# Patient Record
Sex: FEMALE | Race: WHITE | Employment: OTHER | URBAN - METROPOLITAN AREA
[De-identification: names, ages, dates, MRNs, and addresses within clinical notes are randomized per-mention and may not be internally consistent; named-entity substitution may affect disease eponyms.]

---

## 2017-01-30 ENCOUNTER — LAB ENCOUNTER (OUTPATIENT)
Dept: LAB | Age: 77
End: 2017-01-30
Attending: INTERNAL MEDICINE
Payer: MEDICARE

## 2017-01-30 ENCOUNTER — PRIOR ORIGINAL RECORDS (OUTPATIENT)
Dept: OTHER | Age: 77
End: 2017-01-30

## 2017-01-30 DIAGNOSIS — R53.81 DEBILITY: ICD-10-CM

## 2017-01-30 DIAGNOSIS — R10.9 STOMACH ACHE: ICD-10-CM

## 2017-01-30 DIAGNOSIS — E11.9 DIABETES MELLITUS (HCC): Primary | ICD-10-CM

## 2017-01-30 DIAGNOSIS — R35.0 URINARY FREQUENCY: ICD-10-CM

## 2017-01-30 LAB
ALBUMIN SERPL BCP-MCNC: 3.3 G/DL (ref 3.5–4.8)
ALBUMIN/GLOB SERPL: 1 {RATIO} (ref 1–2)
ALP SERPL-CCNC: 52 U/L (ref 32–100)
ALT SERPL-CCNC: 27 U/L (ref 14–54)
ANION GAP SERPL CALC-SCNC: 8 MMOL/L (ref 0–18)
AST SERPL-CCNC: 22 U/L (ref 15–41)
BASOPHILS # BLD: 0 K/UL (ref 0–0.2)
BASOPHILS NFR BLD: 0 %
BILIRUB SERPL-MCNC: 0.3 MG/DL (ref 0.3–1.2)
BILIRUB UR QL: NEGATIVE
BUN SERPL-MCNC: 12 MG/DL (ref 8–20)
BUN/CREAT SERPL: 24 (ref 10–20)
CALCIUM SERPL-MCNC: 8.9 MG/DL (ref 8.5–10.5)
CHLORIDE SERPL-SCNC: 97 MMOL/L (ref 95–110)
CHOLEST SERPL-MCNC: 204 MG/DL (ref 110–200)
CLARITY UR: CLEAR
CO2 SERPL-SCNC: 35 MMOL/L (ref 22–32)
COLOR UR: YELLOW
CREAT SERPL-MCNC: 0.5 MG/DL (ref 0.5–1.5)
CREAT UR-MCNC: 79.5 MG/DL
EOSINOPHIL # BLD: 0.1 K/UL (ref 0–0.7)
EOSINOPHIL NFR BLD: 1 %
ERYTHROCYTE [DISTWIDTH] IN BLOOD BY AUTOMATED COUNT: 14.2 % (ref 11–15)
GLOBULIN PLAS-MCNC: 3.3 G/DL (ref 2.5–3.7)
GLUCOSE SERPL-MCNC: 115 MG/DL (ref 70–99)
GLUCOSE UR-MCNC: NEGATIVE MG/DL
HCT VFR BLD AUTO: 43.2 % (ref 35–48)
HDLC SERPL-MCNC: 68 MG/DL
HGB BLD-MCNC: 14.5 G/DL (ref 12–16)
HGB UR QL STRIP.AUTO: NEGATIVE
KETONES UR-MCNC: NEGATIVE MG/DL
LDLC SERPL CALC-MCNC: 117 MG/DL (ref 0–99)
LEUKOCYTE ESTERASE UR QL STRIP.AUTO: NEGATIVE
LYMPHOCYTES # BLD: 1.3 K/UL (ref 1–4)
LYMPHOCYTES NFR BLD: 16 %
MCH RBC QN AUTO: 30.9 PG (ref 27–32)
MCHC RBC AUTO-ENTMCNC: 33.5 G/DL (ref 32–37)
MCV RBC AUTO: 92.2 FL (ref 80–100)
MICROALBUMIN UR-MCNC: 0 MG/DL (ref 0–1.8)
MICROALBUMIN/CREAT UR: 0 MG/G{CREAT} (ref 0–20)
MONOCYTES # BLD: 0.6 K/UL (ref 0–1)
MONOCYTES NFR BLD: 8 %
NEUTROPHILS # BLD AUTO: 6 K/UL (ref 1.8–7.7)
NEUTROPHILS NFR BLD: 75 %
NITRITE UR QL STRIP.AUTO: NEGATIVE
NONHDLC SERPL-MCNC: 136 MG/DL
OSMOLALITY UR CALC.SUM OF ELEC: 291 MOSM/KG (ref 275–295)
PH UR: 6 [PH] (ref 5–8)
PLATELET # BLD AUTO: 209 K/UL (ref 140–400)
PMV BLD AUTO: 7.6 FL (ref 7.4–10.3)
POTASSIUM SERPL-SCNC: 4.3 MMOL/L (ref 3.3–5.1)
PROT SERPL-MCNC: 6.6 G/DL (ref 5.9–8.4)
PROT UR-MCNC: NEGATIVE MG/DL
RBC # BLD AUTO: 4.69 M/UL (ref 3.7–5.4)
SODIUM SERPL-SCNC: 140 MMOL/L (ref 136–144)
SP GR UR STRIP: 1.02 (ref 1–1.03)
TRIGL SERPL-MCNC: 96 MG/DL (ref 1–149)
TSH SERPL-ACNC: 1.23 UIU/ML (ref 0.34–5.6)
UROBILINOGEN UR STRIP-ACNC: <2
VIT C UR-MCNC: NEGATIVE MG/DL
WBC # BLD AUTO: 8 K/UL (ref 4–11)

## 2017-01-30 PROCEDURE — 80061 LIPID PANEL: CPT

## 2017-01-30 PROCEDURE — 84443 ASSAY THYROID STIM HORMONE: CPT

## 2017-01-30 PROCEDURE — 81003 URINALYSIS AUTO W/O SCOPE: CPT

## 2017-01-30 PROCEDURE — 82043 UR ALBUMIN QUANTITATIVE: CPT

## 2017-01-30 PROCEDURE — 82306 VITAMIN D 25 HYDROXY: CPT

## 2017-01-30 PROCEDURE — 83036 HEMOGLOBIN GLYCOSYLATED A1C: CPT

## 2017-01-30 PROCEDURE — 36415 COLL VENOUS BLD VENIPUNCTURE: CPT

## 2017-01-30 PROCEDURE — 82570 ASSAY OF URINE CREATININE: CPT

## 2017-01-30 PROCEDURE — 85025 COMPLETE CBC W/AUTO DIFF WBC: CPT

## 2017-01-30 PROCEDURE — 80053 COMPREHEN METABOLIC PANEL: CPT

## 2017-01-31 LAB — HBA1C MFR BLD: 6 % (ref 4–6)

## 2017-02-01 LAB — 25(OH)D3 SERPL-MCNC: 39 NG/ML

## 2017-02-16 ENCOUNTER — OFFICE VISIT (OUTPATIENT)
Dept: OTOLARYNGOLOGY | Facility: CLINIC | Age: 77
End: 2017-02-16

## 2017-02-16 VITALS
HEART RATE: 76 BPM | BODY MASS INDEX: 31.32 KG/M2 | SYSTOLIC BLOOD PRESSURE: 122 MMHG | WEIGHT: 188 LBS | DIASTOLIC BLOOD PRESSURE: 60 MMHG | TEMPERATURE: 98 F | HEIGHT: 65 IN

## 2017-02-16 DIAGNOSIS — J34.89 NASAL CRUSTING: Primary | ICD-10-CM

## 2017-02-16 PROCEDURE — G0463 HOSPITAL OUTPT CLINIC VISIT: HCPCS | Performed by: OTOLARYNGOLOGY

## 2017-02-16 PROCEDURE — 99214 OFFICE O/P EST MOD 30 MIN: CPT | Performed by: OTOLARYNGOLOGY

## 2017-02-16 NOTE — PROGRESS NOTES
Johnna Reyes is a 78year old female. Patient presents with:  Lesion: left nostril x 3 years      HISTORY OF PRESENT ILLNESS  She presents with a history Left-sided pulmonary issues. She is nonworking left hemidiaphragm.  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    • Diverticulitis      colonoscopy            Past Surgical History    T&A      HYSTERECTOMY Detail Normal Submental. Submandibular. Anterior cervical. Posterior cervical. Supraclavicular.         Nose/Mouth/Throat Normal External nose - Normal. Lips/teeth/gums - Normal. Tonsils - Normal. Oropharynx - Normal.   Nose/Mouth/Throat Normal Nares - Righ Take  by mouth. Take one tablet by mouth every day, Disp: , Rfl:   •  Multiple Vitamin (MULTI-VITAMINS) Oral Tab, Take  by mouth.  take 1 tablet by ORAL route  every day with food, Disp: , Rfl:   •  omega-3 fatty acids (FISH OIL) 1000 MG Oral Cap, Take  by

## 2017-05-04 ENCOUNTER — OFFICE VISIT (OUTPATIENT)
Dept: DERMATOLOGY CLINIC | Facility: CLINIC | Age: 77
End: 2017-05-04

## 2017-05-04 DIAGNOSIS — D23.9 BENIGN NEOPLASM OF SKIN, UNSPECIFIED LOCATION: ICD-10-CM

## 2017-05-04 DIAGNOSIS — L82.0 INFLAMED SEBORRHEIC KERATOSIS: ICD-10-CM

## 2017-05-04 DIAGNOSIS — L82.1 SEBORRHEIC KERATOSES: ICD-10-CM

## 2017-05-04 DIAGNOSIS — L57.0 AK (ACTINIC KERATOSIS): Primary | ICD-10-CM

## 2017-05-04 DIAGNOSIS — L71.9 ROSACEA: ICD-10-CM

## 2017-05-04 PROCEDURE — 99213 OFFICE O/P EST LOW 20 MIN: CPT | Performed by: DERMATOLOGY

## 2017-05-04 PROCEDURE — 17000 DESTRUCT PREMALG LESION: CPT | Performed by: DERMATOLOGY

## 2017-05-04 NOTE — PROGRESS NOTES
Past Medical History   Diagnosis Date   • A-fib [facility])    • COPD (chronic obstructive pulmonary disease) ([facility] [address]    • Arthritis    • Unspecified essential hypertension    • Extrinsic asthma, unspecified    • Headache    • Heart disease    • High cholesterol

## 2017-05-21 NOTE — PROGRESS NOTES
Jacoby Orellana is a 78year old female. HPI:     CC:  Patient presents with:  Derm Problem: established pt. Pt here with concerns of lesion to right cheek. onset x multiple years, but has recently become \"rougher and raised\".   Pt denies personal hx of Chloride ER (K-DUR M20) 20 MEQ Oral Tab CR  Disp:  Rfl:    HYDROcodone-acetaminophen (NORCO) 5-325 MG Oral Tab  Disp:  Rfl:    triamcinolone acetonide (KENALOG) 0.1 % Apply Externally Cream  Disp:  Rfl: 1   mupirocin (BACTROBAN) 2 % Apply Externally Ointme reaction(s): Vomitting  Levofloxacin                Comment:Other reaction(s): LEVOFLOXACIN  Penicillins                 Comment:Other reaction(s): Unknown    Past Medical History   Diagnosis Date   • A-[facility])    • COPD (chronic obstructive pulmonary di their usual state of health. History, medications, allergies reviewed as noted. ROS:  Denies any other systemic complaints. No new or changeing lesions other than noted above. No fevers, chills, night sweats, unusual sun sensitivity.   No other skin persistent, still bothersome slowly improving    Please refer to map for specific lesions. See additional diagnoses. Pros cons of various therapies, risks benefits discussed. Pathophysiology discussed with patient. Therapeutic options reviewed.   See  Med

## 2017-08-15 LAB
ALBUMIN: 3.3 G/DL
ALKALINE PHOSPHATATE(ALK PHOS): 52 IU/L
ALT (SGPT): 27 U/L
AST (SGOT): 22 U/L
BILIRUBIN TOTAL: 0.3 MG/DL
BUN: 12 MG/DL
CALCIUM: 8.9 MG/DL
CHLORIDE: 97 MEQ/L
CHOLESTEROL, TOTAL: 204 MG/DL
CREATININE, SERUM: 0.5 MG/DL
GLOBULIN: 3.3 G/DL
GLUCOSE: 115 MG/DL
GLUCOSE: 115 MG/DL
HDL CHOLESTEROL: 68 MG/DL
HEMOGLOBIN A1C: 6 %
LDL CHOLESTEROL: 117 MG/DL
POTASSIUM, SERUM: 4.3 MEQ/L
PROTEIN, TOTAL: 6.6 G/DL
SGOT (AST): 22 IU/L
SGPT (ALT): 27 IU/L
SODIUM: 140 MEQ/L
THYROID STIMULATING HORMONE: 1.23 MLU/L
TRIGLYCERIDES: 96 MG/DL

## 2017-08-16 ENCOUNTER — PRIOR ORIGINAL RECORDS (OUTPATIENT)
Dept: OTHER | Age: 77
End: 2017-08-16

## 2017-08-22 ENCOUNTER — HOSPITAL ENCOUNTER (OUTPATIENT)
Dept: ULTRASOUND IMAGING | Facility: HOSPITAL | Age: 77
Discharge: HOME | End: 2017-08-22
Attending: INTERNAL MEDICINE
Payer: MEDICARE

## 2017-08-22 DIAGNOSIS — R60.0 ARM EDEMA: ICD-10-CM

## 2017-08-22 PROCEDURE — 93971 EXTREMITY STUDY: CPT | Performed by: INTERNAL MEDICINE

## 2017-09-28 ENCOUNTER — TELEPHONE (OUTPATIENT)
Dept: PODIATRY CLINIC | Facility: CLINIC | Age: 77
End: 2017-09-28

## 2017-09-28 NOTE — TELEPHONE ENCOUNTER
Pt requesting to speak to RN, was pt of [facility], states her L big toe nail is coming off. Pls advise thank you.

## 2017-09-28 NOTE — TELEPHONE ENCOUNTER
Great toe on the left foot. Fungal infection for years and now nail is coming loose . Pt will place a bandaide on the nail   Appointment made for [place] next week.

## 2017-10-05 ENCOUNTER — OFFICE VISIT (OUTPATIENT)
Dept: PODIATRY CLINIC | Facility: CLINIC | Age: 77
End: 2017-10-05

## 2017-10-05 DIAGNOSIS — M79.674 PAIN IN TOES OF BOTH FEET: Primary | ICD-10-CM

## 2017-10-05 DIAGNOSIS — M79.675 PAIN IN TOES OF BOTH FEET: Primary | ICD-10-CM

## 2017-10-05 DIAGNOSIS — B35.1 ONYCHOMYCOSIS: ICD-10-CM

## 2017-10-05 PROCEDURE — 11721 DEBRIDE NAIL 6 OR MORE: CPT | Performed by: PODIATRIST

## 2017-10-05 PROCEDURE — 99213 OFFICE O/P EST LOW 20 MIN: CPT | Performed by: PODIATRIST

## 2017-10-05 RX ORDER — FUROSEMIDE 10 MG/ML
SOLUTION ORAL AS NEEDED
COMMUNITY
End: 2018-05-03

## 2017-10-05 NOTE — PROGRESS NOTES
HPI:    Patient ID: Em Braun is a 78year old female. HPI  This 72-year-old female presents as a new patient to me and the primary concern is difficulty caring for and managing her toenails.   She has noticed that the left great toenail is completel one tablet by mouth every day Disp:  Rfl:    Loratadine 10 MG Oral Cap Take  by mouth. Take one tablet by mouth daily Disp:  Rfl:    Montelukast Sodium (SINGULAIR) 10 MG Oral Tab Take  by mouth.  Take one tablet by mouth every day Disp:  Rfl:    Multiple Vi nature of these nails and will reevaluate as needed.   Patient indicates an understanding plan follow-up if needed         ASSESSMENT/PLAN:   Pain in toes of both feet  (primary encounter diagnosis)  Onychomycosis    No orders of the defined types were plac

## 2017-10-16 ENCOUNTER — HOSPITAL ENCOUNTER (OUTPATIENT)
Dept: MAMMOGRAPHY | Age: 77
Discharge: HOME | End: 2017-10-16
Attending: OBSTETRICS & GYNECOLOGY
Payer: MEDICARE

## 2017-10-16 DIAGNOSIS — Z12.31 VISIT FOR SCREENING MAMMOGRAM: ICD-10-CM

## 2017-10-16 PROCEDURE — 77067 SCR MAMMO BI INCL CAD: CPT | Performed by: OBSTETRICS & GYNECOLOGY

## 2017-10-30 ENCOUNTER — TELEPHONE (OUTPATIENT)
Dept: PODIATRY CLINIC | Facility: CLINIC | Age: 77
End: 2017-10-30

## 2017-10-30 NOTE — TELEPHONE ENCOUNTER
appt 10-5-17, dr carrillo helped the toe nail come off. Top 2/3rd of the toe is red. Not hot. No pain. Just red.   Please call to advise

## 2017-10-30 NOTE — TELEPHONE ENCOUNTER
S/w pt and she states that her left hallux from the base of the nail 2/3 way down and on the bottom of toe has been red since before SCR removed the toenail on 10/5 and the redness hasn't gone away.  She denies any swelling, warmth, d/c, fever, chills and s

## 2017-10-31 NOTE — TELEPHONE ENCOUNTER
Spoke to pt and scheduled appt with SCR for tomorrow at 4:30pm at [facility]. [facility] Pt verbalized understanding.

## 2017-11-01 ENCOUNTER — OFFICE VISIT (OUTPATIENT)
Dept: PODIATRY CLINIC | Facility: CLINIC | Age: 77
End: 2017-11-01

## 2017-11-01 DIAGNOSIS — B35.1 ONYCHOMYCOSIS: ICD-10-CM

## 2017-11-01 DIAGNOSIS — M79.674 PAIN IN TOES OF BOTH FEET: Primary | ICD-10-CM

## 2017-11-01 DIAGNOSIS — M79.675 PAIN IN TOES OF BOTH FEET: Primary | ICD-10-CM

## 2017-11-01 PROCEDURE — 11721 DEBRIDE NAIL 6 OR MORE: CPT | Performed by: PODIATRIST

## 2017-11-01 RX ORDER — FLUTICASONE FUROATE 200 UG/1
POWDER RESPIRATORY (INHALATION)
Status: ON HOLD | COMMUNITY
Start: 2017-09-29 | End: 2020-02-03

## 2017-11-02 NOTE — PROGRESS NOTES
HPI:    Patient ID: Em Braun is a 78year old female. HPI  This 51-year-old female presents with recurrent pain associated with her toenails.   Patient's had relief by previous care and today there is some slight erythema of the left great toe there Disp:  Rfl:    aspirin 81 MG Oral Tab Take  by mouth. Take one tablet by mouth daily Disp:  Rfl:    budesonide (PULMICORT) 0.25 MG/2ML Inhalation Suspension Inhale  into the lungs.  PULMICORT (unknown strength) Disp:  Rfl:    Calcium Carbonate-Vitamin D (CA

## 2017-11-22 NOTE — TELEPHONE ENCOUNTER
LOV 05/04/17 pt was seen for AK's pt requesting refill for TRIAMCINOLONE ACETONIDE 0.1 % External Cream please advise

## 2018-02-21 ENCOUNTER — TELEPHONE (OUTPATIENT)
Dept: OTOLARYNGOLOGY | Facility: CLINIC | Age: 78
End: 2018-02-21

## 2018-02-21 NOTE — TELEPHONE ENCOUNTER
Pt called stating pt has requested a refill for rx mupirocin otiment 2%, 3 different times. Rx. Is used for a sore in pt's nose. Pt has checked with the pharm but they did not know why it was not approved. Will you refill rx.   Call pt to advise

## 2018-02-23 NOTE — TELEPHONE ENCOUNTER
LMTCB. Pt's request for refill of mupirocin was denied. Per JAYNA, pt's LOV was 2/16/17. Since pt had nasal crusting, she should RTC for reevaluation if this is the issue she is still having.

## 2018-02-23 NOTE — TELEPHONE ENCOUNTER
Pt informed per JDO she will need to RTC for reevaluation. Pt had nasal crusting and was prescribed mupirocin; per JDO pt will need to be reevaluated for any changes that might warrant other treatment before any refill is given.   Pt verbalized understandi

## 2018-03-01 ENCOUNTER — OFFICE VISIT (OUTPATIENT)
Dept: OTOLARYNGOLOGY | Facility: CLINIC | Age: 78
End: 2018-03-01

## 2018-03-01 VITALS
TEMPERATURE: 97 F | BODY MASS INDEX: 31.16 KG/M2 | WEIGHT: 187 LBS | DIASTOLIC BLOOD PRESSURE: 68 MMHG | SYSTOLIC BLOOD PRESSURE: 114 MMHG | HEIGHT: 65 IN

## 2018-03-01 DIAGNOSIS — J34.89 NASAL CRUSTING: Primary | ICD-10-CM

## 2018-03-01 PROCEDURE — G0463 HOSPITAL OUTPT CLINIC VISIT: HCPCS | Performed by: OTOLARYNGOLOGY

## 2018-03-01 PROCEDURE — 99214 OFFICE O/P EST MOD 30 MIN: CPT | Performed by: OTOLARYNGOLOGY

## 2018-03-01 RX ORDER — LUBIPROSTONE 8 UG/1
8 CAPSULE, GELATIN COATED ORAL 2 TIMES DAILY WITH MEALS
COMMUNITY
Start: 2018-02-07

## 2018-03-01 RX ORDER — FUROSEMIDE 40 MG/1
20 TABLET ORAL DAILY
COMMUNITY
Start: 2018-02-26

## 2018-03-01 RX ORDER — BUDESONIDE 180 UG/1
AEROSOL, POWDER RESPIRATORY (INHALATION)
COMMUNITY
Start: 2017-12-07 | End: 2019-10-08

## 2018-03-01 RX ORDER — PREDNISONE 20 MG/1
TABLET ORAL
COMMUNITY
Start: 2018-02-08 | End: 2019-10-08

## 2018-03-01 RX ORDER — CYCLOBENZAPRINE HCL 10 MG
TABLET ORAL
COMMUNITY
Start: 2017-12-28 | End: 2019-10-08 | Stop reason: ALTCHOICE

## 2018-03-01 RX ORDER — GUAIFENESIN AND CODEINE PHOSPHATE 10; 100 MG/5ML; MG/5ML
LIQUID ORAL
Refills: 0 | COMMUNITY
Start: 2018-01-26 | End: 2019-10-08

## 2018-03-01 RX ORDER — DILTIAZEM HYDROCHLORIDE 240 MG/1
CAPSULE, COATED, EXTENDED RELEASE ORAL
COMMUNITY
Start: 2018-02-16 | End: 2019-10-08

## 2018-03-01 NOTE — PROGRESS NOTES
Rishi Baldwin is a 78year old female. Patient presents with:  Nose Problem: left nostril crusting- LOV 2/16/17      HISTORY OF PRESENT ILLNESS  She presents with a history Left-sided pulmonary issues. She is nonworking left hemidiaphragm.  She now has bec ovarian primary   • Pulmonary Disease Sister      COPD   • Skin cancer Other    • Stroke Other    • Other [OTHER] Other        Past Medical History:   Diagnosis Date   • A-fib [facility])    • Arthritis    • COPD (chronic obstructive pulmonary disease) ([facility] Normal. Cranial nerves - Cranial nerves II through XII grossly intact.    Head/Face Normal Facial features - Normal. Eyebrows - Normal. Skull - Normal.        Nasopharynx Normal External nose - Normal. Lips/teeth/gums - Normal. Tonsils - Normal. Oropharynx Rfl:   •  Diltiazem HCl ER (DILACOR XR) 240 MG Oral Capsule SR 24 Hr, , Disp: , Rfl: 0  •  DIGOX 250 MCG Oral Tab, , Disp: , Rfl:   •  Potassium Chloride ER (K-DUR M20) 20 MEQ Oral Tab CR, , Disp: , Rfl:   •  HYDROcodone-acetaminophen (NORCO) 5-325 MG Oral Tab, , Disp: , Rfl:   •  GUAIATUSSIN -10 MG/5ML Oral Syrup, , Disp: , Rfl: 0  •  mupirocin (BACTROBAN) 2 % Apply Externally Ointment, Apply 1 Application topically 2 (two) times daily. , Disp: 1 Tube, Rfl: 0  ASSESSMENT AND PLAN    1.  Nasal crusting

## 2018-03-27 ENCOUNTER — ANESTHESIA EVENT (OUTPATIENT)
Dept: ENDOSCOPY | Facility: HOSPITAL | Age: 78
End: 2018-03-27
Payer: MEDICARE

## 2018-03-27 ENCOUNTER — SURGERY (OUTPATIENT)
Age: 78
End: 2018-03-27

## 2018-03-27 ENCOUNTER — ANESTHESIA (OUTPATIENT)
Dept: ENDOSCOPY | Facility: HOSPITAL | Age: 78
End: 2018-03-27
Payer: MEDICARE

## 2018-03-27 ENCOUNTER — HOSPITAL ENCOUNTER (OUTPATIENT)
Facility: HOSPITAL | Age: 78
Setting detail: HOSPITAL OUTPATIENT SURGERY
Discharge: HOME | End: 2018-03-27
Attending: SPECIALIST | Admitting: SPECIALIST
Payer: MEDICARE

## 2018-03-27 DIAGNOSIS — R13.10 DYSPHAGIA, UNSPECIFIED TYPE: Primary | ICD-10-CM

## 2018-03-27 DIAGNOSIS — R13.10 DYSPHAGIA: ICD-10-CM

## 2018-03-27 PROCEDURE — 0DB78ZX EXCISION OF STOMACH, PYLORUS, VIA NATURAL OR ARTIFICIAL OPENING ENDOSCOPIC, DIAGNOSTIC: ICD-10-PCS | Performed by: SPECIALIST

## 2018-03-27 PROCEDURE — 88305 TISSUE EXAM BY PATHOLOGIST: CPT | Performed by: SPECIALIST

## 2018-03-27 PROCEDURE — 88312 SPECIAL STAINS GROUP 1: CPT | Performed by: SPECIALIST

## 2018-03-27 RX ORDER — SODIUM CHLORIDE, SODIUM LACTATE, POTASSIUM CHLORIDE, CALCIUM CHLORIDE 600; 310; 30; 20 MG/100ML; MG/100ML; MG/100ML; MG/100ML
INJECTION, SOLUTION INTRAVENOUS CONTINUOUS
Status: DISCONTINUED | OUTPATIENT
Start: 2018-03-27 | End: 2018-03-27

## 2018-03-27 RX ORDER — LIDOCAINE HYDROCHLORIDE 10 MG/ML
INJECTION, SOLUTION EPIDURAL; INFILTRATION; INTRACAUDAL; PERINEURAL AS NEEDED
Status: DISCONTINUED | OUTPATIENT
Start: 2018-03-27 | End: 2018-03-27 | Stop reason: SURG

## 2018-03-27 RX ORDER — MIDAZOLAM HYDROCHLORIDE 1 MG/ML
1 INJECTION INTRAMUSCULAR; INTRAVENOUS EVERY 5 MIN PRN
Status: DISCONTINUED | OUTPATIENT
Start: 2018-03-27 | End: 2018-03-27

## 2018-03-27 RX ORDER — SODIUM CHLORIDE 0.9 % (FLUSH) 0.9 %
10 SYRINGE (ML) INJECTION AS NEEDED
Status: DISCONTINUED | OUTPATIENT
Start: 2018-03-27 | End: 2018-03-27

## 2018-03-27 RX ORDER — NALOXONE HYDROCHLORIDE 0.4 MG/ML
80 INJECTION, SOLUTION INTRAMUSCULAR; INTRAVENOUS; SUBCUTANEOUS AS NEEDED
Status: DISCONTINUED | OUTPATIENT
Start: 2018-03-27 | End: 2018-03-27

## 2018-03-27 RX ADMIN — SODIUM CHLORIDE, SODIUM LACTATE, POTASSIUM CHLORIDE, CALCIUM CHLORIDE: 600; 310; 30; 20 INJECTION, SOLUTION INTRAVENOUS at 09:57:00

## 2018-03-27 RX ADMIN — LIDOCAINE HYDROCHLORIDE 50 MG: 10 INJECTION, SOLUTION EPIDURAL; INFILTRATION; INTRACAUDAL; PERINEURAL at 09:45:00

## 2018-03-27 RX ADMIN — SODIUM CHLORIDE, SODIUM LACTATE, POTASSIUM CHLORIDE, CALCIUM CHLORIDE: 600; 310; 30; 20 INJECTION, SOLUTION INTRAVENOUS at 09:30:00

## 2018-03-27 NOTE — ANESTHESIA POSTPROCEDURE EVALUATION
Patient: Rishi Baldwin    Procedure Summary     Date:  03/27/18 Room / Location:  [facility] ENDOSCOPY 05 / [facility] ENDOSCOPY    Anesthesia Start:  2045 Anesthesia Stop:      Procedure:  ESOPHAGOGASTRODUODENOSCOPY (EGD) (N/A ) Diagnosis:       Dysphagia      (poor ga

## 2018-03-27 NOTE — INTERVAL H&P NOTE
Pre-op Diagnosis: DYSPHAGIA    The above referenced H&P was reviewed by Remy Arzola MD on 3/27/2018, the patient was examined and no significant changes have occurred in the patient's condition since the H&P was performed.   I discussed with

## 2018-03-27 NOTE — ANESTHESIA PREPROCEDURE EVALUATION
Anesthesia PreOp Note    HPI:     Felice Lamb is a 78year old female who presents for preoperative consultation requested by: Dinora Maurer., Shankar Horvath MD    Date of Surgery: 3/27/2018    Procedure(s):  ESOPHAGOGASTRODUODENOSCOPY (EGD)  Indication: D MCG/ACT Inhalation Aero Soln  Disp:  Rfl:  3/20/2018   Cholecalciferol (VITAMIN D) 1000 UNITS Oral Tab Take by mouth.  Disp:  Rfl:  3/26/2018 at 2300   Crotamiton 10 % External Lotion Use tid for itching Disp: 60 g Rfl: 3 3/26/2018   Diltiazem HCl ER [facility] mouth. VITAMIN B COMPLEX (unknown strength) Disp:  Rfl:  3/26/2018 at 2300   Cyclobenzaprine HCl 10 MG Oral Tab  Disp:  Rfl:  Not Taking   DilTIAZem HCl ER Coated Beads 240 MG Oral Capsule SR 24 Hr  Disp:  Rfl:  Taking   GUAIATUSSIN -10 MG/5ML Oral S LEVOFLOXACIN  Penicillins                 Comment:Other reaction(s): Unknown    Family History   Problem Relation Age of Onset   • Ovarian Cancer Mother 68     endometrial, poss ovarian primary   • Pulmonary Disease Sister      COPD   • Skin cancer Other management. All of the patient's questions were answered to the best of my ability. The patient desires the anesthetic management as planned.   Tricia Jean  3/27/2018 9:32 AM

## 2018-03-27 NOTE — OPERATIVE REPORT
EGD PROCEDURE REPORT    DATE OF PROCEDURE:  3/27/2018     PREOPERATIVE DIAGNOSIS: Reflux and generalized abdominal pain chronic diarrhea     POSTOPERATIVE DIAGNOSIS: Gastritis esophagitis     SURGEON:  SAGAR Crandall

## 2018-03-28 VITALS
SYSTOLIC BLOOD PRESSURE: 120 MMHG | OXYGEN SATURATION: 94 % | WEIGHT: 190 LBS | DIASTOLIC BLOOD PRESSURE: 61 MMHG | TEMPERATURE: 98 F | BODY MASS INDEX: 31.65 KG/M2 | HEIGHT: 65 IN | HEART RATE: 79 BPM | RESPIRATION RATE: 16 BRPM

## 2018-05-03 ENCOUNTER — OFFICE VISIT (OUTPATIENT)
Dept: DERMATOLOGY CLINIC | Facility: CLINIC | Age: 78
End: 2018-05-03

## 2018-05-03 DIAGNOSIS — L71.9 ROSACEA: ICD-10-CM

## 2018-05-03 DIAGNOSIS — L82.1 SEBORRHEIC KERATOSES: ICD-10-CM

## 2018-05-03 DIAGNOSIS — D23.9 BENIGN NEOPLASM OF SKIN, UNSPECIFIED LOCATION: ICD-10-CM

## 2018-05-03 DIAGNOSIS — L57.0 AK (ACTINIC KERATOSIS): Primary | ICD-10-CM

## 2018-05-03 PROCEDURE — 17000 DESTRUCT PREMALG LESION: CPT | Performed by: DERMATOLOGY

## 2018-05-03 PROCEDURE — 99213 OFFICE O/P EST LOW 20 MIN: CPT | Performed by: DERMATOLOGY

## 2018-05-14 NOTE — PROGRESS NOTES
Em Braun is a 78year old female. HPI:     CC:  Patient presents with:  Lesion: LOV 5/2017. Patient presents with changing lesion to chest x couple weeks. Per patient, rasied and rough. No personal hx of skin ca.  Sister with hx of non melanoma skin c 0.0 oz/week     Comment: one drink once a week         Current Outpatient Prescriptions:  PULMICORT FLEXHALER 180 MCG/ACT Inhalation Aerosol Powder, Breath Activated  Disp:  Rfl:    Cyclobenzaprine HCl 10 MG Oral Tab  Disp:  Rfl:    DilTIAZem HCl ER mouth every day Disp:  Rfl:    Loratadine 10 MG Oral Cap Take  by mouth. Take one tablet by mouth daily Disp:  Rfl:    Montelukast Sodium (SINGULAIR) 10 MG Oral Tab Take  by mouth.  Take one tablet by mouth every day Disp:  Rfl:    Multiple Vitamin (MULTI-V swallowing     occasionally   • Shortness of breath     2 L NC ALL THE TIME 24HR/7 DAYS PER WEEK   • Unspecified essential hypertension    • Visual impairment      Past Surgical History:  No date: ADENOIDECTOMY  No date: CATARACT      Comment: cataract ext scalp, head, neck, face,nails, hair, external eyes, including conjunctival mucosa, eyelids, lips external ears, back, chest,/ breasts, axillae,  abdomen, arms, legs, palms.      Multiple light to medium brown, well marginated, uniformly pigmented, macules a cherry angiomas:  Reassurance regarding other benign skin lesions. Signs and symptoms of skin cancer, ABCDE's of melanoma discussed with patient. Sunscreen use, sun protection, self exams reviewed.   Followup as noted RTC routine checkup 6 mos - one year or

## 2018-05-21 ENCOUNTER — PRIOR ORIGINAL RECORDS (OUTPATIENT)
Dept: OTHER | Age: 78
End: 2018-05-21

## 2018-05-21 ENCOUNTER — LAB ENCOUNTER (OUTPATIENT)
Dept: LAB | Age: 78
End: 2018-05-21
Attending: INTERNAL MEDICINE
Payer: MEDICARE

## 2018-05-21 DIAGNOSIS — E83.42 HYPOMAGNESEMIA: Primary | ICD-10-CM

## 2018-05-21 DIAGNOSIS — E11.9 DIABETES MELLITUS (HCC): ICD-10-CM

## 2018-05-21 DIAGNOSIS — R35.0 URINARY FREQUENCY: ICD-10-CM

## 2018-05-21 DIAGNOSIS — I48.20 CHRONIC ATRIAL FIBRILLATION (HCC): ICD-10-CM

## 2018-05-21 DIAGNOSIS — E55.9 VITAMIN D DEFICIENCY: ICD-10-CM

## 2018-05-21 PROCEDURE — 82570 ASSAY OF URINE CREATININE: CPT

## 2018-05-21 PROCEDURE — 83036 HEMOGLOBIN GLYCOSYLATED A1C: CPT

## 2018-05-21 PROCEDURE — 36415 COLL VENOUS BLD VENIPUNCTURE: CPT

## 2018-05-21 PROCEDURE — 82043 UR ALBUMIN QUANTITATIVE: CPT

## 2018-05-21 PROCEDURE — 81015 MICROSCOPIC EXAM OF URINE: CPT

## 2018-05-21 PROCEDURE — 83735 ASSAY OF MAGNESIUM: CPT

## 2018-05-21 PROCEDURE — 82306 VITAMIN D 25 HYDROXY: CPT

## 2018-05-21 PROCEDURE — 80162 ASSAY OF DIGOXIN TOTAL: CPT

## 2018-05-21 PROCEDURE — 80053 COMPREHEN METABOLIC PANEL: CPT

## 2018-05-21 PROCEDURE — 80061 LIPID PANEL: CPT

## 2018-06-11 ENCOUNTER — PRIOR ORIGINAL RECORDS (OUTPATIENT)
Dept: OTHER | Age: 78
End: 2018-06-11

## 2018-06-11 LAB
ALT (SGPT): 20 U/L
AST (SGOT): 16 U/L
BUN: 11 MG/DL
CHOLESTEROL, TOTAL: 196 MG/DL
CREATININE, SERUM: 0.53 MG/DL
DIGOXIN LEVEL: 1.7 NG/ML
GLUCOSE: 128 MG/DL
GLUCOSE: 128 MG/DL
HDL CHOLESTEROL: 64 MG/DL
HEMOGLOBIN A1C: 6.3 %
LDL CHOLESTEROL: 118 MG/DL
MAGNESIUM: 1.8 MG/DL
NON-HDL CHOLESTEROL: 132 MG/DL
POTASSIUM, SERUM: 4.4 MEQ/L
SGOT (AST): 16 IU/L
SGPT (ALT): 20 IU/L
SODIUM: 139 MEQ/L
TRIGLYCERIDES: 72 MG/DL

## 2018-08-13 ENCOUNTER — LAB ENCOUNTER (OUTPATIENT)
Dept: LAB | Age: 78
End: 2018-08-13
Attending: INTERNAL MEDICINE
Payer: MEDICARE

## 2018-08-13 DIAGNOSIS — R53.83 FATIGUE: Primary | ICD-10-CM

## 2018-08-13 LAB
BASOPHILS # BLD: 0 K/UL (ref 0–0.2)
BASOPHILS NFR BLD: 0 %
EOSINOPHIL # BLD: 0.1 K/UL (ref 0–0.7)
EOSINOPHIL NFR BLD: 1 %
ERYTHROCYTE [DISTWIDTH] IN BLOOD BY AUTOMATED COUNT: 14.2 % (ref 11–15)
HCT VFR BLD AUTO: 41.4 % (ref 35–48)
HGB BLD-MCNC: 13.7 G/DL (ref 12–16)
LYMPHOCYTES # BLD: 1.3 K/UL (ref 1–4)
LYMPHOCYTES NFR BLD: 10 %
MCH RBC QN AUTO: 30.9 PG (ref 27–32)
MCHC RBC AUTO-ENTMCNC: 33 G/DL (ref 32–37)
MCV RBC AUTO: 93.5 FL (ref 80–100)
MONOCYTES # BLD: 0.6 K/UL (ref 0–1)
MONOCYTES NFR BLD: 5 %
NEUTROPHILS # BLD AUTO: 10.4 K/UL (ref 1.8–7.7)
NEUTROPHILS NFR BLD: 84 %
PLATELET # BLD AUTO: 307 K/UL (ref 140–400)
PMV BLD AUTO: 7 FL (ref 7.4–10.3)
RBC # BLD AUTO: 4.43 M/UL (ref 3.7–5.4)
TSH SERPL-ACNC: 1.5 UIU/ML (ref 0.45–5.33)
WBC # BLD AUTO: 12.3 K/UL (ref 4–11)

## 2018-08-13 PROCEDURE — 85025 COMPLETE CBC W/AUTO DIFF WBC: CPT

## 2018-08-13 PROCEDURE — 84443 ASSAY THYROID STIM HORMONE: CPT

## 2018-08-13 PROCEDURE — 36415 COLL VENOUS BLD VENIPUNCTURE: CPT

## 2018-08-15 ENCOUNTER — PRIOR ORIGINAL RECORDS (OUTPATIENT)
Dept: OTHER | Age: 78
End: 2018-08-15

## 2018-08-15 ENCOUNTER — MYAURORA ACCOUNT LINK (OUTPATIENT)
Dept: OTHER | Age: 78
End: 2018-08-15

## 2018-08-24 ENCOUNTER — PRIOR ORIGINAL RECORDS (OUTPATIENT)
Dept: OTHER | Age: 78
End: 2018-08-24

## 2018-08-24 ENCOUNTER — MYAURORA ACCOUNT LINK (OUTPATIENT)
Dept: OTHER | Age: 78
End: 2018-08-24

## 2018-08-27 ENCOUNTER — HOSPITAL ENCOUNTER (OUTPATIENT)
Dept: GENERAL RADIOLOGY | Age: 78
Discharge: HOME | End: 2018-08-27
Attending: INTERNAL MEDICINE
Payer: MEDICARE

## 2018-08-27 DIAGNOSIS — I10 HYPERTENSION: ICD-10-CM

## 2018-08-27 PROCEDURE — 71046 X-RAY EXAM CHEST 2 VIEWS: CPT | Performed by: INTERNAL MEDICINE

## 2018-08-28 ENCOUNTER — PRIOR ORIGINAL RECORDS (OUTPATIENT)
Dept: OTHER | Age: 78
End: 2018-08-28

## 2018-08-29 ENCOUNTER — HOSPITAL ENCOUNTER (OUTPATIENT)
Dept: CARDIOLOGY CLINIC | Age: 78
Discharge: HOME | End: 2018-08-29
Attending: INTERNAL MEDICINE
Payer: MEDICARE

## 2018-08-29 ENCOUNTER — HOSPITAL SCAN (OUTPATIENT)
Dept: CARDIOLOGY | Age: 78
End: 2018-08-29

## 2018-08-29 DIAGNOSIS — R06.02 SOB (SHORTNESS OF BREATH): ICD-10-CM

## 2018-08-29 PROCEDURE — 93306 TTE W/DOPPLER COMPLETE: CPT | Performed by: INTERNAL MEDICINE

## 2018-08-30 ENCOUNTER — PRIOR ORIGINAL RECORDS (OUTPATIENT)
Dept: OTHER | Age: 78
End: 2018-08-30

## 2018-11-01 ENCOUNTER — APPOINTMENT (OUTPATIENT)
Dept: CARDIAC REHAB | Facility: HOSPITAL | Age: 78
End: 2018-11-01
Attending: INTERNAL MEDICINE
Payer: MEDICARE

## 2018-11-08 ENCOUNTER — CARDPULM VISIT (OUTPATIENT)
Dept: CARDIAC REHAB | Facility: HOSPITAL | Age: 78
End: 2018-11-08
Attending: INTERNAL MEDICINE
Payer: MEDICARE

## 2018-11-13 ENCOUNTER — APPOINTMENT (OUTPATIENT)
Dept: CARDIAC REHAB | Facility: HOSPITAL | Age: 78
End: 2018-11-13
Attending: INTERNAL MEDICINE
Payer: MEDICARE

## 2018-11-15 ENCOUNTER — APPOINTMENT (OUTPATIENT)
Dept: CARDIAC REHAB | Facility: HOSPITAL | Age: 78
End: 2018-11-15
Attending: INTERNAL MEDICINE
Payer: MEDICARE

## 2018-11-27 ENCOUNTER — APPOINTMENT (OUTPATIENT)
Dept: CARDIAC REHAB | Facility: HOSPITAL | Age: 78
End: 2018-11-27
Attending: INTERNAL MEDICINE
Payer: MEDICARE

## 2018-11-29 ENCOUNTER — APPOINTMENT (OUTPATIENT)
Dept: CARDIAC REHAB | Facility: HOSPITAL | Age: 78
End: 2018-11-29
Attending: INTERNAL MEDICINE
Payer: MEDICARE

## 2018-12-04 ENCOUNTER — APPOINTMENT (OUTPATIENT)
Dept: CARDIAC REHAB | Facility: HOSPITAL | Age: 78
End: 2018-12-04
Attending: INTERNAL MEDICINE
Payer: MEDICARE

## 2018-12-06 ENCOUNTER — CARDPULM VISIT (OUTPATIENT)
Dept: CARDIAC REHAB | Facility: HOSPITAL | Age: 78
End: 2018-12-06
Attending: INTERNAL MEDICINE
Payer: MEDICARE

## 2018-12-11 ENCOUNTER — CARDPULM VISIT (OUTPATIENT)
Dept: CARDIAC REHAB | Facility: HOSPITAL | Age: 78
End: 2018-12-11
Attending: INTERNAL MEDICINE
Payer: MEDICARE

## 2018-12-18 ENCOUNTER — CARDPULM VISIT (OUTPATIENT)
Dept: CARDIAC REHAB | Facility: HOSPITAL | Age: 78
End: 2018-12-18
Attending: INTERNAL MEDICINE
Payer: MEDICARE

## 2018-12-20 ENCOUNTER — CARDPULM VISIT (OUTPATIENT)
Dept: CARDIAC REHAB | Facility: HOSPITAL | Age: 78
End: 2018-12-20
Attending: INTERNAL MEDICINE
Payer: MEDICARE

## 2018-12-21 ENCOUNTER — OFFICE VISIT (OUTPATIENT)
Dept: DERMATOLOGY CLINIC | Facility: CLINIC | Age: 78
End: 2018-12-21
Payer: MEDICARE

## 2018-12-21 DIAGNOSIS — L81.4 LENTIGO: ICD-10-CM

## 2018-12-21 DIAGNOSIS — D23.9 BENIGN NEOPLASM OF SKIN, UNSPECIFIED LOCATION: ICD-10-CM

## 2018-12-21 DIAGNOSIS — L82.1 SEBORRHEIC KERATOSES: ICD-10-CM

## 2018-12-21 DIAGNOSIS — L57.0 AK (ACTINIC KERATOSIS): Primary | ICD-10-CM

## 2018-12-21 PROCEDURE — 17000 DESTRUCT PREMALG LESION: CPT | Performed by: DERMATOLOGY

## 2018-12-21 PROCEDURE — 99213 OFFICE O/P EST LOW 20 MIN: CPT | Performed by: DERMATOLOGY

## 2018-12-27 ENCOUNTER — CARDPULM VISIT (OUTPATIENT)
Dept: CARDIAC REHAB | Facility: HOSPITAL | Age: 78
End: 2018-12-27
Attending: INTERNAL MEDICINE
Payer: MEDICARE

## 2018-12-31 NOTE — PROGRESS NOTES
Carlton Napier is a 70year old female. HPI:     CC:  Patient presents with:  Lesion: LOV 5-3-18. Pt c/o lesion at L wrist, pink, scaly. Hx of AKs. Allergies:  Cefuroxime;  Levofloxacin; Penicillins    HISTORY:    Past Medical History:   Diagnosis Current Outpatient Medications:  PULMICORT FLEXHALER 180 MCG/ACT Inhalation Aerosol Powder, Breath Activated  Disp:  Rfl:    Cyclobenzaprine HCl 10 MG Oral Tab  Disp:  Rfl:    DilTIAZem HCl ER Coated Beads 240 MG Oral Capsule SR 24 Hr  Disp:  Rfl: mouth. Take one tablet by mouth every day Disp:  Rfl:    Loratadine 10 MG Oral Cap Take  by mouth. Take one tablet by mouth daily Disp:  Rfl:    Montelukast Sodium (SINGULAIR) 10 MG Oral Tab Take  by mouth.  Take one tablet by mouth every day Disp:  Rfl: L NC ALL THE TIME 24HR/7 DAYS PER WEEK   • Unspecified essential hypertension    • Visual impairment      Past Surgical History:   Procedure Laterality Date   • ADENOIDECTOMY     • CATARACT      cataract extraction    • ESOPHAGOGASTRODUODENOSCOPY (EGD) N/A on file    Family History   Problem Relation Age of Onset   • Ovarian Cancer Mother 68        endometrial, poss ovarian primary   • Pulmonary Disease Sister         COPD   • Skin cancer Other    • Stroke Other    • Other (Other) Other        There were no (actinic keratosis)  (primary encounter diagnosis)  Seborrheic keratoses  Benign neoplasm of skin, unspecified location  Lentigo    See details on map. Remarkable for:  Erythematous scaling keratotic papules left dorsal wrist 1 cm  Actinic keratoses.

## 2019-01-08 ENCOUNTER — APPOINTMENT (OUTPATIENT)
Dept: CARDIAC REHAB | Facility: HOSPITAL | Age: 79
End: 2019-01-08
Attending: INTERNAL MEDICINE
Payer: MEDICARE

## 2019-01-10 ENCOUNTER — APPOINTMENT (OUTPATIENT)
Dept: CARDIAC REHAB | Facility: HOSPITAL | Age: 79
End: 2019-01-10
Attending: INTERNAL MEDICINE
Payer: MEDICARE

## 2019-01-15 ENCOUNTER — APPOINTMENT (OUTPATIENT)
Dept: CARDIAC REHAB | Facility: HOSPITAL | Age: 79
End: 2019-01-15
Attending: INTERNAL MEDICINE
Payer: MEDICARE

## 2019-02-28 VITALS
HEART RATE: 81 BPM | WEIGHT: 190 LBS | SYSTOLIC BLOOD PRESSURE: 120 MMHG | HEIGHT: 65 IN | RESPIRATION RATE: 18 BRPM | DIASTOLIC BLOOD PRESSURE: 62 MMHG | BODY MASS INDEX: 31.65 KG/M2

## 2019-02-28 VITALS
RESPIRATION RATE: 18 BRPM | HEIGHT: 65 IN | DIASTOLIC BLOOD PRESSURE: 60 MMHG | BODY MASS INDEX: 31.16 KG/M2 | SYSTOLIC BLOOD PRESSURE: 128 MMHG | WEIGHT: 187 LBS | HEART RATE: 97 BPM

## 2019-03-01 ENCOUNTER — HOSPITAL ENCOUNTER (OUTPATIENT)
Dept: MAMMOGRAPHY | Age: 79
Discharge: HOME | End: 2019-03-01
Attending: OBSTETRICS & GYNECOLOGY
Payer: MEDICARE

## 2019-03-01 ENCOUNTER — HOSPITAL ENCOUNTER (OUTPATIENT)
Dept: BONE DENSITY | Age: 79
Discharge: HOME | End: 2019-03-01
Attending: OBSTETRICS & GYNECOLOGY
Payer: MEDICARE

## 2019-03-01 DIAGNOSIS — Z78.0 POSTMENOPAUSAL STATUS: ICD-10-CM

## 2019-03-01 DIAGNOSIS — M85.80 OSTEOPENIA, UNSPECIFIED LOCATION: ICD-10-CM

## 2019-03-01 DIAGNOSIS — Z12.31 VISIT FOR SCREENING MAMMOGRAM: ICD-10-CM

## 2019-03-01 PROCEDURE — 77067 SCR MAMMO BI INCL CAD: CPT | Performed by: OBSTETRICS & GYNECOLOGY

## 2019-03-01 PROCEDURE — 77080 DXA BONE DENSITY AXIAL: CPT | Performed by: OBSTETRICS & GYNECOLOGY

## 2019-03-01 PROCEDURE — 77063 BREAST TOMOSYNTHESIS BI: CPT | Performed by: OBSTETRICS & GYNECOLOGY

## 2019-03-07 ENCOUNTER — OFFICE VISIT (OUTPATIENT)
Dept: OTOLARYNGOLOGY | Facility: CLINIC | Age: 79
End: 2019-03-07
Payer: MEDICARE

## 2019-03-07 ENCOUNTER — CARDPULM VISIT (OUTPATIENT)
Dept: CARDIAC REHAB | Facility: HOSPITAL | Age: 79
End: 2019-03-07
Attending: INTERNAL MEDICINE
Payer: MEDICARE

## 2019-03-07 ENCOUNTER — LAB ENCOUNTER (OUTPATIENT)
Dept: LAB | Facility: HOSPITAL | Age: 79
End: 2019-03-07
Attending: INTERNAL MEDICINE
Payer: MEDICARE

## 2019-03-07 VITALS
BODY MASS INDEX: 31.16 KG/M2 | HEIGHT: 65 IN | TEMPERATURE: 98 F | WEIGHT: 187 LBS | SYSTOLIC BLOOD PRESSURE: 116 MMHG | DIASTOLIC BLOOD PRESSURE: 58 MMHG

## 2019-03-07 DIAGNOSIS — E11.9 DIABETES MELLITUS, TYPE 2 (HCC): Primary | ICD-10-CM

## 2019-03-07 DIAGNOSIS — J34.89 NASAL CRUSTING: Primary | ICD-10-CM

## 2019-03-07 DIAGNOSIS — R35.0 FREQUENCY OF MICTURITION: ICD-10-CM

## 2019-03-07 DIAGNOSIS — R53.83 OTHER FATIGUE: ICD-10-CM

## 2019-03-07 DIAGNOSIS — E55.9 VITAMIN D DEFICIENCY: ICD-10-CM

## 2019-03-07 LAB
ABSOLUTE IMMATURE GRANULOCYTES (OFFPRE24): NORMAL
ALBUMIN SERPL-MCNC: 3 G/DL (ref 3.4–5)
ALBUMIN SERPL-MCNC: NORMAL G/DL
ALBUMIN/GLOB SERPL: 0.7 {RATIO} (ref 1–2)
ALBUMIN/GLOB SERPL: NORMAL {RATIO}
ALP LIVER SERPL-CCNC: 66 U/L (ref 55–142)
ALP SERPL-CCNC: NORMAL U/L
ALT SERPL-CCNC: 33 U/L
ALT SERPL-CCNC: 33 U/L (ref 13–56)
ANION GAP SERPL CALC-SCNC: 2 MMOL/L (ref 0–18)
ANION GAP SERPL CALC-SCNC: NORMAL MMOL/L
AST SERPL-CCNC: 22 U/L
AST SERPL-CCNC: 22 U/L (ref 15–37)
BACTERIA UR QL AUTO: NEGATIVE /HPF
BASO+EOS+MONOS # BLD: NORMAL 10*3/UL
BASO+EOS+MONOS NFR BLD: NORMAL %
BASOPHILS # BLD AUTO: 0.03 X10(3) UL (ref 0–0.2)
BASOPHILS # BLD: NORMAL 10*3/UL
BASOPHILS NFR BLD AUTO: 0.4 %
BASOPHILS NFR BLD: NORMAL %
BILIRUB SERPL-MCNC: 0.3 MG/DL (ref 0.1–2)
BILIRUB SERPL-MCNC: NORMAL MG/DL
BILIRUB UR QL: NEGATIVE
BUN BLD-MCNC: 11 MG/DL (ref 7–18)
BUN SERPL-MCNC: 11 MG/DL
BUN/CREAT SERPL: 19.6
BUN/CREAT SERPL: 19.6 (ref 10–20)
CALCIUM BLD-MCNC: 8.8 MG/DL (ref 8.5–10.1)
CALCIUM SERPL-MCNC: 8.8 MG/DL
CHLORIDE SERPL-SCNC: 102 MMOL/L (ref 98–107)
CHLORIDE SERPL-SCNC: NORMAL MMOL/L
CHOLEST SERPL-MCNC: 210 MG/DL
CHOLEST SMN-MCNC: 210 MG/DL (ref ?–200)
CHOLEST/HDLC SERPL: NORMAL {RATIO}
CO2 SERPL-SCNC: 35 MMOL/L (ref 21–32)
CO2 SERPL-SCNC: NORMAL MMOL/L
COLOR UR: YELLOW
CREAT BLD-MCNC: 0.56 MG/DL (ref 0.55–1.02)
CREAT SERPL-MCNC: 0.56 MG/DL
CREAT UR-SCNC: 46.7 MG/DL
DEPRECATED RDW RBC AUTO: 52.1 FL (ref 35.1–46.3)
DIFFERENTIAL METHOD BLD: NORMAL
EOSINOPHIL # BLD AUTO: 0.08 X10(3) UL (ref 0–0.7)
EOSINOPHIL # BLD: NORMAL 10*3/UL
EOSINOPHIL NFR BLD AUTO: 1 %
EOSINOPHIL NFR BLD: NORMAL %
ERYTHROCYTE [DISTWIDTH] IN BLOOD BY AUTOMATED COUNT: 14.7 % (ref 11–15)
ERYTHROCYTE [DISTWIDTH] IN BLOOD: NORMAL %
EST. AVERAGE GLUCOSE BLD GHB EST-MCNC: 131 MG/DL (ref 68–126)
GLOBULIN PLAS-MCNC: 4.1 G/DL (ref 2.8–4.4)
GLOBULIN SER-MCNC: NORMAL G/DL
GLUCOSE BLD-MCNC: 129 MG/DL (ref 70–99)
GLUCOSE SERPL-MCNC: 129 MG/DL
GLUCOSE UR-MCNC: NEGATIVE MG/DL
HBA1C MFR BLD HPLC: 6.2 % (ref ?–5.7)
HCT VFR BLD AUTO: 44.7 % (ref 35–48)
HCT VFR BLD CALC: 44.7 %
HDLC SERPL-MCNC: 75 MG/DL
HDLC SERPL-MCNC: 75 MG/DL (ref 40–59)
HGB BLD-MCNC: 13.9 G/DL
HGB BLD-MCNC: 13.9 G/DL (ref 12–16)
HGB UR QL STRIP.AUTO: NEGATIVE
IMM GRANULOCYTES # BLD AUTO: 0.02 X10(3) UL (ref 0–1)
IMM GRANULOCYTES NFR BLD: 0.3 %
IMMATURE GRANULOCYTES (OFFPRE25): NORMAL
KETONES UR-MCNC: NEGATIVE MG/DL
LDLC SERPL CALC-MCNC: 119 MG/DL
LDLC SERPL CALC-MCNC: 119 MG/DL (ref ?–100)
LENGTH OF FAST TIME PATIENT: NORMAL H
LENGTH OF FAST TIME PATIENT: NORMAL H
LYMPHOCYTES # BLD AUTO: 1.49 X10(3) UL (ref 1–4)
LYMPHOCYTES # BLD: NORMAL 10*3/UL
LYMPHOCYTES NFR BLD AUTO: 19.1 %
LYMPHOCYTES NFR BLD: NORMAL %
M PROTEIN MFR SERPL ELPH: 7.1 G/DL (ref 6.4–8.2)
MCH RBC QN AUTO: 29.7 PG (ref 26–34)
MCH RBC QN AUTO: NORMAL PG
MCHC RBC AUTO-ENTMCNC: 31.1 G/DL (ref 31–37)
MCHC RBC AUTO-ENTMCNC: NORMAL G/DL
MCV RBC AUTO: 95.5 FL (ref 80–100)
MCV RBC AUTO: NORMAL FL
MICROALBUMIN UR-MCNC: <0.5 MG/DL
MONOCYTES # BLD AUTO: 0.64 X10(3) UL (ref 0.1–1)
MONOCYTES # BLD: NORMAL 10*3/UL
MONOCYTES NFR BLD AUTO: 8.2 %
MONOCYTES NFR BLD: NORMAL %
MPV (OFFPRE2): NORMAL
NEUTROPHILS # BLD AUTO: 5.54 X10 (3) UL (ref 1.5–7.7)
NEUTROPHILS # BLD AUTO: 5.54 X10(3) UL (ref 1.5–7.7)
NEUTROPHILS # BLD: NORMAL 10*3/UL
NEUTROPHILS NFR BLD AUTO: 71 %
NEUTROPHILS NFR BLD: NORMAL %
NITRITE UR QL STRIP.AUTO: NEGATIVE
NONHDLC SERPL-MCNC: 135 MG/DL
NONHDLC SERPL-MCNC: 135 MG/DL (ref ?–130)
NRBC BLD MANUAL-RTO: NORMAL %
OSMOLALITY SERPL CALC.SUM OF ELEC: 289 MOSM/KG (ref 275–295)
PH UR: 7 [PH] (ref 5–8)
PLAT MORPH BLD: NORMAL
PLATELET # BLD AUTO: 227 10(3)UL (ref 150–450)
PLATELET # BLD: 227 10*3/UL
POTASSIUM SERPL-SCNC: 4.3 MMOL/L
POTASSIUM SERPL-SCNC: 4.3 MMOL/L (ref 3.5–5.1)
PROT SERPL-MCNC: NORMAL G/DL
PROT UR-MCNC: NEGATIVE MG/DL
RBC # BLD AUTO: 4.68 X10(6)UL (ref 3.8–5.3)
RBC # BLD: 4.68 10*6/UL
RBC #/AREA URNS AUTO: <1 /HPF
RBC MORPH BLD: NORMAL
SODIUM SERPL-SCNC: 139 MMOL/L
SODIUM SERPL-SCNC: 139 MMOL/L (ref 136–145)
SP GR UR STRIP: 1.01 (ref 1–1.03)
TRIGL SERPL-MCNC: 79 MG/DL
TRIGL SERPL-MCNC: 79 MG/DL (ref 30–149)
TSI SER-ACNC: 1.91 MIU/ML (ref 0.36–3.74)
UROBILINOGEN UR STRIP-ACNC: <2
VIT C UR-MCNC: 20 MG/DL
VLDLC SERPL CALC-MCNC: 16 MG/DL
VLDLC SERPL CALC-MCNC: 16 MG/DL (ref 0–30)
WBC # BLD AUTO: 7.8 X10(3) UL (ref 4–11)
WBC # BLD: 7.8 10*3/UL
WBC #/AREA URNS AUTO: 1 /HPF
WBC MORPH BLD: NORMAL

## 2019-03-07 PROCEDURE — 82570 ASSAY OF URINE CREATININE: CPT

## 2019-03-07 PROCEDURE — 82306 VITAMIN D 25 HYDROXY: CPT

## 2019-03-07 PROCEDURE — 80061 LIPID PANEL: CPT

## 2019-03-07 PROCEDURE — 82043 UR ALBUMIN QUANTITATIVE: CPT

## 2019-03-07 PROCEDURE — 99214 OFFICE O/P EST MOD 30 MIN: CPT | Performed by: OTOLARYNGOLOGY

## 2019-03-07 PROCEDURE — 36415 COLL VENOUS BLD VENIPUNCTURE: CPT

## 2019-03-07 PROCEDURE — 83036 HEMOGLOBIN GLYCOSYLATED A1C: CPT

## 2019-03-07 PROCEDURE — 85025 COMPLETE CBC W/AUTO DIFF WBC: CPT

## 2019-03-07 PROCEDURE — 80053 COMPREHEN METABOLIC PANEL: CPT

## 2019-03-07 PROCEDURE — 84443 ASSAY THYROID STIM HORMONE: CPT

## 2019-03-07 PROCEDURE — 81001 URINALYSIS AUTO W/SCOPE: CPT

## 2019-03-07 PROCEDURE — G0463 HOSPITAL OUTPT CLINIC VISIT: HCPCS | Performed by: OTOLARYNGOLOGY

## 2019-03-08 LAB — 25(OH)D3 SERPL-MCNC: 45.4 NG/ML (ref 30–100)

## 2019-03-08 NOTE — PROGRESS NOTES
Chuy Davila is a 70year old female. Patient presents with:  Nose Problem: pt reports here for a follow up visit on a constant sore in left nostril       HISTORY OF PRESENT ILLNESS  She presents with a history Left-sided pulmonary issues.  She is nonwork Pt has a pacemaker: No        Pt has a defibrillator: No        Reaction to local anesthetic: No        Caffeine Concern: No      Family History   Problem Relation Age of Onset   • Ovarian Cancer Mother 68        endometrial, poss ovarian primary   • Pulm syncope. GI Negative Abdominal pain and diarrhea. Endocrine Negative Cold intolerance and heat intolerance. Neuro Negative Tremors. Psych Negative Anxiety and depression. Integumentary Negative Frequent skin infections, pigment change and rash. FLEXHALER 180 MCG/ACT Inhalation Aerosol Powder, Breath Activated, , Disp: , Rfl:   •  Cyclobenzaprine HCl 10 MG Oral Tab, , Disp: , Rfl:   •  DilTIAZem HCl ER Coated Beads 240 MG Oral Capsule SR 24 Hr, , Disp: , Rfl:   •  furosemide 40 MG Oral Tab, Take 2 hydrocodone-acetaminophen (VICODIN) 5-500 MG Oral Tab, Take  by mouth. take 1 tablet by oral route  every 4 - 6 hours as needed for pain, Disp: , Rfl:   •  Lisinopril-Hydrochlorothiazide 10-12.5 MG Oral Tab, Take  by mouth.  Take one tablet by mouth every d

## 2019-03-12 ENCOUNTER — CARDPULM VISIT (OUTPATIENT)
Dept: CARDIAC REHAB | Facility: HOSPITAL | Age: 79
End: 2019-03-12
Attending: INTERNAL MEDICINE
Payer: MEDICARE

## 2019-03-14 ENCOUNTER — CARDPULM VISIT (OUTPATIENT)
Dept: CARDIAC REHAB | Facility: HOSPITAL | Age: 79
End: 2019-03-14
Attending: INTERNAL MEDICINE
Payer: MEDICARE

## 2019-03-15 ENCOUNTER — HOSPITAL ENCOUNTER (OUTPATIENT)
Dept: ULTRASOUND IMAGING | Facility: HOSPITAL | Age: 79
Discharge: HOME | End: 2019-03-15
Attending: OBSTETRICS & GYNECOLOGY
Payer: MEDICARE

## 2019-03-15 ENCOUNTER — HOSPITAL ENCOUNTER (OUTPATIENT)
Dept: MAMMOGRAPHY | Facility: HOSPITAL | Age: 79
Discharge: HOME | End: 2019-03-15
Attending: OBSTETRICS & GYNECOLOGY
Payer: MEDICARE

## 2019-03-15 DIAGNOSIS — R92.8 ABNORMAL MAMMOGRAM: ICD-10-CM

## 2019-03-15 PROCEDURE — 77061 BREAST TOMOSYNTHESIS UNI: CPT | Performed by: OBSTETRICS & GYNECOLOGY

## 2019-03-15 PROCEDURE — 76642 ULTRASOUND BREAST LIMITED: CPT | Performed by: OBSTETRICS & GYNECOLOGY

## 2019-03-15 PROCEDURE — 77065 DX MAMMO INCL CAD UNI: CPT | Performed by: OBSTETRICS & GYNECOLOGY

## 2019-03-19 ENCOUNTER — APPOINTMENT (OUTPATIENT)
Dept: CARDIAC REHAB | Facility: HOSPITAL | Age: 79
End: 2019-03-19
Attending: INTERNAL MEDICINE
Payer: MEDICARE

## 2019-03-21 ENCOUNTER — CARDPULM VISIT (OUTPATIENT)
Dept: CARDIAC REHAB | Facility: HOSPITAL | Age: 79
End: 2019-03-21
Attending: INTERNAL MEDICINE
Payer: MEDICARE

## 2019-03-26 ENCOUNTER — CARDPULM VISIT (OUTPATIENT)
Dept: CARDIAC REHAB | Facility: HOSPITAL | Age: 79
End: 2019-03-26
Attending: INTERNAL MEDICINE
Payer: MEDICARE

## 2019-03-28 ENCOUNTER — CARDPULM VISIT (OUTPATIENT)
Dept: CARDIAC REHAB | Facility: HOSPITAL | Age: 79
End: 2019-03-28
Attending: INTERNAL MEDICINE
Payer: MEDICARE

## 2019-03-28 NOTE — TELEPHONE ENCOUNTER
Dr. Lennox Carrillo please review documentation below. Pt in need of abx ointment refill however excluded from RN refill protocol. Please advise thank you. Background: LOV with Dr. Gino Leonardo 3/7/19 for sore on left nostril.  At the time Dr. iGno Leonardo had advised that

## 2019-04-02 ENCOUNTER — APPOINTMENT (OUTPATIENT)
Dept: CARDIAC REHAB | Facility: HOSPITAL | Age: 79
End: 2019-04-02
Attending: INTERNAL MEDICINE
Payer: MEDICARE

## 2019-04-04 ENCOUNTER — CARDPULM VISIT (OUTPATIENT)
Dept: CARDIAC REHAB | Facility: HOSPITAL | Age: 79
End: 2019-04-04
Attending: INTERNAL MEDICINE
Payer: MEDICARE

## 2019-04-23 ENCOUNTER — CARDPULM VISIT (OUTPATIENT)
Dept: CARDIAC REHAB | Facility: HOSPITAL | Age: 79
End: 2019-04-23
Attending: INTERNAL MEDICINE
Payer: MEDICARE

## 2019-04-24 RX ORDER — ALBUTEROL SULFATE 90 UG/1
AEROSOL, METERED RESPIRATORY (INHALATION)
COMMUNITY

## 2019-04-24 RX ORDER — MONTELUKAST SODIUM 10 MG/1
TABLET ORAL
COMMUNITY

## 2019-04-24 RX ORDER — LISINOPRIL AND HYDROCHLOROTHIAZIDE 12.5; 1 MG/1; MG/1
1 TABLET ORAL DAILY
COMMUNITY
Start: 2015-01-20

## 2019-04-24 RX ORDER — HYDROCODONE BITARTRATE AND ACETAMINOPHEN 5; 325 MG/1; MG/1
TABLET ORAL
COMMUNITY

## 2019-04-24 RX ORDER — LORATADINE 10 MG/1
TABLET ORAL
COMMUNITY

## 2019-04-24 RX ORDER — DILTIAZEM HYDROCHLORIDE 240 MG/1
240 CAPSULE, COATED, EXTENDED RELEASE ORAL DAILY
COMMUNITY

## 2019-04-24 RX ORDER — FUROSEMIDE 20 MG/1
20 TABLET ORAL DAILY
COMMUNITY
Start: 2018-08-15

## 2019-04-24 RX ORDER — TIOTROPIUM BROMIDE 18 UG/1
CAPSULE ORAL; RESPIRATORY (INHALATION)
COMMUNITY

## 2019-04-24 RX ORDER — BUDESONIDE 0.25 MG/2ML
SUSPENSION RESPIRATORY (INHALATION)
COMMUNITY

## 2019-04-24 RX ORDER — HYDROXYZINE HYDROCHLORIDE 25 MG/1
TABLET, FILM COATED ORAL
COMMUNITY
Start: 2015-08-26 | End: 2019-08-14

## 2019-04-24 RX ORDER — POTASSIUM CHLORIDE 20 MEQ/1
TABLET, EXTENDED RELEASE ORAL
COMMUNITY

## 2019-04-24 RX ORDER — AMOXICILLIN 500 MG
CAPSULE ORAL
COMMUNITY

## 2019-04-24 RX ORDER — CELECOXIB 200 MG/1
CAPSULE ORAL
COMMUNITY
End: 2019-08-14

## 2019-04-24 RX ORDER — AMITRIPTYLINE HYDROCHLORIDE 100 MG/1
TABLET ORAL
COMMUNITY

## 2019-04-25 ENCOUNTER — CARDPULM VISIT (OUTPATIENT)
Dept: CARDIAC REHAB | Facility: HOSPITAL | Age: 79
End: 2019-04-25
Attending: INTERNAL MEDICINE
Payer: MEDICARE

## 2019-04-30 ENCOUNTER — APPOINTMENT (OUTPATIENT)
Dept: CARDIAC REHAB | Facility: HOSPITAL | Age: 79
End: 2019-04-30
Attending: INTERNAL MEDICINE
Payer: MEDICARE

## 2019-05-02 ENCOUNTER — CARDPULM VISIT (OUTPATIENT)
Dept: CARDIAC REHAB | Facility: HOSPITAL | Age: 79
End: 2019-05-02
Attending: INTERNAL MEDICINE
Payer: MEDICARE

## 2019-05-09 ENCOUNTER — CARDPULM VISIT (OUTPATIENT)
Dept: CARDIAC REHAB | Facility: HOSPITAL | Age: 79
End: 2019-05-09
Attending: INTERNAL MEDICINE
Payer: MEDICARE

## 2019-05-14 ENCOUNTER — APPOINTMENT (OUTPATIENT)
Dept: CARDIAC REHAB | Facility: HOSPITAL | Age: 79
End: 2019-05-14
Attending: INTERNAL MEDICINE
Payer: MEDICARE

## 2019-07-22 ENCOUNTER — OFFICE VISIT (OUTPATIENT)
Dept: DERMATOLOGY CLINIC | Facility: CLINIC | Age: 79
End: 2019-07-22
Payer: MEDICARE

## 2019-07-22 DIAGNOSIS — L81.4 LENTIGO: ICD-10-CM

## 2019-07-22 DIAGNOSIS — L71.9 ROSACEA: ICD-10-CM

## 2019-07-22 DIAGNOSIS — L82.1 SEBORRHEIC KERATOSES: ICD-10-CM

## 2019-07-22 DIAGNOSIS — D23.9 BENIGN NEOPLASM OF SKIN, UNSPECIFIED LOCATION: ICD-10-CM

## 2019-07-22 DIAGNOSIS — D48.5 NEOPLASM OF UNCERTAIN BEHAVIOR OF SKIN: Primary | ICD-10-CM

## 2019-07-22 DIAGNOSIS — L57.0 AK (ACTINIC KERATOSIS): ICD-10-CM

## 2019-07-22 PROCEDURE — 88305 TISSUE EXAM BY PATHOLOGIST: CPT | Performed by: DERMATOLOGY

## 2019-07-22 PROCEDURE — 17003 DESTRUCT PREMALG LES 2-14: CPT | Performed by: DERMATOLOGY

## 2019-07-22 PROCEDURE — G0463 HOSPITAL OUTPT CLINIC VISIT: HCPCS | Performed by: DERMATOLOGY

## 2019-07-22 PROCEDURE — 99213 OFFICE O/P EST LOW 20 MIN: CPT | Performed by: DERMATOLOGY

## 2019-07-22 PROCEDURE — 17000 DESTRUCT PREMALG LESION: CPT | Performed by: DERMATOLOGY

## 2019-07-22 PROCEDURE — 11102 TANGNTL BX SKIN SINGLE LES: CPT | Performed by: DERMATOLOGY

## 2019-07-22 RX ORDER — ERGOCALCIFEROL 1.25 MG/1
50000 CAPSULE ORAL
COMMUNITY
Start: 2015-10-15 | End: 2019-10-08

## 2019-07-22 RX ORDER — SODIUM FLUORIDE 6 MG/ML
PASTE, DENTIFRICE DENTAL
Status: ON HOLD | COMMUNITY
Start: 2019-07-11 | End: 2020-02-03 | Stop reason: CLARIF

## 2019-07-22 RX ORDER — HYDROXYZINE PAMOATE 25 MG/1
25 CAPSULE ORAL
COMMUNITY
End: 2019-10-08 | Stop reason: ALTCHOICE

## 2019-07-22 RX ORDER — DOXYCYCLINE HYCLATE 100 MG
TABLET ORAL
COMMUNITY
Start: 2017-06-23 | End: 2019-10-08

## 2019-07-22 RX ORDER — CELECOXIB 200 MG/1
CAPSULE ORAL
Status: ON HOLD | COMMUNITY
End: 2020-02-03

## 2019-07-22 RX ORDER — CLARITHROMYCIN 500 MG/1
TABLET, COATED ORAL
COMMUNITY
Start: 2019-06-06 | End: 2019-10-08 | Stop reason: ALTCHOICE

## 2019-07-25 ENCOUNTER — OFFICE VISIT (OUTPATIENT)
Dept: PODIATRY CLINIC | Facility: CLINIC | Age: 79
End: 2019-07-25
Payer: MEDICARE

## 2019-07-25 ENCOUNTER — TELEPHONE (OUTPATIENT)
Dept: PODIATRY CLINIC | Facility: CLINIC | Age: 79
End: 2019-07-25

## 2019-07-25 DIAGNOSIS — L03.031 PARONYCHIA OF FOURTH TOE OF RIGHT FOOT: ICD-10-CM

## 2019-07-25 DIAGNOSIS — M79.675 PAIN IN TOES OF BOTH FEET: Primary | ICD-10-CM

## 2019-07-25 DIAGNOSIS — B35.1 ONYCHOMYCOSIS: ICD-10-CM

## 2019-07-25 DIAGNOSIS — M79.674 PAIN IN TOES OF BOTH FEET: Primary | ICD-10-CM

## 2019-07-25 PROCEDURE — 11721 DEBRIDE NAIL 6 OR MORE: CPT | Performed by: PODIATRIST

## 2019-07-25 PROCEDURE — 99212 OFFICE O/P EST SF 10 MIN: CPT | Performed by: PODIATRIST

## 2019-07-25 NOTE — TELEPHONE ENCOUNTER
Pt states her fourth toe on R foot is red, swollen and leaking puss. pt requesting to speak with RN in regards to being seen sooner than next available appt.  Pt states to call home # anytime except for 12-2p, pt states she will be out of the house from 12-2

## 2019-07-25 NOTE — TELEPHONE ENCOUNTER
Spoke to pt an she states her right 4th toe is infected. Bumped toe a couple of days ago. Went to have pedicure yesterday and noticed that toe was red and draining pus. States toe \"feels swollen\". Pt does say that toe \"is not really painful\".  Denies an

## 2019-07-25 NOTE — PROGRESS NOTES
HPI:    Patient ID: Jacoby Orellana is a 70year old female. 82-year-old patient called today with concerns of an infection of the fourth right toe.   She states that she went to have a pedicure and while cutting the nail there was some fluid that came out f Inhalation Aerosol Powder, Breath Activated  Disp:  Rfl:    PROAIR  (90 BASE) MCG/ACT Inhalation Aero Soln  Disp:  Rfl:    Cholecalciferol (VITAMIN D) 1000 UNITS Oral Tab Take by mouth.  Disp:  Rfl:    Crotamiton 10 % External Lotion Use tid for itch (unknown strength) Disp:  Rfl:    Tiotropium Bromide Monohydrate (SPIRIVA HANDIHALER) 18 MCG Inhalation Cap Inhale  into the lungs. No medications sig Disp:  Rfl:    B Complex Oral Cap Take  by mouth.  VITAMIN B COMPLEX (unknown strength) Disp:  Rfl:      A

## 2019-07-26 ENCOUNTER — TELEPHONE (OUTPATIENT)
Dept: PODIATRY CLINIC | Facility: CLINIC | Age: 79
End: 2019-07-26

## 2019-07-26 ENCOUNTER — TELEPHONE (OUTPATIENT)
Dept: DERMATOLOGY CLINIC | Facility: CLINIC | Age: 79
End: 2019-07-26

## 2019-07-26 NOTE — TELEPHONE ENCOUNTER
Patient is just concerned of how a sore taken off is healing, pls call to discuss what she should do

## 2019-07-26 NOTE — TELEPHONE ENCOUNTER
S/w pt and she states band-aid won't stick and she has been applying neosporin. She states the 4th toe is not draining anymore. S/w SCR in regards to pt and he states pt does not need to use band-aid and ok to stop using antibiotic cream if d/c has stopped.

## 2019-07-26 NOTE — TELEPHONE ENCOUNTER
Pt requesting to speak with RN in regards to questions. Pt states her band-aid will not stay on her toe and pt wants to know if it is okay for her toe to be exposed to air. Pt also wants to know if she can use any creams on her toe.  pls advise call thank y

## 2019-07-26 NOTE — PROGRESS NOTES
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 Please log in test results. Please call patient and inform of results and recommendations.  (please add to history). Pt to  rtc   4-6 mos  or prn.

## 2019-07-26 NOTE — TELEPHONE ENCOUNTER
As long as the toenail is not draining is probably okay for her not to wear a Band-Aid if she is trying to put a Band-Aid around the toes sometimes that does not work as well and if she is using a moisturizing cream in conjunction with trying to get a Band

## 2019-07-26 NOTE — TELEPHONE ENCOUNTER
Spoke to pt states blister on left arm opened up and is weeping. Denies any redness, warm to touch, and denies any pus on the site. Inquiring if ok to put band aid on site and if ok to put neosporin.  Informed pt to clean site and may use neosporin along wi

## 2019-08-01 ENCOUNTER — CARDPULM VISIT (OUTPATIENT)
Dept: CARDIAC REHAB | Facility: HOSPITAL | Age: 79
End: 2019-08-01
Attending: INTERNAL MEDICINE
Payer: MEDICARE

## 2019-08-04 NOTE — PROGRESS NOTES
Adarsh Mercado is a 70year old female. HPI:     CC:  Patient presents with:  Actinic Keratosis: LOV 12/21/18. pt presenting today with f/u. pt concerned about new lesion to chest that's changing in size and crusted. Allergies:  Cefuroxime;  Levoflo standard drinks      Comment: one drink once a week    Drug use: Yes         Current Outpatient Medications:  Acetaminophen (MAPAP) 500 MG Oral Cap 1-2 caps at bedtime Disp:  Rfl:    Calcium Carb-Cholecalciferol (CALCIUM + D3) 600-200 MG-UNIT Oral Tab 1 da 0.1 % Ophthalmic Solution  Disp:  Rfl:    lansoprazole (PREVACID) 30 MG Oral Capsule Delayed Release  Disp:  Rfl:    Amitriptyline HCl (ELAVIL) 100 MG Oral Tab Take  by mouth.  1 tablet by mouth at bedtime Disp:  Rfl:    aspirin 81 MG Oral Tab Take  by mout Diagnosis Date   • A-fib [facility])    • Anesthesia complication    • Arrhythmia     AFIB   • Arthritis    • Asthma    • Back problem    • COPD (chronic obstructive pulmonary disease) ([facility] [address]    • Deviated nasal septum 2009    nasal septoplasty, turb reduction, drink once a week      Drug use: Yes      Sexual activity: Not on file    Lifestyle      Physical activity:        Days per week: Not on file        Minutes per session: Not on file      Stress: Not on file    Relationships      Social connections: Past notes/ records and appropriate/relevant lab results including pathology and past body maps reviewed. Updated and new information noted in current visit.      Concern with Michelle tender lesion at left upper chest.  AK's few scaly areas on the face an location  Lentigo  Rosacea    See details on map.       Remarkable for:  Diagnosis/Clinical History: pt with tender crusted papule on chest  Spec 1 Description >>>>>: left central chest  Spec 1 Comment: r/o SCC 4x6mm keratotic erythematous crusted tender pa me regarding any confusion resulting from errors in recognition.

## 2019-08-04 NOTE — PROGRESS NOTES
Operative Report                     Shave/  Tangential biopsy     Clinical diagnosis:    Size of lesion:    Location:Diagnosis/Clinical History: pt with tender crusted papule on chest  Spec 1 Description >>>>>: left central chest  Spec 1 Comment: r/

## 2019-08-13 RX ORDER — HYDROXYZINE PAMOATE 25 MG/1
25 CAPSULE ORAL
COMMUNITY
End: 2019-08-14

## 2019-08-13 RX ORDER — DICYCLOMINE HYDROCHLORIDE 10 MG/1
10 CAPSULE ORAL 3 TIMES DAILY PRN
COMMUNITY
Start: 2019-01-22 | End: 2019-08-14

## 2019-08-13 RX ORDER — DILTIAZEM HYDROCHLORIDE 240 MG/1
240 CAPSULE, EXTENDED RELEASE ORAL DAILY
COMMUNITY
Start: 2015-03-12 | End: 2019-08-14

## 2019-08-13 RX ORDER — DIPHENOXYLATE HYDROCHLORIDE AND ATROPINE SULFATE 2.5; .025 MG/1; MG/1
TABLET ORAL
COMMUNITY
Start: 2010-03-01

## 2019-08-13 RX ORDER — CYCLOBENZAPRINE HCL 10 MG
1 TABLET ORAL 3 TIMES DAILY PRN
COMMUNITY
Start: 2015-02-05 | End: 2019-08-14

## 2019-08-13 RX ORDER — VITAMIN B COMPLEX
CAPSULE ORAL
COMMUNITY
Start: 2012-10-02

## 2019-08-13 RX ORDER — DOXYCYCLINE HYCLATE 100 MG
TABLET ORAL
COMMUNITY
Start: 2017-06-23 | End: 2019-08-14

## 2019-08-13 RX ORDER — PREDNISONE 20 MG/1
TABLET ORAL
COMMUNITY
Start: 2018-02-08 | End: 2019-08-14

## 2019-08-13 RX ORDER — ESTRADIOL 0.05 MG/D
PATCH TRANSDERMAL
COMMUNITY
Start: 2014-07-30

## 2019-08-13 RX ORDER — TRIAMCINOLONE ACETONIDE 1 MG/G
CREAM TOPICAL
COMMUNITY
Start: 2015-03-13

## 2019-08-13 RX ORDER — LUBIPROSTONE 8 UG/1
8 CAPSULE ORAL
COMMUNITY
Start: 2017-06-10

## 2019-08-13 RX ORDER — ERGOCALCIFEROL 1.25 MG/1
50000 CAPSULE ORAL
COMMUNITY
Start: 2015-10-15 | End: 2020-08-19

## 2019-08-13 RX ORDER — DIGOXIN 250 MCG
1 TABLET ORAL DAILY
COMMUNITY
Start: 2015-04-09

## 2019-08-13 RX ORDER — OLOPATADINE HYDROCHLORIDE 1 MG/ML
SOLUTION/ DROPS OPHTHALMIC
COMMUNITY
Start: 2014-07-28

## 2019-08-14 ENCOUNTER — OFFICE VISIT (OUTPATIENT)
Dept: CARDIOLOGY | Age: 79
End: 2019-08-14

## 2019-08-14 VITALS
HEART RATE: 84 BPM | HEIGHT: 65 IN | OXYGEN SATURATION: 92 % | BODY MASS INDEX: 31.82 KG/M2 | SYSTOLIC BLOOD PRESSURE: 124 MMHG | WEIGHT: 191 LBS | DIASTOLIC BLOOD PRESSURE: 64 MMHG

## 2019-08-14 DIAGNOSIS — R60.0 LOCALIZED EDEMA: ICD-10-CM

## 2019-08-14 DIAGNOSIS — I48.0 PAF (PAROXYSMAL ATRIAL FIBRILLATION) (CMD): Primary | ICD-10-CM

## 2019-08-14 PROCEDURE — 99214 OFFICE O/P EST MOD 30 MIN: CPT | Performed by: INTERNAL MEDICINE

## 2019-08-14 ASSESSMENT — PATIENT HEALTH QUESTIONNAIRE - PHQ9
SUM OF ALL RESPONSES TO PHQ9 QUESTIONS 1 AND 2: 0
1. LITTLE INTEREST OR PLEASURE IN DOING THINGS: NOT AT ALL
SUM OF ALL RESPONSES TO PHQ9 QUESTIONS 1 AND 2: 0
2. FEELING DOWN, DEPRESSED OR HOPELESS: NOT AT ALL

## 2019-09-03 NOTE — TELEPHONE ENCOUNTER
Pt last office visit on 03/07/19 for nasal crusting, pt requesting a refill for mupirocin.  Please advise

## 2019-10-08 ENCOUNTER — OFFICE VISIT (OUTPATIENT)
Dept: OTOLARYNGOLOGY | Facility: CLINIC | Age: 79
End: 2019-10-08
Payer: MEDICARE

## 2019-10-08 VITALS
WEIGHT: 190 LBS | DIASTOLIC BLOOD PRESSURE: 66 MMHG | SYSTOLIC BLOOD PRESSURE: 108 MMHG | TEMPERATURE: 98 F | BODY MASS INDEX: 31.65 KG/M2 | HEIGHT: 65 IN

## 2019-10-08 DIAGNOSIS — J34.89 NASAL CRUSTING: Primary | ICD-10-CM

## 2019-10-08 DIAGNOSIS — R49.0 HOARSENESS: ICD-10-CM

## 2019-10-08 PROCEDURE — 31575 DIAGNOSTIC LARYNGOSCOPY: CPT | Performed by: OTOLARYNGOLOGY

## 2019-10-08 PROCEDURE — G0463 HOSPITAL OUTPT CLINIC VISIT: HCPCS | Performed by: OTOLARYNGOLOGY

## 2019-10-08 PROCEDURE — 99214 OFFICE O/P EST MOD 30 MIN: CPT | Performed by: OTOLARYNGOLOGY

## 2019-10-08 RX ORDER — AZITHROMYCIN 250 MG/1
TABLET, FILM COATED ORAL
Status: ON HOLD | COMMUNITY
Start: 2019-10-04 | End: 2020-02-03

## 2019-10-08 RX ORDER — LACTOBACIL 2/BIFIDO 1/S.THERMO 450B CELL
1 PACKET (EA) ORAL DAILY
COMMUNITY

## 2019-10-08 NOTE — PROGRESS NOTES
Italo Juarez is a 70year old female.   Patient presents with:  Lesion: inside of left nostril for several years   Voice Problem: voice changes off and on for at least 1 year       HISTORY OF PRESENT ILLNESS  She presents with a history Left-sided pulmonar is affecting her voice. Previous history of left-sided true vocal cord paralysis      Social History    Socioeconomic History      Marital status:        Spouse name: Not on file      Number of children: Not on file      Years of education: Not on f impairment        Past Surgical History:   Procedure Laterality Date   • ADENOIDECTOMY     • CATARACT      cataract extraction    • ESOPHAGOGASTRODUODENOSCOPY (EGD) N/A 3/27/2018    Performed by Sawyer Dennis MD at [address] [place] [address] Lymph Detail Normal Submental. Submandibular. Anterior cervical. Posterior cervical. Supraclavicular.         Nose/Mouth/Throat Normal External nose - Normal. Lips/teeth/gums - Normal. Tonsils - Normal. Oropharynx - Normal.   Nose/Mouth/Throat Normal Na (10 mg total) by mouth 3 (three) times daily as needed. , Disp: 30 capsule, Rfl: 0  •  furosemide 40 MG Oral Tab, Take 20 mg by mouth every other day.  , Disp: , Rfl:   •  AMITIZA 8 MCG Oral Cap, , Disp: , Rfl:   •  TRIAMCINOLONE ACETONIDE 0.1 % External Cr MG Oral Cap, Take  by mouth. FISH OIL (unknown strength), Disp: , Rfl:   •  Tiotropium Bromide Monohydrate (SPIRIVA HANDIHALER) 18 MCG Inhalation Cap, Inhale  into the lungs. No medications sig, Disp: , Rfl:   •  B Complex Oral Cap, Take  by mouth.  VITAMIN

## 2020-01-31 ENCOUNTER — TELEPHONE (OUTPATIENT)
Dept: CARDIOLOGY | Age: 80
End: 2020-01-31

## 2020-02-03 ENCOUNTER — APPOINTMENT (OUTPATIENT)
Dept: GENERAL RADIOLOGY | Facility: HOSPITAL | Age: 80
End: 2020-02-03
Attending: EMERGENCY MEDICINE
Payer: MEDICARE

## 2020-02-03 ENCOUNTER — APPOINTMENT (OUTPATIENT)
Dept: CT IMAGING | Facility: HOSPITAL | Age: 80
End: 2020-02-03
Attending: INTERNAL MEDICINE
Payer: MEDICARE

## 2020-02-03 ENCOUNTER — APPOINTMENT (OUTPATIENT)
Dept: CV DIAGNOSTICS | Facility: HOSPITAL | Age: 80
End: 2020-02-03
Attending: INTERNAL MEDICINE
Payer: MEDICARE

## 2020-02-03 ENCOUNTER — HOSPITAL ENCOUNTER (OUTPATIENT)
Facility: HOSPITAL | Age: 80
Setting detail: OBSERVATION
Discharge: HOME | End: 2020-02-03
Attending: EMERGENCY MEDICINE | Admitting: HOSPITALIST
Payer: MEDICARE

## 2020-02-03 VITALS
DIASTOLIC BLOOD PRESSURE: 50 MMHG | BODY MASS INDEX: 32.04 KG/M2 | RESPIRATION RATE: 18 BRPM | WEIGHT: 192.31 LBS | HEIGHT: 65 IN | HEART RATE: 56 BPM | SYSTOLIC BLOOD PRESSURE: 101 MMHG | OXYGEN SATURATION: 96 % | TEMPERATURE: 97 F

## 2020-02-03 DIAGNOSIS — R10.10 PAIN OF UPPER ABDOMEN: ICD-10-CM

## 2020-02-03 DIAGNOSIS — R00.2 PALPITATIONS: Primary | ICD-10-CM

## 2020-02-03 LAB
ANION GAP SERPL CALC-SCNC: 4 MMOL/L (ref 0–18)
BASOPHILS # BLD AUTO: 0.03 X10(3) UL (ref 0–0.2)
BASOPHILS NFR BLD AUTO: 0.3 %
BUN BLD-MCNC: 18 MG/DL (ref 7–18)
BUN/CREAT SERPL: 25.7 (ref 10–20)
CALCIUM BLD-MCNC: 8.8 MG/DL (ref 8.5–10.1)
CHLORIDE SERPL-SCNC: 100 MMOL/L (ref 98–112)
CO2 SERPL-SCNC: 35 MMOL/L (ref 21–32)
CREAT BLD-MCNC: 0.7 MG/DL (ref 0.55–1.02)
DEPRECATED RDW RBC AUTO: 47.6 FL (ref 35.1–46.3)
EOSINOPHIL # BLD AUTO: 0.05 X10(3) UL (ref 0–0.7)
EOSINOPHIL NFR BLD AUTO: 0.5 %
ERYTHROCYTE [DISTWIDTH] IN BLOOD BY AUTOMATED COUNT: 13.5 % (ref 11–15)
GLUCOSE BLD-MCNC: 146 MG/DL (ref 70–99)
HCT VFR BLD AUTO: 44.9 % (ref 35–48)
HGB BLD-MCNC: 14.4 G/DL (ref 12–16)
IMM GRANULOCYTES # BLD AUTO: 0.03 X10(3) UL (ref 0–1)
IMM GRANULOCYTES NFR BLD: 0.3 %
LYMPHOCYTES # BLD AUTO: 1.82 X10(3) UL (ref 1–4)
LYMPHOCYTES NFR BLD AUTO: 17.4 %
MCH RBC QN AUTO: 30.6 PG (ref 26–34)
MCHC RBC AUTO-ENTMCNC: 32.1 G/DL (ref 31–37)
MCV RBC AUTO: 95.5 FL (ref 80–100)
MONOCYTES # BLD AUTO: 0.85 X10(3) UL (ref 0.1–1)
MONOCYTES NFR BLD AUTO: 8.1 %
NEUTROPHILS # BLD AUTO: 7.68 X10 (3) UL (ref 1.5–7.7)
NEUTROPHILS # BLD AUTO: 7.68 X10(3) UL (ref 1.5–7.7)
NEUTROPHILS NFR BLD AUTO: 73.4 %
OSMOLALITY SERPL CALC.SUM OF ELEC: 293 MOSM/KG (ref 275–295)
PLATELET # BLD AUTO: 220 10(3)UL (ref 150–450)
POTASSIUM SERPL-SCNC: 4 MMOL/L (ref 3.5–5.1)
RBC # BLD AUTO: 4.7 X10(6)UL (ref 3.8–5.3)
SODIUM SERPL-SCNC: 139 MMOL/L (ref 136–145)
TROPONIN I SERPL-MCNC: <0.045 NG/ML (ref ?–0.04)
TSI SER-ACNC: 2.32 MIU/ML (ref 0.36–3.74)
WBC # BLD AUTO: 10.5 X10(3) UL (ref 4–11)

## 2020-02-03 PROCEDURE — 99214 OFFICE O/P EST MOD 30 MIN: CPT | Performed by: INTERNAL MEDICINE

## 2020-02-03 PROCEDURE — 75574 CT ANGIO HRT W/3D IMAGE: CPT | Performed by: INTERNAL MEDICINE

## 2020-02-03 PROCEDURE — 93306 TTE W/DOPPLER COMPLETE: CPT | Performed by: INTERNAL MEDICINE

## 2020-02-03 PROCEDURE — 71045 X-RAY EXAM CHEST 1 VIEW: CPT | Performed by: EMERGENCY MEDICINE

## 2020-02-03 PROCEDURE — 99235 HOSP IP/OBS SAME DATE MOD 70: CPT | Performed by: HOSPITALIST

## 2020-02-03 RX ORDER — METOPROLOL TARTRATE 50 MG/1
50 TABLET, FILM COATED ORAL ONCE
Status: DISCONTINUED | OUTPATIENT
Start: 2020-02-03 | End: 2020-02-03

## 2020-02-03 RX ORDER — ACETAMINOPHEN 325 MG/1
650 TABLET ORAL EVERY 6 HOURS PRN
Status: DISCONTINUED | OUTPATIENT
Start: 2020-02-03 | End: 2020-02-03

## 2020-02-03 RX ORDER — KETOTIFEN FUMARATE 0.35 MG/ML
1 SOLUTION/ DROPS OPHTHALMIC 2 TIMES DAILY PRN
Status: DISCONTINUED | OUTPATIENT
Start: 2020-02-03 | End: 2020-02-03

## 2020-02-03 RX ORDER — METOPROLOL TARTRATE 50 MG/1
50 TABLET, FILM COATED ORAL ONCE
Status: DISCONTINUED | OUTPATIENT
Start: 2020-02-04 | End: 2020-02-03

## 2020-02-03 RX ORDER — PANTOPRAZOLE SODIUM 40 MG/1
40 TABLET, DELAYED RELEASE ORAL
Status: DISCONTINUED | OUTPATIENT
Start: 2020-02-04 | End: 2020-02-03

## 2020-02-03 RX ORDER — METOPROLOL TARTRATE 5 MG/5ML
5 INJECTION INTRAVENOUS SEE ADMIN INSTRUCTIONS
Status: DISCONTINUED | OUTPATIENT
Start: 2020-02-03 | End: 2020-02-03

## 2020-02-03 RX ORDER — DIGOXIN 250 MCG
0.25 TABLET ORAL DAILY
COMMUNITY

## 2020-02-03 RX ORDER — ALPRAZOLAM 0.25 MG/1
0.25 TABLET ORAL
Status: DISCONTINUED | OUTPATIENT
Start: 2020-02-03 | End: 2020-02-03

## 2020-02-03 RX ORDER — DOXEPIN HYDROCHLORIDE 50 MG/1
1 CAPSULE ORAL DAILY
Status: DISCONTINUED | OUTPATIENT
Start: 2020-02-04 | End: 2020-02-03

## 2020-02-03 RX ORDER — ALBUTEROL SULFATE 90 UG/1
2 AEROSOL, METERED RESPIRATORY (INHALATION) AS NEEDED
Status: DISCONTINUED | OUTPATIENT
Start: 2020-02-03 | End: 2020-02-03

## 2020-02-03 RX ORDER — SODIUM CHLORIDE 0.9 % (FLUSH) 0.9 %
3 SYRINGE (ML) INJECTION AS NEEDED
Status: DISCONTINUED | OUTPATIENT
Start: 2020-02-03 | End: 2020-02-03

## 2020-02-03 RX ORDER — METOPROLOL TARTRATE 50 MG/1
50 TABLET, FILM COATED ORAL ONCE AS NEEDED
Status: COMPLETED | OUTPATIENT
Start: 2020-02-03 | End: 2020-02-03

## 2020-02-03 RX ORDER — DILTIAZEM HYDROCHLORIDE 5 MG/ML
INJECTION INTRAVENOUS
Status: DISCONTINUED
Start: 2020-02-03 | End: 2020-02-03

## 2020-02-03 RX ORDER — METOPROLOL TARTRATE 50 MG/1
50 TABLET, FILM COATED ORAL ONCE AS NEEDED
Status: DISCONTINUED | OUTPATIENT
Start: 2020-02-03 | End: 2020-02-03

## 2020-02-03 RX ORDER — METOPROLOL TARTRATE 5 MG/5ML
INJECTION INTRAVENOUS
Status: DISCONTINUED
Start: 2020-02-03 | End: 2020-02-03

## 2020-02-03 RX ORDER — METOPROLOL TARTRATE 50 MG/1
50 TABLET, FILM COATED ORAL ONCE AS NEEDED
Status: DISCONTINUED | OUTPATIENT
Start: 2020-02-04 | End: 2020-02-03

## 2020-02-03 RX ORDER — METOPROLOL TARTRATE 100 MG/1
100 TABLET ORAL ONCE AS NEEDED
Status: DISCONTINUED | OUTPATIENT
Start: 2020-02-03 | End: 2020-02-03

## 2020-02-03 RX ORDER — DILTIAZEM HYDROCHLORIDE 5 MG/ML
5 INJECTION INTRAVENOUS SEE ADMIN INSTRUCTIONS
Status: DISCONTINUED | OUTPATIENT
Start: 2020-02-03 | End: 2020-02-03

## 2020-02-03 RX ORDER — DIGOXIN 250 MCG
0.25 TABLET ORAL DAILY
Status: DISCONTINUED | OUTPATIENT
Start: 2020-02-04 | End: 2020-02-03

## 2020-02-03 RX ORDER — METOPROLOL TARTRATE 50 MG/1
100 TABLET, FILM COATED ORAL ONCE AS NEEDED
Status: COMPLETED | OUTPATIENT
Start: 2020-02-03 | End: 2020-02-03

## 2020-02-03 RX ORDER — DILTIAZEM HYDROCHLORIDE 240 MG/1
240 CAPSULE, COATED, EXTENDED RELEASE ORAL DAILY
Refills: 0 | Status: SHIPPED | COMMUNITY
Start: 2020-02-03

## 2020-02-03 RX ORDER — BUDESONIDE 0.25 MG/2ML
0.25 INHALANT ORAL NIGHTLY
Status: DISCONTINUED | OUTPATIENT
Start: 2020-02-03 | End: 2020-02-03

## 2020-02-03 RX ORDER — DICYCLOMINE HYDROCHLORIDE 10 MG/1
10 CAPSULE ORAL EVERY 4 HOURS PRN
Status: DISCONTINUED | OUTPATIENT
Start: 2020-02-03 | End: 2020-02-03

## 2020-02-03 RX ORDER — ASPIRIN 81 MG/1
162 TABLET ORAL DAILY
Status: DISCONTINUED | OUTPATIENT
Start: 2020-02-04 | End: 2020-02-03

## 2020-02-03 RX ORDER — CETIRIZINE HYDROCHLORIDE 10 MG/1
10 TABLET ORAL NIGHTLY
Status: DISCONTINUED | OUTPATIENT
Start: 2020-02-03 | End: 2020-02-03

## 2020-02-03 RX ORDER — POLYETHYLENE GLYCOL 3350 17 G/17G
17 POWDER, FOR SOLUTION ORAL DAILY PRN
Status: DISCONTINUED | OUTPATIENT
Start: 2020-02-03 | End: 2020-02-03

## 2020-02-03 RX ORDER — METOCLOPRAMIDE HYDROCHLORIDE 5 MG/ML
10 INJECTION INTRAMUSCULAR; INTRAVENOUS EVERY 8 HOURS PRN
Status: DISCONTINUED | OUTPATIENT
Start: 2020-02-03 | End: 2020-02-03

## 2020-02-03 RX ORDER — HEPARIN SODIUM 5000 [USP'U]/ML
5000 INJECTION, SOLUTION INTRAVENOUS; SUBCUTANEOUS EVERY 12 HOURS SCHEDULED
Status: DISCONTINUED | OUTPATIENT
Start: 2020-02-03 | End: 2020-02-03

## 2020-02-03 RX ORDER — ONDANSETRON 2 MG/ML
4 INJECTION INTRAMUSCULAR; INTRAVENOUS EVERY 6 HOURS PRN
Status: DISCONTINUED | OUTPATIENT
Start: 2020-02-03 | End: 2020-02-03

## 2020-02-03 RX ORDER — METOPROLOL TARTRATE 100 MG/1
100 TABLET ORAL ONCE
Status: DISCONTINUED | OUTPATIENT
Start: 2020-02-04 | End: 2020-02-03

## 2020-02-03 RX ORDER — DOCUSATE SODIUM 100 MG/1
100 CAPSULE, LIQUID FILLED ORAL 2 TIMES DAILY
Status: DISCONTINUED | OUTPATIENT
Start: 2020-02-03 | End: 2020-02-03

## 2020-02-03 RX ORDER — MONTELUKAST SODIUM 10 MG/1
10 TABLET ORAL NIGHTLY
Status: DISCONTINUED | OUTPATIENT
Start: 2020-02-03 | End: 2020-02-03

## 2020-02-03 RX ORDER — NITROGLYCERIN 0.4 MG/1
0.4 TABLET SUBLINGUAL ONCE
Status: DISCONTINUED | OUTPATIENT
Start: 2020-02-03 | End: 2020-02-03

## 2020-02-03 RX ORDER — AMITRIPTYLINE HYDROCHLORIDE 50 MG/1
100 TABLET, FILM COATED ORAL NIGHTLY
Status: DISCONTINUED | OUTPATIENT
Start: 2020-02-03 | End: 2020-02-03

## 2020-02-03 RX ORDER — METOPROLOL TARTRATE 100 MG/1
100 TABLET ORAL ONCE
Status: DISCONTINUED | OUTPATIENT
Start: 2020-02-03 | End: 2020-02-03

## 2020-02-03 NOTE — ED PROVIDER NOTES
Patient Seen in: [facility] AND [facility] Emergency Department      History   Patient presents with:  Chest Pain Angina    Stated Complaint:     HPI    The patient is a 51-year-old female with a history of atrial fibrillation, COPD on chronic home O2 who presen No pertinent social history. Review of Systems    Positive for stated complaint:   Other systems are as noted in HPI. Constitutional and vital signs reviewed.       All other systems reviewed and negative except as noted above Judgment: Judgment normal.       Differential diagnosis includes arrhythmia, acute coronary syndrome        ED Course     Labs Reviewed   BASIC METABOLIC PANEL (8) - Abnormal; Notable for the following components:       Result Value    Glucose 146 (*) Jose Bullard MD on 2/03/2020 at [address] [place] [address]            Radiology exams  Viewed and reviewed by myself and findings discussed with patient including need for follow up    Admission disposition: 2/3/2020  9:25 AM                   Disposition and Plan     Cl

## 2020-02-03 NOTE — ED INITIAL ASSESSMENT (HPI)
Pt reports she began to 'feel  Nervous' because her heart rate was going down to 40 bpm.  When her heart rate was low, she describes a 'dull pain' - pt points at the pain near lower sternum/epigastric area. Hx of afib and paralyzed diaphragm.   Pt placed o

## 2020-02-03 NOTE — IMAGING NOTE
TO RAD HOLDING FROM CT @ 1562      HX TAKEN :CHEST PAIN    PT CONSENTED AT 1232     BASELINE VITAL SIGNS   HR 62  /53    CTA ORDERED BY DR NATHAN WAS PT GIVEN CTA  PREMEDS , IF YES     100 MG X 2 DOSES @ 0934 AND 1043 IN ER    18 GAUGE IV STARTED IN ER

## 2020-02-03 NOTE — CONSULTS
[facility] Cardiology Consult Note    Melissa Lopez Patient Status:  Emergency    1940 MRN [id number]   Location [address] Attending Roseann Garrison MD   Hosp Day # 0 PCP Linda Barrientos MD     70year old female, consult Cardiology    --------------------------------------------------------------------------------------------------------------------------------  ROS 10 systems reviewed, pertinent findings above.   Review of Systems   All other systems reviewed and are negat She reports current drug use.     Objective:   Temp: 97.8 °F (36.6 °C)  Pulse: 73  Resp: 18  BP: 125/51    Intake/Output:   No intake or output data in the 24 hours ending 02/03/20 0853    Physical Exam:     General: NAD  HEENT: Normocephalic, anicteric scl

## 2020-02-03 NOTE — H&P
[facility] - [facility]  History & Physical     Lincoln Miller  : 1940    Status: Emergency  Day #: 0    Attending: Lulu Beck MD  PCP: Aparna Coburn MD     Date of Encounter:  2/3/2020  Date of Admission:  2/3/2020     Chief Complaint:  Chest dis SINUS SURGERY        DEVIATED SEPTUM   • T&A     • TONSILLECTOMY         Family History   Problem Relation Age of Onset   • Ovarian Cancer Mother 68        endometrial, poss ovarian primary   • Pulmonary Disease Sister         COPD   • Skin cancer Other Apply 1 Application topically 2 (two) times daily.    estradiol (CLIMARA) 0.05 MG/24HR Transdermal Patch Weekly,     PATANOL 0.1 % Ophthalmic Solution,     lansoprazole (PREVACID) 30 MG Oral Capsule Delayed Release,     Amitriptyline HCl (ELAVIL) 100 MG Or clubbing  Neurologic:  nonfocal  Psychiatric:  Normal affect, calm and appropriate  Skin:  No rash, no lesion     Results     Recent Labs   Lab 02/03/20  0600   WBC 10.5   HGB 14.4   HCT 44.9   .0   RBC 4.70   MCV 95.5   MCH 30.6   MCHC 32.1   RDW 1

## 2020-02-04 ENCOUNTER — ANCILLARY ORDERS (OUTPATIENT)
Dept: CARDIOLOGY | Age: 80
End: 2020-02-04

## 2020-02-04 ENCOUNTER — ANCILLARY PROCEDURE (OUTPATIENT)
Dept: CARDIOLOGY | Age: 80
End: 2020-02-04
Attending: INTERNAL MEDICINE

## 2020-02-04 DIAGNOSIS — R00.2 PALPITATIONS: ICD-10-CM

## 2020-02-04 PROCEDURE — X1094 NO CHARGE VISIT: HCPCS | Performed by: INTERNAL MEDICINE

## 2020-02-04 NOTE — DISCHARGE SUMMARY
[facility]  Discharge Summary     Vania Lomeli  : 1940    Status: Observation  Day #: 0    Attending: Jaycob Casanova MD  PCP: Sky Esparza MD     Date of Admission: 2/3/2020  Date of Discharge: 2/3/2020     Hospital Discharge Diagnoses changed:    · how to take this  · when to take this  · reasons to take this      Use tid for itching   Quantity:  60 g  Refills:  3     triamcinolone acetonide 0.1 % Crea  Commonly known as:  KENALOG  What changed:  See the new instructions.       APPLY TO Soln  Generic drug:  Olopatadine HCl      Place 1 drop into both eyes as needed for Allergies. Refills:  0     Potassium Chloride ER 20 MEQ Tbcr  Commonly known as:  K-DUR M20      Take 20 mEq by mouth nightly.    Refills:  0     ProAir  (90 Base)

## 2020-02-04 NOTE — PLAN OF CARE
Problem: Patient Centered Care  Goal: Patient preferences are identified and integrated in the patient's plan of care  Description  Interventions:  - What would you like us to know as we care for you?  Pt was a former RN  - Provide timely, complete, and a

## 2020-02-10 ENCOUNTER — TELEPHONE (OUTPATIENT)
Dept: CARDIOLOGY | Age: 80
End: 2020-02-10

## 2020-02-15 ENCOUNTER — APPOINTMENT (OUTPATIENT)
Dept: LAB | Age: 80
End: 2020-02-15
Attending: INTERNAL MEDICINE
Payer: MEDICARE

## 2020-02-15 DIAGNOSIS — Z51.81 ENCOUNTER FOR MONITORING DIGOXIN THERAPY: ICD-10-CM

## 2020-02-15 DIAGNOSIS — Z79.899 ENCOUNTER FOR MONITORING DIGOXIN THERAPY: ICD-10-CM

## 2020-02-15 DIAGNOSIS — I48.0 PAF (PAROXYSMAL ATRIAL FIBRILLATION) (HCC): ICD-10-CM

## 2020-02-15 LAB
DGDOSEF LAST DOSE: NORMAL
DIGOXIN SERPL-MCNC: 1.19 NG/ML
DIGOXIN SERPL-MCNC: 1.19 NG/ML (ref 0.8–2)

## 2020-02-15 PROCEDURE — 80162 ASSAY OF DIGOXIN TOTAL: CPT

## 2020-02-15 PROCEDURE — 36415 COLL VENOUS BLD VENIPUNCTURE: CPT

## 2020-02-17 ENCOUNTER — CLINICAL ABSTRACT (OUTPATIENT)
Dept: CARDIOLOGY | Age: 80
End: 2020-02-17

## 2020-03-10 ENCOUNTER — TELEPHONE (OUTPATIENT)
Dept: CARDIOLOGY | Age: 80
End: 2020-03-10

## 2020-03-10 ENCOUNTER — OFFICE VISIT (OUTPATIENT)
Dept: OTOLARYNGOLOGY | Facility: CLINIC | Age: 80
End: 2020-03-10
Payer: MEDICARE

## 2020-03-10 VITALS
HEIGHT: 69 IN | SYSTOLIC BLOOD PRESSURE: 117 MMHG | TEMPERATURE: 98 F | DIASTOLIC BLOOD PRESSURE: 74 MMHG | WEIGHT: 190 LBS | BODY MASS INDEX: 28.14 KG/M2

## 2020-03-10 DIAGNOSIS — J34.89 NASAL CRUSTING: Primary | ICD-10-CM

## 2020-03-10 PROCEDURE — 99213 OFFICE O/P EST LOW 20 MIN: CPT | Performed by: OTOLARYNGOLOGY

## 2020-03-10 PROCEDURE — 93272 ECG/REVIEW INTERPRET ONLY: CPT | Performed by: INTERNAL MEDICINE

## 2020-03-10 PROCEDURE — G0463 HOSPITAL OUTPT CLINIC VISIT: HCPCS | Performed by: OTOLARYNGOLOGY

## 2020-03-10 NOTE — PROGRESS NOTES
Kajal Rivera is a 70year old female.   Patient presents with:  Nose Problem: pt here for a 5 month check up on left nasal crusting, per pt lesion no changes, pt would like refill on ointment      HISTORY OF PRESENT ILLNESS  She presents with a history Lef postnasal drip which is affecting her voice. Previous history of left-sided true vocal cord paralysis     3/10/20 Long history of nasal crusting primarily on the left side. Previous septoplasty back in 2009.   She is now oxygen dependent with significant WAS TOLD SHE HAS HAD THIS WHEN SHE WAS 18YEARS OLD   • High blood pressure    • High cholesterol    • Irregular heart rate    • Lichenoid keratosis 03/41/3505    left chest-bx   • Osteoarthritis    • 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 Left: Normal. Pupil - Right: Normal, Left: Normal. Fundus - Right: Normal, Left: Normal.   Neurological Normal Memory - Normal. Cranial nerves - Cranial nerves II through XII grossly intact.    Head/Face Normal Facial features - Normal. Eyebrows - Normal. S taking differently: as needed.  ), Disp: 80 g, Rfl: 2  •  PROAIR  (90 BASE) MCG/ACT Inhalation Aero Soln, Inhale 2 puffs into the lungs as needed.   , Disp: , Rfl:   •  Cholecalciferol (VITAMIN D) 1000 UNITS Oral Tab, Take 1,000 Units by mouth nightl , Rfl:   •  B Complex Oral Cap, Take by mouth daily. VITAMIN B COMPLEX (unknown strength) , Disp: , Rfl:   ASSESSMENT AND PLAN    1. Nasal crusting  Continue nasal crusting which seems to be reasonably well-controlled with the use of mupirocin.   Continue m

## 2020-03-11 ENCOUNTER — OFFICE VISIT (OUTPATIENT)
Dept: CARDIOLOGY | Age: 80
End: 2020-03-11

## 2020-03-11 VITALS
HEART RATE: 80 BPM | RESPIRATION RATE: 18 BRPM | HEIGHT: 65 IN | WEIGHT: 188 LBS | OXYGEN SATURATION: 95 % | BODY MASS INDEX: 31.32 KG/M2 | DIASTOLIC BLOOD PRESSURE: 50 MMHG | SYSTOLIC BLOOD PRESSURE: 116 MMHG

## 2020-03-11 DIAGNOSIS — I48.0 PAF (PAROXYSMAL ATRIAL FIBRILLATION) (CMD): Primary | ICD-10-CM

## 2020-03-11 DIAGNOSIS — R60.0 LOCALIZED EDEMA: ICD-10-CM

## 2020-03-11 DIAGNOSIS — I49.3 PVC'S (PREMATURE VENTRICULAR CONTRACTIONS): ICD-10-CM

## 2020-03-11 PROCEDURE — 99214 OFFICE O/P EST MOD 30 MIN: CPT | Performed by: INTERNAL MEDICINE

## 2020-03-11 ASSESSMENT — PATIENT HEALTH QUESTIONNAIRE - PHQ9
SUM OF ALL RESPONSES TO PHQ9 QUESTIONS 1 AND 2: 0
SUM OF ALL RESPONSES TO PHQ9 QUESTIONS 1 AND 2: 0
2. FEELING DOWN, DEPRESSED OR HOPELESS: NOT AT ALL
1. LITTLE INTEREST OR PLEASURE IN DOING THINGS: NOT AT ALL

## 2020-05-12 ENCOUNTER — TELEPHONE (OUTPATIENT)
Dept: DERMATOLOGY CLINIC | Facility: CLINIC | Age: 80
End: 2020-05-12

## 2020-05-12 NOTE — TELEPHONE ENCOUNTER
LOV 7/22/19. Patient with scab to upper back. Noticed scab x 2 months ago but didn't call because she didn't think she would be seen (shelter in place). Patient now very concerned because the scab remains. Bleeding when patient aggravates area.  Okay to javier

## 2020-05-13 ENCOUNTER — OFFICE VISIT (OUTPATIENT)
Dept: DERMATOLOGY CLINIC | Facility: CLINIC | Age: 80
End: 2020-05-13
Payer: MEDICARE

## 2020-05-13 DIAGNOSIS — L81.4 LENTIGO: ICD-10-CM

## 2020-05-13 DIAGNOSIS — L82.1 SEBORRHEIC KERATOSES: ICD-10-CM

## 2020-05-13 DIAGNOSIS — L82.0 INFLAMED SEBORRHEIC KERATOSIS: Primary | ICD-10-CM

## 2020-05-13 DIAGNOSIS — L57.0 AK (ACTINIC KERATOSIS): ICD-10-CM

## 2020-05-13 PROCEDURE — G0463 HOSPITAL OUTPT CLINIC VISIT: HCPCS | Performed by: DERMATOLOGY

## 2020-05-13 PROCEDURE — 99213 OFFICE O/P EST LOW 20 MIN: CPT | Performed by: DERMATOLOGY

## 2020-05-18 NOTE — PROGRESS NOTES
Masood Sena is a 70year old female. HPI:     CC:  Patient presents with:  Lesion: LOV 7/22/2019 Patient present with raised scaley lesion on L side of back . Patient no sure when lesion occur due to the locaition . Patient denies any hx of sc        All Smoker      Smokeless tobacco: Never Used    Alcohol use:  Yes      Alcohol/week: 0.0 standard drinks      Frequency: 2-4 times a month      Comment: one drink once a week    Drug use: Never       Current Outpatient Medications   Medication Sig Dispense Ref Inhalation Suspension Inhale 0.25 mg into the lungs nightly. 2 puffs nightly      • hydrocodone-acetaminophen (VICODIN) 5-500 MG Oral Tab Take  by mouth.  take 1 tablet by oral route  every 4 - 6 hours as needed for pain     • Lisinopril-Hydrochlorothiazide Problems with swallowing     occasionally   • Shortness of breath     2 L NC ALL THE TIME 24HR/7 DAYS PER WEEK   • Unspecified essential hypertension    • Visual impairment      Past Surgical History:   Procedure Laterality Date   • ADENOIDECTOMY     • CAT Other Topics      Concerns:        [place] up [place] a farm: Not Asked        History of tanning: Not Asked        Outdoor occupation: Not Asked        Pt has a pacemaker: No        Pt has a defibrillator: No        Breast feeding: Not Asked        Reaction to lo complaints. History, medications, allergies reviewed as noted. Physical Examination:     Well-developed well-nourished patient alert oriented in no acute distress.   Exam total-body performed, including scalp, head, neck, face,nails, hair, exte observe carefully discussed topicals over-the-counter Differin. Reviewed options for ingrowing hair. VAn IQ UA intermittently on back order    Multiple benign keratoses reassurance.   Waxy tan papules at face, upper back chest reassurance regarding benign

## 2020-06-20 ENCOUNTER — HOSPITAL ENCOUNTER (OUTPATIENT)
Dept: MAMMOGRAPHY | Age: 80
Discharge: HOME | End: 2020-06-20
Attending: INTERNAL MEDICINE
Payer: MEDICARE

## 2020-06-20 DIAGNOSIS — Z12.31 ENCOUNTER FOR SCREENING MAMMOGRAM FOR MALIGNANT NEOPLASM OF BREAST: ICD-10-CM

## 2020-06-20 PROCEDURE — 77067 SCR MAMMO BI INCL CAD: CPT | Performed by: INTERNAL MEDICINE

## 2020-06-20 PROCEDURE — 77063 BREAST TOMOSYNTHESIS BI: CPT | Performed by: INTERNAL MEDICINE

## 2020-07-21 ENCOUNTER — LAB ENCOUNTER (OUTPATIENT)
Dept: LAB | Age: 80
End: 2020-07-21
Attending: INTERNAL MEDICINE
Payer: MEDICARE

## 2020-07-21 DIAGNOSIS — I48.20 CHRONIC ATRIAL FIBRILLATION (HCC): ICD-10-CM

## 2020-07-21 DIAGNOSIS — R53.83 OTHER FATIGUE: ICD-10-CM

## 2020-07-21 DIAGNOSIS — E55.9 VITAMIN D DEFICIENCY: ICD-10-CM

## 2020-07-21 DIAGNOSIS — E11.9 DIABETES MELLITUS WITHOUT COMPLICATION (HCC): Primary | ICD-10-CM

## 2020-07-21 DIAGNOSIS — E83.42 HYPOMAGNESEMIA: ICD-10-CM

## 2020-07-21 DIAGNOSIS — E55.9 AVITAMINOSIS D: ICD-10-CM

## 2020-07-21 LAB
ALBUMIN SERPL-MCNC: 2.9 G/DL (ref 3.4–5)
ALBUMIN/GLOB SERPL: 0.7 {RATIO} (ref 1–2)
ALP LIVER SERPL-CCNC: 66 U/L (ref 55–142)
ALT SERPL-CCNC: 39 U/L (ref 13–56)
ANION GAP SERPL CALC-SCNC: 1 MMOL/L (ref 0–18)
AST SERPL-CCNC: 20 U/L (ref 15–37)
BILIRUB SERPL-MCNC: 0.2 MG/DL (ref 0.1–2)
BUN BLD-MCNC: 9 MG/DL (ref 7–18)
BUN/CREAT SERPL: 16.1 (ref 10–20)
CALCIUM BLD-MCNC: 8.7 MG/DL (ref 8.5–10.1)
CHLORIDE SERPL-SCNC: 101 MMOL/L (ref 98–112)
CHOLEST SMN-MCNC: 199 MG/DL (ref ?–200)
CO2 SERPL-SCNC: 38 MMOL/L (ref 21–32)
CREAT BLD-MCNC: 0.56 MG/DL (ref 0.55–1.02)
CREAT UR-SCNC: 150 MG/DL
DIGOXIN SERPL-MCNC: 1.48 NG/ML (ref 0.8–2)
EST. AVERAGE GLUCOSE BLD GHB EST-MCNC: 169 MG/DL (ref 68–126)
GLOBULIN PLAS-MCNC: 4.1 G/DL (ref 2.8–4.4)
GLUCOSE BLD-MCNC: 163 MG/DL (ref 70–99)
HAV IGM SER QL: 2 MG/DL (ref 1.6–2.6)
HBA1C MFR BLD HPLC: 7.5 % (ref ?–5.7)
HDLC SERPL-MCNC: 63 MG/DL (ref 40–59)
LDLC SERPL CALC-MCNC: 114 MG/DL (ref ?–100)
M PROTEIN MFR SERPL ELPH: 7 G/DL (ref 6.4–8.2)
MICROALBUMIN UR-MCNC: 1.29 MG/DL
MICROALBUMIN/CREAT 24H UR-RTO: 8.6 UG/MG (ref ?–30)
NONHDLC SERPL-MCNC: 136 MG/DL (ref ?–130)
OSMOLALITY SERPL CALC.SUM OF ELEC: 292 MOSM/KG (ref 275–295)
PATIENT FASTING Y/N/NP: YES
PATIENT FASTING Y/N/NP: YES
POTASSIUM SERPL-SCNC: 4.2 MMOL/L (ref 3.5–5.1)
SODIUM SERPL-SCNC: 140 MMOL/L (ref 136–145)
TRIGL SERPL-MCNC: 108 MG/DL (ref 30–149)
TSI SER-ACNC: 1.58 MIU/ML (ref 0.36–3.74)
VLDLC SERPL CALC-MCNC: 22 MG/DL (ref 0–30)

## 2020-07-21 PROCEDURE — 84443 ASSAY THYROID STIM HORMONE: CPT

## 2020-07-21 PROCEDURE — 80061 LIPID PANEL: CPT

## 2020-07-21 PROCEDURE — 80053 COMPREHEN METABOLIC PANEL: CPT

## 2020-07-21 PROCEDURE — 82570 ASSAY OF URINE CREATININE: CPT

## 2020-07-21 PROCEDURE — 36415 COLL VENOUS BLD VENIPUNCTURE: CPT

## 2020-07-21 PROCEDURE — 82306 VITAMIN D 25 HYDROXY: CPT

## 2020-07-21 PROCEDURE — 83036 HEMOGLOBIN GLYCOSYLATED A1C: CPT

## 2020-07-21 PROCEDURE — 80162 ASSAY OF DIGOXIN TOTAL: CPT

## 2020-07-21 PROCEDURE — 83735 ASSAY OF MAGNESIUM: CPT

## 2020-07-21 PROCEDURE — 82043 UR ALBUMIN QUANTITATIVE: CPT

## 2020-07-22 LAB — 25(OH)D3 SERPL-MCNC: 49.6 NG/ML (ref 30–100)

## 2020-08-19 ENCOUNTER — OFFICE VISIT (OUTPATIENT)
Dept: CARDIOLOGY | Age: 80
End: 2020-08-19

## 2020-08-19 VITALS
BODY MASS INDEX: 30.82 KG/M2 | SYSTOLIC BLOOD PRESSURE: 98 MMHG | DIASTOLIC BLOOD PRESSURE: 40 MMHG | HEART RATE: 73 BPM | WEIGHT: 185 LBS | HEIGHT: 65 IN | OXYGEN SATURATION: 95 % | RESPIRATION RATE: 18 BRPM

## 2020-08-19 DIAGNOSIS — I49.3 PVC'S (PREMATURE VENTRICULAR CONTRACTIONS): ICD-10-CM

## 2020-08-19 DIAGNOSIS — I10 ESSENTIAL HYPERTENSION: ICD-10-CM

## 2020-08-19 DIAGNOSIS — R60.0 LOCALIZED EDEMA: ICD-10-CM

## 2020-08-19 DIAGNOSIS — I48.0 PAF (PAROXYSMAL ATRIAL FIBRILLATION) (CMD): Primary | ICD-10-CM

## 2020-08-19 PROCEDURE — 99214 OFFICE O/P EST MOD 30 MIN: CPT | Performed by: INTERNAL MEDICINE

## 2020-08-19 ASSESSMENT — PATIENT HEALTH QUESTIONNAIRE - PHQ9
CLINICAL INTERPRETATION OF PHQ9 SCORE: NO FURTHER SCREENING NEEDED
SUM OF ALL RESPONSES TO PHQ9 QUESTIONS 1 AND 2: 0
2. FEELING DOWN, DEPRESSED OR HOPELESS: NOT AT ALL
CLINICAL INTERPRETATION OF PHQ2 SCORE: NO FURTHER SCREENING NEEDED
1. LITTLE INTEREST OR PLEASURE IN DOING THINGS: NOT AT ALL
SUM OF ALL RESPONSES TO PHQ9 QUESTIONS 1 AND 2: 0

## 2020-09-08 ENCOUNTER — APPOINTMENT (OUTPATIENT)
Dept: ENDOCRINOLOGY | Facility: HOSPITAL | Age: 80
End: 2020-09-08
Attending: INTERNAL MEDICINE
Payer: MEDICARE

## 2020-09-17 ENCOUNTER — HOSPITAL ENCOUNTER (OUTPATIENT)
Dept: ENDOCRINOLOGY | Facility: HOSPITAL | Age: 80
Discharge: HOME | End: 2020-09-17
Attending: INTERNAL MEDICINE
Payer: MEDICARE

## 2020-09-17 DIAGNOSIS — E11.9 DIABETES MELLITUS (HCC): Primary | ICD-10-CM

## 2020-09-17 DIAGNOSIS — E11.9 DIABETES MELLITUS TYPE II, NON INSULIN DEPENDENT (HCC): Primary | ICD-10-CM

## 2020-09-17 RX ORDER — BLOOD-GLUCOSE METER
1 EACH MISCELLANEOUS DAILY
Qty: 1 KIT | Refills: 0 | Status: SHIPPED | OUTPATIENT
Start: 2020-09-17

## 2020-09-17 NOTE — PROGRESS NOTES
Jimena Jones  : 1940 attended individual initial assessment for Diabetes Education:    Date: 2020   Start time: 10:30 am End time: 11:20 am    HgbA1C (%)   Date Value   2020 7.5 (H)        Assessment: Recently diagnosed with DIabetes an blood glucose meter, test strips and lancets per protocol. Written materials provided for all areas covered. Patient verbalized understanding and has no further questions at this time.     Macario Foster RN

## 2020-09-23 ENCOUNTER — OFFICE VISIT (OUTPATIENT)
Dept: DERMATOLOGY CLINIC | Facility: CLINIC | Age: 80
End: 2020-09-23
Payer: MEDICARE

## 2020-09-23 DIAGNOSIS — L82.0 INFLAMED SEBORRHEIC KERATOSIS: Primary | ICD-10-CM

## 2020-09-23 DIAGNOSIS — L72.0 EPIDERMAL CYST: ICD-10-CM

## 2020-09-23 DIAGNOSIS — L57.0 AK (ACTINIC KERATOSIS): ICD-10-CM

## 2020-09-23 DIAGNOSIS — D23.9 BENIGN NEOPLASM OF SKIN, UNSPECIFIED LOCATION: ICD-10-CM

## 2020-09-23 DIAGNOSIS — L82.1 SEBORRHEIC KERATOSES: ICD-10-CM

## 2020-09-23 PROCEDURE — G0463 HOSPITAL OUTPT CLINIC VISIT: HCPCS | Performed by: DERMATOLOGY

## 2020-09-23 PROCEDURE — 99213 OFFICE O/P EST LOW 20 MIN: CPT | Performed by: DERMATOLOGY

## 2020-09-23 RX ORDER — DIGOXIN 250 MCG
TABLET ORAL
COMMUNITY
Start: 2020-06-12

## 2020-09-23 RX ORDER — LUBIPROSTONE 24 UG/1
CAPSULE, GELATIN COATED ORAL
COMMUNITY
Start: 2020-06-12

## 2020-09-23 RX ORDER — FUROSEMIDE 40 MG/1
TABLET ORAL
COMMUNITY
Start: 2020-05-21 | End: 2020-09-23

## 2020-09-23 RX ORDER — POTASSIUM CHLORIDE 1500 MG/1
TABLET, FILM COATED, EXTENDED RELEASE ORAL
COMMUNITY
Start: 2020-06-12 | End: 2020-09-23

## 2020-09-23 RX ORDER — DOXYCYCLINE HYCLATE 100 MG/1
CAPSULE ORAL
COMMUNITY
Start: 2020-06-16

## 2020-09-23 RX ORDER — DOXYCYCLINE HYCLATE 100 MG/1
CAPSULE ORAL
COMMUNITY
Start: 2020-06-16 | End: 2020-09-23

## 2020-09-23 RX ORDER — BUDESONIDE 180 UG/1
AEROSOL, POWDER RESPIRATORY (INHALATION)
COMMUNITY
Start: 2020-07-15

## 2020-09-23 RX ORDER — B-COMPLEX WITH VITAMIN C
TABLET ORAL
COMMUNITY
End: 2020-09-23

## 2020-09-23 RX ORDER — PREDNISONE 20 MG/1
TABLET ORAL
COMMUNITY
Start: 2020-06-16

## 2020-09-23 RX ORDER — ESTRADIOL 0.05 MG/D
PATCH TRANSDERMAL
COMMUNITY
Start: 2020-06-12

## 2020-09-23 RX ORDER — AMITRIPTYLINE HYDROCHLORIDE 100 MG/1
TABLET, FILM COATED ORAL
COMMUNITY
Start: 2020-06-12 | End: 2020-09-23

## 2020-09-28 NOTE — PROGRESS NOTES
Lincoln Miller is a 68year old female. HPI:     CC:  Patient presents with:  Lesion: LOV:5/13/20 Pt presenting with lesion on scapula for 6 months. Pt c/o itching, lesion has changed in size.  HX of Ak's         Allergies:  Cefuroxime, Levofloxacin, and Pe Used    Alcohol use:  Yes      Alcohol/week: 0.0 standard drinks      Frequency: 2-4 times a month      Comment: one drink once a week    Drug use: Never       Current Outpatient Medications   Medication Sig Dispense Refill   • AMITIZA 24 MCG Oral Cap as needed for Allergies. • lansoprazole (PREVACID) 30 MG Oral Capsule Delayed Release Take 30 mg by mouth nightly. • Amitriptyline HCl (ELAVIL) 100 MG Oral Tab Take 100 mg by mouth nightly.        • aspirin 81 MG Oral Tab Take 162 mg by mouth da needed   • Diverticulitis     colonoscopy    • Diverticulosis of large intestine    • Esophageal reflux    • Extrinsic asthma, unspecified    • Headache    • Heart disease    • Hepatitis     WAS TOLD SHE HAS HAD THIS WHEN SHE WAS 18YEARS OLD   • High bloo Social connections        Talks on phone: Not on file        Gets together: Not on file        Attends Jainism service: Not on file        Active member of club or organization: Not on file        Attends meetings of clubs or organizations: Not on file Concerned with this. Cannot see it in the mirror. Patient does have a history of multiple sebaceous cyst removed previously    History of AK's  No other concerns presently    Patient presents with concerns above.     Patient has been in their usual stat several similar lesions around this laterally. Reassurance given regarding cyst.  Appears benign. Inflamed keratoses around this consider trial of cryo. Reassurance given. Patient with history of sebaceous cysts.   Is comfortable watching this present year or p.r.n. The patient indicates understanding of these issues and agrees to the plan. The patient is asked to return as noted in follow-up/ above. This note was generated using Dragon voice recognition software.   Please contact me regarding any

## 2020-10-08 ENCOUNTER — HOSPITAL ENCOUNTER (OUTPATIENT)
Dept: ENDOCRINOLOGY | Facility: HOSPITAL | Age: 80
Discharge: HOME | End: 2020-10-08
Attending: INTERNAL MEDICINE
Payer: MEDICARE

## 2020-10-08 VITALS — BODY MASS INDEX: 27 KG/M2 | WEIGHT: 180.88 LBS

## 2020-10-08 DIAGNOSIS — E11.9 DIABETES MELLITUS TYPE II, NON INSULIN DEPENDENT (HCC): Primary | ICD-10-CM

## 2020-10-08 NOTE — PROGRESS NOTES
Lincoln Miller  [id number] attended Step 2 Group Diabetes Education Class:    Date: 10/8/2020  Start time: 1:00 pm End time: 2:45 pm    Wt: 176.6 lbs Weight change since start of program:  -4.3 lbs    Diabetes Overview, pathophysiology, pre-diabetes, A1C
normal

## 2020-10-15 ENCOUNTER — HOSPITAL ENCOUNTER (OUTPATIENT)
Dept: ENDOCRINOLOGY | Facility: HOSPITAL | Age: 80
Discharge: HOME | End: 2020-10-15
Attending: INTERNAL MEDICINE
Payer: MEDICARE

## 2020-10-15 VITALS — BODY MASS INDEX: 35 KG/M2 | WEIGHT: 240 LBS

## 2020-10-15 DIAGNOSIS — E11.9 DIABETES MELLITUS TYPE II, NON INSULIN DEPENDENT (HCC): Primary | ICD-10-CM

## 2020-10-15 NOTE — PROGRESS NOTES
Anne Marie Found  [id number] attended Step 3 Group Diabetes Education Class:    Date: 10/15/2020  Start time: 1300  End time: 1500    Wt: 240 lb Weight change since start of program: 3#    Blood Glucose: Controlled: Stable        Healthy Eating  Reviewed Ba

## 2020-10-22 ENCOUNTER — APPOINTMENT (OUTPATIENT)
Dept: ENDOCRINOLOGY | Facility: HOSPITAL | Age: 80
End: 2020-10-22
Attending: INTERNAL MEDICINE
Payer: MEDICARE

## 2020-11-09 ENCOUNTER — OFFICE VISIT (OUTPATIENT)
Dept: DERMATOLOGY CLINIC | Facility: CLINIC | Age: 80
End: 2020-11-09
Payer: MEDICARE

## 2020-11-09 DIAGNOSIS — L57.0 AK (ACTINIC KERATOSIS): Primary | ICD-10-CM

## 2020-11-09 DIAGNOSIS — L72.0 EPIDERMAL CYST: ICD-10-CM

## 2020-11-09 DIAGNOSIS — L81.4 LENTIGO: ICD-10-CM

## 2020-11-09 DIAGNOSIS — D23.9 BENIGN NEOPLASM OF SKIN, UNSPECIFIED LOCATION: ICD-10-CM

## 2020-11-09 DIAGNOSIS — L71.9 ROSACEA: ICD-10-CM

## 2020-11-09 DIAGNOSIS — L82.1 SEBORRHEIC KERATOSES: ICD-10-CM

## 2020-11-09 PROCEDURE — G0463 HOSPITAL OUTPT CLINIC VISIT: HCPCS | Performed by: DERMATOLOGY

## 2020-11-09 PROCEDURE — 99213 OFFICE O/P EST LOW 20 MIN: CPT | Performed by: DERMATOLOGY

## 2020-11-16 NOTE — PROGRESS NOTES
Piotr Peacock is a 68year old female. HPI:     CC:  Patient presents with:  Full Skin Exam: LOV 9/23/2020. Pt requesting full skin exam. Concerned with lesion on back that is growing that was checked at last visit. Personal hx of AKs.          Allergies: Smokeless tobacco: Never Used    Alcohol use:  Yes      Alcohol/week: 0.0 standard drinks      Frequency: 2-4 times a month      Comment: one drink once a week    Drug use: Never       Current Outpatient Medications   Medication Sig Dispense Refill   • AM for itching ) 60 g 3   • Potassium Chloride ER (K-DUR M20) 20 MEQ Oral Tab CR Take 20 mEq by mouth nightly. • PATANOL 0.1 % Ophthalmic Solution Place 1 drop into both eyes as needed for Allergies.        • lansoprazole (PREVACID) 30 MG Oral Capsule Meri Cassidy Diverticulitis     colonoscopy    • Diverticulosis of large intestine    • Esophageal reflux    • Extrinsic asthma, unspecified    • Headache    • Heart disease    • Hepatitis     WAS TOLD SHE HAS HAD THIS WHEN SHE WAS 18YEARS OLD   • High blood pressure connections        Talks on phone: Not on file        Gets together: Not on file        Attends [employer] service: Not on file        Active member of club or organization: Not on file        Attends meetings of clubs or organizations: Not on file        R visit left scapula    Patient does have a history of multiple sebaceous cyst removed previously    History of AK's  No other concerns presently    Patient presents with concerns above. Patient has been in their usual state of health.   History, medicatio scaly areas over the forehead brows ears. Chest okay    Patient with history of sebaceous cysts. Is comfortable watching this lesion on the left back noted above presently consider removal if this becomes more bothersome.     No active AK's    Previous AK

## 2020-11-19 ENCOUNTER — APPOINTMENT (OUTPATIENT)
Dept: ENDOCRINOLOGY | Facility: HOSPITAL | Age: 80
End: 2020-11-19
Attending: INTERNAL MEDICINE
Payer: MEDICARE

## 2020-11-23 ENCOUNTER — APPOINTMENT (OUTPATIENT)
Dept: ENDOCRINOLOGY | Facility: HOSPITAL | Age: 80
End: 2020-11-23
Attending: INTERNAL MEDICINE
Payer: MEDICARE

## 2020-11-25 ENCOUNTER — LAB ENCOUNTER (OUTPATIENT)
Dept: LAB | Age: 80
End: 2020-11-25
Attending: INTERNAL MEDICINE
Payer: MEDICARE

## 2020-11-25 DIAGNOSIS — E55.9 VITAMIN D DEFICIENCY: ICD-10-CM

## 2020-11-25 DIAGNOSIS — E11.9 DIABETES MELLITUS (HCC): Primary | ICD-10-CM

## 2020-11-25 PROCEDURE — 83036 HEMOGLOBIN GLYCOSYLATED A1C: CPT

## 2020-11-25 PROCEDURE — 80053 COMPREHEN METABOLIC PANEL: CPT

## 2020-11-25 PROCEDURE — 82306 VITAMIN D 25 HYDROXY: CPT

## 2020-11-25 PROCEDURE — 80061 LIPID PANEL: CPT

## 2020-11-25 PROCEDURE — 36415 COLL VENOUS BLD VENIPUNCTURE: CPT

## 2020-12-15 ENCOUNTER — HOSPITAL ENCOUNTER (OUTPATIENT)
Dept: ENDOCRINOLOGY | Facility: HOSPITAL | Age: 80
Discharge: HOME | End: 2020-12-15
Attending: INTERNAL MEDICINE
Payer: MEDICARE

## 2020-12-15 VITALS — BODY MASS INDEX: 25 KG/M2 | WEIGHT: 166 LBS

## 2020-12-15 DIAGNOSIS — E11.9 DIABETES MELLITUS TYPE II, NON INSULIN DEPENDENT (HCC): Primary | ICD-10-CM

## 2020-12-15 NOTE — PROGRESS NOTES
Kasey Novoa  [id number] attended Step 4 Diabetes Education: 1:1 due to Covid concern       Date: 12/15/2020  Start time: 1030  End time: 1130    Wt: 166 lb Weight change since start of program: 15#    Blood Glucose: Controlled: Stable    Reviewed infor

## 2020-12-21 ENCOUNTER — APPOINTMENT (OUTPATIENT)
Dept: ENDOCRINOLOGY | Facility: HOSPITAL | Age: 80
End: 2020-12-21
Attending: INTERNAL MEDICINE
Payer: MEDICARE

## 2020-12-28 NOTE — ADDENDUM NOTE
Encounter addended by: Zackary Carranza RD on: 12/28/2020 11:30 AM   Actions taken: Clinical Note Signed

## 2020-12-29 ENCOUNTER — HOSPITAL ENCOUNTER (OUTPATIENT)
Dept: ENDOCRINOLOGY | Facility: HOSPITAL | Age: 80
Discharge: HOME | End: 2020-12-29
Attending: INTERNAL MEDICINE
Payer: MEDICARE

## 2020-12-29 VITALS — BODY MASS INDEX: 25 KG/M2 | WEIGHT: 171 LBS

## 2020-12-29 DIAGNOSIS — E11.9 DIABETES MELLITUS TYPE II, NON INSULIN DEPENDENT (HCC): Primary | ICD-10-CM

## 2020-12-29 NOTE — PROGRESS NOTES
Kajal Rivera  [id number] attended Step 5 Group Diabetes Education Class:    Date: 12/29/2020  Start time: 1:00 pm End time: 2:35 pm    Wt: 171 lb Weight change since start of program: -8.9 lbs    Blood Glucose: Controlled: Stable    Healthy Eating  Revi

## 2020-12-31 NOTE — ADDENDUM NOTE
Encounter addended by: Twin Slaughter RD on: 12/31/2020 9:19 AM   Actions taken: Charge Capture section accepted

## 2021-03-05 DIAGNOSIS — Z23 NEED FOR VACCINATION: ICD-10-CM

## 2021-03-12 ENCOUNTER — IMMUNIZATION (OUTPATIENT)
Dept: LAB | Age: 81
End: 2021-03-12

## 2021-03-12 DIAGNOSIS — Z23 NEED FOR VACCINATION: Primary | ICD-10-CM

## 2021-03-12 PROCEDURE — 0011A COVID-19 MODERNA VACCINE: CPT | Performed by: NURSE PRACTITIONER

## 2021-03-12 PROCEDURE — 91301 COVID-19 MODERNA VACCINE: CPT | Performed by: NURSE PRACTITIONER

## 2021-03-17 ENCOUNTER — APPOINTMENT (OUTPATIENT)
Dept: CARDIOLOGY | Age: 81
End: 2021-03-17

## 2021-03-23 NOTE — TELEPHONE ENCOUNTER
This refill request is being sent to the provider for the following reason:  [x]Patient has not had an appointment within the past 12 months but has made an appointment on: ___  [x]Medication is not within protocol  []Patient did not complete follow up recommendations  []Other: ___

## 2021-04-07 NOTE — TELEPHONE ENCOUNTER
This refill request is being sent to the provider for the following reason:  [x]Patient has not had an appointment within the past 12 months but has made an appointment on: ___  []Medication is not within protocol  []Patient did not complete follow up oc

## 2021-04-09 ENCOUNTER — IMMUNIZATION (OUTPATIENT)
Dept: LAB | Age: 81
End: 2021-04-09
Attending: NURSE PRACTITIONER

## 2021-04-09 DIAGNOSIS — Z23 NEED FOR VACCINATION: Primary | ICD-10-CM

## 2021-04-09 PROCEDURE — 91301 COVID-19 MODERNA VACCINE: CPT | Performed by: OCCUPATIONAL THERAPIST

## 2021-04-09 PROCEDURE — 0012A COVID-19 MODERNA VACCINE: CPT | Performed by: OCCUPATIONAL THERAPIST

## 2021-04-15 ENCOUNTER — OFFICE VISIT (OUTPATIENT)
Dept: OTOLARYNGOLOGY | Facility: CLINIC | Age: 81
End: 2021-04-15
Payer: MEDICARE

## 2021-04-15 VITALS
BODY MASS INDEX: 27.82 KG/M2 | TEMPERATURE: 98 F | SYSTOLIC BLOOD PRESSURE: 96 MMHG | DIASTOLIC BLOOD PRESSURE: 54 MMHG | HEIGHT: 65 IN | WEIGHT: 167 LBS

## 2021-04-15 DIAGNOSIS — J34.89 NASAL CRUSTING: Primary | ICD-10-CM

## 2021-04-15 PROCEDURE — 99213 OFFICE O/P EST LOW 20 MIN: CPT | Performed by: OTOLARYNGOLOGY

## 2021-04-15 NOTE — PROGRESS NOTES
Carlton Napier is a 68year old female. Patient presents with:   Follow - Up: nasal crusting- for pt no changes/improvement of symptoms, LOV 3/10/210, pt needs medication refill      HISTORY OF PRESENT ILLNESS  She presents with a history Left-sided pulmona which is affecting her voice.  Previous history of left-sided true vocal cord paralysis     3/10/20 Long history of nasal crusting primarily on the left side. Previous septoplasty back in 2009. 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 Appropriate mood and affect.    Neck Exam Normal Inspection - Normal. Palpation - Normal. Parotid gland - Normal. Thyroid gland - Normal.   Eyes Normal Conjunctiva - Right: Norm Box, Rfl: 1  •  digoxin 0.25 MG Oral Tab, Take 0.25 mg by mouth daily. , Disp: , Rfl:   •  dilTIAZem HCl ER Coated Beads 240 MG Oral Capsule SR 24 Hr, Take 1 capsule (240 mg total) by mouth daily.  Continue taking, Disp: , Rfl: 0  •  Dicyclomine HCl 10 MG Or Tab, Take 1 tablet by mouth daily.   , Disp: , Rfl:   •  Loratadine 10 MG Oral Cap, Take 10 mg by mouth nightly.  , Disp: , Rfl:   •  Montelukast Sodium (SINGULAIR) 10 MG Oral Tab, Take 10 mg by mouth nightly.  , Disp: , Rfl:   •  Multiple Vitamin (Bradly Encinas

## 2021-07-19 ENCOUNTER — HOSPITAL ENCOUNTER (OUTPATIENT)
Dept: MAMMOGRAPHY | Age: 81
Discharge: HOME | End: 2021-07-19
Attending: OBSTETRICS & GYNECOLOGY
Payer: MEDICARE

## 2021-07-19 ENCOUNTER — LAB ENCOUNTER (OUTPATIENT)
Dept: LAB | Age: 81
End: 2021-07-19
Attending: OBSTETRICS & GYNECOLOGY
Payer: MEDICARE

## 2021-07-19 DIAGNOSIS — E55.9 VITAMIN D DEFICIENCY: ICD-10-CM

## 2021-07-19 DIAGNOSIS — Z12.31 ENCOUNTER FOR SCREENING MAMMOGRAM FOR MALIGNANT NEOPLASM OF BREAST: ICD-10-CM

## 2021-07-19 DIAGNOSIS — E11.9 TYPE 2 DIABETES MELLITUS WITHOUT COMPLICATIONS (HCC): ICD-10-CM

## 2021-07-19 DIAGNOSIS — R35.0 FREQUENCY OF MICTURITION: Primary | ICD-10-CM

## 2021-07-19 LAB
ALBUMIN SERPL-MCNC: 3 G/DL (ref 3.4–5)
ALBUMIN/GLOB SERPL: 0.8 {RATIO} (ref 1–2)
ALP LIVER SERPL-CCNC: 56 U/L
ALT SERPL-CCNC: 26 U/L
ANION GAP SERPL CALC-SCNC: 2 MMOL/L (ref 0–18)
AST SERPL-CCNC: 14 U/L (ref 15–37)
BILIRUB SERPL-MCNC: 0.3 MG/DL (ref 0.1–2)
BILIRUB UR QL: NEGATIVE
BUN BLD-MCNC: 17 MG/DL (ref 7–18)
BUN/CREAT SERPL: 31.5 (ref 10–20)
CALCIUM BLD-MCNC: 8.8 MG/DL (ref 8.5–10.1)
CHLORIDE SERPL-SCNC: 101 MMOL/L (ref 98–112)
CHOLEST SMN-MCNC: 214 MG/DL (ref ?–200)
CO2 SERPL-SCNC: 37 MMOL/L (ref 21–32)
COLOR UR: YELLOW
CREAT BLD-MCNC: 0.54 MG/DL
EST. AVERAGE GLUCOSE BLD GHB EST-MCNC: 123 MG/DL (ref 68–126)
GLOBULIN PLAS-MCNC: 3.8 G/DL (ref 2.8–4.4)
GLUCOSE BLD-MCNC: 103 MG/DL (ref 70–99)
GLUCOSE UR-MCNC: NEGATIVE MG/DL
HBA1C MFR BLD HPLC: 5.9 % (ref ?–5.7)
HDLC SERPL-MCNC: 72 MG/DL (ref 40–59)
HGB UR QL STRIP.AUTO: NEGATIVE
KETONES UR-MCNC: NEGATIVE MG/DL
LDLC SERPL CALC-MCNC: 129 MG/DL (ref ?–100)
M PROTEIN MFR SERPL ELPH: 6.8 G/DL (ref 6.4–8.2)
NITRITE UR QL STRIP.AUTO: NEGATIVE
NONHDLC SERPL-MCNC: 142 MG/DL (ref ?–130)
OSMOLALITY SERPL CALC.SUM OF ELEC: 292 MOSM/KG (ref 275–295)
PATIENT FASTING Y/N/NP: YES
PATIENT FASTING Y/N/NP: YES
PH UR: 6 [PH] (ref 5–8)
POTASSIUM SERPL-SCNC: 4.4 MMOL/L (ref 3.5–5.1)
PROT UR-MCNC: NEGATIVE MG/DL
SODIUM SERPL-SCNC: 140 MMOL/L (ref 136–145)
SP GR UR STRIP: 1.02 (ref 1–1.03)
TRIGL SERPL-MCNC: 75 MG/DL (ref 30–149)
UROBILINOGEN UR STRIP-ACNC: 2
VLDLC SERPL CALC-MCNC: 13 MG/DL (ref 0–30)

## 2021-07-19 PROCEDURE — 36415 COLL VENOUS BLD VENIPUNCTURE: CPT

## 2021-07-19 PROCEDURE — 77067 SCR MAMMO BI INCL CAD: CPT | Performed by: OBSTETRICS & GYNECOLOGY

## 2021-07-19 PROCEDURE — 82306 VITAMIN D 25 HYDROXY: CPT

## 2021-07-19 PROCEDURE — 77063 BREAST TOMOSYNTHESIS BI: CPT | Performed by: OBSTETRICS & GYNECOLOGY

## 2021-07-19 PROCEDURE — 80061 LIPID PANEL: CPT

## 2021-07-19 PROCEDURE — 81001 URINALYSIS AUTO W/SCOPE: CPT

## 2021-07-19 PROCEDURE — 80053 COMPREHEN METABOLIC PANEL: CPT

## 2021-07-19 PROCEDURE — 83036 HEMOGLOBIN GLYCOSYLATED A1C: CPT

## 2021-07-21 LAB — 25(OH)D3 SERPL-MCNC: 55 NG/ML (ref 30–100)

## 2021-07-29 ENCOUNTER — OFFICE VISIT (OUTPATIENT)
Dept: OTOLARYNGOLOGY | Facility: CLINIC | Age: 81
End: 2021-07-29
Payer: MEDICARE

## 2021-07-29 VITALS — WEIGHT: 176 LBS | BODY MASS INDEX: 29.32 KG/M2 | TEMPERATURE: 97 F | HEIGHT: 65 IN

## 2021-07-29 DIAGNOSIS — J34.89 NASAL CRUSTING: Primary | ICD-10-CM

## 2021-07-29 PROCEDURE — 99213 OFFICE O/P EST LOW 20 MIN: CPT | Performed by: OTOLARYNGOLOGY

## 2021-08-16 NOTE — PROGRESS NOTES
Piotr Peacock is a 80year old female. Patient presents with:  Lesion: nose lesion ,right sideo nose, per pt pain at times ,      HISTORY OF PRESENT ILLNESS  She presents with a history Left-sided pulmonary issues. She is nonworking left hemidiaphragm.  Sh left-sided true vocal cord paralysis     3/10/20 Long history of nasal crusting primarily on the left side.  Previous septoplasty back in 2009.  She is now oxygen dependent with significant COPD/emphysema.  Nasal cannula seems to touch an area of the left History   Problem Relation Age of Onset   • Ovarian Cancer Mother 68        endometrial, poss ovarian primary   • Pulmonary Disease Sister         COPD   • Skin cancer Other    • Stroke Other    • Other (Other) Other        Past Medical History:   Arnel Willis Frequent skin infections, pigment change and rash. Hema/Lymph Negative Easy bleeding and easy bruising.            PHYSICAL EXAM    Temp 97.2 °F (36.2 °C) (Tympanic)   Ht 5' 5\" (1.651 m)   Wt 176 lb (79.8 kg)   BMI 29.29 kg/m²        Constitutional Cleatis Sable estradiol 0.05 MG/24HR Transdermal Patch Weekly, , Disp: , Rfl:   •  predniSONE 20 MG Oral Tab,  , Disp: , Rfl:   •  triamcinolone acetonide 0.1 % External Cream, , Disp: , Rfl:   •  PULMICORT FLEXHALER 180 MCG/ACT Inhalation Aerosol Powder, Breath Activat nightly.  , Disp: , Rfl:   •  Amitriptyline HCl (ELAVIL) 100 MG Oral Tab, Take 100 mg by mouth nightly.  , Disp: , Rfl:   •  aspirin 81 MG Oral Tab, Take 162 mg by mouth daily.   , Disp: , Rfl:   •  budesonide (PULMICORT) 0.25 MG/2ML Inhalation Suspension, discrepancies may still exist    Loi Baer MD    8/16/2021    9:41 AM

## 2021-08-18 ENCOUNTER — APPOINTMENT (OUTPATIENT)
Dept: CARDIOLOGY | Age: 81
End: 2021-08-18

## 2021-10-23 NOTE — TELEPHONE ENCOUNTER
This refill request is being sent to the provider for the following reason:  []Patient has not had an appointment within the past 12 months but has made an appointment on: ___  [x]Medication is not within protocol  []Patient did not complete follow up oc

## 2021-11-24 ENCOUNTER — LAB ENCOUNTER (OUTPATIENT)
Dept: LAB | Age: 81
End: 2021-11-24
Attending: INTERNAL MEDICINE
Payer: MEDICARE

## 2021-11-24 DIAGNOSIS — E55.9 VITAMIN D DEFICIENCY: Primary | ICD-10-CM

## 2021-11-24 DIAGNOSIS — E11.9 DIABETES MELLITUS WITHOUT COMPLICATION (HCC): ICD-10-CM

## 2021-11-24 PROCEDURE — 80061 LIPID PANEL: CPT

## 2021-11-24 PROCEDURE — 85025 COMPLETE CBC W/AUTO DIFF WBC: CPT

## 2021-11-24 PROCEDURE — 80053 COMPREHEN METABOLIC PANEL: CPT

## 2021-11-24 PROCEDURE — 84443 ASSAY THYROID STIM HORMONE: CPT

## 2021-11-24 PROCEDURE — 82043 UR ALBUMIN QUANTITATIVE: CPT

## 2021-11-24 PROCEDURE — 36415 COLL VENOUS BLD VENIPUNCTURE: CPT

## 2021-11-24 PROCEDURE — 83036 HEMOGLOBIN GLYCOSYLATED A1C: CPT

## 2021-11-24 PROCEDURE — 82570 ASSAY OF URINE CREATININE: CPT

## 2021-11-24 PROCEDURE — 82306 VITAMIN D 25 HYDROXY: CPT

## 2022-01-04 ENCOUNTER — HOSPITAL ENCOUNTER (OUTPATIENT)
Dept: GENERAL RADIOLOGY | Age: 82
Discharge: HOME | End: 2022-01-04
Attending: INTERNAL MEDICINE
Payer: MEDICARE

## 2022-01-04 DIAGNOSIS — R06.02 SOB (SHORTNESS OF BREATH): ICD-10-CM

## 2022-01-04 DIAGNOSIS — R07.81 RIB PAIN ON RIGHT SIDE: ICD-10-CM

## 2022-01-04 PROCEDURE — 71101 X-RAY EXAM UNILAT RIBS/CHEST: CPT | Performed by: INTERNAL MEDICINE

## 2022-02-03 NOTE — TELEPHONE ENCOUNTER
Added in for tomorrow at 11:30am. Patient says, thank you very much Detail Level: Simple Render Risk Assessment In Note?: no Additional Notes: Patient Consent was obtained to proceed with the visit and recommended plan of care after discussion of all risks and benefits, including the risks of COVID-19 exposure. Additional Notes: If not better in 1 month consider biopsy

## 2022-03-18 ENCOUNTER — OFFICE VISIT (OUTPATIENT)
Dept: DERMATOLOGY CLINIC | Facility: CLINIC | Age: 82
End: 2022-03-18
Payer: MEDICARE

## 2022-03-18 DIAGNOSIS — L82.1 SEBORRHEIC KERATOSES: ICD-10-CM

## 2022-03-18 DIAGNOSIS — L57.0 AK (ACTINIC KERATOSIS): Primary | ICD-10-CM

## 2022-03-18 DIAGNOSIS — L71.9 ROSACEA: ICD-10-CM

## 2022-03-18 DIAGNOSIS — D23.9 BENIGN NEOPLASM OF SKIN, UNSPECIFIED LOCATION: ICD-10-CM

## 2022-03-18 DIAGNOSIS — L81.4 LENTIGO: ICD-10-CM

## 2022-03-18 PROCEDURE — 17003 DESTRUCT PREMALG LES 2-14: CPT | Performed by: DERMATOLOGY

## 2022-03-18 PROCEDURE — 17000 DESTRUCT PREMALG LESION: CPT | Performed by: DERMATOLOGY

## 2022-03-18 PROCEDURE — 99213 OFFICE O/P EST LOW 20 MIN: CPT | Performed by: DERMATOLOGY

## 2022-03-18 RX ORDER — AZITHROMYCIN 250 MG/1
TABLET, FILM COATED ORAL
COMMUNITY
Start: 2022-02-11

## 2022-03-18 RX ORDER — LIDOCAINE 50 MG/G
PATCH TOPICAL
COMMUNITY

## 2022-03-18 RX ORDER — LINACLOTIDE 145 UG/1
CAPSULE, GELATIN COATED ORAL
COMMUNITY
Start: 2022-02-14

## 2022-03-18 RX ORDER — SODIUM FLUORIDE 5 MG/G
GEL, DENTIFRICE DENTAL
COMMUNITY

## 2022-03-18 RX ORDER — FLASH GLUCOSE SENSOR
1 KIT MISCELLANEOUS AS DIRECTED
COMMUNITY
Start: 2022-02-22

## 2022-04-14 ENCOUNTER — OFFICE VISIT (OUTPATIENT)
Dept: OTOLARYNGOLOGY | Facility: CLINIC | Age: 82
End: 2022-04-14
Payer: MEDICARE

## 2022-04-14 VITALS — HEIGHT: 65 IN | WEIGHT: 168 LBS | BODY MASS INDEX: 27.99 KG/M2

## 2022-04-14 DIAGNOSIS — J34.89 NASAL CRUSTING: Primary | ICD-10-CM

## 2022-04-14 PROCEDURE — 99213 OFFICE O/P EST LOW 20 MIN: CPT | Performed by: OTOLARYNGOLOGY

## 2022-04-18 NOTE — TELEPHONE ENCOUNTER
LOV 4/14/22  This refill request is being sent to the provider for the following reason:  [x]Medication is not within protocol

## 2022-04-25 NOTE — TELEPHONE ENCOUNTER
Spoke with patient. Out of medication. Called pharmacy and they did not receive when sent on 04/20/2022. Will resend to pharmacy.

## 2022-08-09 ENCOUNTER — TELEPHONE (OUTPATIENT)
Dept: CARDIOLOGY | Age: 82
End: 2022-08-09

## 2022-08-23 ENCOUNTER — HOSPITAL ENCOUNTER (OUTPATIENT)
Dept: MAMMOGRAPHY | Age: 82
Discharge: HOME | End: 2022-08-23
Attending: OBSTETRICS & GYNECOLOGY
Payer: MEDICARE

## 2022-08-23 DIAGNOSIS — Z12.31 ENCOUNTER FOR SCREENING MAMMOGRAM FOR MALIGNANT NEOPLASM OF BREAST: ICD-10-CM

## 2022-08-23 PROCEDURE — 77067 SCR MAMMO BI INCL CAD: CPT | Performed by: OBSTETRICS & GYNECOLOGY

## 2022-08-23 PROCEDURE — 77063 BREAST TOMOSYNTHESIS BI: CPT | Performed by: OBSTETRICS & GYNECOLOGY

## 2022-09-23 ENCOUNTER — OFFICE VISIT (OUTPATIENT)
Dept: OTOLARYNGOLOGY | Facility: CLINIC | Age: 82
End: 2022-09-23

## 2022-09-23 VITALS — BODY MASS INDEX: 27.99 KG/M2 | WEIGHT: 168 LBS | TEMPERATURE: 99 F | HEIGHT: 65 IN

## 2022-09-23 DIAGNOSIS — J34.89 NASAL CRUSTING: Primary | ICD-10-CM

## 2022-09-23 PROCEDURE — 99213 OFFICE O/P EST LOW 20 MIN: CPT | Performed by: OTOLARYNGOLOGY

## 2022-10-03 ENCOUNTER — TELEPHONE (OUTPATIENT)
Dept: OTHER | Age: 82
End: 2022-10-03

## 2022-10-05 ENCOUNTER — TELEPHONE (OUTPATIENT)
Dept: OTHER | Age: 82
End: 2022-10-05

## 2022-11-10 ENCOUNTER — EXTERNAL RECORD (OUTPATIENT)
Dept: OTHER | Age: 82
End: 2022-11-10

## 2022-12-09 ENCOUNTER — CLINICAL ABSTRACT (OUTPATIENT)
Dept: OTHER | Age: 82
End: 2022-12-09

## 2022-12-21 ENCOUNTER — OFFICE VISIT (OUTPATIENT)
Dept: PODIATRY CLINIC | Facility: CLINIC | Age: 82
End: 2022-12-21
Payer: MEDICARE

## 2022-12-21 DIAGNOSIS — Q82.8 POROKERATOSIS: Primary | ICD-10-CM

## 2022-12-21 PROCEDURE — 99203 OFFICE O/P NEW LOW 30 MIN: CPT | Performed by: PODIATRIST

## 2023-01-06 ENCOUNTER — LAB ENCOUNTER (OUTPATIENT)
Dept: LAB | Age: 83
End: 2023-01-06
Attending: INTERNAL MEDICINE
Payer: MEDICARE

## 2023-01-06 DIAGNOSIS — E78.5 HYPERLIPEMIA: Primary | ICD-10-CM

## 2023-01-06 DIAGNOSIS — R53.83 FATIGUE: ICD-10-CM

## 2023-01-06 DIAGNOSIS — E55.9 VITAMIN D DEFICIENCY: ICD-10-CM

## 2023-01-06 DIAGNOSIS — E55.9 AVITAMINOSIS D: ICD-10-CM

## 2023-01-06 DIAGNOSIS — R35.0 URINARY FREQUENCY: ICD-10-CM

## 2023-01-06 LAB
ALBUMIN SERPL-MCNC: 2.7 G/DL (ref 3.4–5)
ALBUMIN/GLOB SERPL: 0.6 {RATIO} (ref 1–2)
ALP LIVER SERPL-CCNC: 76 U/L
ALT SERPL-CCNC: 25 U/L
ANION GAP SERPL CALC-SCNC: 1 MMOL/L (ref 0–18)
AST SERPL-CCNC: 14 U/L (ref 15–37)
BASOPHILS # BLD AUTO: 0.03 X10(3) UL (ref 0–0.2)
BASOPHILS NFR BLD AUTO: 0.2 %
BILIRUB SERPL-MCNC: 0.2 MG/DL (ref 0.1–2)
BUN BLD-MCNC: 12 MG/DL (ref 7–18)
BUN/CREAT SERPL: 19.4 (ref 10–20)
CALCIUM BLD-MCNC: 9.2 MG/DL (ref 8.5–10.1)
CHLORIDE SERPL-SCNC: 98 MMOL/L (ref 98–112)
CHOLEST SERPL-MCNC: 163 MG/DL (ref ?–200)
CO2 SERPL-SCNC: 38 MMOL/L (ref 21–32)
CREAT BLD-MCNC: 0.62 MG/DL
DEPRECATED RDW RBC AUTO: 46.9 FL (ref 35.1–46.3)
EOSINOPHIL # BLD AUTO: 0.13 X10(3) UL (ref 0–0.7)
EOSINOPHIL NFR BLD AUTO: 1 %
ERYTHROCYTE [DISTWIDTH] IN BLOOD BY AUTOMATED COUNT: 13.2 % (ref 11–15)
EST. AVERAGE GLUCOSE BLD GHB EST-MCNC: 123 MG/DL (ref 68–126)
FASTING PATIENT LIPID ANSWER: YES
FASTING STATUS PATIENT QL REPORTED: YES
GFR SERPLBLD BASED ON 1.73 SQ M-ARVRAT: 90 ML/MIN/1.73M2 (ref 60–?)
GLOBULIN PLAS-MCNC: 4.3 G/DL (ref 2.8–4.4)
GLUCOSE BLD-MCNC: 130 MG/DL (ref 70–99)
HBA1C MFR BLD: 5.9 % (ref ?–5.7)
HCT VFR BLD AUTO: 43 %
HDLC SERPL-MCNC: 69 MG/DL (ref 40–59)
HGB BLD-MCNC: 13.9 G/DL
IMM GRANULOCYTES # BLD AUTO: 0.04 X10(3) UL (ref 0–1)
IMM GRANULOCYTES NFR BLD: 0.3 %
LDLC SERPL CALC-MCNC: 78 MG/DL (ref ?–100)
LYMPHOCYTES # BLD AUTO: 1.11 X10(3) UL (ref 1–4)
LYMPHOCYTES NFR BLD AUTO: 8.9 %
MCH RBC QN AUTO: 31.2 PG (ref 26–34)
MCHC RBC AUTO-ENTMCNC: 32.3 G/DL (ref 31–37)
MCV RBC AUTO: 96.6 FL
MONOCYTES # BLD AUTO: 1.19 X10(3) UL (ref 0.1–1)
MONOCYTES NFR BLD AUTO: 9.5 %
NEUTROPHILS # BLD AUTO: 10 X10 (3) UL (ref 1.5–7.7)
NEUTROPHILS # BLD AUTO: 10 X10(3) UL (ref 1.5–7.7)
NEUTROPHILS NFR BLD AUTO: 80.1 %
NONHDLC SERPL-MCNC: 94 MG/DL (ref ?–130)
OSMOLALITY SERPL CALC.SUM OF ELEC: 286 MOSM/KG (ref 275–295)
PLATELET # BLD AUTO: 306 10(3)UL (ref 150–450)
POTASSIUM SERPL-SCNC: 4 MMOL/L (ref 3.5–5.1)
PROT SERPL-MCNC: 7 G/DL (ref 6.4–8.2)
RBC # BLD AUTO: 4.45 X10(6)UL
SODIUM SERPL-SCNC: 137 MMOL/L (ref 136–145)
TRIGL SERPL-MCNC: 85 MG/DL (ref 30–149)
TSI SER-ACNC: 1.06 MIU/ML (ref 0.36–3.74)
VIT D+METAB SERPL-MCNC: 46.7 NG/ML (ref 30–100)
VLDLC SERPL CALC-MCNC: 13 MG/DL (ref 0–30)
WBC # BLD AUTO: 12.5 X10(3) UL (ref 4–11)

## 2023-01-06 PROCEDURE — 84443 ASSAY THYROID STIM HORMONE: CPT

## 2023-01-06 PROCEDURE — 36415 COLL VENOUS BLD VENIPUNCTURE: CPT

## 2023-01-06 PROCEDURE — 83036 HEMOGLOBIN GLYCOSYLATED A1C: CPT

## 2023-01-06 PROCEDURE — 80053 COMPREHEN METABOLIC PANEL: CPT

## 2023-01-06 PROCEDURE — 82306 VITAMIN D 25 HYDROXY: CPT

## 2023-01-06 PROCEDURE — 85025 COMPLETE CBC W/AUTO DIFF WBC: CPT

## 2023-01-06 PROCEDURE — 80061 LIPID PANEL: CPT

## 2023-01-11 ENCOUNTER — HOSPITAL ENCOUNTER (OUTPATIENT)
Dept: GENERAL RADIOLOGY | Age: 83
Discharge: HOME | End: 2023-01-11
Attending: INTERNAL MEDICINE
Payer: MEDICARE

## 2023-01-11 ENCOUNTER — LAB ENCOUNTER (OUTPATIENT)
Dept: LAB | Age: 83
End: 2023-01-11
Attending: INTERNAL MEDICINE
Payer: MEDICARE

## 2023-01-11 DIAGNOSIS — R50.9 FEVER, UNSPECIFIED FEVER CAUSE: ICD-10-CM

## 2023-01-11 DIAGNOSIS — J45.909 ASTHMA, UNSPECIFIED ASTHMA SEVERITY, UNSPECIFIED WHETHER COMPLICATED, UNSPECIFIED WHETHER PERSISTENT: ICD-10-CM

## 2023-01-11 LAB
BILIRUB UR QL: NEGATIVE
CLARITY UR: CLEAR
COLOR UR: YELLOW
GLUCOSE UR-MCNC: NEGATIVE MG/DL
HGB UR QL STRIP.AUTO: NEGATIVE
KETONES UR-MCNC: NEGATIVE MG/DL
LEUKOCYTE ESTERASE UR QL STRIP.AUTO: NEGATIVE
NITRITE UR QL STRIP.AUTO: NEGATIVE
PH UR: 6.5 [PH] (ref 5–8)
PROT UR-MCNC: NEGATIVE MG/DL
SP GR UR STRIP: 1.02 (ref 1–1.03)
UROBILINOGEN UR STRIP-ACNC: 1

## 2023-01-11 PROCEDURE — 81003 URINALYSIS AUTO W/O SCOPE: CPT

## 2023-01-11 PROCEDURE — 71046 X-RAY EXAM CHEST 2 VIEWS: CPT | Performed by: INTERNAL MEDICINE

## 2023-01-25 RX ORDER — DICYCLOMINE HYDROCHLORIDE 10 MG/1
10 CAPSULE ORAL 3 TIMES DAILY PRN
COMMUNITY

## 2023-01-25 RX ORDER — ESTRADIOL 0.1 MG/G
CREAM VAGINAL
COMMUNITY

## 2023-01-25 RX ORDER — LIDOCAINE 50 MG/G
1 PATCH TOPICAL
COMMUNITY

## 2023-02-20 ENCOUNTER — HOSPITAL ENCOUNTER (OUTPATIENT)
Dept: CT IMAGING | Age: 83
Discharge: HOME | End: 2023-02-20
Attending: INTERNAL MEDICINE
Payer: MEDICARE

## 2023-02-20 DIAGNOSIS — R10.32 LEFT LOWER QUADRANT PAIN: ICD-10-CM

## 2023-02-20 LAB
CREAT BLD-MCNC: 0.7 MG/DL
GFR SERPLBLD BASED ON 1.73 SQ M-ARVRAT: 87 ML/MIN/1.73M2 (ref 60–?)

## 2023-02-20 PROCEDURE — 82565 ASSAY OF CREATININE: CPT

## 2023-02-20 PROCEDURE — 74177 CT ABD & PELVIS W/CONTRAST: CPT | Performed by: INTERNAL MEDICINE

## 2023-03-14 ENCOUNTER — HOSPITAL ENCOUNTER (EMERGENCY)
Facility: HOSPITAL | Age: 83
Discharge: HOME | End: 2023-03-15
Attending: EMERGENCY MEDICINE
Payer: MEDICARE

## 2023-03-14 DIAGNOSIS — M54.9 MID BACK PAIN: Primary | ICD-10-CM

## 2023-03-14 DIAGNOSIS — J81.0 ACUTE PULMONARY EDEMA (HCC): ICD-10-CM

## 2023-03-14 LAB
ANION GAP SERPL CALC-SCNC: 2 MMOL/L (ref 0–18)
BASOPHILS # BLD AUTO: 0.02 X10(3) UL (ref 0–0.2)
BASOPHILS NFR BLD AUTO: 0.2 %
BUN BLD-MCNC: 12 MG/DL (ref 7–18)
BUN/CREAT SERPL: 19.4 (ref 10–20)
CALCIUM BLD-MCNC: 9.1 MG/DL (ref 8.5–10.1)
CHLORIDE SERPL-SCNC: 100 MMOL/L (ref 98–112)
CO2 SERPL-SCNC: 39 MMOL/L (ref 21–32)
CREAT BLD-MCNC: 0.62 MG/DL
DEPRECATED RDW RBC AUTO: 51.8 FL (ref 35.1–46.3)
EOSINOPHIL # BLD AUTO: 0.25 X10(3) UL (ref 0–0.7)
EOSINOPHIL NFR BLD AUTO: 2.8 %
ERYTHROCYTE [DISTWIDTH] IN BLOOD BY AUTOMATED COUNT: 14.9 % (ref 11–15)
GFR SERPLBLD BASED ON 1.73 SQ M-ARVRAT: 90 ML/MIN/1.73M2 (ref 60–?)
GLUCOSE BLD-MCNC: 186 MG/DL (ref 70–99)
GLUCOSE BLDC GLUCOMTR-MCNC: 182 MG/DL (ref 70–99)
HCT VFR BLD AUTO: 38.6 %
HGB BLD-MCNC: 12.6 G/DL
IMM GRANULOCYTES # BLD AUTO: 0.02 X10(3) UL (ref 0–1)
IMM GRANULOCYTES NFR BLD: 0.2 %
LYMPHOCYTES # BLD AUTO: 1.03 X10(3) UL (ref 1–4)
LYMPHOCYTES NFR BLD AUTO: 11.6 %
MCH RBC QN AUTO: 31 PG (ref 26–34)
MCHC RBC AUTO-ENTMCNC: 32.6 G/DL (ref 31–37)
MCV RBC AUTO: 94.8 FL
MONOCYTES # BLD AUTO: 0.62 X10(3) UL (ref 0.1–1)
MONOCYTES NFR BLD AUTO: 7 %
NEUTROPHILS # BLD AUTO: 6.93 X10 (3) UL (ref 1.5–7.7)
NEUTROPHILS # BLD AUTO: 6.93 X10(3) UL (ref 1.5–7.7)
NEUTROPHILS NFR BLD AUTO: 78.2 %
OSMOLALITY SERPL CALC.SUM OF ELEC: 297 MOSM/KG (ref 275–295)
PLATELET # BLD AUTO: 259 10(3)UL (ref 150–450)
POTASSIUM SERPL-SCNC: 4.1 MMOL/L (ref 3.5–5.1)
RBC # BLD AUTO: 4.07 X10(6)UL
SODIUM SERPL-SCNC: 141 MMOL/L (ref 136–145)
WBC # BLD AUTO: 8.9 X10(3) UL (ref 4–11)

## 2023-03-14 PROCEDURE — 80048 BASIC METABOLIC PNL TOTAL CA: CPT

## 2023-03-14 PROCEDURE — 82962 GLUCOSE BLOOD TEST: CPT

## 2023-03-14 PROCEDURE — 85025 COMPLETE CBC W/AUTO DIFF WBC: CPT

## 2023-03-14 PROCEDURE — 85025 COMPLETE CBC W/AUTO DIFF WBC: CPT | Performed by: EMERGENCY MEDICINE

## 2023-03-14 PROCEDURE — 99285 EMERGENCY DEPT VISIT HI MDM: CPT

## 2023-03-14 PROCEDURE — 83690 ASSAY OF LIPASE: CPT | Performed by: EMERGENCY MEDICINE

## 2023-03-14 PROCEDURE — 85610 PROTHROMBIN TIME: CPT | Performed by: EMERGENCY MEDICINE

## 2023-03-14 PROCEDURE — 85730 THROMBOPLASTIN TIME PARTIAL: CPT | Performed by: EMERGENCY MEDICINE

## 2023-03-14 PROCEDURE — 84484 ASSAY OF TROPONIN QUANT: CPT | Performed by: EMERGENCY MEDICINE

## 2023-03-14 PROCEDURE — 93010 ELECTROCARDIOGRAM REPORT: CPT

## 2023-03-14 PROCEDURE — 36415 COLL VENOUS BLD VENIPUNCTURE: CPT

## 2023-03-14 PROCEDURE — 80048 BASIC METABOLIC PNL TOTAL CA: CPT | Performed by: EMERGENCY MEDICINE

## 2023-03-14 PROCEDURE — 93005 ELECTROCARDIOGRAM TRACING: CPT

## 2023-03-15 ENCOUNTER — APPOINTMENT (OUTPATIENT)
Dept: CT IMAGING | Facility: HOSPITAL | Age: 83
End: 2023-03-15
Attending: EMERGENCY MEDICINE
Payer: MEDICARE

## 2023-03-15 VITALS
SYSTOLIC BLOOD PRESSURE: 106 MMHG | HEART RATE: 79 BPM | TEMPERATURE: 99 F | DIASTOLIC BLOOD PRESSURE: 93 MMHG | RESPIRATION RATE: 27 BRPM | OXYGEN SATURATION: 95 %

## 2023-03-15 LAB
APTT PPP: 30.1 SECONDS (ref 23.3–35.6)
ATRIAL RATE: 87 BPM
INR BLD: 1.12 (ref 0.85–1.16)
LIPASE SERPL-CCNC: 18 U/L (ref 13–75)
P AXIS: 11 DEGREES
P-R INTERVAL: 166 MS
PROTHROMBIN TIME: 14.3 SECONDS (ref 11.6–14.8)
Q-T INTERVAL: 332 MS
QRS DURATION: 90 MS
QTC CALCULATION (BEZET): 399 MS
R AXIS: 5 DEGREES
T AXIS: 25 DEGREES
TROPONIN I HIGH SENSITIVITY: 12 NG/L
VENTRICULAR RATE: 87 BPM

## 2023-03-15 PROCEDURE — 74175 CTA ABDOMEN W/CONTRAST: CPT | Performed by: EMERGENCY MEDICINE

## 2023-03-15 PROCEDURE — 71275 CT ANGIOGRAPHY CHEST: CPT | Performed by: EMERGENCY MEDICINE

## 2023-03-15 NOTE — ED QUICK NOTES
Pt cleared by MD for discharge. Belongings with patient. Patient instructions reviewed including when and how to follow up with healthcare and when to seek for medical treatment. Peripheral IV removed. Pt is going home with son. Pt on her portable oxygen. Denies OWEN. Pt walking with steady gait.

## 2023-03-15 NOTE — ED INITIAL ASSESSMENT (HPI)
Pt presents to ED via [facility] for SOB and bilateral arm weakness. Pt Is on 2-3L of oxygen via NC (baseline) . Denies CP. Denies dizziness. Denies vision changes.

## 2023-05-12 ENCOUNTER — OFFICE VISIT (OUTPATIENT)
Dept: DERMATOLOGY CLINIC | Facility: CLINIC | Age: 83
End: 2023-05-12

## 2023-05-12 DIAGNOSIS — L81.4 LENTIGO: ICD-10-CM

## 2023-05-12 DIAGNOSIS — L82.1 SEBORRHEIC KERATOSES: ICD-10-CM

## 2023-05-12 DIAGNOSIS — L82.1 SK (SEBORRHEIC KERATOSIS): ICD-10-CM

## 2023-05-12 DIAGNOSIS — L82.0 INFLAMED SEBORRHEIC KERATOSIS: ICD-10-CM

## 2023-05-12 DIAGNOSIS — L57.0 AK (ACTINIC KERATOSIS): Primary | ICD-10-CM

## 2023-05-12 DIAGNOSIS — D23.9 BENIGN NEOPLASM OF SKIN, UNSPECIFIED LOCATION: ICD-10-CM

## 2023-05-12 PROCEDURE — 99213 OFFICE O/P EST LOW 20 MIN: CPT | Performed by: DERMATOLOGY

## 2023-05-12 RX ORDER — ESTRADIOL 0.05 MG/D
PATCH, EXTENDED RELEASE TRANSDERMAL
COMMUNITY
Start: 2023-02-24

## 2023-05-12 RX ORDER — FUROSEMIDE 20 MG/1
TABLET ORAL
COMMUNITY
Start: 2023-04-17

## 2023-05-12 RX ORDER — PREDNISONE 20 MG/1
TABLET ORAL
COMMUNITY
Start: 2023-02-02

## 2023-06-01 ENCOUNTER — OFFICE VISIT (OUTPATIENT)
Dept: OTOLARYNGOLOGY | Facility: CLINIC | Age: 83
End: 2023-06-01

## 2023-06-01 DIAGNOSIS — J34.89 NASAL CRUSTING: Primary | ICD-10-CM

## 2023-06-01 PROCEDURE — 99213 OFFICE O/P EST LOW 20 MIN: CPT | Performed by: OTOLARYNGOLOGY

## 2023-07-10 ENCOUNTER — OFFICE VISIT (OUTPATIENT)
Dept: OBGYN | Age: 83
End: 2023-07-10

## 2023-07-10 VITALS — SYSTOLIC BLOOD PRESSURE: 77 MMHG | TEMPERATURE: 98.4 F | DIASTOLIC BLOOD PRESSURE: 45 MMHG | HEART RATE: 84 BPM

## 2023-07-10 DIAGNOSIS — Z01.419 ENCOUNTER FOR ROUTINE GYNECOLOGIC EXAMINATION IN MEDICARE PATIENT: ICD-10-CM

## 2023-07-10 DIAGNOSIS — Z12.31 ENCOUNTER FOR SCREENING MAMMOGRAM FOR MALIGNANT NEOPLASM OF BREAST: ICD-10-CM

## 2023-07-10 DIAGNOSIS — N95.1 SYMPTOMATIC MENOPAUSAL OR FEMALE CLIMACTERIC STATES: Primary | ICD-10-CM

## 2023-07-10 PROCEDURE — G0101 CA SCREEN;PELVIC/BREAST EXAM: HCPCS | Performed by: OBSTETRICS & GYNECOLOGY

## 2023-07-10 RX ORDER — ESTRADIOL 0.05 MG/D
1 PATCH TRANSDERMAL
Qty: 12 PATCH | Refills: 5 | Status: SHIPPED | OUTPATIENT
Start: 2023-07-10 | End: 2024-07-09

## 2023-08-30 ENCOUNTER — HOSPITAL ENCOUNTER (EMERGENCY)
Facility: HOSPITAL | Age: 83
Discharge: HOME | End: 2023-08-31
Attending: EMERGENCY MEDICINE
Payer: MEDICARE

## 2023-08-30 DIAGNOSIS — K62.5 RECTAL BLEEDING: ICD-10-CM

## 2023-08-30 DIAGNOSIS — K52.9 COLITIS: Primary | ICD-10-CM

## 2023-08-30 LAB
ANION GAP SERPL CALC-SCNC: 0 MMOL/L (ref 0–18)
ANTIBODY SCREEN: NEGATIVE
BASOPHILS # BLD AUTO: 0.03 X10(3) UL (ref 0–0.2)
BASOPHILS NFR BLD AUTO: 0.2 %
BILIRUB UR QL: NEGATIVE
BUN BLD-MCNC: 19 MG/DL (ref 7–18)
BUN/CREAT SERPL: 33.3 (ref 10–20)
CALCIUM BLD-MCNC: 8.3 MG/DL (ref 8.5–10.1)
CHLORIDE SERPL-SCNC: 101 MMOL/L (ref 98–112)
CLARITY UR: CLEAR
CO2 SERPL-SCNC: 39 MMOL/L (ref 21–32)
COLOR UR: YELLOW
CREAT BLD-MCNC: 0.57 MG/DL
DEPRECATED RDW RBC AUTO: 49.5 FL (ref 35.1–46.3)
EGFRCR SERPLBLD CKD-EPI 2021: 91 ML/MIN/1.73M2 (ref 60–?)
EOSINOPHIL # BLD AUTO: 0.04 X10(3) UL (ref 0–0.7)
EOSINOPHIL NFR BLD AUTO: 0.2 %
ERYTHROCYTE [DISTWIDTH] IN BLOOD BY AUTOMATED COUNT: 13.9 % (ref 11–15)
GLUCOSE BLD-MCNC: 120 MG/DL (ref 70–99)
GLUCOSE UR-MCNC: NEGATIVE MG/DL
HCT VFR BLD AUTO: 42.5 %
HGB BLD-MCNC: 13.4 G/DL
HGB UR QL STRIP.AUTO: NEGATIVE
IMM GRANULOCYTES # BLD AUTO: 0.06 X10(3) UL (ref 0–1)
IMM GRANULOCYTES NFR BLD: 0.3 %
KETONES UR-MCNC: NEGATIVE MG/DL
LEUKOCYTE ESTERASE UR QL STRIP.AUTO: NEGATIVE
LYMPHOCYTES # BLD AUTO: 1.27 X10(3) UL (ref 1–4)
LYMPHOCYTES NFR BLD AUTO: 7.3 %
MCH RBC QN AUTO: 30.6 PG (ref 26–34)
MCHC RBC AUTO-ENTMCNC: 31.5 G/DL (ref 31–37)
MCV RBC AUTO: 97 FL
MONOCYTES # BLD AUTO: 1.02 X10(3) UL (ref 0.1–1)
MONOCYTES NFR BLD AUTO: 5.9 %
NEUTROPHILS # BLD AUTO: 14.88 X10 (3) UL (ref 1.5–7.7)
NEUTROPHILS # BLD AUTO: 14.88 X10(3) UL (ref 1.5–7.7)
NEUTROPHILS NFR BLD AUTO: 86.1 %
NITRITE UR QL STRIP.AUTO: NEGATIVE
OSMOLALITY SERPL CALC.SUM OF ELEC: 293 MOSM/KG (ref 275–295)
PH UR: 5.5 [PH] (ref 5–8)
PLATELET # BLD AUTO: 241 10(3)UL (ref 150–450)
POTASSIUM SERPL-SCNC: 4.2 MMOL/L (ref 3.5–5.1)
PROT UR-MCNC: NEGATIVE MG/DL
RBC # BLD AUTO: 4.38 X10(6)UL
RH BLOOD TYPE: POSITIVE
SODIUM SERPL-SCNC: 140 MMOL/L (ref 136–145)
SP GR UR STRIP: 1.01 (ref 1–1.03)
UROBILINOGEN UR STRIP-ACNC: 0.2
WBC # BLD AUTO: 17.3 X10(3) UL (ref 4–11)

## 2023-08-30 PROCEDURE — 86901 BLOOD TYPING SEROLOGIC RH(D): CPT | Performed by: EMERGENCY MEDICINE

## 2023-08-30 PROCEDURE — 87427 SHIGA-LIKE TOXIN AG IA: CPT | Performed by: EMERGENCY MEDICINE

## 2023-08-30 PROCEDURE — 87493 C DIFF AMPLIFIED PROBE: CPT | Performed by: EMERGENCY MEDICINE

## 2023-08-30 PROCEDURE — 87046 STOOL CULTR AEROBIC BACT EA: CPT | Performed by: EMERGENCY MEDICINE

## 2023-08-30 PROCEDURE — 85025 COMPLETE CBC W/AUTO DIFF WBC: CPT | Performed by: EMERGENCY MEDICINE

## 2023-08-30 PROCEDURE — 96360 HYDRATION IV INFUSION INIT: CPT

## 2023-08-30 PROCEDURE — 87045 FECES CULTURE AEROBIC BACT: CPT | Performed by: EMERGENCY MEDICINE

## 2023-08-30 PROCEDURE — 93005 ELECTROCARDIOGRAM TRACING: CPT

## 2023-08-30 PROCEDURE — 99284 EMERGENCY DEPT VISIT MOD MDM: CPT

## 2023-08-30 PROCEDURE — 80048 BASIC METABOLIC PNL TOTAL CA: CPT | Performed by: EMERGENCY MEDICINE

## 2023-08-30 PROCEDURE — 81003 URINALYSIS AUTO W/O SCOPE: CPT | Performed by: EMERGENCY MEDICINE

## 2023-08-30 PROCEDURE — 99285 EMERGENCY DEPT VISIT HI MDM: CPT

## 2023-08-30 PROCEDURE — 86900 BLOOD TYPING SEROLOGIC ABO: CPT | Performed by: EMERGENCY MEDICINE

## 2023-08-30 PROCEDURE — 93010 ELECTROCARDIOGRAM REPORT: CPT

## 2023-08-30 PROCEDURE — 86850 RBC ANTIBODY SCREEN: CPT | Performed by: EMERGENCY MEDICINE

## 2023-08-30 PROCEDURE — 96361 HYDRATE IV INFUSION ADD-ON: CPT

## 2023-08-31 ENCOUNTER — APPOINTMENT (OUTPATIENT)
Dept: CT IMAGING | Facility: HOSPITAL | Age: 83
End: 2023-08-31
Attending: EMERGENCY MEDICINE
Payer: MEDICARE

## 2023-08-31 VITALS
OXYGEN SATURATION: 97 % | TEMPERATURE: 98 F | HEIGHT: 65 IN | WEIGHT: 157 LBS | DIASTOLIC BLOOD PRESSURE: 76 MMHG | RESPIRATION RATE: 18 BRPM | HEART RATE: 87 BPM | SYSTOLIC BLOOD PRESSURE: 104 MMHG | BODY MASS INDEX: 26.16 KG/M2

## 2023-08-31 LAB
ATRIAL RATE: 83 BPM
C DIFF TOX B STL QL: NEGATIVE
P-R INTERVAL: 146 MS
Q-T INTERVAL: 350 MS
QRS DURATION: 82 MS
QTC CALCULATION (BEZET): 411 MS
R AXIS: -4 DEGREES
RH BLOOD TYPE: POSITIVE
T AXIS: 19 DEGREES
VENTRICULAR RATE: 83 BPM

## 2023-08-31 PROCEDURE — 74174 CTA ABD&PLVS W/CONTRAST: CPT | Performed by: EMERGENCY MEDICINE

## 2023-08-31 RX ORDER — HYDROCODONE BITARTRATE AND ACETAMINOPHEN 5; 325 MG/1; MG/1
1 TABLET ORAL EVERY 8 HOURS PRN
Qty: 9 TABLET | Refills: 0 | Status: SHIPPED | OUTPATIENT
Start: 2023-08-31 | End: 2023-09-03

## 2023-08-31 RX ORDER — DICYCLOMINE HCL 20 MG
20 TABLET ORAL 4 TIMES DAILY PRN
Qty: 40 TABLET | Refills: 0 | Status: SHIPPED | OUTPATIENT
Start: 2023-08-31 | End: 2023-09-10

## 2023-08-31 RX ORDER — DICYCLOMINE HCL 20 MG
20 TABLET ORAL ONCE
Status: COMPLETED | OUTPATIENT
Start: 2023-08-31 | End: 2023-08-31

## 2023-08-31 RX ORDER — HYDROCODONE BITARTRATE AND ACETAMINOPHEN 5; 325 MG/1; MG/1
1 TABLET ORAL ONCE
Status: COMPLETED | OUTPATIENT
Start: 2023-08-31 | End: 2023-08-31

## 2023-08-31 NOTE — ED INITIAL ASSESSMENT (HPI)
PT to ED via stretcher, bright red blood in stool with clots x 2 hours, diarrhea since 1500, lowver abd pain cramping moderate non radiating. On 2 L NC at baseline. Denies dyspnea, chest pain.

## 2023-08-31 NOTE — ED QUICK NOTES
Pt cleared by MD for discharge. Belongings with patient. Patient instructions reviewed including when and how to follow up with healthcare and when to seek for medical treatment. Peripheral IV removed. Medication use and prescription reviewed. Pt left with son. Pt A&ox4. Denies CP. Denies n/v. Pt ambulates with steady gait. Pt on 2L of oxygen(baseline). Pt has oxygen at home.

## 2023-08-31 NOTE — DISCHARGE INSTRUCTIONS
Call your primary care and GI physicians for follow-up and reevaluation. Seek care for worsening symptoms, worsening bleeding and/or any concern. Patient has no objection to blood transfusions.

## 2023-09-05 ENCOUNTER — OFFICE VISIT (OUTPATIENT)
Dept: OTOLARYNGOLOGY | Facility: CLINIC | Age: 83
End: 2023-09-05

## 2023-09-05 VITALS — BODY MASS INDEX: 26.16 KG/M2 | HEIGHT: 65 IN | WEIGHT: 157 LBS

## 2023-09-05 DIAGNOSIS — J34.89 NASAL CRUSTING: Primary | ICD-10-CM

## 2023-09-05 PROCEDURE — 99213 OFFICE O/P EST LOW 20 MIN: CPT | Performed by: OTOLARYNGOLOGY

## 2023-09-05 RX ORDER — FLUTICASONE PROPIONATE 50 MCG
2 SPRAY, SUSPENSION (ML) NASAL DAILY
COMMUNITY
Start: 2023-08-11

## 2023-09-05 RX ORDER — CODEINE PHOSPHATE AND GUAIFENESIN 10; 100 MG/5ML; MG/5ML
5 SOLUTION ORAL EVERY 6 HOURS PRN
COMMUNITY
Start: 2023-08-21

## 2023-09-28 ENCOUNTER — OFFICE VISIT (OUTPATIENT)
Facility: CLINIC | Age: 83
End: 2023-09-28

## 2023-09-28 ENCOUNTER — TELEPHONE (OUTPATIENT)
Facility: CLINIC | Age: 83
End: 2023-09-28

## 2023-09-28 VITALS
WEIGHT: 155 LBS | SYSTOLIC BLOOD PRESSURE: 108 MMHG | HEART RATE: 87 BPM | DIASTOLIC BLOOD PRESSURE: 66 MMHG | HEIGHT: 65 IN | BODY MASS INDEX: 25.83 KG/M2

## 2023-09-28 DIAGNOSIS — K62.5 BRIGHT RED BLOOD PER RECTUM: Primary | ICD-10-CM

## 2023-09-28 PROCEDURE — 99204 OFFICE O/P NEW MOD 45 MIN: CPT

## 2023-09-28 NOTE — PATIENT INSTRUCTIONS
- bowel prep from pharmacy     -I will contact you regarding options for imaging    -follow up as needed

## 2023-09-28 NOTE — TELEPHONE ENCOUNTER
I would like to schedule colonoscopy for patient for examination of acute episode of blood per rectum and abdominal pain. GI RNs to reach out to patient's pulmonologist, Dr. Mello Aviles, for approval to use sedation for procedure. 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   *Pt has appt with pulmonologist, Dr. Mello Aviles, tomorrow, 9/29/23 and will discuss this with her doctor as well    -other options are unsedated colonoscopy (patient hesitant), to closely monitor for recurrence of symptoms or consider flexible sigmoidoscopy with fleet enema & magnesium citrate prep      Inell Kylah, APRN

## 2023-09-28 NOTE — H&P
[facility] - Gastroenterology                                                                                                               Reason for consult: ER follow up    Requesting physician or provider: Xiao Braden MD    Patient presents with:  ER F/U  Rectal Bleeding      HPI:   Rito Fofana is a 82year old year-old female with medical history including  history including IBS w/ constipation, diverticulosis, esophagitis, GERD, gastric ulcer, episodic atrial fibrillation, HTN, high cholesterol. 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 Her pulmonologist does not want her sedated for any procedure, so patient last had CT colonography for CRC screen in 2022. she is here today for ED follow up:  -seen in ED on 8/30/23 for diarrhea and BRBPR   -labs remarkable for WBC 17.2, elevated neutrophils, in ED  -C Diff & shigatoxin was negative  -ED CT finding: Left-sided colitis,     Prior to this:  -pt with hx of IBS-constipation, had been taking linzess daily up until 1 month ago.  And previously on amitiza which worked well, but then insurance stopped covering it  -pt reports past 2-3 months having more diarrhea, she stopped the linzess then had no stool for 2 days, took one more linzess and then had bright red blood per rectum, passing copious amounts of blood without stool, associated with lower abdominal pain that was not relieved with bentyl  -pt previously on linzess, but stopped taking on 8/30/23, last saw a Gastroenterologist about 4 years ago     Since the ED visit:  -after the ED patient reported abd pain & diarrhea resolved after 2-3 days and having soft, formed stool now  -about 2-3 days ago having hard small stool  -pt took colace x 3 days and today soft, formed stool  -no bleeding since the ED visit   -pt has not taken linzess since 8/30/23  -pt with recent covid diagnosis, tested positive on 9/13/23, reports still feeling fatigue and low appetite since  -pt reports weight loss of 10lbs since 9/13/23, because appetite low since covid, reports only eating one meal daily    #GERD  -on daily prevacid   -regurgitates water if she coughs while eating, triggered by cough when going from oral to pharyngeal phase  -gets occasional breakthrough heartburn that she brad stephanie for     Patient denies symptoms of nausea, vomiting, dyspepsia, odynophagia, globus sensation, heartburn, hematemesis, abdominal pain, or melena. she denies fever. Last colonoscopy: colonoscopy about 10 years ago, CT Colongraphy screen done 2022 at [facility] (normal, told to repeat in 5 years)  IMPRESSION:   C1: Normal colon or benign lesion; Continue routine screening. Last EGD: 2018 (improvement of esophagitis per patient)    NSAIDS: 2 81mg aspirin daily, now taking 1 daily since ED visit on 8/30.   Tobacco:former  Alcohol: social  Marijuana: none   Illicit drugs:none     FH GI malignancy: maternal grandma - colon cancer age [de-identified]    No history of adverse reaction to sedation  No ADELSO  No anticoagulants  No pacemaker/defibrillator  No pain medications and/or sleep aides    Wt Readings from Last 6 Encounters:  09/28/23 : 155 lb (70.3 kg)  09/05/23 : 157 lb (71.2 kg)  08/30/23 : 157 lb (71.2 kg)  09/23/22 : 168 lb (76.2 kg)  04/14/22 : 168 lb (76.2 kg)  11/19/21 : 166 lb (75.3 kg)       History, Medications, Allergies, ROS:      Past Medical History:   Diagnosis Date    A-fib ([address]     Anesthesia complication     Arrhythmia     AFIB    Arthritis     Asthma     Back problem     COPD (chronic obstructive pulmonary disease) ([address]     Deviated nasal septum 2009    nasal septoplasty, turb reduction, smr of turbs    Difficult intubation     fiber optic if needed    Diverticulitis     colonoscopy     Diverticulosis of large intestine     Esophageal reflux     Extrinsic asthma, unspecified     Headache     Heart disease     Hepatitis     WAS TOLD SHE HAS HAD THIS WHEN SHE WAS 18YEARS OLD    High blood pressure     High cholesterol     Irregular heart rate     Lichenoid keratosis     left chest-bx    Osteoarthritis     Paralysis ([address]     left lung and left vocal cord. Paronychia     (RT); onychomycosis; debridement    Problems with swallowing     occasionally    Shortness of breath     2 L NC ALL THE TIME 24HR/7 DAYS PER WEEK    Unspecified essential hypertension     Visual impairment       Past Surgical History:   Procedure Laterality Date    ADENOIDECTOMY      CATARACT      cataract extraction     HYSTERECTOMY      SINUS SURGERY        DEVIATED SEPTUM    T&A      TONSILLECTOMY        Family Hx:   Family History   Problem Relation Age of Onset    Ovarian Cancer Mother 68        endometrial, poss ovarian primary    Pulmonary Disease Sister         COPD    Skin cancer Other     Stroke Other     Other (Other) Other       Social History:   Social History     Socioeconomic History    Marital status:    Tobacco Use    Smoking status: Former     Types: Cigarettes     Quit date: 1996     Years since quittin.7    Smokeless tobacco: Never   Vaping Use    Vaping Use: Never used   Substance and Sexual Activity    Alcohol use: Yes     Alcohol/week: 0.0 standard drinks of alcohol     Comment: one drink once a week    Drug use: Never   Other Topics Concern    Pt has a pacemaker No    Pt has a defibrillator No    Reaction to local anesthetic No    Caffeine Concern No        Medications (Active prior to today's visit):  Current Outpatient Medications   Medication Sig Dispense Refill    polyethylene glycol, PEG 3350-KCl-NaBcb-NaCl-NaSulf, 236 g Oral Recon Soln Take 4,000 mL by mouth As Directed. Take 2,000 mL the night before your procedure and 2,000 mL the morning of your procedure. Take as directed by GI clinic. Okay to substitute for generic. 1 each 0    fluticasone propionate 50 MCG/ACT Nasal Suspension 2 sprays by Nasal route daily. guaiFENesin-codeine 100-10 MG/5ML Oral Solution Take 5 mL by mouth every 6 (six) hours as needed. mupirocin 2 % External Ointment Apply 1 Application topically 3 (three) times daily. 1 each 3    linaCLOtide 145 MCG Oral Cap Take by mouth.      estradiol 0.05 MG/24HR Transdermal Patch Biweekly APPLY 1 PATCH EVERY WEEK AS DIRECTED      predniSONE 20 MG Oral Tab TAKE 2 TABLETS BY MOUTH DAILY FOR 5 DAYS, THEN 1 TABLET DAILY FOR 5 FAYS      furosemide 20 MG Oral Tab       mupirocin 2 % External Ointment Apply 1 Application topically 3 (three) times daily. 1 each 1    MUPIROCIN 2 % External Ointment Apply 1 Application topically 3 (three) times daily. 22 g 0    triamcinolone 0.1 % External Cream Apply topically 2 (two) times daily. Apply bid as directed 80 g 3    Continuous Blood Gluc Sensor (FREESTYLE REBECCA 14 DAY SENSOR) Does not apply Misc Take 1 Bottle by mouth As Directed. diclofenac 1 % External Gel       lidocaine 5 % External Patch       LINZESS 145 MCG Oral Cap TAKE 1 CAPSULE BY MOUTH AT LEAST 30 MINUTES BEFORE THE FIRST MEAL OF THE DAY ON AN EMPTY STOMACH      Sodium Fluoride 1.1 % Dental Gel sodium fluoride 1.1 % dental paste   BRUSH 2 TIMES DAILY      dicyclomine 10 MG Oral Cap Take 1 capsule (10 mg total) by mouth 3 (three) times daily as needed (abdominal pain). 40 capsule 3    digoxin 0.25 MG Oral Tab       estradiol 0.05 MG/24HR Transdermal Patch Weekly       PULMICORT FLEXHALER 180 MCG/ACT Inhalation Aerosol Powder, Breath Activated       Blood Glucose Monitoring Suppl (CONTOUR NEXT EZ) w/Device Does not apply Kit 1 kit by Does not apply route daily. 1 kit 0    dilTIAZem HCl ER Coated Beads 240 MG Oral Capsule SR 24 Hr Take 1 capsule (240 mg total) by mouth daily. Continue taking  0    Probiotic Product (VSL#3) Oral Cap Take 1 capsule by mouth daily. Calcium Carb-Cholecalciferol (CALCIUM + D3) 600-200 MG-UNIT Oral Tab Take 1 tablet by mouth daily.         furosemide 40 MG Oral Tab Take 0.5 tablets (20 mg total) by mouth daily. PROAIR  (90 BASE) MCG/ACT Inhalation Aero Soln Inhale 2 puffs into the lungs as needed. Cholecalciferol (VITAMIN D) 1000 UNITS Oral Tab Take 1,000 Units by mouth nightly. Potassium Chloride ER (K-DUR M20) 20 MEQ Oral Tab CR Take 1 tablet (20 mEq total) by mouth nightly. PATANOL 0.1 % Ophthalmic Solution Place 1 drop into both eyes as needed for Allergies. lansoprazole (PREVACID) 30 MG Oral Capsule Delayed Release Take 1 capsule (30 mg total) by mouth nightly. Amitriptyline HCl (ELAVIL) 100 MG Oral Tab Take 1 tablet (100 mg total) by mouth nightly. aspirin 81 MG Oral Tab Take 2 tablets (162 mg total) by mouth daily. budesonide 0.25 MG/2ML Inhalation Suspension Inhale 2 mL (0.25 mg total) into the lungs nightly. 2 puffs nightly      hydrocodone-acetaminophen 5-500 MG Oral Tab Take by mouth. take 1 tablet by oral route  every 4 - 6 hours as needed for pain      Loratadine 10 MG Oral Cap Take 10 mg by mouth nightly. montelukast 10 MG Oral Tab Take 1 tablet (10 mg total) by mouth nightly. Multiple Vitamin (MULTI-VITAMINS) Oral Tab Take  by mouth. take 1 tablet by ORAL route  every day with food      omega-3 fatty acids (FISH OIL) 1000 MG Oral Cap Take 1,000 mg by mouth daily. FISH OIL (unknown strength)      Tiotropium Bromide Monohydrate 18 MCG Inhalation Cap Inhale 1 capsule (18 mcg total) into the lungs nightly. B Complex Oral Cap Take by mouth daily. VITAMIN B COMPLEX (unknown strength)       Lisinopril-Hydrochlorothiazide 10-12.5 MG Oral Tab Take 1 tablet by mouth daily.  (Patient not taking: Reported on 9/28/2023)         Allergies:    Penicillin G            ANAPHYLAXIS  Cefuroxime              UNKNOWN    Comment:Other reaction(s): Vomitting  Levofloxacin            UNKNOWN    Comment:Other reaction(s): LEVOFLOXACIN  Penicillins                 Comment:Other reaction(s): Unknown    ROS:   CONSTITUTIONAL: negative for fevers, chills, sweats and weight loss  EYES Negative for scleral icterus or redness, and diplopia  HEENT: Negative for dysphagia and hoarseness  RESPIRATORY: Negative for cough and severe shortness of breath  CARDIOVASCULAR: Negative for crushing sub-sternal chest pain  GASTROINTESTINAL: See HPI  GENITOURINARY: Negative for dysuria  MUSCULOSKELETAL: Negative for arthralgias and myalgias  SKIN: Negative for jaundice, rash or pruritus  NEUROLOGICAL: Negative for dizziness and headaches  BEHAVIOR/PSYCH: Negative for psychotic behavior and poor appetite    PHYSICAL EXAM:   Blood pressure 108/66, pulse 87, height 5' 5\" (1.651 m), weight 155 lb (70.3 kg). GEN: Alert, no acute distress, well-nourished   HEENT: anicteric sclera, neck supple, trachea midline, MMM, no palpable or tender neck or supraclavicular lymph nodes  CV: RRR, the extremities are warm and well perfused   LUNGS: No increased work of breathing, CTAB  ABDOMEN: Soft, symmetrical, non-tender without distention or guarding. No scars or lesions. Aorta is without bruit or visible pulsation. Umbilicus is midline without herniation. Normoactive bowel sounds are present, No masses, hepatomegaly or splenomegaly noted. MSK: No erythema, no warmth, no swelling of joints  SKIN: No jaundice, no erythema, no rashes, no lesions  HEMATOLOGIC: No bleeding, no bruising  NEURO: Alert and interactive, THOMASON  PSYCH: appropriate mood & affect    Labs/Imaging/Procedures:     Patient's pertinent labs and imaging were reviewed and discussed with patient today. .  ASSESSMENT/PLAN:   Keara Loza is a 82year old year-old female with medical history including  history including IBS w/ constipation, diverticulosis, esophagitis, GERD, gastric ulcer, episodic atrial fibrillation, HTN, high cholesterol. 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  Her pulmonologist does not want her sedated for any procedure, so patient last had CT colonography for [facility] screen in 2022. she is here today for ED follow up:  -seen in ED on 8/30/23 for diarrhea and BRBPR   -labs remarkable for WBC 17.2, elevated neutrophils, in ED  -C Diff & shigatoxin was negative  -ED CT finding: Left-sided colitis,     Prior to this:  -pt with hx of IBS-constipation, had been taking linzess daily up until 1 month ago. And previously on amitiza which worked well, but then insurance stopped covering it  -pt reports past 2-3 months having more diarrhea, she stopped the linzess then had no stool for 2 days, took one more linzess and then had bright red blood per rectum, passing copious amounts of blood without stool, associated with lower abdominal pain that was not relieved with bentyl  -pt previously on linzess, but stopped taking on 8/30/23, last saw a Gastroenterologist about 4 years ago     Since the ED visit:  -after the ED patient reported abd pain & diarrhea resolved after 2-3 days and having soft, formed stool now  -about 2-3 days ago having hard small stool  -pt took colace x 3 days and today soft, formed stool  -no bleeding since the ED visit   -pt has not taken linzess since 8/30/23  -pt with recent covid diagnosis, tested positive on 9/13/23, reports still feeling fatigue and low appetite since  -pt reports weight loss of 10lbs since 9/13/23, because appetite low since covid, reports only eating one meal daily    -acute episode of abdominal pain & diarrhea may have had viral or bacterial infectious etiology, acute inflammatory process, flare of IBS, new onset IBD less likely, less likely colorectal cancer given acute onset and resolution.     #GERD  -on daily prevacid   -regurgitates water if she coughs while eating, triggered by cough when going from oral to pharyngeal phase  -gets occasional breakthrough heartburn that she bradmarcelo brown for   -last EGD 2018  -symptoms described are more oral-pharyngeal then pharyngeal-esophageal, will consider video swallow screen in future if worsening    Recommendations:  - bowel prep from pharmacy if going to proceed with colonoscopy    -patient had CT colonography screening in March 20[facility] at [facility], normal exam    -I would like to schedule colonoscopy for patient for examination of acute episode of blood per rectum and abdominal pain. I will send message to GI RNs to reach out to patient's pulmonologist, Dr. Asad Murphy, for approval to use sedation for procedure. Offered colonoscopy for patient without sedation but patient hesitant about her ability to tolerate the procedure  *Pt has appt with pulmonologist, Dr. Asad Murphy, tomorrow, 9/29/23 and will discuss this with her doctor as well    -other options are to closely monitor for recurrence of symptoms or consider flexible sigmoidoscopy with fleet enema & magnesium citrate prep    -follow up as needed or at least yearly for monitoring of IBS        Orders This Visit:  No orders of the defined types were placed in this encounter. Meds This Visit:  Requested Prescriptions     Signed Prescriptions Disp Refills    polyethylene glycol, PEG 3350-KCl-NaBcb-NaCl-NaSulf, 236 g Oral Recon Soln 1 each 0     Sig: Take 4,000 mL by mouth As Directed. Take 2,000 mL the night before your procedure and 2,000 mL the morning of your procedure. Take as directed by GI clinic. Okay to substitute for generic. Imaging & Referrals:  None      Juan Carrington [facility]  9/28/2023        This note was partially prepared using [employer] voice recognition dictation software. As a result, errors may occur. When identified, these errors have been corrected.  While every attempt is made to correct errors during dictation, discrepancies may still exist.

## 2023-09-28 NOTE — TELEPHONE ENCOUNTER
I faxed a request to Dr Bree Booth office asking if we can use sedation for the pt for a procedure    I called the office to make sure I have the correct fax number    Request faxed to [phone]    Will await response    Pt has an appt tomorrow

## 2023-10-04 NOTE — TELEPHONE ENCOUNTER
Patient following up states would be ok per Dr Dean Seats as long as there will be an anesthesiologist, and also additional questions. please call.

## 2023-10-05 NOTE — TELEPHONE ENCOUNTER
Rolando Daly,  Please see message below from patient. Patient also asking when can she resume her normal diet and eat nuts again? Patient states she is feeling well. Normal stool and no blood. Please advise. Thank you.

## 2023-10-09 ENCOUNTER — TELEPHONE (OUTPATIENT)
Facility: CLINIC | Age: 83
End: 2023-10-09

## 2023-10-09 DIAGNOSIS — Z12.11 COLON CANCER SCREENING: ICD-10-CM

## 2023-10-09 DIAGNOSIS — K62.5 BRIGHT RED BLOOD PER RECTUM: Primary | ICD-10-CM

## 2023-10-12 NOTE — TELEPHONE ENCOUNTER
GI schedulers,   Please reach out to [facility] for scheduling. Dr. Kayla Lind (pulm) approved patient for procedure with anesthesia provider present.      MD: Dr. Kasey Joseph  Sedation: MAC (pulmonary hx, on O2)  Prep: golytely  Diagnosis: diarrhea & bright red blood per rectum    Lisa Morrison, APRN
Pt has additional questions about the type of procedure she is going to have please call
Saskia Elise         Patient has been schedule for her colonoscopy 04/12/2024 04/12/2024 is  first opening at [facility] also I added her to the waiting list     Patient wants to know if this is okay please advise Arias Miller
Scheduled for:  Colonoscopy [place]  Provider Name:    Date:  04/12/2024  Location:  [facility]   Sedation:  Mac  Time:  3:00pm (pt is aware to arrive at 2:00pm    Prep:  golytely   Meds/Allergies Reconciled?:  yes    Diagnosis with codes:  diarrhea R19.7 & bright red blood per rectum K62.5  Was patient informed to call insurance with codes (Y/N): yes      Referral sent?:  Referral was sent at the time of electronic surgical scheduling. [facility] or [facility]  notified?:  I sent an electronic request to Endo Scheduling and received a confirmation today. Medication Orders:  none  Misc Orders:  none     Further instructions given by staff:  I discussed the prep instructions with the patient which she verbally understood and is aware that I will send the instructions today. via [employer]
Yes this is okay given her resolution of symptoms currently. I will send her a message.     RAFI Montemayor
Home

## 2023-11-09 NOTE — TELEPHONE ENCOUNTER
Left voicemail for patient and sent her the following mychart: \"Hi Alisa Handy,     I just left you a voicemail. You can call back if you have more questions [phone]. OR if you reply to this message it will come directly to me. You can resume your normal diet and eat nuts. You are scheduled for colonoscopy in April 2024 with Dr. Nain Mckenna.      Alondra Be, RAFI\"

## 2023-11-17 ENCOUNTER — OFFICE VISIT (OUTPATIENT)
Facility: CLINIC | Age: 83
End: 2023-11-17

## 2023-11-17 VITALS
BODY MASS INDEX: 27.13 KG/M2 | WEIGHT: 162.81 LBS | HEART RATE: 86 BPM | SYSTOLIC BLOOD PRESSURE: 124 MMHG | HEIGHT: 65 IN | DIASTOLIC BLOOD PRESSURE: 69 MMHG

## 2023-11-17 DIAGNOSIS — R13.12 OROPHARYNGEAL DYSPHAGIA: Primary | ICD-10-CM

## 2023-11-17 PROCEDURE — 99214 OFFICE O/P EST MOD 30 MIN: CPT

## 2023-11-17 NOTE — PATIENT INSTRUCTIONS
-call to schedule xray video swallow, xray esophagus, speech pathology     -call me if you decide to switch to flexible sigmoidoscopy (no sedation & no bowel prep), or CT colonography (no sedation) or if you want to be scheduled for colonoscopy with first available provider

## 2023-11-17 NOTE — PROGRESS NOTES
[facility], [facility] - Gastroenterology                                                                                                               Reason for consult: ER follow up    Requesting physician or provider: Rosalia Lind MD    Chief Complaint   Patient presents with    Follow - Up       HPI:   Edy Ramirez is a 82year old year-old female with medical history including  history including IBS w/ constipation, diverticulosis, esophagitis, GERD, gastric ulcer, episodic atrial fibrillation, HTN, high cholesterol. 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 She is here today for follow up. Today in clinic:  -she reports feeling well: normally having daily bowel movement. no melena or hematochezia. Last bleeding occurred on 8/30/23. One episode of abd cramping, resolved with bentyl.  -she does report increased episodes of choking on water when initiating swallow. Tolerates food and pills without trouble. Unpredictable per patient. Denies odynophagia or globus sensation. Does have hx of GERD with breakthrough symptoms on PPI    Last visit 9/28/23  -seen for ED follow up: diarrhea and BRBPR   -labs remarkable for WBC 17.2, elevated neutrophils, in ED, -C Diff & shigatoxin negative, ED CT finding: Left-sided colitis,     Prior to this:  -pt with hx of IBS-constipation, had been taking linzess daily up until 1 month ago.  And previously on amitiza which worked well, but then insurance stopped covering it  -pt reports past 2-3 months having more diarrhea, she stopped the linzess then had no stool for 2 days, took one more linzess and then had bright red blood per rectum, passing copious amounts of blood without stool, associated with lower abdominal pain that was not relieved with bentyl  -pt previously on linzess, but stopped taking on 8/30/23, last saw a Gastroenterologist about 4 years ago     Since the ED visit:  -after the ED patient reported abd pain & diarrhea resolved after 2-3 days and having soft, formed stool now  -about 2-3 days ago having hard small stool  -pt took colace x 3 days and today soft, formed stool  -no bleeding since the ED visit   -pt has not taken linzess since 8/30/23  -pt with recent covid diagnosis, tested positive on 9/13/23, reports still feeling fatigue and low appetite since  -pt reports weight loss of 10lbs since 9/13/23, because appetite low since covid, reports only eating one meal daily    #GERD  -on daily prevacid   -regurgitates water if she coughs while eating, triggered by cough when going from oral to pharyngeal phase  -gets occasional breakthrough heartburn that she brad yuryer for     Patient denies symptoms of nausea, vomiting, dyspepsia, odynophagia, globus sensation, heartburn, hematemesis, abdominal pain, or melena. she denies fever. Last colonoscopy: colonoscopy about 10 years ago, CT Colongraphy screen done 2022 at [facility] (normal, told to repeat in 5 years)  IMPRESSION:   C1: Normal colon or benign lesion; Continue routine screening. Last EGD: 2018 (improvement of esophagitis per patient)    NSAIDS: 2 81mg aspirin daily, now taking 1 daily since ED visit on 8/30.   Tobacco:former  Alcohol: social  Marijuana: none   Illicit drugs:none     FH GI malignancy: maternal grandma - colon cancer age [de-identified]    No history of adverse reaction to sedation  No ADELSO  No anticoagulants  No pacemaker/defibrillator  No pain medications and/or sleep aides    Wt Readings from Last 6 Encounters:   11/17/23 162 lb 12.8 oz (73.8 kg)   09/28/23 155 lb (70.3 kg)   09/05/23 157 lb (71.2 kg)   08/30/23 157 lb (71.2 kg)   09/23/22 168 lb (76.2 kg)   04/14/22 168 lb (76.2 kg)        History, Medications, Allergies, ROS:      Past Medical History:   Diagnosis Date    A-fib ([facility]     Anesthesia complication     Arrhythmia     AFIB    Arthritis     Asthma     Back problem     COPD (chronic obstructive pulmonary disease) ([address]     Deviated nasal septum     nasal septoplasty, turb reduction, smr of turbs    Difficult intubation     fiber optic if needed    Diverticulitis     colonoscopy     Diverticulosis of large intestine     Esophageal reflux     Extrinsic asthma, unspecified     Headache     Heart disease     Hepatitis     WAS TOLD SHE HAS HAD THIS WHEN SHE WAS 18YEARS OLD    High blood pressure     High cholesterol     Irregular heart rate     Lichenoid keratosis     left chest-bx    Osteoarthritis     Paralysis ([address]     left lung and left vocal cord. Paronychia     (RT); onychomycosis; debridement    Problems with swallowing     occasionally    Shortness of breath     2 L NC ALL THE TIME 24HR/7 DAYS PER WEEK    Unspecified essential hypertension     Visual impairment       Past Surgical History:   Procedure Laterality Date    ADENOIDECTOMY      CATARACT      cataract extraction     HYSTERECTOMY      SINUS SURGERY        DEVIATED SEPTUM    T&A      TONSILLECTOMY        Family Hx:   Family History   Problem Relation Age of Onset    Ovarian Cancer Mother 68        endometrial, poss ovarian primary    Pulmonary Disease Sister         COPD    Skin cancer Other     Stroke Other     Other (Other) Other       Social History:   Social History     Socioeconomic History    Marital status:    Tobacco Use    Smoking status: Former     Types: Cigarettes     Quit date: 1996     Years since quittin.8    Smokeless tobacco: Never   Vaping Use    Vaping Use: Never used   Substance and Sexual Activity    Alcohol use:  Yes     Alcohol/week: 0.0 standard drinks of alcohol     Comment: one drink once a week    Drug use: Never   Other Topics Concern    Pt has a pacemaker No    Pt has a defibrillator No    Reaction to local anesthetic No    Caffeine Concern No        Medications (Active prior to today's visit):  Current Outpatient Medications   Medication Sig Dispense Refill polyethylene glycol, PEG 3350-KCl-NaBcb-NaCl-NaSulf, 236 g Oral Recon Soln Take 4,000 mL by mouth As Directed. Take 2,000 mL the night before your procedure and 2,000 mL the morning of your procedure. Take as directed by GI clinic. Okay to substitute for generic. 1 each 0    fluticasone propionate 50 MCG/ACT Nasal Suspension 2 sprays by Nasal route daily. guaiFENesin-codeine 100-10 MG/5ML Oral Solution Take 5 mL by mouth every 6 (six) hours as needed. mupirocin 2 % External Ointment Apply 1 Application topically 3 (three) times daily. 1 each 3    linaCLOtide 145 MCG Oral Cap Take by mouth.      estradiol 0.05 MG/24HR Transdermal Patch Biweekly APPLY 1 PATCH EVERY WEEK AS DIRECTED      predniSONE 20 MG Oral Tab TAKE 2 TABLETS BY MOUTH DAILY FOR 5 DAYS, THEN 1 TABLET DAILY FOR 5 FAYS      furosemide 20 MG Oral Tab       mupirocin 2 % External Ointment Apply 1 Application topically 3 (three) times daily. 1 each 1    MUPIROCIN 2 % External Ointment Apply 1 Application topically 3 (three) times daily. 22 g 0    triamcinolone 0.1 % External Cream Apply topically 2 (two) times daily. Apply bid as directed 80 g 3    Continuous Blood Gluc Sensor (LoudcasterSTYLE REBECCA 14 DAY SENSOR) Does not apply Misc Take 1 Bottle by mouth As Directed. diclofenac 1 % External Gel       lidocaine 5 % External Patch       Sodium Fluoride 1.1 % Dental Gel sodium fluoride 1.1 % dental paste   BRUSH 2 TIMES DAILY      dicyclomine 10 MG Oral Cap Take 1 capsule (10 mg total) by mouth 3 (three) times daily as needed (abdominal pain). 40 capsule 3    digoxin 0.25 MG Oral Tab       estradiol 0.05 MG/24HR Transdermal Patch Weekly       PULMICORT FLEXHALER 180 MCG/ACT Inhalation Aerosol Powder, Breath Activated       Blood Glucose Monitoring Suppl (CONTOUR NEXT EZ) w/Device Does not apply Kit 1 kit by Does not apply route daily. 1 kit 0    dilTIAZem HCl ER Coated Beads 240 MG Oral Capsule SR 24 Hr Take 1 capsule (240 mg total) by mouth daily. Continue taking  0    Probiotic Product (VSL#3) Oral Cap Take 1 capsule by mouth daily. Calcium Carb-Cholecalciferol (CALCIUM + D3) 600-200 MG-UNIT Oral Tab Take 1 tablet by mouth daily. furosemide 40 MG Oral Tab Take 0.5 tablets (20 mg total) by mouth daily. PROAIR  (90 BASE) MCG/ACT Inhalation Aero Soln Inhale 2 puffs into the lungs as needed. Cholecalciferol (VITAMIN D) 1000 UNITS Oral Tab Take 1,000 Units by mouth nightly. Potassium Chloride ER (K-DUR M20) 20 MEQ Oral Tab CR Take 1 tablet (20 mEq total) by mouth nightly. PATANOL 0.1 % Ophthalmic Solution Place 1 drop into both eyes as needed for Allergies. lansoprazole (PREVACID) 30 MG Oral Capsule Delayed Release Take 1 capsule (30 mg total) by mouth nightly. Amitriptyline HCl (ELAVIL) 100 MG Oral Tab Take 1 tablet (100 mg total) by mouth nightly. aspirin 81 MG Oral Tab Take 2 tablets (162 mg total) by mouth daily. budesonide 0.25 MG/2ML Inhalation Suspension Inhale 2 mL (0.25 mg total) into the lungs nightly. 2 puffs nightly      hydrocodone-acetaminophen 5-500 MG Oral Tab Take by mouth. take 1 tablet by oral route  every 4 - 6 hours as needed for pain      Lisinopril-Hydrochlorothiazide 10-12.5 MG Oral Tab Take 1 tablet by mouth daily. Loratadine 10 MG Oral Cap Take 10 mg by mouth nightly. montelukast 10 MG Oral Tab Take 1 tablet (10 mg total) by mouth nightly. Multiple Vitamin (MULTI-VITAMINS) Oral Tab Take  by mouth. take 1 tablet by ORAL route  every day with food      omega-3 fatty acids (FISH OIL) 1000 MG Oral Cap Take 1,000 mg by mouth daily. FISH OIL (unknown strength)      Tiotropium Bromide Monohydrate 18 MCG Inhalation Cap Inhale 1 capsule (18 mcg total) into the lungs nightly. B Complex Oral Cap Take by mouth daily.  VITAMIN B COMPLEX (unknown strength)       LINZESS 145 MCG Oral Cap TAKE 1 CAPSULE BY MOUTH AT LEAST 30 MINUTES BEFORE THE FIRST MEAL OF THE DAY ON AN EMPTY STOMACH (Patient not taking: Reported on 11/17/2023)         Allergies: Allergies   Allergen Reactions    Penicillin G ANAPHYLAXIS    Cefuroxime UNKNOWN     Other reaction(s): Vomitting    Levofloxacin UNKNOWN     Other reaction(s): LEVOFLOXACIN    Penicillins      Other reaction(s): Unknown       ROS:   CONSTITUTIONAL: negative for fevers, chills, sweats and weight loss  EYES Negative for scleral icterus or redness, and diplopia  HEENT: Negative for dysphagia and hoarseness  RESPIRATORY: Negative for cough and severe shortness of breath  CARDIOVASCULAR: Negative for crushing sub-sternal chest pain  GASTROINTESTINAL: See HPI  GENITOURINARY: Negative for dysuria  MUSCULOSKELETAL: Negative for arthralgias and myalgias  SKIN: Negative for jaundice, rash or pruritus  NEUROLOGICAL: Negative for dizziness and headaches  BEHAVIOR/PSYCH: Negative for psychotic behavior and poor appetite    PHYSICAL EXAM:   Blood pressure 124/69, pulse 86, height 5' 5\" (1.651 m), weight 162 lb 12.8 oz (73.8 kg). GEN: Alert, no acute distress, well-nourished   HEENT: anicteric sclera, neck supple, trachea midline, MMM, no palpable or tender neck or supraclavicular lymph nodes  CV: RRR, the extremities are warm and well perfused   LUNGS: No increased work of breathing  ABDOMEN: Soft, symmetrical, non-tender without distention or guarding. No scars or lesions. Aorta is without bruit or visible pulsation. Umbilicus is midline without herniation. Normoactive bowel sounds are present, No masses, hepatomegaly or splenomegaly noted. MSK: No erythema, no warmth, no swelling of joints  SKIN: No jaundice, no erythema, no rashes, no lesions  HEMATOLOGIC: No bleeding, no bruising  NEURO: Alert and interactive, THOMASON  PSYCH: appropriate mood & affect    Labs/Imaging/Procedures:     Patient's pertinent labs and imaging were reviewed and discussed with patient today.         .  ASSESSMENT/PLAN:   Rita Paul is a 82year old year-old female with medical history including  history including IBS w/ constipation, diverticulosis, esophagitis, GERD, gastric ulcer, episodic atrial fibrillation, HTN, high cholesterol. 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 She is here today for follow up. Today in clinic:  -she reports feeling well: normally having daily bowel movement. no melena or hematochezia. Last bleeding occurred on 8/30/23. One episode of abd cramping, resolved with bentyl.  -pt previously on linzess, but stopped taking on 8/30/23. No bleeding since ED visit.  -she does report increased episodes of choking on water when initiating swallow. Tolerates food and pills without trouble. Unpredictable per patient. Denies odynophagia or globus sensation. Does have hx of GERD with breakthrough symptoms on PPI  Differentials include but not limited to: achalasia, motility disorder, systemic sclerosis (scleroderma), functional dysphagia    Recommendations:  -call to schedule xray video swallow, xray esophagus, speech pathology     -call me if you decide to switch to flexible sigmoidoscopy (no sedation & no bowel prep), or CT colonography (no sedation) or if you want to be scheduled for colonoscopy with first available provider     We discussed in detail the risks, benefits of CLN, flex sig, CT colon. Pt verbalized understanding and will call if she wants to change procedure. For now she would like to keep date in April with Dr. Stephenie Urias. Orders This Visit:  No orders of the defined types were placed in this encounter. Meds This Visit:  Requested Prescriptions      No prescriptions requested or ordered in this encounter       Imaging & Referrals:  SPEECH THERAPY - INTERNAL  XR ESOPHAGUS SINGLE CONTRAST (CPT=74220)  XR VIDEO SWALLOW (ENG=[id number])      [facility]  9/28/2023        This note was partially prepared using [employer] voice recognition dictation software. As a result, errors may occur.  When identified, these errors have been corrected.  While every attempt is made to correct errors during dictation, discrepancies may still exist.

## 2024-01-10 ENCOUNTER — OFFICE VISIT (OUTPATIENT)
Facility: CLINIC | Age: 84
End: 2024-01-10

## 2024-01-10 ENCOUNTER — LAB ENCOUNTER (OUTPATIENT)
Dept: LAB | Facility: HOSPITAL | Age: 84
End: 2024-01-10
Attending: INTERNAL MEDICINE
Payer: MEDICARE

## 2024-01-10 VITALS
HEIGHT: 65 IN | WEIGHT: 160 LBS | DIASTOLIC BLOOD PRESSURE: 67 MMHG | BODY MASS INDEX: 26.66 KG/M2 | SYSTOLIC BLOOD PRESSURE: 106 MMHG | HEART RATE: 87 BPM

## 2024-01-10 DIAGNOSIS — I48.0 PAF (PAROXYSMAL ATRIAL FIBRILLATION) (HCC): ICD-10-CM

## 2024-01-10 DIAGNOSIS — K59.00 CONSTIPATION, UNSPECIFIED CONSTIPATION TYPE: Primary | ICD-10-CM

## 2024-01-10 DIAGNOSIS — R60.0 LOCALIZED EDEMA: ICD-10-CM

## 2024-01-10 DIAGNOSIS — K59.00 CONSTIPATION, UNSPECIFIED CONSTIPATION TYPE: ICD-10-CM

## 2024-01-10 DIAGNOSIS — I10 ESSENTIAL HYPERTENSION: Primary | ICD-10-CM

## 2024-01-10 LAB
BASOPHILS # BLD AUTO: 0.02 X10(3) UL (ref 0–0.2)
BASOPHILS NFR BLD AUTO: 0.2 %
DEPRECATED RDW RBC AUTO: 47.9 FL (ref 35.1–46.3)
DIGOXIN SERPL-MCNC: 2.09 NG/ML (ref 0.8–2)
EOSINOPHIL # BLD AUTO: 0.01 X10(3) UL (ref 0–0.7)
EOSINOPHIL NFR BLD AUTO: 0.1 %
ERYTHROCYTE [DISTWIDTH] IN BLOOD BY AUTOMATED COUNT: 13.5 % (ref 11–15)
HCT VFR BLD AUTO: 43.3 %
HGB BLD-MCNC: 14 G/DL
IMM GRANULOCYTES # BLD AUTO: 0.02 X10(3) UL (ref 0–1)
IMM GRANULOCYTES NFR BLD: 0.2 %
LYMPHOCYTES # BLD AUTO: 1.13 X10(3) UL (ref 1–4)
LYMPHOCYTES NFR BLD AUTO: 12.8 %
MCH RBC QN AUTO: 31.1 PG (ref 26–34)
MCHC RBC AUTO-ENTMCNC: 32.3 G/DL (ref 31–37)
MCV RBC AUTO: 96.2 FL
MONOCYTES # BLD AUTO: 0.6 X10(3) UL (ref 0.1–1)
MONOCYTES NFR BLD AUTO: 6.8 %
NEUTROPHILS # BLD AUTO: 7.08 X10 (3) UL (ref 1.5–7.7)
NEUTROPHILS # BLD AUTO: 7.08 X10(3) UL (ref 1.5–7.7)
NEUTROPHILS NFR BLD AUTO: 79.9 %
PLATELET # BLD AUTO: 218 10(3)UL (ref 150–450)
RBC # BLD AUTO: 4.5 X10(6)UL
WBC # BLD AUTO: 8.9 X10(3) UL (ref 4–11)

## 2024-01-10 PROCEDURE — 85025 COMPLETE CBC W/AUTO DIFF WBC: CPT

## 2024-01-10 PROCEDURE — 80162 ASSAY OF DIGOXIN TOTAL: CPT

## 2024-01-10 PROCEDURE — 36415 COLL VENOUS BLD VENIPUNCTURE: CPT

## 2024-01-10 NOTE — PATIENT INSTRUCTIONS
Episode diarrhea /rectal bleeding  - likely ischemic colitis episode/monitor  - CBC    Constipation  - colace + pericolace  - pelvic floor therapy     Dysphagia   - upper GI

## 2024-01-15 NOTE — PROGRESS NOTES
Yesenia Berkowitz is a 83 year old female.    HPI:     Chief Complaint   Patient presents with    Follow - Up     ER        The patient is an 83 year old female who follows up from ER      HISTORY:  Past Medical History:   Diagnosis Date    A-fib (HCC)     Anesthesia complication     Arrhythmia     AFIB    Arthritis     Asthma     Back problem     COPD (chronic obstructive pulmonary disease) (HCC)     Deviated nasal septum     nasal septoplasty, turb reduction, smr of turbs    Difficult intubation     fiber optic if needed    Diverticulitis     colonoscopy     Diverticulosis of large intestine     Esophageal reflux     Extrinsic asthma, unspecified     Headache     Heart disease     Hepatitis     WAS TOLD SHE HAS HAD THIS WHEN SHE WAS 19 YEARS OLD    High blood pressure     High cholesterol     Irregular heart rate     Lichenoid keratosis 2019    left chest-bx    Osteoarthritis     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    Paronychia     (RT); onychomycosis; debridement    Problems with swallowing     occasionally    Shortness of breath     2 L NC ALL THE TIME 24HR/7 DAYS PER WEEK    Unspecified essential hypertension     Visual impairment       Past Surgical History:   Procedure Laterality Date    ADENOIDECTOMY      CATARACT      cataract extraction     HYSTERECTOMY      SINUS SURGERY        DEVIATED SEPTUM    T&A      TONSILLECTOMY        Family History   Problem Relation Age of Onset    Ovarian Cancer Mother 76        endometrial, poss ovarian primary    Pulmonary Disease Sister         COPD    Skin cancer Other     Stroke Other     Other (Other) Other       Social History:   Social History     Socioeconomic History    Marital status:    Tobacco Use    Smoking status: Former     Types: Cigarettes     Quit date: 1996     Years since quittin.0    Smokeless tobacco: Never   Vaping Use    Vaping Use: Never used   Substance and Sexual Activity    Alcohol use: Yes     Alcohol/week:  0.0 standard drinks of alcohol     Comment: one drink once a week    Drug use: Never   Other Topics Concern    Pt has a pacemaker No    Pt has a defibrillator No    Reaction to local anesthetic No    Caffeine Concern No        Medications (Active prior to today's visit):  Current Outpatient Medications   Medication Sig Dispense Refill    polyethylene glycol, PEG 3350-KCl-NaBcb-NaCl-NaSulf, 236 g Oral Recon Soln Take 4,000 mL by mouth As Directed. Take 2,000 mL the night before your procedure and 2,000 mL the morning of your procedure. Take as directed by GI clinic. Okay to substitute for generic. 1 each 0    fluticasone propionate 50 MCG/ACT Nasal Suspension 2 sprays by Nasal route daily.      guaiFENesin-codeine 100-10 MG/5ML Oral Solution Take 5 mL by mouth every 6 (six) hours as needed.      mupirocin 2 % External Ointment Apply 1 Application topically 3 (three) times daily. 1 each 3    linaCLOtide 145 MCG Oral Cap Take by mouth.      estradiol 0.05 MG/24HR Transdermal Patch Biweekly APPLY 1 PATCH EVERY WEEK AS DIRECTED      predniSONE 20 MG Oral Tab TAKE 2 TABLETS BY MOUTH DAILY FOR 5 DAYS, THEN 1 TABLET DAILY FOR 5 FAYS      furosemide 20 MG Oral Tab       mupirocin 2 % External Ointment Apply 1 Application topically 3 (three) times daily. 1 each 1    MUPIROCIN 2 % External Ointment Apply 1 Application topically 3 (three) times daily. 22 g 0    triamcinolone 0.1 % External Cream Apply topically 2 (two) times daily. Apply bid as directed 80 g 3    Continuous Blood Gluc Sensor (FREESTYLE REBECCA 14 DAY SENSOR) Does not apply Misc Take 1 Bottle by mouth As Directed.      diclofenac 1 % External Gel       lidocaine 5 % External Patch       Sodium Fluoride 1.1 % Dental Gel sodium fluoride 1.1 % dental paste   BRUSH 2 TIMES DAILY      dicyclomine 10 MG Oral Cap Take 1 capsule (10 mg total) by mouth 3 (three) times daily as needed (abdominal pain). 40 capsule 3    digoxin 0.25 MG Oral Tab       estradiol 0.05 MG/24HR  Transdermal Patch Weekly       PULMICORT FLEXHALER 180 MCG/ACT Inhalation Aerosol Powder, Breath Activated       Blood Glucose Monitoring Suppl (CONTOUR NEXT EZ) w/Device Does not apply Kit 1 kit by Does not apply route daily. 1 kit 0    dilTIAZem HCl ER Coated Beads 240 MG Oral Capsule SR 24 Hr Take 1 capsule (240 mg total) by mouth daily. Continue taking  0    Probiotic Product (VSL#3) Oral Cap Take 1 capsule by mouth daily.      Calcium Carb-Cholecalciferol (CALCIUM + D3) 600-200 MG-UNIT Oral Tab Take 1 tablet by mouth daily.        furosemide 40 MG Oral Tab Take 0.5 tablets (20 mg total) by mouth daily.      PROAIR  (90 BASE) MCG/ACT Inhalation Aero Soln Inhale 2 puffs into the lungs as needed.      Cholecalciferol (VITAMIN D) 1000 UNITS Oral Tab Take 1,000 Units by mouth nightly.        Potassium Chloride ER (K-DUR M20) 20 MEQ Oral Tab CR Take 1 tablet (20 mEq total) by mouth nightly.      PATANOL 0.1 % Ophthalmic Solution Place 1 drop into both eyes as needed for Allergies.      lansoprazole (PREVACID) 30 MG Oral Capsule Delayed Release Take 1 capsule (30 mg total) by mouth nightly.      Amitriptyline HCl (ELAVIL) 100 MG Oral Tab Take 1 tablet (100 mg total) by mouth nightly.      aspirin 81 MG Oral Tab Take 2 tablets (162 mg total) by mouth daily.      budesonide 0.25 MG/2ML Inhalation Suspension Inhale 2 mL (0.25 mg total) into the lungs nightly. 2 puffs nightly      hydrocodone-acetaminophen 5-500 MG Oral Tab Take by mouth. take 1 tablet by oral route  every 4 - 6 hours as needed for pain      Lisinopril-Hydrochlorothiazide 10-12.5 MG Oral Tab Take 1 tablet by mouth daily.      Loratadine 10 MG Oral Cap Take 10 mg by mouth nightly.        montelukast 10 MG Oral Tab Take 1 tablet (10 mg total) by mouth nightly.      Multiple Vitamin (MULTI-VITAMINS) Oral Tab Take  by mouth. take 1 tablet by ORAL route  every day with food      omega-3 fatty acids (FISH OIL) 1000 MG Oral Cap Take 1,000 mg by mouth  daily. FISH OIL (unknown strength)      Tiotropium Bromide Monohydrate 18 MCG Inhalation Cap Inhale 1 capsule (18 mcg total) into the lungs nightly.      B Complex Oral Cap Take by mouth daily. VITAMIN B COMPLEX (unknown strength)       LINZESS 145 MCG Oral Cap  (Patient not taking: Reported on 1/10/2024)         Allergies:  Allergies   Allergen Reactions    Penicillin G ANAPHYLAXIS    Cefuroxime UNKNOWN     Other reaction(s): Vomitting    Levofloxacin UNKNOWN     Other reaction(s): LEVOFLOXACIN    Penicillins      Other reaction(s): Unknown         ROS:   The patient denies any chest pain or shortness of breath,  No neurologic or dermatologic symptoms.     PHYSICAL EXAM:   Blood pressure 106/67, pulse 87, height 5' 5\" (1.651 m), weight 160 lb (72.6 kg).    The patient appears their stated age and is in no acute distress  HEENT- anicteric sclera, neck no lymphadnopathy, OP- clear with no masses or lesions  Chest- Clear bilaterally, no wheezing,  Heart- regular rate, no murmur or gallop  Abdomen- Soft and nontender, nondistended  Ext- no clubbing or cyanosis  Skin- no rashes or lesions  Neuro- appropriate response, alert, no confusion  .  ASSESSMENT/PLAN:   Assessment   Encounter Diagnosis   Name Primary?    Constipation, unspecified constipation type Yes       Episode diarrhea /rectal bleeding  - likely ischemic colitis episode/monitor  - CBC    Constipation  - colace + pericolace  - pelvic floor therapy     Dysphagia   - upper GI          Orders This Visit:  Orders Placed This Encounter   Procedures    CBC W Differential W Platelet       Meds This Visit:  Requested Prescriptions      No prescriptions requested or ordered in this encounter       Imaging & Referrals:  PELVIC FLOOR THERAPY - INTERNAL       David Miller MD  [facility]

## 2024-01-16 ENCOUNTER — LAB ENCOUNTER (OUTPATIENT)
Dept: LAB | Age: 84
End: 2024-01-16
Attending: STUDENT IN AN ORGANIZED HEALTH CARE EDUCATION/TRAINING PROGRAM
Payer: MEDICARE

## 2024-01-16 DIAGNOSIS — I49.3 PVC (PREMATURE VENTRICULAR CONTRACTION): ICD-10-CM

## 2024-01-16 DIAGNOSIS — I48.0 PAF (PAROXYSMAL ATRIAL FIBRILLATION) (HCC): ICD-10-CM

## 2024-01-16 DIAGNOSIS — K62.5 RECTAL BLEEDING: ICD-10-CM

## 2024-01-16 DIAGNOSIS — I10 ESSENTIAL HYPERTENSION: Primary | ICD-10-CM

## 2024-01-16 DIAGNOSIS — K52.9 COLITIS: ICD-10-CM

## 2024-01-16 DIAGNOSIS — R60.0 LOCALIZED EDEMA: ICD-10-CM

## 2024-01-16 LAB — DIGOXIN SERPL-MCNC: 0.31 NG/ML (ref 0.8–2)

## 2024-01-16 PROCEDURE — 36415 COLL VENOUS BLD VENIPUNCTURE: CPT

## 2024-01-16 PROCEDURE — 80162 ASSAY OF DIGOXIN TOTAL: CPT

## 2024-01-29 ENCOUNTER — HOSPITAL ENCOUNTER (OUTPATIENT)
Dept: GENERAL RADIOLOGY | Facility: HOSPITAL | Age: 84
Discharge: HOME | End: 2024-01-29
Payer: MEDICARE

## 2024-01-29 DIAGNOSIS — R13.12 OROPHARYNGEAL DYSPHAGIA: ICD-10-CM

## 2024-01-29 PROCEDURE — 74220 X-RAY XM ESOPHAGUS 1CNTRST: CPT

## 2024-01-31 ENCOUNTER — TELEPHONE (OUTPATIENT)
Facility: CLINIC | Age: 84
End: 2024-01-31

## 2024-01-31 DIAGNOSIS — R10.9 ABDOMINAL PAIN, UNSPECIFIED ABDOMINAL LOCATION: ICD-10-CM

## 2024-01-31 DIAGNOSIS — K59.00 CONSTIPATION, UNSPECIFIED CONSTIPATION TYPE: Primary | ICD-10-CM

## 2024-01-31 NOTE — TELEPHONE ENCOUNTER
Pt states that she had a Barium Swallow test on Monday.  Pt states that she has not ad a BM since Sunday and it is becoming uncomfortable for her.  Please call

## 2024-02-01 ENCOUNTER — TELEPHONE (OUTPATIENT)
Facility: CLINIC | Age: 84
End: 2024-02-01

## 2024-02-01 NOTE — TELEPHONE ENCOUNTER
The patient was contacted, she had an esophagram/upper GI on Monday and has not passed the contrast, she is constipated, she is passing gas but feels uncomfortable and bloated but no abdominal pain or vomiting.  She has tried Tamera-Colace but no other laxatives.    Plan-trial of GoLytely bowel preparation to flush out the contrast which is likely now stuck in her colon.  If she develops any obstructive signs or symptoms she will call back immediately and or go to emergency room.  I sent over to the pharmacy bowel prep.

## 2024-02-01 NOTE — TELEPHONE ENCOUNTER
----- Message from RAFI Jenkins sent at 1/31/2024 12:49 PM CST -----  GI RNs - please call Yesenia to set up telephone virtual visit with me AFTER 2/12/24. She is getting a video swallow study done 2/7/24    Thank you.    Xray esophagus:  CONCLUSION:   1. No large hiatal hernia.  There is mild to moderate free gastroesophageal reflux.  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  Scattered tertiary contractions in the esophagus are non propulsive.  No ulceration, stricture or mass.

## 2024-02-01 NOTE — TELEPHONE ENCOUNTER
Dr. Miller    I spoke to Yesenia  She did a barium swallow test on Monday and has not had a bowel movement since.  She tried mojgan-colace and a stool softener for 2 days and no results besides 3-4 very small hard pieces.    She is passing gas.  She denies any nausea or vomiting.  She is bloated and after eating she will get pain in LUQ that goes long-term down her abdomen.  She has a rectocele so sometimes has to help herself go but does not feel any mass or anything there.    She bought over the counter liquid dulcolax as recommended by per pharmacist.  She is going to try this around 4pm or 5pm today since she has some errands to run.  I told her if she does not have a movement my tomorrow morning to call back so we can further advise her on what to do.     She is drinking plenty of fluids.  I did discuss either adding more fiber rich foods to diet or trying a supplement like Benefiber too.  I also told her when she tries to go to the bathroom to use a squatty potty or a step stool to elevate her feed to help facilitate a movement.    Reviewed signs of obstruction and symptoms that would prompt an ER visit.    Please advise    Thank you

## 2024-02-02 NOTE — TELEPHONE ENCOUNTER
Spoke to patient    Scheduled her for a virtual follow up with Molly Rodrigez after her video swallow.    Date and time confirmed. Reminded patient that this will be over the phone.    Your Appointments      Wednesday February 14, 2024  2:00 PM  Virtual Phone Visit with RAFI Jenkins  [facility] ([facility]) [address] [address]  [phone]   You have been scheduled for a Virtual Telephone visit with your provider.  Please be available at your phone so that your physician can contact you, and be prepared with any questions or concerns.  You may be billed a copay at a later time, depending upon your insurance.  As always, your health is our priority.

## 2024-02-05 NOTE — TELEPHONE ENCOUNTER
Infant Weight Check    SUBJECTIVE:   Cyndy Domínguez is a 1 week old female who presents for a weight check. Patient presents {ACCOMPANYING PEOPLE:005089}      Birth Hx:  Birth History    Birth     Length: 21.75\" (55.2 cm)     Weight: 4.23 kg (9 lb 5.2 oz)     HC 37 cm (14.57\")    Apgar     One: 9     Five: 9    Discharge Weight: 3.945 kg (8 lb 11.2 oz)    Delivery Method: , Low Transverse    Gestation Age: 39 1/7 wks    Days in Hospital: 2.0    Hospital Name: [facility] Location: [place], [place]       66 %ile (Z= 0.40) based on WHO (Girls, 0-2 years) weight-for-age data using data from 2024.   BW: 4.23 kg (9 lb 5.2 oz)  Weight Change since birth: -14%    Diet:  - Milk: Every 3-4 hours breastfeeding 10-30 minutes   Did have trouble latching, still has on the left but uses a shield. Has improved since being in the hospital. Has pumped a few times before would like to pump more.     Dietary supplements: yes    Elimination:   - Regular stooling and urinating: 3-4 times a day pooping that appear orange/green. Peeing 7 times day  - Problems with constipation: no    Sleep:  - hours of sleep per night: 4 hours   - location of sleep: [facility]       OBJECTIVE:  Vitals:    24 1013   Temp: 97 °F (36.1 °C)      66 %ile (Z= 0.40) based on WHO (Girls, 0-2 years) weight-for-age data using data from 2024.   BW: 4.23 kg (9 lb 5.2 oz)  Weight Change since birth: -14%  Physical Exam  Constitutional: Appears well-nourished, active and a strong cry.   Head: Anterior fontanelle is flat.   Eyes: Red reflex bilaterally  Right Ear: Tympanic membrane normal.   Left Ear: Tympanic membrane normal.   Nose: Nose normal.   Mouth/Throat: Mucous membranes are moist.   Cardiovascular: Normal rate, regular rhythm, S1 normal and S2 normal.   No murmur heard.Capillary refill takes less than 2 seconds.   Pulmonary/Chest: Effort normal and breath sounds normal.   Abdominal: Soft.  Pt calling back.  She states that she has become very uncomfortable.  She state the pain is in the lower abdomen and Rectal.  Please call   Bowel sounds are normal, non-tender and no distension.   Genitourinary: Normal *** genitalia without lesion  Musculoskeletal: Normal range of motion.   Negative Ortolani and Holt maneuvers of both hips   Neurological: Alert. Normal tone.   Skin: Skin is warm and moist. *** rash      ASSESSMENT:  1 week old{FT / PT* :483809} female   {Assess/PlanSmartLinks:17578}  Weight change since birth: -14%      PLAN:  - Encourage breastfeeding - Feed every 2-3 hours, call if not waking to feed or difficulty feeding.   - Monitor urine output - call if less than 6 wets in 24 hours  - Monitor stools - should transition to yellow/soft/seedy. Call if less than 4 in 24 hours or if color is black, white or pale gray.  - Discussed safe sleep (ABCs = Alone, on Back, in Crib or bassinet -- no loose blankets, not on back or side, no stuffed animals, firm mattress, no co-sleeping, no sleeping in swings)      Follow up:No follow-ups on file.

## 2024-02-05 NOTE — TELEPHONE ENCOUNTER
Spoke to patient    She is requesting to speak with Dr. Miller    Pt has been experiencing irregular bowel movements/constipation for the last 7 days.  She is very uncomfortable and has intermittent pain in her lower abdomen.    Pt confirms she is passing gas and tolerating a diet.    Pt denies fever, n/v, rectal bleeding.    She states she believes this is from the barium she had to drink for  test.    Please advise

## 2024-02-05 NOTE — TELEPHONE ENCOUNTER
Pt calling to let  know she took laxative on Thursday and had a liquid bowel movement - nothing more since then

## 2024-02-05 NOTE — TELEPHONE ENCOUNTER
Spoke to patient    She states she passed 1 formed/soft BM that did not cause any relief  Pt states she is still bloated and cramping in her suprapubic area.    I recommended dulcolax with a lot of water, and she was agreeable.    Pt states she will call back with an update

## 2024-02-05 NOTE — TELEPHONE ENCOUNTER
CALLED [facility] AND SET UP BLS TRANSPORT ETA 1630 Left message for patient to call back.  She is already scheduled-I added message indicating below message from Dr. Miller on comments.

## 2024-02-05 NOTE — TELEPHONE ENCOUNTER
The patient was contacted, she said bloating but did not have any stool output over the weekend but did have a couple small stools today.  She feels bloated and is uncomfortable.  She has been eating okay.  No vomiting.    She did take some Dulcolax today.    Patient asked about restarting on Linzess, she discontinued this recently and has been struggling with her bowels.  She did have diarrhea with going on the higher dose but we discussed possibly going to every other day or every 3 days.    Plan CT scan

## 2024-02-05 NOTE — TELEPHONE ENCOUNTER
Spoke to patient -    She is concerned that she has to drink barium again because her symptoms started right after she drank barium last time.    Please advise

## 2024-02-05 NOTE — TELEPHONE ENCOUNTER
Dr. Miller    Patient did 2000 mL of the PEG prep that you sent.  She had a lot of liquid watery diarrhea that wasn't stool.  It was mushy when she wiped, it wasn't a piece of stool.  Since then she has only passed gas-no further bowel movements.    She has not yet had a regular bowl movement.  She is concerned about having blockage.  She has been eating \"roughage\" and taking colace.    She has reflux, but no vomiting.  Denies any abdominal pain but is bloated.    I advised since colace not working can try a daily dose of Miralax as it sounds like she is taking colace but not regularly.  She seemed to get frustrated with advise stating she discussed colace and mojgan colace when she saw you last.  She told me that she has some dulcolax at home and I told her that she could try a dose of that now.  She told me that she is scared to take anything and does not want a bowel blockage.  However, she told me that she would be willing to try whatever you tell her to do.    Reviewed if severe pain/signs of obstruction which were reviewed with her she needs to go to the Emergency room.    Please advise    Thank you

## 2024-02-07 ENCOUNTER — TELEPHONE (OUTPATIENT)
Facility: CLINIC | Age: 84
End: 2024-02-07

## 2024-02-07 NOTE — TELEPHONE ENCOUNTER
Canceled OV appt on 2024    Called and spoke to the patient, /name verified.    Your Appointments        2024 11:30 AM  Virtual Phone Visit with RAFI Jenkins  [facility] ([facility]) [address] [address]  [phone]   You have been scheduled for a Virtual Telephone visit with your provider.  Please be available at your phone so that your physician can contact you, and be prepared with any questions or concerns.  You may be billed a copay at a later time, depending upon your insurance.  As always, your health is our priority.

## 2024-02-07 NOTE — TELEPHONE ENCOUNTER
Called and spoke to the patient, /name verified    Your Appointments        2024 11:30 AM  Follow Up Visit with RAFI Jenkins  [facility] ([facility]) [address] [facility]   [facility] [address]  [phone]       Advised to come 10-15 mins early.

## 2024-02-08 ENCOUNTER — TELEPHONE (OUTPATIENT)
Facility: CLINIC | Age: 84
End: 2024-02-08

## 2024-02-08 NOTE — TELEPHONE ENCOUNTER
Dr. Miller    Patient saw you in office last month.  Requesting refill for linzess-per chart she was on 145mcg daily and was last prescribed by external provider.    Would you be willing to fill?  I don't see it mentioned in your OV note.    Thank you

## 2024-02-08 NOTE — TELEPHONE ENCOUNTER
She told us the linzess was not working and she was taking colace and Pericolace for her constipation

## 2024-02-08 NOTE — TELEPHONE ENCOUNTER
I spoke to Liza  Reviewed below message from Dr. Miller with her.  She told me that they had discussed trying linzess every 2-3 days instead of daily since taking it daily caused her to have a few good days followed by diarrhea which is why she it didn't work as it had \"violent results\"    She is going to try the colace and mojgan colace as discussed before and call back if no results after the weekend to see if he would be willing to let her take the linzess after that.    She told me that she took dulcolax the day before yesterday with good results but no bowel movement yesterday or today.    She will call back if colace/pericolace plan not working out after another week.

## 2024-02-14 ENCOUNTER — TELEPHONE (OUTPATIENT)
Facility: CLINIC | Age: 84
End: 2024-02-14

## 2024-02-14 NOTE — TELEPHONE ENCOUNTER
Pt is calling to inform [facility] that she has to put off getting the Speech Swallow test.  She states that her breathing has worsened and she can barely walk 5 feet without getting winded.  She saw Cardiologist today and she is being sent for other test.  As soon as she is cleared, she will reschedule test.      She states that Dr Miller had ordered a CT Abd / Pelv.  She was scheduled for tomorrow but she will be putting off this test as well.

## 2024-02-16 ENCOUNTER — TELEPHONE (OUTPATIENT)
Facility: CLINIC | Age: 84
End: 2024-02-16

## 2024-02-16 NOTE — TELEPHONE ENCOUNTER
1st Reminder letter was sent to patient mychart for orders pending:   XR VIDEO SWALLOW (CPT=74230) (Order #[id number]) on 11/17/23

## 2024-02-22 ENCOUNTER — LAB SERVICES (OUTPATIENT)
Dept: LAB | Age: 84
End: 2024-02-22

## 2024-02-22 ENCOUNTER — LAB ENCOUNTER (OUTPATIENT)
Dept: LAB | Age: 84
End: 2024-02-22
Attending: INTERNAL MEDICINE
Payer: MEDICARE

## 2024-02-22 ENCOUNTER — LAB REQUISITION (OUTPATIENT)
Dept: LAB | Age: 84
End: 2024-02-22

## 2024-02-22 DIAGNOSIS — E11.9 TYPE 2 DIABETES MELLITUS WITHOUT COMPLICATIONS (CMD): ICD-10-CM

## 2024-02-22 DIAGNOSIS — R35.0 FREQUENCY OF MICTURITION: ICD-10-CM

## 2024-02-22 DIAGNOSIS — R53.1 WEAKNESS: ICD-10-CM

## 2024-02-22 DIAGNOSIS — D50.8 OTHER IRON DEFICIENCY ANEMIAS: ICD-10-CM

## 2024-02-22 LAB
BILIRUB UR QL: NEGATIVE
CLARITY UR: CLEAR
GLUCOSE UR-MCNC: NORMAL MG/DL
HGB UR QL STRIP.AUTO: NEGATIVE
KETONES UR-MCNC: NEGATIVE MG/DL
LEUKOCYTE ESTERASE UR QL STRIP.AUTO: NEGATIVE
NITRITE UR QL STRIP.AUTO: NEGATIVE
PH UR: 6 [PH] (ref 5–8)
PROT UR-MCNC: NEGATIVE MG/DL
SP GR UR STRIP: 1.01 (ref 1–1.03)
UROBILINOGEN UR STRIP-ACNC: NORMAL

## 2024-02-22 PROCEDURE — 83540 ASSAY OF IRON: CPT | Performed by: CLINICAL MEDICAL LABORATORY

## 2024-02-22 PROCEDURE — 80053 COMPREHEN METABOLIC PANEL: CPT | Performed by: CLINICAL MEDICAL LABORATORY

## 2024-02-22 PROCEDURE — 83036 HEMOGLOBIN GLYCOSYLATED A1C: CPT | Performed by: CLINICAL MEDICAL LABORATORY

## 2024-02-22 PROCEDURE — 80061 LIPID PANEL: CPT | Performed by: CLINICAL MEDICAL LABORATORY

## 2024-02-22 PROCEDURE — 84443 ASSAY THYROID STIM HORMONE: CPT | Performed by: CLINICAL MEDICAL LABORATORY

## 2024-02-22 PROCEDURE — 85025 COMPLETE CBC W/AUTO DIFF WBC: CPT | Performed by: CLINICAL MEDICAL LABORATORY

## 2024-02-22 PROCEDURE — 83550 IRON BINDING TEST: CPT | Performed by: CLINICAL MEDICAL LABORATORY

## 2024-02-23 LAB
ALBUMIN SERPL-MCNC: 2.8 G/DL (ref 3.6–5.1)
ALBUMIN/GLOB SERPL: 0.8 {RATIO} (ref 1–2.4)
ALP SERPL-CCNC: 85 UNITS/L (ref 45–117)
ALT SERPL-CCNC: 23 UNITS/L
ANION GAP SERPL CALC-SCNC: 8 MMOL/L (ref 7–19)
AST SERPL-CCNC: 14 UNITS/L
BASOPHILS # BLD: 0 K/MCL (ref 0–0.3)
BASOPHILS NFR BLD: 0 %
BILIRUB SERPL-MCNC: 0.4 MG/DL (ref 0.2–1)
BUN SERPL-MCNC: 17 MG/DL (ref 6–20)
BUN/CREAT SERPL: 43 (ref 7–25)
CALCIUM SERPL-MCNC: 8.8 MG/DL (ref 8.4–10.2)
CHLORIDE SERPL-SCNC: 96 MMOL/L (ref 97–110)
CHOLEST SERPL-MCNC: 196 MG/DL
CHOLEST/HDLC SERPL: 2.5 {RATIO}
CO2 SERPL-SCNC: 36 MMOL/L (ref 21–32)
CREAT SERPL-MCNC: 0.4 MG/DL (ref 0.51–0.95)
DEPRECATED RDW RBC: 52.1 FL (ref 39–50)
EGFRCR SERPLBLD CKD-EPI 2021: >90 ML/MIN/{1.73_M2}
EOSINOPHIL # BLD: 0.2 K/MCL (ref 0–0.5)
EOSINOPHIL NFR BLD: 2 %
ERYTHROCYTE [DISTWIDTH] IN BLOOD: 14.3 % (ref 11–15)
FASTING DURATION TIME PATIENT: ABNORMAL H
GLOBULIN SER-MCNC: 3.6 G/DL (ref 2–4)
GLUCOSE SERPL-MCNC: 101 MG/DL (ref 70–99)
HBA1C MFR BLD: 5.5 % (ref 4.5–5.6)
HCT VFR BLD CALC: 43.3 % (ref 36–46.5)
HDLC SERPL-MCNC: 80 MG/DL
HGB BLD-MCNC: 13.8 G/DL (ref 12–15.5)
IMM GRANULOCYTES # BLD AUTO: 0 K/MCL (ref 0–0.2)
IMM GRANULOCYTES # BLD: 0 %
IRON SATN MFR SERPL: 27 % (ref 15–45)
IRON SERPL-MCNC: 73 MCG/DL (ref 50–170)
LDLC SERPL CALC-MCNC: 102 MG/DL
LYMPHOCYTES # BLD: 1.5 K/MCL (ref 1–4)
LYMPHOCYTES NFR BLD: 13 %
MCH RBC QN AUTO: 31.7 PG (ref 26–34)
MCHC RBC AUTO-ENTMCNC: 31.9 G/DL (ref 32–36.5)
MCV RBC AUTO: 99.5 FL (ref 78–100)
MONOCYTES # BLD: 0.7 K/MCL (ref 0.3–0.9)
MONOCYTES NFR BLD: 6 %
NEUTROPHILS # BLD: 9.2 K/MCL (ref 1.8–7.7)
NEUTROPHILS NFR BLD: 79 %
NONHDLC SERPL-MCNC: 116 MG/DL
NRBC BLD MANUAL-RTO: 0 /100 WBC
PLATELET # BLD AUTO: 268 K/MCL (ref 140–450)
POTASSIUM SERPL-SCNC: 4 MMOL/L (ref 3.4–5.1)
PROT SERPL-MCNC: 6.4 G/DL (ref 6.4–8.2)
RBC # BLD: 4.35 MIL/MCL (ref 4–5.2)
SODIUM SERPL-SCNC: 136 MMOL/L (ref 135–145)
TIBC SERPL-MCNC: 267 MCG/DL (ref 250–450)
TRIGL SERPL-MCNC: 71 MG/DL
TSH SERPL-ACNC: 1.87 MCUNITS/ML (ref 0.35–5)
WBC # BLD: 11.7 K/MCL (ref 4.2–11)

## 2024-03-05 PROBLEM — M17.11 PRIMARY OSTEOARTHRITIS OF RIGHT KNEE: Status: ACTIVE | Noted: 2024-03-05

## 2024-03-05 PROBLEM — K21.9 GASTROESOPHAGEAL REFLUX DISEASE: Status: ACTIVE | Noted: 2024-03-05

## 2024-03-05 PROBLEM — R05.3 CHRONIC COUGH: Status: ACTIVE | Noted: 2024-03-05

## 2024-03-05 PROBLEM — J44.9 CHRONIC OBSTRUCTIVE PULMONARY DISEASE  (CMD): Status: ACTIVE | Noted: 2024-03-05

## 2024-03-05 PROBLEM — R06.02 SHORTNESS OF BREATH: Status: ACTIVE | Noted: 2024-03-05

## 2024-03-08 ENCOUNTER — APPOINTMENT (OUTPATIENT)
Dept: CT IMAGING | Facility: HOSPITAL | Age: 84
End: 2024-03-08
Attending: EMERGENCY MEDICINE
Payer: MEDICARE

## 2024-03-08 ENCOUNTER — HOSPITAL ENCOUNTER (EMERGENCY)
Facility: HOSPITAL | Age: 84
Discharge: HOME | End: 2024-03-08
Attending: EMERGENCY MEDICINE
Payer: MEDICARE

## 2024-03-08 VITALS
HEART RATE: 70 BPM | BODY MASS INDEX: 26.66 KG/M2 | RESPIRATION RATE: 14 BRPM | WEIGHT: 160 LBS | SYSTOLIC BLOOD PRESSURE: 113 MMHG | HEIGHT: 65 IN | TEMPERATURE: 97 F | OXYGEN SATURATION: 97 % | DIASTOLIC BLOOD PRESSURE: 56 MMHG

## 2024-03-08 DIAGNOSIS — S01.01XA LACERATION OF SCALP, INITIAL ENCOUNTER: Primary | ICD-10-CM

## 2024-03-08 PROCEDURE — 70450 CT HEAD/BRAIN W/O DYE: CPT | Performed by: EMERGENCY MEDICINE

## 2024-03-08 PROCEDURE — 90471 IMMUNIZATION ADMIN: CPT

## 2024-03-08 PROCEDURE — 12002 RPR S/N/AX/GEN/TRNK2.6-7.5CM: CPT

## 2024-03-08 PROCEDURE — 99284 EMERGENCY DEPT VISIT MOD MDM: CPT

## 2024-03-08 RX ORDER — LIDOCAINE HCL/EPINEPHRINE/PF 2%-1:200K
20 VIAL (ML) INJECTION ONCE
Status: COMPLETED | OUTPATIENT
Start: 2024-03-08 | End: 2024-03-08

## 2024-03-08 NOTE — DISCHARGE INSTRUCTIONS
You should keep your laceration clean and dry for 48 hours and then washing it twice a day with a damp soapy towel until you have the sutures removed. Your staples need to come out in 7 days. You should arrange to have this done your primary care doctor's office or can go to urgent care to have them out   Please come back right away for severe pain, changes speech or vision, numbness, tingling, weakness redness warmth swelling or discharge around the wound or any other concerns

## 2024-03-08 NOTE — ED INITIAL ASSESSMENT (HPI)
Patient arrived to ED from home via EMS for mechanica fall. Patient reports walking backwards, talking to son, tripped on a hose and fell backward. Patient reports hitting back of head on metal bracket, lac noted on posterior of head. Patient denies LOC or thinners, on 162mg (x2 81mg) asa/day. Patient arrives on 2lpm oxygen, normally on o2 at home.     Patient is A/O x 4    Patient changed into gown. Side rails up x2, call light within reach, blanket provided.

## 2024-03-08 NOTE — ED PROVIDER NOTES
Patient Seen in: [facility] Emergency Department    History     Chief Complaint   Patient presents with    Fall       HPI    83-year-old female who slipped at her garden backward and struck a metal pole at the occiput of her head and called EMS immediately.  She denies any vision changes or diplopia or any focal weakness or numbness or paresthesias.  No anticoagulation.  Takes aspirin    History reviewed.   Past Medical History:   Diagnosis Date    A-fib ([facility])     Anesthesia complication     Arrhythmia     AFIB    Arthritis     Asthma (HCC)     Back problem     COPD (chronic obstructive pulmonary disease) ([facility])     Deviated nasal septum 2009    nasal septoplasty, turb reduction, smr of turbs    Difficult intubation     fiber optic if needed    Diverticulitis     colonoscopy     Diverticulosis of large intestine     Esophageal reflux     Extrinsic asthma, unspecified     Headache     Heart disease     Hepatitis     WAS TOLD SHE HAS HAD THIS WHEN SHE WAS 19 YEARS OLD    High blood pressure     High cholesterol     Irregular heart rate     Lichenoid keratosis 07/22/2019    left chest-bx    Osteoarthritis     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    Paronychia 2011    (RT); onychomycosis; debridement    Problems with swallowing     occasionally    Shortness of breath     2 L NC ALL THE TIME 24HR/7 DAYS PER WEEK    Unspecified essential hypertension     Visual impairment        History reviewed.   Past Surgical History:   Procedure Laterality Date    ADENOIDECTOMY      CATARACT      cataract extraction     HYSTERECTOMY      SINUS SURGERY        DEVIATED SEPTUM    T&A      TONSILLECTOMY           Medications :  (Not in a hospital admission)       Family History   Problem Relation Age of Onset    Ovarian Cancer Mother 76        endometrial, poss ovarian primary    Pulmonary Disease Sister         COPD    Skin cancer Other     Stroke Other     Other (Other) Other        Smoking Status:   Social History      Socioeconomic History    Marital status:    Tobacco Use    Smoking status: Former     Types: Cigarettes     Quit date: 1996     Years since quittin.2    Smokeless tobacco: Never   Vaping Use    Vaping Use: Never used   Substance and Sexual Activity    Alcohol use: Yes     Alcohol/week: 0.0 standard drinks of alcohol     Comment: one drink once a week    Drug use: Never   Other Topics Concern    Pt has a pacemaker No    Pt has a defibrillator No    Reaction to local anesthetic No    Caffeine Concern No       Constitutional and vital signs reviewed.      Social History and Family History elements reviewed from today, pertinent positives to the presenting problem noted.    Physical Exam     ED Triage Vitals [24 1212]   /52   Pulse 85   Resp 17   Temp 97.4 °F (36.3 °C)   Temp src Temporal   SpO2 (!) 5 %   O2 Device Nasal cannula       All measures to prevent infection transmission during my interaction with the patient were taken. The patient was already wearing a droplet mask on my arrival to the room. Personal protective equipment was worn throughout the duration of the exam.  Handwashing was performed prior to and after the exam.  Stethoscope and any equipment used during my examination was cleaned with super sani-cloth germicidal wipes following the exam.     Physical Exam    General: No acute distress  Head: 3 cm superficial linear occipital laceration.  Mouth/Throat/Ears/Nose: Oropharynx is clear and moist.  Chronic nasal cannula  Eyes: Conjunctivae and EOM are normal.   Neck: Normal range of motion. Supple.  No midline cervical tenderness  Cardiovascular: Normal rate, regular rhythm, normal heart sounds.  Respiratory/Chest: No tachypnea.. Exhibits no tenderness.  Gastrointestinal: Soft, non-tender, non-distended. Bowel sounds are normal.   Musculoskeletal:No swelling or deformity.   Neurological: Alert and appropriate. No focal deficits. AOx4  CN II-XII grossly intact    strength: Left  strength, deltoid abduction, achilles, patella  5/5 strength: Right  strength, deltoid abduction, achilles, patella   SILT to bilateral upper and lower extremities   normal gait .     Skin: Skin is warm and dry. No pallor.  Psychiatric: Has a normal mood and affect.      ED Course      Labs Reviewed - No data to display    As Interpreted by me    Imaging Results Available and Reviewed while in ED: CT BRAIN OR HEAD (01400)    Result Date: 3/8/2024  CONCLUSION:  1. No acute intracranial process by noncontrast CT technique. 2. Soft tissue injury overlying the posterior vertex. 3. Intracranial atherosclerosis. 4. Nonspecific partially empty sella turcica. 5. Morphologic changes of chronic left sphenoid sinusitis. 6. Partially imaged numerous bilateral parotid sialoliths. 7. Lesser incidental findings as above.   Dictated by (CST): Ellis Garza MD on 3/08/2024 at 1:24 PM     Finalized by (CST): Ellis Garza MD on 3/08/2024 at 1:27 PM         ED Medications Administered:   Medications   Tetanus-Diphth-Acell Pertussis (Tdap) (Boostrix) injection 0.5 mL (0.5 mL Intramuscular Given 3/8/24 1233)   lidocaine 2%-EPINEPHrine 1:200,000 (Xylocaine-Epinephrine) injection (20 mL Infiltration Given by Other 3/8/24 1317)         MDM     Vitals:    03/08/24 1212 03/08/24 1215   BP: 115/52 115/52   Pulse: 85 88   Resp: 17    Temp: 97.4 °F (36.3 °C)    TempSrc: Temporal    SpO2: (!) 5% 98%   Weight: 72.6 kg    Height: 165.1 cm (5' 5\")      *I personally reviewed and interpreted all ED vitals.    Pulse Ox: 98%, Room air, Normal       Medical Decision Making      Differential Diagnosis/ Diagnostic Considerations: Intracranial hemorrhage, scalp laceration    Complicating Factors: The patient already has does not have any pertinent problems on file. to contribute to the complexity of this ED evaluation.    I reviewed prior chart records including office visit note from March 5, 2024.  Patient here after  mechanical fall, laceration repaired as below.  Tetanus updated.  CT head unremarkable for intracranial hemorrhage on my interpretation.    Discharged in stable condition.  Patient is comfortable with the plan.    Laceration repair Procedure note   Indication: Scalp laceration  Consent: obtained after discussion of risks, benefits, and alternatives  Preparation: extensive pressurized irrigation >500ml   Anesthesia: Lidocaine 2% with epinephrine, 3 mL  Procedure: Staples X5 with appropriate approximation  Complications: None      Disposition and Plan     Clinical Impression:  1. Laceration of scalp, initial encounter        Disposition:  Discharge    Follow-up:  Brenda Wilkerson MD  [address]  [address] [address]  [phone]    Schedule an appointment as soon as possible for a visit in 1 week(s)  For suture removal      Medications Prescribed:  Current Discharge Medication List

## 2024-03-08 NOTE — ED QUICK NOTES
MD cleared patient for discharge. Patient provided with discharge paperwork and RN discussed plan of care. Patient given opportunity to ask any questions. Patient was in no apparent distress. Patient instructed to follow up with PCP. Patient pushed out of ED in wheelchair by family.

## 2024-03-12 ENCOUNTER — TELEPHONE (OUTPATIENT)
Dept: OBGYN | Age: 84
End: 2024-03-12

## 2024-03-21 ENCOUNTER — TELEPHONE (OUTPATIENT)
Dept: GASTROENTEROLOGY | Facility: CLINIC | Age: 84
End: 2024-03-21

## 2024-03-21 NOTE — TELEPHONE ENCOUNTER
1st reminder of overdue imaging sent    CT ABDOMEN+PELVIS W/ ORAL AND IV CONTRAST (CPT=74177) (Order #[id number]) on 2/5/24

## 2024-03-26 ENCOUNTER — HOSPITAL ENCOUNTER (OUTPATIENT)
Dept: MAMMOGRAPHY | Age: 84
Discharge: HOME | End: 2024-03-26
Attending: OBSTETRICS & GYNECOLOGY
Payer: MEDICARE

## 2024-03-26 DIAGNOSIS — Z12.31 ENCOUNTER FOR SCREENING MAMMOGRAM FOR MALIGNANT NEOPLASM OF BREAST: ICD-10-CM

## 2024-03-26 PROCEDURE — 77067 SCR MAMMO BI INCL CAD: CPT | Performed by: OBSTETRICS & GYNECOLOGY

## 2024-03-26 PROCEDURE — 77063 BREAST TOMOSYNTHESIS BI: CPT | Performed by: OBSTETRICS & GYNECOLOGY

## 2024-04-05 ENCOUNTER — TELEPHONE (OUTPATIENT)
Facility: CLINIC | Age: 84
End: 2024-04-05

## 2024-04-05 DIAGNOSIS — K62.5 BRIGHT RED BLOOD PER RECTUM: ICD-10-CM

## 2024-04-05 DIAGNOSIS — R19.7 DIARRHEA, UNSPECIFIED TYPE: Primary | ICD-10-CM

## 2024-04-05 NOTE — TELEPHONE ENCOUNTER
Pt is scheduled for a CLN on 4/12/24.  Pt was started a steroid and Antibiotic on 4/3/24 to 4/11/24, due to Lung infection.  Pt is wanting to know if the CLN has to be reschedule or if she can proceed with 4/12/24 CLN appointment.  Please call

## 2024-04-05 NOTE — TELEPHONE ENCOUNTER
Dr. Miller    Patient is on steroid and antibiotic until 4/11/24 for a Lung infection and is scheduled for a colonoscopy with you on 4/12/24.  May she proceed as scheduled or should she reschedule to a later date?    Thank you

## 2024-04-08 NOTE — TELEPHONE ENCOUNTER
Patient contacted.  Aware per Dr. Miller needs to reschedule due to being on steroid and antibiotic right now.  Agreeable with plan.  Aware  will be contacting her to set up a new date for procedure.    I removed from appointment desk and entered surgical case change request to cancel 4/12/24 procedure.    Schedulers:  Please reach out to patient to reschedule colonoscopy - orders in 10/9/2023 encounter under Dr. Miller.  We have the pulmonary clearance, but please route back once scheduled so we can get ok from cardiology too.    Thank you

## 2024-04-16 NOTE — TELEPHONE ENCOUNTER
Spoke to patient she is requesting a call back around 1 pm. I will give the patient a call back this afternoon.

## 2024-04-17 ENCOUNTER — TELEPHONE (OUTPATIENT)
Facility: CLINIC | Age: 84
End: 2024-04-17

## 2024-04-17 DIAGNOSIS — K58.1 IRRITABLE BOWEL SYNDROME WITH CONSTIPATION: ICD-10-CM

## 2024-04-17 RX ORDER — DICYCLOMINE HYDROCHLORIDE 10 MG/1
10 CAPSULE ORAL 3 TIMES DAILY PRN
Qty: 40 CAPSULE | Refills: 3 | Status: SHIPPED | OUTPATIENT
Start: 2024-04-17

## 2024-04-17 NOTE — TELEPHONE ENCOUNTER
Dr. Miller    I spoke to Yesenia  She was in ER for lower abdominal pain back in August.  She was sent home on dicyclomine that she only takes if she really needs it.  When she takes it the pain goes away, but she is almost out.    We are rescheduling her procedure (she had to reschedule due to being on steroids for her breathing at the time it was scheduled before)    Would you be willing to refill her dicyclomine since last script came from ER since it helps her?    Thank you

## 2024-04-17 NOTE — TELEPHONE ENCOUNTER
Patient states she is still experiencing symptoms since being at the [facility] ER.  Patient states she was prescribed Dicyclomine 20 mg and is almost out - should she continue taking and if so requesting refills.  Please call.  Thank you.

## 2024-04-23 NOTE — TELEPHONE ENCOUNTER
Called patient - states she cannot schedule at this time and wants to speak around 1 pm to discuss options.

## 2024-04-23 NOTE — TELEPHONE ENCOUNTER
Called patient - rescheduled her colonoscopy with Dr. Miller for 11/7/2024 at [facility] and also added to the wait list.    Please refer to [facility] dated 10/9/2023 for scheduling orders.    [facility]AL[facility] closed.

## 2024-05-08 ENCOUNTER — APPOINTMENT (OUTPATIENT)
Dept: PHYSICAL THERAPY | Age: 84
End: 2024-05-08
Attending: INTERNAL MEDICINE
Payer: MEDICARE

## 2024-05-08 ENCOUNTER — TELEPHONE (OUTPATIENT)
Dept: PHYSICAL THERAPY | Facility: HOSPITAL | Age: 84
End: 2024-05-08

## 2024-05-15 ENCOUNTER — TELEPHONE (OUTPATIENT)
Dept: PHYSICAL THERAPY | Age: 84
End: 2024-05-15

## 2024-05-15 ENCOUNTER — APPOINTMENT (OUTPATIENT)
Dept: PHYSICAL THERAPY | Age: 84
End: 2024-05-15
Attending: INTERNAL MEDICINE
Payer: MEDICARE

## 2024-05-17 ENCOUNTER — APPOINTMENT (OUTPATIENT)
Dept: PHYSICAL THERAPY | Age: 84
End: 2024-05-17
Attending: INTERNAL MEDICINE
Payer: MEDICARE

## 2024-05-17 ENCOUNTER — TELEPHONE (OUTPATIENT)
Dept: PHYSICAL THERAPY | Facility: HOSPITAL | Age: 84
End: 2024-05-17

## 2024-05-29 ENCOUNTER — APPOINTMENT (OUTPATIENT)
Dept: PHYSICAL THERAPY | Age: 84
End: 2024-05-29
Attending: INTERNAL MEDICINE
Payer: MEDICARE

## 2024-06-05 ENCOUNTER — APPOINTMENT (OUTPATIENT)
Dept: PHYSICAL THERAPY | Age: 84
End: 2024-06-05
Attending: INTERNAL MEDICINE
Payer: MEDICARE

## 2024-06-05 ENCOUNTER — TELEPHONE (OUTPATIENT)
Dept: PHYSICAL THERAPY | Facility: HOSPITAL | Age: 84
End: 2024-06-05

## 2024-06-06 PROBLEM — R10.10 PAIN OF UPPER ABDOMEN: Status: ACTIVE | Noted: 2020-02-03

## 2024-06-06 PROBLEM — I10 ESSENTIAL HYPERTENSION: Status: ACTIVE | Noted: 2019-08-13

## 2024-06-06 PROBLEM — R00.2 PALPITATIONS: Status: ACTIVE | Noted: 2020-02-03

## 2024-06-06 PROBLEM — I48.0 PAROXYSMAL ATRIAL FIBRILLATION  (CMD): Status: ACTIVE | Noted: 2019-08-13

## 2024-06-06 PROBLEM — Z98.49 CATARACT EXTRACTION STATUS, UNSPECIFIED EYE: Status: ACTIVE | Noted: 2021-08-18

## 2024-06-06 PROBLEM — I49.3 PVC'S (PREMATURE VENTRICULAR CONTRACTIONS): Status: ACTIVE | Noted: 2019-08-13

## 2024-06-10 PROBLEM — J98.6 DIAPHRAGMATIC PARALYSIS: Status: ACTIVE | Noted: 2024-06-10

## 2024-06-10 PROBLEM — J45.909 BRONCHIAL ASTHMA (CMD): Status: ACTIVE | Noted: 2024-06-10

## 2024-06-10 PROBLEM — M41.20 IDIOPATHIC SCOLIOSIS AND KYPHOSCOLIOSIS: Status: ACTIVE | Noted: 2024-06-10

## 2024-06-10 PROBLEM — K21.9 GASTROESOPHAGEAL REFLUX DISEASE: Status: ACTIVE | Noted: 2024-06-10

## 2024-06-10 PROBLEM — J44.1 COPD WITH EXACERBATION  (CMD): Status: ACTIVE | Noted: 2024-03-05

## 2024-06-12 ENCOUNTER — APPOINTMENT (OUTPATIENT)
Dept: PHYSICAL THERAPY | Age: 84
End: 2024-06-12
Attending: INTERNAL MEDICINE
Payer: MEDICARE

## 2024-06-19 ENCOUNTER — APPOINTMENT (OUTPATIENT)
Dept: PHYSICAL THERAPY | Age: 84
End: 2024-06-19
Attending: INTERNAL MEDICINE
Payer: MEDICARE

## 2024-06-26 ENCOUNTER — APPOINTMENT (OUTPATIENT)
Dept: PHYSICAL THERAPY | Age: 84
End: 2024-06-26
Attending: INTERNAL MEDICINE
Payer: MEDICARE

## 2024-06-26 ENCOUNTER — TELEPHONE (OUTPATIENT)
Dept: PHYSICAL THERAPY | Age: 84
End: 2024-06-26

## 2024-07-01 PROBLEM — M41.20 IDIOPATHIC SCOLIOSIS AND KYPHOSCOLIOSIS: Status: ACTIVE | Noted: 2024-07-01

## 2024-07-01 PROBLEM — Z99.81 OXYGEN DEPENDENT: Status: ACTIVE | Noted: 2024-07-01

## 2024-07-03 ENCOUNTER — APPOINTMENT (OUTPATIENT)
Dept: PHYSICAL THERAPY | Age: 84
End: 2024-07-03
Attending: INTERNAL MEDICINE
Payer: MEDICARE

## 2024-07-10 ENCOUNTER — APPOINTMENT (OUTPATIENT)
Dept: PHYSICAL THERAPY | Age: 84
End: 2024-07-10
Attending: INTERNAL MEDICINE
Payer: MEDICARE

## 2024-07-12 RX ORDER — ESTRADIOL 0.05 MG/D
PATCH TRANSDERMAL
Qty: 12 EACH | Refills: 0 | Status: SHIPPED | OUTPATIENT
Start: 2024-07-12

## 2024-07-23 ENCOUNTER — APPOINTMENT (OUTPATIENT)
Dept: OBGYN | Age: 84
End: 2024-07-23

## 2024-07-23 VITALS
HEART RATE: 90 BPM | HEIGHT: 65 IN | OXYGEN SATURATION: 97 % | SYSTOLIC BLOOD PRESSURE: 95 MMHG | BODY MASS INDEX: 27.29 KG/M2 | TEMPERATURE: 98 F | DIASTOLIC BLOOD PRESSURE: 59 MMHG

## 2024-07-23 DIAGNOSIS — Z12.31 ENCOUNTER FOR SCREENING MAMMOGRAM FOR MALIGNANT NEOPLASM OF BREAST: ICD-10-CM

## 2024-07-23 DIAGNOSIS — M85.80 OSTEOPENIA, UNSPECIFIED LOCATION: ICD-10-CM

## 2024-07-23 DIAGNOSIS — Z01.419 ENCOUNTER FOR ROUTINE GYNECOLOGIC EXAMINATION IN MEDICARE PATIENT: Primary | ICD-10-CM

## 2024-08-05 ENCOUNTER — LAB REQUISITION (OUTPATIENT)
Dept: LAB | Age: 84
End: 2024-08-05

## 2024-08-05 DIAGNOSIS — R35.0 FREQUENCY OF MICTURITION: ICD-10-CM

## 2024-08-07 ENCOUNTER — APPOINTMENT (OUTPATIENT)
Dept: LAB | Age: 84
End: 2024-08-07

## 2024-08-07 PROCEDURE — 81001 URINALYSIS AUTO W/SCOPE: CPT | Performed by: CLINICAL MEDICAL LABORATORY

## 2024-08-08 LAB
APPEARANCE UR: CLEAR
BACTERIA #/AREA URNS HPF: NORMAL /HPF
BILIRUB UR QL STRIP: NEGATIVE
COLOR UR: YELLOW
GLUCOSE UR STRIP-MCNC: NEGATIVE MG/DL
HGB UR QL STRIP: NEGATIVE
HYALINE CASTS #/AREA URNS LPF: NORMAL /LPF
KETONES UR STRIP-MCNC: NEGATIVE MG/DL
LEUKOCYTE ESTERASE UR QL STRIP: NEGATIVE
NITRITE UR QL STRIP: NEGATIVE
PH UR STRIP: 6 [PH] (ref 5–7)
PROT UR STRIP-MCNC: NEGATIVE MG/DL
RBC #/AREA URNS HPF: NORMAL /HPF
SP GR UR STRIP: 1.02 (ref 1–1.03)
SQUAMOUS #/AREA URNS HPF: NORMAL /HPF
UROBILINOGEN UR STRIP-MCNC: 0.2 MG/DL
WBC #/AREA URNS HPF: NORMAL /HPF

## 2024-08-15 ENCOUNTER — OFFICE VISIT (OUTPATIENT)
Dept: OTOLARYNGOLOGY | Facility: CLINIC | Age: 84
End: 2024-08-15

## 2024-08-15 DIAGNOSIS — J34.89 NASAL CRUSTING: Primary | ICD-10-CM

## 2024-08-15 PROCEDURE — 99213 OFFICE O/P EST LOW 20 MIN: CPT | Performed by: OTOLARYNGOLOGY

## 2024-08-15 RX ORDER — DOXYCYCLINE HYCLATE 100 MG/1
100 CAPSULE ORAL EVERY 12 HOURS
COMMUNITY
Start: 2024-06-10

## 2024-08-15 NOTE — PROGRESS NOTES
Yesenia Berkowitz is a 84 year old female.    Chief Complaint   Patient presents with    Nose Problem     Patient is here due to nasal crusting follow up.        HISTORY OF PRESENT ILLNESS    She presents with a history Left-sided pulmonary issues. She is nonworking left hemidiaphragm. She now has become O2 dependent and since starting the use of nasal cannula oxygen she's developed some crusting and bleeding from the left side. History of previous septoplasty back in 2009. No other signs or symptoms.   2/16/17 She presents today with persistent nasal crusting on the left with concerns about possible malignancy. She is having some discomfort at this time. Last visit I noticed some crusting to nasal mucosa due to a slight cartilage deviation of her septum and drawing of this mucosa with her oxygen use. She has tried to trim her nasal cannula and has even purchased a softer one with no real help.   3/1/18 previous septoplasty many years ago.  Chronic oxygen use via nasal cannula.  Last visit noted to have a slight caudal edge deviation to the left with the oxygen and the cannula itself causing irritation to mucosa causing scabbing.  She has been using mupirocin on a as needed basis which seems to help significantly with her signs and symptoms.  She wishes to have a refill.   3/7/19 still having crusting on the left.  Worried about malignancy.  Using home O2 24 hours a day with nasal cannula.  Trying to use gels and mupirocin to maintain the area moist.     10/8/19 she has been doing well with a stable crust intranasally on the left.  Previous septoplasty many years ago but still has a deviation of her septum to the left.  I have suspected that her nasal cannula is causing the crusting from rubbing on the left side.  Also complains of some postnasal drip currently on an antihistamine and Singulair daily.  Cannot really use sprays due to her crusting issues.  Still having significant postnasal drip which is affecting her  voice.  Previous history of left-sided true vocal cord paralysis     3/10/20 Long history of nasal crusting primarily on the left side.  Previous septoplasty back in 2009.  She is now oxygen dependent with significant COPD/emphysema.  Nasal cannula seems to touch an area of the left nasal septal mucosa causing her to scab.  Mupirocin seems to help with limiting scab production.  No bleeding or other issues.  She has not been interested in having a mild revision procedure being performed     4/15/21 she been using mupirocin consistently and this seems to help with crusting where she has a cartilage deflection to the left.  Uses nasal O2 24 hours a day.  Has tried to cut the cannula on the left to avoid irritation of the skin of the septum.  No new signs, symptoms or complaints.  Worried that she may develop a carcinoma     7/29/21 Long history of nasal crusting on the left with a septal deviation of the caudal edge of her septum to the left nasal vestibule.  Uses nasal cannula O2 24 hours a day.  She has used mupirocin as well as other ointments and emollients but still continues to get crusting on occasion.  Very concerned about possibly having a malignancy form as she has had a skin cancer on the external aspect of her nose in the past.  No new signs, symptoms or complaints.     4/14/22 nasal crusting on the left for quite some time with a slight septal spur noted inferiorly.  This at the caudal edge of her septum of the left nasal vestibule.  Wears nasal cannula O2 which worsens symptoms.  No other signs, symptoms complaints with occasional blood and significant crusting at times.  Using ointments like mupirocin and Vaseline at least twice a day.      9/23/22 on home O2 for COPD.  No bleeding no pain intranasally but very worried about malignancy.  Previous septoplasty many years ago persistent caudal deflection to the left.  Gets crusting uses mupirocin.  Currently on Singulair loratadine for allergies and  postnasal discharge.  Very dry mouth all the time.  No other signs, symptoms or complaints     23 here for routine examination of her nasal septal mucosa.  No significant bleeding just crusting that she has removed very soft.  She is very worried about possible malignancy and wishes to have her nose examined once again.     23 changes in her symptoms still having crusting on the left.  History of deviated septum to the left.  Previous septal surgery in the past.  Persistent caudal deflection here for routine evaluation as she has tenderness about malignancy     8/15/24 she presents today for reevaluation of her left nasal vestibule due to chronic crusting.  Wants to make sure that no malignancy is present.  Very concerned about potential progression into cancer of her nasal vestibule chronic processing from use of O2 intranasally.  Has not been a candidate for any type of surgical management of this crusting issue due to her underlying pulmonary issues.  Uses O2 24 hours a day.  She has been having occasional nosebleeds which are easily managed with some Kleenex in her nose.              Social History     Socioeconomic History    Marital status:    Tobacco Use    Smoking status: Former     Current packs/day: 0.00     Types: Cigarettes     Quit date: 1996     Years since quittin.6    Smokeless tobacco: Never   Vaping Use    Vaping status: Never Used   Substance and Sexual Activity    Alcohol use: Yes     Alcohol/week: 0.0 standard drinks of alcohol     Comment: one drink once a week    Drug use: Never   Other Topics Concern    Pt has a pacemaker No    Pt has a defibrillator No    Reaction to local anesthetic No    Caffeine Concern No       Family History   Problem Relation Age of Onset    Ovarian Cancer Mother 76        endometrial, poss ovarian primary    Pulmonary Disease Sister         COPD    Skin cancer Other     Stroke Other     Other (Other) Other        Past Medical History:    A-fib  (HCC)    Anesthesia complication    Arrhythmia    AFIB    Arthritis    Asthma (HCC)    Back problem    COPD (chronic obstructive pulmonary disease) (HCC)    Deviated nasal septum    nasal septoplasty, turb reduction, smr of turbs    Difficult intubation    fiber optic if needed    Diverticulitis    colonoscopy     Diverticulosis of large intestine    Esophageal reflux    Extrinsic asthma, unspecified    Headache    Heart disease    Hepatitis    WAS TOLD SHE HAS HAD THIS WHEN SHE WAS 19 YEARS OLD    High blood pressure    High cholesterol    Irregular heart rate    Lichenoid keratosis    left chest-bx    Osteoarthritis    Paralysis ([facility])    left lung and left vocal cord.    Paronychia    (RT); onychomycosis; debridement    Problems with swallowing    occasionally    Shortness of breath    2 L NC ALL THE TIME 24HR/7 DAYS PER WEEK    Unspecified essential hypertension    Visual impairment       Past Surgical History:   Procedure Laterality Date    Adenoidectomy      Cataract      cataract extraction     Hysterectomy      Sinus surgery        DEVIATED SEPTUM    T&a      Tonsillectomy           REVIEW OF SYSTEMS    System Neg/Pos Details   Constitutional Negative Fatigue, fever and weight loss.   ENMT Negative Drooling.   Eyes Negative Blurred vision and vision changes.   Respiratory Negative Dyspnea and wheezing.   Cardio Negative Chest pain, irregular heartbeat/palpitations and syncope.   GI Negative Abdominal pain and diarrhea.   Endocrine Negative Cold intolerance and heat intolerance.   Neuro Negative Tremors.   Psych Negative Anxiety and depression.   Integumentary Negative Frequent skin infections, pigment change and rash.   Hema/Lymph Negative Easy bleeding and easy bruising.           PHYSICAL EXAM    There were no vitals taken for this visit.       Constitutional Normal Overall appearance - Normal.   Psychiatric Normal Orientation - Oriented to time, place, person & situation. Appropriate mood and affect.    Neck Exam Normal Inspection - Normal. Palpation - Normal. Parotid gland - Normal. Thyroid gland - Normal.   Eyes Normal Conjunctiva - Right: Normal, Left: Normal. Pupil - Right: Normal, Left: Normal. Fundus - Right: Normal, Left: Normal.   Neurological Normal Memory - Normal. Cranial nerves - Cranial nerves II through XII grossly intact.   Head/Face Normal Facial features - Normal. Eyebrows - Normal. Skull - Normal.        Nasopharynx Normal External nose - Normal. Lips/teeth/gums - Normal. Tonsils - Normal. Oropharynx - Normal.   Ears Normal Inspection - Right: Normal, Left: Normal. Canal - Right: Normal, Left: Normal. TM - Right: Normal, Left: Normal.   Skin Normal Inspection - Normal.        Lymph Detail Normal Submental. Submandibular. Anterior cervical. Posterior cervical. Supraclavicular.        Nose/Mouth/Throat Normal External nose - Normal. Lips/teeth/gums - Normal. Tonsils - Normal. Oropharynx - Normal.   Nose/Mouth/Throat Normal Nares - Right: Normal Left: Normal. Septum -slight deviation to the left with crusting anteriorly on the left turbinates - Right: Normal, Left: Normal.       Current Outpatient Medications:     doxycycline 100 MG Oral Cap, Take 1 capsule (100 mg total) by mouth Q12H., Disp: , Rfl:     dicyclomine 10 MG Oral Cap, Take 1 capsule (10 mg total) by mouth 3 (three) times daily as needed (abdominal pain)., Disp: 40 capsule, Rfl: 3    polyethylene glycol, PEG 3350-KCl-NaBcb-NaCl-NaSulf, 236 g Oral Recon Soln, Take 4,000 mL by mouth As Directed. Take 2,000 mL the night before your procedure and 2,000 mL the morning of your procedure. Take as directed by GI clinic. Okay to substitute for generic., Disp: 1 each, Rfl: 0    fluticasone propionate 50 MCG/ACT Nasal Suspension, 2 sprays by Nasal route daily., Disp: , Rfl:     guaiFENesin-codeine 100-10 MG/5ML Oral Solution, Take 5 mL by mouth every 6 (six) hours as needed., Disp: , Rfl:     mupirocin 2 % External Ointment, Apply 1 Application  topically 3 (three) times daily., Disp: 1 each, Rfl: 3    linaCLOtide 145 MCG Oral Cap, Take by mouth., Disp: , Rfl:     estradiol 0.05 MG/24HR Transdermal Patch Biweekly, APPLY 1 PATCH EVERY WEEK AS DIRECTED, Disp: , Rfl:     predniSONE 20 MG Oral Tab, TAKE 2 TABLETS BY MOUTH DAILY FOR 5 DAYS, THEN 1 TABLET DAILY FOR 5 FAYS, Disp: , Rfl:     furosemide 20 MG Oral Tab, , Disp: , Rfl:     mupirocin 2 % External Ointment, Apply 1 Application topically 3 (three) times daily., Disp: 1 each, Rfl: 1    MUPIROCIN 2 % External Ointment, Apply 1 Application topically 3 (three) times daily., Disp: 22 g, Rfl: 0    triamcinolone 0.1 % External Cream, Apply topically 2 (two) times daily. Apply bid as directed, Disp: 80 g, Rfl: 3    Continuous Blood Gluc Sensor (FREESTYLE REBECCA 14 DAY SENSOR) Does not apply Misc, Take 1 Bottle by mouth As Directed., Disp: , Rfl:     diclofenac 1 % External Gel, , Disp: , Rfl:     lidocaine 5 % External Patch, , Disp: , Rfl:     LINZESS 145 MCG Oral Cap, , Disp: , Rfl:     Sodium Fluoride 1.1 % Dental Gel, sodium fluoride 1.1 % dental paste  BRUSH 2 TIMES DAILY, Disp: , Rfl:     digoxin 0.25 MG Oral Tab, , Disp: , Rfl:     estradiol 0.05 MG/24HR Transdermal Patch Weekly, , Disp: , Rfl:     PULMICORT FLEXHALER 180 MCG/ACT Inhalation Aerosol Powder, Breath Activated, , Disp: , Rfl:     Blood Glucose Monitoring Suppl (CONTOUR NEXT EZ) w/Device Does not apply Kit, 1 kit by Does not apply route daily., Disp: 1 kit, Rfl: 0    dilTIAZem HCl ER Coated Beads 240 MG Oral Capsule SR 24 Hr, Take 1 capsule (240 mg total) by mouth daily. Continue taking, Disp: , Rfl: 0    Probiotic Product (VSL#3) Oral Cap, Take 1 capsule by mouth daily., Disp: , Rfl:     Calcium Carb-Cholecalciferol (CALCIUM + D3) 600-200 MG-UNIT Oral Tab, Take 1 tablet by mouth daily.  , Disp: , Rfl:     furosemide 40 MG Oral Tab, Take 0.5 tablets (20 mg total) by mouth daily., Disp: , Rfl:     PROAIR  (90 BASE) MCG/ACT Inhalation Aero  Soln, Inhale 2 puffs into the lungs as needed., Disp: , Rfl:     Cholecalciferol (VITAMIN D) 1000 UNITS Oral Tab, Take 1,000 Units by mouth nightly.  , Disp: , Rfl:     Potassium Chloride ER (K-DUR M20) 20 MEQ Oral Tab CR, Take 1 tablet (20 mEq total) by mouth nightly., Disp: , Rfl:     PATANOL 0.1 % Ophthalmic Solution, Place 1 drop into both eyes as needed for Allergies., Disp: , Rfl:     lansoprazole (PREVACID) 30 MG Oral Capsule Delayed Release, Take 1 capsule (30 mg total) by mouth nightly., Disp: , Rfl:     Amitriptyline HCl (ELAVIL) 100 MG Oral Tab, Take 1 tablet (100 mg total) by mouth nightly., Disp: , Rfl:     aspirin 81 MG Oral Tab, Take 2 tablets (162 mg total) by mouth daily., Disp: , Rfl:     budesonide 0.25 MG/2ML Inhalation Suspension, Inhale 2 mL (0.25 mg total) into the lungs nightly. 2 puffs nightly, Disp: , Rfl:     hydrocodone-acetaminophen 5-500 MG Oral Tab, Take by mouth. take 1 tablet by oral route  every 4 - 6 hours as needed for pain, Disp: , Rfl:     Lisinopril-Hydrochlorothiazide 10-12.5 MG Oral Tab, Take 1 tablet by mouth daily., Disp: , Rfl:     Loratadine 10 MG Oral Cap, Take 10 mg by mouth nightly.  , Disp: , Rfl:     montelukast 10 MG Oral Tab, Take 1 tablet (10 mg total) by mouth nightly., Disp: , Rfl:     Multiple Vitamin (MULTI-VITAMINS) Oral Tab, Take  by mouth. take 1 tablet by ORAL route  every day with food, Disp: , Rfl:     omega-3 fatty acids (FISH OIL) 1000 MG Oral Cap, Take 1,000 mg by mouth daily. FISH OIL (unknown strength), Disp: , Rfl:     Tiotropium Bromide Monohydrate 18 MCG Inhalation Cap, Inhale 1 capsule (18 mcg total) into the lungs nightly., Disp: , Rfl:     B Complex Oral Cap, Take by mouth daily. VITAMIN B COMPLEX (unknown strength) , Disp: , Rfl:   ASSESSMENT AND PLAN    1. Nasal crusting  Continue nasal crusting secondary to use of chronic oxygen for her underlying this.  We once again discussed the possibility of doing a very minimal septal repair  anteriorly to help with the crusting but she states that she would not tolerate either anesthesia or being off of her oxygen for an extended period time to do the procedure.  For now she will continue with the ointments and return to see me in 3 to 6 months.  I did reassure her that nothing looks malignant at this time.        This note was prepared using Dragon Medical voice recognition dictation software. As a result errors may occur. When identified these errors have been corrected. While every attempt is made to correct errors during dictation discrepancies may still exist    Loi Godinez MD    8/15/2024    9:08 AM

## 2024-08-27 ENCOUNTER — TELEPHONE (OUTPATIENT)
Facility: CLINIC | Age: 84
End: 2024-08-27

## 2024-08-27 DIAGNOSIS — R10.30 LOWER ABDOMINAL PAIN: Primary | ICD-10-CM

## 2024-08-27 NOTE — TELEPHONE ENCOUNTER
I spoke with Yesenia.  Patient states 1 week ago she started experiencing lower abdominal cramping/discomfort, bloating,nausea and smaller bowel movements.   Before that, she was having normal bowel movements without using Colace.  Last small bowel movement was yesterday.  Denies abdominal pain,vomiting,blood in stool or fever.  Stopped taking Dicyclomine 5 days ago.  Patient asking if she should restart her Colace.  Please advise. Thank you.

## 2024-08-27 NOTE — TELEPHONE ENCOUNTER
Patient called to apeak with a nurse in regards to her symptoms such as abdominal discomfit, nausea, cramping. Please call.

## 2024-08-27 NOTE — TELEPHONE ENCOUNTER
The patient was contacted, she reports has been doing well for the last 5 months or so.  Her bowel habits have regulated with dietary  changes only.  She has not been taking Colace or dicyclomine over this timeframe.    About 2 or 3 weeks ago she started develop bowel irregularity, crampy abdominal pain in the suprapubic region.  She has been having typically small formed bowel movements and occasionally can have 1-2 episodes of looser stool but no blood or mucus.  This has been ongoing for the last couple weeks, possibly longer.    She did take dicyclomine 2-3 times about a week ago and then developed some constipation.  She does not wish to restart on this.    Plan-patient restarted on Colace, advised that she take 1 tablet twice daily and monitor for stool output.  Hopefully this will help regulate her but she does have a history of colitis possibly ischemic colitis and advised that we repeat a CAT scan if she has ongoing symptoms or issues.  If she has severe symptoms then she should go to emergency room.  Patient agrees to this plan and all questions were answered.

## 2024-08-28 NOTE — TELEPHONE ENCOUNTER
Dr. Miller    I spoke to Yesenia.  Has not had a normal bowel movement in a week and a half.  She took the colace twice a day as directed.  She still is having the cramps especially in the suprapubic area as she discussed with you yesterday.  She had 2, 1 inch pieces of stool-has not had a good bowel movement.  Denies nausea and states her abdomen is soft.  She was calm on the phone and in no apparent distress.  Symptoms same as yesterday-wants to know if you want her to proceed with CT scan or office visit since symptoms continue.  She would rather wait for your return tomorrow for response.    She will continue colace twice a day and she is already pushing fluids.  I told her that trying a warm beverage in the morning may help stimulate a bowel movement as well.    Reviewed symptoms that would prompt ER visit.    Please advise    Thank you

## 2024-08-28 NOTE — TELEPHONE ENCOUNTER
Patient called to inform Dr. Miller that she continues to struggle with her symptoms, patient states that she continues to have cramping in her stomach. Patient wanted to give an update and will call back tomorrow with an update on her symptoms.

## 2024-08-29 NOTE — TELEPHONE ENCOUNTER
Patient states she is still experiencing abdominal discomfort with cramping in pubic area.  Patient has been taking Colace twice per day since this past Tuesday, 8/27/2024.  Patient's stools are small and thin.  Patient requesting to speak with RN regarding treatment plan.  Please call.  Thank you.

## 2024-08-29 NOTE — TELEPHONE ENCOUNTER
Dr. Miller    I spoke to Yesenia  She states she now has soreness in her upper abdomen from this.  She does not feel her symptoms are severe but she is uncomfortable.  I gave her the number to set up with CT scan with central scheduling she will call to set this up now.    She wants to know if you would recommend that she take a stimulant laxative along with the 2 times a day colace.  If so, is there one you recommend?    Thank you

## 2024-08-29 NOTE — TELEPHONE ENCOUNTER
Dr. Miller    Please see my message below - patient called for update on treatment plan since symptoms persist on the colace.    Thank you

## 2024-08-29 NOTE — TELEPHONE ENCOUNTER
Patient contacted, she still has uncomfortable which she describes as abdominal more discomfort than obtained.  She has moved her bowels slightly, she is taking the Colace.  I would like to hold off on any further medicine at this time until we get further testing.  She has a CAT scan scheduled for tomorrow morning.  I have advised that we get blood work up to and I set this up and she will go to the lab either before or after CT scan.  Patient knows to go to the emergency room if her symptoms become severe.

## 2024-08-30 ENCOUNTER — LAB ENCOUNTER (OUTPATIENT)
Dept: LAB | Facility: HOSPITAL | Age: 84
End: 2024-08-30
Attending: INTERNAL MEDICINE
Payer: MEDICARE

## 2024-08-30 ENCOUNTER — TELEPHONE (OUTPATIENT)
Facility: CLINIC | Age: 84
End: 2024-08-30

## 2024-08-30 ENCOUNTER — HOSPITAL ENCOUNTER (OUTPATIENT)
Dept: CT IMAGING | Facility: HOSPITAL | Age: 84
Discharge: HOME | End: 2024-08-30
Attending: INTERNAL MEDICINE
Payer: MEDICARE

## 2024-08-30 DIAGNOSIS — R10.30 LOWER ABDOMINAL PAIN: ICD-10-CM

## 2024-08-30 LAB
ALBUMIN SERPL-MCNC: 2.8 G/DL (ref 3.2–4.8)
ALBUMIN/GLOB SERPL: 1.4 {RATIO} (ref 1–2)
ALP LIVER SERPL-CCNC: 48 U/L
ALT SERPL-CCNC: 22 U/L
ANION GAP SERPL CALC-SCNC: 0 MMOL/L (ref 0–18)
AST SERPL-CCNC: 15 U/L (ref ?–34)
BASOPHILS # BLD AUTO: 0.03 X10(3) UL (ref 0–0.2)
BASOPHILS NFR BLD AUTO: 0.2 %
BILIRUB SERPL-MCNC: 0.2 MG/DL (ref 0.2–1.1)
BUN BLD-MCNC: 18 MG/DL (ref 9–23)
BUN/CREAT SERPL: 26.1 (ref 10–20)
CALCIUM BLD-MCNC: 8.3 MG/DL (ref 8.7–10.4)
CHLORIDE SERPL-SCNC: 98 MMOL/L (ref 98–112)
CO2 SERPL-SCNC: 37 MMOL/L (ref 21–32)
CREAT BLD-MCNC: 0.69 MG/DL
CREAT BLD-MCNC: 1 MG/DL
DEPRECATED RDW RBC AUTO: 50.8 FL (ref 35.1–46.3)
EGFRCR SERPLBLD CKD-EPI 2021: 56 ML/MIN/1.73M2 (ref 60–?)
EGFRCR SERPLBLD CKD-EPI 2021: 87 ML/MIN/1.73M2 (ref 60–?)
EOSINOPHIL # BLD AUTO: 0.29 X10(3) UL (ref 0–0.7)
EOSINOPHIL NFR BLD AUTO: 2.1 %
ERYTHROCYTE [DISTWIDTH] IN BLOOD BY AUTOMATED COUNT: 14.2 % (ref 11–15)
FASTING STATUS PATIENT QL REPORTED: YES
GLOBULIN PLAS-MCNC: 2 G/DL (ref 2–3.5)
GLUCOSE BLD-MCNC: 104 MG/DL (ref 70–99)
HCT VFR BLD AUTO: 37.6 %
HGB BLD-MCNC: 12 G/DL
IMM GRANULOCYTES # BLD AUTO: 0.07 X10(3) UL (ref 0–1)
IMM GRANULOCYTES NFR BLD: 0.5 %
LIPASE SERPL-CCNC: 30 U/L (ref 12–53)
LYMPHOCYTES # BLD AUTO: 1.42 X10(3) UL (ref 1–4)
LYMPHOCYTES NFR BLD AUTO: 10.5 %
MCH RBC QN AUTO: 31 PG (ref 26–34)
MCHC RBC AUTO-ENTMCNC: 31.9 G/DL (ref 31–37)
MCV RBC AUTO: 97.2 FL
MONOCYTES # BLD AUTO: 1.11 X10(3) UL (ref 0.1–1)
MONOCYTES NFR BLD AUTO: 8.2 %
NEUTROPHILS # BLD AUTO: 10.64 X10 (3) UL (ref 1.5–7.7)
NEUTROPHILS # BLD AUTO: 10.64 X10(3) UL (ref 1.5–7.7)
NEUTROPHILS NFR BLD AUTO: 78.5 %
OSMOLALITY SERPL CALC.SUM OF ELEC: 282 MOSM/KG (ref 275–295)
PLATELET # BLD AUTO: 232 10(3)UL (ref 150–450)
POTASSIUM SERPL-SCNC: 4.7 MMOL/L (ref 3.5–5.1)
PROT SERPL-MCNC: 4.8 G/DL (ref 5.7–8.2)
RBC # BLD AUTO: 3.87 X10(6)UL
SODIUM SERPL-SCNC: 135 MMOL/L (ref 136–145)
WBC # BLD AUTO: 13.6 X10(3) UL (ref 4–11)

## 2024-08-30 PROCEDURE — 85025 COMPLETE CBC W/AUTO DIFF WBC: CPT

## 2024-08-30 PROCEDURE — 82565 ASSAY OF CREATININE: CPT

## 2024-08-30 PROCEDURE — 74177 CT ABD & PELVIS W/CONTRAST: CPT | Performed by: INTERNAL MEDICINE

## 2024-08-30 PROCEDURE — 36415 COLL VENOUS BLD VENIPUNCTURE: CPT

## 2024-08-30 PROCEDURE — 83690 ASSAY OF LIPASE: CPT

## 2024-08-30 PROCEDURE — 80053 COMPREHEN METABOLIC PANEL: CPT

## 2024-08-30 NOTE — TELEPHONE ENCOUNTER
Patient contacted by phone, results of her CT scan reviewed.  No signs of colitis noted on the imaging.  She does have constipation and plan to have her start on MiraLAX twice a day, I like to gently move the stool out of her system and advise that she go with the MiraLAX until her stools get loose to watery and then will back off to once daily to every other day.  If the patient symptoms become severe or there is any blood per rectum or signs of colitis then she is to go to emergency room, concerns for ischemic colitis would be raised her symptoms may progress to that.  Hopefully we can get her bowels moving and prevent this.    Overall her symptoms are about the same, she is taking Prevacid for reflux.    To call with an update on Tuesday.

## 2024-09-09 RX ORDER — ESTRADIOL 0.05 MG/D
PATCH TRANSDERMAL
Qty: 12 EACH | Refills: 3 | Status: SHIPPED | OUTPATIENT
Start: 2024-09-09

## 2024-09-12 PROBLEM — J96.11 CHRONIC RESPIRATORY FAILURE WITH HYPOXIA  (CMD): Status: ACTIVE | Noted: 2024-02-13

## 2024-09-12 PROBLEM — R55 SYNCOPE AND COLLAPSE: Status: ACTIVE | Noted: 2024-02-13

## 2024-09-13 ENCOUNTER — HOSPITAL ENCOUNTER (INPATIENT)
Facility: HOSPITAL | Age: 84
LOS: 6 days | Discharge: HOME HEALTH | End: 2024-09-19
Attending: EMERGENCY MEDICINE | Admitting: HOSPITALIST
Payer: MEDICARE

## 2024-09-13 ENCOUNTER — APPOINTMENT (OUTPATIENT)
Dept: GENERAL RADIOLOGY | Facility: HOSPITAL | Age: 84
End: 2024-09-13
Attending: EMERGENCY MEDICINE
Payer: MEDICARE

## 2024-09-13 DIAGNOSIS — I50.9 ACUTE ON CHRONIC CONGESTIVE HEART FAILURE, UNSPECIFIED HEART FAILURE TYPE (HCC): Primary | ICD-10-CM

## 2024-09-13 DIAGNOSIS — J44.1 COPD EXACERBATION (HCC): ICD-10-CM

## 2024-09-13 PROBLEM — J44.9 COPD (CHRONIC OBSTRUCTIVE PULMONARY DISEASE) (HCC): Status: ACTIVE | Noted: 2024-09-13

## 2024-09-13 LAB
ANION GAP SERPL CALC-SCNC: 0 MMOL/L (ref 0–18)
BASOPHILS # BLD AUTO: 0.04 X10(3) UL (ref 0–0.2)
BASOPHILS NFR BLD AUTO: 0.4 %
BUN BLD-MCNC: 12 MG/DL (ref 9–23)
BUN/CREAT SERPL: 18.5 (ref 10–20)
CALCIUM BLD-MCNC: 9.2 MG/DL (ref 8.7–10.4)
CHLORIDE SERPL-SCNC: 100 MMOL/L (ref 98–112)
CO2 SERPL-SCNC: 38 MMOL/L (ref 21–32)
CREAT BLD-MCNC: 0.65 MG/DL
D DIMER PPP FEU-MCNC: 0.72 UG/ML FEU (ref ?–0.82)
DEPRECATED RDW RBC AUTO: 49.4 FL (ref 35.1–46.3)
EGFRCR SERPLBLD CKD-EPI 2021: 88 ML/MIN/1.73M2 (ref 60–?)
EOSINOPHIL # BLD AUTO: 0.13 X10(3) UL (ref 0–0.7)
EOSINOPHIL NFR BLD AUTO: 1.2 %
ERYTHROCYTE [DISTWIDTH] IN BLOOD BY AUTOMATED COUNT: 13.7 % (ref 11–15)
GLUCOSE BLD-MCNC: 117 MG/DL (ref 70–99)
HCT VFR BLD AUTO: 41.1 %
HGB BLD-MCNC: 13.2 G/DL
IMM GRANULOCYTES # BLD AUTO: 0.03 X10(3) UL (ref 0–1)
IMM GRANULOCYTES NFR BLD: 0.3 %
LYMPHOCYTES # BLD AUTO: 1.47 X10(3) UL (ref 1–4)
LYMPHOCYTES NFR BLD AUTO: 13.5 %
MCH RBC QN AUTO: 31.4 PG (ref 26–34)
MCHC RBC AUTO-ENTMCNC: 32.1 G/DL (ref 31–37)
MCV RBC AUTO: 97.9 FL
MONOCYTES # BLD AUTO: 0.7 X10(3) UL (ref 0.1–1)
MONOCYTES NFR BLD AUTO: 6.4 %
NEUTROPHILS # BLD AUTO: 8.52 X10 (3) UL (ref 1.5–7.7)
NEUTROPHILS # BLD AUTO: 8.52 X10(3) UL (ref 1.5–7.7)
NEUTROPHILS NFR BLD AUTO: 78.2 %
NT-PROBNP SERPL-MCNC: 164 PG/ML (ref ?–450)
OSMOLALITY SERPL CALC.SUM OF ELEC: 287 MOSM/KG (ref 275–295)
PLATELET # BLD AUTO: 233 10(3)UL (ref 150–450)
POTASSIUM SERPL-SCNC: 4.2 MMOL/L (ref 3.5–5.1)
RBC # BLD AUTO: 4.2 X10(6)UL
SARS-COV-2 RNA RESP QL NAA+PROBE: NOT DETECTED
SODIUM SERPL-SCNC: 138 MMOL/L (ref 136–145)
TROPONIN I SERPL HS-MCNC: 7 NG/L
WBC # BLD AUTO: 10.9 X10(3) UL (ref 4–11)

## 2024-09-13 PROCEDURE — 71045 X-RAY EXAM CHEST 1 VIEW: CPT | Performed by: EMERGENCY MEDICINE

## 2024-09-13 PROCEDURE — 99223 1ST HOSP IP/OBS HIGH 75: CPT | Performed by: HOSPITALIST

## 2024-09-13 PROCEDURE — 5A0945A ASSISTANCE WITH RESPIRATORY VENTILATION, 24-96 CONSECUTIVE HOURS, HIGH NASAL FLOW/VELOCITY: ICD-10-PCS | Performed by: STUDENT IN AN ORGANIZED HEALTH CARE EDUCATION/TRAINING PROGRAM

## 2024-09-13 RX ORDER — METOCLOPRAMIDE HYDROCHLORIDE 5 MG/ML
10 INJECTION INTRAMUSCULAR; INTRAVENOUS EVERY 8 HOURS PRN
Status: DISCONTINUED | OUTPATIENT
Start: 2024-09-13 | End: 2024-09-14

## 2024-09-13 RX ORDER — TEMAZEPAM 15 MG/1
15 CAPSULE ORAL NIGHTLY PRN
Status: DISCONTINUED | OUTPATIENT
Start: 2024-09-13 | End: 2024-09-19

## 2024-09-13 RX ORDER — LISINOPRIL/HYDROCHLOROTHIAZIDE 10-12.5 MG
1 TABLET ORAL DAILY
Status: DISCONTINUED | OUTPATIENT
Start: 2024-09-13 | End: 2024-09-13

## 2024-09-13 RX ORDER — PANTOPRAZOLE SODIUM 40 MG/1
40 TABLET, DELAYED RELEASE ORAL
Status: DISCONTINUED | OUTPATIENT
Start: 2024-09-14 | End: 2024-09-14

## 2024-09-13 RX ORDER — IPRATROPIUM BROMIDE AND ALBUTEROL SULFATE 2.5; .5 MG/3ML; MG/3ML
3 SOLUTION RESPIRATORY (INHALATION) EVERY 6 HOURS PRN
Status: DISCONTINUED | OUTPATIENT
Start: 2024-09-13 | End: 2024-09-19

## 2024-09-13 RX ORDER — ENOXAPARIN SODIUM 100 MG/ML
40 INJECTION SUBCUTANEOUS DAILY
Status: DISCONTINUED | OUTPATIENT
Start: 2024-09-14 | End: 2024-09-19

## 2024-09-13 RX ORDER — METHYLPREDNISOLONE SODIUM SUCCINATE 125 MG/2ML
60 INJECTION, POWDER, LYOPHILIZED, FOR SOLUTION INTRAMUSCULAR; INTRAVENOUS ONCE
Status: COMPLETED | OUTPATIENT
Start: 2024-09-13 | End: 2024-09-13

## 2024-09-13 RX ORDER — ALBUTEROL SULFATE 90 UG/1
2 INHALANT RESPIRATORY (INHALATION) AS NEEDED
COMMUNITY

## 2024-09-13 RX ORDER — DIGOXIN 125 MCG
125 TABLET ORAL DAILY
Status: DISCONTINUED | OUTPATIENT
Start: 2024-09-13 | End: 2024-09-19

## 2024-09-13 RX ORDER — DILTIAZEM HYDROCHLORIDE 120 MG/1
240 CAPSULE, EXTENDED RELEASE ORAL DAILY
Status: DISCONTINUED | OUTPATIENT
Start: 2024-09-13 | End: 2024-09-19

## 2024-09-13 RX ORDER — MONTELUKAST SODIUM 10 MG/1
10 TABLET ORAL NIGHTLY
Status: DISCONTINUED | OUTPATIENT
Start: 2024-09-13 | End: 2024-09-19

## 2024-09-13 RX ORDER — METHYLPREDNISOLONE SODIUM SUCCINATE 40 MG/ML
40 INJECTION, POWDER, LYOPHILIZED, FOR SOLUTION INTRAMUSCULAR; INTRAVENOUS EVERY 12 HOURS
Status: DISCONTINUED | OUTPATIENT
Start: 2024-09-13 | End: 2024-09-18

## 2024-09-13 RX ORDER — METHYLPREDNISOLONE SODIUM SUCCINATE 40 MG/ML
40 INJECTION, POWDER, LYOPHILIZED, FOR SOLUTION INTRAMUSCULAR; INTRAVENOUS EVERY 8 HOURS
Status: DISCONTINUED | OUTPATIENT
Start: 2024-09-13 | End: 2024-09-13

## 2024-09-13 RX ORDER — AMITRIPTYLINE HYDROCHLORIDE 50 MG/1
50 TABLET ORAL NIGHTLY
Status: DISCONTINUED | OUTPATIENT
Start: 2024-09-13 | End: 2024-09-19

## 2024-09-13 RX ORDER — BENZONATATE 200 MG/1
200 CAPSULE ORAL 3 TIMES DAILY PRN
Status: DISCONTINUED | OUTPATIENT
Start: 2024-09-13 | End: 2024-09-19

## 2024-09-13 RX ORDER — IPRATROPIUM BROMIDE AND ALBUTEROL SULFATE 2.5; .5 MG/3ML; MG/3ML
3 SOLUTION RESPIRATORY (INHALATION) EVERY 6 HOURS PRN
Status: DISCONTINUED | OUTPATIENT
Start: 2024-09-13 | End: 2024-09-13

## 2024-09-13 RX ORDER — FUROSEMIDE 10 MG/ML
40 INJECTION INTRAMUSCULAR; INTRAVENOUS
Status: DISCONTINUED | OUTPATIENT
Start: 2024-09-13 | End: 2024-09-15

## 2024-09-13 RX ORDER — CETIRIZINE HYDROCHLORIDE 10 MG/1
10 TABLET ORAL DAILY
Status: DISCONTINUED | OUTPATIENT
Start: 2024-09-13 | End: 2024-09-14

## 2024-09-13 RX ORDER — ONDANSETRON 2 MG/ML
4 INJECTION INTRAMUSCULAR; INTRAVENOUS EVERY 6 HOURS PRN
Status: DISCONTINUED | OUTPATIENT
Start: 2024-09-13 | End: 2024-09-19

## 2024-09-13 RX ORDER — FUROSEMIDE 10 MG/ML
40 INJECTION INTRAMUSCULAR; INTRAVENOUS ONCE
Status: COMPLETED | OUTPATIENT
Start: 2024-09-13 | End: 2024-09-13

## 2024-09-13 RX ORDER — ASPIRIN 325 MG
162 TABLET ORAL DAILY
Status: DISCONTINUED | OUTPATIENT
Start: 2024-09-13 | End: 2024-09-14

## 2024-09-13 RX ORDER — IPRATROPIUM BROMIDE AND ALBUTEROL SULFATE 2.5; .5 MG/3ML; MG/3ML
3 SOLUTION RESPIRATORY (INHALATION) ONCE
Status: COMPLETED | OUTPATIENT
Start: 2024-09-13 | End: 2024-09-13

## 2024-09-13 RX ORDER — ACETAMINOPHEN 500 MG
500 TABLET ORAL EVERY 4 HOURS PRN
Status: DISCONTINUED | OUTPATIENT
Start: 2024-09-13 | End: 2024-09-14

## 2024-09-13 NOTE — HISTORICAL OFFICE NOTE
[facility]  Outside Information  Continuity of Care Document  8/26/2024  Yesenia Berkowitz - 84 y.o. Female; born [birth year]Summary of episode note, generated on Aug. 30, 2024August 30, 2024   CHIEF COMPLAINT    CHIEF COMPLAINT  Reason for Visit/Chief Complaint   f/u   Patient is an 83-year-old female with a history of paroxysmal atrial fibrillation, oxygen dependent COPD, HTN, HLD who presents establish care. Previously followed with Dr. Boles, and prior to that with Dr. Preston. Patient is a former smoker and quit 30 years ago, has been on 2 L of oxygen at baseline for the last 15 years. She has relatively unchanged exertional dyspnea. No chest pain, palpitations, edema, dizziness, or syncope. She also has a remote history of paroxysmal atrial fibrillation, when this comes on she will feel more short of breath and fatigued and then checks her pulse which will feel irregular. This is not occurred for the last several years. She is not on anticoagulation due to frequent bruising and infrequent A-fib. She has mild bilateral lower extremity edema but only takes her Lasix as needed. Patient had a coronary CT in 2020 which demonstrated a small speck of calcium in the LAD with a calcium score of 1.4/15/2024  Her last few months has had increased shortness of breath, put on steroids by her pulmonologist with significant permanent breathing. Denies significant palpitations. Has had increased bilateral lower extremity edema, 2 months ago was on increased Lasix 40 mg daily which seemed to improve although will back to 20 mg daily after 10 days per instruction.8/26/2024  Reports worsened shortness of breath, currently on prednisone for COPD exacerbation. Typically has allergies in the summer as well. Mild gradual increase in lower extremity edema although not too bothersome at this point. Remains on Lasix 40 mg daily.Cardiac history:  Paroxysmal atrial fibrillation  COPD, on 2 L O2  HFpEF  HTN      PROBLEMS  Reconcile with Patient's ChartPROBLEMS  Problem Effective Dates Date resolved Problem Status   Chronic hypoxemic respiratory failure, [SNOMED-CT: 166405068] 2/13/2024 - Active   COPD (chronic obstructive pulmonary disease), [SNOMED-CT: 75197884] 2/13/2024 - Active   Syncope and collapse, [SNOMED-CT: 010073712] 2/13/2024 - Active   Cataract surgery, unspecified eye, [SNOMED-CT: 664426009] 8/18/2021 5/19/2023 Resolved   PAF (paroxysmal atrial fibrillation), [SNOMED-CT: 213219937] 8/13/2019 - Active   Essential hypertension, [SNOMED-CT: 12399942] 8/13/2019 - Active   PVC's (premature ventricular contractions), [SNOMED-CT: 59542415] 8/13/2019 - Active   Localized edema, [SNOMED-CT: 757721260] 8/14/2019 - Active     ENCOUNTER    ENCOUNTER  Problem Effective Dates Date resolved Problem Status   Acute heart failure with preserved ejection fraction (HFpEF), [SNOMED-CT: 667759090] 2/13/2024 - Active   Chronic hypoxemic respiratory failure, [SNOMED-CT: 753035909] 2/13/2024 - Active   COPD (chronic obstructive pulmonary disease), [SNOMED-CT: 81787172] 2/13/2024 - Active   Syncope and collapse, [SNOMED-CT: 935917546] 2/13/2024 - Active   PAF (paroxysmal atrial fibrillation), [SNOMED-CT: 264358568] 8/13/2019 - Active   Essential hypertension, [SNOMED-CT: 86550014] 8/13/2019 - Active   PVC's (premature ventricular contractions), [SNOMED-CT: 03178400] 8/13/2019 - Active     VITAL SIGNS    VITAL SIGNS  Date / Time: 8/26/2024   BP Systolic 108 mmHg   BP Diastolic 60 mmHg   Height 65 inches   Weight 167.2 lbs   Pulse Rate 96 bpm   BSA (Body Surface Area) 1.9 cc/m2   BMI (Body Mass Index) 27.8 cc/m2   Blood Pressure 108 / 60 mmHg     PHYSICAL EXAMINATION    PHYSICAL EXAMINATION  Header Details   Constitutional o2sat 94   Vitals Right Arm Sitting  / 60 mmHg, Pulse rate 96 bpm, Height in 5' 5\", BMI: 27.8, Weight in 167.2 lbs (or) 75.84 kgs, BSA : 1.88 cc/m²   General Appearance No Acute Distress, Normal Body habitus    Cardiovascular      ALLERGIES, ADVERSE REACTIONS, ALERTS    ALLERGIES, ADVERSE REACTIONS, ALERTS  Type Substance Reaction Severity Status   Allergies Cefuroxime, [RxNorm: 786023]   Mild Active   Allergies Levofloxacin, [RxNorm: 9282591]   Mild Active   Allergies penicillin - Ingredient unknown Moderate Active     MEDICATIONS ADMINISTERED DURING VISIT    No data available    MEDICATIONS    MEDICATIONS  Medication Start Date Route/Frequency Status   albuterol (PROAIR HFA) 108 (90 Base) MCG/ACT inhaler, [RxNorm: 907771] 8/16/2021 as needed Active   amitriptyline (ELAVIL) 100 MG tablet, [RxNorm: 727940] 8/16/2021 1 daily Active   aspirin 81mg, [RxNorm: 1191] 11/22/2023 2 tablets once a day Active   B complex capsule, [RxNorm: 0] 8/16/2021 - Active   Calcium Carb-Cholecalciferol (CALCIUM + D3) 600-200 MG-UNIT Tab, [RxNorm: 339422] 8/16/2021 1 daily Active   digoxin 125 mcg (0.125 mg) tablet, [RxNorm: 436923] 1/11/2024 Take 1 tablet orally once a day. Active   dilTIAZem (CARDIZEM CD) 240 MG 24 hr capsule, [RxNorm: 245514] 8/16/2021 Take 240 mg by mouth daily. 1 daily Active   estradiol (CLIMARA) 0.05 MG/24HR once weekly patch, [RxNorm: 0] 8/16/2021 - Active   furosemide 40 mg tablet, [RxNorm: 194635] 4/15/2024 Take 1 tablet orally once a day. Active   HYDROcodone-acetaminophen (NORCO) 5-325 MG per tablet, [RxNorm: 913592] 8/16/2021 as needed Active   lansoprazole (PREVACID SOLUTAB) 30 MG disintegrating tablet, [RxNorm: 0] 8/16/2021 Take 30 mg by mouth daily. 1 daily Active   lisinopril-hydroCHLOROthiazide (PRINZIDE,ZESTORETIC) 10-12.5 MG per tablet, [RxNorm: 018162] 8/16/2021 Take 1 tablet by mouth daily. Active   loratadine (CLARITIN) 10 MG tablet, [RxNorm: 231592] 8/16/2021 1 daily Active   montelukast (SINGULAIR) 10 MG tablet, [RxNorm: 294517] 8/16/2021 1 daily Active   Multiple Vitamin (MULTI-VITAMINS) Tab, [RxNorm: 0] 8/16/2021 - Active   olopatadine (PATANOL) 0.1 % ophthalmic solution, [RxNorm: 6280096] 8/16/2021  - Active   Omega-3 Fatty Acids (FISH OIL) 1200 MG capsule, [RxNorm: 0] 8/16/2021 1 daily Active   tiotropium (SPIRIVA HANDIHALER) 18 MCG capsule for inhaler, [RxNorm: 838929] 8/16/2021 daily Active   sodium fluoride (PREVIDENT 5000 BOOSTER PLUS) 1.1 % dental paste, [RxNorm: 817358] 8/16/2021 - Active   triamcinolone (ARISTOCORT) 0.1 % cream, [RxNorm: 7452428] 8/16/2021 APPLY TO AFFECTED AREA(S) 2 (TWO) TIMES DAILY. Active   potassium chloride (KLOR-CON M20) 20 MEQ cruz ER tablet, [RxNorm: 5850366] 8/16/2021 1 daily Active   Pulmicort Nebulizer , [RxNorm: 0] 11/22/2023 twice a day Active     ASSESSMENT    Paroxysmal atrial fibrillation  - No recent recurrence or symptoms  - Continue aspirin 81 mg once daily, diltiazem 240 mg once daily  - Reviewed 30-day MCT without evidence of recurrent atrial fibrillation  - Per patient preference avoiding anticoagulation, history of GI bleed in the past and low A-fib burden  - Additionally, we had a discussion about possibly stopping her digoxin however she does not feel comfortable doing this at this pointHFpEF  - Increase bilateral lower extremity edema, TTE noted diastolic dysfunction, edema improved on higher dose of Lasix  - Continue  - Additionally discussed SGLT2 inhibitor, would like to reassess symptoms in 3 months before making a decision Lasix to 40 mg dailyHTN  - Normotensive today  - Continue lisinopril-hydrochlorothiazide 10-12.5 mg once dailyPlan  Follow-up with me in 3 months or sooner as needed.     FAMILY HISTORY    FAMILY HISTORY  Relationship Age Diagnosis   Father 62 Hypertension (HTN), primary; Old MI (myocardial infarction) (Reason for death)   Mother 0 Hypertension (HTN), primary   No family history of heart disease.     GENERAL STATUS    No data available    PAST MEDICAL HISTORY    PAST MEDICAL HISTORY  Problem Date diagonsed Date resolved Status   Chronic hypoxemic respiratory failure, [SNOMED-CT: 772457244] 2/13/2024 - Active   COPD (chronic obstructive  pulmonary disease), [SNOMED-CT: 00751696] 2/13/2024 - Active   Syncope and collapse, [SNOMED-CT: 964029378] 2/13/2024 - Active   PAF (paroxysmal atrial fibrillation), [SNOMED-CT: 318216059] 8/13/2019 - Active   Essential hypertension, [SNOMED-CT: 80629215] 8/13/2019 - Active   PVC's (premature ventricular contractions), [SNOMED-CT: 29791543] 8/13/2019 - Active   Localized edema, [SNOMED-CT: 916670032] 8/14/2019 - Active     HISTORY OF PRESENT ILLNESS    Patient is an 83-year-old female with a history of paroxysmal atrial fibrillation, oxygen dependent COPD, HTN, HLD who presents establish care. Previously followed with Dr. Boles, and prior to that with Dr. Preston. Patient is a former smoker and quit 30 years ago, has been on 2 L of oxygen at baseline for the last 15 years. She has relatively unchanged exertional dyspnea. No chest pain, palpitations, edema, dizziness, or syncope. She also has a remote history of paroxysmal atrial fibrillation, when this comes on she will feel more short of breath and fatigued and then checks her pulse which will feel irregular. This is not occurred for the last several years. She is not on anticoagulation due to frequent bruising and infrequent A-fib. She has mild bilateral lower extremity edema but only takes her Lasix as needed. Patient had a coronary CT in 2020 which demonstrated a small speck of calcium in the LAD with a calcium score of 1.4/15/2024  Her last few months has had increased shortness of breath, put on steroids by her pulmonologist with significant permanent breathing. Denies significant palpitations. Has had increased bilateral lower extremity edema, 2 months ago was on increased Lasix 40 mg daily which seemed to improve although will back to 20 mg daily after 10 days per instruction.8/26/2024  Reports worsened shortness of breath, currently on prednisone for COPD exacerbation. Typically has allergies in the summer as well. Mild gradual increase in lower extremity  edema although not too bothersome at this point. Remains on Lasix 40 mg daily.Cardiac history:  Paroxysmal atrial fibrillation  COPD, on 2 L O2  HFpEF  HTN     IMMUNIZATIONS  Reconcile with Patient's ChartNo data available    PLAN OF CARE    No data available    PROCEDURES    No data available    RESULTS    RESULTS  Name Result Date Location - Ordered By   DIGOXIN [LOINC: 10535-3] 0.31 ng/mL [Low] 01/16/2024 01:31:00 PM [facility] ([facility])  Address: [address] [facility]  [id number]  tel:   Trans Thoracic Echocardiogram 1.The left ventricle is normal in size. Left ventricular wall thickness is normal. Global left ventricular systolic function is hyperdynamic. The left ventricular ejection fraction is >70%. No regional wall motion abnormalities. The left ventricle diastolic function is impaired (Grade II) with an elevated left atrial pressure.2.The right ventricle is mildly enlarged. Right ventricular systolic function is mildly decreased. 3/18/2024 11:00:00 AM Luis Fernando Fowler MD   Ambulatory Telemetry Monitor 1.This is an average quality study. 2.Predominant rhythm is normal sinus rhythm. 3.The minimum heart rate recorded was 62 beats / minute (normal sinus rhythm, 3/10/2024). The maximum heart rate is 111 beats / minute (sinus tachycardia, 2/27/2024). The mean heart rate is 82 beats / minute. 4.- There were rare PVCs with a burden of <1%.  - There were rare PACs with a burden of 2%.  - 113 Supraventricular Tachycardia events -- the longest episode was 6.8s and the fastest episode was 181 BPM.  - No atrial fibrillation.  - No other arrhythmias.  - There were 7 patient triggered events with symptoms of palpitations, cough, and shortness of breath, associated with sinus rhythm, PVCs, PACs, and brief PAT. 2/15/2024 2:45:00 PM Luis Fernando Fowler MD     REVIEW OF SYSTEMS    REVIEW OF SYSTEMS  Header Details   Cardiovascular No history of Palpitations, Edema   Respiratory SOB   Neurological  No history of Numbness, Limb weakness     SOCIAL HISTORY    SOCIAL HISTORY  Social History Element Description Effective Dates   Smoking status Former smoker -     FUNCTIONAL STATUS    No data available    MEDICAL EQUIPMENT    No data available    Goals Sections    No data available    REASON FOR REFERRAL    No data available    Health Concerns Section    No data available    COGNITIVE/MENTAL STATUS    No data available    Patient Demographics    Patient Demographics  Patient Address Patient Name Communication   [address] [place] [address] Yesenia Berkowitz [phone] (Home)     Patient Demographics  Language Race / Ethnicity Marital Status   Unknown - Spoken White / Unknown      Document Information    Primary Care Provider Other Service Providers Document Coverage Dates   Luis Fernando Fowler  NPI: [id number]  [phone] (Work)  [address] [facility], [address] [place] [address]  [place] [address]  Interpreting Physicians  [facility]  [phone] (Work)  [address] [place] [address] Norma Murillo  NPI: [id number]  [phone] (Work)  [address] [facility], [address] [place] [address]  [place] [address]  Nurses     Alida GIOVANY Sorto  NPI: [id number]  [phone] (Work)  [address] [facility], [address] [place] [address]  [place] [address]  Nurses Aug. 26, 2024August 26, 2024      Organization   [facility]  [phone] (Work)  [address] [facility], [address] [place] [address]  [place] [address]     Encounter Providers Encounter Date    Aug. 26, 2024August 26, 2024     Legal Authenticator    Luis Fernando Fowler  NPI: [id number]  [phone] (Work)  [address] [facility], [address] [place] [address]  [place] [address]

## 2024-09-13 NOTE — H&P
[facility]    PATIENT'S NAME: CEZAR JOVEL   ATTENDING PHYSICIAN: Korey Luna MD   PATIENT ACCOUNT#:   [id number]    LOCATION:  [address]  MEDICAL RECORD #:   [id number]       YOB: 1940  ADMISSION DATE:       09/13/2024    HISTORY AND PHYSICAL EXAMINATION    CHIEF COMPLAINT:  Combined COPD exacerbation and heart failure.     HISTORY OF PRESENT ILLNESS:  Patient is an 84-year-old  female who came into the emergency department for evaluation of 10 days of progressive leg edema, dyspnea on exertion, wheezing, and shortness of breath.  CBC showed white blood cell count of 10.9 with left shift.  Chemistry showed bicarb of 38, otherwise unremarkable.  Troponin, proBNP, COVID testing, and D-dimer were all negative.  Chest x-ray showed cardiomegaly, increased interstitial edema, and bilateral pleural effusions.  EKG showed normal sinus rhythm.    PAST MEDICAL HISTORY:  Left ventricular grade 1 diastolic dysfunction; paroxysmal atrial fibrillation, low burden; history of gastrointestinal bleed, not on anticoagulation; chronic obstructive pulmonary disease, on home oxygen; hypertension; hyperlipidemia; gastroesophageal reflux disease; diverticulosis; osteoarthritis; degenerative joint disease of lumbar spine.    PAST SURGICAL HISTORY:  Tonsillectomy, adenoidectomy, hysterectomy, cataract procedure.    MEDICATIONS:  Please see medication reconciliation list.     ALLERGIES:  Penicillin.    FAMILY HISTORY:  Mother had ovarian cancer.  Sister had COPD.    SOCIAL HISTORY:  Ex-tobacco user.  Social alcohol.  No drug use.  Lives with her family.  Independent for basic activities of daily living.     REVIEW OF SYSTEMS:  Patient describes increased leg edema, dyspnea on exertion, and orthopnea.  She takes Lasix only when she needs it.  She is noncompliant with low-salt diet.  No sick contacts.  No fever or chills.  She heard herself wheezing.  Other 12-point review of systems is negative.        PHYSICAL EXAMINATION:    GENERAL:  Alert and oriented to time, place and person.  Visibly dyspneic.  Mild distress.   VITAL SIGNS:  Temperature 97.5; pulse 93; respiratory rate 26; blood pressure 112/53; pulse ox 76% on room air, 90% on 4L nasal cannula oxygen.  HEENT:  Atraumatic.  Oropharynx clear.  Dry mucous membranes.  Ears and nose normal.  Eyes:  Anicteric sclerae.   NECK:  Supple.  No lymphadenopathy.  Positive jugular venous distention.   LUNGS:  Diminished breathing sounds, both lung bases.  End-expiratory wheezing auscultated on both lung fields.  Increased respiratory effort.    HEART:  Regular rate and rhythm.  S1 and S2 auscultated.  No murmur.    ABDOMEN:  Soft, nondistended.  No tenderness.  Positive bowel sounds.   EXTREMITIES:  There is +1 to 2 edema, both legs.  No clubbing or cyanosis.   NEUROLOGIC:  Motor and sensory intact.  Cranial nerves II to XII are intact.      ASSESSMENT:    1.   Acute on chronic hypoxemic respiratory failure.  2.   Chronic obstructive pulmonary disease exacerbation.  3.   Fluid overload status and possible component of heart failure.   4.   Essential hypertension.    PLAN:  Patient will be admitted to telemetry floor.  IV Lasix.  Solu-Medrol.  Nebulizer treatments.  Cough suppressants.  Oxygen support.  DVT prophylaxis.  Pulmonary and cardiology consults.  Obtain 2D echocardiogram with Doppler.  Further recommendations to follow.     Dictated By Yamil Khan MD  d: 09/13/2024 16:11:39  t: 09/13/2024 16:32:45  Job [id number]  FB/

## 2024-09-13 NOTE — ED QUICK NOTES
Orders for admission, patient is aware of plan and ready to go upstairs. Any questions, please call ED RN zachary  at extension [phone].   Type of COVID test sent:  COVID Suspicion level: negative    Titratable drug(s) infusing:  Rate:right ac 20    LOC at time of transport:Ax4    Other pertinent information  4L high flow  2L at baseline    Left arm restriction

## 2024-09-13 NOTE — CONSULTS
[facility]  Cardiology Consultation    Yesenia Berkowitz Patient Status:  Emergency    1940 MRN [id number]   Location [facility] EMERGENCY DEPARTMENT Attending Korey Luna MD   Hosp Day # 0 PCP JO-ANN YANG MD     Date of Admission:  2024  Date of Consult:  2024  Reason for Consultation:   Shortness of breath    History of Present Illness:   Patient is an 84-year-old female with a history of paroxysmal atrial fibrillation, COPD on 2 L oxygen, HFpEF, HTN, who presents with progressive shortness of breath.  Symptoms worsened over the last week but became particularly bad the last 24 hours where she became out of breath doing minimal activity.  Occasional cough but infrequent.  Symptoms are worse at night when she is laying down.  No fever/chills.  She has very mild increased lower extremity edema that began the last 24 hours.  If she continues to take Lasix 40 mg daily.    proBNP 164  Troponin 7  D-dimer 0.72    CXR-possible mild interstitial edema and small pleural effusions  ECG-sinus rhythm with PACs, cannot rule out anterior infarct, 90 bpm    Cardiac history:  Paroxysmal atrial fibrillation  COPD, on 2 L O2 baseline  HFpEF  HTN    Past Medical History  Past Medical History:    A-fib (HCC)    Anesthesia complication    Arrhythmia    AFIB    Arthritis    Asthma (HCC)    Back problem    COPD (chronic obstructive pulmonary disease) (HCC)    Deviated nasal septum    nasal septoplasty, turb reduction, smr of turbs    Difficult intubation    fiber optic if needed    Diverticulitis    colonoscopy     Diverticulosis of large intestine    Esophageal reflux    Extrinsic asthma, unspecified    Headache    Heart disease    Hepatitis    WAS TOLD SHE HAS HAD THIS WHEN SHE WAS 19 YEARS OLD    High blood pressure    High cholesterol    Irregular heart rate    Lichenoid keratosis    left chest-bx    Osteoarthritis    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    Paronychia     (RT); onychomycosis; debridement    Problems with swallowing    occasionally    Shortness of breath    2 L NC ALL THE TIME 24HR/7 DAYS PER WEEK    Unspecified essential hypertension    Visual impairment     Past Surgical History  Past Surgical History:   Procedure Laterality Date    Adenoidectomy      Cataract      cataract extraction     Hysterectomy      Sinus surgery        DEVIATED SEPTUM    T&a      Tonsillectomy       Family History  Family history of heart disease  Family History   Problem Relation Age of Onset    Ovarian Cancer Mother 76        endometrial, poss ovarian primary    Pulmonary Disease Sister         COPD    Skin cancer Other     Stroke Other     Other (Other) Other      Social History  Lives at home alone.  She is a former smoker quit 30 years ago.  No alcohol use.  She is a former RN  Pediatric History   Patient Parents    Not on file     Other Topics Concern    Grew up on a farm Not Asked    History of tanning Not Asked    Outdoor occupation Not Asked    Pt has a pacemaker No    Pt has a defibrillator No    Breast feeding Not Asked    Reaction to local anesthetic No     Service Not Asked    Blood Transfusions Not Asked    Caffeine Concern No    Occupational Exposure Not Asked    Hobby Hazards Not Asked    Sleep Concern Not Asked    Stress Concern Not Asked    Weight Concern Not Asked    Special Diet Not Asked    Back Care Not Asked    Exercise Not Asked    Bike Helmet Not Asked    Seat Belt Not Asked    Self-Exams Not Asked   Social History Narrative    Not on file         Current Medications:  Current Facility-Administered Medications   Medication Dose Route Frequency    ipratropium-albuterol (Duoneb) 0.5-2.5 (3) MG/3ML inhalation solution 3 mL  3 mL Nebulization Once     (Not in a hospital admission)    Allergies  Allergies   Allergen Reactions    Penicillin G ANAPHYLAXIS    Cefuroxime UNKNOWN     Other reaction(s): Vomitting    Levofloxacin UNKNOWN     Other reaction(s):  LEVOFLOXACIN    Penicillins      Other reaction(s): Unknown       Review of Systems:     14 point review of systems completed and negative except as noted.    Physical Exam:   Patient Vitals for the past 24 hrs:   BP Temp Pulse Resp SpO2   09/13/24 1615 -- -- 81 21 94 %   09/13/24 1359 112/53 97.5 °F (36.4 °C) -- -- --   09/13/24 1354 -- -- 93 26 (!) 76 %     Scheduled Meds:    ipratropium-albuterol  3 mL Nebulization Once     General: Alert and oriented x 3. No apparent distress.   HEENT: Normocephalic, anicteric sclera, neck supple, no thyromegaly or adenopathy.  Neck: No JVD, carotids 2+, no bruits.  Cardiac: Regular rate and rhythm. S1, S2 normal. No murmur, pericardial rub, S3, or extra cardiac sounds.  Lungs: Diffuse wheezing bilaterally.  Normal excursions and effort.  Abdomen: Soft, non-tender. No organosplenomegally, mass or rebound, BS-present.  Extremities: Without clubbing or cyanosis.  Trace edema.    Neurologic: Alert and oriented, normal affect. No focal defects  Skin: Warm and dry.     Results:   Laboratory Data:  Lab Results   Component Value Date    WBC 10.9 09/13/2024    HGB 13.2 09/13/2024    HCT 41.1 09/13/2024    .0 09/13/2024    CREATSERUM 0.65 09/13/2024    BUN 12 09/13/2024     09/13/2024    K 4.2 09/13/2024     09/13/2024    CO2 38.0 (H) 09/13/2024     (H) 09/13/2024    CA 9.2 09/13/2024    ALB 2.8 (L) 08/30/2024    ALKPHO 48 (L) 08/30/2024    TP 4.8 (L) 08/30/2024    AST 15 08/30/2024    ALT 22 08/30/2024    PTT 30.1 03/14/2023    INR 1.12 03/14/2023    PTP 14.3 03/14/2023    TSH 1.060 01/06/2023    LIP 30 08/30/2024    DDIMER 0.72 09/13/2024    MG 2.0 07/21/2020    TROP <0.045 02/03/2020         Recent Labs   Lab 09/13/24  1357   *   BUN 12   CREATSERUM 0.65   CA 9.2      K 4.2      CO2 38.0*     Recent Labs   Lab 09/13/24  1357   RBC 4.20   HGB 13.2   HCT 41.1   MCV 97.9   MCH 31.4   MCHC 32.1   RDW 13.7   NEPRELIM 8.52*   WBC 10.9   .0        No results for input(s): \"BNPML\" in the last 168 hours.    No results for input(s): \"TROP\", \"CK\" in the last 168 hours.    Imaging:  XR CHEST AP PORTABLE  (CPT=71045)    Result Date: 9/13/2024  CONCLUSION:  1. Mild cardiomegaly and mild interstitial edema but decreased from previous study.  I cannot exclude a component of cardiac failure/fluid overload.  Chronic moderate elevation left hemidiaphragm.  Small bilateral pleural effusions appear to have increased since previous study, and I cannot exclude small areas of bibasilar pneumonia and or atelectasis with a progressed as well from previous study.  Correlate clinically and follow-up studies are advised. 2. Small subcentimeter in size nodules in bilateral upper lung fields.  Correlate with CT scan findings.    Dictated by (CST): Kings Pritchard MD on 9/13/2024 at 3:29 PM     Finalized by (CST): Kings Pritchard MD on 9/13/2024 at 3:32 PM           Impression:   COPD exacerbation  - Primary causes of increased exertional dyspnea is likely secondary to above, noted significant wheezing on exam with occasional cough  - Continue IV steroids, nebulizers  - Further management per pulmonary    HFpEF  - Clinically does not appear significant overloaded, very minimal edema today and proBNP was normal, however chest x-ray read as possible interstitial edema  - Continue IV Lasix twice daily empirically  - Continue lisinopril-hydrochlorothiazide 10-12.5 mg daily  - Consider SGLT2 inhibitor as outpatient    Paroxysmal atrial fibrillation  -Currently in sinus rhythm  - Continue diltiazem 240 mg daily    HTN  - Continue home antihypertensive medications    Thank you for allowing me to participate in the care of your patient.    Luis Fernando Fowler MD  [facility]  9/13/2024

## 2024-09-13 NOTE — ED PROVIDER NOTES
Patient Seen in: [facility] Emergency Department      History     Chief Complaint   Patient presents with    Difficulty Breathing     Stated Complaint: copd    Subjective:   HPI    84-year-old female with history of hypertension, hypercholesterolemia, atrial fibrillation not on anticoagulation, COPD on 2 L of oxygen by nasal cannula, asthma, and esophageal reflux presents with complaints of increased shortness of breath.  The patient reports increasing shortness of breath over the past 2 to 3 weeks.  She was given a course of prednisone which she completed at the beginning of this week.  She reports some improvement in symptoms while taking the prednisone but states the symptoms have worsened since she completed the steroids.  She reports chronic nonproductive cough.  No complaints of any chest pain.  No fevers.  No vomiting or diarrhea.  She had an appointment scheduled with her pulmonologist today but reports that her shortness of breath was too bad to make it to the appointment.    Objective:   Past Medical History:    A-fib (HCC)    Anesthesia complication    Arrhythmia    AFIB    Arthritis    Asthma ([facility])    Back problem    COPD (chronic obstructive pulmonary disease) ([facility])    Deviated nasal septum    nasal septoplasty, turb reduction, smr of turbs    Difficult intubation    fiber optic if needed    Diverticulitis    colonoscopy     Diverticulosis of large intestine    Esophageal reflux    Extrinsic asthma, unspecified    Headache    Heart disease    Hepatitis    WAS TOLD SHE HAS HAD THIS WHEN SHE WAS 19 YEARS OLD    High blood pressure    High cholesterol    Irregular heart rate    Lichenoid keratosis    left chest-bx    Osteoarthritis    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    Paronychia    (RT); onychomycosis; debridement    Problems with swallowing    occasionally    Shortness of breath    2 L NC ALL THE TIME 24HR/7 DAYS PER WEEK    Unspecified essential hypertension    Visual  impairment              Past Surgical History:   Procedure Laterality Date    Adenoidectomy      Cataract      cataract extraction     Hysterectomy      Sinus surgery        DEVIATED SEPTUM    T&a      Tonsillectomy                  Social History     Socioeconomic History    Marital status:    Tobacco Use    Smoking status: Former     Current packs/day: 0.00     Types: Cigarettes     Quit date: 1996     Years since quittin.7    Smokeless tobacco: Never   Vaping Use    Vaping status: Never Used   Substance and Sexual Activity    Alcohol use: Yes     Alcohol/week: 0.0 standard drinks of alcohol     Comment: one drink once a week    Drug use: Never   Other Topics Concern    Pt has a pacemaker No    Pt has a defibrillator No    Reaction to local anesthetic No    Caffeine Concern No              Review of Systems    Positive for stated Chief Complaint: Difficulty Breathing    Other systems are as noted in HPI.  Constitutional and vital signs reviewed.      All other systems reviewed and negative except as noted above.    Physical Exam     ED Triage Vitals   BP 24 1359 112/53   Pulse 24 1354 93   Resp 24 1354 26   Temp 24 1359 97.5 °F (36.4 °C)   Temp src --    SpO2 24 1354 (!) 76 %   O2 Device 24 1354 None (Room air)       Current Vitals:   Vital Signs  BP: 112/53  Pulse: 93  Resp: 26  Temp: 97.5 °F (36.4 °C)    Oxygen Therapy  SpO2: (!) 76 %  O2 Device: High flow nasal cannula  O2 Flow Rate (L/min): 4 L/min            Physical Exam      General Appearance: alert, no distress  Eyes: pupils equal and round no pallor or injection  ENT, Mouth: mucous membranes moist  Respiratory: there are no retractions, crackles to bilateral lower lung fields  Cardiovascular: regular rate and rhythm  Gastrointestinal:  abdomen is soft and non tender, no masses, bowel sounds normal  Neurological: Speech normal.  Moving extremities x 4.  Skin: warm and dry, no rashes.  Musculoskeletal:  neck is supple non tender        Extremities are symmetrical, full range of motion.  Bilateral distal leg and ankle edema.  No erythema, warmth, or tenderness noted.  Psychiatric: patient is oriented X 3, there is no agitation    DIFFERENTIAL DIAGNOSIS: After history and physical exam differential diagnosis was considered for shortness of breath including pulmonary infectious process, COPD, asthma, pulmonary embolus and congestive heart failure        ED Course     Labs Reviewed   CBC WITH DIFFERENTIAL WITH PLATELET - Abnormal; Notable for the following components:       Result Value    RDW-SD 49.4 (*)     Neutrophil Absolute Prelim 8.52 (*)     Neutrophil Absolute 8.52 (*)     All other components within normal limits   BASIC METABOLIC PANEL (8) - Abnormal; Notable for the following components:    Glucose 117 (*)     CO2 38.0 (*)     All other components within normal limits   TROPONIN I HIGH SENSITIVITY - Normal   D-DIMER - Normal   PRO BETA NATRIURETIC PEPTIDE - Normal   RAPID SARS-COV-2 BY PCR - Normal   RAINBOW DRAW LAVENDER   RAINBOW DRAW LIGHT GREEN   RAINBOW DRAW BLUE     EKG    Rate, intervals and axes as noted on EKG Report.  Rate: 90  Rhythm: Sinus Rhythm  Axis: Normal  Reading: Nonspecific EKG                        MDM      Lab, x-ray, and EKG results noted.  Suspect COPD and CHF exacerbations.  Given diuretics in the ED along with IV steroids and nebulizer treatments.  Plan to admit for further evaluation and treatment.  Discussed with Dr. Khan, hospitalist.  Also discussed with Dr. Boyle, the patient's pulmonologist.  Also communicated with [facility].  Admission disposition: 9/13/2024  4:23 PM                                        Medical Decision Making      Disposition and Plan     Clinical Impression:  1. Acute on chronic congestive heart failure, unspecified heart failure type (HCC)    2. COPD exacerbation (HCC)         Disposition:  Admit  9/13/2024  4:23  pm    Follow-up:  No follow-up provider specified.  We recommend that you schedule follow up care with a primary care provider within the next three months to obtain basic health screening including reassessment of your blood pressure.      Medications Prescribed:  Current Discharge Medication List                            Hospital Problems       Present on Admission  Date Reviewed: 8/15/2024            ICD-10-CM Noted POA    * (Principal) Acute on chronic congestive heart failure, unspecified heart failure type (HCC) I50.9 9/13/2024 Unknown

## 2024-09-14 ENCOUNTER — APPOINTMENT (OUTPATIENT)
Dept: CV DIAGNOSTICS | Facility: HOSPITAL | Age: 84
End: 2024-09-14
Attending: HOSPITALIST
Payer: MEDICARE

## 2024-09-14 LAB
ANION GAP SERPL CALC-SCNC: 1 MMOL/L (ref 0–18)
ATRIAL RATE: 90 BPM
BASOPHILS # BLD AUTO: 0 X10(3) UL (ref 0–0.2)
BASOPHILS NFR BLD AUTO: 0 %
BUN BLD-MCNC: 21 MG/DL (ref 9–23)
BUN/CREAT SERPL: 30 (ref 10–20)
CALCIUM BLD-MCNC: 8.8 MG/DL (ref 8.7–10.4)
CHLORIDE SERPL-SCNC: 98 MMOL/L (ref 98–112)
CO2 SERPL-SCNC: 39 MMOL/L (ref 21–32)
CREAT BLD-MCNC: 0.7 MG/DL
DEPRECATED RDW RBC AUTO: 48.3 FL (ref 35.1–46.3)
EGFRCR SERPLBLD CKD-EPI 2021: 86 ML/MIN/1.73M2 (ref 60–?)
EOSINOPHIL # BLD AUTO: 0 X10(3) UL (ref 0–0.7)
EOSINOPHIL NFR BLD AUTO: 0 %
ERYTHROCYTE [DISTWIDTH] IN BLOOD BY AUTOMATED COUNT: 13.6 % (ref 11–15)
GLUCOSE BLD-MCNC: 121 MG/DL (ref 70–99)
HCT VFR BLD AUTO: 35.5 %
HGB BLD-MCNC: 11.6 G/DL
IMM GRANULOCYTES # BLD AUTO: 0.04 X10(3) UL (ref 0–1)
IMM GRANULOCYTES NFR BLD: 0.4 %
LYMPHOCYTES # BLD AUTO: 0.61 X10(3) UL (ref 1–4)
LYMPHOCYTES NFR BLD AUTO: 6.3 %
MCH RBC QN AUTO: 31.4 PG (ref 26–34)
MCHC RBC AUTO-ENTMCNC: 32.7 G/DL (ref 31–37)
MCV RBC AUTO: 96.2 FL
MONOCYTES # BLD AUTO: 0.22 X10(3) UL (ref 0.1–1)
MONOCYTES NFR BLD AUTO: 2.3 %
NEUTROPHILS # BLD AUTO: 8.78 X10 (3) UL (ref 1.5–7.7)
NEUTROPHILS # BLD AUTO: 8.78 X10(3) UL (ref 1.5–7.7)
NEUTROPHILS NFR BLD AUTO: 91 %
OSMOLALITY SERPL CALC.SUM OF ELEC: 290 MOSM/KG (ref 275–295)
P AXIS: -10 DEGREES
P-R INTERVAL: 160 MS
PLATELET # BLD AUTO: 206 10(3)UL (ref 150–450)
POTASSIUM SERPL-SCNC: 4.5 MMOL/L (ref 3.5–5.1)
Q-T INTERVAL: 346 MS
QRS DURATION: 92 MS
QTC CALCULATION (BEZET): 423 MS
R AXIS: -4 DEGREES
RBC # BLD AUTO: 3.69 X10(6)UL
SODIUM SERPL-SCNC: 138 MMOL/L (ref 136–145)
T AXIS: 69 DEGREES
VENTRICULAR RATE: 90 BPM
WBC # BLD AUTO: 9.7 X10(3) UL (ref 4–11)

## 2024-09-14 PROCEDURE — 93306 TTE W/DOPPLER COMPLETE: CPT | Performed by: HOSPITALIST

## 2024-09-14 PROCEDURE — 99233 SBSQ HOSP IP/OBS HIGH 50: CPT | Performed by: HOSPITALIST

## 2024-09-14 RX ORDER — LANSOPRAZOLE 30 MG/1
30 TABLET, ORALLY DISINTEGRATING, DELAYED RELEASE ORAL
Status: DISCONTINUED | OUTPATIENT
Start: 2024-09-15 | End: 2024-09-19

## 2024-09-14 RX ORDER — METOCLOPRAMIDE HYDROCHLORIDE 5 MG/ML
5 INJECTION INTRAMUSCULAR; INTRAVENOUS EVERY 8 HOURS PRN
Status: DISCONTINUED | OUTPATIENT
Start: 2024-09-14 | End: 2024-09-19

## 2024-09-14 RX ORDER — ASPIRIN 81 MG/1
81 TABLET ORAL DAILY
Status: DISCONTINUED | OUTPATIENT
Start: 2024-09-15 | End: 2024-09-19

## 2024-09-14 RX ORDER — DOXYCYCLINE 100 MG/1
100 CAPSULE ORAL EVERY 12 HOURS SCHEDULED
Status: DISCONTINUED | OUTPATIENT
Start: 2024-09-14 | End: 2024-09-19

## 2024-09-14 RX ORDER — POLYETHYLENE GLYCOL 3350 17 G/17G
17 POWDER, FOR SOLUTION ORAL 2 TIMES DAILY
Status: DISCONTINUED | OUTPATIENT
Start: 2024-09-14 | End: 2024-09-19

## 2024-09-14 RX ORDER — CETIRIZINE HYDROCHLORIDE 5 MG/1
5 TABLET ORAL DAILY
Status: DISCONTINUED | OUTPATIENT
Start: 2024-09-15 | End: 2024-09-19

## 2024-09-14 RX ORDER — CALCIUM CARBONATE 500 MG/1
500 TABLET, CHEWABLE ORAL EVERY 6 HOURS PRN
Status: DISCONTINUED | OUTPATIENT
Start: 2024-09-14 | End: 2024-09-19

## 2024-09-14 RX ORDER — ACETAMINOPHEN 500 MG
500 TABLET ORAL EVERY 4 HOURS PRN
Status: DISCONTINUED | OUTPATIENT
Start: 2024-09-14 | End: 2024-09-19

## 2024-09-14 NOTE — CONSULTS
[facility]    PATIENT'S NAME: CEZAR JOVEL   ATTENDING PHYSICIAN: Yamil Khan MD   CONSULTING PHYSICIAN: Bing Boyle MD   PATIENT ACCOUNT#:   [id number]    LOCATION:  [facility]  MEDICAL RECORD #:   [id number]       YOB: 1940  ADMISSION DATE:       2024      CONSULT DATE:  2024    REPORT OF CONSULTATION    HISTORY OF PRESENT ILLNESS:  Patient is an 84-year-old white female with a history of asthma, COPD, quit smoking 20 years ago, presented to emergency room with increasing shortness of breath in the past 2 weeks.  The patient also experienced cough, nonproductive; wheezing and tightness of the chest, lower leg edema.  In the emergency room, chest x-ray showed elevation of the left hemidiaphragm, pulmonary interstitial infiltrate.  Diuretic was given and patient was admitted.    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    MEDICATIONS:  Home medications:  Albuterol p.r.n., mupirocin 2% ointment, triamcinolone 0.1% external cream, digoxin 0.25 mg a day, Pulmicort 180 two inhalations q.12 h., Cardizem 240 daily, Lasix 40 mg a day, potassium chloride 20 mEq a day, Prevacid 30 mg a day, amitriptyline 50 mg a day, aspirin 81 mg a day, lisinopril/hydrochlorothiazide 10/12.5 mg a day, Claritin 10 mg a day, Singulair 10 mg at bedtime, Spiriva Respimat 1 inhalation daily, dicyclomine 10 mg 3 times a day.    ALLERGIES:  Penicillin, Levaquin, and Zinacef.    SOCIAL HISTORY:  Patient was a heavy smoker until 20 years ago.  Denies alcohol abuse.    FAMILY HISTORY:  Father  from MI in his 60s.  Mother  from cancer of the ovary at the age of 79.  Both parents had allergies or asthma.    REVIEW OF SYSTEMS:  GENERAL:  Weak.  HEAD:  No headache.  EYES:  No diplopia, blurry vision.  EARS:  No tinnitus, impaired hearing.  NOSE:  No rhinorrhea, epistaxis.  THROAT:  No sore  throat.  NECK:  No neck stiffness.  RESPIRATORY:  See the Present Illness.  CARDIOVASCULAR:  No orthopnea, paroxysmal nocturnal dyspnea.  GASTROINTESTINAL:  No nausea, vomiting, diarrhea.  GENITOURINARY:  No dysuria or hematuria.  MUSCULOSKELETAL:  Lower leg edema.      PHYSICAL EXAMINATION:    VITAL SIGNS:  Temperature 97.5, pulse 81, respirations 21, blood pressure 112/53.   HEENT:  Unremarkable.    CHEST:  Asymmetrical.  The patient is kyphoscoliotic.    LUNGS:  There is rhonchi and wheezing throughout both lung fields.  HEART:  Irregular.  No murmur.  Cardiac dullness was normal.  ABDOMEN:  Soft.  No hepatosplenomegaly.  No ascites.  No hernia.  EXTREMITIES:  There is 3 to 4+ edema.    LABORATORY DATA:  Electrolytes showed sodium 138, potassium 4.2, chloride 100, CO2 of 38, BUN 12, creatinine 0.65.  CBC showed WBC 10,900, RBC 4.20, hemoglobin 13.2, hematocrit 41, platelet count 233,000.    DIAGNOSTIC IMPRESSION:    1.   Acute respiratory failure due to chronic obstructive pulmonary disease, asthma, kyphoscoliosis, and left phrenic nerve paralysis.  2.   Pneumonia cannot be ruled out.  3.   Cor pulmonale.  4.   Chronic atrial fibrillation.  5.   Kyphoscoliosis.  6.   Left phrenic nerve paralysis.    SUGGESTIONS AND RECOMMENDATIONS:    1.   O2 protocol.  2.   DuoNeb q.i.d. and p.r.n.  3.   Solu-Medrol 40 IV q.12 h.  4.   Doxycycline 100 mg IV q.12 h.   5.   Protonix 40 mg IV q.24 h.   6.   Diuretic.    Dictated By Bing Boyle MD  d: 09/13/2024 18:07:39  t: 09/13/2024 18:31:16  Job [id number]  [facility]/

## 2024-09-14 NOTE — PLAN OF CARE
Problem: Patient Centered Care  Goal: Patient preferences are identified and integrated in the patient's plan of care  Description: Interventions:  - What would you like us to know as we care for you? From home alone  - Provide timely, complete, and accurate information to patient/family  - Incorporate patient and family knowledge, values, beliefs, and cultural backgrounds into the planning and delivery of care  - Encourage patient/family to participate in care and decision-making at the level they choose  - Honor patient and family perspectives and choices  Outcome: Progressing     Problem: Patient/Family Goals  Goal: Patient/Family Long Term Goal  Description: Patient's Long Term Goal:     Interventions:  - Monitor vitals  - Monitor appropriate labs  - Administer medications as ordered  - Follow MD's orders  - Update patient on plan of care   - Discharge planning     - See additional Care Plan goals for specific interventions  Outcome: Progressing  Goal: Patient/Family Short Term Goal  Description: Patient's Short Term Goal:     Interventions:   - Monitor vitals  - Monitor appropriate labs  - Administer medications as ordered  - Follow MD's orders  - Update patient on plan of care   - Discharge planning     - See additional Care Plan goals for specific interventions  Outcome: Progressing     Problem: RESPIRATORY - ADULT  Goal: Achieves optimal ventilation and oxygenation  Description: INTERVENTIONS:  - Assess for changes in respiratory status  - Assess for changes in mentation and behavior  - Position to facilitate oxygenation and minimize respiratory effort  - Oxygen supplementation based on oxygen saturation or ABGs  - Provide Smoking Cessation handout, if applicable  - Encourage broncho-pulmonary hygiene including cough, deep breathe, Incentive Spirometry  - Assess the need for suctioning and perform as needed  - Assess and instruct to report SOB or any respiratory difficulty  - Respiratory Therapy support as  indicated  - Manage/alleviate anxiety  - Monitor for signs/symptoms of CO2 retention  Outcome: Progressing

## 2024-09-14 NOTE — PROGRESS NOTES
[facility] Cardiology Progress Note    Yesenia Berkowitz Patient Status:  Inpatient    1940 MRN [id number]   Location [facility]W Attending Yamil Khan MD   Hosp Day # 1 PCP JO-ANN YANG MD     Subjective:  Denies cp. Dyspnea improved     Objective:  /54 (BP Location: Right arm)   Pulse 71   Temp 97.5 °F (36.4 °C) (Oral)   Resp 18   Wt 168 lb 3.2 oz (76.3 kg)   SpO2 95%   BMI 27.99 kg/m²     Telemetry: NSR       Intake/Output:    Intake/Output Summary (Last 24 hours) at 2024 0829  Last data filed at 2024 2212  Gross per 24 hour   Intake 580 ml   Output --   Net 580 ml       Last 3 Weights   24 0643 168 lb 3.2 oz (76.3 kg)   24 1832 160 lb 0.9 oz (72.6 kg)   24 1212 160 lb (72.6 kg)   01/10/24 1442 160 lb (72.6 kg)       Labs:  Recent Labs   Lab 24  1357 24  0437   * 121*   BUN 12 21   CREATSERUM 0.65 0.70   EGFRCR 88 86   CA 9.2 8.8    138   K 4.2 4.5    98   CO2 38.0* 39.0*     Recent Labs   Lab 24  1357 24  0437   RBC 4.20 3.69*   HGB 13.2 11.6*   HCT 41.1 35.5   MCV 97.9 96.2   MCH 31.4 31.4   MCHC 32.1 32.7   RDW 13.7 13.6   NEPRELIM 8.52* 8.78*   WBC 10.9 9.7   .0 206.0         Recent Labs   Lab 24  1357   TROPHS 7       Diagnostics:         Review of Systems   Respiratory:  Positive for shortness of breath and wheezing.    Cardiovascular: Negative.      Physical Exam:    General: Alert and oriented x 3. No apparent distress.   HEENT: Normocephalic, anicteric sclera, neck supple, no thyromegaly or adenopathy.  Neck: No JVD, carotids 2+, no bruits.  Cardiac: Regular rate & rhythm. S1, S2 normal. No murmur, pericardial rub, S3, or extra cardiac sounds.  Lungs: Mild exp wheezing.  Normal excursions and effort.  Abdomen: Soft, non-tender. No organosplenomegally, mass or rebound, BS-present.  Extremities: Without clubbing or cyanosis.  No left lower extremity edema, no right lower extremity  edema.  Neurologic: Alert and oriented, normal affect. No focal defects  Skin: Warm and dry.       Medications:   furosemide  40 mg Intravenous BID (Diuretic)    enoxaparin  40 mg Subcutaneous Daily    doxycycline  100 mg Intravenous Q12H    methylPREDNISolone  40 mg Intravenous Q12H    amitriptyline  50 mg Oral Nightly    aspirin  162.5 mg Oral Daily    digoxin  125 mcg Oral Daily    dilTIAZem ER  240 mg Oral Daily    pantoprazole  40 mg Oral QAM AC    cetirizine  10 mg Oral Daily    montelukast  10 mg Oral Nightly    lisinopril (Zestril) 10 mg, hydroCHLOROthiazide 12.5 mg for Zestoretic 10-12.5 ([facility] only)   Oral Daily    fluticasone furoate  1 puff Inhalation Daily         Assessment:    COPD exacerbation  - Primary causes of increased exertional dyspnea is likely secondary to above, noted significant wheezing on exam on admission with occasional cough. Sx improving   - Continue IV steroids, nebulizers, & inhalers   - Further management per pulmonary     HFpEF  - Clinically does not appear significant overloaded, very minimal edema on admission and proBNP was normal, however chest x-ray read as possible interstitial edema  - Continue IV Lasix twice daily empirically  - Continue lisinopril-hydrochlorothiazide    - Consider SGLT2 inhibitor as outpatient     Paroxysmal atrial fibrillation  - Currently in sinus rhythm  - Continue diltiazem & digoxin   - Per patient preference avoiding anticoagulation, history of GI bleed in the past and low A-fib burden (based on recent office note)      HTN  - Stable     Plan:    Dyspnea improving.  Continue IV steroids, nebulizers, & inhalers per Pulm   Appears compensated . Scr stable. Continue IVP lasix for mild interstitial edema noted on cxr yesterday  Monitor accurate volume status & renal fx closely     PAULIE Scott  9/14/2024  8:29 AM  Ph [phone] ([place])  Ph [phone] ([place])      CARDIOLOGIST ADDENDUM:    I agree with the findings above.  I have personally  performed the Assessment and Plan in its entirety with my edits made to the above.    Davey Vasquez M.D.   Cardiology/Electrophysiology   9/14/2024  L3  -----------------------------------------

## 2024-09-14 NOTE — PLAN OF CARE
Pt is ao x 4. On 3L of O2 HFNC. Pt is ambulating independently. On tele/pulse ox. Pt refused CGM agreement form. 2D echo completed. Son at bedside. Call light in reach. Safety precautions in place.   Problem: Patient Centered Care  Goal: Patient preferences are identified and integrated in the patient's plan of care  Description: Interventions:  - What would you like us to know as we care for you? From home alone  - Provide timely, complete, and accurate information to patient/family  - Incorporate patient and family knowledge, values, beliefs, and cultural backgrounds into the planning and delivery of care  - Encourage patient/family to participate in care and decision-making at the level they choose  - Honor patient and family perspectives and choices  Outcome: Progressing     Problem: Patient/Family Goals  Goal: Patient/Family Long Term Goal  Description: Patient's Long Term Goal:     Interventions:  - Monitor vitals  - Monitor appropriate labs  - Administer medications as ordered  - Follow MD's orders  - Update patient on plan of care   - Discharge planning     - See additional Care Plan goals for specific interventions  Outcome: Progressing  Goal: Patient/Family Short Term Goal  Description: Patient's Short Term Goal:     Interventions:   - Monitor vitals  - Monitor appropriate labs  - Administer medications as ordered  - Follow MD's orders  - Update patient on plan of care   - Discharge planning     - See additional Care Plan goals for specific interventions  Outcome: Progressing     Problem: RESPIRATORY - ADULT  Goal: Achieves optimal ventilation and oxygenation  Description: INTERVENTIONS:  - Assess for changes in respiratory status  - Assess for changes in mentation and behavior  - Position to facilitate oxygenation and minimize respiratory effort  - Oxygen supplementation based on oxygen saturation or ABGs  - Provide Smoking Cessation handout, if applicable  - Encourage broncho-pulmonary hygiene including  cough, deep breathe, Incentive Spirometry  - Assess the need for suctioning and perform as needed  - Assess and instruct to report SOB or any respiratory difficulty  - Respiratory Therapy support as indicated  - Manage/alleviate anxiety  - Monitor for signs/symptoms of CO2 retention  Outcome: Progressing

## 2024-09-14 NOTE — DIETARY NOTE
Nutrition Education:     9/14/24: Received consult for heart failure diet education 9/13 1800. Education deferred today and will provide prior to discharge.     Lexie Tavarez RD, LDN, [facility] ([id number])

## 2024-09-15 LAB
ANION GAP SERPL CALC-SCNC: 1 MMOL/L (ref 0–18)
BUN BLD-MCNC: 28 MG/DL (ref 9–23)
BUN/CREAT SERPL: 40 (ref 10–20)
CALCIUM BLD-MCNC: 9.1 MG/DL (ref 8.7–10.4)
CHLORIDE SERPL-SCNC: 98 MMOL/L (ref 98–112)
CO2 SERPL-SCNC: 39 MMOL/L (ref 21–32)
CREAT BLD-MCNC: 0.7 MG/DL
DEPRECATED RDW RBC AUTO: 48.4 FL (ref 35.1–46.3)
EGFRCR SERPLBLD CKD-EPI 2021: 86 ML/MIN/1.73M2 (ref 60–?)
ERYTHROCYTE [DISTWIDTH] IN BLOOD BY AUTOMATED COUNT: 13.7 % (ref 11–15)
GLUCOSE BLD-MCNC: 146 MG/DL (ref 70–99)
HCT VFR BLD AUTO: 34.1 %
HGB BLD-MCNC: 11.1 G/DL
MAGNESIUM SERPL-MCNC: 1.9 MG/DL (ref 1.6–2.6)
MCH RBC QN AUTO: 31.4 PG (ref 26–34)
MCHC RBC AUTO-ENTMCNC: 32.6 G/DL (ref 31–37)
MCV RBC AUTO: 96.6 FL
OSMOLALITY SERPL CALC.SUM OF ELEC: 294 MOSM/KG (ref 275–295)
PLATELET # BLD AUTO: 217 10(3)UL (ref 150–450)
POTASSIUM SERPL-SCNC: 4.5 MMOL/L (ref 3.5–5.1)
RBC # BLD AUTO: 3.53 X10(6)UL
SODIUM SERPL-SCNC: 138 MMOL/L (ref 136–145)
WBC # BLD AUTO: 12.9 X10(3) UL (ref 4–11)

## 2024-09-15 PROCEDURE — 99233 SBSQ HOSP IP/OBS HIGH 50: CPT | Performed by: HOSPITALIST

## 2024-09-15 RX ORDER — DIAZEPAM 10 MG/2ML
2.5 INJECTION, SOLUTION INTRAMUSCULAR; INTRAVENOUS ONCE AS NEEDED
Status: DISCONTINUED | OUTPATIENT
Start: 2024-09-15 | End: 2024-09-15

## 2024-09-15 RX ORDER — FUROSEMIDE 40 MG
40 TABLET ORAL DAILY
Status: DISCONTINUED | OUTPATIENT
Start: 2024-09-16 | End: 2024-09-19

## 2024-09-15 RX ORDER — DIGOXIN 125 MCG
125 TABLET ORAL DAILY
Qty: 30 TABLET | Refills: 0 | Status: SHIPPED | OUTPATIENT
Start: 2024-09-16

## 2024-09-15 RX ORDER — DIAZEPAM 10 MG/2ML
2.5 INJECTION, SOLUTION INTRAMUSCULAR; INTRAVENOUS ONCE
Status: DISCONTINUED | OUTPATIENT
Start: 2024-09-15 | End: 2024-09-15

## 2024-09-15 NOTE — PLAN OF CARE
Pt ao x 4 and on 3L of o2 NC. Ambulates independently. On remote tele/ pulse ox. EF 65%-70%. Call light within reach. Son at bedside. Safety precautions in place.     Problem: Patient Centered Care  Goal: Patient preferences are identified and integrated in the patient's plan of care  Description: Interventions:  - What would you like us to know as we care for you? From home alone  - Provide timely, complete, and accurate information to patient/family  - Incorporate patient and family knowledge, values, beliefs, and cultural backgrounds into the planning and delivery of care  - Encourage patient/family to participate in care and decision-making at the level they choose  - Honor patient and family perspectives and choices  Outcome: Progressing     Problem: Patient/Family Goals  Goal: Patient/Family Long Term Goal  Description: Patient's Long Term Goal:     Interventions:  - Monitor vitals  - Monitor appropriate labs  - Administer medications as ordered  - Follow MD's orders  - Update patient on plan of care   - Discharge planning     - See additional Care Plan goals for specific interventions  Outcome: Progressing  Goal: Patient/Family Short Term Goal  Description: Patient's Short Term Goal:     Interventions:   - Monitor vitals  - Monitor appropriate labs  - Administer medications as ordered  - Follow MD's orders  - Update patient on plan of care   - Discharge planning     - See additional Care Plan goals for specific interventions  Outcome: Progressing     Problem: RESPIRATORY - ADULT  Goal: Achieves optimal ventilation and oxygenation  Description: INTERVENTIONS:  - Assess for changes in respiratory status  - Assess for changes in mentation and behavior  - Position to facilitate oxygenation and minimize respiratory effort  - Oxygen supplementation based on oxygen saturation or ABGs  - Provide Smoking Cessation handout, if applicable  - Encourage broncho-pulmonary hygiene including cough, deep breathe, Incentive  Spirometry  - Assess the need for suctioning and perform as needed  - Assess and instruct to report SOB or any respiratory difficulty  - Respiratory Therapy support as indicated  - Manage/alleviate anxiety  - Monitor for signs/symptoms of CO2 retention  Outcome: Progressing     Problem: PAIN - ADULT  Goal: Verbalizes/displays adequate comfort level or patient's stated pain goal  Description: INTERVENTIONS:  - Encourage pt to monitor pain and request assistance  - Assess pain using appropriate pain scale  - Administer analgesics based on type and severity of pain and evaluate response  - Implement non-pharmacological measures as appropriate and evaluate response  - Consider cultural and social influences on pain and pain management  - Manage/alleviate anxiety  - Utilize distraction and/or relaxation techniques  - Monitor for opioid side effects  - Notify MD/LIP if interventions unsuccessful or patient reports new pain  - Anticipate increased pain with activity and pre-medicate as appropriate  Outcome: Progressing     Problem: RISK FOR INFECTION - ADULT  Goal: Absence of fever/infection during anticipated neutropenic period  Description: INTERVENTIONS  - Monitor WBC  - Administer growth factors as ordered  - Implement neutropenic guidelines  Outcome: Progressing     Problem: SAFETY ADULT - FALL  Goal: Free from fall injury  Description: INTERVENTIONS:  - Assess pt frequently for physical needs  - Identify cognitive and physical deficits and behaviors that affect risk of falls.  - [place] fall precautions as indicated by assessment.  - Educate pt/family on patient safety including physical limitations  - Instruct pt to call for assistance with activity based on assessment  - Modify environment to reduce risk of injury  - Provide assistive devices as appropriate  - Consider OT/PT consult to assist with strengthening/mobility  - Encourage toileting schedule  Outcome: Progressing     Problem: DISCHARGE PLANNING  Goal:  Discharge to home or other facility with appropriate resources  Description: INTERVENTIONS:  - Identify barriers to discharge w/pt and caregiver  - Include patient/family/discharge partner in discharge planning  - Arrange for needed discharge resources and transportation as appropriate  - Identify discharge learning needs (meds, wound care, etc)  - Arrange for interpreters to assist at discharge as needed  - Consider post-discharge preferences of patient/family/discharge partner  - Complete POLST form as appropriate  - Assess patient's ability to be responsible for managing their own health  - Refer to Case Management Department for coordinating discharge planning if the patient needs post-hospital services based on physician/LIP order or complex needs related to functional status, cognitive ability or social support system  Outcome: Progressing

## 2024-09-15 NOTE — PLAN OF CARE
Problem: Patient Centered Care  Goal: Patient preferences are identified and integrated in the patient's plan of care  Description: Interventions:  - What would you like us to know as we care for you? From home alone  - Provide timely, complete, and accurate information to patient/family  - Incorporate patient and family knowledge, values, beliefs, and cultural backgrounds into the planning and delivery of care  - Encourage patient/family to participate in care and decision-making at the level they choose  - Honor patient and family perspectives and choices  Outcome: Progressing     Problem: Patient/Family Goals  Goal: Patient/Family Long Term Goal  Description: Patient's Long Term Goal: To discharge from hospital     Interventions:  - Monitor vitals  - Monitor appropriate labs  - Administer medications as ordered  - Follow MD's orders  - Update patient on plan of care   - Discharge planning     - See additional Care Plan goals for specific interventions  Outcome: Progressing  Goal: Patient/Family Short Term Goal  Description: Patient's Short Term Goal: To improve shortness of breath     Interventions:   - Monitor vitals  - Monitor appropriate labs  - Administer medications as ordered  - Follow MD's orders  - Update patient on plan of care     - See additional Care Plan goals for specific interventions  Outcome: Progressing     Problem: RESPIRATORY - ADULT  Goal: Achieves optimal ventilation and oxygenation  Description: INTERVENTIONS:  - Assess for changes in respiratory status  - Assess for changes in mentation and behavior  - Position to facilitate oxygenation and minimize respiratory effort  - Oxygen supplementation based on oxygen saturation or ABGs  - Provide Smoking Cessation handout, if applicable  - Encourage broncho-pulmonary hygiene including cough, deep breathe, Incentive Spirometry  - Assess the need for suctioning and perform as needed  - Assess and instruct to report SOB or any respiratory  difficulty  - Respiratory Therapy support as indicated  - Manage/alleviate anxiety  - Monitor for signs/symptoms of CO2 retention  Outcome: Progressing     Problem: PAIN - ADULT  Goal: Verbalizes/displays adequate comfort level or patient's stated pain goal  Description: INTERVENTIONS:  - Encourage pt to monitor pain and request assistance  - Assess pain using appropriate pain scale  - Administer analgesics based on type and severity of pain and evaluate response  - Implement non-pharmacological measures as appropriate and evaluate response  - Consider cultural and social influences on pain and pain management  - Manage/alleviate anxiety  - Utilize distraction and/or relaxation techniques  - Monitor for opioid side effects  - Notify MD/LIP if interventions unsuccessful or patient reports new pain  - Anticipate increased pain with activity and pre-medicate as appropriate  Outcome: Progressing     Problem: RISK FOR INFECTION - ADULT  Goal: Absence of fever/infection during anticipated neutropenic period  Description: INTERVENTIONS  - Monitor WBC  - Administer growth factors as ordered  - Implement neutropenic guidelines  Outcome: Progressing     Problem: SAFETY ADULT - FALL  Goal: Free from fall injury  Description: INTERVENTIONS:  - Assess pt frequently for physical needs  - Identify cognitive and physical deficits and behaviors that affect risk of falls.  - [place] fall precautions as indicated by assessment.  - Educate pt/family on patient safety including physical limitations  - Instruct pt to call for assistance with activity based on assessment  - Modify environment to reduce risk of injury  - Provide assistive devices as appropriate  - Consider OT/PT consult to assist with strengthening/mobility  - Encourage toileting schedule  Outcome: Progressing     Problem: DISCHARGE PLANNING  Goal: Discharge to home or other facility with appropriate resources  Description: INTERVENTIONS:  - Identify barriers to  discharge w/pt and caregiver  - Include patient/family/discharge partner in discharge planning  - Arrange for needed discharge resources and transportation as appropriate  - Identify discharge learning needs (meds, wound care, etc)  - Arrange for interpreters to assist at discharge as needed  - Consider post-discharge preferences of patient/family/discharge partner  - Complete POLST form as appropriate  - Assess patient's ability to be responsible for managing their own health  - Refer to Case Management Department for coordinating discharge planning if the patient needs post-hospital services based on physician/LIP order or complex needs related to functional status, cognitive ability or social support system  Outcome: Progressing     Monitoring vital signs, stable at this time. No acute changes at this moment. Fall precautions in place- bed alarm on, bed locked in lowest position, call light within reach. Frequent rounding by nursing staff.

## 2024-09-15 NOTE — PLAN OF CARE
Correction:    General: Alert and oriented x 3. No apparent distress.   HEENT: Normocephalic, anicteric sclera, neck supple, no thyromegaly or adenopathy.  Neck: No JVD, carotids 2+, no bruits.  Cardiac: Regular rate & rhythm. S1, S2 normal. No murmur, pericardial rub, S3, or extra cardiac sounds.  Lungs: Clear without wheezes, rales, rhonchi or dullness.  Normal excursions and effort.  Abdomen: Soft, non-tender. No organosplenomegally, mass or rebound, BS-present.  Extremities: Without clubbing or cyanosis.  +trace chronic LLE lymphedema, no right lower extremity edema.  Neurologic: Alert and oriented, normal affect. No focal defects  Skin: Warm and dry.       PAULIE Scott  9/14/2024  8:30 AM  Ph [phone] ([place])  Ph [phone] ([place])

## 2024-09-15 NOTE — PROGRESS NOTES
[facility]  part of [facility]    Progress Note    Yesenia Berkowitz Patient Status:  Inpatient    1940 MRN [id number]   Location [facility]5W Attending Jose Pfeiffer MD   Hosp Day # 2 PCP JO-ANN YANG MD       Subjective:   Yesenia Berkowitz is feeling better, wants to know what happened to her lungs because she still has shortness of breath.    Review of Systems:   10 point ROS completed and was negative, except for pertinent positive and negatives stated in subjective.    Objective:     Vitals:    24 1701 24 2044 24 2354 09/15/24 0548   BP: 103/51 117/52 102/50 99/57   BP Location: Right arm Right arm Right arm Right arm   Pulse: 81 84 83 71   Resp:    Temp: 98.2 °F (36.8 °C) 98.1 °F (36.7 °C) 98.4 °F (36.9 °C) 97.6 °F (36.4 °C)   TempSrc: Oral Oral Oral Oral   SpO2: 92% 95% 93% 92%   Weight:    168 lb 8 oz (76.4 kg)     Patient Weight for the past 72 hrs:   Weight   24 1832 160 lb 0.9 oz (72.6 kg)   24 0643 168 lb 3.2 oz (76.3 kg)       Intake/Output Summary (Last 24 hours) at 9/15/2024 0831  Last data filed at 9/15/2024 0550  Gross per 24 hour   Intake 802 ml   Output 350 ml   Net 452 ml       Current Scheduled Inpatient Meds:      polyethylene glycol (PEG 3350)  17 g Oral BID    aspirin  81 mg Oral Daily    cetirizine  5 mg Oral Daily    doxycycline  100 mg Oral 2 times per day    lansoprazole  30 mg Oral QAM AC    furosemide  40 mg Intravenous BID (Diuretic)    enoxaparin  40 mg Subcutaneous Daily    methylPREDNISolone  40 mg Intravenous Q12H    amitriptyline  50 mg Oral Nightly    digoxin  125 mcg Oral Daily    dilTIAZem ER  240 mg Oral Daily    montelukast  10 mg Oral Nightly    lisinopril (Zestril) 10 mg, hydroCHLOROthiazide 12.5 mg for Zestoretic 10-12.5 ([facility] only)   Oral Daily    fluticasone furoate  1 puff Inhalation Daily       Current PRN Inpatient Meds:        calcium carbonate    acetaminophen    metoclopramide     ipratropium-albuterol    temazepam    ondansetron    guaiFENesin    benzonatate    Results:     Lab Results   Component Value Date    WBC 12.9 (H) 09/15/2024    HGB 11.1 (L) 09/15/2024    HCT 34.1 (L) 09/15/2024    .0 09/15/2024    CREATSERUM 0.70 09/15/2024    BUN 28 (H) 09/15/2024     09/15/2024    K 4.5 09/15/2024    CL 98 09/15/2024    CO2 39.0 (H) 09/15/2024     (H) 09/15/2024    CA 9.1 09/15/2024    ALB 2.8 (L) 08/30/2024    ALKPHO 48 (L) 08/30/2024    BILT 0.2 08/30/2024    TP 4.8 (L) 08/30/2024    AST 15 08/30/2024    ALT 22 08/30/2024    PTT 30.1 03/14/2023    INR 1.12 03/14/2023    TSH 1.060 01/06/2023    LIP 30 08/30/2024    DDIMER 0.72 09/13/2024    MG 1.9 09/15/2024    TROP <0.045 02/03/2020       Recent Labs   Lab 09/13/24  1357 09/14/24  0437 09/15/24  0513   RBC 4.20 3.69* 3.53*   HGB 13.2 11.6* 11.1*   HCT 41.1 35.5 34.1*   MCV 97.9 96.2 96.6   MCH 31.4 31.4 31.4   MCHC 32.1 32.7 32.6   RDW 13.7 13.6 13.7   NEPRELIM 8.52* 8.78*  --    WBC 10.9 9.7 12.9*   .0 206.0 217.0     Recent Labs   Lab 09/13/24  1357 09/14/24  0437 09/15/24  0513   * 121* 146*   BUN 12 21 28*   CREATSERUM 0.65 0.70 0.70   CA 9.2 8.8 9.1    138 138   K 4.2 4.5 4.5    98 98   CO2 38.0* 39.0* 39.0*     Lab Results   Component Value Date    INR 1.12 03/14/2023       Culture:  No results found for this visit on 09/13/24.    Imaging/EKG:   XR CHEST AP PORTABLE  (CPT=71045)    Result Date: 9/13/2024  CONCLUSION:  1. Mild cardiomegaly and mild interstitial edema but decreased from previous study.  I cannot exclude a component of cardiac failure/fluid overload.  Chronic moderate elevation left hemidiaphragm.  Small bilateral pleural effusions appear to have increased since previous study, and I cannot exclude small areas of bibasilar pneumonia and or atelectasis with a progressed as well from previous study.  Correlate clinically and follow-up studies are advised. 2. Small subcentimeter in  size nodules in bilateral upper lung fields.  Correlate with CT scan findings.    Dictated by (CST): Kings Pritchard MD on 9/13/2024 at 3:29 PM     Finalized by (CST): Kings Pritchard MD on 9/13/2024 at 3:32 PM         EKG 12 Lead    Result Date: 9/14/2024  Sinus rhythm with Premature supraventricular complexes Cannot rule out Anterior infarct (cited on or before 30-AUG-2023) Abnormal ECG When compared with ECG of 30-AUG-2023 21:55, Premature supraventricular complexes are now Present Non-specific change in ST segment in Lateral leads Confirmed by DO Sargent Asif ([id number]) on 9/14/2024 8:28:59 AM   Assessment and Plan:   Patient is an 84-year-old female with hx of COPD on home O2, HTN, HLD, Afib not on AC, who p/w progressive leg edema, dyspnea on exertion and shortness of breath.     # Acute exacerbation of HFpEF  # Paroxysmal Afib, not on AC  # HTN  # HLD  - Echo unremarkable  - continue home diltiazem, digoxin, lisinopril  - Lasix IV -> PO  - Cards on consult    # Acute on chronic hypoxemic respiratory failure   # COPD exacerbation  # Left phrenic nerve paralysis  # Kyphoscoliosis  - Steroid, bronchodilator, doxycycline  - Pulm on consult    Diet: low salt  PT/OT: deferred  DVT ppx: Lovenox  Code status: Full  Dispo: per clinical course    MDM: high Jose Pfeiffer MD, PhD  Message via Secure Chat  [facility] Hospitalist

## 2024-09-15 NOTE — PROGRESS NOTES
[facility]    PATIENT'S NAME: CEZAR JOVEL   ATTENDING PHYSICIAN: Yamil Khan MD   CONSULTING PHYSICIAN: Bing Boyle MD   PATIENT ACCOUNT#:   [id number]    LOCATION:  [facility]  MEDICAL RECORD #:   [id number]       YOB: 1940  ADMISSION DATE:       2024      CONSULT DATE:  2024    REPORT OF CONSULTATION    HISTORY OF PRESENT ILLNESS:  Patient is an 84-year-old white female with a history of asthma, COPD, quit smoking 20 years ago, presented to emergency room with increasing shortness of breath in the past 2 weeks.  The patient also experienced cough, nonproductive; wheezing and tightness of the chest, lower leg edema.  In the emergency room, chest x-ray showed elevation of the left hemidiaphragm, pulmonary interstitial infiltrate.  Diuretic was given and patient was admitted.    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    MEDICATIONS:  Home medications:  Albuterol p.r.n., mupirocin 2% ointment, triamcinolone 0.1% external cream, digoxin 0.25 mg a day, Pulmicort 180 two inhalations q.12 h., Cardizem 240 daily, Lasix 40 mg a day, potassium chloride 20 mEq a day, Prevacid 30 mg a day, amitriptyline 50 mg a day, aspirin 81 mg a day, lisinopril/hydrochlorothiazide 10/12.5 mg a day, Claritin 10 mg a day, Singulair 10 mg at bedtime, Spiriva Respimat 1 inhalation daily, dicyclomine 10 mg 3 times a day.    ALLERGIES:  Penicillin, Levaquin, and Zinacef.    SOCIAL HISTORY:  Patient was a heavy smoker until 20 years ago.  Denies alcohol abuse.    FAMILY HISTORY:  Father  from MI in his 60s.  Mother  from cancer of the ovary at the age of 79.  Both parents had allergies or asthma.    REVIEW OF SYSTEMS:  GENERAL:  Weak.  HEAD:  No headache.  EYES:  No diplopia, blurry vision.  EARS:  No tinnitus, impaired hearing.  NOSE:  No rhinorrhea, epistaxis.  THROAT:  No sore  throat.  NECK:  No neck stiffness.  RESPIRATORY:  See the Present Illness.  CARDIOVASCULAR:  No orthopnea, paroxysmal nocturnal dyspnea.  GASTROINTESTINAL:  No nausea, vomiting, diarrhea.  GENITOURINARY:  No dysuria or hematuria.  MUSCULOSKELETAL:  Lower leg edema.  Better today    9/15/2024 shortness of breath with dyspnea on exertion    PHYSICAL EXAMINATION:    VITAL SIGNS: afebrile, on 3L  HEENT:  Unremarkable.    CHEST:  Asymmetrical.  The patient is kyphoscoliotic.    LUNGS:  There is rhonchi and wheezing throughout both lung fields.  HEART:  Irregular.  No murmur.  Cardiac dullness was normal.  ABDOMEN:  Soft.  No hepatosplenomegaly.  No ascites.  No hernia.  EXTREMITIES: Wearing stockings    LABORATORY DATA:  Electrolytes showed sodium 138, potassium 4.2, chloride 100, CO2 of 38, BUN 12, creatinine 0.65.  CBC showed WBC 10,900, RBC 4.20, hemoglobin 13.2, hematocrit 41, platelet count 233,000.    DIAGNOSTIC IMPRESSION:    1.   Acute respiratory failure due to chronic obstructive pulmonary disease, asthma, kyphoscoliosis, and left phrenic nerve paralysis.  2.   Pneumonia cannot be ruled out.  3.   Cor pulmonale.  4.   Chronic atrial fibrillation.  5.   Kyphoscoliosis.  6.   Left phrenic nerve paralysis.  Elevated left diaphragm    SUGGESTIONS AND RECOMMENDATIONS:    1.   O2 protocol.  2.   DuoNeb q.i.d. and p.r.n.  3.   Solu-Medrol 40 IV q.12 h.  4.   Doxycycline 100 mg IV q.12 h.   5.   Protonix 40 mg IV q.24 h.   6.   Diuretic.  7.      DW Pt and Family, recommend pulmonary rehab    Enrique Padilla MD.  RST/

## 2024-09-15 NOTE — PROGRESS NOTES
[facility] Cardiology Progress Note    Yesenia Berkowitz Patient Status:  Inpatient    1940 MRN [id number]   Location [facility]5W Attending Yamil Khan MD   Hosp Day # 2 PCP JO-ANN YANG MD     Subjective:  Denies cp, sob at rest. Mild OJEDA improving   On 3L 02 NC  (baseline 2)   Not happy with fluid restriction     Objective:  /81 (BP Location: Right arm)   Pulse 75   Temp 97.5 °F (36.4 °C) (Oral)   Resp 20   Wt 168 lb 8 oz (76.4 kg)   SpO2 91%   BMI 28.04 kg/m²     Telemetry: NSR       Intake/Output:    Intake/Output Summary (Last 24 hours) at 9/15/2024 0926  Last data filed at 9/15/2024 0550  Gross per 24 hour   Intake 702 ml   Output 350 ml   Net 352 ml       Last 3 Weights   09/15/24 0548 168 lb 8 oz (76.4 kg)   24 0643 168 lb 3.2 oz (76.3 kg)   24 1832 160 lb 0.9 oz (72.6 kg)   24 1212 160 lb (72.6 kg)   01/10/24 1442 160 lb (72.6 kg)       Labs:  Recent Labs   Lab 24  1357 24  0437 09/15/24  0513   * 121* 146*   BUN 12 21 28*   CREATSERUM 0.65 0.70 0.70   EGFRCR 88 86 86   CA 9.2 8.8 9.1    138 138   K 4.2 4.5 4.5    98 98   CO2 38.0* 39.0* 39.0*     Recent Labs   Lab 24  1357 24  0437 09/15/24  0513   RBC 4.20 3.69* 3.53*   HGB 13.2 11.6* 11.1*   HCT 41.1 35.5 34.1*   MCV 97.9 96.2 96.6   MCH 31.4 31.4 31.4   MCHC 32.1 32.7 32.6   RDW 13.7 13.6 13.7   NEPRELIM 8.52* 8.78*  --    WBC 10.9 9.7 12.9*   .0 206.0 217.0         Recent Labs   Lab 24  1357   TROPHS 7       Diagnostics:    9/ 15/24 Echo  1. Left ventricle: The cavity size was normal. Wall thickness was mildly      increased. Systolic function was hyperdynamic. The estimated ejection      fraction was 65-70%, by visual assessment. No diagnostic evidence for      regional wall motion abnormalities. Unable to assess LV diastolic      function.   2. Right ventricle: The cavity size was mildly increased. Systolic pressure      was moderately  increased.   3. Aortic valve: There was thickening. The findings were consistent with      mild stenosis. The peak systolic velocity was 2.06m/sec. The mean      systolic gradient was 10mm Hg. The valve area (VTI) was 2.17cm^2. The      valve area (VTI) index was 1.19cm^2/m^2.   4. Mitral valve: The annulus was moderately calcified. The leaflets were      mildly thickened. Transvalvular velocity was increased. The findings were      consistent with mild stenosis. The mean diastolic gradient was 5mm Hg.   5. Pulmonary arteries: Systolic pressure was moderately increased, estimated      to be 56mm Hg.       Review of Systems   Respiratory:  Positive for shortness of breath.    Cardiovascular: Negative.      Physical Exam:    General: Alert and oriented x 3. No apparent distress.   HEENT: Normocephalic, anicteric sclera, neck supple, no thyromegaly or adenopathy.  Neck: No JVD, carotids 2+, no bruits.  Cardiac: Regular rate & rhythm. S1, S2 normal. No murmur, pericardial rub, S3, or extra cardiac sounds.  Lungs: Faint crackles bilaterally. No wheezing. Normal excursions and effort.  Abdomen: Soft, non-tender. No organosplenomegally, mass or rebound, BS-present.  Extremities: Without clubbing or cyanosis.  +Trace chronic LLE edema > RLE (Baseline )   Neurologic: Alert and oriented, normal affect. No focal defects  Skin: Warm and dry.       Medications:   polyethylene glycol (PEG 3350)  17 g Oral BID    aspirin  81 mg Oral Daily    cetirizine  5 mg Oral Daily    doxycycline  100 mg Oral 2 times per day    lansoprazole  30 mg Oral QAM AC    furosemide  40 mg Intravenous BID (Diuretic)    enoxaparin  40 mg Subcutaneous Daily    methylPREDNISolone  40 mg Intravenous Q12H    amitriptyline  50 mg Oral Nightly    digoxin  125 mcg Oral Daily    dilTIAZem ER  240 mg Oral Daily    montelukast  10 mg Oral Nightly    lisinopril (Zestril) 10 mg, hydroCHLOROthiazide 12.5 mg for Zestoretic 10-12.5 (EE[facility] only)   Oral Daily     fluticasone furoate  1 puff Inhalation Daily         Assessment:    COPD exacerbation  - Primary causes of increased exertional dyspnea is likely secondary to above, noted significant wheezing on exam on admission with occasional cough. Sx improving   - Continue IV steroids, nebulizers, & inhalers   - Further management per pulmonary     Acute HFpEF  - Clinically does not appear significant overloaded, very minimal edema on admission and proBNP was normal, however chest x-ray read as possible interstitial edema  - On IV Lasix twice daily empirically  - Continue lisinopril-hydrochlorothiazide    - Consider SGLT2 inhibitor as outpatient  - Apply compression stockings. I/o inaccurate . Monitor accurate volume status & renal fx closely.   - Echo w/ EF 65-70%, no wma, mod increased RV systolic pressure, mild AoS, mild MS, pasp 56mmHg      Paroxysmal atrial fibrillation  - Currently in sinus rhythm  - Continue diltiazem & digoxin   - Per patient preference avoiding anticoagulation, history of GI bleed in the past and low A-fib burden (based on recent office note)      HTN  - Stable     Plan:    Mild jackson, improving. Scr stable. Switch lasix to 40mg po daily  Further recommendations per Pulm   Will follow peripherally at this time. Continue current cardiac medications. Please call with any questions / concerns       PAULIE Scott  9/15/2024  9:27 AM  Ph [phone] ([place])  Ph [phone] ([place])          CARDIOLOGIST ADDENDUM:    I agree with the findings above.  I have personally performed the Assessment and Plan in its entirety, as detailed below:    Acute HFpEF  - Echo w/ EF 65-70%, no wma, mod increased RV systolic pressure, mild AoS, mild MS, pasp 56mmHg   - Continues to improve  - Change to Lasix 40 PO daily  - Consider SGLT2 inhibitor as outpatient  - Does not need an ischemic evaluation at this time. I had raised this consideration with her, but given the overall findings on her echo and improvement in  symptoms, this can be considered by Dr Fowler as an outpatient.     Paroxysmal atrial fibrillation  - Currently in sinus rhythm, outpt monitoring showed recurrent PAF albeit low burden.  - Continue diltiazem & digoxin   - CHADSVASC = 4.  I recommended starting anticoagulation. However, based on her wishes and prior outpt discussions, she will not start a/c at this time.     HTN  - BP at goal  - Continue current meds    Davey Vasquez M.D.   Cardiology/Electrophysiology   9/15/2024  L3  -----------------------------------------     intermittent compression stockings

## 2024-09-15 NOTE — PROGRESS NOTES
[facility]    PATIENT'S NAME: CEZAR JOVEL   ATTENDING PHYSICIAN: Yamil Khan MD   CONSULTING PHYSICIAN: Bing Boyle MD   PATIENT ACCOUNT#:   [id number]    LOCATION:  [facility]  MEDICAL RECORD #:   [id number]       YOB: 1940  ADMISSION DATE:       2024      CONSULT DATE:  2024    REPORT OF CONSULTATION    HISTORY OF PRESENT ILLNESS:  Patient is an 84-year-old white female with a history of asthma, COPD, quit smoking 20 years ago, presented to emergency room with increasing shortness of breath in the past 2 weeks.  The patient also experienced cough, nonproductive; wheezing and tightness of the chest, lower leg edema.  In the emergency room, chest x-ray showed elevation of the left hemidiaphragm, pulmonary interstitial infiltrate.  Diuretic was given and patient was admitted.    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    MEDICATIONS:  Home medications:  Albuterol p.r.n., mupirocin 2% ointment, triamcinolone 0.1% external cream, digoxin 0.25 mg a day, Pulmicort 180 two inhalations q.12 h., Cardizem 240 daily, Lasix 40 mg a day, potassium chloride 20 mEq a day, Prevacid 30 mg a day, amitriptyline 50 mg a day, aspirin 81 mg a day, lisinopril/hydrochlorothiazide 10/12.5 mg a day, Claritin 10 mg a day, Singulair 10 mg at bedtime, Spiriva Respimat 1 inhalation daily, dicyclomine 10 mg 3 times a day.    ALLERGIES:  Penicillin, Levaquin, and Zinacef.    SOCIAL HISTORY:  Patient was a heavy smoker until 20 years ago.  Denies alcohol abuse.    FAMILY HISTORY:  Father  from MI in his 60s.  Mother  from cancer of the ovary at the age of 79.  Both parents had allergies or asthma.    REVIEW OF SYSTEMS:  GENERAL:  Weak.  HEAD:  No headache.  EYES:  No diplopia, blurry vision.  EARS:  No tinnitus, impaired hearing.  NOSE:  No rhinorrhea, epistaxis.  THROAT:  No sore  throat.  NECK:  No neck stiffness.  RESPIRATORY:  See the Present Illness.  CARDIOVASCULAR:  No orthopnea, paroxysmal nocturnal dyspnea.  GASTROINTESTINAL:  No nausea, vomiting, diarrhea.  GENITOURINARY:  No dysuria or hematuria.  MUSCULOSKELETAL:  Lower leg edema.  Better today    PHYSICAL EXAMINATION:    VITAL SIGNS: afebrile, on 3L  HEENT:  Unremarkable.    CHEST:  Asymmetrical.  The patient is kyphoscoliotic.    LUNGS:  There is rhonchi and wheezing throughout both lung fields.  HEART:  Irregular.  No murmur.  Cardiac dullness was normal.  ABDOMEN:  Soft.  No hepatosplenomegaly.  No ascites.  No hernia.  EXTREMITIES:  There is 3 to 4+ edema.    LABORATORY DATA:  Electrolytes showed sodium 138, potassium 4.2, chloride 100, CO2 of 38, BUN 12, creatinine 0.65.  CBC showed WBC 10,900, RBC 4.20, hemoglobin 13.2, hematocrit 41, platelet count 233,000.    DIAGNOSTIC IMPRESSION:    1.   Acute respiratory failure due to chronic obstructive pulmonary disease, asthma, kyphoscoliosis, and left phrenic nerve paralysis.  2.   Pneumonia cannot be ruled out.  3.   Cor pulmonale.  4.   Chronic atrial fibrillation.  5.   Kyphoscoliosis.  6.   Left phrenic nerve paralysis.    SUGGESTIONS AND RECOMMENDATIONS:    1.   O2 protocol.  2.   DuoNeb q.i.d. and p.r.n.  3.   Solu-Medrol 40 IV q.12 h.  4.   Doxycycline 100 mg IV q.12 h.   5.   Protonix 40 mg IV q.24 h.   6.   Diuretic.  7.      DW Pt and Family    Enrique Padilla MD.  RST/

## 2024-09-16 LAB
BUN BLD-MCNC: 38 MG/DL (ref 9–23)
BUN/CREAT SERPL: 48.1 (ref 10–20)
CALCIUM BLD-MCNC: 9.1 MG/DL (ref 8.7–10.4)
CHLORIDE SERPL-SCNC: 93 MMOL/L (ref 98–112)
CO2 SERPL-SCNC: >40 MMOL/L (ref 21–32)
CREAT BLD-MCNC: 0.79 MG/DL
DEPRECATED RDW RBC AUTO: 47.8 FL (ref 35.1–46.3)
EGFRCR SERPLBLD CKD-EPI 2021: 75 ML/MIN/1.73M2 (ref 60–?)
ERYTHROCYTE [DISTWIDTH] IN BLOOD BY AUTOMATED COUNT: 13.6 % (ref 11–15)
GLUCOSE BLD-MCNC: 156 MG/DL (ref 70–99)
HCT VFR BLD AUTO: 35.8 %
HGB BLD-MCNC: 11.9 G/DL
MAGNESIUM SERPL-MCNC: 1.9 MG/DL (ref 1.6–2.6)
MCH RBC QN AUTO: 31.7 PG (ref 26–34)
MCHC RBC AUTO-ENTMCNC: 33.2 G/DL (ref 31–37)
MCV RBC AUTO: 95.5 FL
NT-PROBNP SERPL-MCNC: 256 PG/ML (ref ?–450)
OSMOLALITY SERPL CALC.SUM OF ELEC: 292 MOSM/KG (ref 275–295)
PLATELET # BLD AUTO: 222 10(3)UL (ref 150–450)
POTASSIUM SERPL-SCNC: 4.9 MMOL/L (ref 3.5–5.1)
RBC # BLD AUTO: 3.75 X10(6)UL
SODIUM SERPL-SCNC: 135 MMOL/L (ref 136–145)
WBC # BLD AUTO: 12.5 X10(3) UL (ref 4–11)

## 2024-09-16 PROCEDURE — 99233 SBSQ HOSP IP/OBS HIGH 50: CPT | Performed by: HOSPITALIST

## 2024-09-16 RX ORDER — SODIUM CHLORIDE 9 MG/ML
INJECTION, SOLUTION INTRAVENOUS CONTINUOUS
Status: DISCONTINUED | OUTPATIENT
Start: 2024-09-16 | End: 2024-09-18

## 2024-09-16 NOTE — PLAN OF CARE
Problem: RESPIRATORY - ADULT  Goal: Achieves optimal ventilation and oxygenation  Description: INTERVENTIONS:  - Assess for changes in respiratory status  - Assess for changes in mentation and behavior  - Position to facilitate oxygenation and minimize respiratory effort  - Oxygen supplementation based on oxygen saturation or ABGs  - Provide Smoking Cessation handout, if applicable  - Encourage broncho-pulmonary hygiene including cough, deep breathe, Incentive Spirometry  - Assess the need for suctioning and perform as needed  - Assess and instruct to report SOB or any respiratory difficulty  - Respiratory Therapy support as indicated  - Manage/alleviate anxiety  - Monitor for signs/symptoms of CO2 retention  Outcome: Progressing     Problem: PAIN - ADULT  Goal: Verbalizes/displays adequate comfort level or patient's stated pain goal  Description: INTERVENTIONS:  - Encourage pt to monitor pain and request assistance  - Assess pain using appropriate pain scale  - Administer analgesics based on type and severity of pain and evaluate response  - Implement non-pharmacological measures as appropriate and evaluate response  - Consider cultural and social influences on pain and pain management  - Manage/alleviate anxiety  - Utilize distraction and/or relaxation techniques  - Monitor for opioid side effects  - Notify MD/LIP if interventions unsuccessful or patient reports new pain  - Anticipate increased pain with activity and pre-medicate as appropriate  Outcome: Progressing     Problem: RISK FOR INFECTION - ADULT  Goal: Absence of fever/infection during anticipated neutropenic period  Description: INTERVENTIONS  - Monitor WBC  - Administer growth factors as ordered  - Implement neutropenic guidelines  Outcome: Progressing     Problem: SAFETY ADULT - FALL  Goal: Free from fall injury  Description: INTERVENTIONS:  - Assess pt frequently for physical needs  - Identify cognitive and physical deficits and behaviors that  affect risk of falls.  - [place] fall precautions as indicated by assessment.  - Educate pt/family on patient safety including physical limitations  - Instruct pt to call for assistance with activity based on assessment  - Modify environment to reduce risk of injury  - Provide assistive devices as appropriate  - Consider OT/PT consult to assist with strengthening/mobility  - Encourage toileting schedule  Outcome: Progressing     Patient on 3 L nasal cannula at rest and with activity. On remote telemetry monitoring. Ambulating independently, tolerating cardiac diet. Consent for bronchoscopy signed and in chart.   Safety precautions in place, frequent nursing rounding completed, call light within reach. All questions answered and needs met.

## 2024-09-16 NOTE — CM/SW NOTE
09/16/24 1600   CM/SW Referral Data   Referral Source    Reason for Referral Discharge planning   Informant Patient   Medical Hx   Does patient have an established PCP? Yes  (Brenda Wilkerson)   Patient Info   Patient's Current Mental Status at Time of Assessment Alert;Oriented   Patient's Home Environment House   Number of Levels in Home 1   Patient lives with Alone   Patient Status Prior to Admission   Independent with ADLs and Mobility Yes   Services in place prior to admission DME/Supplies at home   Type of DME/Supplies Standard Walker;Shower Chair   Discharge Needs   Anticipated D/C needs To be determined     Pt discussed during nursing rounds. Dx CHF on 3L O2 (2L is baseline). Home alone, independent w/walker prior to admission. Current w/[employer] for home O2. PT/OT evals requested for dc recommendation.    Plan: TBD    / to remain available for support and/or discharge planning.     GEORGINA Samuel    [phone]

## 2024-09-16 NOTE — PROGRESS NOTES
Heart Failure Nurse  Progress Note    Patient was evaluated by the Heart Failure Nurse  for understanding, verbalization, demonstration, and recall of education related to heart failure, overall adherence to the behaviors necessary to maintain a compensated status, and risk for readmission.     Patient assessment:    Patient is able to verbalize signs/symptoms fluid overload/impending HF exacerbation and who to contact with problems                                          _x__ yes  ___ no      Patient is following a 2000 mg sodium diet                                             __x_ yes  ___ no    If no, barriers to 2000 mg sodium diet:_______________________________________________    Patient is adherent to medication regimen                                              _x__ yes  ___ no    If no, barriers to medication regimen:    Patient has sufficient funds to purchase medication                      __x_ yes  ___ no      Patient has a scale in the home              __x_ yes  ___ no      Patient is adherent to daily weight monitoring                                        ___ yes   __x_ no agreeable to several times per week    If no, barriers to daily weight monitoring:    Symptom Tracker Worksheet reviewed with patient  __x_ yes   ___ no      Patient verbalizes understanding of stoplight/heart failure zones          __x_ yes   ___ no      Patient understands the importance of 7-day follow-up appointment      __x_ yes  ___ no    Appointment Date:      9/24 /24 with  Amanda requesting @0807    Patient has adequate transportation to attend follow-up appointments    __x_ yes  ___ no    If no, was referral to Social Work made  ___yes  ___ no      Family/Friend present during education: son    Additional consultations required: none    Juliet Hines RN HF

## 2024-09-16 NOTE — PLAN OF CARE
Problem: Patient Centered Care  Goal: Patient preferences are identified and integrated in the patient's plan of care  Description: Interventions:  - What would you like us to know as we care for you? From home alone  - Provide timely, complete, and accurate information to patient/family  - Incorporate patient and family knowledge, values, beliefs, and cultural backgrounds into the planning and delivery of care  - Encourage patient/family to participate in care and decision-making at the level they choose  - Honor patient and family perspectives and choices  Outcome: Progressing     Problem: Patient/Family Goals  Goal: Patient/Family Long Term Goal  Description: Patient's Long Term Goal: To discharge from hospital     Interventions:  - Monitor vitals  - Monitor appropriate labs  - Administer medications as ordered  - Follow MD's orders  - Update patient on plan of care   - Discharge planning     - See additional Care Plan goals for specific interventions  Outcome: Progressing  Goal: Patient/Family Short Term Goal  Description: Patient's Short Term Goal: To improve shortness of breath     Interventions:   - Monitor vitals  - Monitor appropriate labs  - Administer medications as ordered  - Follow MD's orders  - Update patient on plan of care     - See additional Care Plan goals for specific interventions  Outcome: Progressing     Problem: RESPIRATORY - ADULT  Goal: Achieves optimal ventilation and oxygenation  Description: INTERVENTIONS:  - Assess for changes in respiratory status  - Assess for changes in mentation and behavior  - Position to facilitate oxygenation and minimize respiratory effort  - Oxygen supplementation based on oxygen saturation or ABGs  - Provide Smoking Cessation handout, if applicable  - Encourage broncho-pulmonary hygiene including cough, deep breathe, Incentive Spirometry  - Assess the need for suctioning and perform as needed  - Assess and instruct to report SOB or any respiratory  difficulty  - Respiratory Therapy support as indicated  - Manage/alleviate anxiety  - Monitor for signs/symptoms of CO2 retention  Outcome: Progressing     Problem: PAIN - ADULT  Goal: Verbalizes/displays adequate comfort level or patient's stated pain goal  Description: INTERVENTIONS:  - Encourage pt to monitor pain and request assistance  - Assess pain using appropriate pain scale  - Administer analgesics based on type and severity of pain and evaluate response  - Implement non-pharmacological measures as appropriate and evaluate response  - Consider cultural and social influences on pain and pain management  - Manage/alleviate anxiety  - Utilize distraction and/or relaxation techniques  - Monitor for opioid side effects  - Notify MD/LIP if interventions unsuccessful or patient reports new pain  - Anticipate increased pain with activity and pre-medicate as appropriate  Outcome: Progressing     Problem: RISK FOR INFECTION - ADULT  Goal: Absence of fever/infection during anticipated neutropenic period  Description: INTERVENTIONS  - Monitor WBC  - Administer growth factors as ordered  - Implement neutropenic guidelines  Outcome: Progressing     Problem: SAFETY ADULT - FALL  Goal: Free from fall injury  Description: INTERVENTIONS:  - Assess pt frequently for physical needs  - Identify cognitive and physical deficits and behaviors that affect risk of falls.  - [place] fall precautions as indicated by assessment.  - Educate pt/family on patient safety including physical limitations  - Instruct pt to call for assistance with activity based on assessment  - Modify environment to reduce risk of injury  - Provide assistive devices as appropriate  - Consider OT/PT consult to assist with strengthening/mobility  - Encourage toileting schedule  Outcome: Progressing     Problem: DISCHARGE PLANNING  Goal: Discharge to home or other facility with appropriate resources  Description: INTERVENTIONS:  - Identify barriers to  discharge w/pt and caregiver  - Include patient/family/discharge partner in discharge planning  - Arrange for needed discharge resources and transportation as appropriate  - Identify discharge learning needs (meds, wound care, etc)  - Arrange for interpreters to assist at discharge as needed  - Consider post-discharge preferences of patient/family/discharge partner  - Complete POLST form as appropriate  - Assess patient's ability to be responsible for managing their own health  - Refer to Case Management Department for coordinating discharge planning if the patient needs post-hospital services based on physician/LIP order or complex needs related to functional status, cognitive ability or social support system  Outcome: Progressing     Son at bedside overnight. Monitoring vital signs, stable at this time. No acute changes at this moment. Fall precautions in place- bed alarm on, bed locked in lowest position, call light within reach. Frequent rounding by nursing staff.

## 2024-09-16 NOTE — CARDIAC REHAB
CARDIAC REHAB HEART FAILURE EDUCATION    Handouts provided and reviewed: CHF Booklet.     Disease Process: Disease process reviewed.    Reviewed the following: DAILY WEIGHT MONITORING: reviewed      SODIUM RESTRICTION: reviewed      FLUID RESTRICTION: reviewed      RISK FACTORS: reviewed      SMOKING CESSATION: n/a      HOME EXERCISE ACTIVITY: reviewed      OUTPATIENT CARDIAC REHAB: not a candidate for phase 2 cardiac rehab       WHEN TO CONTACT YOUR PHYSICIAN: reviewed       HEART FAILURE CLINIC: [phone]

## 2024-09-16 NOTE — PROGRESS NOTES
[facility]  part of [facility]    Progress Note    Yesenia Berkowitz Patient Status:  Inpatient    1940 MRN [id number]   Location [facility]5W Attending Jose Pfeiffer MD   Hosp Day # 3 PCP JO-ANN YANG MD       Subjective:   Yesenia Berkowitz remains stable but frail, PT/OT and bronch.    Review of Systems:   10 point ROS completed and was negative, except for pertinent positive and negatives stated in subjective.    Objective:     Vitals:    09/15/24 1900 09/15/24 2035 09/15/24 2350 24 0545   BP:  99/66 132/61 104/53   BP Location:  Right arm Right arm Right arm   Pulse: 84 95 89 78   Resp:   16   Temp:  98.3 °F (36.8 °C) 98.2 °F (36.8 °C) 97.7 °F (36.5 °C)   TempSrc:  Oral Oral Oral   SpO2:  92% 95% 98%   Weight:    168 lb 14.4 oz (76.6 kg)     Patient Weight for the past 72 hrs:   Weight   24 1832 160 lb 0.9 oz (72.6 kg)   24 0643 168 lb 3.2 oz (76.3 kg)       Intake/Output Summary (Last 24 hours) at 2024 0820  Last data filed at 2024 0554  Gross per 24 hour   Intake 420 ml   Output 1720 ml   Net -1300 ml       Current Scheduled Inpatient Meds:      furosemide  40 mg Oral Daily    polyethylene glycol (PEG 3350)  17 g Oral BID    aspirin  81 mg Oral Daily    cetirizine  5 mg Oral Daily    doxycycline  100 mg Oral 2 times per day    lansoprazole  30 mg Oral QAM AC    enoxaparin  40 mg Subcutaneous Daily    methylPREDNISolone  40 mg Intravenous Q12H    amitriptyline  50 mg Oral Nightly    digoxin  125 mcg Oral Daily    dilTIAZem ER  240 mg Oral Daily    montelukast  10 mg Oral Nightly    lisinopril (Zestril) 10 mg, hydroCHLOROthiazide 12.5 mg for Zestoretic 10-12.5 (EEH only)   Oral Daily    fluticasone furoate  1 puff Inhalation Daily       Current PRN Inpatient Meds:        calcium carbonate    acetaminophen    metoclopramide    ipratropium-albuterol    temazepam    ondansetron    guaiFENesin    benzonatate    Results:     Lab Results   Component Value  Date    WBC 12.5 (H) 09/16/2024    HGB 11.9 (L) 09/16/2024    HCT 35.8 09/16/2024    .0 09/16/2024    CREATSERUM 0.79 09/16/2024    BUN 38 (H) 09/16/2024     (L) 09/16/2024    K 4.9 09/16/2024    CL 93 (L) 09/16/2024    CO2 >40.0 (HH) 09/16/2024     (H) 09/16/2024    CA 9.1 09/16/2024    ALB 2.8 (L) 08/30/2024    ALKPHO 48 (L) 08/30/2024    BILT 0.2 08/30/2024    TP 4.8 (L) 08/30/2024    AST 15 08/30/2024    ALT 22 08/30/2024    PTT 30.1 03/14/2023    INR 1.12 03/14/2023    TSH 1.060 01/06/2023    LIP 30 08/30/2024    DDIMER 0.72 09/13/2024    MG 1.9 09/16/2024    TROP <0.045 02/03/2020       Recent Labs   Lab 09/13/24  1357 09/14/24  0437 09/15/24  0513 09/16/24  0442   RBC 4.20 3.69* 3.53* 3.75*   HGB 13.2 11.6* 11.1* 11.9*   HCT 41.1 35.5 34.1* 35.8   MCV 97.9 96.2 96.6 95.5   MCH 31.4 31.4 31.4 31.7   MCHC 32.1 32.7 32.6 33.2   RDW 13.7 13.6 13.7 13.6   NEPRELIM 8.52* 8.78*  --   --    WBC 10.9 9.7 12.9* 12.5*   .0 206.0 217.0 222.0     Recent Labs   Lab 09/14/24  0437 09/15/24  0513 09/16/24  0442   * 146* 156*   BUN 21 28* 38*   CREATSERUM 0.70 0.70 0.79   CA 8.8 9.1 9.1    138 135*   K 4.5 4.5 4.9   CL 98 98 93*   CO2 39.0* 39.0* >40.0*     Lab Results   Component Value Date    INR 1.12 03/14/2023       Culture:  No results found for this visit on 09/13/24.    Imaging/EKG:   No results found.      Assessment and Plan:   Patient is an 84-year-old female with hx of COPD on home O2, HTN, HLD, Afib not on AC, who p/w progressive leg edema, dyspnea on exertion and shortness of breath.     # Acute exacerbation of HFpEF  # Paroxysmal Afib, not on AC  # HTN  # HLD  - Echo unremarkable  - continue home diltiazem, digoxin, lisinopril  - Lasix IV -> PO  - Cards on consult    # Acute on chronic hypoxemic respiratory failure   # COPD exacerbation  # Left phrenic nerve paralysis  # Kyphoscoliosis  - Steroid, bronchodilator, doxycycline  - Pulm on consult: bronchoscopy     Diet: low  salt  PT/OT: deferred  DVT ppx: Lovenox  Code status: Full  Dispo: per clinical course    MDM: high Jose Pfeiffer MD, PhD  Message via Secure Chat  [facility]

## 2024-09-16 NOTE — PROGRESS NOTES
[facility]  part of [facility]    Progress Note    Yesenia Berkowitz Patient Status:  Inpatient    1940 MRN [id number]   Location [facility]5W Attending Jose Pfeiffer MD   Hosp Day # 3 PCP JO-ANN YANG MD       Subjective:   Yesenia Berkowitz is a(n) 84 year old female.   Persistent non productive cough sob and wheezing  Objective:   Blood pressure 123/51, pulse 84, temperature 98 °F (36.7 °C), temperature source Oral, resp. rate 20, weight 168 lb 14.4 oz (76.6 kg), SpO2 96%.    HEENT: negative  Neck: no adenopathy, no carotid bruit, no JVD, supple, symmetrical, trachea midline and thyroid not enlarged, symmetric, no tenderness/mass/nodules  Pulmonary/Chest:  rhonchi wheezing  Cardiovascular:atrial fibrillation  Abdominal: normal findings: bowel sounds normal, soft, non-tender and no hepatosplenomegaly   Extremities: extremities normal, atraumatic, no cyanosis 2+ edema  Skin: Skin color, texture, turgor normal. No rashes or lesions    Results:     Lab Results   Component Value Date    WBC 12.5 (H) 2024    HGB 11.9 (L) 2024    HCT 35.8 2024    .0 2024    CREATSERUM 0.79 2024    BUN 38 (H) 2024     (L) 2024    K 4.9 2024    CL 93 (L) 2024    CO2 >40.0 (HH) 2024     (H) 2024    CA 9.1 2024    ALB 2.8 (L) 2024    ALKPHO 48 (L) 2024    BILT 0.2 2024    TP 4.8 (L) 2024    AST 15 2024    ALT 22 2024    PTT 30.1 2023    INR 1.12 2023    TSH 1.060 2023    LIP 30 2024    DDIMER 0.72 2024    MG 1.9 2024    TROP <0.045 2020       No results found.        Assessment and Plan:   Principal Problem:    Acute on chronic congestive heart failure, unspecified heart failure type (HCC)  Active Problems:    COPD exacerbation (HCC)    COPD (chronic obstructive pulmonary disease) (HCC)       Persisent non productive cough sob and  wheezing,continue solumedrol doxycycline duoneb,flexiblle bronchoscopy      ELVIA LIVINGSTON MD, MD  9/16/2024

## 2024-09-16 NOTE — DIETARY NOTE
NUTRITION EDUCATION NOTE     Received consult for heart failure nutrition education. Verbally reviewed basic cardiac diet restrictions. Provided with Heart Failure Nutrition Therapy NCM handout to reinforce. Receptive to instruction. Would benefit from outpt f/u. Expect fair compliance. RD contact information provided to pt.      Tracy Salvador MS, DEMETRIUS, RDN, LDN  Clinical Dietitian  P: [phone]

## 2024-09-16 NOTE — DISCHARGE INSTRUCTIONS
Sometimes managing your health at home requires assistance.  The [facility] team has recognized your preference to use [facility].  They can be reached by phone at [phone].  The fax number for your reference is [phone].  A representative from the home health agency will contact you or your family to schedule your first visit.     Going Home Instructions  In this section you will find the tools which will guide you through the first few days after you leave the hospital. Continued use of these tools will help you develop the skills necessary to keep your heart failure under control.     Home Care Instructions Following Heart Failure - the most important things to do every day include:   Weigh yourself and review the “Self-Check Plan” sheet every morning.   Call your cardiologist office if you are in the “Pay Attention-Use Caution” (yellow zone) or “Medical Alert-Warning!” (red zone) as outlined in the Self-Check Plan sheet.  Take your medicines as prescribed.  Limit your sodium (salt) intake.  Know when to call your cardiologist, primary doctor, or nurse.  Know when to seek emergency care.      Things for You to Remember:   1. See your doctor or healthcare provider as written on your discharge instructions.  It is important that you attend this appointment to make sure your symptoms are under control.     2. Your recommended sodium intake is 5614-4900 mg daily.    3.  Weigh yourself every day.    4. Some exercise and activity is important to help keep your heart functioning and strong. Unless instructed not to exercise, you may walk at a slow to moderate pace for 10-15 minutes 2-3 days per week to start. Pace your activity to prevent shortness of breath or fatigue. Stop exercising if you develop chest pain, lightheadedness, or significant shortness of breath.       Call Your Cardiologist If:   You gain 2-3 pounds in one day or 5 pounds in one week.  You have more difficulty  breathing.  You are getting more tired with normal activity.  You are more short of breath lying down, or awaken at night short of breath.  You have swelling of your feet or legs.  You urinate less often during the day and more often at night.  You have cramps in your legs.  You have blurred vision or see yellowish-green halos around objects of lights.    Go to the Emergency Room If:   You have pain or tightness in your chest  You are extremely short of breath  You are coughing up pink-frothy mucus  You are traveling and develop symptoms of worsening heart failure      ** Please follow up with your cardiologist or Advanced Practice Provider as written on your discharge instructions. If you are not provided with an appointment, let your nurse know so you can get an appointment**

## 2024-09-17 PROBLEM — J18.9 PNEUMONIA: Status: ACTIVE | Noted: 2024-09-17

## 2024-09-17 PROBLEM — M41.9 KYPHOSCOLIOSIS: Status: ACTIVE | Noted: 2024-09-17

## 2024-09-17 PROBLEM — G56.80 PHRENIC NERVE PARALYSIS: Status: ACTIVE | Noted: 2024-09-17

## 2024-09-17 PROBLEM — J98.4 RESTRICTIVE LUNG DISEASE: Status: ACTIVE | Noted: 2024-09-17

## 2024-09-17 PROBLEM — I26.09 ACUTE COR PULMONALE (HCC): Status: ACTIVE | Noted: 2024-09-17

## 2024-09-17 LAB
BUN BLD-MCNC: 29 MG/DL (ref 9–23)
BUN/CREAT SERPL: 46.8 (ref 10–20)
CALCIUM BLD-MCNC: 8.8 MG/DL (ref 8.7–10.4)
CHLORIDE SERPL-SCNC: 96 MMOL/L (ref 98–112)
CO2 SERPL-SCNC: >40 MMOL/L (ref 21–32)
CREAT BLD-MCNC: 0.62 MG/DL
DEPRECATED RDW RBC AUTO: 48.6 FL (ref 35.1–46.3)
EGFRCR SERPLBLD CKD-EPI 2021: 89 ML/MIN/1.73M2 (ref 60–?)
ERYTHROCYTE [DISTWIDTH] IN BLOOD BY AUTOMATED COUNT: 13.5 % (ref 11–15)
GLUCOSE BLD-MCNC: 146 MG/DL (ref 70–99)
GLUCOSE BLDC GLUCOMTR-MCNC: 129 MG/DL (ref 70–99)
HCT VFR BLD AUTO: 38 %
HGB BLD-MCNC: 12.3 G/DL
MAGNESIUM SERPL-MCNC: 1.9 MG/DL (ref 1.6–2.6)
MCH RBC QN AUTO: 31.3 PG (ref 26–34)
MCHC RBC AUTO-ENTMCNC: 32.4 G/DL (ref 31–37)
MCV RBC AUTO: 96.7 FL
OSMOLALITY SERPL CALC.SUM OF ELEC: 290 MOSM/KG (ref 275–295)
PLATELET # BLD AUTO: 221 10(3)UL (ref 150–450)
POTASSIUM SERPL-SCNC: 4.8 MMOL/L (ref 3.5–5.1)
RBC # BLD AUTO: 3.93 X10(6)UL
SODIUM SERPL-SCNC: 136 MMOL/L (ref 136–145)
WBC # BLD AUTO: 11.2 X10(3) UL (ref 4–11)

## 2024-09-17 PROCEDURE — 0BC38ZZ EXTIRPATION OF MATTER FROM RIGHT MAIN BRONCHUS, VIA NATURAL OR ARTIFICIAL OPENING ENDOSCOPIC: ICD-10-PCS | Performed by: INTERNAL MEDICINE

## 2024-09-17 PROCEDURE — 99233 SBSQ HOSP IP/OBS HIGH 50: CPT | Performed by: HOSPITALIST

## 2024-09-17 PROCEDURE — 0BC78ZZ EXTIRPATION OF MATTER FROM LEFT MAIN BRONCHUS, VIA NATURAL OR ARTIFICIAL OPENING ENDOSCOPIC: ICD-10-PCS | Performed by: INTERNAL MEDICINE

## 2024-09-17 RX ORDER — LIDOCAINE HYDROCHLORIDE 20 MG/ML
INJECTION, SOLUTION EPIDURAL; INFILTRATION; INTRACAUDAL; PERINEURAL
Status: DISCONTINUED | OUTPATIENT
Start: 2024-09-17 | End: 2024-09-17 | Stop reason: HOSPADM

## 2024-09-17 RX ORDER — MIDAZOLAM HYDROCHLORIDE 1 MG/ML
INJECTION INTRAMUSCULAR; INTRAVENOUS
Status: DISCONTINUED | OUTPATIENT
Start: 2024-09-17 | End: 2024-09-17 | Stop reason: HOSPADM

## 2024-09-17 NOTE — PLAN OF CARE
Problem: RESPIRATORY - ADULT  Goal: Achieves optimal ventilation and oxygenation  Description: INTERVENTIONS:  - Assess for changes in respiratory status  - Assess for changes in mentation and behavior  - Position to facilitate oxygenation and minimize respiratory effort  - Oxygen supplementation based on oxygen saturation or ABGs  - Provide Smoking Cessation handout, if applicable  - Encourage broncho-pulmonary hygiene including cough, deep breathe, Incentive Spirometry  - Assess the need for suctioning and perform as needed  - Assess and instruct to report SOB or any respiratory difficulty  - Respiratory Therapy support as indicated  - Manage/alleviate anxiety  - Monitor for signs/symptoms of CO2 retention  Outcome: Progressing     Problem: PAIN - ADULT  Goal: Verbalizes/displays adequate comfort level or patient's stated pain goal  Description: INTERVENTIONS:  - Encourage pt to monitor pain and request assistance  - Assess pain using appropriate pain scale  - Administer analgesics based on type and severity of pain and evaluate response  - Implement non-pharmacological measures as appropriate and evaluate response  - Consider cultural and social influences on pain and pain management  - Manage/alleviate anxiety  - Utilize distraction and/or relaxation techniques  - Monitor for opioid side effects  - Notify MD/LIP if interventions unsuccessful or patient reports new pain  - Anticipate increased pain with activity and pre-medicate as appropriate  Outcome: Progressing     Problem: RISK FOR INFECTION - ADULT  Goal: Absence of fever/infection during anticipated neutropenic period  Description: INTERVENTIONS  - Monitor WBC  - Administer growth factors as ordered  - Implement neutropenic guidelines  Outcome: Progressing     Problem: SAFETY ADULT - FALL  Goal: Free from fall injury  Description: INTERVENTIONS:  - Assess pt frequently for physical needs  - Identify cognitive and physical deficits and behaviors that  affect risk of falls.  - [place] fall precautions as indicated by assessment.  - Educate pt/family on patient safety including physical limitations  - Instruct pt to call for assistance with activity based on assessment  - Modify environment to reduce risk of injury  - Provide assistive devices as appropriate  - Consider OT/PT consult to assist with strengthening/mobility  - Encourage toileting schedule  Outcome: Progressing     Patient went to Endoscopy for Bronchoscopy with Dr. Boyle this am, vital signs stable on return to unit. Weaned to 2 L nasal cannula. On remote telemetry monitoring. Ambulating independently.   Safety precautions in place, frequent nursing rounding completed, call light within reach. All questions answered and needs met.

## 2024-09-17 NOTE — OCCUPATIONAL THERAPY NOTE
OCCUPATIONAL THERAPY EVALUATION - INPATIENT     Room Number: 515/515-A  Evaluation Date: 9/17/2024  Type of Evaluation: Initial       Physician Order: IP Consult to Occupational Therapy  Reason for Therapy: ADL/IADL Dysfunction and Discharge Planning    OCCUPATIONAL THERAPY ASSESSMENT   RN cleared pt for participation in OT session, which was completed in collaboration with PT. Upon arrival, pt was supine in bed and agreeable to activity. No visitors present during session. Pt was left in bed. Call light and all needs left in reach. Handoff given to RN.    Patient is a 84 year old female admitted 9/13/2024 for acute on chronic CHF.  Prior to admission, pt was independent; 2-3L of O2.  Patient is currently requiring up to ind for ADLs overall along with ind for all mobility. After toileting, O2 decreased to 82% on 3L and recovered after a few minutes and sitting down. Walking in hallway deferred due to decrease and high recovery time after walking to bathroom. Pt reported she is fine and takes off her oxygen when she sits outside and goes to mailbox at home. 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 Will attempt walk test prior to DC. Pt owns all recommended DME.         Patient will benefit from continued skilled OT Services for duration of hospitalization, however, given the patient is functioning near baseline level do not anticipate skilled therapy needs at discharge     PLAN  OT Treatment Plan: Balance activities;Energy conservation/work simplification techniques;Continued evaluation;Compensatory technique education;Fine motor coordination activities;Neuromuscluar reeducation;Equipment eval/education;Patient/Family training;Patient/Family education;Cognitive reorientation;Endurance training;UE strengthening/ROM;Functional transfer training;Visual perceptual training;IADL training;ADL training      OCCUPATIONAL THERAPY MEDICAL/SOCIAL HISTORY     Problem List  Principal Problem:    Acute on chronic  congestive heart failure, unspecified heart failure type (HCC)  Active Problems:    COPD exacerbation (HCC)    COPD (chronic obstructive pulmonary disease) ([facility])    Kyphoscoliosis    Phrenic nerve paralysis    Restrictive lung disease    Acute cor pulmonale (HCC)    Pneumonia      Past Medical History  Past Medical History:    A-fib ([facility])    Anesthesia complication    Arrhythmia    AFIB    Arthritis    Asthma (HCC)    Back problem    COPD (chronic obstructive pulmonary disease) ([facility])    Deviated nasal septum    nasal septoplasty, turb reduction, smr of turbs    Difficult intubation    fiber optic if needed    Diverticulitis    colonoscopy     Diverticulosis of large intestine    Esophageal reflux    Extrinsic asthma, unspecified    Headache    Heart disease    Hepatitis    WAS TOLD SHE HAS HAD THIS WHEN SHE WAS 19 YEARS OLD    High blood pressure    High cholesterol    Irregular heart rate    Lichenoid keratosis    left chest-bx    Osteoarthritis    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    Paronychia    (RT); onychomycosis; debridement    Problems with swallowing    occasionally    Shortness of breath    2 L NC ALL THE TIME 24HR/7 DAYS PER WEEK    Unspecified essential hypertension    Visual impairment       Past Surgical History  Past Surgical History:   Procedure Laterality Date    Adenoidectomy      Cataract      cataract extraction     Hysterectomy      Sinus surgery        DEVIATED SEPTUM    T&a      Tonsillectomy         HOME SITUATION  Type of Home: House  Home Layout: Two level  Lives With: Alone                              SUBJECTIVE  \"I am fine once I sit.\"    OCCUPATIONAL THERAPY EXAMINATION      OBJECTIVE     Fall Risk: Standard fall risk    PAIN ASSESSMENT  Ratin           RANGE OF MOTION   Upper extremity ROM is within functional limits     STRENGTH ASSESSMENT  Upper extremity strength is within functional limits     COORDINATION  Gross Motor: WFL   Fine Motor: WFL     ACTIVITIES OF  DAILY LIVING ASSESSMENT  AM-PAC ‘6-Clicks’ Inpatient Daily Activity Short Form  How much help from another person does the patient currently need…  -   Putting on and taking off regular lower body clothing?: None  -   Bathing (including washing, rinsing, drying)?: None  -   Toileting, which includes using toilet, bedpan or urinal? : None  -   Putting on and taking off regular upper body clothing?: None  -   Taking care of personal grooming such as brushing teeth?: None  -   Eating meals?: None    AM-PAC Score:  Score: 24  Approx Degree of Impairment: 0%  Standardized Score (AM-PAC Scale): 57.54  CMS Modifier (G-Code): CH      FUNCTIONAL TRANSFER ASSESSMENT  ind    FUNCTIONAL ADL ASSESSMENT  ind    The patient's Approx Degree of Impairment: 0% has been calculated based on documentation in the [facility] '6 clicks' Inpatient Daily Activity Short Form.  Research supports that patients with this level of impairment may benefit from home. Final disposition will be made by interdisciplinary medical team.    OT Goals  Patients self stated goal is: to go home    Pt will complete IADL with ind.    Patient Evaluation Complexity Level:   Occupational Profile/Medical History MODERATE - Expanded review of history including review of medical or therapy record   Specific performance deficits impacting engagement in ADL/IADL MODERATE  3 - 5 performance deficits   Client Assessment/Performance Deficits MODERATE - Comorbidities and min to mod modifications of tasks    Clinical Decision Making MODERATE - Analysis of occupational profile, detailed assessments, several treatment options    Overall Complexity MODERATE     OT Session Time: 20 minutes  Self-Care Home Management: 10 minutes           Rosalba Fallon OT  [facility]  Inpatient Rehabilitation  Occupational Therapy  [phone]

## 2024-09-17 NOTE — PROGRESS NOTES
[facility]  part of [facility]    Progress Note    Yesenia Berkowitz Patient Status:  Inpatient    1940 MRN [id number]   Location [facility]5W Attending Jose Pfeiffer MD   Hosp Day # 4 PCP JO-ANN YANG MD       Subjective:   Yesenia Berkowitz remains stable, still with dyspnea, ready for bronch today.    Review of Systems:   10 point ROS completed and was negative, except for pertinent positive and negatives stated in subjective.    Objective:     Vitals:    24 2000 24 2100 24 0151 24 0541   BP:  119/65 117/60 121/59   BP Location:  Right arm Right arm Right arm   Pulse: 80 81 81 82   Resp:     Temp:  97.8 °F (36.6 °C) 97.7 °F (36.5 °C) 97.4 °F (36.3 °C)   TempSrc:  Oral Oral Oral   SpO2:  92% 94% 92%   Weight:    168 lb (76.2 kg)     Patient Weight for the past 72 hrs:   Weight   24 1832 160 lb 0.9 oz (72.6 kg)   24 0643 168 lb 3.2 oz (76.3 kg)       Intake/Output Summary (Last 24 hours) at 2024 0747  Last data filed at 2024 0620  Gross per 24 hour   Intake 627 ml   Output 2425 ml   Net -1798 ml       Current Scheduled Inpatient Meds:      furosemide  40 mg Oral Daily    polyethylene glycol (PEG 3350)  17 g Oral BID    aspirin  81 mg Oral Daily    cetirizine  5 mg Oral Daily    doxycycline  100 mg Oral 2 times per day    lansoprazole  30 mg Oral QAM AC    [Held by provider] enoxaparin  40 mg Subcutaneous Daily    methylPREDNISolone  40 mg Intravenous Q12H    amitriptyline  50 mg Oral Nightly    digoxin  125 mcg Oral Daily    dilTIAZem ER  240 mg Oral Daily    montelukast  10 mg Oral Nightly    lisinopril (Zestril) 10 mg, hydroCHLOROthiazide 12.5 mg for Zestoretic 10-12.5 (EEH only)   Oral Daily    fluticasone furoate  1 puff Inhalation Daily       Current PRN Inpatient Meds:        calcium carbonate    acetaminophen    metoclopramide    ipratropium-albuterol    temazepam    ondansetron    guaiFENesin    benzonatate    Results:     Lab  Results   Component Value Date    WBC 11.2 (H) 09/17/2024    HGB 12.3 09/17/2024    HCT 38.0 09/17/2024    .0 09/17/2024    CREATSERUM 0.62 09/17/2024    BUN 29 (H) 09/17/2024     09/17/2024    K 4.8 09/17/2024    CL 96 (L) 09/17/2024    CO2 >40.0 (HH) 09/17/2024     (H) 09/17/2024    CA 8.8 09/17/2024    ALB 2.8 (L) 08/30/2024    ALKPHO 48 (L) 08/30/2024    BILT 0.2 08/30/2024    TP 4.8 (L) 08/30/2024    AST 15 08/30/2024    ALT 22 08/30/2024    PTT 30.1 03/14/2023    INR 1.12 03/14/2023    TSH 1.060 01/06/2023    LIP 30 08/30/2024    DDIMER 0.72 09/13/2024    MG 1.9 09/17/2024    TROP <0.045 02/03/2020       Recent Labs   Lab 09/13/24  1357 09/14/24  0437 09/15/24  0513 09/16/24  0442 09/17/24  0526   RBC 4.20 3.69* 3.53* 3.75* 3.93   HGB 13.2 11.6* 11.1* 11.9* 12.3   HCT 41.1 35.5 34.1* 35.8 38.0   MCV 97.9 96.2 96.6 95.5 96.7   MCH 31.4 31.4 31.4 31.7 31.3   MCHC 32.1 32.7 32.6 33.2 32.4   RDW 13.7 13.6 13.7 13.6 13.5   NEPRELIM 8.52* 8.78*  --   --   --    WBC 10.9 9.7 12.9* 12.5* 11.2*   .0 206.0 217.0 222.0 221.0     Recent Labs   Lab 09/15/24  0513 09/16/24  0442 09/17/24  0526   * 156* 146*   BUN 28* 38* 29*   CREATSERUM 0.70 0.79 0.62   CA 9.1 9.1 8.8    135* 136   K 4.5 4.9 4.8   CL 98 93* 96*   CO2 39.0* >40.0* >40.0*     Lab Results   Component Value Date    INR 1.12 03/14/2023       Culture:  No results found for this visit on 09/13/24.    Imaging/EKG:   No results found.      Assessment and Plan:   Patient is an 84-year-old female with hx of COPD on home O2, HTN, HLD, Afib not on AC, who p/w progressive leg edema, dyspnea on exertion and shortness of breath.     # Acute on chronic hypoxemic respiratory failure   # COPD exacerbation  # Mucus plug  # Left phrenic nerve paralysis  # Kyphoscoliosis  - Steroid, bronchodilator, doxycycline  - s/p flexible bronchoscopy with bronchial washings and removal of mucus plug, by Dr. Boyle 09/17/24  - Pulm on consult    # Acute  exacerbation of HFpEF  # Paroxysmal Afib, not on AC  # HTN  # HLD  - Echo unremarkable  - continue home diltiazem, digoxin, lisinopril  - Lasix IV -> PO  - Cards signed off 09/15.    Diet: low salt  PT/OT: rec HH PT  DVT ppx: Lovenox  Code status: Full  Dispo: per clinical course    MDM: high Jose Pfeiffer MD, PhD  Message via Secure Chat  [facility]

## 2024-09-17 NOTE — PHYSICAL THERAPY NOTE
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 Prior to admission, patient's baseline is Mod I for functional mobility with use of rollator for community ambulation and holding onto furniture as needed for household ambulation.  Patient is currently functioning below baseline with bed mobility, transfers, gait, stair negotiation, standing prolonged periods, and performing household tasks.  Patient is requiring supervision, stand-by assist, and contact guard assist as a result of the following impairments: decreased endurance/aerobic capacity, decreased muscular endurance, increased O2 needs from baseline, and decreased insight/receptivity towards education .  Physical Therapy will continue to follow for duration of hospitalization.    Patient will benefit from continued skilled PT Services at discharge to promote prior level of function and safety with additional support and return home with home health PT with transition to Cardiopulmonary Rehab.    PLAN  PT Treatment Plan: Bed mobility;Body mechanics;Coordination;Endurance;Energy conservation;Patient education;Gait training;Strengthening;Stair training;Transfer training;Balance training  Rehab Potential : Good  Frequency (Obs): 5x/week    PHYSICAL THERAPY MEDICAL/SOCIAL HISTORY     Problem List  Principal Problem:    Acute on chronic congestive heart failure, unspecified heart failure type  ([facility])  Active Problems:    COPD exacerbation (HCC)    COPD (chronic obstructive pulmonary disease) ([facility])    Kyphoscoliosis    Phrenic nerve paralysis    Restrictive lung disease    Acute cor pulmonale (HCC)    Pneumonia      HOME SITUATION  Home Situation  Type of Home: House  Home Layout: One level (with basement - 12 stairs with partial railing)  Stairs to Enter : 5  Railing: Yes  Lives With: Alone (supportive son in area)  Drives: No  Patient Owned Equipment: Rollator  Patient Regularly Uses: Home O2 (2-3 L/min at baseline)     SUBJECTIVE  \"Can I walk more? My oxygen is almost 100% again.\"    PHYSICAL THERAPY EXAMINATION   OBJECTIVE  Precautions:  (oxygen saturations)  Fall Risk: Standard fall risk    WEIGHT BEARING RESTRICTION  Weight Bearing Restriction: None    PAIN ASSESSMENT  Ratin    COGNITION  Overall Cognitive Status:  WFL - within functional limits  Safety Judgement:  decreased awareness of need for safety    RANGE OF MOTION AND STRENGTH ASSESSMENT  Upper extremity ROM and strength are within functional limits   Lower extremity ROM is within functional limits   Lower extremity strength is within functional limits     BALANCE  Static Sitting: Good  Dynamic Sitting: Fair +  Static Standing: Fair  Dynamic Standing: Fair -    ACTIVITY TOLERANCE  Pulse: 94 (104 bpm with activity)  Heart Rate Source: Monitor    O2 WALK  Oxygen Therapy  SPO2% on Oxygen at Rest: 98  At rest oxygen flow (liters per minute): 3  SPO2% Ambulation on Oxygen: 82 (recovered to 90% within 5 minutes with seated rest break and cues for PLB)  Ambulation oxygen flow (liters per minute): 3    AM-PAC '6-Clicks' INPATIENT SHORT FORM - BASIC MOBILITY  How much difficulty does the patient currently have...  Patient Difficulty: Turning over in bed (including adjusting bedclothes, sheets and blankets)?: A Little   Patient Difficulty: Sitting down on and standing up from a chair with arms (e.g., wheelchair, bedside commode, etc.): A Little    Patient Difficulty: Moving from lying on back to sitting on the side of the bed?: A Little   How much help from another person does the patient currently need...   Help from Another: Moving to and from a bed to a chair (including a wheelchair)?: A Little   Help from Another: Need to walk in hospital room?: A Little   Help from Another: Climbing 3-5 steps with a railing?: A Little     AM-PAC Score:  Raw Score: 18   Approx Degree of Impairment: 46.58%   Standardized Score (AM-PAC Scale): 43.63   CMS Modifier (G-Code): CK    FUNCTIONAL ABILITY STATUS  Functional Mobility/Gait Assessment  Gait Assistance: Contact guard assist (close SBA)  Distance (ft): 25 ft x 2  Assistive Device: None  Pattern: Shuffle (decreased hecotr speed, decreased step length. Seated rest break provided in between ambulation trials.)  Supine to Sit: supervision  Sit to Supine: supervision  Sit to Stand: stand-by assist    Exercise/Education Provided:  Bed mobility  Body mechanics  Energy conservation  Gait training  Posture  Transfer training    Skilled Therapy Provided: Patient in bed upon arrival. RN approved activity. Educated patient on POC and benefits of mobilization. Agreeable to participate. Patient reporting no pain. Educated patient on energy conservation, activity pacing, and pursed lip breathing. Patient with drop in oxygen saturations to 82% on 3 L/min with ambulation to/from bathroom. Patient requiring 5 minute seated rest break in order to recover to 90%. Patient requesting to ambulate further distance in hallway once recovered, however, further activity deferred at this time. Patient with limited receptivity towards education on oxygen saturations dropping with short distance of ambulation, requiring increased time to recover. Patient reports that at home, she takes her oxygen off if she has to go outside in the front to the mailbox and at times her oxygen saturations drop to the 50-60%. 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 Will benefit from continued education on activity pacing and energy conservation.     The patient's Approx Degree of Impairment: 46.58% has been calculated based on documentation in the [facility] '6 clicks' Inpatient Basic Mobility Short Form.  Research supports that patients with this level of impairment may benefit from HHPT.  Final disposition will be made by interdisciplinary medical team.    Patient End of Session: In bed;Needs met;Call light within reach;RN aware of session/findings;All patient questions and concerns addressed    CURRENT GOALS  Goals to be met by: 24  Patient Goal Patient's self-stated goal is: walk more and go home   Goal #1 Patient is able to demonstrate supine - sit EOB @ level: modified independent     Goal #1   Current Status    Goal #2 Patient is able to demonstrate transfers Sit to/from Stand at assistance level: modified independent with walker - rolling     Goal #2  Current Status    Goal #3 Patient is able to ambulate 100 feet with assist device: walker - rolling at assistance level: modified independent   Goal #3   Current Status    Goal #4 Patient will negotiate 5 stairs/one curb w/ assistive device and supervision   Goal #4   Current Status    Goal #5 Patient to demonstrate independence with home activity/exercise instructions provided to patient in preparation for discharge.   Goal #5   Current Status      Patient Evaluation Complexity Level:  History Moderate - 1 or 2 personal factors and/or co-morbidities   Examination of body systems Moderate - addressing a total of 3 or more elements   Clinical Presentation  Moderate - Evolving   Clinical Decision Making  Moderate Complexity     Gait Trainin minutes  Therapeutic Activity:  10 minutes

## 2024-09-17 NOTE — PLAN OF CARE
Fall precautions in place, safety precautions in place. Bed in lowest setting and locked. Patient educated and encouraged to use call light as needed overnight. Frequent rounding performed overnight by nursing staff. NPO at midnight for planned bronchoscopy tomorrow, IV fluids started per order. Family at bedside overnight.     Problem: Patient Centered Care  Goal: Patient preferences are identified and integrated in the patient's plan of care  Description: Interventions:  - What would you like us to know as we care for you? From home alone  - Provide timely, complete, and accurate information to patient/family  - Incorporate patient and family knowledge, values, beliefs, and cultural backgrounds into the planning and delivery of care  - Encourage patient/family to participate in care and decision-making at the level they choose  - Honor patient and family perspectives and choices  Outcome: Progressing     Problem: Patient/Family Goals  Goal: Patient/Family Long Term Goal  Description: Patient's Long Term Goal: To discharge     Interventions:  - Monitor vitals  - Monitor appropriate labs  - Administer medications as ordered  - Follow MD's orders  - Update patient on plan of care   - Discharge planning   - See additional Care Plan goals for specific interventions  Outcome: Progressing  Goal: Patient/Family Short Term Goal  Description: Patient's Short Term Goal: To feel better    Interventions:   -Assess for pain  -Administer prn medication as ordered  -Decrease external stimuli  -Reposition as needed  -Frequent rounding  - See additional Care Plan goals for specific interventions  Outcome: Progressing     Problem: RESPIRATORY - ADULT  Goal: Achieves optimal ventilation and oxygenation  Description: INTERVENTIONS:  - Assess for changes in respiratory status  - Assess for changes in mentation and behavior  - Position to facilitate oxygenation and minimize respiratory effort  - Oxygen supplementation based on oxygen  saturation or ABGs  - Provide Smoking Cessation handout, if applicable  - Encourage broncho-pulmonary hygiene including cough, deep breathe, Incentive Spirometry  - Assess the need for suctioning and perform as needed  - Assess and instruct to report SOB or any respiratory difficulty  - Respiratory Therapy support as indicated  - Manage/alleviate anxiety  - Monitor for signs/symptoms of CO2 retention  Outcome: Progressing     Problem: PAIN - ADULT  Goal: Verbalizes/displays adequate comfort level or patient's stated pain goal  Description: INTERVENTIONS:  - Encourage pt to monitor pain and request assistance  - Assess pain using appropriate pain scale  - Administer analgesics based on type and severity of pain and evaluate response  - Implement non-pharmacological measures as appropriate and evaluate response  - Consider cultural and social influences on pain and pain management  - Manage/alleviate anxiety  - Utilize distraction and/or relaxation techniques  - Monitor for opioid side effects  - Notify MD/LIP if interventions unsuccessful or patient reports new pain  - Anticipate increased pain with activity and pre-medicate as appropriate  Outcome: Progressing     Problem: RISK FOR INFECTION - ADULT  Goal: Absence of fever/infection during anticipated neutropenic period  Description: INTERVENTIONS  - Monitor WBC  - Administer growth factors as ordered  - Implement neutropenic guidelines  Outcome: Progressing     Problem: SAFETY ADULT - FALL  Goal: Free from fall injury  Description: INTERVENTIONS:  - Assess pt frequently for physical needs  - Identify cognitive and physical deficits and behaviors that affect risk of falls.  - Lucile fall precautions as indicated by assessment.  - Educate pt/family on patient safety including physical limitations  - Instruct pt to call for assistance with activity based on assessment  - Modify environment to reduce risk of injury  - Provide assistive devices as appropriate  -  Consider OT/PT consult to assist with strengthening/mobility  - Encourage toileting schedule  Outcome: Progressing     Problem: DISCHARGE PLANNING  Goal: Discharge to home or other facility with appropriate resources  Description: INTERVENTIONS:  - Identify barriers to discharge w/pt and caregiver  - Include patient/family/discharge partner in discharge planning  - Arrange for needed discharge resources and transportation as appropriate  - Identify discharge learning needs (meds, wound care, etc)  - Arrange for interpreters to assist at discharge as needed  - Consider post-discharge preferences of patient/family/discharge partner  - Complete POLST form as appropriate  - Assess patient's ability to be responsible for managing their own health  - Refer to Case Management Department for coordinating discharge planning if the patient needs post-hospital services based on physician/LIP order or complex needs related to functional status, cognitive ability or social support system  Outcome: Progressing

## 2024-09-17 NOTE — BRIEF OP NOTE
Pre-Operative Diagnosis: mucus plug; copd     Post-Operative Diagnosis: same*      Procedure Performed:   BRONCHOSCOPY    Surgeons and Role:     * Elvia Livingston MD - Primary    Assistant(s):        Surgical Findings: no endobronchial lesion,mucus pluggs removed by suction     Specimen: brnchial wasing     Estimated Blood Loss: No data recorded    Dictation Number:      ELVIA LIVINGSTON MD, MD  9/17/2024  9:40 AM

## 2024-09-17 NOTE — PROGRESS NOTES
[facility]  part of [facility]    Progress Note    Yesenia Berkowitz Patient Status:  Inpatient    1940 MRN [id number]   Location [facility] ENDOSCOPY LAB SUITES Attending Jose Pfeiffer MD   Hosp Day # 4 PCP JO-ANN YANG MD       Subjective:   Yesenia Berkowitz is a(n) 84 year old female.   Persistent cough,sob and wheezing  Objective:   Blood pressure 121/71, pulse 73, temperature 97.3 °F (36.3 °C), temperature source Oral, resp. rate 17, height 5' 5\" (1.651 m), weight 168 lb (76.2 kg), SpO2 96%.    HEENT: negative  Neck: no adenopathy, no carotid bruit, no JVD, supple, symmetrical, trachea midline and thyroid not enlarged, symmetric, no tenderness/mass/nodules  Pulmonary/Chest:  rhonchi,wheezing  Cardiovascular: atrial fibrillation  Abdominal: normal findings: bowel sounds normal, soft, non-tender and no hepatosplenomegaly   Extremities: extremities normal, atraumatic, no cyanosis or 2+ edema  Skin: Skin color, texture, turgor normal. No rashes or lesions    Results:     Lab Results   Component Value Date    WBC 11.2 (H) 2024    HGB 12.3 2024    HCT 38.0 2024    .0 2024    CREATSERUM 0.62 2024    BUN 29 (H) 2024     2024    K 4.8 2024    CL 96 (L) 2024    CO2 >40.0 (HH) 2024     (H) 2024    CA 8.8 2024    ALB 2.8 (L) 2024    ALKPHO 48 (L) 2024    BILT 0.2 2024    TP 4.8 (L) 2024    AST 15 2024    ALT 22 2024    PTT 30.1 2023    INR 1.12 2023    TSH 1.060 2023    LIP 30 2024    DDIMER 0.72 2024    MG 1.9 2024    TROP <0.045 2020       No results found.        Assessment and Plan:   Principal Problem:    Acute on chronic congestive heart failure, unspecified heart failure type (HCC)  Active Problems:    COPD exacerbation (HCC)    COPD (chronic obstructive pulmonary disease) (HCC)    Kyphoscoliosis    Phrenic nerve  paralysis    Restrictive lung disease    Acute cor pulmonale (HCC)     Persistent cough,non productive,sob and wheezing,flexible bronchoscopy today        ELVIA LIVINGSTON MD, MD  9/17/2024

## 2024-09-18 LAB
BUN BLD-MCNC: 26 MG/DL (ref 9–23)
BUN/CREAT SERPL: 43.3 (ref 10–20)
CALCIUM BLD-MCNC: 8.5 MG/DL (ref 8.7–10.4)
CHLORIDE SERPL-SCNC: 94 MMOL/L (ref 98–112)
CO2 SERPL-SCNC: >40 MMOL/L (ref 21–32)
CREAT BLD-MCNC: 0.6 MG/DL
DEPRECATED RDW RBC AUTO: 47.5 FL (ref 35.1–46.3)
EGFRCR SERPLBLD CKD-EPI 2021: 90 ML/MIN/1.73M2 (ref 60–?)
ERYTHROCYTE [DISTWIDTH] IN BLOOD BY AUTOMATED COUNT: 13.5 % (ref 11–15)
GLUCOSE BLD-MCNC: 164 MG/DL (ref 70–99)
HCT VFR BLD AUTO: 37.4 %
HGB BLD-MCNC: 12.3 G/DL
MAGNESIUM SERPL-MCNC: 2 MG/DL (ref 1.6–2.6)
MCH RBC QN AUTO: 31.6 PG (ref 26–34)
MCHC RBC AUTO-ENTMCNC: 32.9 G/DL (ref 31–37)
MCV RBC AUTO: 96.1 FL
OSMOLALITY SERPL CALC.SUM OF ELEC: 290 MOSM/KG (ref 275–295)
PHOSPHATE SERPL-MCNC: 4 MG/DL (ref 2.4–5.1)
PLATELET # BLD AUTO: 231 10(3)UL (ref 150–450)
POTASSIUM SERPL-SCNC: 4.5 MMOL/L (ref 3.5–5.1)
RBC # BLD AUTO: 3.89 X10(6)UL
SODIUM SERPL-SCNC: 136 MMOL/L (ref 136–145)
WBC # BLD AUTO: 11.3 X10(3) UL (ref 4–11)

## 2024-09-18 PROCEDURE — 99233 SBSQ HOSP IP/OBS HIGH 50: CPT | Performed by: HOSPITALIST

## 2024-09-18 RX ORDER — METHYLPREDNISOLONE SODIUM SUCCINATE 40 MG/ML
40 INJECTION, POWDER, LYOPHILIZED, FOR SOLUTION INTRAMUSCULAR; INTRAVENOUS DAILY
Status: DISCONTINUED | OUTPATIENT
Start: 2024-09-19 | End: 2024-09-19

## 2024-09-18 NOTE — HOME CARE LIAISON
Received referral via [employer] for Home Health services. Spoke w/ patient at the bedside and remain agreeable for home health care services. Demographics confirmed and updated AVS with contact information

## 2024-09-18 NOTE — CM/SW NOTE
Anticipated therapy need: Home with Home Healthcare    CM sent tentative home healthcare referrals via [employer].  Orders and face to face entered.    CM will meet with patient today to determine if agreeable to HHC, provide list/confirm choice agency.    1237 CM met with patient at bedside to discuss home healthcare services/provide list HHA.  Patient confirmed she is agreeable to HH arrangement and her choice agency is [facility].  CM reserved [facility] via [employer] and notified liaison Zee of choice. Patient discharge instructions updated with [facility] contact information.     Patient inquiring about additional assistance at home for housework.  Patient states her son is able to help occasionally.  CM provided patient with caregiver resources at bedside.  CM contacted [facility] liaison Meghan to determine if patient is eligible for caregiver assistance through [facility].  Per [facility] liaison, she will meet with patient prior to discharge to discuss eligibility/provide additional resources.    / to remain available for support and/or discharge planning.     Plan: Home with [facility] and [employer] home oxygen - once medically cleared    Melinda Cartagena RN, BSN  Nurse   [phone]

## 2024-09-18 NOTE — PHYSICAL THERAPY NOTE
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      Patient is requiring supervision, stand-by assist, and contact guard assist as a result of the following impairments: decreased functional strength and medical status.     Patient continues to function below baseline with bed mobility, transfers, and gait.  Next session anticipate patient to progress bed mobility, transfers, and gait.  Physical Therapy will continue to follow patient for duration of hospitalization.    Patient continues to benefit from continued skilled PT services: at discharge to promote prior level of function and safety with additional support and return home with home health PT.    PLAN  PT Treatment Plan: Bed mobility;Body mechanics;Coordination;Endurance;Energy conservation;Patient education;Gait training;Strengthening;Stair training;Transfer training;Balance training  Frequency (Obs): 5x/week    SUBJECTIVE  \"You'll know I need oxygen when I stop talking\"    OBJECTIVE  Precautions:  (oxygen saturations)    PAIN ASSESSMENT   Rating: 3  Location: L thoracic  Management Techniques: Breathing techniques;Body mechanics;Activity promotion    BALANCE  Static Sitting: Good  Dynamic Sitting: Fair +  Static Standing: Fair  Dynamic Standing: Fair  -    ACTIVITY TOLERANCE           BP: 119/55  BP Location: Right arm  BP Method: Automatic  Patient Position: Semi-Green     O2 WALK  Oxygen Therapy  SPO2% on Oxygen at Rest: 80  At rest oxygen flow (liters per minute): 2  SPO2% Ambulation on Oxygen: 76  Ambulation oxygen flow (liters per minute): 2    AM-PAC '6-Clicks' INPATIENT SHORT FORM - BASIC MOBILITY  How much difficulty does the patient currently have...  Patient Difficulty: Turning over in bed (including adjusting bedclothes, sheets and blankets)?: A Little   Patient Difficulty: Sitting down on and standing up from a chair with arms (e.g., wheelchair, bedside commode, etc.): A Little   Patient Difficulty: Moving from lying on back to sitting on the side of the bed?: A Little   How much help from another person does the patient currently need...   Help from Another: Moving to and from a bed to a chair (including a wheelchair)?: A Little   Help from Another: Need to walk in hospital room?: A Little   Help from Another: Climbing 3-5 steps with a railing?: A Little     AM-PAC Score:  Raw Score: 18   Approx Degree of Impairment: 46.58%   Standardized Score (AM-PAC Scale): 43.63   CMS Modifier (G-Code): CK    FUNCTIONAL ABILITY STATUS  Functional Mobility/Gait Assessment  Gait Assistance: Contact guard assist  Distance (ft): 100ft x2  Assistive Device: None  Pattern: Shuffle  Rolling: supervision  Supine to Sit: supervision  Sit to Supine: stand-by assist  Sit to Stand: stand-by assist    Skilled Therapy Provided: Pt rec'd in bed agreeable to therapy, identified by name and , gait belt donned for mobility. Pt returned from [facility] upon entry. Vitals monitored on bed and SpO2 noted at 80%. Pursed lip breathing encouraged to reach appropriate oxygen levels before beginning activity. Pt ambulated past household distance without assistive device with no visible signs of increased work however pt did experience one lateral sway with CGA to recover.  After 100ft, vitals were monitored during a seated rest break. SpO2 dropped to 76% again and took one minute and a half to recover to 94%. Pt stated its difficult to interpret if energy is hindered from lack of oxygen or pain from knee. Pt educated on use of RW for energy conservation technique with pursed lip breathing and use of pulse oximetry at home. After ambulation, SpO2 levels reached 76% with 1 minute to recover to 92%. This treatment session demos improvement with recovery time needed to reach normal oxygen levels after activity.      The patient's Approx Degree of Impairment: 46.58% has been calculated based on documentation in the [facility] '6 clicks' Inpatient Daily Activity Short Form.  Research supports that patients with this level of impairment may benefit from HHPT.  Final disposition will be made by interdisciplinary medical team.    Patient End of Session: In bed;Call light within reach;Needs met;RN aware of session/findings    CURRENT GOALS   Goals to be met by: 9/24/24  Patient Goal Patient's self-stated goal is: walk more and go home   Goal #1 Patient is able to demonstrate supine - sit EOB @ level: modified independent      Goal #1   Current Status  NOT MET/ IN PROGRESS   Goal #2 Patient is able to demonstrate transfers Sit to/from Stand at assistance level: modified independent with walker - rolling      Goal #2  Current Status  NOT MET/ IN PROGRESS   Goal #3 Patient is able to ambulate 100 feet with assist device: walker - rolling at assistance level: modified independent   Goal #3   Current Status NOT MET/ IN PROGRESS   Goal #4 Patient will negotiate 5 stairs/one curb w/ assistive device and supervision   Goal #4   Current Status  NOT MET/ IN PROGRESS   Goal #5 Patient to demonstrate independence with home activity/exercise instructions provided to patient in preparation for discharge.   Goal #5   Current Status  NOT MET/ IN PROGRESS     Therapeutic Activity: 23 minutes    This treatment was  completed under direct supervision of clinical instructor. I have reviewed and agree with the above documentation.    RENETAT Elder

## 2024-09-18 NOTE — PROGRESS NOTES
[facility]  part of [facility]    Progress Note    Yesenia Berkowitz Patient Status:  Inpatient    1940 MRN [id number]   Location [facility]5W Attending Jose Pfeiffer MD   Hosp Day # 5 PCP JO-ANN YANG MD       Subjective:   Yesenia Berkowitz is a(n) 84 year old female.   Felt better,les sob and cough  Objective:   Blood pressure 140/56, pulse 75, temperature 97.8 °F (36.6 °C), temperature source Oral, resp. rate 18, height 5' 5\" (1.651 m), weight 166 lb 3.2 oz (75.4 kg), SpO2 96%.    HEENT: negative  Neck: no adenopathy, no carotid bruit, no JVD, supple, symmetrical, trachea midline and thyroid not enlarged, symmetric, no tenderness/mass/nodules  Pulmonary/Chest:  wheezing  Cardiovascular: S1, S2 normal, no S3 or S4, regular rate and rhythm, no murmur  Abdominal: normal findings: bowel sounds normal, soft, non-tender and no hepatosplenomegaly   Extremities: extremities normal, atraumatic, 2+ edema  Skin: Skin color, texture, turgor normal. No rashes or lesions    Results:     Lab Results   Component Value Date    WBC 11.3 (H) 2024    HGB 12.3 2024    HCT 37.4 2024    .0 2024    CREATSERUM 0.60 2024    BUN 26 (H) 2024     2024    K 4.5 2024    CL 94 (L) 2024    CO2 >40.0 (HH) 2024     (H) 2024    CA 8.5 (L) 2024    ALB 2.8 (L) 2024    ALKPHO 48 (L) 2024    BILT 0.2 2024    TP 4.8 (L) 2024    AST 15 2024    ALT 22 2024    PTT 30.1 2023    INR 1.12 2023    TSH 1.060 2023    LIP 30 2024    DDIMER 0.72 2024    MG 2.0 2024    PHOS 4.0 2024    TROP <0.045 2020       No results found.        Assessment and Plan:   Principal Problem:    Acute on chronic congestive heart failure, unspecified heart failure type (HCC)  Active Problems:    COPD exacerbation (HCC)    COPD (chronic obstructive pulmonary disease) (HCC)     Kyphoscoliosis    Phrenic nerve paralysis    Restrictive lung disease    Acute cor pulmonale (HCC)    Pneumonia     Continue to improve.less sob and cough,waiting bronch result,continue doxycycine,reduce solumedrol,home am?        ELVIA LIVINGSTON MD, MD  9/18/2024

## 2024-09-18 NOTE — PLAN OF CARE
Pt A/O x4, 2L O2 baseline. SOB with exertion at times. No calls from tele. Tolerating diet. Up ad gary, voiding freely. Denies pain. L arm precautions maintained. PO abx continued. PT/OT. Fall precautions in place. Call light within reach. Calls appropriately. Frequent rounding. Anticipate dc home tomorrow with HHC pending bronch cxs.     Problem: Patient Centered Care  Goal: Patient preferences are identified and integrated in the patient's plan of care  Description: Interventions:  - What would you like us to know as we care for you? From home alone  - Provide timely, complete, and accurate information to patient/family  - Incorporate patient and family knowledge, values, beliefs, and cultural backgrounds into the planning and delivery of care  - Encourage patient/family to participate in care and decision-making at the level they choose  - Honor patient and family perspectives and choices  Outcome: Progressing     Problem: Patient/Family Goals  Goal: Patient/Family Long Term Goal  Description: Patient's Long Term Goal:     Interventions:  - Monitor vitals  - Monitor appropriate labs  - Administer medications as ordered  - Follow MD's orders  - Update patient on plan of care   - Discharge planning     - See additional Care Plan goals for specific interventions  Outcome: Progressing  Goal: Patient/Family Short Term Goal  Description: Patient's Short Term Goal:     Interventions:   - Monitor vitals  - Monitor appropriate labs  - Administer medications as ordered  - Follow MD's orders  - Update patient on plan of care   - Discharge planning     - See additional Care Plan goals for specific interventions  Outcome: Progressing     Problem: RESPIRATORY - ADULT  Goal: Achieves optimal ventilation and oxygenation  Description: INTERVENTIONS:  - Assess for changes in respiratory status  - Assess for changes in mentation and behavior  - Position to facilitate oxygenation and minimize respiratory effort  - Oxygen  supplementation based on oxygen saturation or ABGs  - Provide Smoking Cessation handout, if applicable  - Encourage broncho-pulmonary hygiene including cough, deep breathe, Incentive Spirometry  - Assess the need for suctioning and perform as needed  - Assess and instruct to report SOB or any respiratory difficulty  - Respiratory Therapy support as indicated  - Manage/alleviate anxiety  - Monitor for signs/symptoms of CO2 retention  Outcome: Progressing     Problem: PAIN - ADULT  Goal: Verbalizes/displays adequate comfort level or patient's stated pain goal  Description: INTERVENTIONS:  - Encourage pt to monitor pain and request assistance  - Assess pain using appropriate pain scale  - Administer analgesics based on type and severity of pain and evaluate response  - Implement non-pharmacological measures as appropriate and evaluate response  - Consider cultural and social influences on pain and pain management  - Manage/alleviate anxiety  - Utilize distraction and/or relaxation techniques  - Monitor for opioid side effects  - Notify MD/LIP if interventions unsuccessful or patient reports new pain  - Anticipate increased pain with activity and pre-medicate as appropriate  Outcome: Progressing     Problem: RISK FOR INFECTION - ADULT  Goal: Absence of fever/infection during anticipated neutropenic period  Description: INTERVENTIONS  - Monitor WBC  - Administer growth factors as ordered  - Implement neutropenic guidelines  Outcome: Progressing     Problem: SAFETY ADULT - FALL  Goal: Free from fall injury  Description: INTERVENTIONS:  - Assess pt frequently for physical needs  - Identify cognitive and physical deficits and behaviors that affect risk of falls.  - [place] fall precautions as indicated by assessment.  - Educate pt/family on patient safety including physical limitations  - Instruct pt to call for assistance with activity based on assessment  - Modify environment to reduce risk of injury  - Provide  assistive devices as appropriate  - Consider OT/PT consult to assist with strengthening/mobility  - Encourage toileting schedule  Outcome: Progressing     Problem: DISCHARGE PLANNING  Goal: Discharge to home or other facility with appropriate resources  Description: INTERVENTIONS:  - Identify barriers to discharge w/pt and caregiver  - Include patient/family/discharge partner in discharge planning  - Arrange for needed discharge resources and transportation as appropriate  - Identify discharge learning needs (meds, wound care, etc)  - Arrange for interpreters to assist at discharge as needed  - Consider post-discharge preferences of patient/family/discharge partner  - Complete POLST form as appropriate  - Assess patient's ability to be responsible for managing their own health  - Refer to Case Management Department for coordinating discharge planning if the patient needs post-hospital services based on physician/LIP order or complex needs related to functional status, cognitive ability or social support system  Outcome: Progressing

## 2024-09-18 NOTE — OPERATIVE REPORT
[facility]    PATIENT'S NAME: CEZAR JOVEL   ATTENDING PHYSICIAN: Jose Pfeiffer MD   OPERATING PHYSICIAN: Bing Boyle MD   PATIENT ACCOUNT#:   [id number]    LOCATION:  [address] [place] [address]  MEDICAL RECORD #:   [id number]       YOB: 1940  ADMISSION DATE:       09/13/2024      OPERATION DATE:  09/17/2024    OPERATIVE REPORT    PREOPERATIVE DIAGNOSIS:    1.   Chronic obstructive pulmonary disease with exacerbation.    2.   Mucus plug.  POSTOPERATIVE DIAGNOSIS:    1.   Chronic obstructive pulmonary disease with exacerbation.    2.   Mucus plug.  PROCEDURE:  Flexible bronchoscopy with bronchial washings and removal of mucus plug.    ANESTHESIA:  Xylocaine 2%, Versed 2 mg IV.    OPERATIVE TECHNIQUE:  After informed consent was obtained from the patient, the patient was placed in supine position.  Versed was given in a slowly fashion until 2 mg was given IV.  O2 was given per nasal cannula to maintain O2 saturation over 90%.  Xylocaine 2% was sprayed to both nostrils, nasopharynx, hypopharynx, peritonsillar fossae.  Flexible scope was inserted through right nostril, nasopharynx, under epiglottis, through vocal cord to the trachea.  Vocal cord moved normally.  Peritonsillar fossae were free of tumor.  Esthela appeared to be sharp.  Scope was advanced to the left main bronchus.  There was no endobronchial lesion noted up to segmental, subsegmental bronchi of upper lobe and lower lobe.  Scope was withdrawn and advanced to the right main bronchus.  There was no endobronchial lesion noted up to segmental, subsegmental bronchi of upper lobe, middle lobe, lower lobe.  Mucus plug was removed by suction.  Patient tolerated procedure very well.  Bronchoscopy was terminated in satisfactory fashion.    Dictated By Bing Boyle MD  d: 09/17/2024 09:50:54  t: 09/17/2024 18:17:09  [facility] [id number]  [facility]/

## 2024-09-18 NOTE — PLAN OF CARE
Pt A&Ox4. No acute changes overnight. Tele remains in place. New iv inserted, flushed and saline locked. Po antibiotics given. On 2L O2, tolerating well. O2 increased with activity. Call lightwithin reach  Problem: Patient Centered Care  Goal: Patient preferences are identified and integrated in the patient's plan of care  Description: Interventions:  - What would you like us to know as we care for you? From home alone  - Provide timely, complete, and accurate information to patient/family  - Incorporate patient and family knowledge, values, beliefs, and cultural backgrounds into the planning and delivery of care  - Encourage patient/family to participate in care and decision-making at the level they choose  - Honor patient and family perspectives and choices  Outcome: Progressing     Problem: Patient/Family Goals  Goal: Patient/Family Long Term Goal  Description: Patient's Long Term Goal:     Interventions:  - Monitor vitals  - Monitor appropriate labs  - Administer medications as ordered  - Follow MD's orders  - Update patient on plan of care   - Discharge planning     - See additional Care Plan goals for specific interventions  Outcome: Progressing  Goal: Patient/Family Short Term Goal  Description: Patient's Short Term Goal:     Interventions:   - Monitor vitals  - Monitor appropriate labs  - Administer medications as ordered  - Follow MD's orders  - Update patient on plan of care   - Discharge planning     - See additional Care Plan goals for specific interventions  Outcome: Progressing     Problem: RESPIRATORY - ADULT  Goal: Achieves optimal ventilation and oxygenation  Description: INTERVENTIONS:  - Assess for changes in respiratory status  - Assess for changes in mentation and behavior  - Position to facilitate oxygenation and minimize respiratory effort  - Oxygen supplementation based on oxygen saturation or ABGs  - Provide Smoking Cessation handout, if applicable  - Encourage broncho-pulmonary hygiene  including cough, deep breathe, Incentive Spirometry  - Assess the need for suctioning and perform as needed  - Assess and instruct to report SOB or any respiratory difficulty  - Respiratory Therapy support as indicated  - Manage/alleviate anxiety  - Monitor for signs/symptoms of CO2 retention  Outcome: Progressing     Problem: PAIN - ADULT  Goal: Verbalizes/displays adequate comfort level or patient's stated pain goal  Description: INTERVENTIONS:  - Encourage pt to monitor pain and request assistance  - Assess pain using appropriate pain scale  - Administer analgesics based on type and severity of pain and evaluate response  - Implement non-pharmacological measures as appropriate and evaluate response  - Consider cultural and social influences on pain and pain management  - Manage/alleviate anxiety  - Utilize distraction and/or relaxation techniques  - Monitor for opioid side effects  - Notify MD/LIP if interventions unsuccessful or patient reports new pain  - Anticipate increased pain with activity and pre-medicate as appropriate  Outcome: Progressing     Problem: RISK FOR INFECTION - ADULT  Goal: Absence of fever/infection during anticipated neutropenic period  Description: INTERVENTIONS  - Monitor WBC  - Administer growth factors as ordered  - Implement neutropenic guidelines  Outcome: Progressing     Problem: SAFETY ADULT - FALL  Goal: Free from fall injury  Description: INTERVENTIONS:  - Assess pt frequently for physical needs  - Identify cognitive and physical deficits and behaviors that affect risk of falls.  - [place] fall precautions as indicated by assessment.  - Educate pt/family on patient safety including physical limitations  - Instruct pt to call for assistance with activity based on assessment  - Modify environment to reduce risk of injury  - Provide assistive devices as appropriate  - Consider OT/PT consult to assist with strengthening/mobility  - Encourage toileting schedule  Outcome:  Progressing     Problem: DISCHARGE PLANNING  Goal: Discharge to home or other facility with appropriate resources  Description: INTERVENTIONS:  - Identify barriers to discharge w/pt and caregiver  - Include patient/family/discharge partner in discharge planning  - Arrange for needed discharge resources and transportation as appropriate  - Identify discharge learning needs (meds, wound care, etc)  - Arrange for interpreters to assist at discharge as needed  - Consider post-discharge preferences of patient/family/discharge partner  - Complete POLST form as appropriate  - Assess patient's ability to be responsible for managing their own health  - Refer to Case Management Department for coordinating discharge planning if the patient needs post-hospital services based on physician/LIP order or complex needs related to functional status, cognitive ability or social support system  Outcome: Progressing

## 2024-09-18 NOTE — PROGRESS NOTES
[facility] [place]  part of [facility]    Progress Note    Yesenia Berkowitz Patient Status:  Inpatient    1940 MRN [id number]   Location [facility]5W Attending Jose Pfeiffer MD   Hosp Day # 5 PCP JO-ANN YANG MD       Subjective:   Yesenia Berkowitz feels well, and wants to go home.    Review of Systems:   10 point ROS completed and was negative, except for pertinent positive and negatives stated in subjective.    Objective:     Vitals:    24 2142 24 2345 24 0548 24 0609   BP:  119/63 140/56    BP Location:  Right arm Right arm    Pulse:   75    Resp:  18 18    Temp:  98.1 °F (36.7 °C) 97.8 °F (36.6 °C)    TempSrc:   Oral    SpO2: 91%  96%    Weight:    166 lb 3.2 oz (75.4 kg)   Height:         Patient Weight for the past 72 hrs:   Weight   24 1832 160 lb 0.9 oz (72.6 kg)   24 0643 168 lb 3.2 oz (76.3 kg)       Intake/Output Summary (Last 24 hours) at 2024 0738  Last data filed at 2024 2200  Gross per 24 hour   Intake 768.5 ml   Output 1450 ml   Net -681.5 ml     GENERAL:  no respiratory distress at rest  HEENT:  Head was atraumatic and normocephalic.    NECK:  Supple.  There was no JVD.   CHEST:  Symmetrical movement on inspiration  LUNGS:  No audible wheezing  ABDOMEN: Non-distended  MUSCULOSKELETAL:  There was no deformity.   EXTREMITIES: There was no edema  NEUROLOGICAL:  There was no focal deficit.    PSYCHIATRIC: Calm and cooperative     Current Scheduled Inpatient Meds:      furosemide  40 mg Oral Daily    polyethylene glycol (PEG 3350)  17 g Oral BID    aspirin  81 mg Oral Daily    cetirizine  5 mg Oral Daily    doxycycline  100 mg Oral 2 times per day    lansoprazole  30 mg Oral QAM AC    [Held by provider] enoxaparin  40 mg Subcutaneous Daily    methylPREDNISolone  40 mg Intravenous Q12H    amitriptyline  50 mg Oral Nightly    digoxin  125 mcg Oral Daily    dilTIAZem ER  240 mg Oral Daily    montelukast  10 mg Oral Nightly    lisinopril  (Zestril) 10 mg, hydroCHLOROthiazide 12.5 mg for Zestoretic 10-12.5 ([facility] only)   Oral Daily    fluticasone furoate  1 puff Inhalation Daily       Current PRN Inpatient Meds:        calcium carbonate    acetaminophen    metoclopramide    ipratropium-albuterol    temazepam    ondansetron    guaiFENesin    benzonatate    Results:     Lab Results   Component Value Date    WBC 11.3 (H) 09/18/2024    HGB 12.3 09/18/2024    HCT 37.4 09/18/2024    .0 09/18/2024    CREATSERUM 0.60 09/18/2024    BUN 26 (H) 09/18/2024     09/18/2024    K 4.5 09/18/2024    CL 94 (L) 09/18/2024    CO2 >40.0 (HH) 09/18/2024     (H) 09/18/2024    CA 8.5 (L) 09/18/2024    ALB 2.8 (L) 08/30/2024    ALKPHO 48 (L) 08/30/2024    BILT 0.2 08/30/2024    TP 4.8 (L) 08/30/2024    AST 15 08/30/2024    ALT 22 08/30/2024    PTT 30.1 03/14/2023    INR 1.12 03/14/2023    TSH 1.060 01/06/2023    LIP 30 08/30/2024    DDIMER 0.72 09/13/2024    MG 1.9 09/17/2024    TROP <0.045 02/03/2020       Recent Labs   Lab 09/13/24  1357 09/14/24  0437 09/15/24  0513 09/16/24  0442 09/17/24  0526 09/18/24  0434   RBC 4.20 3.69*   < > 3.75* 3.93 3.89   HGB 13.2 11.6*   < > 11.9* 12.3 12.3   HCT 41.1 35.5   < > 35.8 38.0 37.4   MCV 97.9 96.2   < > 95.5 96.7 96.1   MCH 31.4 31.4   < > 31.7 31.3 31.6   MCHC 32.1 32.7   < > 33.2 32.4 32.9   RDW 13.7 13.6   < > 13.6 13.5 13.5   NEPRELIM 8.52* 8.78*  --   --   --   --    WBC 10.9 9.7   < > 12.5* 11.2* 11.3*   .0 206.0   < > 222.0 221.0 231.0    < > = values in this interval not displayed.     Recent Labs   Lab 09/16/24  0442 09/17/24  0526 09/18/24  0434   * 146* 164*   BUN 38* 29* 26*   CREATSERUM 0.79 0.62 0.60   CA 9.1 8.8 8.5*   * 136 136   K 4.9 4.8 4.5   CL 93* 96* 94*   CO2 >40.0* >40.0* >40.0*     Lab Results   Component Value Date    INR 1.12 03/14/2023       Culture:  No results found for this visit on 09/13/24.    Imaging/EKG:   No results found.      Assessment and Plan:   Patient  is an 84-year-old female with hx of COPD on home O2, HTN, HLD, Afib not on AC, who p/w progressive leg edema, dyspnea on exertion and shortness of breath.     # Acute on chronic hypoxemic respiratory failure   # COPD exacerbation  # Mucus plug  # Left phrenic nerve paralysis  # Kyphoscoliosis  - Steroid, bronchodilator, doxycycline  - s/p flexible bronchoscopy with bronchial washings and removal of mucus plug, by Dr. Boyle 09/17/24  - Pulm on consult    # Acute exacerbation of HFpEF  # Paroxysmal Afib, not on AC  # HTN  # HLD  - Echo unremarkable  - continue home diltiazem, digoxin, lisinopril  - Lasix IV -> PO  - Cards signed off 09/15.    Diet: low salt  PT/OT: rec HH PT  DVT ppx: Lovenox  Code status: Full  Dispo: home 9/19    MDM: high Jose Pfeiffer MD, PhD  Message via Secure Chat  [facility] Hospitalist

## 2024-09-19 VITALS
BODY MASS INDEX: 28.16 KG/M2 | SYSTOLIC BLOOD PRESSURE: 102 MMHG | HEART RATE: 84 BPM | DIASTOLIC BLOOD PRESSURE: 46 MMHG | TEMPERATURE: 98 F | WEIGHT: 169 LBS | OXYGEN SATURATION: 94 % | HEIGHT: 65 IN | RESPIRATION RATE: 18 BRPM

## 2024-09-19 LAB
BUN BLD-MCNC: 34 MG/DL (ref 9–23)
BUN/CREAT SERPL: 48.6 (ref 10–20)
CALCIUM BLD-MCNC: 8.7 MG/DL (ref 8.7–10.4)
CHLORIDE SERPL-SCNC: 92 MMOL/L (ref 98–112)
CO2 SERPL-SCNC: >40 MMOL/L (ref 21–32)
CREAT BLD-MCNC: 0.7 MG/DL
DEPRECATED RDW RBC AUTO: 47.5 FL (ref 35.1–46.3)
EGFRCR SERPLBLD CKD-EPI 2021: 86 ML/MIN/1.73M2 (ref 60–?)
ERYTHROCYTE [DISTWIDTH] IN BLOOD BY AUTOMATED COUNT: 13.4 % (ref 11–15)
GLUCOSE BLD-MCNC: 106 MG/DL (ref 70–99)
HCT VFR BLD AUTO: 39.5 %
HGB BLD-MCNC: 12.9 G/DL
MCH RBC QN AUTO: 31.2 PG (ref 26–34)
MCHC RBC AUTO-ENTMCNC: 32.7 G/DL (ref 31–37)
MCV RBC AUTO: 95.4 FL
OSMOLALITY SERPL CALC.SUM OF ELEC: 288 MOSM/KG (ref 275–295)
PLATELET # BLD AUTO: 252 10(3)UL (ref 150–450)
POTASSIUM SERPL-SCNC: 4.1 MMOL/L (ref 3.5–5.1)
RBC # BLD AUTO: 4.14 X10(6)UL
SODIUM SERPL-SCNC: 135 MMOL/L (ref 136–145)
WBC # BLD AUTO: 15.4 X10(3) UL (ref 4–11)

## 2024-09-19 PROCEDURE — 99239 HOSP IP/OBS DSCHRG MGMT >30: CPT | Performed by: STUDENT IN AN ORGANIZED HEALTH CARE EDUCATION/TRAINING PROGRAM

## 2024-09-19 RX ORDER — DOXYCYCLINE 100 MG/1
100 CAPSULE ORAL 2 TIMES DAILY
Qty: 10 CAPSULE | Refills: 0 | Status: SHIPPED | OUTPATIENT
Start: 2024-09-19 | End: 2024-09-24

## 2024-09-19 RX ORDER — PREDNISONE 20 MG/1
TABLET ORAL
Qty: 15 TABLET | Refills: 0 | Status: SHIPPED | OUTPATIENT
Start: 2024-09-19 | End: 2024-09-28

## 2024-09-19 NOTE — PLAN OF CARE
Pt A&x4. Tele in place. On 2L O2, her baseline. Pt up self in room. PO antibiotics given. Is/Os monitored as ordered. Waiting for Bronch results prior to discharge.  Problem: Patient Centered Care  Goal: Patient preferences are identified and integrated in the patient's plan of care  Description: Interventions:  - What would you like us to know as we care for you? From home alone  - Provide timely, complete, and accurate information to patient/family  - Incorporate patient and family knowledge, values, beliefs, and cultural backgrounds into the planning and delivery of care  - Encourage patient/family to participate in care and decision-making at the level they choose  - Honor patient and family perspectives and choices  Outcome: Progressing     Problem: Patient/Family Goals  Goal: Patient/Family Long Term Goal  Description: Patient's Long Term Goal:     Interventions:  - Monitor vitals  - Monitor appropriate labs  - Administer medications as ordered  - Follow MD's orders  - Update patient on plan of care   - Discharge planning     - See additional Care Plan goals for specific interventions  Outcome: Progressing  Goal: Patient/Family Short Term Goal  Description: Patient's Short Term Goal:     Interventions:   - Monitor vitals  - Monitor appropriate labs  - Administer medications as ordered  - Follow MD's orders  - Update patient on plan of care   - Discharge planning     - See additional Care Plan goals for specific interventions  Outcome: Progressing     Problem: RESPIRATORY - ADULT  Goal: Achieves optimal ventilation and oxygenation  Description: INTERVENTIONS:  - Assess for changes in respiratory status  - Assess for changes in mentation and behavior  - Position to facilitate oxygenation and minimize respiratory effort  - Oxygen supplementation based on oxygen saturation or ABGs  - Provide Smoking Cessation handout, if applicable  - Encourage broncho-pulmonary hygiene including cough, deep breathe, Incentive  Spirometry  - Assess the need for suctioning and perform as needed  - Assess and instruct to report SOB or any respiratory difficulty  - Respiratory Therapy support as indicated  - Manage/alleviate anxiety  - Monitor for signs/symptoms of CO2 retention  Outcome: Progressing     Problem: PAIN - ADULT  Goal: Verbalizes/displays adequate comfort level or patient's stated pain goal  Description: INTERVENTIONS:  - Encourage pt to monitor pain and request assistance  - Assess pain using appropriate pain scale  - Administer analgesics based on type and severity of pain and evaluate response  - Implement non-pharmacological measures as appropriate and evaluate response  - Consider cultural and social influences on pain and pain management  - Manage/alleviate anxiety  - Utilize distraction and/or relaxation techniques  - Monitor for opioid side effects  - Notify MD/LIP if interventions unsuccessful or patient reports new pain  - Anticipate increased pain with activity and pre-medicate as appropriate  Outcome: Progressing     Problem: RISK FOR INFECTION - ADULT  Goal: Absence of fever/infection during anticipated neutropenic period  Description: INTERVENTIONS  - Monitor WBC  - Administer growth factors as ordered  - Implement neutropenic guidelines  Outcome: Progressing     Problem: SAFETY ADULT - FALL  Goal: Free from fall injury  Description: INTERVENTIONS:  - Assess pt frequently for physical needs  - Identify cognitive and physical deficits and behaviors that affect risk of falls.  - [place] fall precautions as indicated by assessment.  - Educate pt/family on patient safety including physical limitations  - Instruct pt to call for assistance with activity based on assessment  - Modify environment to reduce risk of injury  - Provide assistive devices as appropriate  - Consider OT/PT consult to assist with strengthening/mobility  - Encourage toileting schedule  Outcome: Progressing     Problem: DISCHARGE PLANNING  Goal:  Discharge to home or other facility with appropriate resources  Description: INTERVENTIONS:  - Identify barriers to discharge w/pt and caregiver  - Include patient/family/discharge partner in discharge planning  - Arrange for needed discharge resources and transportation as appropriate  - Identify discharge learning needs (meds, wound care, etc)  - Arrange for interpreters to assist at discharge as needed  - Consider post-discharge preferences of patient/family/discharge partner  - Complete POLST form as appropriate  - Assess patient's ability to be responsible for managing their own health  - Refer to Case Management Department for coordinating discharge planning if the patient needs post-hospital services based on physician/LIP order or complex needs related to functional status, cognitive ability or social support system  Outcome: Progressing

## 2024-09-19 NOTE — PROGRESS NOTES
[facility]  part of [facility]    Progress Note    Yesenia Berkowitz Patient Status:  Inpatient    1940 MRN [id number]   Location [facility]5W Attending Anna Douglas MD   Hosp Day # 6 PCP JO-ANN YANG MD       Subjective:   Yesenia Berkowitz is a(n) 84 year old female.   Much better,less sob and cough  Objective:   Blood pressure 102/46, pulse 93, temperature 98 °F (36.7 °C), temperature source Oral, resp. rate 18, height 5' 5\" (1.651 m), weight 169 lb (76.7 kg), SpO2 94%.    HEENT: negative  Neck: no adenopathy, no carotid bruit, no JVD, supple, symmetrical, trachea midline and thyroid not enlarged, symmetric, no tenderness/mass/nodules  Pulmonary/Chest:rhonchi  Cardiovascular: S1, S2 normal, no S3 or S4, regular rate and rhythm, no murmur  Abdominal: normal findings: bowel sounds normal, soft, non-tender and no hepatosplenomegaly   Extremities: extremities normal, atraumatic, no cyanosis or edema  Skin: Skin color, texture, turgor normal. No rashes or lesions    Results:     Lab Results   Component Value Date    WBC 15.4 (H) 2024    HGB 12.9 2024    HCT 39.5 2024    .0 2024    CREATSERUM 0.70 2024    BUN 34 (H) 2024     (L) 2024    K 4.1 2024    CL 92 (L) 2024    CO2 >40.0 (HH) 2024     (H) 2024    CA 8.7 2024    ALB 2.8 (L) 2024    ALKPHO 48 (L) 2024    BILT 0.2 2024    TP 4.8 (L) 2024    AST 15 2024    ALT 22 2024    PTT 30.1 2023    INR 1.12 2023    TSH 1.060 2023    LIP 30 2024    DDIMER 0.72 2024    MG 2.0 2024    PHOS 4.0 2024    TROP <0.045 2020       No results found.        Assessment and Plan:   Principal Problem:    Acute on chronic congestive heart failure, unspecified heart failure type (HCC)  Active Problems:    COPD exacerbation (HCC)    COPD (chronic obstructive pulmonary disease) (HCC)     Kyphoscoliosis    Phrenic nerve paralysis    Restrictive lung disease    Acute cor pulmonale (HCC)    Pneumonia    Much improvd less soband cough,home         ELVIA LIVINGSTON MD, MD  9/19/2024

## 2024-09-19 NOTE — PLAN OF CARE
Patient stable for discharge this afternoon. Son will provide transportation home. Education given to patient. Safety precautions maintained. Call light in reach.     Problem: Patient Centered Care  Goal: Patient preferences are identified and integrated in the patient's plan of care  Description: Interventions:  - What would you like us to know as we care for you? From home alone  - Provide timely, complete, and accurate information to patient/family  - Incorporate patient and family knowledge, values, beliefs, and cultural backgrounds into the planning and delivery of care  - Encourage patient/family to participate in care and decision-making at the level they choose  - Honor patient and family perspectives and choices  Outcome: Completed     Problem: Patient/Family Goals  Goal: Patient/Family Long Term Goal  Description: Patient's Long Term Goal:     Interventions:  - Monitor vitals  - Monitor appropriate labs  - Administer medications as ordered  - Follow MD's orders  - Update patient on plan of care   - Discharge planning     - See additional Care Plan goals for specific interventions  Outcome: Completed  Goal: Patient/Family Short Term Goal  Description: Patient's Short Term Goal:     Interventions:   - Monitor vitals  - Monitor appropriate labs  - Administer medications as ordered  - Follow MD's orders  - Update patient on plan of care   - Discharge planning     - See additional Care Plan goals for specific interventions  Outcome: Completed     Problem: RESPIRATORY - ADULT  Goal: Achieves optimal ventilation and oxygenation  Description: INTERVENTIONS:  - Assess for changes in respiratory status  - Assess for changes in mentation and behavior  - Position to facilitate oxygenation and minimize respiratory effort  - Oxygen supplementation based on oxygen saturation or ABGs  - Provide Smoking Cessation handout, if applicable  - Encourage broncho-pulmonary hygiene including cough, deep breathe, Incentive  Spirometry  - Assess the need for suctioning and perform as needed  - Assess and instruct to report SOB or any respiratory difficulty  - Respiratory Therapy support as indicated  - Manage/alleviate anxiety  - Monitor for signs/symptoms of CO2 retention  Outcome: Completed     Problem: PAIN - ADULT  Goal: Verbalizes/displays adequate comfort level or patient's stated pain goal  Description: INTERVENTIONS:  - Encourage pt to monitor pain and request assistance  - Assess pain using appropriate pain scale  - Administer analgesics based on type and severity of pain and evaluate response  - Implement non-pharmacological measures as appropriate and evaluate response  - Consider cultural and social influences on pain and pain management  - Manage/alleviate anxiety  - Utilize distraction and/or relaxation techniques  - Monitor for opioid side effects  - Notify MD/LIP if interventions unsuccessful or patient reports new pain  - Anticipate increased pain with activity and pre-medicate as appropriate  Outcome: Completed     Problem: RISK FOR INFECTION - ADULT  Goal: Absence of fever/infection during anticipated neutropenic period  Description: INTERVENTIONS  - Monitor WBC  - Administer growth factors as ordered  - Implement neutropenic guidelines  Outcome: Completed     Problem: SAFETY ADULT - FALL  Goal: Free from fall injury  Description: INTERVENTIONS:  - Assess pt frequently for physical needs  - Identify cognitive and physical deficits and behaviors that affect risk of falls.  - [place] fall precautions as indicated by assessment.  - Educate pt/family on patient safety including physical limitations  - Instruct pt to call for assistance with activity based on assessment  - Modify environment to reduce risk of injury  - Provide assistive devices as appropriate  - Consider OT/PT consult to assist with strengthening/mobility  - Encourage toileting schedule  Outcome: Completed     Problem: DISCHARGE PLANNING  Goal: Discharge  to home or other facility with appropriate resources  Description: INTERVENTIONS:  - Identify barriers to discharge w/pt and caregiver  - Include patient/family/discharge partner in discharge planning  - Arrange for needed discharge resources and transportation as appropriate  - Identify discharge learning needs (meds, wound care, etc)  - Arrange for interpreters to assist at discharge as needed  - Consider post-discharge preferences of patient/family/discharge partner  - Complete POLST form as appropriate  - Assess patient's ability to be responsible for managing their own health  - Refer to Case Management Department for coordinating discharge planning if the patient needs post-hospital services based on physician/LIP order or complex needs related to functional status, cognitive ability or social support system  Outcome: Completed

## 2024-09-19 NOTE — CM/SW NOTE
09/19/24 1000   Discharge disposition   Expected discharge disposition Home-Health   Post Acute Care Provider [facility]   DME/Infusion Providers Other (comment)  ([employer] home oxygen - 2L)   Discharge transportation Private car     MDO placed for discharge.     notified [facility] liaison Vanessa of discharge today.  [facility] will be reaching out to patient to coordinate start of care.     JIMI Barksdale notified  that patient's son will be providing transport home with patient's portable O2 concentrator.    Patient cleared for discharge from CM/ standpoint.    Melinda Cartagena RN, BSN  Nurse   [phone]

## 2024-09-19 NOTE — DISCHARGE SUMMARY
[facility]  part of [facility]    Discharge Summary    Yesenia Berkowitz Patient Status:  Inpatient    1940 MRN [id number]   Location [facility]5W Attending Anna Douglas MD   Hosp Day # 6 PCP JO-ANN YANG MD     Date of Admission: 2024   Date of Discharge: 24     Admitting Diagnosis: COPD exacerbation (HCC) [J44.1]  Acute on chronic congestive heart failure, unspecified heart failure type (HCC) [I50.9]    Disposition: Home    Discharge Diagnosis: .Principal Problem:    Acute on chronic congestive heart failure, unspecified heart failure type (HCC)  Active Problems:    COPD exacerbation (HCC)    COPD (chronic obstructive pulmonary disease) (HCC)    Kyphoscoliosis    Phrenic nerve paralysis    Restrictive lung disease    Acute cor pulmonale (HCC)    Pneumonia      Hospital Course:   Reason for Admission: leg edema, OJEDA, wheezing, SOB    Discharge Physical Exam:     GENERAL:  no respiratory distress at rest  HEENT:  Head was atraumatic and normocephalic.    NECK:  Supple.  There was no JVD.   CHEST:  Symmetrical movement on inspiration  LUNGS:  No audible wheezing  ABDOMEN: Non-distended  MUSCULOSKELETAL:  There was no deformity.   EXTREMITIES: There was no edema  NEUROLOGICAL:  There was no focal deficit.    PSYCHIATRIC: Calm and cooperative     Hospital Course:     Patient is an 84-year-old  female who came into the emergency department for evaluation of 10 days of progressive leg edema, dyspnea on exertion, wheezing, and shortness of breath. CBC showed white blood cell count of 10.9 with left shift. Chemistry showed bicarb of 38, otherwise unremarkable. Troponin, proBNP, COVID testing, and D-dimer were all negative. Chest x-ray showed cardiomegaly, increased interstitial edema, and bilateral pleural effusions. EKG showed normal sinus rhythm. Patient was admitted for Acute on chronic hypoxemic respiratory failure 2/2 COPD and fluid overload from CHF. Pulmonary  was consulted and pt underwent flex bronch on 9/17/24 with Dr. Boyle his pulmonologist, with bronchial washings and removal of mucus plug. Pt tolerated the procedure with no complications. She received broncodilators, steroids, and doxycycline.   Cardiology was also consulted for acute HFpEF and received IV lasix which was later switched to oral.   By discharge patient was feeling better. She was discharged home to complete doxycycline and steroid taper. Full biopsy results to be followed in clinic.   Follow up instructions given. Ed precautions reviewed.       Complications: None    Consultants         Provider   Role Specialty     Luis Fernando Fowler MD      Consulting Physician Cardiovascular Diseases     Bing Boyle MD      Consulting Physician PULMONARY DISEASES          Surgical Procedures       Case IDs Date Procedure Surgeon Location Status    [id number] 9/17/24 BRONCHOSCOPY with bronchoavleolar lavage Bing Boyle MD [facility] ENDOSCOPY Comp          Pending Labs       Order Current Status    AFB Culture and Smear In process    AFB Culture(P) In process    FUNGUS CULTURE(P) In process    Fungus Culture and Stain In process            Discharge Plan:   Discharge Condition: Stable    Current Discharge Medication List        New Orders    Details   predniSONE 20 MG Oral Tab Take 2 tablets (40 mg total) by mouth daily for 5 days, THEN 1 tablet (20 mg total) daily for 5 days. 20mgx2 [id number], then 20mg [id number].      doxycycline 100 MG Oral Cap Take 1 capsule (100 mg total) by mouth 2 (two) times daily for 10 doses.           Home Meds - Modified    Details   DIGOXIN 0.125 MG Oral Tab Take 1 tablet (125 mcg total) by mouth daily.           Home Meds - Unchanged    Details   albuterol 108 (90 Base) MCG/ACT Inhalation Aero Soln Inhale 2 puffs into the lungs as needed.      mupirocin 2 % External Ointment Apply 1 Application topically 3 (three) times daily.      triamcinolone 0.1 % External Cream Apply topically 2 (two) times  daily. Apply bid as directed      estradiol 0.05 MG/24HR Transdermal Patch Weekly Place 1 patch onto the skin once a week. As directed.      PULMICORT FLEXHALER 180 MCG/ACT Inhalation Aerosol Powder, Breath Activated Inhale 2 puffs into the lungs daily.      dilTIAZem HCl ER Coated Beads 240 MG Oral Capsule SR 24 Hr Take 1 capsule (240mg)by mouth every morning on an empty stomach.      Calcium Carb-Cholecalciferol (CALCIUM + D3) 600-200 MG-UNIT Oral Tab Take 1 tablet by mouth daily.        furosemide 40 MG Oral Tab Take 1 tablet (40 mg total) by mouth daily.      Cholecalciferol (VITAMIN D) 1000 UNITS Oral Tab Take 1,000 Units by mouth 2 (two) times daily.      Potassium Chloride ER (K-DUR M20) 20 MEQ Oral Tab CR Take 1 tablet (20 mEq total) by mouth nightly.      lansoprazole (PREVACID) 30 MG Oral Capsule Delayed Release Take 1 capsule (30 mg total) by mouth nightly.      amitriptyline 50 MG Oral Tab Take 1 tablet (50 mg total) by mouth nightly.      aspirin 81 MG Oral Tab Take 2 tablets (162 mg total) by mouth daily.      Lisinopril-Hydrochlorothiazide 10-12.5 MG Oral Tab Take 1 tablet by mouth daily.      Loratadine 10 MG Oral Cap Take 10 mg by mouth nightly.        montelukast 10 MG Oral Tab Take 1 tablet (10 mg total) by mouth nightly.      Multiple Vitamin (MULTI-VITAMINS) Oral Tab take 1 tablet by ORAL route  every day with food      omega-3 fatty acids (FISH OIL) 1000 MG Oral Cap Take 1,000 mg by mouth daily.      Tiotropium Bromide Monohydrate 18 MCG Inhalation Cap Inhale 1 capsule (18 mcg total) into the lungs nightly.      B Complex Oral Cap Take 1 tablet by mouth daily.      dicyclomine 10 MG Oral Cap Take 1 capsule (10 mg total) by mouth 3 (three) times daily as needed (abdominal pain).      polyethylene glycol, PEG 3350-KCl-NaBcb-NaCl-NaSulf, 236 g Oral Recon Soln Take 4,000 mL by mouth As Directed. Take 2,000 mL the night before your procedure and 2,000 mL the morning of your procedure. Take as  directed by GI clinic. Okay to substitute for generic.                 Discharge Diet: As tolerated    Discharge Activity: As tolerated    Follow up:      Follow-up Information       Luis Fernando Fowler MD Follow up in 2 week(s).    Specialties: Cardiovascular Diseases, CARDIOLOGY  Why: Office will call to schedule  Contact information:  [address]  [address]  [facility] [id number] [phone]               Amanda Nelson APRN. Go on 9/20/2024.    Specialty: Nurse Practitioner  Why: @1130 pls bring discharge med lsit to this appt  Contact information:  [address]  [facility] [id number] [phone]               Bing Boyle MD Follow up in 1 week(s).    Specialties: PULMONARY DISEASES, Sleep Disorders  Contact information:  [address]  [address]  [place] [id number] [phone]                                   Other Discharge Instructions:         Sometimes managing your health at home requires assistance.  The [facility] team has recognized your preference to use [facility].  They can be reached by phone at [phone].  The fax number for your reference is [phone].  A representative from the home health agency will contact you or your family to schedule your first visit.     Going Home Instructions  In this section you will find the tools which will guide you through the first few days after you leave the hospital. Continued use of these tools will help you develop the skills necessary to keep your heart failure under control.     Home Care Instructions Following Heart Failure - the most important things to do every day include:   Weigh yourself and review the “Self-Check Plan” sheet every morning.   Call your cardiologist office if you are in the “Pay Attention-Use Caution” (yellow zone) or “Medical Alert-Warning!” (red zone) as outlined in the Self-Check Plan sheet.  Take your medicines as prescribed.  Limit your sodium (salt) intake.  Know when to call  your cardiologist, primary doctor, or nurse.  Know when to seek emergency care.      Things for You to Remember:   1. See your doctor or healthcare provider as written on your discharge instructions.  It is important that you attend this appointment to make sure your symptoms are under control.     2. Your recommended sodium intake is 3845-4131 mg daily.    3.  Weigh yourself every day.    4. Some exercise and activity is important to help keep your heart functioning and strong. Unless instructed not to exercise, you may walk at a slow to moderate pace for 10-15 minutes 2-3 days per week to start. Pace your activity to prevent shortness of breath or fatigue. Stop exercising if you develop chest pain, lightheadedness, or significant shortness of breath.       Call Your Cardiologist If:   You gain 2-3 pounds in one day or 5 pounds in one week.  You have more difficulty breathing.  You are getting more tired with normal activity.  You are more short of breath lying down, or awaken at night short of breath.  You have swelling of your feet or legs.  You urinate less often during the day and more often at night.  You have cramps in your legs.  You have blurred vision or see yellowish-green halos around objects of lights.    Go to the Emergency Room If:   You have pain or tightness in your chest  You are extremely short of breath  You are coughing up pink-frothy mucus  You are traveling and develop symptoms of worsening heart failure      ** Please follow up with your cardiologist or Advanced Practice Provider as written on your discharge instructions. If you are not provided with an appointment, let your nurse know so you can get an appointment**         >30 minutes spent on discharge orders, coordination, and planning.       Anna Douglas MD  9/19/2024

## 2024-09-23 ENCOUNTER — TELEPHONE (OUTPATIENT)
Dept: CARDIOLOGY UNIT | Facility: HOSPITAL | Age: 84
End: 2024-09-23

## 2024-09-23 NOTE — PROGRESS NOTES
Called patient for a post Dc clinical update and to remind her a bout her appt with Amanda Nelson APRN (Nurse Practitioner) on 9/24/2024; @1131 pls bring discharge med lsit to this appt . A message was left in detail with contact information and the above mentioned. Will follow.

## 2024-09-30 PROBLEM — J45.909 BRONCHIAL ASTHMA (CMD): Status: ACTIVE | Noted: 2024-09-30

## 2024-10-04 PROBLEM — J45.909 BRONCHIAL ASTHMA (CMD): Status: RESOLVED | Noted: 2024-06-10 | Resolved: 2024-10-04

## 2024-10-07 ENCOUNTER — HOSPITAL ENCOUNTER (INPATIENT)
Facility: HOSPITAL | Age: 84
LOS: 4 days | Discharge: HOME HEALTH | End: 2024-10-11
Attending: STUDENT IN AN ORGANIZED HEALTH CARE EDUCATION/TRAINING PROGRAM | Admitting: HOSPITALIST
Payer: MEDICARE

## 2024-10-07 ENCOUNTER — APPOINTMENT (OUTPATIENT)
Dept: GENERAL RADIOLOGY | Facility: HOSPITAL | Age: 84
End: 2024-10-07
Attending: STUDENT IN AN ORGANIZED HEALTH CARE EDUCATION/TRAINING PROGRAM
Payer: MEDICARE

## 2024-10-07 DIAGNOSIS — J96.21 ACUTE ON CHRONIC HYPOXIC RESPIRATORY FAILURE (HCC): Primary | ICD-10-CM

## 2024-10-07 DIAGNOSIS — J81.0 ACUTE PULMONARY EDEMA (HCC): ICD-10-CM

## 2024-10-07 DIAGNOSIS — J44.1 COPD EXACERBATION (HCC): ICD-10-CM

## 2024-10-07 PROBLEM — M41.20 IDIOPATHIC SCOLIOSIS AND KYPHOSCOLIOSIS: Status: ACTIVE | Noted: 2024-10-07

## 2024-10-07 PROBLEM — J96.10 CHRONIC RESPIRATORY FAILURE (CMD): Status: ACTIVE | Noted: 2024-02-13

## 2024-10-07 LAB
ANION GAP SERPL CALC-SCNC: 0 MMOL/L (ref 0–18)
ATRIAL RATE: 92 BPM
BASOPHILS # BLD AUTO: 0.01 X10(3) UL (ref 0–0.2)
BASOPHILS NFR BLD AUTO: 0.1 %
BNP SERPL-MCNC: 72 PG/ML
BUN BLD-MCNC: 18 MG/DL (ref 9–23)
BUN/CREAT SERPL: 25.4 (ref 10–20)
CALCIUM BLD-MCNC: 9 MG/DL (ref 8.7–10.4)
CHLORIDE SERPL-SCNC: 101 MMOL/L (ref 98–112)
CO2 SERPL-SCNC: 39 MMOL/L (ref 21–32)
CREAT BLD-MCNC: 0.71 MG/DL
D DIMER PPP FEU-MCNC: 0.63 UG/ML FEU (ref ?–0.82)
DEPRECATED RDW RBC AUTO: 47.4 FL (ref 35.1–46.3)
EGFRCR SERPLBLD CKD-EPI 2021: 85 ML/MIN/1.73M2 (ref 60–?)
EOSINOPHIL # BLD AUTO: 0.05 X10(3) UL (ref 0–0.7)
EOSINOPHIL NFR BLD AUTO: 0.5 %
ERYTHROCYTE [DISTWIDTH] IN BLOOD BY AUTOMATED COUNT: 13.8 % (ref 11–15)
GLUCOSE BLD-MCNC: 137 MG/DL (ref 70–99)
HCT VFR BLD AUTO: 36.7 %
HGB BLD-MCNC: 12.3 G/DL
IMM GRANULOCYTES # BLD AUTO: 0.02 X10(3) UL (ref 0–1)
IMM GRANULOCYTES NFR BLD: 0.2 %
LYMPHOCYTES # BLD AUTO: 0.74 X10(3) UL (ref 1–4)
LYMPHOCYTES NFR BLD AUTO: 6.8 %
MCH RBC QN AUTO: 31.5 PG (ref 26–34)
MCHC RBC AUTO-ENTMCNC: 33.5 G/DL (ref 31–37)
MCV RBC AUTO: 94.1 FL
MONOCYTES # BLD AUTO: 0.79 X10(3) UL (ref 0.1–1)
MONOCYTES NFR BLD AUTO: 7.3 %
NEUTROPHILS # BLD AUTO: 9.2 X10 (3) UL (ref 1.5–7.7)
NEUTROPHILS # BLD AUTO: 9.2 X10(3) UL (ref 1.5–7.7)
NEUTROPHILS NFR BLD AUTO: 85.1 %
OSMOLALITY SERPL CALC.SUM OF ELEC: 294 MOSM/KG (ref 275–295)
P AXIS: -13 DEGREES
P-R INTERVAL: 136 MS
PLATELET # BLD AUTO: 195 10(3)UL (ref 150–450)
POTASSIUM SERPL-SCNC: 3.9 MMOL/L (ref 3.5–5.1)
PROCALCITONIN SERPL-MCNC: <0.04 NG/ML (ref ?–0.05)
Q-T INTERVAL: 338 MS
QRS DURATION: 88 MS
QTC CALCULATION (BEZET): 417 MS
R AXIS: -5 DEGREES
RBC # BLD AUTO: 3.9 X10(6)UL
SODIUM SERPL-SCNC: 140 MMOL/L (ref 136–145)
T AXIS: 27 DEGREES
TROPONIN I SERPL HS-MCNC: 9 NG/L
VENTRICULAR RATE: 92 BPM
WBC # BLD AUTO: 10.8 X10(3) UL (ref 4–11)

## 2024-10-07 PROCEDURE — 99223 1ST HOSP IP/OBS HIGH 75: CPT | Performed by: HOSPITALIST

## 2024-10-07 PROCEDURE — 71045 X-RAY EXAM CHEST 1 VIEW: CPT | Performed by: STUDENT IN AN ORGANIZED HEALTH CARE EDUCATION/TRAINING PROGRAM

## 2024-10-07 RX ORDER — IPRATROPIUM BROMIDE AND ALBUTEROL SULFATE 2.5; .5 MG/3ML; MG/3ML
3 SOLUTION RESPIRATORY (INHALATION) EVERY 6 HOURS PRN
Status: DISCONTINUED | OUTPATIENT
Start: 2024-10-07 | End: 2024-10-11

## 2024-10-07 RX ORDER — MONTELUKAST SODIUM 10 MG/1
10 TABLET ORAL NIGHTLY
Status: DISCONTINUED | OUTPATIENT
Start: 2024-10-07 | End: 2024-10-11

## 2024-10-07 RX ORDER — ENOXAPARIN SODIUM 100 MG/ML
40 INJECTION SUBCUTANEOUS DAILY
Status: DISCONTINUED | OUTPATIENT
Start: 2024-10-07 | End: 2024-10-11

## 2024-10-07 RX ORDER — FUROSEMIDE 40 MG/1
40 TABLET ORAL DAILY
Status: DISCONTINUED | OUTPATIENT
Start: 2024-10-07 | End: 2024-10-08

## 2024-10-07 RX ORDER — FUROSEMIDE 10 MG/ML
40 INJECTION INTRAMUSCULAR; INTRAVENOUS ONCE
Status: COMPLETED | OUTPATIENT
Start: 2024-10-07 | End: 2024-10-07

## 2024-10-07 RX ORDER — DICYCLOMINE HYDROCHLORIDE 10 MG/1
10 CAPSULE ORAL 3 TIMES DAILY PRN
Status: DISCONTINUED | OUTPATIENT
Start: 2024-10-07 | End: 2024-10-11

## 2024-10-07 RX ORDER — METHYLPREDNISOLONE SODIUM SUCCINATE 40 MG/ML
40 INJECTION, POWDER, LYOPHILIZED, FOR SOLUTION INTRAMUSCULAR; INTRAVENOUS EVERY 6 HOURS
Status: DISCONTINUED | OUTPATIENT
Start: 2024-10-07 | End: 2024-10-08

## 2024-10-07 RX ORDER — DOXYCYCLINE 100 MG/1
100 CAPSULE ORAL EVERY 12 HOURS SCHEDULED
Status: DISCONTINUED | OUTPATIENT
Start: 2024-10-07 | End: 2024-10-11

## 2024-10-07 RX ORDER — ASPIRIN 81 MG/1
162 TABLET, CHEWABLE ORAL DAILY
Status: DISCONTINUED | OUTPATIENT
Start: 2024-10-07 | End: 2024-10-11

## 2024-10-07 RX ORDER — AMITRIPTYLINE HYDROCHLORIDE 50 MG/1
50 TABLET ORAL NIGHTLY
Status: DISCONTINUED | OUTPATIENT
Start: 2024-10-07 | End: 2024-10-11

## 2024-10-07 RX ORDER — BENZONATATE 200 MG/1
200 CAPSULE ORAL 3 TIMES DAILY PRN
Status: DISCONTINUED | OUTPATIENT
Start: 2024-10-07 | End: 2024-10-11

## 2024-10-07 RX ORDER — CETIRIZINE HYDROCHLORIDE 10 MG/1
10 TABLET ORAL DAILY
Status: DISCONTINUED | OUTPATIENT
Start: 2024-10-08 | End: 2024-10-11

## 2024-10-07 RX ORDER — METHYLPREDNISOLONE SODIUM SUCCINATE 125 MG/2ML
125 INJECTION, POWDER, LYOPHILIZED, FOR SOLUTION INTRAMUSCULAR; INTRAVENOUS ONCE
Status: COMPLETED | OUTPATIENT
Start: 2024-10-07 | End: 2024-10-07

## 2024-10-07 RX ORDER — DIGOXIN 125 MCG
125 TABLET ORAL DAILY
Status: DISCONTINUED | OUTPATIENT
Start: 2024-10-07 | End: 2024-10-11

## 2024-10-07 RX ORDER — IPRATROPIUM BROMIDE AND ALBUTEROL SULFATE 2.5; .5 MG/3ML; MG/3ML
3 SOLUTION RESPIRATORY (INHALATION) EVERY 4 HOURS PRN
Status: DISCONTINUED | OUTPATIENT
Start: 2024-10-07 | End: 2024-10-11

## 2024-10-07 RX ORDER — ALBUTEROL SULFATE 0.83 MG/ML
10 SOLUTION RESPIRATORY (INHALATION) CONTINUOUS
Status: DISCONTINUED | OUTPATIENT
Start: 2024-10-07 | End: 2024-10-07

## 2024-10-07 RX ORDER — PANTOPRAZOLE SODIUM 40 MG/1
40 TABLET, DELAYED RELEASE ORAL NIGHTLY
Status: DISCONTINUED | OUTPATIENT
Start: 2024-10-07 | End: 2024-10-11

## 2024-10-07 RX ORDER — DILTIAZEM HYDROCHLORIDE 120 MG/1
240 CAPSULE, EXTENDED RELEASE ORAL DAILY
Status: DISCONTINUED | OUTPATIENT
Start: 2024-10-07 | End: 2024-10-11

## 2024-10-07 RX ORDER — TEMAZEPAM 15 MG/1
15 CAPSULE ORAL NIGHTLY PRN
Status: DISCONTINUED | OUTPATIENT
Start: 2024-10-07 | End: 2024-10-11

## 2024-10-07 NOTE — ED QUICK NOTES
Orders for admission, patient is aware of plan and ready to go upstairs. Any questions, please call ED JIMI pulliam at extension [phone].     Patient Covid vaccination status: Fully vaccinated     COVID Test Ordered in ED: None    COVID Suspicion at Admission: N/A    Running Infusions:    albuterol      None    Mental Status/LOC at time of transport: aox4    Other pertinent information:   CIWA score: N/A   NIH score:  N/A

## 2024-10-07 NOTE — H&P
[facility]    PATIENT'S NAME: CEZAR JOVEL   ATTENDING PHYSICIAN: Jacqueline López MD   PATIENT ACCOUNT#:   [id number]    LOCATION:  [address]  MEDICAL RECORD #:   [id number]       YOB: 1940  ADMISSION DATE:       10/07/2024    HISTORY AND PHYSICAL EXAMINATION    CHIEF COMPLAINT:  COPD exacerbation.    HISTORY OF PRESENT ILLNESS:  The patient is an 84-year-old  female with history of severe chronic obstructive pulmonary disease, last hospitalization September 2024.  At that time treated with steroids, antibiotics, and nebulizer treatments.  Also had an echocardiogram which showed mild aortic stenosis and moderate pulmonary artery hypertension.  Today presented with progressive shortness of breath.  CBC showed white blood cell count of 10.8 with left shift.  Chemistry, B-natriuretic peptide, and troponin are still pending.  Chest x-ray showed hazy opacities both lung bases.  The patient was started on steroids, nebulizer treatments, and she will be admitted to the hospital for further management.     PAST MEDICAL HISTORY:  Left ventricular grade 1 diastolic dysfunction; paroxysmal atrial fibrillation, low burden; history of gastrointestinal bleed, not on anticoagulation; chronic obstructive pulmonary disease, on home oxygen; hypertension; hyperlipidemia; gastroesophageal reflux disease; diverticulosis; osteoarthritis; degenerative joint disease of lumbar spine.    PAST SURGICAL HISTORY:  Tonsillectomy, adenoidectomy, hysterectomy, cataract procedure.    MEDICATIONS:  Please see medication reconciliation list.     ALLERGIES:  Penicillin.    FAMILY HISTORY:  Mother had ovarian cancer.  Sister had COPD.    SOCIAL HISTORY:  Ex-tobacco user.  Social alcohol.  No drug use.  Lives with her family.  Independent for basic activities of daily living.    REVIEW OF SYSTEMS:  The patient reports progressive shortness of breath for the last 4 to 5 days and then became worse overnight to the  point she is dyspneic without even exertion.  Other 12-point review of systems negative.  She denies fever, chills, or sick contacts.      PHYSICAL EXAMINATION:    GENERAL:  Alert, oriented to time, place, and person, moderate distress.   VITAL SIGNS:  Temperature 98.4, pulse 93, respiratory rate 34, blood pressure 115/100, pulse ox 62% on room air and 95% on nasal cannula oxygen.  HEENT:  Atraumatic.  Oropharynx clear.  Dry mucous membranes.  Ears, nose normal.  Eyes:  Anicteric sclerae.   NECK:  Supple.  No lymphadenopathy.   LUNGS:  Bilateral expiratory wheezing with moderate air restriction.  Increased respiratory effort.   HEART:  Regular rate and rhythm.  S1 and S2 auscultated.   ABDOMEN:  Soft, nondistended.  No tenderness.  Positive bowel sounds.  EXTREMITIES:  Trace edema both legs.  No clubbing or cyanosis.  NEUROLOGIC:  Motor and sensory intact.      ASSESSMENT:    1.   COPD exacerbation.  2.   Acute on chronic hypoxemic respiratory failure.  3.   Question of possible underlying recurrent pneumonia versus acute bronchitis.  4.   Essential hypertension.    PLAN:  The patient will be admitted to general medical floor.  IV Solu-Medrol, nebulizer treatments.  Oral doxycycline.  Pulmonary consult.  Oxygen support.  DVT prophylaxis.  Monitor hemodynamic and respiratory status.  Further recommendations to follow.     Dictated By Yamil Khan MD  d: 10/07/2024 12:45:05  t: 10/07/2024 13:04:22  Job [id number]  FB/    cc: Jacqueline López MD

## 2024-10-07 NOTE — PLAN OF CARE
Patient is A&Ox4, 6Lo2, stand by assist. Continuing IV solumedrol. Call light within reach and fall precautions in place.     Problem: Patient Centered Care  Goal: Patient preferences are identified and integrated in the patient's plan of care  Description: Interventions:  - What would you like us to know as we care for you? From home alone  - Provide timely, complete, and accurate information to patient/family  - Incorporate patient and family knowledge, values, beliefs, and cultural backgrounds into the planning and delivery of care  - Encourage patient/family to participate in care and decision-making at the level they choose  - Honor patient and family perspectives and choices  Outcome: Progressing     Problem: Patient/Family Goals  Goal: Patient/Family Long Term Goal  Description: Patient's Long Term Goal: to go back home    Interventions:  - follow medical regimen  - See additional Care Plan goals for specific interventions  Outcome: Progressing  Goal: Patient/Family Short Term Goal  Description: Patient's Short Term Goal: to not be Short of Breath    Interventions:   - follow medical regimen  - See additional Care Plan goals for specific interventions  Outcome: Progressing     Problem: RESPIRATORY - ADULT  Goal: Achieves optimal ventilation and oxygenation  Description: INTERVENTIONS:  - Assess for changes in respiratory status  - Assess for changes in mentation and behavior  - Position to facilitate oxygenation and minimize respiratory effort  - Oxygen supplementation based on oxygen saturation or ABGs  - Provide Smoking Cessation handout, if applicable  - Encourage broncho-pulmonary hygiene including cough, deep breathe, Incentive Spirometry  - Assess the need for suctioning and perform as needed  - Assess and instruct to report SOB or any respiratory difficulty  - Respiratory Therapy support as indicated  - Manage/alleviate anxiety  - Monitor for signs/symptoms of CO2 retention  Outcome: Progressing      Problem: CARDIOVASCULAR - ADULT  Goal: Maintains optimal cardiac output and hemodynamic stability  Description: INTERVENTIONS:  - Monitor vital signs, rhythm, and trends  - Monitor for bleeding, hypotension and signs of decreased cardiac output  - Evaluate effectiveness of vasoactive medications to optimize hemodynamic stability  - Monitor arterial and/or venous puncture sites for bleeding and/or hematoma  - Assess quality of pulses, skin color and temperature  - Assess for signs of decreased coronary artery perfusion - ex. Angina  - Evaluate fluid balance, assess for edema, trend weights  Outcome: Progressing  Goal: Absence of cardiac arrhythmias or at baseline  Description: INTERVENTIONS:  - Continuous cardiac monitoring, monitor vital signs, obtain 12 lead EKG if indicated  - Evaluate effectiveness of antiarrhythmic and heart rate control medications as ordered  - Initiate emergency measures for life threatening arrhythmias  - Monitor electrolytes and administer replacement therapy as ordered  Outcome: Progressing

## 2024-10-07 NOTE — ED PROVIDER NOTES
[place] Emergency Department Note  Patient: Yesenia Berkowitz Age: 84 year old Sex: female      MRN: [id number]  : 1940    Patient Seen in: [facility]    History     Chief Complaint   Patient presents with    Difficulty Breathing     Stated Complaint: difficulty breathing    History obtained from: Patient    Patient is an 84-year-old female with a past medical history of asthma, COPD, hypertension, hyperlipidemia, A-fib presenting for evaluation of shortness of breath gradually worsening over the past 1 week.  Associate with cough.  States that she was seen at her pulmonologist office today and noted to be hypoxic.  States that she is normally on 3 to 4 L of O2 at home.  Noted to be saturating 62% on her home O2 settings.    Review of Systems:  Review of Systems  Positive for stated complaint: difficulty breathing. Constitutional and vital signs reviewed. All other systems reviewed and negative except as noted above.    Patient History:  Past Medical History:    A-fib (HCC)    Anesthesia complication    Arrhythmia    AFIB    Arthritis    Asthma (HCC)    Back problem    COPD (chronic obstructive pulmonary disease) ([facility])    Deviated nasal septum    nasal septoplasty, turb reduction, smr of turbs    Difficult intubation    fiber optic if needed    Diverticulitis    colonoscopy     Diverticulosis of large intestine    Esophageal reflux    Extrinsic asthma, unspecified    Headache    Heart disease    Hepatitis    WAS TOLD SHE HAS HAD THIS WHEN SHE WAS 19 YEARS OLD    High blood pressure    High cholesterol    Irregular heart rate    Lichenoid keratosis    left chest-bx    Osteoarthritis    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    Paronychia    (RT); onychomycosis; debridement    Problems with swallowing    occasionally    Shortness of breath    2 L NC ALL THE TIME 24HR/7 DAYS PER WEEK    Unspecified essential hypertension    Visual impairment       Past Surgical History:   Procedure Laterality  Date    Adenoidectomy      Cataract      cataract extraction     Hysterectomy      Sinus surgery        DEVIATED SEPTUM    T&a      Tonsillectomy          Family History   Problem Relation Age of Onset    Ovarian Cancer Mother 76        endometrial, poss ovarian primary    Pulmonary Disease Sister         COPD    Skin cancer Other     Stroke Other     Other (Other) Other        Specific Social Determinants of Health:   Social History     Socioeconomic History    Marital status:    Tobacco Use    Smoking status: Former     Current packs/day: 0.00     Types: Cigarettes     Quit date: 1996     Years since quittin.7    Smokeless tobacco: Never   Vaping Use    Vaping status: Never Used   Substance and Sexual Activity    Alcohol use: Yes     Alcohol/week: 0.0 standard drinks of alcohol     Comment: one drink once a week    Drug use: Never   Other Topics Concern    Pt has a pacemaker No    Pt has a defibrillator No    Reaction to local anesthetic No    Caffeine Concern No     Social Determinants of Health     Food Insecurity: No Food Insecurity (10/7/2024)    Food Insecurity     Food Insecurity: Never true   Transportation Needs: No Transportation Needs (10/7/2024)    Transportation Needs     Lack of Transportation: No   Housing Stability: Low Risk  (10/7/2024)    Housing Stability     Housing Instability: No           PSFH elements reviewed from today and agreed except as otherwise stated in HPI.    Physical Exam     ED Triage Vitals   BP 10/07/24 1200 (!) 115/103   Pulse 10/07/24 1200 93   Resp 10/07/24 1200 (!) 34   Temp 10/07/24 1207 98.6 °F (37 °C)   Temp src 10/07/24 1207 Temporal   SpO2 10/07/24 1149 (!) 62 %   O2 Device 10/07/24 1149 Nasal cannula       Current:/62 (BP Location: Right arm)   Pulse 99   Temp 98.6 °F (37 °C) (Oral)   Resp 24   Ht 165.1 cm (5' 5\")   Wt 74.1 kg   SpO2 99%   BMI 27.19 kg/m²         Physical Exam  Constitutional:       General: She is not in acute  distress.  HENT:      Head: Normocephalic and atraumatic.      Mouth/Throat:      Mouth: Mucous membranes are moist.   Eyes:      Extraocular Movements: Extraocular movements intact.   Cardiovascular:      Rate and Rhythm: Normal rate and regular rhythm.      Heart sounds: Normal heart sounds.   Pulmonary:      Effort: Pulmonary effort is normal. No respiratory distress.      Breath sounds: Wheezing and rhonchi present.   Abdominal:      Palpations: Abdomen is soft.      Tenderness: There is no abdominal tenderness.   Musculoskeletal:      Right lower leg: No tenderness. Edema present.      Left lower leg: No tenderness. Edema present.   Skin:     General: Skin is warm and dry.      Capillary Refill: Capillary refill takes less than 2 seconds.      Findings: No rash.   Neurological:      General: No focal deficit present.      Mental Status: She is alert and oriented to person, place, and time.   Psychiatric:         Mood and Affect: Mood normal.         Behavior: Behavior normal.         ED Course   Labs:   Labs Reviewed   CBC WITH DIFFERENTIAL WITH PLATELET - Abnormal; Notable for the following components:       Result Value    RDW-SD 47.4 (*)     Neutrophil Absolute Prelim 9.20 (*)     Neutrophil Absolute 9.20 (*)     Lymphocyte Absolute 0.74 (*)     All other components within normal limits   BASIC METABOLIC PANEL (8) - Abnormal; Notable for the following components:    Glucose 137 (*)     CO2 39.0 (*)     BUN/CREA Ratio 25.4 (*)     All other components within normal limits   BNP (B TYPE NATRIURETIC PEPTIDE) - Normal   TROPONIN I HIGH SENSITIVITY - Normal   PROCALCITONIN - Normal   D-DIMER - Normal   RAINBOW DRAW LAVENDER   RAINBOW DRAW LIGHT GREEN   RAINBOW DRAW BLUE   RAINBOW DRAW GOLD     Radiology findings:  I personally reviewed the images.   XR CHEST AP PORTABLE  (CPT=71045)    Result Date: 10/7/2024  CONCLUSION:   No significant change in the small bilateral pleural effusions with associated bibasilar  opacities, which may reflect atelectasis with or without superimposed pneumonia.  Prominence of the interstitial markings, which likely reflects components of both pulmonary edema and emphysema/bronchiectasis.  Lesser incidental findings described above.    Dictated by (CST): Joe Hayes MD on 10/07/2024 at 12:39 PM     Finalized by (CST): Joe Hayes MD on 10/07/2024 at 12:45 PM           EKG as interpreted by me: Ventricular rate 92, normal sinus rhythm, normal axis, no IA interval prolongation, narrow QRS, QTc 417 ms, no ST segment elevations or depressions, no abnormal T wave inversions  Cardiac Monitor: Interpreted by me.   Pulse Readings from Last 1 Encounters:   10/07/24 99   , sinus,     External non-ED records reviewed independently by me: Echocardiogram from 9/14/2024 reviewed showing ejection fraction of 65 to 70% with no regional wall motion abnormalities.    MDM   84-year-old female with a past medical history of asthma, COPD, hypertension, hyperlipidemia, A-fib presenting for evaluation of shortness of breath and cough worsening over the past 1 week.  Upon arrival to the emergency department, she saturating 62% on her home O2.  Tachypneic.  On 10 L supplemental O2 via large bore nasal cannula improvement of symptoms and O2 saturations.    Differential diagnoses considered includes, but is not limited to: COPD Exacerbation, asthma exacerbation, pulmonary embolism, pulmonary edema, CHF    Will obtain the following tests: CBC, BMP, troponin, BNP, D-dimer, chest x-ray, EKG  Please see ED course for my independent review of these tests/imaging results.    Initial Medications/Therapeutics administered: Albuterol, Atrovent, Solu-Medrol, Lasix    Chronic conditions affecting care: Asthma, COPD, hypertension, hyperlipidemia, A-fib    Workup and medications considered but not ordered: None    Social Determinants of Health that impacted care: None    ED Course as of 10/07/24  1557  ------------------------------------------------------------  Time: 10/07 1254  Comment: I spoke with Dr. Boyle.  He was made aware new consult.  Agrees with plan for admission for further management.  Request D-dimer to rule out pulmonary embolism.  I endorsed the patient's case to the [facility] hospitalist, Dr. Khan, who accepted the patient for admission.          Disposition and Plan     Clinical Impression:  1. Acute on chronic hypoxic respiratory failure (HCC)    2. COPD exacerbation (HCC)    3. Acute pulmonary edema (HCC)        Disposition:  Admit    Follow-up:  No follow-up provider specified.    Medications Prescribed:  Current Discharge Medication List          Hospital Problems       Present on Admission  Date Reviewed: 8/15/2024            ICD-10-CM Noted POA    * (Principal) COPD exacerbation (HCC) J44.1 9/13/2024 Unknown    Acute on chronic hypoxic respiratory failure (HCC) J96.21 10/7/2024 Unknown    Acute pulmonary edema (HCC) J81.0 10/7/2024 Unknown        This note may have been created using voice dictation technology and may include inadvertent errors.      Jacqueline López MD  Emergency Medicine

## 2024-10-07 NOTE — ED INITIAL ASSESSMENT (HPI)
Patient presents with OWEN and increased oxygen use. Patient using unk amount of o2 at this time. Normally 3-4 L at home 62% on NC

## 2024-10-08 ENCOUNTER — APPOINTMENT (OUTPATIENT)
Dept: CT IMAGING | Facility: HOSPITAL | Age: 84
End: 2024-10-08
Attending: INTERNAL MEDICINE
Payer: MEDICARE

## 2024-10-08 LAB
ANION GAP SERPL CALC-SCNC: 0 MMOL/L (ref 0–18)
BASOPHILS # BLD AUTO: 0.01 X10(3) UL (ref 0–0.2)
BASOPHILS NFR BLD AUTO: 0.1 %
BUN BLD-MCNC: 21 MG/DL (ref 9–23)
BUN/CREAT SERPL: 30.4 (ref 10–20)
CALCIUM BLD-MCNC: 8.7 MG/DL (ref 8.7–10.4)
CHLORIDE SERPL-SCNC: 100 MMOL/L (ref 98–112)
CO2 SERPL-SCNC: 38 MMOL/L (ref 21–32)
CREAT BLD-MCNC: 0.69 MG/DL
DEPRECATED RDW RBC AUTO: 46 FL (ref 35.1–46.3)
EGFRCR SERPLBLD CKD-EPI 2021: 87 ML/MIN/1.73M2 (ref 60–?)
EOSINOPHIL # BLD AUTO: 0 X10(3) UL (ref 0–0.7)
EOSINOPHIL NFR BLD AUTO: 0 %
ERYTHROCYTE [DISTWIDTH] IN BLOOD BY AUTOMATED COUNT: 13.4 % (ref 11–15)
GLUCOSE BLD-MCNC: 165 MG/DL (ref 70–99)
HCT VFR BLD AUTO: 32 %
HGB BLD-MCNC: 10.8 G/DL
IMM GRANULOCYTES # BLD AUTO: 0.07 X10(3) UL (ref 0–1)
IMM GRANULOCYTES NFR BLD: 0.7 %
LYMPHOCYTES # BLD AUTO: 0.44 X10(3) UL (ref 1–4)
LYMPHOCYTES NFR BLD AUTO: 4.2 %
MCH RBC QN AUTO: 31.5 PG (ref 26–34)
MCHC RBC AUTO-ENTMCNC: 33.8 G/DL (ref 31–37)
MCV RBC AUTO: 93.3 FL
MONOCYTES # BLD AUTO: 0.14 X10(3) UL (ref 0.1–1)
MONOCYTES NFR BLD AUTO: 1.4 %
NEUTROPHILS # BLD AUTO: 9.7 X10 (3) UL (ref 1.5–7.7)
NEUTROPHILS # BLD AUTO: 9.7 X10(3) UL (ref 1.5–7.7)
NEUTROPHILS NFR BLD AUTO: 93.6 %
OSMOLALITY SERPL CALC.SUM OF ELEC: 293 MOSM/KG (ref 275–295)
PLATELET # BLD AUTO: 160 10(3)UL (ref 150–450)
POTASSIUM SERPL-SCNC: 4 MMOL/L (ref 3.5–5.1)
RBC # BLD AUTO: 3.43 X10(6)UL
SODIUM SERPL-SCNC: 138 MMOL/L (ref 136–145)
WBC # BLD AUTO: 10.4 X10(3) UL (ref 4–11)

## 2024-10-08 PROCEDURE — 71260 CT THORAX DX C+: CPT | Performed by: INTERNAL MEDICINE

## 2024-10-08 PROCEDURE — 99233 SBSQ HOSP IP/OBS HIGH 50: CPT | Performed by: HOSPITALIST

## 2024-10-08 RX ORDER — FUROSEMIDE 10 MG/ML
40 INJECTION INTRAMUSCULAR; INTRAVENOUS DAILY
Status: DISCONTINUED | OUTPATIENT
Start: 2024-10-08 | End: 2024-10-10

## 2024-10-08 RX ORDER — METHYLPREDNISOLONE SODIUM SUCCINATE 40 MG/ML
40 INJECTION, POWDER, LYOPHILIZED, FOR SOLUTION INTRAMUSCULAR; INTRAVENOUS EVERY 12 HOURS
Status: DISCONTINUED | OUTPATIENT
Start: 2024-10-08 | End: 2024-10-11

## 2024-10-08 NOTE — PHYSICAL THERAPY NOTE
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 Prior to admission, patient's baseline is Mod I for functional mobility - holds onto furniture as needed for household ambulation and utilizes rollator intermittently for community ambulation. Mod I > Independent with ADLs. Son assists as needed with iADLs. Patient reports that recently more difficult to meal prep/cook meals due to fatigue. Patient is currently functioning below baseline with bed mobility, transfers, gait, stair negotiation, standing prolonged periods, and performing household tasks.  Patient is requiring supervision and stand-by assist as a result of the following impairments: decreased functional strength, decreased endurance/aerobic capacity, decreased muscular endurance, medical status, and increased O2 needs from baseline.  Physical Therapy will continue to follow for duration of hospitalization.    Patient will benefit from continued skilled PT Services at discharge to promote prior level of function and safety with additional support and return home with Cardiopulmonary Rehab.    PLAN DURING HOSPITALIZATION  Nursing Mobility Recommendation : 1 Assist  PT Device Recommendation: Rolling walker (for energy conservation purposes)  PT Treatment Plan: Bed mobility;Body mechanics;Coordination;Endurance;Energy conservation;Patient education;Gait training;Strengthening;Stair training;Transfer training;Balance training  Rehab Potential : Good  Frequency (Obs): 3-5x/week      PHYSICAL THERAPY MEDICAL/SOCIAL HISTORY     Problem List  Principal Problem:    COPD exacerbation (HCC)  Active Problems:    Acute on chronic hypoxic respiratory failure (HCC)    Acute pulmonary edema (HCC)      HOME SITUATION  Type of Home: House  Home Layout: One level (12 stairs to basement with partial railing)  Stairs to Enter : 5   Railing: Yes    Lives With: Alone    Drives: Yes   Patient Regularly Uses: Home O2 (3 L/min at baseline. Owns rollator - utilizes intermittently for community ambulation.)    SUBJECTIVE  \"I need someone to bring me meals, I'll pay!\"    PHYSICAL THERAPY EXAMINATION   OBJECTIVE  Precautions: Limb alert - left (oxygen saturations)  Fall Risk: Standard fall risk    PAIN ASSESSMENT  Ratin    COGNITION  Overall Cognitive Status:  WFL - within functional limits    RANGE OF MOTION AND STRENGTH ASSESSMENT  Upper extremity ROM and strength are within functional limits   Lower extremity ROM is within functional limits   Lower extremity strength is within functional limits     BALANCE  Static Sitting: Normal  Dynamic Sitting: Good  Static Standing: Fair +  Dynamic Standing: Fair    ACTIVITY TOLERANCE  Pulse: 79  Heart Rate Source: Monitor    O2 WALK  Oxygen Therapy  SPO2% on Oxygen at Rest: 96  At rest oxygen flow (liters per minute): 4.5  SPO2% Ambulation on Oxygen: 79 (recovered to 93% within 2.5 minutes with seated rest break and cues for pursed lip breathing)  Ambulation oxygen flow (liters per minute): 4.5    AM-PAC '6-Clicks' INPATIENT SHORT FORM - BASIC MOBILITY  How much difficulty does the patient currently have...  Patient Difficulty: Turning over in bed (including adjusting bedclothes, sheets and blankets)?: A Little   Patient Difficulty: Sitting down on and standing up from a chair with arms (e.g., wheelchair, bedside commode, etc.): A Little   Patient Difficulty: Moving from lying on back to sitting on the side of the bed?: A Little   How much help from another person does  the patient currently need...   Help from Another: Moving to and from a bed to a chair (including a wheelchair)?: A Little   Help from Another: Need to walk in hospital room?: A Little   Help from Another: Climbing 3-5 steps with a railing?: A Little     AM-PAC Score:  Raw Score: 18   Approx Degree of Impairment: 46.58%   Standardized Score (AM-PAC Scale): 43.63   CMS Modifier (G-Code): CK    FUNCTIONAL ABILITY STATUS  Functional Mobility/Gait Assessment  Gait Assistance:  (SBA)  Distance (ft): 40 ft  Assistive Device: None  Pattern: Shuffle (decreased hector speed, decreased step length)  Supine to Sit: supervision  Sit to Stand: stand-by assist    Exercise/Education Provided:  Bed mobility  Body mechanics  Energy conservation  Gait training  Posture  Transfer training    Skilled Therapy Provided: Patient in bed upon arrival. RN approved activity. Educated patient on POC and benefits of mobilization. Agreeable to participate. Patient reporting no pain. Increased time spent educating patient on energy conservation, activity pacing, pursed lip breathing, and taking rest breaks. Patient reports that she monitors her SpO2 numbers at home as well. Patient on 4.5 L/min of supplemental oxygen upon arrival, SpO2 96% at rest. With short distance of ambulation, SpO2 79%. With seated rest break and cues for pursed lip breathing, SpO2 recovered to 93% within 2.5 minutes. Discussed utilizing rolling walker/rollator more often upon discharge for energy conservation purposes; patient may benefit from continued education and encouragement on benefits. Patient reports that she has been up ad gary in the room.     The patient's Approx Degree of Impairment: 46.58% has been calculated based on documentation in the [facility] '6 clicks' Inpatient Basic Mobility Short Form.  Research supports that patients with this level of impairment may benefit from OP cardiopulmonary PT.  Final disposition will be made by interdisciplinary medical  team.    Patient End of Session: Needs met;Call light within reach;RN aware of session/findings;All patient questions and concerns addressed;Hospital anti-slip socks (sitting EOB)    CURRENT GOALS  Goals to be met by: 10/15/24  Patient Goal Patient's self-stated goal is: none stated   Goal #1 Patient is able to demonstrate supine - sit EOB @ level: modified independent     Goal #1   Current Status    Goal #2 Patient is able to demonstrate transfers Sit to/from Stand at assistance level: modified independent with  LRAD     Goal #2  Current Status    Goal #3 Patient is able to ambulate 150 feet with assist device:  LRAD  at assistance level: modified independent   Goal #3   Current Status    Goal #4 Patient will negotiate 5 stairs/one curb w/ assistive device and supervision   Goal #4   Current Status    Goal #5 Patient to demonstrate independence with home activity/exercise instructions provided to patient in preparation for discharge.   Goal #5   Current Status      Patient Evaluation Complexity Level:  History Moderate - 1 or 2 personal factors and/or co-morbidities   Examination of body systems Low -  addressing 1-2 elements   Clinical Presentation  Moderate - Evolving   Clinical Decision Making  Low Complexity     Gait Trainin minutes  Therapeutic Activity:  15 minutes

## 2024-10-08 NOTE — CONSULTS
[facility]  part of [facility]    Cardiology Consultation    Yesenia Berkowitz Patient Status:  Inpatient    1940 MRN [id number]   Location [facility]5W Attending Ez Taylor MD   Hosp Day # 1 PCP JO-ANN YANG MD     Date of Admission:  10/7/2024  Date of Consult:  10/8/2024  Reason for Consultation:   Shortness of breath    History of Present Illness:   Patient is a 84-year-old female with a history of COPD on 2 to 3 L O2 at home, paroxysmal atrial fibrillation, HFpEF, HTN, who presented with increased shortness of breath, hypoxia, and cough.  She recently saw her pulmonologist in the office where she was noted to be saturating in the 70s to 80s on 5 L O2.  Symptoms have been progressively worsening over the last 1 to 2 weeks.  She has 1+ bilateral lower extremity edema which is close to her baseline.  No significant weight gain.  No palpitations, dizziness, or syncope.  When I saw in the office 2 weeks ago I discontinued her lisinopril-hydrochlorothiazide due to hypotension and lightheadedness and this is all resolved since then.  Blood pressure running 120s to 130s at home.    BNP 72  Troponin 9  D-dimer 0.63    CXR-prominent interstitial markings, small bilateral pleural effusions  CT chest-no PE, findings compatible with both emphysema and fluid retention  ECG-sinus rhythm, anterior infarct, 92 bpm    Cardiac history:  Paroxysmal atrial fibrillation  COPD, on 2-3 L O2  HFpEF  HTN     Past Medical History  Past Medical History:    A-fib (HCC)    Anesthesia complication    Arrhythmia    AFIB    Arthritis    Asthma ([facility]ly [facility])    Back problem    COPD (chronic obstructive pulmonary disease) (Formerly [facility])    Deviated nasal septum    nasal septoplasty, turb reduction, smr of turbs    Difficult intubation    fiber optic if needed    Diverticulitis    colonoscopy     Diverticulosis of large intestine    Esophageal reflux    Extrinsic asthma, unspecified    Headache    Heart disease    Hepatitis     WAS TOLD SHE HAS HAD THIS WHEN SHE WAS 19 YEARS OLD    High blood pressure    High cholesterol    Irregular heart rate    Lichenoid keratosis    left chest-bx    Osteoarthritis    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    Paronychia    (RT); onychomycosis; debridement    Problems with swallowing    occasionally    Shortness of breath    2 L NC ALL THE TIME 24HR/7 DAYS PER WEEK    Unspecified essential hypertension    Visual impairment     Past Surgical History  Past Surgical History:   Procedure Laterality Date    Adenoidectomy      Cataract      cataract extraction     Hysterectomy      Sinus surgery        DEVIATED SEPTUM    T&a      Tonsillectomy       Family History  No family history of heart disease.  Family History   Problem Relation Age of Onset    Ovarian Cancer Mother 76        endometrial, poss ovarian primary    Pulmonary Disease Sister         COPD    Skin cancer Other     Stroke Other     Other (Other) Other      Social History  Lives at home alone.  Former smoker, quit 30 years ago.  No alcohol use.  She is a former RN.  Pediatric History   Patient Parents    Not on file     Other Topics Concern    [place] up [place] a farm Not Asked    History of tanning Not Asked    Outdoor occupation Not Asked    Pt has a pacemaker No    Pt has a defibrillator No    Breast feeding Not Asked    Reaction to local anesthetic No     Service Not Asked    Blood Transfusions Not Asked    Caffeine Concern No    Occupational Exposure Not Asked    Hobby Hazards Not Asked    Sleep Concern Not Asked    Stress Concern Not Asked    Weight Concern Not Asked    Special Diet Not Asked    Back Care Not Asked    Exercise Not Asked    Bike Helmet Not Asked    Seat Belt Not Asked    Self-Exams Not Asked   Social History Narrative    Not on file         Current Medications:  Current Facility-Administered Medications   Medication Dose Route Frequency    methylPREDNISolone sodium succinate (Solu-MEDROL) injection 40 mg  40 mg  Intravenous Q12H    furosemide (Lasix) 10 mg/mL injection 40 mg  40 mg Intravenous Daily    ipratropium-albuterol (Duoneb) 0.5-2.5 (3) MG/3ML inhalation solution 3 mL  3 mL Nebulization Q4H PRN    enoxaparin (Lovenox) 40 MG/0.4ML SUBQ injection 40 mg  40 mg Subcutaneous Daily    temazepam (Restoril) cap 15 mg  15 mg Oral Nightly PRN    guaiFENesin (Robitussin) 100 MG/5 ML oral liquid 200 mg  200 mg Oral Q4H PRN    benzonatate (Tessalon) cap 200 mg  200 mg Oral TID PRN    doxycycline (Vibramycin) cap 100 mg  100 mg Oral 2 times per day    amitriptyline (Elavil) tab 50 mg  50 mg Oral Nightly    aspirin chewable tab 162 mg  162 mg Oral Daily    dicyclomine (Bentyl) cap 10 mg  10 mg Oral TID PRN    digoxin (Lanoxin) tab 125 mcg  125 mcg Oral Daily    dilTIAZem ER (Dilacor XR) 24 hr cap 240 mg  240 mg Oral Daily    pantoprazole (Protonix) DR tab 40 mg  40 mg Oral Nightly    montelukast (Singulair) tab 10 mg  10 mg Oral Nightly    cetirizine (ZyrTEC) tab 10 mg  10 mg Oral Daily    fluticasone furoate (Arnuity Ellipta) 100 MCG/ACT inhaler 1 puff  1 puff Inhalation Daily    ipratropium-albuterol (Duoneb) 0.5-2.5 (3) MG/3ML inhalation solution 3 mL  3 mL Nebulization Q6H PRN     Medications Prior to Admission   Medication Sig    DIGOXIN 0.125 MG Oral Tab Take 1 tablet (125 mcg total) by mouth daily.    mupirocin 2 % External Ointment Apply 1 Application topically 3 (three) times daily.    PULMICORT FLEXHALER 180 MCG/ACT Inhalation Aerosol Powder, Breath Activated Inhale 2 puffs into the lungs daily.    dilTIAZem HCl ER Coated Beads 240 MG Oral Capsule SR 24 Hr Take 1 capsule (240mg)by mouth every morning on an empty stomach.    Calcium Carb-Cholecalciferol (CALCIUM + D3) 600-200 MG-UNIT Oral Tab Take 1 tablet by mouth daily.      furosemide 40 MG Oral Tab Take 1 tablet (40 mg total) by mouth daily.    Cholecalciferol (VITAMIN D) 1000 UNITS Oral Tab Take 1,000 Units by mouth 2 (two) times daily.    Potassium Chloride ER  (K-DUR M20) 20 MEQ Oral Tab CR Take 1 tablet (20 mEq total) by mouth nightly.    lansoprazole (PREVACID) 30 MG Oral Capsule Delayed Release Take 1 capsule (30 mg total) by mouth nightly.    amitriptyline 50 MG Oral Tab Take 1 tablet (50 mg total) by mouth nightly.    aspirin 81 MG Oral Tab Take 2 tablets (162 mg total) by mouth daily.    Loratadine 10 MG Oral Cap Take 10 mg by mouth nightly.      montelukast 10 MG Oral Tab Take 1 tablet (10 mg total) by mouth nightly.    Multiple Vitamin (MULTI-VITAMINS) Oral Tab take 1 tablet by ORAL route  every day with food    omega-3 fatty acids (FISH OIL) 1000 MG Oral Cap Take 1,000 mg by mouth daily.    Tiotropium Bromide Monohydrate 18 MCG Inhalation Cap Inhale 1 capsule (18 mcg total) into the lungs nightly.    [] predniSONE 20 MG Oral Tab Take 2 tablets (40 mg total) by mouth daily for 5 days, THEN 1 tablet (20 mg total) daily for 5 days. 20mgx2 [id number], then 20mg [id number].    [] doxycycline 100 MG Oral Cap Take 1 capsule (100 mg total) by mouth 2 (two) times daily for 10 doses.    albuterol 108 (90 Base) MCG/ACT Inhalation Aero Soln Inhale 2 puffs into the lungs as needed.    dicyclomine 10 MG Oral Cap Take 1 capsule (10 mg total) by mouth 3 (three) times daily as needed (abdominal pain).    polyethylene glycol, PEG 3350-KCl-NaBcb-NaCl-NaSulf, 236 g Oral Recon Soln Take 4,000 mL by mouth As Directed. Take 2,000 mL the night before your procedure and 2,000 mL the morning of your procedure. Take as directed by GI clinic. Okay to substitute for generic.    triamcinolone 0.1 % External Cream Apply topically 2 (two) times daily. Apply bid as directed    estradiol 0.05 MG/24HR Transdermal Patch Weekly Place 1 patch onto the skin once a week. As directed.    B Complex Oral Cap Take 1 tablet by mouth daily.     Allergies  Allergies   Allergen Reactions    Penicillin G ANAPHYLAXIS    Azithromycin DIARRHEA and NAUSEA AND VOMITING    Cefuroxime UNKNOWN     Other  reaction(s): Vomitting    Levofloxacin UNKNOWN     Other reaction(s): LEVOFLOXACIN    Penicillins      Other reaction(s): Unknown       Review of Systems:     14 point review of systems completed and negative except as noted.    Physical Exam:   Patient Vitals for the past 24 hrs:   BP Temp Temp src Pulse Resp SpO2   10/08/24 1249 -- -- -- -- -- 93 %   10/08/24 1234 130/60 97.2 °F (36.2 °C) -- -- -- 99 %   10/08/24 0923 -- -- -- -- -- 93 %   10/08/24 0759 -- -- -- 75 -- --   10/08/24 0758 120/53 97.6 °F (36.4 °C) Oral 75 18 91 %   10/08/24 0605 117/48 97.6 °F (36.4 °C) Oral 75 20 100 %   10/07/24 2300 146/73 98.6 °F (37 °C) Oral 87 20 97 %   10/07/24 2214 134/58 98.6 °F (37 °C) Oral 94 18 95 %   10/07/24 2000 -- -- -- 90 -- --   10/07/24 1612 -- -- -- 101 -- --   10/07/24 1611 141/53 98.3 °F (36.8 °C) Oral 101 18 94 %     Intake/Output:   Last 3 shifts:   Intake/Output                   10/06/24 0700 - 10/07/24 0659 (Not Admitted) 10/07/24 0700 - 10/08/24 0659 10/08/24 0700 - 10/09/24 0659       Intake    P.O.  --  --  240    P.O. -- -- 240    Total Intake -- -- 240       Output    Urine  --  1151  --    Urine -- 1151 --    Stool  --  --  --    Stool Count Calculated for I/O -- 1 x --    Total Output -- 1151 --       Net I/O     -- -1151 240          Scheduled Meds:    methylPREDNISolone  40 mg Intravenous Q12H    furosemide  40 mg Intravenous Daily    enoxaparin  40 mg Subcutaneous Daily    doxycycline  100 mg Oral 2 times per day    amitriptyline  50 mg Oral Nightly    aspirin  162 mg Oral Daily    digoxin  125 mcg Oral Daily    dilTIAZem ER  240 mg Oral Daily    pantoprazole  40 mg Oral Nightly    montelukast  10 mg Oral Nightly    cetirizine  10 mg Oral Daily    fluticasone furoate  1 puff Inhalation Daily     General: Alert and oriented x 3. No apparent distress.   HEENT: Normocephalic, anicteric sclera, neck supple, no thyromegaly or adenopathy.  Neck: No JVD, carotids 2+, no bruits.  Cardiac: Regular rate and  rhythm. S1, S2 normal. No murmur, pericardial rub, S3, or extra cardiac sounds.  Lungs: Clear without wheezes, rales, rhonchi or dullness.  Normal excursions and effort.  Abdomen: Soft, non-tender. No organosplenomegally, mass or rebound, BS-present.  Extremities: Without clubbing or cyanosis. 1+ LE edema.    Neurologic: Alert and oriented, normal affect. No focal defects  Skin: Warm and dry.     Results:   Laboratory Data:  Lab Results   Component Value Date    WBC 10.4 10/08/2024    HGB 10.8 (L) 10/08/2024    HCT 32.0 (L) 10/08/2024    .0 10/08/2024    CREATSERUM 0.69 10/08/2024    BUN 21 10/08/2024     10/08/2024    K 4.0 10/08/2024     10/08/2024    CO2 38.0 (H) 10/08/2024     (H) 10/08/2024    CA 8.7 10/08/2024    ALB 2.8 (L) 08/30/2024    ALKPHO 48 (L) 08/30/2024    TP 4.8 (L) 08/30/2024    AST 15 08/30/2024    ALT 22 08/30/2024    PTT 30.1 03/14/2023    INR 1.12 03/14/2023    PTP 14.3 03/14/2023    TSH 1.060 01/06/2023    LIP 30 08/30/2024    DDIMER 0.63 10/07/2024    MG 2.0 09/18/2024    PHOS 4.0 09/18/2024    TROP <0.045 02/03/2020         Recent Labs   Lab 10/07/24  1204 10/08/24  0525   * 165*   BUN 18 21   CREATSERUM 0.71 0.69   CA 9.0 8.7    138   K 3.9 4.0    100   CO2 39.0* 38.0*     Recent Labs   Lab 10/07/24  1204 10/08/24  0525   RBC 3.90 3.43*   HGB 12.3 10.8*   HCT 36.7 32.0*   MCV 94.1 93.3   MCH 31.5 31.5   MCHC 33.5 33.8   RDW 13.8 13.4   NEPRELIM 9.20* 9.70*   WBC 10.8 10.4   .0 160.0       Recent Labs   Lab 10/07/24  1204   BNPML 72       No results for input(s): \"TROP\", \"CK\" in the last 168 hours.    Imaging:  CT CHEST PE AORTA (IV ONLY) (CPT=71260)    Result Date: 10/8/2024  CONCLUSION:  1. Negative for pulmonary embolism. 2. Cardiomegaly and mild coronary atherosclerosis.  Stable findings consistent with moderate CHF/fluid overload including pulmonary edema, a moderate right pleural effusion and a small left pleural effusion.  There is  resulting passive atelectasis at both lung bases. 3. Mild emphysema with a 10 x 7 mm noncalcified subpleural nodule in the posterior aspect of the right upper lobe.  This nodule has increased in size compared to 03/15/2023.  A malignant etiology such as a slow growing primary bronchogenic carcinoma therefore cannot be excluded.  Consider further assessment with percutaneous biopsy or follow-up PET/CT when the patient is able. 4. Stable moderately dilated main pulmonary artery, which can be seen with chronic pulmonary hypertension; correlate clinically. 5. Chronically elevated left hemidiaphragm with unchanged chronic scarring at the left lung base.    Dictated by (CST): Malik Reynolds MD on 10/08/2024 at 1:53 PM     Finalized by (CST): Malik Reynolds MD on 10/08/2024 at 2:02 PM           Impression:   Assessment:  COPD exacerbation  Paroxysmal atrial fibrillation  HFpEF  HTN     Plan:  - Presents with increased cough, hypoxia, and shortness of breath, likely all secondary to COPD exacerbation  - BNP normal, no change in lower extremity edema, however CT of the chest noted findings suggestive of interstitial edema, unchanged from prior scans  - Not significantly overloaded on exam, primary issue is progressive lung disease however will empirically diurese with Lasix 40 mg IV twice daily as long as she tolerates  - Continue steroids, antibiotics, nebulizers per pulmonary  - Not on anticoagulation per patient preference  - Continue diltiazem 240 mg once a day    Thank you for allowing me to participate in the care of your patient.    Luis Fernando Fowler MD  [facility]  10/8/2024

## 2024-10-08 NOTE — OCCUPATIONAL THERAPY NOTE
OCCUPATIONAL THERAPY EVALUATION - INPATIENT     Room Number: 517/517-A  Evaluation Date: 10/8/2024  Type of Evaluation: Initial  Presenting Problem: sob    Physician Order: IP Consult to Occupational Therapy  Reason for Therapy: ADL/IADL Dysfunction and Discharge Planning    OCCUPATIONAL THERAPY ASSESSMENT   Patient is a 84 year old female admitted 10/7/2024 for dyspnea.  Prior to admission, patient was living alone  in a 1 story home;stairs to basement. Pt reports being mod I for adls, ambulation w/o and ad and driving. Pt is O2 dependent 3L at baseline. Pt's son lives in the area and assists as needed.  Patient is currently functioning near baseline with adls and functional mobility.    PLAN  Patient has been evaluated and presents with no skilled Occupational Therapy needs  at this time.  Patient will be discharged from Occupational Therapy services.     OT Device Recommendations: None    OCCUPATIONAL THERAPY MEDICAL/SOCIAL HISTORY   Problem List  Principal Problem:    COPD exacerbation (HCC)  Active Problems:    Acute on chronic hypoxic respiratory failure (HCC)    Acute pulmonary edema (HCC)    HOME SITUATION  Type of Home: House  Home Layout: One level (12 stairs to basement with partial railing)  Lives With: Alone  Other Equipment: -- (pulse ox;rollator)  Occupation/Status: -- (retired nurse)  Drives: Yes  Patient Regularly Uses: Home O2 (3 L/min at baseline. Owns rollator - utilizes intermittently for community ambulation.)    Stairs in Home: 5 stairs to enter  Use of Assistive Device(s): rollator primarily in the community to assist w/ O2 tank management    Prior Level of [place]: Prior to admission, patient was living alone  in a 1 story home;stairs to basement. Pt reports being mod I for adls, ambulation w/o and ad and driving. Pt is O2 dependent 3L at baseline. Pt's son lives in the area and assists as needed    SUBJECTIVE  \"I'm back, this time I came from my doctor's office\"    OCCUPATIONAL THERAPY  EXAMINATION    OBJECTIVE  Precautions: Limb alert - left (oxygen saturations)  Fall Risk: Standard fall risk    PAIN ASSESSMENT  Ratin    ACTIVITY TOLERANCE  Limited by sob w/ activity    O2 SATURATIONS  96%  on 4.5L at rest prior to activity. Pt desatting to 79% w/ short distance ambulation. Pt requiring increased time (2+ minutes) to recover to 93%    Behavioral/Emotional/Social: pleasant,receptive to suggestions    RANGE OF MOTION   Upper extremity ROM is within functional limits     STRENGTH ASSESSMENT  Upper extremity strength is within functional limits     ACTIVITIES OF DAILY LIVING ASSESSMENT  AM-PAC ‘6-Clicks’ Inpatient Daily Activity Short Form  How much help from another person does the patient currently need…  -   Putting on and taking off regular lower body clothing?: None  -   Bathing (including washing, rinsing, drying)?: None  -   Toileting, which includes using toilet, bedpan or urinal? : None  -   Putting on and taking off regular upper body clothing?: None  -   Taking care of personal grooming such as brushing teeth?: None  -   Eating meals?: None    AM-PAC Score:  Score: 24  Approx Degree of Impairment: 0%  Standardized Score (AM-PAC Scale): 57.54  CMS Modifier (G-Code): CH    FUNCTIONAL ADL ASSESSMENT  Eating: independent  Grooming: independent  UB Dressing: independent  LB Dressing: independent  Toileting: independent    Skilled Therapy Provided: RN contacted prior to start of care. Treatment coordinated w/ PT. Pt agreeable to participation in therapy. Pt received in bed, alert and oriented x 4. On initial contact pt performing bed mobility and transfers w/ supervision. Pt ambulating in the room w/ supervision;activity tolerance limited by sob. Pt presenting w/ good insight into pacing strategies and when to terminate activity. Pt practicing pursed lip breathing and aware of need to minimize conversation while recovering. Energy conservation and pacing strategies reinforced. Discussed  possible ideas to assist w/ home needs such as home delivered meals. Suggestion made for OP Pulmonary Rehab as well. Pt receptive. At this time pt does not present w/ skilled OT needs. Oxygen management is pt's most limiting factor at this time      At end of session pt returned to bed and left w/ all needs in reach RN aware of pt's status and performance in therapy. No further OT contact planned      EDUCATION PROVIDED  Patient Education : Role of Occupational Therapy; Plan of Care; Functional Transfer Techniques; Fall Prevention; Energy Conservation; Proper Body Mechanics  Patient's Response to Education: Verbalized Understanding; Returned Demonstration    The patient's Approx Degree of Impairment: 0% has been calculated based on documentation in the [facility] '6 clicks' Inpatient Daily Activity Short Form.  Research supports that patients with this level of impairment may benefit from return to home.  Final disposition will be made by interdisciplinary medical team.    Patient End of Session: In bed;Needs met;Call light within reach;RN aware of session/findings;All patient questions and concerns addressed      Patient Evaluation Complexity Level:   Occupational Profile/Medical History LOW - Brief history including review of medical or therapy records    Specific performance deficits impacting engagement in ADL/IADL LOW  1 - 3 performance deficits    Client Assessment/Performance Deficits MODERATE - Comorbidities and min to mod modifications of tasks    Clinical Decision Making LOW - Analysis of occupational profile, problem-focused assessments, limited treatment options    Overall Complexity LOW     Therapeutic Activity: 23 minutes

## 2024-10-08 NOTE — HISTORICAL OFFICE NOTE
Continuity of Care Document  9/27/2024  Yesenia Berkowitz - 84 y.o. Female; born [birth year]Summary of episode note, generated on Oct. 07, 2024October 07, 2024   CHIEF COMPLAINT    CHIEF COMPLAINT  Reason for Visit/Chief Complaint   hospital f/u  SOB, Very tired, pt feels shaky and lightheaded   Patient is an 83-year-old female with a history of paroxysmal atrial fibrillation, oxygen dependent COPD, HTN, HLD who presents establish care. Previously followed with Dr. Boles, and prior to that with Dr. Preston. Patient is a former smoker and quit 30 years ago, has been on 2 L of oxygen at baseline for the last 15 years. She has relatively unchanged exertional dyspnea. No chest pain, palpitations, edema, dizziness, or syncope. She also has a remote history of paroxysmal atrial fibrillation, when this comes on she will feel more short of breath and fatigued and then checks her pulse which will feel irregular. This is not occurred for the last several years. She is not on anticoagulation due to frequent bruising and infrequent A-fib. She has mild bilateral lower extremity edema but only takes her Lasix as needed. Patient had a coronary CT in 2020 which demonstrated a small speck of calcium in the LAD with a calcium score of 1.4/15/2024  Her last few months has had increased shortness of breath, put on steroids by her pulmonologist with significant permanent breathing. Denies significant palpitations. Has had increased bilateral lower extremity edema, 2 months ago was on increased Lasix 40 mg daily which seemed to improve although will back to 20 mg daily after 10 days per instruction.8/26/2024  Reports worsened shortness of breath, currently on prednisone for COPD exacerbation. Typically has allergies in the summer as well. Mild gradual increase in lower extremity edema although not too bothersome at this point. Remains on Lasix 40 mg daily.9/27/2024  Unfortunately hospitalized again earlier this month primarily  with COPD exacerbation, was diuresed with IV Lasix for a few days but did not appear clinically overloaded at that time. More recently's been feeling dizzy/lightheaded, fatigued, with persistent shortness of breath. Blood pressures were running low at home in the 90s systolic. Today 82/54.Cardiac history:  Paroxysmal atrial fibrillation  COPD, on 2-3 L O2  HFpEF  HTN     PROBLEMS  Reconcile with Patient's ChartPROBLEMS  Problem Effective Dates Date resolved Problem Status   Chronic hypoxemic respiratory failure, [SNOMED-CT: 016089604] 2/13/2024 - Active   COPD (chronic obstructive pulmonary disease), [SNOMED-CT: 20543227] 2/13/2024 - Active   Syncope and collapse, [SNOMED-CT: 836313086] 2/13/2024 - Active   Cataract surgery, unspecified eye, [SNOMED-CT: 463288377] 8/18/2021 5/19/2023 Resolved   PAF (paroxysmal atrial fibrillation), [SNOMED-CT: 526649884] 8/13/2019 - Active   Essential hypertension, [SNOMED-CT: 50035077] 8/13/2019 - Active   PVC's (premature ventricular contractions), [SNOMED-CT: 99801147] 8/13/2019 - Active   Localized edema, [SNOMED-CT: 356003392] 8/14/2019 - Active     ENCOUNTER    ENCOUNTER  Problem Effective Dates Date resolved Problem Status   Acute heart failure with preserved ejection fraction (HFpEF), [SNOMED-CT: 266595786] 2/13/2024 - Active   Chronic hypoxemic respiratory failure, [SNOMED-CT: 335399032] 2/13/2024 - Active   COPD (chronic obstructive pulmonary disease), [SNOMED-CT: 68440447] 2/13/2024 - Active   Syncope and collapse, [SNOMED-CT: 836401562] 2/13/2024 - Active   PAF (paroxysmal atrial fibrillation), [SNOMED-CT: 332602460] 8/13/2019 - Active   Essential hypertension, [SNOMED-CT: 35315044] 8/13/2019 - Active   PVC's (premature ventricular contractions), [SNOMED-CT: 08452294] 8/13/2019 - Active     VITAL SIGNS    VITAL SIGNS  Date / Time: 9/27/2024   BP Systolic 82 mmHg   BP Diastolic 54 mmHg   Height 65 inches   Weight 157 lbs   Pulse Rate 101 bpm   BSA (Body Surface Area) 1.8  cc/m2   BMI (Body Mass Index) 26.1 cc/m2   Blood Pressure 82 / 54 mmHg     PHYSICAL EXAMINATION    PHYSICAL EXAMINATION  Header Details   Constitutional o2sat 86 %   Vitals Left Arm Sitting BP 82 / 54 mmHg, Pulse rate 101 bpm, Height in 5' 5\", BMI: 26.1, Weight in 156.53 lbs (or) 71 kgs, BSA : 1.82 cc/m²   General Appearance No Acute Distress, Normal Body habitus   Cardiovascular      ALLERGIES, ADVERSE REACTIONS, ALERTS    ALLERGIES, ADVERSE REACTIONS, ALERTS  Type Substance Reaction Severity Status   Allergies Cefuroxime, [RxNorm: 864260]   Mild Active   Allergies Levofloxacin, [RxNorm: 1371245]   Mild Active   Allergies penicillin - Ingredient unknown Moderate Active     MEDICATIONS ADMINISTERED DURING VISIT    No data available    MEDICATIONS  Reconcile with Patient's ChartMEDICATIONS  Medication Start Date Route/Frequency Status   albuterol (PROAIR HFA) 108 (90 Base) MCG/ACT inhaler, [RxNorm: 877020] 8/16/2021 as needed Active   amitriptyline 50 mg tablet, [RxNorm: 875562] 9/27/2024 Take 1 tablet orally once a day. Active   aspirin 81mg, [RxNorm: 1191] 11/22/2023 2 tablets once a day Active   B complex capsule, [RxNorm: 0] 8/16/2021 - Active   Calcium Carb-Cholecalciferol (CALCIUM + D3) 600-200 MG-UNIT Tab, [RxNorm: 871880] 8/16/2021 1 daily Active   digoxin 125 mcg (0.125 mg) tablet, [RxNorm: 664286] 1/11/2024 Take 1 tablet orally once a day. Active   dilTIAZem (CARDIZEM CD) 240 MG 24 hr capsule, [RxNorm: 492678] 8/16/2021 Take 240 mg by mouth daily. 1 daily Active   estradiol (CLIMARA) 0.05 MG/24HR once weekly patch, [RxNorm: 0] 8/16/2021 - Active   furosemide 40 mg tablet, [RxNorm: 718651] 4/15/2024 Take 1 tablet orally once a day. Active   HYDROcodone-acetaminophen (NORCO) 5-325 MG per tablet, [RxNorm: 530087] 8/16/2021 as needed Active   lansoprazole (PREVACID SOLUTAB) 30 MG disintegrating tablet, [RxNorm: 0] 8/16/2021 Take 30 mg by mouth daily. 1 daily Active   loratadine (CLARITIN) 10 MG tablet, [RxNorm:  147952] 8/16/2021 1 daily Active   montelukast (SINGULAIR) 10 MG tablet, [RxNorm: 417329] 8/16/2021 1 daily Active   Multiple Vitamin (MULTI-VITAMINS) Tab, [RxNorm: 0] 8/16/2021 - Active   olopatadine (PATANOL) 0.1 % ophthalmic solution, [RxNorm: 0731338] 8/16/2021 - Active   Omega-3 Fatty Acids (FISH OIL) 1200 MG capsule, [RxNorm: 0] 8/16/2021 1 daily Active   potassium chloride (KLOR-CON M20) 20 MEQ cruz ER tablet, [RxNorm: 8510753] 8/16/2021 1 daily Active   sodium fluoride (PREVIDENT 5000 BOOSTER PLUS) 1.1 % dental paste, [RxNorm: 945328] 8/16/2021 - Active   tiotropium (SPIRIVA HANDIHALER) 18 MCG capsule for inhaler, [RxNorm: 475169] 8/16/2021 daily Active   triamcinolone (ARISTOCORT) 0.1 % cream, [RxNorm: 6792775] 8/16/2021 APPLY TO AFFECTED AREA(S) 2 (TWO) TIMES DAILY. Active     ASSESSMENT    Paroxysmal atrial fibrillation  - No recent recurrence or symptoms  - Continue aspirin 81 mg once daily, diltiazem 240 mg once daily  - Reviewed 30-day MCT without evidence of recurrent atrial fibrillation  - Per patient preference avoiding anticoagulation, history of GI bleed in the past and low A-fib burden  - Additionally, we had a discussion about possibly stopping her digoxin however she does not feel comfortable doing this at this pointHFpEF  - Increase bilateral lower extremity edema, TTE noted diastolic dysfunction, edema improved on higher dose of Lasix  - Continue Lasix 40 mg daily  - Additionally discussed SGLT2 inhibitor, would like to reassess symptoms before making a decisionHTN  - Low blood pressure today at home, may be contributing to dizziness/lightheadedness and fatigue  - Discontinue lisinopril-hydrochlorothiazide  - Continue diltiazem 240 mg dailyPlan  Stop lisinopril-hydrochlorothiazide  Follow-up with me as scheduled in November, then every 3 months afterwards.     FAMILY HISTORY    FAMILY HISTORY  Relationship Age Diagnosis   Father 62 Hypertension (HTN), primary; Old MI (myocardial infarction)  (Reason for death)   Mother 0 Hypertension (HTN), primary   No family history of heart disease.     GENERAL STATUS    No data available    PAST MEDICAL HISTORY    PAST MEDICAL HISTORY  Problem Date diagonsed Date resolved Status   Chronic hypoxemic respiratory failure, [SNOMED-CT: 905592829] 2/13/2024 - Active   COPD (chronic obstructive pulmonary disease), [SNOMED-CT: 90124591] 2/13/2024 - Active   Syncope and collapse, [SNOMED-CT: 004317140] 2/13/2024 - Active   PAF (paroxysmal atrial fibrillation), [SNOMED-CT: 290922390] 8/13/2019 - Active   Essential hypertension, [SNOMED-CT: 51001362] 8/13/2019 - Active   PVC's (premature ventricular contractions), [SNOMED-CT: 77342377] 8/13/2019 - Active   Localized edema, [SNOMED-CT: 287071554] 8/14/2019 - Active     HISTORY OF PRESENT ILLNESS    Patient is an 83-year-old female with a history of paroxysmal atrial fibrillation, oxygen dependent COPD, HTN, HLD who presents establish care. Previously followed with Dr. Boles, and prior to that with Dr. Preston. Patient is a former smoker and quit 30 years ago, has been on 2 L of oxygen at baseline for the last 15 years. She has relatively unchanged exertional dyspnea. No chest pain, palpitations, edema, dizziness, or syncope. She also has a remote history of paroxysmal atrial fibrillation, when this comes on she will feel more short of breath and fatigued and then checks her pulse which will feel irregular. This is not occurred for the last several years. She is not on anticoagulation due to frequent bruising and infrequent A-fib. She has mild bilateral lower extremity edema but only takes her Lasix as needed. Patient had a coronary CT in 2020 which demonstrated a small speck of calcium in the LAD with a calcium score of 1.4/15/2024  Her last few months has had increased shortness of breath, put on steroids by her pulmonologist with significant permanent breathing. Denies significant palpitations. Has had increased bilateral  lower extremity edema, 2 months ago was on increased Lasix 40 mg daily which seemed to improve although will back to 20 mg daily after 10 days per instruction.8/26/2024  Reports worsened shortness of breath, currently on prednisone for COPD exacerbation. Typically has allergies in the summer as well. Mild gradual increase in lower extremity edema although not too bothersome at this point. Remains on Lasix 40 mg daily.9/27/2024  Unfortunately hospitalized again earlier this month primarily with COPD exacerbation, was diuresed with IV Lasix for a few days but did not appear clinically overloaded at that time. More recently's been feeling dizzy/lightheaded, fatigued, with persistent shortness of breath. Blood pressures were running low at home in the 90s systolic. Today 82/54.Cardiac history:  Paroxysmal atrial fibrillation  COPD, on 2-3 L O2  HFpEF  HTN     IMMUNIZATIONS  Reconcile with Patient's ChartNo data available    PLAN OF CARE    No data available    PROCEDURES    No data available    RESULTS    RESULTS  Name Result Date Location - Ordered By   DIGOXIN [LOINC: 10535-3] 0.31 ng/mL [Low] 01/16/2024 01:31:00 PM [facility] ([facility])  Address: [address] [facility]  [address]  tel:   Trans Thoracic Echocardiogram 1.The left ventricle is normal in size. Left ventricular wall thickness is normal. Global left ventricular systolic function is hyperdynamic. The left ventricular ejection fraction is >70%. No regional wall motion abnormalities. The left ventricle diastolic function is impaired (Grade II) with an elevated left atrial pressure.2.The right ventricle is mildly enlarged. Right ventricular systolic function is mildly decreased. 3/18/2024 11:00:00 AM Luis Fernando Fowler MD   Ambulatory Telemetry Monitor 1.This is an average quality study. 2.Predominant rhythm is normal sinus rhythm. 3.The minimum heart rate recorded was 62 beats / minute (normal sinus rhythm, 3/10/2024). The  maximum heart rate is 111 beats / minute (sinus tachycardia, 2/27/2024). The mean heart rate is 82 beats / minute. 4.- There were rare PVCs with a burden of <1%.  - There were rare PACs with a burden of 2%.  - 113 Supraventricular Tachycardia events -- the longest episode was 6.8s and the fastest episode was 181 BPM.  - No atrial fibrillation.  - No other arrhythmias.  - There were 7 patient triggered events with symptoms of palpitations, cough, and shortness of breath, associated with sinus rhythm, PVCs, PACs, and brief PAT. 2/15/2024 2:45:00 PM Luis Fernando Fowler MD     REVIEW OF SYSTEMS    REVIEW OF SYSTEMS  Header Details   Cardiovascular No history of Chest pain, Palpitations, Edema   Respiratory SOB   Neurological No history of Numbness, Limb weakness     SOCIAL HISTORY    SOCIAL HISTORY  Social History Element Description Effective Dates   Smoking status Former smoker -     FUNCTIONAL STATUS    No data available    MEDICAL EQUIPMENT    No data available    Goals Sections    No data available    REASON FOR REFERRAL    No data available    Health Concerns Section    No data available    COGNITIVE/MENTAL STATUS    No data available    Patient Demographics    Patient Demographics  Patient Address Patient Name Communication   [address] [place] [address] Yesenia Berkowitz [phone] (Home)     Patient Demographics  Language Race / Ethnicity Marital Status   Unknown - Spoken White / Unknown      Document Information    Primary Care Provider Other Service Providers Document Coverage Dates   Luis Fernando Fowler  NPI: [id number]  [phone] (Work)  [address], [address] [place] [address]  [place] [address]  Interpreting Physicians  [facility]  [phone] (Work)  [address] [place] [address] Norma Murillo  NPI: [id number]  [phone] (Work)  [address], [address] [place] [address]  [place] [address]  Nurses     Makayla ADAIR  Sebastian  NPI: [id number]  [phone] (Work)  [address], [address] [place] [address]  [place] [address]  Nurses Sep. 27, 2024September 27, 2024      Organization   [facility]  [phone] (Work)  [address], [address] [place] [address]  [place] [address]     Encounter Providers Encounter Date    Sep. 27, 2024September 27, 2024     Legal Authenticator    Luis Fernando Fowler  NPI: [id number]  [phone] (Work)  [address], [address] [place] [address]  [place] [address]

## 2024-10-08 NOTE — PLAN OF CARE
Pt is A&Ox4. Pt has wean down to 5L or O2 via high flow Nasal canula. She is monitor via remote tele and continuous pulse ox.  Pt is tolerating ambulating to self in her room. Pt is not producing sputum at the moment. Safety precaution in place. Call light with the pt's reach and frequent round made by staff.     Problem: Patient Centered Care  Goal: Patient preferences are identified and integrated in the patient's plan of care  Description: Interventions:  - What would you like us to know as we care for you? From home alone  - Provide timely, complete, and accurate information to patient/family  - Incorporate patient and family knowledge, values, beliefs, and cultural backgrounds into the planning and delivery of care  - Encourage patient/family to participate in care and decision-making at the level they choose  - Honor patient and family perspectives and choices  Outcome: Progressing     Problem: Patient/Family Goals  Goal: Patient/Family Long Term Goal  Description: Patient's Long Term Goal: to go back home    Interventions:  - follow medical regimen  - See additional Care Plan goals for specific interventions  Outcome: Progressing  Goal: Patient/Family Short Term Goal  Description: Patient's Short Term Goal: to not be Short of Breath    Interventions:   - follow medical regimen  - See additional Care Plan goals for specific interventions  Outcome: Progressing     Problem: RESPIRATORY - ADULT  Goal: Achieves optimal ventilation and oxygenation  Description: INTERVENTIONS:  - Assess for changes in respiratory status  - Assess for changes in mentation and behavior  - Position to facilitate oxygenation and minimize respiratory effort  - Oxygen supplementation based on oxygen saturation or ABGs  - Provide Smoking Cessation handout, if applicable  - Encourage broncho-pulmonary hygiene including cough, deep breathe, Incentive Spirometry  - Assess the need for suctioning and perform as needed  - Assess and instruct to  report SOB or any respiratory difficulty  - Respiratory Therapy support as indicated  - Manage/alleviate anxiety  - Monitor for signs/symptoms of CO2 retention  Outcome: Progressing     Problem: CARDIOVASCULAR - ADULT  Goal: Maintains optimal cardiac output and hemodynamic stability  Description: INTERVENTIONS:  - Monitor vital signs, rhythm, and trends  - Monitor for bleeding, hypotension and signs of decreased cardiac output  - Evaluate effectiveness of vasoactive medications to optimize hemodynamic stability  - Monitor arterial and/or venous puncture sites for bleeding and/or hematoma  - Assess quality of pulses, skin color and temperature  - Assess for signs of decreased coronary artery perfusion - ex. Angina  - Evaluate fluid balance, assess for edema, trend weights  Outcome: Progressing  Goal: Absence of cardiac arrhythmias or at baseline  Description: INTERVENTIONS:  - Continuous cardiac monitoring, monitor vital signs, obtain 12 lead EKG if indicated  - Evaluate effectiveness of antiarrhythmic and heart rate control medications as ordered  - Initiate emergency measures for life threatening arrhythmias  - Monitor electrolytes and administer replacement therapy as ordered  Outcome: Progressing

## 2024-10-08 NOTE — CONSULTS
[facility]    PATIENT'S NAME: CEZAR JOVEL   ATTENDING PHYSICIAN: Ez Taylor MD   CONSULTING PHYSICIAN: Bing Boyle MD   PATIENT ACCOUNT#:   [id number]    LOCATION:  [facility]  MEDICAL RECORD #:   [id number]       YOB: 1940  ADMISSION DATE:       10/07/2024      CONSULT DATE:  10/08/2024    REPORT OF CONSULTATION    HISTORY OF PRESENT ILLNESS:  The patient is an 84-year-old white female with history of asthma, COPD, quit smoking 20 years ago, was previously admitted to [facility] from  to  because of respiratory failure.  The patient is known to have asthma, COPD, kyphoscoliosis, elevation of left hemidiaphragm, atrial fibrillation.  Chest x-ray in the emergency room showed increasing pulmonary interstitial infiltrate.  The patient presented to my office yesterday because of increasing shortness of breath, nonproductive cough, severe hypoxia with O2 saturation in 70s and 80s with 5L O2.  The patient denied chills and fever.  The patient was subsequently admitted.    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    MEDICATIONS:  Home medications of albuterol p.r.n., Pulmicort 180 two puffs every 12, Cardizem 240 mg a day, Lasix 40 mg a day, potassium chloride 20 mEq a day, amitriptyline 50 mg a day, aspirin 81 mg a day, lisinopril/hydrochlorothiazide 10/12.5 mg a day, Claritin 10 mg a day, Singulair 10 mg at bedtime, Spiriva Respimat 1 inhalation every 24, and Reglan 10 mg 3 times a day.     ALLERGIES:  Penicillin, Levaquin, Zinacef.    SOCIAL HISTORY:  The patient quit smoking 20 years ago.  The patient denies alcohol abuse.    FAMILY HISTORY:  Father  from MI in his 60.  Mother  from cancer of ovary at age of 79.    REVIEW OF SYSTEMS:  GENERAL:  No weight loss.  HEAD:  No headache.  EYES:  No diplopia, blurred vision.  EARS:  No  tinnitus, impaired hearing.  NOSE:  No rhinorrhea, epistaxis.  THROAT:  No sore throat.  NECK:  No neck stiffness.  RESPIRATORY:  See the present illness.  CARDIOVASCULAR:  No orthopnea, paroxysmal nocturnal dyspnea.  GASTROINTESTINAL:  No nausea, vomiting, diarrhea.  GASTROINTESTINAL:  No dysuria or hematuria.      PHYSICAL EXAMINATION:    VITAL SIGNS:  Temperature 97.6, pulse 75, respirations 18, blood pressure 134/53.   HEENT:  Head and face:  No trauma.  No injury.  Pupils equal bilaterally.  Conjunctivae were not anemic.  Sclerae nonicteric.  Eye fundi were normal.  External ears, no deformity.  Ear canals were patent.  Eardrums were intact.  Nose, no congestion or polyp.  Mouth, throat free.    NECK:  No neck stiffness.  No palpable nodes.  No carotid bruit.  CHEST:  Symmetrical.  LUNGS:  Rhonchi, wheezing.  HEART:  Regular.  No murmur.  Cardiac dullness was normal.  ABDOMEN:  Soft.  No hepatosplenomegaly.  No ascites.  No hernia.  EXTREMITIES:  No clubbing of fingers or lower leg edema.    LABORATORY DATA:  CBC showed WBC 10,800, RBC 3.90, hemoglobin 12.3, hematocrit 36.  BNP 72.  Electrolytes showed sodium 140, potassium 3.9, chloride 101, CO2 of 39.    IMPRESSION:    1.   Acute on chronic respiratory failure.  2.   COPD with exacerbation.  3.   Asthma with exacerbation.  4.   Left phrenic nerve paralysis.  5.   Acute on chronic pulmonary.  6.   Kyphoscoliosis.    SUGGESTION AND RECOMMENDATION:    1.   DuoNeb p.r.n.   2.   Solu-Medrol 40 IV every 12.   3.   Doxycycline 100 mg IV q.12 h.    4.   Protonix 40 mg IV daily.  5.   Lasix 40 mg a day.  6.   DVT prophylaxis.   7.   Will have chest CT angiogram to rule out pulmonary embolism.     Dictated By Bing Boyle MD  d: 10/08/2024 09:45:13  t: 10/08/2024 10:31:03  Job [id number]  RUST/

## 2024-10-08 NOTE — HOME CARE LIAISON
Patient is current with [facility] for RN PT and HHA services. Will need LEIDY orders at [facility]. Informed zachary Hwang

## 2024-10-08 NOTE — PROGRESS NOTES
[facility]  Progress Note     Yesenia Berkowitz  : 1940    Status: Inpatient  Day #: 1    Attending: Ez Taylor MD  PCP: JO-ANN YANG MD     Assessment and Plan:    Acute on chronic respiratory failure due to  AECOPD  Acute on chronic diastolic CHF  Left phrenic nerve paralysis  Kyphoscoliosis  -pulm on consult  -CT chest - no PE. +pulmonary edema, moderate R and small L pleural effusions  -cont nebs, steroids IV, doxycycline  -lasix IV  -monitor I/O, wts    Acute on chronic diastolic CHF  -echo 2024 - EF 65-70%, moderate pulmonary htn  -consult cardiology    Paroxysmal afib  -not on AC  -cont diltiazem, digoxin  -in NSR    HTN  -cont meds and moniotr    Pulmonary nodule  -per pulmonology        DVT Mechanical Prophylaxis:        DVT Pharmacologic Prophylaxis   Medication    enoxaparin (Lovenox) 40 MG/0.4ML SUBQ injection 40 mg      DVT Pharmacologic prophylaxis: Aspirin 162 mg        Subjective:      Initial Chief Complaint:  SOB    Feels a little better. Still SOB with exertion. No CP    10 point ROS completed and was negative, except for pertinent positive and negatives stated in subjective.      Objective:      Temp:  [97.2 °F (36.2 °C)-98.6 °F (37 °C)] 97.2 °F (36.2 °C)  Pulse:  [] 75  Resp:  [18-20] 18  BP: (117-146)/(48-73) 130/60  SpO2:  [91 %-100 %] 93 %  General:  Alert, no distress  HEENT:  Normocephalic, atraumatic  Cardiac:  Regular rate, regular rhythm  Pulmonary:  bibasilar crackles  Gastrointestinal:  Soft, non-tender, normal bowel sounds  Musculoskeletal:  No joint swelling  Extremities:  1+ b/l LE edema  Neurologic:  nonfocal  Psychiatric:  Normal affect, calm and appropriate    Intake/Output Summary (Last 24 hours) at 10/8/2024 1415  Last data filed at 10/8/2024 0923  Gross per 24 hour   Intake 240 ml   Output 1151 ml   Net -911 ml         Recent Labs   Lab 10/07/24  1204 10/08/24  0525   WBC 10.8 10.4   HGB 12.3 10.8*   HCT 36.7 32.0*   .0 160.0   RBC 3.90 3.43*    MCV 94.1 93.3   MCH 31.5 31.5   MCHC 33.5 33.8   RDW 13.8 13.4   NEPRELIM 9.20* 9.70*     Recent Labs   Lab 10/07/24  1204 10/08/24  0525   BUN 18 21   CREATSERUM 0.71 0.69   CA 9.0 8.7    138   K 3.9 4.0    100   CO2 39.0* 38.0*   * 165*   DDIMER 0.63  --        CT CHEST PE AORTA (IV ONLY) (CPT=71260)    Result Date: 10/8/2024  CONCLUSION:  1. Negative for pulmonary embolism. 2. Cardiomegaly and mild coronary atherosclerosis.  Stable findings consistent with moderate CHF/fluid overload including pulmonary edema, a moderate right pleural effusion and a small left pleural effusion.  There is resulting passive atelectasis at both lung bases. 3. Mild emphysema with a 10 x 7 mm noncalcified subpleural nodule in the posterior aspect of the right upper lobe.  This nodule has increased in size compared to 03/15/2023.  A malignant etiology such as a slow growing primary bronchogenic carcinoma therefore cannot be excluded.  Consider further assessment with percutaneous biopsy or follow-up PET/CT when the patient is able. 4. Stable moderately dilated main pulmonary artery, which can be seen with chronic pulmonary hypertension; correlate clinically. 5. Chronically elevated left hemidiaphragm with unchanged chronic scarring at the left lung base.    Dictated by (CST): Malik Reynolds MD on 10/08/2024 at 1:53 PM     Finalized by (CST): Malik Reynolds MD on 10/08/2024 at 2:02 PM          XR CHEST AP PORTABLE  (CPT=71045)    Result Date: 10/7/2024  CONCLUSION:   No significant change in the small bilateral pleural effusions with associated bibasilar opacities, which may reflect atelectasis with or without superimposed pneumonia.  Prominence of the interstitial markings, which likely reflects components of both pulmonary edema and emphysema/bronchiectasis.  Lesser incidental findings described above.    Dictated by (CST): Joe Hayes MD on 10/07/2024 at 12:39 PM     Finalized by (CST): Freddy  MD Joe on 10/07/2024 at 12:45 PM         EKG 12 Lead    Result Date: 10/7/2024  Normal sinus rhythm Cannot rule out Anterior infarct (cited on or before 30-AUG-2023) Abnormal ECG When compared with ECG of 13-SEP-2024 13:59, Premature supraventricular complexes are no longer Present Confirmed by SAGAR VINSON JOHN ([id number]) on 10/7/2024 5:40:47 PM   Medications:   methylPREDNISolone  40 mg Intravenous Q12H    enoxaparin  40 mg Subcutaneous Daily    doxycycline  100 mg Oral 2 times per day    amitriptyline  50 mg Oral Nightly    aspirin  162 mg Oral Daily    digoxin  125 mcg Oral Daily    dilTIAZem ER  240 mg Oral Daily    furosemide  40 mg Oral Daily    pantoprazole  40 mg Oral Nightly    montelukast  10 mg Oral Nightly    cetirizine  10 mg Oral Daily    fluticasone furoate  1 puff Inhalation Daily      PRN Meds:   ipratropium-albuterol    temazepam    guaiFENesin    benzonatate    dicyclomine    ipratropium-albuterol    Supplementary Documentation:        [facility]. Time spent on chart/note review, review of labs/imaging, discussion with patient, physical exam, discussion with staff, consultants, coordinating care, writing progress note, discussion of plan of care.      Ez Taylor MD

## 2024-10-08 NOTE — PLAN OF CARE
Problem: Patient Centered Care  Goal: Patient preferences are identified and integrated in the patient's plan of care  Description: Interventions:  - What would you like us to know as we care for you? From home alone  - Provide timely, complete, and accurate information to patient/family  - Incorporate patient and family knowledge, values, beliefs, and cultural backgrounds into the planning and delivery of care  - Encourage patient/family to participate in care and decision-making at the level they choose  - Honor patient and family perspectives and choices  Outcome: Progressing     Problem: Patient/Family Goals  Goal: Patient/Family Long Term Goal  Description: Patient's Long Term Goal: to go back home    Interventions:  - follow medical regimen  - See additional Care Plan goals for specific interventions  Outcome: Progressing  Goal: Patient/Family Short Term Goal  Description: Patient's Short Term Goal: to not be Short of Breath    Interventions:   - follow medical regimen  - See additional Care Plan goals for specific interventions  Outcome: Progressing     Problem: RESPIRATORY - ADULT  Goal: Achieves optimal ventilation and oxygenation  Description: INTERVENTIONS:  - Assess for changes in respiratory status  - Assess for changes in mentation and behavior  - Position to facilitate oxygenation and minimize respiratory effort  - Oxygen supplementation based on oxygen saturation or ABGs  - Provide Smoking Cessation handout, if applicable  - Encourage broncho-pulmonary hygiene including cough, deep breathe, Incentive Spirometry  - Assess the need for suctioning and perform as needed  - Assess and instruct to report SOB or any respiratory difficulty  - Respiratory Therapy support as indicated  - Manage/alleviate anxiety  - Monitor for signs/symptoms of CO2 retention  Outcome: Progressing

## 2024-10-09 PROBLEM — R91.1 PULMONARY NODULE: Status: ACTIVE | Noted: 2024-10-09

## 2024-10-09 PROBLEM — J90 PLEURAL EFFUSION: Status: ACTIVE | Noted: 2024-10-09

## 2024-10-09 LAB
ANION GAP SERPL CALC-SCNC: 3 MMOL/L (ref 0–18)
BASOPHILS # BLD AUTO: 0.02 X10(3) UL (ref 0–0.2)
BASOPHILS NFR BLD AUTO: 0.1 %
BUN BLD-MCNC: 35 MG/DL (ref 9–23)
BUN/CREAT SERPL: 35 (ref 10–20)
CALCIUM BLD-MCNC: 8.7 MG/DL (ref 8.7–10.4)
CHLORIDE SERPL-SCNC: 99 MMOL/L (ref 98–112)
CO2 SERPL-SCNC: 36 MMOL/L (ref 21–32)
CREAT BLD-MCNC: 1 MG/DL
DEPRECATED RDW RBC AUTO: 46.5 FL (ref 35.1–46.3)
EGFRCR SERPLBLD CKD-EPI 2021: 56 ML/MIN/1.73M2 (ref 60–?)
EOSINOPHIL # BLD AUTO: 0 X10(3) UL (ref 0–0.7)
EOSINOPHIL NFR BLD AUTO: 0 %
ERYTHROCYTE [DISTWIDTH] IN BLOOD BY AUTOMATED COUNT: 13.5 % (ref 11–15)
GLUCOSE BLD-MCNC: 172 MG/DL (ref 70–99)
HCT VFR BLD AUTO: 34.2 %
HGB BLD-MCNC: 11.3 G/DL
IMM GRANULOCYTES # BLD AUTO: 0.12 X10(3) UL (ref 0–1)
IMM GRANULOCYTES NFR BLD: 0.7 %
LYMPHOCYTES # BLD AUTO: 0.51 X10(3) UL (ref 1–4)
LYMPHOCYTES NFR BLD AUTO: 3 %
MCH RBC QN AUTO: 31.3 PG (ref 26–34)
MCHC RBC AUTO-ENTMCNC: 33 G/DL (ref 31–37)
MCV RBC AUTO: 94.7 FL
MONOCYTES # BLD AUTO: 0.63 X10(3) UL (ref 0.1–1)
MONOCYTES NFR BLD AUTO: 3.7 %
NEUTROPHILS # BLD AUTO: 15.57 X10 (3) UL (ref 1.5–7.7)
NEUTROPHILS # BLD AUTO: 15.57 X10(3) UL (ref 1.5–7.7)
NEUTROPHILS NFR BLD AUTO: 92.5 %
OSMOLALITY SERPL CALC.SUM OF ELEC: 298 MOSM/KG (ref 275–295)
PLATELET # BLD AUTO: 204 10(3)UL (ref 150–450)
POTASSIUM SERPL-SCNC: 4.2 MMOL/L (ref 3.5–5.1)
RBC # BLD AUTO: 3.61 X10(6)UL
SODIUM SERPL-SCNC: 138 MMOL/L (ref 136–145)
WBC # BLD AUTO: 16.9 X10(3) UL (ref 4–11)

## 2024-10-09 PROCEDURE — 99233 SBSQ HOSP IP/OBS HIGH 50: CPT | Performed by: HOSPITALIST

## 2024-10-09 RX ORDER — ACETAMINOPHEN 325 MG/1
650 TABLET ORAL EVERY 6 HOURS PRN
Status: DISCONTINUED | OUTPATIENT
Start: 2024-10-09 | End: 2024-10-11

## 2024-10-09 NOTE — PROGRESS NOTES
[facility]  part of [facility]    Progress Note    Yesenia Berkowitz Patient Status:  Inpatient    1940 MRN [id number]   Location [facility]5W Attending Tayo Bang MD   Hosp Day # 2 PCP JO-ANN YANG MD       Subjective:     Pt notes OJEDA.    Objective:   Blood pressure 131/58, pulse 78, temperature 97.3 °F (36.3 °C), temperature source Oral, resp. rate 20, height 5' 5\" (1.651 m), weight 166 lb 9.6 oz (75.6 kg), SpO2 (!) 87%.    Gen:   NAD.  A and O x 3  CV:   RRR, no m/g/r  Pulm:   crackles bilat  Abd:   +bs, soft, NT, ND  LE:   No c/c/e  Neuro:   nonfocal    Results:     Lab Results   Component Value Date    WBC 16.9 (H) 10/09/2024    HGB 11.3 (L) 10/09/2024    HCT 34.2 (L) 10/09/2024    .0 10/09/2024    CREATSERUM 1.00 10/09/2024    BUN 35 (H) 10/09/2024     10/09/2024    K 4.2 10/09/2024    CL 99 10/09/2024    CO2 36.0 (H) 10/09/2024     (H) 10/09/2024    CA 8.7 10/09/2024    ALB 2.8 (L) 2024    ALKPHO 48 (L) 2024    BILT 0.2 2024    TP 4.8 (L) 2024    AST 15 2024    ALT 22 2024    PTT 30.1 2023    INR 1.12 2023    TSH 1.060 2023    LIP 30 2024    DDIMER 0.63 10/07/2024    MG 2.0 2024    PHOS 4.0 2024    TROP <0.045 2020       CT CHEST PE AORTA (IV ONLY) (CPT=71260)    Result Date: 10/8/2024  CONCLUSION:  1. Negative for pulmonary embolism. 2. Cardiomegaly and mild coronary atherosclerosis.  Stable findings consistent with moderate CHF/fluid overload including pulmonary edema, a moderate right pleural effusion and a small left pleural effusion.  There is resulting passive atelectasis at both lung bases. 3. Mild emphysema with a 10 x 7 mm noncalcified subpleural nodule in the posterior aspect of the right upper lobe.  This nodule has increased in size compared to 03/15/2023.  A malignant etiology such as a slow growing primary bronchogenic carcinoma therefore cannot be excluded.   Consider further assessment with percutaneous biopsy or follow-up PET/CT when the patient is able. 4. Stable moderately dilated main pulmonary artery, which can be seen with chronic pulmonary hypertension; correlate clinically. 5. Chronically elevated left hemidiaphragm with unchanged chronic scarring at the left lung base.    Dictated by (CST): Malik Reynolds MD on 10/08/2024 at 1:53 PM     Finalized by (CST): Malik Reynolds MD on 10/08/2024 at 2:02 PM               Assessment and Plan:     Acute on chronic respiratory failure due to  AECOPD  Acute on chronic diastolic CHF  Left phrenic nerve paralysis  Currently on 4L o2  Kyphoscoliosis  - pulm and cards on consult  - CT chest - no PE. +pulmonary edema, moderate R and small L pleural effusions  - cont nebs, steroids IV, doxycycline  - lasix IV  - monitor I/O, wts  - possible right thoracentesis tomorrow per pulm  - wean o2     Acute on chronic diastolic CHF  - echo 9/2024 - EF 65-70%, moderate pulmonary htn  - cardiology on consult  - cont IV lasix     Paroxysmal afib  - not on AC  - aspirin  - cont diltiazem, digoxin  - in NSR     HTN  - cont meds and monitor     Pulmonary nodule  - per pulmonology    dvt proph:    lovenox on hold.   SCDs.    Code status:    Full       MDM:    High Tayo Cervantes MD  10/9/2024

## 2024-10-09 NOTE — CM/SW NOTE
CM informed by [facility] liaison Vanessa - patient is current with [facility] for RN, PT, and HHA services.  CM sent return referral to [facility] via [employer].  LEIDY entered and uploaded to referral.    Patient discussed in nursing rounds - plan for thoracentesis tomorrow 10/10.    / to remain available for support and/or discharge planning.     Plan: Home with [facility], [employer] home oxygen - once medically cleared    Melinda Cartagena RN, BSN  Nurse   [phone]

## 2024-10-09 NOTE — PROGRESS NOTES
[facility]  part of [facility]    Progress Note    Yesenia Berkowitz Patient Status:  Inpatient    1940 MRN [id number]   Location [facility]5W Attending Tayo Bang MD   Hosp Day # 2 PCP JO-ANN YANG MD       Subjective:   Yesenia Berkowitz is a(n) 84 year old female.   Less sob and cough  Objective:   Blood pressure 115/56, pulse 78, temperature 97 °F (36.1 °C), temperature source Oral, resp. rate 18, height 5' 5\" (1.651 m), weight 166 lb 9.6 oz (75.6 kg), SpO2 98%.    HEENT: negative  Neck: no adenopathy, no carotid bruit, no JVD, supple, symmetrical, trachea midline and thyroid not enlarged, symmetric, no tenderness/mass/nodules  Pulmonary/Chest: rhonchi,diminished BS with dullness on the rt chest  Cardiovascular: S1, S2 normal, no S3 or S4, regular rate and rhythm, no murmur  Abdominal: normal findings: bowel sounds normal, soft, non-tender and no hepatosplenomegaly   Extremities: extremities normal, atraumatic, no cyanosis or edema  Skin: Skin color, texture, turgor normal. No rashes or lesions    Results:     Lab Results   Component Value Date    WBC 16.9 (H) 10/09/2024    HGB 11.3 (L) 10/09/2024    HCT 34.2 (L) 10/09/2024    .0 10/09/2024    CREATSERUM 1.00 10/09/2024    BUN 35 (H) 10/09/2024     10/09/2024    K 4.2 10/09/2024    CL 99 10/09/2024    CO2 36.0 (H) 10/09/2024     (H) 10/09/2024    CA 8.7 10/09/2024    ALB 2.8 (L) 2024    ALKPHO 48 (L) 2024    BILT 0.2 2024    TP 4.8 (L) 2024    AST 15 2024    ALT 22 2024    PTT 30.1 2023    INR 1.12 2023    TSH 1.060 2023    LIP 30 2024    DDIMER 0.63 10/07/2024    MG 2.0 2024    PHOS 4.0 2024    TROP <0.045 2020       CT CHEST PE AORTA (IV ONLY) (CPT=71260)    Result Date: 10/8/2024  CONCLUSION:  1. Negative for pulmonary embolism. 2. Cardiomegaly and mild coronary atherosclerosis.  Stable findings consistent with moderate  CHF/fluid overload including pulmonary edema, a moderate right pleural effusion and a small left pleural effusion.  There is resulting passive atelectasis at both lung bases. 3. Mild emphysema with a 10 x 7 mm noncalcified subpleural nodule in the posterior aspect of the right upper lobe.  This nodule has increased in size compared to 03/15/2023.  A malignant etiology such as a slow growing primary bronchogenic carcinoma therefore cannot be excluded.  Consider further assessment with percutaneous biopsy or follow-up PET/CT when the patient is able. 4. Stable moderately dilated main pulmonary artery, which can be seen with chronic pulmonary hypertension; correlate clinically. 5. Chronically elevated left hemidiaphragm with unchanged chronic scarring at the left lung base.    Dictated by (CST): Malik Reynolds MD on 10/08/2024 at 1:53 PM     Finalized by (CST): Malik Reynolds MD on 10/08/2024 at 2:02 PM          XR CHEST AP PORTABLE  (CPT=71045)    Result Date: 10/7/2024  CONCLUSION:   No significant change in the small bilateral pleural effusions with associated bibasilar opacities, which may reflect atelectasis with or without superimposed pneumonia.  Prominence of the interstitial markings, which likely reflects components of both pulmonary edema and emphysema/bronchiectasis.  Lesser incidental findings described above.    Dictated by (CST): Joe Hayes MD on 10/07/2024 at 12:39 PM     Finalized by (CST): Joe Hayes MD on 10/07/2024 at 12:45 PM         EKG 12 Lead    Result Date: 10/7/2024  Normal sinus rhythm Cannot rule out Anterior infarct (cited on or before 30-AUG-2023) Abnormal ECG When compared with ECG of 13-SEP-2024 13:59, Premature supraventricular complexes are no longer Present Confirmed by SAGAR VINSON JOHN ([id number]) on 10/7/2024 5:40:47 PM     Assessment and Plan:   Principal Problem:    COPD exacerbation (HCC)  Active Problems:    Acute on chronic hypoxic respiratory failure (HCC)     Acute pulmonary edema (HCC)    Pleural effusion    Pulmonary nodule       Less sob and cough,continue doxycycline,lasix and solumedrol.plan rt thoracenthesis      EVLIA LIVINGSTON MD, MD  10/9/2024

## 2024-10-09 NOTE — PLAN OF CARE
Problem: Patient Centered Care  Goal: Patient preferences are identified and integrated in the patient's plan of care  Description: Interventions:  - What would you like us to know as we care for you? From home alone  - Provide timely, complete, and accurate information to patient/family  - Incorporate patient and family knowledge, values, beliefs, and cultural backgrounds into the planning and delivery of care  - Encourage patient/family to participate in care and decision-making at the level they choose  - Honor patient and family perspectives and choices  Outcome: Progressing     Problem: Patient/Family Goals  Goal: Patient/Family Long Term Goal  Description: Patient's Long Term Goal: to go back home    Interventions:  - follow medical regimen  - See additional Care Plan goals for specific interventions  Outcome: Progressing  Goal: Patient/Family Short Term Goal  Description: Patient's Short Term Goal: to not be Short of Breath    Interventions:   - follow medical regimen  - See additional Care Plan goals for specific interventions  Outcome: Progressing     Problem: RESPIRATORY - ADULT  Goal: Achieves optimal ventilation and oxygenation  Description: INTERVENTIONS:  - Assess for changes in respiratory status  - Assess for changes in mentation and behavior  - Position to facilitate oxygenation and minimize respiratory effort  - Oxygen supplementation based on oxygen saturation or ABGs  - Provide Smoking Cessation handout, if applicable  - Encourage broncho-pulmonary hygiene including cough, deep breathe, Incentive Spirometry  - Assess the need for suctioning and perform as needed  - Assess and instruct to report SOB or any respiratory difficulty  - Respiratory Therapy support as indicated  - Manage/alleviate anxiety  - Monitor for signs/symptoms of CO2 retention  Outcome: Progressing     Problem: CARDIOVASCULAR - ADULT  Goal: Maintains optimal cardiac output and hemodynamic stability  Description:  INTERVENTIONS:  - Monitor vital signs, rhythm, and trends  - Monitor for bleeding, hypotension and signs of decreased cardiac output  - Evaluate effectiveness of vasoactive medications to optimize hemodynamic stability  - Monitor arterial and/or venous puncture sites for bleeding and/or hematoma  - Assess quality of pulses, skin color and temperature  - Assess for signs of decreased coronary artery perfusion - ex. Angina  - Evaluate fluid balance, assess for edema, trend weights  Outcome: Progressing  Goal: Absence of cardiac arrhythmias or at baseline  Description: INTERVENTIONS:  - Continuous cardiac monitoring, monitor vital signs, obtain 12 lead EKG if indicated  - Evaluate effectiveness of antiarrhythmic and heart rate control medications as ordered  - Initiate emergency measures for life threatening arrhythmias  - Monitor electrolytes and administer replacement therapy as ordered  Outcome: Progressing

## 2024-10-09 NOTE — PROGRESS NOTES
[facility] - CARDIOLOGY PROGRESS NOTE      Yesenia Berkowitz Patient Status:  Inpatient    1940 MRN [id number]   Location [facility]5W Attending Tayo Bang MD   Hosp Day # 2 PCP JO-ANN YANG MD         Assessment and Plan:     COPD exacerbation (HCC)  .  Shortness of breath multifactorial in origin.  COPD component treated per pulmonary.  May have thoracentesis in a.m.    Hypertension   Stable on present meds      Acute pulmonary edema (HCC)  Small component of acute on chronic diastolic heart failure.  Continue IV Lasix today.  Monitor renal function and transition to oral meds if improved in a.m.     History of prior A-fib   No blood thinners per patient request      Subjective:   Yesenia Berkowitz is a(n) 84 year old female still with some shortness of breath with slightly decreased  Objective:   Blood pressure 115/56, pulse 78, temperature 97 °F (36.1 °C), temperature source Oral, resp. rate 18, height 165.1 cm (5' 5\"), weight 166 lb 9.6 oz (75.6 kg), SpO2 98%.  Intake/Output:        Last 24 hours:   Intake/Output Summary (Last 24 hours) at 10/9/2024 1021  Last data filed at 10/9/2024 0133  Gross per 24 hour   Intake 900 ml   Output 627 ml   Net 273 ml      This shift: No intake/output data recorded.    Exam  Gen: Comfortable, in no acute distress,    Psych.alert and oriented x3  HEENT: atraumatic, normal conjunctiva, moist mucosa  Neck:supple,no JVD, no bruits  Lungs: Diminished at the bases, normal respiratory effort  Cardiac: regular rate and rhythm, nl S1,S2, no pathologic murmur  Abd: Abdomen soft, nontender, nondistended,  bowel sounds present  Ext:  no clubbing, no cyanosis, trace ankle edema  Neuro: no focal deficits  Skin: no rashes or lesions        Scheduled Meds:    methylPREDNISolone  40 mg Intravenous Q12H    furosemide  40 mg Intravenous Daily    [Held by provider] enoxaparin  40 mg Subcutaneous Daily    doxycycline  100 mg Oral  2 times per day    amitriptyline  50 mg Oral Nightly    aspirin  162 mg Oral Daily    digoxin  125 mcg Oral Daily    dilTIAZem ER  240 mg Oral Daily    pantoprazole  40 mg Oral Nightly    montelukast  10 mg Oral Nightly    cetirizine  10 mg Oral Daily    fluticasone furoate  1 puff Inhalation Daily       Results:     Lab Results   Component Value Date    WBC 16.9 (H) 10/09/2024    HGB 11.3 (L) 10/09/2024    HCT 34.2 (L) 10/09/2024    .0 10/09/2024    CREATSERUM 1.00 10/09/2024    BUN 35 (H) 10/09/2024     10/09/2024    K 4.2 10/09/2024    CL 99 10/09/2024    CO2 36.0 (H) 10/09/2024     (H) 10/09/2024    CA 8.7 10/09/2024    ALB 2.8 (L) 08/30/2024    ALKPHO 48 (L) 08/30/2024    BILT 0.2 08/30/2024    TP 4.8 (L) 08/30/2024    AST 15 08/30/2024    ALT 22 08/30/2024    PTT 30.1 03/14/2023    INR 1.12 03/14/2023    TSH 1.060 01/06/2023    LIP 30 08/30/2024    DDIMER 0.63 10/07/2024    MG 2.0 09/18/2024    PHOS 4.0 09/18/2024    TROP <0.045 02/03/2020       CT CHEST PE AORTA (IV ONLY) (CPT=71260)    Result Date: 10/8/2024  CONCLUSION:  1. Negative for pulmonary embolism. 2. Cardiomegaly and mild coronary atherosclerosis.  Stable findings consistent with moderate CHF/fluid overload including pulmonary edema, a moderate right pleural effusion and a small left pleural effusion.  There is resulting passive atelectasis at both lung bases. 3. Mild emphysema with a 10 x 7 mm noncalcified subpleural nodule in the posterior aspect of the right upper lobe.  This nodule has increased in size compared to 03/15/2023.  A malignant etiology such as a slow growing primary bronchogenic carcinoma therefore cannot be excluded.  Consider further assessment with percutaneous biopsy or follow-up PET/CT when the patient is able. 4. Stable moderately dilated main pulmonary artery, which can be seen with chronic pulmonary hypertension; correlate clinically. 5. Chronically elevated left hemidiaphragm with unchanged chronic  scarring at the left lung base.    Dictated by (CST): Malik Reynolds MD on 10/08/2024 at 1:53 PM     Finalized by (CST): Malik Reynolds MD on 10/08/2024 at 2:02 PM          XR CHEST AP PORTABLE  (CPT=71045)    Result Date: 10/7/2024  CONCLUSION:   No significant change in the small bilateral pleural effusions with associated bibasilar opacities, which may reflect atelectasis with or without superimposed pneumonia.  Prominence of the interstitial markings, which likely reflects components of both pulmonary edema and emphysema/bronchiectasis.  Lesser incidental findings described above.    Dictated by (CST): Joe Hayes MD on 10/07/2024 at 12:39 PM     Finalized by (CST): Joe Hayes MD on 10/07/2024 at 12:45 PM         EKG 12 Lead    Result Date: 10/7/2024  Normal sinus rhythm Cannot rule out Anterior infarct (cited on or before 30-AUG-2023) Abnormal ECG When compared with ECG of 13-SEP-2024 13:59, Premature supraventricular complexes are no longer Present Confirmed by SAGAR VINSON, JESS ([id number]) on 10/7/2024 5:40:47 PM       Luis Fernando Castaneda MD  [facility] [place] [facility]   10/9/2024

## 2024-10-10 ENCOUNTER — APPOINTMENT (OUTPATIENT)
Dept: GENERAL RADIOLOGY | Facility: HOSPITAL | Age: 84
End: 2024-10-10
Attending: INTERNAL MEDICINE
Payer: MEDICARE

## 2024-10-10 ENCOUNTER — APPOINTMENT (OUTPATIENT)
Dept: ULTRASOUND IMAGING | Facility: HOSPITAL | Age: 84
End: 2024-10-10
Attending: INTERNAL MEDICINE
Payer: MEDICARE

## 2024-10-10 LAB
ANION GAP SERPL CALC-SCNC: 1 MMOL/L (ref 0–18)
BASOPHILS NFR PLR: 0 %
BUN BLD-MCNC: 49 MG/DL (ref 9–23)
BUN/CREAT SERPL: 51 (ref 10–20)
CALCIUM BLD-MCNC: 8.5 MG/DL (ref 8.7–10.4)
CHLORIDE SERPL-SCNC: 99 MMOL/L (ref 98–112)
CO2 SERPL-SCNC: 39 MMOL/L (ref 21–32)
CREAT BLD-MCNC: 0.96 MG/DL
EGFRCR SERPLBLD CKD-EPI 2021: 59 ML/MIN/1.73M2 (ref 60–?)
EOSINOPHIL NFR PLR: 0 %
GLUCOSE BLD-MCNC: 147 MG/DL (ref 70–99)
GLUCOSE PLR-MCNC: 170 MG/DL
LDH FLD L TO P-CCNC: 65 U/L
LYMPHOCYTES NFR PLR: 92 %
MESOTHL CELL NFR PLR: 1 %
MONOS+MACROS NFR PLR: 5 %
NEUTROPHILS NFR PLR: 2 %
OSMOLALITY SERPL CALC.SUM OF ELEC: 304 MOSM/KG (ref 275–295)
POTASSIUM SERPL-SCNC: 4.3 MMOL/L (ref 3.5–5.1)
PROT PLR-MCNC: 2.1 G/DL
RBC # FLD: ABNORMAL /CUMM (ref ?–1)
SODIUM SERPL-SCNC: 139 MMOL/L (ref 136–145)
TOTAL CELLS COUNTED FLD: 100
TOTAL CELLS COUNTED PLR: 2173 /CUMM (ref ?–1)
WBC # PLR: 2151 /CUMM

## 2024-10-10 PROCEDURE — 32555 ASPIRATE PLEURA W/ IMAGING: CPT | Performed by: INTERNAL MEDICINE

## 2024-10-10 PROCEDURE — 99233 SBSQ HOSP IP/OBS HIGH 50: CPT | Performed by: HOSPITALIST

## 2024-10-10 PROCEDURE — 0W993ZZ DRAINAGE OF RIGHT PLEURAL CAVITY, PERCUTANEOUS APPROACH: ICD-10-PCS | Performed by: INTERNAL MEDICINE

## 2024-10-10 RX ORDER — FUROSEMIDE 40 MG/1
40 TABLET ORAL DAILY
Status: DISCONTINUED | OUTPATIENT
Start: 2024-10-11 | End: 2024-10-11

## 2024-10-10 NOTE — PROGRESS NOTES
Progress Note  Yesenia Berkowitz Patient Status:  Inpatient    1940 MRN [id number]   Location [facility]5W Attending Tayo Bang MD   Hosp Day # 3 PCP JO-ANN YANG MD     Subjective:  Pt feels SOB is mildly improved today; coughing a bit more. S/P thoracentesis earlier.     Objective:  /56 (BP Location: Right arm)   Pulse 78   Temp 97.7 °F (36.5 °C) (Oral)   Resp 16   Ht 5' 5\" (1.651 m)   Wt 167 lb (75.8 kg)   SpO2 100%   BMI 27.79 kg/m²     Telemetry: NSR    Intake/Output:    Intake/Output Summary (Last 24 hours) at 10/10/2024 1215  Last data filed at 10/10/2024 0127  Gross per 24 hour   Intake 240 ml   Output 650 ml   Net -410 ml       Last 3 Weights   10/10/24 0511 167 lb (75.8 kg)   10/09/24 0611 166 lb 9.6 oz (75.6 kg)   10/07/24 1403 163 lb 6.4 oz (74.1 kg)   24 0554 169 lb (76.7 kg)   24 0609 166 lb 3.2 oz (75.4 kg)   24 0834 168 lb (76.2 kg)   24 0541 168 lb (76.2 kg)   24 0545 168 lb 14.4 oz (76.6 kg)   09/15/24 0548 168 lb 8 oz (76.4 kg)   24 0643 168 lb 3.2 oz (76.3 kg)   24 1832 160 lb 0.9 oz (72.6 kg)   24 1212 160 lb (72.6 kg)       Labs:  Recent Labs   Lab 10/08/24  0525 10/09/24  0440 10/10/24  0505   * 172* 147*   BUN 21 35* 49*   CREATSERUM 0.69 1.00 0.96   EGFRCR 87 56* 59*   CA 8.7 8.7 8.5*    138 139   K 4.0 4.2 4.3    99 99   CO2 38.0* 36.0* 39.0*     Recent Labs   Lab 10/07/24  1204 10/08/24  0525 10/09/24  0441   RBC 3.90 3.43* 3.61*   HGB 12.3 10.8* 11.3*   HCT 36.7 32.0* 34.2*   MCV 94.1 93.3 94.7   MCH 31.5 31.5 31.3   MCHC 33.5 33.8 33.0   RDW 13.8 13.4 13.5   NEPRELIM 9.20* 9.70* 15.57*   WBC 10.8 10.4 16.9*   .0 160.0 204.0         Recent Labs   Lab 10/07/24  1204   TROPHS 9       Diagnostics:  XR CHEST AP PORTABLE  (CPT=71045)    Result Date: 10/10/2024  CONCLUSION:   Decreased right pleural effusion with mild improved aeration in the right lung base.  No pneumothorax.  No other  significant interval change.    Dictated by (CST): Jayro Samuel MD on 10/10/2024 at 11:14 AM     Finalized by (CST): Jayro Samuel MD on 10/10/2024 at 11:15 AM          US THORACENTESIS GUIDED RIGHT (CPT=32555)    Result Date: 10/10/2024  CONCLUSION: No sonographic guidance for right thoracentesis.    Dictated by (CST): Jayro Samuel MD on 10/10/2024 at 10:00 AM     Finalized by (CST): Jayro Samuel MD on 10/10/2024 at 10:01 AM          Review of Systems   Cardiovascular:  Positive for dyspnea on exertion and leg swelling. Negative for chest pain, orthopnea and palpitations.   Respiratory:  Positive for cough and shortness of breath.        Physical Exam:    Gen: alert, oriented x 3, NAD  Heent: pupils equal, reactive. Mucous membranes moist.   Neck: no jvd  Cardiac: regular rate and rhythm, normal S1,S2, 1/6 RUSB SONNY murmur, no clicks, rub or gallop  Lungs: diminished, R basilar crackles  Abd: soft, NT/ND +bs  Ext: trace BLE edema  Skin: Warm, dry  Neuro: No focal deficits      Medications:     methylPREDNISolone  40 mg Intravenous Q12H    furosemide  40 mg Intravenous Daily    [Held by provider] enoxaparin  40 mg Subcutaneous Daily    doxycycline  100 mg Oral 2 times per day    amitriptyline  50 mg Oral Nightly    aspirin  162 mg Oral Daily    digoxin  125 mcg Oral Daily    dilTIAZem ER  240 mg Oral Daily    pantoprazole  40 mg Oral Nightly    montelukast  10 mg Oral Nightly    cetirizine  10 mg Oral Daily    fluticasone furoate  1 puff Inhalation Daily         Assessment:  Acute COPD Exacerabation  Currently on 4L NC (baseline 2-3L at home)  Steroids, inhalers, nebs   Pulm following  Bilateral Pleural Effusion R>L, L Hemidiaphragm  10/10 S/P US guided R thoracentesis - 700ml serosanguinous fluid removed  Fluid analysis pending  On IV Lasix   Acute on Chronic HFpEF, Pulmonary Hypertension  BNP 72, CXR/CT Chest w/pulm edema, effusions  Echo LVEF 65-70%, no RWMA, mild AVS (mean grad 10mmHg), mild  MVS, PASP 56mmHg  Diuresing w/IV Lasix - initially 40mg BID, now daily; net neg 1L  Hx on Lasix 40mg po daily at home  Paroxysmal Atrial Fibrillation  Currently maintaining NSR  On po digoxin 125mcg, diltiazem 240mg daily  [id number] - hx not on A/C due to pt preference  Hypertension - controlled  On diltiazem    Plan:  Continue IV Lasix today and hopefully transition back to oral Lasix tomorrow  Maintaining NSR - continue digoxin, diltiazem   Steroids, inhalers per pulmonology    Plan of care discussed with patient, RN.    Cassy Landers, APRN  10/10/2024  12:15 PM  [phone] [facility]  [phone] [facility]        I saw and examined the patient agree with attached findings.  Breathing a little better since thoracentesis.  Creatinine trending upwards, stop IV Lasix and start home oral Lasix tomorrow.  Continue other cardiac medications.    Luis Fernando Fowler MD  [place] [facility] [place]

## 2024-10-10 NOTE — PHYSICAL THERAPY NOTE
Attempted treatment pt is at a procedure at this time. Will follow up as appropriate and schedule permitting.

## 2024-10-10 NOTE — PROGRESS NOTES
[facility]  part of [facility]    Progress Note    Yesenia Berkowitz Patient Status:  Inpatient    1940 MRN [id number]   Location [facility]5W Attending Tayo Bang MD   Hosp Day # 3 PCP JO-ANN YANG MD       Subjective:     Breathing improved.    Objective:   Blood pressure 128/55, pulse 79, temperature 97.9 °F (36.6 °C), temperature source Oral, resp. rate 18, height 5' 5\" (1.651 m), weight 167 lb (75.8 kg), SpO2 99%.    Gen:   NAD.  A and O x 3  CV:   RRR, no m/g/r  Pulm:   faint crackles bilat  Abd:   +bs, soft, NT, ND  LE:   No c/c/e  Neuro:   nonfocal    Results:     Lab Results   Component Value Date    WBC 16.9 (H) 10/09/2024    HGB 11.3 (L) 10/09/2024    HCT 34.2 (L) 10/09/2024    .0 10/09/2024    CREATSERUM 0.96 10/10/2024    BUN 49 (H) 10/10/2024     10/10/2024    K 4.3 10/10/2024    CL 99 10/10/2024    CO2 39.0 (H) 10/10/2024     (H) 10/10/2024    CA 8.5 (L) 10/10/2024    ALB 2.8 (L) 2024    ALKPHO 48 (L) 2024    BILT 0.2 2024    TP 4.8 (L) 2024    AST 15 2024    ALT 22 2024    PTT 30.1 2023    INR 1.12 2023    TSH 1.060 2023    LIP 30 2024    DDIMER 0.63 10/07/2024    MG 2.0 2024    PHOS 4.0 2024    TROP <0.045 2020       XR CHEST AP PORTABLE  (CPT=71045)    Result Date: 10/10/2024  CONCLUSION:   Decreased right pleural effusion with mild improved aeration in the right lung base.  No pneumothorax.  No other significant interval change.    Dictated by (CST): Jayro Samuel MD on 10/10/2024 at 11:14 AM     Finalized by (CST): Jayro Samuel MD on 10/10/2024 at 11:15 AM          US THORACENTESIS GUIDED RIGHT (CPT=32555)    Result Date: 10/10/2024  CONCLUSION: No sonographic guidance for right thoracentesis.    Dictated by (CST): Jayro Samuel MD on 10/10/2024 at 10:00 AM     Finalized by (CST): Jayro Samuel MD on 10/10/2024 at 10:01 AM                Assessment and Plan:     Acute on chronic respiratory failure due to  AECOPD  Acute on chronic diastolic CHF  Left phrenic nerve paralysis  Currently on 4L o2.   On 3L o2 at home.  Kyphoscoliosis  - pulm and cards on consult  - CT chest - no PE. +pulmonary edema, moderate R and small L pleural effusions  - cont nebs, steroids IV, doxycycline  - lasix IV  - monitor I/O, wts  - S/p right thoracentesis of 700 ml today  - wean o2     Acute on chronic diastolic CHF  - echo 9/2024 - EF 65-70%, moderate pulmonary htn  - cardiology on consult  - cont IV lasix     Paroxysmal afib  - not on AC  - aspirin  - cont diltiazem, digoxin  - in NSR     HTN  - cont meds and monitor     Pulmonary nodule  - per pulmonology     dvt proph:    lovenox on hold.   SCDs.     Code status:    Full       MDM:    High Tayo Cervantes MD  10/10/2024

## 2024-10-10 NOTE — PROCEDURES
[facility]5W  Post Procedure Staging Note    Yesenia Berkowitz Patient Status:  Inpatient    1940 MRN [id number]   Location [facility]5W Attending Tayo Bang MD   Hosp Day # 3 PCP JO-ANN YANG MD       Procedure Date: 10/10/2024    Primary Surgeon: ELVIA LIVINGSTON MD, MD  Assistant Surgeon:   Surgical Assistant:     PROCEDURE:U/S guided right thoracenthesis    Pre-Op Diagnosis: rt pleural effusion    Post-Op Diagnosis: same    Procedure Performed:U/S guided rt thoracentheis    Post-Op Findings: 700ml seresanguinous fluid was removed    Complications: none    Condition: good      ELVIA LIVINGSTON MD, MD  10/10/2024  9:40 AM

## 2024-10-10 NOTE — PLAN OF CARE
Patient is Aox4, thoracentesis done at the bedside, tolerated well. Vital signs stable. Call light within reach, rounding maintained.     Problem: CARDIOVASCULAR - ADULT  Goal: Maintains optimal cardiac output and hemodynamic stability  Description: INTERVENTIONS:  - Monitor vital signs, rhythm, and trends  - Monitor for bleeding, hypotension and signs of decreased cardiac output  - Evaluate effectiveness of vasoactive medications to optimize hemodynamic stability  - Monitor arterial and/or venous puncture sites for bleeding and/or hematoma  - Assess quality of pulses, skin color and temperature  - Assess for signs of decreased coronary artery perfusion - ex. Angina  - Evaluate fluid balance, assess for edema, trend weights  Outcome: Progressing  Goal: Absence of cardiac arrhythmias or at baseline  Description: INTERVENTIONS:  - Continuous cardiac monitoring, monitor vital signs, obtain 12 lead EKG if indicated  - Evaluate effectiveness of antiarrhythmic and heart rate control medications as ordered  - Initiate emergency measures for life threatening arrhythmias  - Monitor electrolytes and administer replacement therapy as ordered  Outcome: Progressing

## 2024-10-10 NOTE — PLAN OF CARE
Pt A&Ox4, self care, calls appropriately. Pt NPO since 00:00 for 09:00 thoracentesis. Lovenox on hold for procedure. Hourly rounding overnight.       Problem: Patient Centered Care  Goal: Patient preferences are identified and integrated in the patient's plan of care  Description: Interventions:  - What would you like us to know as we care for you? From home alone  - Provide timely, complete, and accurate information to patient/family  - Incorporate patient and family knowledge, values, beliefs, and cultural backgrounds into the planning and delivery of care  - Encourage patient/family to participate in care and decision-making at the level they choose  - Honor patient and family perspectives and choices  Outcome: Progressing     Problem: Patient/Family Goals  Goal: Patient/Family Long Term Goal  Description: Patient's Long Term Goal: to go back home    Interventions:  - follow medical regimen  - See additional Care Plan goals for specific interventions  Outcome: Progressing  Goal: Patient/Family Short Term Goal  Description: Patient's Short Term Goal: to not be Short of Breath    Interventions:   - follow medical regimen  - See additional Care Plan goals for specific interventions  Outcome: Progressing     Problem: RESPIRATORY - ADULT  Goal: Achieves optimal ventilation and oxygenation  Description: INTERVENTIONS:  - Assess for changes in respiratory status  - Assess for changes in mentation and behavior  - Position to facilitate oxygenation and minimize respiratory effort  - Oxygen supplementation based on oxygen saturation or ABGs  - Provide Smoking Cessation handout, if applicable  - Encourage broncho-pulmonary hygiene including cough, deep breathe, Incentive Spirometry  - Assess the need for suctioning and perform as needed  - Assess and instruct to report SOB or any respiratory difficulty  - Respiratory Therapy support as indicated  - Manage/alleviate anxiety  - Monitor for signs/symptoms of CO2  retention  Outcome: Progressing     Problem: CARDIOVASCULAR - ADULT  Goal: Maintains optimal cardiac output and hemodynamic stability  Description: INTERVENTIONS:  - Monitor vital signs, rhythm, and trends  - Monitor for bleeding, hypotension and signs of decreased cardiac output  - Evaluate effectiveness of vasoactive medications to optimize hemodynamic stability  - Monitor arterial and/or venous puncture sites for bleeding and/or hematoma  - Assess quality of pulses, skin color and temperature  - Assess for signs of decreased coronary artery perfusion - ex. Angina  - Evaluate fluid balance, assess for edema, trend weights  Outcome: Progressing  Goal: Absence of cardiac arrhythmias or at baseline  Description: INTERVENTIONS:  - Continuous cardiac monitoring, monitor vital signs, obtain 12 lead EKG if indicated  - Evaluate effectiveness of antiarrhythmic and heart rate control medications as ordered  - Initiate emergency measures for life threatening arrhythmias  - Monitor electrolytes and administer replacement therapy as ordered  Outcome: Progressing

## 2024-10-10 NOTE — PROGRESS NOTES
[facility]  part of [facility]    Progress Note    Yesenia Berkowitz Patient Status:  Inpatient    1940 MRN [id number]   Location [facility]5W Attending Tayo Bang MD   Hosp Day # 3 PCP JO-ANN YANG MD       Subjective:   Yesenia Berkowitz is a(n) 84 year old female.   Less sob and cough  Objective:   Blood pressure 125/56, pulse 75, temperature 97.7 °F (36.5 °C), temperature source Oral, resp. rate 16, height 5' 5\" (1.651 m), weight 167 lb (75.8 kg), SpO2 96%.    HEENT: negative  Neck: no adenopathy, no carotid bruit, no JVD, supple, symmetrical, trachea midline and thyroid not enlarged, symmetric, no tenderness/mass/nodules  Pulmonary/Chest: rales,rhonchi,diminished bs with dullness on the rt chest  Cardiovascular: S1, S2 normal, no S3 or S4, regular rate and rhythm, no murmur  Abdominal: normal findings: bowel sounds normal, soft, non-tender and no hepatosplenomegaly   Extremities: extremities normal, atraumatic, no cyanosis or edema  Skin: Skin color, texture, turgor normal. No rashes or lesions    Results:     Lab Results   Component Value Date    WBC 16.9 (H) 10/09/2024    HGB 11.3 (L) 10/09/2024    HCT 34.2 (L) 10/09/2024    .0 10/09/2024    CREATSERUM 0.96 10/10/2024    BUN 49 (H) 10/10/2024     10/10/2024    K 4.3 10/10/2024    CL 99 10/10/2024    CO2 39.0 (H) 10/10/2024     (H) 10/10/2024    CA 8.5 (L) 10/10/2024    ALB 2.8 (L) 2024    ALKPHO 48 (L) 2024    BILT 0.2 2024    TP 4.8 (L) 2024    AST 15 2024    ALT 22 2024    PTT 30.1 2023    INR 1.12 2023    TSH 1.060 2023    LIP 30 2024    DDIMER 0.63 10/07/2024    MG 2.0 2024    PHOS 4.0 2024    TROP <0.045 2020       CT CHEST PE AORTA (IV ONLY) (CPT=71260)    Result Date: 10/8/2024  CONCLUSION:  1. Negative for pulmonary embolism. 2. Cardiomegaly and mild coronary atherosclerosis.  Stable findings consistent with moderate  CHF/fluid overload including pulmonary edema, a moderate right pleural effusion and a small left pleural effusion.  There is resulting passive atelectasis at both lung bases. 3. Mild emphysema with a 10 x 7 mm noncalcified subpleural nodule in the posterior aspect of the right upper lobe.  This nodule has increased in size compared to 03/15/2023.  A malignant etiology such as a slow growing primary bronchogenic carcinoma therefore cannot be excluded.  Consider further assessment with percutaneous biopsy or follow-up PET/CT when the patient is able. 4. Stable moderately dilated main pulmonary artery, which can be seen with chronic pulmonary hypertension; correlate clinically. 5. Chronically elevated left hemidiaphragm with unchanged chronic scarring at the left lung base.    Dictated by (CST): Malik Reynolds MD on 10/08/2024 at 1:53 PM     Finalized by (CST): Malik Reynolds MD on 10/08/2024 at 2:02 PM               Assessment and Plan:   Principal Problem:    COPD exacerbation (HCC)  Active Problems:    Acute on chronic hypoxic respiratory failure (HCC)    Acute pulmonary edema (HCC)    Pleural effusion    Pulmonary nodule       Less sob and cough,rduce solumrol,continue doxycycline,rt thoracenthesis today      ELVIA LIVINGSTON MD, MD  10/10/2024

## 2024-10-11 VITALS
HEART RATE: 87 BPM | TEMPERATURE: 98 F | HEIGHT: 65 IN | RESPIRATION RATE: 16 BRPM | BODY MASS INDEX: 27.81 KG/M2 | WEIGHT: 166.88 LBS | DIASTOLIC BLOOD PRESSURE: 51 MMHG | OXYGEN SATURATION: 93 % | SYSTOLIC BLOOD PRESSURE: 122 MMHG

## 2024-10-11 LAB
ANION GAP SERPL CALC-SCNC: 5 MMOL/L (ref 0–18)
BUN BLD-MCNC: 37 MG/DL (ref 9–23)
BUN/CREAT SERPL: 41.6 (ref 10–20)
CALCIUM BLD-MCNC: 8.5 MG/DL (ref 8.7–10.4)
CHLORIDE SERPL-SCNC: 100 MMOL/L (ref 98–112)
CO2 SERPL-SCNC: 34 MMOL/L (ref 21–32)
CREAT BLD-MCNC: 0.89 MG/DL
EGFRCR SERPLBLD CKD-EPI 2021: 65 ML/MIN/1.73M2 (ref 60–?)
GLUCOSE BLD-MCNC: 164 MG/DL (ref 70–99)
OSMOLALITY SERPL CALC.SUM OF ELEC: 300 MOSM/KG (ref 275–295)
POTASSIUM SERPL-SCNC: 4.7 MMOL/L (ref 3.5–5.1)
SODIUM SERPL-SCNC: 139 MMOL/L (ref 136–145)

## 2024-10-11 PROCEDURE — 99239 HOSP IP/OBS DSCHRG MGMT >30: CPT | Performed by: HOSPITALIST

## 2024-10-11 RX ORDER — FUROSEMIDE 40 MG/1
TABLET ORAL
Qty: 45 TABLET | Refills: 1 | Status: SHIPPED | OUTPATIENT
Start: 2024-10-11

## 2024-10-11 RX ORDER — IPRATROPIUM BROMIDE AND ALBUTEROL SULFATE 2.5; .5 MG/3ML; MG/3ML
3 SOLUTION RESPIRATORY (INHALATION)
Status: DISCONTINUED | OUTPATIENT
Start: 2024-10-11 | End: 2024-10-11

## 2024-10-11 RX ORDER — PREDNISONE 20 MG/1
TABLET ORAL
Qty: 15 TABLET | Refills: 0 | Status: SHIPPED | OUTPATIENT
Start: 2024-10-11 | End: 2024-10-20

## 2024-10-11 RX ORDER — DOXYCYCLINE 100 MG/1
100 CAPSULE ORAL EVERY 12 HOURS SCHEDULED
Qty: 10 CAPSULE | Refills: 0 | Status: SHIPPED | OUTPATIENT
Start: 2024-10-11 | End: 2024-10-16

## 2024-10-11 NOTE — PHYSICAL THERAPY NOTE
PHYSICAL THERAPY TREATMENT NOTE - INPATIENT     Room Number: 517/517-A       Presenting Problem: COPD exacerbation, SOB  Co-Morbidities : Asthma, COPD, atrial fibrillation, herpes zoster, kyphoscoliosis, left phrenic nerve paralysis with elevation of left hemidiaphragm    Problem List  Principal Problem:    COPD exacerbation (HCC)  Active Problems:    Acute on chronic hypoxic respiratory failure (HCC)    Acute pulmonary edema (HCC)    Pleural effusion    Pulmonary nodule      PHYSICAL THERAPY ASSESSMENT   Patient demonstrates good  progress this session, goals  updated to reflect patient performance.      Patient is requiring supervision as a result of the following impairments: decreased functional strength, decreased endurance/aerobic capacity, medical status, and low endurance .     Patient continues to function near baseline with transfers and gait.  Next session anticipate patient to progress transfers and gait.  Physical Therapy will continue to follow patient for duration of hospitalization.    Patient continues to benefit from continued skilled PT services: at discharge to promote prior level of function and safety with additional support and return home with Cardiopulmonary Rehab.    PLAN DURING HOSPITALIZATION  Nursing Mobility Recommendation : 1 Assist  PT Device Recommendation: Rolling walker (for energy conservation purposes)  PT Treatment Plan: Bed mobility;Body mechanics;Endurance;Energy conservation;Patient education;Gait training;Neuromuscular re-educate;Range of motion;Strengthening;Transfer training;Balance training  Frequency (Obs): 5x/week     SUBJECTIVE  \"I feel like I am breathing easier but wheezing more now.\"     OBJECTIVE  Precautions: Limb alert - left (monitor 02 saturations with activity)    WEIGHT BEARING RESTRICTION       PAIN ASSESSMENT   Ratin     Management Techniques: Activity promotion    BALANCE  Static Sitting: Normal  Dynamic Sitting: Good  Static Standing: Fair +  Dynamic  Standing: Fair +    ACTIVITY TOLERANCE  Pulse: 85  Heart Rate Source: Monitor                    O2 WALK  Oxygen Therapy  SPO2% on Oxygen at Rest: 96  At rest oxygen flow (liters per minute): 3  SPO2% Ambulation on Oxygen: 88  Ambulation oxygen flow (liters per minute): 3    AM-PAC '6-Clicks' INPATIENT SHORT FORM - BASIC MOBILITY  How much difficulty does the patient currently have...  Patient Difficulty: Turning over in bed (including adjusting bedclothes, sheets and blankets)?: None   Patient Difficulty: Sitting down on and standing up from a chair with arms (e.g., wheelchair, bedside commode, etc.): None   Patient Difficulty: Moving from lying on back to sitting on the side of the bed?: None   How much help from another person does the patient currently need...   Help from Another: Moving to and from a bed to a chair (including a wheelchair)?: A Little   Help from Another: Need to walk in hospital room?: A Little   Help from Another: Climbing 3-5 steps with a railing?: A Little     AM-PAC Score:  Raw Score: 21   Approx Degree of Impairment: 28.97%   Standardized Score (AM-PAC Scale): 50.25   CMS Modifier (G-Code): CJ    FUNCTIONAL ABILITY STATUS  Functional Mobility/Gait Assessment  Gait Assistance: Supervision  Distance (ft): 40 ft  Assistive Device: None  Pattern:  (wide LAMONT, decreased hector)  Rolling: supervision  Supine to Sit: supervision  Sit to Supine: supervision  Sit to Stand: supervision    Skilled Therapy Provided: Pt received in bed at start of session, agreeable to participate. Pt demonstrates bed mobility with supervision. Gait belt donned. Pt presently on 3L supplemental 02 with saturation at 96% at rest. Pt ambulated 40 ft within room, no device and SBA/CGA. Pt maintains wide LAMONT and slow hector. Gait pattern appears effortful. Pt reports moderate fatigue with short distance ambulation. 02 saturation at 88% with activity, encouraged PLB and activity pacing. Recovered into 90's within 1 min of  seated rest break. Pt remained in bed at end of session, needs in reach. Discussed discharge concerns. Pt most significant worry is how she will be able to prepare meals and is hopeful to find a service that can deliver pre-made meals that are heart healthy. Discussed some options and also send message to social work to determine if there were any other options available for patient.     The patient's Approx Degree of Impairment: 28.97% has been calculated based on documentation in the [facility] '6 clicks' Inpatient Daily Activity Short Form.  Research supports that patients with this level of impairment may benefit from home with cardiopulmonary outpatient PT.    Final disposition will be made by interdisciplinary medical team.      Patient End of Session: In bed;Needs met;Call light within reach;RN aware of session/findings;All patient questions and concerns addressed;Hospital anti-slip socks    CURRENT GOALS   Goals to be met by: 10/15/24  Patient Goal Patient's self-stated goal is: none stated   Goal #1 Patient is able to demonstrate supine - sit EOB @ level: modified independent      Goal #1   Current Status  Progressing   Goal #2 Patient is able to demonstrate transfers Sit to/from Stand at assistance level: modified independent with  LRAD      Goal #2  Current Status  Progressing   Goal #3 Patient is able to ambulate 150 feet with assist device:  LRAD  at assistance level: modified independent   Goal #3   Current Status  Progressing   Goal #4 Patient will negotiate 5 stairs/one curb w/ assistive device and supervision   Goal #4   Current Status  NT   Goal #5 Patient to demonstrate independence with home activity/exercise instructions provided to patient in preparation for discharge.   Goal #5   Current Status  Progressing     Gait Training: 10 minutes  Therapeutic Activity: 15 minutes

## 2024-10-11 NOTE — PROGRESS NOTES
Patient is being discharge to home, all discharge instructions provided to the patient. Patient verbalized understanding of taking medications as well as following up with physicians. IV discontinued.Tele off.   Patient has her own oxygen at bedside, connected and working. Family is providing the transportation.

## 2024-10-11 NOTE — PLAN OF CARE
Bedside thoracentesis 10/10, 700 ml bloody output, pending results of thora. CXRAY showed no pneumo. Pt reporting some mild R sided discomfort r/t thora. PRN tylenol administered. PT A&Ox4, calls appropriately. Self care with fall precautions in place. Hourly rounding overnight.       Problem: Patient Centered Care  Goal: Patient preferences are identified and integrated in the patient's plan of care  Description: Interventions:  - What would you like us to know as we care for you? From home alone  - Provide timely, complete, and accurate information to patient/family  - Incorporate patient and family knowledge, values, beliefs, and cultural backgrounds into the planning and delivery of care  - Encourage patient/family to participate in care and decision-making at the level they choose  - Honor patient and family perspectives and choices  Outcome: Progressing     Problem: Patient/Family Goals  Goal: Patient/Family Long Term Goal  Description: Patient's Long Term Goal: to go back home    Interventions:  - follow medical regimen  - See additional Care Plan goals for specific interventions  Outcome: Progressing  Goal: Patient/Family Short Term Goal  Description: Patient's Short Term Goal: to not be Short of Breath    Interventions:   - follow medical regimen  - See additional Care Plan goals for specific interventions  Outcome: Progressing     Problem: RESPIRATORY - ADULT  Goal: Achieves optimal ventilation and oxygenation  Description: INTERVENTIONS:  - Assess for changes in respiratory status  - Assess for changes in mentation and behavior  - Position to facilitate oxygenation and minimize respiratory effort  - Oxygen supplementation based on oxygen saturation or ABGs  - Provide Smoking Cessation handout, if applicable  - Encourage broncho-pulmonary hygiene including cough, deep breathe, Incentive Spirometry  - Assess the need for suctioning and perform as needed  - Assess and instruct to report SOB or any  respiratory difficulty  - Respiratory Therapy support as indicated  - Manage/alleviate anxiety  - Monitor for signs/symptoms of CO2 retention  Outcome: Progressing     Problem: CARDIOVASCULAR - ADULT  Goal: Maintains optimal cardiac output and hemodynamic stability  Description: INTERVENTIONS:  - Monitor vital signs, rhythm, and trends  - Monitor for bleeding, hypotension and signs of decreased cardiac output  - Evaluate effectiveness of vasoactive medications to optimize hemodynamic stability  - Monitor arterial and/or venous puncture sites for bleeding and/or hematoma  - Assess quality of pulses, skin color and temperature  - Assess for signs of decreased coronary artery perfusion - ex. Angina  - Evaluate fluid balance, assess for edema, trend weights  Outcome: Progressing  Goal: Absence of cardiac arrhythmias or at baseline  Description: INTERVENTIONS:  - Continuous cardiac monitoring, monitor vital signs, obtain 12 lead EKG if indicated  - Evaluate effectiveness of antiarrhythmic and heart rate control medications as ordered  - Initiate emergency measures for life threatening arrhythmias  - Monitor electrolytes and administer replacement therapy as ordered  Outcome: Progressing

## 2024-10-11 NOTE — PLAN OF CARE
Problem: Patient Centered Care  Goal: Patient preferences are identified and integrated in the patient's plan of care  Description: Interventions:  - What would you like us to know as we care for you? From home alone  - Provide timely, complete, and accurate information to patient/family  - Incorporate patient and family knowledge, values, beliefs, and cultural backgrounds into the planning and delivery of care  - Encourage patient/family to participate in care and decision-making at the level they choose  - Honor patient and family perspectives and choices  Outcome: Adequate for Discharge     Problem: Patient/Family Goals  Goal: Patient/Family Long Term Goal  Description: Patient's Long Term Goal: to go back home    Interventions:  - follow medical regimen  - See additional Care Plan goals for specific interventions  Outcome: Adequate for Discharge  Goal: Patient/Family Short Term Goal  Description: Patient's Short Term Goal: to not be Short of Breath    Interventions:   - follow medical regimen  - See additional Care Plan goals for specific interventions  Outcome: Adequate for Discharge     Problem: RESPIRATORY - ADULT  Goal: Achieves optimal ventilation and oxygenation  Description: INTERVENTIONS:  - Assess for changes in respiratory status  - Assess for changes in mentation and behavior  - Position to facilitate oxygenation and minimize respiratory effort  - Oxygen supplementation based on oxygen saturation or ABGs  - Provide Smoking Cessation handout, if applicable  - Encourage broncho-pulmonary hygiene including cough, deep breathe, Incentive Spirometry  - Assess the need for suctioning and perform as needed  - Assess and instruct to report SOB or any respiratory difficulty  - Respiratory Therapy support as indicated  - Manage/alleviate anxiety  - Monitor for signs/symptoms of CO2 retention  10/11/2024 1042 by Arianna Mills, RN  Outcome: Adequate for Discharge  10/11/2024 0907 by Arianna Mills, RN  Outcome:  Progressing     Problem: CARDIOVASCULAR - ADULT  Goal: Maintains optimal cardiac output and hemodynamic stability  Description: INTERVENTIONS:  - Monitor vital signs, rhythm, and trends  - Monitor for bleeding, hypotension and signs of decreased cardiac output  - Evaluate effectiveness of vasoactive medications to optimize hemodynamic stability  - Monitor arterial and/or venous puncture sites for bleeding and/or hematoma  - Assess quality of pulses, skin color and temperature  - Assess for signs of decreased coronary artery perfusion - ex. Angina  - Evaluate fluid balance, assess for edema, trend weights  10/11/2024 1042 by Arianna Mills RN  Outcome: Adequate for Discharge  10/11/2024 0907 by Arianna Mills RN  Outcome: Progressing  Goal: Absence of cardiac arrhythmias or at baseline  Description: INTERVENTIONS:  - Continuous cardiac monitoring, monitor vital signs, obtain 12 lead EKG if indicated  - Evaluate effectiveness of antiarrhythmic and heart rate control medications as ordered  - Initiate emergency measures for life threatening arrhythmias  - Monitor electrolytes and administer replacement therapy as ordered  10/11/2024 1042 by Arianna Mills RN  Outcome: Adequate for Discharge  10/11/2024 0907 by Arianna Mills RN  Outcome: Progressing

## 2024-10-11 NOTE — PROGRESS NOTES
[facility]  part of [facility]    Progress Note    Yesenia Berkowitz Patient Status:  Inpatient    1940 MRN [id number]   Location [facility]5W Attending Tayo Bang MD   Hosp Day # 4 PCP JO-ANN YANG MD       Subjective:   Yesenia Berkowitz is a(n) 84 year old female.   Less sob and cough,still wheezing  Objective:   Blood pressure 122/51, pulse 76, temperature 97.9 °F (36.6 °C), temperature source Oral, resp. rate 16, height 5' 5\" (1.651 m), weight 166 lb 14.4 oz (75.7 kg), SpO2 95%.    HEENT: negative  Neck: no adenopathy, no carotid bruit, no JVD, supple, symmetrical, trachea midline and thyroid not enlarged, symmetric, no tenderness/mass/nodules  Pulmonary/Chest:rales, wheezing  Cardiovascular: S1, S2 normal, no S3 or S4, regular rate and rhythm, no murmur  Abdominal: normal findings: bowel sounds normal, soft, non-tender and no hepatosplenomegaly   Extremities: extremities normal, atraumatic, no cyanosis or edema  Skin: Skin color, texture, turgor normal. No rashes or lesions    Results:     Lab Results   Component Value Date    WBC 16.9 (H) 10/09/2024    HGB 11.3 (L) 10/09/2024    HCT 34.2 (L) 10/09/2024    .0 10/09/2024    CREATSERUM 0.89 10/11/2024    BUN 37 (H) 10/11/2024     10/11/2024    K 4.7 10/11/2024     10/11/2024    CO2 34.0 (H) 10/11/2024     (H) 10/11/2024    CA 8.5 (L) 10/11/2024    ALB 2.8 (L) 2024    ALKPHO 48 (L) 2024    BILT 0.2 2024    TP 4.8 (L) 2024    AST 15 2024    ALT 22 2024    PTT 30.1 2023    INR 1.12 2023    TSH 1.060 2023    LIP 30 2024    DDIMER 0.63 10/07/2024    MG 2.0 2024    PHOS 4.0 2024    TROP <0.045 2020       XR CHEST AP PORTABLE  (CPT=71045)    Result Date: 10/10/2024  CONCLUSION:   Decreased right pleural effusion with mild improved aeration in the right lung base.  No pneumothorax.  No other significant interval change.    Dictated  by (CST): Jayro Samuel MD on 10/10/2024 at 11:14 AM     Finalized by (CST): Jayro Samuel MD on 10/10/2024 at 11:15 AM          US THORACENTESIS GUIDED RIGHT (CPT=32555)    Result Date: 10/10/2024  CONCLUSION: No sonographic guidance for right thoracentesis.    Dictated by (CST): Jayro Samuel MD on 10/10/2024 at 10:00 AM     Finalized by (CST): Jayro Samuel MD on 10/10/2024 at 10:01 AM               Assessment and Plan:   Principal Problem:    COPD exacerbation (HCC)  Active Problems:    Acute on chronic hypoxic respiratory failure (HCC)    Acute pulmonary edema (HCC)    Pleural effusion    Pulmonary nodule    Less sob and cough,still wheezing.pleural fluid is exudative,cytology negative,home         ELVIA LIVINGSTON MD, MD  10/11/2024

## 2024-10-11 NOTE — CM/SW NOTE
10/11/24 1300   Discharge disposition   Expected discharge disposition Home-Health   Post Acute Care Provider Residential   Discharge transportation Private car     MDO placed for discharge.     notified [facility] liaison Vanessa of patient discharge today.  [facility] will be reaching out to patient to coordinate start of care.    Patent cleared for discharge from CM/ standpoint.    Melinda Cartagena RN, BSN  Nurse   [phone]'

## 2024-10-11 NOTE — PROGRESS NOTES
[facility]  Cardiology Progress Note    Yesenia Berkowitz Patient Status:  Inpatient    1940 MRN [id number]   Location [facility]5W Attending Tayo Bang MD   Hosp Day # 4 PCP JO-ANN YANG MD     Subjective     Now back to 3 to 4 L oxygen.  Breathing is improved.    Objective:   Patient Vitals for the past 24 hrs:   BP Temp Temp src Pulse Resp SpO2 Weight   10/11/24 0822 -- -- -- 76 -- -- --   10/11/24 0821 122/51 97.9 °F (36.6 °C) Oral 76 16 95 % --   10/11/24 0618 -- -- -- -- -- 98 % 166 lb 14.4 oz (75.7 kg)   10/11/24 0617 129/59 97.7 °F (36.5 °C) Oral 74 16 100 % --   10/11/24 0300 -- -- -- 78 -- -- --   10/11/24 0107 138/60 -- -- 81 -- 98 % --   10/11/24 0013 (!) 154/95 97.8 °F (36.6 °C) Oral 91 20 96 % --   10/10/24 2018 148/75 98 °F (36.7 °C) Axillary 87 18 96 % --   10/10/24 1900 -- -- -- 93 -- -- --   10/10/24 1514 128/55 97.9 °F (36.6 °C) Oral 79 18 99 % --     Intake/Output:   Last 3 shifts:   Intake/Output                   10/09/24 07 - 10/10/24 0659 10/10/24 07 - 10/11/24 0659 10/11/24 07 - 10/12/24 0659       Intake    P.O.  240  720  120    P.O. 240 720 120    Total Intake 240 720 120       Output    Urine  650  1260  --    Urine 650 1260 --    Stool  --  --  --    Stool Count Calculated for I/O -- 1 x --    Total Output 650 1260 --       Net I/O     -410 -540 120             Scheduled Meds:    furosemide  40 mg Oral Daily    methylPREDNISolone  40 mg Intravenous Q12H    [Held by provider] enoxaparin  40 mg Subcutaneous Daily    doxycycline  100 mg Oral 2 times per day    amitriptyline  50 mg Oral Nightly    aspirin  162 mg Oral Daily    digoxin  125 mcg Oral Daily    dilTIAZem ER  240 mg Oral Daily    pantoprazole  40 mg Oral Nightly    montelukast  10 mg Oral Nightly    cetirizine  10 mg Oral Daily    fluticasone furoate  1 puff Inhalation Daily       Continuous Infusions:     Results:     Lab Results   Component Value Date    WBC 16.9 (H) 10/09/2024     HGB 11.3 (L) 10/09/2024    HCT 34.2 (L) 10/09/2024    .0 10/09/2024    CREATSERUM 0.89 10/11/2024    BUN 37 (H) 10/11/2024     10/11/2024    K 4.7 10/11/2024     10/11/2024    CO2 34.0 (H) 10/11/2024     (H) 10/11/2024    CA 8.5 (L) 10/11/2024    ALB 2.8 (L) 08/30/2024    ALKPHO 48 (L) 08/30/2024    BILT 0.2 08/30/2024    TP 4.8 (L) 08/30/2024    AST 15 08/30/2024    ALT 22 08/30/2024    PTT 30.1 03/14/2023    INR 1.12 03/14/2023    TSH 1.060 01/06/2023    LIP 30 08/30/2024    DDIMER 0.63 10/07/2024    MG 2.0 09/18/2024    PHOS 4.0 09/18/2024    TROP <0.045 02/03/2020       Recent Labs   Lab 10/09/24  0440 10/10/24  0505 10/11/24  0532   * 147* 164*   BUN 35* 49* 37*   CREATSERUM 1.00 0.96 0.89   CA 8.7 8.5* 8.5*    139 139   K 4.2 4.3 4.7   CL 99 99 100   CO2 36.0* 39.0* 34.0*     Recent Labs   Lab 10/07/24  1204 10/08/24  0525 10/09/24  0441   RBC 3.90 3.43* 3.61*   HGB 12.3 10.8* 11.3*   HCT 36.7 32.0* 34.2*   MCV 94.1 93.3 94.7   MCH 31.5 31.5 31.3   MCHC 33.5 33.8 33.0   RDW 13.8 13.4 13.5   NEPRELIM 9.20* 9.70* 15.57*   WBC 10.8 10.4 16.9*   .0 160.0 204.0       Recent Labs   Lab 10/07/24  1204   BNPML 72       No results for input(s): \"TROP\", \"CK\" in the last 168 hours.    XR CHEST AP PORTABLE  (CPT=71045)    Result Date: 10/10/2024  CONCLUSION:   Decreased right pleural effusion with mild improved aeration in the right lung base.  No pneumothorax.  No other significant interval change.    Dictated by (CST): Jayro Samuel MD on 10/10/2024 at 11:14 AM     Finalized by (CST): Jayro Samuel MD on 10/10/2024 at 11:15 AM          US THORACENTESIS GUIDED RIGHT (CPT=32555)    Result Date: 10/10/2024  CONCLUSION: No sonographic guidance for right thoracentesis.    Dictated by (CST): Jayro Samuel MD on 10/10/2024 at 10:00 AM     Finalized by (CST): Jayro Samuel MD on 10/10/2024 at 10:01 AM          CT CHEST PE AORTA (IV ONLY) (CPT=71260)    Result Date:  10/8/2024  CONCLUSION:  1. Negative for pulmonary embolism. 2. Cardiomegaly and mild coronary atherosclerosis.  Stable findings consistent with moderate CHF/fluid overload including pulmonary edema, a moderate right pleural effusion and a small left pleural effusion.  There is resulting passive atelectasis at both lung bases. 3. Mild emphysema with a 10 x 7 mm noncalcified subpleural nodule in the posterior aspect of the right upper lobe.  This nodule has increased in size compared to 03/15/2023.  A malignant etiology such as a slow growing primary bronchogenic carcinoma therefore cannot be excluded.  Consider further assessment with percutaneous biopsy or follow-up PET/CT when the patient is able. 4. Stable moderately dilated main pulmonary artery, which can be seen with chronic pulmonary hypertension; correlate clinically. 5. Chronically elevated left hemidiaphragm with unchanged chronic scarring at the left lung base.    Dictated by (CST): Malik Reynolds MD on 10/08/2024 at 1:53 PM     Finalized by (CST): Malik Reynolds MD on 10/08/2024 at 2:02 PM             CARD ECHO 2D DOPPLER CONTRAST (CPT=93306)    Result Date: 9/15/2024  Transthoracic Echocardiogram Name: Yesenia Berkowitz Date: 2024 :  1940 Ht:  (64 in)        BP: 117 / 63 MRN:  [id number]    Age:  84 years   Wt:  (168 lb)       HR: 75 bpm Loc:  [facility]       Gndr: F          BSA: 1.82 m^2 Sonographer: Samantha Ordering:    Yamil Khan Consulting:  Luis Fernando Fowler ---------------------------------------------------------------------------- History/Indications:   Abnormal ECG. Heart failure. ---------------------------------------------------------------------------- Procedure information:  A transthoracic complete 2D study was performed. Additional evaluation included M-mode, complete spectral Doppler, and color Doppler.  Patient status:  Inpatient.  Location:  Bedside.    This was a routine study. Transthoracic echocardiography for  diagnosis. Image quality was suboptimal. The study was technically limited due to poor acoustic window availability. Intravenous contrast (Definity) was administered to evaluate left ventricular function. ---------------------------------------------------------------------------- Conclusions: 1. Left ventricle: The cavity size was normal. Wall thickness was mildly    increased. Systolic function was hyperdynamic. The estimated ejection    fraction was 65-70%, by visual assessment. No diagnostic evidence for    regional wall motion abnormalities. Unable to assess LV diastolic    function. 2. Right ventricle: The cavity size was mildly increased. Systolic pressure    was moderately increased. 3. Aortic valve: There was thickening. The findings were consistent with    mild stenosis. The peak systolic velocity was 2.06 m/sec. The mean    systolic gradient was 10 mm Hg. The valve area (VTI) was 2.17 cm^2. The    valve area (VTI) index was 1.19 cm^2/m^2. 4. Mitral valve: The annulus was moderately calcified. The leaflets were    mildly thickened. Transvalvular velocity was increased. The findings were    consistent with mild stenosis. The mean diastolic gradient was 5 mm Hg. 5. Pulmonary arteries: Systolic pressure was moderately increased, estimated    to be 56 mm Hg. * ---------------------------------------------------------------------------- * Findings: Left ventricle:  The cavity size was normal. Wall thickness was mildly increased. Systolic function was hyperdynamic. The estimated ejection fraction was 65-70%, by visual assessment. No diagnostic evidence for regional wall motion abnormalities. Unable to assess LV diastolic function. Left atrium:  The atrium was normal in size. The left atrial volume was normal. Right ventricle:  The cavity size was mildly increased. Systolic function was normal.  Systolic pressure was moderately increased. Right atrium:  The atrium was normal in size. Mitral valve:  The annulus  was moderately calcified. The leaflets were mildly thickened. Leaflet separation was normal.  Doppler:  Transvalvular velocity was increased. The findings were consistent with mild stenosis. There was trivial regurgitation.    The valve area (LVOT continuity) was 1.88 cm^2. The valve area index (LVOT continuity) was 1.04 cm^2/m^2.    The mean diastolic gradient was 5 mm Hg. Aortic valve:   The valve was trileaflet.   There was thickening. Cusp separation was normal.  Doppler:   The findings were consistent with mild stenosis.   There was no significant regurgitation.    The valve area (VTI) was 2.17 cm^2. The valve area (VTI) index was 1.19 cm^2/m^2.    The mean systolic gradient was 10 mm Hg. The peak systolic gradient was 17 mm Hg. Tricuspid valve:  The valve is structurally normal. Leaflet separation was normal.  Doppler:  Transvalvular velocity was within the normal range. There was no evidence for stenosis. There was mild regurgitation. Pulmonic valve:   The valve is structurally normal. Cusp separation was normal.  Doppler:  Transvalvular velocity was within the normal range. There was no evidence for stenosis. There was no significant regurgitation. Pericardium:   There was no pericardial effusion. Aorta: Aortic root: The aortic root was normal. Ascending aorta: The ascending aorta was normal. Pulmonary arteries: Systolic pressure was moderately increased, estimated to be 56 mm Hg. ---------------------------------------------------------------------------- Measurements  Left ventricle                  Value          Ref  IVS thickness, ED, PLAX     (H) 1.0   cm       0.6 - 0.9  LV ID, ED, PLAX                 4.2   cm       3.8 - 5.2  LV ID, ES, PLAX                 2.9   cm       2.2 - 3.5  LV PW thickness, ED, PLAX   (H) 1.0   cm       0.6 - 0.9  IVS/LV PW ratio, ED, PLAX       1.00           ---------  LV PW/LV ID ratio, ED, PLAX     0.24           ---------  LV ejection fraction            59    %         54 - 74  Stroke volume/bsa, 2D           51    ml/m^2   ---------  LVOT                            Value          Ref  LVOT ID                         1.8   cm       ---------  LVOT peak velocity, S           1.69  m/sec    ---------  LVOT VTI, S                     36.4  cm       ---------  LVOT peak gradient, S           11    mm Hg    ---------  LVOT mean gradient, S           6     mm Hg    ---------  Stroke volume (SV), LVOT DP     93    ml       ---------  Stroke index (SV/bsa), LVOT     51    ml/m^2   ---------  DP  Aortic valve                    Value          Ref  Aortic valve peak velocity,     2.06  m/sec    ---------  S  Aortic valve VTI, S             42.1  cm       ---------  Aortic mean gradient, S         10    mm Hg    ---------  Aortic peak gradient, S         17    mm Hg    ---------  Aortic valve area, VTI          2.17  cm^2     ---------  Aortic valve area/bsa, VTI      1.19  cm^2/m^2 ---------  Velocity ratio, peak,           0.82           ---------  LVOT/AV  Aortic root                     Value          Ref  Aortic root ID                  2.9   cm       2.5 - 4.0  Ascending aorta                 Value          Ref  Ascending aorta ID              3.0   cm       1.9 - 3.5  Left atrium                     Value          Ref  LA ID, A-P, ES                  2.8   cm       2.7 - 3.8  LA volume, S                    47    ml       22 - 52  LA volume/bsa, S                26    ml/m^2   16 - 34  LA volume, ES, 1-p A4C          47    ml       22 - 52  LA volume, ES, 1-p A2C          44    ml       22 - 52  LA volume, ES, A/L              53    ml       ---------  LA volume/bsa, ES, A/L          29    ml/m^2   16 - 34  LA/aortic root ratio            0.97           ---------  Mitral valve                    Value          Ref  Mitral E-wave peak velocity     1     m/sec    ---------  Mitral A-wave peak velocity     1.4   m/sec    ---------  Mitral deceleration time        243   ms       ---------   Mitral mean gradient, D         5     mm Hg    ---------  Mitral peak gradient, D         11    mm Hg    ---------  Mitral E/A ratio, peak          0.7            ---------  Mitral valve area, LVOT         1.88  cm^2     ---------  continuity  Mitral valve area/bsa, LVOT     1.04  cm^2/m^2 ---------  continuity  Pulmonary artery                Value          Ref  PA pressure, S, DP              56    mm Hg    ---------  Tricuspid valve                 Value          Ref  Tricuspid regurg peak       (H) 3.63  m/sec    <=2.8  velocity  Tricuspid peak RV-RA            53    mm Hg    ---------  gradient  Systemic veins                  Value          Ref  Estimated CVP                   3     mm Hg    ---------  Inferior vena cava              Value          Ref  ID                              1.8   cm       <=2.1  Right ventricle                 Value          Ref  TAPSE, 2D                       1.98  cm       >=1.70  TAPSE, MM                       1.98  cm       >=1.70  RV pressure, S, DP              56    mm Hg    ---------  RV s', lateral                  14.8  cm/sec   >=9.5 Legend: (L)  and  (H)  malvin values outside specified reference range. ---------------------------------------------------------------------------- Prepared and electronically signed by Davey Vasquez 09/15/2024 09:22        Exam:     General: Alert and oriented x 3. No apparent distress.   HEENT: Normocephalic, anicteric sclera, neck supple, no thyromegaly or adenopathy.  Neck: No JVD, carotids 2+, no bruits.  Cardiac: Regular rate and rhythm. S1, S2 normal. No murmur, pericardial rub, S3, or extra cardiac sounds.  Lungs: Clear without wheezes, rales, rhonchi or dullness.  Normal excursions and effort.  Abdomen: Soft, non-tender. No organosplenomegally, mass or rebound, BS-present.  Extremities: Without clubbing or cyanosis. 1 + LE edema.    Neurologic: Alert and oriented, normal affect. No focal defects  Skin: Warm and dry.     Assessment  and Plan:   Assessment:  Acute COPD Exacerabation  Currently on 4L NC (baseline 2-3L at home)  Steroids, inhalers, nebs   Pulm following  Bilateral Pleural Effusion R>L, L Hemidiaphragm  10/10 S/P US guided R thoracentesis - 700ml serosanguinous fluid removed  On oral Lasix   Acute on Chronic HFpEF, Pulmonary Hypertension  BNP 72, CXR/CT Chest w/pulm edema, effusions  Echo LVEF 65-70%, no RWMA, mild AVS (mean grad 10mmHg), mild MVS, PASP 56mmHg  Hx on Lasix 40mg po daily at home  Paroxysmal Atrial Fibrillation  Currently maintaining NSR  On po digoxin 125mcg, diltiazem 240mg daily  [id number] 5 - hx not on A/C due to pt preference  Hypertension - controlled  On diltiazem     Plan:  Continue oral Lasix.  Recommend she alternate 40 mg twice daily with 40 mg daily at home.  Maintaining NSR - continue digoxin, diltiazem.  Steroids, inhalers per pulmonology.  Plan for discharge home today, will need follow-up with me in several weeks.    Luis Fernando Fowler MD  [facility]  10/11/2024

## 2024-10-11 NOTE — DISCHARGE SUMMARY
[facility]  part of [facility]    Discharge Summary    Yesenia Berkowitz Patient Status:  Inpatient    1940 MRN [id number]   Location [facility]5W Attending Tayo Bang MD   Hosp Day # 4 PCP JO-ANN YANG MD     Date of Admission: 10/7/2024 Disposition:   Home Health Care Services     Date of Discharge:   10/11/2024     Admitting Diagnosis:   Acute pulmonary edema (HCC) [J81.0]  COPD exacerbation (HCC) [J44.1]  Acute on chronic hypoxic respiratory failure (HCC) [J96.21]    Hospital Discharge Diagnoses:    Acute on chronic respiratory failure    AECOPD  Acute on chronic diastolic CHF  Left phrenic nerve paralysis  Kyphoscoliosis  Acute on chronic diastolic CHF  Paroxysmal afib  Hx of HTN  Pulmonary nodule      Problem List:     Patient Active Problem List   Diagnosis    Actinic keratosis    Inflamed seborrheic keratosis    Palpitations    Pain of upper abdomen    Acute on chronic congestive heart failure, unspecified heart failure type (HCC)    COPD exacerbation (HCC)    COPD (chronic obstructive pulmonary disease) (HCC)    Kyphoscoliosis    Phrenic nerve paralysis    Restrictive lung disease    Acute cor pulmonale (HCC)    Pneumonia    Acute on chronic hypoxic respiratory failure (HCC)    Acute pulmonary edema (HCC)    Pleural effusion    Pulmonary nodule       Physical Exam:      /51 (BP Location: Right arm)   Pulse 76   Temp 97.9 °F (36.6 °C) (Oral)   Resp 16   Ht 5' 5\" (1.651 m)   Wt 166 lb 14.4 oz (75.7 kg)   SpO2 93%   BMI 27.77 kg/m²     Gen:   NAD.  A and O x 3  CV:   RRR, no m/g/r  Pulm:   coarse bilat  Abd:   +bs, soft, NT, ND  LE:   No c/c/e  Neuro:   nonfocal      Reason for Admission:   COPD exacerbation.     HISTORY OF PRESENT ILLNESS:  The patient is an 84-year-old  female with history of severe chronic obstructive pulmonary disease, last hospitalization 2024.  At that time treated with steroids, antibiotics, and nebulizer  treatments.  Also had an echocardiogram which showed mild aortic stenosis and moderate pulmonary artery hypertension.  Today presented with progressive shortness of breath.  CBC showed white blood cell count of 10.8 with left shift.  Chemistry, B-natriuretic peptide, and troponin are still pending.  Chest x-ray showed hazy opacities both lung bases.  The patient was started on steroids, nebulizer treatments, and she will be admitted to the hospital for further management.     Hospital Course:     Acute on chronic respiratory failure due to  AECOPD  Acute on chronic diastolic CHF  Left phrenic nerve paralysis  Currently on 3L o2.   On 3L o2 at home.  Kyphoscoliosis  - pulm and cards were on consult  - CT chest - no PE. +pulmonary edema, moderate R and small L pleural effusions  - was given nebs, steroids IV, doxycycline  - was given lasix IV  - dc home on po lasix.   Lasix 40 mg bid alternating with lasix 40 daily.  - monitored I/O, wts  - S/p right thoracentesis of 700 ml 10/10  - weaned o2  - cards and pulm f/u     Acute on chronic diastolic CHF  - echo 9/2024 - EF 65-70%, moderate pulmonary htn  - cardiology was on consult  - lasix as above     Paroxysmal afib  - not on AC  - aspirin  - cont diltiazem, digoxin  - in NSR     HTN  - cont meds and monitor     Pulmonary nodule  - per pulmonology     dvt proph:   lovenox      Code status:    Full       Consultations:   Pulmonology, cardiology     Discharge Condition:  Good    Discharge Medications:      Discharge Medications        CHANGE how you take these medications        Instructions Prescription details   doxycycline 100 MG Caps  Commonly known as: Vibramycin  What changed: when to take this      Take 1 capsule (100 mg total) by mouth every 12 (twelve) hours for 10 doses.   Stop taking on: October 16, 2024  Quantity: 10 capsule  Refills: 0     furosemide 40 MG Tabs  Commonly known as: Lasix  What changed:   how much to take  how to take this  when to take  this  additional instructions      Please alternate taking 40 mg twice per day with 40 mg once per day.   Quantity: 45 tablet  Refills: 1     predniSONE 20 MG Tabs  Commonly known as: Deltasone  Start taking on: October 11, 2024  What changed: See the new instructions.      Take 2 tablets (40 mg total) by mouth daily for 5 days, THEN 1 tablet (20 mg total) daily for 5 days. 20mgx2 [id number], then 20mg [id number].   Stop taking on: October 20, 2024  Quantity: 15 tablet  Refills: 0            CONTINUE taking these medications        Instructions Prescription details   albuterol 108 (90 Base) MCG/ACT Aers  Commonly known as: Ventolin HFA      Inhale 2 puffs into the lungs as needed.   Refills: 0     amitriptyline 50 MG Tabs  Commonly known as: Elavil      Take 1 tablet (50 mg total) by mouth nightly.   Refills: 0     aspirin 81 MG Tabs      Take 2 tablets (162 mg total) by mouth daily.   Refills: 0     B Complex Caps      Take 1 tablet by mouth daily.   Refills: 0     Calcium + D3 600-200 MG-UNIT Tabs      Take 1 tablet by mouth daily.   Refills: 0     dicyclomine 10 MG Caps  Commonly known as: Bentyl      Take 1 capsule (10 mg total) by mouth 3 (three) times daily as needed (abdominal pain).   Quantity: 40 capsule  Refills: 3     digoxin 0.125 MG Tabs  Commonly known as: Lanoxin      Take 1 tablet (125 mcg total) by mouth daily.   Quantity: 30 tablet  Refills: 0     dilTIAZem  MG Cp24  Commonly known as: CardIZEM CD      Take 1 capsule (240mg)by mouth every morning on an empty stomach.   Refills: 0     estradiol 0.05 MG/24HR Ptwk  Commonly known as: Climara      Place 1 patch onto the skin once a week. As directed.   Refills: 0     lansoprazole 30 MG Cpdr  Commonly known as: Prevacid      Take 1 capsule (30 mg total) by mouth nightly.   Refills: 0     Loratadine 10 MG Caps      Take 10 mg by mouth nightly.   Refills: 0     montelukast 10 MG Tabs  Commonly known as: Singulair      Take 1 tablet (10 mg total) by  mouth nightly.   Refills: 0     mupirocin 2 % Oint  Commonly known as: Bactroban      Apply 1 Application topically 3 (three) times daily.   Quantity: 1 each  Refills: 3     omega-3 fatty acids 1000 MG Caps  Commonly known as: Fish Oil      Take 1,000 mg by mouth daily.   Refills: 0     polyethylene glycol (PEG 3350-KCl-NaBcb-NaCl-NaSulf) 236 g Solr  Commonly known as: Golytely      Take 4,000 mL by mouth As Directed. Take 2,000 mL the night before your procedure and 2,000 mL the morning of your procedure. Take as directed by GI clinic. Okay to substitute for generic.   Quantity: 1 each  Refills: 0     potassium chloride 20 MEQ Tbcr  Commonly known as: Klor-Con M20      Take 1 tablet (20 mEq total) by mouth nightly.   Refills: 0     Pulmicort Flexhaler 180 MCG/ACT Aepb  Generic drug: Budesonide      Inhale 2 puffs into the lungs daily.   Refills: 0     THERA/BETA-CAROTENE Tabs      take 1 tablet by ORAL route  every day with food   Refills: 0     tiotropium 18 MCG Caps  Commonly known as: Spiriva Handihaler      Inhale 1 capsule (18 mcg total) into the lungs nightly.   Refills: 0     triamcinolone 0.1 % Crea  Commonly known as: Kenalog      Apply topically 2 (two) times daily. Apply bid as directed   Quantity: 80 g  Refills: 3     Vitamin D 1000 units Tabs      Take 1,000 Units by mouth 2 (two) times daily.   Refills: 0               Where to Get Your Medications        These medications were sent to [id number] - [place], [place] - [address] [place] [address] [phone], [phone]  [address] [place] [id number]      Phone: [phone]   doxycycline 100 MG Caps  furosemide 40 MG Tabs  predniSONE 20 MG Tabs         Follow up Visits:     Follow-up Information       [facility]. Schedule an appointment as soon as possible for a visit.    Specialty: Cardiology  Contact information:  [address] [facility] [address]  [facility] [address] [phone]  Additional information:  Your appointment  is located at [address] [facility] in [place], [place].  Please park in the Yellow lot and go through the [facility] entrance.  Then proceed to [address].      Masks are optional for all patients and visitors, unless otherwise indicated.             Brenda Wilkerson MD. Schedule an appointment as soon as possible for a visit in 1 week(s).    Specialty: Internal Medicine  Contact information:  [address] [facility] [address]  [facility] [address]  [phone]               Bing Boyle MD Follow up in 1 week(s).    Specialties: PULMONARY DISEASES, Sleep Disorders  Contact information:  [address]  [place] [address]  [phone]                             Hospital Discharge Diagnoses:  AECOPD    Lace+ Score: 78  59-90 High Risk  29-58 Medium Risk  0-28   Low Risk.    TCM Follow-Up Recommendation:  LACE > 58: High Risk of readmission after discharge from the hospital.    >35 minutes spent preparing this discharge.    Tayo Cervantes MD  10/11/2024  10:57 AM

## 2024-10-14 ENCOUNTER — TELEPHONE (OUTPATIENT)
Dept: CARDIOLOGY CLINIC | Facility: HOSPITAL | Age: 84
End: 2024-10-14

## 2024-10-14 NOTE — TELEPHONE ENCOUNTER
Spoke with Yesenia, assisted to schedule follow up in [facility] for this week. Advised if [facility] doesn't call to schedule follow up with Dr. Camilo we can assist when in clinic Thursday. Agreed with plan.

## 2024-10-14 NOTE — TELEPHONE ENCOUNTER
Yesenia called for a hospital follow up appointment with Dr. Fowler in 2-3 weeks. Chart reviewed, hospitalized 10/7-10/11, acute pulm edema, COPD exacerbation, A/C hypoxic respiratory failure, acute on chronic diastolic CHF treated with IV diuretic.    AVS shows:  Follow-up Information         [facility]. Schedule an appointment as soon as possible for a visit.    Specialty: Cardiology  Contact information:  [address] [facility] [address]  [facility] [address]  [phone]  Additional information:  Your appointment is located at [address] in [place], [place].  Please park in the Yellow lot and go through the [facility] entrance.  Then proceed to [address].

## 2024-10-17 ENCOUNTER — PATIENT OUTREACH (OUTPATIENT)
Dept: CASE MANAGEMENT | Age: 84
End: 2024-10-17

## 2024-10-17 NOTE — PROGRESS NOTES
Voicemail received; Patient requesting assistance with cancelling appointment. Patient can be reached at [phone]. Patient discharged on 10/11.  Called pt back    Patient asked to cancel appointment today with Linda Garay ([facility]); patient advised she is seeing Dr Boyle on Monday, 10/21 and doesn't need the appointment today.  Per patient's request, appointment for today 10/17 @2:00pm has been cancelled.  Closing encounter

## 2024-10-28 ENCOUNTER — TELEPHONE (OUTPATIENT)
Facility: CLINIC | Age: 84
End: 2024-10-28

## 2024-10-28 NOTE — TELEPHONE ENCOUNTER
Called patient - rescheduled her colonoscopy with Dr. Miller to 2/24/2025 at [facility].    Please refer to telephone encounter dated 10/9/2023 for scheduling orders.    Telephone encounter closed.

## 2024-10-28 NOTE — TELEPHONE ENCOUNTER
Patient states that she needs to reschedule her procedure and procedure must be done at the hospital.

## 2024-10-30 ENCOUNTER — LAB ENCOUNTER (OUTPATIENT)
Dept: LAB | Age: 84
End: 2024-10-30
Attending: STUDENT IN AN ORGANIZED HEALTH CARE EDUCATION/TRAINING PROGRAM
Payer: MEDICARE

## 2024-10-30 DIAGNOSIS — I10 HYPERTENSION, ESSENTIAL: ICD-10-CM

## 2024-10-30 DIAGNOSIS — I48.0 PAF (PAROXYSMAL ATRIAL FIBRILLATION) (HCC): Primary | ICD-10-CM

## 2024-10-30 LAB
BUN BLD-MCNC: 15 MG/DL (ref 9–23)
BUN/CREAT SERPL: 19 (ref 10–20)
CALCIUM BLD-MCNC: 8.8 MG/DL (ref 8.7–10.4)
CHLORIDE SERPL-SCNC: 100 MMOL/L (ref 98–112)
CO2 SERPL-SCNC: >40 MMOL/L (ref 21–32)
CREAT BLD-MCNC: 0.79 MG/DL
EGFRCR SERPLBLD CKD-EPI 2021: 75 ML/MIN/1.73M2 (ref 60–?)
FASTING STATUS PATIENT QL REPORTED: YES
GLUCOSE BLD-MCNC: 137 MG/DL (ref 70–99)
OSMOLALITY SERPL CALC.SUM OF ELEC: 301 MOSM/KG (ref 275–295)
POTASSIUM SERPL-SCNC: 4 MMOL/L (ref 3.5–5.1)
SODIUM SERPL-SCNC: 144 MMOL/L (ref 136–145)

## 2024-10-30 PROCEDURE — 36415 COLL VENOUS BLD VENIPUNCTURE: CPT

## 2024-10-30 PROCEDURE — 80048 BASIC METABOLIC PNL TOTAL CA: CPT

## 2024-11-02 ENCOUNTER — APPOINTMENT (OUTPATIENT)
Dept: GENERAL RADIOLOGY | Facility: HOSPITAL | Age: 84
End: 2024-11-02
Attending: EMERGENCY MEDICINE
Payer: MEDICARE

## 2024-11-02 ENCOUNTER — HOSPITAL ENCOUNTER (INPATIENT)
Facility: HOSPITAL | Age: 84
LOS: 4 days | Discharge: HOME HEALTH | End: 2024-11-07
Attending: EMERGENCY MEDICINE | Admitting: STUDENT IN AN ORGANIZED HEALTH CARE EDUCATION/TRAINING PROGRAM
Payer: MEDICARE

## 2024-11-02 DIAGNOSIS — J44.1 COPD EXACERBATION (HCC): Primary | ICD-10-CM

## 2024-11-02 DIAGNOSIS — J96.22 ACUTE ON CHRONIC RESPIRATORY FAILURE WITH HYPOXIA AND HYPERCAPNIA (HCC): ICD-10-CM

## 2024-11-02 DIAGNOSIS — J18.9 COMMUNITY ACQUIRED PNEUMONIA OF RIGHT LOWER LOBE OF LUNG: ICD-10-CM

## 2024-11-02 DIAGNOSIS — J96.21 ACUTE ON CHRONIC RESPIRATORY FAILURE WITH HYPOXIA AND HYPERCAPNIA (HCC): ICD-10-CM

## 2024-11-02 LAB
BASE EXCESS BLD CALC-SCNC: 23.3 MMOL/L (ref ?–2)
BLOOD GAS EPAP: 8 CM H2O
BLOOD GAS IPAP: 14 CM H2O
HCO3 BLDA-SCNC: 42.9 MEQ/L (ref 21–27)
MODIFIED ALLEN TEST: POSITIVE
O2 CT BLD-SCNC: 16.3 VOL% (ref 15–23)
O2/TOTAL GAS SETTING VFR VENT: 50 %
PCO2 BLDA: 79 MM HG (ref 35–45)
PH BLDA: 7.43 [PH] (ref 7.35–7.45)
PO2 BLDA: 65 MM HG (ref 80–100)
PUNCTURE CHARGE: YES
RESP RATE: 12 BPM
SAO2 % BLDA: 94.4 % (ref 94–100)

## 2024-11-02 PROCEDURE — 71045 X-RAY EXAM CHEST 1 VIEW: CPT | Performed by: EMERGENCY MEDICINE

## 2024-11-02 PROCEDURE — 5A09357 ASSISTANCE WITH RESPIRATORY VENTILATION, LESS THAN 24 CONSECUTIVE HOURS, CONTINUOUS POSITIVE AIRWAY PRESSURE: ICD-10-PCS | Performed by: HOSPITALIST

## 2024-11-02 RX ORDER — PREDNISONE 20 MG/1
TABLET ORAL
Qty: 12 TABLET | Refills: 0 | Status: SHIPPED | OUTPATIENT
Start: 2024-11-02 | End: 2024-11-07

## 2024-11-02 RX ORDER — ALBUTEROL SULFATE 0.83 MG/ML
2.5 SOLUTION RESPIRATORY (INHALATION) EVERY 4 HOURS PRN
Qty: 50 EACH | Refills: 0 | Status: SHIPPED | OUTPATIENT
Start: 2024-11-02 | End: 2024-11-07

## 2024-11-02 RX ORDER — PREDNISONE 20 MG/1
60 TABLET ORAL ONCE
Status: COMPLETED | OUTPATIENT
Start: 2024-11-02 | End: 2024-11-02

## 2024-11-02 RX ORDER — DOXYCYCLINE 100 MG/1
100 CAPSULE ORAL 2 TIMES DAILY
Qty: 20 CAPSULE | Refills: 0 | Status: SHIPPED | OUTPATIENT
Start: 2024-11-02 | End: 2024-11-07

## 2024-11-02 RX ORDER — ALBUTEROL SULFATE 0.83 MG/ML
10 SOLUTION RESPIRATORY (INHALATION) CONTINUOUS
Status: DISCONTINUED | OUTPATIENT
Start: 2024-11-02 | End: 2024-11-03 | Stop reason: ALTCHOICE

## 2024-11-03 ENCOUNTER — APPOINTMENT (OUTPATIENT)
Dept: CT IMAGING | Facility: HOSPITAL | Age: 84
End: 2024-11-03
Attending: INTERNAL MEDICINE
Payer: MEDICARE

## 2024-11-03 PROBLEM — J18.9 COMMUNITY ACQUIRED PNEUMONIA OF RIGHT LOWER LOBE OF LUNG: Status: ACTIVE | Noted: 2024-11-03

## 2024-11-03 PROBLEM — J96.22 ACUTE ON CHRONIC RESPIRATORY FAILURE WITH HYPOXIA AND HYPERCAPNIA (HCC): Status: ACTIVE | Noted: 2024-11-03

## 2024-11-03 PROBLEM — J96.21 ACUTE ON CHRONIC RESPIRATORY FAILURE WITH HYPOXIA AND HYPERCAPNIA (HCC): Status: ACTIVE | Noted: 2024-11-03

## 2024-11-03 LAB
ATRIAL RATE: 80 BPM
BASOPHILS # BLD AUTO: 0.01 X10(3) UL (ref 0–0.2)
BASOPHILS # BLD AUTO: 0.02 X10(3) UL (ref 0–0.2)
BASOPHILS NFR BLD AUTO: 0.1 %
BASOPHILS NFR BLD AUTO: 0.2 %
BNP SERPL-MCNC: 73 PG/ML (ref ?–100)
BUN BLD-MCNC: 14 MG/DL (ref 9–23)
BUN BLD-MCNC: 14 MG/DL (ref 9–23)
BUN/CREAT SERPL: 22.6 (ref 10–20)
CALCIUM BLD-MCNC: 9.1 MG/DL (ref 8.7–10.4)
CALCIUM BLD-MCNC: 9.1 MG/DL (ref 8.7–10.4)
CHLORIDE SERPL-SCNC: 100 MMOL/L (ref 98–112)
CHLORIDE SERPL-SCNC: 102 MMOL/L (ref 98–112)
CO2 SERPL-SCNC: >40 MMOL/L (ref 21–32)
CO2 SERPL-SCNC: >40 MMOL/L (ref 21–32)
CREAT BLD-MCNC: 0.62 MG/DL
CREAT BLD-MCNC: 0.75 MG/DL
DEPRECATED RDW RBC AUTO: 49.8 FL (ref 35.1–46.3)
DEPRECATED RDW RBC AUTO: 50.6 FL (ref 35.1–46.3)
EGFRCR SERPLBLD CKD-EPI 2021: 79 ML/MIN/1.73M2 (ref 60–?)
EGFRCR SERPLBLD CKD-EPI 2021: 89 ML/MIN/1.73M2 (ref 60–?)
EOSINOPHIL # BLD AUTO: 0 X10(3) UL (ref 0–0.7)
EOSINOPHIL # BLD AUTO: 0.04 X10(3) UL (ref 0–0.7)
EOSINOPHIL NFR BLD AUTO: 0 %
EOSINOPHIL NFR BLD AUTO: 0.5 %
ERYTHROCYTE [DISTWIDTH] IN BLOOD BY AUTOMATED COUNT: 13.8 % (ref 11–15)
ERYTHROCYTE [DISTWIDTH] IN BLOOD BY AUTOMATED COUNT: 14.2 % (ref 11–15)
GLUCOSE BLD-MCNC: 137 MG/DL (ref 70–99)
GLUCOSE BLD-MCNC: 170 MG/DL (ref 70–99)
HCT VFR BLD AUTO: 39.2 %
HCT VFR BLD AUTO: 41 %
HGB BLD-MCNC: 12 G/DL
HGB BLD-MCNC: 13.2 G/DL
IMM GRANULOCYTES # BLD AUTO: 0.02 X10(3) UL (ref 0–1)
IMM GRANULOCYTES # BLD AUTO: 0.03 X10(3) UL (ref 0–1)
IMM GRANULOCYTES NFR BLD: 0.2 %
IMM GRANULOCYTES NFR BLD: 0.4 %
LACTATE SERPL-SCNC: 1.4 MMOL/L (ref 0.5–2)
LYMPHOCYTES # BLD AUTO: 0.33 X10(3) UL (ref 1–4)
LYMPHOCYTES # BLD AUTO: 1.62 X10(3) UL (ref 1–4)
LYMPHOCYTES NFR BLD AUTO: 19 %
LYMPHOCYTES NFR BLD AUTO: 4.7 %
MCH RBC QN AUTO: 30.4 PG (ref 26–34)
MCH RBC QN AUTO: 31.1 PG (ref 26–34)
MCHC RBC AUTO-ENTMCNC: 30.6 G/DL (ref 31–37)
MCHC RBC AUTO-ENTMCNC: 32.2 G/DL (ref 31–37)
MCV RBC AUTO: 96.7 FL
MCV RBC AUTO: 99.2 FL
MONOCYTES # BLD AUTO: 0.05 X10(3) UL (ref 0.1–1)
MONOCYTES # BLD AUTO: 0.56 X10(3) UL (ref 0.1–1)
MONOCYTES NFR BLD AUTO: 0.7 %
MONOCYTES NFR BLD AUTO: 6.6 %
NEUTROPHILS # BLD AUTO: 6.27 X10 (3) UL (ref 1.5–7.7)
NEUTROPHILS # BLD AUTO: 6.27 X10(3) UL (ref 1.5–7.7)
NEUTROPHILS # BLD AUTO: 6.66 X10 (3) UL (ref 1.5–7.7)
NEUTROPHILS # BLD AUTO: 6.66 X10(3) UL (ref 1.5–7.7)
NEUTROPHILS NFR BLD AUTO: 73.5 %
NEUTROPHILS NFR BLD AUTO: 94.1 %
NT-PROBNP SERPL-MCNC: 394 PG/ML (ref ?–450)
OSMOLALITY SERPL CALC.SUM OF ELEC: 304 MOSM/KG (ref 275–295)
P AXIS: 7 DEGREES
P-R INTERVAL: 150 MS
PLATELET # BLD AUTO: 247 10(3)UL (ref 150–450)
PLATELET # BLD AUTO: 286 10(3)UL (ref 150–450)
POTASSIUM SERPL-SCNC: 3.9 MMOL/L (ref 3.5–5.1)
POTASSIUM SERPL-SCNC: 4.2 MMOL/L (ref 3.5–5.1)
Q-T INTERVAL: 352 MS
QRS DURATION: 90 MS
QTC CALCULATION (BEZET): 405 MS
R AXIS: 1 DEGREES
RBC # BLD AUTO: 3.95 X10(6)UL
RBC # BLD AUTO: 4.24 X10(6)UL
SODIUM SERPL-SCNC: 145 MMOL/L (ref 136–145)
SODIUM SERPL-SCNC: 145 MMOL/L (ref 136–145)
T AXIS: 28 DEGREES
VENTRICULAR RATE: 80 BPM
WBC # BLD AUTO: 7.1 X10(3) UL (ref 4–11)
WBC # BLD AUTO: 8.5 X10(3) UL (ref 4–11)

## 2024-11-03 PROCEDURE — 71260 CT THORAX DX C+: CPT | Performed by: INTERNAL MEDICINE

## 2024-11-03 PROCEDURE — 99223 1ST HOSP IP/OBS HIGH 75: CPT | Performed by: HOSPITALIST

## 2024-11-03 RX ORDER — DILTIAZEM HYDROCHLORIDE 180 MG/1
360 CAPSULE, EXTENDED RELEASE ORAL DAILY
Status: DISCONTINUED | OUTPATIENT
Start: 2024-11-04 | End: 2024-11-07

## 2024-11-03 RX ORDER — PANTOPRAZOLE SODIUM 40 MG/1
40 TABLET, DELAYED RELEASE ORAL
Status: DISCONTINUED | OUTPATIENT
Start: 2024-11-03 | End: 2024-11-07

## 2024-11-03 RX ORDER — METHYLPREDNISOLONE SODIUM SUCCINATE 40 MG/ML
40 INJECTION INTRAMUSCULAR; INTRAVENOUS EVERY 8 HOURS
Status: DISCONTINUED | OUTPATIENT
Start: 2024-11-03 | End: 2024-11-03

## 2024-11-03 RX ORDER — FUROSEMIDE 40 MG/1
40 TABLET ORAL DAILY
Status: DISCONTINUED | OUTPATIENT
Start: 2024-11-03 | End: 2024-11-07

## 2024-11-03 RX ORDER — POLYETHYLENE GLYCOL 3350 17 G/17G
17 POWDER, FOR SOLUTION ORAL DAILY PRN
Status: DISCONTINUED | OUTPATIENT
Start: 2024-11-03 | End: 2024-11-07

## 2024-11-03 RX ORDER — DOXEPIN HYDROCHLORIDE 50 MG/1
1 CAPSULE ORAL DAILY
Status: DISCONTINUED | OUTPATIENT
Start: 2024-11-03 | End: 2024-11-07

## 2024-11-03 RX ORDER — SENNOSIDES 8.6 MG
17.2 TABLET ORAL NIGHTLY PRN
Status: DISCONTINUED | OUTPATIENT
Start: 2024-11-03 | End: 2024-11-07

## 2024-11-03 RX ORDER — ONDANSETRON 2 MG/ML
4 INJECTION INTRAMUSCULAR; INTRAVENOUS EVERY 6 HOURS PRN
Status: DISCONTINUED | OUTPATIENT
Start: 2024-11-03 | End: 2024-11-07

## 2024-11-03 RX ORDER — ASPIRIN 81 MG/1
162 TABLET ORAL DAILY
Status: DISCONTINUED | OUTPATIENT
Start: 2024-11-04 | End: 2024-11-07

## 2024-11-03 RX ORDER — HEPARIN SODIUM 5000 [USP'U]/ML
5000 INJECTION, SOLUTION INTRAVENOUS; SUBCUTANEOUS EVERY 12 HOURS SCHEDULED
Status: DISCONTINUED | OUTPATIENT
Start: 2024-11-03 | End: 2024-11-07

## 2024-11-03 RX ORDER — ACETAMINOPHEN 500 MG
500 TABLET ORAL EVERY 4 HOURS PRN
Status: DISCONTINUED | OUTPATIENT
Start: 2024-11-03 | End: 2024-11-07

## 2024-11-03 RX ORDER — IPRATROPIUM BROMIDE AND ALBUTEROL SULFATE 2.5; .5 MG/3ML; MG/3ML
3 SOLUTION RESPIRATORY (INHALATION) EVERY 4 HOURS PRN
Status: DISCONTINUED | OUTPATIENT
Start: 2024-11-03 | End: 2024-11-07

## 2024-11-03 RX ORDER — AMITRIPTYLINE HYDROCHLORIDE 50 MG/1
50 TABLET ORAL NIGHTLY
Status: DISCONTINUED | OUTPATIENT
Start: 2024-11-03 | End: 2024-11-07

## 2024-11-03 RX ORDER — SODIUM PHOSPHATE, DIBASIC AND SODIUM PHOSPHATE, MONOBASIC 7; 19 G/230ML; G/230ML
1 ENEMA RECTAL ONCE AS NEEDED
Status: DISCONTINUED | OUTPATIENT
Start: 2024-11-03 | End: 2024-11-07

## 2024-11-03 RX ORDER — DILTIAZEM HYDROCHLORIDE 120 MG/1
240 CAPSULE, EXTENDED RELEASE ORAL DAILY
Status: DISCONTINUED | OUTPATIENT
Start: 2024-11-03 | End: 2024-11-03

## 2024-11-03 RX ORDER — ASPIRIN 325 MG
162 TABLET ORAL DAILY
Status: DISCONTINUED | OUTPATIENT
Start: 2024-11-03 | End: 2024-11-03 | Stop reason: ALTCHOICE

## 2024-11-03 RX ORDER — ALBUTEROL SULFATE 0.83 MG/ML
10 SOLUTION RESPIRATORY (INHALATION) CONTINUOUS
Status: DISCONTINUED | OUTPATIENT
Start: 2024-11-03 | End: 2024-11-03 | Stop reason: ALTCHOICE

## 2024-11-03 RX ORDER — CETIRIZINE HYDROCHLORIDE 5 MG/1
5 TABLET ORAL DAILY
Status: DISCONTINUED | OUTPATIENT
Start: 2024-11-03 | End: 2024-11-07

## 2024-11-03 RX ORDER — MONTELUKAST SODIUM 10 MG/1
10 TABLET ORAL NIGHTLY
Status: DISCONTINUED | OUTPATIENT
Start: 2024-11-03 | End: 2024-11-07

## 2024-11-03 RX ORDER — DOXYCYCLINE 100 MG/1
100 CAPSULE ORAL EVERY 12 HOURS
Status: DISCONTINUED | OUTPATIENT
Start: 2024-11-03 | End: 2024-11-07

## 2024-11-03 RX ORDER — BISACODYL 10 MG
10 SUPPOSITORY, RECTAL RECTAL
Status: DISCONTINUED | OUTPATIENT
Start: 2024-11-03 | End: 2024-11-07

## 2024-11-03 RX ORDER — BENZONATATE 200 MG/1
200 CAPSULE ORAL 3 TIMES DAILY PRN
Status: DISCONTINUED | OUTPATIENT
Start: 2024-11-03 | End: 2024-11-07

## 2024-11-03 RX ORDER — METHYLPREDNISOLONE SODIUM SUCCINATE 40 MG/ML
40 INJECTION INTRAMUSCULAR; INTRAVENOUS EVERY 12 HOURS
Status: DISCONTINUED | OUTPATIENT
Start: 2024-11-03 | End: 2024-11-04

## 2024-11-03 RX ORDER — DIGOXIN 125 MCG
125 TABLET ORAL DAILY
Status: DISCONTINUED | OUTPATIENT
Start: 2024-11-03 | End: 2024-11-07

## 2024-11-03 RX ORDER — FUROSEMIDE 10 MG/ML
20 INJECTION INTRAMUSCULAR; INTRAVENOUS ONCE
Status: DISCONTINUED | OUTPATIENT
Start: 2024-11-03 | End: 2024-11-03

## 2024-11-03 NOTE — CONSULTS
[facility]    PATIENT'S NAME: CEZAR JOVEL   ATTENDING PHYSICIAN: Obdulia Lovell DO   CONSULTING PHYSICIAN: Bing Boyle MD   PATIENT ACCOUNT#:   [id number]    LOCATION:  [facility]  MEDICAL RECORD #:   [id number]       YOB: 1940  ADMISSION DATE:       11/02/2024      CONSULT DATE:  11/03/2024    REPORT OF CONSULTATION    HISTORY OF PRESENT ILLNESS:  The patient is an 84-year-old white female with history of asthma, COPD, quit smoking 20 years ago, kyphoscoliosis, elevation of left hemidiaphragm, and atrial fibrillation, present to emergency room with increasing shortness of breath, nonproductive cough for 2 days.  The patient claimed 2 days ago she had some shortness of breath.  The day before admission symptoms deteriorated.  She also noted increasing lower leg edema.  Her Lasix was cut down from 80 mg a day to 40 mg a day about 1 week ago.  The patient was hypoxic at home on 5L of O2.  The patient presented to the emergency room and chest x-ray show right lower lobe infiltrate with pleural effusion.  CBC showed WBC 7100, RBC 3.95, hemoglobin 12.0, hematocrit 39.  Electrolytes showed sodium 145, potassium 3.9, chloride 102, CO2 greater than 40.  Arterial blood gas on 50% O2 showed pO2 of 65, pCO2 of 79, pH 7.43.  The patient was put on BiPAP and admitted.    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    MEDICATIONS:  Home medications of prednisone 20 mg a day; doxycycline 100 mg twice a day; Lasix 40 mg a day; digoxin 0.125 mg a day; albuterol 2 inhalations p.r.n.; Bentyl 10 mg p.r.n.; triamcinolone 0.1% cream; Pulmicort 180 two puffs q.12; Spiriva 1 inhalation q.24; Cardizem 240 mg a day; potassium chloride 20 mg a day; Prevacid 30 mg a day; aspirin 81 mg a day; amitriptyline 50 mg a day; Claritin 10 mg a day; Singulair 10 mg at bedtime.    ALLERGIES:  Penicillin, Levaquin,  John.    SOCIAL HISTORY:  The patient quit smoking 20 years ago.  The patient is .  Denies alcohol abuse.    FAMILY HISTORY:  Father  from MI in 60.  Mother  from cancer of the ovary at age of 79.    REVIEW OF SYSTEMS:  GENERAL: No weight loss.  HEAD:  o headache.  EYES: No diplopia, blurred vision.  EARS: No tinnitus, impaired hearing.  NOSE:  o rhinorrhea, epistaxis.  THROAT: No sore throat.  NECK: No neck stiffness.  RESPIRATORY: See the present illness.  CARDIOVASCULAR: No orthopnea, paroxysmal nocturnal dyspnea.  GASTROINTESTINAL: No nausea, vomiting, diarrhea.  GENITOURINARY: No dysuria, hematuria.      PHYSICAL EXAMINATION:    VITAL SIGNS:  Temperature 97.6, pulse 91, respiration 19, blood pressure 132/62.    HEENT:  Head and face:  No trauma, no injury.  Pupils equal bilaterally.  Conjunctivae were not anemic.  Sclerae nonicteric.  Eye fundi were normal.  External ears, no deformity.  Ear canals were patent.  Eardrums were intact.  Nose:  No congestion, polyp.  Mouth, throat free.    NECK:  No neck stiffness.  No palpable nodes.  No carotid bruit.  CHEST:  Symmetrical.  LUNGS:  Diminished breath sounds with increased dullness and decreased vocal fremitus of the right chest and rales.  HEART:  Regular.  No murmur.  Cardiac dullness was normal.  ABDOMEN:  Soft.  No hepatosplenomegaly.  No ascites.  No hernia.  EXTREMITIES:  No clubbing of fingers.  There was 2+ edema.    IMPRESSION:    1.   Acute on chronic respiratory failure.  2.   Chronic obstructive pulmonary disease with exacerbation.  3.   Asthma with exacerbation.  4.   Left phrenic nerve paralysis.  5.   Pneumonia and pleural effusion.  6.   Kyphoscoliosis.    SUGGESTIONS AND RECOMMENDATIONS:    1.   Continue BiPAP p.r.n.  2.   Solu-Medrol 40 IV q.12.  3.   Doxycycline 100 mg q.12.  4.   Protonix 40 mg daily.  5.   Lasix 40 mg a day.  6.   DVT prophylaxis.  7.   CT angio of the chest.  8.   Obtain BMP.    Dictated By Bing Boyle  MD  d: 11/03/2024 10:08:20  t: 11/03/2024 13:26:53  [facility] [id number]  T/

## 2024-11-03 NOTE — ED INITIAL ASSESSMENT (HPI)
Pt c/o SOB that started 2 days ago but got worse. Pt has hx of COPD and usually on 3L at home. Pt given 2 albuterol tx in field.    Pt denies CP/N/V/D/Fever and cough

## 2024-11-03 NOTE — ED QUICK NOTES
Orders for admission, patient is aware of plan and ready to go upstairs. Any questions, please call ED RN simin at extension [phone].     Patient Covid vaccination status: Fully vaccinated     COVID Test Ordered in ED: None    COVID Suspicion at Admission: N/A    Running Infusions:    albuterol      albuterol          Mental Status/LOC at time of transport: A&Ox4    Other pertinent information: pt usually on 3L nasal canula at home. Pt currently on BiPap  CIWA score: N/A   NIH score:  N/A

## 2024-11-03 NOTE — CM/SW NOTE
11/03/24 1200   PERLITA/DREW Referral Data   Referral Source Physician;Social Work (self-referral)   Reason for Referral Discharge planning;Readmission   Informant EMR;Clinical Staff Member   Readmission Assessment   Factors that patient feels contributed to this readmission Acute/Chronic Clinical Presentation   Pt's living situation prior to admission? Home alone   Pt's level of independence at discharge? No assist/independent (minimal)   Pt. received education on diagnoses at time of discharge? Yes   Did you know who and how to call someone if you felt worse? Yes   Did any new symptoms or issues develop after you were discharged? Yes   ----Post D/C symptoms: Symptoms/issue related to previous hospitalization Yes   Did you understand your discharge instructions? Yes   Were medications taken as indicated on discharge instructions? Yes   Did you have a follow-up appointment scheduled at time of discharge? Yes   ----F/U appt: Did you go to that appointment? Yes   ---- F/U appt: How many days after discharge was follow-up appointment? 0-14 days   Was patient a candidate for: Home Health   ----Home Health Recommendation: Recommended, arranged   Was full assessment completed by DREW/PERLITA on prior admission? Yes   Was the recommended discharge plan achieved? Yes   Was pt. discharged w/out services? No   Medical Hx   Does patient have an established PCP? Yes  (Dr. Brenda Wilkerson)   Significant Past Medical/Mental Health Hx COPD; pulm edema   Patient Info   Advanced directives? No   Patient's Current Mental Status at Time of Assessment Alert;Oriented   Patient's Home Environment House   Number of Levels in Home 1   Number of Stair in Home 5 to enter   Patient lives with Alone   Patient Status Prior to Admission   Independent with ADLs and Mobility Yes   Services in place prior to admission Home Health Care;DME/Supplies at home   Home Health Provider Info [facility]   DME Provider/Supplier Shane   Type of DME/Supplies Rollator  Walker;Home Oxygen;Home Pulse Oximetry;Nebulizer   Discharge Needs   Anticipated D/C needs Home health care     SW received MD order for home health evaluation.    Pt is a 30 day readmission for COPD exacerbation.  Pt saw Dr. Boyle in office 10/21.  Pt currently on 7 L NC.    DREW performed EMR of case.    Pt is from home alone- independent with ADLs and mobility.  She uses a rollator PRN to conserve her energy (becomes SOB due to COPD).  Pt uses 2-3 L of O2 baseline ([employer] POC).  Pt has a nebulizer machine at home and monitors her O2 sats regularly.    Patient is current with [facility].  LEIDY entered on request of [facility] liaison Vanessa.    PT/OT to evaluate pt when medically cleared to work with them.    PLAN: TBD pending clinical course    / to remain available for support and/or discharge planning.     Liyah Sawant MSW, LSW [id number]

## 2024-11-03 NOTE — ED PROVIDER NOTES
Patient Seen in: [facility] Emergency Department    History     Chief Complaint   Patient presents with    Shortness Of Breath       HPI    The patient presents to the ED complaining of increasing shortness of breath for the past several days.  She states history of COPD and intermittently gets flares, especially in the fall time.  Was seen about a month ago for similar symptoms.  Denies any fevers, chills, changes in cough/sputum production or other new complaints other than the shortness of breath.  Usually on 3 L of oxygen at home, is changed to 4 L today due to the shortness of breath.    History reviewed.   Past Medical History:    A-fib ([facility])    Anesthesia complication    Arrhythmia    AFIB    Arthritis    Asthma ([facility])    Back problem    COPD (chronic obstructive pulmonary disease) ([facility])    Deviated nasal septum    nasal septoplasty, turb reduction, smr of turbs    Difficult intubation    fiber optic if needed    Diverticulitis    colonoscopy     Diverticulosis of large intestine    Esophageal reflux    Extrinsic asthma, unspecified    Headache    Heart disease    Hepatitis    WAS TOLD SHE HAS HAD THIS WHEN SHE WAS 19 YEARS OLD    High blood pressure    High cholesterol    Irregular heart rate    Lichenoid keratosis    left chest-bx    Osteoarthritis    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    Paronychia    (RT); onychomycosis; debridement    Problems with swallowing    occasionally    Shortness of breath    2 L NC ALL THE TIME 24HR/7 DAYS PER WEEK    Unspecified essential hypertension    Visual impairment       History reviewed.   Past Surgical History:   Procedure Laterality Date    Adenoidectomy      Cataract      cataract extraction     Hysterectomy      Sinus surgery        DEVIATED SEPTUM    T&a      Tonsillectomy           Medications :  Prescriptions Prior to Admission[1]     Family History   Problem Relation Age of Onset    Ovarian Cancer Mother 76        endometrial, poss ovarian  primary    Pulmonary Disease Sister         COPD    Skin cancer Other     Stroke Other     Other (Other) Other        Smoking Status:   Social History     Socioeconomic History    Marital status:    Tobacco Use    Smoking status: Former     Current packs/day: 0.00     Types: Cigarettes     Quit date: 1996     Years since quittin.8    Smokeless tobacco: Never   Vaping Use    Vaping status: Never Used   Substance and Sexual Activity    Alcohol use: Yes     Alcohol/week: 0.0 standard drinks of alcohol     Comment: one drink once a week    Drug use: Never   Other Topics Concern    Pt has a pacemaker No    Pt has a defibrillator No    Reaction to local anesthetic No    Caffeine Concern No       Constitutional and vital signs reviewed.      Social History and Family History elements reviewed from today, pertinent positives to the presenting problem noted.    Physical Exam     ED Triage Vitals   BP 24 141/54   Pulse 245 83   Resp 24 (!) 32   Temp 24 98.1 °F (36.7 °C)   Temp src 24 Oral   SpO2 24 92 %   O2 Device 24 Nasal cannula       All measures to prevent infection transmission during my interaction with the patient were taken. Handwashing was performed prior to and after the exam.  Stethoscope and any equipment used during my examination was cleaned with super sani-cloth germicidal wipes following the exam.     Physical Exam  Vitals and nursing note reviewed.   Constitutional:       General: She is not in acute distress.     Appearance: She is well-developed. She is not ill-appearing or toxic-appearing.   HENT:      Head: Normocephalic and atraumatic.   Eyes:      General:         Right eye: No discharge.         Left eye: No discharge.      Conjunctiva/sclera: Conjunctivae normal.   Neck:      Trachea: No tracheal deviation.   Cardiovascular:      Rate and Rhythm: Normal rate and regular rhythm.   Pulmonary:      Effort:  Pulmonary effort is normal. Tachypnea present. No bradypnea or respiratory distress.      Breath sounds: No stridor. Wheezing present.   Abdominal:      General: There is no distension.      Palpations: Abdomen is soft.   Musculoskeletal:         General: No deformity.   Skin:     General: Skin is warm and dry.   Neurological:      Mental Status: She is alert and oriented to person, place, and time.   Psychiatric:         Mood and Affect: Mood normal.         Behavior: Behavior normal.         ED Course        Labs Reviewed   RAINBOW DRAW LAVENDER   RAINBOW DRAW LIGHT GREEN   RAINBOW DRAW BLUE   RAINBOW DRAW GOLD     EKG    Rate, intervals and axes as noted on EKG Report.  Rate: Normal, 80 bpm  Rhythm: Sinus Rhythm  Reading: Normal intervals, normal ST segments, normal EKG           As Interpreted by me    Imaging Results Available and Reviewed while in ED: Chest x-ray 1 view  ED Medications Administered:   Medications   albuterol (Ventolin) (2.5 MG/3ML) 0.083% nebulizer solution 10 mg (10 mg Nebulization New Bag 11/2/24 2155)   predniSONE (Deltasone) tab 60 mg (60 mg Oral Given 11/2/24 2140)         MDM     Vitals:    11/02/24 2115 11/02/24 2145 11/02/24 2215   BP: 141/54 113/56 131/52   Pulse: 83 83 79   Resp: (!) 32 25 20   Temp: 98.1 °F (36.7 °C)     TempSrc: Oral     SpO2:  92% 97%   Weight: 74.4 kg       *I personally reviewed and interpreted all ED vitals.    Pulse Ox: 97%, Room air, Normal     Monitor Interpretation:   normal sinus rhythm as interpreted by me.  The cardiac monitor was ordered to monitor heart rate.    Differential Diagnosis/ Diagnostic Considerations: COPD exacerbation, respiratory failure, other    Complicating Factors: The patient already has does not have any pertinent problems on file. to contribute to the complexity of this ED evaluation.    Medical Decision Making  The patient presents to the ED with COPD exacerbation symptoms.  Mild respiratory distress arrival however patient  otherwise nondistressed.  Afebrile in the ED.  patient improve dramatically with an hour-long breathing treatment and oral steroids.  No wheezing on reexamination and patient would like to go home but requests a nebulizer machine for home as this seems to help better than her inhaler.  Possible infectious finding on x-ray imaging.  Will add doxycycline as well.  Recommended outpatient follow-up and return precautions.    Problems Addressed:  COPD exacerbation (HCC): chronic illness or injury with exacerbation, progression, or side effects of treatment that poses a threat to life or bodily functions    Amount and/or Complexity of Data Reviewed  Radiology: ordered and independent interpretation performed. Decision-making details documented in ED Course.     Details: I personally reviewed the patient's chest x-ray image and noted no pneumothorax  ECG/medicine tests: ordered and independent interpretation performed. Decision-making details documented in ED Course.    Risk  Prescription drug management.        Condition upon leaving the department: Stable    Disposition and Plan     Clinical Impression:  1. COPD exacerbation (HCC)        Disposition:  Discharge    Follow-up:  Brenda Wilkerson MD  [address] [id number] [phone]    Schedule an appointment as soon as possible for a visit in 3 day(s)        Medications Prescribed:  Current Discharge Medication List        START taking these medications    Details   albuterol (2.5 MG/3ML) 0.083% Inhalation Nebu Soln Take 3 mL (2.5 mg total) by nebulization every 4 (four) hours as needed for Wheezing or Shortness of Breath.  Qty: 50 each, Refills: 0      Doxycycline Monohydrate 100 MG Oral Cap Take 1 capsule (100 mg total) by mouth 2 (two) times daily for 10 days.  Qty: 20 capsule, Refills: 0                              [1] (Not in a hospital admission)

## 2024-11-03 NOTE — H&P
[facility]  part of [facility]    History & Physical    Yesenia Berkowitz Patient Status:  Inpatient    1940 MRN [id number]   Location [facility]5W Attending Obdulia Lovell,    Hosp Day # 0 PCP JO-ANN YANG MD     Date:  11/3/2024  Date of Admission:  2024    History provided by:patient  Chief Complaint:     Chief Complaint   Patient presents with    Shortness Of Breath       HPI:   Yesenia Berkowitz is a(n) 84 year old female. Pt has h/o copd on 3 L presents with sob and cough. She was here a month ago and treated for copd and pna. Pt was on bipap overnight but off this am. She denies fever or change in her cough. She did report cp upon arrival but better now. She received nebs, steroids and abx in er. ROS is neg for n/v, c/d, brbpr, melena, dysuria or hematuria.     Abg reveals respiratory acidosis with compensation.   Glucose 137  Wbc 7.1  Hb 12     EKG nsr     History     Past Medical History:    A-fib ([facility])    Anesthesia complication    Arrhythmia    AFIB    Arthritis    Asthma (HCC)    Back problem    COPD (chronic obstructive pulmonary disease) ([facility])    Deviated nasal septum    nasal septoplasty, turb reduction, smr of turbs    Difficult intubation    fiber optic if needed    Diverticulitis    colonoscopy     Diverticulosis of large intestine    Esophageal reflux    Extrinsic asthma, unspecified    Headache    Heart disease    Hepatitis    WAS TOLD SHE HAS HAD THIS WHEN SHE WAS 19 YEARS OLD    High blood pressure    High cholesterol    Irregular heart rate    Lichenoid keratosis    left chest-bx    Osteoarthritis    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    Paronychia    (RT); onychomycosis; debridement    Problems with swallowing    occasionally    Shortness of breath    2 L NC ALL THE TIME 24HR/7 DAYS PER WEEK    Unspecified essential hypertension    Visual impairment     Past Surgical History:   Procedure Laterality Date    Adenoidectomy      Cataract       cataract extraction     Hysterectomy      Sinus surgery        DEVIATED SEPTUM    T&a      Tonsillectomy       Family History   Problem Relation Age of Onset    Ovarian Cancer Mother 76        endometrial, poss ovarian primary    Pulmonary Disease Sister         COPD    Skin cancer Other     Stroke Other     Other (Other) Other      Social History:  Social History     Socioeconomic History    Marital status:    Tobacco Use    Smoking status: Former     Current packs/day: 0.00     Types: Cigarettes     Quit date: 1996     Years since quittin.8    Smokeless tobacco: Never   Vaping Use    Vaping status: Never Used   Substance and Sexual Activity    Alcohol use: Yes     Alcohol/week: 0.0 standard drinks of alcohol     Comment: one drink once a week    Drug use: Never   Other Topics Concern    Pt has a pacemaker No    Pt has a defibrillator No    Reaction to local anesthetic No    Caffeine Concern No     Social Drivers of Health     Food Insecurity: No Food Insecurity (11/3/2024)    Food Insecurity     Food Insecurity: Never true   Transportation Needs: No Transportation Needs (11/3/2024)    Transportation Needs     Lack of Transportation: No   Housing Stability: Low Risk  (11/3/2024)    Housing Stability     Housing Instability: No     Allergies/Medications:   Allergies: Allergies[1]  Medications Prior to Admission   Medication Sig    furosemide 40 MG Oral Tab Please alternate taking 40 mg twice per day with 40 mg once per day.    DIGOXIN 0.125 MG Oral Tab Take 1 tablet (125 mcg total) by mouth daily.    albuterol 108 (90 Base) MCG/ACT Inhalation Aero Soln Inhale 2 puffs into the lungs as needed.    dicyclomine 10 MG Oral Cap Take 1 capsule (10 mg total) by mouth 3 (three) times daily as needed (abdominal pain).    polyethylene glycol, PEG 3350-KCl-NaBcb-NaCl-NaSulf, 236 g Oral Recon Soln Take 4,000 mL by mouth As Directed. Take 2,000 mL the night before your procedure and 2,000 mL the morning of your  procedure. Take as directed by GI clinic. Okay to substitute for generic.    mupirocin 2 % External Ointment Apply 1 Application topically 3 (three) times daily.    triamcinolone 0.1 % External Cream Apply topically 2 (two) times daily. Apply bid as directed    estradiol 0.05 MG/24HR Transdermal Patch Weekly Place 1 patch onto the skin once a week. As directed.    PULMICORT FLEXHALER 180 MCG/ACT Inhalation Aerosol Powder, Breath Activated Inhale 2 puffs into the lungs daily.    dilTIAZem HCl ER Coated Beads 240 MG Oral Capsule SR 24 Hr Take 1 capsule (240mg)by mouth every morning on an empty stomach.    Calcium Carb-Cholecalciferol (CALCIUM + D3) 600-200 MG-UNIT Oral Tab Take 1 tablet by mouth daily.      Cholecalciferol (VITAMIN D) 1000 UNITS Oral Tab Take 1,000 Units by mouth 2 (two) times daily.    Potassium Chloride ER (K-DUR M20) 20 MEQ Oral Tab CR Take 1 tablet (20 mEq total) by mouth nightly.    lansoprazole (PREVACID) 30 MG Oral Capsule Delayed Release Take 1 capsule (30 mg total) by mouth nightly.    amitriptyline 50 MG Oral Tab Take 1 tablet (50 mg total) by mouth nightly.    aspirin 81 MG Oral Tab Take 2 tablets (162 mg total) by mouth daily.    Loratadine 10 MG Oral Cap Take 10 mg by mouth nightly.      montelukast 10 MG Oral Tab Take 1 tablet (10 mg total) by mouth nightly.    Multiple Vitamin (MULTI-VITAMINS) Oral Tab take 1 tablet by ORAL route  every day with food    omega-3 fatty acids (FISH OIL) 1000 MG Oral Cap Take 1,000 mg by mouth daily.    Tiotropium Bromide Monohydrate 18 MCG Inhalation Cap Inhale 1 capsule (18 mcg total) into the lungs nightly.    B Complex Oral Cap Take 1 tablet by mouth daily.    [] predniSONE 20 MG Oral Tab Take 2 tablets (40 mg total) by mouth daily for 5 days, THEN 1 tablet (20 mg total) daily for 5 days. 20mgx2 [id number], then 20mg [id number].    [] doxycycline 100 MG Oral Cap Take 1 capsule (100 mg total) by mouth every 12 (twelve) hours for 10 doses.        Review of Systems:   Constitutional: negative  Eyes: negative for blurry vision  Ears, nose, mouth, throat, and face: negative  Respiratory: negative for cough and dyspnea on exertion  Cardiovascular: negative for chest pain and dyspnea  Gastrointestinal: negative for constipation, diarrhea, melena, nausea and vomiting  Genitourinary:negative for dysuria and hematuria  Integument/breast: negative for rash  Hematologic/lymphatic: negative  Musculoskeletal: negative for back pain   Neurological: negative for dizziness and headaches  Behavioral/Psych: negative for depression  Allergic/Immunologic: negative    Physical Exam:   Vital Signs:  Blood pressure 132/62, pulse 91, temperature 97.6 °F (36.4 °C), temperature source Oral, resp. rate 19, weight 161 lb 14.4 oz (73.4 kg), SpO2 96%.     General appearance: alert, appears stated age and cooperative  Head: Normocephalic, without obvious abnormality, atraumatic  Eyes: conjunctivae/corneas clear. PERRL, EOM's intact. Fundi benign.  Nose: Nares normal. Septum midline. Mucosa normal. No drainage or sinus tenderness.  Throat: lips, mucosa, and tongue normal; teeth and gums normal  Neck: no adenopathy, no carotid bruit, no JVD, supple, symmetrical, trachea midline and thyroid not enlarged, symmetric, no tenderness/mass/nodules  Back: symmetric, no curvature. ROM normal. No CVA tenderness.  Pulmonary: crackles at bases   Cardiovascular: S1, S2 normal, no murmur, click, rub or gallop, regular rate and rhythm  Abdominal: soft, non-tender; bowel sounds normal; no masses,  no organomegaly  Extremities:b/l edema   Pulses: 2+ and symmetric  Skin: Skin color, texture, turgor normal. No rashes or lesions  Neurologic: Alert and oriented X 3, normal strength and tone. Normal symmetric reflexes.   Psychiatric: calm      Cervical Papanicolaou contraindicated    Results:     Lab Results   Component Value Date    WBC 7.1 11/03/2024    HGB 12.0 11/03/2024    HCT 39.2 11/03/2024    PLT  247.0 11/03/2024    CREATSERUM 0.75 11/02/2024    BUN 14 11/03/2024     11/02/2024    K 4.2 11/03/2024     11/02/2024    CO2 >40.0 (HH) 11/02/2024     (H) 11/02/2024    CA 9.1 11/02/2024    ALB 2.8 (L) 08/30/2024    ALKPHO 48 (L) 08/30/2024    BILT 0.2 08/30/2024    TP 4.8 (L) 08/30/2024    AST 15 08/30/2024    ALT 22 08/30/2024    PTT 30.1 03/14/2023    INR 1.12 03/14/2023    TSH 1.060 01/06/2023    LIP 30 08/30/2024    DDIMER 0.63 10/07/2024    MG 2.0 09/18/2024    PHOS 4.0 09/18/2024    TROP <0.045 02/03/2020       XR CHEST AP PORTABLE  (CPT=71045)    Result Date: 11/3/2024  CONCLUSION:   Increased right basilar opacity with associated small pleural effusion, may represent atelectasis and/or pneumonia.  Slightly decreased small left pleural effusion with left basilar opacity.  Background of coarsened increased interstitial markings bilaterally, may represent a component of pulmonary fibrosis.    Dictated by (CST): Loreto Rain MD on 11/03/2024 at 8:31 AM     Finalized by (CST): Loreto Rain MD on 11/03/2024 at 8:33 AM         EKG 12 Lead    Result Date: 11/3/2024  Normal sinus rhythm Normal ECG When compared with ECG of 07-OCT-2024 11:58, No significant change was found     Assessment/Plan:       Acute on chronic respiratory failure with hypoxia and hypercapnia - likely multifactorial from copd exacerbation, chf and possible pna   - cont iv abx ; recent admit in October   - cont nebs and steroids   - consult pulm Dr Boyle notified in ER   - may need thoracentesis   - will order lasix 20 iv x 1   - use bipap as needed  - cont singulair     Afib with controlled rate - cont diltiazem and dig, monitor on tele     Chf with h/o diastolic dysfunction and pulmonary htn   - lasix as above   - strict I/o and daily wts   - last echo with normal EF in sep, pulm arteries sys pressure increased    Gerd - cont ppi               DVT PPX: heparin       60 minutes spent on this admission - examining  patient, obtaining history, reviewing previous medical records, going over test results/imaging and discussing plan of care. All questions answered.       Obdulia Lovell, DO  11/3/2024  **Certification      PHYSICIAN Certification of Need for Inpatient Hospitalization - Initial Certification    Patient will require inpatient services that will reasonably be expected to span two midnight's based on the clinical documentation in H+P.   Based on patients current state of illness, I anticipate that, after discharge, patient will require TBD.               [1]   Allergies  Allergen Reactions    Penicillin G ANAPHYLAXIS    Azithromycin DIARRHEA and NAUSEA AND VOMITING    Cefuroxime UNKNOWN     Other reaction(s): Vomitting    Levofloxacin UNKNOWN     Other reaction(s): LEVOFLOXACIN    Penicillins      Other reaction(s): Unknown

## 2024-11-03 NOTE — PLAN OF CARE
Problem: PAIN - ADULT  Goal: Verbalizes/displays adequate comfort level or patient's stated pain goal  Description: INTERVENTIONS:  - Encourage pt to monitor pain and request assistance  - Assess pain using appropriate pain scale  - Administer analgesics based on type and severity of pain and evaluate response  - Implement non-pharmacological measures as appropriate and evaluate response  - Consider cultural and social influences on pain and pain management  - Manage/alleviate anxiety  - Utilize distraction and/or relaxation techniques  - Monitor for opioid side effects  - Notify MD/LIP if interventions unsuccessful or patient reports new pain  - Anticipate increased pain with activity and pre-medicate as appropriate  Outcome: Progressing     Problem: SAFETY ADULT - FALL  Goal: Free from fall injury  Description: INTERVENTIONS:  - Assess pt frequently for physical needs  - Identify cognitive and physical deficits and behaviors that affect risk of falls.  - [place] fall precautions as indicated by assessment.  - Educate pt/family on patient safety including physical limitations  - Instruct pt to call for assistance with activity based on assessment  - Modify environment to reduce risk of injury  - Provide assistive devices as appropriate  - Consider OT/PT consult to assist with strengthening/mobility  - Encourage toileting schedule  Outcome: Progressing     Problem: RESPIRATORY - ADULT  Goal: Achieves optimal ventilation and oxygenation  Description: INTERVENTIONS:  - Assess for changes in respiratory status  - Assess for changes in mentation and behavior  - Position to facilitate oxygenation and minimize respiratory effort  - Oxygen supplementation based on oxygen saturation or ABGs  - Provide Smoking Cessation handout, if applicable  - Encourage broncho-pulmonary hygiene including cough, deep breathe, Incentive Spirometry  - Assess the need for suctioning and perform as needed  - Assess and instruct to report  SOB or any respiratory difficulty  - Respiratory Therapy support as indicated  - Manage/alleviate anxiety  - Monitor for signs/symptoms of CO2 retention  Outcome: Progressing     Patient on 5 L high flow nasal cannula at rest and up to 7 L with activity. Went to CT scan for rule out PE today.  On remote telemetry monitoring. Tolerating regular diet.   Safety precautions in place, frequent nursing rounding completed, call light within reach. All questions answered and needs met.

## 2024-11-03 NOTE — DISCHARGE INSTRUCTIONS
Resume services with [facility]  [phone]  Ok to go home  Home o2 3l nc     Medication List        START taking these medications      acetaminophen 500 MG Tabs  Commonly known as: Tylenol Extra Strength     benzonatate 200 MG Caps  Commonly known as: Tessalon  Take 1 capsule (200 mg total) by mouth 3 (three) times daily as needed for cough.     Doxycycline Monohydrate 100 MG Caps  Commonly known as: MONODOX  Take 1 capsule (100 mg total) by mouth 2 (two) times daily for 10 days.     guaiFENesin 100 MG/5 ML  Commonly known as: Robitussin  Take 10 mL (200 mg total) by mouth every 4 (four) hours as needed.            CHANGE how you take these medications      * albuterol 108 (90 Base) MCG/ACT Aers  Commonly known as: Ventolin HFA  What changed: Another medication with the same name was added. Make sure you understand how and when to take each.     * albuterol (2.5 MG/3ML) 0.083% Nebu  Commonly known as: Ventolin  Take 3 mL (2.5 mg total) by nebulization every 4 (four) hours as needed for Wheezing or Shortness of Breath.  What changed: You were already taking a medication with the same name, and this prescription was added. Make sure you understand how and when to take each.     * doxycycline 100 MG Caps  Commonly known as: Vibramycin  Take 1 capsule (100 mg total) by mouth every 12 (twelve) hours for 10 doses.  What changed: Another medication with the same name was added. Make sure you understand how and when to take each.     * doxycycline 100 MG Caps  Commonly known as: Vibramycin  Take 1 capsule (100 mg total) by mouth every 12 (twelve) hours for 10 doses.  What changed: You were already taking a medication with the same name, and this prescription was added. Make sure you understand how and when to take each.     predniSONE 20 MG Tabs  Commonly known as: Deltasone  Take 1 tablet (20 mg total) by mouth daily for 10 days.  What changed: See the new instructions.           * This list has 4  medication(s) that are the same as other medications prescribed for you. Read the directions carefully, and ask your doctor or other care provider to review them with you.                CONTINUE taking these medications      amitriptyline 50 MG Tabs  Commonly known as: Elavil     aspirin 81 MG Tabs     B Complex Caps     Calcium + D3 600-200 MG-UNIT Tabs     dicyclomine 10 MG Caps  Commonly known as: Bentyl  Take 1 capsule (10 mg total) by mouth 3 (three) times daily as needed (abdominal pain).     digoxin 0.125 MG Tabs  Commonly known as: Lanoxin  Take 1 tablet (125 mcg total) by mouth daily.     dilTIAZem  MG Cp24  Commonly known as: CardIZEM CD     estradiol 0.05 MG/24HR Ptwk  Commonly known as: Climara     furosemide 40 MG Tabs  Commonly known as: Lasix  Please alternate taking 40 mg twice per day with 40 mg once per day.     lansoprazole 30 MG Cpdr  Commonly known as: Prevacid     Loratadine 10 MG Caps     montelukast 10 MG Tabs  Commonly known as: Singulair     mupirocin 2 % Oint  Commonly known as: Bactroban  Apply 1 Application topically 3 (three) times daily.     omega-3 fatty acids 1000 MG Caps  Commonly known as: Fish Oil     polyethylene glycol (PEG 3350-KCl-NaBcb-NaCl-NaSulf) 236 g Solr  Commonly known as: Golytely  Take 4,000 mL by mouth As Directed. Take 2,000 mL the night before your procedure and 2,000 mL the morning of your procedure. Take as directed by GI clinic. Okay to substitute for generic.     potassium chloride 20 MEQ Tbcr  Commonly known as: Klor-Con M20     Pulmicort Flexhaler 180 MCG/ACT Aepb  Generic drug: Budesonide     THERA/BETA-CAROTENE Tabs     tiotropium 18 MCG Caps  Commonly known as: Spiriva Handihaler     triamcinolone 0.1 % Crea  Commonly known as: Kenalog  Apply topically 2 (two) times daily. Apply bid as directed     Vitamin D 1000 units Tabs               Where to Get Your Medications        These medications were sent to [id number] - [place], [place]  - [address] AT [facility], [phone], [phone]  [address], [facility] [address]      Hours: 24-hours Phone: [phone]   albuterol (2.5 MG/3ML) 0.083% Nebu  benzonatate 200 MG Caps  doxycycline 100 MG Caps  doxycycline 100 MG Caps  Doxycycline Monohydrate 100 MG Caps  guaiFENesin 100 MG/5 ML  predniSONE 20 MG Tabs

## 2024-11-03 NOTE — ED PROVIDER NOTES
Patient with COPD, signed out with plan to go home with her nebulizer machine after nebulizer treatment in the ER completed.  The ER nurse found me and stated the patient seemed to be doing worse after the treatment, shortness of breath, hypoxia on her home O2 3 L, 80%.  Patient reported not feeling well.  Lungs with rhonchi and wheezing bilaterally.  ABG obtained and shows compensated normal pH but with elevated pCO2 79.  Patient placed on BiPAP and is feeling better.  Will admit.  On review of chest x-ray there is a concern for pneumonia, she has had this in the past.  Will start on doxycycline.  Labs ordered and pending.    Discussed with Dr. Pitts - will admit  Discussed with Dr. Boyle -will consult, advises steroids, nebs, BiPAP, doxycycline antibiotic.

## 2024-11-03 NOTE — ED QUICK NOTES
Pt being admitted to 518. Report given to JIMI Joyce. Pt and family are aware of admission and have no further questions at this time. Pt A&Ox4, stable and vss

## 2024-11-03 NOTE — PLAN OF CARE
Pt A&Ox4,  bedside, pt currently on continuous BIPAP. Pt wants to remove BIPAP by morning, verified with MD to place diet order when pt is no longer able to tolerate BIPAP, will place on HFNC and titrate accordingly. Pt calls appropriately, call light within reach. Pt is normally self care, calling for assistance at this time, does not want purewick. Wearing brief at this time. Tylenol flagged as an allergy related to zithro pt has been able to tolerate tylenol, notified MD & verified order.     Pt placed on 4L HFNC requested increase to 5L HFNC, >90% .      Problem: Patient Centered Care  Goal: Patient preferences are identified and integrated in the patient's plan of care  Description: Interventions:  - What would you like us to know as we care for you? From home w/ family  - Provide timely, complete, and accurate information to patient/family  - Incorporate patient and family knowledge, values, beliefs, and cultural backgrounds into the planning and delivery of care  - Encourage patient/family to participate in care and decision-making at the level they choose  - Honor patient and family perspectives and choices  Outcome: Progressing     Problem: Patient/Family Goals  Goal: Patient/Family Long Term Goal  Description: Patient's Long Term Goal: free from PNA    Interventions:  - follow POC & regimen   - See additional Care Plan goals for specific interventions  Outcome: Progressing  Goal: Patient/Family Short Term Goal  Description: Patient's Short Term Goal: free from BIPAP    Interventions:   - follow POC & regimen   - See additional Care Plan goals for specific interventions  Outcome: Progressing

## 2024-11-04 LAB
BASOPHILS # BLD AUTO: 0.01 X10(3) UL (ref 0–0.2)
BASOPHILS NFR BLD AUTO: 0.1 %
BUN BLD-MCNC: 20 MG/DL (ref 9–23)
BUN/CREAT SERPL: 31.7 (ref 10–20)
CALCIUM BLD-MCNC: 9.1 MG/DL (ref 8.7–10.4)
CHLORIDE SERPL-SCNC: 100 MMOL/L (ref 98–112)
CO2 SERPL-SCNC: >40 MMOL/L (ref 21–32)
CREAT BLD-MCNC: 0.63 MG/DL
DEPRECATED RDW RBC AUTO: 49.6 FL (ref 35.1–46.3)
EGFRCR SERPLBLD CKD-EPI 2021: 89 ML/MIN/1.73M2 (ref 60–?)
EOSINOPHIL # BLD AUTO: 0 X10(3) UL (ref 0–0.7)
EOSINOPHIL NFR BLD AUTO: 0 %
ERYTHROCYTE [DISTWIDTH] IN BLOOD BY AUTOMATED COUNT: 13.8 % (ref 11–15)
GLUCOSE BLD-MCNC: 169 MG/DL (ref 70–99)
HCT VFR BLD AUTO: 36.8 %
HGB BLD-MCNC: 11.4 G/DL
IMM GRANULOCYTES # BLD AUTO: 0.06 X10(3) UL (ref 0–1)
IMM GRANULOCYTES NFR BLD: 0.7 %
LYMPHOCYTES # BLD AUTO: 0.35 X10(3) UL (ref 1–4)
LYMPHOCYTES NFR BLD AUTO: 4 %
MCH RBC QN AUTO: 30.2 PG (ref 26–34)
MCHC RBC AUTO-ENTMCNC: 31 G/DL (ref 31–37)
MCV RBC AUTO: 97.4 FL
MONOCYTES # BLD AUTO: 0.12 X10(3) UL (ref 0.1–1)
MONOCYTES NFR BLD AUTO: 1.4 %
NEUTROPHILS # BLD AUTO: 8.22 X10 (3) UL (ref 1.5–7.7)
NEUTROPHILS # BLD AUTO: 8.22 X10(3) UL (ref 1.5–7.7)
NEUTROPHILS NFR BLD AUTO: 93.8 %
OSMOLALITY SERPL CALC.SUM OF ELEC: 303 MOSM/KG (ref 275–295)
PLATELET # BLD AUTO: 220 10(3)UL (ref 150–450)
POTASSIUM SERPL-SCNC: 3.8 MMOL/L (ref 3.5–5.1)
RBC # BLD AUTO: 3.78 X10(6)UL
SODIUM SERPL-SCNC: 143 MMOL/L (ref 136–145)
WBC # BLD AUTO: 8.8 X10(3) UL (ref 4–11)

## 2024-11-04 PROCEDURE — 99233 SBSQ HOSP IP/OBS HIGH 50: CPT | Performed by: HOSPITALIST

## 2024-11-04 RX ORDER — METHYLPREDNISOLONE SODIUM SUCCINATE 40 MG/ML
40 INJECTION INTRAMUSCULAR; INTRAVENOUS DAILY
Status: DISCONTINUED | OUTPATIENT
Start: 2024-11-05 | End: 2024-11-07

## 2024-11-04 NOTE — PROGRESS NOTES
[facility]  part of [facility]    Progress Note    Yesenia Berkowitz Patient Status:  Inpatient    1940 MRN [id number]   Location [facility]5W Attending Obdulia Lovell,    Hosp Day # 1 PCP JO-ANN YANG MD       Subjective:   Yesenia Berkowitz is a(n) 84 year old female.   Less sob and cough  Objective:   Blood pressure 114/45, pulse 74, temperature 97.5 °F (36.4 °C), temperature source Oral, resp. rate 20, weight 165 lb 11.2 oz (75.2 kg), SpO2 96%.    HEENT: negative  Neck: no adenopathy, no carotid bruit, no JVD, supple, symmetrical, trachea midline and thyroid not enlarged, symmetric, no tenderness/mass/nodules  Pulmonary/Chest: diminished bs with dullness on the rt chest  Cardiovascular: S1, S2 normal, no S3 or S4, regular rate and rhythm, no murmur  Abdominal: normal findings: bowel sounds normal, soft, non-tender and no hepatosplenomegaly   Extremities: extremities normal, atraumatic, no cyanosis 2+ edema  Skin: Skin color, texture, turgor normal. No rashes or lesions    Results:     Lab Results   Component Value Date    WBC 8.8 2024    HGB 11.4 (L) 2024    HCT 36.8 2024    .0 2024    CREATSERUM 0.63 2024    BUN 20 2024     2024    K 3.8 2024     2024    CO2 >40.0 (HH) 2024     (H) 2024    CA 9.1 2024    ALB 2.8 (L) 2024    ALKPHO 48 (L) 2024    BILT 0.2 2024    TP 4.8 (L) 2024    AST 15 2024    ALT 22 2024    PTT 30.1 2023    INR 1.12 2023    TSH 1.060 2023    LIP 30 2024    DDIMER 0.63 10/07/2024    MG 2.0 2024    PHOS 4.0 2024    TROP <0.045 2020       CT CHEST PE AORTA (IV ONLY) (CPT=71260)    Result Date: 11/3/2024  CONCLUSION:   No evidence of acute pulmonary embolism to the level of the first order subsegmental pulmonary artery branches.  Chronic enlargement of the main pulmonary artery suggesting  pulmonary arterial hypertension, unchanged.  Unchanged cardiomegaly with interstitial and alveolar pulmonary edema.  Unchanged moderate right and small left pleural effusions.  Similar appearance of a 0.6 cm nodule along the posterior aspect of the left lower thyroid, may be an exophytic thyroid nodule, lymph node, or parathyroid finding.  Correlate with thyroid ultrasound on a nonemergent basis if not right previously performed  10 mm right middle lobe nodule, unchanged from most recent prior, but noted to have increased since 2023. Recommend further evaluation with FDG PET-CT when the patient is able to tolerate.        Dictated by (CST): Loreto Rain MD on 11/03/2024 at 12:57 PM     Finalized by (CST): Loreto Rain MD on 11/03/2024 at 1:04 PM          XR CHEST AP PORTABLE  (CPT=71045)    Result Date: 11/3/2024  CONCLUSION:   Increased right basilar opacity with associated small pleural effusion, may represent atelectasis and/or pneumonia.  Slightly decreased small left pleural effusion with left basilar opacity.  Background of coarsened increased interstitial markings bilaterally, may represent a component of pulmonary fibrosis.    Dictated by (CST): Loreto Rain MD on 11/03/2024 at 8:31 AM     Finalized by (CST): Loreto Rain MD on 11/03/2024 at 8:33 AM         EKG 12 Lead    Result Date: 11/3/2024  Normal sinus rhythm Normal ECG When compared with ECG of 07-OCT-2024 11:58, No significant change was found Confirmed by SAGAR BRIZUELA STANLEY ([id number]) on 11/3/2024 12:42:59 PM     Assessment and Plan:   Principal Problem:    COPD exacerbation (HCC)  Active Problems:    Pleural effusion    Community acquired pneumonia of right lower lobe of lung    Acute on chronic respiratory failure with hypoxia and hypercapnia (HCC)     Less sob and cough,continue doxycycilie,reduce solumedrol and rt thoracenthesis        ELVIA LIVINGSTON MD, MD  11/4/2024

## 2024-11-04 NOTE — PROGRESS NOTES
[facility]  part of [facility]    Progress Note    Yesenia Berkowitz Patient Status:  Inpatient    1940 MRN [id number]   Location [facility]5W Attending Obdulia Lovell,    Hosp Day # 1 PCP JO-ANN YANG MD     Chief complaint     Subjective:   Yesenia Berkowitz is a(n) 84 year old female Pt seen resting with bipap in place     ROS:   No cp, sob   No c/d   No n/v     Objective:   Blood pressure 114/45, pulse 74, temperature 97.5 °F (36.4 °C), temperature source Oral, resp. rate 20, weight 165 lb 11.2 oz (75.2 kg), SpO2 96%.      Intake/Output Summary (Last 24 hours) at 2024 1437  Last data filed at 2024 1323  Gross per 24 hour   Intake 200 ml   Output 1050 ml   Net -850 ml       Patient Weight(s) for the past 336 hrs:   Weight   24 0607 165 lb 11.2 oz (75.2 kg)   24 0443 165 lb 11.2 oz (75.2 kg)   24 0247 161 lb 14.4 oz (73.4 kg)   24 0118 161 lb 6.4 oz (73.2 kg)   24 2115 164 lb (74.4 kg)           General appearance: alert, appears stated age and cooperative  Pulmonary:  clear to auscultation bilaterally  Cardiovascular: S1, S2 normal, no murmur, click, rub or gallop, regular rate and rhythm  Abdominal: soft, non-tender; bowel sounds normal; no masses,  no organomegaly  Extremities: extremities normal, atraumatic, no cyanosis or edema        Medicines:     Current Facility-Administered Medications   Medication Dose Route Frequency    ondansetron (Zofran) 4 MG/2ML injection 4 mg  4 mg Intravenous Q6H PRN    heparin (Porcine) 5000 UNIT/ML injection 5,000 Units  5,000 Units Subcutaneous 2 times per day    acetaminophen (Tylenol Extra Strength) tab 500 mg  500 mg Oral Q4H PRN    polyethylene glycol (PEG 3350) (Miralax) 17 g oral packet 17 g  17 g Oral Daily PRN    sennosides (Senokot) tab 17.2 mg  17.2 mg Oral Nightly PRN    bisacodyl (Dulcolax) 10 MG rectal suppository 10 mg  10 mg Rectal Daily PRN    fleet enema (Fleet) rectal enema 133 mL  1  enema Rectal Once PRN    guaiFENesin (Robitussin) 100 MG/5 ML oral liquid 200 mg  200 mg Oral Q4H PRN    benzonatate (Tessalon) cap 200 mg  200 mg Oral TID PRN    ipratropium-albuterol (Duoneb) 0.5-2.5 (3) MG/3ML inhalation solution 3 mL  3 mL Nebulization Q4H PRN    influenza virus trivalent high dose PF (Fluzone HD) 0.5 mL injection (ages >/= 65 years) 0.5 mL  0.5 mL Intramuscular Prior to discharge    doxycycline (Vibramycin) cap 100 mg  100 mg Oral q12h    amitriptyline (Elavil) tab 50 mg  50 mg Oral Nightly    multivitamin (Tab-A-Morteza/Beta Carotene) tab 1 tablet  1 tablet Oral Daily    digoxin (Lanoxin) tab 125 mcg  125 mcg Oral Daily    pantoprazole (Protonix) DR tab 40 mg  40 mg Oral QAM AC    cetirizine (ZyrTEC) tab 5 mg  5 mg Oral Daily    montelukast (Singulair) tab 10 mg  10 mg Oral Nightly    cefTRIAXone (Rocephin) 2 g in sodium chloride 0.9% 100 mL IVPB-ADDV  2 g Intravenous Q24H    dilTIAZem ER (CardIZEM CD) 24 hr cap 360 mg  360 mg Oral Daily    methylPREDNISolone sodium succinate (Solu-MEDROL) injection 40 mg  40 mg Intravenous Q12H    furosemide (Lasix) tab 40 mg  40 mg Oral Daily    aspirin DR tab 162 mg  162 mg Oral Daily           Ant-Infective Medications            Doxycycline Monohydrate 100 MG Oral Cap    doxycycline 100 MG Oral Cap ()                Blood Pressure and Cardiac Medications            DIGOXIN 0.125 MG Oral Tab    dilTIAZem HCl ER Coated Beads 240 MG Oral Capsule SR 24 Hr                    Lab Results   Component Value Date    WBC 8.8 2024    HGB 11.4 (L) 2024    HCT 36.8 2024    .0 2024    CREATSERUM 0.63 2024    BUN 20 2024     2024    K 3.8 2024     2024    CO2 >40.0 (HH) 2024     (H) 2024    CA 9.1 2024    ALB 2.8 (L) 2024    ALKPHO 48 (L) 2024    BILT 0.2 2024    TP 4.8 (L) 2024    AST 15 2024    ALT 22 2024    PTT 30.1 2023     INR 1.12 03/14/2023    TSH 1.060 01/06/2023    LIP 30 08/30/2024    DDIMER 0.63 10/07/2024    MG 2.0 09/18/2024    PHOS 4.0 09/18/2024    TROP <0.045 02/03/2020       CT CHEST PE AORTA (IV ONLY) (CPT=71260)    Result Date: 11/3/2024  CONCLUSION:   No evidence of acute pulmonary embolism to the level of the first order subsegmental pulmonary artery branches.  Chronic enlargement of the main pulmonary artery suggesting pulmonary arterial hypertension, unchanged.  Unchanged cardiomegaly with interstitial and alveolar pulmonary edema.  Unchanged moderate right and small left pleural effusions.  Similar appearance of a 0.6 cm nodule along the posterior aspect of the left lower thyroid, may be an exophytic thyroid nodule, lymph node, or parathyroid finding.  Correlate with thyroid ultrasound on a nonemergent basis if not right previously performed  10 mm right middle lobe nodule, unchanged from most recent prior, but noted to have increased since 2023. Recommend further evaluation with FDG PET-CT when the patient is able to tolerate.        Dictated by (CST): Loreto Rain MD on 11/03/2024 at 12:57 PM     Finalized by (CST): Loreto Rain MD on 11/03/2024 at 1:04 PM          XR CHEST AP PORTABLE  (CPT=71045)    Result Date: 11/3/2024  CONCLUSION:   Increased right basilar opacity with associated small pleural effusion, may represent atelectasis and/or pneumonia.  Slightly decreased small left pleural effusion with left basilar opacity.  Background of coarsened increased interstitial markings bilaterally, may represent a component of pulmonary fibrosis.    Dictated by (CST): Loreto Rain MD on 11/03/2024 at 8:31 AM     Finalized by (CST): Loreto Rain MD on 11/03/2024 at 8:33 AM         EKG 12 Lead    Result Date: 11/3/2024  Normal sinus rhythm Normal ECG When compared with ECG of 07-OCT-2024 11:58, No significant change was found Confirmed by SAGAR BRIZUELA STANLEY ([id number]) on 11/3/2024 12:42:59 PM     Results:      CBC:    Lab Results   Component Value Date    WBC 8.8 11/04/2024    WBC 7.1 11/03/2024    WBC 8.5 11/02/2024     Lab Results   Component Value Date    HGB 11.4 (L) 11/04/2024    HGB 12.0 11/03/2024    HGB 13.2 11/02/2024      Lab Results   Component Value Date    .0 11/04/2024    .0 11/03/2024    .0 11/02/2024       Recent Labs   Lab 11/02/24  2119 11/03/24  0150 11/03/24  0827 11/04/24  0502   *  --  170* 169*   BUN  --  14 14 20   CREATSERUM 0.75  --  0.62 0.63   CA 9.1  --  9.1 9.1     --  145 143   K  --  4.2 3.9 3.8     --  102 100   CO2 >40.0*  --  >40.0* >40.0*           Assessment and Plan:        Acute on chronic respiratory failure with hypoxia and hypercapnia - likely multifactorial from copd exacerbation, chf and possible pna   - currently on 6 L. Try to wean. Goal 90-92%  - cont iv abx-ceftriaxone and doxy ; recent admit in October   - cont nebs and steroids   - consult pulm Dr Boyle notified in ER - apprec input - ct neg for pe    - cont home lasix   - use bipap as needed  - cont singulair      Afib with controlled rate - cont diltiazem and dig, monitor on tele      Chf with h/o diastolic dysfunction and pulmonary htn   - lasix as above   - strict I/o and daily wts   - last echo with normal EF in sep, pulm arteries sys pressure increased     Gerd - cont ppi            Pt/ot consult         DVT PPX: heparin            Obdulia Lovell DO  11/3/2024  **Certification                 Obdulia Lovell DO         Chart reviewed, including current vitals, notes, labs and imaging  Labs ordered and medications adjusted as outlined above  Coordinate care with care team/consultants  Discussed with patient results of tests, management plan as outlined above, and the need for ongoing hospitalization  D/w RN     [facility]        11/4/2024             Supplementary Documentation:   DVT Mechanical Prophylaxis:   SCDs,    DVT Pharmacologic Prophylaxis   Medication    heparin  (Porcine) 5000 UNIT/ML injection 5,000 Units         DVT Pharmacologic prophylaxis: Aspirin 81 mg      Code Status: Full Code  García: No urinary catheter in place  García Duration (in days):   Central line:    ANGEL:

## 2024-11-04 NOTE — CDS QUERY
.  Dear Dr. Lovell  Clinical information (provided below) includes diagnosis of Heart Failure.   Please clarify the status of CHF.     (  x  ) Acute on Chronic Diastolic Heart Failure   (    ) Chronic Diastolic Heart Failure    (    ) Other - please specify:    _____________________________________________________________________________  Clinical Indicators:   H&P: Chf with h/o diastolic dysfunction and pulmonary htn   last echo with normal EF in sep, pulm arteries sys pressure increased   Echo 9-:  Ejection fraction 65-70%   Treatment: Lasix 20mg IVP   x'1 on 11-3-2024, Lasix po 40mg daily  Risk factors: COPD   If you have any questions, please contact Clinical  Matilda Westfall RN CCDS  [phone]     Thank You!      THIS FORM IS A PERMANENT PART OF THE MEDICAL RECORD

## 2024-11-04 NOTE — CDS QUERY
Dear Dr. Lovell,  Please specify type of possible pneumonia in the progress notes:    (  )  Aspiration pneumonia                  Please document specific aspirate (food, liquids, etc.)    (x  )  Bacterial (specify organism)____________     (  ) Viral pneumonia    (  ) Other pneumonia (specify organism or type)____________________       2. Please specify the organism causing the pneumonia, if known     Note: CAP (Community-acquired), HAP (Hospital-acquired), and HCAP (Healthcare-associated) indicate where the pneumonia was acquired, not a specific type.   _______________________________________________________________________   Clinical Indicators:   H&P: Acute on chronic respiratory failure with hypoxia and hypercapnia - likely multifactorial from copd exacerbation, chf and possible pna     Dr. Boyle consult: 4.  Left phrenic nerve paralysis.  5.   Pneumonia and pleural effusion    11-3-2024 CXR: Increased right basilar opacity with associated small pleural effusion, may represent atelectasis and/or pneumonia.    Slightly decreased small left pleural effusion with left basilar opacity.   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     THIS FORM IS A PERMANENT PART OF THE MEDICAL RECORD

## 2024-11-04 NOTE — PLAN OF CARE
Problem: Patient Centered Care  Goal: Patient preferences are identified and integrated in the patient's plan of care  Description: Interventions:  - What would you like us to know as we care for you?   - Provide timely, complete, and accurate information to patient/family  - Incorporate patient and family knowledge, values, beliefs, and cultural backgrounds into the planning and delivery of care  - Encourage patient/family to participate in care and decision-making at the level they choose  - Honor patient and family perspectives and choices  Outcome: Progressing     Problem: Patient/Family Goals  Goal: Patient/Family Long Term Goal  Description: Patient's Long Term Goal: To be discharged home.     Interventions:  - IV antibiotics  IV steroids  -Neb treatment  -BiPAP  -Supplemental O2.   - See additional Care Plan goals for specific interventions  Outcome: Progressing  Goal: Patient/Family Short Term Goal  Description: Patient's Short Term Goal: To breathe better/     Interventions:   - IV steroids  -neb treatment  -BiPAP    - See additional Care Plan goals for specific interventions  Outcome: Progressing     Problem: PAIN - ADULT  Goal: Verbalizes/displays adequate comfort level or patient's stated pain goal  Description: INTERVENTIONS:  - Encourage pt to monitor pain and request assistance  - Assess pain using appropriate pain scale  - Administer analgesics based on type and severity of pain and evaluate response  - Implement non-pharmacological measures as appropriate and evaluate response  - Consider cultural and social influences on pain and pain management  - Manage/alleviate anxiety  - Utilize distraction and/or relaxation techniques  - Monitor for opioid side effects  - Notify MD/LIP if interventions unsuccessful or patient reports new pain  - Anticipate increased pain with activity and pre-medicate as appropriate  Outcome: Progressing     Problem: SAFETY ADULT - FALL  Goal: Free from fall  injury  Description: INTERVENTIONS:  - Assess pt frequently for physical needs  - Identify cognitive and physical deficits and behaviors that affect risk of falls.  - [place] fall precautions as indicated by assessment.  - Educate pt/family on patient safety including physical limitations  - Instruct pt to call for assistance with activity based on assessment  - Modify environment to reduce risk of injury  - Provide assistive devices as appropriate  - Consider OT/PT consult to assist with strengthening/mobility  - Encourage toileting schedule  Outcome: Progressing     Problem: RESPIRATORY - ADULT  Goal: Achieves optimal ventilation and oxygenation  Description: INTERVENTIONS:  - Assess for changes in respiratory status  - Assess for changes in mentation and behavior  - Position to facilitate oxygenation and minimize respiratory effort  - Oxygen supplementation based on oxygen saturation or ABGs  - Provide Smoking Cessation handout, if applicable  - Encourage broncho-pulmonary hygiene including cough, deep breathe, Incentive Spirometry  - Assess the need for suctioning and perform as needed  - Assess and instruct to report SOB or any respiratory difficulty  - Respiratory Therapy support as indicated  - Manage/alleviate anxiety  - Monitor for signs/symptoms of CO2 retention  Outcome: Progressing   Pt is A&Ox4. Pt was able to tolerated the continuous Bi-Pap for 2 hours. Pt is on 6L High flow nasal canula. She is able to ambulate within the room with increased o2. Pt was given IV antibiotics and PO lasix with no complaints of pain. Safety precaution in place, and call light within the pt's reach.

## 2024-11-04 NOTE — PLAN OF CARE
Pt alert and oriented, ambulated to the bathroom. On 4L of O2, tried the bipap for naps. Plan of care discussed with pt. Frequent rounding by staff, call light within reach.  Problem: Patient Centered Care  Goal: Patient preferences are identified and integrated in the patient's plan of care  Description: Interventions:  - What would you like us to know as we care for you?  - Provide timely, complete, and accurate information to patient/family  - Incorporate patient and family knowledge, values, beliefs, and cultural backgrounds into the planning and delivery of care  - Encourage patient/family to participate in care and decision-making at the level they choose  - Honor patient and family perspectives and choices  Outcome: Progressing     Problem: Patient/Family Goals  Goal: Patient/Family Long Term Goal  Description: Patient's Long Term Goal:    Interventions:  - See additional Care Plan goals for specific interventions  Outcome: Progressing  Goal: Patient/Family Short Term Goal  Description: Patient's Short Term Goal:    Interventions:   - See additional Care Plan goals for specific interventions  Outcome: Progressing     Problem: PAIN - ADULT  Goal: Verbalizes/displays adequate comfort level or patient's stated pain goal  Description: INTERVENTIONS:  - Encourage pt to monitor pain and request assistance  - Assess pain using appropriate pain scale  - Administer analgesics based on type and severity of pain and evaluate response  - Implement non-pharmacological measures as appropriate and evaluate response  - Consider cultural and social influences on pain and pain management  - Manage/alleviate anxiety  - Utilize distraction and/or relaxation techniques  - Monitor for opioid side effects  - Notify MD/LIP if interventions unsuccessful or patient reports new pain  - Anticipate increased pain with activity and pre-medicate as appropriate  Outcome: Progressing     Problem: SAFETY ADULT - FALL  Goal: Free from fall  injury  Description: INTERVENTIONS:  - Assess pt frequently for physical needs  - Identify cognitive and physical deficits and behaviors that affect risk of falls.  - [place] fall precautions as indicated by assessment.  - Educate pt/family on patient safety including physical limitations  - Instruct pt to call for assistance with activity based on assessment  - Modify environment to reduce risk of injury  - Provide assistive devices as appropriate  - Consider OT/PT consult to assist with strengthening/mobility  - Encourage toileting schedule  Outcome: Progressing     Problem: RESPIRATORY - ADULT  Goal: Achieves optimal ventilation and oxygenation  Description: INTERVENTIONS:  - Assess for changes in respiratory status  - Assess for changes in mentation and behavior  - Position to facilitate oxygenation and minimize respiratory effort  - Oxygen supplementation based on oxygen saturation or ABGs  - Provide Smoking Cessation handout, if applicable  - Encourage broncho-pulmonary hygiene including cough, deep breathe, Incentive Spirometry  - Assess the need for suctioning and perform as needed  - Assess and instruct to report SOB or any respiratory difficulty  - Respiratory Therapy support as indicated  - Manage/alleviate anxiety  - Monitor for signs/symptoms of CO2 retention  Outcome: Progressing

## 2024-11-05 ENCOUNTER — APPOINTMENT (OUTPATIENT)
Dept: GENERAL RADIOLOGY | Facility: HOSPITAL | Age: 84
End: 2024-11-05
Attending: INTERNAL MEDICINE
Payer: MEDICARE

## 2024-11-05 ENCOUNTER — APPOINTMENT (OUTPATIENT)
Dept: ULTRASOUND IMAGING | Facility: HOSPITAL | Age: 84
End: 2024-11-05
Attending: INTERNAL MEDICINE
Payer: MEDICARE

## 2024-11-05 LAB
BASOPHILS # BLD AUTO: 0.02 X10(3) UL (ref 0–0.2)
BASOPHILS NFR BLD AUTO: 0.2 %
BASOPHILS NFR PLR: 0 %
BUN BLD-MCNC: 26 MG/DL (ref 9–23)
BUN/CREAT SERPL: 35.1 (ref 10–20)
CALCIUM BLD-MCNC: 9.2 MG/DL (ref 8.7–10.4)
CHLORIDE SERPL-SCNC: 100 MMOL/L (ref 98–112)
CO2 SERPL-SCNC: >40 MMOL/L (ref 21–32)
CREAT BLD-MCNC: 0.74 MG/DL
DEPRECATED RDW RBC AUTO: 49.4 FL (ref 35.1–46.3)
EGFRCR SERPLBLD CKD-EPI 2021: 81 ML/MIN/1.73M2 (ref 60–?)
EOSINOPHIL # BLD AUTO: 0 X10(3) UL (ref 0–0.7)
EOSINOPHIL NFR BLD AUTO: 0 %
EOSINOPHIL NFR PLR: 0 %
ERYTHROCYTE [DISTWIDTH] IN BLOOD BY AUTOMATED COUNT: 13.8 % (ref 11–15)
GLUCOSE BLD-MCNC: 122 MG/DL (ref 70–99)
GLUCOSE PLR-MCNC: 139 MG/DL
HCT VFR BLD AUTO: 37.1 %
HGB BLD-MCNC: 12.1 G/DL
IMM GRANULOCYTES # BLD AUTO: 0.07 X10(3) UL (ref 0–1)
IMM GRANULOCYTES NFR BLD: 0.6 %
LDH FLD L TO P-CCNC: 74 U/L
LYMPHOCYTES # BLD AUTO: 1.03 X10(3) UL (ref 1–4)
LYMPHOCYTES NFR BLD AUTO: 9.5 %
LYMPHOCYTES NFR PLR: 95 %
MCH RBC QN AUTO: 31.7 PG (ref 26–34)
MCHC RBC AUTO-ENTMCNC: 32.6 G/DL (ref 31–37)
MCV RBC AUTO: 97.1 FL
MONOCYTES # BLD AUTO: 0.79 X10(3) UL (ref 0.1–1)
MONOCYTES NFR BLD AUTO: 7.3 %
MONOS+MACROS NFR PLR: 3 %
NEUTROPHILS # BLD AUTO: 8.89 X10 (3) UL (ref 1.5–7.7)
NEUTROPHILS # BLD AUTO: 8.89 X10(3) UL (ref 1.5–7.7)
NEUTROPHILS NFR BLD AUTO: 82.4 %
NEUTROPHILS NFR PLR: 2 %
OSMOLALITY SERPL CALC.SUM OF ELEC: 304 MOSM/KG (ref 275–295)
PLATELET # BLD AUTO: 246 10(3)UL (ref 150–450)
POTASSIUM SERPL-SCNC: 4.1 MMOL/L (ref 3.5–5.1)
PROT PLR-MCNC: 2.4 G/DL
RBC # BLD AUTO: 3.82 X10(6)UL
RBC # FLD: ABNORMAL /CUMM (ref ?–1)
SODIUM SERPL-SCNC: 144 MMOL/L (ref 136–145)
TOTAL CELLS COUNTED FLD: 100
TOTAL CELLS COUNTED PLR: 1673 /CUMM (ref ?–1)
WBC # BLD AUTO: 10.8 X10(3) UL (ref 4–11)
WBC # PLR: 1673 /CUMM

## 2024-11-05 PROCEDURE — 32555 ASPIRATE PLEURA W/ IMAGING: CPT | Performed by: INTERNAL MEDICINE

## 2024-11-05 PROCEDURE — 99233 SBSQ HOSP IP/OBS HIGH 50: CPT | Performed by: HOSPITALIST

## 2024-11-05 PROCEDURE — 0W993ZZ DRAINAGE OF RIGHT PLEURAL CAVITY, PERCUTANEOUS APPROACH: ICD-10-PCS | Performed by: INTERNAL MEDICINE

## 2024-11-05 NOTE — PLAN OF CARE
A&Ox4. Thoracentesis completed. Pt on 3L via high flow nasal cannula, tylenol provided as needed for pain. Frequent rounding by nursing staff. Safety precautions maintained/call light within reach.     Problem: Patient Centered Care  Goal: Patient preferences are identified and integrated in the patient's plan of care  Description: Interventions:  - What would you like us to know as we care for you?   - Provide timely, complete, and accurate information to patient/family  - Incorporate patient and family knowledge, values, beliefs, and cultural backgrounds into the planning and delivery of care  - Encourage patient/family to participate in care and decision-making at the level they choose  - Honor patient and family perspectives and choices  Outcome: Progressing     Problem: Patient/Family Goals  Goal: Patient/Family Long Term Goal  Description: Patient's Long Term Goal: Discharge    Interventions:  - Monitor vital signs  - Discharge planning  - Medical clearance  - See additional Care Plan goals for specific interventions  Outcome: Progressing  Goal: Patient/Family Short Term Goal  Description: Patient's Short Term Goal: Pain score 0    Interventions:   - Pain management  - See additional Care Plan goals for specific interventions  Outcome: Progressing     Problem: PAIN - ADULT  Goal: Verbalizes/displays adequate comfort level or patient's stated pain goal  Description: INTERVENTIONS:  - Encourage pt to monitor pain and request assistance  - Assess pain using appropriate pain scale  - Administer analgesics based on type and severity of pain and evaluate response  - Implement non-pharmacological measures as appropriate and evaluate response  - Consider cultural and social influences on pain and pain management  - Manage/alleviate anxiety  - Utilize distraction and/or relaxation techniques  - Monitor for opioid side effects  - Notify MD/LIP if interventions unsuccessful or patient reports new pain  - Anticipate  increased pain with activity and pre-medicate as appropriate  Outcome: Progressing     Problem: SAFETY ADULT - FALL  Goal: Free from fall injury  Description: INTERVENTIONS:  - Assess pt frequently for physical needs  - Identify cognitive and physical deficits and behaviors that affect risk of falls.  - [place] fall precautions as indicated by assessment.  - Educate pt/family on patient safety including physical limitations  - Instruct pt to call for assistance with activity based on assessment  - Modify environment to reduce risk of injury  - Provide assistive devices as appropriate  - Consider OT/PT consult to assist with strengthening/mobility  - Encourage toileting schedule  Outcome: Progressing     Problem: RESPIRATORY - ADULT  Goal: Achieves optimal ventilation and oxygenation  Description: INTERVENTIONS:  - Assess for changes in respiratory status  - Assess for changes in mentation and behavior  - Position to facilitate oxygenation and minimize respiratory effort  - Oxygen supplementation based on oxygen saturation or ABGs  - Provide Smoking Cessation handout, if applicable  - Encourage broncho-pulmonary hygiene including cough, deep breathe, Incentive Spirometry  - Assess the need for suctioning and perform as needed  - Assess and instruct to report SOB or any respiratory difficulty  - Respiratory Therapy support as indicated  - Manage/alleviate anxiety  - Monitor for signs/symptoms of CO2 retention  Outcome: Progressing

## 2024-11-05 NOTE — PROGRESS NOTES
[facility]  part of [facility]    Progress Note    Yesenia Berkowitz Patient Status:  Inpatient    1940 MRN [id number]   Location [facility]5W Attending Obdulia Lovell, DO   Hosp Day # 2 PCP JO-ANN YANG MD       Subjective:   Yesenia Berkowitz is a(n) 84 year old female.   Persistent sob,sl cough  Objective:   Blood pressure 124/54, pulse 72, temperature 97.5 °F (36.4 °C), resp. rate 18, weight 165 lb 11.2 oz (75.2 kg), SpO2 95%.    HEENT: negative  Neck: no adenopathy, no carotid bruit, no JVD, supple, symmetrical, trachea midline and thyroid not enlarged, symmetric, no tenderness/mass/nodules  Pulmonary/Chest: diminished BS with dullness on the rt chest  Cardiovascular: S1, S2 normal, no S3 or S4, regular rate and rhythm, no murmur  Abdominal: normal findings: bowel sounds normal, soft, non-tender and no hepatosplenomegaly   Extremities: extremities normal, atraumatic, no cyanosis or edema  Skin: Skin color, texture, turgor normal. No rashes or lesions    Results:     Lab Results   Component Value Date    WBC 10.8 2024    HGB 12.1 2024    HCT 37.1 2024    .0 2024    CREATSERUM 0.74 2024    BUN 26 (H) 2024     2024    K 4.1 2024     2024    CO2 >40.0 (HH) 2024     (H) 2024    CA 9.2 2024    ALB 2.8 (L) 2024    ALKPHO 48 (L) 2024    BILT 0.2 2024    TP 4.8 (L) 2024    AST 15 2024    ALT 22 2024    PTT 30.1 2023    INR 1.12 2023    TSH 1.060 2023    LIP 30 2024    DDIMER 0.63 10/07/2024    MG 2.0 2024    PHOS 4.0 2024    TROP <0.045 2020       US THORACENTESIS GUIDED RIGHT (CPT=32555)    Result Date: 2024  CONCLUSION:  1. Right pleural effusion.    Dictated by (CST): Bijan Orona MD on 2024 at 9:31 AM     Finalized by (CST): Bijan Orona MD on 2024 at 9:32 AM          CT CHEST PE AORTA (IV  ONLY) (CPT=71260)    Result Date: 11/3/2024  CONCLUSION:   No evidence of acute pulmonary embolism to the level of the first order subsegmental pulmonary artery branches.  Chronic enlargement of the main pulmonary artery suggesting pulmonary arterial hypertension, unchanged.  Unchanged cardiomegaly with interstitial and alveolar pulmonary edema.  Unchanged moderate right and small left pleural effusions.  Similar appearance of a 0.6 cm nodule along the posterior aspect of the left lower thyroid, may be an exophytic thyroid nodule, lymph node, or parathyroid finding.  Correlate with thyroid ultrasound on a nonemergent basis if not right previously performed  10 mm right middle lobe nodule, unchanged from most recent prior, but noted to have increased since 2023. Recommend further evaluation with FDG PET-CT when the patient is able to tolerate.        Dictated by (CST): Loreto Rain MD on 11/03/2024 at 12:57 PM     Finalized by (CST): Loreto Rain MD on 11/03/2024 at 1:04 PM               Assessment and Plan:   Principal Problem:    COPD exacerbation (HCC)  Active Problems:    Pleural effusion    Community acquired pneumonia of right lower lobe of lung    Acute on chronic respiratory failure with hypoxia and hypercapnia (HCC)     Persistwnt sob and sl cough,continue rocephin and doxycycline solumedrol. Ultrasound guided rt thoracenthesis        ELVIA LIVINGSTON MD, MD  11/5/2024

## 2024-11-05 NOTE — PROCEDURES
[facility]  Post Procedure Staging Note    Yesenia Berkowitz Patient Status:  Inpatient    1940 MRN [id number]   Location [facility]5W Attending Obdulia Lovell DO   Hosp Day # 2 PCP JO-ANN YANG MD       Procedure Date: 2024    Primary Surgeon: ELVIA LIVINGSTON MD, MD  Assistant Surgeon:   Surgical Assistant:     PROCEDURE:rt ultrasound guided thoracenthesis    Pre-Op Diagnosis: rt pleural effusion    Post-Op Diagnosis: same    Procedure Performed:U/S guided rt thoracenthesis    Post-Op Findings: 800ml serosanguinous fluid was removed    Complications: none    Condition: good      ELVIA LIVINGSTON MD, MD  2024  9:37 AM

## 2024-11-05 NOTE — PLAN OF CARE
Problem: Patient Centered Care  Goal: Patient preferences are identified and integrated in the patient's plan of care  Description: Interventions:  - What would you like us to know as we care for you? Home ind.  - Provide timely, complete, and accurate information to patient/family  - Incorporate patient and family knowledge, values, beliefs, and cultural backgrounds into the planning and delivery of care  - Encourage patient/family to participate in care and decision-making at the level they choose  - Honor patient and family perspectives and choices  Outcome: Progressing     Problem: PAIN - ADULT  Goal: Verbalizes/displays adequate comfort level or patient's stated pain goal  Description: INTERVENTIONS:  - Encourage pt to monitor pain and request assistance  - Assess pain using appropriate pain scale  - Administer analgesics based on type and severity of pain and evaluate response  - Implement non-pharmacological measures as appropriate and evaluate response  - Consider cultural and social influences on pain and pain management  - Manage/alleviate anxiety  - Utilize distraction and/or relaxation techniques  - Monitor for opioid side effects  - Notify MD/LIP if interventions unsuccessful or patient reports new pain  - Anticipate increased pain with activity and pre-medicate as appropriate  Outcome: Progressing     Problem: SAFETY ADULT - FALL  Goal: Free from fall injury  Description: INTERVENTIONS:  - Assess pt frequently for physical needs  - Identify cognitive and physical deficits and behaviors that affect risk of falls.  - [place] fall precautions as indicated by assessment.  - Educate pt/family on patient safety including physical limitations  - Instruct pt to call for assistance with activity based on assessment  - Modify environment to reduce risk of injury  - Provide assistive devices as appropriate  - Consider OT/PT consult to assist with strengthening/mobility  - Encourage toileting  schedule  Outcome: Progressing     Problem: RESPIRATORY - ADULT  Goal: Achieves optimal ventilation and oxygenation  Description: INTERVENTIONS:  - Assess for changes in respiratory status  - Assess for changes in mentation and behavior  - Position to facilitate oxygenation and minimize respiratory effort  - Oxygen supplementation based on oxygen saturation or ABGs  - Provide Smoking Cessation handout, if applicable  - Encourage broncho-pulmonary hygiene including cough, deep breathe, Incentive Spirometry  - Assess the need for suctioning and perform as needed  - Assess and instruct to report SOB or any respiratory difficulty  - Respiratory Therapy support as indicated  - Manage/alleviate anxiety  - Monitor for signs/symptoms of CO2 retention  Outcome: Progressing

## 2024-11-05 NOTE — PROGRESS NOTES
[facility]  part of [facility]    Progress Note    Yesenia Berkowitz Patient Status:  Inpatient    1940 MRN [id number]   Location [facility] Attending Obdulia Lovell,    Hosp Day # 2 PCP JO-ANN YANG MD     Chief complaint     Subjective:   Yesenia Berkowitz is a(n) 84 year old female Pt improved today after thoracentesis. 900 c c out     ROS:   sob  No c/d   No n/v     Objective:   Blood pressure 113/50, pulse 81, temperature 97.5 °F (36.4 °C), temperature source Oral, resp. rate 16, weight 165 lb 11.2 oz (75.2 kg), SpO2 90%.      Intake/Output Summary (Last 24 hours) at 2024 1518  Last data filed at 2024 1100  Gross per 24 hour   Intake 120 ml   Output 2300 ml   Net -2180 ml       Patient Weight(s) for the past 336 hrs:   Weight   24 0607 165 lb 11.2 oz (75.2 kg)   24 0443 165 lb 11.2 oz (75.2 kg)   24 0247 161 lb 14.4 oz (73.4 kg)   24 0118 161 lb 6.4 oz (73.2 kg)   24 2115 164 lb (74.4 kg)           General appearance: alert, appears stated age and cooperative  Pulmonary:  clear to auscultation bilaterally  Cardiovascular: S1, S2 normal, no murmur, click, rub or gallop, regular rate and rhythm  Abdominal: soft, non-tender; bowel sounds normal; no masses,  no organomegaly  Extremities: extremities normal, atraumatic, no cyanosis or edema        Medicines:     Current Facility-Administered Medications   Medication Dose Route Frequency    methylPREDNISolone sodium succinate (Solu-MEDROL) injection 40 mg  40 mg Intravenous Daily    ondansetron (Zofran) 4 MG/2ML injection 4 mg  4 mg Intravenous Q6H PRN    [Held by provider] heparin (Porcine) 5000 UNIT/ML injection 5,000 Units  5,000 Units Subcutaneous 2 times per day    acetaminophen (Tylenol Extra Strength) tab 500 mg  500 mg Oral Q4H PRN    polyethylene glycol (PEG 3350) (Miralax) 17 g oral packet 17 g  17 g Oral Daily PRN    sennosides (Senokot) tab 17.2 mg  17.2 mg Oral Nightly PRN     bisacodyl (Dulcolax) 10 MG rectal suppository 10 mg  10 mg Rectal Daily PRN    fleet enema (Fleet) rectal enema 133 mL  1 enema Rectal Once PRN    guaiFENesin (Robitussin) 100 MG/5 ML oral liquid 200 mg  200 mg Oral Q4H PRN    benzonatate (Tessalon) cap 200 mg  200 mg Oral TID PRN    ipratropium-albuterol (Duoneb) 0.5-2.5 (3) MG/3ML inhalation solution 3 mL  3 mL Nebulization Q4H PRN    influenza virus trivalent high dose PF (Fluzone HD) 0.5 mL injection (ages >/= 65 years) 0.5 mL  0.5 mL Intramuscular Prior to discharge    doxycycline (Vibramycin) cap 100 mg  100 mg Oral q12h    amitriptyline (Elavil) tab 50 mg  50 mg Oral Nightly    multivitamin (Tab-A-Morteza/Beta Carotene) tab 1 tablet  1 tablet Oral Daily    digoxin (Lanoxin) tab 125 mcg  125 mcg Oral Daily    pantoprazole (Protonix) DR tab 40 mg  40 mg Oral QAM AC    cetirizine (ZyrTEC) tab 5 mg  5 mg Oral Daily    montelukast (Singulair) tab 10 mg  10 mg Oral Nightly    cefTRIAXone (Rocephin) 2 g in sodium chloride 0.9% 100 mL IVPB-ADDV  2 g Intravenous Q24H    dilTIAZem ER (CardIZEM CD) 24 hr cap 360 mg  360 mg Oral Daily    furosemide (Lasix) tab 40 mg  40 mg Oral Daily    aspirin DR tab 162 mg  162 mg Oral Daily           Ant-Infective Medications            Doxycycline Monohydrate 100 MG Oral Cap    doxycycline 100 MG Oral Cap ()                Blood Pressure and Cardiac Medications            DIGOXIN 0.125 MG Oral Tab    dilTIAZem HCl ER Coated Beads 240 MG Oral Capsule SR 24 Hr                    Lab Results   Component Value Date    WBC 10.8 2024    HGB 12.1 2024    HCT 37.1 2024    .0 2024    CREATSERUM 0.74 2024    BUN 26 (H) 2024     2024    K 4.1 2024     2024    CO2 >40.0 (HH) 2024     (H) 2024    CA 9.2 2024    ALB 2.8 (L) 2024    ALKPHO 48 (L) 2024    BILT 0.2 2024    TP 4.8 (L) 2024    AST 15 2024    ALT 22  08/30/2024    PTT 30.1 03/14/2023    INR 1.12 03/14/2023    TSH 1.060 01/06/2023    LIP 30 08/30/2024    DDIMER 0.63 10/07/2024    MG 2.0 09/18/2024    PHOS 4.0 09/18/2024    TROP <0.045 02/03/2020       XR CHEST AP PORTABLE  (CPT=71045)    Result Date: 11/5/2024  CONCLUSION:   Post thoracentesis there is markedly decreased right pleural effusion.  No appreciable pneumothorax.   Persistent elevation of the left hemidiaphragm and small left pleural effusion.  Persistent pulmonary edema.     elm-remote   Dictated by (CST): Isaac Holley MD on 11/05/2024 at 1:39 PM     Finalized by (CST): Isaac Holley MD on 11/05/2024 at 1:45 PM          US THORACENTESIS GUIDED RIGHT (CPT=32555)    Result Date: 11/5/2024  CONCLUSION:  1. Right pleural effusion.    Dictated by (CST): Bijan Orona MD on 11/05/2024 at 9:31 AM     Finalized by (CST): Bijan Orona MD on 11/05/2024 at 9:32 AM               Results:     CBC:    Lab Results   Component Value Date    WBC 10.8 11/05/2024    WBC 8.8 11/04/2024    WBC 7.1 11/03/2024     Lab Results   Component Value Date    HGB 12.1 11/05/2024    HGB 11.4 (L) 11/04/2024    HGB 12.0 11/03/2024      Lab Results   Component Value Date    .0 11/05/2024    .0 11/04/2024    .0 11/03/2024       Recent Labs   Lab 11/03/24  0827 11/04/24  0502 11/05/24  0513   * 169* 122*   BUN 14 20 26*   CREATSERUM 0.62 0.63 0.74   CA 9.1 9.1 9.2    143 144   K 3.9 3.8 4.1    100 100   CO2 >40.0* >40.0* >40.0*           Assessment and Plan:        Acute on chronic respiratory failure with hypoxia and hypercapnia - likely multifactorial from copd exacerbation, chf and possible pna   - currently on 5 L. Try to wean. Goal 90-92%  - cont iv abx-ceftriaxone and doxy ; recent admit in October   - cont nebs and steroids   - consult pulm Dr Boyle notified in ER - apprec input - ct neg for pe  ; sp 900 c c out by thormarcelo today. Appears exudative   - cont home lasix   - use bipap as  needed  - cont singulair      Afib with controlled rate - cont diltiazem and dig, monitor on tele      Chf with h/o diastolic dysfunction and pulmonary htn   - lasix as above   - strict I/o and daily wts   - last echo with normal EF in sep, pulm arteries sys pressure increased     Gerd - cont ppi            Pt/ot consult         DVT PPX: heparin            Obdulia Lovell, DO Obdulia Lovell DO         Chart reviewed, including current vitals, notes, labs and imaging  Labs ordered and medications adjusted as outlined above  Coordinate care with care team/consultants  Discussed with patient results of tests, management plan as outlined above, and the need for ongoing hospitalization  D/w RN     [facility] high        11/4/2024             Supplementary Documentation:   DVT Mechanical Prophylaxis:   SCDs,    DVT Pharmacologic Prophylaxis   Medication    [Held by provider] heparin (Porcine) 5000 UNIT/ML injection 5,000 Units         DVT Pharmacologic prophylaxis: Aspirin 81 mg      Code Status: Full Code  García: No urinary catheter in place  García Duration (in days):   Central line:    ANGEL: 11/6/2024

## 2024-11-06 ENCOUNTER — APPOINTMENT (OUTPATIENT)
Dept: GENERAL RADIOLOGY | Facility: HOSPITAL | Age: 84
End: 2024-11-06
Attending: HOSPITALIST
Payer: MEDICARE

## 2024-11-06 PROCEDURE — 71045 X-RAY EXAM CHEST 1 VIEW: CPT | Performed by: HOSPITALIST

## 2024-11-06 PROCEDURE — 99233 SBSQ HOSP IP/OBS HIGH 50: CPT | Performed by: HOSPITALIST

## 2024-11-06 NOTE — PROGRESS NOTES
[facility]  part of [facility]    Progress Note    Yesenia Berkowitz Patient Status:  Inpatient    1940 MRN [id number]   Location [facility]5W Attending Rex Tabor,*   Hosp Day # 3 PCP JO-ANN YANG MD       Subjective:   Yesenia Berkowitz is a(n) 84 year old female.   Non productive cough,less sob  Objective:   Blood pressure 117/58, pulse 76, temperature 97.8 °F (36.6 °C), temperature source Oral, resp. rate 20, weight 165 lb 11.2 oz (75.2 kg), SpO2 96%.    HEENT: negative  Neck: no adenopathy, no carotid bruit, no JVD, supple, symmetrical, trachea midline and thyroid not enlarged, symmetric, no tenderness/mass/nodules  Pulmonary/Chest:  rhonchi,rales,diminished BS ,dullness left chest  Cardiovascular: S1, S2 normal, no S3 or S4, regular rate and rhythm, no murmur  Abdominal: normal findings: bowel sounds normal, soft, non-tender and no hepatosplenomegaly   Extremities: extremities normal, atraumatic, no cyanosis or edema  Skin: Skin color, texture, turgor normal. No rashes or lesions    Results:     Lab Results   Component Value Date    WBC 10.8 2024    HGB 12.1 2024    HCT 37.1 2024    .0 2024    CREATSERUM 0.74 2024    BUN 26 (H) 2024     2024    K 4.1 2024     2024    CO2 >40.0 (HH) 2024     (H) 2024    CA 9.2 2024    ALB 2.8 (L) 2024    ALKPHO 48 (L) 2024    BILT 0.2 2024    TP 4.8 (L) 2024    AST 15 2024    ALT 22 2024    PTT 30.1 2023    INR 1.12 2023    TSH 1.060 2023    LIP 30 2024    DDIMER 0.63 10/07/2024    MG 2.0 2024    PHOS 4.0 2024    TROP <0.045 2020       XR CHEST AP PORTABLE  (CPT=71045)    Result Date: 2024  CONCLUSION:   Post thoracentesis there is markedly decreased right pleural effusion.  No appreciable pneumothorax.   Persistent elevation of the left hemidiaphragm and  small left pleural effusion.  Persistent pulmonary edema.     elm-remote   Dictated by (CST): Isaac Holley MD on 11/05/2024 at 1:39 PM     Finalized by (CST): Isaac Holley MD on 11/05/2024 at 1:45 PM          US THORACENTESIS GUIDED RIGHT (CPT=32555)    Result Date: 11/5/2024  CONCLUSION:  1. Right pleural effusion.    Dictated by (CST): Bijan Orona MD on 11/05/2024 at 9:31 AM     Finalized by (CST): Bijan Orona MD on 11/05/2024 at 9:32 AM               Assessment and Plan:   Principal Problem:    COPD exacerbation (HCC)  Active Problems:    Pleural effusion    Community acquired pneumonia of right lower lobe of lung    Acute on chronic respiratory failure with hypoxia and hypercapnia (HCC)     Less sob and non productive cough,await thorcenthsis result,pleural fluid transudative,continue doxycycline solumedrol,lasix home am        ELVIA LIVINGSTON MD, MD  11/6/2024

## 2024-11-06 NOTE — PROGRESS NOTES
[facility]  part of [facility]    Progress Note    Yesenia Berkowitz Patient Status:  Inpatient    1940 MRN [id number]   Location [facility]5W Attending Rex Tabor,*   Hosp Day # 3 PCP JO-ANN YANG MD     Chief Complaint: left sided lower back pain    Subjective:     Constitutional:  Negative for appetite change, chills and diaphoresis.   HENT:  Positive for congestion.    Respiratory:  Positive for cough.    Cardiovascular:  Positive for chest pain.        Left side   Gastrointestinal:  Negative for abdominal pain.   Genitourinary:  Negative for dysuria.   Musculoskeletal:  Negative for joint pain.   Psychiatric/Behavioral:  Negative for confusion, sleep disturbance and decreased concentration. The patient is not nervous/anxious and is not hyperactive.        Objective:   Blood pressure 121/62, pulse 80, temperature 98.2 °F (36.8 °C), temperature source Oral, resp. rate 18, weight 170 lb 3.2 oz (77.2 kg), SpO2 92%.  Physical Exam  Vitals and nursing note reviewed.   Constitutional:       General: She is not in acute distress.     Appearance: She is not ill-appearing, toxic-appearing or diaphoretic.   Neurological:      Mental Status: She is alert.         Results:   Lab Results   Component Value Date    WBC 10.8 2024    HGB 12.1 2024    HCT 37.1 2024    .0 2024    CREATSERUM 0.74 2024    BUN 26 (H) 2024     2024    K 4.1 2024     2024    CO2 >40.0 (HH) 2024     (H) 2024    CA 9.2 2024    ALB 2.8 (L) 2024    ALKPHO 48 (L) 2024    BILT 0.2 2024    TP 4.8 (L) 2024    AST 15 2024    ALT 22 2024    PTT 30.1 2023    INR 1.12 2023    TSH 1.060 2023    LIP 30 2024    DDIMER 0.63 10/07/2024    MG 2.0 2024    PHOS 4.0 2024    TROP <0.045 2020    TROPHS 9 10/07/2024       XR CHEST AP PORTABLE   (CPT=71045)    Result Date: 11/5/2024  CONCLUSION:   Post thoracentesis there is markedly decreased right pleural effusion.  No appreciable pneumothorax.   Persistent elevation of the left hemidiaphragm and small left pleural effusion.  Persistent pulmonary edema.     elm-remote   Dictated by (CST): Isaac Holley MD on 11/05/2024 at 1:39 PM     Finalized by (CST): Isaac Holley MD on 11/05/2024 at 1:45 PM          US THORACENTESIS GUIDED RIGHT (CPT=32555)    Result Date: 11/5/2024  CONCLUSION:  1. Right pleural effusion.    Dictated by (CST): Bijan Orona MD on 11/05/2024 at 9:31 AM     Finalized by (CST): Bijan Orona MD on 11/05/2024 at 9:32 AM               Assessment & Plan:        Acute on chronic respiratory failure with hypoxia and hypercapnia - likely multifactorial from copd exacerbation, chf and possible pna   - currently on 3l her baseline  - cont iv abx-ceftriaxone and doxy ; recent admit in October   - cont nebs and steroids   - consult pulm Dr Boyle notified in ER - apprec input - ct neg for pe  ; sp 900 c c out by thorra Appears exudative   - cont home lasix   - use bipap as needed  - cont singulair     Left sided pain will recheck cxr     Afib with controlled rate - cont diltiazem and dig, monitor on tele      Acute on chronic Chf with h/o diastolic dysfunction and pulmonary htn   - lasix as above   - strict I/o and daily wts   - last echo with normal EF in sep, pulm arteries sys pressure increased     Gerd - cont ppi            Pt/ot consult         DVT PPX: heparin        Complex mdm; coordinating care with nurse and counseling pt about poss dc; check cxr        TEOFILO HOLBROOK MD

## 2024-11-06 NOTE — PLAN OF CARE
Pt alert and oriented, ambulated within the room and up to the bathroom. No complaints of pain, on 3L O2 which is baseline. Pt updated on plan of care. Frequent rounding by staff, call light within reach.   Problem: Patient Centered Care  Goal: Patient preferences are identified and integrated in the patient's plan of care  Description: Interventions:  - What would you like us to know as we care for you?  - Provide timely, complete, and accurate information to patient/family  - Incorporate patient and family knowledge, values, beliefs, and cultural backgrounds into the planning and delivery of care  - Encourage patient/family to participate in care and decision-making at the level they choose  - Honor patient and family perspectives and choices  Outcome: Progressing     Problem: Patient/Family Goals  Goal: Patient/Family Long Term Goal  Description: Patient's Long Term Goal:  Interventions:  - See additional Care Plan goals for specific interventions  Outcome: Progressing  Goal: Patient/Family Short Term Goal  Description: Patient's Short Term Goal:    Interventions:   - See additional Care Plan goals for specific interventions  Outcome: Progressing     Problem: PAIN - ADULT  Goal: Verbalizes/displays adequate comfort level or patient's stated pain goal  Description: INTERVENTIONS:  - Encourage pt to monitor pain and request assistance  - Assess pain using appropriate pain scale  - Administer analgesics based on type and severity of pain and evaluate response  - Implement non-pharmacological measures as appropriate and evaluate response  - Consider cultural and social influences on pain and pain management  - Manage/alleviate anxiety  - Utilize distraction and/or relaxation techniques  - Monitor for opioid side effects  - Notify MD/LIP if interventions unsuccessful or patient reports new pain  - Anticipate increased pain with activity and pre-medicate as appropriate  Outcome: Progressing     Problem: SAFETY ADULT -  FALL  Goal: Free from fall injury  Description: INTERVENTIONS:  - Assess pt frequently for physical needs  - Identify cognitive and physical deficits and behaviors that affect risk of falls.  - [place] fall precautions as indicated by assessment.  - Educate pt/family on patient safety including physical limitations  - Instruct pt to call for assistance with activity based on assessment  - Modify environment to reduce risk of injury  - Provide assistive devices as appropriate  - Consider OT/PT consult to assist with strengthening/mobility  - Encourage toileting schedule  Outcome: Progressing     Problem: RESPIRATORY - ADULT  Goal: Achieves optimal ventilation and oxygenation  Description: INTERVENTIONS:  - Assess for changes in respiratory status  - Assess for changes in mentation and behavior  - Position to facilitate oxygenation and minimize respiratory effort  - Oxygen supplementation based on oxygen saturation or ABGs  - Provide Smoking Cessation handout, if applicable  - Encourage broncho-pulmonary hygiene including cough, deep breathe, Incentive Spirometry  - Assess the need for suctioning and perform as needed  - Assess and instruct to report SOB or any respiratory difficulty  - Respiratory Therapy support as indicated  - Manage/alleviate anxiety  - Monitor for signs/symptoms of CO2 retention  Outcome: Progressing

## 2024-11-06 NOTE — PROGRESS NOTES
[facility]  part of [facility]    Progress Note    Yesenia Berkowitz Patient Status:  Inpatient    1940 MRN [id number]   Location [facility]5W Attending Rex Tabor,*   Hosp Day # 3 PCP JO-ANN YANG MD       Subjective:   Yesenia Berkowitz is a(n) 84 year old female.   Felt better,less son and chest pain  Objective:   Blood pressure 117/58, pulse 76, temperature 97.8 °F (36.6 °C), temperature source Oral, resp. rate 20, weight 165 lb 11.2 oz (75.2 kg), SpO2 96%.    HEENT: negative  Neck: no adenopathy, no carotid bruit, no JVD, supple, symmetrical, trachea midline and thyroid not enlarged, symmetric, no tenderness/mass/nodules  Pulmonary/Chest:  clear to auscultation bilaterally, no tenderness  Cardiovascular: S1, S2 normal, no S3 or S4, regular rate and rhythm, no murmur  Abdominal: normal findings: bowel sounds normal, soft, non-tender and no hepatosplenomegaly   Extremities: extremities normal, atraumatic, no cyanosis or edema  Skin: Skin color, texture, turgor normal. No rashes or lesions    Results:     Lab Results   Component Value Date    WBC 10.8 2024    HGB 12.1 2024    HCT 37.1 2024    .0 2024    CREATSERUM 0.74 2024    BUN 26 (H) 2024     2024    K 4.1 2024     2024    CO2 >40.0 (HH) 2024     (H) 2024    CA 9.2 2024    ALB 2.8 (L) 2024    ALKPHO 48 (L) 2024    BILT 0.2 2024    TP 4.8 (L) 2024    AST 15 2024    ALT 22 2024    PTT 30.1 2023    INR 1.12 2023    TSH 1.060 2023    LIP 30 2024    DDIMER 0.63 10/07/2024    MG 2.0 2024    PHOS 4.0 2024    TROP <0.045 2020       XR CHEST AP PORTABLE  (CPT=71045)    Result Date: 2024  CONCLUSION:   Post thoracentesis there is markedly decreased right pleural effusion.  No appreciable pneumothorax.   Persistent elevation of the left hemidiaphragm  and small left pleural effusion.  Persistent pulmonary edema.     elm-remote   Dictated by (CST): Isaac Holley MD on 11/05/2024 at 1:39 PM     Finalized by (CST): Isaac Holley MD on 11/05/2024 at 1:45 PM          US THORACENTESIS GUIDED RIGHT (CPT=32555)    Result Date: 11/5/2024  CONCLUSION:  1. Right pleural effusion.    Dictated by (CST): Bijan Orona MD on 11/05/2024 at 9:31 AM     Finalized by (CST): Bijan Orona MD on 11/05/2024 at 9:32 AM               Assessment and Plan:   Principal Problem:    COPD exacerbation (HCC)  Active Problems:    Pleural effusion    Community acquired pneumonia of right lower lobe of lung    Acute on chronic respiratory failure with hypoxia and hypercapnia (HCC)     Much better,home with julee LIVINGSTON MD, MD  11/6/2024

## 2024-11-06 NOTE — PLAN OF CARE
Problem: Patient Centered Care  Goal: Patient preferences are identified and integrated in the patient's plan of care  Description: Interventions:  - What would you like us to know as we care for you?   - Provide timely, complete, and accurate information to patient/family  - Incorporate patient and family knowledge, values, beliefs, and cultural backgrounds into the planning and delivery of care  - Encourage patient/family to participate in care and decision-making at the level they choose  - Honor patient and family perspectives and choices  Outcome: Progressing     Problem: Patient/Family Goals  Goal: Patient/Family Long Term Goal  Description: Patient's Long Term Goal:     Interventions:  -   - See additional Care Plan goals for specific interventions  Outcome: Progressing  Goal: Patient/Family Short Term Goal  Description: Patient's Short Term Goal:     Interventions:   -  - See additional Care Plan goals for specific interventions  Outcome: Progressing     Problem: PAIN - ADULT  Goal: Verbalizes/displays adequate comfort level or patient's stated pain goal  Description: INTERVENTIONS:  - Encourage pt to monitor pain and request assistance  - Assess pain using appropriate pain scale  - Administer analgesics based on type and severity of pain and evaluate response  - Implement non-pharmacological measures as appropriate and evaluate response  - Consider cultural and social influences on pain and pain management  - Manage/alleviate anxiety  - Utilize distraction and/or relaxation techniques  - Monitor for opioid side effects  - Notify MD/LIP if interventions unsuccessful or patient reports new pain  - Anticipate increased pain with activity and pre-medicate as appropriate  Outcome: Progressing     Problem: SAFETY ADULT - FALL  Goal: Free from fall injury  Description: INTERVENTIONS:  - Assess pt frequently for physical needs  - Identify cognitive and physical deficits and behaviors that affect risk of falls.  -  [place] fall precautions as indicated by assessment.  - Educate pt/family on patient safety including physical limitations  - Instruct pt to call for assistance with activity based on assessment  - Modify environment to reduce risk of injury  - Provide assistive devices as appropriate  - Consider OT/PT consult to assist with strengthening/mobility  - Encourage toileting schedule  Outcome: Progressing     Problem: RESPIRATORY - ADULT  Goal: Achieves optimal ventilation and oxygenation  Description: INTERVENTIONS:  - Assess for changes in respiratory status  - Assess for changes in mentation and behavior  - Position to facilitate oxygenation and minimize respiratory effort  - Oxygen supplementation based on oxygen saturation or ABGs  - Provide Smoking Cessation handout, if applicable  - Encourage broncho-pulmonary hygiene including cough, deep breathe, Incentive Spirometry  - Assess the need for suctioning and perform as needed  - Assess and instruct to report SOB or any respiratory difficulty  - Respiratory Therapy support as indicated  - Manage/alleviate anxiety  - Monitor for signs/symptoms of CO2 retention  Outcome: Progressing   No acute changes overnight, vital signs being monitored, frequent rounding by nursing staff, appropriate safety precautions in place, call light within reach.

## 2024-11-07 VITALS
TEMPERATURE: 98 F | BODY MASS INDEX: 29 KG/M2 | OXYGEN SATURATION: 97 % | WEIGHT: 171.38 LBS | RESPIRATION RATE: 20 BRPM | SYSTOLIC BLOOD PRESSURE: 135 MMHG | DIASTOLIC BLOOD PRESSURE: 69 MMHG | HEART RATE: 90 BPM

## 2024-11-07 LAB
BASOPHILS # BLD AUTO: 0.04 X10(3) UL (ref 0–0.2)
BASOPHILS NFR BLD AUTO: 0.4 %
BUN BLD-MCNC: 21 MG/DL (ref 9–23)
BUN/CREAT SERPL: 38.2 (ref 10–20)
CALCIUM BLD-MCNC: 9 MG/DL (ref 8.7–10.4)
CHLORIDE SERPL-SCNC: 101 MMOL/L (ref 98–112)
CO2 SERPL-SCNC: >40 MMOL/L (ref 21–32)
CREAT BLD-MCNC: 0.55 MG/DL
DEPRECATED RDW RBC AUTO: 48.5 FL (ref 35.1–46.3)
EGFRCR SERPLBLD CKD-EPI 2021: 91 ML/MIN/1.73M2 (ref 60–?)
EOSINOPHIL # BLD AUTO: 0 X10(3) UL (ref 0–0.7)
EOSINOPHIL NFR BLD AUTO: 0 %
ERYTHROCYTE [DISTWIDTH] IN BLOOD BY AUTOMATED COUNT: 13.9 % (ref 11–15)
GLUCOSE BLD-MCNC: 122 MG/DL (ref 70–99)
HCT VFR BLD AUTO: 39 %
HGB BLD-MCNC: 12.4 G/DL
IMM GRANULOCYTES # BLD AUTO: 0.11 X10(3) UL (ref 0–1)
IMM GRANULOCYTES NFR BLD: 1.1 %
LYMPHOCYTES # BLD AUTO: 1.34 X10(3) UL (ref 1–4)
LYMPHOCYTES NFR BLD AUTO: 12.8 %
MAGNESIUM SERPL-MCNC: 2 MG/DL (ref 1.6–2.6)
MCH RBC QN AUTO: 30.3 PG (ref 26–34)
MCHC RBC AUTO-ENTMCNC: 31.8 G/DL (ref 31–37)
MCV RBC AUTO: 95.4 FL
MONOCYTES # BLD AUTO: 0.86 X10(3) UL (ref 0.1–1)
MONOCYTES NFR BLD AUTO: 8.2 %
NEUTROPHILS # BLD AUTO: 8.11 X10 (3) UL (ref 1.5–7.7)
NEUTROPHILS # BLD AUTO: 8.11 X10(3) UL (ref 1.5–7.7)
NEUTROPHILS NFR BLD AUTO: 77.5 %
OSMOLALITY SERPL CALC.SUM OF ELEC: 296 MOSM/KG (ref 275–295)
PHOSPHATE SERPL-MCNC: 3.7 MG/DL (ref 2.4–5.1)
PLATELET # BLD AUTO: 257 10(3)UL (ref 150–450)
POTASSIUM SERPL-SCNC: 4.1 MMOL/L (ref 3.5–5.1)
RBC # BLD AUTO: 4.09 X10(6)UL
SODIUM SERPL-SCNC: 141 MMOL/L (ref 136–145)
WBC # BLD AUTO: 10.5 X10(3) UL (ref 4–11)

## 2024-11-07 PROCEDURE — 99239 HOSP IP/OBS DSCHRG MGMT >30: CPT | Performed by: HOSPITALIST

## 2024-11-07 RX ORDER — DOXYCYCLINE 100 MG/1
100 CAPSULE ORAL EVERY 12 HOURS
Qty: 10 CAPSULE | Refills: 0 | Status: SHIPPED | OUTPATIENT
Start: 2024-11-07 | End: 2024-11-07

## 2024-11-07 RX ORDER — DOXYCYCLINE 100 MG/1
100 CAPSULE ORAL EVERY 12 HOURS SCHEDULED
Qty: 10 CAPSULE | Refills: 0 | Status: SHIPPED | OUTPATIENT
Start: 2024-11-07 | End: 2024-11-07

## 2024-11-07 RX ORDER — BENZONATATE 200 MG/1
200 CAPSULE ORAL 3 TIMES DAILY PRN
Qty: 30 CAPSULE | Refills: 0 | Status: SHIPPED | OUTPATIENT
Start: 2024-11-07

## 2024-11-07 RX ORDER — DOXYCYCLINE 100 MG/1
100 CAPSULE ORAL 2 TIMES DAILY
Qty: 20 CAPSULE | Refills: 0 | Status: SHIPPED | OUTPATIENT
Start: 2024-11-07 | End: 2024-11-17

## 2024-11-07 RX ORDER — BENZONATATE 200 MG/1
200 CAPSULE ORAL 3 TIMES DAILY PRN
Qty: 30 CAPSULE | Refills: 0 | Status: SHIPPED | OUTPATIENT
Start: 2024-11-07 | End: 2024-11-07

## 2024-11-07 RX ORDER — ALBUTEROL SULFATE 0.83 MG/ML
2.5 SOLUTION RESPIRATORY (INHALATION) EVERY 4 HOURS PRN
Qty: 50 EACH | Refills: 0 | Status: SHIPPED | OUTPATIENT
Start: 2024-11-07 | End: 2024-11-07

## 2024-11-07 RX ORDER — PREDNISONE 20 MG/1
20 TABLET ORAL DAILY
Qty: 10 TABLET | Refills: 0 | Status: SHIPPED | OUTPATIENT
Start: 2024-11-07 | End: 2024-11-17

## 2024-11-07 RX ORDER — PREDNISONE 20 MG/1
20 TABLET ORAL DAILY
Qty: 10 TABLET | Refills: 0 | Status: SHIPPED | OUTPATIENT
Start: 2024-11-07 | End: 2024-11-07

## 2024-11-07 RX ORDER — ACETAMINOPHEN 500 MG
500 TABLET ORAL EVERY 6 HOURS PRN
Status: SHIPPED | COMMUNITY
Start: 2024-11-07

## 2024-11-07 RX ORDER — ALBUTEROL SULFATE 0.83 MG/ML
2.5 SOLUTION RESPIRATORY (INHALATION) EVERY 4 HOURS PRN
Qty: 50 EACH | Refills: 0 | Status: SHIPPED | OUTPATIENT
Start: 2024-11-07

## 2024-11-07 NOTE — PLAN OF CARE
Problem: Patient Centered Care  Goal: Patient preferences are identified and integrated in the patient's plan of care  Description: Interventions:  - What would you like us to know as we care for you? I am a retired RN.   - Provide timely, complete, and accurate information to patient/family  - Incorporate patient and family knowledge, values, beliefs, and cultural backgrounds into the planning and delivery of care  - Encourage patient/family to participate in care and decision-making at the level they choose  - Honor patient and family perspectives and choices  Outcome: Progressing     Problem: PAIN - ADULT  Goal: Verbalizes/displays adequate comfort level or patient's stated pain goal  Description: INTERVENTIONS:  - Encourage pt to monitor pain and request assistance  - Assess pain using appropriate pain scale  - Administer analgesics based on type and severity of pain and evaluate response  - Implement non-pharmacological measures as appropriate and evaluate response  - Consider cultural and social influences on pain and pain management  - Manage/alleviate anxiety  - Utilize distraction and/or relaxation techniques  - Monitor for opioid side effects  - Notify MD/LIP if interventions unsuccessful or patient reports new pain  - Anticipate increased pain with activity and pre-medicate as appropriate  Outcome: Progressing     Problem: SAFETY ADULT - FALL  Goal: Free from fall injury  Description: INTERVENTIONS:  - Assess pt frequently for physical needs  - Identify cognitive and physical deficits and behaviors that affect risk of falls.  - [place] fall precautions as indicated by assessment.  - Educate pt/family on patient safety including physical limitations  - Instruct pt to call for assistance with activity based on assessment  - Modify environment to reduce risk of injury  - Provide assistive devices as appropriate  - Consider OT/PT consult to assist with strengthening/mobility  - Encourage toileting  schedule  Outcome: Progressing     Problem: RESPIRATORY - ADULT  Goal: Achieves optimal ventilation and oxygenation  Description: INTERVENTIONS:  - Assess for changes in respiratory status  - Assess for changes in mentation and behavior  - Position to facilitate oxygenation and minimize respiratory effort  - Oxygen supplementation based on oxygen saturation or ABGs  - Provide Smoking Cessation handout, if applicable  - Encourage broncho-pulmonary hygiene including cough, deep breathe, Incentive Spirometry  - Assess the need for suctioning and perform as needed  - Assess and instruct to report SOB or any respiratory difficulty  - Respiratory Therapy support as indicated  - Manage/alleviate anxiety  - Monitor for signs/symptoms of CO2 retention  Outcome: Progressing   Pt is A& Ox4. Pt's was able to tolerated ambulating on 3L of O2, this is the pt's baseline. No Bi-Pap. Remote tele with pulse ox. Tylenol for pain. Vitals stable. Safety precautions in place and call light within the pt's reach.

## 2024-11-07 NOTE — PROGRESS NOTES
[facility]  part of [facility]    Progress Note    Yesenia Berkowitz Patient Status:  Inpatient    1940 MRN [id number]   Location [facility]W Attending Rex Tabor,*   Hosp Day # 4 PCP JO-ANN YANG MD       Subjective:   Yesenia Berkowitz is a(n) 84 year old female.   Less sob,c/c non productive cough  Objective:   Blood pressure 125/59, pulse 82, temperature 98 °F (36.7 °C), temperature source Oral, resp. rate 18, weight 171 lb 6.4 oz (77.7 kg), SpO2 99%.    HEENT: negative  Neck: no adenopathy, no carotid bruit, no JVD, supple, symmetrical, trachea midline and thyroid not enlarged, symmetric, no tenderness/mass/nodules  Pulmonary/Chest: rhonchi  Cardiovascular: S1, S2 normal, no S3 or S4, regular rate and rhythm, no murmur  Abdominal: normal findings: bowel sounds normal, soft, non-tender and no hepatosplenomegaly   Extremities: extremities normal, atraumatic, no cyanosis or edema  Skin: Skin color, texture, turgor normal. No rashes or lesions    Results:     Lab Results   Component Value Date    WBC 10.5 2024    HGB 12.4 2024    HCT 39.0 2024    .0 2024    CREATSERUM 0.55 2024    BUN 21 2024     2024    K 4.1 2024     2024    CO2 >40.0 (HH) 2024     (H) 2024    CA 9.0 2024    ALB 2.8 (L) 2024    ALKPHO 48 (L) 2024    BILT 0.2 2024    TP 4.8 (L) 2024    AST 15 2024    ALT 22 2024    PTT 30.1 2023    INR 1.12 2023    TSH 1.060 2023    LIP 30 2024    DDIMER 0.63 10/07/2024    MG 2.0 2024    PHOS 3.7 2024    TROP <0.045 2020       XR CHEST AP PORTABLE  (CPT=71045)    Result Date: 2024  CONCLUSION:   No significant change when compared to 2024.    Dictated by (CST): Joe Hayes MD on 2024 at 4:07 PM     Finalized by (CST): Joe Hayes MD on 2024 at 4:09 PM          XR CHEST  AP PORTABLE  (CPT=71045)    Result Date: 11/5/2024  CONCLUSION:   Post thoracentesis there is markedly decreased right pleural effusion.  No appreciable pneumothorax.   Persistent elevation of the left hemidiaphragm and small left pleural effusion.  Persistent pulmonary edema.     elm-remote   Dictated by (CST): Isaac Holley MD on 11/05/2024 at 1:39 PM     Finalized by (CST): Isaac Holley MD on 11/05/2024 at 1:45 PM               Assessment and Plan:   Principal Problem:    COPD exacerbation (HCC)  Active Problems:    Pleural effusion    Community acquired pneumonia of right lower lobe of lung    Acute on chronic respiratory failure with hypoxia and hypercapnia (HCC)     Felt better,less sob,c/c non productive coug,home        ELVIA LIVINGSTON MD, MD  11/7/2024

## 2024-11-07 NOTE — PLAN OF CARE
Pt alert and oriented, ambulated within the room and up to the bathroom. On 3L of O2, at baseline. Pt okay to dc per MD. AVS printed and reviewed with pt, all questions answered. Frequent rounding by staff, call light within reach.  Problem: Patient Centered Care  Goal: Patient preferences are identified and integrated in the patient's plan of care  Description: Interventions:  - What would you like us to know as we care for you?  - Provide timely, complete, and accurate information to patient/family  - Incorporate patient and family knowledge, values, beliefs, and cultural backgrounds into the planning and delivery of care  - Encourage patient/family to participate in care and decision-making at the level they choose  - Honor patient and family perspectives and choices  Outcome: Completed     Problem: Patient/Family Goals  Goal: Patient/Family Long Term Goal  Description: Patient's Long Term Goal:    Interventions:  - See additional Care Plan goals for specific interventions  Outcome: Completed  Goal: Patient/Family Short Term Goal  Description: Patient's Short Term Goal:    Interventions:   - See additional Care Plan goals for specific interventions  Outcome: Completed     Problem: PAIN - ADULT  Goal: Verbalizes/displays adequate comfort level or patient's stated pain goal  Description: INTERVENTIONS:  - Encourage pt to monitor pain and request assistance  - Assess pain using appropriate pain scale  - Administer analgesics based on type and severity of pain and evaluate response  - Implement non-pharmacological measures as appropriate and evaluate response  - Consider cultural and social influences on pain and pain management  - Manage/alleviate anxiety  - Utilize distraction and/or relaxation techniques  - Monitor for opioid side effects  - Notify MD/LIP if interventions unsuccessful or patient reports new pain  - Anticipate increased pain with activity and pre-medicate as appropriate  Outcome: Completed      Problem: SAFETY ADULT - FALL  Goal: Free from fall injury  Description: INTERVENTIONS:  - Assess pt frequently for physical needs  - Identify cognitive and physical deficits and behaviors that affect risk of falls.  - [place] fall precautions as indicated by assessment.  - Educate pt/family on patient safety including physical limitations  - Instruct pt to call for assistance with activity based on assessment  - Modify environment to reduce risk of injury  - Provide assistive devices as appropriate  - Consider OT/PT consult to assist with strengthening/mobility  - Encourage toileting schedule  Outcome: Completed     Problem: RESPIRATORY - ADULT  Goal: Achieves optimal ventilation and oxygenation  Description: INTERVENTIONS:  - Assess for changes in respiratory status  - Assess for changes in mentation and behavior  - Position to facilitate oxygenation and minimize respiratory effort  - Oxygen supplementation based on oxygen saturation or ABGs  - Provide Smoking Cessation handout, if applicable  - Encourage broncho-pulmonary hygiene including cough, deep breathe, Incentive Spirometry  - Assess the need for suctioning and perform as needed  - Assess and instruct to report SOB or any respiratory difficulty  - Respiratory Therapy support as indicated  - Manage/alleviate anxiety  - Monitor for signs/symptoms of CO2 retention  Outcome: Completed

## 2024-11-07 NOTE — DISCHARGE SUMMARY
Dc summary#[id number]  > 30 min spent on dc    Hospital Discharge Diagnoses: copd exacerbation    Lace+ Score: 82  59-90 High Risk  29-58 Medium Risk  0-28   Low Risk.    TCM Follow-Up Recommendation:  LACE 29-58: Moderate Risk of readmission after discharge from the hospital.  Tcm fu recommended

## 2024-11-07 NOTE — CM/SW NOTE
11/07/24 1100   Discharge disposition   Expected discharge disposition Home-Health   Post Acute Care Provider [facility]   DME/Infusion Providers Other (comment)  ([employer] home O2)   Discharge transportation Private car     MDO placed for discharge.     notified [facility] liaison Vanessa of patient discharge today.  [facility] will be reaching out to patient to coordinate start of care.    Patient cleared for discharge from CM/SW standpoint.    Melinda Cartagena RN, BSN  Nurse   [phone]

## 2024-11-08 ENCOUNTER — PATIENT OUTREACH (OUTPATIENT)
Dept: CASE MANAGEMENT | Age: 84
End: 2024-11-08

## 2024-11-08 NOTE — PROGRESS NOTES
Called pt to schedule a [facility] follow-up appt :        [facility]-COPD Request :     [facility]  [address]  [place] [place] [address]  [phone]  Schedule an appointment as soon as possible for a visit  Tuesday 11/12 @9am w/ Michelle MCKEE      No further assistance needed      Close encounter

## 2024-11-08 NOTE — PROGRESS NOTES
Called pt to schedule a [facility] follow-up appt :      [facility]-COPD Request :    [facility]  [address] [id number] [phone]  Schedule an appointment as soon as possible for a visit

## 2024-11-11 NOTE — DISCHARGE SUMMARY
[facility]    PATIENT'S NAME: CEZAR JOVEL   ATTENDING PHYSICIAN: Rex Tabor MD   PATIENT ACCOUNT#:   [id number]    LOCATION:  [facility]  MEDICAL RECORD #:   [id number]       YOB: 1940  ADMISSION DATE:       11/02/2024      DISCHARGE DATE:  11/07/2024    DISCHARGE SUMMARY    About 35 minutes were spent preparing this discharge.    DISCHARGE DIAGNOSIS:  Acute on chronic respiratory failure with hypoxia and hypercapnia, on chronic O2 at home, with community-acquired pneumonia of right lower lung complicated by chronic obstructive pulmonary disease exacerbation.    HISTORY AND HOSPITAL COURSE:  This is a very pleasant 84-year-old white female, appears younger than her stated age, who presents with a history of having difficulty breathing.  She is a long time patient of Dr. Boyle's and needed to have more oxygen than her usual 3L at home and came with an elevated respiratory rate of 32.  She was admitted to the hospital and found to have a pneumonia.  She was started on antibiotics, nebulizers, and steroids and seen by Dr. Boyle.  She slowly improved.  She was found to have a right pleural effusion and Dr. Boyle removed 800 mL from her right lung.  Started having some discomfort in her left lung, but her x-ray did not show any significant effusion.  She looked well today.  Dr. Boyle put in an order to discharge her home and I concur.    PHYSICAL EXAMINATION ON DISCHARGE:    VITAL SIGNS:  Temperature 98.3, pulse 90, respiratory rate 20, blood pressure 135/69, 97% on 3L nasal cannula.  LUNGS:  Crackles, right base greater than left.  HEART:  Normal S1, S2.  No S3.  ABDOMEN:  Soft.  EXTREMITIES:  Mild edema.  NEUROLOGIC:  She is alert and oriented, friendly and cooperative.    LABORATORY STUDIES:  Please see chart.    ASSESSMENT AND PLAN:    1.   Acute on chronic respiratory failure with hypoxia and hypercapnia from COPD, mild CHF, and possible pneumonia and COPD exacerbation.  Patient on  antibiotics status post thoracentesis, doing well.  2.   Left-sided chest pain.  Chest x-ray rechecked.  3.   Atrial fibrillation with controlled rate.  Continue medication.  4.   Acute on chronic diastolic CHF and pulmonary hypertension.  Continue medication.  5.   GERD.  Continue PPI.  6.   DVT prophylaxis.  Subcutaneous heparin.    CONDITION ON DISCHARGE:  Stable.    CODE STATUS:  Full Code per records.  Not actually discussed during this hospitalization.    DISCHARGE MEDICATIONS:    1.   Albuterol 2 puffs as long as needed.   2.   Amitriptyline 50 mg nightly.  Watch for dizziness and constipation.  3.   Ecotrin 162 mg daily with food.  4.   B-Complex 1 daily.  5.   Calcium carbonate 1 tablet daily.  6.   Vitamin D 1000 units twice a day.  7.   Bentyl 10 mg 3 times a day as needed.  8.   Digoxin 125 mcg daily.  9.   Diltiazem  mg daily.  10.   Estradiol patch weekly on Sundays.  11.   Lasix 40 mg twice a day alternating with 40 mg once a day.  12.   Prevacid 30 mg daily.  13.   Loratadine 10 mg daily.  14.   Montelukast.  15.   Singulair 10 mg daily.  16.   Multivitamins once a day.  17.   Mupirocin.  Apply 3 times a day.  18.   Omega-3 fish oil 1000 mg daily.  19.   Potassium 20 mEq nightly.  20.   Pulmicort 2 puffs daily.  Rinse mouth after use.  21.   Spiriva 18 mcg daily.  22.   Triamcinolone cream as needed.  23.   Tylenol 500 mg every 6 hours as needed for pain or fever.  Watch total daily Tylenol to 3 g.  24.   Tessalon Perles 200 mg 3 times a day as needed.  25.   Robitussin 200 mg every 4 hours as needed.  26.   Doxycycline 100 mg twice a day for 10 days.  Keep away from children.  Avoid the sun.  Do not take with milk.  27.   Prednisone 20 g daily for 10 days.  To be weaned off by Dr. Boyle.    DISCHARGE INSTRUCTIONS:  Her activity is as tolerated, wearing 3L of oxygen at all times.  Followup is with Dr. Boyle in 1 week.  Follow up with specialty clinic as needed.  Follow up with Dr. Wilkerson on November  8 at 11 a.m.  Return if fever, chills, sweats, nausea, vomiting, chest pain, shortness of breath, or other complaints.    RISK OF READMISSION:  Moderate.  TCM followup is recommended.    Dictated By Rex Tabor MD  d: 11/07/2024 17:15:12  t: 11/09/2024 09:21:07  Job [id number]  LAS/

## 2024-11-18 ENCOUNTER — APPOINTMENT (OUTPATIENT)
Dept: GENERAL RADIOLOGY | Facility: HOSPITAL | Age: 84
End: 2024-11-18
Attending: EMERGENCY MEDICINE
Payer: MEDICARE

## 2024-11-18 ENCOUNTER — HOSPITAL ENCOUNTER (EMERGENCY)
Facility: HOSPITAL | Age: 84
Discharge: HOME | End: 2024-11-18
Attending: EMERGENCY MEDICINE
Payer: MEDICARE

## 2024-11-18 VITALS
WEIGHT: 170 LBS | RESPIRATION RATE: 21 BRPM | DIASTOLIC BLOOD PRESSURE: 67 MMHG | SYSTOLIC BLOOD PRESSURE: 123 MMHG | HEART RATE: 96 BPM | HEIGHT: 65 IN | BODY MASS INDEX: 28.32 KG/M2 | TEMPERATURE: 98 F | OXYGEN SATURATION: 90 %

## 2024-11-18 DIAGNOSIS — J44.1 COPD EXACERBATION (HCC): Primary | ICD-10-CM

## 2024-11-18 LAB
ATRIAL RATE: 91 BPM
BASOPHILS # BLD AUTO: 0.05 X10(3) UL (ref 0–0.2)
BASOPHILS NFR BLD AUTO: 0.3 %
BNP SERPL-MCNC: 63 PG/ML (ref ?–100)
BUN BLD-MCNC: 21 MG/DL (ref 9–23)
BUN/CREAT SERPL: 36.2 (ref 10–20)
CALCIUM BLD-MCNC: 9.1 MG/DL (ref 8.7–10.4)
CHLORIDE SERPL-SCNC: 100 MMOL/L (ref 98–112)
CO2 SERPL-SCNC: >40 MMOL/L (ref 21–32)
CREAT BLD-MCNC: 0.58 MG/DL
DEPRECATED RDW RBC AUTO: 54.9 FL (ref 35.1–46.3)
EGFRCR SERPLBLD CKD-EPI 2021: 90 ML/MIN/1.73M2 (ref 60–?)
EOSINOPHIL # BLD AUTO: 0 X10(3) UL (ref 0–0.7)
EOSINOPHIL NFR BLD AUTO: 0 %
ERYTHROCYTE [DISTWIDTH] IN BLOOD BY AUTOMATED COUNT: 15.1 % (ref 11–15)
GLUCOSE BLD-MCNC: 123 MG/DL (ref 70–99)
HCT VFR BLD AUTO: 40.7 %
HGB BLD-MCNC: 12.7 G/DL
IMM GRANULOCYTES # BLD AUTO: 0.11 X10(3) UL (ref 0–1)
IMM GRANULOCYTES NFR BLD: 0.7 %
LYMPHOCYTES # BLD AUTO: 2.55 X10(3) UL (ref 1–4)
LYMPHOCYTES NFR BLD AUTO: 16.3 %
MCH RBC QN AUTO: 30.7 PG (ref 26–34)
MCHC RBC AUTO-ENTMCNC: 31.2 G/DL (ref 31–37)
MCV RBC AUTO: 98.3 FL
MONOCYTES # BLD AUTO: 1.05 X10(3) UL (ref 0.1–1)
MONOCYTES NFR BLD AUTO: 6.7 %
NEUTROPHILS # BLD AUTO: 11.9 X10 (3) UL (ref 1.5–7.7)
NEUTROPHILS # BLD AUTO: 11.9 X10(3) UL (ref 1.5–7.7)
NEUTROPHILS NFR BLD AUTO: 76 %
NT-PROBNP SERPL-MCNC: 258 PG/ML (ref ?–450)
OSMOLALITY SERPL CALC.SUM OF ELEC: 302 MOSM/KG (ref 275–295)
P AXIS: 4 DEGREES
P-R INTERVAL: 150 MS
PLATELET # BLD AUTO: 220 10(3)UL (ref 150–450)
POTASSIUM SERPL-SCNC: 4.6 MMOL/L (ref 3.5–5.1)
Q-T INTERVAL: 340 MS
QRS DURATION: 90 MS
QTC CALCULATION (BEZET): 418 MS
R AXIS: -4 DEGREES
RBC # BLD AUTO: 4.14 X10(6)UL
SODIUM SERPL-SCNC: 144 MMOL/L (ref 136–145)
T AXIS: 36 DEGREES
VENTRICULAR RATE: 91 BPM
WBC # BLD AUTO: 15.7 X10(3) UL (ref 4–11)

## 2024-11-18 PROCEDURE — 99285 EMERGENCY DEPT VISIT HI MDM: CPT

## 2024-11-18 PROCEDURE — 80048 BASIC METABOLIC PNL TOTAL CA: CPT | Performed by: EMERGENCY MEDICINE

## 2024-11-18 PROCEDURE — 93005 ELECTROCARDIOGRAM TRACING: CPT

## 2024-11-18 PROCEDURE — 96375 TX/PRO/DX INJ NEW DRUG ADDON: CPT

## 2024-11-18 PROCEDURE — 83880 ASSAY OF NATRIURETIC PEPTIDE: CPT | Performed by: EMERGENCY MEDICINE

## 2024-11-18 PROCEDURE — 93010 ELECTROCARDIOGRAM REPORT: CPT

## 2024-11-18 PROCEDURE — 96374 THER/PROPH/DIAG INJ IV PUSH: CPT

## 2024-11-18 PROCEDURE — 94640 AIRWAY INHALATION TREATMENT: CPT

## 2024-11-18 PROCEDURE — 85025 COMPLETE CBC W/AUTO DIFF WBC: CPT | Performed by: EMERGENCY MEDICINE

## 2024-11-18 PROCEDURE — 99284 EMERGENCY DEPT VISIT MOD MDM: CPT

## 2024-11-18 PROCEDURE — 71045 X-RAY EXAM CHEST 1 VIEW: CPT | Performed by: EMERGENCY MEDICINE

## 2024-11-18 RX ORDER — IPRATROPIUM BROMIDE AND ALBUTEROL SULFATE 2.5; .5 MG/3ML; MG/3ML
3 SOLUTION RESPIRATORY (INHALATION) ONCE
Status: COMPLETED | OUTPATIENT
Start: 2024-11-18 | End: 2024-11-18

## 2024-11-18 RX ORDER — DEXAMETHASONE 4 MG/1
8 TABLET ORAL
Qty: 6 TABLET | Refills: 0 | Status: ON HOLD | OUTPATIENT
Start: 2024-11-18 | End: 2024-11-21

## 2024-11-18 RX ORDER — FUROSEMIDE 10 MG/ML
40 INJECTION INTRAMUSCULAR; INTRAVENOUS ONCE
Status: COMPLETED | OUTPATIENT
Start: 2024-11-18 | End: 2024-11-18

## 2024-11-18 RX ORDER — DEXAMETHASONE SODIUM PHOSPHATE 4 MG/ML
6 VIAL (ML) INJECTION ONCE
Status: COMPLETED | OUTPATIENT
Start: 2024-11-18 | End: 2024-11-18

## 2024-11-18 NOTE — ED QUICK NOTES
.Patient cleared for discharge by ED MD. Patient verbalizes understanding of discharge instructions. Patient exited ER via wheelchair upon discharge.

## 2024-11-18 NOTE — CM/SW NOTE
Medical Hx   Does patient have an established PCP? Yes  (Dr. Brenda Wilkerson)   Significant Past Medical/Mental Health Hx COPD; pulm edema   Patient Info   Advanced directives? No   Patient's Current Mental Status at Time of Assessment Alert;Oriented   Patient's Home Environment House   Number of Levels in Home 1   Number of Stair in Home 5 to enter   Patient lives with Alone      Independent with ADLs and Mobility Yes   Services in place prior to admission Home Health Care;DME/Supplies at home   Home Health Provider Info [facility]   DME Provider/Supplier [facility]   Type of DME/Supplies Rollator Walker;Home Oxygen;Home Pulse Oximetry;Nebulizer   Discharge Needs   Anticipated D/C needs Home health care         Pt is from home alone- independent with ADLs and mobility.  She uses a rollator PRN to conserve her energy (becomes SOB due to COPD).  Pt uses 2-3 L of O2 baseline ([employer] POC).  Pt has a nebulizer machine at home and monitors her O2 sats regularly.     Patient is current with [facility]. Services from home health care confirmed with [facility] are aide and RN. Physical therapy added to services. Services added to Ry. Caregiver, respite and a place for mom resources provided    Geno CANO, CCM, MSN    Emergency Room  [facility]  Clinical Transitions Leader  [phone]

## 2024-11-18 NOTE — ED INITIAL ASSESSMENT (HPI)
Pt arrives via ems for shortness of breath, worsening for the past couple of days. Pt hx of copd and on 3L, per ems pt was 85% on 4L at home. Per ems pt received one duo neb en route which increased her oxygen to 100%. Pt denies chest pain.

## 2024-11-18 NOTE — ED PROVIDER NOTES
Patient Seen in: Health system Emergency Department    History     Chief Complaint   Patient presents with    Difficulty Breathing     Stated Complaint: dyspnea    HPI    Patient complains of worsening shortness of breath for the past couple of days.  Patient suffers from chronic COPD she is on inhalers inhaled steroids.  She was hospitalized at the beginning of the month for this.  She also has some CHF she is on twice a day Lasix.  She states for the past 24 hours she is at increased shortness of breath no fever no chills no chest pain.  Okay at rest she is on 3 L normally at home.  Notes with some exertion she is more fatigued.    Alleviating factors: rest  Exacerbating factors: activity    Past Medical History:    A-fib ([facility])    Anesthesia complication    Arrhythmia    AFIB    Arthritis    Asthma ([facility])    Back problem    COPD (chronic obstructive pulmonary disease) ([facility])    Deviated nasal septum    nasal septoplasty, turb reduction, smr of turbs    Difficult intubation    fiber optic if needed    Diverticulitis    colonoscopy     Diverticulosis of large intestine    Esophageal reflux    Extrinsic asthma, unspecified    Headache    Heart disease    Hepatitis    WAS TOLD SHE HAS HAD THIS WHEN SHE WAS 19 YEARS OLD    High blood pressure    High cholesterol    Irregular heart rate    Lichenoid keratosis    left chest-bx    Osteoarthritis    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    Paronychia    (RT); onychomycosis; debridement    Problems with swallowing    occasionally    Shortness of breath    2 L NC ALL THE TIME 24HR/7 DAYS PER WEEK    Unspecified essential hypertension    Visual impairment       Past Surgical History:   Procedure Laterality Date    Adenoidectomy      Cataract      cataract extraction     Hysterectomy      Sinus surgery        DEVIATED SEPTUM    T&a      Tonsillectomy              Family History   Problem Relation Age of Onset    Ovarian Cancer Mother 76        endometrial, poss  EMS was called out to pts home for a g tube problem. Reports that they were unable to flush it at home. Last time used successfully was around 1800 tonight, denies any abdominal pain or issues around site.   ovarian primary    Pulmonary Disease Sister         COPD    Skin cancer Other     Stroke Other     Other (Other) Other        Social History     Socioeconomic History    Marital status:    Tobacco Use    Smoking status: Former     Current packs/day: 0.00     Types: Cigarettes     Quit date: 1996     Years since quittin.9    Smokeless tobacco: Never   Vaping Use    Vaping status: Never Used   Substance and Sexual Activity    Alcohol use: Yes     Alcohol/week: 0.0 standard drinks of alcohol     Comment: one drink once a week    Drug use: Never   Other Topics Concern    Pt has a pacemaker No    Pt has a defibrillator No    Reaction to local anesthetic No    Caffeine Concern No     Social Drivers of Health     Food Insecurity: No Food Insecurity (11/3/2024)    Food Insecurity     Food Insecurity: Never true   Transportation Needs: No Transportation Needs (11/3/2024)    Transportation Needs     Lack of Transportation: No   Housing Stability: Low Risk  (11/3/2024)    Housing Stability     Housing Instability: No       Review of Systems    Positive for stated complaint: dyspnea  Other systems are as noted in HPI.  Constitutional and vital signs reviewed.      All other systems reviewed and negative except as noted above.    PSFH elements reviewed from today and agreed except as otherwise stated in HPI.    Physical Exam     ED Triage Vitals [24 1224]   /75   Pulse 98   Resp 22   Temp 98 °F (36.7 °C)   Temp src Temporal   SpO2 (!) 88 %   O2 Device Nasal cannula       Current:/75   Pulse 98   Temp 98 °F (36.7 °C) (Temporal)   Resp 22   Ht 165.1 cm (5' 5\")   Wt 77.1 kg   SpO2 92%   BMI 28.29 kg/m²    PULSE OX nl  GENERAL: awake alert conversational dyspnea  HEAD: normocephalic, atraumatic,   EYES: PERRLA, EOMI, conj sclera clear  THROAT: mmm, no lesions  NECK: supple, no meningeal signs  LUNGS: no acc use, conversational dyspnea, scattered rhonchi  CARDIO: RRR without murmur  GI:  abdomen is soft and non tender, no masses, nl bowel sounds   EXTREMITIES: from, 5/5 strength in all 4 ext, pedal edema  NEURO: alert and oiented *3, 2-12 intact, no focal deficit noted  SKIN: good skin turgor, no  rashes  PSYCH: calm, cooperative,    Differential includes:copd and chf likely both contributing, has sig underlying disease    ED Course     Labs Reviewed   CBC WITH DIFFERENTIAL WITH PLATELET - Abnormal; Notable for the following components:       Result Value    WBC 15.7 (*)     RDW-SD 54.9 (*)     RDW 15.1 (*)     Neutrophil Absolute Prelim 11.90 (*)     Neutrophil Absolute 11.90 (*)     Monocyte Absolute 1.05 (*)     All other components within normal limits   BASIC METABOLIC PANEL (8) - Abnormal; Notable for the following components:    Glucose 123 (*)     CO2 >40.0 (*)     BUN/CREA Ratio 36.2 (*)     Calculated Osmolality 302 (*)     All other components within normal limits   BNP (B TYPE NATRIURETIC PEPTIDE) - Normal   PRO BETA NATRIURETIC PEPTIDE - Normal   RAINBOW DRAW LAVENDER   RAINBOW DRAW LIGHT GREEN   RAINBOW DRAW BLUE     EKG    Rate, intervals and axes as noted on EKG Report.  Rate: 92  Rhythm: Sinus Rhythm  Reading: nsr with non specific st changes           MDM       Cardiac Monitor:   Pulse Readings from Last 1 Encounters:   11/18/24 98   , sinus, 98  interpreted by me.    Radiology findings:   No results found.    No results found.  I reviewed xray noted no infiltrates no pneumothorax      Medical Decision Making  Problems Addressed:  COPD exacerbation (HCC): acute illness or injury    Amount and/or Complexity of Data Reviewed  Independent Historian: caregiver  Labs: ordered. Decision-making details documented in ED Course.  Radiology: ordered and independent interpretation performed. Decision-making details documented in ED Course.  ECG/medicine tests: ordered and independent interpretation performed. Decision-making details documented in ED Course.  Discussion of management or test  interpretation with external provider(s): Iv asix and steroids.  Consult sw to help with home health resources.  Fu with pulmonologist.    Risk  Prescription drug management.  Decision regarding hospitalization.        Disposition and Plan     Clinical Impression:  1. COPD exacerbation (HCC)        Disposition:  Discharge    Follow-up:  Bing Boyle MD  [address]  [place] [address] [phone]    Follow up        Medications Prescribed:  Current Discharge Medication List        START taking these medications    Details   dexamethasone (DECADRON) 4 MG tablet Take 2 tablets (8 mg total) by mouth daily with breakfast for 3 days.  Qty: 6 tablet, Refills: 0

## 2024-11-24 ENCOUNTER — HOSPITAL ENCOUNTER (INPATIENT)
Facility: HOSPITAL | Age: 84
LOS: 4 days | Discharge: HOME HEALTH | End: 2024-11-28
Attending: EMERGENCY MEDICINE | Admitting: HOSPITALIST
Payer: MEDICARE

## 2024-11-24 ENCOUNTER — HOSPITAL ENCOUNTER (INPATIENT)
Facility: HOSPITAL | Age: 84
LOS: 4 days | Discharge: HOME HEALTH | End: 2024-11-28
Attending: EMERGENCY MEDICINE
Payer: MEDICARE

## 2024-11-24 ENCOUNTER — APPOINTMENT (OUTPATIENT)
Dept: GENERAL RADIOLOGY | Facility: HOSPITAL | Age: 84
End: 2024-11-24
Attending: EMERGENCY MEDICINE
Payer: MEDICARE

## 2024-11-24 DIAGNOSIS — J44.1 COPD EXACERBATION (HCC): Primary | ICD-10-CM

## 2024-11-24 DIAGNOSIS — J96.02 ACUTE RESPIRATORY FAILURE WITH HYPOXIA AND HYPERCAPNIA (HCC): ICD-10-CM

## 2024-11-24 DIAGNOSIS — J96.01 ACUTE RESPIRATORY FAILURE WITH HYPOXIA AND HYPERCAPNIA (HCC): ICD-10-CM

## 2024-11-24 LAB
ALBUMIN SERPL-MCNC: 3.2 G/DL (ref 3.2–4.8)
ALP LIVER SERPL-CCNC: 59 U/L
ALT SERPL-CCNC: 32 U/L
AST SERPL-CCNC: 20 U/L (ref ?–34)
BASOPHILS # BLD AUTO: 0.03 X10(3) UL (ref 0–0.2)
BASOPHILS NFR BLD AUTO: 0.2 %
BILIRUB DIRECT SERPL-MCNC: 0.1 MG/DL (ref ?–0.3)
BILIRUB SERPL-MCNC: 0.4 MG/DL (ref 0.2–1.1)
BUN BLD-MCNC: 20 MG/DL (ref 9–23)
BUN/CREAT SERPL: 33.3 (ref 10–20)
CALCIUM BLD-MCNC: 8.4 MG/DL (ref 8.7–10.4)
CHLORIDE SERPL-SCNC: 96 MMOL/L (ref 98–112)
CO2 SERPL-SCNC: >40 MMOL/L (ref 21–32)
CREAT BLD-MCNC: 0.6 MG/DL
DEPRECATED RDW RBC AUTO: 54.3 FL (ref 35.1–46.3)
EGFRCR SERPLBLD CKD-EPI 2021: 90 ML/MIN/1.73M2 (ref 60–?)
EOSINOPHIL # BLD AUTO: 0.08 X10(3) UL (ref 0–0.7)
EOSINOPHIL NFR BLD AUTO: 0.5 %
ERYTHROCYTE [DISTWIDTH] IN BLOOD BY AUTOMATED COUNT: 15 % (ref 11–15)
GLUCOSE BLD-MCNC: 134 MG/DL (ref 70–99)
HCT VFR BLD AUTO: 41 %
HGB BLD-MCNC: 13.1 G/DL
IMM GRANULOCYTES # BLD AUTO: 0.09 X10(3) UL (ref 0–1)
IMM GRANULOCYTES NFR BLD: 0.5 %
LYMPHOCYTES # BLD AUTO: 1.73 X10(3) UL (ref 1–4)
LYMPHOCYTES NFR BLD AUTO: 9.9 %
MCH RBC QN AUTO: 31 PG (ref 26–34)
MCHC RBC AUTO-ENTMCNC: 32 G/DL (ref 31–37)
MCV RBC AUTO: 97.2 FL
MONOCYTES # BLD AUTO: 1.06 X10(3) UL (ref 0.1–1)
MONOCYTES NFR BLD AUTO: 6.1 %
NEUTROPHILS # BLD AUTO: 14.46 X10 (3) UL (ref 1.5–7.7)
NEUTROPHILS # BLD AUTO: 14.46 X10(3) UL (ref 1.5–7.7)
NEUTROPHILS NFR BLD AUTO: 82.8 %
NT-PROBNP SERPL-MCNC: 231 PG/ML (ref ?–450)
OSMOLALITY SERPL CALC.SUM OF ELEC: 299 MOSM/KG (ref 275–295)
PLATELET # BLD AUTO: 213 10(3)UL (ref 150–450)
PLATELETS.RETICULATED NFR BLD AUTO: 1.6 % (ref 0–7)
POTASSIUM SERPL-SCNC: 3.7 MMOL/L (ref 3.5–5.1)
PROT SERPL-MCNC: 5 G/DL (ref 5.7–8.2)
RBC # BLD AUTO: 4.22 X10(6)UL
SODIUM SERPL-SCNC: 142 MMOL/L (ref 136–145)
WBC # BLD AUTO: 17.5 X10(3) UL (ref 4–11)

## 2024-11-24 PROCEDURE — 71045 X-RAY EXAM CHEST 1 VIEW: CPT | Performed by: EMERGENCY MEDICINE

## 2024-11-24 PROCEDURE — 99223 1ST HOSP IP/OBS HIGH 75: CPT | Performed by: INTERNAL MEDICINE

## 2024-11-24 RX ORDER — FUROSEMIDE 10 MG/ML
40 INJECTION INTRAMUSCULAR; INTRAVENOUS
Status: DISCONTINUED | OUTPATIENT
Start: 2024-11-24 | End: 2024-11-27

## 2024-11-24 RX ORDER — ENOXAPARIN SODIUM 100 MG/ML
40 INJECTION SUBCUTANEOUS DAILY
Status: DISCONTINUED | OUTPATIENT
Start: 2024-11-25 | End: 2024-11-28

## 2024-11-24 RX ORDER — ALBUTEROL SULFATE 5 MG/ML
10 SOLUTION RESPIRATORY (INHALATION) ONCE
Status: COMPLETED | OUTPATIENT
Start: 2024-11-24 | End: 2024-11-24

## 2024-11-24 RX ORDER — ONDANSETRON 2 MG/ML
4 INJECTION INTRAMUSCULAR; INTRAVENOUS EVERY 6 HOURS PRN
Status: DISCONTINUED | OUTPATIENT
Start: 2024-11-24 | End: 2024-11-28

## 2024-11-24 RX ORDER — METHYLPREDNISOLONE SODIUM SUCCINATE 125 MG/2ML
125 INJECTION INTRAMUSCULAR; INTRAVENOUS ONCE
Status: COMPLETED | OUTPATIENT
Start: 2024-11-24 | End: 2024-11-24

## 2024-11-24 RX ORDER — DOXYCYCLINE 100 MG/1
100 CAPSULE ORAL EVERY 12 HOURS SCHEDULED
Status: DISCONTINUED | OUTPATIENT
Start: 2024-11-24 | End: 2024-11-28

## 2024-11-24 RX ORDER — IPRATROPIUM BROMIDE AND ALBUTEROL SULFATE 2.5; .5 MG/3ML; MG/3ML
3 SOLUTION RESPIRATORY (INHALATION) EVERY 6 HOURS PRN
Status: DISCONTINUED | OUTPATIENT
Start: 2024-11-24 | End: 2024-11-28

## 2024-11-24 RX ORDER — METOCLOPRAMIDE HYDROCHLORIDE 5 MG/ML
10 INJECTION INTRAMUSCULAR; INTRAVENOUS EVERY 8 HOURS PRN
Status: DISCONTINUED | OUTPATIENT
Start: 2024-11-24 | End: 2024-11-28

## 2024-11-24 RX ORDER — ACETAMINOPHEN 500 MG
500 TABLET ORAL EVERY 4 HOURS PRN
Status: DISCONTINUED | OUTPATIENT
Start: 2024-11-24 | End: 2024-11-28

## 2024-11-24 RX ORDER — PREDNISONE 20 MG/1
40 TABLET ORAL DAILY
Status: DISCONTINUED | OUTPATIENT
Start: 2024-11-25 | End: 2024-11-28

## 2024-11-24 RX ORDER — FUROSEMIDE 10 MG/ML
40 INJECTION INTRAMUSCULAR; INTRAVENOUS
Status: DISCONTINUED | OUTPATIENT
Start: 2024-11-25 | End: 2024-11-24

## 2024-11-25 ENCOUNTER — APPOINTMENT (OUTPATIENT)
Dept: CT IMAGING | Facility: HOSPITAL | Age: 84
End: 2024-11-25
Attending: INTERNAL MEDICINE
Payer: MEDICARE

## 2024-11-25 LAB
APTT PPP: 24.3 SECONDS (ref 23–36)
ATRIAL RATE: 91 BPM
BASE EXCESS BLD CALC-SCNC: 18.5 MMOL/L (ref ?–2)
BUN BLD-MCNC: 23 MG/DL (ref 9–23)
BUN/CREAT SERPL: 28.8 (ref 10–20)
CALCIUM BLD-MCNC: 8.4 MG/DL (ref 8.7–10.4)
CHLORIDE SERPL-SCNC: 97 MMOL/L (ref 98–112)
CO2 SERPL-SCNC: >40 MMOL/L (ref 21–32)
CREAT BLD-MCNC: 0.8 MG/DL
DEPRECATED RDW RBC AUTO: 55.9 FL (ref 35.1–46.3)
EGFRCR SERPLBLD CKD-EPI 2021: 74 ML/MIN/1.73M2 (ref 60–?)
ERYTHROCYTE [DISTWIDTH] IN BLOOD BY AUTOMATED COUNT: 15.2 % (ref 11–15)
EST. AVERAGE GLUCOSE BLD GHB EST-MCNC: 123 MG/DL (ref 68–126)
GLUCOSE BLD-MCNC: 351 MG/DL (ref 70–99)
GLUCOSE BLDC GLUCOMTR-MCNC: 128 MG/DL (ref 70–99)
GLUCOSE BLDC GLUCOMTR-MCNC: 198 MG/DL (ref 70–99)
GLUCOSE BLDC GLUCOMTR-MCNC: 239 MG/DL (ref 70–99)
HBA1C MFR BLD: 5.9 % (ref ?–5.7)
HCO3 BLDA-SCNC: 38.9 MEQ/L (ref 21–27)
HCT VFR BLD AUTO: 37.2 %
HGB BLD-MCNC: 11.5 G/DL
INR BLD: 1.08 (ref 0.8–1.2)
MCH RBC QN AUTO: 30.7 PG (ref 26–34)
MCHC RBC AUTO-ENTMCNC: 30.9 G/DL (ref 31–37)
MCV RBC AUTO: 99.2 FL
MODIFIED ALLEN TEST: POSITIVE
O2 CT BLD-SCNC: 14 VOL% (ref 15–23)
OSMOLALITY SERPL CALC.SUM OF ELEC: 316 MOSM/KG (ref 275–295)
OXYGEN LITERS/MINUTE: 3 L/MIN
P AXIS: 7 DEGREES
P-R INTERVAL: 126 MS
PCO2 BLDA: 68 MM HG (ref 35–45)
PH BLDA: 7.44 [PH] (ref 7.35–7.45)
PLATELET # BLD AUTO: 151 10(3)UL (ref 150–450)
PO2 BLDA: 45 MM HG (ref 80–100)
POTASSIUM SERPL-SCNC: 3.4 MMOL/L (ref 3.5–5.1)
POTASSIUM SERPL-SCNC: 4.5 MMOL/L (ref 3.5–5.1)
PROTHROMBIN TIME: 14.6 SECONDS (ref 11.6–14.8)
PUNCTURE CHARGE: YES
Q-T INTERVAL: 350 MS
QRS DURATION: 94 MS
QTC CALCULATION (BEZET): 430 MS
R AXIS: -17 DEGREES
RBC # BLD AUTO: 3.75 X10(6)UL
SAO2 % BLDA: 83.5 % (ref 94–100)
SODIUM SERPL-SCNC: 144 MMOL/L (ref 136–145)
T AXIS: 55 DEGREES
VENTRICULAR RATE: 91 BPM
WBC # BLD AUTO: 13.7 X10(3) UL (ref 4–11)

## 2024-11-25 PROCEDURE — 71250 CT THORAX DX C-: CPT | Performed by: INTERNAL MEDICINE

## 2024-11-25 PROCEDURE — 99233 SBSQ HOSP IP/OBS HIGH 50: CPT | Performed by: HOSPITALIST

## 2024-11-25 RX ORDER — POTASSIUM CHLORIDE 1500 MG/1
40 TABLET, EXTENDED RELEASE ORAL EVERY 4 HOURS
Status: COMPLETED | OUTPATIENT
Start: 2024-11-25 | End: 2024-11-25

## 2024-11-25 RX ORDER — DIGOXIN 125 MCG
125 TABLET ORAL DAILY
Status: DISCONTINUED | OUTPATIENT
Start: 2024-11-25 | End: 2024-11-28

## 2024-11-25 RX ORDER — BENZONATATE 200 MG/1
200 CAPSULE ORAL 3 TIMES DAILY PRN
Status: DISCONTINUED | OUTPATIENT
Start: 2024-11-25 | End: 2024-11-28

## 2024-11-25 RX ORDER — NICOTINE POLACRILEX 4 MG
15 LOZENGE BUCCAL
Status: DISCONTINUED | OUTPATIENT
Start: 2024-11-25 | End: 2024-11-28

## 2024-11-25 RX ORDER — ALBUTEROL SULFATE 0.83 MG/ML
2.5 SOLUTION RESPIRATORY (INHALATION) EVERY 4 HOURS PRN
Status: DISCONTINUED | OUTPATIENT
Start: 2024-11-25 | End: 2024-11-28

## 2024-11-25 RX ORDER — NICOTINE POLACRILEX 4 MG
30 LOZENGE BUCCAL
Status: DISCONTINUED | OUTPATIENT
Start: 2024-11-25 | End: 2024-11-28

## 2024-11-25 RX ORDER — MONTELUKAST SODIUM 10 MG/1
10 TABLET ORAL NIGHTLY
Status: DISCONTINUED | OUTPATIENT
Start: 2024-11-25 | End: 2024-11-28

## 2024-11-25 RX ORDER — PANTOPRAZOLE SODIUM 40 MG/1
40 TABLET, DELAYED RELEASE ORAL
Status: DISCONTINUED | OUTPATIENT
Start: 2024-11-25 | End: 2024-11-28

## 2024-11-25 RX ORDER — INSULIN DEGLUDEC 100 U/ML
5 INJECTION, SOLUTION SUBCUTANEOUS DAILY
Status: DISCONTINUED | OUTPATIENT
Start: 2024-11-25 | End: 2024-11-25

## 2024-11-25 RX ORDER — CETIRIZINE HYDROCHLORIDE 5 MG/1
5 TABLET ORAL NIGHTLY
Status: DISCONTINUED | OUTPATIENT
Start: 2024-11-25 | End: 2024-11-28

## 2024-11-25 RX ORDER — ACETAZOLAMIDE 250 MG/1
500 TABLET ORAL DAILY
Status: DISCONTINUED | OUTPATIENT
Start: 2024-11-25 | End: 2024-11-28

## 2024-11-25 RX ORDER — DICYCLOMINE HYDROCHLORIDE 10 MG/1
10 CAPSULE ORAL 3 TIMES DAILY PRN
Status: DISCONTINUED | OUTPATIENT
Start: 2024-11-25 | End: 2024-11-28

## 2024-11-25 RX ORDER — ASPIRIN 81 MG/1
162 TABLET, CHEWABLE ORAL DAILY
Status: DISCONTINUED | OUTPATIENT
Start: 2024-11-25 | End: 2024-11-28

## 2024-11-25 RX ORDER — DILTIAZEM HYDROCHLORIDE 180 MG/1
360 CAPSULE, EXTENDED RELEASE ORAL DAILY
Status: DISCONTINUED | OUTPATIENT
Start: 2024-11-25 | End: 2024-11-28

## 2024-11-25 RX ORDER — DEXTROSE MONOHYDRATE 25 G/50ML
50 INJECTION, SOLUTION INTRAVENOUS
Status: DISCONTINUED | OUTPATIENT
Start: 2024-11-25 | End: 2024-11-28

## 2024-11-25 RX ORDER — ESTRADIOL 0.1 MG/G
2 CREAM VAGINAL DAILY
Status: DISCONTINUED | OUTPATIENT
Start: 2024-11-25 | End: 2024-11-25

## 2024-11-25 NOTE — ED PROVIDER NOTES
Patient Seen in: [facility] Emergency Department    History     Chief Complaint   Patient presents with    Shortness Of Breath       HPI    84-year-old female presents to the ED for difficulty breathing and low oxygen saturations at home.  Patient states that she has had worsening symptoms over the past 2 or 3 days.  She had gone to the bathroom at home and when she sat down, her O2 sats were in the 40s.  She is normally on 3 and half liters oxygen at home and even on 3 and half liters, her O2 sats were staying in the 60s.  She denies any chest pain, fever, chills, diaphoresis.  She states she does have leg edema which is chronic.    History from Independent Source: Per EMS, patient's O2 sats were in the 70s on 3 L and they had to put her on 6 L to get her sats around 90.    External Records Reviewed: Hospital on the 18th and discharged from the ER after treatment for COPD exacerbation.  She was placed on 3 additional days of dexamethasone.  Patient was last admitted to the hospital at the beginning of November and discharged on 7 November after acute respiratory failure, community-acquired pneumonia, COPD exacerbation.  Patient had thoracentesis completed and had 800 mL of fluid removed from the right lung at that time.    History reviewed.   Past Medical History:    A-fib ([facility])    Anesthesia complication    Arrhythmia    AFIB    Arthritis    Asthma ([facility])    Back problem    COPD (chronic obstructive pulmonary disease) ([facility])    Deviated nasal septum    nasal septoplasty, turb reduction, smr of turbs    Difficult intubation    fiber optic if needed    Diverticulitis    colonoscopy     Diverticulosis of large intestine    Esophageal reflux    Extrinsic asthma, unspecified    Headache    Heart disease    Hepatitis    WAS TOLD SHE HAS HAD THIS WHEN SHE WAS 19 YEARS OLD    High blood pressure    High cholesterol    Irregular heart rate    Lichenoid keratosis    left chest-bx    Osteoarthritis    Paralysis ([facility])     left lung and left vocal cord.    Paronychia    (RT); onychomycosis; debridement    Problems with swallowing    occasionally    Shortness of breath    2 L NC ALL THE TIME 24HR/7 DAYS PER WEEK    Unspecified essential hypertension    Visual impairment       History reviewed.   Past Surgical History:   Procedure Laterality Date    Adenoidectomy      Cataract      cataract extraction     Hysterectomy      Sinus surgery        DEVIATED SEPTUM    T&a      Tonsillectomy           Medications :  Prescriptions Prior to Admission[1]     Family History   Problem Relation Age of Onset    Ovarian Cancer Mother 76        endometrial, poss ovarian primary    Pulmonary Disease Sister         COPD    Skin cancer Other     Stroke Other     Other (Other) Other        Smoking Status:   Social History     Socioeconomic History    Marital status:    Tobacco Use    Smoking status: Former     Current packs/day: 0.00     Types: Cigarettes     Quit date: 1996     Years since quittin.9    Smokeless tobacco: Never   Vaping Use    Vaping status: Never Used   Substance and Sexual Activity    Alcohol use: Yes     Alcohol/week: 0.0 standard drinks of alcohol     Comment: one drink once a week    Drug use: Never   Other Topics Concern    Pt has a pacemaker No    Pt has a defibrillator No    Reaction to local anesthetic No    Caffeine Concern No       Constitutional and vital signs reviewed.      Social History and Family History elements reviewed from today, pertinent positives to the presenting problem noted.    Physical Exam     ED Triage Vitals   BP 24 1915 125/54   Pulse 24 1837 91   Resp 24 1837 20   Temp 24 1837 98.6 °F (37 °C)   Temp src 24 1837 Temporal   SpO2 24 1837 (!) 77 %   O2 Device 24 1837 Nasal cannula       Physical Exam   Constitutional: AAOx3, well nourished, NAD  HEENT: Normocephalic, PERRLA, MMM  CV: s1s2+, RRR, no m/r/g, normal distal pulses  Pulmonary/Chest:   bibasilar crackles.  No chest wall tenderness  Abdominal: Nontender.  Nondistended. Soft. Bowel sounds are normal.   Neck/Back:   :   Musculoskeletal: Normal range of motion. No deformity.   Neurological: Awake, alert. Normal reflexes. No cranial nerve deficit.    Skin: Skin is warm and dry. No rash noted. No erythema.   Psychiatric:      All measures to prevent infection transmission during my interaction with the patient were taken. The patient was already wearing a droplet mask on my arrival to the room. Personal protective equipment was worn throughout the duration of the exam.      ED Course        Labs Reviewed   BASIC METABOLIC PANEL (8) - Abnormal; Notable for the following components:       Result Value    Glucose 134 (*)     Chloride 96 (*)     CO2 >40.0 (*)     BUN/CREA Ratio 33.3 (*)     Calcium, Total 8.4 (*)     Calculated Osmolality 299 (*)     All other components within normal limits   HEPATIC FUNCTION PANEL (7) - Abnormal; Notable for the following components:    Total Protein 5.0 (*)     All other components within normal limits   CBC WITH DIFFERENTIAL WITH PLATELET - Abnormal; Notable for the following components:    WBC 17.5 (*)     RDW-SD 54.3 (*)     Neutrophil Absolute Prelim 14.46 (*)     Neutrophil Absolute 14.46 (*)     Monocyte Absolute 1.06 (*)     All other components within normal limits   PRO BETA NATRIURETIC PEPTIDE - Normal   SCAN SLIDE   RAINBOW DRAW LAVENDER   RAINBOW DRAW LIGHT GREEN   RAINBOW DRAW BLUE     My Independent Interpretation of EKG (if performed): EKG    Rate, intervals and axes as noted on EKG Report.  Rate: 91 bpm  Rhythm: Sinus Rhythm  Reading: Normal sinus rhythm, no acute ST changes, normal axis and intervals, no ectopy             Monitor Interpretation:   normal sinus rhythm as interpreted by me.      Imaging Results Available and Reviewed while in ED: XR CHEST AP PORTABLE  (CPT=71045)    Result Date: 11/24/2024  CONCLUSION:   Moderate interstitial edema.   Bilateral lower lobe air space opacities suggestive of atelectasis. Underlying pneumonia is felt to be less likely but is also in the differential.  Dictated by (CST): Miles Clayton MD on 11/24/2024 at 7:09 PM     Finalized by (CST): Miles Clayton MD on 11/24/2024 at 7:10 PM         ED Medications Administered:   Medications   methylPREDNISolone sodium succinate (Solu-MEDROL) injection 125 mg (has no administration in time range)   albuterol (Ventolin) (5 MG/ML) 0.5% nebulizer solution 10 mg (10 mg Nebulization Given 11/24/24 1934)   ipratropium (Atrovent) 0.02 % nebulizer solution 1 mg (1 mg Nebulization Given 11/24/24 1933)             MDM     Vitals:    11/24/24 1857 11/24/24 1915 11/24/24 2000 11/24/24 2033   BP:  125/54 122/52    Pulse: 92 85 82 94   Resp: 19 19 19    Temp:       TempSrc:       SpO2: 92% 92% 96% 96%   Height:         *I personally reviewed and interpreted all ED vitals.    Independent Interpretation of Studies: Independently reviewed chest x-ray and there is pulmonary vascular congestion along with atelectasis at the bases, not significantly changed from prior study earlier in the week.    Social Determinants of Health:     Procedures:      Differential/MDM/Shared Decision Making: Differential Diagnosis includes COPD, CHF, pneumonia, PE, pleural effusion, others.      The patient already  has a past medical history of A-fib (HCC), Anesthesia complication, Arrhythmia, Arthritis, Asthma (HCC), Back problem, COPD (chronic obstructive pulmonary disease) (HCC), Deviated nasal septum (2009), Difficult intubation, Diverticulitis, Diverticulosis of large intestine, Esophageal reflux, Extrinsic asthma, unspecified, Headache, Heart disease, Hepatitis, High blood pressure, High cholesterol, Irregular heart rate, Lichenoid keratosis (07/22/2019), Osteoarthritis, Paralysis (HCC), Paronychia (2011), Problems with swallowing, Shortness of breath, Unspecified essential hypertension, and Visual impairment.  to  contribute to the complexity of this ED evaluation.           Medications, Diagnostics, or Disposition considered but not done:     Patient does not have hypoxia here is her initial O2 sats were in the low 70s on her normal 3.5 L of oxygen.  She is doing much better on 6 L.  She completed hour-long nebulizer treatment and steroids.  Chest x-ray shows some mild vascular congestion however her BNP is not significantly elevated.  Chest x-ray does not show any other acute infiltrates at this time.  Patient has an elevated white count of 17 though has been on steroids for several days.  Patient given IV Solu-Medrol in the ED.  Management of case was discussed with Dr. Padilla from pulmonology who will see for consult.  Discussed with [facility] and they have accepted patient for admission.    Critical care time exclusive of procedure time spent on this patient was 35 min for taking history from patient examining patient, medical decision-making, reviewing lab work and radiology studies, explaining results to patient and family, discussing with consultants/admitting physician, and documenting in patient's chart.      Condition upon leaving the department: Stable    Disposition and Plan     Clinical Impression:  1. COPD exacerbation (HCC)    2. Acute respiratory failure with hypoxia and hypercapnia (HCC)        Disposition:  Admit    Follow-up:  No follow-up provider specified.    Medications Prescribed:  Current Discharge Medication List          Hospital Problems       Present on Admission  Date Reviewed: 8/15/2024            ICD-10-CM Noted POA    * (Principal) COPD exacerbation (HCC) J44.1 9/13/2024 Unknown               [1] (Not in a hospital admission)

## 2024-11-25 NOTE — CONSULTS
[facility]  part of [facility]    Cardiology Consultation    Yesenia Berkowitz Patient Status:  Inpatient    1940 MRN [id number]   Location [facility]5W Attending Rex Tabor,*   Hosp Day # 1 PCP JO-ANN YANG MD     Date of Admission:  2024  Date of Consult:  2024  Reason for Consultation: CHF, PAF    History of Present Illness:   Patient is a 84 year old female with sig PMH/o paroxysmal AF, HFpEF, hypertension and COPD who presents with progressive dyspnea and hypoxia.  CXR with moderate pulmonary edema, bilateral lower lobe atelectasis.  Started on IV lasix 40mg BID with improvement in symptoms - still with dyspnea and edema.   Tele with brief paroxysmal AF, currently in SR.   Assessment and Plan:   Acute hypoxic and hypercapnic respiratory failure  Moderate to severe pulmonary hypertension  Acute on chronic HFpEF  Acute on chronic COPD  -presents with progressive dyspnea/hypoxi  -found to have pulmonary edema and elevated proBNP and bicarb >40  -TTE with preserved EF, moderate to severe pulmonary hypertension   -continue preload optimization with IV lasix, add acetazolamide - likely compensation secondary to chronic respiratory acidosis due to COPD  -strict I/Os  -GDMT as tolerated  -appreciate COPD management per primary     Paroxysmal AF  -brief paroxysmal AF, currently in SR  -continue rate control, on dilt/digoxin  -continue AC for elevated [id number]    Past Medical History  Past Medical History:    A-fib (HCC)    Anesthesia complication    Arrhythmia    AFIB    Arthritis    Asthma (HCC)    Back problem    COPD (chronic obstructive pulmonary disease) (HCC)    Deviated nasal septum    nasal septoplasty, turb reduction, smr of turbs    Difficult intubation    fiber optic if needed    Diverticulitis    colonoscopy     Diverticulosis of large intestine    Esophageal reflux    Extrinsic asthma, unspecified    Headache    Heart disease    Hepatitis     WAS TOLD SHE HAS HAD THIS WHEN SHE WAS 19 YEARS OLD    High blood pressure    High cholesterol    Irregular heart rate    Lichenoid keratosis    left chest-bx    Osteoarthritis    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    Paronychia    (RT); onychomycosis; debridement    Problems with swallowing    occasionally    Shortness of breath    2 L NC ALL THE TIME 24HR/7 DAYS PER WEEK    Unspecified essential hypertension    Visual impairment       Past Surgical History  Past Surgical History:   Procedure Laterality Date    Adenoidectomy      Cataract      cataract extraction     Hysterectomy      Sinus surgery        DEVIATED SEPTUM    T&a      Tonsillectomy         Family History  Family History   Problem Relation Age of Onset    Ovarian Cancer Mother 76        endometrial, poss ovarian primary    Pulmonary Disease Sister         COPD    Skin cancer Other     Stroke Other     Other (Other) Other        Social History  Pediatric History   Patient Parents    Not on file     Other Topics Concern    Grew up on a farm Not Asked    History of tanning Not Asked    Outdoor occupation Not Asked    Pt has a pacemaker No    Pt has a defibrillator No    Breast feeding Not Asked    Reaction to local anesthetic No     Service Not Asked    Blood Transfusions Not Asked    Caffeine Concern No    Occupational Exposure Not Asked    Hobby Hazards Not Asked    Sleep Concern Not Asked    Stress Concern Not Asked    Weight Concern Not Asked    Special Diet Not Asked    Back Care Not Asked    Exercise Not Asked    Bike Helmet Not Asked    Seat Belt Not Asked    Self-Exams Not Asked   Social History Narrative    Not on file           Current Medications:  Current Facility-Administered Medications   Medication Dose Route Frequency    albuterol (Ventolin) (2.5 MG/3ML) 0.083% nebulizer solution 2.5 mg  2.5 mg Nebulization Q4H PRN    amitriptyline (Elavil) tab 50 mg  50 mg Oral Nightly    aspirin chewable tab 162 mg  162 mg Oral  Daily    benzonatate (Tessalon) cap 200 mg  200 mg Oral TID PRN    dicyclomine (Bentyl) cap 10 mg  10 mg Oral TID PRN    digoxin (Lanoxin) tab 125 mcg  125 mcg Oral Daily    dilTIAZem ER (CardIZEM CD) 24 hr cap 360 mg  360 mg Oral Daily    guaiFENesin (Robitussin) 100 MG/5 ML oral liquid 200 mg  200 mg Oral Q4H PRN    pantoprazole (Protonix) DR tab 40 mg  40 mg Oral QAM AC    cetirizine (ZyrTEC) tab 5 mg  5 mg Oral Nightly    montelukast (Singulair) tab 10 mg  10 mg Oral Nightly    fluticasone furoate (Arnuity Ellipta) 100 MCG/ACT inhaler 1 puff  1 puff Inhalation Daily    umeclidinium bromide (Incruse Ellipta) 62.5 MCG/ACT inhaler 1 puff  1 puff Inhalation Daily    glucose (Dex4) 15 GM/59ML oral liquid 15 g  15 g Oral Q15 Min PRN    Or    glucose (Glutose) 40% oral gel 15 g  15 g Oral Q15 Min PRN    Or    glucose-vitamin C (Dex-4) chewable tab 4 tablet  4 tablet Oral Q15 Min PRN    Or    dextrose 50% injection 50 mL  50 mL Intravenous Q15 Min PRN    Or    glucose (Dex4) 15 GM/59ML oral liquid 30 g  30 g Oral Q15 Min PRN    Or    glucose (Glutose) 40% oral gel 30 g  30 g Oral Q15 Min PRN    Or    glucose-vitamin C (Dex-4) chewable tab 8 tablet  8 tablet Oral Q15 Min PRN    insulin aspart (NovoLOG) 100 Units/mL FlexPen 1-7 Units  1-7 Units Subcutaneous TID CC    acetaZOLAMIDE (Diamox) tab 500 mg  500 mg Oral Daily    enoxaparin (Lovenox) 40 MG/0.4ML SUBQ injection 40 mg  40 mg Subcutaneous Daily    acetaminophen (Tylenol Extra Strength) tab 500 mg  500 mg Oral Q4H PRN    ipratropium-albuterol (Duoneb) 0.5-2.5 (3) MG/3ML inhalation solution 3 mL  3 mL Nebulization Q6H PRN    predniSONE (Deltasone) tab 40 mg  40 mg Oral Daily    doxycycline (Vibramycin) cap 100 mg  100 mg Oral 2 times per day    ondansetron (Zofran) 4 MG/2ML injection 4 mg  4 mg Intravenous Q6H PRN    metoclopramide (Reglan) 5 mg/mL injection 10 mg  10 mg Intravenous Q8H PRN    furosemide (Lasix) 10 mg/mL injection 40 mg  40 mg Intravenous BID  (Diuretic)     Medications Prior to Admission   Medication Sig    acetaminophen 500 MG Oral Tab Take 1 tablet (500 mg total) by mouth every 6 (six) hours as needed for Pain or Fever.    albuterol (2.5 MG/3ML) 0.083% Inhalation Nebu Soln Take 3 mL (2.5 mg total) by nebulization every 4 (four) hours as needed for Wheezing or Shortness of Breath.    furosemide 40 MG Oral Tab Please alternate taking 40 mg twice per day with 40 mg once per day.    DIGOXIN 0.125 MG Oral Tab Take 1 tablet (125 mcg total) by mouth daily.    albuterol 108 (90 Base) MCG/ACT Inhalation Aero Soln Inhale 2 puffs into the lungs as needed.    dicyclomine 10 MG Oral Cap Take 1 capsule (10 mg total) by mouth 3 (three) times daily as needed (abdominal pain).    mupirocin 2 % External Ointment Apply 1 Application topically 3 (three) times daily.    PULMICORT FLEXHALER 180 MCG/ACT Inhalation Aerosol Powder, Breath Activated Inhale 2 puffs into the lungs daily.    dilTIAZem HCl ER Coated Beads 360 MG Oral Capsule SR 24 Hr Take 1 capsule (360 mg total) by mouth daily. Take 1 capsule (360mg)by mouth every morning on an empty stomach.    Calcium Carb-Cholecalciferol (CALCIUM + D3) 600-200 MG-UNIT Oral Tab Take 1 tablet by mouth daily.      Cholecalciferol (VITAMIN D) 1000 UNITS Oral Tab Take 1,000 Units by mouth 2 (two) times daily.    Potassium Chloride ER (K-DUR M20) 20 MEQ Oral Tab CR Take 1 tablet (20 mEq total) by mouth nightly.    lansoprazole (PREVACID) 30 MG Oral Capsule Delayed Release Take 1 capsule (30 mg total) by mouth nightly.    amitriptyline 50 MG Oral Tab Take 1 tablet (50 mg total) by mouth nightly.    aspirin 81 MG Oral Tab Take 2 tablets (162 mg total) by mouth daily.    Loratadine 10 MG Oral Cap Take 10 mg by mouth nightly.      montelukast 10 MG Oral Tab Take 1 tablet (10 mg total) by mouth nightly.    Multiple Vitamin (MULTI-VITAMINS) Oral Tab take 1 tablet by ORAL route  every day with food    omega-3 fatty acids (FISH OIL) 1000 MG  Oral Cap Take 1,000 mg by mouth daily.    Tiotropium Bromide Monohydrate 18 MCG Inhalation Cap Inhale 1 capsule (18 mcg total) into the lungs nightly.    B Complex Oral Cap Take 1 tablet by mouth daily.    [] dexamethasone (DECADRON) 4 MG tablet Take 2 tablets (8 mg total) by mouth daily with breakfast for 3 days.    benzonatate 200 MG Oral Cap Take 1 capsule (200 mg total) by mouth 3 (three) times daily as needed for cough.    guaiFENesin 100 MG/5 ML Oral Take 10 mL (200 mg total) by mouth every 4 (four) hours as needed.    [] Doxycycline Monohydrate 100 MG Oral Cap Take 1 capsule (100 mg total) by mouth 2 (two) times daily for 10 days. Avoid sun, do not take with milk; keep away from children    [] predniSONE 20 MG Oral Tab Take 1 tablet (20 mg total) by mouth daily for 10 days.    triamcinolone 0.1 % External Cream Apply topically 2 (two) times daily. Apply bid as directed    estradiol 0.05 MG/24HR Transdermal Patch Weekly Place 1 patch onto the skin once a week. As directed.       Allergies  Allergies[1]    Review of Systems:   ROS  10 point review of systems completed and negative except as noted.    Physical Exam:   Patient Vitals for the past 24 hrs:   BP Temp Temp src Pulse Resp SpO2 Height Weight   24 1653 -- -- -- -- -- 99 % -- --   24 1604 127/61 98.7 °F (37.1 °C) Oral 80 20 99 % -- --   24 1100 -- -- -- -- -- 97 % -- --   24 1008 -- -- -- -- -- 100 % -- --   24 0809 -- -- -- 84 -- 96 % -- --   24 0808 131/41 97.1 °F (36.2 °C) Oral 84 18 97 % -- --   24 0602 107/50 -- -- 75 20 95 % -- --   24 0324 -- -- -- 97 -- -- -- --   24 0302 -- -- -- 85 -- -- -- --   24 130/52 98.9 °F (37.2 °C) Oral 85 20 91 % -- --   24 -- -- -- -- -- -- -- 173 lb 6.4 oz (78.7 kg)   24 116/50 -- -- 85 19 92 % -- --   24 -- -- -- 94 -- 96 % -- --   24 122/52 -- -- 82 19 96 % -- --   24 125/54  -- -- 85 19 92 % -- --   11/24/24 1857 -- -- -- 92 19 92 % -- --   11/24/24 1842 -- -- -- -- -- 90 % -- --   11/24/24 1840 -- -- -- -- -- (!) 89 % -- --   11/24/24 1837 -- 98.6 °F (37 °C) Temporal 91 20 (!) 77 % 5' 5\" (1.651 m) --       Intake/Output:   Last 3 shifts:   Intake/Output                   11/23/24 0700 - 11/24/24 0659 (Not Admitted) 11/24/24 0700 - 11/25/24 0659 11/25/24 0700 - 11/26/24 0659       Intake    P.O.  --  --  60    P.O. -- -- 60    Total Intake -- -- 60       Output    Urine  --  750  700    Urine -- 750 700    Stool  --  --  --    Stool Count Calculated for I/O -- 1 x --    Total Output -- 750 700       Net I/O     -- -750 -640          Vent Settings:    Hemodynamic parameters (last 24 hours):    Scheduled Meds:    amitriptyline  50 mg Oral Nightly    aspirin  162 mg Oral Daily    digoxin  125 mcg Oral Daily    dilTIAZem HCl ER Coated Beads  360 mg Oral Daily    pantoprazole  40 mg Oral QAM AC    cetirizine  5 mg Oral Nightly    montelukast  10 mg Oral Nightly    fluticasone furoate  1 puff Inhalation Daily    umeclidinium bromide  1 puff Inhalation Daily    insulin aspart  1-7 Units Subcutaneous TID CC    acetaZOLAMIDE  500 mg Oral Daily    enoxaparin  40 mg Subcutaneous Daily    predniSONE  40 mg Oral Daily    doxycycline  100 mg Oral 2 times per day    furosemide  40 mg Intravenous BID (Diuretic)     Continuous Infusions:     Physical Exam:  General: Alert and oriented x 3. No apparent distress.   HEENT: Normocephalic, anicteric sclera, neck supple, no thyromegaly or adenopathy.  Neck:+ JVD, carotids 2+, no bruits.  Cardiac: Regular rate and rhythm. S1, S2 normal.   Lungs: diminished BS, +crackles bilbasilar  Abdomen: Soft, non-tender. No organosplenomegally, mass or rebound, BS-present.  Extremities: Without clubbing or cyanosis. No edema.    Neurologic: Alert and oriented, normal affect. No focal defects  Skin: Warm and dry.     Results:   Laboratory Data:  Lab Results   Component  Value Date    WBC 13.7 (H) 11/25/2024    HGB 11.5 (L) 11/25/2024    HCT 37.2 11/25/2024    .0 11/25/2024    CREATSERUM 0.80 11/25/2024    BUN 23 11/25/2024     11/25/2024    K 4.5 11/25/2024    CL 97 (L) 11/25/2024    CO2 >40.0 (HH) 11/25/2024     (H) 11/25/2024    CA 8.4 (L) 11/25/2024    ALB 3.2 11/24/2024    ALKPHO 59 11/24/2024    TP 5.0 (L) 11/24/2024    AST 20 11/24/2024    ALT 32 11/24/2024    PTT 24.3 11/25/2024    INR 1.08 11/25/2024    PTP 14.6 11/25/2024    TSH 1.060 01/06/2023    LIP 30 08/30/2024    DDIMER 0.63 10/07/2024    MG 2.0 11/07/2024    PHOS 3.7 11/07/2024    TROP <0.045 02/03/2020         Recent Labs   Lab 11/24/24  1844 11/25/24  0446 11/25/24  1614   * 351*  --    BUN 20 23  --    CREATSERUM 0.60 0.80  --    CA 8.4* 8.4*  --     144  --    K 3.7 3.4* 4.5   CL 96* 97*  --    CO2 >40.0* >40.0*  --      Recent Labs   Lab 11/24/24 1844 11/25/24 0446   RBC 4.22 3.75*   HGB 13.1 11.5*   HCT 41.0 37.2   MCV 97.2 99.2   MCH 31.0 30.7   MCHC 32.0 30.9*   RDW 15.0 15.2*   NEPRELIM 14.46*  --    WBC 17.5* 13.7*   .0 151.0       No results for input(s): \"BNPML\" in the last 168 hours.    No results for input(s): \"TROP\", \"CK\" in the last 168 hours.    Imaging:  XR CHEST AP PORTABLE  (CPT=71045)    Result Date: 11/24/2024  CONCLUSION:   Moderate interstitial edema.  Bilateral lower lobe air space opacities suggestive of atelectasis. Underlying pneumonia is felt to be less likely but is also in the differential.  Dictated by (CST): Miles Clayton MD on 11/24/2024 at 7:09 PM     Finalized by (CST): Miles Clayton MD on 11/24/2024 at 7:10 PM           Thank you for allowing me to participate in the care of your patient.    Angel Pratt, [facility] [place]       [1]   Allergies  Allergen Reactions    Penicillin G ANAPHYLAXIS    Azithromycin DIARRHEA and NAUSEA AND VOMITING    Cefuroxime UNKNOWN     Other reaction(s): Vomitting    Levofloxacin UNKNOWN      Other reaction(s): LEVOFLOXACIN    Penicillins      Other reaction(s): Unknown

## 2024-11-25 NOTE — PLAN OF CARE
Problem: Patient Centered Care  Goal: Patient preferences are identified and integrated in the patient's plan of care  Description: Interventions:  - What would you like us to know as we care for you? I live home alone, but my adult son helps me.   - Provide timely, complete, and accurate information to patient/family  - Incorporate patient and family knowledge, values, beliefs, and cultural backgrounds into the planning and delivery of care  - Encourage patient/family to participate in care and decision-making at the level they choose  - Honor patient and family perspectives and choices  Outcome: Progressing     Problem: Patient/Family Goals  Goal: Patient/Family Long Term Goal  Description: Patient's Long Term Goal: To be discharge home.     Interventions:  - Neb treatment  -Dieretics   -wean o2 down to baseline.   - Pulmonary consult  -Card consult  - See additional Care Plan goals for specific interventions  Outcome: Progressing  Goal: Patient/Family Short Term Goal  Description: Patient's Short Term Goal: to breathe better.     Interventions:   -  Neb treatment  -Dieretics   -wean o2 down to baseline.   - Pulmonary consult  -Card consult    - See additional Care Plan goals for specific interventions  Outcome: Progressing     Problem: CARDIOVASCULAR - ADULT  Goal: Absence of cardiac arrhythmias or at baseline  Description: INTERVENTIONS:  - Continuous cardiac monitoring, monitor vital signs, obtain 12 lead EKG if indicated  - Evaluate effectiveness of antiarrhythmic and heart rate control medications as ordered  - Initiate emergency measures for life threatening arrhythmias  - Monitor electrolytes and administer replacement therapy as ordered  Outcome: Progressing     Problem: RESPIRATORY - ADULT  Goal: Achieves optimal ventilation and oxygenation  Description: INTERVENTIONS:  - Assess for changes in respiratory status  - Assess for changes in mentation and behavior  - Position to facilitate oxygenation and  minimize respiratory effort  - Oxygen supplementation based on oxygen saturation or ABGs  - Provide Smoking Cessation handout, if applicable  - Encourage broncho-pulmonary hygiene including cough, deep breathe, Incentive Spirometry  - Assess the need for suctioning and perform as needed  - Assess and instruct to report SOB or any respiratory difficulty  - Respiratory Therapy support as indicated  - Manage/alleviate anxiety  - Monitor for signs/symptoms of CO2 retention  Outcome: Progressing   Pt is A&0x4. Pt si on 2L at baseline. She is able to ambulate to self, but is a fall risk d/t a recent fall at home. Pt's Med rec has been completed. No complaints of pain. Safety precaution in place and call light within the pt's reach.

## 2024-11-25 NOTE — PROGRESS NOTES
[facility]  part of [facility]    Progress Note    Yesenia Berkowitz Patient Status:  Inpatient    1940 MRN [id number]   Location [facility]5W Attending Rex Tabor,*   Hosp Day # 1 PCP JO-ANN YANG MD     Chief Complaint: when can I go home    Subjective:     Constitutional:  Positive for fatigue. Negative for appetite change and chills.   HENT:  Negative for congestion.    Respiratory:  Positive for shortness of breath. Negative for cough and chest tightness.    Cardiovascular:  Negative for chest pain and leg swelling.   Genitourinary:  Negative for dysuria.   Neurological:  Negative for weakness.   Psychiatric/Behavioral:  Negative for behavioral problems and decreased concentration. The patient is nervous/anxious.        Objective:   Blood pressure 131/41, pulse 84, temperature 97.1 °F (36.2 °C), temperature source Oral, resp. rate 18, height 5' 5\" (1.651 m), weight 173 lb 6.4 oz (78.7 kg), SpO2 97%.  Physical Exam  Vitals and nursing note reviewed.   HENT:      Head: Normocephalic and atraumatic.   Cardiovascular:      Rate and Rhythm: Normal rate.      Pulses: Normal pulses.   Pulmonary:      Effort: Pulmonary effort is normal.      Breath sounds: Rhonchi present.   Abdominal:      General: Bowel sounds are normal.      Palpations: Abdomen is soft.   Skin:     General: Skin is warm and dry.      Capillary Refill: Capillary refill takes less than 2 seconds.   Neurological:      General: No focal deficit present.      Mental Status: She is alert and oriented to person, place, and time.   Psychiatric:         Behavior: Behavior normal.         Judgment: Judgment normal.         Results:   Lab Results   Component Value Date    WBC 13.7 (H) 2024    HGB 11.5 (L) 2024    HCT 37.2 2024    .0 2024    CREATSERUM 0.80 2024    BUN 23 2024     2024    K 3.4 (L) 2024    CL 97 (L) 2024    CO2 >40.0 (HH)  11/25/2024     (H) 11/25/2024    CA 8.4 (L) 11/25/2024    ALB 3.2 11/24/2024    ALKPHO 59 11/24/2024    BILT 0.4 11/24/2024    TP 5.0 (L) 11/24/2024    AST 20 11/24/2024    ALT 32 11/24/2024    PTT 24.3 11/25/2024    INR 1.08 11/25/2024    TSH 1.060 01/06/2023    LIP 30 08/30/2024    DDIMER 0.63 10/07/2024    MG 2.0 11/07/2024    PHOS 3.7 11/07/2024    TROP <0.045 02/03/2020    TROPHS 9 10/07/2024       XR CHEST AP PORTABLE  (CPT=71045)    Result Date: 11/24/2024  CONCLUSION:   Moderate interstitial edema.  Bilateral lower lobe air space opacities suggestive of atelectasis. Underlying pneumonia is felt to be less likely but is also in the differential.  Dictated by (CST): Miles Clayton MD on 11/24/2024 at 7:09 PM     Finalized by (CST): Miles Clayton MD on 11/24/2024 at 7:10 PM         EKG 12 Lead    Result Date: 11/25/2024  Normal sinus rhythm Cannot rule out Anterior infarct (cited on or before 18-NOV-2024) Abnormal ECG When compared with ECG of 18-NOV-2024 12:28, No significant change was found     Assessment & Plan:     Acute on chronic respiratory failure with hypercapnia, hypoxia  CHF exacerbation, last echo with EF 65 to 70%.    Possible COPD exacerbation  -Admit  -CHF protocol with diuretics  -Strict I's and O's  -Wean oxygen to baseline  -Activity as tolerated  -Pulmonology on consult  -Steroids, nebs, antibiotics     Leukocytosis, likely steroid-induced  -Was in the ER 11/18/2024, discharged on Decadron    Pulm htn with rv dysfunction     A-fib  HTN  HLD  -Resume home medications as appropriate    Vag dryness resume meds; estrogrn patch; per pt NO hist pe     Quality:  DVT Prophylaxis: Lovenox  CODE status: Full code  ANGEL: TBD    Complex mdm; coordinating care with nurse/seen with dr fowler and counseling pt about steroids and insulin     Patient will require inpatient services that will reasonably be expected to span two midnight's based on the clinical documentation in H+P.   Based on patients current  state of illness, I anticipate that, after discharge, patient will require TBD.      TEOFILO HOLBROOK MD

## 2024-11-25 NOTE — HISTORICAL OFFICE NOTE
[facility]ility [facility]  [address], [address] [place] [address]  [phone]      Yesenia Berkowitz  Progress Note  Demographics:  Name: Yesenia Berkowitz YOB: 1940  Age: 84, Female Medical Record No: [id number]  Visited Date/Time: 10/23/2024 10:30 AM    Chief Complaints  Follow up  History of Present Illness  Patient is an 83-year-old female with a history of paroxysmal atrial fibrillation, oxygen dependent COPD, HTN, HLD who presents establish care.  Previously followed with Dr. Boles, and prior to that with Dr. Preston.  Patient is a former smoker and quit 30 years ago, has been on 2 L of oxygen at baseline for the last 15 years.  She has relatively unchanged exertional dyspnea.  No chest pain, palpitations, edema, dizziness, or syncope.  She also has a remote history of paroxysmal atrial fibrillation, when this comes on she will feel more short of breath and fatigued and then checks her pulse which will feel irregular.  This is not occurred for the last several years.  She is not on anticoagulation due to frequent bruising and infrequent A-fib.  She has mild bilateral lower extremity edema but only takes her Lasix as needed.  Patient had a coronary CT in 2020 which demonstrated a small speck of calcium in the LAD with a calcium score of 1.    4/15/2024  Her last few months has had increased shortness of breath, put on steroids by her pulmonologist with significant permanent breathing.  Denies significant palpitations.  Has had increased bilateral lower extremity edema, 2 months ago was on increased Lasix 40 mg daily which seemed to improve although will back to 20 mg daily after 10 days per instruction.    8/26/2024  Reports worsened shortness of breath, currently on prednisone for COPD exacerbation.  Typically has allergies in the summer as well.  Mild gradual increase in lower extremity edema although not too bothersome at this point.  Remains on Lasix 40 mg  daily.    9/27/2024  Unfortunately hospitalized again earlier this month primarily with COPD exacerbation, was diuresed with IV Lasix for a few days but did not appear clinically overloaded at that time.  More recently's been feeling dizzy/lightheaded, fatigued, with persistent shortness of breath.  Blood pressures were running low at home in the 90s systolic.  Today 82/54.    10/23/2024  Patient was again hospitalized with COPD exacerbation, had a thoracentesis for pleural effusion, diuresed with IV Lasix.  Discharged on Lasix 40 mg daily alternating with 40 mg twice daily.  She saw Dr. Li few days ago recommend that she stay on 40 mg twice daily for a week.  Recently had an episode of atrial fibrillation, felt abnormal sensation in her chest.  Was confirmed on [place] mobile device but now back in sinus rhythm.  Blood pressure creeping up 130s 140s systolic.  Persistent unchanged exertional dyspnea since discharge.    Cardiac history:  Paroxysmal atrial fibrillation  COPD, on 2-3 L O2  HFpEF  HTN  Cardiac risk factors Hypertension and Former smoker  Past Medical History  1.Chronic hypoxemic respiratory failure  2.COPD (chronic obstructive pulmonary disease)  3.Syncope and collapse  4.PAF (paroxysmal atrial fibrillation)  5.Essential hypertension  6.PVC's (premature ventricular contractions)  7.Localized edema  Past Surgical History  1.Cataract surgery, unspecified eye  Family History  1. Father Age 62 - Hypertension (HTN), primary; Old MI (myocardial infarction) (Reason for death)  2. Mother - Hypertension (HTN), primary  No family history of heart disease.  Social History  Smoking status Former [id number] (End Date)  Tobacco usage - No (Non-smoker for [employer] reasons (finding))  Patient lives at home alone.  She is a former smoker and quit 30 years ago.  She denies regular alcohol use.  She is a former RN.  Review of systems  Cardiovascular No history of Chest pain, OJEDA, Palpitations, Syncope, PND,  Orthopnea, Edema and Claudication  Respiratory No history of SOB, Wheezing and Sputum  Hem/Lymphatic No history of Easy bruising, Blood clots, Hx of blood transfusion, Anemia and Bleeding problems  Physical Examination  Constitutional 95%o2  Vitals Left Arm Sitting  / 68 mmHg, Pulse rate 101 bpm, Height in 5' 5\", BMI: 27.6, Weight in 166 lbs (or) 75 kgs and BSA : 1.88 cc/m²  General Appearance No Acute Distress and Normal Body habitus  Cardiovascular   EKG/Other abnormalities  GENERAL: in no apparent distress  HEENT: Normal  NECK: no JVD, normal carotid pulses without bruits. No lymphadenopathy  LUNGS: normal excursion, clear to auscultation bilaterally  HEART: RRR, nl S1/S2, no gallop, no murmur, no rub  ABD: soft, nontender, nondistended, normal bowel sounds  EXTREMITIES: no cyanosis, clubbing.  1+ lower extremity edema.  SKIN: warm, dry, normal turgor  NEURO: alert, oriented x3, symmetrical face, no dysarthria, no focal deficits  PSYCH: cooperative, calm  Allergies  1.Cefuroxime(Reaction:, Severity:Mild)  2.Levofloxacin(Reaction:, Severity:Mild)  3.penicillin - Ingredient(Reaction:unknown, Severity:Moderate)  Medications (Info obtained by: Verbal)  1.albuterol (PROAIR HFA) 108 (90 Base) MCG/ACT inhaler, as needed  2.amitriptyline 50 mg tablet, Take 1 tablet orally once a day.  3.aspirin 81mg, 2 tablets once a day  4.B complex capsule  5.Calcium Carb-Cholecalciferol (CALCIUM + D3) 600-200 MG-UNIT Tab, 1 daily  6.dicyclomine 10 mg capsule, Take 1 capsule orally 3 times a day as needed. for pain  7.digoxin 125 mcg (0.125 mg) tablet, Take 1 tablet orally once a day.  8.dilTIAZem (CARDIZEM CD) 240 MG 24 hr capsule, Take 240 mg by mouth daily. 1 daily  9.estradiol (CLIMARA) 0.05 MG/24HR once weekly patch  10.furosemide 40 mg tablet, Take 1 tablet orally 2 times a day. For a week  11.HYDROcodone-acetaminophen (NORCO) 5-325 MG per tablet, as needed  12.lansoprazole (PREVACID SOLUTAB) 30 MG disintegrating tablet,  Take 30 mg by mouth daily. 1 daily  13.loratadine (CLARITIN) 10 MG tablet, 1 daily  14.Omega-3 Fatty Acids (FISH OIL) 1200 MG capsule, 1 daily  15.Multiple Vitamin (MULTI-VITAMINS) Tab  16.montelukast (SINGULAIR) 10 MG tablet, 1 daily  17.tiotropium (SPIRIVA HANDIHALER) 18 MCG capsule for inhaler, daily  18.sodium fluoride (PREVIDENT 5000 BOOSTER PLUS) 1.1 % dental paste  19.triamcinolone (ARISTOCORT) 0.1 % cream, APPLY TO AFFECTED AREA(S) 2 (TWO) TIMES DAILY.  20.potassium chloride (KLOR-CON M20) 20 MEQ cruz ER tablet, 1 daily  21.olopatadine (PATANOL) 0.1 % ophthalmic solution  22.dilTIAZem  mg capsule,24 hr,extended release, Take 1 capsule orally 2 times a day.  Impression  1.Acute heart failure with preserved ejection fraction (HFpEF)  2.Chronic hypoxemic respiratory failure  3.COPD (chronic obstructive pulmonary disease)  4.Syncope and collapse  5.PAF (paroxysmal atrial fibrillation)  6.Essential hypertension  7.PVC's (premature ventricular contractions)  Assessment & Plan  Paroxysmal atrial fibrillation  - Recent recurrence on [place] mobile, back in sinus rhythm  - Continue aspirin 81 mg once daily, digoxin daily  - Increase diltiazem to 360 mg once daily  - Per patient preference avoiding anticoagulation, history of GI bleed in the past and low A-fib burden; discussed Watchman as an alternative, will provide patient with pamphlet  - Check twelve-lead ECG today    HFpEF  - Increase bilateral lower extremity edema, TTE noted diastolic dysfunction, edema improved on higher dose of Lasix  - Continue Lasix 40 mg twice daily  - Check BMP in 1 week  - Additionally discussed SGLT2 inhibitor, would like to reassess symptoms before making a decision    HTN  - Blood pressure 130s 140s systolic  - Previously on lisinopril-hydrochlorothiazide, discontinued due to hypotension  - Increase diltiazem to 360 mg once daily    Plan  Increase diltiazem to 360 mg daily  Twelve-lead ECG today  BMP in 1 week  Follow-up with me  in 1 month or sooner as needed  Labs and Diagnostics ordered  1.EKG (electrocardiogram) (Today)  Miscellaneous  1.Reviewed trans thoracic echocardiogram, ambulatory telemetry monitor with the patient.  2.Weight monitoring (regime/therapy)  Nurses documentation  EKG: None   refills: none  assistive devices: none  upcoming sx or procedures: none     Patient instructions  Plan  Increase diltiazem to 360 mg daily  Twelve-lead ECG today  BMP in 1 week  Follow-up with me in 1 month or sooner as needed    Please bring in you medication bottles or updated medicine list to your next appointment. Call [facility] if you have any problems or concerns at [phone].   Lab Details  DIGOXIN (LANOXIN)  01/16/2024 04:34:35 PM  DIGOXIN 0.31 0.80-2.00 ng/mL L F  Diagnostics Details  Trans Thoracic Echocardiogram 03/18/2024  1.The left ventricle is normal in size. Left ventricular wall thickness is normal. Global left ventricular systolic function is hyperdynamic. The left ventricular ejection fraction is >70%. No regional wall motion abnormalities. The left ventricle diastolic function is impaired (Grade II) with an elevated left atrial pressure.    2.The right ventricle is mildly enlarged. Right ventricular systolic function is mildly decreased.    ACTMonitoring 02/15/2024  1.This is an average quality study.    2.Predominant rhythm is normal sinus rhythm.    3.The minimum heart rate recorded was 62 beats / minute (normal sinus rhythm, 3/10/2024). The maximum heart rate is 111 beats / minute (sinus tachycardia, 2/27/2024). The mean heart rate is 82 beats / minute.    4.- There were rare PVCs with a burden of <1%. - There were rare PACs with a burden of 2%. - 113 Supraventricular Tachycardia events -- the longest episode was 6.8s and the fastest episode was 181 BPM. - No atrial fibrillation. - No other arrhythmias. - There were 7 patient triggered events with symptoms of palpitations, cough, and shortness of breath, associated with sinus  rhythm, PVCs, PACs, and brief PAT.    CPOE Orders carried out by: Norma CROCKETT  Care Providers: Luis Fernando Fowler MD, Norma CROCKETT and Vonda Prajapati  Electronically Authenticated by  Luis Fernando Fowler MD  10/23/2024 12:23:02 PM  Disclaimer: Components of this note were documented using voice recognition system and are subject to errors not corrected at proofreading. Contact the author of this note for any clarifications.

## 2024-11-25 NOTE — PLAN OF CARE
Problem: Patient Centered Care  Goal: Patient preferences are identified and integrated in the patient's plan of care  Description: Interventions:  - What would you like us to know as we care for you? I live home alone, but my adult son helps me.   - Provide timely, complete, and accurate information to patient/family  - Incorporate patient and family knowledge, values, beliefs, and cultural backgrounds into the planning and delivery of care  - Encourage patient/family to participate in care and decision-making at the level they choose  - Honor patient and family perspectives and choices  Outcome: Progressing     Problem: CARDIOVASCULAR - ADULT  Goal: Absence of cardiac arrhythmias or at baseline  Description: INTERVENTIONS:  - Continuous cardiac monitoring, monitor vital signs, obtain 12 lead EKG if indicated  - Evaluate effectiveness of antiarrhythmic and heart rate control medications as ordered  - Initiate emergency measures for life threatening arrhythmias  - Monitor electrolytes and administer replacement therapy as ordered  Outcome: Progressing     Problem: RESPIRATORY - ADULT  Goal: Achieves optimal ventilation and oxygenation  Description: INTERVENTIONS:  - Assess for changes in respiratory status  - Assess for changes in mentation and behavior  - Position to facilitate oxygenation and minimize respiratory effort  - Oxygen supplementation based on oxygen saturation or ABGs  - Provide Smoking Cessation handout, if applicable  - Encourage broncho-pulmonary hygiene including cough, deep breathe, Incentive Spirometry  - Assess the need for suctioning and perform as needed  - Assess and instruct to report SOB or any respiratory difficulty  - Respiratory Therapy support as indicated  - Manage/alleviate anxiety  - Monitor for signs/symptoms of CO2 retention  Outcome: Progressing     Problem: Patient/Family Goals  Goal: Patient/Family Long Term Goal  Description: Patient's Long Term Goal: to be discharge home.      Interventions:  -  Neb treatment  -Dieretics   -wean o2 down to baseline.   - Pulmonary consult  -Card consult  - See additional Care Plan goals for specific interventions  Outcome: Progressing  Goal: Patient/Family Short Term Goal  Description: Patient's Short Term Goal: To breathe better.    Interventions:   -  Neb treatment  -Dieretics   -wean o2 down to baseline.   - Pulmonary consult  -Card consult    - See additional Care Plan goals for specific interventions  Outcome: Progressing

## 2024-11-25 NOTE — ED INITIAL ASSESSMENT (HPI)
Pt presents to the ED with c/o shortness of  breath upon for one day. Pt reports when she ambulated to the restroom her oxygen level was in the 40s. Pt is oxygen dependent on 3L. Denies chest pain/dizziness/lightheadedness. Pt presents with saturations in the high 70s on 3L of oxygen. Pt speaking in full sentences.

## 2024-11-25 NOTE — H&P
[facility]  part of [facility]                                                                                                          History and Physical     Yesenia Berkowitz Patient Status:  Inpatient    1940 MRN [id number]   Location [facility]5W Attending Luz Marina Garcia MD   Hosp Day # 0 PCP JO-ANN YANG MD       Chief Complaint: Shortness of breath, hypoxia    Subjective:    Yesenia Berkowitz is a 84 year old female with history of A-fib, COPD, HTN, HLD, HFpEF, chronic respiratory failure on 3 L at baseline who presented to the ED with complaints of shortness of breath and hypoxia.  Over the last couple of days, patient has noted progressive shortness of breath with increased lower extremity swelling.  She used her nebs and increased Lasix to 80 daily without improvement.  Today, while ambulating to the bathroom she noted her oxygen levels in the 40s and called EMT.  Has chronic LLE swelling.  When she develops edema on the right, she notes she is in heart failure.  No chest pain, dizziness, lightheadedness, cough, fever or chills.  On arrival to the ED she was noted to be in the high 70s on 3 L, talking in full sentences.    She was given steroids and nebs and has been admitted for further treatment.  CXR: Moderate interstitial edema.  Bilateral lower lobe airspace opacities suggestive of atelectasis.  Underlying pneumonia is felt to be less likely but is also in the differentials.  BNP only 231    At the time of my evaluation, she is sitting up in bed eating, able to talk in full sentences.    Of note patient was hospitalized earlier this month for acute on chronic respiratory failure.  Required thoracentesis with removal of 800 cc from her right lung, diastolic heart failure.    History/Other:      Past Medical History:  Past Medical History:    A-fib (HCC)    Anesthesia complication    Arrhythmia    AFIB    Arthritis    Asthma (HCC)    Back problem    COPD (chronic  obstructive pulmonary disease) (HCC)    Deviated nasal septum    nasal septoplasty, turb reduction, smr of turbs    Difficult intubation    fiber optic if needed    Diverticulitis    colonoscopy     Diverticulosis of large intestine    Esophageal reflux    Extrinsic asthma, unspecified    Headache    Heart disease    Hepatitis    WAS TOLD SHE HAS HAD THIS WHEN SHE WAS 19 YEARS OLD    High blood pressure    High cholesterol    Irregular heart rate    Lichenoid keratosis    left chest-bx    Osteoarthritis    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    Paronychia    (RT); onychomycosis; debridement    Problems with swallowing    occasionally    Shortness of breath    2 L NC ALL THE TIME 24HR/7 DAYS PER WEEK    Unspecified essential hypertension    Visual impairment        Past Surgical History:   Past Surgical History:   Procedure Laterality Date    Adenoidectomy      Cataract      cataract extraction     Hysterectomy      Sinus surgery        DEVIATED SEPTUM    T&a      Tonsillectomy         Social History:  reports that she quit smoking about 28 years ago. Her smoking use included cigarettes. She has never used smokeless tobacco. She reports current alcohol use. She reports that she does not use drugs.    Family History:   Family History   Problem Relation Age of Onset    Ovarian Cancer Mother 76        endometrial, poss ovarian primary    Pulmonary Disease Sister         COPD    Skin cancer Other     Stroke Other     Other (Other) Other        Allergies: Allergies[1]    Medications:  Medications Ordered Prior to Encounter[2]    Review of Systems:   A comprehensive 14 point review of systems was completed.    Pertinent positives and negatives noted in the HPI.    Objective:     /52 (BP Location: Right arm)   Pulse 85   Temp 98.9 °F (37.2 °C) (Oral)   Resp 20   Ht 5' 5\" (1.651 m)   Wt 173 lb 6.4 oz (78.7 kg)   SpO2 91%   BMI 28.86 kg/m²   General: No acute distress.    HEENT: Normocephalic,  atraumatic.  Neck: Supple.  Respiratory: Normal effort.  CTAB.  Diminished breath sounds bibasal  Cardiovascular: S1, S2 regular.  Normal rate.   Abdomen: Soft, nontender, nondistended.  Neurologic: Alert, oriented x 3.  Nonfocal.  Musculoskeletal: No tenderness or deformity.  Extremities: 2+ bilateral lower extremity edema, pitting  Integument: No rashes.   Psychiatric: Appropriate    Results:      Labs:  Labs Last 24 Hours:   BMP     CBC    Other     Na 142 Cl 96 BUN 20 Glu 134   Hb 13.1   PTT - Procal -   K 3.7 CO2 >40.0 Cr 0.60   WBC 17.5 >< .0  INR - CRP -   Renal Lytes Endo    Hct 41.0   Trop - D dim -   eGFR - Ca 8.4 POC Gluc  -    LFT   pBNP 231 Lactic -   eGFR AA - PO4 - A1c -   AST 20 APk 59 Prot 5.0  LDL -     Mg - TSH -   ALT 32 T katie 0.4 Alb 3.2          COVID-19 Lab Results    COVID-19  Lab Results   Component Value Date    COVID19 Not Detected 09/13/2024       Pro-Calcitonin  No results for input(s): \"PCT\" in the last 168 hours.    Cardiac  Recent Labs   Lab 11/24/24  1844   PBNP 231       Creatinine Kinase  No results for input(s): \"CK\" in the last 168 hours.    Inflammatory Markers  No results for input(s): \"CRP\", \"SONA\", \"LDH\", \"DDIMER\" in the last 168 hours.    Imaging: Imaging data reviewed in Epic.    Assessment & Plan:    Acute on chronic respiratory failure with hypercapnia, hypoxia  CHF exacerbation, last echo with EF 65 to 70%.    Possible COPD exacerbation  -Admit  -CHF protocol with diuretics  -Strict I's and O's  -Wean oxygen to baseline  -Activity as tolerated  -Pulmonology on consult  -Consider cardiology consult in a.m.  -Steroids, nebs, antibiotics    Leukocytosis, likely steroid-induced  -Was in the ER 11/18/2024, discharged on Decadron    A-fib  HTN  HLD  -Resume home medications as appropriate    Quality:  DVT Prophylaxis: Lovenox  CODE status: Full code  ANGEL: TBD        Plan of care discussed with patient and son at bedside. Acknowledged understanding and agrees to plan.  Also discussed with the ED physician.    High MDM  Time spent on this admission - examining patient, obtaining history, reviewing previous medical records, going over test results/imaging and discussing plan of care. More than 50% of the time was spent in counseling and/or coordination of care related to acute on chronic respiratory failure.   All questions answered.     Luz Marina Garcia MD  11/24/2024                   [1]   Allergies  Allergen Reactions    Penicillin G ANAPHYLAXIS    Azithromycin DIARRHEA and NAUSEA AND VOMITING    Cefuroxime UNKNOWN     Other reaction(s): Vomitting    Levofloxacin UNKNOWN     Other reaction(s): LEVOFLOXACIN    Penicillins      Other reaction(s): Unknown   [2]   Current Facility-Administered Medications on File Prior to Encounter   Medication Dose Route Frequency Provider Last Rate Last Admin    [COMPLETED] furosemide (Lasix) 10 mg/mL injection 40 mg  40 mg Intravenous Once Aakash Hurt MD   40 mg at 11/18/24 1300    [COMPLETED] dexamethasone (Decadron) 4 MG/ML injection 6 mg  6 mg Intravenous Once Aakash Hurt MD   6 mg at 11/18/24 1255    [COMPLETED] ipratropium-albuterol (Duoneb) 0.5-2.5 (3) MG/3ML inhalation solution 3 mL  3 mL Nebulization Once Aakash Hurt MD   3 mL at 11/18/24 1250    [COMPLETED] doxycycline hyclate (Vibramycin) 100 mg in sodium chloride 0.9% 100 mL IVPB  100 mg Intravenous Once Clare Quintero  mL/hr at 11/03/24 0114 100 mg at 11/03/24 0114    [COMPLETED] iopamidol 76% (ISOVUE-370) injection for power injector  80 mL Intravenous ONCE PRN Obdulia Lovell DO   80 mL at 11/03/24 1238    [COMPLETED] predniSONE (Deltasone) tab 60 mg  60 mg Oral Once Russ Su MD   60 mg at 11/02/24 2140    [COMPLETED] iopamidol 76% (ISOVUE-370) injection for power injector  80 mL Intravenous ONCE PRN Ez Taylor MD   50 mL at 10/08/24 1223    [COMPLETED] methylPREDNISolone sodium succinate (Solu-MEDROL) injection 125 mg  125 mg Intravenous Once Rene,  MD Jacqueline   125 mg at 10/07/24 1254    [COMPLETED] ipratropium (Atrovent) 0.02 % nebulizer solution 1 mg  1 mg Nebulization Once Jacqueline López MD   1 mg at 10/07/24 1216    [COMPLETED] furosemide (Lasix) 10 mg/mL injection 40 mg  40 mg Intravenous Once Jacqueline López MD   40 mg at 10/07/24 1357    [COMPLETED] Perflutren Lipid Microsphere (DEFINITY) 6.52 MG/ML injection 1.5 mL  1.5 mL Intravenous ONCE PRN Jose Pfeiffer MD   1.5 mL at 24 1613    [COMPLETED] ipratropium-albuterol (Duoneb) 0.5-2.5 (3) MG/3ML inhalation solution 3 mL  3 mL Nebulization Once Korey Luna MD   3 mL at 24 1419    [COMPLETED] furosemide (Lasix) 10 mg/mL injection 40 mg  40 mg Intravenous Once Korey Luna MD   40 mg at 24 1558    [COMPLETED] methylPREDNISolone sodium succinate (Solu-MEDROL) injection 60 mg  60 mg Intravenous Once Korey Luna MD   60 mg at 24 1648    [COMPLETED] ipratropium-albuterol (Duoneb) 0.5-2.5 (3) MG/3ML inhalation solution 3 mL  3 mL Nebulization Once Korey Luna MD   3 mL at 24 1730    [COMPLETED] iopamidol 76% (ISOVUE-370) injection for power injector  80 mL Intravenous ONCE PRN David Miller MD   80 mL at 24 1026     Current Outpatient Medications on File Prior to Encounter   Medication Sig Dispense Refill    [] dexamethasone (DECADRON) 4 MG tablet Take 2 tablets (8 mg total) by mouth daily with breakfast for 3 days. 6 tablet 0    acetaminophen 500 MG Oral Tab Take 1 tablet (500 mg total) by mouth every 6 (six) hours as needed for Pain or Fever.      benzonatate 200 MG Oral Cap Take 1 capsule (200 mg total) by mouth 3 (three) times daily as needed for cough. 30 capsule 0    guaiFENesin 100 MG/5 ML Oral Take 10 mL (200 mg total) by mouth every 4 (four) hours as needed. 300 mL 0    [] Doxycycline Monohydrate 100 MG Oral Cap Take 1 capsule (100 mg total) by mouth 2 (two) times daily for 10 days. Avoid sun, do not take with milk; keep away from children  20 capsule 0    [] predniSONE 20 MG Oral Tab Take 1 tablet (20 mg total) by mouth daily for 10 days. 10 tablet 0    albuterol (2.5 MG/3ML) 0.083% Inhalation Nebu Soln Take 3 mL (2.5 mg total) by nebulization every 4 (four) hours as needed for Wheezing or Shortness of Breath. 50 each 0    furosemide 40 MG Oral Tab Please alternate taking 40 mg twice per day with 40 mg once per day. 45 tablet 1    DIGOXIN 0.125 MG Oral Tab Take 1 tablet (125 mcg total) by mouth daily. 30 tablet 0    albuterol 108 (90 Base) MCG/ACT Inhalation Aero Soln Inhale 2 puffs into the lungs as needed.      dicyclomine 10 MG Oral Cap Take 1 capsule (10 mg total) by mouth 3 (three) times daily as needed (abdominal pain). 40 capsule 3    mupirocin 2 % External Ointment Apply 1 Application topically 3 (three) times daily. 1 each 3    triamcinolone 0.1 % External Cream Apply topically 2 (two) times daily. Apply bid as directed 80 g 3    estradiol 0.05 MG/24HR Transdermal Patch Weekly Place 1 patch onto the skin once a week. As directed.      PULMICORT FLEXHALER 180 MCG/ACT Inhalation Aerosol Powder, Breath Activated Inhale 2 puffs into the lungs daily.      dilTIAZem HCl ER Coated Beads 240 MG Oral Capsule SR 24 Hr Take 1 capsule (240mg)by mouth every morning on an empty stomach.  0    Calcium Carb-Cholecalciferol (CALCIUM + D3) 600-200 MG-UNIT Oral Tab Take 1 tablet by mouth daily.        Cholecalciferol (VITAMIN D) 1000 UNITS Oral Tab Take 1,000 Units by mouth 2 (two) times daily.      Potassium Chloride ER (K-DUR M20) 20 MEQ Oral Tab CR Take 1 tablet (20 mEq total) by mouth nightly.      lansoprazole (PREVACID) 30 MG Oral Capsule Delayed Release Take 1 capsule (30 mg total) by mouth nightly.      amitriptyline 50 MG Oral Tab Take 1 tablet (50 mg total) by mouth nightly.      aspirin 81 MG Oral Tab Take 2 tablets (162 mg total) by mouth daily.      Loratadine 10 MG Oral Cap Take 10 mg by mouth nightly.        montelukast 10 MG Oral Tab Take  1 tablet (10 mg total) by mouth nightly.      Multiple Vitamin (MULTI-VITAMINS) Oral Tab take 1 tablet by ORAL route  every day with food      omega-3 fatty acids (FISH OIL) 1000 MG Oral Cap Take 1,000 mg by mouth daily.      Tiotropium Bromide Monohydrate 18 MCG Inhalation Cap Inhale 1 capsule (18 mcg total) into the lungs nightly.      B Complex Oral Cap Take 1 tablet by mouth daily.

## 2024-11-25 NOTE — CM/SW NOTE
Social work received an update from the [facility] liaison that the patient is pending for RN,PT,HHA, and MSW  services.    Social work sent a referral in A[employer] and entered new F2F to be signed.     DREW/CM to remain available for support and/or discharge planning.     Isidra De Paz MSW, LSW  Discharge Planner [id number]

## 2024-11-25 NOTE — ED QUICK NOTES
Orders for admission, patient is aware of plan and ready to go upstairs. Any questions, please call ED RN Marce at extension [phone].     Patient Covid vaccination status: Fully vaccinated     COVID Test Ordered in ED: None    COVID Suspicion at Admission: N/A    Running Infusions:  None    Mental Status/LOC at time of transport: axox4    Other pertinent information: oxygen dependent, on 5L  CIWA score: N/A   NIH score:  N/A

## 2024-11-26 PROBLEM — I27.81 COR PULMONALE (CHRONIC) (HCC): Status: ACTIVE | Noted: 2024-11-26

## 2024-11-26 LAB
BASOPHILS # BLD AUTO: 0.02 X10(3) UL (ref 0–0.2)
BASOPHILS NFR BLD AUTO: 0.1 %
BUN BLD-MCNC: 23 MG/DL (ref 9–23)
BUN/CREAT SERPL: 35.9 (ref 10–20)
CALCIUM BLD-MCNC: 8.8 MG/DL (ref 8.7–10.4)
CHLORIDE SERPL-SCNC: 99 MMOL/L (ref 98–112)
CO2 SERPL-SCNC: >40 MMOL/L (ref 21–32)
CREAT BLD-MCNC: 0.64 MG/DL
DEPRECATED RDW RBC AUTO: 54.3 FL (ref 35.1–46.3)
EGFRCR SERPLBLD CKD-EPI 2021: 88 ML/MIN/1.73M2 (ref 60–?)
EOSINOPHIL # BLD AUTO: 0 X10(3) UL (ref 0–0.7)
EOSINOPHIL NFR BLD AUTO: 0 %
ERYTHROCYTE [DISTWIDTH] IN BLOOD BY AUTOMATED COUNT: 15 % (ref 11–15)
GLUCOSE BLD-MCNC: 156 MG/DL (ref 70–99)
GLUCOSE BLDC GLUCOMTR-MCNC: 120 MG/DL (ref 70–99)
GLUCOSE BLDC GLUCOMTR-MCNC: 208 MG/DL (ref 70–99)
GLUCOSE BLDC GLUCOMTR-MCNC: 284 MG/DL (ref 70–99)
HCT VFR BLD AUTO: 34.8 %
HGB BLD-MCNC: 11 G/DL
IMM GRANULOCYTES # BLD AUTO: 0.07 X10(3) UL (ref 0–1)
IMM GRANULOCYTES NFR BLD: 0.4 %
LYMPHOCYTES # BLD AUTO: 1.14 X10(3) UL (ref 1–4)
LYMPHOCYTES NFR BLD AUTO: 6.9 %
MAGNESIUM SERPL-MCNC: 2.1 MG/DL (ref 1.6–2.6)
MCH RBC QN AUTO: 30.6 PG (ref 26–34)
MCHC RBC AUTO-ENTMCNC: 31.6 G/DL (ref 31–37)
MCV RBC AUTO: 96.9 FL
MONOCYTES # BLD AUTO: 0.93 X10(3) UL (ref 0.1–1)
MONOCYTES NFR BLD AUTO: 5.6 %
NEUTROPHILS # BLD AUTO: 14.47 X10 (3) UL (ref 1.5–7.7)
NEUTROPHILS # BLD AUTO: 14.47 X10(3) UL (ref 1.5–7.7)
NEUTROPHILS NFR BLD AUTO: 87 %
OSMOLALITY SERPL CALC.SUM OF ELEC: 303 MOSM/KG (ref 275–295)
PHOSPHATE SERPL-MCNC: 3.2 MG/DL (ref 2.4–5.1)
PLATELET # BLD AUTO: 145 10(3)UL (ref 150–450)
POTASSIUM SERPL-SCNC: 3.7 MMOL/L (ref 3.5–5.1)
RBC # BLD AUTO: 3.59 X10(6)UL
SODIUM SERPL-SCNC: 143 MMOL/L (ref 136–145)
WBC # BLD AUTO: 16.6 X10(3) UL (ref 4–11)

## 2024-11-26 PROCEDURE — 99233 SBSQ HOSP IP/OBS HIGH 50: CPT | Performed by: HOSPITALIST

## 2024-11-26 RX ORDER — POTASSIUM CHLORIDE 1500 MG/1
40 TABLET, EXTENDED RELEASE ORAL ONCE
Status: COMPLETED | OUTPATIENT
Start: 2024-11-26 | End: 2024-11-26

## 2024-11-26 NOTE — CONSULTS
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  Patient denied cough.  Chest x-ray showed pulmonary interstitial edema with slight right pleural effusion and elevation of the hemidiaphragm.  Electrolytes showed sodium 142, potassium 3.7, chloride 96.  CO2 greater than 40, BUN 20, creatinine 0.60.  CBC showed WBC 17,500, RBC 4.22, hemoglobin 13.1, hematocrit 41.      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     MEDICATIONS:  Home medications:  Albuterol p.r.n., Lasix 40 mg a day, digoxin 0.125 mg a day, dicyclomine 10 mg 3 times a day, Pulmicort 180 two puffs q.12 h., Cardizem  mg a day, potassium chloride 20 mEq a day, Prevacid 30 mg a day, Protonix 40 mg a day, amitriptyline 50 mg at night, aspirin 81 mg a day, Claritin 10 mg a day, Singulair 10 mg at bedtime, doxycycline 100 mg twice a day, prednisone 20 mg a day, Spiriva 1 inhalation q.24 h.     ALLERGIES:  Penicillin, Levaquin, Zinacef.    SOCIAL HISTORY:  Patient quit smoking 20 years ago.      FAMILY HISTORY:  Father  from MI in 60s.  Mother  from cancer of the ovary at the age of  70.    REVIEW OF SYSTEMS:  GENERAL:  No weight loss.  HEAD:  No headache.  EYES:  No diplopia, blurry vision.  EARS:   No tinnitus, impaired hearing.  NOSE:  No rhinorrhea, epistaxis.  THROAT:  No sore throat.  NECK:  No neck stiffness.  RESPIRATORY:  No cough.  Complains of progressive shortness of breath.  CARDIOVASCULAR:  No orthopnea, paroxysmal nocturnal dyspnea.  GASTROINTESTINAL:  No nausea, vomiting, diarrhea.  GENITOURINARY:  No dysuria or hematuria.       PHYSICAL EXAMINATION:    VITAL SIGNS:  Temperature 98.7, pulse 80, respirations 20, blood pressure 127/61.   HEENT:  Head and face:  No trauma, no injury.  Eyes:  Pupils equal bilaterally.  Conjunctivae were not anemic.  Sclerae nonicteric.  Fundi were normal.  Ears:  External ears, no deformity.  Ear canals were patent.  Eardrums were intact.  Nose:  No congestion, polyp.  Mouth and throat:  Free.   NECK:  No neck stiffness.  No palpable nodes.  No carotid bruit.    CHEST:  Symmetrical.    LUNGS:  Diminished breath sounds with increased dullness and decreased vocal fremitus from the left chest.    HEART:  Regular.  No murmur.  Cardiac dullness was normal.   ABDOMEN:  Soft.  No hepatosplenomegaly.  No ascites.  No hernia.   EXTREMITIES:  There is 3+ edema.      DIAGNOSTIC IMPRESSION:    1.   Acute on chronic respiratory failure.   2.   Chronic obstructive pulmonary disease with exacerbation.  3.   Asthma with exacerbation.  4.   Left phrenic nerve paralysis.  5.   Kyphoscoliosis  6.   Obesity hypoventilation syndrome.    SUGGESTIONS AND RECOMMENDATIONS:    1.   Prednisone 40 mg a day.  2.   Doxycycline 100 mg q.12 h.  3.   Protonix 40 mg a day.  4.   Lasix 40 mg a day.    5.   DVT prophylaxis.    6.   CT of the chest.  7.   Consider nocturnal polysomnography study as an outpatient.    Dictated By Bing Boyle MD  d: 11/25/2024 17:48:28  t: 11/25/2024 20:20:41  Job [id number]  [facility]/

## 2024-11-26 NOTE — DISCHARGE INSTRUCTIONS
HOME HEALTH:  Sometimes managing your health at home requires assistance.  The [facility] team has recognized your preference to use [facility].  They can be reached by phone at [phone].  The fax number for your reference is [phone].  A representative from the home health agency will contact you or your family to schedule your first visit.

## 2024-11-26 NOTE — PLAN OF CARE
Patient on baseline oxygen. Remote tele and pulse ox in place. IV lasix and solumedrol continued. Fluid restriction in place. Waiting on medical clearance. VSS. Rounded on frequently.    Problem: Patient Centered Care  Goal: Patient preferences are identified and integrated in the patient's plan of care  Description: Interventions:  - What would you like us to know as we care for you? I live home alone, but my adult son helps me.   - Provide timely, complete, and accurate information to patient/family  - Incorporate patient and family knowledge, values, beliefs, and cultural backgrounds into the planning and delivery of care  - Encourage patient/family to participate in care and decision-making at the level they choose  - Honor patient and family perspectives and choices  Outcome: Progressing     Problem: Patient/Family Goals  Goal: Patient/Family Long Term Goal  Description: Patient's Long Term Goal: to be discharge home.     Interventions:  -  Neb treatment  -Dieretics   -wean o2 down to baseline.   - Pulmonary consult  -Card consult  - See additional Care Plan goals for specific interventions  Outcome: Progressing  Goal: Patient/Family Short Term Goal  Description: Patient's Short Term Goal: To breathe better.    Interventions:   -  Neb treatment  -Dieretics   -wean o2 down to baseline.   - Pulmonary consult  -Card consult    - See additional Care Plan goals for specific interventions  Outcome: Progressing     Problem: CARDIOVASCULAR - ADULT  Goal: Absence of cardiac arrhythmias or at baseline  Description: INTERVENTIONS:  - Continuous cardiac monitoring, monitor vital signs, obtain 12 lead EKG if indicated  - Evaluate effectiveness of antiarrhythmic and heart rate control medications as ordered  - Initiate emergency measures for life threatening arrhythmias  - Monitor electrolytes and administer replacement therapy as ordered  Outcome: Progressing     Problem: RESPIRATORY - ADULT  Goal: Achieves optimal  ventilation and oxygenation  Description: INTERVENTIONS:  - Assess for changes in respiratory status  - Assess for changes in mentation and behavior  - Position to facilitate oxygenation and minimize respiratory effort  - Oxygen supplementation based on oxygen saturation or ABGs  - Provide Smoking Cessation handout, if applicable  - Encourage broncho-pulmonary hygiene including cough, deep breathe, Incentive Spirometry  - Assess the need for suctioning and perform as needed  - Assess and instruct to report SOB or any respiratory difficulty  - Respiratory Therapy support as indicated  - Manage/alleviate anxiety  - Monitor for signs/symptoms of CO2 retention  Outcome: Progressing

## 2024-11-26 NOTE — PROGRESS NOTES
[facility]  Cardiology Progress Note    Yesenia Berkowitz Patient Status:  Inpatient    1940 MRN [id number]   Location [facility]5W Attending Rex Tabor,*   Hosp Day # 2 PCP JO-ANN YANG MD     Subjective     Feeling better today however not back to normal.    Objective:   Patient Vitals for the past 24 hrs:   BP Temp Temp src Pulse Resp SpO2 Weight   24 0700 -- -- -- -- -- -- 174 lb (78.9 kg)   24 0513 94/59 97.4 °F (36.3 °C) Axillary 72 18 99 % --   24 0100 114/48 98.6 °F (37 °C) Oral 74 20 100 % --   24 2050 121/54 98.7 °F (37.1 °C) Oral 83 20 97 % --   24 1653 -- -- -- -- -- 99 % --   24 1604 127/61 98.7 °F (37.1 °C) Oral 80 20 99 % --   24 1100 -- -- -- -- -- 97 % --   24 1008 -- -- -- -- -- 100 % --     Intake/Output:   Last 3 shifts:   Intake/Output                   24 0700 - 24 0659 24 0700 - 24 0659 24 0700 - 24 0659       Intake    P.O.  --  540  --    P.O. -- 540 --    Total Intake -- 540 --       Output    Urine  750  2500  --    Urine 750 2500 --    Incontinent Urine Occurrence -- 1 x --    Stool  --  --  --    Stool Count Calculated for I/O 1 x 1 x --    Total Output 750 2500 --       Net I/O     -750  --             Scheduled Meds:    potassium chloride  40 mEq Oral Once    amitriptyline  50 mg Oral Nightly    aspirin  162 mg Oral Daily    digoxin  125 mcg Oral Daily    dilTIAZem HCl ER Coated Beads  360 mg Oral Daily    pantoprazole  40 mg Oral QAM AC    cetirizine  5 mg Oral Nightly    montelukast  10 mg Oral Nightly    fluticasone furoate  1 puff Inhalation Daily    umeclidinium bromide  1 puff Inhalation Daily    insulin aspart  1-7 Units Subcutaneous TID CC    acetaZOLAMIDE  500 mg Oral Daily    enoxaparin  40 mg Subcutaneous Daily    predniSONE  40 mg Oral Daily    doxycycline  100 mg Oral 2 times per day    furosemide  40 mg Intravenous BID (Diuretic)        Results:     Lab Results   Component Value Date    WBC 16.6 (H) 11/26/2024    HGB 11.0 (L) 11/26/2024    HCT 34.8 (L) 11/26/2024    .0 (L) 11/26/2024    CREATSERUM 0.64 11/26/2024    BUN 23 11/26/2024     11/26/2024    K 3.7 11/26/2024    CL 99 11/26/2024    CO2 >40.0 (HH) 11/26/2024     (H) 11/26/2024    CA 8.8 11/26/2024    ALB 3.2 11/24/2024    ALKPHO 59 11/24/2024    BILT 0.4 11/24/2024    TP 5.0 (L) 11/24/2024    AST 20 11/24/2024    ALT 32 11/24/2024    PTT 24.3 11/25/2024    INR 1.08 11/25/2024    TSH 1.060 01/06/2023    LIP 30 08/30/2024    DDIMER 0.63 10/07/2024    MG 2.1 11/26/2024    PHOS 3.2 11/26/2024    TROP <0.045 02/03/2020       Recent Labs   Lab 11/24/24  1844 11/25/24  0446 11/25/24  1614 11/26/24 0449   * 351*  --  156*   BUN 20 23  --  23   CREATSERUM 0.60 0.80  --  0.64   CA 8.4* 8.4*  --  8.8    144  --  143   K 3.7 3.4* 4.5 3.7   CL 96* 97*  --  99   CO2 >40.0* >40.0*  --  >40.0*     Recent Labs   Lab 11/24/24  1844 11/25/24  0446 11/26/24 0449   RBC 4.22 3.75* 3.59*   HGB 13.1 11.5* 11.0*   HCT 41.0 37.2 34.8*   MCV 97.2 99.2 96.9   MCH 31.0 30.7 30.6   MCHC 32.0 30.9* 31.6   RDW 15.0 15.2* 15.0   NEPRELIM 14.46*  --  14.47*   WBC 17.5* 13.7* 16.6*   .0 151.0 145.0*       No results for input(s): \"BNPML\" in the last 168 hours.    No results for input(s): \"TROP\", \"CK\" in the last 168 hours.    CT CHEST (OIO=40523)    Result Date: 11/25/2024  CONCLUSION:  1. Cardiomegaly and mild coronary atherosclerosis.  Findings consistent with mild CHF/fluid overload including small bilateral pleural effusions, slightly improved on the right, with associated bibasilar atelectasis. 2. Mild emphysema with a 10 x 7 mm spiculated right upper lobe pulmonary nodule.  This nodule is stable dating back to October 2024 but has increased in size compared to prior studies.  A malignant etiology such as a slow growing primary bronchogenic carcinoma therefore cannot be  excluded.  Consider further assessment with percutaneous biopsy or follow-up PET/CT when the patient is better able to cooperate. 3. 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 4. Stable moderately dilated main pulmonary artery, which can be seen with chronic pulmonary hypertension; correlate clinically.     Dictated by (CST): Malik Reynolds MD on 11/25/2024 at 8:16 PM     Finalized by (CST): Malik Reynolds MD on 11/25/2024 at 8:22 PM          XR CHEST AP PORTABLE  (CPT=71045)    Result Date: 11/24/2024  CONCLUSION:   Moderate interstitial edema.  Bilateral lower lobe air space opacities suggestive of atelectasis. Underlying pneumonia is felt to be less likely but is also in the differential.  Dictated by (CST): Miles Clayton MD on 11/24/2024 at 7:09 PM     Finalized by (CST): Mlies Clayton MD on 11/24/2024 at 7:10 PM         EKG 12 Lead    Result Date: 11/25/2024  Normal sinus rhythm Cannot rule out Anterior infarct (cited on or before 18-NOV-2024) Abnormal ECG When compared with ECG of 18-NOV-2024 12:28, No significant change was found Confirmed by SAGAR HERNANDEZ, HERNANDEZ ([id number]) on 11/25/2024 5:03:25 PM          Exam:     General: Alert and oriented x 3. No apparent distress.   HEENT: Normocephalic, anicteric sclera, neck supple, no thyromegaly or adenopathy.  Neck: No JVD, carotids 2+, no bruits.  Cardiac: Regular rate and rhythm. S1, S2 normal. No murmur, pericardial rub, S3, or extra cardiac sounds.  Lungs: Mild wheezes bilaterally..  Normal excursions and effort.  Abdomen: Soft, non-tender. No organosplenomegally, mass or rebound, BS-present.  Extremities: Without clubbing or cyanosis. 1/2+ LE edema.    Neurologic: Alert and oriented, normal affect. No focal defects  Skin: Warm and dry.     Assessment and Plan:   Acute hypoxic and hypercapnic respiratory failure  Moderate to severe pulmonary hypertension  Acute on chronic HFpEF  Acute on chronic  COPD  -presents with progressive dyspnea/hypoxi  -found to have pulmonary edema and elevated proBNP and bicarb >40  -TTE with preserved EF, moderate to severe pulmonary hypertension   -Supposed to be taking Lasix 40 mg daily alternating with 40 mg twice daily every other day however was only taking 40 mg daily  -continue Lasix 40 mg IV twice daily  -Patient will be more compliant with Lasix at home, however if has recurrence may benefit from CardioMEMS device  -strict I/Os  -GDMT as tolerated  -appreciate COPD management per primary      Paroxysmal AF  -brief paroxysmal AF, currently in SR  -continue rate control, on dilt/digoxin  -continue AC for elevated [id number]      Luis Fernando Fowler MD  [facility]  11/26/2024

## 2024-11-26 NOTE — PROGRESS NOTES
[facility]  part of [facility]    Progress Note    Yesenia Berkowitz Patient Status:  Inpatient    1940 MRN [id number]   Location [facility]5W Attending Rex Tabor,*   Hosp Day # 2 PCP JO-ANN YANG MD     Chief Complaint: when can I go home    Subjective:     Constitutional:  Positive for fatigue. Negative for appetite change and chills.   HENT:  Negative for congestion.    Respiratory:  Positive for shortness of breath. Negative for cough and chest tightness.    Cardiovascular:  Negative for chest pain and leg swelling.   Genitourinary:  Negative for dysuria.   Neurological:  Negative for weakness.   Psychiatric/Behavioral:  Negative for behavioral problems and decreased concentration. The patient is nervous/anxious.        Objective:   Blood pressure 118/57, pulse 71, temperature 97.8 °F (36.6 °C), temperature source Oral, resp. rate 18, height 5' 5\" (1.651 m), weight 174 lb (78.9 kg), SpO2 94%.  Physical Exam  Vitals and nursing note reviewed.   HENT:      Head: Normocephalic and atraumatic.   Cardiovascular:      Rate and Rhythm: Normal rate.      Pulses: Normal pulses.   Pulmonary:      Effort: Pulmonary effort is normal.      Breath sounds: Rhonchi present.   Abdominal:      General: Bowel sounds are normal.      Palpations: Abdomen is soft.   Skin:     General: Skin is warm and dry.      Capillary Refill: Capillary refill takes less than 2 seconds.   Neurological:      General: No focal deficit present.      Mental Status: She is alert and oriented to person, place, and time.   Psychiatric:         Behavior: Behavior normal.         Judgment: Judgment normal.         Results:   Lab Results   Component Value Date    WBC 16.6 (H) 2024    HGB 11.0 (L) 2024    HCT 34.8 (L) 2024    .0 (L) 2024    CREATSERUM 0.64 2024    BUN 23 2024     2024    K 3.7 2024    CL 99 2024    CO2 >40.0 (HH) 2024      (H) 11/26/2024    CA 8.8 11/26/2024    ALB 3.2 11/24/2024    ALKPHO 59 11/24/2024    BILT 0.4 11/24/2024    TP 5.0 (L) 11/24/2024    AST 20 11/24/2024    ALT 32 11/24/2024    PTT 24.3 11/25/2024    INR 1.08 11/25/2024    TSH 1.060 01/06/2023    LIP 30 08/30/2024    DDIMER 0.63 10/07/2024    MG 2.1 11/26/2024    PHOS 3.2 11/26/2024    TROP <0.045 02/03/2020    TROPHS 9 10/07/2024       CT CHEST (CMA=21984)    Result Date: 11/25/2024  CONCLUSION:  1. Cardiomegaly and mild coronary atherosclerosis.  Findings consistent with mild CHF/fluid overload including small bilateral pleural effusions, slightly improved on the right, with associated bibasilar atelectasis. 2. Mild emphysema with a 10 x 7 mm spiculated right upper lobe pulmonary nodule.  This nodule is stable dating back to October 2024 but has increased in size compared to prior studies.  A malignant etiology such as a slow growing primary bronchogenic carcinoma therefore cannot be excluded.  Consider further assessment with percutaneous biopsy or follow-up PET/CT when the patient is better able to cooperate. 3. 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 4. Stable moderately dilated main pulmonary artery, which can be seen with chronic pulmonary hypertension; correlate clinically.     Dictated by (CST): Malik Reynolds MD on 11/25/2024 at 8:16 PM     Finalized by (CST): Malik Reynolds MD on 11/25/2024 at 8:22 PM          XR CHEST AP PORTABLE  (CPT=71045)    Result Date: 11/24/2024  CONCLUSION:   Moderate interstitial edema.  Bilateral lower lobe air space opacities suggestive of atelectasis. Underlying pneumonia is felt to be less likely but is also in the differential.  Dictated by (CST): Miles Clayton MD on 11/24/2024 at 7:09 PM     Finalized by (CST): Miles Clayton MD on 11/24/2024 at 7:10 PM         EKG 12 Lead    Result Date: 11/25/2024  Normal sinus rhythm Cannot rule out Anterior  infarct (cited on or before 18-NOV-2024) Abnormal ECG When compared with ECG of 18-NOV-2024 12:28, No significant change was found Confirmed by SAGAR HERNANDEZ, HERNANDEZ ([id number]) on 11/25/2024 5:03:25 PM     Assessment & Plan:     Acute on chronic respiratory failure with hypercapnia, hypoxia  CHF exacerbation, last echo with EF 65 to 70%.    Possible COPD exacerbation  -Admit  -CHF protocol with diuretics  -Strict I's and O's  -Wean oxygen to baseline  -Activity as tolerated  -Pulmonology on consult  -Steroids, nebs, antibiotics     Leukocytosis, likely steroid-induced  -Was in the ER 11/18/2024, discharged on Decadron    Pulm htn with rv dysfunction     A-fib  HTN  HLD  -Resume home medications as appropriate    Vag dryness resume meds; estrogrn patch; per pt NO hist pe     Quality:  DVT Prophylaxis: Lovenox  CODE status: Full code  ANGEL: TBD    Complex mdm; coordinating care with nurse/dr fowler and counseling pt about steroids and insulin; poss dc if walk on 3.5 l nc ok     Patient will require inpatient services that will reasonably be expected to span two midnight's based on the clinical documentation in H+P.   Based on patients current state of illness, I anticipate that, after discharge, patient will require TBD.      TEOFILO HOLBROOK MD

## 2024-11-26 NOTE — PROGRESS NOTES
[facility]  part of [facility]    Progress Note    Yesenia Berkowitz Patient Status:  Inpatient    1940 MRN [id number]   Location [facility]5W Attending Rex Tabor,*   Hosp Day # 2 PCP JO-ANN YANG MD       Subjective:   Yesenia Berkowitz is a(n) 84 year old female.   Felt better,less sob,persistent  lower leg edema  Objective:   Blood pressure 94/59, pulse 72, temperature 97.4 °F (36.3 °C), temperature source Axillary, resp. rate 18, height 5' 5\" (1.651 m), weight 174 lb (78.9 kg), SpO2 99%.    HEENT: negative  Neck: no adenopathy, no carotid bruit, no JVD, supple, symmetrical, trachea midline and thyroid not enlarged, symmetric, no tenderness/mass/nodules  Pulmonary/Chest:  rhonhi  Cardiovascular: S1, S2 normal, no S3 or S4, regular rate and rhythm, no murmur  Abdominal: normal findings: bowel sounds normal, soft, non-tender and no hepatosplenomegaly   Extremities: extremities normal, atraumatic, no cyanosis3+ edema  Skin: Skin color, texture, turgor normal. No rashes or lesions    Results:     Lab Results   Component Value Date    WBC 16.6 (H) 2024    HGB 11.0 (L) 2024    HCT 34.8 (L) 2024    .0 (L) 2024    CREATSERUM 0.64 2024    BUN 23 2024     2024    K 3.7 2024    CL 99 2024    CO2 >40.0 (HH) 2024     (H) 2024    CA 8.8 2024    ALB 3.2 2024    ALKPHO 59 2024    BILT 0.4 2024    TP 5.0 (L) 2024    AST 20 2024    ALT 32 2024    PTT 24.3 2024    INR 1.08 2024    TSH 1.060 2023    LIP 30 2024    DDIMER 0.63 10/07/2024    MG 2.1 2024    PHOS 3.2 2024    TROP <0.045 2020       CT CHEST (WMD=53256)    Result Date: 2024  CONCLUSION:  1. Cardiomegaly and mild coronary atherosclerosis.  Findings consistent with mild CHF/fluid overload including small bilateral pleural effusions, slightly improved on the  right, with associated bibasilar atelectasis. 2. Mild emphysema with a 10 x 7 mm spiculated right upper lobe pulmonary nodule.  This nodule is stable dating back to October 2024 but has increased in size compared to prior studies.  A malignant etiology such as a slow growing primary bronchogenic carcinoma therefore cannot be excluded.  Consider further assessment with percutaneous biopsy or follow-up PET/CT when the patient is better able to cooperate. 3. 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 4. Stable moderately dilated main pulmonary artery, which can be seen with chronic pulmonary hypertension; correlate clinically.     Dictated by (CST): Malik Reynolds MD on 11/25/2024 at 8:16 PM     Finalized by (CST): Malik Reynolds MD on 11/25/2024 at 8:22 PM          XR CHEST AP PORTABLE  (CPT=71045)    Result Date: 11/24/2024  CONCLUSION:   Moderate interstitial edema.  Bilateral lower lobe air space opacities suggestive of atelectasis. Underlying pneumonia is felt to be less likely but is also in the differential.  Dictated by (CST): Miles Clayton MD on 11/24/2024 at 7:09 PM     Finalized by (CST): Miles Clayton MD on 11/24/2024 at 7:10 PM         EKG 12 Lead    Result Date: 11/25/2024  Normal sinus rhythm Cannot rule out Anterior infarct (cited on or before 18-NOV-2024) Abnormal ECG When compared with ECG of 18-NOV-2024 12:28, No significant change was found Confirmed by SAGAR HERNANDEZ, HERNANDEZ (48) on 11/25/2024 5:03:25 PM     Assessment and Plan:   Principal Problem:    COPD exacerbation (HCC)  Active Problems:    Pulmonary nodule    Acute respiratory failure with hypoxia and hypercapnia (HCC)    Cor pulmonale (chronic) (HCC)       Felt better,persistent edema,continue lasix,dimox,duoneb and doxycycline.follow up CT of the chest in 3 month for pulmonar nodule      ELVIA LIVINGSTON MD, MD  11/26/2024

## 2024-11-27 ENCOUNTER — APPOINTMENT (OUTPATIENT)
Dept: PICC SERVICES | Facility: HOSPITAL | Age: 84
End: 2024-11-27
Attending: HOSPITALIST
Payer: MEDICARE

## 2024-11-27 LAB
BASOPHILS # BLD AUTO: 0.01 X10(3) UL (ref 0–0.2)
BASOPHILS NFR BLD AUTO: 0.1 %
BUN BLD-MCNC: 25 MG/DL (ref 9–23)
BUN/CREAT SERPL: 40.3 (ref 10–20)
CALCIUM BLD-MCNC: 8.7 MG/DL (ref 8.7–10.4)
CHLORIDE SERPL-SCNC: 101 MMOL/L (ref 98–112)
CO2 SERPL-SCNC: >40 MMOL/L (ref 21–32)
CREAT BLD-MCNC: 0.62 MG/DL
DEPRECATED RDW RBC AUTO: 54.6 FL (ref 35.1–46.3)
EGFRCR SERPLBLD CKD-EPI 2021: 89 ML/MIN/1.73M2 (ref 60–?)
EOSINOPHIL # BLD AUTO: 0.01 X10(3) UL (ref 0–0.7)
EOSINOPHIL NFR BLD AUTO: 0.1 %
ERYTHROCYTE [DISTWIDTH] IN BLOOD BY AUTOMATED COUNT: 14.7 % (ref 11–15)
GLUCOSE BLD-MCNC: 136 MG/DL (ref 70–99)
GLUCOSE BLDC GLUCOMTR-MCNC: 115 MG/DL (ref 70–99)
GLUCOSE BLDC GLUCOMTR-MCNC: 153 MG/DL (ref 70–99)
GLUCOSE BLDC GLUCOMTR-MCNC: 170 MG/DL (ref 70–99)
GLUCOSE BLDC GLUCOMTR-MCNC: 232 MG/DL (ref 70–99)
HCT VFR BLD AUTO: 33.9 %
HGB BLD-MCNC: 10.7 G/DL
IMM GRANULOCYTES # BLD AUTO: 0.05 X10(3) UL (ref 0–1)
IMM GRANULOCYTES NFR BLD: 0.4 %
LYMPHOCYTES # BLD AUTO: 1.18 X10(3) UL (ref 1–4)
LYMPHOCYTES NFR BLD AUTO: 10.1 %
MAGNESIUM SERPL-MCNC: 2 MG/DL (ref 1.6–2.6)
MCH RBC QN AUTO: 31.3 PG (ref 26–34)
MCHC RBC AUTO-ENTMCNC: 31.6 G/DL (ref 31–37)
MCV RBC AUTO: 99.1 FL
MONOCYTES # BLD AUTO: 0.72 X10(3) UL (ref 0.1–1)
MONOCYTES NFR BLD AUTO: 6.1 %
NEUTROPHILS # BLD AUTO: 9.75 X10 (3) UL (ref 1.5–7.7)
NEUTROPHILS # BLD AUTO: 9.75 X10(3) UL (ref 1.5–7.7)
NEUTROPHILS NFR BLD AUTO: 83.2 %
OSMOLALITY SERPL CALC.SUM OF ELEC: 300 MOSM/KG (ref 275–295)
PHOSPHATE SERPL-MCNC: 3.5 MG/DL (ref 2.4–5.1)
PLATELET # BLD AUTO: 149 10(3)UL (ref 150–450)
POTASSIUM SERPL-SCNC: 3.6 MMOL/L (ref 3.5–5.1)
POTASSIUM SERPL-SCNC: 3.6 MMOL/L (ref 3.5–5.1)
POTASSIUM SERPL-SCNC: 4.6 MMOL/L (ref 3.5–5.1)
RBC # BLD AUTO: 3.42 X10(6)UL
SODIUM SERPL-SCNC: 142 MMOL/L (ref 136–145)
WBC # BLD AUTO: 11.7 X10(3) UL (ref 4–11)

## 2024-11-27 PROCEDURE — 99233 SBSQ HOSP IP/OBS HIGH 50: CPT | Performed by: HOSPITALIST

## 2024-11-27 RX ORDER — FUROSEMIDE 40 MG/1
40 TABLET ORAL
Qty: 60 TABLET | Refills: 0 | Status: SHIPPED | OUTPATIENT
Start: 2024-11-28

## 2024-11-27 RX ORDER — ACETAZOLAMIDE 250 MG/1
500 TABLET ORAL DAILY
Qty: 30 TABLET | Refills: 0 | Status: SHIPPED | OUTPATIENT
Start: 2024-11-28

## 2024-11-27 RX ORDER — PREDNISONE 20 MG/1
TABLET ORAL
Qty: 15 TABLET | Refills: 0 | Status: SHIPPED | OUTPATIENT
Start: 2024-11-28 | End: 2024-12-10

## 2024-11-27 RX ORDER — DOXYCYCLINE 100 MG/1
100 CAPSULE ORAL EVERY 12 HOURS SCHEDULED
Qty: 4 CAPSULE | Refills: 0 | Status: SHIPPED | OUTPATIENT
Start: 2024-11-27 | End: 2024-11-29

## 2024-11-27 RX ORDER — POTASSIUM CHLORIDE 1500 MG/1
40 TABLET, EXTENDED RELEASE ORAL EVERY 4 HOURS
Status: COMPLETED | OUTPATIENT
Start: 2024-11-27 | End: 2024-11-27

## 2024-11-27 RX ORDER — FUROSEMIDE 40 MG/1
40 TABLET ORAL
Status: DISCONTINUED | OUTPATIENT
Start: 2024-11-28 | End: 2024-11-28

## 2024-11-27 RX ORDER — FUROSEMIDE 40 MG/1
40 TABLET ORAL
Status: DISCONTINUED | OUTPATIENT
Start: 2024-11-27 | End: 2024-11-27

## 2024-11-27 RX ORDER — FUROSEMIDE 10 MG/ML
40 INJECTION INTRAMUSCULAR; INTRAVENOUS ONCE
Status: COMPLETED | OUTPATIENT
Start: 2024-11-27 | End: 2024-11-27

## 2024-11-27 RX ORDER — PREDNISONE 20 MG/1
20 TABLET ORAL DAILY
Qty: 10 TABLET | Refills: 0
Start: 2024-11-27 | End: 2024-12-07

## 2024-11-27 NOTE — PROGRESS NOTES
[facility]  part of [facility]    Progress Note    Yesenia Berkowitz Patient Status:  Inpatient    1940 MRN [id number]   Location [facility]5W Attending Obdulia Lovell,    Hosp Day # 3 PCP JO-ANN YANG MD       Subjective:   Yesenia Berkowitz is a(n) 84 year old female.   Much better,less sob and less edema  Objective:   Blood pressure 139/42, pulse 69, temperature 98.3 °F (36.8 °C), temperature source Oral, resp. rate 20, height 5' 5\" (1.651 m), weight 169 lb 6.4 oz (76.8 kg), SpO2 99%.    HEENT: negative  Neck: no adenopathy, no carotid bruit, no JVD, supple, symmetrical, trachea midline and thyroid not enlarged, symmetric, no tenderness/mass/nodules  Pulmonary/Chest:  clear to auscultation bilaterally, no tenderness  Cardiovascular: S1, S2 normal, no S3 or S4, regular rate and rhythm, no murmur  Abdominal: normal findings: bowel sounds normal, soft, non-tender and no hepatosplenomegaly   Extremities: extremities normal, atraumatic, no cyanosis or 2+ edema  Skin: Skin color, texture, turgor normal. No rashes or lesions    Results:     Lab Results   Component Value Date    WBC 11.7 (H) 2024    HGB 10.7 (L) 2024    HCT 33.9 (L) 2024    .0 (L) 2024    CREATSERUM 0.62 2024    BUN 25 (H) 2024     2024    K 3.6 2024    K 3.6 2024     2024    CO2 >40.0 (HH) 2024     (H) 2024    CA 8.7 2024    ALB 3.2 2024    ALKPHO 59 2024    BILT 0.4 2024    TP 5.0 (L) 2024    AST 20 2024    ALT 32 2024    PTT 24.3 2024    INR 1.08 2024    TSH 1.060 2023    LIP 30 2024    DDIMER 0.63 10/07/2024    MG 2.0 2024    PHOS 3.5 2024    TROP <0.045 2020       CT CHEST (UDZ=37780)    Result Date: 2024  CONCLUSION:  1. Cardiomegaly and mild coronary atherosclerosis.  Findings consistent with mild CHF/fluid overload including  small bilateral pleural effusions, slightly improved on the right, with associated bibasilar atelectasis. 2. Mild emphysema with a 10 x 7 mm spiculated right upper lobe pulmonary nodule.  This nodule is stable dating back to October 2024 but has increased in size compared to prior studies.  A malignant etiology such as a slow growing primary bronchogenic carcinoma therefore cannot be excluded.  Consider further assessment with percutaneous biopsy or follow-up PET/CT when the patient is better able to cooperate. 3. 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 4. Stable moderately dilated main pulmonary artery, which can be seen with chronic pulmonary hypertension; correlate clinically.     Dictated by (CST): Malik Reynolds MD on 11/25/2024 at 8:16 PM     Finalized by (CST): Malik Reynolds MD on 11/25/2024 at 8:22 PM               Assessment and Plan:   Principal Problem:    COPD exacerbation (HCC)  Active Problems:    Pulmonary nodule    Acute respiratory failure with hypoxia and hypercapnia (HCC)    Cor pulmonale (chronic) (HCC)       Felt better,less sob and cough,less edema,home      ELVIA LIVINGSTON MD, MD  11/27/2024

## 2024-11-27 NOTE — PLAN OF CARE
Problem: CARDIOVASCULAR - ADULT  Goal: Absence of cardiac arrhythmias or at baseline  Description: INTERVENTIONS:  - Continuous cardiac monitoring, monitor vital signs, obtain 12 lead EKG if indicated  - Evaluate effectiveness of antiarrhythmic and heart rate control medications as ordered  - Initiate emergency measures for life threatening arrhythmias  - Monitor electrolytes and administer replacement therapy as ordered  Outcome: Progressing     Problem: RESPIRATORY - ADULT  Goal: Achieves optimal ventilation and oxygenation  Description: INTERVENTIONS:  - Assess for changes in respiratory status  - Assess for changes in mentation and behavior  - Position to facilitate oxygenation and minimize respiratory effort  - Oxygen supplementation based on oxygen saturation or ABGs  - Provide Smoking Cessation handout, if applicable  - Encourage broncho-pulmonary hygiene including cough, deep breathe, Incentive Spirometry  - Assess the need for suctioning and perform as needed  - Assess and instruct to report SOB or any respiratory difficulty  - Respiratory Therapy support as indicated  - Manage/alleviate anxiety  - Monitor for signs/symptoms of CO2 retention  Outcome: Progressing     Problem: Patient/Family Goals  Goal: Patient/Family Short Term Goal  Description: Patient's Short Term Goal: To breathe better.    Interventions:   -  Neb treatment  -Dieretics   -wean o2 down to baseline.   - Pulmonary consult  -Card consult    - See additional Care Plan goals for specific interventions  Outcome: Progressing

## 2024-11-27 NOTE — CM/SW NOTE
SW followed up on DC planning.     Pt not ready for DC yet    Per chart review, pt is pending with [facility], also has Home O2 with [employer], 3-4L of o2 at baseline    SW/CM to remain available for support and/or discharge planning.     PLAN: DC home, with [facility] and Home O2 once medically cleared    Kellie MORALEZW, MSW ext. [phone]

## 2024-11-27 NOTE — PLAN OF CARE
Patient on baseline oxygen. Remote tele and pulse ox in place. New IV placed by VAT. Cardiology keeping patient due to fluid overload. Fluid restriction continued. Daily weights as ordered. Lasix and steroid oral. VSS. Rounded on frequently. Possible discharge tomorrow based on cardiology recommendations.    Problem: Patient Centered Care  Goal: Patient preferences are identified and integrated in the patient's plan of care  Description: Interventions:  - What would you like us to know as we care for you? I live home alone, but my adult son helps me.   - Provide timely, complete, and accurate information to patient/family  - Incorporate patient and family knowledge, values, beliefs, and cultural backgrounds into the planning and delivery of care  - Encourage patient/family to participate in care and decision-making at the level they choose  - Honor patient and family perspectives and choices  Outcome: Progressing     Problem: Patient/Family Goals  Goal: Patient/Family Long Term Goal  Description: Patient's Long Term Goal: to be discharge home.     Interventions:  -  Neb treatment  -Dieretics   -wean o2 down to baseline.   - Pulmonary consult  -Card consult  - See additional Care Plan goals for specific interventions  Outcome: Progressing  Goal: Patient/Family Short Term Goal  Description: Patient's Short Term Goal: To breathe better.    Interventions:   -  Neb treatment  -Dieretics   -wean o2 down to baseline.   - Pulmonary consult  -Card consult    - See additional Care Plan goals for specific interventions  Outcome: Progressing     Problem: CARDIOVASCULAR - ADULT  Goal: Absence of cardiac arrhythmias or at baseline  Description: INTERVENTIONS:  - Continuous cardiac monitoring, monitor vital signs, obtain 12 lead EKG if indicated  - Evaluate effectiveness of antiarrhythmic and heart rate control medications as ordered  - Initiate emergency measures for life threatening arrhythmias  - Monitor electrolytes and  administer replacement therapy as ordered  Outcome: Progressing     Problem: RESPIRATORY - ADULT  Goal: Achieves optimal ventilation and oxygenation  Description: INTERVENTIONS:  - Assess for changes in respiratory status  - Assess for changes in mentation and behavior  - Position to facilitate oxygenation and minimize respiratory effort  - Oxygen supplementation based on oxygen saturation or ABGs  - Provide Smoking Cessation handout, if applicable  - Encourage broncho-pulmonary hygiene including cough, deep breathe, Incentive Spirometry  - Assess the need for suctioning and perform as needed  - Assess and instruct to report SOB or any respiratory difficulty  - Respiratory Therapy support as indicated  - Manage/alleviate anxiety  - Monitor for signs/symptoms of CO2 retention  Outcome: Progressing

## 2024-11-27 NOTE — PROGRESS NOTES
Progress Note  Yesenia Berkowitz Patient Status:  Inpatient    1940 MRN [id number]   Location [facility]5W Attending Obdulia Lovell,    Hosp Day # 3 PCP JO-ANN YANG MD     Subjective:  Remains on 3.5L O2    Objective:  /70 (BP Location: Right arm)   Pulse 78   Temp 98.4 °F (36.9 °C) (Oral)   Resp 20   Ht 5' 5\" (1.651 m)   Wt 169 lb 6.4 oz (76.8 kg)   SpO2 98%   BMI 28.19 kg/m²     Telemetry: SR    Intake/Output:    Intake/Output Summary (Last 24 hours) at 2024 1107  Last data filed at 2024 0924  Gross per 24 hour   Intake 700 ml   Output 800 ml   Net -100 ml     Last 3 Weights   24 0650 169 lb 6.4 oz (76.8 kg)   24 0700 174 lb (78.9 kg)   24 2200 173 lb 6.4 oz (78.7 kg)   24 1224 170 lb (77.1 kg)   24 0443 171 lb 6.4 oz (77.7 kg)   24 0958 170 lb 3.2 oz (77.2 kg)   24 0607 165 lb 11.2 oz (75.2 kg)   24 0443 165 lb 11.2 oz (75.2 kg)   24 0247 161 lb 14.4 oz (73.4 kg)   24 0118 161 lb 6.4 oz (73.2 kg)   24 2115 164 lb (74.4 kg)     Labs:  Recent Labs   Lab 24  0446 24  1614 24  0449 24  0434   *  --  156* 136*   BUN 23  --  23 25*   CREATSERUM 0.80  --  0.64 0.62   EGFRCR 74  --  88 89   CA 8.4*  --  8.8 8.7     --  143 142   K 3.4* 4.5 3.7 3.6  3.6   CL 97*  --  99 101   CO2 >40.0*  --  >40.0* >40.0*     Recent Labs   Lab 24  1844 24  0446 24  0449 24  0434   RBC 4.22 3.75* 3.59* 3.42*   HGB 13.1 11.5* 11.0* 10.7*   HCT 41.0 37.2 34.8* 33.9*   MCV 97.2 99.2 96.9 99.1   MCH 31.0 30.7 30.6 31.3   MCHC 32.0 30.9* 31.6 31.6   RDW 15.0 15.2* 15.0 14.7   NEPRELIM 14.46*  --  14.47* 9.75*   WBC 17.5* 13.7* 16.6* 11.7*   .0 151.0 145.0* 149.0*     No results for input(s): \"TROP\", \"TROPHS\", \"CK\" in the last 168 hours.  Lab Results   Component Value Date/Time    HDL 69 (H) 2023 04:12 PM    LDL 78 2023 04:12 PM    TRIG 85 2023 04:12  PM     Lab Results   Component Value Date    DDIMER 0.63 10/07/2024     Lab Results   Component Value Date    TSH 1.060 01/06/2023     Review of Systems:   Constitutional: No fevers, chills, fatigue or night sweats.  Respiratory: Denies cough, wheezing or shortness of breath.  CV: Denies chest pain, palpitations, orthopnea, PND or dizziness.  GI: No nausea, vomiting or diarrhea. No blood in stools.    Physical Exam:  General: Alert, cooperative, no distress, appears stated age.  Neck: no JVD.  Lungs: expiratory wheezing bilaterally.  Chest wall: No tenderness or deformity.  Heart: Regular rate and rhythm, S1, S2 normal, no murmur, click, rub or gallop.  Abdomen: Soft, non-tender. Bowel sounds normal. No masses,  No organomegaly.  Extremities: Extremities normal, atraumatic, no cyanosis, BLE edema.  Pulses: 2+ and symmetric all extremities.  Neurologic: Grossly intact.    Medications:   potassium chloride  40 mEq Oral Q4H    amitriptyline  50 mg Oral Nightly    aspirin  162 mg Oral Daily    digoxin  125 mcg Oral Daily    dilTIAZem HCl ER Coated Beads  360 mg Oral Daily    pantoprazole  40 mg Oral QAM AC    cetirizine  5 mg Oral Nightly    montelukast  10 mg Oral Nightly    fluticasone furoate  1 puff Inhalation Daily    umeclidinium bromide  1 puff Inhalation Daily    insulin aspart  1-7 Units Subcutaneous TID CC    acetaZOLAMIDE  500 mg Oral Daily    enoxaparin  40 mg Subcutaneous Daily    predniSONE  40 mg Oral Daily    doxycycline  100 mg Oral 2 times per day    furosemide  40 mg Intravenous BID (Diuretic)     Assessment:    84 year old female with PMH of PAF, HFpEF, HTN, COPD who presents with progressive dyspnea and hypoxia.     Acute on chronic HFpEF  Moderate to severe pulmonary HTN  Was not compliant with home diuretic regimen  -September echo with LVEF 65-70%, moderate to severe pulmonary htn, PASP 56MMhG  -diuresing with IV lasix BID with good response  -I/Os net neg 4.2L, 2.1L UOP past 24 hours  -pulmonary  edema on CXR, on admission  -proBNP 231 on admission  -CO2 elevated >40 chronically, on diamox daily  -weight down 5# from peak  -GDMT: lasix home dosing was 40mg daily with twice daily every other day alternating, she was only taking once daily  -consideration of cardioMEMS evaluation due to frequent exacerbations    Acute hypoxic and hypercapneic Respiratory failure  2/2 COPD, CHF  -pulm following, cleared for discharge from their perspective  -steroids, inhalers, abx    Paroxysmal atrial fibrillation  Maintaining sinus on tele  -digoxin, diltiazem  -not on OAC due to history of GIB and infrequent episodes of PAF, taking 164mg asa alternatively  - [id number] score of 5, consider watchman eval    Leukocytosis  WBC trending down  -afebrile    Mild Aortic stenosis  Mean gradient 10mmHg    Mild Mitral stenosis  Mean gradient 5mmHg    HTN-controlled    HLD-not on statin    Plan:  -Transition to PO lasix 40mg BID tomorrow, give additional IV dose this afternoon  -High risk for readmission due to historical noncompliance with diuretics, frequent readmissions  -Continue digoxin, diltiazem  -Needs 1 week [facility] f/u after discharge, likely discharge home tomorrow      Plan of care discussed with patient, RN.        Molly Carrillo, APRN  11/27/2024  11:07 AM  [phone] [place]  [phone] Jac

## 2024-11-28 VITALS
TEMPERATURE: 98 F | WEIGHT: 169.88 LBS | HEIGHT: 65 IN | SYSTOLIC BLOOD PRESSURE: 113 MMHG | BODY MASS INDEX: 28.3 KG/M2 | DIASTOLIC BLOOD PRESSURE: 57 MMHG | HEART RATE: 82 BPM | OXYGEN SATURATION: 98 % | RESPIRATION RATE: 16 BRPM

## 2024-11-28 LAB
ANION GAP SERPL CALC-SCNC: 1 MMOL/L (ref 0–18)
BASOPHILS # BLD AUTO: 0.01 X10(3) UL (ref 0–0.2)
BASOPHILS NFR BLD AUTO: 0.1 %
BUN BLD-MCNC: 26 MG/DL (ref 9–23)
BUN/CREAT SERPL: 36.6 (ref 10–20)
CALCIUM BLD-MCNC: 8.5 MG/DL (ref 8.7–10.4)
CHLORIDE SERPL-SCNC: 104 MMOL/L (ref 98–112)
CO2 SERPL-SCNC: 38 MMOL/L (ref 21–32)
CREAT BLD-MCNC: 0.71 MG/DL
DEPRECATED RDW RBC AUTO: 52.4 FL (ref 35.1–46.3)
EGFRCR SERPLBLD CKD-EPI 2021: 85 ML/MIN/1.73M2 (ref 60–?)
EOSINOPHIL # BLD AUTO: 0 X10(3) UL (ref 0–0.7)
EOSINOPHIL NFR BLD AUTO: 0 %
ERYTHROCYTE [DISTWIDTH] IN BLOOD BY AUTOMATED COUNT: 14.7 % (ref 11–15)
GLUCOSE BLD-MCNC: 150 MG/DL (ref 70–99)
GLUCOSE BLDC GLUCOMTR-MCNC: 122 MG/DL (ref 70–99)
HCT VFR BLD AUTO: 33.1 %
HGB BLD-MCNC: 10.9 G/DL
IMM GRANULOCYTES # BLD AUTO: 0.05 X10(3) UL (ref 0–1)
IMM GRANULOCYTES NFR BLD: 0.5 %
LYMPHOCYTES # BLD AUTO: 1.3 X10(3) UL (ref 1–4)
LYMPHOCYTES NFR BLD AUTO: 11.7 %
MCH RBC QN AUTO: 31.9 PG (ref 26–34)
MCHC RBC AUTO-ENTMCNC: 32.9 G/DL (ref 31–37)
MCV RBC AUTO: 96.8 FL
MONOCYTES # BLD AUTO: 0.97 X10(3) UL (ref 0.1–1)
MONOCYTES NFR BLD AUTO: 8.7 %
NEUTROPHILS # BLD AUTO: 8.76 X10 (3) UL (ref 1.5–7.7)
NEUTROPHILS # BLD AUTO: 8.76 X10(3) UL (ref 1.5–7.7)
NEUTROPHILS NFR BLD AUTO: 79 %
OSMOLALITY SERPL CALC.SUM OF ELEC: 304 MOSM/KG (ref 275–295)
PLATELET # BLD AUTO: 141 10(3)UL (ref 150–450)
POTASSIUM SERPL-SCNC: 4.1 MMOL/L (ref 3.5–5.1)
RBC # BLD AUTO: 3.42 X10(6)UL
SODIUM SERPL-SCNC: 143 MMOL/L (ref 136–145)
WBC # BLD AUTO: 11.1 X10(3) UL (ref 4–11)

## 2024-11-28 PROCEDURE — 99239 HOSP IP/OBS DSCHRG MGMT >30: CPT | Performed by: HOSPITALIST

## 2024-11-28 NOTE — PROGRESS NOTES
Progress Note  Yesenia Berkowitz Patient Status:  Inpatient    1940 MRN [id number]   Location [facility]5W Attending Obdulia Lovell, DO   Hosp Day # 4 PCP JO-ANN YANG MD     Subjective:  Remains on 3.5L O2  Denies cardiac complaints, feels her swelling is improving and at baseline    Objective:  /47 (BP Location: Right arm)   Pulse 66   Temp 97.8 °F (36.6 °C) (Oral)   Resp 16   Ht 5' 5\" (1.651 m)   Wt 169 lb 14.4 oz (77.1 kg)   SpO2 97%   BMI 28.27 kg/m²     Telemetry: SR    Intake/Output:    Intake/Output Summary (Last 24 hours) at 2024 0909  Last data filed at 2024 0014  Gross per 24 hour   Intake 860 ml   Output 200 ml   Net 660 ml     Last 3 Weights   24 0541 169 lb 14.4 oz (77.1 kg)   24 0650 169 lb 6.4 oz (76.8 kg)   24 0700 174 lb (78.9 kg)   24 2200 173 lb 6.4 oz (78.7 kg)   24 1224 170 lb (77.1 kg)   24 0443 171 lb 6.4 oz (77.7 kg)   24 0958 170 lb 3.2 oz (77.2 kg)   24 0607 165 lb 11.2 oz (75.2 kg)   24 0443 165 lb 11.2 oz (75.2 kg)   24 0247 161 lb 14.4 oz (73.4 kg)   24 0118 161 lb 6.4 oz (73.2 kg)   24 2115 164 lb (74.4 kg)     Labs:  Recent Labs   Lab 24  0449 24  0434 24  1546 24  0419   * 136*  --  150*   BUN 23 25*  --  26*   CREATSERUM 0.64 0.62  --  0.71   EGFRCR 88 89  --  85   CA 8.8 8.7  --  8.5*    142  --  143   K 3.7 3.6  3.6 4.6 4.1   CL 99 101  --  104   CO2 >40.0* >40.0*  --  38.0*     Recent Labs   Lab 24  0449 24  0434 24   RBC 3.59* 3.42* 3.42*   HGB 11.0* 10.7* 10.9*   HCT 34.8* 33.9* 33.1*   MCV 96.9 99.1 96.8   MCH 30.6 31.3 31.9   MCHC 31.6 31.6 32.9   RDW 15.0 14.7 14.7   NEPRELIM 14.47* 9.75* 8.76*   WBC 16.6* 11.7* 11.1*   .0* 149.0* 141.0*     No results for input(s): \"TROP\", \"TROPHS\", \"CK\" in the last 168 hours.  Lab Results   Component Value Date/Time    HDL 69 (H) 2023 04:12 PM    LDL 78  01/06/2023 04:12 PM    TRIG 85 01/06/2023 04:12 PM     Lab Results   Component Value Date    DDIMER 0.63 10/07/2024     Lab Results   Component Value Date    TSH 1.060 01/06/2023     Review of Systems:   Constitutional: No fevers, chills, fatigue or night sweats.  Respiratory: Denies cough, wheezing or shortness of breath.  CV: Denies chest pain, palpitations, orthopnea, PND or dizziness.  GI: No nausea, vomiting or diarrhea. No blood in stools.    Physical Exam:  General: Alert, cooperative, no distress, appears stated age.  Neck: no JVD.  Lungs: clear to auscultation bilaterally  Chest wall: No tenderness or deformity.  Heart: Regular rate and rhythm, S1, S2 normal, no murmur, click, rub or gallop.  Abdomen: Soft, non-tender. Bowel sounds normal. No masses,  No organomegaly.  Extremities: Extremities normal, atraumatic, no cyanosis, mild BLE edema.  Pulses: 2+ and symmetric all extremities.  Neurologic: Grossly intact.    Medications:   furosemide  40 mg Oral BID (Diuretic)    amitriptyline  50 mg Oral Nightly    aspirin  162 mg Oral Daily    digoxin  125 mcg Oral Daily    dilTIAZem HCl ER Coated Beads  360 mg Oral Daily    pantoprazole  40 mg Oral QAM AC    cetirizine  5 mg Oral Nightly    montelukast  10 mg Oral Nightly    fluticasone furoate  1 puff Inhalation Daily    umeclidinium bromide  1 puff Inhalation Daily    insulin aspart  1-7 Units Subcutaneous TID CC    acetaZOLAMIDE  500 mg Oral Daily    enoxaparin  40 mg Subcutaneous Daily    predniSONE  40 mg Oral Daily    doxycycline  100 mg Oral 2 times per day     Assessment:    84 year old female with PMH of PAF, HFpEF, HTN, COPD who presents with progressive dyspnea and hypoxia.     Acute on chronic HFpEF  Moderate to severe pulmonary HTN  Was not compliant with home diuretic regimen  -September echo with LVEF 65-70%, moderate to severe pulmonary htn, PASP 56MMhG  -diuresing with IV lasix BID with good response  -I/Os net neg 3.5L, UOP not accurately  documented  -pulmonary edema on CXR, on admission  -proBNP 231 on admission  -CO2 elevated chronically, on diamox daily  -weight down 5# from peak  -GDMT: lasix home dosing was 40mg daily with twice daily every other day alternating, she was only taking once daily  -consideration of cardioMEMS evaluation due to frequent exacerbations    Acute hypoxic and hypercapneic Respiratory failure  2/2 COPD, CHF  -pulm following, cleared for discharge from their perspective  -steroids, inhalers, abx    Paroxysmal atrial fibrillation  Maintaining sinus on tele  -digoxin, diltiazem  -not on OAC due to history of GIB and infrequent episodes of PAF, taking 164mg asa alternatively  -[id number] score of 5, consider watchman eval    Leukocytosis  WBC trending down  -afebrile    Mild Aortic stenosis  Mean gradient 10mmHg    Mild Mitral stenosis  Mean gradient 5mmHg    HTN-controlled    HLD-not on statin    Plan:  -Transition to PO lasix 40mg BID   -High risk for readmission due to historical noncompliance with diuretics, frequent readmissions  -Continue digoxin, diltiazem  -Needs 1 week [facility] f/u after discharge, stable for discharge home today      Plan of care discussed with patient, RN.        Molly Carrillo, APRN  11/28/24  9:12 AM  [phone] [place]  [phone] Jac

## 2024-11-28 NOTE — PLAN OF CARE
Discharge put in. Paperwork gone over. IV removed. Son drove home.   Problem: Patient Centered Care  Goal: Patient preferences are identified and integrated in the patient's plan of care  Description: Interventions:  - What would you like us to know as we care for you? I live home alone, but my adult son helps me.   - Provide timely, complete, and accurate information to patient/family  - Incorporate patient and family knowledge, values, beliefs, and cultural backgrounds into the planning and delivery of care  - Encourage patient/family to participate in care and decision-making at the level they choose  - Honor patient and family perspectives and choices  Outcome: Completed     Problem: Patient/Family Goals  Goal: Patient/Family Long Term Goal  Description: Patient's Long Term Goal: to be discharge home.     Interventions:  -  Neb treatment  -Dieretics   -wean o2 down to baseline.   - Pulmonary consult  -Card consult  - See additional Care Plan goals for specific interventions  Outcome: Completed  Goal: Patient/Family Short Term Goal  Description: Patient's Short Term Goal: To breathe better.    Interventions:   -  Neb treatment  -Dieretics   -wean o2 down to baseline.   - Pulmonary consult  -Card consult    - See additional Care Plan goals for specific interventions  Outcome: Completed     Problem: CARDIOVASCULAR - ADULT  Goal: Absence of cardiac arrhythmias or at baseline  Description: INTERVENTIONS:  - Continuous cardiac monitoring, monitor vital signs, obtain 12 lead EKG if indicated  - Evaluate effectiveness of antiarrhythmic and heart rate control medications as ordered  - Initiate emergency measures for life threatening arrhythmias  - Monitor electrolytes and administer replacement therapy as ordered  Outcome: Completed     Problem: RESPIRATORY - ADULT  Goal: Achieves optimal ventilation and oxygenation  Description: INTERVENTIONS:  - Assess for changes in respiratory status  - Assess for changes in  mentation and behavior  - Position to facilitate oxygenation and minimize respiratory effort  - Oxygen supplementation based on oxygen saturation or ABGs  - Provide Smoking Cessation handout, if applicable  - Encourage broncho-pulmonary hygiene including cough, deep breathe, Incentive Spirometry  - Assess the need for suctioning and perform as needed  - Assess and instruct to report SOB or any respiratory difficulty  - Respiratory Therapy support as indicated  - Manage/alleviate anxiety  - Monitor for signs/symptoms of CO2 retention  Outcome: Completed

## 2024-11-28 NOTE — PLAN OF CARE
Problem: CARDIOVASCULAR - ADULT  Goal: Absence of cardiac arrhythmias or at baseline  Description: INTERVENTIONS:  - Continuous cardiac monitoring, monitor vital signs, obtain 12 lead EKG if indicated  - Evaluate effectiveness of antiarrhythmic and heart rate control medications as ordered  - Initiate emergency measures for life threatening arrhythmias  - Monitor electrolytes and administer replacement therapy as ordered  Outcome: Progressing     Problem: RESPIRATORY - ADULT  Goal: Achieves optimal ventilation and oxygenation  Description: INTERVENTIONS:  - Assess for changes in respiratory status  - Assess for changes in mentation and behavior  - Position to facilitate oxygenation and minimize respiratory effort  - Oxygen supplementation based on oxygen saturation or ABGs  - Provide Smoking Cessation handout, if applicable  - Encourage broncho-pulmonary hygiene including cough, deep breathe, Incentive Spirometry  - Assess the need for suctioning and perform as needed  - Assess and instruct to report SOB or any respiratory difficulty  - Respiratory Therapy support as indicated  - Manage/alleviate anxiety  - Monitor for signs/symptoms of CO2 retention  Outcome: Progressing

## 2024-11-28 NOTE — PROGRESS NOTES
[facility]  part of [facility]    Progress Note    Yesenia Berkowitz Patient Status:  Inpatient    1940 MRN [id number]   Location [facility]5W Attending Obdulia Lovell DO   Hosp Day # 3 PCP JO-ANN YANG MD     Chief complaint sob     Subjective:   Yesenia Berkowitz is a(n) 84 year old female pt doing better today. Wants to go home     ROS:   No cp, sob   No c/d   No n/v     Objective:   Blood pressure 127/62, pulse 74, temperature 98.7 °F (37.1 °C), temperature source Oral, resp. rate 20, height 5' 5\" (1.651 m), weight 169 lb 6.4 oz (76.8 kg), SpO2 98%.      Intake/Output Summary (Last 24 hours) at 2024 1816  Last data filed at 2024 1802  Gross per 24 hour   Intake 860 ml   Output 500 ml   Net 360 ml       Patient Weight(s) for the past 336 hrs:   Weight   24 0650 169 lb 6.4 oz (76.8 kg)   24 0700 174 lb (78.9 kg)   24 2200 173 lb 6.4 oz (78.7 kg)           General appearance: alert, appears stated age and cooperative  Pulmonary:  clear to auscultation bilaterally  Cardiovascular: S1, S2 normal, no murmur, click, rub or gallop, regular rate and rhythm  Abdominal: soft, non-tender; bowel sounds normal; no masses,  no organomegaly  Extremities: extremities normal, atraumatic, no cyanosis or edema        Medicines:     Current Facility-Administered Medications   Medication Dose Route Frequency    [START ON 2024] furosemide (Lasix) tab 40 mg  40 mg Oral BID (Diuretic)    albuterol (Ventolin) (2.5 MG/3ML) 0.083% nebulizer solution 2.5 mg  2.5 mg Nebulization Q4H PRN    amitriptyline (Elavil) tab 50 mg  50 mg Oral Nightly    aspirin chewable tab 162 mg  162 mg Oral Daily    benzonatate (Tessalon) cap 200 mg  200 mg Oral TID PRN    dicyclomine (Bentyl) cap 10 mg  10 mg Oral TID PRN    digoxin (Lanoxin) tab 125 mcg  125 mcg Oral Daily    dilTIAZem ER (CardIZEM CD) 24 hr cap 360 mg  360 mg Oral Daily    guaiFENesin (Robitussin) 100 MG/5 ML oral liquid 200 mg   200 mg Oral Q4H PRN    pantoprazole (Protonix) DR tab 40 mg  40 mg Oral QAM AC    cetirizine (ZyrTEC) tab 5 mg  5 mg Oral Nightly    montelukast (Singulair) tab 10 mg  10 mg Oral Nightly    fluticasone furoate (Arnuity Ellipta) 100 MCG/ACT inhaler 1 puff  1 puff Inhalation Daily    umeclidinium bromide (Incruse Ellipta) 62.5 MCG/ACT inhaler 1 puff  1 puff Inhalation Daily    glucose (Dex4) 15 GM/59ML oral liquid 15 g  15 g Oral Q15 Min PRN    Or    glucose (Glutose) 40% oral gel 15 g  15 g Oral Q15 Min PRN    Or    glucose-vitamin C (Dex-4) chewable tab 4 tablet  4 tablet Oral Q15 Min PRN    Or    dextrose 50% injection 50 mL  50 mL Intravenous Q15 Min PRN    Or    glucose (Dex4) 15 GM/59ML oral liquid 30 g  30 g Oral Q15 Min PRN    Or    glucose (Glutose) 40% oral gel 30 g  30 g Oral Q15 Min PRN    Or    glucose-vitamin C (Dex-4) chewable tab 8 tablet  8 tablet Oral Q15 Min PRN    insulin aspart (NovoLOG) 100 Units/mL FlexPen 1-7 Units  1-7 Units Subcutaneous TID CC    acetaZOLAMIDE (Diamox) tab 500 mg  500 mg Oral Daily    enoxaparin (Lovenox) 40 MG/0.4ML SUBQ injection 40 mg  40 mg Subcutaneous Daily    acetaminophen (Tylenol Extra Strength) tab 500 mg  500 mg Oral Q4H PRN    ipratropium-albuterol (Duoneb) 0.5-2.5 (3) MG/3ML inhalation solution 3 mL  3 mL Nebulization Q6H PRN    predniSONE (Deltasone) tab 40 mg  40 mg Oral Daily    doxycycline (Vibramycin) cap 100 mg  100 mg Oral 2 times per day    ondansetron (Zofran) 4 MG/2ML injection 4 mg  4 mg Intravenous Q6H PRN    metoclopramide (Reglan) 5 mg/mL injection 10 mg  10 mg Intravenous Q8H PRN           Ant-Infective Medications            doxycycline 100 MG Oral Cap    Doxycycline Monohydrate 100 MG Oral Cap ()                Blood Pressure and Cardiac Medications            DIGOXIN 0.125 MG Oral Tab    dilTIAZem HCl ER Coated Beads 360 MG Oral Capsule SR 24 Hr                    Lab Results   Component Value Date    WBC 11.7 (H) 2024    HGB 10.7  (L) 11/27/2024    HCT 33.9 (L) 11/27/2024    .0 (L) 11/27/2024    CREATSERUM 0.62 11/27/2024    BUN 25 (H) 11/27/2024     11/27/2024    K 4.6 11/27/2024     11/27/2024    CO2 >40.0 (HH) 11/27/2024     (H) 11/27/2024    CA 8.7 11/27/2024    ALB 3.2 11/24/2024    ALKPHO 59 11/24/2024    BILT 0.4 11/24/2024    TP 5.0 (L) 11/24/2024    AST 20 11/24/2024    ALT 32 11/24/2024    PTT 24.3 11/25/2024    INR 1.08 11/25/2024    TSH 1.060 01/06/2023    LIP 30 08/30/2024    DDIMER 0.63 10/07/2024    MG 2.0 11/27/2024    PHOS 3.5 11/27/2024    TROP <0.045 02/03/2020       CT CHEST (HIP=[id number])    Result Date: 11/25/2024  CONCLUSION:  1. Cardiomegaly and mild coronary atherosclerosis.  Findings consistent with mild CHF/fluid overload including small bilateral pleural effusions, slightly improved on the right, with associated bibasilar atelectasis. 2. Mild emphysema with a 10 x 7 mm spiculated right upper lobe pulmonary nodule.  This nodule is stable dating back to October 2024 but has increased in size compared to prior studies.  A malignant etiology such as a slow growing primary bronchogenic carcinoma therefore cannot be excluded.  Consider further assessment with percutaneous biopsy or follow-up PET/CT when the patient is better able to cooperate. 3. 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 4. Stable moderately dilated main pulmonary artery, which can be seen with chronic pulmonary hypertension; correlate clinically.     Dictated by (CST): Malik Reynolds MD on 11/25/2024 at 8:16 PM     Finalized by (CST): Malik Reynolds MD on 11/25/2024 at 8:22 PM               Results:     CBC:    Lab Results   Component Value Date    WBC 11.7 (H) 11/27/2024    WBC 16.6 (H) 11/26/2024    WBC 13.7 (H) 11/25/2024     Lab Results   Component Value Date    HGB 10.7 (L) 11/27/2024    HGB 11.0 (L) 11/26/2024    HGB 11.5 (L) 11/25/2024      Lab Results    Component Value Date    .0 (L) 11/27/2024    .0 (L) 11/26/2024    .0 11/25/2024       Recent Labs   Lab 11/25/24  0446 11/25/24  1614 11/26/24  0449 11/27/24  0434 11/27/24  1546   *  --  156* 136*  --    BUN 23  --  23 25*  --    CREATSERUM 0.80  --  0.64 0.62  --    CA 8.4*  --  8.8 8.7  --      --  143 142  --    K 3.4*   < > 3.7 3.6  3.6 4.6   CL 97*  --  99 101  --    CO2 >40.0*  --  >40.0* >40.0*  --     < > = values in this interval not displayed.           Assessment and Plan:         Acute on chronic respiratory failure with hypercapnia, hypoxia  CHF exacerbation, last echo with EF 65 to 70%.    Possible COPD exacerbation  -Admit  -CHF protocol with diuretics  -Strict I's and O's  -Wean oxygen to baseline  -Activity as tolerated  -Pulmonology on consult  -Steroids, nebs, antibiotics     Leukocytosis, likely steroid-induced  -Was in the ER 11/18/2024, discharged on Decadron     Pulm htn with rv dysfunction     A-fib  HTN  HLD  -Resume home medications as appropriate     Vag dryness resume meds; estrogrn patch; per pt NO hist pe     Quality:  DVT Prophylaxis: Lovenox  CODE status: Full code  ANGEL: TBD        Patient will require inpatient services that will reasonably be expected to span two midnight's based on the clinical documentation in H+P.   Based on patients current state of illness, I anticipate that, after discharge, patient will require TBD.              Obdulia Lovell DO         Chart reviewed, including current vitals, notes, labs and imaging  Labs ordered and medications adjusted as outlined above  Coordinate care with care team/consultants  Discussed with patient results of tests, management plan as outlined above, and the need for ongoing hospitalization  D/w RN     [facility]        11/27/2024             Supplementary Documentation:   DVT Mechanical Prophylaxis:     Early ambuation  DVT Pharmacologic Prophylaxis   Medication    enoxaparin (Lovenox) 40  MG/0.4ML SUBQ injection 40 mg      DVT Pharmacologic prophylaxis: Aspirin 162 mg         Code Status: Full Code  García: No urinary catheter in place  García Duration (in days):   Central line:    ANGEL: 11/28/2024

## 2024-11-28 NOTE — CM/SW NOTE
11/28/24 0900   Discharge disposition   Expected discharge disposition Home-Health   Post Acute Care Provider [facility]   DME/Infusion Providers [facility]  (Home O2)   Discharge transportation Private car     Pt discussed during nursing rounds. Pt is stable for dc today. [facility] order entered. [facility] will provide RN and therapy services at [facility], liaison Maryann notified of dc home today. H[facility] will resume home O2 services at [facility]. Pt's family will provide transport at [facility].    Plan: Home w/[facility] and [facility] for home O2 today.    / to remain available for support and/or discharge planning.     GEORGINA Samuel    [phone]

## 2024-11-28 NOTE — PROGRESS NOTES
[facility]  part of [facility]    Progress Note    Yesenia Berkowitz Patient Status:  Inpatient    1940 MRN [id number]   Location [facility]W Attending Obdulia Lovell,    Hosp Day # 4 PCP JO-ANN YANG MD       Subjective:   Yesenia Berkowitz is a(n) 84 year old female.   Less sob,no cough persistent lower leg edema  Objective:   Blood pressure 113/47, pulse 66, temperature 97.8 °F (36.6 °C), temperature source Oral, resp. rate 16, height 5' 5\" (1.651 m), weight 169 lb 14.4 oz (77.1 kg), SpO2 97%.    HEENT: negative  Neck: no adenopathy, no carotid bruit, no JVD, supple, symmetrical, trachea midline and thyroid not enlarged, symmetric, no tenderness/mass/nodules  Pulmonary/Chest:  diminished BS with dullness on the leftn chest  Cardiovascular: S1, S2 normal, no S3 or S4, regular rate and rhythm, no murmur  Abdominal: normal findings: bowel sounds normal, soft, non-tender and no hepatosplenomegaly   Extremities: extremities normal, atraumatic, 2+ edema  Skin: Skin color, texture, turgor normal. No rashes or lesions    Results:     Lab Results   Component Value Date    WBC 11.1 (H) 2024    HGB 10.9 (L) 2024    HCT 33.1 (L) 2024    .0 (L) 2024    CREATSERUM 0.71 2024    BUN 26 (H) 2024     2024    K 4.1 2024     2024    CO2 38.0 (H) 2024     (H) 2024    CA 8.5 (L) 2024    ALB 3.2 2024    ALKPHO 59 2024    BILT 0.4 2024    TP 5.0 (L) 2024    AST 20 2024    ALT 32 2024    PTT 24.3 2024    INR 1.08 2024    TSH 1.060 2023    LIP 30 2024    DDIMER 0.63 10/07/2024    MG 2.0 2024    PHOS 3.5 2024    TROP <0.045 2020       No results found.        Assessment and Plan:   Principal Problem:    COPD exacerbation (HCC)  Active Problems:    Pulmonary nodule    Acute respiratory failure with hypoxia and hypercapnia (HCC)    Cor  pulmonale (chronic) (HCC)       Less sob ,no cough,coninue lasix,home today      ELVIA LIVINGSTON MD, MD  11/28/2024

## 2024-11-29 ENCOUNTER — PATIENT OUTREACH (OUTPATIENT)
Dept: CASE MANAGEMENT | Age: 84
End: 2024-11-29

## 2024-11-29 LAB
GLUCOSE BLDC GLUCOMTR-MCNC: 109 MG/DL (ref 70–99)
GLUCOSE BLDC GLUCOMTR-MCNC: 117 MG/DL (ref 70–99)
GLUCOSE BLDC GLUCOMTR-MCNC: 50 MG/DL (ref 70–99)

## 2024-11-29 NOTE — PROGRESS NOTES
Hospital follow up.    [facility] (COPD) request.  Tuesday 12/3 @3    Patient requested a call back between 2:30-3:00.

## 2024-11-29 NOTE — PROGRESS NOTES
Hospital follow up.    [facility] (COPD) request.  Tuesday 12/3 @11    Confirmed with patient.    Closing encounter.

## 2024-12-03 NOTE — DISCHARGE SUMMARY
[facility]  part of [facility]    Discharge Summary    Yesenia Berkowitz Patient Status:  Inpatient    1940 MRN [id number]   Location [facility]W Attending No att. providers found   Hosp Day # 4 PCP JO-ANN YANG MD     Date of Admission: 2024 Disposition: Home Health Care Services     Date of Discharge: 24    Admitting Diagnosis: COPD exacerbation (HCC) [J44.1]  Acute respiratory failure with hypoxia and hypercapnia (HCC) [J96.01, J96.02]    Hospital Discharge Diagnoses:  copd    Hospital Discharge Diagnoses:  chf    Lace+ Score: 81  59-90 High Risk  29-58 Medium Risk  0-28   Low Risk.    TCM Follow-Up Recommendation:  LACE > 58: High Risk of readmission after discharge from the hospital.          Lace+ Score: 81  59-90 High Risk  29-58 Medium Risk  0-28   Low Risk    Risk of readmission: Yesenia Berkowitz has High Risk of readmission after discharge from the hospital.    Problem List:   Patient Active Problem List   Diagnosis    Actinic keratosis    Inflamed seborrheic keratosis    Palpitations    Pain of upper abdomen    Acute on chronic congestive heart failure, unspecified heart failure type (HCC)    COPD exacerbation (HCC)    COPD (chronic obstructive pulmonary disease) (HCC)    Kyphoscoliosis    Phrenic nerve paralysis    Restrictive lung disease    Acute cor pulmonale (HCC)    Pneumonia    Acute on chronic hypoxic respiratory failure (HCC)    Acute pulmonary edema (HCC)    Pleural effusion    Pulmonary nodule    Community acquired pneumonia of right lower lobe of lung    Acute on chronic respiratory failure with hypoxia and hypercapnia (HCC)    Acute respiratory failure with hypoxia and hypercapnia (HCC)    Cor pulmonale (chronic) (HCC)       Reason for Admission: sob     Physical Exam:   General appearance: alert, appears stated age and cooperative  Pulmonary:  clear to auscultation bilaterally  Cardiovascular: S1, S2 normal, no murmur, click, rub or gallop,  regular rate and rhythm  Abdominal: soft, non-tender; bowel sounds normal; no masses,  no organomegaly  Extremities: extremities normal, atraumatic, no cyanosis or edema  Psychiatric: calm        History of Present Illness:     Yesenia Berkowitz is a 84 year old female with history of A-fib, COPD, HTN, HLD, HFpEF, chronic respiratory failure on 3 L at baseline who presented to the ED with complaints of shortness of breath and hypoxia.  Over the last couple of days, patient has noted progressive shortness of breath with increased lower extremity swelling.  She used her nebs and increased Lasix to 80 daily without improvement.  Today, while ambulating to the bathroom she noted her oxygen levels in the 40s and called EMT.  Has chronic LLE swelling.  When she develops edema on the right, she notes she is in heart failure.  No chest pain, dizziness, lightheadedness, cough, fever or chills.  On arrival to the ED she was noted to be in the high 70s on 3 L, talking in full sentences.     She was given steroids and nebs and has been admitted for further treatment.  CXR: Moderate interstitial edema.  Bilateral lower lobe airspace opacities suggestive of atelectasis.  Underlying pneumonia is felt to be less likely but is also in the differentials.  BNP only 231     At the time of my evaluation, she is sitting up in bed eating, able to talk in full sentences.     Of note patient was hospitalized earlier this month for acute on chronic respiratory failure.  Required thoracentesis with removal of 800 cc from her right lung, diastolic heart failure.    Hospital Course:   Acute on chronic respiratory failure with hypercapnia, hypoxia  CHF exacerbation, last echo with EF 65 to 70%.    Possible COPD exacerbation  -Admit  -CHF protocol with diuretics  -Strict I's and O's  -Wean oxygen to baseline  -Activity as tolerated  -Pulmonology on consult  -Steroids, nebs, antibiotics     Leukocytosis, likely steroid-induced  -Was in the ER  11/18/2024, discharged on Decadron     Pulm htn with rv dysfunction     A-fib  HTN  HLD  -Resume home medications as appropriate     Vag dryness resume meds; estrogrn patch; per pt NO hist pe     Quality:  DVT Prophylaxis: Lovenox  CODE status: Full code  ANGEL: TBD        Patient will require inpatient services that will reasonably be expected to span two midnight's based on the clinical documentation in H+P.   Based on patients current state of illness, I anticipate that, after discharge, patient will require TBD.                 Obdulia Lovell DO           Chart reviewed, including current vitals, notes, labs and imaging  Labs ordered and medications adjusted as outlined above  Coordinate care with care team/consultants  Discussed with patient results of tests, management plan as outlined above, and the need for ongoing hospitalization  D/w RN     [facility]           11/27/2024              Consultations: Dr Boyle, Dr Pratt     Procedures: none    Complications: none    Discharge Condition: Good    Discharge Medications:      Discharge Medications        START taking these medications        Instructions Prescription details   acetaZOLAMIDE 250 MG Tabs  Commonly known as: Diamox      Take 2 tablets (500 mg total) by mouth daily.   Quantity: 30 tablet  Refills: 0            CHANGE how you take these medications        Instructions Prescription details   furosemide 40 MG Tabs  Commonly known as: Lasix  What changed:   how much to take  how to take this  when to take this  additional instructions      Take 1 tablet (40 mg total) by mouth BID (Diuretic).   Quantity: 60 tablet  Refills: 0     predniSONE 20 MG Tabs  Commonly known as: Deltasone  Start taking on: November 28, 2024  What changed: See the new instructions.      Take 2 tablets (40 mg total) by mouth daily for 3 days, THEN 1.5 tablets (30 mg total) daily for 3 days, THEN 1 tablet (20 mg total) daily for 3 days, THEN 0.5 tablets (10 mg total) daily for 3  days.   Stop taking on: December 10, 2024  Quantity: 15 tablet  Refills: 0            CONTINUE taking these medications        Instructions Prescription details   acetaminophen 500 MG Tabs  Commonly known as: Tylenol Extra Strength      Take 1 tablet (500 mg total) by mouth every 6 (six) hours as needed for Pain or Fever.   Refills: 0     albuterol 108 (90 Base) MCG/ACT Aers  Commonly known as: Ventolin HFA      Inhale 2 puffs into the lungs as needed.   Refills: 0     albuterol (2.5 MG/3ML) 0.083% Nebu  Commonly known as: Ventolin      Take 3 mL (2.5 mg total) by nebulization every 4 (four) hours as needed for Wheezing or Shortness of Breath.   Quantity: 50 each  Refills: 0     amitriptyline 50 MG Tabs  Commonly known as: Elavil      Take 1 tablet (50 mg total) by mouth nightly.   Refills: 0     aspirin 81 MG Tabs      Take 2 tablets (162 mg total) by mouth daily.   Refills: 0     B Complex Caps      Take 1 tablet by mouth daily.   Refills: 0     benzonatate 200 MG Caps  Commonly known as: Tessalon      Take 1 capsule (200 mg total) by mouth 3 (three) times daily as needed for cough.   Quantity: 30 capsule  Refills: 0     Calcium + D3 600-200 MG-UNIT Tabs      Take 1 tablet by mouth daily.   Refills: 0     dicyclomine 10 MG Caps  Commonly known as: Bentyl      Take 1 capsule (10 mg total) by mouth 3 (three) times daily as needed (abdominal pain).   Quantity: 40 capsule  Refills: 3     digoxin 0.125 MG Tabs  Commonly known as: Lanoxin      Take 1 tablet (125 mcg total) by mouth daily.   Quantity: 30 tablet  Refills: 0     dilTIAZem HCl ER Coated Beads 360 MG Cp24  Commonly known as: CARDIZEM CD      Take 1 capsule (360 mg total) by mouth daily. Take 1 capsule (360mg)by mouth every morning on an empty stomach.   Refills: 0     estradiol 0.05 MG/24HR Ptwk  Commonly known as: Climara      Place 1 patch onto the skin once a week. As directed.   Refills: 0     guaiFENesin 100 MG/5 ML  Commonly known as: Robitussin       Take 10 mL (200 mg total) by mouth every 4 (four) hours as needed.   Quantity: 300 mL  Refills: 0     lansoprazole 30 MG Cpdr  Commonly known as: Prevacid      Take 1 capsule (30 mg total) by mouth nightly.   Refills: 0     Loratadine 10 MG Caps      Take 10 mg by mouth nightly.   Refills: 0     montelukast 10 MG Tabs  Commonly known as: Singulair      Take 1 tablet (10 mg total) by mouth nightly.   Refills: 0     mupirocin 2 % Oint  Commonly known as: Bactroban      Apply 1 Application topically 3 (three) times daily.   Quantity: 1 each  Refills: 3     omega-3 fatty acids 1000 MG Caps  Commonly known as: Fish Oil      Take 1,000 mg by mouth daily.   Refills: 0     potassium chloride 20 MEQ Tbcr  Commonly known as: Klor-Con M20      Take 1 tablet (20 mEq total) by mouth nightly.   Refills: 0     Pulmicort Flexhaler 180 MCG/ACT Aepb  Generic drug: Budesonide      Inhale 2 puffs into the lungs daily.   Refills: 0     THERA/BETA-CAROTENE Tabs      take 1 tablet by ORAL route  every day with food   Refills: 0     tiotropium 18 MCG Caps  Commonly known as: Spiriva Handihaler      Inhale 1 capsule (18 mcg total) into the lungs nightly.   Refills: 0     triamcinolone 0.1 % Crea  Commonly known as: Kenalog      Apply topically 2 (two) times daily. Apply bid as directed   Quantity: 80 g  Refills: 3     Vitamin D 1000 units Tabs      Take 1,000 Units by mouth 2 (two) times daily.   Refills: 0            STOP taking these medications      dexamethasone 4 MG tablet  Commonly known as: Decadron        Doxycycline Monohydrate 100 MG Caps  Commonly known as: MONODOX               ASK your doctor about these medications        Instructions Prescription details   doxycycline 100 MG Caps  Commonly known as: Vibramycin  Ask about: Should I take this medication?      Take 1 capsule (100 mg total) by mouth every 12 (twelve) hours for 2 days.   Stop taking on: November 29, 2024  Quantity: 4 capsule  Refills: 0               Where to Get  Your Medications        These medications were sent to [id number] - [place], [place] - [address] [phone], [phone]  [address], [place] [address]      Phone: [phone]   acetaZOLAMIDE 250 MG Tabs  doxycycline 100 MG Caps  furosemide 40 MG Tabs  predniSONE 20 MG Tabs         Follow up Visits: Follow-up with pcp, cards, pulm  in 1 week    Follow up Labs: none     Other Discharge Instructions: none    Obdulia Lovell DO  12/3/2024  5:01 PM    > 35 min

## 2024-12-12 ENCOUNTER — HOSPITAL ENCOUNTER (INPATIENT)
Facility: HOSPITAL | Age: 84
LOS: 5 days | Discharge: HOME HEALTH | End: 2024-12-18
Attending: EMERGENCY MEDICINE | Admitting: HOSPITALIST
Payer: MEDICARE

## 2024-12-12 ENCOUNTER — APPOINTMENT (OUTPATIENT)
Dept: GENERAL RADIOLOGY | Facility: HOSPITAL | Age: 84
End: 2024-12-12
Attending: EMERGENCY MEDICINE
Payer: MEDICARE

## 2024-12-12 DIAGNOSIS — R06.02 SOB (SHORTNESS OF BREATH): Primary | ICD-10-CM

## 2024-12-12 PROBLEM — I50.33 ACUTE ON CHRONIC DIASTOLIC (CONGESTIVE) HEART FAILURE (HCC): Status: ACTIVE | Noted: 2024-12-12

## 2024-12-12 LAB
ANION GAP SERPL CALC-SCNC: 2 MMOL/L (ref 0–18)
ATRIAL RATE: 102 BPM
BASE EXCESS BLD CALC-SCNC: 11.3 MMOL/L (ref ?–2)
BASOPHILS # BLD AUTO: 0.02 X10(3) UL (ref 0–0.2)
BASOPHILS NFR BLD AUTO: 0.2 %
BUN BLD-MCNC: 18 MG/DL (ref 9–23)
BUN/CREAT SERPL: 27.7 (ref 10–20)
CALCIUM BLD-MCNC: 8.9 MG/DL (ref 8.7–10.4)
CHLORIDE SERPL-SCNC: 106 MMOL/L (ref 98–112)
CO2 SERPL-SCNC: 38 MMOL/L (ref 21–32)
CREAT BLD-MCNC: 0.65 MG/DL
D DIMER PPP FEU-MCNC: 0.45 UG/ML FEU (ref ?–0.82)
DEPRECATED RDW RBC AUTO: 55.3 FL (ref 35.1–46.3)
EGFRCR SERPLBLD CKD-EPI 2021: 88 ML/MIN/1.73M2 (ref 60–?)
EOSINOPHIL # BLD AUTO: 0.09 X10(3) UL (ref 0–0.7)
EOSINOPHIL NFR BLD AUTO: 0.9 %
ERYTHROCYTE [DISTWIDTH] IN BLOOD BY AUTOMATED COUNT: 14.9 % (ref 11–15)
GLUCOSE BLD-MCNC: 136 MG/DL (ref 70–99)
HCO3 BLDA-SCNC: 33.6 MEQ/L (ref 21–27)
HCT VFR BLD AUTO: 40.6 %
HGB BLD-MCNC: 13 G/DL
IMM GRANULOCYTES # BLD AUTO: 0.04 X10(3) UL (ref 0–1)
IMM GRANULOCYTES NFR BLD: 0.4 %
LYMPHOCYTES # BLD AUTO: 1.46 X10(3) UL (ref 1–4)
LYMPHOCYTES NFR BLD AUTO: 14.4 %
MCH RBC QN AUTO: 31.8 PG (ref 26–34)
MCHC RBC AUTO-ENTMCNC: 32 G/DL (ref 31–37)
MCV RBC AUTO: 99.3 FL
MODIFIED ALLEN TEST: POSITIVE
MONOCYTES # BLD AUTO: 0.86 X10(3) UL (ref 0.1–1)
MONOCYTES NFR BLD AUTO: 8.5 %
NEUTROPHILS # BLD AUTO: 7.65 X10 (3) UL (ref 1.5–7.7)
NEUTROPHILS # BLD AUTO: 7.65 X10(3) UL (ref 1.5–7.7)
NEUTROPHILS NFR BLD AUTO: 75.6 %
NT-PROBNP SERPL-MCNC: 272 PG/ML (ref ?–450)
O2 CT BLD-SCNC: 14.8 VOL% (ref 15–23)
OSMOLALITY SERPL CALC.SUM OF ELEC: 306 MOSM/KG (ref 275–295)
OXYGEN LITERS/MINUTE: 4.5 L/MIN
P AXIS: 10 DEGREES
P-R INTERVAL: 142 MS
PCO2 BLDA: 78 MM HG (ref 35–45)
PH BLDA: 7.32 [PH] (ref 7.35–7.45)
PLATELET # BLD AUTO: 188 10(3)UL (ref 150–450)
PO2 BLDA: 69 MM HG (ref 80–100)
POTASSIUM SERPL-SCNC: 3.9 MMOL/L (ref 3.5–5.1)
PUNCTURE CHARGE: YES
Q-T INTERVAL: 334 MS
QRS DURATION: 94 MS
QTC CALCULATION (BEZET): 435 MS
R AXIS: 0 DEGREES
RBC # BLD AUTO: 4.09 X10(6)UL
SAO2 % BLDA: 94.8 % (ref 94–100)
SODIUM SERPL-SCNC: 146 MMOL/L (ref 136–145)
T AXIS: 47 DEGREES
TROPONIN I SERPL HS-MCNC: 12 NG/L
VENTRICULAR RATE: 102 BPM
WBC # BLD AUTO: 10.1 X10(3) UL (ref 4–11)

## 2024-12-12 PROCEDURE — 99223 1ST HOSP IP/OBS HIGH 75: CPT | Performed by: HOSPITALIST

## 2024-12-12 PROCEDURE — 71045 X-RAY EXAM CHEST 1 VIEW: CPT | Performed by: EMERGENCY MEDICINE

## 2024-12-12 PROCEDURE — 5A0945A ASSISTANCE WITH RESPIRATORY VENTILATION, 24-96 CONSECUTIVE HOURS, HIGH NASAL FLOW/VELOCITY: ICD-10-PCS | Performed by: EMERGENCY MEDICINE

## 2024-12-12 RX ORDER — IPRATROPIUM BROMIDE AND ALBUTEROL SULFATE 2.5; .5 MG/3ML; MG/3ML
3 SOLUTION RESPIRATORY (INHALATION) ONCE
Status: COMPLETED | OUTPATIENT
Start: 2024-12-12 | End: 2024-12-12

## 2024-12-12 RX ORDER — HEPARIN SODIUM 5000 [USP'U]/ML
5000 INJECTION, SOLUTION INTRAVENOUS; SUBCUTANEOUS EVERY 12 HOURS SCHEDULED
Status: DISCONTINUED | OUTPATIENT
Start: 2024-12-12 | End: 2024-12-18

## 2024-12-12 RX ORDER — DICYCLOMINE HYDROCHLORIDE 10 MG/1
10 CAPSULE ORAL 3 TIMES DAILY PRN
Status: DISCONTINUED | OUTPATIENT
Start: 2024-12-12 | End: 2024-12-18

## 2024-12-12 RX ORDER — IPRATROPIUM BROMIDE AND ALBUTEROL SULFATE 2.5; .5 MG/3ML; MG/3ML
3 SOLUTION RESPIRATORY (INHALATION)
Status: DISCONTINUED | OUTPATIENT
Start: 2024-12-12 | End: 2024-12-12

## 2024-12-12 RX ORDER — METHYLPREDNISOLONE SODIUM SUCCINATE 40 MG/ML
40 INJECTION INTRAMUSCULAR; INTRAVENOUS EVERY 12 HOURS
Status: DISCONTINUED | OUTPATIENT
Start: 2024-12-12 | End: 2024-12-18

## 2024-12-12 RX ORDER — ASPIRIN 81 MG/1
162 TABLET, CHEWABLE ORAL DAILY
Status: DISCONTINUED | OUTPATIENT
Start: 2024-12-12 | End: 2024-12-18

## 2024-12-12 RX ORDER — DILTIAZEM HYDROCHLORIDE 180 MG/1
360 CAPSULE, EXTENDED RELEASE ORAL DAILY
Status: DISCONTINUED | OUTPATIENT
Start: 2024-12-12 | End: 2024-12-18

## 2024-12-12 RX ORDER — MONTELUKAST SODIUM 10 MG/1
10 TABLET ORAL NIGHTLY
Status: DISCONTINUED | OUTPATIENT
Start: 2024-12-12 | End: 2024-12-18

## 2024-12-12 RX ORDER — ONDANSETRON 2 MG/ML
4 INJECTION INTRAMUSCULAR; INTRAVENOUS EVERY 6 HOURS PRN
Status: DISCONTINUED | OUTPATIENT
Start: 2024-12-12 | End: 2024-12-18

## 2024-12-12 RX ORDER — BENZONATATE 200 MG/1
200 CAPSULE ORAL 3 TIMES DAILY PRN
Status: DISCONTINUED | OUTPATIENT
Start: 2024-12-12 | End: 2024-12-18

## 2024-12-12 RX ORDER — IPRATROPIUM BROMIDE AND ALBUTEROL SULFATE 2.5; .5 MG/3ML; MG/3ML
3 SOLUTION RESPIRATORY (INHALATION)
Status: DISCONTINUED | OUTPATIENT
Start: 2024-12-12 | End: 2024-12-15

## 2024-12-12 RX ORDER — PANTOPRAZOLE SODIUM 40 MG/1
40 TABLET, DELAYED RELEASE ORAL
Status: DISCONTINUED | OUTPATIENT
Start: 2024-12-12 | End: 2024-12-18

## 2024-12-12 RX ORDER — SPIRONOLACTONE 25 MG/1
25 TABLET ORAL DAILY
Status: DISCONTINUED | OUTPATIENT
Start: 2024-12-12 | End: 2024-12-18

## 2024-12-12 RX ORDER — FUROSEMIDE 10 MG/ML
10 INJECTION INTRAMUSCULAR; INTRAVENOUS ONCE
Status: COMPLETED | OUTPATIENT
Start: 2024-12-12 | End: 2024-12-12

## 2024-12-12 RX ORDER — CETIRIZINE HYDROCHLORIDE 5 MG/1
5 TABLET ORAL DAILY
Status: DISCONTINUED | OUTPATIENT
Start: 2024-12-12 | End: 2024-12-18

## 2024-12-12 RX ORDER — ACETAZOLAMIDE 250 MG/1
500 TABLET ORAL DAILY
Status: DISCONTINUED | OUTPATIENT
Start: 2024-12-12 | End: 2024-12-18

## 2024-12-12 RX ORDER — ACETAMINOPHEN 500 MG
1000 TABLET ORAL ONCE
Status: COMPLETED | OUTPATIENT
Start: 2024-12-12 | End: 2024-12-12

## 2024-12-12 RX ORDER — DIGOXIN 125 MCG
125 TABLET ORAL DAILY
Status: DISCONTINUED | OUTPATIENT
Start: 2024-12-12 | End: 2024-12-18

## 2024-12-12 RX ORDER — FUROSEMIDE 40 MG/1
40 TABLET ORAL DAILY
Status: DISCONTINUED | OUTPATIENT
Start: 2024-12-12 | End: 2024-12-13

## 2024-12-12 RX ORDER — ACETAMINOPHEN 500 MG
500 TABLET ORAL EVERY 6 HOURS PRN
Status: DISCONTINUED | OUTPATIENT
Start: 2024-12-12 | End: 2024-12-18

## 2024-12-12 NOTE — ED INITIAL ASSESSMENT (HPI)
Pt to ED via EMS from home for increased SOB. Hx of COPD and asthma. Per EMS pt found on 66% on 4L per baseline, increased to 6L, saturation increased to 96% with improved WOB

## 2024-12-12 NOTE — DISCHARGE INSTRUCTIONS
[facility]- [phone]    [facility] is providing your bipap. Please call as soon as you get home.  P:[phone]  F:[phone]

## 2024-12-12 NOTE — ED PROVIDER NOTES
Patient Seen in: [facility] Emergency Department      History     Chief Complaint   Patient presents with    Difficulty Breathing     Stated Complaint:     Subjective:   HPI      84-year-old female with history of hypertension, hypercholesterolemia, atrial fibrillation, COPD on 3 to 4 L of oxygen by nasal cannula at home, asthma, and recent admission from 11/24-28 for COPD exacerbation and CHF presents with complaints of thoracic back pain and increased dyspnea.  The patient reports pain across her mid back just beneath her scapula bilaterally over the past 2 days.  She also reports increase in her baseline shortness of breath.  She reports minimal cough.  No known fevers.  She also reports increased swelling to her extremities.  She reports the back pain is worsened with deep inhalation and with movement.  She denies any known injury.    Objective:     Past Medical History:    A-fib ([facility])    Anesthesia complication    Arrhythmia    AFIB    Arthritis    Asthma ([facility])    Back problem    COPD (chronic obstructive pulmonary disease) ([facility])    Deviated nasal septum    nasal septoplasty, turb reduction, smr of turbs    Difficult intubation    fiber optic if needed    Diverticulitis    colonoscopy     Diverticulosis of large intestine    Esophageal reflux    Extrinsic asthma, unspecified    Headache    Heart disease    Hepatitis    WAS TOLD SHE HAS HAD THIS WHEN SHE WAS 19 YEARS OLD    High blood pressure    High cholesterol    Irregular heart rate    Lichenoid keratosis    left chest-bx    Osteoarthritis    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    Paronychia    (RT); onychomycosis; debridement    Problems with swallowing    occasionally    Shortness of breath    2 L NC ALL THE TIME 24HR/7 DAYS PER WEEK    Unspecified essential hypertension    Visual impairment              Past Surgical History:   Procedure Laterality Date    Adenoidectomy      Cataract      cataract extraction     Hysterectomy       Sinus surgery        DEVIATED SEPTUM    T&a      Tonsillectomy                  Social History     Socioeconomic History    Marital status:    Tobacco Use    Smoking status: Former     Current packs/day: 0.00     Types: Cigarettes     Quit date: 1996     Years since quittin.9    Smokeless tobacco: Never   Vaping Use    Vaping status: Never Used   Substance and Sexual Activity    Alcohol use: Yes     Alcohol/week: 0.0 standard drinks of alcohol     Comment: one drink once a week    Drug use: Never   Other Topics Concern    Pt has a pacemaker No    Pt has a defibrillator No    Reaction to local anesthetic No    Caffeine Concern No     Social Drivers of Health     Food Insecurity: No Food Insecurity (2024)    Food Insecurity     Food Insecurity: Never true   Transportation Needs: No Transportation Needs (2024)    Transportation Needs     Lack of Transportation: No   Housing Stability: Low Risk  (2024)    Housing Stability     Housing Instability: No                  Physical Exam     ED Triage Vitals    144/78   Pulse 244 99   Resp 244 20   Temp 24 0550 98.1 °F (36.7 °C)   Temp src 24 0550 Tympanic   SpO2 244 91 %   O2 Device 24 Nasal cannula       Current Vitals:   Vital Signs  BP: 111/53  Pulse: 78  Resp: 20  Temp: 97.8 °F (36.6 °C)  Temp src: Oral  MAP (mmHg): 68    Oxygen Therapy  SpO2: 95 %  O2 Device: High flow nasal cannula  Mode: Spontaneous  O2 Flow Rate (L/min): 4 L/min        Physical Exam    General Appearance: alert, no distress  Eyes: pupils equal and round no pallor or injection  ENT, Mouth: mucous membranes moist  Respiratory: there are no retractions, scattered wheezes and crackles bilaterally.  Mild tenderness to palpation across the mid back.  No overlying erythema or skin changes noted.  Cardiovascular: regular rate and rhythm  Gastrointestinal:  abdomen is soft and non tender, no masses, bowel  sounds normal  Neurological: Speech normal.  Moving extremities x 4.  Skin: warm and dry, no rashes.  Musculoskeletal: neck is supple non tender        Extremities are symmetrical, full range of motion.  Bilateral leg and ankle edema.  There is also edema noted to bilateral forearms and hands.  Psychiatric: patient is oriented X 3, there is no agitation    DIFFERENTIAL DIAGNOSIS: After history and physical exam differential diagnosis was considered for shortness of breath including pulmonary infectious process, COPD, asthma, pulmonary embolus and congestive heart failure        ED Course     Labs Reviewed   BASIC METABOLIC PANEL (8) - Abnormal; Notable for the following components:       Result Value    Glucose 136 (*)     Sodium 146 (*)     CO2 38.0 (*)     BUN/CREA Ratio 27.7 (*)     Calculated Osmolality 306 (*)     All other components within normal limits   CBC WITH DIFFERENTIAL WITH PLATELET - Abnormal; Notable for the following components:    RDW-SD 55.3 (*)     All other components within normal limits   ARTERIAL BLOOD GAS - Abnormal; Notable for the following components:    ABG pH 7.32 (*)     ABG pCO2 78 (*)     ABG PO2 69 (*)     ABG HCO3 33.6 (*)     Blood Gas Base Excess 11.3 (*)     Oxygen Content 14.8 (*)     All other components within normal limits   BASIC METABOLIC PANEL (8) - Abnormal; Notable for the following components:    Glucose 212 (*)     CO2 35.0 (*)     BUN/CREA Ratio 28.8 (*)     Calcium, Total 8.6 (*)     Calculated Osmolality 307 (*)     All other components within normal limits   CBC WITH DIFFERENTIAL WITH PLATELET - Abnormal; Notable for the following components:    RBC 3.53 (*)     HGB 11.1 (*)     HCT 34.5 (*)     RDW-SD 53.2 (*)     Lymphocyte Absolute 0.42 (*)     Monocyte Absolute 0.09 (*)     All other components within normal limits   BASIC METABOLIC PANEL (8) - Abnormal; Notable for the following components:    Glucose 198 (*)     CO2 36.0 (*)     BUN 24 (*)     BUN/CREA  Ratio 38.1 (*)     Calculated Osmolality 308 (*)     All other components within normal limits   ARTERIAL BLOOD GAS - Abnormal; Notable for the following components:    ABG pCO2 69 (*)     ABG PO2 74 (*)     ABG HCO3 32.6 (*)     Blood Gas Base Excess 10.0 (*)     All other components within normal limits   TROPONIN I HIGH SENSITIVITY - Normal   D-DIMER - Normal   PRO BETA NATRIURETIC PEPTIDE - Normal   MAGNESIUM - Normal   RAINBOW DRAW LAVENDER     EKG    Rate, intervals and axes as noted on EKG Report.  Rate: 102  Rhythm: Sinus Rhythm  Axis: Normal  Reading: Nonspecific EKG                     MDM      Lab results noted.  Patient receiving a nebulizer treatment and was given Tylenol for pain.  Awaiting chest x-ray and D-dimer.    Admission disposition: 12/12/2024  7:02 AM           Medical Decision Making      Disposition and Plan     Clinical Impression:  1. SOB (shortness of breath)         Disposition:  Admit  12/12/2024  7:02 am    Follow-up:  No follow-up provider specified.  We recommend that you schedule follow up care with a primary care provider within the next three months to obtain basic health screening including reassessment of your blood pressure.      Medications Prescribed:  Current Discharge Medication List              Supplementary Documentation:         Hospital Problems       Present on Admission  Date Reviewed: 11/24/2024            ICD-10-CM Noted POA    * (Principal) SOB (shortness of breath) R06.02 12/12/2024 Unknown    Acute cor pulmonale (HCC) I26.09 9/17/2024 Yes    Acute on chronic diastolic (congestive) heart failure (HCC) I50.33 12/12/2024 Unknown    Acute on chronic hypoxic respiratory failure (HCC) J96.21 10/7/2024 Yes    Asthma (HCC) J45.909 12/14/2024 Unknown    COPD with acute exacerbation (HCC) J44.1 9/13/2024 Yes    Kyphoscoliosis M41.9 9/17/2024 Yes    Obesity hypoventilation syndrome (HCC) E66.2 12/14/2024 Unknown    Phrenic nerve paralysis G56.80 9/17/2024 Yes     Restrictive lung disease J98.4 9/17/2024 Yes

## 2024-12-12 NOTE — H&P
[facility]  part of [facility]  HISTORY AND PHYSICAL       Yesenia Berkowitz Patient Status:  Observation    1940  84 year old CSN [id number]   Location 508/508-A Attending Robbie Tate MD     PCP JO-ANN YANG MD     ASSESSMENT/PLAN    Acute on chronic diastolic congestive heart failure.  Presumably mostly due to noncompliance with diet.  Patient does not check her weight very regularly.  -IV diuretics per cardiology  -Cardiology consult  -Telemetry  -GDMT    Acute COPD exacerbation.  -IV Solu-Medrol  -Nebulizer treatments  -Pulmonology consult    Acute on chronic hypoxemic respiratory failure.  Usually on 4 L.  -Continue oxygen  -Decrease as tolerated    Back pain.  This was her primary complaint but now much better.  Seems to be musculoskeletal.  -Heat pack  -Gentle activity  -No neurologic symptoms    Other problems  A-fib, proximal muscle  Diverticulosis  ?  Hepatitis      ----------------------------------  dvt prophylaxis: sc heparin  code status: full code  dispo: home upon medical clearance    DATE OF ADMISSION: 24    CHIEF COMPLAINT: Back pain    HISTORY OF PRESENT ILLNESS  This is a 84 year oldfemale recently discharged after hospitalization for COPD and congestive heart failure.  Noticed increase in swelling, weight, dyspnea at home.  Her main reason for coming in however was back pain located between the shoulder blades.  No radiation.      PAST MEDICAL HISTORY  Past Medical History:    A-fib (HCC)    Anesthesia complication    Arrhythmia    AFIB    Arthritis    Asthma (HCC)    Back problem    COPD (chronic obstructive pulmonary disease) (HCC)    Deviated nasal septum    nasal septoplasty, turb reduction, smr of turbs    Difficult intubation    fiber optic if needed    Diverticulitis    colonoscopy     Diverticulosis of large intestine    Esophageal reflux    Extrinsic asthma, unspecified    Headache    Heart disease    Hepatitis    WAS TOLD SHE HAS HAD THIS WHEN SHE WAS  19 YEARS OLD    High blood pressure    High cholesterol    Irregular heart rate    Lichenoid keratosis    left chest-bx    Osteoarthritis    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    Paronychia    (RT); onychomycosis; debridement    Problems with swallowing    occasionally    Shortness of breath    2 L NC ALL THE TIME 24HR/7 DAYS PER WEEK    Unspecified essential hypertension    Visual impairment        PAST SURGICAL HISTORY  Past Surgical History:   Procedure Laterality Date    Adenoidectomy      Cataract      cataract extraction     Hysterectomy      Sinus surgery        DEVIATED SEPTUM    T&a      Tonsillectomy         ALLERGIES   Penicillin g, Azithromycin, Cefuroxime, Levofloxacin, and Penicillins    MEDICATIONS  Current Discharge Medication List        CONTINUE these medications which have NOT CHANGED    Details   acetaZOLAMIDE 250 MG Oral Tab Take 2 tablets (500 mg total) by mouth daily.  Qty: 30 tablet, Refills: 0      furosemide 40 MG Oral Tab Take 1 tablet (40 mg total) by mouth BID (Diuretic).  Qty: 60 tablet, Refills: 0      acetaminophen 500 MG Oral Tab Take 1 tablet (500 mg total) by mouth every 6 (six) hours as needed for Pain or Fever.      DIGOXIN 0.125 MG Oral Tab Take 1 tablet (125 mcg total) by mouth daily.  Qty: 30 tablet, Refills: 0      albuterol 108 (90 Base) MCG/ACT Inhalation Aero Soln Inhale 2 puffs into the lungs as needed.      dicyclomine 10 MG Oral Cap Take 1 capsule (10 mg total) by mouth 3 (three) times daily as needed (abdominal pain).  Qty: 40 capsule, Refills: 3    Associated Diagnoses: Irritable bowel syndrome with constipation      mupirocin 2 % External Ointment Apply 1 Application topically 3 (three) times daily.  Qty: 1 each, Refills: 3      PULMICORT FLEXHALER 180 MCG/ACT Inhalation Aerosol Powder, Breath Activated Inhale 2 puffs into the lungs daily.      dilTIAZem HCl ER Coated Beads 360 MG Oral Capsule SR 24 Hr Take 1 capsule (360 mg total) by mouth daily. Take  1 capsule (360mg)by mouth every morning on an empty stomach.  Refills: 0      Calcium Carb-Cholecalciferol (CALCIUM + D3) 600-200 MG-UNIT Oral Tab Take 1 tablet by mouth daily.        Cholecalciferol (VITAMIN D) 1000 UNITS Oral Tab Take 1,000 Units by mouth 2 (two) times daily.      Potassium Chloride ER (K-DUR M20) 20 MEQ Oral Tab CR Take 1 tablet (20 mEq total) by mouth nightly.      lansoprazole (PREVACID) 30 MG Oral Capsule Delayed Release Take 1 capsule (30 mg total) by mouth nightly.      amitriptyline 50 MG Oral Tab Take 1 tablet (50 mg total) by mouth nightly.      aspirin 81 MG Oral Tab Take 2 tablets (162 mg total) by mouth daily.      Loratadine 10 MG Oral Cap Take 10 mg by mouth nightly.        montelukast 10 MG Oral Tab Take 1 tablet (10 mg total) by mouth nightly.      Multiple Vitamin (MULTI-VITAMINS) Oral Tab take 1 tablet by ORAL route  every day with food      omega-3 fatty acids (FISH OIL) 1000 MG Oral Cap Take 1,000 mg by mouth daily.      Tiotropium Bromide Monohydrate 18 MCG Inhalation Cap Inhale 1 capsule (18 mcg total) into the lungs nightly.      B Complex Oral Cap Take 1 tablet by mouth daily.      benzonatate 200 MG Oral Cap Take 1 capsule (200 mg total) by mouth 3 (three) times daily as needed for cough.  Qty: 30 capsule, Refills: 0      guaiFENesin 100 MG/5 ML Oral Take 10 mL (200 mg total) by mouth every 4 (four) hours as needed.  Qty: 300 mL, Refills: 0      albuterol (2.5 MG/3ML) 0.083% Inhalation Nebu Soln Take 3 mL (2.5 mg total) by nebulization every 4 (four) hours as needed for Wheezing or Shortness of Breath.  Qty: 50 each, Refills: 0    Comments: ICD code J44.1      triamcinolone 0.1 % External Cream Apply topically 2 (two) times daily. Apply bid as directed  Qty: 80 g, Refills: 3    Comments: per patient request      estradiol 0.05 MG/24HR Transdermal Patch Weekly Place 1 patch onto the skin once a week. As directed.             SOCIAL HISTORY  Social History     Socioeconomic  History    Marital status:    Tobacco Use    Smoking status: Former     Current packs/day: 0.00     Types: Cigarettes     Quit date: 1996     Years since quittin.9    Smokeless tobacco: Never   Vaping Use    Vaping status: Never Used   Substance and Sexual Activity    Alcohol use: Yes     Alcohol/week: 0.0 standard drinks of alcohol     Comment: one drink once a week    Drug use: Never   Other Topics Concern    Pt has a pacemaker No    Pt has a defibrillator No    Reaction to local anesthetic No    Caffeine Concern No       FAMILY HISTORY  Family History   Problem Relation Age of Onset    Ovarian Cancer Mother 76        endometrial, poss ovarian primary    Pulmonary Disease Sister         COPD    Skin cancer Other     Stroke Other     Other (Other) Other        REVIEW OF SYSTEMS  Gen: Neg for chills, fever, diaphoresis and fatigue.  No weight loss or weight gain.  Endo: No heat or cold intolerance.  No polyuria.  HEENT: Neg for trouble swallowing, visual disturbance.  no hearing changes, no speech difficulties, no neck stiffness.  Resp: As above  CV: Neg for chest pain and palpitations.   GI: Neg for nausea, vomiting, diarrhea, abdominal pain/distention, constipation, blood in stool, black stools  : Neg for dysuria, frequency and hematuria.   MS as above  Skin: Neg for rash.   Neuro: Neg for dizziness, focal weakness, LH, HA.   Psych: Neg for suicidal ideas, confusion, agitation and depressed mood.   Other: All other review systems negative except what is mentioned in the HPI    PHYSICAL EXAM  Vital signs:  /67 (BP Location: Right arm)   Pulse 96   Temp 97.2 °F (36.2 °C) (Oral)   Resp 20   Wt 175 lb 3.2 oz (79.5 kg)   SpO2 94%   BMI 29.15 kg/m²   Gen: A+Ox3.  No distress.   HEENT: NCAT, neck supple, no carotid bruit.  CV: RRR, S1S2, and intact distal pulses. No gallop, rub, murmur.    Pulm: Lungs with mild bilateral basilar rales.  No obvious wheezing.  Abd: Soft, NTND, BS normal, no  mass, no HSM, no rebound/guarding.   Neuro: Normal reflexes, cranial nerves II through XII intact, strength is symmetric and 5 out of 5 throughout, sensory exam grossly intact, coordination and gait normal.  Skin: Skin is warm and dry. No rashes, erythema, diaphoresis.   MS: Some tenderness to palpation in the paraspinal muscles along thoracic spine.  No midline tenderness.  No radiation.  No skin changes.  Psych: Normal mood and affect. Behavior and judgment normal.     Data:  Recent Labs   Lab 12/12/24  0513   RBC 4.09   HGB 13.0   HCT 40.6   MCV 99.3   MCH 31.8   MCHC 32.0   RDW 14.9   NEPRELIM 7.65   WBC 10.1   .0     Recent Labs   Lab 12/12/24  0513   *   BUN 18   CREATSERUM 0.65   CA 8.9   *   K 3.9      CO2 38.0*       I personally reviewed the available laboratories, imaging including chest x-ray. I discussed the case with ER physician. I ordered laboratories, studies including CBC. I adjusted medications including Solu-Medrol. Medical decision making high, risk is high  >75min spent, >50% spent counseling and coordinating care in the form of educating pt/family and d/w consultants and staff. Most of the time spent discussing the above plan.

## 2024-12-12 NOTE — PROGRESS NOTES
12/12/24 1215   BiPAP   $ Vent Care / Non-Invasive Initial Day Yes   Device V60   BiPAP / CPAP CE# [id number]   BiPAP bacteria filter Yes   BIPAP plugged into main power? Yes   Mode Spontaneous/Timed   Interface Full face mask   Mask Size Medium   Control Settings   Set Rate 12 breaths/min   Set IPAP 13   Set EPAP 5   Oxygen Percent 30 %   Inspiratory time 1   Insp rise time 2   BiPAP/CPAP Alarm Settings   Hi Rate 50   Low Rate 10   Hi VT 1500   Low    Hi Pressure 20   Low Pressure 5   Low MV 3   BiPAP/CPAP Monitored Parameters   Pulse 96   PIP 14   Total Rate 31 breaths/min   Minute Volume 12   Tidal Volume 424   Total Leak 24   Trigger % 79   Ti/Ttot % 25   Toleration Well   Skin Integrity Normal

## 2024-12-12 NOTE — ED NOTES
Patient signed out at shift change.  Results for orders placed or performed during the hospital encounter of 12/12/24   Basic Metabolic Panel (8)    Collection Time: 12/12/24  5:13 AM   Result Value Ref Range    Glucose 136 (H) 70 - 99 mg/dL    Sodium 146 (H) 136 - 145 mmol/L    Potassium 3.9 3.5 - 5.1 mmol/L    Chloride 106 98 - 112 mmol/L    CO2 38.0 (H) 21.0 - 32.0 mmol/L    Anion Gap 2 0 - 18 mmol/L    BUN 18 9 - 23 mg/dL    Creatinine 0.65 0.55 - 1.02 mg/dL    BUN/CREA Ratio 27.7 (H) 10.0 - 20.0    Calcium, Total 8.9 8.7 - 10.4 mg/dL    Calculated Osmolality 306 (H) 275 - 295 mOsm/kg    eGFR-Cr 88 >=60 mL/min/1.73m2   CBC With Differential With Platelet    Collection Time: 12/12/24  5:13 AM   Result Value Ref Range    WBC 10.1 4.0 - 11.0 x10(3) uL    RBC 4.09 3.80 - 5.30 x10(6)uL    HGB 13.0 12.0 - 16.0 g/dL    HCT 40.6 35.0 - 48.0 %    MCV 99.3 80.0 - 100.0 fL    MCH 31.8 26.0 - 34.0 pg    MCHC 32.0 31.0 - 37.0 g/dL    RDW-SD 55.3 (H) 35.1 - 46.3 fL    RDW 14.9 11.0 - 15.0 %    .0 150.0 - 450.0 10(3)uL    Neutrophil Absolute Prelim 7.65 1.50 - 7.70 x10 (3) uL    Neutrophil Absolute 7.65 1.50 - 7.70 x10(3) uL    Lymphocyte Absolute 1.46 1.00 - 4.00 x10(3) uL    Monocyte Absolute 0.86 0.10 - 1.00 x10(3) uL    Eosinophil Absolute 0.09 0.00 - 0.70 x10(3) uL    Basophil Absolute 0.02 0.00 - 0.20 x10(3) uL    Immature Granulocyte Absolute 0.04 0.00 - 1.00 x10(3) uL    Neutrophil % 75.6 %    Lymphocyte % 14.4 %    Monocyte % 8.5 %    Eosinophil % 0.9 %    Basophil % 0.2 %    Immature Granulocyte % 0.4 %   Troponin I (High Sensitivity)    Collection Time: 12/12/24  5:13 AM   Result Value Ref Range    Troponin I (High Sensitivity) 12 <=34 ng/L   D-Dimer    Collection Time: 12/12/24  5:13 AM   Result Value Ref Range    D-Dimer 0.45 <0.82 ug/mL FEU   Pro Beta Natriuretic Peptide    Collection Time: 12/12/24  5:13 AM   Result Value Ref Range    Pro-Beta Natriuretic Peptide 272 <450 pg/mL   EKG    Collection Time:  12/12/24  5:21 AM   Result Value Ref Range    Ventricular rate 102 BPM    Atrial rate 102 BPM    P-R Interval 142 ms    QRS Duration 94 ms    Q-T Interval 334 ms    QTC Calculation (Bezet) 435 ms    P Axis 10 degrees    R Axis 0 degrees    T Axis 47 degrees     Vitals:    12/12/24 0630   BP: 121/68   Pulse: 98   Resp: 16   Temp:      Vitals:    12/12/24 0630   BP: 121/68   Pulse: 98   Resp: 16   Temp:      Chest X-ray, independent interpretation completed by myself and awaiting formal radiology read: Fluid overload versus pneumonia      Patient was signed out to me requesting to follow on chest x-ray and D-dimer.  Known history of CHF/COPD on 3 L chronically.  I was requested to follow the aforementioned and to have low reserve for admission given patient's shortness of breath.  Patient presented to the emergency room for mid upper back pain and shortness of breath.  she reports this occurring for 1 week during my interview.  states that she has worsening lower extremity edema.   S/p neb and Tylenol by previous clinician.  She reports mild improvement.  On my exam, crackles noted.  Lower extremity edema noted with equal distal pulses.  I suspect combination of COPD and CHF.  Case discussed with hospitalist who will kindly admit, no further recommendations at this time.    [facility] consultations to cardiology and pulmonary completed.        Medical Decision Making  Problems Addressed:  SOB (shortness of breath): acute illness or injury    Amount and/or Complexity of Data Reviewed  External Data Reviewed: labs, radiology and notes.  Labs:  Decision-making details documented in ED Course.  Radiology: independent interpretation performed. Decision-making details documented in ED Course.  ECG/medicine tests: independent interpretation performed. Decision-making details documented in ED Course.  Discussion of management or test interpretation with external provider(s): Hospitalist    Risk  Decision regarding  hospitalization.          .

## 2024-12-12 NOTE — CONSULTS
[facility]    PATIENT'S NAME: CEZAR JOVEL   ATTENDING PHYSICIAN: Robbie Tate MD   CONSULTING PHYSICIAN: Bing Boyle MD   PATIENT ACCOUNT#:   [id number]    LOCATION:  [facility]  MEDICAL RECORD #:   [id number]       YOB: 1940  ADMISSION DATE:       2024      CONSULT DATE:  2024    REPORT OF CONSULTATION      HISTORY OF PRESENT ILLNESS:  Patient is an 84-year-old white female with history of asthma, COPD, elevation of left hemidiaphragm, quit smoking 20 years ago, kyphoscoliosis, atrial fibrillation, now sinus rhythm, presented to emergency room with increasing shortness of breath, pain in the right upper back worse with coughing for 3 or 4 days.  The patient also gained 4 or 5 pounds in 3 to 4 days with increasing lower leg edema.  Chest x-ray showed pulmonary congestion, bilateral basal pleural effusion, atelectasis.  CBC showed WBC 10,100, RBC 4.09, hemoglobin 13.0, hematocrit 40.  Electrolytes showed sodium 146, potassium 3.9, chloride 106, CO2 of 38, BUN 18, creatinine 0.65.    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    MEDICATIONS:  Lasix 40 mg a day, digoxin 0.125 mg a day, dicyclomine 10 mg 3 times a day, Pulmicort 180 two puffs q.12 h., Cardizem  mg a day, potassium chloride 20 mEq a day, Prevacid 30 mg a day, Protonix 40 mg a day, amitriptyline 50 mg at night, aspirin 81 mg a day, Claritin 10 mg a day, Singular 10 mg at bedtime, Spiriva 1 inhalation q.24 h.    ALLERGIES:  Penicillin, Levaquin, Zinacef.    SOCIAL HISTORY:  Patient quit smoking 20 years ago.    FAMILY HISTORY:  Father  from MI at age of 60.  Mother  from ovarian cancer at age of 70.    REVIEW OF SYSTEMS:  GENERAL:  Weight gain of 3 to 5 pounds.  HEAD:  No headache.  EYES:  No diplopia, blurred vision.  EARS:  No tinnitus, impaired hearing.  NOSE:  No rhinorrhea, epistaxis.   THROAT:  No sore throat.  NECK:  No neck stiffness.  RESPIRATORY:  See the Present Illness.  CARDIOVASCULAR:  No orthopnea, paroxysmal nocturnal dyspnea.  GASTROINTESTINAL: No nausea, vomiting, diarrhea.  GENITOURINARY:  No dysuria or hematuria.      PHYSICAL EXAMINATION:    VITAL SIGNS:  Temperature 97, pulse 94, respirations 22, blood pressure 133/58, O2 saturation 94% on nasal cannula 4L to 5L.  HEENT:  Head and face:  No trauma, no injury.  Eyes:  Pupils equal bilaterally.  Conjunctivae were not anemic.  Sclerae nonicteric.  Fundi were normal.  Ears:  External ears, no deformity.  Ear canals were patent.  Eardrums were intact.  Nose:  No congestion, polyp.  Mouth and throat:  Free.  NECK:  No neck stiffness.  No palpable nodes.  No carotid bruit.    CHEST:  Symmetrical.  LUNGS:  Rales throughout both lung fields.  HEART:  Regular.  No murmur.  Cardiac dullness was normal.  ABDOMEN:  Soft.  No hepatosplenomegaly.  No ascites.  No hernia.  EXTREMITIES:  No clubbing of fingers.  There is 3+ lower leg edema.    DIAGNOSTIC IMPRESSION:    1.   Acute on chronic respiratory failure.  2.   Chronic obstructive pulmonary disease and asthma with exacerbation.  3.   Left phrenic nerve paralysis.  4.   Kyphoscoliosis.  5.   Obesity hypoventilation syndrome.  6.   Chronic and acute cor pulmonale.    SUGGESTIONS AND RECOMMENDATIONS:    1.   Solu-Medrol 40 IV q.12 h.   2.   DuoNeb q.i.d. and p.r.n.   3.   Protonix 40 mg a day.    4.   Continue Lasix.    5.   DVT prophylaxis.  6.   Doxycycline 100 mg IV q.12 h.    Dictated By Bing Boyle MD  d: 12/12/2024 10:41:22  t: 12/12/2024 12:18:50  Job [id number]  [facility]/

## 2024-12-12 NOTE — CONSULTS
[facility]  Cardiology Consultation    Yesenia Berkowitz Patient Status:  Observation    1940 MRN [id number]   Location [facility], [place]5W Attending Robbie Tate MD   Hosp Day # 0 PCP JO-ANN YANG MD     Date of Admission:  2024  Date of Consult:  2024  Reason for Consultation:   Heart failure    History of Present Illness:   Patient is an 84-year-old female with a history of COPD on 3 L O2 at home, paroxysmal atrial fibrillation, HFpEF, HTN, who presents with increased shortness of breath, hypoxia, and bilateral lower extremity edema.  She was recently hospitalized in November for similar symptoms, treated for COPD exacerbation and diuresed with plans to take Lasix 40 mg twice daily at home.  She has only been taking it once daily as she mostly forgets the second dose.  Over the last 1 to 2 weeks developed above symptoms including lower extremity edema during current admission.  Denies any chest pain, palpitations, dizziness, or syncope.    proBNP 272  Troponin 12  D-dimer 0.45  Sodium 146    CXR-emphysema with bibasilar pleural effusion  ECG-sinus tachycardia with PACs, anterior infarct, 102 bpm    Cardiac history:  Paroxysmal atrial fibrillation  COPD, on 3 L O2  HFpEF  HTN     Past Medical History  Past Medical History:    A-fib ([facility])    Anesthesia complication    Arrhythmia    AFIB    Arthritis    Asthma ([facility])    Back problem    COPD (chronic obstructive pulmonary disease) ([facility])    Deviated nasal septum    nasal septoplasty, turb reduction, smr of turbs    Difficult intubation    fiber optic if needed    Diverticulitis    colonoscopy     Diverticulosis of large intestine    Esophageal reflux    Extrinsic asthma, unspecified    Headache    Heart disease    Hepatitis    WAS TOLD SHE HAS HAD THIS WHEN SHE WAS 19 YEARS OLD    High blood pressure    High cholesterol    Irregular heart rate    Lichenoid keratosis    left chest-bx    Osteoarthritis    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    Paronychia    (RT); onychomycosis; debridement    Problems with swallowing    occasionally    Shortness of breath    2 L NC ALL THE TIME 24HR/7 DAYS PER WEEK    Unspecified essential hypertension    Visual impairment     Past Surgical History  Past Surgical History:   Procedure Laterality Date    Adenoidectomy      Cataract      cataract extraction     Hysterectomy      Sinus surgery        DEVIATED SEPTUM    T&a      Tonsillectomy       Family History  No family history of heart disease.  Family History   Problem Relation Age of Onset    Ovarian Cancer Mother 76        endometrial, poss ovarian primary    Pulmonary Disease Sister         COPD    Skin cancer Other     Stroke Other     Other (Other) Other      Social History  Lives at home alone. Former smoker, quit 30 years ago. No alcohol use. She is a former RN.   Pediatric History   Patient Parents    Not on file     Other Topics Concern    [place] a farm Not Asked    History of tanning Not Asked    Outdoor occupation Not Asked    Pt has a pacemaker No    Pt has a defibrillator No    Breast feeding Not Asked    Reaction to local anesthetic No     Service Not Asked    Blood Transfusions Not Asked    Caffeine Concern No    Occupational Exposure Not Asked    Hobby Hazards Not Asked    Sleep Concern Not Asked    Stress Concern Not Asked    Weight Concern Not Asked    Special Diet Not Asked    Back Care Not Asked    Exercise Not Asked    Bike Helmet Not Asked    Seat Belt Not Asked    Self-Exams Not Asked   Social History Narrative    Not on file         Current Medications:  Current Facility-Administered Medications   Medication Dose Route Frequency    acetaminophen (Tylenol Extra Strength) tab 500 mg  500 mg Oral Q6H PRN    acetaZOLAMIDE (Diamox) tab 500 mg  500 mg Oral Daily    amitriptyline (Elavil) tab 50 mg  50 mg Oral Nightly    aspirin chewable tab 162 mg  162 mg Oral Daily    benzonatate (Tessalon) cap 200 mg  200 mg  Oral TID PRN    dicyclomine (Bentyl) cap 10 mg  10 mg Oral TID PRN    digoxin (Lanoxin) tab 125 mcg  125 mcg Oral Daily    dilTIAZem ER (CardIZEM CD) 24 hr cap 360 mg  360 mg Oral Daily    guaiFENesin (Robitussin) 100 MG/5 ML oral liquid 200 mg  200 mg Oral Q4H PRN    pantoprazole (Protonix) DR tab 40 mg  40 mg Oral QAM AC    cetirizine (ZyrTEC) tab 5 mg  5 mg Oral Daily    montelukast (Singulair) tab 10 mg  10 mg Oral Nightly    fluticasone furoate (Arnuity Ellipta) 100 MCG/ACT inhaler 1 puff  1 puff Inhalation Daily    umeclidinium bromide (Incruse Ellipta) 62.5 MCG/ACT inhaler 1 puff  1 puff Inhalation Daily    ondansetron (Zofran) 4 MG/2ML injection 4 mg  4 mg Intravenous Q6H PRN    heparin (Porcine) 5000 UNIT/ML injection 5,000 Units  5,000 Units Subcutaneous 2 times per day    ipratropium-albuterol (Duoneb) 0.5-2.5 (3) MG/3ML inhalation solution 3 mL  3 mL Nebulization Q4H WA (4 times daily)    methylPREDNISolone sodium succinate (Solu-MEDROL) injection 40 mg  40 mg Intravenous Q12H    doxycycline hyclate (Vibramycin) 100 mg in sodium chloride 0.9% 100 mL IVPB  100 mg Intravenous Q12H    furosemide (Lasix) tab 40 mg  40 mg Oral Daily    spironolactone (Aldactone) tab 25 mg  25 mg Oral Daily     Medications Prior to Admission   Medication Sig    acetaZOLAMIDE 250 MG Oral Tab Take 2 tablets (500 mg total) by mouth daily.    furosemide 40 MG Oral Tab Take 1 tablet (40 mg total) by mouth BID (Diuretic).    acetaminophen 500 MG Oral Tab Take 1 tablet (500 mg total) by mouth every 6 (six) hours as needed for Pain or Fever.    DIGOXIN 0.125 MG Oral Tab Take 1 tablet (125 mcg total) by mouth daily.    albuterol 108 (90 Base) MCG/ACT Inhalation Aero Soln Inhale 2 puffs into the lungs as needed.    dicyclomine 10 MG Oral Cap Take 1 capsule (10 mg total) by mouth 3 (three) times daily as needed (abdominal pain).    mupirocin 2 % External Ointment Apply 1 Application topically 3 (three) times daily.    PULMICORT FLEXHALER  180 MCG/ACT Inhalation Aerosol Powder, Breath Activated Inhale 2 puffs into the lungs daily.    dilTIAZem HCl ER Coated Beads 360 MG Oral Capsule SR 24 Hr Take 1 capsule (360 mg total) by mouth daily. Take 1 capsule (360mg)by mouth every morning on an empty stomach.    Calcium Carb-Cholecalciferol (CALCIUM + D3) 600-200 MG-UNIT Oral Tab Take 1 tablet by mouth daily.      Cholecalciferol (VITAMIN D) 1000 UNITS Oral Tab Take 1,000 Units by mouth 2 (two) times daily.    Potassium Chloride ER (K-DUR M20) 20 MEQ Oral Tab CR Take 1 tablet (20 mEq total) by mouth nightly.    lansoprazole (PREVACID) 30 MG Oral Capsule Delayed Release Take 1 capsule (30 mg total) by mouth nightly.    amitriptyline 50 MG Oral Tab Take 1 tablet (50 mg total) by mouth nightly.    aspirin 81 MG Oral Tab Take 2 tablets (162 mg total) by mouth daily.    Loratadine 10 MG Oral Cap Take 10 mg by mouth nightly.      montelukast 10 MG Oral Tab Take 1 tablet (10 mg total) by mouth nightly.    Multiple Vitamin (MULTI-VITAMINS) Oral Tab take 1 tablet by ORAL route  every day with food    omega-3 fatty acids (FISH OIL) 1000 MG Oral Cap Take 1,000 mg by mouth daily.    Tiotropium Bromide Monohydrate 18 MCG Inhalation Cap Inhale 1 capsule (18 mcg total) into the lungs nightly.    B Complex Oral Cap Take 1 tablet by mouth daily.    benzonatate 200 MG Oral Cap Take 1 capsule (200 mg total) by mouth 3 (three) times daily as needed for cough.    guaiFENesin 100 MG/5 ML Oral Take 10 mL (200 mg total) by mouth every 4 (four) hours as needed.    albuterol (2.5 MG/3ML) 0.083% Inhalation Nebu Soln Take 3 mL (2.5 mg total) by nebulization every 4 (four) hours as needed for Wheezing or Shortness of Breath.    triamcinolone 0.1 % External Cream Apply topically 2 (two) times daily. Apply bid as directed    estradiol 0.05 MG/24HR Transdermal Patch Weekly Place 1 patch onto the skin once a week. As directed.     Allergies  Allergies[1]    Review of Systems:     14 point  review of systems completed and negative except as noted.    Physical Exam:   Patient Vitals for the past 24 hrs:   BP Temp Temp src Pulse Resp SpO2 Weight   12/12/24 0959 120/54 -- -- 95 -- -- --   12/12/24 0900 -- -- -- -- -- -- 175 lb 3.2 oz (79.5 kg)   12/12/24 0834 133/58 97.5 °F (36.4 °C) Oral 94 22 94 % --   12/12/24 0745 126/61 -- -- 91 16 93 % --   12/12/24 0630 121/68 -- -- 98 16 93 % --   12/12/24 0550 -- 98.1 °F (36.7 °C) Tympanic -- -- -- --   12/12/24 0524 -- -- -- 107 25 92 % --   12/12/24 0514 -- -- -- 107 (!) 27 94 % --   12/12/24 0504 -- -- -- 88 20 95 % --   12/12/24 0500 144/78 -- -- 96 20 94 % --   12/12/24 0454 144/78 -- -- 99 20 91 % --     Scheduled Meds:    acetaZOLAMIDE  500 mg Oral Daily    amitriptyline  50 mg Oral Nightly    aspirin  162 mg Oral Daily    digoxin  125 mcg Oral Daily    dilTIAZem HCl ER Coated Beads  360 mg Oral Daily    pantoprazole  40 mg Oral QAM AC    cetirizine  5 mg Oral Daily    montelukast  10 mg Oral Nightly    fluticasone furoate  1 puff Inhalation Daily    umeclidinium bromide  1 puff Inhalation Daily    heparin  5,000 Units Subcutaneous 2 times per day    ipratropium-albuterol  3 mL Nebulization Q4H WA (4 times daily)    methylPREDNISolone  40 mg Intravenous Q12H    doxycycline  100 mg Intravenous Q12H    furosemide  40 mg Oral Daily    spironolactone  25 mg Oral Daily     General: Alert and oriented x 3. No apparent distress.   HEENT: Normocephalic, anicteric sclera, neck supple, no thyromegaly or adenopathy.  Neck: No JVD, carotids 2+, no bruits.  Cardiac: Regular rate and rhythm. S1, S2 normal. No murmur, pericardial rub, S3, or extra cardiac sounds.  Lungs: Clear without wheezes, rales, rhonchi or dullness.  Normal excursions and effort.  Abdomen: Soft, non-tender. No organosplenomegally, mass or rebound, BS-present.  Extremities: Without clubbing or cyanosis. 2+ LE edema.    Neurologic: Alert and oriented, normal affect. No focal defects  Skin: Warm and  dry.     Results:   Laboratory Data:  Lab Results   Component Value Date    WBC 10.1 12/12/2024    HGB 13.0 12/12/2024    HCT 40.6 12/12/2024    .0 12/12/2024    CREATSERUM 0.65 12/12/2024    BUN 18 12/12/2024     (H) 12/12/2024    K 3.9 12/12/2024     12/12/2024    CO2 38.0 (H) 12/12/2024     (H) 12/12/2024    CA 8.9 12/12/2024    ALB 3.2 11/24/2024    ALKPHO 59 11/24/2024    TP 5.0 (L) 11/24/2024    AST 20 11/24/2024    ALT 32 11/24/2024    PTT 24.3 11/25/2024    INR 1.08 11/25/2024    PTP 14.6 11/25/2024    TSH 1.060 01/06/2023    LIP 30 08/30/2024    DDIMER 0.45 12/12/2024    MG 2.0 11/27/2024    PHOS 3.5 11/27/2024    TROP <0.045 02/03/2020         Recent Labs   Lab 12/12/24  0513   *   BUN 18   CREATSERUM 0.65   CA 8.9   *   K 3.9      CO2 38.0*     Recent Labs   Lab 12/12/24  0513   RBC 4.09   HGB 13.0   HCT 40.6   MCV 99.3   MCH 31.8   MCHC 32.0   RDW 14.9   NEPRELIM 7.65   WBC 10.1   .0       No results for input(s): \"BNPML\" in the last 168 hours.    No results for input(s): \"TROP\", \"CK\" in the last 168 hours.    Imaging:  No results found.    Impression:   Assessment:  COPD exacerbation  HFpEF, right-sided symptoms with primarily lower extremity edema  Paroxysmal atrial fibrillation  HTN      Plan:  - Presents with increased cough, hypoxia, and shortness of breath, secondary to COPD exacerbation with component of congestive heart failure  - With significant bilateral lower extremity edema agree with Lasix 40 mg IV twice daily  - On discharge will have her increase Lasix to 80 mg once daily as she has trouble remembering to take medication twice a day  - Would also benefit from SGLT2 inhibitor, will try to get a price check from  before discharge  - Continue steroids, antibiotics, nebulizers per pulmonary  - Not on anticoagulation per patient preference  - Continue diltiazem 360 mg once a day    Thank you for allowing me to participate in the  care of your patient.    Luis Fernando Fowler MD  [facility]  12/12/2024         [1]   Allergies  Allergen Reactions    Penicillin G ANAPHYLAXIS    Azithromycin DIARRHEA and NAUSEA AND VOMITING    Cefuroxime UNKNOWN     Other reaction(s): Vomitting    Levofloxacin UNKNOWN     Other reaction(s): LEVOFLOXACIN    Penicillins      Other reaction(s): Unknown

## 2024-12-12 NOTE — HISTORICAL OFFICE NOTE
Continuity of Care Document  10/23/2024  Yesenia Berkowitz - 84 y.o. Female; born [birth year]Summary of episode note, generated on Oct. 30, 2024October 30, 2024   CHIEF COMPLAINT    CHIEF COMPLAINT  Reason for Visit/Chief Complaint   Follow up   Patient is an 83-year-old female with a history of paroxysmal atrial fibrillation, oxygen dependent COPD, HTN, HLD who presents establish care. Previously followed with Dr. Boles, and prior to that with Dr. Preston. Patient is a former smoker and quit 30 years ago, has been on 2 L of oxygen at baseline for the last 15 years. She has relatively unchanged exertional dyspnea. No chest pain, palpitations, edema, dizziness, or syncope. She also has a remote history of paroxysmal atrial fibrillation, when this comes on she will feel more short of breath and fatigued and then checks her pulse which will feel irregular. This is not occurred for the last several years. She is not on anticoagulation due to frequent bruising and infrequent A-fib. She has mild bilateral lower extremity edema but only takes her Lasix as needed. Patient had a coronary CT in 2020 which demonstrated a small speck of calcium in the LAD with a calcium score of 1.4/15/2024  Her last few months has had increased shortness of breath, put on steroids by her pulmonologist with significant permanent breathing. Denies significant palpitations. Has had increased bilateral lower extremity edema, 2 months ago was on increased Lasix 40 mg daily which seemed to improve although will back to 20 mg daily after 10 days per instruction.8/26/2024  Reports worsened shortness of breath, currently on prednisone for COPD exacerbation. Typically has allergies in the summer as well. Mild gradual increase in lower extremity edema although not too bothersome at this point. Remains on Lasix 40 mg daily.9/27/2024  Unfortunately hospitalized again earlier this month primarily with COPD exacerbation, was diuresed with IV Lasix  for a few days but did not appear clinically overloaded at that time. More recently's been feeling dizzy/lightheaded, fatigued, with persistent shortness of breath. Blood pressures were running low at home in the 90s systolic. Today 82/54.10/23/2024  Patient was again hospitalized with COPD exacerbation, had a thoracentesis for pleural effusion, diuresed with IV Lasix. Discharged on Lasix 40 mg daily alternating with 40 mg twice daily. She saw Dr. Li few days ago recommend that she stay on 40 mg twice daily for a week. Recently had an episode of atrial fibrillation, felt abnormal sensation in her chest. Was confirmed on [employer] mobile device but now back in sinus rhythm. Blood pressure creeping up 130s 140s systolic. Persistent unchanged exertional dyspnea since discharge.Cardiac history:  Paroxysmal atrial fibrillation  COPD, on 2-3 L O2  HFpEF  HTN     PROBLEMS  Reconcile with Patient's ChartPROBLEMS  Problem Effective Dates Date resolved Problem Status   Chronic hypoxemic respiratory failure, [SNOMED-CT: 456212450] 2/13/2024 - Active   COPD (chronic obstructive pulmonary disease), [SNOMED-CT: 99136970] 2/13/2024 - Active   Syncope and collapse, [SNOMED-CT: 138160382] 2/13/2024 - Active   Cataract surgery, unspecified eye, [SNOMED-CT: 296357968] 8/18/2021 5/19/2023 Resolved   PAF (paroxysmal atrial fibrillation), [SNOMED-CT: 413212451] 8/13/2019 - Active   Essential hypertension, [SNOMED-CT: 39199547] 8/13/2019 - Active   PVC's (premature ventricular contractions), [SNOMED-CT: 83981207] 8/13/2019 - Active   Localized edema, [SNOMED-CT: 424008858] 8/14/2019 - Active     ENCOUNTER    ENCOUNTER  Problem Effective Dates Date resolved Problem Status   Acute heart failure with preserved ejection fraction (HFpEF), [SNOMED-CT: 346107874] 2/13/2024 - Active   Chronic hypoxemic respiratory failure, [SNOMED-CT: 804436556] 2/13/2024 - Active   COPD (chronic obstructive pulmonary disease), [SNOMED-CT: 21974943] 2/13/2024 -  Active   Syncope and collapse, [SNOMED-CT: 589638963] 2/13/2024 - Active   PAF (paroxysmal atrial fibrillation), [SNOMED-CT: 552339613] 8/13/2019 - Active   Essential hypertension, [SNOMED-CT: 03075987] 8/13/2019 - Active   PVC's (premature ventricular contractions), [SNOMED-CT: 88634959] 8/13/2019 - Active     VITAL SIGNS    VITAL SIGNS  Date / Time: 10/23/2024   BP Systolic 142 mmHg   BP Diastolic 68 mmHg   Height 65 inches   Weight 166 lbs   Pulse Rate 101 bpm   BSA (Body Surface Area) 1.9 cc/m2   BMI (Body Mass Index) 27.6 cc/m2   Blood Pressure 142 / 68 mmHg     PHYSICAL EXAMINATION    PHYSICAL EXAMINATION  Header Details   Constitutional 95%o2   Vitals Left Arm Sitting  / 68 mmHg, Pulse rate 101 bpm, Height in 5' 5\", BMI: 27.6, Weight in 165.35 lbs (or) 75 kgs, BSA : 1.88 cc/m²   General Appearance No Acute Distress, Normal Body habitus   Cardiovascular      ALLERGIES, ADVERSE REACTIONS, ALERTS    ALLERGIES, ADVERSE REACTIONS, ALERTS  Type Substance Reaction Severity Status   Allergies Cefuroxime, [RxNorm: 113302]   Mild Active   Allergies Levofloxacin, [RxNorm: 4660336]   Mild Active   Allergies penicillin - Ingredient unknown Moderate Active     MEDICATIONS ADMINISTERED DURING VISIT    No data available    MEDICATIONS  Reconcile with Patient's ChartMEDICATIONS  Medication Start Date Route/Frequency Status   albuterol (PROAIR HFA) 108 (90 Base) MCG/ACT inhaler, [RxNorm: 409422] 8/16/2021 as needed Active   amitriptyline 50 mg tablet, [RxNorm: 701541] 9/27/2024 Take 1 tablet orally once a day. Active   aspirin 81mg, [RxNorm: 1191] 11/22/2023 2 tablets once a day Active   B complex capsule, [RxNorm: 0] 8/16/2021 - Active   Calcium Carb-Cholecalciferol (CALCIUM + D3) 600-200 MG-UNIT Tab, [RxNorm: 170046] 8/16/2021 1 daily Active   dicyclomine 10 mg capsule, [RxNorm: 993529] 10/23/2024 Take 1 capsule orally 3 times a day as needed. for pain Active   digoxin 125 mcg (0.125 mg) tablet, [RxNorm: 672901]  10/22/2024 Take 1 tablet orally once a day. Active   dilTIAZem (CARDIZEM CD) 240 MG 24 hr capsule, [RxNorm: 855804] 8/16/2021 Take 240 mg by mouth daily. 1 daily Active   estradiol (CLIMARA) 0.05 MG/24HR once weekly patch, [RxNorm: 0] 8/16/2021 - Active   furosemide 40 mg tablet, [RxNorm: 351216] 10/23/2024 Take 1 tablet orally 2 times a day. For a week Active   HYDROcodone-acetaminophen (NORCO) 5-325 MG per tablet, [RxNorm: 756769] 8/16/2021 as needed Active   lansoprazole (PREVACID SOLUTAB) 30 MG disintegrating tablet, [RxNorm: 0] 8/16/2021 Take 30 mg by mouth daily. 1 daily Active   loratadine (CLARITIN) 10 MG tablet, [RxNorm: 943505] 8/16/2021 1 daily Active   montelukast (SINGULAIR) 10 MG tablet, [RxNorm: 617225] 8/16/2021 1 daily Active   Multiple Vitamin (MULTI-VITAMINS) Tab, [RxNorm: 0] 8/16/2021 - Active   olopatadine (PATANOL) 0.1 % ophthalmic solution, [RxNorm: 9591632] 8/16/2021 - Active   Omega-3 Fatty Acids (FISH OIL) 1200 MG capsule, [RxNorm: 0] 8/16/2021 1 daily Active   tiotropium (SPIRIVA HANDIHALER) 18 MCG capsule for inhaler, [RxNorm: 398960] 8/16/2021 daily Active   sodium fluoride (PREVIDENT 5000 BOOSTER PLUS) 1.1 % dental paste, [RxNorm: 549294] 8/16/2021 - Active   triamcinolone (ARISTOCORT) 0.1 % cream, [RxNorm: 5067668] 8/16/2021 APPLY TO AFFECTED AREA(S) 2 (TWO) TIMES DAILY. Active   potassium chloride (KLOR-CON M20) 20 MEQ cruz ER tablet, [RxNorm: 5743565] 8/16/2021 1 daily Active   dilTIAZem  mg capsule,24 hr,extended release, [RxNorm: 936215] 10/23/2024 Take 1 capsule orally 2 times a day. Active     ASSESSMENT    Paroxysmal atrial fibrillation  - Recent recurrence on [employer] mobile, back in sinus rhythm  - Continue aspirin 81 mg once daily, digoxin daily  - Increase diltiazem to 360 mg once daily  - Per patient preference avoiding anticoagulation, history of GI bleed in the past and low A-fib burden; discussed Watchman as an alternative, will provide patient with pamphlet  - Check  twelve-lead ECG todayHFpEF  - Increase bilateral lower extremity edema, TTE noted diastolic dysfunction, edema improved on higher dose of Lasix  - Continue Lasix 40 mg twice daily  - Check BMP in 1 week  - Additionally discussed SGLT2 inhibitor, would like to reassess symptoms before making a decisionHTN  - Blood pressure 130s 140s systolic  - Previously on lisinopril-hydrochlorothiazide, discontinued due to hypotension  - Increase diltiazem to 360 mg once dailyPlan  Increase diltiazem to 360 mg daily  Twelve-lead ECG today  BMP in 1 week  Follow-up with me in 1 month or sooner as needed     FAMILY HISTORY    FAMILY HISTORY  Relationship Age Diagnosis   Father 62 Hypertension (HTN), primary; Old MI (myocardial infarction) (Reason for death)   Mother 0 Hypertension (HTN), primary   No family history of heart disease.     GENERAL STATUS    No data available    PAST MEDICAL HISTORY    PAST MEDICAL HISTORY  Problem Date diagonsed Date resolved Status   Chronic hypoxemic respiratory failure, [SNOMED-CT: 657171739] 2/13/2024 - Active   COPD (chronic obstructive pulmonary disease), [SNOMED-CT: 73504811] 2/13/2024 - Active   Syncope and collapse, [SNOMED-CT: 998388096] 2/13/2024 - Active   PAF (paroxysmal atrial fibrillation), [SNOMED-CT: 614122758] 8/13/2019 - Active   Essential hypertension, [SNOMED-CT: 58810102] 8/13/2019 - Active   PVC's (premature ventricular contractions), [SNOMED-CT: 00809777] 8/13/2019 - Active   Localized edema, [SNOMED-CT: 690857197] 8/14/2019 - Active     HISTORY OF PRESENT ILLNESS    Patient is an 83-year-old female with a history of paroxysmal atrial fibrillation, oxygen dependent COPD, HTN, HLD who presents establish care. Previously followed with Dr. Boles, and prior to that with Dr. Preston. Patient is a former smoker and quit 30 years ago, has been on 2 L of oxygen at baseline for the last 15 years. She has relatively unchanged exertional dyspnea. No chest pain, palpitations, edema,  dizziness, or syncope. She also has a remote history of paroxysmal atrial fibrillation, when this comes on she will feel more short of breath and fatigued and then checks her pulse which will feel irregular. This is not occurred for the last several years. She is not on anticoagulation due to frequent bruising and infrequent A-fib. She has mild bilateral lower extremity edema but only takes her Lasix as needed. Patient had a coronary CT in 2020 which demonstrated a small speck of calcium in the LAD with a calcium score of 1.4/15/2024  Her last few months has had increased shortness of breath, put on steroids by her pulmonologist with significant permanent breathing. Denies significant palpitations. Has had increased bilateral lower extremity edema, 2 months ago was on increased Lasix 40 mg daily which seemed to improve although will back to 20 mg daily after 10 days per instruction.8/26/2024  Reports worsened shortness of breath, currently on prednisone for COPD exacerbation. Typically has allergies in the summer as well. Mild gradual increase in lower extremity edema although not too bothersome at this point. Remains on Lasix 40 mg daily.9/27/2024  Unfortunately hospitalized again earlier this month primarily with COPD exacerbation, was diuresed with IV Lasix for a few days but did not appear clinically overloaded at that time. More recently's been feeling dizzy/lightheaded, fatigued, with persistent shortness of breath. Blood pressures were running low at home in the 90s systolic. Today 82/54.10/23/2024  Patient was again hospitalized with COPD exacerbation, had a thoracentesis for pleural effusion, diuresed with IV Lasix. Discharged on Lasix 40 mg daily alternating with 40 mg twice daily. She saw Dr. Li few days ago recommend that she stay on 40 mg twice daily for a week. Recently had an episode of atrial fibrillation, felt abnormal sensation in her chest. Was confirmed on [employer] device but now back in  sinus rhythm. Blood pressure creeping up 130s 140s systolic. Persistent unchanged exertional dyspnea since discharge.Cardiac history:  Paroxysmal atrial fibrillation  COPD, on 2-3 L O2  HFpEF  HTN     IMMUNIZATIONS  Reconcile with Patient's ChartNo data available    PLAN OF CARE    PLAN OF CARE  Planned Care Date   EKG (electrocardiogram) 1/1/1900     PROCEDURES    No data available    RESULTS    RESULTS  Name Result Date Location - Ordered By   DIGOXIN [LOINC: 10535-3] 0.31 ng/mL [Low] 01/16/2024 01:31:00 PM [facility] ([facility])  Address: [address] [facility]  [id number]  tel:   Trans Thoracic Echocardiogram 1.The left ventricle is normal in size. Left ventricular wall thickness is normal. Global left ventricular systolic function is hyperdynamic. The left ventricular ejection fraction is >70%. No regional wall motion abnormalities. The left ventricle diastolic function is impaired (Grade II) with an elevated left atrial pressure.2.The right ventricle is mildly enlarged. Right ventricular systolic function is mildly decreased. 3/18/2024 11:00:00 AM Luis Fernando Fowler MD   Ambulatory Telemetry Monitor 1.This is an average quality study. 2.Predominant rhythm is normal sinus rhythm. 3.The minimum heart rate recorded was 62 beats / minute (normal sinus rhythm, 3/10/2024). The maximum heart rate is 111 beats / minute (sinus tachycardia, 2/27/2024). The mean heart rate is 82 beats / minute. 4.- There were rare PVCs with a burden of <1%.  - There were rare PACs with a burden of 2%.  - 113 Supraventricular Tachycardia events -- the longest episode was 6.8s and the fastest episode was 181 BPM.  - No atrial fibrillation.  - No other arrhythmias.  - There were 7 patient triggered events with symptoms of palpitations, cough, and shortness of breath, associated with sinus rhythm, PVCs, PACs, and brief PAT. 2/15/2024 2:45:00 PM Luis Fernando Fowler MD     REVIEW OF SYSTEMS    REVIEW OF SYSTEMS  Header  Details   Cardiovascular No history of Chest pain, OJEDA, Palpitations, Syncope, PND, Orthopnea, Edema, Claudication   Respiratory No history of SOB, Wheezing, Sputum   Hem/Lymphatic No history of Easy bruising, Blood clots, Hx of blood transfusion, Anemia, Bleeding problems     SOCIAL HISTORY    SOCIAL HISTORY  Social History Element Description Effective Dates   Smoking status Former smoker -     FUNCTIONAL STATUS    No data available    INSTRUCTIONS    INSTRUCTIONS  NAME TYPE DATE   Recommend low sodium diet, daily exercise and maintain a normal BMI. Recommend monitor blood pressure at home. Goals 10/23/2024     MEDICAL EQUIPMENT    No data available    Goals Sections    No data available    REASON FOR REFERRAL    No data available    Health Concerns Section    No data available    COGNITIVE/MENTAL STATUS    No data available    Patient Demographics    Patient Demographics  Patient Address Patient Name Communication   [address] [place] [address] Yesenia Berkowitz [phone] (Home)     Patient Demographics  Language Race / Ethnicity Marital Status   Unknown - Spoken White / Unknown      Document Information    Primary Care Provider Other Service Providers Document Coverage Dates   Luis Fernando Fowler  NPI: [id number]  [phone] (Work)  [address], [address]  [place] [address]  [place] [address]  Interpreting Physicians  [facility]  [phone] (Work)  [address] [place] [address] Norma Murillo  NPI: [id number]  [phone] (Work)  [address], [address]  [place] [address]  [place] [address]  Nurses     Vonda Prajapati  NPI: [id number]  [phone] (Work)  [address], [address]  [place] [address]  [place] [address]  Nurses Oct. 23, 2024October 23, 2024      Organization   [facility]  [phone] (Work)  [address], [address]  [place] [address]  [place]  [address]     Encounter Providers Encounter Date    Oct. 23, 2024October 23, 2024     Legal Authenticator    Luis Fernando Fowler  NPI: [id number]  [phone] (Work)  [address], [address]  [place] [address]  [place] [address]

## 2024-12-13 PROBLEM — J44.1 COPD WITH ACUTE EXACERBATION (HCC): Status: ACTIVE | Noted: 2024-09-13

## 2024-12-13 LAB
ANION GAP SERPL CALC-SCNC: 3 MMOL/L (ref 0–18)
BASOPHILS # BLD AUTO: 0.01 X10(3) UL (ref 0–0.2)
BASOPHILS NFR BLD AUTO: 0.1 %
BUN BLD-MCNC: 19 MG/DL (ref 9–23)
BUN/CREAT SERPL: 28.8 (ref 10–20)
CALCIUM BLD-MCNC: 8.6 MG/DL (ref 8.7–10.4)
CHLORIDE SERPL-SCNC: 106 MMOL/L (ref 98–112)
CO2 SERPL-SCNC: 35 MMOL/L (ref 21–32)
CREAT BLD-MCNC: 0.66 MG/DL
DEPRECATED RDW RBC AUTO: 53.2 FL (ref 35.1–46.3)
EGFRCR SERPLBLD CKD-EPI 2021: 88 ML/MIN/1.73M2 (ref 60–?)
EOSINOPHIL # BLD AUTO: 0 X10(3) UL (ref 0–0.7)
EOSINOPHIL NFR BLD AUTO: 0 %
ERYTHROCYTE [DISTWIDTH] IN BLOOD BY AUTOMATED COUNT: 14.6 % (ref 11–15)
GLUCOSE BLD-MCNC: 212 MG/DL (ref 70–99)
HCT VFR BLD AUTO: 34.5 %
HGB BLD-MCNC: 11.1 G/DL
IMM GRANULOCYTES # BLD AUTO: 0.03 X10(3) UL (ref 0–1)
IMM GRANULOCYTES NFR BLD: 0.4 %
LYMPHOCYTES # BLD AUTO: 0.42 X10(3) UL (ref 1–4)
LYMPHOCYTES NFR BLD AUTO: 5.5 %
MCH RBC QN AUTO: 31.4 PG (ref 26–34)
MCHC RBC AUTO-ENTMCNC: 32.2 G/DL (ref 31–37)
MCV RBC AUTO: 97.7 FL
MONOCYTES # BLD AUTO: 0.09 X10(3) UL (ref 0.1–1)
MONOCYTES NFR BLD AUTO: 1.2 %
NEUTROPHILS # BLD AUTO: 7.14 X10 (3) UL (ref 1.5–7.7)
NEUTROPHILS # BLD AUTO: 7.14 X10(3) UL (ref 1.5–7.7)
NEUTROPHILS NFR BLD AUTO: 92.8 %
OSMOLALITY SERPL CALC.SUM OF ELEC: 307 MOSM/KG (ref 275–295)
PLATELET # BLD AUTO: 171 10(3)UL (ref 150–450)
POTASSIUM SERPL-SCNC: 3.8 MMOL/L (ref 3.5–5.1)
RBC # BLD AUTO: 3.53 X10(6)UL
SODIUM SERPL-SCNC: 144 MMOL/L (ref 136–145)
WBC # BLD AUTO: 7.7 X10(3) UL (ref 4–11)

## 2024-12-13 PROCEDURE — 99233 SBSQ HOSP IP/OBS HIGH 50: CPT | Performed by: HOSPITALIST

## 2024-12-13 RX ORDER — FUROSEMIDE 10 MG/ML
40 INJECTION INTRAMUSCULAR; INTRAVENOUS
Status: DISCONTINUED | OUTPATIENT
Start: 2024-12-13 | End: 2024-12-14

## 2024-12-13 RX ORDER — DOXYCYCLINE 100 MG/1
100 CAPSULE ORAL EVERY 12 HOURS SCHEDULED
Status: DISCONTINUED | OUTPATIENT
Start: 2024-12-13 | End: 2024-12-18

## 2024-12-13 NOTE — PLAN OF CARE
Pt alert and oriented, on 4L of O2. Ambulated within the room and up to the bathroom. VS stable. Pt updated on plan of care, frequent rounding by staff, call light within reach.   Problem: Patient Centered Care  Goal: Patient preferences are identified and integrated in the patient's plan of care  Description: Interventions:  - What would you like us to know as we care for you? From home   - Provide timely, complete, and accurate information to patient/family  - Incorporate patient and family knowledge, values, beliefs, and cultural backgrounds into the planning and delivery of care  - Encourage patient/family to participate in care and decision-making at the level they choose  - Honor patient and family perspectives and choices  Outcome: Progressing     Problem: Patient/Family Goals  Goal: Patient/Family Long Term Goal  Description: Patient's Long Term Goal: to discharge home    Interventions:  - Monitor vital signs  - Monitor appropriate labs   - IV abx   - BIPAP prn  - Pain management   - Administer medications per order   - Follow MD orders   - Diagnostics per order   - Update/inform patient and family on plan of care   - Discharge planning   - See additional Care Plan goals for specific interventions  Outcome: Progressing  Goal: Patient/Family Short Term Goal  Description: Patient's Short Term Goal: to breathe better and decrease fluid overload    Interventions:   - Monitor vital signs  - Monitor appropriate labs   - IV abx   - BIPAP prn  - Pain management   - Administer medications per order   - Follow MD orders   - Diagnostics per order   - Update/inform patient and family on plan of care   - Discharge planning   - See additional Care Plan goals for specific interventions  Outcome: Progressing     Problem: PAIN - ADULT  Goal: Verbalizes/displays adequate comfort level or patient's stated pain goal  Description: INTERVENTIONS:  - Encourage pt to monitor pain and request assistance  - Assess pain using  appropriate pain scale  - Administer analgesics based on type and severity of pain and evaluate response  - Implement non-pharmacological measures as appropriate and evaluate response  - Consider cultural and social influences on pain and pain management  - Manage/alleviate anxiety  - Utilize distraction and/or relaxation techniques  - Monitor for opioid side effects  - Notify MD/LIP if interventions unsuccessful or patient reports new pain  - Anticipate increased pain with activity and pre-medicate as appropriate  Outcome: Progressing     Problem: SAFETY ADULT - FALL  Goal: Free from fall injury  Description: INTERVENTIONS:  - Assess pt frequently for physical needs  - Identify cognitive and physical deficits and behaviors that affect risk of falls.  - [place] fall precautions as indicated by assessment.  - Educate pt/family on patient safety including physical limitations  - Instruct pt to call for assistance with activity based on assessment  - Modify environment to reduce risk of injury  - Provide assistive devices as appropriate  - Consider OT/PT consult to assist with strengthening/mobility  - Encourage toileting schedule  Outcome: Progressing     Problem: DISCHARGE PLANNING  Goal: Discharge to home or other facility with appropriate resources  Description: INTERVENTIONS:  - Identify barriers to discharge w/pt and caregiver  - Include patient/family/discharge partner in discharge planning  - Arrange for needed discharge resources and transportation as appropriate  - Identify discharge learning needs (meds, wound care, etc)  - Arrange for interpreters to assist at discharge as needed  - Consider post-discharge preferences of patient/family/discharge partner  - Complete POLST form as appropriate  - Assess patient's ability to be responsible for managing their own health  - Refer to Case Management Department for coordinating discharge planning if the patient needs post-hospital services based on physician/LIP  order or complex needs related to functional status, cognitive ability or social support system  Outcome: Progressing     Problem: RESPIRATORY - ADULT  Goal: Achieves optimal ventilation and oxygenation  Description: INTERVENTIONS:  - Assess for changes in respiratory status  - Assess for changes in mentation and behavior  - Position to facilitate oxygenation and minimize respiratory effort  - Oxygen supplementation based on oxygen saturation or ABGs  - Provide Smoking Cessation handout, if applicable  - Encourage broncho-pulmonary hygiene including cough, deep breathe, Incentive Spirometry  - Assess the need for suctioning and perform as needed  - Assess and instruct to report SOB or any respiratory difficulty  - Respiratory Therapy support as indicated  - Manage/alleviate anxiety  - Monitor for signs/symptoms of CO2 retention  Outcome: Progressing     Problem: CARDIOVASCULAR - ADULT  Goal: Maintains optimal cardiac output and hemodynamic stability  Description: INTERVENTIONS:  - Monitor vital signs, rhythm, and trends  - Monitor for bleeding, hypotension and signs of decreased cardiac output  - Evaluate effectiveness of vasoactive medications to optimize hemodynamic stability  - Monitor arterial and/or venous puncture sites for bleeding and/or hematoma  - Assess quality of pulses, skin color and temperature  - Assess for signs of decreased coronary artery perfusion - ex. Angina  - Evaluate fluid balance, assess for edema, trend weights  Outcome: Progressing     Problem: SKIN/TISSUE INTEGRITY - ADULT  Goal: Skin integrity remains intact  Description: INTERVENTIONS  - Assess and document risk factors for pressure ulcer development  - Assess and document skin integrity  - Monitor for areas of redness and/or skin breakdown  - Initiate interventions, skin care algorithm/standards of care as needed  Outcome: Progressing     Problem: HEMATOLOGIC - ADULT  Goal: Maintains hematologic stability  Description:  INTERVENTIONS  - Assess for signs and symptoms of bleeding or hemorrhage  - Monitor labs and vital signs for trends  - Administer supportive blood products/factors, fluids and medications as ordered and appropriate  - Administer supportive blood products/factors as ordered and appropriate  Outcome: Progressing  Goal: Free from bleeding injury  Description: (Example usage: patient with low platelets)  INTERVENTIONS:  - Avoid intramuscular injections, enemas and rectal medication administration  - Ensure safe mobilization of patient  - Hold pressure on venipuncture sites to achieve adequate hemostasis  - Assess for signs and symptoms of internal bleeding  - Monitor lab trends  - Patient is to report abnormal signs of bleeding to staff  - Avoid use of toothpicks and dental floss  - Use electric shaver for shaving  - Use soft bristle tooth brush  - Limit straining and forceful nose blowing  Outcome: Progressing     Problem: MUSCULOSKELETAL - ADULT  Goal: Return mobility to safest level of function  Description: INTERVENTIONS:  - Assess patient stability and activity tolerance for standing, transferring and ambulating w/ or w/o assistive devices  - Assist with transfers and ambulation using safe patient handling equipment as needed  - Ensure adequate protection for wounds/incisions during mobilization  - Obtain PT/OT consults as needed  - Advance activity as appropriate  - Communicate ordered activity level and limitations with patient/family  Outcome: Progressing

## 2024-12-13 NOTE — PLAN OF CARE
Problem: Patient Centered Care  Goal: Patient preferences are identified and integrated in the patient's plan of care  Description: Interventions:  - What would you like us to know as we care for you? From home   - Provide timely, complete, and accurate information to patient/family  - Incorporate patient and family knowledge, values, beliefs, and cultural backgrounds into the planning and delivery of care  - Encourage patient/family to participate in care and decision-making at the level they choose  - Honor patient and family perspectives and choices  Outcome: Progressing     Problem: Patient/Family Goals  Goal: Patient/Family Long Term Goal  Description: Patient's Long Term Goal: to discharge home    Interventions:  - Monitor vital signs  - Monitor appropriate labs   - IV abx   - BIPAP prn  - Pain management   - Administer medications per order   - Follow MD orders   - Diagnostics per order   - Update/inform patient and family on plan of care   - Discharge planning   - See additional Care Plan goals for specific interventions  Outcome: Progressing  Goal: Patient/Family Short Term Goal  Description: Patient's Short Term Goal: to breathe better and decrease fluid overload    Interventions:   - Monitor vital signs  - Monitor appropriate labs   - IV abx   - BIPAP prn  - Pain management   - Administer medications per order   - Follow MD orders   - Diagnostics per order   - Update/inform patient and family on plan of care   - Discharge planning   - See additional Care Plan goals for specific interventions  Outcome: Progressing     Problem: PAIN - ADULT  Goal: Verbalizes/displays adequate comfort level or patient's stated pain goal  Description: INTERVENTIONS:  - Encourage pt to monitor pain and request assistance  - Assess pain using appropriate pain scale  - Administer analgesics based on type and severity of pain and evaluate response  - Implement non-pharmacological measures as appropriate and evaluate response  -  Consider cultural and social influences on pain and pain management  - Manage/alleviate anxiety  - Utilize distraction and/or relaxation techniques  - Monitor for opioid side effects  - Notify MD/LIP if interventions unsuccessful or patient reports new pain  - Anticipate increased pain with activity and pre-medicate as appropriate  Outcome: Progressing     Problem: SAFETY ADULT - FALL  Goal: Free from fall injury  Description: INTERVENTIONS:  - Assess pt frequently for physical needs  - Identify cognitive and physical deficits and behaviors that affect risk of falls.  - [place] fall precautions as indicated by assessment.  - Educate pt/family on patient safety including physical limitations  - Instruct pt to call for assistance with activity based on assessment  - Modify environment to reduce risk of injury  - Provide assistive devices as appropriate  - Consider OT/PT consult to assist with strengthening/mobility  - Encourage toileting schedule  Outcome: Progressing     Problem: DISCHARGE PLANNING  Goal: Discharge to home or other facility with appropriate resources  Description: INTERVENTIONS:  - Identify barriers to discharge w/pt and caregiver  - Include patient/family/discharge partner in discharge planning  - Arrange for needed discharge resources and transportation as appropriate  - Identify discharge learning needs (meds, wound care, etc)  - Arrange for interpreters to assist at discharge as needed  - Consider post-discharge preferences of patient/family/discharge partner  - Complete POLST form as appropriate  - Assess patient's ability to be responsible for managing their own health  - Refer to Case Management Department for coordinating discharge planning if the patient needs post-hospital services based on physician/LIP order or complex needs related to functional status, cognitive ability or social support system  Outcome: Progressing     Problem: RESPIRATORY - ADULT  Goal: Achieves optimal ventilation  and oxygenation  Description: INTERVENTIONS:  - Assess for changes in respiratory status  - Assess for changes in mentation and behavior  - Position to facilitate oxygenation and minimize respiratory effort  - Oxygen supplementation based on oxygen saturation or ABGs  - Provide Smoking Cessation handout, if applicable  - Encourage broncho-pulmonary hygiene including cough, deep breathe, Incentive Spirometry  - Assess the need for suctioning and perform as needed  - Assess and instruct to report SOB or any respiratory difficulty  - Respiratory Therapy support as indicated  - Manage/alleviate anxiety  - Monitor for signs/symptoms of CO2 retention  Outcome: Progressing     Problem: CARDIOVASCULAR - ADULT  Goal: Maintains optimal cardiac output and hemodynamic stability  Description: INTERVENTIONS:  - Monitor vital signs, rhythm, and trends  - Monitor for bleeding, hypotension and signs of decreased cardiac output  - Evaluate effectiveness of vasoactive medications to optimize hemodynamic stability  - Monitor arterial and/or venous puncture sites for bleeding and/or hematoma  - Assess quality of pulses, skin color and temperature  - Assess for signs of decreased coronary artery perfusion - ex. Angina  - Evaluate fluid balance, assess for edema, trend weights  Outcome: Progressing     Problem: SKIN/TISSUE INTEGRITY - ADULT  Goal: Skin integrity remains intact  Description: INTERVENTIONS  - Assess and document risk factors for pressure ulcer development  - Assess and document skin integrity  - Monitor for areas of redness and/or skin breakdown  - Initiate interventions, skin care algorithm/standards of care as needed  Outcome: Progressing     Problem: HEMATOLOGIC - ADULT  Goal: Maintains hematologic stability  Description: INTERVENTIONS  - Assess for signs and symptoms of bleeding or hemorrhage  - Monitor labs and vital signs for trends  - Administer supportive blood products/factors, fluids and medications as ordered and  appropriate  - Administer supportive blood products/factors as ordered and appropriate  Outcome: Progressing  Goal: Free from bleeding injury  Description: (Example usage: patient with low platelets)  INTERVENTIONS:  - Avoid intramuscular injections, enemas and rectal medication administration  - Ensure safe mobilization of patient  - Hold pressure on venipuncture sites to achieve adequate hemostasis  - Assess for signs and symptoms of internal bleeding  - Monitor lab trends  - Patient is to report abnormal signs of bleeding to staff  - Avoid use of toothpicks and dental floss  - Use electric shaver for shaving  - Use soft bristle tooth brush  - Limit straining and forceful nose blowing  Outcome: Progressing     Problem: MUSCULOSKELETAL - ADULT  Goal: Return mobility to safest level of function  Description: INTERVENTIONS:  - Assess patient stability and activity tolerance for standing, transferring and ambulating w/ or w/o assistive devices  - Assist with transfers and ambulation using safe patient handling equipment as needed  - Ensure adequate protection for wounds/incisions during mobilization  - Obtain PT/OT consults as needed  - Advance activity as appropriate  - Communicate ordered activity level and limitations with patient/family  Outcome: Progressing

## 2024-12-13 NOTE — PROGRESS NOTES
[facility]  part of [facility]  Hospitalist Progress Note     Yesenia Berkowitz Patient Status:  Observation    1940  84 year old CSN [id number]   Location 508/508-A Attending Robbie Tate MD   Hosp Day # 0 PCP JO-NAN YANG MD     Assessment & Plan:   ----------------------------------  Acute on chronic diastolic congestive heart failure.  Presumably mostly due to noncompliance with diet.  Patient does not check her weight very regularly.  -IV diuretics per cardiology  -Cardiology consult  -Telemetry  -GDMT     Acute COPD exacerbation.  -IV Solu-Medrol  -Nebulizer treatments  -Pulmonology consult     Acute on chronic hypoxemic and hypercarbic respiratory failure.  Usually on 4 L.  -Continue oxygen  -Decrease as tolerated  -Bipap as tolerated     Back pain.  This was her primary complaint but now much better.  Seems to be musculoskeletal.  -Heat pack  -Gentle activity  -No neurologic symptoms     Other problems  A-fib, proximal muscle  Diverticulosis  ?  Hepatitis, per chart      ----------------------------------  dvt prophylaxis: sc heparin  code status: full code  dispo: home upon medical clearance  If applicable, malnutrition status at bottom of note    I personally reviewed the available laboratories, imaging including. I discussed/will discuss the case with consultants. I ordered laboratories and/or radiographic studies. I adjusted medications as detailed above.  Medical decision making high, risk is high.    Subjective:   ----------------------------------  Pain and breathing better. Tolerated bipap overnight. Feels that her edema is better.      Objective:   Chief Complaint:   Chief Complaint   Patient presents with    Difficulty Breathing     ----------------------------------  Temp:  [97.2 °F (36.2 °C)-97.9 °F (36.6 °C)] 97.7 °F (36.5 °C)  Pulse:  [78-98] 81  Resp:  [16-22] 18  BP: (120-151)/(54-67) 139/57  SpO2:  [90 %-97 %] 90 %  Gen: A+Ox3.  No distress.   HEENT: NCAT, neck  supple, no carotid bruit.  CV: RRR, S1S2, and intact distal pulses. No gallop, rub, murmur.  Pulm: coarse bs L base, no wheezing  Abd: Soft, NTND, BS normal, no mass, no HSM, no rebound/guarding.   Neuro: Normal reflexes, CN. Sensory/motor exams grossly normal deficit.   MS: No joint effusions.  1+ bilateral LE peripheral edema.  Skin: Skin is warm and dry. No rashes, erythema, diaphoresis.   Psych: Normal mood and affect. Calm, cooperative    Labs:  Lab Results   Component Value Date    HGB 11.1 (L) 12/13/2024    WBC 7.7 12/13/2024    .0 12/13/2024     12/13/2024    K 3.8 12/13/2024    CREATSERUM 0.66 12/13/2024    INR 1.08 11/25/2024    AST 20 11/24/2024    ALT 32 11/24/2024    TROP <0.045 02/03/2020            acetaZOLAMIDE  500 mg Oral Daily    amitriptyline  50 mg Oral Nightly    aspirin  162 mg Oral Daily    digoxin  125 mcg Oral Daily    dilTIAZem HCl ER Coated Beads  360 mg Oral Daily    pantoprazole  40 mg Oral QAM AC    cetirizine  5 mg Oral Daily    montelukast  10 mg Oral Nightly    fluticasone furoate  1 puff Inhalation Daily    umeclidinium bromide  1 puff Inhalation Daily    heparin  5,000 Units Subcutaneous 2 times per day    ipratropium-albuterol  3 mL Nebulization Q4H WA (4 times daily)    methylPREDNISolone  40 mg Intravenous Q12H    doxycycline  100 mg Intravenous Q12H    furosemide  40 mg Oral Daily    spironolactone  25 mg Oral Daily       acetaminophen    benzonatate    dicyclomine    guaiFENesin    ondansetron

## 2024-12-13 NOTE — PROGRESS NOTES
[facility]  part of [facility]    Progress Note    Yesenia Berkowitz Patient Status:  Observation    1940 MRN [id number]   Location [facility]W Attending Robbie Tate MD   Hosp Day # 0 PCP JO-ANN YANG MD       Subjective:   Yesenia Berkowitz is a(n) 84 year old female.   Felt better,less sob and edema  Objective:   Blood pressure 139/57, pulse 81, temperature 97.7 °F (36.5 °C), temperature source Oral, resp. rate 18, weight 182 lb 9.6 oz (82.8 kg), SpO2 90%.    HEENT: negative  Neck: no adenopathy, no carotid bruit, no JVD, supple, symmetrical, trachea midline and thyroid not enlarged, symmetric, no tenderness/mass/nodules  Pulmonary/Chest: rales  Cardiovascular: S1, S2 normal, no S3 or S4, regular rate and rhythm, no murmur  Abdominal: normal findings: bowel sounds normal, soft, non-tender and no hepatosplenomegaly   Extremities: extremities normal, atraumatic, no cyanosis or less edema  Skin: Skin color, texture, turgor normal. No rashes or lesions    Results:     Lab Results   Component Value Date    WBC 7.7 2024    HGB 11.1 (L) 2024    HCT 34.5 (L) 2024    .0 2024    CREATSERUM 0.66 2024    BUN 19 2024     2024    K 3.8 2024     2024    CO2 35.0 (H) 2024     (H) 2024    CA 8.6 (L) 2024    ALB 3.2 2024    ALKPHO 59 2024    BILT 0.4 2024    TP 5.0 (L) 2024    AST 20 2024    ALT 32 2024    PTT 24.3 2024    INR 1.08 2024    TSH 1.060 2023    LIP 30 2024    DDIMER 0.45 2024    MG 2.0 2024    PHOS 3.5 2024    TROP <0.045 2020       No results found.  EKG    Result Date: 2024  Sinus tachycardia with Premature atrial complexes Cannot rule out Anterior infarct (cited on or before 2024) Abnormal ECG When compared with ECG of 2024 18:42, Premature atrial complexes are now Present Confirmed by  SAGAR HANSEN, HEDY ([id number]) on 12/12/2024 8:19:33 AM     Assessment and Plan:   Principal Problem:    SOB (shortness of breath)  Active Problems:    COPD with acute exacerbation (HCC)    Acute on chronic diastolic (congestive) heart failure (HCC)    Felt better,less sob and edema,continue solumedrol,duoneb,doxycycline and BIPAP         ELVIA LIVINGSTON MD, MD  12/13/2024

## 2024-12-13 NOTE — CM/SW NOTE
12/13/24 0900   CM/SW Referral Data   Referral Source    Reason for Referral Readmission;Discharge planning   Informant Patient   Readmission Assessment   Factors that patient feels contributed to this readmission Acute/Chronic Clinical Presentation   Pt's living situation prior to admission? Home alone   Pt's level of independence at discharge? No assist/independent (minimal)   Pt. received education on diagnoses at time of discharge? Yes   Did you know who and how to call someone if you felt worse? Yes   Did any new symptoms or issues develop after you were discharged? Yes   ----Post D/C symptoms: Symptoms/issue related to previous hospitalization Yes   Did you understand your discharge instructions? Yes   Were medications taken as indicated on discharge instructions? Yes   Was full assessment completed by SW/PERLITA on prior admission? Yes   Was the recommended discharge plan achieved? Yes   Was pt. discharged w/out services? No   Medical Hx   Does patient have an established PCP? Yes  (Brenda Wilkerson)   Patient Info   Patient's Current Mental Status at Time of Assessment Alert;Oriented   Patient's Home Environment House   Number of Levels in Home 1   Number of Stair in Home 5 steps to enter   Patient lives with Alone   Patient Status Prior to Admission   Independent with ADLs and Mobility Yes   Services in place prior to admission Home Health Care;DME/Supplies at home   Home Health Provider Info [facility]  (RN. PT, and MSW)   DME Provider/Supplier [employer] for home O2   Type of DME/Supplies Straight Cane;Standard Walker;Wheelchair;Shower Chair;Raised Toilet Seat;Grab Bars;Home Oxygen   Discharge Needs   Anticipated D/C needs Home health care;Medical equipment     Pt discussed during nursing rounds. Dxs respiratory failure 2/2 CHF and COPD, on 4L O2 (3L is baseline). Home alone, independent w/o device prior to admission though pt has all DME on hand at home if needed. Current w/[employer] for home  O2. Current w/[facility] for RN/PT/MSW, LEIDY entered. CM anticipated pt will return with same services once medically cleared for dc. Pt used BIPAP overnight, currently no BIPAP in place at home.    1040: Pt will require BIPAP at dc per NIA [place]. DME referral sent to [facility] in [place]. Per Colby Clarke ([phone]) at [facility], pt does qualify for BIPAP with current COPD diagnosis, current ABG pCO2 of 72, and overnight nocturnal sleep study. Overnight nocturnal sleep study ordered. Weekend SW/CM will need to upload report in [place] once available. Once report is sent, [facility] will complete BIPAP order and send to SW/CM directly. Same order will need to be signed by MD and then uploaded in [place].    12/16/2024 at 1205: Overnight nocturnal sleep study uploaded by weekend DREW. Madhu Evans at [facility] now requesting BIPAP settings for pre-filled order form. CM requested that settings be entered into respiratory note. CM will upload in [place] once available.    1305: BIPAP settings uploaded in [place]. Madhu Azevedo confirmed she will request BIPAP order form be filled out prior to sending to CM for MD signature.     1455: Signed BIPAP order form uploaded in [place]. Madhu Evans at [facility] confirmed she will set up delivery to patient's room tomorrow.    ABHIJIT received for medication check for new order for Jardiance. Per pt's [facility], cost to patient for a 30 days supply will be $156.42, CM and CARDS APN made aware.    12/17/2024 at 1205: PULM notified CM that pt will be ready for dc tomorrow. CM notified madhu Durbin at [facility] of dc tomorrow. Cristina confirmed she will set up delivery for new BIPAP to patient's home tomorrow. Wednesday SW/CM will need to confirm delivery time prior to patient's dc home.    Plan: Home w/[facility], [employer] for home O2, and HME for new BIPAP pending confirmation of delivery of new BIPAP and  medical clearance.    / to remain available for support and/or discharge  planning.     Luis Fernando Esparza, ROMÁNN    [phone]

## 2024-12-13 NOTE — SPIRITUAL CARE NOTE
Spiritual Care Visit Note    Patient Name: Yesenia Berkowitz Date of Spiritual Care Visit: 24   : 1940 Primary Dx: SOB (shortness of breath)       Referred By: Referral From: Nurse    Spiritual Care Taxonomy:    Intended Effects: Build relationship of care and support    Methods: Accompany someone in their spiritual/Cheondoism practice outside your coral tradition;Encourage story-telling;Encourage sharing of feelings;Encourage self reflection;Offer spiritual/Cheondoism support;Demonstrate acceptance;Collaborate with care team member;Assist with spiritual/Cheondoism practices    Interventions: Acknowledge current situation;Active listening;Ask guided questions;Ask questions to bring forth feelings;Discuss coping mechanism with someone;Discuss concerns;Assist with identifying strengths;Prayer for healing;Identify supportive relationship(s);Provide hospitality;Share words of hope and inspiration    Visit Type/Summary:     - Spiritual Care: Responded to a request for spiritual care and assessed the patient for spiritual care needs. Consulted with RN prior to visit. Offered empathic listening and emotional support. Patient and family expressed appreciation for  visit. Provided support for Patient's spiritual/Cheondoism requests. Coordinated Jain Communion and verified NPO status. Offered prayer.  remains available for follow up.    Spiritual Care support can be requested via an Epic consult. For urgent/immediate needs, please contact the On Call  at: [place]: ext [phone]    Rev Tash Pringle MDiv

## 2024-12-13 NOTE — PLAN OF CARE
Med rec completed with patient. Patient placed on continuous BIPAP during the afternoon, removed for meals per MD.     Problem: Patient Centered Care  Goal: Patient preferences are identified and integrated in the patient's plan of care  Description: Interventions:  - What would you like us to know as we care for you? I live home alone  - Provide timely, complete, and accurate information to patient/family  - Incorporate patient and family knowledge, values, beliefs, and cultural backgrounds into the planning and delivery of care  - Encourage patient/family to participate in care and decision-making at the level they choose  - Honor patient and family perspectives and choices  Outcome: Progressing     Problem: Patient/Family Goals  Goal: Patient/Family Long Term Goal  Description: Patient's Long Term Goal: Discharge from hospital    Interventions:  - Monitor vital signs  - Monitor appropriate labs  - Pain management  - Oxygen and respiratory intervention as needed  - Administer medications per order  - Follow MD orders  - Diagnostics per order  - Update / inform patient and family on plan of care  - Discharge planning  - See additional Care Plan goals for specific interventions  Outcome: Progressing  Goal: Patient/Family Short Term Goal  Description: Patient's Short Term Goal: improve swelling    Interventions:   - Monitor vital signs  - Monitor appropriate labs  - Pain management  - Oxygen and respiratory intervention as needed  - Administer medications per order  - Follow MD orders  - Diagnostics per order  - Update / inform patient and family on plan of care  - Discharge planning  - See additional Care Plan goals for specific interventions  Outcome: Progressing     Problem: PAIN - ADULT  Goal: Verbalizes/displays adequate comfort level or patient's stated pain goal  Description: INTERVENTIONS:  - Encourage pt to monitor pain and request assistance  - Assess pain using appropriate pain scale  - Administer  analgesics based on type and severity of pain and evaluate response  - Implement non-pharmacological measures as appropriate and evaluate response  - Consider cultural and social influences on pain and pain management  - Manage/alleviate anxiety  - Utilize distraction and/or relaxation techniques  - Monitor for opioid side effects  - Notify MD/LIP if interventions unsuccessful or patient reports new pain  - Anticipate increased pain with activity and pre-medicate as appropriate  Outcome: Progressing     Problem: SAFETY ADULT - FALL  Goal: Free from fall injury  Description: INTERVENTIONS:  - Assess pt frequently for physical needs  - Identify cognitive and physical deficits and behaviors that affect risk of falls.  - [place] fall precautions as indicated by assessment.  - Educate pt/family on patient safety including physical limitations  - Instruct pt to call for assistance with activity based on assessment  - Modify environment to reduce risk of injury  - Provide assistive devices as appropriate  - Consider OT/PT consult to assist with strengthening/mobility  - Encourage toileting schedule  Outcome: Progressing     Problem: DISCHARGE PLANNING  Goal: Discharge to home or other facility with appropriate resources  Description: INTERVENTIONS:  - Identify barriers to discharge w/pt and caregiver  - Include patient/family/discharge partner in discharge planning  - Arrange for needed discharge resources and transportation as appropriate  - Identify discharge learning needs (meds, wound care, etc)  - Arrange for interpreters to assist at discharge as needed  - Consider post-discharge preferences of patient/family/discharge partner  - Complete POLST form as appropriate  - Assess patient's ability to be responsible for managing their own health  - Refer to Case Management Department for coordinating discharge planning if the patient needs post-hospital services based on physician/LIP order or complex needs related to  functional status, cognitive ability or social support system  Outcome: Progressing     Problem: RESPIRATORY - ADULT  Goal: Achieves optimal ventilation and oxygenation  Description: INTERVENTIONS:  - Assess for changes in respiratory status  - Assess for changes in mentation and behavior  - Position to facilitate oxygenation and minimize respiratory effort  - Oxygen supplementation based on oxygen saturation or ABGs  - Provide Smoking Cessation handout, if applicable  - Encourage broncho-pulmonary hygiene including cough, deep breathe, Incentive Spirometry  - Assess the need for suctioning and perform as needed  - Assess and instruct to report SOB or any respiratory difficulty  - Respiratory Therapy support as indicated  - Manage/alleviate anxiety  - Monitor for signs/symptoms of CO2 retention  Outcome: Progressing

## 2024-12-13 NOTE — PROGRESS NOTES
[facility]  Cardiology Progress Note    Yesenia Berkowitz Patient Status:  Observation    1940 MRN [id number]   Location [facility]5W Attending Robbie Tate MD   Hosp Day # 0 PCP JO-ANN YANG MD     Subjective     Unchanged dyspnea and edema.    Objective:   Patient Vitals for the past 24 hrs:   BP Temp Temp src Pulse Resp SpO2 Weight   24 0915 146/55 97.7 °F (36.5 °C) Oral 86 20 90 % --   24 07 -- -- -- -- -- -- 182 lb 9.6 oz (82.8 kg)   24 0618 139/57 97.7 °F (36.5 °C) Oral 81 18 90 % --   24 0300 -- -- -- 78 -- -- --   24 2347 136/59 97.9 °F (36.6 °C) Oral -- 20 96 % --   24 2046 151/59 97.8 °F (36.6 °C) Oral 98 20 95 % --   24 -- -- -- -- -- 97 % --   24 -- -- -- -- -- 94 % --   24 1905 -- -- -- 86 -- -- --   24 1728 129/62 97.8 °F (36.6 °C) Oral 87 20 93 % --   24 1215 -- -- -- 96 -- -- --   24 1200 -- -- -- 95 -- -- --   24 1159 150/67 97.2 °F (36.2 °C) Oral 95 20 94 % --     Intake/Output:   Last 3 shifts:   Intake/Output                   24 - 24 (Not Admitted) 24 - 24 - 24 0659       Intake    P.O.  --  1182  174    P.O. -- 1182 174    IV PIGGYBACK  --  200  --    Volume (mL) (doxycycline hyclate (Vibramycin) 100 mg in sodium chloride 0.9% 100 mL IVPB) -- 200 --    Total Intake -- 1382 174       Output    Urine  --  1350  --    Urine -- 1350 --    Urine Occurrence -- 5 x --    Total Output -- 1350 --       Net I/O     -- 32 174           Scheduled Meds:    furosemide  40 mg Intravenous BID (Diuretic)    doxycycline  100 mg Oral 2 times per day    acetaZOLAMIDE  500 mg Oral Daily    amitriptyline  50 mg Oral Nightly    aspirin  162 mg Oral Daily    digoxin  125 mcg Oral Daily    dilTIAZem HCl ER Coated Beads  360 mg Oral Daily    pantoprazole  40 mg Oral QAM AC    cetirizine  5 mg Oral Daily    montelukast  10 mg Oral  Nightly    fluticasone furoate  1 puff Inhalation Daily    umeclidinium bromide  1 puff Inhalation Daily    heparin  5,000 Units Subcutaneous 2 times per day    ipratropium-albuterol  3 mL Nebulization Q4H WA (4 times daily)    methylPREDNISolone  40 mg Intravenous Q12H    spironolactone  25 mg Oral Daily       Results:     Lab Results   Component Value Date    WBC 7.7 12/13/2024    HGB 11.1 (L) 12/13/2024    HCT 34.5 (L) 12/13/2024    .0 12/13/2024    CREATSERUM 0.66 12/13/2024    BUN 19 12/13/2024     12/13/2024    K 3.8 12/13/2024     12/13/2024    CO2 35.0 (H) 12/13/2024     (H) 12/13/2024    CA 8.6 (L) 12/13/2024    ALB 3.2 11/24/2024    ALKPHO 59 11/24/2024    BILT 0.4 11/24/2024    TP 5.0 (L) 11/24/2024    AST 20 11/24/2024    ALT 32 11/24/2024    PTT 24.3 11/25/2024    INR 1.08 11/25/2024    TSH 1.060 01/06/2023    LIP 30 08/30/2024    DDIMER 0.45 12/12/2024    MG 2.0 11/27/2024    PHOS 3.5 11/27/2024    TROP <0.045 02/03/2020       Recent Labs   Lab 12/12/24  0513 12/13/24  0449   * 212*   BUN 18 19   CREATSERUM 0.65 0.66   CA 8.9 8.6*   * 144   K 3.9 3.8    106   CO2 38.0* 35.0*     Recent Labs   Lab 12/12/24  0513 12/13/24  0449   RBC 4.09 3.53*   HGB 13.0 11.1*   HCT 40.6 34.5*   MCV 99.3 97.7   MCH 31.8 31.4   MCHC 32.0 32.2   RDW 14.9 14.6   NEPRELIM 7.65 7.14   WBC 10.1 7.7   .0 171.0       No results for input(s): \"BNPML\" in the last 168 hours.    No results for input(s): \"TROP\", \"CK\" in the last 168 hours.    No results found.  EKG    Result Date: 12/12/2024  Sinus tachycardia with Premature atrial complexes Cannot rule out Anterior infarct (cited on or before 18-NOV-2024) Abnormal ECG When compared with ECG of 24-NOV-2024 18:42, Premature atrial complexes are now Present Confirmed by SAGAR HANSEN, HEDY ([id number]) on 12/12/2024 8:19:33 AM          Exam:     General: Alert and oriented x 3. No apparent distress.   HEENT: Normocephalic, anicteric  sclera, neck supple, no thyromegaly or adenopathy.  Neck: No JVD, carotids 2+, no bruits.  Cardiac: Regular rate and rhythm. S1, S2 normal. No murmur, pericardial rub, S3, or extra cardiac sounds.  Lungs: Clear without wheezes, rales, rhonchi or dullness.  Normal excursions and effort.  Abdomen: Soft, non-tender. No organosplenomegally, mass or rebound, BS-present.  Extremities: Without clubbing or cyanosis. 2+ LE edema.    Neurologic: Alert and oriented, normal affect. No focal defects  Skin: Warm and dry.      Assessment and Plan:   Assessment:  COPD exacerbation  HFpEF, right-sided symptoms with primarily lower extremity edema  Paroxysmal atrial fibrillation  HTN      Plan:  - Presents with increased cough, hypoxia, and shortness of breath, secondary to COPD exacerbation with component of congestive heart failure  - With significant bilateral lower extremity edema agree with Lasix 40 mg IV twice daily  - On discharge will have her increase Lasix to 80 mg once daily as she has trouble remembering to take medication twice a day  - Would also benefit from SGLT2 inhibitor, will try to get a price check from  before discharge  - Continue steroids, antibiotics, nebulizers per pulmonary  - Not on anticoagulation per patient preference  - Continue diltiazem 360 mg once a day    Luis Fernando Fowler MD  [facility]  12/13/2024

## 2024-12-14 PROBLEM — J45.909 ASTHMA (HCC): Status: ACTIVE | Noted: 2024-12-14

## 2024-12-14 PROBLEM — E66.2 OBESITY HYPOVENTILATION SYNDROME (HCC): Status: ACTIVE | Noted: 2024-12-14

## 2024-12-14 LAB
ANION GAP SERPL CALC-SCNC: 4 MMOL/L (ref 0–18)
BASE EXCESS BLD CALC-SCNC: 10 MMOL/L (ref ?–2)
BUN BLD-MCNC: 24 MG/DL (ref 9–23)
BUN/CREAT SERPL: 38.1 (ref 10–20)
CALCIUM BLD-MCNC: 8.9 MG/DL (ref 8.7–10.4)
CHLORIDE SERPL-SCNC: 104 MMOL/L (ref 98–112)
CO2 SERPL-SCNC: 36 MMOL/L (ref 21–32)
CREAT BLD-MCNC: 0.63 MG/DL
EGFRCR SERPLBLD CKD-EPI 2021: 89 ML/MIN/1.73M2 (ref 60–?)
GLUCOSE BLD-MCNC: 198 MG/DL (ref 70–99)
HCO3 BLDA-SCNC: 32.6 MEQ/L (ref 21–27)
MAGNESIUM SERPL-MCNC: 1.9 MG/DL (ref 1.6–2.6)
MODIFIED ALLEN TEST: POSITIVE
O2 CT BLD-SCNC: 15.7 VOL% (ref 15–23)
OSMOLALITY SERPL CALC.SUM OF ELEC: 308 MOSM/KG (ref 275–295)
OXYGEN LITERS/MINUTE: 4 L/MIN
PCO2 BLDA: 69 MM HG (ref 35–45)
PH BLDA: 7.35 [PH] (ref 7.35–7.45)
PO2 BLDA: 74 MM HG (ref 80–100)
POTASSIUM SERPL-SCNC: 3.7 MMOL/L (ref 3.5–5.1)
PUNCTURE CHARGE: YES
SAO2 % BLDA: 96.3 % (ref 94–100)
SODIUM SERPL-SCNC: 144 MMOL/L (ref 136–145)

## 2024-12-14 PROCEDURE — 99233 SBSQ HOSP IP/OBS HIGH 50: CPT | Performed by: HOSPITALIST

## 2024-12-14 RX ORDER — FUROSEMIDE 10 MG/ML
40 INJECTION INTRAMUSCULAR; INTRAVENOUS
Status: DISCONTINUED | OUTPATIENT
Start: 2024-12-14 | End: 2024-12-15

## 2024-12-14 RX ORDER — FUROSEMIDE 40 MG/1
40 TABLET ORAL
Status: DISCONTINUED | OUTPATIENT
Start: 2024-12-14 | End: 2024-12-14

## 2024-12-14 NOTE — PROGRESS NOTES
[facility]  part of [facility]    Progress Note    Yesenia Berkowitz Patient Status:  Inpatient    1940 MRN [id number]   Location [facility]5W Attending Robbie Tate MD   Hosp Day # 1 PCP JO-ANN YANG MD       Subjective:   Yesenia Berkowitz is a(n) 84 year old female.   Less sob,back pain persists,no cough  Objective:   Blood pressure 138/58, pulse 86, temperature 97.8 °F (36.6 °C), temperature source Oral, resp. rate 20, weight 179 lb 12.8 oz (81.6 kg), SpO2 91%.    HEENT: negative  Neck: no adenopathy, no carotid bruit, no JVD, supple, symmetrical, trachea midline and thyroid not enlarged, symmetric, no tenderness/mass/nodules  Pulmonary/Chest:  clear to auscultation bilaterally, no tenderness  Cardiovascular: S1, S2 normal, no S3 or S4, regular rate and rhythm, no murmur  Abdominal: normal findings: bowel sounds normal, soft, non-tender and no hepatosplenomegaly   Extremities: extremities normal, atraumatic, no cyanosis or 2+ edema  Skin: Skin color, texture, turgor normal. No rashes or lesions    Results:     Lab Results   Component Value Date    WBC 7.7 2024    HGB 11.1 (L) 2024    HCT 34.5 (L) 2024    .0 2024    CREATSERUM 0.63 2024    BUN 24 (H) 2024     2024    K 3.7 2024     2024    CO2 36.0 (H) 2024     (H) 2024    CA 8.9 2024    ALB 3.2 2024    ALKPHO 59 2024    BILT 0.4 2024    TP 5.0 (L) 2024    AST 20 2024    ALT 32 2024    PTT 24.3 2024    INR 1.08 2024    TSH 1.060 2023    LIP 30 2024    DDIMER 0.45 2024    MG 1.9 2024    PHOS 3.5 2024    TROP <0.045 2020       No results found.        Assessment and Plan:   Principal Problem:    SOB (shortness of breath)  Active Problems:    COPD with acute exacerbation (HCC)    Kyphoscoliosis    Phrenic nerve paralysis    Restrictive lung disease    Acute  cor pulmonale (HCC)    Acute on chronic hypoxic respiratory failure (HCC)    Acute on chronic diastolic (congestive) heart failure (HCC)    Obesity hypoventilation syndrome (HCC)    Asthma (HCC)       Felt better,less sob and cough back pain persists,continue solumedrol,duoneb doxycycline and lasix aldactone.ABG's today      ELVIA LIVINGSTON MD, MD  12/14/2024

## 2024-12-14 NOTE — PLAN OF CARE
Problem: PAIN - ADULT  Goal: Verbalizes/displays adequate comfort level or patient's stated pain goal  Description: INTERVENTIONS:  - Encourage pt to monitor pain and request assistance  - Assess pain using appropriate pain scale  - Administer analgesics based on type and severity of pain and evaluate response  - Implement non-pharmacological measures as appropriate and evaluate response  - Consider cultural and social influences on pain and pain management  - Manage/alleviate anxiety  - Utilize distraction and/or relaxation techniques  - Monitor for opioid side effects  - Notify MD/LIP if interventions unsuccessful or patient reports new pain  - Anticipate increased pain with activity and pre-medicate as appropriate  Outcome: Progressing     Problem: SAFETY ADULT - FALL  Goal: Free from fall injury  Description: INTERVENTIONS:  - Assess pt frequently for physical needs  - Identify cognitive and physical deficits and behaviors that affect risk of falls.  - [place] fall precautions as indicated by assessment.  - Educate pt/family on patient safety including physical limitations  - Instruct pt to call for assistance with activity based on assessment  - Modify environment to reduce risk of injury  - Provide assistive devices as appropriate  - Consider OT/PT consult to assist with strengthening/mobility  - Encourage toileting schedule  Outcome: Progressing     Problem: RESPIRATORY - ADULT  Goal: Achieves optimal ventilation and oxygenation  Description: INTERVENTIONS:  - Assess for changes in respiratory status  - Assess for changes in mentation and behavior  - Position to facilitate oxygenation and minimize respiratory effort  - Oxygen supplementation based on oxygen saturation or ABGs  - Provide Smoking Cessation handout, if applicable  - Encourage broncho-pulmonary hygiene including cough, deep breathe, Incentive Spirometry  - Assess the need for suctioning and perform as needed  - Assess and instruct to report  SOB or any respiratory difficulty  - Respiratory Therapy support as indicated  - Manage/alleviate anxiety  - Monitor for signs/symptoms of CO2 retention  Outcome: Progressing     Problem: CARDIOVASCULAR - ADULT  Goal: Maintains optimal cardiac output and hemodynamic stability  Description: INTERVENTIONS:  - Monitor vital signs, rhythm, and trends  - Monitor for bleeding, hypotension and signs of decreased cardiac output  - Evaluate effectiveness of vasoactive medications to optimize hemodynamic stability  - Monitor arterial and/or venous puncture sites for bleeding and/or hematoma  - Assess quality of pulses, skin color and temperature  - Assess for signs of decreased coronary artery perfusion - ex. Angina  - Evaluate fluid balance, assess for edema, trend weights  Outcome: Progressing     Problem: SKIN/TISSUE INTEGRITY - ADULT  Goal: Skin integrity remains intact  Description: INTERVENTIONS  - Assess and document risk factors for pressure ulcer development  - Assess and document skin integrity  - Monitor for areas of redness and/or skin breakdown  - Initiate interventions, skin care algorithm/standards of care as needed  Outcome: Progressing     Problem: HEMATOLOGIC - ADULT  Goal: Maintains hematologic stability  Description: INTERVENTIONS  - Assess for signs and symptoms of bleeding or hemorrhage  - Monitor labs and vital signs for trends  - Administer supportive blood products/factors, fluids and medications as ordered and appropriate  - Administer supportive blood products/factors as ordered and appropriate  Outcome: Progressing  Goal: Free from bleeding injury  Description: (Example usage: patient with low platelets)  INTERVENTIONS:  - Avoid intramuscular injections, enemas and rectal medication administration  - Ensure safe mobilization of patient  - Hold pressure on venipuncture sites to achieve adequate hemostasis  - Assess for signs and symptoms of internal bleeding  - Monitor lab trends  - Patient is to  report abnormal signs of bleeding to staff  - Avoid use of toothpicks and dental floss  - Use electric shaver for shaving  - Use soft bristle tooth brush  - Limit straining and forceful nose blowing  Outcome: Progressing     Problem: MUSCULOSKELETAL - ADULT  Goal: Return mobility to safest level of function  Description: INTERVENTIONS:  - Assess patient stability and activity tolerance for standing, transferring and ambulating w/ or w/o assistive devices  - Assist with transfers and ambulation using safe patient handling equipment as needed  - Ensure adequate protection for wounds/incisions during mobilization  - Obtain PT/OT consults as needed  - Advance activity as appropriate  - Communicate ordered activity level and limitations with patient/family  Outcome: Progressing     Patient on 4 L high flow nasal cannula and remote telemetry monitoring. Ambulating independently. Tolerating 2 gram sodium diet.   Safety precautions in place, frequent nursing rounding completed, call light within reach. All questions answered and needs met.

## 2024-12-14 NOTE — PROGRESS NOTES
Progress Note  Yesenia Berkowitz Patient Status:  Inpatient    1940 MRN [id number]   Location [facility]5W Attending Robbie Tate MD   Hosp Day # 1 PCP JO-ANN YANG MD     Subjective:  No acute events overnight.  Eating breakfast this morning, on baseline O2 support at 4L, breathing slightly improving but still not at baseline. Chronic LE edema feels stable.  Denies any chest a[in, palpitations or dizziness at this time.    Objective:  /67 (BP Location: Right arm)   Pulse 80   Temp 97.7 °F (36.5 °C) (Oral)   Resp 18   Wt 179 lb 12.8 oz (81.6 kg)   SpO2 96%   BMI 29.92 kg/m²     Telemetry: SR, HR 80s      Intake/Output:    Intake/Output Summary (Last 24 hours) at 2024 0828  Last data filed at 2024 0500  Gross per 24 hour   Intake 534 ml   Output 2000 ml   Net -1466 ml       Last 3 Weights   24 0500 179 lb 12.8 oz (81.6 kg)   24 0700 182 lb 9.6 oz (82.8 kg)   24 0900 175 lb 3.2 oz (79.5 kg)   24 0541 169 lb 14.4 oz (77.1 kg)   24 0650 169 lb 6.4 oz (76.8 kg)   24 0700 174 lb (78.9 kg)   24 2200 173 lb 6.4 oz (78.7 kg)   24 1224 170 lb (77.1 kg)       Labs:  Recent Labs   Lab 24  0513 24  0449 24  0444   * 212* 198*   BUN 18  24*   CREATSERUM 0.65 0.66 0.63   EGFRCR 88 88 89   CA 8.9 8.6* 8.9   * 144 144   K 3.9 3.8 3.7    106 104   CO2 38.0* 35.0* 36.0*     Recent Labs   Lab 24  0513 24  044   RBC 4.09 3.53*   HGB 13.0 11.1*   HCT 40.6 34.5*   MCV 99.3 97.7   MCH 31.8 31.4   MCHC 32.0 32.2   RDW 14.9 14.6   NEPRELIM 7.65 7.14   WBC 10.1 7.7   .0 171.0         Recent Labs   Lab 24  0513   TROPHS 12       Review of Systems   Constitutional: Negative.   Cardiovascular:  Positive for dyspnea on exertion and leg swelling.   Respiratory:  Positive for shortness of breath.        Physical Exam:    Gen: alert, oriented x 3, NAD  Heent: pupils equal, reactive. Mucous membranes  moist.   Neck: no jvd  Cardiac: regular rate and rhythm, normal S1,S2, no murmur, gallop or rub   Lungs: fine bibasilar crackles, diminished, on O2 at 4L  Abd: soft, NT/ND +bs  Ext: +1 lower extremities edema  Skin: Warm, dry  Neuro: No focal deficits        Medications:     furosemide  40 mg Intravenous BID (Diuretic)    doxycycline  100 mg Oral 2 times per day    acetaZOLAMIDE  500 mg Oral Daily    amitriptyline  50 mg Oral Nightly    aspirin  162 mg Oral Daily    digoxin  125 mcg Oral Daily    dilTIAZem HCl ER Coated Beads  360 mg Oral Daily    pantoprazole  40 mg Oral QAM AC    cetirizine  5 mg Oral Daily    montelukast  10 mg Oral Nightly    fluticasone furoate  1 puff Inhalation Daily    umeclidinium bromide  1 puff Inhalation Daily    heparin  5,000 Units Subcutaneous 2 times per day    ipratropium-albuterol  3 mL Nebulization Q4H WA (4 times daily)    methylPREDNISolone  40 mg Intravenous Q12H    spironolactone  25 mg Oral Daily       Assessment:  Acute on chronic hypoxemic and hypercarbic respiratory failure, on home O2 at baseline-likely multifactorial in the settings of acute COPD exacerbation, suspect component of mild volume overload with HF-presented with SOB and cough  CXR showed emphysema, persistent bibasilar pleural effusion and atelectasis    On nebs, inhalers, steroids-pulm following   HFpEF with primarily lower extremities edema    proBNP 272   S/P dose of IV lasix, net fluid off 1.4L    Echo 9/14/24 LVEF 65-70%, mild mitral stenosis, mild aortic stenosis, PASP 56 mmHg  Historically on alena, lasix, acetazolamide  PAF, currently in SR  On diltiazem, digoxin   Historically not on ac d/t bruising and infrequent A-fib   HTN-controlled    HLP, not on statin, LDL 78    Plan:  Still with SOB, O2 demand at baseline 4L, not much improvement with IV lasix, renal function trending up-will hold off further IV diuresis and resume back on oral lasix.  Continue COPD exacerbation treatment with nebs, inhalers,  steroids, abx-per pulm/primary.  Continue current cardiac medications.    Plan of care discussed with patient, RN.    Marimar Villalba, APRN  12/14/2024  8:28 AM  [phone]        On exam patient agree with attached findings.  Continues to have significant lower extremity edema.  Reports good urine output with IV Lasix.  Continue with Lasix 40 mg IV twice daily and wean oxygen as tolerated.  Will start SGLT2 inhibitor as outpatient.    Luis Fernando Fowler MD  [facility] [place]

## 2024-12-14 NOTE — PLAN OF CARE
Problem: Patient Centered Care  Goal: Patient preferences are identified and integrated in the patient's plan of care  Description: Interventions:  - What would you like us to know as we care for you? From home   - Provide timely, complete, and accurate information to patient/family  - Incorporate patient and family knowledge, values, beliefs, and cultural backgrounds into the planning and delivery of care  - Encourage patient/family to participate in care and decision-making at the level they choose  - Honor patient and family perspectives and choices  Outcome: Progressing     Problem: Patient/Family Goals  Goal: Patient/Family Long Term Goal  Description: Patient's Long Term Goal: to discharge home    Interventions:  - Monitor vital signs  - Monitor appropriate labs   - IV abx   - BIPAP prn  - Pain management   - Administer medications per order   - Follow MD orders   - Diagnostics per order   - Update/inform patient and family on plan of care   - Discharge planning   - See additional Care Plan goals for specific interventions  Outcome: Progressing  Goal: Patient/Family Short Term Goal  Description: Patient's Short Term Goal: to breathe better and decrease fluid overload    Interventions:   - Monitor vital signs  - Monitor appropriate labs   - IV abx   - BIPAP prn  - Pain management   - Administer medications per order   - Follow MD orders   - Diagnostics per order   - Update/inform patient and family on plan of care   - Discharge planning   - See additional Care Plan goals for specific interventions  Outcome: Progressing     Problem: PAIN - ADULT  Goal: Verbalizes/displays adequate comfort level or patient's stated pain goal  Description: INTERVENTIONS:  - Encourage pt to monitor pain and request assistance  - Assess pain using appropriate pain scale  - Administer analgesics based on type and severity of pain and evaluate response  - Implement non-pharmacological measures as appropriate and evaluate response  -  Consider cultural and social influences on pain and pain management  - Manage/alleviate anxiety  - Utilize distraction and/or relaxation techniques  - Monitor for opioid side effects  - Notify MD/LIP if interventions unsuccessful or patient reports new pain  - Anticipate increased pain with activity and pre-medicate as appropriate  Outcome: Progressing     Problem: SAFETY ADULT - FALL  Goal: Free from fall injury  Description: INTERVENTIONS:  - Assess pt frequently for physical needs  - Identify cognitive and physical deficits and behaviors that affect risk of falls.  - [place] fall precautions as indicated by assessment.  - Educate pt/family on patient safety including physical limitations  - Instruct pt to call for assistance with activity based on assessment  - Modify environment to reduce risk of injury  - Provide assistive devices as appropriate  - Consider OT/PT consult to assist with strengthening/mobility  - Encourage toileting schedule  Outcome: Progressing     Problem: DISCHARGE PLANNING  Goal: Discharge to home or other facility with appropriate resources  Description: INTERVENTIONS:  - Identify barriers to discharge w/pt and caregiver  - Include patient/family/discharge partner in discharge planning  - Arrange for needed discharge resources and transportation as appropriate  - Identify discharge learning needs (meds, wound care, etc)  - Arrange for interpreters to assist at discharge as needed  - Consider post-discharge preferences of patient/family/discharge partner  - Complete POLST form as appropriate  - Assess patient's ability to be responsible for managing their own health  - Refer to Case Management Department for coordinating discharge planning if the patient needs post-hospital services based on physician/LIP order or complex needs related to functional status, cognitive ability or social support system  Outcome: Progressing     Problem: RESPIRATORY - ADULT  Goal: Achieves optimal ventilation  and oxygenation  Description: INTERVENTIONS:  - Assess for changes in respiratory status  - Assess for changes in mentation and behavior  - Position to facilitate oxygenation and minimize respiratory effort  - Oxygen supplementation based on oxygen saturation or ABGs  - Provide Smoking Cessation handout, if applicable  - Encourage broncho-pulmonary hygiene including cough, deep breathe, Incentive Spirometry  - Assess the need for suctioning and perform as needed  - Assess and instruct to report SOB or any respiratory difficulty  - Respiratory Therapy support as indicated  - Manage/alleviate anxiety  - Monitor for signs/symptoms of CO2 retention  Outcome: Progressing     Problem: CARDIOVASCULAR - ADULT  Goal: Maintains optimal cardiac output and hemodynamic stability  Description: INTERVENTIONS:  - Monitor vital signs, rhythm, and trends  - Monitor for bleeding, hypotension and signs of decreased cardiac output  - Evaluate effectiveness of vasoactive medications to optimize hemodynamic stability  - Monitor arterial and/or venous puncture sites for bleeding and/or hematoma  - Assess quality of pulses, skin color and temperature  - Assess for signs of decreased coronary artery perfusion - ex. Angina  - Evaluate fluid balance, assess for edema, trend weights  Outcome: Progressing     Problem: SKIN/TISSUE INTEGRITY - ADULT  Goal: Skin integrity remains intact  Description: INTERVENTIONS  - Assess and document risk factors for pressure ulcer development  - Assess and document skin integrity  - Monitor for areas of redness and/or skin breakdown  - Initiate interventions, skin care algorithm/standards of care as needed  Outcome: Progressing     Problem: HEMATOLOGIC - ADULT  Goal: Maintains hematologic stability  Description: INTERVENTIONS  - Assess for signs and symptoms of bleeding or hemorrhage  - Monitor labs and vital signs for trends  - Administer supportive blood products/factors, fluids and medications as ordered and  appropriate  - Administer supportive blood products/factors as ordered and appropriate  Outcome: Progressing  Goal: Free from bleeding injury  Description: (Example usage: patient with low platelets)  INTERVENTIONS:  - Avoid intramuscular injections, enemas and rectal medication administration  - Ensure safe mobilization of patient  - Hold pressure on venipuncture sites to achieve adequate hemostasis  - Assess for signs and symptoms of internal bleeding  - Monitor lab trends  - Patient is to report abnormal signs of bleeding to staff  - Avoid use of toothpicks and dental floss  - Use electric shaver for shaving  - Use soft bristle tooth brush  - Limit straining and forceful nose blowing  Outcome: Progressing     Problem: MUSCULOSKELETAL - ADULT  Goal: Return mobility to safest level of function  Description: INTERVENTIONS:  - Assess patient stability and activity tolerance for standing, transferring and ambulating w/ or w/o assistive devices  - Assist with transfers and ambulation using safe patient handling equipment as needed  - Ensure adequate protection for wounds/incisions during mobilization  - Obtain PT/OT consults as needed  - Advance activity as appropriate  - Communicate ordered activity level and limitations with patient/family  Outcome: Progressing

## 2024-12-14 NOTE — RESPIRATORY THERAPY NOTE
ABG drawn on 4lpm HFNC        ABG pH  7.35 - 7.45 7.35   ABG pCO2  35 - 45 mm Hg 69 High Panic    ABG PO2  80 - 100 mm Hg 74 Low    ABG HCO3  21.0 - 27.0 mEq/L 32.6 High    Blood Gas Base Excess  -2.0 - 2.0 mmol/L 10.0 High    ABG O2 Saturation  94.0 - 100.0 % 96.3   Oxygen Content  15.0 - 23.0 Vol% 15.7   Oxygen Delivery Device Hi Flow Nasal Canula   L/M  L/min 4.00     Result inform to Dr. [place] Boyle called back and said continue bipap at night. Notified JIMI Barnes.

## 2024-12-15 LAB
ANION GAP SERPL CALC-SCNC: 4 MMOL/L (ref 0–18)
BUN BLD-MCNC: 28 MG/DL (ref 9–23)
BUN/CREAT SERPL: 37.3 (ref 10–20)
CALCIUM BLD-MCNC: 8.8 MG/DL (ref 8.7–10.4)
CHLORIDE SERPL-SCNC: 103 MMOL/L (ref 98–112)
CO2 SERPL-SCNC: 36 MMOL/L (ref 21–32)
CREAT BLD-MCNC: 0.75 MG/DL
EGFRCR SERPLBLD CKD-EPI 2021: 79 ML/MIN/1.73M2 (ref 60–?)
GLUCOSE BLD-MCNC: 248 MG/DL (ref 70–99)
MAGNESIUM SERPL-MCNC: 1.9 MG/DL (ref 1.6–2.6)
OSMOLALITY SERPL CALC.SUM OF ELEC: 310 MOSM/KG (ref 275–295)
POTASSIUM SERPL-SCNC: 4 MMOL/L (ref 3.5–5.1)
SODIUM SERPL-SCNC: 143 MMOL/L (ref 136–145)

## 2024-12-15 PROCEDURE — 99233 SBSQ HOSP IP/OBS HIGH 50: CPT | Performed by: HOSPITALIST

## 2024-12-15 RX ORDER — FUROSEMIDE 10 MG/ML
80 INJECTION INTRAMUSCULAR; INTRAVENOUS
Status: DISCONTINUED | OUTPATIENT
Start: 2024-12-15 | End: 2024-12-17

## 2024-12-15 RX ORDER — IPRATROPIUM BROMIDE AND ALBUTEROL SULFATE 2.5; .5 MG/3ML; MG/3ML
3 SOLUTION RESPIRATORY (INHALATION)
Status: DISCONTINUED | OUTPATIENT
Start: 2024-12-15 | End: 2024-12-18

## 2024-12-15 RX ORDER — IPRATROPIUM BROMIDE AND ALBUTEROL SULFATE 2.5; .5 MG/3ML; MG/3ML
3 SOLUTION RESPIRATORY (INHALATION) EVERY 6 HOURS PRN
Status: DISCONTINUED | OUTPATIENT
Start: 2024-12-15 | End: 2024-12-18

## 2024-12-15 RX ORDER — FUROSEMIDE 10 MG/ML
40 INJECTION INTRAMUSCULAR; INTRAVENOUS ONCE
Status: COMPLETED | OUTPATIENT
Start: 2024-12-15 | End: 2024-12-15

## 2024-12-15 NOTE — PLAN OF CARE
Problem: Patient Centered Care  Goal: Patient preferences are identified and integrated in the patient's plan of care  Description: Interventions:  - What would you like us to know as we care for you? From home   - Provide timely, complete, and accurate information to patient/family  - Incorporate patient and family knowledge, values, beliefs, and cultural backgrounds into the planning and delivery of care  - Encourage patient/family to participate in care and decision-making at the level they choose  - Honor patient and family perspectives and choices  Outcome: Progressing     Problem: Patient/Family Goals  Goal: Patient/Family Long Term Goal  Description: Patient's Long Term Goal: To discharge from hospital     Interventions:  - Monitor vital signs  - Monitor appropriate labs   - IV abx   - BIPAP prn  - Pain management   - Administer medications per order   - Follow MD orders   - Diagnostics per order   - Update/inform patient and family on plan of care   - Discharge planning   - See additional Care Plan goals for specific interventions  Outcome: Progressing  Goal: Patient/Family Short Term Goal  Description: Patient's Short Term Goal: To breathe better and decrease fluid overload    Interventions:   - Monitor vital signs  - Monitor appropriate labs   - IV abx   - BIPAP prn  - Pain management   - Administer medications per order   - Follow MD orders   - Diagnostics per order   - Update/inform patient and family on plan of care   - See additional Care Plan goals for specific interventions  Outcome: Progressing     Problem: PAIN - ADULT  Goal: Verbalizes/displays adequate comfort level or patient's stated pain goal  Description: INTERVENTIONS:  - Encourage pt to monitor pain and request assistance  - Assess pain using appropriate pain scale  - Administer analgesics based on type and severity of pain and evaluate response  - Implement non-pharmacological measures as appropriate and evaluate response  - Consider  cultural and social influences on pain and pain management  - Manage/alleviate anxiety  - Utilize distraction and/or relaxation techniques  - Monitor for opioid side effects  - Notify MD/LIP if interventions unsuccessful or patient reports new pain  - Anticipate increased pain with activity and pre-medicate as appropriate  Outcome: Progressing     Problem: SAFETY ADULT - FALL  Goal: Free from fall injury  Description: INTERVENTIONS:  - Assess pt frequently for physical needs  - Identify cognitive and physical deficits and behaviors that affect risk of falls.  - [place] fall precautions as indicated by assessment.  - Educate pt/family on patient safety including physical limitations  - Instruct pt to call for assistance with activity based on assessment  - Modify environment to reduce risk of injury  - Provide assistive devices as appropriate  - Consider OT/PT consult to assist with strengthening/mobility  - Encourage toileting schedule  Outcome: Progressing     Problem: DISCHARGE PLANNING  Goal: Discharge to home or other facility with appropriate resources  Description: INTERVENTIONS:  - Identify barriers to discharge w/pt and caregiver  - Include patient/family/discharge partner in discharge planning  - Arrange for needed discharge resources and transportation as appropriate  - Identify discharge learning needs (meds, wound care, etc)  - Arrange for interpreters to assist at discharge as needed  - Consider post-discharge preferences of patient/family/discharge partner  - Complete POLST form as appropriate  - Assess patient's ability to be responsible for managing their own health  - Refer to Case Management Department for coordinating discharge planning if the patient needs post-hospital services based on physician/LIP order or complex needs related to functional status, cognitive ability or social support system  Outcome: Progressing     Problem: RESPIRATORY - ADULT  Goal: Achieves optimal ventilation and  oxygenation  Description: INTERVENTIONS:  - Assess for changes in respiratory status  - Assess for changes in mentation and behavior  - Position to facilitate oxygenation and minimize respiratory effort  - Oxygen supplementation based on oxygen saturation or ABGs  - Provide Smoking Cessation handout, if applicable  - Encourage broncho-pulmonary hygiene including cough, deep breathe, Incentive Spirometry  - Assess the need for suctioning and perform as needed  - Assess and instruct to report SOB or any respiratory difficulty  - Respiratory Therapy support as indicated  - Manage/alleviate anxiety  - Monitor for signs/symptoms of CO2 retention  Outcome: Progressing     Problem: CARDIOVASCULAR - ADULT  Goal: Maintains optimal cardiac output and hemodynamic stability  Description: INTERVENTIONS:  - Monitor vital signs, rhythm, and trends  - Monitor for bleeding, hypotension and signs of decreased cardiac output  - Evaluate effectiveness of vasoactive medications to optimize hemodynamic stability  - Monitor arterial and/or venous puncture sites for bleeding and/or hematoma  - Assess quality of pulses, skin color and temperature  - Assess for signs of decreased coronary artery perfusion - ex. Angina  - Evaluate fluid balance, assess for edema, trend weights  Outcome: Progressing     Problem: SKIN/TISSUE INTEGRITY - ADULT  Goal: Skin integrity remains intact  Description: INTERVENTIONS  - Assess and document risk factors for pressure ulcer development  - Assess and document skin integrity  - Monitor for areas of redness and/or skin breakdown  - Initiate interventions, skin care algorithm/standards of care as needed  Outcome: Progressing     Problem: HEMATOLOGIC - ADULT  Goal: Maintains hematologic stability  Description: INTERVENTIONS  - Assess for signs and symptoms of bleeding or hemorrhage  - Monitor labs and vital signs for trends  - Administer supportive blood products/factors, fluids and medications as ordered and  appropriate  - Administer supportive blood products/factors as ordered and appropriate  Outcome: Progressing  Goal: Free from bleeding injury  Description: (Example usage: patient with low platelets)  INTERVENTIONS:  - Avoid intramuscular injections, enemas and rectal medication administration  - Ensure safe mobilization of patient  - Hold pressure on venipuncture sites to achieve adequate hemostasis  - Assess for signs and symptoms of internal bleeding  - Monitor lab trends  - Patient is to report abnormal signs of bleeding to staff  - Avoid use of toothpicks and dental floss  - Use electric shaver for shaving  - Use soft bristle tooth brush  - Limit straining and forceful nose blowing  Outcome: Progressing     Problem: MUSCULOSKELETAL - ADULT  Goal: Return mobility to safest level of function  Description: INTERVENTIONS:  - Assess patient stability and activity tolerance for standing, transferring and ambulating w/ or w/o assistive devices  - Assist with transfers and ambulation using safe patient handling equipment as needed  - Ensure adequate protection for wounds/incisions during mobilization  - Obtain PT/OT consults as needed  - Advance activity as appropriate  - Communicate ordered activity level and limitations with patient/family  Outcome: Progressing     Monitoring vital signs, stable at this time. No acute changes at this moment. Fall precautions in place-  bed locked in lowest position, call light within reach. Frequent rounding by nursing staff. Report given to JIMI Knox.

## 2024-12-15 NOTE — PROGRESS NOTES
Progress Note  Yesenia Berkowitz Patient Status:  Inpatient    1940 MRN [id number]   Location [facility]5W Attending Robbie Tate MD   Hosp Day # 2 PCP JO-ANN YANG MD     Subjective:  No acute events overnight.  Sitting up in bed this morning, on O2 at 4L, breathing feels better but still not at baseline. Denies any chest pain, palpitations or dizziness at this time.  Diuresing well with IV lasix, net neg 1.7L/24 hr, renal function remains stable.    Objective:  /51 (BP Location: Right arm)   Pulse 74   Temp 97.4 °F (36.3 °C) (Axillary)   Resp 20   Wt 179 lb 14.4 oz (81.6 kg)   SpO2 96%   BMI 29.94 kg/m²     Telemetry: Sr, HR 70s      Intake/Output:    Intake/Output Summary (Last 24 hours) at 12/15/2024 0636  Last data filed at 12/15/2024 0527  Gross per 24 hour   Intake 650 ml   Output 1700 ml   Net -1050 ml       Last 3 Weights   12/15/24 0559 179 lb 14.4 oz (81.6 kg)   24 0500 179 lb 12.8 oz (81.6 kg)   24 0700 182 lb 9.6 oz (82.8 kg)   24 0900 175 lb 3.2 oz (79.5 kg)   24 0541 169 lb 14.4 oz (77.1 kg)   24 0650 169 lb 6.4 oz (76.8 kg)   24 0700 174 lb (78.9 kg)   24 2200 173 lb 6.4 oz (78.7 kg)   24 1224 170 lb (77.1 kg)       Labs:  Recent Labs   Lab 24  0449 24  0444 12/15/24  0523   * 198* 248*   BUN 19 24* 28*   CREATSERUM 0.66 0.63 0.75   EGFRCR 88 89 79   CA 8.6* 8.9 8.8    144 143   K 3.8 3.7 4.0    104 103   CO2 35.0* 36.0* 36.0*     Recent Labs   Lab 24  0513 24  0449   RBC 4.09 3.53*   HGB 13.0 11.1*   HCT 40.6 34.5*   MCV 99.3 97.7   MCH 31.8 31.4   MCHC 32.0 32.2   RDW 14.9 14.6   NEPRELIM 7.65 7.14   WBC 10.1 7.7   .0 171.0         Recent Labs   Lab 24  0513   TROPHS 12       Review of Systems   Constitutional: Negative.   Cardiovascular:  Positive for dyspnea on exertion and leg swelling.   Respiratory:  Positive for shortness of breath.        Physical Exam:    Gen:  alert, oriented x 3, NAD  Heent: pupils equal, reactive. Mucous membranes moist.   Neck: no jvd  Cardiac: regular rate and rhythm, normal S1,S2, no murmur, gallop or rub   Lungs: diminished, scattered bibasilar crackles, O2 at 4L   Abd: soft, NT/ND +bs  Ext: +1 lower extremities edema  Skin: Warm, dry  Neuro: No focal deficits        Medications:     furosemide  40 mg Intravenous BID (Diuretic)    doxycycline  100 mg Oral 2 times per day    acetaZOLAMIDE  500 mg Oral Daily    amitriptyline  50 mg Oral Nightly    aspirin  162 mg Oral Daily    digoxin  125 mcg Oral Daily    dilTIAZem HCl ER Coated Beads  360 mg Oral Daily    pantoprazole  40 mg Oral QAM AC    cetirizine  5 mg Oral Daily    montelukast  10 mg Oral Nightly    fluticasone furoate  1 puff Inhalation Daily    umeclidinium bromide  1 puff Inhalation Daily    heparin  5,000 Units Subcutaneous 2 times per day    ipratropium-albuterol  3 mL Nebulization Q4H WA (4 times daily)    methylPREDNISolone  40 mg Intravenous Q12H    spironolactone  25 mg Oral Daily     Assessment:  Acute on chronic hypoxemic and hypercarbic respiratory failure, on home O2 at baseline-likely multifactorial in the settings of acute COPD exacerbation, suspect component of mild volume overload with HF-presented with SOB and cough  CXR showed emphysema, persistent bibasilar pleural effusion and atelectasis    On nebs, inhalers, steroids-pulm following   HFpEF with primarily lower extremities edema    proBNP 272   S/P dose of IV lasix, net fluid off 2.4L    Echo 9/14/24 LVEF 65-70%, mild mitral stenosis, mild aortic stenosis, PASP 56 mmHg  Historically on alena, lasix, acetazolamide  PAF, currently in SR  On diltiazem, digoxin   Historically not on ac d/t bruising and infrequent A-fib   HTN-controlled    HLP, not on statin, LDL 78    Plan:  Breathing improving but still not at baseline.  Still with lower extremities edema-continue IV diuresis with lasix, net neg 1.7L/24hr, renal function  remains stable.  Monitor I/Os, daily weights, electrolytes and renal function.  Continue COPD exacerbation treatment with nebs, inhalers, steroids, abx per pulm/primary.  Continue current cardiac meds.     Plan of care discussed with patient, RN.    Marimar Villalba, APRN  12/15/2024  6:36 AM  [phone]      I saw and examined patient with attached findings.  Continues to have significant lower extremity edema, not significantly changed.  Suboptimal urine output with 1.7 L for last 24 hours.  Increase Lasix to 80 mg twice daily.  Pulmonary treatments per Dr. Boyle.    Luis Fernando Fowler MD  [facility] [place]

## 2024-12-15 NOTE — PROGRESS NOTES
[facility]  part of [facility]    Progress Note    Yesenia Berkowitz Patient Status:  Inpatient    1940 MRN [id number]   Location [facility]5W Attending Robbie Tate MD   Hosp Day # 2 PCP JO-ANN YANG MD       Subjective:   Yesenia Berkowitz is a(n) 84 year old female.   Felt the same,sob and backache,persistent edema  Objective:   Blood pressure 99/80, pulse 79, temperature 97.7 °F (36.5 °C), temperature source Oral, resp. rate 20, weight 179 lb 14.4 oz (81.6 kg), SpO2 96%.    HEENT: negative  Neck: no adenopathy, no carotid bruit, no JVD, supple, symmetrical, trachea midline and thyroid not enlarged, symmetric, no tenderness/mass/nodules  Pulmonary/Chest:rales  Cardiovascular: S1, S2 normal, no S3 or S4, regular rate and rhythm, no murmur  Abdominal: normal findings: bowel sounds normal, soft, non-tender and no hepatosplenomegaly   Extremities: extremities normal, atraumatic, no cyanosis or  2+edema  Skin: Skin color, texture, turgor normal. No rashes or lesions    Results:     Lab Results   Component Value Date    WBC 7.7 2024    HGB 11.1 (L) 2024    HCT 34.5 (L) 2024    .0 2024    CREATSERUM 0.75 12/15/2024    BUN 28 (H) 12/15/2024     12/15/2024    K 4.0 12/15/2024     12/15/2024    CO2 36.0 (H) 12/15/2024     (H) 12/15/2024    CA 8.8 12/15/2024    ALB 3.2 2024    ALKPHO 59 2024    BILT 0.4 2024    TP 5.0 (L) 2024    AST 20 2024    ALT 32 2024    PTT 24.3 2024    INR 1.08 2024    TSH 1.060 2023    LIP 30 2024    DDIMER 0.45 2024    MG 1.9 12/15/2024    PHOS 3.5 2024    TROP <0.045 2020       No results found.        Assessment and Plan:   Principal Problem:    SOB (shortness of breath)  Active Problems:    COPD with acute exacerbation (HCC)    Kyphoscoliosis    Phrenic nerve paralysis    Restrictive lung disease    Acute cor pulmonale (HCC)    Acute on  chronic hypoxic respiratory failure (HCC)    Acute on chronic diastolic (congestive) heart failure (HCC)    Obesity hypoventilation syndrome (HCC)    Asthma (HCC)     Persistent sob and edema,continue solumedrol,diuretic,doxccline BIPAP.social servicee for discharge planning.home with BIPAP        ELVIA LIVINGSTON MD, MD  12/15/2024

## 2024-12-15 NOTE — CM/SW NOTE
Received MDO for BiPAP at home.    SW obtained copy of overnight pulse ox study from pt's chart and sent to [facility] via [employer].    Monday SW/CM will need to confirm HME received it and is able to schedule delivery of BiPAP when pt is cleared for DC.    PLAN: Home w/ [facility], resume home O2 via [employer], & E BiPAP - pending confirmation of approval/delivery for BiPAP & med clear      SW/CM to remain available for support and/or discharge planning.       MS GabrielleW, LSW [id number]

## 2024-12-15 NOTE — PROGRESS NOTES
[facility]  part of [facility]  Hospitalist Progress Note     Yesenia Berkowitz Patient Status:  Observation    1940  84 year old CSN [id number]   Location 508/508-A Attending Robbie Tate MD   Hosp Day # 2 PCP JO-ANN YANG MD     Assessment & Plan:   ----------------------------------  Acute on chronic diastolic congestive heart failure.  Presumably mostly due to noncompliance with diet.  Patient does not check her weight very regularly.  -IV diuretics per cardiology  -Cardiology consult  -Telemetry  -GDMT     Acute COPD exacerbation.  -IV Solu-Medrol  -Nebulizer treatments  -Pulmonology consult     Acute on chronic hypoxemic and hypercarbic respiratory failure.  Usually on 4 L.  -Continue oxygen  -Decrease as tolerated  -Bipap as tolerated     Back pain.  This was her primary complaint but now much better.  Seems to be musculoskeletal.  -Heat pack  -Gentle activity  -No neurologic symptoms     Other problems  A-fib, proximal muscle  Diverticulosis  ?  Hepatitis, per chart      ----------------------------------  dvt prophylaxis: sc heparin  code status: full code  dispo: home upon medical clearance  If applicable, malnutrition status at bottom of note    I personally reviewed the available laboratories, imaging including. I discussed/will discuss the case with consultants. I ordered laboratories and/or radiographic studies. I adjusted medications as detailed above.  Medical decision making high, risk is high.    Subjective:   ----------------------------------  Pain and breathing better. Tolerated bipap overnight. Feels that her edema is better.      Objective:   Chief Complaint:   Chief Complaint   Patient presents with    Difficulty Breathing     ----------------------------------  Temp:  [97.4 °F (36.3 °C)-97.8 °F (36.6 °C)] 97.8 °F (36.6 °C)  Pulse:  [74-99] 78  Resp:  [20-22] 20  BP: ()/(45-80) 118/50  SpO2:  [95 %-99 %] 95 %  Gen: A+Ox3.  No distress.   HEENT: NCAT, neck  supple, no carotid bruit.  CV: RRR, S1S2, and intact distal pulses. No gallop, rub, murmur.  Pulm: coarse bs L base, no wheezing  Abd: Soft, NTND, BS normal, no mass, no HSM, no rebound/guarding.   Neuro: Normal reflexes, CN. Sensory/motor exams grossly normal deficit.   MS: No joint effusions.  1+ bilateral LE peripheral edema.  Skin: Skin is warm and dry. No rashes, erythema, diaphoresis.   Psych: Normal mood and affect. Calm, cooperative    Labs:  Lab Results   Component Value Date    HGB 11.1 (L) 12/13/2024    WBC 7.7 12/13/2024    .0 12/13/2024     12/15/2024    K 4.0 12/15/2024    CREATSERUM 0.75 12/15/2024    INR 1.08 11/25/2024    AST 20 11/24/2024    ALT 32 11/24/2024    TROP <0.045 02/03/2020            ipratropium-albuterol  3 mL Nebulization 2 times daily    furosemide  80 mg Intravenous BID (Diuretic)    doxycycline  100 mg Oral 2 times per day    acetaZOLAMIDE  500 mg Oral Daily    amitriptyline  50 mg Oral Nightly    aspirin  162 mg Oral Daily    digoxin  125 mcg Oral Daily    dilTIAZem HCl ER Coated Beads  360 mg Oral Daily    pantoprazole  40 mg Oral QAM AC    cetirizine  5 mg Oral Daily    montelukast  10 mg Oral Nightly    fluticasone furoate  1 puff Inhalation Daily    umeclidinium bromide  1 puff Inhalation Daily    heparin  5,000 Units Subcutaneous 2 times per day    methylPREDNISolone  40 mg Intravenous Q12H    spironolactone  25 mg Oral Daily       ipratropium-albuterol    acetaminophen    benzonatate    dicyclomine    guaiFENesin    ondansetron

## 2024-12-15 NOTE — PROGRESS NOTES
[facility]  part of [facility]  Hospitalist Progress Note     Yesenia Berkowitz Patient Status:  Observation    1940  84 year old CSN [id number]   Location 508/508-A Attending Robbie Tate MD   Hosp Day # 2 PCP JO-ANN YANG MD     Assessment & Plan:   ----------------------------------  Acute on chronic diastolic congestive heart failure.  Presumably mostly due to noncompliance with diet.  Patient does not check her weight very regularly.  -IV diuretics per cardiology, increased dose  -Cardiology consult  -Telemetry  -GDMT     Acute COPD exacerbation.  -IV Solu-Medrol, now every 12  -Nebulizer treatments  -Pulmonology consult     Acute on chronic hypoxemic and hypercarbic respiratory failure.  Usually on 4 L.  -Continue oxygen  -Decrease as tolerated  -Bipap as tolerated     Back pain.  This was her primary complaint but now much better.  Seems to be musculoskeletal.  -Heat pack  -Gentle activity  -No neurologic symptoms     Other problems  A-fib, proximal muscle  Diverticulosis  ?  Hepatitis, per chart      ----------------------------------  dvt prophylaxis: sc heparin  code status: full code  dispo: home upon medical clearance  If applicable, malnutrition status at bottom of note    I personally reviewed the available laboratories, imaging including. I discussed/will discuss the case with consultants. I ordered laboratories and/or radiographic studies. I adjusted medications as detailed above.  Medical decision making high, risk is high.    Subjective:   ----------------------------------  Pain and breathing better. Tolerated bipap overnight. Feels that her edema is better.      Objective:   Chief Complaint:   Chief Complaint   Patient presents with    Difficulty Breathing     ----------------------------------  Temp:  [97.4 °F (36.3 °C)-97.8 °F (36.6 °C)] 97.8 °F (36.6 °C)  Pulse:  [74-99] 78  Resp:  [20-22] 20  BP: ()/(45-80) 118/50  SpO2:  [95 %-99 %] 95 %  Gen: A+Ox3.  No  distress.   HEENT: NCAT, neck supple, no carotid bruit.  CV: RRR, S1S2, and intact distal pulses. No gallop, rub, murmur.  Pulm: coarse bs L base, no wheezing  Abd: Soft, NTND, BS normal, no mass, no HSM, no rebound/guarding.   Neuro: Normal reflexes, CN. Sensory/motor exams grossly normal deficit.   MS: No joint effusions.  1+ bilateral LE peripheral edema.  Skin: Skin is warm and dry. No rashes, erythema, diaphoresis.   Psych: Normal mood and affect. Calm, cooperative    Labs:  Lab Results   Component Value Date    HGB 11.1 (L) 12/13/2024    WBC 7.7 12/13/2024    .0 12/13/2024     12/15/2024    K 4.0 12/15/2024    CREATSERUM 0.75 12/15/2024    INR 1.08 11/25/2024    AST 20 11/24/2024    ALT 32 11/24/2024    TROP <0.045 02/03/2020            ipratropium-albuterol  3 mL Nebulization 2 times daily    furosemide  80 mg Intravenous BID (Diuretic)    doxycycline  100 mg Oral 2 times per day    acetaZOLAMIDE  500 mg Oral Daily    amitriptyline  50 mg Oral Nightly    aspirin  162 mg Oral Daily    digoxin  125 mcg Oral Daily    dilTIAZem HCl ER Coated Beads  360 mg Oral Daily    pantoprazole  40 mg Oral QAM AC    cetirizine  5 mg Oral Daily    montelukast  10 mg Oral Nightly    fluticasone furoate  1 puff Inhalation Daily    umeclidinium bromide  1 puff Inhalation Daily    heparin  5,000 Units Subcutaneous 2 times per day    methylPREDNISolone  40 mg Intravenous Q12H    spironolactone  25 mg Oral Daily       ipratropium-albuterol    acetaminophen    benzonatate    dicyclomine    guaiFENesin    ondansetron

## 2024-12-15 NOTE — PLAN OF CARE
Problem: PAIN - ADULT  Goal: Verbalizes/displays adequate comfort level or patient's stated pain goal  Description: INTERVENTIONS:  - Encourage pt to monitor pain and request assistance  - Assess pain using appropriate pain scale  - Administer analgesics based on type and severity of pain and evaluate response  - Implement non-pharmacological measures as appropriate and evaluate response  - Consider cultural and social influences on pain and pain management  - Manage/alleviate anxiety  - Utilize distraction and/or relaxation techniques  - Monitor for opioid side effects  - Notify MD/LIP if interventions unsuccessful or patient reports new pain  - Anticipate increased pain with activity and pre-medicate as appropriate  Outcome: Progressing     Problem: SAFETY ADULT - FALL  Goal: Free from fall injury  Description: INTERVENTIONS:  - Assess pt frequently for physical needs  - Identify cognitive and physical deficits and behaviors that affect risk of falls.  - [place] fall precautions as indicated by assessment.  - Educate pt/family on patient safety including physical limitations  - Instruct pt to call for assistance with activity based on assessment  - Modify environment to reduce risk of injury  - Provide assistive devices as appropriate  - Consider OT/PT consult to assist with strengthening/mobility  - Encourage toileting schedule  Outcome: Progressing     Problem: RESPIRATORY - ADULT  Goal: Achieves optimal ventilation and oxygenation  Description: INTERVENTIONS:  - Assess for changes in respiratory status  - Assess for changes in mentation and behavior  - Position to facilitate oxygenation and minimize respiratory effort  - Oxygen supplementation based on oxygen saturation or ABGs  - Provide Smoking Cessation handout, if applicable  - Encourage broncho-pulmonary hygiene including cough, deep breathe, Incentive Spirometry  - Assess the need for suctioning and perform as needed  - Assess and instruct to report  SOB or any respiratory difficulty  - Respiratory Therapy support as indicated  - Manage/alleviate anxiety  - Monitor for signs/symptoms of CO2 retention  Outcome: Progressing     Problem: CARDIOVASCULAR - ADULT  Goal: Maintains optimal cardiac output and hemodynamic stability  Description: INTERVENTIONS:  - Monitor vital signs, rhythm, and trends  - Monitor for bleeding, hypotension and signs of decreased cardiac output  - Evaluate effectiveness of vasoactive medications to optimize hemodynamic stability  - Monitor arterial and/or venous puncture sites for bleeding and/or hematoma  - Assess quality of pulses, skin color and temperature  - Assess for signs of decreased coronary artery perfusion - ex. Angina  - Evaluate fluid balance, assess for edema, trend weights  Outcome: Progressing     Problem: HEMATOLOGIC - ADULT  Goal: Maintains hematologic stability  Description: INTERVENTIONS  - Assess for signs and symptoms of bleeding or hemorrhage  - Monitor labs and vital signs for trends  - Administer supportive blood products/factors, fluids and medications as ordered and appropriate  - Administer supportive blood products/factors as ordered and appropriate  Outcome: Progressing  Goal: Free from bleeding injury  Description: (Example usage: patient with low platelets)  INTERVENTIONS:  - Avoid intramuscular injections, enemas and rectal medication administration  - Ensure safe mobilization of patient  - Hold pressure on venipuncture sites to achieve adequate hemostasis  - Assess for signs and symptoms of internal bleeding  - Monitor lab trends  - Patient is to report abnormal signs of bleeding to staff  - Avoid use of toothpicks and dental floss  - Use electric shaver for shaving  - Use soft bristle tooth brush  - Limit straining and forceful nose blowing  Outcome: Progressing     Problem: MUSCULOSKELETAL - ADULT  Goal: Return mobility to safest level of function  Description: INTERVENTIONS:  - Assess patient stability  and activity tolerance for standing, transferring and ambulating w/ or w/o assistive devices  - Assist with transfers and ambulation using safe patient handling equipment as needed  - Ensure adequate protection for wounds/incisions during mobilization  - Obtain PT/OT consults as needed  - Advance activity as appropriate  - Communicate ordered activity level and limitations with patient/family  Outcome: Progressing     On 4 L high flow nasal cannula and remote telemetry monitoring. Ambulating independently, tolerating 2 gram sodium diet.   Safety precautions in place, frequent nursing rounding completed, call light within reach. All questions answered and needs met.

## 2024-12-16 ENCOUNTER — APPOINTMENT (OUTPATIENT)
Dept: PICC SERVICES | Facility: HOSPITAL | Age: 84
End: 2024-12-16
Attending: HOSPITALIST
Payer: MEDICARE

## 2024-12-16 LAB
ALBUMIN SERPL-MCNC: 3.1 G/DL (ref 3.2–4.8)
ANION GAP SERPL CALC-SCNC: 0 MMOL/L (ref 0–18)
BUN BLD-MCNC: 34 MG/DL (ref 9–23)
BUN/CREAT SERPL: 36.2 (ref 10–20)
CALCIUM BLD-MCNC: 8.7 MG/DL (ref 8.7–10.4)
CHLORIDE SERPL-SCNC: 102 MMOL/L (ref 98–112)
CO2 SERPL-SCNC: 37 MMOL/L (ref 21–32)
CREAT BLD-MCNC: 0.94 MG/DL
EGFRCR SERPLBLD CKD-EPI 2021: 61 ML/MIN/1.73M2 (ref 60–?)
GLUCOSE BLD-MCNC: 277 MG/DL (ref 70–99)
MAGNESIUM SERPL-MCNC: 1.9 MG/DL (ref 1.6–2.6)
OSMOLALITY SERPL CALC.SUM OF ELEC: 306 MOSM/KG (ref 275–295)
PHOSPHATE SERPL-MCNC: 3.8 MG/DL (ref 2.4–5.1)
POTASSIUM SERPL-SCNC: 3.9 MMOL/L (ref 3.5–5.1)
SODIUM SERPL-SCNC: 139 MMOL/L (ref 136–145)

## 2024-12-16 PROCEDURE — 5A09357 ASSISTANCE WITH RESPIRATORY VENTILATION, LESS THAN 24 CONSECUTIVE HOURS, CONTINUOUS POSITIVE AIRWAY PRESSURE: ICD-10-PCS | Performed by: INTERNAL MEDICINE

## 2024-12-16 PROCEDURE — 99233 SBSQ HOSP IP/OBS HIGH 50: CPT | Performed by: HOSPITALIST

## 2024-12-16 RX ORDER — DOCUSATE SODIUM 100 MG/1
100 CAPSULE, LIQUID FILLED ORAL 2 TIMES DAILY
Status: DISCONTINUED | OUTPATIENT
Start: 2024-12-16 | End: 2024-12-18

## 2024-12-16 RX ORDER — SODIUM PHOSPHATE, DIBASIC AND SODIUM PHOSPHATE, MONOBASIC 7; 19 G/230ML; G/230ML
1 ENEMA RECTAL ONCE AS NEEDED
Status: DISCONTINUED | OUTPATIENT
Start: 2024-12-16 | End: 2024-12-18

## 2024-12-16 RX ORDER — BISACODYL 10 MG
10 SUPPOSITORY, RECTAL RECTAL
Status: DISCONTINUED | OUTPATIENT
Start: 2024-12-16 | End: 2024-12-18

## 2024-12-16 RX ORDER — ESTRADIOL 0.05 MG/D
1 PATCH TRANSDERMAL WEEKLY
Status: DISCONTINUED | OUTPATIENT
Start: 2024-12-16 | End: 2024-12-18

## 2024-12-16 RX ORDER — POLYETHYLENE GLYCOL 3350 17 G/17G
17 POWDER, FOR SOLUTION ORAL DAILY
Status: DISCONTINUED | OUTPATIENT
Start: 2024-12-16 | End: 2024-12-18

## 2024-12-16 NOTE — PROGRESS NOTES
[facility]  part of [facility]  Hospitalist Progress Note     Yesenia Berkowitz Patient Status:  Observation    1940  84 year old CSN [id number]   Location 508/508-A Attending Robbie Tate MD   Hosp Day # 3 PCP JO-ANN YANG MD     Assessment & Plan:   ----------------------------------  Acute on chronic diastolic congestive heart failure.  Presumably mostly due to noncompliance with diet.  Patient does not check her weight very regularly. Cr up slightly but still normal  -IV diuretics per cardiology  -Cardiology consult  -Telemetry  -GDMT     Acute COPD exacerbation.  -IV Solu-Medrol, now every 12  -Nebulizer treatments  -Pulmonology consult     Acute on chronic hypoxemic and hypercarbic respiratory failure.  Usually on 4 L. Some of her OJEDA may be related to inc right sided pressures.  -Continue oxygen  -Decrease as tolerated  -Bipap as tolerated     Back pain.  This was her primary complaint but now much better.  Seems to be musculoskeletal.  -Heat pack  -Gentle activity  -No neurologic symptoms     Other problems  A-fib, proximal muscle  Diverticulosis  ?  Hepatitis, per chart      ----------------------------------  dvt prophylaxis: sc heparin  code status: full code  dispo: home upon medical clearance  If applicable, malnutrition status at bottom of note    I personally reviewed the available laboratories, imaging including. I discussed/will discuss the case with consultants. I ordered laboratories and/or radiographic studies. I adjusted medications as detailed above.  Medical decision making high, risk is high.    Subjective:   ----------------------------------  Pain and breathing about the same. Back pain better than admission but same as yesterday, improved with bedside massage      Objective:   Chief Complaint:   Chief Complaint   Patient presents with    Difficulty Breathing     ----------------------------------  Temp:  [97.7 °F (36.5 °C)-98.1 °F (36.7 °C)] 97.7 °F (36.5  °C)  Pulse:  [73-82] 73  Resp:  [15-20] 15  BP: ()/(50-80) 138/52  SpO2:  [95 %-100 %] 100 %  Gen: A+Ox3.  No distress.   HEENT: NCAT, neck supple, no carotid bruit.  CV: RRR, S1S2, and intact distal pulses. No gallop, rub, murmur.  Pulm: coarse bs L base, no wheezing  Abd: Soft, NTND, BS normal, no mass, no HSM, no rebound/guarding.   Neuro: Normal reflexes, CN. Sensory/motor exams grossly normal deficit.   MS: No joint effusions.  1+ bilateral LE peripheral edema.  Skin: Skin is warm and dry. No rashes, erythema, diaphoresis.   Psych: Normal mood and affect. Calm, cooperative    Labs:  Lab Results   Component Value Date    HGB 11.1 (L) 12/13/2024    WBC 7.7 12/13/2024    .0 12/13/2024     12/16/2024    K 3.9 12/16/2024    CREATSERUM 0.94 12/16/2024    INR 1.08 11/25/2024    AST 20 11/24/2024    ALT 32 11/24/2024    TROP <0.045 02/03/2020            ipratropium-albuterol  3 mL Nebulization 2 times daily    furosemide  80 mg Intravenous BID (Diuretic)    doxycycline  100 mg Oral 2 times per day    acetaZOLAMIDE  500 mg Oral Daily    amitriptyline  50 mg Oral Nightly    aspirin  162 mg Oral Daily    digoxin  125 mcg Oral Daily    dilTIAZem HCl ER Coated Beads  360 mg Oral Daily    pantoprazole  40 mg Oral QAM AC    cetirizine  5 mg Oral Daily    montelukast  10 mg Oral Nightly    fluticasone furoate  1 puff Inhalation Daily    umeclidinium bromide  1 puff Inhalation Daily    heparin  5,000 Units Subcutaneous 2 times per day    methylPREDNISolone  40 mg Intravenous Q12H    spironolactone  25 mg Oral Daily       ipratropium-albuterol    acetaminophen    benzonatate    dicyclomine    guaiFENesin    ondansetron

## 2024-12-16 NOTE — RESPIRATORY THERAPY NOTE
Patient on Bipap last night with following settings:      Bipap Spontaneous/Timed    Rate -12  IPAP -13  EPAP- 5  FIO2 - 30%  I time 1.0 sec    Patient used a medium full face mask

## 2024-12-16 NOTE — PROGRESS NOTES
Progress Note  Yesenia Berkowitz Patient Status:  Inpatient    1940 MRN [id number]   Location [facility] Attending Robbie Tate MD   Hosp Day # 3 PCP JO-ANN YANG MD     Subjective:  In bed on exam. No acute distress. Feels breathing is about the same. Does feel LE edema is somewhat improved.     Objective:  /61 (BP Location: Right arm)   Pulse 88   Temp 97.5 °F (36.4 °C) (Oral)   Resp 22   Wt 179 lb 14.4 oz (81.6 kg)   SpO2 100%   BMI 29.94 kg/m²     Telemetry: nsr      Intake/Output:    Intake/Output Summary (Last 24 hours) at 2024 0946  Last data filed at 2024 0939  Gross per 24 hour   Intake 410 ml   Output 1375 ml   Net -965 ml       Last 3 Weights   12/15/24 0559 179 lb 14.4 oz (81.6 kg)   24 0500 179 lb 12.8 oz (81.6 kg)   24 0700 182 lb 9.6 oz (82.8 kg)   24 0900 175 lb 3.2 oz (79.5 kg)   24 0541 169 lb 14.4 oz (77.1 kg)   24 0650 169 lb 6.4 oz (76.8 kg)   24 0700 174 lb (78.9 kg)   24 2200 173 lb 6.4 oz (78.7 kg)   24 1224 170 lb (77.1 kg)       Labs:  Recent Labs   Lab 24  0444 12/15/24  0523 24  0440   * 248* 277*   BUN 24* 28* 34*   CREATSERUM 0.63 0.75 0.94   EGFRCR 89 79 61   CA 8.9 8.8 8.7    143 139   K 3.7 4.0 3.9    103 102   CO2 36.0* 36.0* 37.0*     Recent Labs   Lab 24  0513 24  0449   RBC 4.09 3.53*   HGB 13.0 11.1*   HCT 40.6 34.5*   MCV 99.3 97.7   MCH 31.8 31.4   MCHC 32.0 32.2   RDW 14.9 14.6   NEPRELIM 7.65 7.14   WBC 10.1 7.7   .0 171.0         Recent Labs   Lab 24  0513   TROPHS 12       Review of Systems   Cardiovascular:  Positive for dyspnea on exertion and leg swelling.   Respiratory:  Positive for shortness of breath.        Physical Exam:    Gen: alert, oriented x 3, NAD  Heent: pupils equal, reactive. Mucous membranes moist.   Neck: no jvd  Cardiac: regular rate and rhythm, normal S1,S2  Lungs: CTA/dim, on nc  Abd: soft, NT/ND +bs  Ext:  1-2+ ble edema to knees.  Skin: Warm, dry  Neuro: No focal deficits        Medications:     ipratropium-albuterol  3 mL Nebulization 2 times daily    furosemide  80 mg Intravenous BID (Diuretic)    doxycycline  100 mg Oral 2 times per day    acetaZOLAMIDE  500 mg Oral Daily    amitriptyline  50 mg Oral Nightly    aspirin  162 mg Oral Daily    digoxin  125 mcg Oral Daily    dilTIAZem HCl ER Coated Beads  360 mg Oral Daily    pantoprazole  40 mg Oral QAM AC    cetirizine  5 mg Oral Daily    montelukast  10 mg Oral Nightly    fluticasone furoate  1 puff Inhalation Daily    umeclidinium bromide  1 puff Inhalation Daily    heparin  5,000 Units Subcutaneous 2 times per day    methylPREDNISolone  40 mg Intravenous Q12H    spironolactone  25 mg Oral Daily             Assessment:  Acute on chronic hypoxemic and hypercarbic respiratory failure, on home O2 at baseline-likely multifactorial in the settings of acute COPD exacerbation, suspect component of mild volume overload with HF-presented with SOB and cough  CXR showed emphysema, persistent bibasilar pleural effusion and atelectasis    On nebs, inhalers, steroids-pulm following   On 3L o2 at home.   Acute on chronic HFpEF with primarily lower extremity edema    Admits to dietary indiscretions and non-adherence to bid diuretic prior to admit.   proBNP 272 -no need to trend given normal.   Echo 9/14/24 LVEF 65-70%, mild mitral stenosis, mild aortic stenosis, PASP 56 mmHg  Historically on alena, lasix, acetazolamide  Prior dc weight ~169#.   Was prescribed lasix bid, but took once daily frequently.   PAF, currently in SR  On diltiazem, digoxin   Historically not on ac d/t bruising and infrequent A-fib   HTN-controlled    HLP, not on statin, LDL 78    Plan:  Overall negative 3.2L since admit. Several unmeasured voids so UO may be underestimated. Needs weight today. Creat a bit higher, but still stable. Cont IV diuretic for today. Alkalosis stable and multifactorial given  underlying lung disease. On daily diamox. Clinically remains ol on exam.   Resp/copd tx per primary/pulmonary.   Check [id number] pricing.     Plan of care discussed with patient, RN.    Gillian Bone, APRN  12/16/2024  9:46 AM      Seen/examined patient independently.  Agree with findings, assessment and plan as outlined above.     Patient reports improved edema.  No orthopnea.  Ongoing dyspnea - marginally better.    In regards to acute on chronic HFpEF, patient with ongoing volume overload-marginally improved.  Will continue IV Lasix.  Given concomitant metabolic alkalosis, continue acetazolamide.  Continue spironolactone.  Will need to optimize GDMT.  Consider switching diltiazem to beta-blocker.    In regards to paroxysmal A-fib, patient not on chronic anticoagulation-Per chart review appears to be due to abnormal bruising.  On digoxin.     Angel Pratt, DO

## 2024-12-16 NOTE — PROGRESS NOTES
[facility]  part of [facility]    Progress Note    Yesenia Berkowitz Patient Status:  Inpatient    1940 MRN [id number]   Location [facility] Attending Robbie Tate MD   Hosp Day # 3 PCP JO-ANN YANG MD       Subjective:   Yesenia Berkowitz is a(n) 84 year old female.   Felt better,less sob and cough  Objective:   Blood pressure 142/61, pulse 88, temperature 97.5 °F (36.4 °C), temperature source Oral, resp. rate 22, weight 179 lb (81.2 kg), SpO2 100%.    HEENT: negative  Neck: no adenopathy, no carotid bruit, no JVD, supple, symmetrical, trachea midline and thyroid not enlarged, symmetric, no tenderness/mass/nodules  Pulmonary/Chest:  clear to auscultation bilaterally, no tenderness  Cardiovascular: S1, S2 normal, no S3 or S4, regular rate and rhythm, no murmur  Abdominal: normal findings: bowel sounds normal, soft, non-tender and no hepatosplenomegaly   Extremities: extremities normal, atraumatic, no cyanosis or 2+ edema  Skin: Skin color, texture, turgor normal. No rashes or lesions    Results:     Lab Results   Component Value Date    WBC 7.7 2024    HGB 11.1 (L) 2024    HCT 34.5 (L) 2024    .0 2024    CREATSERUM 0.94 2024    BUN 34 (H) 2024     2024    K 3.9 2024     2024    CO2 37.0 (H) 2024     (H) 2024    CA 8.7 2024    ALB 3.1 (L) 2024    ALKPHO 59 2024    BILT 0.4 2024    TP 5.0 (L) 2024    AST 20 2024    ALT 32 2024    PTT 24.3 2024    INR 1.08 2024    TSH 1.060 2023    LIP 30 2024    DDIMER 0.45 2024    MG 1.9 2024    PHOS 3.8 2024    TROP <0.045 2020       No results found.        Assessment and Plan:   Principal Problem:    SOB (shortness of breath)  Active Problems:    COPD with acute exacerbation (HCC)    Kyphoscoliosis    Phrenic nerve paralysis    Restrictive lung disease    Acute cor  pulmonale (HCC)    Acute on chronic hypoxic respiratory failure (HCC)    Acute on chronic diastolic (congestive) heart failure (HCC)    Obesity hypoventilation syndrome (HCC)    Asthma (HCC)    Felt betetr,less sob and cough,home with bipap continue lasix,aldactone and sumedrol         ELVIA LIVINGSTON MD, MD  12/16/2024

## 2024-12-16 NOTE — PLAN OF CARE
Patient alert and oriented. Tele. Call light within reach. Frequent rounding by staff.  Problem: Patient Centered Care  Goal: Patient preferences are identified and integrated in the patient's plan of care  Description: Interventions:  - What would you like us to know as we care for you? From home   - Provide timely, complete, and accurate information to patient/family  - Incorporate patient and family knowledge, values, beliefs, and cultural backgrounds into the planning and delivery of care  - Encourage patient/family to participate in care and decision-making at the level they choose  - Honor patient and family perspectives and choices  Outcome: Progressing     Problem: Patient/Family Goals  Goal: Patient/Family Long Term Goal  Description: Patient's Long Term Goal: to discharge home    Interventions:  - Monitor vital signs  - Monitor appropriate labs   - IV abx   - BIPAP prn  - Pain management   - Administer medications per order   - Follow MD orders   - Diagnostics per order   - Update/inform patient and family on plan of care   - Discharge planning   - See additional Care Plan goals for specific interventions  Outcome: Progressing  Goal: Patient/Family Short Term Goal  Description: Patient's Short Term Goal: to breathe better and decrease fluid overload    Interventions:   - Monitor vital signs  - Monitor appropriate labs   - IV abx   - BIPAP prn  - Pain management   - Administer medications per order   - Follow MD orders   - Diagnostics per order   - Update/inform patient and family on plan of care   - Discharge planning   - See additional Care Plan goals for specific interventions  Outcome: Progressing     Problem: PAIN - ADULT  Goal: Verbalizes/displays adequate comfort level or patient's stated pain goal  Description: INTERVENTIONS:  - Encourage pt to monitor pain and request assistance  - Assess pain using appropriate pain scale  - Administer analgesics based on type and severity of pain and evaluate  response  - Implement non-pharmacological measures as appropriate and evaluate response  - Consider cultural and social influences on pain and pain management  - Manage/alleviate anxiety  - Utilize distraction and/or relaxation techniques  - Monitor for opioid side effects  - Notify MD/LIP if interventions unsuccessful or patient reports new pain  - Anticipate increased pain with activity and pre-medicate as appropriate  Outcome: Progressing     Problem: SAFETY ADULT - FALL  Goal: Free from fall injury  Description: INTERVENTIONS:  - Assess pt frequently for physical needs  - Identify cognitive and physical deficits and behaviors that affect risk of falls.  - [place] fall precautions as indicated by assessment.  - Educate pt/family on patient safety including physical limitations  - Instruct pt to call for assistance with activity based on assessment  - Modify environment to reduce risk of injury  - Provide assistive devices as appropriate  - Consider OT/PT consult to assist with strengthening/mobility  - Encourage toileting schedule  Outcome: Progressing     Problem: DISCHARGE PLANNING  Goal: Discharge to home or other facility with appropriate resources  Description: INTERVENTIONS:  - Identify barriers to discharge w/pt and caregiver  - Include patient/family/discharge partner in discharge planning  - Arrange for needed discharge resources and transportation as appropriate  - Identify discharge learning needs (meds, wound care, etc)  - Arrange for interpreters to assist at discharge as needed  - Consider post-discharge preferences of patient/family/discharge partner  - Complete POLST form as appropriate  - Assess patient's ability to be responsible for managing their own health  - Refer to Case Management Department for coordinating discharge planning if the patient needs post-hospital services based on physician/LIP order or complex needs related to functional status, cognitive ability or social support  system  Outcome: Progressing     Problem: RESPIRATORY - ADULT  Goal: Achieves optimal ventilation and oxygenation  Description: INTERVENTIONS:  - Assess for changes in respiratory status  - Assess for changes in mentation and behavior  - Position to facilitate oxygenation and minimize respiratory effort  - Oxygen supplementation based on oxygen saturation or ABGs  - Provide Smoking Cessation handout, if applicable  - Encourage broncho-pulmonary hygiene including cough, deep breathe, Incentive Spirometry  - Assess the need for suctioning and perform as needed  - Assess and instruct to report SOB or any respiratory difficulty  - Respiratory Therapy support as indicated  - Manage/alleviate anxiety  - Monitor for signs/symptoms of CO2 retention  Outcome: Progressing     Problem: CARDIOVASCULAR - ADULT  Goal: Maintains optimal cardiac output and hemodynamic stability  Description: INTERVENTIONS:  - Monitor vital signs, rhythm, and trends  - Monitor for bleeding, hypotension and signs of decreased cardiac output  - Evaluate effectiveness of vasoactive medications to optimize hemodynamic stability  - Monitor arterial and/or venous puncture sites for bleeding and/or hematoma  - Assess quality of pulses, skin color and temperature  - Assess for signs of decreased coronary artery perfusion - ex. Angina  - Evaluate fluid balance, assess for edema, trend weights  Outcome: Progressing     Problem: SKIN/TISSUE INTEGRITY - ADULT  Goal: Skin integrity remains intact  Description: INTERVENTIONS  - Assess and document risk factors for pressure ulcer development  - Assess and document skin integrity  - Monitor for areas of redness and/or skin breakdown  - Initiate interventions, skin care algorithm/standards of care as needed  Outcome: Progressing     Problem: HEMATOLOGIC - ADULT  Goal: Maintains hematologic stability  Description: INTERVENTIONS  - Assess for signs and symptoms of bleeding or hemorrhage  - Monitor labs and vital signs  for trends  - Administer supportive blood products/factors, fluids and medications as ordered and appropriate  - Administer supportive blood products/factors as ordered and appropriate  Outcome: Progressing  Goal: Free from bleeding injury  Description: (Example usage: patient with low platelets)  INTERVENTIONS:  - Avoid intramuscular injections, enemas and rectal medication administration  - Ensure safe mobilization of patient  - Hold pressure on venipuncture sites to achieve adequate hemostasis  - Assess for signs and symptoms of internal bleeding  - Monitor lab trends  - Patient is to report abnormal signs of bleeding to staff  - Avoid use of toothpicks and dental floss  - Use electric shaver for shaving  - Use soft bristle tooth brush  - Limit straining and forceful nose blowing  Outcome: Progressing     Problem: MUSCULOSKELETAL - ADULT  Goal: Return mobility to safest level of function  Description: INTERVENTIONS:  - Assess patient stability and activity tolerance for standing, transferring and ambulating w/ or w/o assistive devices  - Assist with transfers and ambulation using safe patient handling equipment as needed  - Ensure adequate protection for wounds/incisions during mobilization  - Obtain PT/OT consults as needed  - Advance activity as appropriate  - Communicate ordered activity level and limitations with patient/family  Outcome: Progressing

## 2024-12-17 LAB
ALBUMIN SERPL-MCNC: 3.3 G/DL (ref 3.2–4.8)
ANION GAP SERPL CALC-SCNC: 1 MMOL/L (ref 0–18)
BASOPHILS # BLD AUTO: 0.03 X10(3) UL (ref 0–0.2)
BASOPHILS NFR BLD AUTO: 0.3 %
BUN BLD-MCNC: 50 MG/DL (ref 9–23)
BUN/CREAT SERPL: 47.2 (ref 10–20)
CALCIUM BLD-MCNC: 8.5 MG/DL (ref 8.7–10.4)
CHLORIDE SERPL-SCNC: 100 MMOL/L (ref 98–112)
CO2 SERPL-SCNC: 40 MMOL/L (ref 21–32)
CREAT BLD-MCNC: 1.06 MG/DL
DEPRECATED RDW RBC AUTO: 52.4 FL (ref 35.1–46.3)
DIGOXIN SERPL-MCNC: 0.7 NG/ML (ref 0.8–2)
EGFRCR SERPLBLD CKD-EPI 2021: 52 ML/MIN/1.73M2 (ref 60–?)
EOSINOPHIL # BLD AUTO: 0 X10(3) UL (ref 0–0.7)
EOSINOPHIL NFR BLD AUTO: 0 %
ERYTHROCYTE [DISTWIDTH] IN BLOOD BY AUTOMATED COUNT: 14.8 % (ref 11–15)
GLUCOSE BLD-MCNC: 273 MG/DL (ref 70–99)
HCT VFR BLD AUTO: 38.5 %
HGB BLD-MCNC: 12.3 G/DL
IMM GRANULOCYTES # BLD AUTO: 0.09 X10(3) UL (ref 0–1)
IMM GRANULOCYTES NFR BLD: 0.9 %
LYMPHOCYTES # BLD AUTO: 0.36 X10(3) UL (ref 1–4)
LYMPHOCYTES NFR BLD AUTO: 3.6 %
MCH RBC QN AUTO: 30.9 PG (ref 26–34)
MCHC RBC AUTO-ENTMCNC: 31.9 G/DL (ref 31–37)
MCV RBC AUTO: 96.7 FL
MONOCYTES # BLD AUTO: 0.31 X10(3) UL (ref 0.1–1)
MONOCYTES NFR BLD AUTO: 3.1 %
NEUTROPHILS # BLD AUTO: 9.27 X10 (3) UL (ref 1.5–7.7)
NEUTROPHILS # BLD AUTO: 9.27 X10(3) UL (ref 1.5–7.7)
NEUTROPHILS NFR BLD AUTO: 92.1 %
OSMOLALITY SERPL CALC.SUM OF ELEC: 315 MOSM/KG (ref 275–295)
PHOSPHATE SERPL-MCNC: 3.9 MG/DL (ref 2.4–5.1)
PLATELET # BLD AUTO: 250 10(3)UL (ref 150–450)
POTASSIUM SERPL-SCNC: 3.9 MMOL/L (ref 3.5–5.1)
RBC # BLD AUTO: 3.98 X10(6)UL
SODIUM SERPL-SCNC: 141 MMOL/L (ref 136–145)
T3FREE SERPL-MCNC: 2.18 PG/ML (ref 2.4–4.2)
T4 FREE SERPL-MCNC: 0.9 NG/DL (ref 0.8–1.7)
TSI SER-ACNC: 0.18 UIU/ML (ref 0.55–4.78)
WBC # BLD AUTO: 10.1 X10(3) UL (ref 4–11)

## 2024-12-17 PROCEDURE — 99233 SBSQ HOSP IP/OBS HIGH 50: CPT | Performed by: HOSPITALIST

## 2024-12-17 RX ORDER — DOXYCYCLINE 100 MG/1
100 CAPSULE ORAL EVERY 12 HOURS SCHEDULED
Qty: 10 CAPSULE | Refills: 0 | Status: SHIPPED | OUTPATIENT
Start: 2024-12-17 | End: 2024-12-29

## 2024-12-17 RX ORDER — PREDNISONE 20 MG/1
20 TABLET ORAL DAILY
Qty: 10 TABLET | Refills: 0 | Status: SHIPPED | OUTPATIENT
Start: 2024-12-17 | End: 2024-12-29

## 2024-12-17 RX ORDER — FUROSEMIDE 10 MG/ML
80 INJECTION INTRAMUSCULAR; INTRAVENOUS
Status: COMPLETED | OUTPATIENT
Start: 2024-12-17 | End: 2024-12-17

## 2024-12-17 RX ORDER — SPIRONOLACTONE 25 MG/1
25 TABLET ORAL DAILY
Qty: 90 TABLET | Refills: 0 | Status: SHIPPED | OUTPATIENT
Start: 2024-12-17 | End: 2025-03-17

## 2024-12-17 RX ORDER — ACETAZOLAMIDE 250 MG/1
500 TABLET ORAL ONCE
Status: COMPLETED | OUTPATIENT
Start: 2024-12-17 | End: 2024-12-17

## 2024-12-17 NOTE — PROGRESS NOTES
Heart Failure Nurse  Progress Note    Patient was evaluated by the Heart Failure Nurse  for understanding, verbalization, demonstration, and recall of education related to heart failure, overall adherence to the behaviors necessary to maintain a compensated status, and risk for readmission.     Patient assessment:Patient sitting in bed had just awakened from a nap. Patient has a meal delivery service as she does not cook. Her son lives with her and he helps to provide transportation to her appts.     Patient is able to verbalize signs/symptoms fluid overload/impending HF exacerbation and who to contact with problems                                          _x__ yes  ___ no      Patient is following a 2000 mg sodium diet                                             _x__ yes  ___ no has ordered meal to be delivered [place] comes with an oven as well.    If no, barriers to 2000 mg sodium diet:_______________________________________________    Patient informed of 2-Part dietician classes that is free if sign up within 30 days of discharge or $40  ___ yes  __x_ no      Patient is adherent to medication regimen                                              _x__ yes  ___ no    If no, barriers to medication regimen:    Patient has sufficient funds to purchase medication                      ___x yes  ___ no      Patient has a scale in the home              _x__ yes  ___ no      Patient is adherent to daily weight monitoring                                        _x__ yes   ___ no    If no, barriers to daily weight monitoring:    Symptom Tracker Worksheet reviewed with patient  _x__ yes   ___ no      Patient verbalizes understanding of stoplight/heart failure zones          _x__ yes   ___ no      Patient understands the importance of 7-day follow-up appointment      ___x yes  ___ no    Appointment Date:12/27/24 1100 with Janet          Patient has adequate transportation to attend follow-up appointments    __x_ yes   ___ no    If no, was referral to Social Work made  ___yes  ___ no      Family/Friend present during education: none    Additional consultations required: none    Juliet Hines RN HF  [phone]

## 2024-12-17 NOTE — PLAN OF CARE
A&Ox4. Pt ambulates self, voiding up to bathroom, tolerating diet, on 4L via NC. Frequent rounding by nursing staff. Safety precautions maintained/call light within reach. Plan for discharge tomorrow pending medical clearance.    Problem: Patient Centered Care  Goal: Patient preferences are identified and integrated in the patient's plan of care  Description: Interventions:  - What would you like us to know as we care for you? From home   - Provide timely, complete, and accurate information to patient/family  - Incorporate patient and family knowledge, values, beliefs, and cultural backgrounds into the planning and delivery of care  - Encourage patient/family to participate in care and decision-making at the level they choose  - Honor patient and family perspectives and choices  Outcome: Progressing     Problem: PAIN - ADULT  Goal: Verbalizes/displays adequate comfort level or patient's stated pain goal  Description: INTERVENTIONS:  - Encourage pt to monitor pain and request assistance  - Assess pain using appropriate pain scale  - Administer analgesics based on type and severity of pain and evaluate response  - Implement non-pharmacological measures as appropriate and evaluate response  - Consider cultural and social influences on pain and pain management  - Manage/alleviate anxiety  - Utilize distraction and/or relaxation techniques  - Monitor for opioid side effects  - Notify MD/LIP if interventions unsuccessful or patient reports new pain  - Anticipate increased pain with activity and pre-medicate as appropriate  Outcome: Progressing     Problem: SAFETY ADULT - FALL  Goal: Free from fall injury  Description: INTERVENTIONS:  - Assess pt frequently for physical needs  - Identify cognitive and physical deficits and behaviors that affect risk of falls.  - [place] fall precautions as indicated by assessment.  - Educate pt/family on patient safety including physical limitations  - Instruct pt to call for assistance  with activity based on assessment  - Modify environment to reduce risk of injury  - Provide assistive devices as appropriate  - Consider OT/PT consult to assist with strengthening/mobility  - Encourage toileting schedule  Outcome: Progressing     Problem: DISCHARGE PLANNING  Goal: Discharge to home or other facility with appropriate resources  Description: INTERVENTIONS:  - Identify barriers to discharge w/pt and caregiver  - Include patient/family/discharge partner in discharge planning  - Arrange for needed discharge resources and transportation as appropriate  - Identify discharge learning needs (meds, wound care, etc)  - Arrange for interpreters to assist at discharge as needed  - Consider post-discharge preferences of patient/family/discharge partner  - Complete POLST form as appropriate  - Assess patient's ability to be responsible for managing their own health  - Refer to Case Management Department for coordinating discharge planning if the patient needs post-hospital services based on physician/LIP order or complex needs related to functional status, cognitive ability or social support system  Outcome: Progressing     Problem: RESPIRATORY - ADULT  Goal: Achieves optimal ventilation and oxygenation  Description: INTERVENTIONS:  - Assess for changes in respiratory status  - Assess for changes in mentation and behavior  - Position to facilitate oxygenation and minimize respiratory effort  - Oxygen supplementation based on oxygen saturation or ABGs  - Provide Smoking Cessation handout, if applicable  - Encourage broncho-pulmonary hygiene including cough, deep breathe, Incentive Spirometry  - Assess the need for suctioning and perform as needed  - Assess and instruct to report SOB or any respiratory difficulty  - Respiratory Therapy support as indicated  - Manage/alleviate anxiety  - Monitor for signs/symptoms of CO2 retention  Outcome: Progressing     Problem: SKIN/TISSUE INTEGRITY - ADULT  Goal: Skin integrity  remains intact  Description: INTERVENTIONS  - Assess and document risk factors for pressure ulcer development  - Assess and document skin integrity  - Monitor for areas of redness and/or skin breakdown  - Initiate interventions, skin care algorithm/standards of care as needed  Outcome: Progressing     Problem: HEMATOLOGIC - ADULT  Goal: Free from bleeding injury  Description: (Example usage: patient with low platelets)  INTERVENTIONS:  - Avoid intramuscular injections, enemas and rectal medication administration  - Ensure safe mobilization of patient  - Hold pressure on venipuncture sites to achieve adequate hemostasis  - Assess for signs and symptoms of internal bleeding  - Monitor lab trends  - Patient is to report abnormal signs of bleeding to staff  - Avoid use of toothpicks and dental floss  - Use electric shaver for shaving  - Use soft bristle tooth brush  - Limit straining and forceful nose blowing  Outcome: Progressing

## 2024-12-17 NOTE — PLAN OF CARE
Problem: Patient Centered Care  Goal: Patient preferences are identified and integrated in the patient's plan of care  Description: Interventions:  - What would you like us to know as we care for you? From home   - Provide timely, complete, and accurate information to patient/family  - Incorporate patient and family knowledge, values, beliefs, and cultural backgrounds into the planning and delivery of care  - Encourage patient/family to participate in care and decision-making at the level they choose  - Honor patient and family perspectives and choices  Outcome: Progressing     Problem: Patient/Family Goals  Goal: Patient/Family Long Term Goal  Description: Patient's Long Term Goal: To discharge from hospital     Interventions:  - Monitor vital signs  - Monitor appropriate labs   - IV abx   - BIPAP prn  - Pain management   - Administer medications per order   - Follow MD orders   - Diagnostics per order   - Update/inform patient and family on plan of care   - Discharge planning   - See additional Care Plan goals for specific interventions  Outcome: Progressing  Goal: Patient/Family Short Term Goal  Description: Patient's Short Term Goal: To breathe better and decrease fluid overload    Interventions:   - Monitor vital signs  - Monitor appropriate labs   - IV abx   - BIPAP prn  - Pain management   - Administer medications per order   - Follow MD orders   - Diagnostics per order   - Update/inform patient and family on plan of care   - See additional Care Plan goals for specific interventions  Outcome: Progressing     Problem: PAIN - ADULT  Goal: Verbalizes/displays adequate comfort level or patient's stated pain goal  Description: INTERVENTIONS:  - Encourage pt to monitor pain and request assistance  - Assess pain using appropriate pain scale  - Administer analgesics based on type and severity of pain and evaluate response  - Implement non-pharmacological measures as appropriate and evaluate response  - Consider  cultural and social influences on pain and pain management  - Manage/alleviate anxiety  - Utilize distraction and/or relaxation techniques  - Monitor for opioid side effects  - Notify MD/LIP if interventions unsuccessful or patient reports new pain  - Anticipate increased pain with activity and pre-medicate as appropriate  Outcome: Progressing     Problem: SAFETY ADULT - FALL  Goal: Free from fall injury  Description: INTERVENTIONS:  - Assess pt frequently for physical needs  - Identify cognitive and physical deficits and behaviors that affect risk of falls.  - [place] fall precautions as indicated by assessment.  - Educate pt/family on patient safety including physical limitations  - Instruct pt to call for assistance with activity based on assessment  - Modify environment to reduce risk of injury  - Provide assistive devices as appropriate  - Consider OT/PT consult to assist with strengthening/mobility  - Encourage toileting schedule  Outcome: Progressing     Problem: DISCHARGE PLANNING  Goal: Discharge to home or other facility with appropriate resources  Description: INTERVENTIONS:  - Identify barriers to discharge w/pt and caregiver  - Include patient/family/discharge partner in discharge planning  - Arrange for needed discharge resources and transportation as appropriate  - Identify discharge learning needs (meds, wound care, etc)  - Arrange for interpreters to assist at discharge as needed  - Consider post-discharge preferences of patient/family/discharge partner  - Complete POLST form as appropriate  - Assess patient's ability to be responsible for managing their own health  - Refer to Case Management Department for coordinating discharge planning if the patient needs post-hospital services based on physician/LIP order or complex needs related to functional status, cognitive ability or social support system  Outcome: Progressing     Problem: RESPIRATORY - ADULT  Goal: Achieves optimal ventilation and  oxygenation  Description: INTERVENTIONS:  - Assess for changes in respiratory status  - Assess for changes in mentation and behavior  - Position to facilitate oxygenation and minimize respiratory effort  - Oxygen supplementation based on oxygen saturation or ABGs  - Provide Smoking Cessation handout, if applicable  - Encourage broncho-pulmonary hygiene including cough, deep breathe, Incentive Spirometry  - Assess the need for suctioning and perform as needed  - Assess and instruct to report SOB or any respiratory difficulty  - Respiratory Therapy support as indicated  - Manage/alleviate anxiety  - Monitor for signs/symptoms of CO2 retention  Outcome: Progressing     Problem: CARDIOVASCULAR - ADULT  Goal: Maintains optimal cardiac output and hemodynamic stability  Description: INTERVENTIONS:  - Monitor vital signs, rhythm, and trends  - Monitor for bleeding, hypotension and signs of decreased cardiac output  - Evaluate effectiveness of vasoactive medications to optimize hemodynamic stability  - Monitor arterial and/or venous puncture sites for bleeding and/or hematoma  - Assess quality of pulses, skin color and temperature  - Assess for signs of decreased coronary artery perfusion - ex. Angina  - Evaluate fluid balance, assess for edema, trend weights  Outcome: Progressing     Problem: SKIN/TISSUE INTEGRITY - ADULT  Goal: Skin integrity remains intact  Description: INTERVENTIONS  - Assess and document risk factors for pressure ulcer development  - Assess and document skin integrity  - Monitor for areas of redness and/or skin breakdown  - Initiate interventions, skin care algorithm/standards of care as needed  Outcome: Progressing     Problem: HEMATOLOGIC - ADULT  Goal: Maintains hematologic stability  Description: INTERVENTIONS  - Assess for signs and symptoms of bleeding or hemorrhage  - Monitor labs and vital signs for trends  - Administer supportive blood products/factors, fluids and medications as ordered and  appropriate  - Administer supportive blood products/factors as ordered and appropriate  Outcome: Progressing  Goal: Free from bleeding injury  Description: (Example usage: patient with low platelets)  INTERVENTIONS:  - Avoid intramuscular injections, enemas and rectal medication administration  - Ensure safe mobilization of patient  - Hold pressure on venipuncture sites to achieve adequate hemostasis  - Assess for signs and symptoms of internal bleeding  - Monitor lab trends  - Patient is to report abnormal signs of bleeding to staff  - Avoid use of toothpicks and dental floss  - Use electric shaver for shaving  - Use soft bristle tooth brush  - Limit straining and forceful nose blowing  Outcome: Progressing     Problem: MUSCULOSKELETAL - ADULT  Goal: Return mobility to safest level of function  Description: INTERVENTIONS:  - Assess patient stability and activity tolerance for standing, transferring and ambulating w/ or w/o assistive devices  - Assist with transfers and ambulation using safe patient handling equipment as needed  - Ensure adequate protection for wounds/incisions during mobilization  - Obtain PT/OT consults as needed  - Advance activity as appropriate  - Communicate ordered activity level and limitations with patient/family  Outcome: Progressing     Monitoring vital signs, stable at this time. No acute changes at this moment. BiPAP worn on/off overnight. Fall precautions in place-  bed locked in lowest position, call light within reach. Frequent rounding by nursing staff.

## 2024-12-17 NOTE — PROGRESS NOTES
[facility]  part of [facility]    Progress Note    Yesenia Berkowitz Patient Status:  Inpatient    1940 MRN [id number]   Location [facility]5W Attending Robbie Tate MD   Hosp Day # 4 PCP JO-ANN YANG MD       Subjective:   Yesenia Berkowitz is a(n) 84 year old female.   Felt better,less sob,no cough  Objective:   Blood pressure 159/72, pulse 85, temperature 97.7 °F (36.5 °C), temperature source Oral, resp. rate 20, weight 180 lb 4.8 oz (81.8 kg), SpO2 98%.    HEENT: negative  Neck: no adenopathy, no carotid bruit, no JVD, supple, symmetrical, trachea midline and thyroid not enlarged, symmetric, no tenderness/mass/nodules  Pulmonary/Chest:  clear to auscultation bilaterally, no tenderness  Cardiovascular: S1, S2 normal, no S3 or S4, regular rate and rhythm, no murmur  Abdominal: normal findings: bowel sounds normal, soft, non-tender and no hepatosplenomegaly   Extremities: extremities normal, atraumatic, no cyanosis or 2+ edema  Skin: Skin color, texture, turgor normal. No rashes or lesions    Results:     Lab Results   Component Value Date    WBC 10.1 2024    HGB 12.3 2024    HCT 38.5 2024    .0 2024    CREATSERUM 1.06 (H) 2024    BUN 50 (H) 2024     2024    K 3.9 2024     2024    CO2 40.0 (HH) 2024     (H) 2024    CA 8.5 (L) 2024    ALB 3.3 2024    ALKPHO 59 2024    BILT 0.4 2024    TP 5.0 (L) 2024    AST 20 2024    ALT 32 2024    PTT 24.3 2024    INR 1.08 2024    TSH 0.185 (L) 2024    LIP 30 2024    DDIMER 0.45 2024    MG 1.9 2024    PHOS 3.9 2024    TROP <0.045 2020       No results found.        Assessment and Plan:   Principal Problem:    SOB (shortness of breath)  Active Problems:    COPD with acute exacerbation (HCC)    Kyphoscoliosis    Phrenic nerve paralysis    Restrictive lung disease    Acute  cor pulmonale (HCC)    Acute on chronic hypoxic respiratory failure (HCC)    Acute on chronic diastolic (congestive) heart failure (HCC)    Obesity hypoventilation syndrome (HCC)    Asthma (HCC)     Felt better,less sob,no cough,persistent lower leg edemaBIPAP to be delivered today,home am        ELVIA LIVINGSTON MD, MD  12/17/2024

## 2024-12-17 NOTE — PROGRESS NOTES
[facility]  part of [facility]  Hospitalist Progress Note     Yesenia Berkowitz Patient Status:  Observation    1940  84 year old CSN [id number]   Location 508/508-A Attending Robbie Tate MD   Hosp Day # 4 PCP JO-ANN YANG MD     Assessment & Plan:   ----------------------------------  Acute on chronic diastolic congestive heart failure.  Presumably mostly due to noncompliance with diet.  Patient does not check her weight very regularly. Cr up slightly but still normal  -IV diuretics per cardiology  -Cardiology consult  -Telemetry  -GDMT     Acute COPD exacerbation.  -IV Solu-Medrol, now every 12  -Nebulizer treatments  -Pulmonology consult     Acute on chronic hypoxemic and hypercarbic respiratory failure.  Usually on 4 L. Some of her OJEDA may be related to inc right sided pressures.  -Continue oxygen  -Decrease as tolerated  -Bipap as tolerated  -Home BiPAP arranged     Back pain.  This was her primary complaint but now much better.  Seems to be musculoskeletal.  -Heat pack  -Gentle activity  -No neurologic symptoms     Other problems  A-fib, proximal muscle  Diverticulosis  ?  Hepatitis, per chart      ----------------------------------  dvt prophylaxis: sc heparin  code status: full code  dispo: home tomorrow  If applicable, malnutrition status at bottom of note    I personally reviewed the available laboratories, imaging including. I discussed/will discuss the case with consultants. I ordered laboratories and/or radiographic studies. I adjusted medications as detailed above.  Medical decision making high, risk is high.    Subjective:   ----------------------------------  Pain and breathing about the same. Back pain better than admission but same as yesterday, improved with bedside massage      Objective:   Chief Complaint:   Chief Complaint   Patient presents with    Difficulty Breathing     ----------------------------------  Temp:  [97.6 °F (36.4 °C)-98 °F (36.7 °C)] 97.6 °F (36.4  °C)  Pulse:  [78-92] 88  Resp:  [20] 20  BP: (139-159)/(64-72) 139/64  SpO2:  [92 %-98 %] 98 %  Gen: A+Ox3.  No distress.   HEENT: NCAT, neck supple, no carotid bruit.  CV: RRR, S1S2, and intact distal pulses. No gallop, rub, murmur.  Pulm: coarse bs L base, no wheezing  Abd: Soft, NTND, BS normal, no mass, no HSM, no rebound/guarding.   Neuro: Normal reflexes, CN. Sensory/motor exams grossly normal deficit.   MS: No joint effusions.  1+ bilateral LE peripheral edema.  Skin: Skin is warm and dry. No rashes, erythema, diaphoresis.   Psych: Normal mood and affect. Calm, cooperative    Labs:  Lab Results   Component Value Date    HGB 12.3 12/17/2024    WBC 10.1 12/17/2024    .0 12/17/2024     12/17/2024    K 3.9 12/17/2024    CREATSERUM 1.06 (H) 12/17/2024    INR 1.08 11/25/2024    AST 20 11/24/2024    ALT 32 11/24/2024    TROP <0.045 02/03/2020            acetaZOLAMIDE  500 mg Oral Once    docusate sodium  100 mg Oral BID    polyethylene glycol (PEG 3350)  17 g Oral Daily    estradiol  1 patch Transdermal Weekly    ipratropium-albuterol  3 mL Nebulization 2 times daily    doxycycline  100 mg Oral 2 times per day    acetaZOLAMIDE  500 mg Oral Daily    amitriptyline  50 mg Oral Nightly    aspirin  162 mg Oral Daily    digoxin  125 mcg Oral Daily    dilTIAZem HCl ER Coated Beads  360 mg Oral Daily    pantoprazole  40 mg Oral QAM AC    cetirizine  5 mg Oral Daily    montelukast  10 mg Oral Nightly    fluticasone furoate  1 puff Inhalation Daily    umeclidinium bromide  1 puff Inhalation Daily    heparin  5,000 Units Subcutaneous 2 times per day    methylPREDNISolone  40 mg Intravenous Q12H    spironolactone  25 mg Oral Daily       magnesium hydroxide    bisacodyl    fleet enema    ipratropium-albuterol    acetaminophen    benzonatate    dicyclomine    guaiFENesin    ondansetron

## 2024-12-17 NOTE — PLAN OF CARE
Problem: Patient Centered Care  Goal: Patient preferences are identified and integrated in the patient's plan of care  Description: Interventions:  - What would you like us to know as we care for you? From home   - Provide timely, complete, and accurate information to patient/family  - Incorporate patient and family knowledge, values, beliefs, and cultural backgrounds into the planning and delivery of care  - Encourage patient/family to participate in care and decision-making at the level they choose  - Honor patient and family perspectives and choices  Outcome: Progressing     Problem: Patient/Family Goals  Goal: Patient/Family Long Term Goal  Description: Patient's Long Term Goal: to discharge home    Interventions:  - Monitor vital signs  - Monitor appropriate labs   - IV abx   - BIPAP prn  - Pain management   - Administer medications per order   - Follow MD orders   - Diagnostics per order   - Update/inform patient and family on plan of care   - Discharge planning   - See additional Care Plan goals for specific interventions  Outcome: Progressing  Goal: Patient/Family Short Term Goal  Description: Patient's Short Term Goal: to breathe better and decrease fluid overload    Interventions:   - Monitor vital signs  - Monitor appropriate labs   - IV abx   - BIPAP prn  - Pain management   - Administer medications per order   - Follow MD orders   - Diagnostics per order   - Update/inform patient and family on plan of care   - Discharge planning   - See additional Care Plan goals for specific interventions  Outcome: Progressing     Problem: PAIN - ADULT  Goal: Verbalizes/displays adequate comfort level or patient's stated pain goal  Description: INTERVENTIONS:  - Encourage pt to monitor pain and request assistance  - Assess pain using appropriate pain scale  - Administer analgesics based on type and severity of pain and evaluate response  - Implement non-pharmacological measures as appropriate and evaluate response  -  Consider cultural and social influences on pain and pain management  - Manage/alleviate anxiety  - Utilize distraction and/or relaxation techniques  - Monitor for opioid side effects  - Notify MD/LIP if interventions unsuccessful or patient reports new pain  - Anticipate increased pain with activity and pre-medicate as appropriate  Outcome: Progressing     Problem: SAFETY ADULT - FALL  Goal: Free from fall injury  Description: INTERVENTIONS:  - Assess pt frequently for physical needs  - Identify cognitive and physical deficits and behaviors that affect risk of falls.  - [place] fall precautions as indicated by assessment.  - Educate pt/family on patient safety including physical limitations  - Instruct pt to call for assistance with activity based on assessment  - Modify environment to reduce risk of injury  - Provide assistive devices as appropriate  - Consider OT/PT consult to assist with strengthening/mobility  - Encourage toileting schedule  Outcome: Progressing     Problem: DISCHARGE PLANNING  Goal: Discharge to home or other facility with appropriate resources  Description: INTERVENTIONS:  - Identify barriers to discharge w/pt and caregiver  - Include patient/family/discharge partner in discharge planning  - Arrange for needed discharge resources and transportation as appropriate  - Identify discharge learning needs (meds, wound care, etc)  - Arrange for interpreters to assist at discharge as needed  - Consider post-discharge preferences of patient/family/discharge partner  - Complete POLST form as appropriate  - Assess patient's ability to be responsible for managing their own health  - Refer to Case Management Department for coordinating discharge planning if the patient needs post-hospital services based on physician/LIP order or complex needs related to functional status, cognitive ability or social support system  Outcome: Progressing     Problem: RESPIRATORY - ADULT  Goal: Achieves optimal ventilation  and oxygenation  Description: INTERVENTIONS:  - Assess for changes in respiratory status  - Assess for changes in mentation and behavior  - Position to facilitate oxygenation and minimize respiratory effort  - Oxygen supplementation based on oxygen saturation or ABGs  - Provide Smoking Cessation handout, if applicable  - Encourage broncho-pulmonary hygiene including cough, deep breathe, Incentive Spirometry  - Assess the need for suctioning and perform as needed  - Assess and instruct to report SOB or any respiratory difficulty  - Respiratory Therapy support as indicated  - Manage/alleviate anxiety  - Monitor for signs/symptoms of CO2 retention  Outcome: Progressing     Problem: CARDIOVASCULAR - ADULT  Goal: Maintains optimal cardiac output and hemodynamic stability  Description: INTERVENTIONS:  - Monitor vital signs, rhythm, and trends  - Monitor for bleeding, hypotension and signs of decreased cardiac output  - Evaluate effectiveness of vasoactive medications to optimize hemodynamic stability  - Monitor arterial and/or venous puncture sites for bleeding and/or hematoma  - Assess quality of pulses, skin color and temperature  - Assess for signs of decreased coronary artery perfusion - ex. Angina  - Evaluate fluid balance, assess for edema, trend weights  Outcome: Progressing     Problem: SKIN/TISSUE INTEGRITY - ADULT  Goal: Skin integrity remains intact  Description: INTERVENTIONS  - Assess and document risk factors for pressure ulcer development  - Assess and document skin integrity  - Monitor for areas of redness and/or skin breakdown  - Initiate interventions, skin care algorithm/standards of care as needed  Outcome: Progressing     Problem: HEMATOLOGIC - ADULT  Goal: Maintains hematologic stability  Description: INTERVENTIONS  - Assess for signs and symptoms of bleeding or hemorrhage  - Monitor labs and vital signs for trends  - Administer supportive blood products/factors, fluids and medications as ordered and  appropriate  - Administer supportive blood products/factors as ordered and appropriate  Outcome: Progressing  Goal: Free from bleeding injury  Description: (Example usage: patient with low platelets)  INTERVENTIONS:  - Avoid intramuscular injections, enemas and rectal medication administration  - Ensure safe mobilization of patient  - Hold pressure on venipuncture sites to achieve adequate hemostasis  - Assess for signs and symptoms of internal bleeding  - Monitor lab trends  - Patient is to report abnormal signs of bleeding to staff  - Avoid use of toothpicks and dental floss  - Use electric shaver for shaving  - Use soft bristle tooth brush  - Limit straining and forceful nose blowing  Outcome: Progressing     Problem: MUSCULOSKELETAL - ADULT  Goal: Return mobility to safest level of function  Description: INTERVENTIONS:  - Assess patient stability and activity tolerance for standing, transferring and ambulating w/ or w/o assistive devices  - Assist with transfers and ambulation using safe patient handling equipment as needed  - Ensure adequate protection for wounds/incisions during mobilization  - Obtain PT/OT consults as needed  - Advance activity as appropriate  - Communicate ordered activity level and limitations with patient/family  Outcome: Progressing     Vital signs are stable.  C/O pain to upper back, heat pack applied.  On 4L of oxygen, which is at baseline.  On IV solumedrol and IV lasix.  Safety precautions in place.

## 2024-12-17 NOTE — PROGRESS NOTES
Progress Note  Yesenia Berkowitz Patient Status:  Inpatient    1940 MRN [id number]   Location [facility]W Attending Robbie Tate MD   Hosp Day # 4 PCP JO-ANN YANG MD     Attending Cardiologist: Malcolm     SUBJECTIVE:    Breathing and LE edema feels near to be near baseline. No chest pain. Reports mild non-productive cough.     VITALS:  /72 (BP Location: Right arm)   Pulse 85   Temp 97.7 °F (36.5 °C) (Oral)   Resp 20   Wt 180 lb 4.8 oz (81.8 kg)   SpO2 98%   BMI 30.00 kg/m²   INTAKE/OUTPUT:    Intake/Output Summary (Last 24 hours) at 2024 0713  Last data filed at 2024 0548  Gross per 24 hour   Intake 470 ml   Output 1800 ml   Net -1330 ml     Last 3 Weights   24 0544 180 lb 4.8 oz (81.8 kg)   24 0906 179 lb (81.2 kg)   12/15/24 0559 179 lb 14.4 oz (81.6 kg)   24 0500 179 lb 12.8 oz (81.6 kg)   24 0700 182 lb 9.6 oz (82.8 kg)   24 0900 175 lb 3.2 oz (79.5 kg)   24 0541 169 lb 14.4 oz (77.1 kg)   24 0650 169 lb 6.4 oz (76.8 kg)   24 0700 174 lb (78.9 kg)   24 2200 173 lb 6.4 oz (78.7 kg)   24 1224 170 lb (77.1 kg)     LABS:  Recent Labs   Lab 12/15/24  0523 24  0440 24  0502   * 277* 273*   BUN 28* 34* 50*   CREATSERUM 0.75 0.94 1.06*   EGFRCR 79 61 52*   CA 8.8 8.7 8.5*    139 141   K 4.0 3.9 3.9    102 100   CO2 36.0* 37.0* 40.0*     Recent Labs   Lab 24  0513 24  0449   RBC 4.09 3.53*   HGB 13.0 11.1*   HCT 40.6 34.5*   MCV 99.3 97.7   MCH 31.8 31.4   MCHC 32.0 32.2   RDW 14.9 14.6   NEPRELIM 7.65 7.14   WBC 10.1 7.7   .0 171.0     No results for input(s): \"TROP\", \"CK\" in the last 168 hours.  DIAGNOSTICS:  TELEMETRY: SR, Rare PVCs    ECHO 2024:  Conclusions:   1. Left ventricle: The cavity size was normal. Wall thickness was mildly      increased. Systolic function was hyperdynamic. The estimated ejection      fraction was 65-70%, by visual assessment. No  diagnostic evidence for      regional wall motion abnormalities. Unable to assess LV diastolic      function.   2. Right ventricle: The cavity size was mildly increased. Systolic pressure      was moderately increased.   3. Aortic valve: There was thickening. The findings were consistent with      mild stenosis. The peak systolic velocity was 2.06 m/sec. The mean      systolic gradient was 10 mm Hg. The valve area (VTI) was 2.17 cm^2. The      valve area (VTI) index was 1.19 cm^2/m^2.   4. Mitral valve: The annulus was moderately calcified. The leaflets were      mildly thickened. Transvalvular velocity was increased. The findings were      consistent with mild stenosis. The mean diastolic gradient was 5 mm Hg.   5. Pulmonary arteries: Systolic pressure was moderately increased, estimated      to be 56 mm Hg.     ROS: Negative unless noted above   PHYSICAL EXAM:  General: Alert and oriented x 3. No apparent distress.  HEENT: Normocephalic, sclera are nonicteric. Hearing appropriate bilaterally.  Neck: No JVD or Carotid bruits. Trachea midline.   Cardiac: Regular rate and rhythm. S1, S2 auscultated. No murmurs, rubs, or gallops appreciated.   Lungs: a/p dullness. Chest expansion symmetrical. Regular effort. 2L NC  Abdomen: Soft, non-tender, +BS. No hepatosplenomegaly or appreciable masses.   Extremities: Without clubbing, cyanosis. Peripheral pulses are 2+. Edema +1 from feet to mid shin BLE  Neurologic: Motor and sensory nerves grossly intact.   Psych: Appropriate affect   Skin: Warm and dry. No obvious lesions, wounds, or ulcerations.   MEDICATIONS:   docusate sodium  100 mg Oral BID    polyethylene glycol (PEG 3350)  17 g Oral Daily    estradiol  1 patch Transdermal Weekly    ipratropium-albuterol  3 mL Nebulization 2 times daily    furosemide  80 mg Intravenous BID (Diuretic)    doxycycline  100 mg Oral 2 times per day    acetaZOLAMIDE  500 mg Oral Daily    amitriptyline  50 mg Oral Nightly    aspirin  162 mg  Oral Daily    digoxin  125 mcg Oral Daily    dilTIAZem HCl ER Coated Beads  360 mg Oral Daily    pantoprazole  40 mg Oral QAM AC    cetirizine  5 mg Oral Daily    montelukast  10 mg Oral Nightly    fluticasone furoate  1 puff Inhalation Daily    umeclidinium bromide  1 puff Inhalation Daily    heparin  5,000 Units Subcutaneous 2 times per day    methylPREDNISolone  40 mg Intravenous Q12H    spironolactone  25 mg Oral Daily     ASSESSMENT:    Presented with dyspnea and LE edema. Reports forgetting to take Lasix twice a day at home.     Acute Hypercarbic Respiratory Failure   COPD Exacerbation  Acute on Chronic HFpEF  - Multifactorial with COPD exacerbation and volume expansion  - Pulm following, Steroids  - Baseline 2-3L NC use  - proBNP 272, trop negative x1, dimer negative   - IV Lasix 80 mg BID and Diamox 500 mg daily, Aldactone, Net neg 4L, Wt grossly unchanged, bed scale   - Slight increase in Cr and Bicarb rising   - Nearing euvolemia     PAF  - Maintaining SR. On CCB and Digoxin, Last TSH one year ago unremarkable, Last Dig level one year ago was low   - Not historically on a/c due to bruising/ bleeding risk/ Hx GIB and low Afib burden, o/p Watchman discussions in process     HTN- Controlled   Chronic Anemia- Stable on 12/13  HLD- LDL 78    PLAN:  - Give one more dose of IV Lasix 80 mg this morning, with rising Bicarb will give two doses of Diamox today. She is historically on Diamox once daily. Likely start oral Lasix tomorrow and will plan on sending her home on Lasix 80 mg daily PO so it is easier for her to take her medications.   - Unable to start Lipitor while on CCB due to interaction and increased risk of myopathies, can explore alternatives outpatient   - Check Digoxin level and TSH  - Add compression hose, check standing weight   - SGLT2 cost-prohibitive for patient at this time, OOP cost is $156.42/ month     Plan of care discussed with patient and RN.     Cortney Dumont, MSN, FNP-BC,  CCK  12/17/24   7:13 AM  [phone] [place]  [phone] [place]      I saw and examined the patient agree with attached findings.  Feeling better however continues to have lower extremity edema.  Continue Lasix 80 mg IV twice daily.  Give a second dose of Diamox this evening.  On discharge will need Lasix 80 mg daily.  Will also set up referral to Dr. Rey to discuss CardioMEMS as outpatient.    Luis Fernando Fowler MD  [place] [facility] [place]

## 2024-12-18 VITALS
DIASTOLIC BLOOD PRESSURE: 71 MMHG | RESPIRATION RATE: 16 BRPM | HEART RATE: 86 BPM | WEIGHT: 175.63 LBS | TEMPERATURE: 98 F | BODY MASS INDEX: 29 KG/M2 | OXYGEN SATURATION: 95 % | SYSTOLIC BLOOD PRESSURE: 133 MMHG

## 2024-12-18 LAB
ANION GAP SERPL CALC-SCNC: 4 MMOL/L (ref 0–18)
BASOPHILS # BLD AUTO: 0.14 X10(3) UL (ref 0–0.2)
BASOPHILS NFR BLD AUTO: 0.9 %
BUN BLD-MCNC: 29 MG/DL (ref 9–23)
BUN/CREAT SERPL: 50 (ref 10–20)
CALCIUM BLD-MCNC: 8.3 MG/DL (ref 8.7–10.4)
CHLORIDE SERPL-SCNC: 99 MMOL/L (ref 98–112)
CO2 SERPL-SCNC: 33 MMOL/L (ref 21–32)
CREAT BLD-MCNC: 0.58 MG/DL
DEPRECATED RDW RBC AUTO: 56.5 FL (ref 35.1–46.3)
EGFRCR SERPLBLD CKD-EPI 2021: 90 ML/MIN/1.73M2 (ref 60–?)
EOSINOPHIL # BLD AUTO: 0.01 X10(3) UL (ref 0–0.7)
EOSINOPHIL NFR BLD AUTO: 0.1 %
ERYTHROCYTE [DISTWIDTH] IN BLOOD BY AUTOMATED COUNT: 14.8 % (ref 11–15)
GLUCOSE BLD-MCNC: 195 MG/DL (ref 70–99)
HCT VFR BLD AUTO: 41 %
HGB BLD-MCNC: 12.5 G/DL
IMM GRANULOCYTES # BLD AUTO: 0.14 X10(3) UL (ref 0–1)
IMM GRANULOCYTES NFR BLD: 0.9 %
LYMPHOCYTES # BLD AUTO: 0.72 X10(3) UL (ref 1–4)
LYMPHOCYTES NFR BLD AUTO: 4.6 %
MCH RBC QN AUTO: 31.1 PG (ref 26–34)
MCHC RBC AUTO-ENTMCNC: 30.5 G/DL (ref 31–37)
MCV RBC AUTO: 102 FL
MONOCYTES # BLD AUTO: 1.43 X10(3) UL (ref 0.1–1)
MONOCYTES NFR BLD AUTO: 9.2 %
NEUTROPHILS # BLD AUTO: 13.18 X10 (3) UL (ref 1.5–7.7)
NEUTROPHILS # BLD AUTO: 13.18 X10(3) UL (ref 1.5–7.7)
NEUTROPHILS NFR BLD AUTO: 84.3 %
OSMOLALITY SERPL CALC.SUM OF ELEC: 293 MOSM/KG (ref 275–295)
PLATELET # BLD AUTO: 191 10(3)UL (ref 150–450)
POTASSIUM SERPL-SCNC: 4.2 MMOL/L (ref 3.5–5.1)
RBC # BLD AUTO: 4.02 X10(6)UL
SODIUM SERPL-SCNC: 136 MMOL/L (ref 136–145)
WBC # BLD AUTO: 15.6 X10(3) UL (ref 4–11)

## 2024-12-18 PROCEDURE — 99239 HOSP IP/OBS DSCHRG MGMT >30: CPT | Performed by: HOSPITALIST

## 2024-12-18 RX ORDER — FUROSEMIDE 40 MG/1
80 TABLET ORAL DAILY
Status: DISCONTINUED | OUTPATIENT
Start: 2024-12-18 | End: 2024-12-18

## 2024-12-18 RX ORDER — FUROSEMIDE 80 MG/1
80 TABLET ORAL DAILY
Qty: 30 TABLET | Refills: 3 | Status: SHIPPED | OUTPATIENT
Start: 2024-12-18

## 2024-12-18 NOTE — PHYSICAL THERAPY NOTE
PHYSICAL THERAPY EVALUATION - INPATIENT     Room Number: 508/508-A  Evaluation Date: 12/18/2024  Type of Evaluation: Initial   Physician Order: PT Eval and Treat    Presenting Problem: SOB, LE swelling  Co-Morbidities : A-fib (HCC)    Anesthesia complication   Arrhythmia    AFIB   Arthritis   Asthma (HCC)   Back problem   COPD (chronic obstructive pulmonary disease) ([facility])   Deviated nasal septum    nasal septoplasty, turb reduction, smr of turbs   Difficult intubation    fiber optic if needed   Diverticulitis    colonoscopy    Diverticulosis of large intestine   Esophageal reflux   Extrinsic asthma, unspecified   Headache   Heart disease   Hepatitis    WAS TOLD SHE HAS HAD THIS WHEN SHE WAS 19 YEARS OLD   High blood pressure   High cholesterol   Irregular heart rate   Lichenoid keratosis    left chest-bx   Osteoarthritis   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   Paronychia    (RT); onychomycosis; debridement   Problems with swallowing    occasionally   Shortness of breath    2 L NC ALL THE TIME 24HR/7 DAYS PER WEEK   Unspecified essential hypertension   Visual impairment  Reason for Therapy: Mobility Dysfunction and Discharge Planning    PHYSICAL THERAPY ASSESSMENT   Patient is a 84 year old female admitted 12/12/2024 for SOB.  Prior to admission, patient's baseline is independent.  Patient is currently functioning below baseline with bed mobility, transfers, gait, stair negotiation, maintaining seated position, standing prolonged periods, and performing household tasks.  Patient is requiring stand-by assist as a result of the following impairments: decreased functional strength, decreased endurance/aerobic capacity, impaired standing balance, decreased muscular endurance, and medical status.  Physical Therapy will continue to follow for duration of hospitalization.    Patient will benefit from continued skilled PT Services at discharge to promote prior level of function and safety with additional  support and return home with home health PT.    PLAN DURING HOSPITALIZATION  Nursing Mobility Recommendation : 1 Assist     PT Treatment Plan: Bed mobility;Body mechanics;Coordination;Endurance;Energy conservation;Gait training;Strengthening;Stair training;Balance training;Transfer training  Rehab Potential : Fair  Frequency (Obs): 5x/week     PHYSICAL THERAPY MEDICAL/SOCIAL HISTORY   History related to current admission: This is a 84 year oldfemale recently discharged after hospitalization for COPD and congestive heart failure.  Noticed increase in swelling, weight, dyspnea at home.  Her main reason for coming in however was back pain located between the shoulder blades.  No radiation.       Problem List  Principal Problem:    SOB (shortness of breath)  Active Problems:    COPD with acute exacerbation (HCC)    Kyphoscoliosis    Phrenic nerve paralysis    Restrictive lung disease    Acute cor pulmonale (HCC)    Acute on chronic hypoxic respiratory failure (HCC)    Acute on chronic diastolic (congestive) heart failure (HCC)    Obesity hypoventilation syndrome (HCC)    Asthma (HCC)    HOME SITUATION  Type of Home: House  Home Layout: One level  Stairs to Enter : 5   Railing: Yes (bilateral rails)    Stairs to Bedroom: 0 (12 to basement)    Lives With: Alone (supportive son in area)    Patient Regularly Uses: Home O2 (has RW at home available for use)     Prior Level of [place]: Prior to admission patient was independent with performance of all ADL's and performance of functional mobility with no assistive device. 3-4L on home O2.    SUBJECTIVE  \"I will be fine at home\"    PHYSICAL THERAPY EXAMINATION   OBJECTIVE  Precautions: Bed/chair alarm  Fall Risk: Standard fall risk    PAIN ASSESSMENT  [place]    COGNITION  Overall Cognitive Status:  WFL - within functional limits  Arousal/Alertness:  appropriate responses to stimuli  Orientation Level:  oriented x4  Safety Judgement:  good awareness of safety  precautions    RANGE OF MOTION AND STRENGTH ASSESSMENT  Upper extremity ROM and strength are within functional limits BUE.  Lower extremity ROM is within functional limits, limited due to swelling.  Lower extremity strength is within functional limits, limited due to swelling.    BALANCE  Static Sitting: Good  Dynamic Sitting: Fair  Static Standing: Fair  Dynamic Standing: Fair    ACTIVITY TOLERANCE  Pulse: 86    O2 WALK  Oxygen Therapy  SPO2% on Oxygen at Rest: 96  At rest oxygen flow (liters per minute): 4  SPO2% Ambulation on Oxygen: 88  Ambulation oxygen flow (liters per minute): 4    AM-PAC '6-Clicks' INPATIENT SHORT FORM - BASIC MOBILITY  How much difficulty does the patient currently have...  Patient Difficulty: Turning over in bed (including adjusting bedclothes, sheets and blankets)?: A Little   Patient Difficulty: Sitting down on and standing up from a chair with arms (e.g., wheelchair, bedside commode, etc.): A Little   Patient Difficulty: Moving from lying on back to sitting on the side of the bed?: A Little   How much help from another person does the patient currently need...   Help from Another: Moving to and from a bed to a chair (including a wheelchair)?: A Little   Help from Another: Need to walk in hospital room?: A Little   Help from Another: Climbing 3-5 steps with a railing?: A Little     AM-PAC Score:  Raw Score: 18   Approx Degree of Impairment: 46.58%   Standardized Score (AM-PAC Scale): 43.63   CMS Modifier (G-Code): CK    FUNCTIONAL ABILITY STATUS  Functional Mobility/Gait Assessment  Gait Assistance:  (standby assistance)  Distance (ft): ~40 feet  Assistive Device: Rolling walker  Pattern:  (decreased hector, decreased step length, forward flexed posture, narrow base of support, verbal cues for sequencing and rolling walker management.)  Stairs:  (Patient declining to trial stairs despite education. Verbalizes no concerns regarding stair negotiation upon discharge. Performed high  standing marches with SBA and BUE support on rolling walker in order to mimic activity.)  Rolling: stand-by assist  Supine to Sit: stand-by assist  Sit to Supine: stand-by assist  Sit to Stand: stand-by assist    Exercise/Education Provided:  Education Provided To: Patient Patient Education: Role of Physical Therapy;Plan of Care;Discharge Recommendations;DME Recommendations;Functional Transfer Techniques;Fall Prevention;Weight Bear Status;Surgical Precautions;Posture/Positioning;Energy Conservation;Stair Training;Gait Training Patient's Response to Education: Verbalized Understanding;Requires Further Education     Skilled Therapy Provided: Patient received supine in bed at initiation of session agreeable to participation in PT. Patient tolerates treatment fairly, requires SBA to perform transfers and ambulation with BUE support on rolling walker. Patient declining to trial stairs despite education. Verbalizes no concerns regarding stair negotiation upon discharge. Performed high standing marches with SBA and BUE support on rolling walker in order to mimic activity. Patient requesting to return to supine at end of session. Patient left in bed, lines intact, needs in reach and handoff to RN.    The patient's Approx Degree of Impairment: 46.58% has been calculated based on documentation in the [facility] '6 clicks' Inpatient Basic Mobility Short Form.  Research supports that patients with this level of impairment may benefit from home.  Final disposition will be made by interdisciplinary medical team.    Patient End of Session: In bed;Needs met;Call light within reach;RN aware of session/findings;All patient questions and concerns addressed;Hospital anti-slip socks;Alarm set    CURRENT GOALS  Goals to be met by: 1/1/24  Patient Goal Patient's self-stated goal is: to return home   Goal #1 Patient is able to demonstrate supine - sit EOB @ level: modified independent     Goal #1   Current Status    Goal #2 Patient is able to  demonstrate transfers Sit to/from Stand at assistance level: modified independent with  least restrictive device     Goal #2  Current Status    Goal #3 Patient is able to ambulate >50 feet with assist device:  least restrictive device  at assistance level: modified independent   Goal #3   Current Status    Goal #4 Patient will negotiate 5 stairs/one curb w/ assistive device and supervision   Goal #4   Current Status    Goal #5 Patient to demonstrate independence with home activity/exercise instructions provided to patient in preparation for discharge.   Goal #5   Current Status    Goal #6    Goal #6  Current Status      Patient Evaluation Complexity Level:  History High - 3 or more personal factors and/or co-morbidities   Examination of body systems Moderate - addressing a total of 3 or more elements   Clinical Presentation  Moderate - Evolving   Clinical Decision Making  Moderate Complexity     Gait Training: 10 minutes    Marybeth Wells PT, DPT

## 2024-12-18 NOTE — CM/SW NOTE
12/18/24 1300   Discharge disposition   Expected discharge disposition Home-Health   Post Acute Care Provider Residential   DME/Infusion Providers [facility]  (BIPAP)   Discharge transportation Private car     MDO placed for discharge.     notified HME liaison Amanda of patient discharge today.  Patient states she will be available at home by 4 PM today for BiPap delivery.  E will coordinate home delivery of equipment directly with patient.    CM notified [facility] liaison Tonia of patient discharge today.  [facility] will be reaching out to patient/family to coordinate start of care.    Patient cleared for discharge from CM/SW standpoint.    Melinda Cartagena RN, BSN  Nurse   [phone]

## 2024-12-18 NOTE — PLAN OF CARE
Problem: Patient Centered Care  Goal: Patient preferences are identified and integrated in the patient's plan of care  Description: Interventions:  - What would you like us to know as we care for you? From home   - Provide timely, complete, and accurate information to patient/family  - Incorporate patient and family knowledge, values, beliefs, and cultural backgrounds into the planning and delivery of care  - Encourage patient/family to participate in care and decision-making at the level they choose  - Honor patient and family perspectives and choices  Outcome: Progressing     Problem: Patient/Family Goals  Goal: Patient/Family Long Term Goal  Description: Patient's Long Term Goal: To discharge from hospital     Interventions:  - Monitor vital signs  - Monitor appropriate labs   - IV abx   - BIPAP prn  - Pain management   - Administer medications per order   - Follow MD orders   - Diagnostics per order   - Update/inform patient and family on plan of care   - Discharge planning   - See additional Care Plan goals for specific interventions  Outcome: Progressing  Goal: Patient/Family Short Term Goal  Description: Patient's Short Term Goal: To breathe better and decrease fluid overload    Interventions:   - Monitor vital signs  - Monitor appropriate labs   - IV abx   - BIPAP prn  - Pain management   - Administer medications per order   - Follow MD orders   - Diagnostics per order   - Update/inform patient and family on plan of care   - See additional Care Plan goals for specific interventions  Outcome: Progressing     Problem: PAIN - ADULT  Goal: Verbalizes/displays adequate comfort level or patient's stated pain goal  Description: INTERVENTIONS:  - Encourage pt to monitor pain and request assistance  - Assess pain using appropriate pain scale  - Administer analgesics based on type and severity of pain and evaluate response  - Implement non-pharmacological measures as appropriate and evaluate response  - Consider  cultural and social influences on pain and pain management  - Manage/alleviate anxiety  - Utilize distraction and/or relaxation techniques  - Monitor for opioid side effects  - Notify MD/LIP if interventions unsuccessful or patient reports new pain  - Anticipate increased pain with activity and pre-medicate as appropriate  Outcome: Progressing     Problem: SAFETY ADULT - FALL  Goal: Free from fall injury  Description: INTERVENTIONS:  - Assess pt frequently for physical needs  - Identify cognitive and physical deficits and behaviors that affect risk of falls.  - [place] fall precautions as indicated by assessment.  - Educate pt/family on patient safety including physical limitations  - Instruct pt to call for assistance with activity based on assessment  - Modify environment to reduce risk of injury  - Provide assistive devices as appropriate  - Consider OT/PT consult to assist with strengthening/mobility  - Encourage toileting schedule  Outcome: Progressing     Problem: DISCHARGE PLANNING  Goal: Discharge to home or other facility with appropriate resources  Description: INTERVENTIONS:  - Identify barriers to discharge w/pt and caregiver  - Include patient/family/discharge partner in discharge planning  - Arrange for needed discharge resources and transportation as appropriate  - Identify discharge learning needs (meds, wound care, etc)  - Arrange for interpreters to assist at discharge as needed  - Consider post-discharge preferences of patient/family/discharge partner  - Complete POLST form as appropriate  - Assess patient's ability to be responsible for managing their own health  - Refer to Case Management Department for coordinating discharge planning if the patient needs post-hospital services based on physician/LIP order or complex needs related to functional status, cognitive ability or social support system  Outcome: Progressing     Problem: RESPIRATORY - ADULT  Goal: Achieves optimal ventilation and  oxygenation  Description: INTERVENTIONS:  - Assess for changes in respiratory status  - Assess for changes in mentation and behavior  - Position to facilitate oxygenation and minimize respiratory effort  - Oxygen supplementation based on oxygen saturation or ABGs  - Provide Smoking Cessation handout, if applicable  - Encourage broncho-pulmonary hygiene including cough, deep breathe, Incentive Spirometry  - Assess the need for suctioning and perform as needed  - Assess and instruct to report SOB or any respiratory difficulty  - Respiratory Therapy support as indicated  - Manage/alleviate anxiety  - Monitor for signs/symptoms of CO2 retention  Outcome: Progressing     Problem: CARDIOVASCULAR - ADULT  Goal: Maintains optimal cardiac output and hemodynamic stability  Description: INTERVENTIONS:  - Monitor vital signs, rhythm, and trends  - Monitor for bleeding, hypotension and signs of decreased cardiac output  - Evaluate effectiveness of vasoactive medications to optimize hemodynamic stability  - Monitor arterial and/or venous puncture sites for bleeding and/or hematoma  - Assess quality of pulses, skin color and temperature  - Assess for signs of decreased coronary artery perfusion - ex. Angina  - Evaluate fluid balance, assess for edema, trend weights  Outcome: Progressing     Problem: SKIN/TISSUE INTEGRITY - ADULT  Goal: Skin integrity remains intact  Description: INTERVENTIONS  - Assess and document risk factors for pressure ulcer development  - Assess and document skin integrity  - Monitor for areas of redness and/or skin breakdown  - Initiate interventions, skin care algorithm/standards of care as needed  Outcome: Progressing     Problem: HEMATOLOGIC - ADULT  Goal: Maintains hematologic stability  Description: INTERVENTIONS  - Assess for signs and symptoms of bleeding or hemorrhage  - Monitor labs and vital signs for trends  - Administer supportive blood products/factors, fluids and medications as ordered and  appropriate  - Administer supportive blood products/factors as ordered and appropriate  Outcome: Progressing  Goal: Free from bleeding injury  Description: (Example usage: patient with low platelets)  INTERVENTIONS:  - Avoid intramuscular injections, enemas and rectal medication administration  - Ensure safe mobilization of patient  - Hold pressure on venipuncture sites to achieve adequate hemostasis  - Assess for signs and symptoms of internal bleeding  - Monitor lab trends  - Patient is to report abnormal signs of bleeding to staff  - Avoid use of toothpicks and dental floss  - Use electric shaver for shaving  - Use soft bristle tooth brush  - Limit straining and forceful nose blowing  Outcome: Progressing     Problem: MUSCULOSKELETAL - ADULT  Goal: Return mobility to safest level of function  Description: INTERVENTIONS:  - Assess patient stability and activity tolerance for standing, transferring and ambulating w/ or w/o assistive devices  - Assist with transfers and ambulation using safe patient handling equipment as needed  - Ensure adequate protection for wounds/incisions during mobilization  - Obtain PT/OT consults as needed  - Advance activity as appropriate  - Communicate ordered activity level and limitations with patient/family  Outcome: Progressing     Monitoring vital signs, stable at this time. No acute changes at this moment. BiPAP worn on/off overnight. Fall precautions in place-  bed locked in lowest position, call light within reach. Frequent rounding by nursing staff.

## 2024-12-18 NOTE — PROGRESS NOTES
[facility]  part of [facility]    Progress Note    Yesenia Berkowitz Patient Status:  Inpatient    1940 MRN [id number]   Location [facility]5W Attending Robbie Tate MD   Hosp Day # 5 PCP JO-ANN YANG MD       Subjective:   Yesenia Berkowitz is a(n) 84 year old female.   Less sob and cough persistent leg edema  Objective:   Blood pressure 133/71, pulse 80, temperature 97.9 °F (36.6 °C), temperature source Oral, resp. rate 18, weight 175 lb 9.6 oz (79.7 kg), SpO2 95%.    HEENT: negative  Neck: no adenopathy, no carotid bruit, no JVD, supple, symmetrical, trachea midline and thyroid not enlarged, symmetric, no tenderness/mass/nodules  Pulmonary/Chest:  clear to auscultation bilaterally, no tenderness  Cardiovascular: S1, S2 normal, no S3 or S4, regular rate and rhythm, no murmur  Abdominal: normal findings: bowel sounds normal, soft, non-tender and no hepatosplenomegaly   Extremities: extremities normal, atraumatic, no cyanosis or2+ edema  Skin: Skin color, texture, turgor normal. No rashes or lesions    Results:     Lab Results   Component Value Date    WBC 10.1 2024    HGB 12.3 2024    HCT 38.5 2024    .0 2024    CREATSERUM 1.06 (H) 2024    BUN 50 (H) 2024     2024    K 3.9 2024     2024    CO2 40.0 (HH) 2024     (H) 2024    CA 8.5 (L) 2024    ALB 3.3 2024    ALKPHO 59 2024    BILT 0.4 2024    TP 5.0 (L) 2024    AST 20 2024    ALT 32 2024    PTT 24.3 2024    INR 1.08 2024    T4F 0.9 2024    TSH 0.185 (L) 2024    LIP 30 2024    DDIMER 0.45 2024    MG 1.9 2024    PHOS 3.9 2024    TROP <0.045 2020       No results found.        Assessment and Plan:   Principal Problem:    SOB (shortness of breath)  Active Problems:    COPD with acute exacerbation (HCC)    Kyphoscoliosis    Phrenic nerve paralysis     Restrictive lung disease    Acute cor pulmonale (HCC)    Acute on chronic hypoxic respiratory failure (HCC)    Acute on chronic diastolic (congestive) heart failure (HCC)    Obesity hypoventilation syndrome (HCC)    Asthma (HCC)    Felt better,less sob and cough,persistent leg edemahome with raghu LIVINGSTON MD, MD  12/18/2024

## 2024-12-18 NOTE — DISCHARGE SUMMARY
[facility]  part of [facility]    Discharge Summary    Yesenia Berkowitz Patient Status:  Inpatient    1940 MRN [id number]   Location [facility]5W Attending Sina Hogan MD   Hosp Day # 5 PCP JO-ANN YANG MD     Date of Admission: 2024 Disposition: Home Health Care Services     Date of Discharge: 24    Admitting Diagnosis: SOB (shortness of breath) [R06.02]    Hospital Discharge Diagnoses: Acute diastolic HF, Acute COPD exacerbation    Lace+ Score: 83  59-90 High Risk  29-58 Medium Risk  0-28   Low Risk.    TCM Follow-Up Recommendation:  LACE > 58: High Risk of readmission after discharge from the hospital.    Problem List:   Patient Active Problem List   Diagnosis    Actinic keratosis    Inflamed seborrheic keratosis    Palpitations    Pain of upper abdomen    Acute on chronic congestive heart failure, unspecified heart failure type (HCC)    COPD with acute exacerbation (HCC)    COPD (chronic obstructive pulmonary disease) (HCC)    Kyphoscoliosis    Phrenic nerve paralysis    Restrictive lung disease    Acute cor pulmonale (HCC)    Pneumonia    Acute on chronic hypoxic respiratory failure (HCC)    Acute pulmonary edema (HCC)    Pleural effusion    Pulmonary nodule    Community acquired pneumonia of right lower lobe of lung    Acute on chronic respiratory failure with hypoxia and hypercapnia (HCC)    Acute respiratory failure with hypoxia and hypercapnia (HCC)    Cor pulmonale (chronic) (HCC)    SOB (shortness of breath)    Acute on chronic diastolic (congestive) heart failure (HCC)    Obesity hypoventilation syndrome (HCC)    Asthma (HCC)       Reason for Admission: SOB    Physical Exam:   Vitals:    24 1033   BP:    Pulse: 86   Resp:    Temp:      Gen: A+Ox3.  No distress.   HEENT: NCAT, neck supple, no carotid bruit.  CV: RRR, S1S2, and intact distal pulses. No gallop, rub, murmur.  Pulm: coarse bs L base, no wheezing  Abd: Soft, NTND, BS normal, no  mass, no HSM, no rebound/guarding.   Neuro:  Normal reflexes, CN. Sensory/motor exams grossly normal deficit.   MS: No joint effusions.  1+ bilateral LE peripheral edema.  Skin: Skin is warm and dry. No rashes, erythema, diaphoresis.   Psych:   Normal mood and affect. Calm, cooperative    History of Present Illness:   Per Dr. Tate  This is a 84 year oldfemale recently discharged after hospitalization for COPD and congestive heart failure.  Noticed increase in swelling, weight, dyspnea at home.  Her main reason for coming in however was back pain located between the shoulder blades.  No radiation.      Hospital Course:   Acute on chronic diastolic congestive heart failure.  Presumably mostly due to noncompliance with diet.  Patient does not check her weight very regularly. Cr up slightly but still normal  -IV diuretics per cardiology -> dc on PO  -Cardiology was consulted  -cont GDMT     Acute COPD exacerbation.  -IV Solu-Medrol, now every 12 -> DC o PO  -Nebulizer treatments  -Pulmonology was on consult     Acute on chronic hypoxemic and hypercarbic respiratory failure.  Usually on 4 L. Some of her OJEDA may be related to inc right sided pressures.  -Continue oxygen  -Decrease as tolerated  -Bipap as tolerated  -Home BiPAP arranged     Back pain.  This was her primary complaint but now much better.  Seems to be musculoskeletal.  -Heat pack  -Gentle activity  -No neurologic symptoms     Other problems  A-fib, proximal muscle  Diverticulosis  ?  Hepatitis, per chart     Consultations: Pulm, Cards    Procedures: none    Complications: none    Discharge Condition: Stable    Discharge Medications:      Discharge Medications        START taking these medications        Instructions Prescription details   doxycycline 100 MG Caps  Commonly known as: Vibramycin      Take 1 capsule (100 mg total) by mouth every 12 (twelve) hours for 10 doses.   Stop taking on: December 22, 2024  Quantity: 10 capsule  Refills: 0      empagliflozin 10 MG Tabs  Commonly known as: Jardiance      Take 1 tablet (10 mg total) by mouth daily.   Quantity: 30 tablet  Refills: 3     predniSONE 20 MG Tabs  Commonly known as: Deltasone      Take 1 tablet (20 mg total) by mouth daily for 10 doses.   Stop taking on: December 27, 2024  Quantity: 10 tablet  Refills: 0     spironolactone 25 MG Tabs  Commonly known as: Aldactone      Take 1 tablet (25 mg total) by mouth daily for 90 doses.   Stop taking on: March 17, 2025  Quantity: 90 tablet  Refills: 0            CONTINUE taking these medications        Instructions Prescription details   acetaminophen 500 MG Tabs  Commonly known as: Tylenol Extra Strength      Take 1 tablet (500 mg total) by mouth every 6 (six) hours as needed for Pain or Fever.   Refills: 0     acetaZOLAMIDE 250 MG Tabs  Commonly known as: Diamox      Take 2 tablets (500 mg total) by mouth daily.   Quantity: 30 tablet  Refills: 0     albuterol 108 (90 Base) MCG/ACT Aers  Commonly known as: Ventolin HFA      Inhale 2 puffs into the lungs as needed.   Refills: 0     albuterol (2.5 MG/3ML) 0.083% Nebu  Commonly known as: Ventolin      Take 3 mL (2.5 mg total) by nebulization every 4 (four) hours as needed for Wheezing or Shortness of Breath.   Quantity: 50 each  Refills: 0     aspirin 81 MG Tabs      Take 2 tablets (162 mg total) by mouth daily.   Refills: 0     B Complex Caps      Take 1 tablet by mouth daily.   Refills: 0     Calcium + D3 600-200 MG-UNIT Tabs      Take 1 tablet by mouth daily.   Refills: 0     dicyclomine 10 MG Caps  Commonly known as: Bentyl      Take 1 capsule (10 mg total) by mouth 3 (three) times daily as needed (abdominal pain).   Quantity: 40 capsule  Refills: 3     digoxin 0.125 MG Tabs  Commonly known as: Lanoxin      Take 1 tablet (125 mcg total) by mouth daily.   Quantity: 30 tablet  Refills: 0     dilTIAZem HCl ER Coated Beads 360 MG Cp24  Commonly known as: CARDIZEM CD      Take 1 capsule (360 mg total) by mouth  daily. Take 1 capsule (360mg)by mouth every morning on an empty stomach.   Refills: 0     estradiol 0.05 MG/24HR Ptwk  Commonly known as: Climara      Place 1 patch onto the skin once a week. As directed.   Refills: 0     furosemide 40 MG Tabs  Commonly known as: Lasix      Take 1 tablet (40 mg total) by mouth BID (Diuretic).   Quantity: 60 tablet  Refills: 0     lansoprazole 30 MG Cpdr  Commonly known as: Prevacid      Take 1 capsule (30 mg total) by mouth nightly.   Refills: 0     Loratadine 10 MG Caps      Take 10 mg by mouth nightly.   Refills: 0     montelukast 10 MG Tabs  Commonly known as: Singulair      Take 1 tablet (10 mg total) by mouth nightly.   Refills: 0     mupirocin 2 % Oint  Commonly known as: Bactroban      Apply 1 Application topically 3 (three) times daily.   Quantity: 1 each  Refills: 3     omega-3 fatty acids 1000 MG Caps  Commonly known as: Fish Oil      Take 1,000 mg by mouth daily.   Refills: 0     potassium chloride 20 MEQ Tbcr  Commonly known as: Klor-Con M20      Take 1 tablet (20 mEq total) by mouth nightly.   Refills: 0     Pulmicort Flexhaler 180 MCG/ACT Aepb  Generic drug: Budesonide      Inhale 2 puffs into the lungs daily.   Refills: 0     THERA/BETA-CAROTENE Tabs      take 1 tablet by ORAL route  every day with food   Refills: 0     tiotropium 18 MCG Caps  Commonly known as: Spiriva Handihaler      Inhale 1 capsule (18 mcg total) into the lungs nightly.   Refills: 0     triamcinolone 0.1 % Crea  Commonly known as: Kenalog      Apply topically 2 (two) times daily. Apply bid as directed   Quantity: 80 g  Refills: 3     Vitamin D 1000 units Tabs      Take 1,000 Units by mouth 2 (two) times daily.   Refills: 0            STOP taking these medications      amitriptyline 50 MG Tabs  Commonly known as: Elavil        benzonatate 200 MG Caps  Commonly known as: Tessalon        guaiFENesin 100 MG/5 ML  Commonly known as: Robitussin                  Where to Get Your Medications        These  medications were sent to [id number] - [place], [place] - [address] [phone], [phone]  [address] [place] [address]      Phone: [phone]   doxycycline 100 MG Caps  empagliflozin 10 MG Tabs  predniSONE 20 MG Tabs  spironolactone 25 MG Tabs         Greater than 35 minutes spent, >50% spent counseling re: treatment plan and workup     Sina Hogan MD  12/18/2024

## 2024-12-18 NOTE — PROGRESS NOTES
Patient is being discharge to home, all discharge instructions provided to the patient as well as son at bedside. Patient verbalized understanding of taking medications as well as following up with physicians. Iv discontinued. Tele off.

## 2024-12-18 NOTE — PROGRESS NOTES
Progress Note  Yesenia Berkowitz Patient Status:  Inpatient    1940 MRN [id number]   Location [facility]5W Attending Robbie Tate MD   Hosp Day # 5 PCP JO-ANN YANG MD     Attending Cardiologist: Malcolm     SUBJECTIVE:    Breathing and LE edema feels near to be near baseline. No chest pain. Reports mild non-productive cough that is unchanged.     VITALS:  /71 (BP Location: Right arm)   Pulse 86   Temp 97.9 °F (36.6 °C) (Oral)   Resp 16   Wt 175 lb 9.6 oz (79.7 kg)   SpO2 95%   BMI 29.22 kg/m²   INTAKE/OUTPUT:    Intake/Output Summary (Last 24 hours) at 2024 1040  Last data filed at 2024 1012  Gross per 24 hour   Intake 492 ml   Output 1120 ml   Net -628 ml     Last 3 Weights   24 0608 175 lb 9.6 oz (79.7 kg)   24 0544 180 lb 4.8 oz (81.8 kg)   24 0906 179 lb (81.2 kg)   12/15/24 0559 179 lb 14.4 oz (81.6 kg)   24 0500 179 lb 12.8 oz (81.6 kg)   24 0700 182 lb 9.6 oz (82.8 kg)   24 0900 175 lb 3.2 oz (79.5 kg)   24 0541 169 lb 14.4 oz (77.1 kg)   24 0650 169 lb 6.4 oz (76.8 kg)   24 0700 174 lb (78.9 kg)   24 2200 173 lb 6.4 oz (78.7 kg)   24 1224 170 lb (77.1 kg)     LABS:  Recent Labs   Lab 12/15/24  0523 24  0440 24  0502   * 277* 273*   BUN 28* 34* 50*   CREATSERUM 0.75 0.94 1.06*   EGFRCR 79 61 52*   CA 8.8 8.7 8.5*    139 141   K 4.0 3.9 3.9    102 100   CO2 36.0* 37.0* 40.0*     Recent Labs   Lab 24  0513 24  0449 24  0509   RBC 4.09 3.53* 3.98   HGB 13.0 11.1* 12.3   HCT 40.6 34.5* 38.5   MCV 99.3 97.7 96.7   MCH 31.8 31.4 30.9   MCHC 32.0 32.2 31.9   RDW 14.9 14.6 14.8   NEPRELIM 7.65 7.14 9.27*   WBC 10.1 7.7 10.1   .0 171.0 250.0     No results for input(s): \"TROP\", \"CK\" in the last 168 hours.  DIAGNOSTICS:  TELEMETRY: SR, Rare PVCs    ECHO 2024:  Conclusions:   1. Left ventricle: The cavity size was normal. Wall thickness was mildly       increased. Systolic function was hyperdynamic. The estimated ejection      fraction was 65-70%, by visual assessment. No diagnostic evidence for      regional wall motion abnormalities. Unable to assess LV diastolic      function.   2. Right ventricle: The cavity size was mildly increased. Systolic pressure      was moderately increased.   3. Aortic valve: There was thickening. The findings were consistent with      mild stenosis. The peak systolic velocity was 2.06 m/sec. The mean      systolic gradient was 10 mm Hg. The valve area (VTI) was 2.17 cm^2. The      valve area (VTI) index was 1.19 cm^2/m^2.   4. Mitral valve: The annulus was moderately calcified. The leaflets were      mildly thickened. Transvalvular velocity was increased. The findings were      consistent with mild stenosis. The mean diastolic gradient was 5 mm Hg.   5. Pulmonary arteries: Systolic pressure was moderately increased, estimated      to be 56 mm Hg.     ROS: Negative unless noted above   PHYSICAL EXAM:  General: Alert and oriented x 3. No apparent distress.  HEENT: Normocephalic, sclera are nonicteric. Hearing appropriate bilaterally.  Neck: No JVD or Carotid bruits. Trachea midline.   Cardiac: Regular rate and rhythm. S1, S2 auscultated. No murmurs, rubs, or gallops appreciated.   Lungs: a/p dullness. Chest expansion symmetrical. Regular effort. 3.5L NC  Abdomen: Soft, non-tender, +BS. No hepatosplenomegaly or appreciable masses.   Extremities: Without clubbing, cyanosis. Peripheral pulses are 2+. Edema +1 from feet to mid shin BLE (per pt chronic)  Neurologic: Motor and sensory nerves grossly intact.   Psych: Appropriate affect   Skin: Warm and dry. No obvious lesions, wounds, or ulcerations.   MEDICATIONS:   docusate sodium  100 mg Oral BID    polyethylene glycol (PEG 3350)  17 g Oral Daily    estradiol  1 patch Transdermal Weekly    ipratropium-albuterol  3 mL Nebulization 2 times daily    doxycycline  100 mg Oral 2 times per day     acetaZOLAMIDE  500 mg Oral Daily    amitriptyline  50 mg Oral Nightly    aspirin  162 mg Oral Daily    digoxin  125 mcg Oral Daily    dilTIAZem HCl ER Coated Beads  360 mg Oral Daily    pantoprazole  40 mg Oral QAM AC    cetirizine  5 mg Oral Daily    montelukast  10 mg Oral Nightly    fluticasone furoate  1 puff Inhalation Daily    umeclidinium bromide  1 puff Inhalation Daily    heparin  5,000 Units Subcutaneous 2 times per day    methylPREDNISolone  40 mg Intravenous Q12H    spironolactone  25 mg Oral Daily     ASSESSMENT:    Presented with dyspnea and LE edema. Reports forgetting to take Lasix twice a day at home.     Acute Hypercarbic Respiratory Failure   COPD Exacerbation  Acute on Chronic HFpEF  - Multifactorial with COPD exacerbation and volume expansion  - Pulm following, Steroids  - Baseline 2-3.5L NC use, at baseline 3.5L NC  - proBNP 272, trop negative x1, dimer negative   - s/p IV Lasix 80 mg BID and Diamox 500 mg daily, Aldactone, Net neg 4L, Wt grossly unchanged, bed scale   - Slight increase in Cr and Bicarb rising yesterday therefore an extra dose of Diamox was given  - Nearing euvolemia     PAF  - Maintaining SR. On CCB and Digoxin, Last TSH low/T4 normal,  Dig level 0.70  - Not historically on a/c due to bruising/ bleeding risk/ Hx GIB and low Afib burden, o/p Watchman discussions in process     HTN- Controlled   Chronic Anemia- Stable on 12/13  HLD- LDL 78    PLAN:  - Check BMP, then will determine PO diuretic dosing, hopefully home today   - Unable to start Lipitor while on CCB due to interaction and increased risk of myopathies, can explore alternatives outpatient   - Add compression hose, check standing weight   - SGLT2 cost-prohibitive for patient at this time, OOP cost is $156.42/ month   - CardioDafne silva with  o/p, will review education materials with patient    Plan of care discussed with patient and RN.     Cortney Dumont, MSN, FNP-BC, CCK  12/18/24   9:26 AM  [phone]  [place]  [phone] [place]

## 2024-12-21 ENCOUNTER — APPOINTMENT (OUTPATIENT)
Dept: GENERAL RADIOLOGY | Facility: HOSPITAL | Age: 84
DRG: 477 | End: 2024-12-21
Attending: EMERGENCY MEDICINE
Payer: MEDICARE

## 2024-12-21 ENCOUNTER — HOSPITAL ENCOUNTER (INPATIENT)
Facility: HOSPITAL | Age: 84
LOS: 8 days | Discharge: HOME HEALTH | DRG: 477 | End: 2024-12-29
Attending: EMERGENCY MEDICINE | Admitting: INTERNAL MEDICINE
Payer: MEDICARE

## 2024-12-21 ENCOUNTER — HOSPITAL ENCOUNTER (INPATIENT)
Facility: HOSPITAL | Age: 84
LOS: 8 days | Discharge: HOME | End: 2024-12-29
Attending: EMERGENCY MEDICINE | Admitting: INTERNAL MEDICINE
Payer: MEDICARE

## 2024-12-21 ENCOUNTER — APPOINTMENT (OUTPATIENT)
Dept: GENERAL RADIOLOGY | Facility: HOSPITAL | Age: 84
End: 2024-12-21
Attending: EMERGENCY MEDICINE
Payer: MEDICARE

## 2024-12-21 DIAGNOSIS — J18.9 ATYPICAL PNEUMONIA: ICD-10-CM

## 2024-12-21 DIAGNOSIS — J90 PLEURAL EFFUSION ON LEFT: ICD-10-CM

## 2024-12-21 DIAGNOSIS — S22.050A COMPRESSION FRACTURE OF T6 VERTEBRA (HCC): ICD-10-CM

## 2024-12-21 DIAGNOSIS — I50.9 ACUTE ON CHRONIC CONGESTIVE HEART FAILURE, UNSPECIFIED HEART FAILURE TYPE (HCC): Primary | ICD-10-CM

## 2024-12-21 DIAGNOSIS — M54.6 ACUTE BILATERAL THORACIC BACK PAIN: ICD-10-CM

## 2024-12-21 DIAGNOSIS — J90 PLEURAL EFFUSION ON RIGHT: ICD-10-CM

## 2024-12-21 LAB
ALBUMIN SERPL-MCNC: 3.4 G/DL (ref 3.2–4.8)
ALBUMIN/GLOB SERPL: 1.8 {RATIO} (ref 1–2)
ALP LIVER SERPL-CCNC: 74 U/L
ALT SERPL-CCNC: 21 U/L
ANION GAP SERPL CALC-SCNC: 5 MMOL/L (ref 0–18)
AST SERPL-CCNC: 15 U/L (ref ?–34)
BASOPHILS # BLD AUTO: 0.05 X10(3) UL (ref 0–0.2)
BASOPHILS NFR BLD AUTO: 0.3 %
BILIRUB SERPL-MCNC: 0.4 MG/DL (ref 0.2–1.1)
BILIRUB UR QL: NEGATIVE
BUN BLD-MCNC: 22 MG/DL (ref 9–23)
BUN/CREAT SERPL: 31 (ref 10–20)
CALCIUM BLD-MCNC: 8.6 MG/DL (ref 8.7–10.4)
CHLORIDE SERPL-SCNC: 101 MMOL/L (ref 98–112)
CLARITY UR: CLEAR
CO2 SERPL-SCNC: 38 MMOL/L (ref 21–32)
COLOR UR: COLORLESS
CREAT BLD-MCNC: 0.71 MG/DL
D DIMER PPP FEU-MCNC: 0.47 UG/ML FEU (ref ?–0.82)
DEPRECATED RDW RBC AUTO: 53.6 FL (ref 35.1–46.3)
EGFRCR SERPLBLD CKD-EPI 2021: 85 ML/MIN/1.73M2 (ref 60–?)
EOSINOPHIL # BLD AUTO: 0.02 X10(3) UL (ref 0–0.7)
EOSINOPHIL NFR BLD AUTO: 0.1 %
ERYTHROCYTE [DISTWIDTH] IN BLOOD BY AUTOMATED COUNT: 14.8 % (ref 11–15)
FLUAV + FLUBV RNA SPEC NAA+PROBE: NEGATIVE
FLUAV + FLUBV RNA SPEC NAA+PROBE: NEGATIVE
GLOBULIN PLAS-MCNC: 1.9 G/DL (ref 2–3.5)
GLUCOSE BLD-MCNC: 233 MG/DL (ref 70–99)
GLUCOSE UR-MCNC: >1000 MG/DL
HCT VFR BLD AUTO: 42.5 %
HGB BLD-MCNC: 13.1 G/DL
HGB UR QL STRIP.AUTO: NEGATIVE
IMM GRANULOCYTES # BLD AUTO: 0.21 X10(3) UL (ref 0–1)
IMM GRANULOCYTES NFR BLD: 1.2 %
KETONES UR-MCNC: NEGATIVE MG/DL
LACTATE SERPL-SCNC: 1.6 MMOL/L (ref 0.5–2)
LEUKOCYTE ESTERASE UR QL STRIP.AUTO: NEGATIVE
LYMPHOCYTES # BLD AUTO: 1.38 X10(3) UL (ref 1–4)
LYMPHOCYTES NFR BLD AUTO: 8 %
MCH RBC QN AUTO: 30.3 PG (ref 26–34)
MCHC RBC AUTO-ENTMCNC: 30.8 G/DL (ref 31–37)
MCV RBC AUTO: 98.2 FL
MONOCYTES # BLD AUTO: 0.4 X10(3) UL (ref 0.1–1)
MONOCYTES NFR BLD AUTO: 2.3 %
NEUTROPHILS # BLD AUTO: 15.25 X10 (3) UL (ref 1.5–7.7)
NEUTROPHILS # BLD AUTO: 15.25 X10(3) UL (ref 1.5–7.7)
NEUTROPHILS NFR BLD AUTO: 88.1 %
NITRITE UR QL STRIP.AUTO: NEGATIVE
NT-PROBNP SERPL-MCNC: 786 PG/ML (ref ?–450)
OSMOLALITY SERPL CALC.SUM OF ELEC: 309 MOSM/KG (ref 275–295)
PH UR: 6.5 [PH] (ref 5–8)
PLATELET # BLD AUTO: 245 10(3)UL (ref 150–450)
POTASSIUM SERPL-SCNC: 4.5 MMOL/L (ref 3.5–5.1)
PROCALCITONIN SERPL-MCNC: 0.05 NG/ML (ref ?–0.05)
PROT SERPL-MCNC: 5.3 G/DL (ref 5.7–8.2)
PROT UR-MCNC: NEGATIVE MG/DL
RBC # BLD AUTO: 4.33 X10(6)UL
RSV RNA SPEC NAA+PROBE: NEGATIVE
SARS-COV-2 RNA RESP QL NAA+PROBE: NOT DETECTED
SODIUM SERPL-SCNC: 144 MMOL/L (ref 136–145)
SP GR UR STRIP: 1.01 (ref 1–1.03)
TROPONIN I SERPL HS-MCNC: 19 NG/L
UROBILINOGEN UR STRIP-ACNC: NORMAL
WBC # BLD AUTO: 17.3 X10(3) UL (ref 4–11)

## 2024-12-21 PROCEDURE — 71045 X-RAY EXAM CHEST 1 VIEW: CPT | Performed by: EMERGENCY MEDICINE

## 2024-12-21 PROCEDURE — 99223 1ST HOSP IP/OBS HIGH 75: CPT | Performed by: INTERNAL MEDICINE

## 2024-12-21 RX ORDER — HYDROCODONE BITARTRATE AND ACETAMINOPHEN 5; 325 MG/1; MG/1
1 TABLET ORAL ONCE
Status: COMPLETED | OUTPATIENT
Start: 2024-12-21 | End: 2024-12-21

## 2024-12-21 RX ORDER — DOXYCYCLINE 100 MG/1
100 CAPSULE ORAL EVERY 12 HOURS SCHEDULED
Status: DISCONTINUED | OUTPATIENT
Start: 2024-12-22 | End: 2024-12-27

## 2024-12-21 RX ORDER — DILTIAZEM HYDROCHLORIDE 180 MG/1
360 CAPSULE, EXTENDED RELEASE ORAL DAILY
Status: DISCONTINUED | OUTPATIENT
Start: 2024-12-22 | End: 2024-12-29

## 2024-12-21 RX ORDER — DOXYCYCLINE 100 MG/1
100 CAPSULE ORAL ONCE
Status: COMPLETED | OUTPATIENT
Start: 2024-12-21 | End: 2024-12-21

## 2024-12-21 RX ORDER — FUROSEMIDE 10 MG/ML
80 INJECTION INTRAMUSCULAR; INTRAVENOUS ONCE
Status: COMPLETED | OUTPATIENT
Start: 2024-12-21 | End: 2024-12-21

## 2024-12-21 RX ORDER — ACETAZOLAMIDE 250 MG/1
500 TABLET ORAL DAILY
Status: DISCONTINUED | OUTPATIENT
Start: 2024-12-22 | End: 2024-12-26

## 2024-12-21 RX ORDER — MONTELUKAST SODIUM 10 MG/1
10 TABLET ORAL NIGHTLY
Status: DISCONTINUED | OUTPATIENT
Start: 2024-12-21 | End: 2024-12-29

## 2024-12-21 RX ORDER — PREDNISONE 20 MG/1
20 TABLET ORAL DAILY
Status: DISCONTINUED | OUTPATIENT
Start: 2024-12-22 | End: 2024-12-28

## 2024-12-21 RX ORDER — ONDANSETRON 2 MG/ML
4 INJECTION INTRAMUSCULAR; INTRAVENOUS EVERY 6 HOURS PRN
Status: DISCONTINUED | OUTPATIENT
Start: 2024-12-21 | End: 2024-12-29

## 2024-12-21 RX ORDER — DIGOXIN 125 MCG
125 TABLET ORAL DAILY
Status: DISCONTINUED | OUTPATIENT
Start: 2024-12-22 | End: 2024-12-29

## 2024-12-21 RX ORDER — ACETAMINOPHEN 500 MG
500 TABLET ORAL EVERY 4 HOURS PRN
Status: DISCONTINUED | OUTPATIENT
Start: 2024-12-21 | End: 2024-12-29

## 2024-12-21 RX ORDER — HYDROCODONE BITARTRATE AND ACETAMINOPHEN 5; 325 MG/1; MG/1
1 TABLET ORAL EVERY 6 HOURS PRN
Status: DISCONTINUED | OUTPATIENT
Start: 2024-12-21 | End: 2024-12-29

## 2024-12-21 RX ORDER — ASPIRIN 325 MG
162 TABLET ORAL DAILY
Status: DISCONTINUED | OUTPATIENT
Start: 2024-12-22 | End: 2024-12-29

## 2024-12-21 RX ORDER — ALBUTEROL SULFATE 0.83 MG/ML
2.5 SOLUTION RESPIRATORY (INHALATION) EVERY 4 HOURS PRN
Status: DISCONTINUED | OUTPATIENT
Start: 2024-12-21 | End: 2024-12-29

## 2024-12-21 RX ORDER — METOCLOPRAMIDE HYDROCHLORIDE 5 MG/ML
5 INJECTION INTRAMUSCULAR; INTRAVENOUS EVERY 8 HOURS PRN
Status: DISCONTINUED | OUTPATIENT
Start: 2024-12-21 | End: 2024-12-29

## 2024-12-21 RX ORDER — FUROSEMIDE 10 MG/ML
40 INJECTION INTRAMUSCULAR; INTRAVENOUS
Status: DISCONTINUED | OUTPATIENT
Start: 2024-12-22 | End: 2024-12-23

## 2024-12-21 RX ORDER — HEPARIN SODIUM 5000 [USP'U]/ML
5000 INJECTION, SOLUTION INTRAVENOUS; SUBCUTANEOUS EVERY 8 HOURS SCHEDULED
Status: DISCONTINUED | OUTPATIENT
Start: 2024-12-22 | End: 2024-12-29

## 2024-12-21 NOTE — ED INITIAL ASSESSMENT (HPI)
Pt to the ed via ems for upper back pain x 3 days  Also reports BLE swelling and increased SOB   Hx of asthma and COPD  Baseline 4L O2 at home

## 2024-12-21 NOTE — ED PROVIDER NOTES
[facility] Emergency Department Note  Patient: Yesenia Berkowitz Age: 84 year old Sex: female      MRN: [id number]  : 1940    Patient Seen in: [facility] Emergency Department    History     Chief Complaint   Patient presents with    Difficulty Breathing     Stated Complaint: sob    History obtained from: patient     This is a very pleasant 84-year-old female history of A-fib not on anticoagulation, COPD chronically on 4 L nasal cannula, GERD, presents to the ER for evaluation of left-sided thoracic back pain.  Denies any trauma, new activities, heavy lifting or other injury.  States the pain is worse when she is laying flat at night and worse with certain movements.  She denies numbness or tingling in her extremities.  No fevers or chills.  No new cough.  States that the pain makes her feel short of breath.  Denies chest pain or tightness.  No abdominal pain, vomiting or diarrhea.  Does note increased swelling in her legs since she was discharged from the hospital here few days ago.  No history of DVT or PE.  No recent travel.  States she chronically has urinary incontinence that has been worsening recently but no stool incontinence, no saddle anesthesias.    Review of Systems:  Review of Systems  Positive for stated complaint: sob. Constitutional and vital signs reviewed. All other systems reviewed and negative except as noted above.    Patient History:  Past Medical History:    A-fib (HCC)    Anesthesia complication    Arrhythmia    AFIB    Arthritis    Asthma (HCC)    Back problem    COPD (chronic obstructive pulmonary disease) (HCC)    Deviated nasal septum    nasal septoplasty, turb reduction, smr of turbs    Difficult intubation    fiber optic if needed    Diverticulitis    colonoscopy     Diverticulosis of large intestine    Esophageal reflux    Extrinsic asthma, unspecified    Headache    Heart disease    Hepatitis    WAS TOLD SHE HAS HAD THIS WHEN SHE WAS 19 YEARS OLD    High blood pressure    High  cholesterol    Irregular heart rate    Lichenoid keratosis    left chest-bx    Osteoarthritis    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    Paronychia    (RT); onychomycosis; debridement    Problems with swallowing    occasionally    Shortness of breath    2 L NC ALL THE TIME 24HR/7 DAYS PER WEEK    Unspecified essential hypertension    Visual impairment       Past Surgical History:   Procedure Laterality Date    Adenoidectomy      Cataract      cataract extraction     Hysterectomy      Sinus surgery        DEVIATED SEPTUM    T&a      Tonsillectomy          Family History   Problem Relation Age of Onset    Ovarian Cancer Mother 76        endometrial, poss ovarian primary    Pulmonary Disease Sister         COPD    Skin cancer Other     Stroke Other     Other (Other) Other        Specific Social Determinants of Health:   Social History     Socioeconomic History    Marital status:    Tobacco Use    Smoking status: Former     Current packs/day: 0.00     Types: Cigarettes     Quit date: 1996     Years since quittin.9    Smokeless tobacco: Never   Vaping Use    Vaping status: Never Used   Substance and Sexual Activity    Alcohol use: Yes     Alcohol/week: 0.0 standard drinks of alcohol     Comment: one drink once a week    Drug use: Never   Other Topics Concern    Pt has a pacemaker No    Pt has a defibrillator No    Reaction to local anesthetic No    Caffeine Concern No     Social Drivers of Health     Food Insecurity: No Food Insecurity (2024)    Food Insecurity     Food Insecurity: Never true   Transportation Needs: No Transportation Needs (2024)    Transportation Needs     Lack of Transportation: No   Housing Stability: Low Risk  (2024)    Housing Stability     Housing Instability: No           PSFH elements reviewed from today and agreed except as otherwise stated in HPI.    Physical Exam     ED Triage Vitals   BP 24 1815 108/60   Pulse 24 1815 80   Resp  12/21/24 1815 20   Temp 12/21/24 1850 97.8 °F (36.6 °C)   Temp src 12/21/24 1850 Temporal   SpO2 12/21/24 1815 93 %   O2 Device 12/21/24 1815 Nasal cannula       Current:/61 (BP Location: Right arm)   Pulse 78   Temp 98.7 °F (37.1 °C) (Oral)   Resp 18   Wt 78.2 kg   SpO2 98%   BMI 28.69 kg/m²         Physical Exam  Vitals and nursing note reviewed.   Constitutional:       General: She is not in acute distress.     Appearance: She is not ill-appearing.   HENT:      Head: Normocephalic and atraumatic.      Right Ear: External ear normal.      Left Ear: External ear normal.      Nose: Nose normal.      Mouth/Throat:      Mouth: Mucous membranes are moist.   Eyes:      Conjunctiva/sclera: Conjunctivae normal.   Cardiovascular:      Rate and Rhythm: Normal rate and regular rhythm.      Heart sounds: No murmur heard.  Pulmonary:      Effort: Pulmonary effort is normal. Tachypnea present. No accessory muscle usage or respiratory distress.      Breath sounds: No stridor. Examination of the left-lower field reveals rales. Rales present. No wheezing or rhonchi.   Abdominal:      General: There is no distension.      Palpations: Abdomen is soft.      Tenderness: There is no abdominal tenderness. There is no guarding or rebound.   Musculoskeletal:         General: No deformity.      Right lower leg: Edema present.      Left lower leg: Edema present.      Comments: 2+ lower leg edema distal to the knees that is pitting, no calf tenderness or asymmetry, no leg redness   Skin:     General: Skin is warm and dry.      Capillary Refill: Capillary refill takes less than 2 seconds.   Neurological:      General: No focal deficit present.      Mental Status: She is alert.      Comments: Strength is 5/5 bilateral handgrip, elbow flexion and extension, hip flexion, and ankle plantar and dorsiflexion.  Sensation tact light touch symmetric bilateral upper and lower extremities and symmetric         ED Course   Labs:   Labs  Reviewed   CBC WITH DIFFERENTIAL WITH PLATELET - Abnormal; Notable for the following components:       Result Value    WBC 17.3 (*)     MCHC 30.8 (*)     RDW-SD 53.6 (*)     Neutrophil Absolute Prelim 15.25 (*)     Neutrophil Absolute 15.25 (*)     All other components within normal limits   COMP METABOLIC PANEL (14) - Abnormal; Notable for the following components:    Glucose 233 (*)     CO2 38.0 (*)     BUN/CREA Ratio 31.0 (*)     Calcium, Total 8.6 (*)     Calculated Osmolality 309 (*)     Total Protein 5.3 (*)     Globulin  1.9 (*)     All other components within normal limits   PRO BETA NATRIURETIC PEPTIDE - Abnormal; Notable for the following components:    Pro-Beta Natriuretic Peptide 786 (*)     All other components within normal limits   URINALYSIS WITH CULTURE REFLEX - Abnormal; Notable for the following components:    Urine Color Colorless (*)     Glucose Urine >1000 (*)     Squamous Epi. Cells Few (*)     All other components within normal limits   PROCALCITONIN - Abnormal; Notable for the following components:    Procalcitonin 0.05 (*)     All other components within normal limits   D-DIMER - Normal   TROPONIN I HIGH SENSITIVITY - Normal   LACTIC ACID, PLASMA - Normal   SARS-COV-2/FLU A AND B/RSV BY PCR (GENEXPERT) - Normal    Narrative:     This test is intended for the qualitative detection and differentiation of SARS-CoV-2, influenza A, influenza B, and respiratory syncytial virus (RSV) viral RNA in nasopharyngeal or nares swabs from individuals suspected of respiratory viral infection consistent with COVID-19 by their healthcare provider. Signs and symptoms of respiratory viral infection due to SARS-CoV-2, influenza, and RSV can be similar.    Test performed using the Xpert Xpress SARS-CoV-2/FLU/RSV (real time RT-PCR)  assay on the GeneXpert instrument, [employer], [place] [address].   This test is being used under the Food and Drug Administration's Emergency Use Authorization.    The authorized Fact  Sheet for Healthcare Providers for this assay is available upon request from the laboratory.   BLOOD CULTURE   BLOOD CULTURE     Radiology findings:  I personally reviewed the images.   XR CHEST AP PORTABLE  (CPT=71045)    Result Date: 12/21/2024  CONCLUSION:   Small bilateral pleural effusions with mild bibasilar opacity which may reflect atelectasis with or without superimposed pneumonia similar to the prior study.    Dictated by (CST): Jayro Samuel MD on 12/21/2024 at 6:41 PM     Finalized by (CST): Jayro Samuel MD on 12/21/2024 at 6:43 PM           EKG as interpreted by me: My interpretation of EKG shows atrial flutter with variable AV block rate 86 bpm, QTc 435 ms, no STEMI  Cardiac Monitor: Interpreted by me.   Pulse Readings from Last 1 Encounters:   12/21/24 78   ,aflutter     External non-ED records reviewed independently by me: Reviewed patient discharge summary after hospitalization from 12/12 through 12/18/2024, she was admitted at that time for heart failure exacerbation and COPD exacerbation, seen by cardiology and discharged on 80 mg Lasix, discharged on oral steroids, seen by pulmonology.    MDM   This patient presents with atraumatic left/thoracic back pain and leg swelling, sob     Differential diagnoses considered includes, but is not limited to:   Pleural effusion  Pneumonia  Viral syndrome  COPD exacerbation less likely given no wheezing on exam but considered given her history   PE given recent hospitalization   Uti  Clinically given no reproducible midline back tenderness or fever, low suspicion for spinal epidural abscess or spinal cord compressive syndrome    Will obtain the following tests: cbc, cmp, trop, probnp, d-dimer, viral swab,cxr, ecg     Will administer the following medications/therapies: norco for pain     Please see ED course for my independent review of these tests/imaging results.      ED Course as of 12/21/24  2335  ------------------------------------------------------------  Time: 12/21 1815  Value: D-Dimer: 0.47  Comment: (Reviewed)  ------------------------------------------------------------  Time: 12/21 1856  Value: pro-BETA NATRIURETIC PEPTIDE(!): 786  Comment: Probnp has uptrended from recent labs. Trop normal. Dimer normal. Cov flu rsv neg. Cbc leukocytosis with left shift. Cmp with elevated bicarb likely 2/2 chronic COPD. Ua no uti.   ------------------------------------------------------------  Time: 12/21 1857  Value: XR CHEST AP PORTABLE  (CPT=71045)  Comment:   FINDINGS:  CARDIAC/MEDIAST: Left heart border is partially obscured.  The heart and mediastinum are unchanged.  LUNGS/PLEURA: Emphysematous change again noted.  There appears to be elevation left hemidiaphragm.  Mild bibasilar opacity which appears to reflect small effusions and parenchymal opacity not significantly changed.  No pneumothorax.  OTHER: Degenerative change in the osseous structures.              Impression  CONCLUSION:     Small bilateral pleural effusions with mild bibasilar opacity which may reflect atelectasis with or without superimposed pneumonia similar to the prior study.       ------------------------------------------------------------  Time: 12/21 1918  Comment: Case d/w Dr. Garcia accepts admission.  Overall workup is concerning for likely acute CHF exacerbation given her uptrending proBNP from prior admission with her increased leg swelling, she also does have a new leukocytosis with left shift concerning for pneumonia given bilateral pleural effusions and basilar opacities, suspect likely component of pleuritic pain irritating her thoracic spine.  Will administer cefepime and doxycycline, obtain blood cultures lactic, plan for admission.  Cardiology and pulmonology will be consulted  ------------------------------------------------------------  Time: 12/21 1946  Comment: Discussed case with Dr. Padilla with pulm accepts  consult.  Discussed with hospitalist accepts admission.  Discussed with JR APODACA accepts consult. Will tx with cefepime and doxycyline for pneumonia coverage. Lasix given. VSS at this time             Procedures:  Procedures        Disposition and Plan     Clinical Impression:  1. Acute on chronic congestive heart failure, unspecified heart failure type (HCC)    2. Pleural effusion on left    3. Pleural effusion on right    4. Acute bilateral thoracic back pain    5. Atypical pneumonia        Disposition:  Admit    Hospital Problems       Present on Admission  Date Reviewed: 12/21/2024            ICD-10-CM Noted POA    * (Principal) Acute on chronic congestive heart failure, unspecified heart failure type (HCC) I50.9 9/13/2024 Unknown    Acute bilateral thoracic back pain M54.6 12/21/2024 Unknown    Acute exacerbation of CHF (congestive heart failure) (HCC) I50.9 12/21/2024 Yes    Atypical pneumonia J18.9 12/21/2024 Unknown    Pleural effusion on left J90 12/21/2024 Unknown    Pleural effusion on right J90 12/21/2024 Unknown        This note may have been created using voice dictation technology and may include inadvertent errors.      Tonia Maguire, DO  Attending Physician   Emergency Medicine

## 2024-12-22 ENCOUNTER — APPOINTMENT (OUTPATIENT)
Dept: CT IMAGING | Facility: HOSPITAL | Age: 84
DRG: 477 | End: 2024-12-22
Attending: INTERNAL MEDICINE
Payer: MEDICARE

## 2024-12-22 ENCOUNTER — APPOINTMENT (OUTPATIENT)
Dept: CT IMAGING | Facility: HOSPITAL | Age: 84
End: 2024-12-22
Attending: INTERNAL MEDICINE
Payer: MEDICARE

## 2024-12-22 LAB
ATRIAL RATE: 326 BPM
P AXIS: 234 DEGREES
Q-T INTERVAL: 364 MS
QRS DURATION: 86 MS
QTC CALCULATION (BEZET): 435 MS
R AXIS: -7 DEGREES
T AXIS: 38 DEGREES
VENTRICULAR RATE: 86 BPM

## 2024-12-22 PROCEDURE — 99233 SBSQ HOSP IP/OBS HIGH 50: CPT | Performed by: INTERNAL MEDICINE

## 2024-12-22 PROCEDURE — 72128 CT CHEST SPINE W/O DYE: CPT | Performed by: INTERNAL MEDICINE

## 2024-12-22 RX ORDER — MORPHINE SULFATE 2 MG/ML
0.5 INJECTION, SOLUTION INTRAMUSCULAR; INTRAVENOUS EVERY 6 HOURS PRN
Status: DISCONTINUED | OUTPATIENT
Start: 2024-12-22 | End: 2024-12-29

## 2024-12-22 RX ORDER — CETIRIZINE HYDROCHLORIDE 10 MG/1
10 TABLET ORAL NIGHTLY
Status: DISCONTINUED | OUTPATIENT
Start: 2024-12-22 | End: 2024-12-22

## 2024-12-22 RX ORDER — CALCIUM CARBONATE 500(1250)
500 TABLET ORAL 2 TIMES DAILY WITH MEALS
Status: DISCONTINUED | OUTPATIENT
Start: 2024-12-22 | End: 2024-12-29

## 2024-12-22 RX ORDER — SPIRONOLACTONE 25 MG/1
25 TABLET ORAL DAILY
Status: DISCONTINUED | OUTPATIENT
Start: 2024-12-22 | End: 2024-12-29

## 2024-12-22 RX ORDER — ESTRADIOL 0.05 MG/D
1 PATCH TRANSDERMAL WEEKLY
Status: DISCONTINUED | OUTPATIENT
Start: 2024-12-22 | End: 2024-12-29

## 2024-12-22 RX ORDER — CHOLECALCIFEROL (VITAMIN D3) 25 MCG
1000 TABLET ORAL 2 TIMES DAILY
Status: DISCONTINUED | OUTPATIENT
Start: 2024-12-22 | End: 2024-12-29

## 2024-12-22 RX ORDER — PANTOPRAZOLE SODIUM 40 MG/1
40 TABLET, DELAYED RELEASE ORAL
Status: DISCONTINUED | OUTPATIENT
Start: 2024-12-22 | End: 2024-12-29

## 2024-12-22 RX ORDER — CETIRIZINE HYDROCHLORIDE 5 MG/1
5 TABLET ORAL NIGHTLY
Status: DISCONTINUED | OUTPATIENT
Start: 2024-12-22 | End: 2024-12-29

## 2024-12-22 NOTE — PLAN OF CARE
Pt A&Ox4. On 4L NC, BL. On tele with pulse ox. PRN norco for pain. Standby assist. Call light within reach. Frequent rounding by staff.  Problem: Patient Centered Care  Goal: Patient preferences are identified and integrated in the patient's plan of care  Description: Interventions:  - What would you like us to know as we care for you?   - Provide timely, complete, and accurate information to patient/family  - Incorporate patient and family knowledge, values, beliefs, and cultural backgrounds into the planning and delivery of care  - Encourage patient/family to participate in care and decision-making at the level they choose  - Honor patient and family perspectives and choices  Outcome: Progressing     Problem: Patient/Family Goals  Goal: Patient/Family Long Term Goal  Description: Patient's Long Term Goal:     Interventions:  -   - See additional Care Plan goals for specific interventions  Outcome: Progressing  Goal: Patient/Family Short Term Goal  Description: Patient's Short Term Goal:     Interventions:   -   - See additional Care Plan goals for specific interventions  Outcome: Progressing     Problem: PAIN - ADULT  Goal: Verbalizes/displays adequate comfort level or patient's stated pain goal  Description: INTERVENTIONS:  - Encourage pt to monitor pain and request assistance  - Assess pain using appropriate pain scale  - Administer analgesics based on type and severity of pain and evaluate response  - Implement non-pharmacological measures as appropriate and evaluate response  - Consider cultural and social influences on pain and pain management  - Manage/alleviate anxiety  - Utilize distraction and/or relaxation techniques  - Monitor for opioid side effects  - Notify MD/LIP if interventions unsuccessful or patient reports new pain  - Anticipate increased pain with activity and pre-medicate as appropriate  Outcome: Progressing     Problem: RISK FOR INFECTION - ADULT  Goal: Absence of fever/infection during  anticipated neutropenic period  Description: INTERVENTIONS  - Monitor WBC  - Administer growth factors as ordered  - Implement neutropenic guidelines  Outcome: Progressing     Problem: SAFETY ADULT - FALL  Goal: Free from fall injury  Description: INTERVENTIONS:  - Assess pt frequently for physical needs  - Identify cognitive and physical deficits and behaviors that affect risk of falls.  - [place] fall precautions as indicated by assessment.  - Educate pt/family on patient safety including physical limitations  - Instruct pt to call for assistance with activity based on assessment  - Modify environment to reduce risk of injury  - Provide assistive devices as appropriate  - Consider OT/PT consult to assist with strengthening/mobility  - Encourage toileting schedule  Outcome: Progressing     Problem: DISCHARGE PLANNING  Goal: Discharge to home or other facility with appropriate resources  Description: INTERVENTIONS:  - Identify barriers to discharge w/pt and caregiver  - Include patient/family/discharge partner in discharge planning  - Arrange for needed discharge resources and transportation as appropriate  - Identify discharge learning needs (meds, wound care, etc)  - Arrange for interpreters to assist at discharge as needed  - Consider post-discharge preferences of patient/family/discharge partner  - Complete POLST form as appropriate  - Assess patient's ability to be responsible for managing their own health  - Refer to Case Management Department for coordinating discharge planning if the patient needs post-hospital services based on physician/LIP order or complex needs related to functional status, cognitive ability or social support system  Outcome: Progressing     Problem: RESPIRATORY - ADULT  Goal: Achieves optimal ventilation and oxygenation  Description: INTERVENTIONS:  - Assess for changes in respiratory status  - Assess for changes in mentation and behavior  - Position to facilitate oxygenation and  minimize respiratory effort  - Oxygen supplementation based on oxygen saturation or ABGs  - Provide Smoking Cessation handout, if applicable  - Encourage broncho-pulmonary hygiene including cough, deep breathe, Incentive Spirometry  - Assess the need for suctioning and perform as needed  - Assess and instruct to report SOB or any respiratory difficulty  - Respiratory Therapy support as indicated  - Manage/alleviate anxiety  - Monitor for signs/symptoms of CO2 retention  Outcome: Progressing

## 2024-12-22 NOTE — PLAN OF CARE
Yesenia Berkowitz Patient Status:  Inpatient    1940 MRN [id number]   Location [facility] Attending Luz Marina Garcia MD   Hosp Day # 1 PCP JO-ANN YANG MD       Cardiology Nocturnal APN Note    Page Received: Dr. Maguire, ED Physician    HPI:     Patient is a 84 year old female with PMH of atrial fibrillation-not on anticoagulation, chronic respiratory failure on chronic O2 at 4 L NC, HFpEF, HTN and asthma who presented to the ED with c/o back pain. Pt reported back pain was worse when laying flat and with movement. Pt also reported having increased BLE edema. Pt discharged from [facility] on 2024 for acute CHF exacerbation and acute respiratory failure. Cardiac work up in ED was suggestive of acute CHF exacerbation. IV Lasix was given. Work up also indicative of possible pneumonia. [facility] consulted for acute on chronic CHF exacerbation. Echocardiogram completed 2024 showed normal left ventricular cavity size, mildly increased wall thickness and hyperdynamic systolic function. EF 65-60%. HPI obtained from chart review and information provided by ED physician.      ED Clinical Course    EKG: Aflutter-rate controlled    Labs:   Troponin negative    Procalcitonin 0.05    Imaging: CXR showed small bilateral effusions with possible superimposed pneumonia    Medications: Lasix 80 mg IVP, antibiotics        Assessment/Plan:    - Continue diuresis  - Monitor I/O  - Daily weights  - Continue to monitor overnight  - Formal cardiology consult to follow in AM.       RAFI Dias  [facility]  2024  2:39 AM

## 2024-12-22 NOTE — CONSULTS
Cardiology Consult Note    Yesenia Berkowitz Patient Status:  Inpatient    1940 MRN [id number]   Location [facility]5W Attending Lissy Blandon MD   Hosp Day # 1 PCP JO-ANN YANG MD     HPI.  Yesenia Berkowitz is a 84 year old female with a history of chronic hypoxic respiratory failure/COPD on 4 L, heart failure, moderate pulm hypertension, hypertension, A-fib, asthma, GERD who presents to the hospital with back pain for 5 to 6 days.  Patient was admitted earlier this month for COPD and heart failure exacerbation.  Lab work pertinent for BNP 76, dimer 0.47, troponin 19, WBC 17.3 and chest x-ray with small bilateral pleural effusions and mild bibasilar opacities atelectasis versus pneumonia.  EKG with atrial flutter in variable block.  Patient deemed to be in acute on chronic heart failure and was admitted for workup and management of back pain, heart failure Possible pneumonia.      Prior cardiac workup  Echo 2024  1. Left ventricle: The cavity size was normal. Wall thickness was mildly      increased. Systolic function was hyperdynamic. The estimated ejection      fraction was 65-70%, by visual assessment. No diagnostic evidence for      regional wall motion abnormalities. Unable to assess LV diastolic      function.   2. Right ventricle: The cavity size was mildly increased. Systolic pressure      was moderately increased.   3. Aortic valve: There was thickening. The findings were consistent with      mild stenosis. The peak systolic velocity was 2.06 m/sec. The mean      systolic gradient was 10 mm Hg. The valve area (VTI) was 2.17 cm^2. The      valve area (VTI) index was 1.19 cm^2/m^2.   4. Mitral valve: The annulus was moderately calcified. The leaflets were      mildly thickened. Transvalvular velocity was increased. The findings were      consistent with mild stenosis. The mean diastolic gradient was 5 mm Hg.   5. Pulmonary arteries: Systolic pressure was moderately increased, estimated      to  be 56 mm Hg.   *         --------------------------------------------------------------------------------------------------------------------------------  ROS 10 systems reviewed, pertinent findings above.  ROS    History:  Past Medical History:    A-fib ([facility])    Anesthesia complication    Arrhythmia    AFIB    Arthritis    Asthma ([facility])    Back problem    COPD (chronic obstructive pulmonary disease) ([facility])    Deviated nasal septum    nasal septoplasty, turb reduction, smr of turbs    Difficult intubation    fiber optic if needed    Diverticulitis    colonoscopy     Diverticulosis of large intestine    Esophageal reflux    Extrinsic asthma, unspecified    Headache    Heart disease    Hepatitis    WAS TOLD SHE HAS HAD THIS WHEN SHE WAS 19 YEARS OLD    High blood pressure    High cholesterol    Irregular heart rate    Lichenoid keratosis    left chest-bx    Osteoarthritis    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    Paronychia    (RT); onychomycosis; debridement    Problems with swallowing    occasionally    Shortness of breath    2 L NC ALL THE TIME 24HR/7 DAYS PER WEEK    Unspecified essential hypertension    Visual impairment     Past Surgical History:   Procedure Laterality Date    Adenoidectomy      Cataract      cataract extraction     Hysterectomy      Sinus surgery        DEVIATED SEPTUM    T&a      Tonsillectomy       Family History   Problem Relation Age of Onset    Ovarian Cancer Mother 76        endometrial, poss ovarian primary    Pulmonary Disease Sister         COPD    Skin cancer Other     Stroke Other     Other (Other) Other       reports that she quit smoking about 28 years ago. Her smoking use included cigarettes. She has never used smokeless tobacco. She reports current alcohol use. She reports that she does not use drugs.    Objective:   Temp: 98.3 °F (36.8 °C)  Pulse: 78  Resp: 18  BP: 120/53    Intake/Output:     Intake/Output Summary (Last 24 hours) at 12/22/2024 1138  Last data filed  at 12/22/2024 1133  Gross per 24 hour   Intake 442 ml   Output 905 ml   Net -463 ml       Physical Exam:     General: Alert and oriented x 3  HEENT: Normocephalic, anicteric sclera, neck supple.  Neck: No JVD, carotids 2+, no bruits.  Cardiac: Irregularly irregular rate and rhythm. S1, S2 normal. No murmur, pericardial rub, S3.  Lungs: Clear without wheezes, rales, rhonchi or dullness.  Normal excursions and effort.  Abdomen: Soft, non-tender. BS-present.  Extremities: Without clubbing, cyanosis, +2 lower EXTR edema.  Peripheral pulses are 2+.  Neurologic: Non-focal  Skin: Warm and dry.       Assessment:    Back pain secondary to T6 fracture  Chronic hypoxic respiratory failure secondary to COPD  HFpEF  Moderate pulm hypertension  Atrial fibrillation/flutter, not on anticoagulation  Former tobacco abuse  Hypertension        Plan:  84-year-old female presenting with back pain admitted for further workup and management.  Questionable pneumonia based on chest x-ray in the ER  Also concern for volume overload, started on diuretics  Recommend continued diuretics and acetazolamide for alkalosis  Rate control with diltiazem, digoxin  T6 vertebral fracture management per primary    Thank you for allowing me to take part in the care of Yesenia Berkowitz. Please call with any questions of concerns.      Level of care: C5    Dr. Shawn Camilo,   December 22, 2024  11:39 AM

## 2024-12-22 NOTE — PROGRESS NOTES
[facility]  [facility]     Yesenia Berkowitz Patient Status:  Inpatient    1940 MRN [id number]   Location [facility]5W Attending Lissy Blandon MD   Hosp Day # 1 PCP JO-ANN YANG MD     Subjective:     Patient resting comfortably and in NAD. Denied any active complaints at the time of interview.   She reported her pain is better since admission.   In good spirits this morning.       Objective:    Review of Systems:   ROS completed; pertinent positive and negatives stated in subjective.      Vital signs:  Temp:  [97.8 °F (36.6 °C)-98.8 °F (37.1 °C)] 98.3 °F (36.8 °C)  Pulse:  [64-87] 78  Resp:  [14-20] 18  BP: ()/() 120/53  SpO2:  [90 %-98 %] 97 %      Physical Exam:    Gen: NAD AO x3  Chest: good air entry CTABL  CVS: normal s1 and s2 RR  Abd: NABS soft NT ND  Neuro: CN 2-12 grossly intact  Ext: no edema in bilateral LE      Diagnostic Data:    Labs:  Recent Labs   Lab 24  0509 24  1236 24  1735   WBC 10.1 15.6* 17.3*   HGB 12.3 12.5 13.1   MCV 96.7 102.0* 98.2   .0 191.0 245.0       Recent Labs   Lab 24  0440 24  0502 24  1236 24  1735   * 273* 195* 233*   BUN 34* 50* 29* 22   CREATSERUM 0.94 1.06* 0.58 0.71   CA 8.7 8.5* 8.3* 8.6*   ALB 3.1* 3.3  --  3.4    141 136 144   K 3.9 3.9 4.2 4.5    100 99 101   CO2 37.0* 40.0* 33.0* 38.0*   ALKPHO  --   --   --  74   AST  --   --   --  15   ALT  --   --   --  21   BILT  --   --   --  0.4   TP  --   --   --  5.3*       Estimated Creatinine Clearance: 55 mL/min (based on SCr of 0.71 mg/dL).    No results for input(s): \"PTP\", \"INR\" in the last 168 hours.           Imaging: Imaging data reviewed in Epic.    Medications:    cholecalciferol  1,000 Units Oral BID    estradiol  1 patch Transdermal Weekly    pantoprazole  40 mg Oral QAM AC    cetirizine  10 mg Oral Nightly    spironolactone  25 mg Oral Daily    acetaZOLAMIDE  500 mg  Oral Daily    aspirin  162.5 mg Oral Daily    digoxin  125 mcg Oral Daily    dilTIAZem HCl ER Coated Beads  360 mg Oral Daily    doxycycline  100 mg Oral 2 times per day    empagliflozin  10 mg Oral Daily    montelukast  10 mg Oral Nightly    predniSONE  20 mg Oral Daily    umeclidinium bromide  1 puff Inhalation Daily    heparin  5,000 Units Subcutaneous Q8H MOISÉS    furosemide  40 mg Intravenous BID (Diuretic)    lidocaine-menthol  1 patch Transdermal Daily       Assessment & Plan:     Intractable mid back pain  Denies any injury or recent trauma  CT T-spine ending  Acute on chronic CHF exacerbation  Chronic respiratory failure, baseline ~4L NC  BNP elevated  Started on Lasix 40 mg IV BID  Cardiology on consult  Appreciate recs  Net IO Since Admission: -190 mL [12/22/24 0846]  Leukocytosis, likely steroid induced  CXR reviewed  UA reviewed  Procal borderline  Cultures pending  Complete home doxycycline course  HTN  BP well controlled  Continue home meds  Monitor vitals  Afib, not on AC  Continue home meds  Tele monitoring  COPD  Not in exacerbation      Plan of care discussed with patient or family at bedside.      Supplementary Documentation:     Quality:  DVT Prophylaxis: heparin s/c  CODE status: Full       Estimated date of discharge: TBD  Discharge is dependent on: clinical stability  At this point Ms. Berkowitz is expected to be discharge to: home             **Certification      PHYSICIAN Certification of Need for Inpatient Hospitalization - Initial Certification    Patient will require inpatient services that will reasonably be expected to span two midnight's based on the clinical documentation in H+P.   Based on patients current state of illness, I anticipate that, after discharge, patient will require TBD.      Lissy Blandon MD  Hospitalist    MDM: High, I personally reviewed the available laboratories, imaging including XR. I discussed the case with RN. I ordered laboratories, studies including AM  labs.  Medical decision making high, risk is high.       The 21st Century Cures Act makes medical notes like these available to patients in the interest of transparency. Please be advised this is a medical document. Medical documents are intended to carry relevant information, facts as evident, and the clinical opinion of the practitioner. The medical note is intended as peer to peer communication and may appear blunt or direct. It is written in medical language and may contain abbreviations or verbiage that are unfamiliar.

## 2024-12-22 NOTE — H&P
[facility]  part of [facility]                                                                                                          History and Physical     Yesenia Berkowitz Patient Status:  Emergency    1940 MRN [id number]   Location [facility] EMERGENCY DEPARTMENT Attending Tonia Maguire DO   Hosp Day # 0 PCP JO-ANN YANG MD       Chief Complaint: Upper back pain    Subjective:    Yesenia Berkowitz is a 84 year old female with history of chronic respiratory failure on 4 L, COPD, CHF, HTN, A-fib, asthma, GERD with recent hospitalization between  to 2024 and treated for CHF/COPD exacerbation.  She presents today via EMS with complaints of upper back pain for the last 5-6 days.  Pain has progressively gotten worse.  She is unable to lie down.  Has been unable to sleep.  Son at bedside notes patient has been a bit forgetful.  Patient attributes this to lack of sleep.  She has noted mild worsening of lower extremity swelling but no pulmonary symptoms.  No fever or chills.  No cough above baseline.    Vital stable  Labs with CO2 38, , D-dimer 0.47, troponin 19  WBC 17.3  CXR: Small bilateral pleural effusions with mild bibasilar opacity which may reflect atelectasis with or without superimposed pneumonia similar to the prior study   UA negative for nitrite and leuc est  Resp PCR negative.  ECG: Aflutter with variable A-V block    As noted, recently hospitalized and treated for COPD/CHF exacerbation. Treated with diuretics, steroids, doxycyline(currently still taking).  This will be patient 5th admission in the last 3 months      History/Other:      Past Medical History:  Past Medical History:    A-fib ([facility])    Anesthesia complication    Arrhythmia    AFIB    Arthritis    Asthma ([facility])    Back problem    COPD (chronic obstructive pulmonary disease) ([facility])    Deviated nasal septum    nasal septoplasty, turb reduction, smr of turbs    Difficult intubation    fiber optic  if needed    Diverticulitis    colonoscopy     Diverticulosis of large intestine    Esophageal reflux    Extrinsic asthma, unspecified    Headache    Heart disease    Hepatitis    WAS TOLD SHE HAS HAD THIS WHEN SHE WAS 19 YEARS OLD    High blood pressure    High cholesterol    Irregular heart rate    Lichenoid keratosis    left chest-bx    Osteoarthritis    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    Paronychia    (RT); onychomycosis; debridement    Problems with swallowing    occasionally    Shortness of breath    2 L NC ALL THE TIME 24HR/7 DAYS PER WEEK    Unspecified essential hypertension    Visual impairment        Past Surgical History:   Past Surgical History:   Procedure Laterality Date    Adenoidectomy      Cataract      cataract extraction     Hysterectomy      Sinus surgery        DEVIATED SEPTUM    T&a      Tonsillectomy         Social History:  reports that she quit smoking about 28 years ago. Her smoking use included cigarettes. She has never used smokeless tobacco. She reports current alcohol use. She reports that she does not use drugs.    Family History:   Family History   Problem Relation Age of Onset    Ovarian Cancer Mother 76        endometrial, poss ovarian primary    Pulmonary Disease Sister         COPD    Skin cancer Other     Stroke Other     Other (Other) Other        Allergies: Allergies[1]    Medications:  Medications Ordered Prior to Encounter[2]    Review of Systems:   A comprehensive 14 point review of systems was completed.    Pertinent positives and negatives noted in the HPI.    Objective:     /66   Pulse 66   Temp 97.8 °F (36.6 °C) (Temporal)   Resp 20   SpO2 95%   General: No acute distress.    HEENT: Normocephalic, atraumatic.  Neck: Supple.  Respiratory: Normal effort.  CTAB  Cardiovascular: Irregular heart rhythm, normal rate.  No murmur  Abdomen: Soft, nontender distended.  Neurologic: Alert, oriented x 3.  Nonfocal.  Musculoskeletal: Tenderness to mid  back  Extremities: 2+ lower extremity edema, close to baseline  Psychiatric: Appropriate    Results:      Labs:  Labs Last 24 Hours:   BMP     CBC    Other     Na 144 Cl 101 BUN 22 Glu 233   Hb 13.1   PTT - Procal -   K 4.5 CO2 38.0 Cr 0.71   WBC 17.3 >< .0  INR - CRP -   Renal Lytes Endo    Hct 42.5   Trop - D dim 0.47   eGFR - Ca 8.6 POC Gluc  -    LFT   pBNP 786 Lactic -   eGFR AA - PO4 - A1c -   AST 15 APk 74 Prot 5.3  LDL -     Mg - TSH -   ALT 21 T katie 0.4 Alb 3.4          COVID-19 Lab Results    COVID-19  Lab Results   Component Value Date    COVID19 Not Detected 12/21/2024    COVID19 Not Detected 09/13/2024       Pro-Calcitonin  No results for input(s): \"PCT\" in the last 168 hours.    Cardiac  Recent Labs   Lab 12/21/24  1735   PBNP 786*       Creatinine Kinase  No results for input(s): \"CK\" in the last 168 hours.    Inflammatory Markers  Recent Labs   Lab 12/21/24  1735   DDIMER 0.47       Imaging: Imaging data reviewed in Epic.    Assessment & Plan:    Intractable mid back pain, unclear etiology.  No reported trauma   -CT T-spine   -Pain control    Acute on chronic CHF exacerbation  -Patient clinically with minimal symptoms but BNP elevated above baseline  -Got a dose of IV Lasix in ED.  -Give another dose in the morning  -Cardiology on consult    Leucocytosis, likely steroids induced  -CXR with no acute process, UA negative  -Check procalcitonin.  Hold cefepime and complete course of doxy if not elevated  -Pulmonology consulted    Chronic respiratory failure on 4 L,  COPD,  CHF,  HTN,  A-fib, not on anticoagulation  Asthma,  GERD  -Resume home regimen when reconciled    Quality:  DVT Prophylaxis: Heparin  CODE status: Full code  ANGEL: 1 to 2 days      Plan of care discussed with patient and son at bedside. Acknowledged understanding and agrees to plan. Also discussed with the ED physician.      >75 minutes spent on this admission - examining patient, obtaining history, reviewing previous medical  records, going over test results/imaging and discussing plan of care. More than 50% of the time was spent in counseling and/or coordination of care related to intractable back pain, possible CHF exacerbation.   All questions answered.     Luz Marina Garcia MD  12/21/2024                   [1]   Allergies  Allergen Reactions    Penicillin G ANAPHYLAXIS    Azithromycin DIARRHEA and NAUSEA AND VOMITING    Cefuroxime UNKNOWN     Other reaction(s): Vomitting    Levofloxacin UNKNOWN     Other reaction(s): LEVOFLOXACIN    Penicillins      Other reaction(s): Unknown   [2]   Current Facility-Administered Medications on File Prior to Encounter   Medication Dose Route Frequency Provider Last Rate Last Admin    [COMPLETED] acetaZOLAMIDE (Diamox) tab 500 mg  500 mg Oral Once Cortney Dumont APRN   500 mg at 12/17/24 1600    [COMPLETED] furosemide (Lasix) 10 mg/mL injection 80 mg  80 mg Intravenous BID (Diuretic) Cortney Dumont APRN   80 mg at 12/17/24 0951    [COMPLETED] furosemide (Lasix) 10 mg/mL injection 40 mg  40 mg Intravenous Once Luis Fernando Fowler MD   40 mg at 12/15/24 1242    [COMPLETED] ipratropium-albuterol (Duoneb) 0.5-2.5 (3) MG/3ML inhalation solution 3 mL  3 mL Nebulization Once Korey Luna MD   3 mL at 12/12/24 0541    [COMPLETED] acetaminophen (Tylenol Extra Strength) tab 1,000 mg  1,000 mg Oral Once Korey Luna MD   1,000 mg at 12/12/24 0554    [COMPLETED] furosemide (Lasix) 10 mg/mL injection 10 mg  10 mg Intravenous Once Norma Kerns MD   10 mg at 12/12/24 0808    [COMPLETED] ipratropium-albuterol (Duoneb) 0.5-2.5 (3) MG/3ML inhalation solution 3 mL  3 mL Nebulization Once Norma Kerns MD   3 mL at 12/12/24 0758    [COMPLETED] potassium chloride (Klor-Con M20) tab 40 mEq  40 mEq Oral Q4H Obdulia Lovell DO   40 mEq at 11/27/24 1234    [COMPLETED] furosemide (Lasix) 10 mg/mL injection 40 mg  40 mg Intravenous Once Molly Carrillo APRN   40 mg at 11/27/24 1726    [COMPLETED]  potassium chloride (Klor-Con M20) tab 40 mEq  40 mEq Oral Once Rex Tabor MD   40 mEq at 11/26/24 1006    [COMPLETED] potassium chloride (Klor-Con M20) tab 40 mEq  40 mEq Oral Q4H Luz Marina Garcia MD   40 mEq at 11/25/24 1301    [COMPLETED] albuterol (Ventolin) (5 MG/ML) 0.5% nebulizer solution 10 mg  10 mg Nebulization Once Thierry Lay MD   10 mg at 11/24/24 1934    [COMPLETED] ipratropium (Atrovent) 0.02 % nebulizer solution 1 mg  1 mg Nebulization Once Thierry Lay MD   1 mg at 11/24/24 1933    [COMPLETED] methylPREDNISolone sodium succinate (Solu-MEDROL) injection 125 mg  125 mg Intravenous Once Thierry Lay MD   125 mg at 11/24/24 2111    [COMPLETED] furosemide (Lasix) 10 mg/mL injection 40 mg  40 mg Intravenous Once Aakash Hurt MD   40 mg at 11/18/24 1300    [COMPLETED] dexamethasone (Decadron) 4 MG/ML injection 6 mg  6 mg Intravenous Once Aakash Hurt MD   6 mg at 11/18/24 1255    [COMPLETED] ipratropium-albuterol (Duoneb) 0.5-2.5 (3) MG/3ML inhalation solution 3 mL  3 mL Nebulization Once Aakash Hurt MD   3 mL at 11/18/24 1250    [COMPLETED] doxycycline hyclate (Vibramycin) 100 mg in sodium chloride 0.9% 100 mL IVPB  100 mg Intravenous Once Clare Quintero  mL/hr at 11/03/24 0114 100 mg at 11/03/24 0114    [COMPLETED] iopamidol 76% (ISOVUE-370) injection for power injector  80 mL Intravenous ONCE PRN Obdulia Lovell DO   80 mL at 11/03/24 1238    [COMPLETED] predniSONE (Deltasone) tab 60 mg  60 mg Oral Once Russ Su MD   60 mg at 11/02/24 2140    [COMPLETED] iopamidol 76% (ISOVUE-370) injection for power injector  80 mL Intravenous ONCE PRN Ez Taylor MD   50 mL at 10/08/24 1223    [COMPLETED] methylPREDNISolone sodium succinate (Solu-MEDROL) injection 125 mg  125 mg Intravenous Once ReneJacqueline frank MD   125 mg at 10/07/24 1254    [COMPLETED] ipratropium (Atrovent) 0.02 % nebulizer solution 1 mg  1 mg Nebulization Once ReneJacqueline frank MD   1 mg at 10/07/24 1216     [COMPLETED] furosemide (Lasix) 10 mg/mL injection 40 mg  40 mg Intravenous Once Jacqueline López MD   40 mg at 10/07/24 0346     Current Outpatient Medications on File Prior to Encounter   Medication Sig Dispense Refill    furosemide 80 MG Oral Tab Take 1 tablet (80 mg total) by mouth daily. 30 tablet 3    spironolactone 25 MG Oral Tab Take 1 tablet (25 mg total) by mouth daily for 90 doses. 90 tablet 0    doxycycline 100 MG Oral Cap Take 1 capsule (100 mg total) by mouth every 12 (twelve) hours for 10 doses. 10 capsule 0    predniSONE 20 MG Oral Tab Take 1 tablet (20 mg total) by mouth daily for 10 doses. 10 tablet 0    empagliflozin (JARDIANCE) 10 MG Oral Tab Take 1 tablet (10 mg total) by mouth daily. 30 tablet 3    acetaZOLAMIDE 250 MG Oral Tab Take 2 tablets (500 mg total) by mouth daily. 30 tablet 0    acetaminophen 500 MG Oral Tab Take 1 tablet (500 mg total) by mouth every 6 (six) hours as needed for Pain or Fever.      albuterol (2.5 MG/3ML) 0.083% Inhalation Nebu Soln Take 3 mL (2.5 mg total) by nebulization every 4 (four) hours as needed for Wheezing or Shortness of Breath. 50 each 0    DIGOXIN 0.125 MG Oral Tab Take 1 tablet (125 mcg total) by mouth daily. 30 tablet 0    albuterol 108 (90 Base) MCG/ACT Inhalation Aero Soln Inhale 2 puffs into the lungs as needed.      dicyclomine 10 MG Oral Cap Take 1 capsule (10 mg total) by mouth 3 (three) times daily as needed (abdominal pain). 40 capsule 3    mupirocin 2 % External Ointment Apply 1 Application topically 3 (three) times daily. 1 each 3    triamcinolone 0.1 % External Cream Apply topically 2 (two) times daily. Apply bid as directed 80 g 3    estradiol 0.05 MG/24HR Transdermal Patch Weekly Place 1 patch onto the skin once a week. As directed.      PULMICORT FLEXHALER 180 MCG/ACT Inhalation Aerosol Powder, Breath Activated Inhale 2 puffs into the lungs daily.      dilTIAZem HCl ER Coated Beads 360 MG Oral Capsule SR 24 Hr Take 1 capsule (360 mg total) by  mouth daily. Take 1 capsule (360mg)by mouth every morning on an empty stomach.  0    Calcium Carb-Cholecalciferol (CALCIUM + D3) 600-200 MG-UNIT Oral Tab Take 1 tablet by mouth daily.        Cholecalciferol (VITAMIN D) 1000 UNITS Oral Tab Take 1,000 Units by mouth 2 (two) times daily.      Potassium Chloride ER (K-DUR M20) 20 MEQ Oral Tab CR Take 1 tablet (20 mEq total) by mouth nightly.      lansoprazole (PREVACID) 30 MG Oral Capsule Delayed Release Take 1 capsule (30 mg total) by mouth nightly.      aspirin 81 MG Oral Tab Take 2 tablets (162 mg total) by mouth daily.      Loratadine 10 MG Oral Cap Take 10 mg by mouth nightly.        montelukast 10 MG Oral Tab Take 1 tablet (10 mg total) by mouth nightly.      Multiple Vitamin (MULTI-VITAMINS) Oral Tab take 1 tablet by ORAL route  every day with food      omega-3 fatty acids (FISH OIL) 1000 MG Oral Cap Take 1,000 mg by mouth daily.      Tiotropium Bromide Monohydrate 18 MCG Inhalation Cap Inhale 1 capsule (18 mcg total) into the lungs nightly.      B Complex Oral Cap Take 1 tablet by mouth daily.

## 2024-12-22 NOTE — HISTORICAL OFFICE NOTE
[facility]  Outside Information  Continuity of Care Document  10/23/2024  Yesenia Berkowitz - 84 y.o. Female; born [birth year]Summary of episode note, generated on Oct. 30, 2024October 30, 2024   CHIEF COMPLAINT    CHIEF COMPLAINT  Reason for Visit/Chief Complaint   Follow up   Patient is an 83-year-old female with a history of paroxysmal atrial fibrillation, oxygen dependent COPD, HTN, HLD who presents establish care. Previously followed with Dr. Boles, and prior to that with Dr. Preston. Patient is a former smoker and quit 30 years ago, has been on 2 L of oxygen at baseline for the last 15 years. She has relatively unchanged exertional dyspnea. No chest pain, palpitations, edema, dizziness, or syncope. She also has a remote history of paroxysmal atrial fibrillation, when this comes on she will feel more short of breath and fatigued and then checks her pulse which will feel irregular. This is not occurred for the last several years. She is not on anticoagulation due to frequent bruising and infrequent A-fib. She has mild bilateral lower extremity edema but only takes her Lasix as needed. Patient had a coronary CT in 2020 which demonstrated a small speck of calcium in the LAD with a calcium score of 1.4/15/2024  Her last few months has had increased shortness of breath, put on steroids by her pulmonologist with significant permanent breathing. Denies significant palpitations. Has had increased bilateral lower extremity edema, 2 months ago was on increased Lasix 40 mg daily which seemed to improve although will back to 20 mg daily after 10 days per instruction.8/26/2024  Reports worsened shortness of breath, currently on prednisone for COPD exacerbation. Typically has allergies in the summer as well. Mild gradual increase in lower extremity edema although not too bothersome at this point. Remains on Lasix 40 mg daily.9/27/2024  Unfortunately hospitalized again earlier this month  primarily with COPD exacerbation, was diuresed with IV Lasix for a few days but did not appear clinically overloaded at that time. More recently's been feeling dizzy/lightheaded, fatigued, with persistent shortness of breath. Blood pressures were running low at home in the 90s systolic. Today 82/54.10/23/2024  Patient was again hospitalized with COPD exacerbation, had a thoracentesis for pleural effusion, diuresed with IV Lasix. Discharged on Lasix 40 mg daily alternating with 40 mg twice daily. She saw Dr. Li few days ago recommend that she stay on 40 mg twice daily for a week. Recently had an episode of atrial fibrillation, felt abnormal sensation in her chest. Was confirmed on "[employer]"a mobile device but now back in sinus rhythm. Blood pressure creeping up 130s 140s systolic. Persistent unchanged exertional dyspnea since discharge.Cardiac history:  Paroxysmal atrial fibrillation  COPD, on 2-3 L O2  HFpEF  HTN     PROBLEMS  Reconcile with Patient's ChartPROBLEMS  Problem Effective Dates Date resolved Problem Status   Chronic hypoxemic respiratory failure, [SNOMED-CT: 515460373] 2/13/2024 - Active   COPD (chronic obstructive pulmonary disease), [SNOMED-CT: 82044887] 2/13/2024 - Active   Syncope and collapse, [SNOMED-CT: 538405898] 2/13/2024 - Active   Cataract surgery, unspecified eye, [SNOMED-CT: 671213733] 8/18/2021 5/19/2023 Resolved   PAF (paroxysmal atrial fibrillation), [SNOMED-CT: 792461375] 8/13/2019 - Active   Essential hypertension, [SNOMED-CT: 91931610] 8/13/2019 - Active   PVC's (premature ventricular contractions), [SNOMED-CT: 44530418] 8/13/2019 - Active   Localized edema, [SNOMED-CT: 732067025] 8/14/2019 - Active     ENCOUNTER    ENCOUNTER  Problem Effective Dates Date resolved Problem Status   Acute heart failure with preserved ejection fraction (HFpEF), [SNOMED-CT: 366133507] 2/13/2024 - Active   Chronic hypoxemic respiratory failure, [SNOMED-CT: 307812540] 2/13/2024 - Active   COPD (chronic  obstructive pulmonary disease), [SNOMED-CT: 66932654] 2/13/2024 - Active   Syncope and collapse, [SNOMED-CT: 548249146] 2/13/2024 - Active   PAF (paroxysmal atrial fibrillation), [SNOMED-CT: 182674875] 8/13/2019 - Active   Essential hypertension, [SNOMED-CT: 20261744] 8/13/2019 - Active   PVC's (premature ventricular contractions), [SNOMED-CT: 47862924] 8/13/2019 - Active     VITAL SIGNS    VITAL SIGNS  Date / Time: 10/23/2024   BP Systolic 142 mmHg   BP Diastolic 68 mmHg   Height 65 inches   Weight 166 lbs   Pulse Rate 101 bpm   BSA (Body Surface Area) 1.9 cc/m2   BMI (Body Mass Index) 27.6 cc/m2   Blood Pressure 142 / 68 mmHg     PHYSICAL EXAMINATION    PHYSICAL EXAMINATION  Header Details   Constitutional 95%o2   Vitals Left Arm Sitting  / 68 mmHg, Pulse rate 101 bpm, Height in 5' 5\", BMI: 27.6, Weight in 165.35 lbs (or) 75 kgs, BSA : 1.88 cc/m²   General Appearance No Acute Distress, Normal Body habitus   Cardiovascular      ALLERGIES, ADVERSE REACTIONS, ALERTS    ALLERGIES, ADVERSE REACTIONS, ALERTS  Type Substance Reaction Severity Status   Allergies Cefuroxime, [RxNorm: 561161]   Mild Active   Allergies Levofloxacin, [RxNorm: 2388874]   Mild Active   Allergies penicillin - Ingredient unknown Moderate Active     MEDICATIONS ADMINISTERED DURING VISIT    No data available    MEDICATIONS    MEDICATIONS  Medication Start Date Route/Frequency Status   albuterol (PROAIR HFA) 108 (90 Base) MCG/ACT inhaler, [RxNorm: 522202] 8/16/2021 as needed Active   amitriptyline 50 mg tablet, [RxNorm: 848714] 9/27/2024 Take 1 tablet orally once a day. Active   aspirin 81mg, [RxNorm: 1191] 11/22/2023 2 tablets once a day Active   B complex capsule, [RxNorm: 0] 8/16/2021 - Active   Calcium Carb-Cholecalciferol (CALCIUM + D3) 600-200 MG-UNIT Tab, [RxNorm: 950976] 8/16/2021 1 daily Active   dicyclomine 10 mg capsule, [RxNorm: 806806] 10/23/2024 Take 1 capsule orally 3 times a day as needed. for pain Active   digoxin 125 mcg  (0.125 mg) tablet, [RxNorm: 689885] 10/22/2024 Take 1 tablet orally once a day. Active   dilTIAZem (CARDIZEM CD) 240 MG 24 hr capsule, [RxNorm: 974212] 8/16/2021 Take 240 mg by mouth daily. 1 daily Active   estradiol (CLIMARA) 0.05 MG/24HR once weekly patch, [RxNorm: 0] 8/16/2021 - Active   furosemide 40 mg tablet, [RxNorm: 099825] 10/23/2024 Take 1 tablet orally 2 times a day. For a week Active   HYDROcodone-acetaminophen (NORCO) 5-325 MG per tablet, [RxNorm: 332932] 8/16/2021 as needed Active   lansoprazole (PREVACID SOLUTAB) 30 MG disintegrating tablet, [RxNorm: 0] 8/16/2021 Take 30 mg by mouth daily. 1 daily Active   loratadine (CLARITIN) 10 MG tablet, [RxNorm: 469728] 8/16/2021 1 daily Active   montelukast (SINGULAIR) 10 MG tablet, [RxNorm: 368853] 8/16/2021 1 daily Active   Multiple Vitamin (MULTI-VITAMINS) Tab, [RxNorm: 0] 8/16/2021 - Active   olopatadine (PATANOL) 0.1 % ophthalmic solution, [RxNorm: 5405815] 8/16/2021 - Active   Omega-3 Fatty Acids (FISH OIL) 1200 MG capsule, [RxNorm: 0] 8/16/2021 1 daily Active   tiotropium (SPIRIVA HANDIHALER) 18 MCG capsule for inhaler, [RxNorm: 124219] 8/16/2021 daily Active   sodium fluoride (PREVIDENT 5000 BOOSTER PLUS) 1.1 % dental paste, [RxNorm: 513780] 8/16/2021 - Active   triamcinolone (ARISTOCORT) 0.1 % cream, [RxNorm: 2638363] 8/16/2021 APPLY TO AFFECTED AREA(S) 2 (TWO) TIMES DAILY. Active   potassium chloride (KLOR-CON M20) 20 MEQ cruz ER tablet, [RxNorm: 5611206] 8/16/2021 1 daily Active   dilTIAZem  mg capsule,24 hr,extended release, [RxNorm: 757278] 10/23/2024 Take 1 capsule orally 2 times a day. Active     ASSESSMENT    Paroxysmal atrial fibrillation  - Recent recurrence on cardia mobile, back in sinus rhythm  - Continue aspirin 81 mg once daily, digoxin daily  - Increase diltiazem to 360 mg once daily  - Per patient preference avoiding anticoagulation, history of GI bleed in the past and low A-fib burden; discussed [employer] as an alternative, will  provide patient with pamphlet  - Check twelve-lead ECG todayHFpEF  - Increase bilateral lower extremity edema, TTE noted diastolic dysfunction, edema improved on higher dose of Lasix  - Continue Lasix 40 mg twice daily  - Check BMP in 1 week  - Additionally discussed SGLT2 inhibitor, would like to reassess symptoms before making a decisionHTN  - Blood pressure 130s 140s systolic  - Previously on lisinopril-hydrochlorothiazide, discontinued due to hypotension  - Increase diltiazem to 360 mg once dailyPlan  Increase diltiazem to 360 mg daily  Twelve-lead ECG today  BMP in 1 week  Follow-up with me in 1 month or sooner as needed     FAMILY HISTORY    FAMILY HISTORY  Relationship Age Diagnosis   Father 62 Hypertension (HTN), primary; Old MI (myocardial infarction) (Reason for death)   Mother 0 Hypertension (HTN), primary   No family history of heart disease.     GENERAL STATUS    No data available    PAST MEDICAL HISTORY    PAST MEDICAL HISTORY  Problem Date diagonsed Date resolved Status   Chronic hypoxemic respiratory failure, [SNOMED-CT: 009653816] 2/13/2024 - Active   COPD (chronic obstructive pulmonary disease), [SNOMED-CT: 89227068] 2/13/2024 - Active   Syncope and collapse, [SNOMED-CT: 607889186] 2/13/2024 - Active   PAF (paroxysmal atrial fibrillation), [SNOMED-CT: 148931580] 8/13/2019 - Active   Essential hypertension, [SNOMED-CT: 73693296] 8/13/2019 - Active   PVC's (premature ventricular contractions), [SNOMED-CT: 60497523] 8/13/2019 - Active   Localized edema, [SNOMED-CT: 776899011] 8/14/2019 - Active     HISTORY OF PRESENT ILLNESS    Patient is an 83-year-old female with a history of paroxysmal atrial fibrillation, oxygen dependent COPD, HTN, HLD who presents establish care. Previously followed with Dr. Boles, and prior to that with Dr. Preston. Patient is a former smoker and quit 30 years ago, has been on 2 L of oxygen at baseline for the last 15 years. She has relatively unchanged exertional dyspnea. No  chest pain, palpitations, edema, dizziness, or syncope. She also has a remote history of paroxysmal atrial fibrillation, when this comes on she will feel more short of breath and fatigued and then checks her pulse which will feel irregular. This is not occurred for the last several years. She is not on anticoagulation due to frequent bruising and infrequent A-fib. She has mild bilateral lower extremity edema but only takes her Lasix as needed. Patient had a coronary CT in 2020 which demonstrated a small speck of calcium in the LAD with a calcium score of 1.4/15/2024  Her last few months has had increased shortness of breath, put on steroids by her pulmonologist with significant permanent breathing. Denies significant palpitations. Has had increased bilateral lower extremity edema, 2 months ago was on increased Lasix 40 mg daily which seemed to improve although will back to 20 mg daily after 10 days per instruction.8/26/2024  Reports worsened shortness of breath, currently on prednisone for COPD exacerbation. Typically has allergies in the summer as well. Mild gradual increase in lower extremity edema although not too bothersome at this point. Remains on Lasix 40 mg daily.9/27/2024  Unfortunately hospitalized again earlier this month primarily with COPD exacerbation, was diuresed with IV Lasix for a few days but did not appear clinically overloaded at that time. More recently's been feeling dizzy/lightheaded, fatigued, with persistent shortness of breath. Blood pressures were running low at home in the 90s systolic. Today 82/54.10/23/2024  Patient was again hospitalized with COPD exacerbation, had a thoracentesis for pleural effusion, diuresed with IV Lasix. Discharged on Lasix 40 mg daily alternating with 40 mg twice daily. She saw Dr. Li few days ago recommend that she stay on 40 mg twice daily for a week. Recently had an episode of atrial fibrillation, felt abnormal sensation in her chest. Was confirmed on  [employer] mobile device but now back in sinus rhythm. Blood pressure creeping up 130s 140s systolic. Persistent unchanged exertional dyspnea since discharge.Cardiac history:  Paroxysmal atrial fibrillation  COPD, on 2-3 L O2  HFpEF  HTN     IMMUNIZATIONS  Reconcile with Patient's ChartNo data available    PLAN OF CARE    PLAN OF CARE  Planned Care Date   EKG (electrocardiogram) 1/1/1900     PROCEDURES    No data available    RESULTS    RESULTS  Name Result Date Location - Ordered By   DIGOXIN [LOINC: 10535-3] 0.31 ng/mL [Low] 01/16/2024 01:31:00 PM [facility] ([facility])  Address: [address] [facility]  [id number]  tel:   Trans Thoracic Echocardiogram 1.The left ventricle is normal in size. Left ventricular wall thickness is normal. Global left ventricular systolic function is hyperdynamic. The left ventricular ejection fraction is >70%. No regional wall motion abnormalities. The left ventricle diastolic function is impaired (Grade II) with an elevated left atrial pressure.2.The right ventricle is mildly enlarged. Right ventricular systolic function is mildly decreased. 3/18/2024 11:00:00 AM Luis Fernando Fowler MD   Ambulatory Telemetry Monitor 1.This is an average quality study. 2.Predominant rhythm is normal sinus rhythm. 3.The minimum heart rate recorded was 62 beats / minute (normal sinus rhythm, 3/10/2024). The maximum heart rate is 111 beats / minute (sinus tachycardia, 2/27/2024). The mean heart rate is 82 beats / minute. 4.- There were rare PVCs with a burden of <1%.  - There were rare PACs with a burden of 2%.  - 113 Supraventricular Tachycardia events -- the longest episode was 6.8s and the fastest episode was 181 BPM.  - No atrial fibrillation.  - No other arrhythmias.  - There were 7 patient triggered events with symptoms of palpitations, cough, and shortness of breath, associated with sinus rhythm, PVCs, PACs, and brief PAT. 2/15/2024 2:45:00 PM Luis Fernando Fowler MD     REVIEW OF  SYSTEMS    REVIEW OF SYSTEMS  Header Details   Cardiovascular No history of Chest pain, OJEDA, Palpitations, Syncope, PND, Orthopnea, Edema, Claudication   Respiratory No history of SOB, Wheezing, Sputum   Hem/Lymphatic No history of Easy bruising, Blood clots, Hx of blood transfusion, Anemia, Bleeding problems     SOCIAL HISTORY    SOCIAL HISTORY  Social History Element Description Effective Dates   Smoking status Former smoker -     FUNCTIONAL STATUS    No data available    INSTRUCTIONS    INSTRUCTIONS  NAME TYPE DATE   Recommend low sodium diet, daily exercise and maintain a normal BMI. Recommend monitor blood pressure at home. Goals 10/23/2024     MEDICAL EQUIPMENT    No data available    Goals Sections    No data available    REASON FOR REFERRAL    No data available    Health Concerns Section    No data available    COGNITIVE/MENTAL STATUS    No data available    Patient Demographics    Patient Demographics  Patient Address Patient Name Communication   [address] [place] [address] Yesenia [place] [phone] (Home)     Patient Demographics  Language Race / Ethnicity Marital Status   Unknown - Spoken White / Unknown      Document Information    Primary Care Provider Other Service Providers Document Coverage Dates   Luis Fernando Fowler  NPI: [id number]  [phone] (Work)  [address]  [place] [address]  [place] [address]  Interpreting Physicians  [facility]  [phone] (Work)  [address] [place] [address] Norma Murillo  NPI: [id number]  [phone] (Work)  [address]  [place] [address]  [place] [address]  Nurses     Vonda Prajapati  NPI: [id number]  [phone] (Work)  [address]  [place] [address]  [place] [address]  Nurses Oct. 23, 2024October 23, 2024      Organization   [facility]  [phone] (Work)  [address]  [place] [address]  [place] [address]     Encounter Providers Encounter Date    Oct. 23, 2024October 23, 2024     Legal Authenticator    Luis Fernando Fowler  NPI: [id number]  [phone] (Work)  [address]  [place] [address]  [place] [address]

## 2024-12-22 NOTE — CONSULTS
[facility]  part of [facility]    Report of Consultation    Yesenia Berkowitz Patient Status:  Inpatient    1940 MRN [id number]   Location [facility]5W Attending Lissy Blandon MD   Hosp Day # 1 PCP JO-ANN YANG MD     Date of Admission:  2024  Date of Consult:  24  Reason for Consultation:   SOB    History of Present Illness:   Patient is a 84 year old female who was admitted to the hospital for Acute on chronic congestive heart failure, unspecified heart failure type (HCC):  Pulmonary consulted for COPD and chronic resp failure. Pt c/o leg edema and now on diuretics and fluid restriction. On Home O2 and bronchodilators.    Past Medical History  Past Medical History:    A-fib (Formerly [facility])    Anesthesia complication    Arrhythmia    AFIB    Arthritis    Asthma (Formerly [facility])    Back problem    COPD (chronic obstructive pulmonary disease) (Formerly [facility])    Deviated nasal septum    nasal septoplasty, turb reduction, smr of turbs    Difficult intubation    fiber optic if needed    Diverticulitis    colonoscopy     Diverticulosis of large intestine    Esophageal reflux    Extrinsic asthma, unspecified    Headache    Heart disease    Hepatitis    WAS TOLD SHE HAS HAD THIS WHEN SHE WAS 19 YEARS OLD    High blood pressure    High cholesterol    Irregular heart rate    Lichenoid keratosis    left chest-bx    Osteoarthritis    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    Paronychia    (RT); onychomycosis; debridement    Problems with swallowing    occasionally    Shortness of breath    2 L NC ALL THE TIME 24HR/7 DAYS PER WEEK    Unspecified essential hypertension    Visual impairment       Past Surgical History  Past Surgical History:   Procedure Laterality Date    Adenoidectomy      Cataract      cataract extraction     Hysterectomy      Sinus surgery        DEVIATED SEPTUM    T&a      Tonsillectomy         Family History  Family History   Problem Relation Age of Onset    Ovarian Cancer Mother 76         endometrial, poss ovarian primary    Pulmonary Disease Sister         COPD    Skin cancer Other     Stroke Other     Other (Other) Other        Social History  Social History     Socioeconomic History    Marital status:    Tobacco Use    Smoking status: Former     Current packs/day: 0.00     Types: Cigarettes     Quit date: 1996     Years since quittin.9    Smokeless tobacco: Never   Vaping Use    Vaping status: Never Used   Substance and Sexual Activity    Alcohol use: Yes     Alcohol/week: 0.0 standard drinks of alcohol     Comment: one drink once a week    Drug use: Never   Other Topics Concern    Pt has a pacemaker No    Pt has a defibrillator No    Reaction to local anesthetic No    Caffeine Concern No     Social Drivers of Health     Food Insecurity: No Food Insecurity (2024)    Food Insecurity     Food Insecurity: Never true   Transportation Needs: No Transportation Needs (2024)    Transportation Needs     Lack of Transportation: No   Housing Stability: Low Risk  (2024)    Housing Stability     Housing Instability: No          Current Medications:  Current Facility-Administered Medications   Medication Dose Route Frequency    cholecalciferol (Vitamin D3) tab 1,000 Units  1,000 Units Oral BID    estradiol (Climara) 0.05 MG/24HR patch 1 patch  1 patch Transdermal Weekly    pantoprazole (Protonix) DR tab 40 mg  40 mg Oral QAM AC    cetirizine (ZyrTEC) tab 10 mg  10 mg Oral Nightly    spironolactone (Aldactone) tab 25 mg  25 mg Oral Daily    tiZANidine (Zanaflex) partial tab 1 mg  1 mg Oral Q6H PRN    morphINE PF 2 MG/ML injection 0.5 mg  0.5 mg Intravenous Q6H PRN    acetaZOLAMIDE (Diamox) tab 500 mg  500 mg Oral Daily    albuterol (Ventolin) (2.5 MG/3ML) 0.083% nebulizer solution 2.5 mg  2.5 mg Nebulization Q4H PRN    aspirin tab 162.5 mg  162.5 mg Oral Daily    digoxin (Lanoxin) tab 125 mcg  125 mcg Oral Daily    dilTIAZem ER (CardIZEM CD) 24 hr cap 360 mg  360 mg Oral  Daily    doxycycline (Vibramycin) cap 100 mg  100 mg Oral 2 times per day    empagliflozin (Jardiance) tab 10 mg  10 mg Oral Daily    montelukast (Singulair) tab 10 mg  10 mg Oral Nightly    predniSONE (Deltasone) tab 20 mg  20 mg Oral Daily    umeclidinium bromide (Incruse Ellipta) 62.5 MCG/ACT inhaler 1 puff  1 puff Inhalation Daily    heparin (Porcine) 5000 UNIT/ML injection 5,000 Units  5,000 Units Subcutaneous Q8H MOISÉS    acetaminophen (Tylenol Extra Strength) tab 500 mg  500 mg Oral Q4H PRN    furosemide (Lasix) 10 mg/mL injection 40 mg  40 mg Intravenous BID (Diuretic)    melatonin tab 3 mg  3 mg Oral Nightly PRN    ondansetron (Zofran) 4 MG/2ML injection 4 mg  4 mg Intravenous Q6H PRN    metoclopramide (Reglan) 5 mg/mL injection 5 mg  5 mg Intravenous Q8H PRN    guaiFENesin (Robitussin) 100 MG/5 ML oral liquid 200 mg  200 mg Oral Q4H PRN    HYDROcodone-acetaminophen (Norco) 5-325 MG per tab 1 tablet  1 tablet Oral Q6H PRN    lidocaine-menthol 4-1 % patch 1 patch  1 patch Transdermal Daily     Medications Prior to Admission   Medication Sig    furosemide 80 MG Oral Tab Take 1 tablet (80 mg total) by mouth daily.    doxycycline 100 MG Oral Cap Take 1 capsule (100 mg total) by mouth every 12 (twelve) hours for 10 doses.    predniSONE 20 MG Oral Tab Take 1 tablet (20 mg total) by mouth daily for 10 doses.    empagliflozin (JARDIANCE) 10 MG Oral Tab Take 1 tablet (10 mg total) by mouth daily.    acetaZOLAMIDE 250 MG Oral Tab Take 2 tablets (500 mg total) by mouth daily.    acetaminophen 500 MG Oral Tab Take 1 tablet (500 mg total) by mouth every 6 (six) hours as needed.    DIGOXIN 0.125 MG Oral Tab Take 1 tablet (125 mcg total) by mouth daily.    albuterol 108 (90 Base) MCG/ACT Inhalation Aero Soln Inhale 2 puffs into the lungs as needed.    estradiol 0.05 MG/24HR Transdermal Patch Weekly Place 1 patch onto the skin once a week. As directed.    dilTIAZem HCl ER Coated Beads 360 MG Oral Capsule SR 24 Hr Take 1  capsule (360 mg total) by mouth 2 (two) times daily.    Cholecalciferol (VITAMIN D) 1000 UNITS Oral Tab Take 1,000 Units by mouth 2 (two) times daily.    Potassium Chloride ER (K-DUR M20) 20 MEQ Oral Tab CR Take 1 tablet (20 mEq total) by mouth nightly.    lansoprazole (PREVACID) 30 MG Oral Capsule Delayed Release Take 1 capsule (30 mg total) by mouth nightly.    aspirin 81 MG Oral Tab Take 2 tablets (162 mg total) by mouth daily as needed.    Loratadine 10 MG Oral Cap Take 10 mg by mouth nightly.      montelukast 10 MG Oral Tab Take 1 tablet (10 mg total) by mouth nightly.    omega-3 fatty acids (FISH OIL) 1000 MG Oral Cap Take 1,000 mg by mouth daily.    spironolactone 25 MG Oral Tab Take 1 tablet (25 mg total) by mouth daily for 90 doses. (Patient taking differently: Take 1 tablet (25 mg total) by mouth daily. Pt not started yet)    dicyclomine 10 MG Oral Cap Take 1 capsule (10 mg total) by mouth 3 (three) times daily as needed (abdominal pain). (Patient not taking: Reported on 12/21/2024)       Allergies  Allergies[1]    Review of Systems:    Pertinent items are noted in HPI.    Physical Exam:   Blood pressure 120/53, pulse 78, temperature 98.3 °F (36.8 °C), temperature source Oral, resp. rate 18, weight 173 lb 4.8 oz (78.6 kg), SpO2 97%.    Dec bs LT>RT  Normal HR  Leg edema    Results:     Laboratory Data:  Lab Results   Component Value Date    WBC 17.3 (H) 12/21/2024    HGB 13.1 12/21/2024    HCT 42.5 12/21/2024    .0 12/21/2024    CREATSERUM 0.71 12/21/2024    BUN 22 12/21/2024     12/21/2024    K 4.5 12/21/2024     12/21/2024    CO2 38.0 (H) 12/21/2024     (H) 12/21/2024    CA 8.6 (L) 12/21/2024    ALB 3.4 12/21/2024    ALKPHO 74 12/21/2024    TP 5.3 (L) 12/21/2024    AST 15 12/21/2024    ALT 21 12/21/2024    PTT 24.3 11/25/2024    INR 1.08 11/25/2024    PTP 14.6 11/25/2024    T4F 0.9 12/17/2024    TSH 0.185 (L) 12/17/2024    LIP 30 08/30/2024    DDIMER 0.47 12/21/2024    MG 1.9  12/16/2024    PHOS 3.9 12/17/2024    TROPHS 19 12/21/2024         Imaging:  CT SPINE THORACIC (CPT=72128)    Result Date: 12/22/2024  CONCLUSION:  1. Mild-to-moderate acute or subacute T6 vertebral body compression fracture.  No associated stenosis. 2. Irregular 11 mm posterior right upper lobe pulmonary nodule suspicious for malignancy. 3. Bibasilar right greater than left pleural effusions suggesting fluid overload. 4. Moderate emphysema.  Enlarged main pulmonary artery compatible with pulmonary hypertension.    Dictated by (CST): Bijan Orona MD on 12/22/2024 at 10:27 AM     Finalized by (CST): Bijan Orona MD on 12/22/2024 at 10:37 AM          XR CHEST AP PORTABLE  (CPT=71045)    Result Date: 12/21/2024  CONCLUSION:   Small bilateral pleural effusions with mild bibasilar opacity which may reflect atelectasis with or without superimposed pneumonia similar to the prior study.    Dictated by (CST): Jayro Samuel MD on 12/21/2024 at 6:41 PM     Finalized by (CST): Jayro Samuel MD on 12/21/2024 at 6:43 PM              Impression:     Acute on chronic congestive heart failure, unspecified heart failure type (HCC)      Acute exacerbation of CHF (congestive heart failure) (HCC)      Pleural effusion on left      Pleural effusion on right      Acute bilateral thoracic back pain      Atypical pneumonia        Recommendations:  Agree with diuresis  Fluid/salt restriction  O2 and Bds  On prednisone 20 mg / day  Dc abc if ok with IM  Has diaphragmatic paralysis and pleural effusions are small  Needs PT and pain control  DW Pt and RN    Thank you for allowing me to participate in the care of your patient.    VIVIANE MIKE MD  12/22/2024         [1]   Allergies  Allergen Reactions    Penicillin G ANAPHYLAXIS    Azithromycin DIARRHEA and NAUSEA AND VOMITING    Cefuroxime UNKNOWN     Other reaction(s): Vomitting    Levofloxacin UNKNOWN     Other reaction(s): LEVOFLOXACIN    Penicillins      Other reaction(s):  Unknown

## 2024-12-23 PROBLEM — S22.050A COMPRESSION FRACTURE OF T6 VERTEBRA (HCC): Status: ACTIVE | Noted: 2024-12-23

## 2024-12-23 PROBLEM — R91.1 PULMONARY NODULE 1 CM OR GREATER IN DIAMETER: Status: ACTIVE | Noted: 2024-10-09

## 2024-12-23 LAB
ALBUMIN SERPL-MCNC: 3.1 G/DL (ref 3.2–4.8)
ALBUMIN/GLOB SERPL: 1.7 {RATIO} (ref 1–2)
ALP LIVER SERPL-CCNC: 66 U/L
ALT SERPL-CCNC: 18 U/L
ANION GAP SERPL CALC-SCNC: 2 MMOL/L (ref 0–18)
AST SERPL-CCNC: 9 U/L (ref ?–34)
BASOPHILS # BLD AUTO: 0.02 X10(3) UL (ref 0–0.2)
BASOPHILS NFR BLD AUTO: 0.2 %
BILIRUB SERPL-MCNC: 0.2 MG/DL (ref 0.2–1.1)
BUN BLD-MCNC: 23 MG/DL (ref 9–23)
BUN/CREAT SERPL: 33.8 (ref 10–20)
CALCIUM BLD-MCNC: 9 MG/DL (ref 8.7–10.4)
CHLORIDE SERPL-SCNC: 101 MMOL/L (ref 98–112)
CO2 SERPL-SCNC: 40 MMOL/L (ref 21–32)
CREAT BLD-MCNC: 0.68 MG/DL
DEPRECATED RDW RBC AUTO: 52.3 FL (ref 35.1–46.3)
EGFRCR SERPLBLD CKD-EPI 2021: 87 ML/MIN/1.73M2 (ref 60–?)
EOSINOPHIL # BLD AUTO: 0.01 X10(3) UL (ref 0–0.7)
EOSINOPHIL NFR BLD AUTO: 0.1 %
ERYTHROCYTE [DISTWIDTH] IN BLOOD BY AUTOMATED COUNT: 14.5 % (ref 11–15)
GLOBULIN PLAS-MCNC: 1.8 G/DL (ref 2–3.5)
GLUCOSE BLD-MCNC: 221 MG/DL (ref 70–99)
GLUCOSE BLDC GLUCOMTR-MCNC: 152 MG/DL (ref 70–99)
HCT VFR BLD AUTO: 35.8 %
HGB BLD-MCNC: 11.6 G/DL
IMM GRANULOCYTES # BLD AUTO: 0.08 X10(3) UL (ref 0–1)
IMM GRANULOCYTES NFR BLD: 0.7 %
LYMPHOCYTES # BLD AUTO: 0.59 X10(3) UL (ref 1–4)
LYMPHOCYTES NFR BLD AUTO: 4.9 %
MCH RBC QN AUTO: 31.4 PG (ref 26–34)
MCHC RBC AUTO-ENTMCNC: 32.4 G/DL (ref 31–37)
MCV RBC AUTO: 97 FL
MONOCYTES # BLD AUTO: 0.33 X10(3) UL (ref 0.1–1)
MONOCYTES NFR BLD AUTO: 2.7 %
NEUTROPHILS # BLD AUTO: 11 X10 (3) UL (ref 1.5–7.7)
NEUTROPHILS # BLD AUTO: 11 X10(3) UL (ref 1.5–7.7)
NEUTROPHILS NFR BLD AUTO: 91.4 %
OSMOLALITY SERPL CALC.SUM OF ELEC: 306 MOSM/KG (ref 275–295)
PLATELET # BLD AUTO: 183 10(3)UL (ref 150–450)
POTASSIUM SERPL-SCNC: 4.3 MMOL/L (ref 3.5–5.1)
PROT SERPL-MCNC: 4.9 G/DL (ref 5.7–8.2)
RBC # BLD AUTO: 3.69 X10(6)UL
SODIUM SERPL-SCNC: 143 MMOL/L (ref 136–145)
WBC # BLD AUTO: 12 X10(3) UL (ref 4–11)

## 2024-12-23 PROCEDURE — 99233 SBSQ HOSP IP/OBS HIGH 50: CPT | Performed by: HOSPITALIST

## 2024-12-23 RX ORDER — DEXTROSE MONOHYDRATE 25 G/50ML
50 INJECTION, SOLUTION INTRAVENOUS
Status: DISCONTINUED | OUTPATIENT
Start: 2024-12-23 | End: 2024-12-29

## 2024-12-23 RX ORDER — NICOTINE POLACRILEX 4 MG
30 LOZENGE BUCCAL
Status: DISCONTINUED | OUTPATIENT
Start: 2024-12-23 | End: 2024-12-29

## 2024-12-23 RX ORDER — POLYETHYLENE GLYCOL 3350 17 G/17G
17 POWDER, FOR SOLUTION ORAL DAILY
Status: DISCONTINUED | OUTPATIENT
Start: 2024-12-23 | End: 2024-12-29

## 2024-12-23 RX ORDER — LORAZEPAM 2 MG/ML
0.5 INJECTION INTRAMUSCULAR
Status: COMPLETED | OUTPATIENT
Start: 2024-12-23 | End: 2024-12-24

## 2024-12-23 RX ORDER — NICOTINE POLACRILEX 4 MG
15 LOZENGE BUCCAL
Status: DISCONTINUED | OUTPATIENT
Start: 2024-12-23 | End: 2024-12-29

## 2024-12-23 RX ORDER — FUROSEMIDE 10 MG/ML
40 INJECTION INTRAMUSCULAR; INTRAVENOUS 3 TIMES DAILY
Status: DISCONTINUED | OUTPATIENT
Start: 2024-12-23 | End: 2024-12-29

## 2024-12-23 RX ORDER — DOCUSATE SODIUM 100 MG/1
100 CAPSULE, LIQUID FILLED ORAL 2 TIMES DAILY
Status: DISCONTINUED | OUTPATIENT
Start: 2024-12-23 | End: 2024-12-29

## 2024-12-23 NOTE — DIETARY NOTE
Brief Nutrition Note:    Pt admitted for acute on chronic congestive heart failure. Pt screened at no nutrition risk at admission by RN. RD consulted for \"heart failure diet education\". Chart reviewed, pt seen and educated by RD on 9/16/24. Handout provided for reinforcement. Further education not warranted at this time. Cleared consult. F/u at length of stay (LOS) per protocol. Please consult nutrition services if earlier intervention is indicated.    Tracy Salvador MS, DEMETRIUS, RDN, LDN  Clinical Dietitian  P: [phone]

## 2024-12-23 NOTE — PLAN OF CARE
Problem: Patient Centered Care  Goal: Patient preferences are identified and integrated in the patient's plan of care  Description: Interventions:  - What would you like us to know as we care for you?   - Provide timely, complete, and accurate information to patient/family  - Incorporate patient and family knowledge, values, beliefs, and cultural backgrounds into the planning and delivery of care  - Encourage patient/family to participate in care and decision-making at the level they choose  - Honor patient and family perspectives and choices  Outcome: Progressing      Problem: PAIN - ADULT  Goal: Verbalizes/displays adequate comfort level or patient's stated pain goal  Description: INTERVENTIONS:  - Encourage pt to monitor pain and request assistance  - Assess pain using appropriate pain scale  - Administer analgesics based on type and severity of pain and evaluate response  - Implement non-pharmacological measures as appropriate and evaluate response  - Consider cultural and social influences on pain and pain management  - Manage/alleviate anxiety  - Utilize distraction and/or relaxation techniques  - Monitor for opioid side effects  - Notify MD/LIP if interventions unsuccessful or patient reports new pain  - Anticipate increased pain with activity and pre-medicate as appropriate  Outcome: Progressing     Problem: RISK FOR INFECTION - ADULT  Goal: Absence of fever/infection during anticipated neutropenic period  Description: INTERVENTIONS  - Monitor WBC  - Administer growth factors as ordered  - Implement neutropenic guidelines  Outcome: Progressing     Problem: SAFETY ADULT - FALL  Goal: Free from fall injury  Description: INTERVENTIONS:  - Assess pt frequently for physical needs  - Identify cognitive and physical deficits and behaviors that affect risk of falls.  - [place] fall precautions as indicated by assessment.  - Educate pt/family on patient safety including physical limitations  - Instruct pt to call  for assistance with activity based on assessment  - Modify environment to reduce risk of injury  - Provide assistive devices as appropriate  - Consider OT/PT consult to assist with strengthening/mobility  - Encourage toileting schedule  Outcome: Progressing     Problem: DISCHARGE PLANNING  Goal: Discharge to home or other facility with appropriate resources  Description: INTERVENTIONS:  - Identify barriers to discharge w/pt and caregiver  - Include patient/family/discharge partner in discharge planning  - Arrange for needed discharge resources and transportation as appropriate  - Identify discharge learning needs (meds, wound care, etc)  - Arrange for interpreters to assist at discharge as needed  - Consider post-discharge preferences of patient/family/discharge partner  - Complete POLST form as appropriate  - Assess patient's ability to be responsible for managing their own health  - Refer to Case Management Department for coordinating discharge planning if the patient needs post-hospital services based on physician/LIP order or complex needs related to functional status, cognitive ability or social support system  Outcome: Progressing     Problem: RESPIRATORY - ADULT  Goal: Achieves optimal ventilation and oxygenation  Description: INTERVENTIONS:  - Assess for changes in respiratory status  - Assess for changes in mentation and behavior  - Position to facilitate oxygenation and minimize respiratory effort  - Oxygen supplementation based on oxygen saturation or ABGs  - Provide Smoking Cessation handout, if applicable  - Encourage broncho-pulmonary hygiene including cough, deep breathe, Incentive Spirometry  - Assess the need for suctioning and perform as needed  - Assess and instruct to report SOB or any respiratory difficulty  - Respiratory Therapy support as indicated  - Manage/alleviate anxiety  - Monitor for signs/symptoms of CO2 retention  Outcome: Progressing     Vital signs are stable. C/o pain to left upper  back. Scheduled lidocaine patch applied, heat pack given, and PRN tizanidine and Norco administered. Currently at 4L of oxygen, which is at baseline.  On IV lasix BID and fluid restriction. Safety precautions in place.

## 2024-12-23 NOTE — PROGRESS NOTES
Pt with persistent hyperglycemia on BMP likely 2/2 prednisone. Will add accuchecks and sliding scale insulin. A1c from 11-: 5.9

## 2024-12-23 NOTE — PLAN OF CARE
Problem: Patient Centered Care  Goal: Patient preferences are identified and integrated in the patient's plan of care  Description: Interventions:  - What would you like us to know as we care for you? Home ind. - Family (son) for support  - Provide timely, complete, and accurate information to patient/family  - Incorporate patient and family knowledge, values, beliefs, and cultural backgrounds into the planning and delivery of care  - Encourage patient/family to participate in care and decision-making at the level they choose  - Honor patient and family perspectives and choices  Outcome: Progressing     Problem: Patient/Family Goals  Goal: Patient/Family Long Term Goal  Description: Patient's Long Term Goal: discharge    Interventions:  - - Monitor vital signs  - Monitor lab values  - Oxygen therapy  - Medications per order  - Diagnostics per order  - Follow MD orders  - See additional Care Plan goals for specific interventions    Outcome: Progressing  Goal: Patient/Family Short Term Goal  Description: Patient's Short Term Goal: manage pain better    Interventions:   - Monitor vital signs and lab values  - Oxygen therapy  - Follow MD orders  - See additional Care Plan goals for specific interventions    Outcome: Progressing

## 2024-12-23 NOTE — PROGRESS NOTES
[facility]  [facility]     Yesenia Berkowitz Patient Status:  Inpatient    1940 MRN [id number]   Location [facility]5W Attending Lissy Blandon MD   Hosp Day # 2 PCP JO-ANN YANG MD     Subjective:     Pain much better with Norco.  Breathing is \"fine\".  It is at her baseline and she does not think it is worse than usual.  She is not sure how aggressive she would be regarding her pulmonary nodule      Objective:    Review of Systems:   ROS completed; pertinent positive and negatives stated in subjective.      Vital signs:  Temp:  [97.7 °F (36.5 °C)-98.6 °F (37 °C)] 98.6 °F (37 °C)  Pulse:  [] 74  Resp:  [19-22] 20  BP: (125-132)/(54-71) 132/69  SpO2:  [94 %-98 %] 94 %      Physical Exam:    Gen: NAD AO x3  Chest: good air entry CTABL  CVS: normal s1 and s2 RR  Abd: NABS soft NT ND  Neuro: CN 2-12 grossly intact  Ext: no edema in bilateral LE      Diagnostic Data:    Labs:  Recent Labs   Lab 24  0509 24  1236 24  1735 24  0526   WBC 10.1 15.6* 17.3* 12.0*   HGB 12.3 12.5 13.1 11.6*   MCV 96.7 102.0* 98.2 97.0   .0 191.0 245.0 183.0       Recent Labs   Lab 24  0502 24  1236 24  1735 24  0526   * 195* 233* 221*   BUN 50* 29* 22 23   CREATSERUM 1.06* 0.58 0.71 0.68   CA 8.5* 8.3* 8.6* 9.0   ALB 3.3  --  3.4 3.1*    136 144 143   K 3.9 4.2 4.5 4.3    99 101 101   CO2 40.0* 33.0* 38.0* 40.0*   ALKPHO  --   --  74 66   AST  --   --  15 9   ALT  --   --  21 18   BILT  --   --  0.4 0.2   TP  --   --  5.3* 4.9*       Estimated Creatinine Clearance: 57.4 mL/min (based on SCr of 0.68 mg/dL).    No results for input(s): \"PTP\", \"INR\" in the last 168 hours.           Imaging: Imaging data reviewed in Epic.    Medications:    insulin aspart  1-5 Units Subcutaneous TID CC    furosemide  40 mg Intravenous TID    docusate sodium  100 mg Oral BID    polyethylene glycol (PEG 3350)  17 g  Oral Daily    cholecalciferol  1,000 Units Oral BID    estradiol  1 patch Transdermal Weekly    pantoprazole  40 mg Oral QAM AC    spironolactone  25 mg Oral Daily    calcium carbonate  500 mg Oral BID with meals    cetirizine  5 mg Oral Nightly    acetaZOLAMIDE  500 mg Oral Daily    aspirin  162.5 mg Oral Daily    digoxin  125 mcg Oral Daily    dilTIAZem HCl ER Coated Beads  360 mg Oral Daily    doxycycline  100 mg Oral 2 times per day    empagliflozin  10 mg Oral Daily    montelukast  10 mg Oral Nightly    predniSONE  20 mg Oral Daily    umeclidinium bromide  1 puff Inhalation Daily    heparin  5,000 Units Subcutaneous Q8H [facility]    lidocaine-menthol  1 patch Transdermal Daily       Assessment & Plan:     Thoracic compression fracture.  T6.  Chronicity unknown.  Seems to be quite symptomatic.  -Pain control  -IR consult  -MRI thoracic spine with and without    Right upper lobe lung nodule.  Slowly growing over the course of years.  Previous thoracentesis cytology negative, otherwise no attempts at obtaining tissue diagnosis.  -Defer to pulmonology regarding further workup  -If kyphoplasty done, will obtain biopsy at the same time    Acute on chronic diastolic congestive heart failure.  Numerous admissions for this.  -IV diuretics per cardiology  -Cardiology following  -Telemetry    COPD.  Does not appear to be in exacerbation.  -No steroids for now  -Nebulizer treatments as needed  -Pulmonology following    Other problems  Steroid-induced leukocytosis  Hypertension  Atrial fibrillation, paroxysmal    Plan of care discussed with patient and son at bedside.      Supplementary Documentation:     Quality:  DVT Prophylaxis: heparin s/c  CODE status: Full       Estimated date of discharge: TBD  Discharge is dependent on: clinical stability  At this point Ms. Berkowitz is expected to be discharge to: home    **Certification      PHYSICIAN Certification of Need for Inpatient Hospitalization - Initial Certification    Patient will  require inpatient services that will reasonably be expected to span two midnight's based on the clinical documentation in H+P.   Based on patients current state of illness, I anticipate that, after discharge, patient will require TBD.    MDM: High, I personally reviewed the available laboratories, imaging including XR. I discussed the case with RN. I ordered laboratories, studies including AM labs.  Medical decision making high, risk is high.

## 2024-12-23 NOTE — PROGRESS NOTES
Notified by RN cardiac rhythm converted from afib to atrial flutter. HR 90s-100s. Advised RN to monitor for now. Repage if HR > 120.

## 2024-12-23 NOTE — HOME CARE LIAISON
Pt is current with [facility] for RN, PT, HHA, MSW. Will need LEIDY prior to discharge.

## 2024-12-23 NOTE — PROGRESS NOTES
Progress Note  Yesenia Berkowitz Patient Status:  Inpatient    1940 MRN [id number]   Location [facility]5W Attending Robbie Tate MD   Hosp Day # 2 PCP JO-ANN YANG MD     Subjective:  Continued mid back pain. No chest pain or palpitations.  Dyspnea same as usual.   Notes she has been urinating normally, but not as frequently as usual on lasix.    Objective:  Physical Exam:   /71 (BP Location: Right arm)   Pulse 89   Temp 98.1 °F (36.7 °C) (Oral)   Resp 20   Wt 173 lb 4.8 oz (78.6 kg)   SpO2 94%   BMI 28.84 kg/m²   Temp (24hrs), Av.1 °F (36.7 °C), Min:97.7 °F (36.5 °C), Max:98.4 °F (36.9 °C)       Intake/Output Summary (Last 24 hours) at 2024 1034  Last data filed at 2024 0822  Gross per 24 hour   Intake 2037 ml   Output 1275 ml   Net 762 ml     Wt Readings from Last 3 Encounters:   24 173 lb 4.8 oz (78.6 kg)   24 175 lb 9.6 oz (79.7 kg)   24 169 lb 14.4 oz (77.1 kg)     Telemetry: NSR in the 80's  General: Alert and oriented in no apparent distress lying comfortably in bed at a 45 degree angle on 3L .  HEENT: No focal deficits.  Neck: No JVD, carotids 2+ no bruits.  Cardiac: Regular rate and rhythm, S1, S2 normal, no murmur, rub or gallop.  Lungs: diminished throughout with prolonged expiratory phase.  Normal excursions and effort.  Abdomen: Soft, non-tender.   Extremities: Without clubbing, cyanosis, 2+ pitting edema L>R.  Peripheral pulses are 2+.  Neurologic: Alert and oriented, normal affect.  Skin: Warm and dry.        Intake/Output:    Intake/Output Summary (Last 24 hours) at 2024 1033  Last data filed at 2024 0822  Gross per 24 hour   Intake 2037 ml   Output 1275 ml   Net 762 ml       Last 3 Weights   24 0543 173 lb 4.8 oz (78.6 kg)   24 172 lb 6.4 oz (78.2 kg)   24 172 lb 6.4 oz (78.2 kg)   24 0608 175 lb 9.6 oz (79.7 kg)   24 0544 180 lb 4.8 oz (81.8 kg)   24 0906 179 lb (81.2 kg)    12/15/24 0559 179 lb 14.4 oz (81.6 kg)   12/14/24 0500 179 lb 12.8 oz (81.6 kg)   12/13/24 0700 182 lb 9.6 oz (82.8 kg)   12/12/24 0900 175 lb 3.2 oz (79.5 kg)   11/28/24 0541 169 lb 14.4 oz (77.1 kg)   11/27/24 0650 169 lb 6.4 oz (76.8 kg)   11/26/24 0700 174 lb (78.9 kg)   11/24/24 2200 173 lb 6.4 oz (78.7 kg)       Labs:  Recent Labs   Lab 12/18/24  1236 12/21/24  1735 12/23/24  0526   * 233* 221*   BUN 29* 22 23   CREATSERUM 0.58 0.71 0.68   EGFRCR 90 85 87   CA 8.3* 8.6* 9.0    144 143   K 4.2 4.5 4.3   CL 99 101 101   CO2 33.0* 38.0* 40.0*     Recent Labs   Lab 12/18/24  1236 12/21/24  1735 12/23/24  0526   RBC 4.02 4.33 3.69*   HGB 12.5 13.1 11.6*   HCT 41.0 42.5 35.8   .0* 98.2 97.0   MCH 31.1 30.3 31.4   MCHC 30.5* 30.8* 32.4   RDW 14.8 14.8 14.5   NEPRELIM 13.18* 15.25* 11.00*   WBC 15.6* 17.3* 12.0*   .0 245.0 183.0         Recent Labs   Lab 12/21/24  1735   TROPHS 19       Diagnostics:   ECHOCARDIOGRAM 9/14/2024:  1. Left ventricle: The cavity size was normal. Wall thickness was mildly      increased. Systolic function was hyperdynamic. The estimated ejection  fraction was 65-70%, by visual assessment. No diagnostic evidence for regional wall motion abnormalities. Unable to assess LV diastolic function.   2. Right ventricle: The cavity size was mildly increased. Systolic pressure was moderately increased.   3. Aortic valve: There was thickening. The findings were consistent with      mild stenosis. The peak systolic velocity was 2.06 m/sec. The mean      systolic gradient was 10 mm Hg. The valve area (VTI) was 2.17 cm^2. The valve area (VTI) index was 1.19 cm^2/m^2.   4. Mitral valve: The annulus was moderately calcified. The leaflets were      mildly thickened. Transvalvular velocity was increased. The findings were consistent with mild stenosis. The mean diastolic gradient was 5 mm Hg.   5. Pulmonary arteries: Systolic pressure was moderately increased, estimated to be 56  mm Hg.           Medications:   insulin aspart  1-5 Units Subcutaneous TID CC    cholecalciferol  1,000 Units Oral BID    estradiol  1 patch Transdermal Weekly    pantoprazole  40 mg Oral QAM AC    spironolactone  25 mg Oral Daily    calcium carbonate  500 mg Oral BID with meals    cetirizine  5 mg Oral Nightly    acetaZOLAMIDE  500 mg Oral Daily    aspirin  162.5 mg Oral Daily    digoxin  125 mcg Oral Daily    dilTIAZem HCl ER Coated Beads  360 mg Oral Daily    doxycycline  100 mg Oral 2 times per day    empagliflozin  10 mg Oral Daily    montelukast  10 mg Oral Nightly    predniSONE  20 mg Oral Daily    umeclidinium bromide  1 puff Inhalation Daily    heparin  5,000 Units Subcutaneous Q8H [facility]    furosemide  40 mg Intravenous BID (Diuretic)    lidocaine-menthol  1 patch Transdermal Daily         Assessment/Plan:  Mrs. Berkowitz is an 83 yo female who presented with back pain found to have a T6 fx having recently been admitted for a COPD exacerbation 12/12-12/18.  She also has a PMH of afib no on AC, COPD on 4L at home, HFpEF, HTN, and asthma.      Acute back pain related to T6 fracture  Chronic Hypoxic respiratory failure secondary to COPD  Acute on chronic HFpEF  IV diuretics and acetazolamide for metabolic alkalosis  Weight increased, net + since admission, increase IV diuretics  GDMT: Aldactone and SGLT2i.  Consider bisoprolol - off d/t pulmonary disease? And ACE  Moderate pHTN suspect group III  Atrial fibrillation/flutter non historically on AC  Currently NSR in the 80's  Rates controlled on diltiazem and digoxin  Former tobacco use  HTN  BP at goal  Leukocytosis related to steroid use    PLAN  Consider adjustment from diltiazem to bisoprolol for dual tx for rates and CHF. Consider addition of ACE. Will discuss with attending cardiologist.  Increase IV diuretics to TID and continue acetazolamide  CHF order set  Continue digoxin for rate control  Remains off OAC historically - understands risk of CVA- Currently  NSR        Joe Carter PA-C  12/23/2024  10:33 AM     _  Seen/examined patient independently.  Agree with findings, assessment and plan as outlined above.     Ongoing back pain, plan for MR back once pain controlled for her to lay supine.  Otherwise complains of BLE edema.    In regards to acute on chronic HFpEF, continues to be volume overloaded on exam. Agree with increasing frequency of IV diuresis, adding acetazolamide due to diuresis associated metabolic acidosis. Needs optimization of GDMT - adding ACEi.     Angel Pratt, DO

## 2024-12-23 NOTE — CM/SW NOTE
SW followed up on DC planning.     SW was notified pt is current with [facility]    LEIDY entered    SW/CM to remain available for support and/or discharge planning.     Kellie BUSTOS LSW, MSW ext. [phone]

## 2024-12-23 NOTE — SPIRITUAL CARE NOTE
Spiritual Care Visit Note    Patient Name: Yesenia Berkowitz Date of Spiritual Care Visit: 24   : 1940 Primary Dx: Acute on chronic congestive heart failure, unspecified heart failure type (HCC)       Referred By: Referral From: Patient    Spiritual Care Taxonomy:    Intended Effects: Aligning care plan with patient's values    Methods: Offer spiritual/Advent support;Assist with spiritual/Advent practices;Accompany someone in their spiritual/Advent practice outside your coral tradition;Collaborate with care team member    Interventions: Acknowledge current situation;Active listening;Ask guided questions;Connect someone with their coral community/clergy    Visit Type/Summary:     - Spiritual Care: Responded to a request via the on call phone Provided support for Patient's spiritual/Advent requests. Coordinated Jain Communion and verified NPO status.  remains available for follow up.    Spiritual Care support can be requested via an [facility] consult. For urgent/immediate needs, please contact the On Call  at: [place]: ext [phone]    Rev Tash Pringle MDiv

## 2024-12-23 NOTE — PROGRESS NOTES
[facility]  part of [facility]    Progress Note    Yesenia Berkowitz Patient Status:  Inpatient    1940 MRN [id number]   Location [facility]5W Attending Robbie Tate MD   Hosp Day # 2 PCP JO-ANN YANG MD       Subjective:   Yesenia Berkowitz is a(n) 84 year old female.   Bacck pain due to T6 compression fracture,MRI scan pending  Objective:   Blood pressure 132/71, pulse 89, temperature 98.1 °F (36.7 °C), temperature source Oral, resp. rate 20, weight 174 lb 3.2 oz (79 kg), SpO2 94%.    HEENT: negative  Neck: no adenopathy, no carotid bruit, no JVD, supple, symmetrical, trachea midline and thyroid not enlarged, symmetric, no tenderness/mass/nodules  Pulmonary/Chest:  clear to auscultation bilaterally, no tenderness  Cardiovascular: S1, S2 normal, no S3 or S4, regular rate and rhythm, no murmur  Abdominal: normal findings: bowel sounds normal, soft, non-tender and no hepatosplenomegaly   Extremities: extremities normal, atraumatic, no cyanosis or edema  Skin: Skin color, texture, turgor normal. No rashes or lesions    Results:     Lab Results   Component Value Date    WBC 12.0 (H) 2024    HGB 11.6 (L) 2024    HCT 35.8 2024    .0 2024    CREATSERUM 0.68 2024    BUN 23 2024     2024    K 4.3 2024     2024    CO2 40.0 (HH) 2024     (H) 2024    CA 9.0 2024    ALB 3.1 (L) 2024    ALKPHO 66 2024    BILT 0.2 2024    TP 4.9 (L) 2024    AST 9 2024    ALT 18 2024    PTT 24.3 2024    INR 1.08 2024    T4F 0.9 2024    TSH 0.185 (L) 2024    LIP 30 2024    DDIMER 0.47 2024    MG 1.9 2024    PHOS 3.9 2024    TROP <0.045 2020       CT SPINE THORACIC (CPT=72128)    Result Date: 2024  CONCLUSION:  1. Mild-to-moderate acute or subacute T6 vertebral body compression fracture.  No associated stenosis. 2. Irregular 11  mm posterior right upper lobe pulmonary nodule suspicious for malignancy. 3. Bibasilar right greater than left pleural effusions suggesting fluid overload. 4. Moderate emphysema.  Enlarged main pulmonary artery compatible with pulmonary hypertension.    Dictated by (CST): Bijan Orona MD on 12/22/2024 at 10:27 AM     Finalized by (CST): Bijan Orona MD on 12/22/2024 at 10:37 AM          XR CHEST AP PORTABLE  (CPT=71045)    Result Date: 12/21/2024  CONCLUSION:   Small bilateral pleural effusions with mild bibasilar opacity which may reflect atelectasis with or without superimposed pneumonia similar to the prior study.    Dictated by (CST): Jayro Samuel MD on 12/21/2024 at 6:41 PM     Finalized by (CST): Jayro Samuel MD on 12/21/2024 at 6:43 PM         EKG 12 Lead    Result Date: 12/22/2024  Atrial flutter with variable A-V block Abnormal ECG When compared with ECG of 12-DEC-2024 05:21, Atrial flutter has replaced Sinus rhythm Confirmed by Shawn Camilo ([id number]) on 12/22/2024 6:08:45 PM     Assessment and Plan:   Principal Problem:    Acute on chronic congestive heart failure, unspecified heart failure type (HCC)  Active Problems:    Pulmonary nodule 1 cm or greater in diameter    Acute exacerbation of CHF (congestive heart failure) ([facility])    Pleural effusion on left    Pleural effusion on right    Acute bilateral thoracic back pain    Atypical pneumonia    Compression fracture of T6 vertebra (HCC)     Back pain due tto compression fracture of T^,MRI scan pending.continue prednisone,doxycycline,duoneb and BIPAP at night        ELVIA LIVINGSTON MD, MD  12/23/2024

## 2024-12-24 ENCOUNTER — APPOINTMENT (OUTPATIENT)
Dept: GENERAL RADIOLOGY | Facility: HOSPITAL | Age: 84
End: 2024-12-24
Attending: HOSPITALIST
Payer: MEDICARE

## 2024-12-24 ENCOUNTER — APPOINTMENT (OUTPATIENT)
Dept: GENERAL RADIOLOGY | Facility: HOSPITAL | Age: 84
DRG: 477 | End: 2024-12-24
Attending: HOSPITALIST
Payer: MEDICARE

## 2024-12-24 ENCOUNTER — APPOINTMENT (OUTPATIENT)
Dept: MRI IMAGING | Facility: HOSPITAL | Age: 84
End: 2024-12-24
Attending: HOSPITALIST
Payer: MEDICARE

## 2024-12-24 ENCOUNTER — APPOINTMENT (OUTPATIENT)
Dept: MRI IMAGING | Facility: HOSPITAL | Age: 84
DRG: 477 | End: 2024-12-24
Attending: HOSPITALIST
Payer: MEDICARE

## 2024-12-24 LAB
ANION GAP SERPL CALC-SCNC: 3 MMOL/L (ref 0–18)
BUN BLD-MCNC: 22 MG/DL (ref 9–23)
BUN/CREAT SERPL: 33.8 (ref 10–20)
CALCIUM BLD-MCNC: 9.3 MG/DL (ref 8.7–10.4)
CHLORIDE SERPL-SCNC: 100 MMOL/L (ref 98–112)
CO2 SERPL-SCNC: 39 MMOL/L (ref 21–32)
CREAT BLD-MCNC: 0.65 MG/DL
EGFRCR SERPLBLD CKD-EPI 2021: 88 ML/MIN/1.73M2 (ref 60–?)
GLUCOSE BLD-MCNC: 128 MG/DL (ref 70–99)
GLUCOSE BLDC GLUCOMTR-MCNC: 124 MG/DL (ref 70–99)
GLUCOSE BLDC GLUCOMTR-MCNC: 129 MG/DL (ref 70–99)
GLUCOSE BLDC GLUCOMTR-MCNC: 134 MG/DL (ref 70–99)
GLUCOSE BLDC GLUCOMTR-MCNC: 140 MG/DL (ref 70–99)
GLUCOSE BLDC GLUCOMTR-MCNC: 169 MG/DL (ref 70–99)
GLUCOSE BLDC GLUCOMTR-MCNC: 174 MG/DL (ref 70–99)
OSMOLALITY SERPL CALC.SUM OF ELEC: 299 MOSM/KG (ref 275–295)
POTASSIUM SERPL-SCNC: 3.5 MMOL/L (ref 3.5–5.1)
POTASSIUM SERPL-SCNC: 4.7 MMOL/L (ref 3.5–5.1)
SODIUM SERPL-SCNC: 142 MMOL/L (ref 136–145)

## 2024-12-24 PROCEDURE — 72100 X-RAY EXAM L-S SPINE 2/3 VWS: CPT | Performed by: HOSPITALIST

## 2024-12-24 PROCEDURE — 72157 MRI CHEST SPINE W/O & W/DYE: CPT | Performed by: HOSPITALIST

## 2024-12-24 PROCEDURE — 72020 X-RAY EXAM OF SPINE 1 VIEW: CPT | Performed by: HOSPITALIST

## 2024-12-24 PROCEDURE — 99233 SBSQ HOSP IP/OBS HIGH 50: CPT | Performed by: HOSPITALIST

## 2024-12-24 RX ORDER — LISINOPRIL 2.5 MG/1
2.5 TABLET ORAL DAILY
Status: DISCONTINUED | OUTPATIENT
Start: 2024-12-24 | End: 2024-12-29

## 2024-12-24 RX ORDER — GADOTERATE MEGLUMINE 376.9 MG/ML
20 INJECTION INTRAVENOUS
Status: COMPLETED | OUTPATIENT
Start: 2024-12-24 | End: 2024-12-24

## 2024-12-24 RX ORDER — POTASSIUM CHLORIDE 1500 MG/1
40 TABLET, EXTENDED RELEASE ORAL EVERY 4 HOURS
Status: COMPLETED | OUTPATIENT
Start: 2024-12-24 | End: 2024-12-24

## 2024-12-24 NOTE — RESPIRATORY THERAPY NOTE
Patient to be placed on CPAP: No  Patient refused: Yes      Comments: Patient refused to use CPAP during hospital stay.

## 2024-12-24 NOTE — PROGRESS NOTES
[facility]  part of [facility]     Hospitalist Progress Note     Yesenia Berkowitz Patient Status:  Inpatient    1940 MRN [id number]   Location [facility]5W Attending Lissy Blandon MD   Hosp Day # 3 PCP JO-ANN YANG MD     Subjective:     Pain ok. Still somewhat somnolent from ativan given for MRI. No overnight events      Objective:    Review of Systems:   ROS completed; pertinent positive and negatives stated in subjective.      Vital signs:  Temp:  [97.8 °F (36.6 °C)-98.6 °F (37 °C)] 97.8 °F (36.6 °C)  Pulse:  [74-89] 87  Resp:  [18-20] 20  BP: (132-136)/(69-81) 135/81  SpO2:  [94 %-100 %] 100 %      Physical Exam:    Gen: NAD Somnolent  Chest: good air entry CTABL  CVS: normal s1 and s2 RR  Abd: NABS soft NT ND  Neuro: CN 2-12 grossly intact  Ext: no edema in bilateral LE      Diagnostic Data:    Labs:  Recent Labs   Lab 24  1236 24  1735 24  0526   WBC 15.6* 17.3* 12.0*   HGB 12.5 13.1 11.6*   .0* 98.2 97.0   .0 245.0 183.0       Recent Labs   Lab 24  1735 24  0526 24  0649   * 221* 128*   BUN 22 23 22   CREATSERUM 0.71 0.68 0.65   CA 8.6* 9.0 9.3   ALB 3.4 3.1*  --     143 142   K 4.5 4.3 3.5    101 100   CO2 38.0* 40.0* 39.0*   ALKPHO 74 66  --    AST 15 9  --    ALT 21 18  --    BILT 0.4 0.2  --    TP 5.3* 4.9*  --        Estimated Creatinine Clearance: 60 mL/min (based on SCr of 0.65 mg/dL).    No results for input(s): \"PTP\", \"INR\" in the last 168 hours.           Imaging: Imaging data reviewed in Epic.    Medications:    insulin aspart  1-5 Units Subcutaneous TID CC    furosemide  40 mg Intravenous TID    docusate sodium  100 mg Oral BID    polyethylene glycol (PEG 3350)  17 g Oral Daily    cholecalciferol  1,000 Units Oral BID    estradiol  1 patch Transdermal Weekly    pantoprazole  40 mg Oral QAM AC    spironolactone  25 mg Oral Daily    calcium carbonate  500 mg Oral BID with meals    cetirizine  5 mg  Oral Nightly    acetaZOLAMIDE  500 mg Oral Daily    aspirin  162.5 mg Oral Daily    digoxin  125 mcg Oral Daily    dilTIAZem HCl ER Coated Beads  360 mg Oral Daily    doxycycline  100 mg Oral 2 times per day    empagliflozin  10 mg Oral Daily    montelukast  10 mg Oral Nightly    predniSONE  20 mg Oral Daily    umeclidinium bromide  1 puff Inhalation Daily    [Held by provider] heparin  5,000 Units Subcutaneous Q8H [facility]    lidocaine-menthol  1 patch Transdermal Daily       Assessment & Plan:     Thoracic compression fracture.  T6.  Chronicity unknown.  Seems to be quite symptomatic.  -Pain control  -IR consult  -MRI thoracic spine with and without done, read pending    Right upper lobe lung nodule.  Slowly growing over the course of years.  Previous thoracentesis cytology negative, otherwise no attempts at obtaining tissue diagnosis.  -Defer to pulmonology regarding further workup  -If kyphoplasty done, will obtain biopsy at the same time    Acute on chronic diastolic congestive heart failure.  Numerous admissions for this.  -IV diuretics per cardiology, cont acetazolamide  -Cardiology following  -Telemetry    COPD.  Does not appear to be in exacerbation.  -No steroids for now  -Nebulizer treatments as needed  -Pulmonology following    Other problems  Steroid-induced leukocytosis  Hypertension  Atrial fibrillation, paroxysmal    Plan of care discussed with patient and son at bedside.      Supplementary Documentation:     Quality:  DVT Prophylaxis: heparin s/c  CODE status: Full       Estimated date of discharge: TBD  Discharge is dependent on: clinical stability  At this point Ms. Berkowitz is expected to be discharge to: home    **Certification      PHYSICIAN Certification of Need for Inpatient Hospitalization - Initial Certification    Patient will require inpatient services that will reasonably be expected to span two midnight's based on the clinical documentation in H+P.   Based on patients current state of illness, I  anticipate that, after discharge, patient will require TBD.    MDM: High, I personally reviewed the available laboratories, imaging including XR. I discussed the case with RN. I ordered laboratories, studies including AM labs.  Medical decision making high, risk is high.

## 2024-12-24 NOTE — DISCHARGE INSTRUCTIONS
Going Home Instructions  In this section you will find the tools which will guide you through the first few days after you leave the hospital. Continued use of these tools will help you develop the skills necessary to keep your heart failure under control.     Home Care Instructions Following Heart Failure - the most important things to do every day include:   Weigh yourself and review the “Self-Check Plan” sheet every morning.   Call your cardiologist office if you are in the “Pay Attention-Use Caution” (yellow zone) or “Medical Alert-Warning!” (red zone) as outlined in the Self-Check Plan sheet.  Take your medicines as prescribed.  Limit your sodium (salt) intake.  Know when to call your cardiologist, primary doctor, or nurse.  Know when to seek emergency care.      Things for You to Remember:   1. See your doctor or healthcare provider as written on your discharge instructions.  It is important that you attend this appointment to make sure your symptoms are under control.     2. Your recommended sodium intake is 6109-0069 mg daily.    3.  Weigh yourself every day.    4. Some exercise and activity is important to help keep your heart functioning and strong. Unless instructed not to exercise, you may walk at a slow to moderate pace for 10-15 minutes 2-3 days per week to start. Pace your activity to prevent shortness of breath or fatigue. Stop exercising if you develop chest pain, lightheadedness, or significant shortness of breath.       Call Your Cardiologist If:   You gain 2-3 pounds in one day or 5 pounds in one week.  You have more difficulty breathing.  You are getting more tired with normal activity.  You are more short of breath lying down, or awaken at night short of breath.  You have swelling of your feet or legs.  You urinate less often during the day and more often at night.  You have cramps in your legs.  You have blurred vision or see yellowish-green halos around objects of lights.    Go to the  Emergency Room If:   You have pain or tightness in your chest  You are extremely short of breath  You are coughing up pink-frothy mucus  You are traveling and develop symptoms of worsening heart failure      ** Please follow up with your cardiologist or Advanced Practice Provider as written on your discharge instructions. If you are not provided with an appointment, let your nurse know so you can get an appointment**

## 2024-12-24 NOTE — CM/SW NOTE
12/24/24 1400   CM/DREW Referral Data   Referral Source    Reason for Referral Readmission;Discharge planning   Informant Patient;EMR   Readmission Assessment   Factors that patient feels contributed to this readmission Acute/Chronic Clinical Presentation   Pt's living situation prior to admission? Home alone   Pt's level of independence at discharge? No assist/independent (minimal)   Pt. received education on diagnoses at time of discharge? Yes   Did you know who and how to call someone if you felt worse? Yes   Did any new symptoms or issues develop after you were discharged? Yes   ----Post D/C symptoms: Symptoms/issue related to previous hospitalization Yes   Did you understand your discharge instructions? Yes   Were medications taken as indicated on discharge instructions? Yes   Was patient a candidate for: Home Health   ----Home Health Recommendation: Recommended, arranged   Was full assessment completed by DREW/PERLITA on prior admission? Yes   Was the recommended discharge plan achieved? Yes   Was pt. discharged w/out services? No   Medical Hx   Does patient have an established PCP? Yes  (Brenda Wilkerson)   Patient Info   Patient's Current Mental Status at Time of Assessment Alert;Oriented   Patient's Home Environment House   Number of Levels in Home 1   Number of Stair in Home 5 steps to enter   Patient Status Prior to Admission   Independent with ADLs and Mobility Yes   Services in place prior to admission Home Health Care;DME/Supplies at home   Home Health Provider Info [facility]  (RN, PT, and MSW)   DME Provider/Supplier [employer] for home O2, HME for BIPAP   Type of DME/Supplies Straight Cane;Standard Walker;Wheelchair;Shower Chair;Raised Toilet Seat;Grab Bars;Home Oxygen;BiPAP   Discharge Needs   Anticipated D/C needs To be determined     Pt discussed during nursing rounds. Dx CHF on 4L O2 (3L is baseline), recurrent falls. Home alone, independent w/walker at baseline. Pt has all required  DME in place at home. Current w/[employer] for home O2. Current w/HME for BIPAP. Current w/[facility] for RN, PT, OT, and MSW. New F2F entered. PT/OT evals requested for dc recommendation.    Plan: TBD    / to remain available for support and/or discharge planning.     GEORGINA Samuel    [phone]

## 2024-12-24 NOTE — PROGRESS NOTES
[facility]  part of [facility]    Progress Note    Yesenia Berkowitz Patient Status:  Inpatient    1940 MRN [id number]   Location [facility]5W Attending Robbie Tate MD   Hosp Day # 3 PCP JO-ANN YANG MD       Subjective:   Yesenia Berkowitz is a(n) 84 year old female.   Felt better,await MRI of the spin tody  Objective:   Blood pressure 111/75, pulse 70, temperature 97.8 °F (36.6 °C), temperature source Oral, resp. rate 16, weight 171 lb 9.6 oz (77.8 kg), SpO2 100%.    HEENT: negative  Neck: no adenopathy, no carotid bruit, no JVD, supple, symmetrical, trachea midline and thyroid not enlarged, symmetric, no tenderness/mass/nodules  Pulmonary/Chest:  clear to auscultation bilaterally, no tenderness  Cardiovascular: S1, S2 normal, no S3 or S4, regular rate and rhythm, no murmur  Abdominal: normal findings: bowel sounds normal, soft, non-tender and no hepatosplenomegaly   Extremities: extremities normal, atraumatic, no cyanosis or edema  Skin: Skin color, texture, turgor normal. No rashes or lesions    Results:     Lab Results   Component Value Date    WBC 12.0 (H) 2024    HGB 11.6 (L) 2024    HCT 35.8 2024    .0 2024    CREATSERUM 0.65 2024    BUN 22 2024     2024    K 3.5 2024     2024    CO2 39.0 (H) 2024     (H) 2024    CA 9.3 2024    ALB 3.1 (L) 2024    ALKPHO 66 2024    BILT 0.2 2024    TP 4.9 (L) 2024    AST 9 2024    ALT 18 2024    PTT 24.3 2024    INR 1.08 2024    T4F 0.9 2024    TSH 0.185 (L) 2024    LIP 30 2024    DDIMER 0.47 2024    MG 1.9 2024    PHOS 3.9 2024    TROP <0.045 2020       No results found.        Assessment and Plan:   Principal Problem:    Acute on chronic congestive heart failure, unspecified heart failure type (HCC)  Active Problems:    Pulmonary nodule 1 cm or greater in  diameter    Acute exacerbation of CHF (congestive heart failure) (HCC)    Pleural effusion on left    Pleural effusion on right    Acute bilateral thoracic back pain    Atypical pneumonia    Compression fracture of T6 vertebra (HCC)    Persistent pin on the back,les sob no cough,awat MRI of the spine for Kyphoplsty         ELVIA LIVINGSTON MD, MD  12/24/2024

## 2024-12-24 NOTE — PLAN OF CARE
Problem: PAIN - ADULT  Goal: Verbalizes/displays adequate comfort level or patient's stated pain goal  Description: INTERVENTIONS:  - Encourage pt to monitor pain and request assistance  - Assess pain using appropriate pain scale  - Administer analgesics based on type and severity of pain and evaluate response  - Implement non-pharmacological measures as appropriate and evaluate response  - Consider cultural and social influences on pain and pain management  - Manage/alleviate anxiety  - Utilize distraction and/or relaxation techniques  - Monitor for opioid side effects  - Notify MD/LIP if interventions unsuccessful or patient reports new pain  - Anticipate increased pain with activity and pre-medicate as appropriate  Outcome: Progressing     Problem: RISK FOR INFECTION - ADULT  Goal: Absence of fever/infection during anticipated neutropenic period  Description: INTERVENTIONS  - Monitor WBC  - Administer growth factors as ordered  - Implement neutropenic guidelines  Outcome: Progressing     Problem: SAFETY ADULT - FALL  Goal: Free from fall injury  Description: INTERVENTIONS:  - Assess pt frequently for physical needs  - Identify cognitive and physical deficits and behaviors that affect risk of falls.  - [place] fall precautions as indicated by assessment.  - Educate pt/family on patient safety including physical limitations  - Instruct pt to call for assistance with activity based on assessment  - Modify environment to reduce risk of injury  - Provide assistive devices as appropriate  - Consider OT/PT consult to assist with strengthening/mobility  - Encourage toileting schedule  Outcome: Progressing     Problem: RESPIRATORY - ADULT  Goal: Achieves optimal ventilation and oxygenation  Description: INTERVENTIONS:  - Assess for changes in respiratory status  - Assess for changes in mentation and behavior  - Position to facilitate oxygenation and minimize respiratory effort  - Oxygen supplementation based on oxygen  saturation or ABGs  - Provide Smoking Cessation handout, if applicable  - Encourage broncho-pulmonary hygiene including cough, deep breathe, Incentive Spirometry  - Assess the need for suctioning and perform as needed  - Assess and instruct to report SOB or any respiratory difficulty  - Respiratory Therapy support as indicated  - Manage/alleviate anxiety  - Monitor for signs/symptoms of CO2 retention  Outcome: Progressing     Patient on 3 L at rest and 4 L with activity. On remote telemetry monitoring. PRN medications given per MAR for back pain. Tolerating cardiac diet, 2000 ml fluid restriction enforced. MRI checklist completed.   Safety precautions in place, frequent nursing rounding completed, call light within reach. All questions answered and needs met.

## 2024-12-24 NOTE — SIGNIFICANT EVENT
Significant Event - Fall Note    Date/Time of Fall: December 24, 2024 at 0943    Fall huddle completed: Yes    Description of patient fall:     Patient fell from: Standing     Activity when fall occurred: Ambulating with assistance or assistive devices and Toileting-related activities     Where did fall occur: Bathroom     Was the fall assisted: Assisted to floor    Who witnessed the fall: Staff    Patient narrative of fall: \"Why does this keep happening?\"    Staff narrative of fall: Patient was ambulating to bathroom with this writer within arms reach. Patient was turning to prepare to sit on toilet and lost her balance and fell backward to the ground. This writer witnessed the patient fall onto her bottom, did not hit her head. This writer was within arms reach of the patient but was unable to assist patient to ground. Patient was assisted back to bed by 3 staff members, vital signs taken and stable. Patient known to have existing T6 fracture on admission and c/o increased back pain post fall. Dr. Tate notified and aware of fall, x rays ordered. No other interventions ordered at this time.     Name of Provider notified of fall: Dr. Robbie Tate    Family notification: Family notified    Factors contributing to fall:     Physical: Balance impairment, Unsteady gait, and increased BLE swelling     Psychological: Alert and Oriented     Environmental: Footwear- non-skid socks in place     Medications received in the past 8 hours:   Medication(s) Administered in past 8 Hours from 12/24/2024 0312 to 12/24/2024 1112       Date/Time Order Dose Route Action Action by Comments    12/24/2024 0936 CST acetaZOLAMIDE (Diamox) tab 500 mg 500 mg Oral Given Molly Martinez RN --    12/24/2024 0955 CST aspirin tab 162.5 mg 162.5 mg Oral Given Molly Martinez RN --    12/24/2024 0936 CST calcium carbonate (Oyster Shell) tab 500 mg 500 mg Oral Given Molly Martinez RN --    12/24/2024 0936 CST cholecalciferol (Vitamin D3) tab 1,000  Units 1,000 Units Oral Given Molly Martinez RN --    12/24/2024 0935 CST digoxin (Lanoxin) tab 125 mcg 125 mcg Oral Given Molly Martinez RN --    12/24/2024 0935 CST dilTIAZem ER (CardIZEM CD) 24 hr cap 360 mg 360 mg Oral Given Molly Martinez RN --    12/24/2024 0935 CST docusate sodium (Colace) cap 100 mg 100 mg Oral Given Molly Martinez RN --    12/24/2024 0930 CST doxycycline (Vibramycin) cap 100 mg 100 mg Oral Given Molly Martinez RN --    12/24/2024 0938 CST empagliflozin (Jardiance) tab 10 mg 10 mg Oral Given Molly Martinez RN --    12/24/2024 0936 CST furosemide (Lasix) 10 mg/mL injection 40 mg 40 mg Intravenous Given Molly Martinez RN --    12/24/2024 0452 CST gadoterate meglumine (Dotarem) 10 MMOL/20ML injection 20 mL 16 mL Intravenous Given Reuben Lynn --    12/24/2024 0517 CST heparin (Porcine) 5000 UNIT/ML injection 5,000 Units 5,000 Units Subcutaneous Given Ely Lorenzo RN --    12/24/2024 0654 CST HYDROcodone-acetaminophen (Norco) 5-325 MG per tab 1 tablet 1 tablet Oral Given Ely Lorenzo RN --    12/24/2024 0936 CST lisinopril (Prinivil; Zestril) tab 2.5 mg 2.5 mg Oral Given Molly Martinez RN --    12/24/2024 0517 CST pantoprazole (Protonix) DR tab 40 mg 40 mg Oral Given Ely Lorenzo RN --    12/24/2024 0935 CST polyethylene glycol (PEG 3350) (Miralax) 17 g oral packet 17 g 17 g Oral Given Molly Martinez RN --    12/24/2024 0936 CST predniSONE (Deltasone) tab 20 mg 20 mg Oral Given Molly Martinez RN --    12/24/2024 0936 CST spironolactone (Aldactone) tab 25 mg 25 mg Oral Given Molly Martinez RN --    12/24/2024 0936 CST umeclidinium bromide (Incruse Ellipta) 62.5 MCG/ACT inhaler 1 puff 1 puff Inhalation Given Molly Martinez, RN --            Was patient identified as high fall risk prior to fall:                                What interventions were in place prior to fall: Bed alarm, Call light within reach, [place] footwear, Patient/family involved in fall prevention plan, Patient  situated close to nursing station, Personal items within reach, and Rounding    Interventions post fall: Bed alarm, Call light within reach, Chair alarm, Fall alert wristband, [place] footwear, Patient/family involved in fall prevention plan, Patient situated close to nursing station, Personal items within reach, Rounding, and Toileting regimen    Additional comments:

## 2024-12-24 NOTE — PROGRESS NOTES
[facility] - CARDIOLOGY PROGRESS NOTE      Yesenia Berkowitz Patient Status:  Inpatient    1940 MRN [id number]   Location [facility]5W Attending Robbie Tate MD   Hosp Day # 3 PCP JO-ANN YANG MD         Assessment and Plan:       Assessment/Plan:  Mrs. Berkowitz is an 83 yo female who presented with back pain found to have a T6 fx having recently been admitted for a COPD exacerbation -.  She also has a PMH of afib no on AC, COPD on 4L at home, HFpEF, HTN, and asthma.        Acute back pain related to T6 fracture  Chronic Hypoxic respiratory failure secondary to COPD  Acute on chronic HFpEF  IV diuretics and acetazolamide for metabolic alkalosis  Weight increased, net + since admission, increase IV diuretics  GDMT: Aldactone and SGLT2i.  Consider bisoprolol - off d/t pulmonary disease? And ACE  Moderate pHTN suspect group III  Atrial fibrillation/flutter non historically on AC  Currently NSR in the 's  Rates controlled on diltiazem and digoxin  Former tobacco use  HTN  BP at goal  Leukocytosis related to steroid use     PLAN  Can, consider addition of ACE  Cont IV diuretics to TID and continue acetazolamide since still +  CHF order set, education ie water restriction at home  Continue digoxin for rate control  Remains off OAC historically - understands risk of CVA- Currently NSR    Subjective:   Yesenia Berkowitz is a(n) 84 year old female with no new c/o today    Objective:   Blood pressure 135/81, pulse 87, temperature 97.8 °F (36.6 °C), temperature source Oral, resp. rate 20, weight 171 lb 9.6 oz (77.8 kg), SpO2 100%.    Exam  Gen: No acute distress, alert and oriented   Neck:supple,no JVD  Pulm: Lungs ok, normal respiratory effort  CV: Heart with regular rate and rhythm,no pathologic murmur  Abd: Abdomen soft, nontender, nondistended,  bowel sounds present  Ext:  no clubbing, no cyanosis  Neuro: no focal deficits  Skin: no rashes or lesions      Scheduled Meds:    insulin aspart  1-5  Units Subcutaneous TID CC    furosemide  40 mg Intravenous TID    docusate sodium  100 mg Oral BID    polyethylene glycol (PEG 3350)  17 g Oral Daily    cholecalciferol  1,000 Units Oral BID    estradiol  1 patch Transdermal Weekly    pantoprazole  40 mg Oral QAM AC    spironolactone  25 mg Oral Daily    calcium carbonate  500 mg Oral BID with meals    cetirizine  5 mg Oral Nightly    acetaZOLAMIDE  500 mg Oral Daily    aspirin  162.5 mg Oral Daily    digoxin  125 mcg Oral Daily    dilTIAZem HCl ER Coated Beads  360 mg Oral Daily    doxycycline  100 mg Oral 2 times per day    empagliflozin  10 mg Oral Daily    montelukast  10 mg Oral Nightly    predniSONE  20 mg Oral Daily    umeclidinium bromide  1 puff Inhalation Daily    heparin  5,000 Units Subcutaneous Q8H MOISÉS    lidocaine-menthol  1 patch Transdermal Daily       Continuous Infusions:     Results:     Lab Results   Component Value Date    WBC 12.0 (H) 12/23/2024    HGB 11.6 (L) 12/23/2024    HCT 35.8 12/23/2024    .0 12/23/2024    CREATSERUM 0.68 12/23/2024    BUN 23 12/23/2024     12/23/2024    K 4.3 12/23/2024     12/23/2024    CO2 40.0 (HH) 12/23/2024     (H) 12/23/2024    CA 9.0 12/23/2024    ALB 3.1 (L) 12/23/2024    ALKPHO 66 12/23/2024    BILT 0.2 12/23/2024    TP 4.9 (L) 12/23/2024    AST 9 12/23/2024    ALT 18 12/23/2024    PTT 24.3 11/25/2024    INR 1.08 11/25/2024    T4F 0.9 12/17/2024    TSH 0.185 (L) 12/17/2024    LIP 30 08/30/2024    DDIMER 0.47 12/21/2024    MG 1.9 12/16/2024    PHOS 3.9 12/17/2024    TROP <0.045 02/03/2020       CT SPINE THORACIC (CPT=72128)    Result Date: 12/22/2024  CONCLUSION:  1. Mild-to-moderate acute or subacute T6 vertebral body compression fracture.  No associated stenosis. 2. Irregular 11 mm posterior right upper lobe pulmonary nodule suspicious for malignancy. 3. Bibasilar right greater than left pleural effusions suggesting fluid overload. 4. Moderate emphysema.  Enlarged main pulmonary  artery compatible with pulmonary hypertension.    Dictated by (CST): Bijna Orona MD on 12/22/2024 at 10:27 AM     Finalized by (CST): Bijan Orona MD on 12/22/2024 at 10:37 AM               Imaging: I independently visualized all relevant chest imaging in PACS, agree with radiology interpretation except where noted.    Raghu Ray MD  [facility]  Cardiac Electrophysiology  12/24/2024  2v

## 2024-12-24 NOTE — PLAN OF CARE
Yesenia is A&Ox4, she ambulates and voids independently. Monitoring blood glucose levels per order- ACHS. She is tolerating her diet, on 3L NC at rest and 4L with activity. Pt is saline locked. Prn medication given for pain-see MAR. Frequent rounding by nursing staff. Safety precautions maintained/call light within reach.    Problem: Patient Centered Care  Goal: Patient preferences are identified and integrated in the patient's plan of care  Description: Interventions:  - What would you like us to know as we care for you?   - Provide timely, complete, and accurate information to patient/family  - Incorporate patient and family knowledge, values, beliefs, and cultural backgrounds into the planning and delivery of care  - Encourage patient/family to participate in care and decision-making at the level they choose  - Honor patient and family perspectives and choices  12/24/2024 0154 by Ely Lorenzo RN  Outcome: Progressing  12/24/2024 0154 by Ely Lorenzo RN  Outcome: Progressing     Problem: PAIN - ADULT  Goal: Verbalizes/displays adequate comfort level or patient's stated pain goal  Description: INTERVENTIONS:  - Encourage pt to monitor pain and request assistance  - Assess pain using appropriate pain scale  - Administer analgesics based on type and severity of pain and evaluate response  - Implement non-pharmacological measures as appropriate and evaluate response  - Consider cultural and social influences on pain and pain management  - Manage/alleviate anxiety  - Utilize distraction and/or relaxation techniques  - Monitor for opioid side effects  - Notify MD/LIP if interventions unsuccessful or patient reports new pain  - Anticipate increased pain with activity and pre-medicate as appropriate  12/24/2024 0154 by Ely Lorenzo RN  Outcome: Progressing  12/24/2024 0154 by Ely Lorenzo RN  Outcome: Progressing     Problem: RISK FOR INFECTION - ADULT  Goal: Absence of fever/infection during anticipated  neutropenic period  Description: INTERVENTIONS  - Monitor WBC  - Administer growth factors as ordered  - Implement neutropenic guidelines  12/24/2024 0154 by Ely Lorenzo RN  Outcome: Progressing  12/24/2024 0154 by Ely Lorenzo RN  Outcome: Progressing     Problem: SAFETY ADULT - FALL  Goal: Free from fall injury  Description: INTERVENTIONS:  - Assess pt frequently for physical needs  - Identify cognitive and physical deficits and behaviors that affect risk of falls.  - [place] fall precautions as indicated by assessment.  - Educate pt/family on patient safety including physical limitations  - Instruct pt to call for assistance with activity based on assessment  - Modify environment to reduce risk of injury  - Provide assistive devices as appropriate  - Consider OT/PT consult to assist with strengthening/mobility  - Encourage toileting schedule  12/24/2024 0154 by Ely Lorenzo RN  Outcome: Progressing  12/24/2024 0154 by Ely Lorenzo RN  Outcome: Progressing     Problem: DISCHARGE PLANNING  Goal: Discharge to home or other facility with appropriate resources  Description: INTERVENTIONS:  - Identify barriers to discharge w/pt and caregiver  - Include patient/family/discharge partner in discharge planning  - Arrange for needed discharge resources and transportation as appropriate  - Identify discharge learning needs (meds, wound care, etc)  - Arrange for interpreters to assist at discharge as needed  - Consider post-discharge preferences of patient/family/discharge partner  - Complete POLST form as appropriate  - Assess patient's ability to be responsible for managing their own health  - Refer to Case Management Department for coordinating discharge planning if the patient needs post-hospital services based on physician/LIP order or complex needs related to functional status, cognitive ability or social support system  12/24/2024 0154 by Ely Lorenzo RN  Outcome: Progressing  12/24/2024 0154 by Bj  JIMI Graves  Outcome: Progressing     Problem: RESPIRATORY - ADULT  Goal: Achieves optimal ventilation and oxygenation  Description: INTERVENTIONS:  - Assess for changes in respiratory status  - Assess for changes in mentation and behavior  - Position to facilitate oxygenation and minimize respiratory effort  - Oxygen supplementation based on oxygen saturation or ABGs  - Provide Smoking Cessation handout, if applicable  - Encourage broncho-pulmonary hygiene including cough, deep breathe, Incentive Spirometry  - Assess the need for suctioning and perform as needed  - Assess and instruct to report SOB or any respiratory difficulty  - Respiratory Therapy support as indicated  - Manage/alleviate anxiety  - Monitor for signs/symptoms of CO2 retention  12/24/2024 0154 by Ely Lorenzo, RN  Outcome: Progressing  12/24/2024 0154 by Ely Lorenzo, RN  Outcome: Progressing

## 2024-12-25 LAB
ANION GAP SERPL CALC-SCNC: 1 MMOL/L (ref 0–18)
BASOPHILS # BLD AUTO: 0.03 X10(3) UL (ref 0–0.2)
BASOPHILS NFR BLD AUTO: 0.3 %
BUN BLD-MCNC: 22 MG/DL (ref 9–23)
BUN/CREAT SERPL: 37.9 (ref 10–20)
CALCIUM BLD-MCNC: 8.9 MG/DL (ref 8.7–10.4)
CHLORIDE SERPL-SCNC: 102 MMOL/L (ref 98–112)
CO2 SERPL-SCNC: 40 MMOL/L (ref 21–32)
CREAT BLD-MCNC: 0.58 MG/DL
DEPRECATED RDW RBC AUTO: 53.2 FL (ref 35.1–46.3)
EGFRCR SERPLBLD CKD-EPI 2021: 90 ML/MIN/1.73M2 (ref 60–?)
EOSINOPHIL # BLD AUTO: 0.03 X10(3) UL (ref 0–0.7)
EOSINOPHIL NFR BLD AUTO: 0.3 %
ERYTHROCYTE [DISTWIDTH] IN BLOOD BY AUTOMATED COUNT: 14.6 % (ref 11–15)
GLUCOSE BLD-MCNC: 120 MG/DL (ref 70–99)
GLUCOSE BLDC GLUCOMTR-MCNC: 112 MG/DL (ref 70–99)
GLUCOSE BLDC GLUCOMTR-MCNC: 169 MG/DL (ref 70–99)
GLUCOSE BLDC GLUCOMTR-MCNC: 183 MG/DL (ref 70–99)
GLUCOSE BLDC GLUCOMTR-MCNC: 184 MG/DL (ref 70–99)
HCT VFR BLD AUTO: 33.1 %
HGB BLD-MCNC: 10.2 G/DL
IMM GRANULOCYTES # BLD AUTO: 0.1 X10(3) UL (ref 0–1)
IMM GRANULOCYTES NFR BLD: 1 %
LYMPHOCYTES # BLD AUTO: 1.33 X10(3) UL (ref 1–4)
LYMPHOCYTES NFR BLD AUTO: 12.9 %
MCH RBC QN AUTO: 30.5 PG (ref 26–34)
MCHC RBC AUTO-ENTMCNC: 30.8 G/DL (ref 31–37)
MCV RBC AUTO: 99.1 FL
MONOCYTES # BLD AUTO: 1.27 X10(3) UL (ref 0.1–1)
MONOCYTES NFR BLD AUTO: 12.3 %
NEUTROPHILS # BLD AUTO: 7.54 X10 (3) UL (ref 1.5–7.7)
NEUTROPHILS # BLD AUTO: 7.54 X10(3) UL (ref 1.5–7.7)
NEUTROPHILS NFR BLD AUTO: 73.2 %
OSMOLALITY SERPL CALC.SUM OF ELEC: 301 MOSM/KG (ref 275–295)
PLATELET # BLD AUTO: 177 10(3)UL (ref 150–450)
POTASSIUM SERPL-SCNC: 4 MMOL/L (ref 3.5–5.1)
RBC # BLD AUTO: 3.34 X10(6)UL
SODIUM SERPL-SCNC: 143 MMOL/L (ref 136–145)
WBC # BLD AUTO: 10.3 X10(3) UL (ref 4–11)

## 2024-12-25 PROCEDURE — 99233 SBSQ HOSP IP/OBS HIGH 50: CPT | Performed by: HOSPITALIST

## 2024-12-25 NOTE — PROGRESS NOTES
Progress Note  Yesenia Berkowitz Patient Status:  Inpatient    1940 MRN [id number]   Location [facility]5W Attending Robbie Tate MD   Hosp Day # 4 PCP JO-ANN YANG MD     Subjective:  Pt denies any cardiac concerns     Objective:  BP (!) 115/99 (BP Location: Right arm)   Pulse 78   Temp 98.2 °F (36.8 °C) (Oral)   Resp 18   Wt 168 lb 6.4 oz (76.4 kg)   SpO2 94%   BMI 28.02 kg/m²     Telemetry: Aflutter/NSR      Intake/Output:    Intake/Output Summary (Last 24 hours) at 2024 0644  Last data filed at 2024 0544  Gross per 24 hour   Intake 660 ml   Output 2050 ml   Net -1390 ml       Last 3 Weights   24 0529 168 lb 6.4 oz (76.4 kg)   24 0516 171 lb 9.6 oz (77.8 kg)   24 0815 174 lb 3.2 oz (79 kg)   24 0543 173 lb 4.8 oz (78.6 kg)   24 205 172 lb 6.4 oz (78.2 kg)   24 2045 172 lb 6.4 oz (78.2 kg)   24 0608 175 lb 9.6 oz (79.7 kg)   24 0544 180 lb 4.8 oz (81.8 kg)   24 0906 179 lb (81.2 kg)   12/15/24 0559 179 lb 14.4 oz (81.6 kg)   24 0500 179 lb 12.8 oz (81.6 kg)   24 0700 182 lb 9.6 oz (82.8 kg)   24 0900 175 lb 3.2 oz (79.5 kg)   24 0541 169 lb 14.4 oz (77.1 kg)   24 0650 169 lb 6.4 oz (76.8 kg)   24 0700 174 lb (78.9 kg)   24 2200 173 lb 6.4 oz (78.7 kg)       Labs:  Recent Labs   Lab 24  1735 24  0526 24  0649 24   * 221* 128*  --    BUN   --    CREATSERUM 0.71 0.68 0.65  --    EGFRCR 85 87 88  --    CA 8.6* 9.0 9.3  --     143 142  --    K 4.5 4.3 3.5 4.7    101 100  --    CO2 38.0* 40.0* 39.0*  --      Recent Labs   Lab 24  1236 24  1735 24  0526   RBC 4.02 4.33 3.69*   HGB 12.5 13.1 11.6*   HCT 41.0 42.5 35.8   .0* 98.2 97.0   MCH 31.1 30.3 31.4   MCHC 30.5* 30.8* 32.4   RDW 14.8 14.8 14.5   NEPRELIM 13.18* 15.25* 11.00*   WBC 15.6* 17.3* 12.0*   .0 245.0 183.0         Recent Labs   Lab  12/21/24  1735   [facility] within [facility]     Lab Results   Component Value Date    INR 1.08 11/25/2024    INR 1.12 03/14/2023       Diagnostics:     Echo from 9/2024  Conclusions:     1. Left ventricle: The cavity size was normal. Wall thickness was mildly      increased. Systolic function was hyperdynamic. The estimated ejection      fraction was 65-70%, by visual assessment. No diagnostic evidence for      regional wall motion abnormalities. Unable to assess LV diastolic      function.   2. Right ventricle: The cavity size was mildly increased. Systolic pressure      was moderately increased.   3. Aortic valve: There was thickening. The findings were consistent with      mild stenosis. The peak systolic velocity was 2.06 m/sec. The mean      systolic gradient was 10 mm Hg. The valve area (VTI) was 2.17 cm^2. The      valve area (VTI) index was 1.19 cm^2/m^2.   4. Mitral valve: The annulus was moderately calcified. The leaflets were      mildly thickened. Transvalvular velocity was increased. The findings were      consistent with mild stenosis. The mean diastolic gradient was 5 mm Hg.   5. Pulmonary arteries: Systolic pressure was moderately increased, estimated      to be 56 mm Hg.   *       Review of Systems   Respiratory: Negative.     Cardiovascular: Negative.            Physical Exam:    Physical Exam  Vitals reviewed.   Constitutional:       General: She is not in acute distress.     Appearance: She is not ill-appearing.   Neck:      Vascular: No JVD.   Cardiovascular:      Rate and Rhythm: Normal rate and regular rhythm.      Pulses: Normal pulses.      Heart sounds: Normal heart sounds, S1 normal and S2 normal. No murmur heard.     No friction rub. No gallop.      Comments: LUE with lymphedema  Pulmonary:      Effort: Pulmonary effort is normal. No respiratory distress.      Breath sounds: No stridor. No wheezing, rhonchi or rales.      Comments: Fine crackles   Chest:      Chest wall: No tenderness.   Abdominal:       General: Abdomen is flat.      Palpations: Abdomen is soft.   Musculoskeletal:      Cervical back: Normal range of motion.      Right lower le+ Edema present.      Left lower le+ Edema present.   Neurological:      Mental Status: She is alert and oriented to person, place, and time.         Medications:   lisinopril  2.5 mg Oral Daily    insulin aspart  1-5 Units Subcutaneous TID CC    furosemide  40 mg Intravenous TID    docusate sodium  100 mg Oral BID    polyethylene glycol (PEG 3350)  17 g Oral Daily    cholecalciferol  1,000 Units Oral BID    estradiol  1 patch Transdermal Weekly    pantoprazole  40 mg Oral QAM AC    spironolactone  25 mg Oral Daily    calcium carbonate  500 mg Oral BID with meals    cetirizine  5 mg Oral Nightly    acetaZOLAMIDE  500 mg Oral Daily    aspirin  162.5 mg Oral Daily    digoxin  125 mcg Oral Daily    dilTIAZem HCl ER Coated Beads  360 mg Oral Daily    doxycycline  100 mg Oral 2 times per day    empagliflozin  10 mg Oral Daily    montelukast  10 mg Oral Nightly    predniSONE  20 mg Oral Daily    umeclidinium bromide  1 puff Inhalation Daily    heparin  5,000 Units Subcutaneous Q8H MOISÉS    lidocaine-menthol  1 patch Transdermal Daily         Assessment/Plan:    Acute back pain related to T6 fracture   Chronic hypoxic respiratory failure secondary to COPD  Acute on chronic HFpEF  Moderate pHTN  Atrial fibrlllation/flutter   HTN  H/o tobacco use  Leukocytosis secondary to steroid use    Plan;  - aflutter rates controlled don diltiazem and digoxin  - mildly volume overloaded on exam, neg of 2L over past 24hrs, wt down 4lb,  continue on diamox and lasix  - remains off OAC historically- understands risk of CVA, currently NSR  - CHF orderset placed on     RAFI Cope  [facility]  2024  6:14 AM

## 2024-12-25 NOTE — PLAN OF CARE
Problem: Patient Centered Care  Goal: Patient preferences are identified and integrated in the patient's plan of care  Description: Interventions:  - What would you like us to know as we care for you? Son at bedside  - Provide timely, complete, and accurate information to patient/family  - Incorporate patient and family knowledge, values, beliefs, and cultural backgrounds into the planning and delivery of care  - Encourage patient/family to participate in care and decision-making at the level they choose  - Honor patient and family perspectives and choices  Outcome: Progressing     Problem: Patient/Family Goals  Goal: Patient/Family Long Term Goal  Description: Patient's Long Term Goal: To go home    Interventions:  - Wean Oxygen, Kyphoplasty tomorrow  - See additional Care Plan goals for specific interventions  Outcome: Progressing  Goal: Patient/Family Short Term Goal  Description: Patient's Short Term Goal: Pain mgmt    Interventions:   - See MAR  - See additional Care Plan goals for specific interventions  Outcome: Progressing     Problem: PAIN - ADULT  Goal: Verbalizes/displays adequate comfort level or patient's stated pain goal  Description: INTERVENTIONS:  - Encourage pt to monitor pain and request assistance  - Assess pain using appropriate pain scale  - Administer analgesics based on type and severity of pain and evaluate response  - Implement non-pharmacological measures as appropriate and evaluate response  - Consider cultural and social influences on pain and pain management  - Manage/alleviate anxiety  - Utilize distraction and/or relaxation techniques  - Monitor for opioid side effects  - Notify MD/LIP if interventions unsuccessful or patient reports new pain  - Anticipate increased pain with activity and pre-medicate as appropriate  Outcome: Progressing     Problem: RISK FOR INFECTION - ADULT  Goal: Absence of fever/infection during anticipated neutropenic period  Description: INTERVENTIONS  -  Monitor WBC  - Administer growth factors as ordered  - Implement neutropenic guidelines  Outcome: Progressing     Problem: SAFETY ADULT - FALL  Goal: Free from fall injury  Description: INTERVENTIONS:  - Assess pt frequently for physical needs  - Identify cognitive and physical deficits and behaviors that affect risk of falls.  - [place] fall precautions as indicated by assessment.  - Educate pt/family on patient safety including physical limitations  - Instruct pt to call for assistance with activity based on assessment  - Modify environment to reduce risk of injury  - Provide assistive devices as appropriate  - Consider OT/PT consult to assist with strengthening/mobility  - Encourage toileting schedule  Outcome: Progressing     Problem: DISCHARGE PLANNING  Goal: Discharge to home or other facility with appropriate resources  Description: INTERVENTIONS:  - Identify barriers to discharge w/pt and caregiver  - Include patient/family/discharge partner in discharge planning  - Arrange for needed discharge resources and transportation as appropriate  - Identify discharge learning needs (meds, wound care, etc)  - Arrange for interpreters to assist at discharge as needed  - Consider post-discharge preferences of patient/family/discharge partner  - Complete POLST form as appropriate  - Assess patient's ability to be responsible for managing their own health  - Refer to Case Management Department for coordinating discharge planning if the patient needs post-hospital services based on physician/LIP order or complex needs related to functional status, cognitive ability or social support system  Outcome: Progressing     Problem: RESPIRATORY - ADULT  Goal: Achieves optimal ventilation and oxygenation  Description: INTERVENTIONS:  - Assess for changes in respiratory status  - Assess for changes in mentation and behavior  - Position to facilitate oxygenation and minimize respiratory effort  - Oxygen supplementation based on  oxygen saturation or ABGs  - Provide Smoking Cessation handout, if applicable  - Encourage broncho-pulmonary hygiene including cough, deep breathe, Incentive Spirometry  - Assess the need for suctioning and perform as needed  - Assess and instruct to report SOB or any respiratory difficulty  - Respiratory Therapy support as indicated  - Manage/alleviate anxiety  - Monitor for signs/symptoms of CO2 retention  Outcome: Progressing

## 2024-12-25 NOTE — PROGRESS NOTES
[facility]  part of [facility]     Hospitalist Progress Note     Yesenia Berkowitz Patient Status:  Inpatient    1940 MRN [id number]   Location [facility]5W Attending Lissy Blandon MD   Hosp Day # 4 PCP JO-ANN YANG MD     Subjective:     Pain ok. Still somewhat somnolent from ativan given for MRI. No overnight events      Objective:    Review of Systems:   ROS completed; pertinent positive and negatives stated in subjective.      Vital signs:  Temp:  [97.8 °F (36.6 °C)-98.7 °F (37.1 °C)] 97.8 °F (36.6 °C)  Pulse:  [68-81] 76  Resp:  [16-18] 16  BP: (113-125)/(43-99) 114/46  SpO2:  [92 %-100 %] 92 %      Physical Exam:    Gen: NAD Somnolent  Chest: good air entry CTABL  CVS: normal s1 and s2 RR  Abd: NABS soft NT ND  Neuro: CN 2-12 grossly intact  Ext: no edema in bilateral LE      Diagnostic Data:    Labs:  Recent Labs   Lab 24  1735 24  0526 24  0734   WBC 17.3* 12.0* 10.3   HGB 13.1 11.6* 10.2*   MCV 98.2 97.0 99.1   .0 183.0 177.0       Recent Labs   Lab 24  1735 24  0526 24  0649 24  0734   * 221* 128*  --  120*   BUN 22 23 22  --  22   CREATSERUM 0.71 0.68 0.65  --  0.58   CA 8.6* 9.0 9.3  --  8.9   ALB 3.4 3.1*  --   --   --     143 142  --  143   K 4.5 4.3 3.5 4.7 4.0    101 100  --  102   CO2 38.0* 40.0* 39.0*  --  40.0*   ALKPHO 74 66  --   --   --    AST 15 9  --   --   --    ALT 21 18  --   --   --    BILT 0.4 0.2  --   --   --    TP 5.3* 4.9*  --   --   --        Estimated Creatinine Clearance: 67.3 mL/min (based on SCr of 0.58 mg/dL).    No results for input(s): \"PTP\", \"INR\" in the last 168 hours.           Imaging: Imaging data reviewed in Epic.    Medications:    lisinopril  2.5 mg Oral Daily    insulin aspart  1-5 Units Subcutaneous TID CC    furosemide  40 mg Intravenous TID    docusate sodium  100 mg Oral BID    polyethylene glycol (PEG 3350)  17 g Oral Daily    cholecalciferol   1,000 Units Oral BID    estradiol  1 patch Transdermal Weekly    pantoprazole  40 mg Oral QAM AC    spironolactone  25 mg Oral Daily    calcium carbonate  500 mg Oral BID with meals    cetirizine  5 mg Oral Nightly    acetaZOLAMIDE  500 mg Oral Daily    aspirin  162.5 mg Oral Daily    digoxin  125 mcg Oral Daily    dilTIAZem HCl ER Coated Beads  360 mg Oral Daily    doxycycline  100 mg Oral 2 times per day    empagliflozin  10 mg Oral Daily    montelukast  10 mg Oral Nightly    predniSONE  20 mg Oral Daily    umeclidinium bromide  1 puff Inhalation Daily    [Held by provider] heparin  5,000 Units Subcutaneous Q8H [facility]    lidocaine-menthol  1 patch Transdermal Daily       Assessment & Plan:     Thoracic compression fracture.  T6.  Chronicity unknown.  Seems to be quite symptomatic.  -Pain control  -IR consult  -MRI thoracic spine with and without done, read pending    Right upper lobe lung nodule.  Slowly growing over the course of years.  Previous thoracentesis cytology negative, otherwise no attempts at obtaining tissue diagnosis.  -Defer to pulmonology regarding further workup  -If kyphoplasty done, will obtain biopsy at the same time    Acute on chronic diastolic congestive heart failure.  Numerous admissions for this.  -IV diuretics per cardiology, cont acetazolamide  -Cardiology following  -Telemetry    COPD.  Does not appear to be in exacerbation.  -No steroids for now  -Nebulizer treatments as needed  -Pulmonology following    Other problems  Steroid-induced leukocytosis  Hypertension  Atrial fibrillation, paroxysmal    Plan of care discussed with patient and son at bedside.      Supplementary Documentation:     Quality:  DVT Prophylaxis: heparin s/c  CODE status: Full       Estimated date of discharge: TBD  Discharge is dependent on: clinical stability  At this point Ms. Berkowitz is expected to be discharge to: home    **Certification      PHYSICIAN Certification of Need for Inpatient Hospitalization - Initial  Certification    Patient will require inpatient services that will reasonably be expected to span two midnight's based on the clinical documentation in H+P.   Based on patients current state of illness, I anticipate that, after discharge, patient will require TBD.    MDM: High, I personally reviewed the available laboratories, imaging including XR. I discussed the case with RN. I ordered laboratories, studies including AM labs.  Medical decision making high, risk is high.

## 2024-12-25 NOTE — PROGRESS NOTES
[facility]  part of [facility]    Progress Note    Yesenia Berkowitz Patient Status:  Inpatient    1940 MRN [id number]   Location [facility]5W Attending Robbie Tate MD   Hosp Day # 4 PCP JO-ANN YANG MD       Subjective:   Yesenia Berkowitz is a(n) 84 year old female.   Less sob and less back pain  Objective:   Blood pressure 114/46, pulse 68, temperature 97.8 °F (36.6 °C), temperature source Oral, resp. rate 18, weight 168 lb 6.4 oz (76.4 kg), SpO2 99%.    HEENT: negative  Neck: no adenopathy, no carotid bruit, no JVD, supple, symmetrical, trachea midline and thyroid not enlarged, symmetric, no tenderness/mass/nodules  Pulmonary/Chest: rhonchi  Cardiovascular: S1, S2 normal, no S3 or S4, regular rate and rhythm, no murmur  Abdominal: normal findings: bowel sounds normal, soft, non-tender and no hepatosplenomegaly   Extremities: extremities normal, atraumatic, no cyanosis or2+ edema  Skin: Skin color, texture, turgor normal. No rashes or lesions    Results:     Lab Results   Component Value Date    WBC 10.3 2024    HGB 10.2 (L) 2024    HCT 33.1 (L) 2024    .0 2024    CREATSERUM 0.58 2024    BUN 22 2024     2024    K 4.0 2024     2024    CO2 40.0 (HH) 2024     (H) 2024    CA 8.9 2024    ALB 3.1 (L) 2024    ALKPHO 66 2024    BILT 0.2 2024    TP 4.9 (L) 2024    AST 9 2024    ALT 18 2024    PTT 24.3 2024    INR 1.08 2024    T4F 0.9 2024    TSH 0.185 (L) 2024    LIP 30 2024    DDIMER 0.47 2024    MG 1.9 2024    PHOS 3.9 2024    TROP <0.045 2020       XR THORACIC SPINE (1 VIEW)  (CPT=72020)    Result Date: 2024  CONCLUSION: Slight to moderate anterior compression of the T6 vertebral body resulting in 25 % loss of height.  This is of indeterminate age.  Although no other compression deformities or  fractures are clearly identified, the study is limited secondary to bony demineralization.    Dictated by (CST): Ortiz Hickey MD on 12/24/2024 at 2:28 PM     Finalized by (CST): Ortiz Hickey MD on 12/24/2024 at 2:30 PM          MRI SPINE THORACIC (W+WO) (CPT=72157)    Result Date: 12/24/2024  CONCLUSION:  1. Acute-appearing mild-to-moderate superior endplate compression fracture at T6.  No retropulsion.  Fracture is new since comparison chest CT from November, 2024.  Given overall MR morphology, a benign osteoporotic/insufficiency fracture is favored. 2. No other suspicious marrow replacing or enhancing osseous lesions throughout the thoracic spine. 3. Known suspicious right upper lobe lung nodule is not well assessed by MR and better seen on recent prior CT. 4. Mild multilevel thoracic spine degenerative changes as detailed. 5. Small right greater than left pleural effusions. 6. Elevation of the left hemidiaphragm. 7. Image degradation secondary to patient related motion artifact. 8. Lesser incidental findings as above.   A preliminary report was issued by the [facility] Radiology teleradiology service. There are no major discrepancies.  elm-remote  Dictated by (CST): Ellis Garza MD on 12/24/2024 at 2:20 PM     Finalized by (CST): Ellis Garza MD on 12/24/2024 at 2:28 PM          XR LUMBAR SPINE (MIN 2 VIEWS) (CPT=72100)    Result Date: 12/24/2024  CONCLUSION: Slight to moderate degenerative changes in the lower thoracic/lumbar spine.  No acute or destructive bony process is clearly visualized.    Dictated by (CST): Ortiz Hickey MD on 12/24/2024 at 2:26 PM     Finalized by (CST): Ortiz Hickey MD on 12/24/2024 at 2:28 PM               Assessment and Plan:   Principal Problem:    Acute on chronic congestive heart failure, unspecified heart failure type (HCC)  Active Problems:    Pulmonary nodule 1 cm or greater in diameter    Acute exacerbation of CHF (congestive heart failure) (HCC)    Pleural  effusion on left    Pleural effusion on right    Acute bilateral thoracic back pain    Atypical pneumonia    Compression fracture of T6 vertebra (HCC)     Less sob and pain in the back due to T6 compression fracture.Kyphoplasty am        ELVIA LIVINGSTON MD, MD  12/25/2024

## 2024-12-25 NOTE — CONSULTS
[facility]  part of [facility]      Consult     Yesenia Berkowitz Patient Status:  Inpatient    1940 MRN [id number]   Location [facility]5W Attending Robbie Tate MD   Hosp Day # 3 PCP JO-ANN YANG MD     Referring Provider: Robbie Tate MD  Reason for Consultation: acute/subacute symptomatic T6 compression fracture    Subjective:    Yesenia Berkowitz is a 84 year old female with complaints of back pain between shoulder blades that is constant, life-style limiting for past 3 weeks.  No distal neurological sequelae. No incontinence.    History/Other:      Past Medical History:  Past Medical History:    A-fib ([facility])    Anesthesia complication    Arrhythmia    AFIB    Arthritis    Asthma ([facility])    Back problem    COPD (chronic obstructive pulmonary disease) ([facility])    Deviated nasal septum    nasal septoplasty, turb reduction, smr of turbs    Difficult intubation    fiber optic if needed    Diverticulitis    colonoscopy     Diverticulosis of large intestine    Esophageal reflux    Extrinsic asthma, unspecified    Headache    Heart disease    Hepatitis    WAS TOLD SHE HAS HAD THIS WHEN SHE WAS 19 YEARS OLD    High blood pressure    High cholesterol    Irregular heart rate    Lichenoid keratosis    left chest-bx    Osteoarthritis    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    Paronychia    (RT); onychomycosis; debridement    Problems with swallowing    occasionally    Shortness of breath    2 L NC ALL THE TIME 24HR/7 DAYS PER WEEK    Unspecified essential hypertension    Visual impairment        Past Surgical History:   Past Surgical History:   Procedure Laterality Date    Adenoidectomy      Cataract      cataract extraction     Hysterectomy      Sinus surgery        DEVIATED SEPTUM    T&a      Tonsillectomy         Social History:  reports that she quit smoking about 29 years ago. Her smoking use included cigarettes. She has never used smokeless tobacco. She reports current alcohol use.  She reports that she does not use drugs.    Family History:   Family History   Problem Relation Age of Onset    Ovarian Cancer Mother 76        endometrial, poss ovarian primary    Pulmonary Disease Sister         COPD    Skin cancer Other     Stroke Other     Other (Other) Other        Allergies: Allergies[1]    Medications:  Medications Ordered Prior to Encounter[2]    Review of Systems:   Review of systems was completed.  Pertinent positives and negatives noted in the HPI.    Objective:     /57 (BP Location: Right arm)   Pulse 74   Temp 97.9 °F (36.6 °C) (Oral)   Resp 18   Wt 171 lb 9.6 oz (77.8 kg)   SpO2 94%   BMI 28.56 kg/m²      General: No acute distress.  Alert ,         Respiratory: No wheezes, no rhonchi, clear to auscultation bilaterally,    Cardiovascular: S1, S2.  Abdomen: Soft, nontender, nondistended.    Extremities: No edema, no cyanosis.    Point tenderness in region of T6    Results:    Labs:  Laboratory data reviewed.      Selected labs - last 24 hours:  Endo  Lytes  Renal   Glu 128  Na 142 Ca 9.3  BUN 22   POC Gluc  140  K 3.5 PO4 -  Cr 0.65   A1c -  Cl 100 Mg -  eGFR 88   TSH -  CO2 39.0         LFT  CBC  Other   AST -  WBC -  PTT - Procal -   ALT -  Hb -  INR - CRP -   APk -  Hct -  Trop - D dim -   T katie -  PLT -  pBNP -  BNP -  - Ferritin  -   Prot -    CK  - Lactate  -   Alb -    LDL  - COVID  -       Imaging: Imaging data reviewed in Epic. Acute/subacute fracture of T6.     Assessment & Plan:    #Symptomatic osteoporotic compression fracture of T6. Risks/benefits of kyphoplasty discussed with Ms Berkowitz to include bleeding, infection, no relief of pain/worse pain, cement migration causing PE/neurological compromise. All questions answered. Pt agrees to proceed.    Will plan on performing T6 Kypho on 12/26/24-- please make npo after midnight on 12/25 and hold heparin.     25 minutes spent on this admission - examining patient, obtaining history, reviewing previous medical records,  going over test results/imaging and discussing plan of care. More than 50% of the time was spent in counseling and/or coordination of care related to intractable back pain   Manuel Harper MD  12/24/2024                               [1]   Allergies  Allergen Reactions    Penicillin G ANAPHYLAXIS    Azithromycin DIARRHEA and NAUSEA AND VOMITING    Cefuroxime UNKNOWN     Other reaction(s): Vomitting    Levofloxacin UNKNOWN     Other reaction(s): LEVOFLOXACIN    Penicillins      Other reaction(s): Unknown   [2]   Current Facility-Administered Medications on File Prior to Encounter   Medication Dose Route Frequency Provider Last Rate Last Admin    [COMPLETED] acetaZOLAMIDE (Diamox) tab 500 mg  500 mg Oral Once Cortney Dumont APRN   500 mg at 12/17/24 1600    [COMPLETED] furosemide (Lasix) 10 mg/mL injection 80 mg  80 mg Intravenous BID (Diuretic) Cortney Dumont APRN   80 mg at 12/17/24 0951    [COMPLETED] furosemide (Lasix) 10 mg/mL injection 40 mg  40 mg Intravenous Once Luis Fernando Fowler MD   40 mg at 12/15/24 1242    [COMPLETED] ipratropium-albuterol (Duoneb) 0.5-2.5 (3) MG/3ML inhalation solution 3 mL  3 mL Nebulization Once Korey Luna MD   3 mL at 12/12/24 0541    [COMPLETED] acetaminophen (Tylenol Extra Strength) tab 1,000 mg  1,000 mg Oral Once Korey Luna MD   1,000 mg at 12/12/24 0554    [COMPLETED] furosemide (Lasix) 10 mg/mL injection 10 mg  10 mg Intravenous Once Norma Kerns MD   10 mg at 12/12/24 0808    [COMPLETED] ipratropium-albuterol (Duoneb) 0.5-2.5 (3) MG/3ML inhalation solution 3 mL  3 mL Nebulization Once Norma Kerns MD   3 mL at 12/12/24 0758    [COMPLETED] potassium chloride (Klor-Con M20) tab 40 mEq  40 mEq Oral Q4H Obdulia Lovell DO   40 mEq at 11/27/24 1234    [COMPLETED] furosemide (Lasix) 10 mg/mL injection 40 mg  40 mg Intravenous Once Molly Carrillo APRN   40 mg at 11/27/24 1726    [COMPLETED] potassium chloride (Klor-Con M20) tab 40 mEq  40 mEq  Oral Once Rex Tabor MD   40 mEq at 11/26/24 1006    [COMPLETED] potassium chloride (Klor-Con M20) tab 40 mEq  40 mEq Oral Q4H Luz Marina Garcia MD   40 mEq at 11/25/24 1301    [COMPLETED] albuterol (Ventolin) (5 MG/ML) 0.5% nebulizer solution 10 mg  10 mg Nebulization Once Thierry Lay MD   10 mg at 11/24/24 1934    [COMPLETED] ipratropium (Atrovent) 0.02 % nebulizer solution 1 mg  1 mg Nebulization Once Thierry Lay MD   1 mg at 11/24/24 1933    [COMPLETED] methylPREDNISolone sodium succinate (Solu-MEDROL) injection 125 mg  125 mg Intravenous Once Thierry Lay MD   125 mg at 11/24/24 2111    [COMPLETED] furosemide (Lasix) 10 mg/mL injection 40 mg  40 mg Intravenous Once Aakash Hurt MD   40 mg at 11/18/24 1300    [COMPLETED] dexamethasone (Decadron) 4 MG/ML injection 6 mg  6 mg Intravenous Once Aakash Hurt MD   6 mg at 11/18/24 1255    [COMPLETED] ipratropium-albuterol (Duoneb) 0.5-2.5 (3) MG/3ML inhalation solution 3 mL  3 mL Nebulization Once Aakash Hurt MD   3 mL at 11/18/24 1250    [COMPLETED] doxycycline hyclate (Vibramycin) 100 mg in sodium chloride 0.9% 100 mL IVPB  100 mg Intravenous Once Clare Quintero  mL/hr at 11/03/24 0114 100 mg at 11/03/24 0114    [COMPLETED] iopamidol 76% (ISOVUE-370) injection for power injector  80 mL Intravenous ONCE PRN Obdulia Lovell DO   80 mL at 11/03/24 1238    [COMPLETED] predniSONE (Deltasone) tab 60 mg  60 mg Oral Once Russ Su MD   60 mg at 11/02/24 2140    [COMPLETED] iopamidol 76% (ISOVUE-370) injection for power injector  80 mL Intravenous ONCE PRN Ez Taylor MD   50 mL at 10/08/24 1223    [COMPLETED] methylPREDNISolone sodium succinate (Solu-MEDROL) injection 125 mg  125 mg Intravenous Once Rene, MD Jacqueline   125 mg at 10/07/24 1254    [COMPLETED] ipratropium (Atrovent) 0.02 % nebulizer solution 1 mg  1 mg Nebulization Once Rene, MD Jacqueline   1 mg at 10/07/24 1216    [COMPLETED] furosemide (Lasix) 10 mg/mL injection  40 mg  40 mg Intravenous Once Jacqueline López MD   40 mg at 10/07/24 9652     Current Outpatient Medications on File Prior to Encounter   Medication Sig Dispense Refill    furosemide 80 MG Oral Tab Take 1 tablet (80 mg total) by mouth daily. 30 tablet 3    [] doxycycline 100 MG Oral Cap Take 1 capsule (100 mg total) by mouth every 12 (twelve) hours for 10 doses. 10 capsule 0    predniSONE 20 MG Oral Tab Take 1 tablet (20 mg total) by mouth daily for 10 doses. 10 tablet 0    empagliflozin (JARDIANCE) 10 MG Oral Tab Take 1 tablet (10 mg total) by mouth daily. 30 tablet 3    acetaZOLAMIDE 250 MG Oral Tab Take 2 tablets (500 mg total) by mouth daily. 30 tablet 0    acetaminophen 500 MG Oral Tab Take 1 tablet (500 mg total) by mouth every 6 (six) hours as needed.      DIGOXIN 0.125 MG Oral Tab Take 1 tablet (125 mcg total) by mouth daily. 30 tablet 0    albuterol 108 (90 Base) MCG/ACT Inhalation Aero Soln Inhale 2 puffs into the lungs as needed.      estradiol 0.05 MG/24HR Transdermal Patch Weekly Place 1 patch onto the skin once a week. As directed.      dilTIAZem HCl ER Coated Beads 360 MG Oral Capsule SR 24 Hr Take 1 capsule (360 mg total) by mouth 2 (two) times daily.  0    Cholecalciferol (VITAMIN D) 1000 UNITS Oral Tab Take 1,000 Units by mouth 2 (two) times daily.      Potassium Chloride ER (K-DUR M20) 20 MEQ Oral Tab CR Take 1 tablet (20 mEq total) by mouth nightly.      lansoprazole (PREVACID) 30 MG Oral Capsule Delayed Release Take 1 capsule (30 mg total) by mouth nightly.      aspirin 81 MG Oral Tab Take 2 tablets (162 mg total) by mouth daily as needed.      Loratadine 10 MG Oral Cap Take 10 mg by mouth nightly.        montelukast 10 MG Oral Tab Take 1 tablet (10 mg total) by mouth nightly.      omega-3 fatty acids (FISH OIL) 1000 MG Oral Cap Take 1,000 mg by mouth daily.      spironolactone 25 MG Oral Tab Take 1 tablet (25 mg total) by mouth daily for 90 doses. (Patient taking differently: Take 1 tablet  (25 mg total) by mouth daily. Pt not started yet) 90 tablet 0    dicyclomine 10 MG Oral Cap Take 1 capsule (10 mg total) by mouth 3 (three) times daily as needed (abdominal pain). (Patient not taking: Reported on 12/21/2024) 40 capsule 3

## 2024-12-25 NOTE — PLAN OF CARE
Problem: PAIN - ADULT  Goal: Verbalizes/displays adequate comfort level or patient's stated pain goal  Description: INTERVENTIONS:  - Encourage pt to monitor pain and request assistance  - Assess pain using appropriate pain scale  - Administer analgesics based on type and severity of pain and evaluate response  - Implement non-pharmacological measures as appropriate and evaluate response  - Consider cultural and social influences on pain and pain management  - Manage/alleviate anxiety  - Utilize distraction and/or relaxation techniques  - Monitor for opioid side effects  - Notify MD/LIP if interventions unsuccessful or patient reports new pain  - Anticipate increased pain with activity and pre-medicate as appropriate  Outcome: Progressing     Problem: RISK FOR INFECTION - ADULT  Goal: Absence of fever/infection during anticipated neutropenic period  Description: INTERVENTIONS  - Monitor WBC  - Administer growth factors as ordered  - Implement neutropenic guidelines  Outcome: Progressing     Problem: RESPIRATORY - ADULT  Goal: Achieves optimal ventilation and oxygenation  Description: INTERVENTIONS:  - Assess for changes in respiratory status  - Assess for changes in mentation and behavior  - Position to facilitate oxygenation and minimize respiratory effort  - Oxygen supplementation based on oxygen saturation or ABGs  - Provide Smoking Cessation handout, if applicable  - Encourage broncho-pulmonary hygiene including cough, deep breathe, Incentive Spirometry  - Assess the need for suctioning and perform as needed  - Assess and instruct to report SOB or any respiratory difficulty  - Respiratory Therapy support as indicated  - Manage/alleviate anxiety  - Monitor for signs/symptoms of CO2 retention  Outcome: Progressing     Problem: SAFETY ADULT - FALL  Goal: Free from fall injury  Description: INTERVENTIONS:  - Assess pt frequently for physical needs  - Identify cognitive and physical deficits and behaviors that  affect risk of falls.  - [place] fall precautions as indicated by assessment.  - Educate pt/family on patient safety including physical limitations  - Instruct pt to call for assistance with activity based on assessment  - Modify environment to reduce risk of injury  - Provide assistive devices as appropriate  - Consider OT/PT consult to assist with strengthening/mobility  - Encourage toileting schedule  Outcome: Not Progressing     On 3 L nasal cannula and remote telemetry monitoring. Contact guard assist to bathroom. Went to radiology for x-rays today. PRN medications given per MAR for back pain. Tolerating cardiac diet, 2000 ml fluid restriction enforced.   Safety precautions in place, frequent nursing rounding completed, call light within reach. All questions answered and needs met.

## 2024-12-25 NOTE — PLAN OF CARE
Problem: Patient Centered Care  Goal: Patient preferences are identified and integrated in the patient's plan of care  Description: Interventions:  - What would you like us to know as we care for you? From home with self with adult son for support.   - Provide timely, complete, and accurate information to patient/family  - Incorporate patient and family knowledge, values, beliefs, and cultural backgrounds into the planning and delivery of care  - Encourage patient/family to participate in care and decision-making at the level they choose  - Honor patient and family perspectives and choices  Outcome: Progressing     Problem: Patient/Family Goals  Goal: Patient/Family Long Term Goal  Description: Patient's Long Term Goal: To be discharged home.     Interventions:  - Pain management.  - See additional Care Plan goals for specific interventions   Problem: PAIN - ADULT  Goal: Verbalizes/displays adequate comfort level or patient's stated pain goal  Description: INTERVENTIONS:  - Encourage pt to monitor pain and request assistance  - Assess pain using appropriate pain scale  - Administer analgesics based on type and severity of pain and evaluate response  - Implement non-pharmacological measures as appropriate and evaluate response  - Consider cultural and social influences on pain and pain management  - Manage/alleviate anxiety  - Utilize distraction and/or relaxation techniques  - Monitor for opioid side effects  - Notify MD/LIP if interventions unsuccessful or patient reports new pain  - Anticipate increased pain with activity and pre-medicate as appropriate  Outcome: Progressing     Problem: RISK FOR INFECTION - ADULT  Goal: Absence of fever/infection during anticipated neutropenic period  Description: INTERVENTIONS  - Monitor WBC  - Administer growth factors as ordered  - Implement neutropenic guidelines  Outcome: Progressing     Problem: SAFETY ADULT - FALL  Goal: Free from fall injury  Description:  INTERVENTIONS:  - Assess pt frequently for physical needs  - Identify cognitive and physical deficits and behaviors that affect risk of falls.  - [place] fall precautions as indicated by assessment.  - Educate pt/family on patient safety including physical limitations  - Instruct pt to call for assistance with activity based on assessment  - Modify environment to reduce risk of injury  - Provide assistive devices as appropriate  - Consider OT/PT consult to assist with strengthening/mobility  - Encourage toileting schedule  Outcome: Progressing     Problem: DISCHARGE PLANNING  Goal: Discharge to home or other facility with appropriate resources  Description: INTERVENTIONS:  - Identify barriers to discharge w/pt and caregiver  - Include patient/family/discharge partner in discharge planning  - Arrange for needed discharge resources and transportation as appropriate  - Identify discharge learning needs (meds, wound care, etc)  - Arrange for interpreters to assist at discharge as needed  - Consider post-discharge preferences of patient/family/discharge partner  - Complete POLST form as appropriate  - Assess patient's ability to be responsible for managing their own health  - Refer to Case Management Department for coordinating discharge planning if the patient needs post-hospital services based on physician/LIP order or complex needs related to functional status, cognitive ability or social support system  Outcome: Progressing     Problem: RESPIRATORY - ADULT  Goal: Achieves optimal ventilation and oxygenation  Description: INTERVENTIONS:  - Assess for changes in respiratory status  - Assess for changes in mentation and behavior  - Position to facilitate oxygenation and minimize respiratory effort  - Oxygen supplementation based on oxygen saturation or ABGs  - Provide Smoking Cessation handout, if applicable  - Encourage broncho-pulmonary hygiene including cough, deep breathe, Incentive Spirometry  - Assess the need  for suctioning and perform as needed  - Assess and instruct to report SOB or any respiratory difficulty  - Respiratory Therapy support as indicated  - Manage/alleviate anxiety  - Monitor for signs/symptoms of CO2 retention  Outcome: Progressing

## 2024-12-26 ENCOUNTER — ANESTHESIA EVENT (OUTPATIENT)
Dept: INTERVENTIONAL RADIOLOGY/VASCULAR | Facility: HOSPITAL | Age: 84
End: 2024-12-26
Payer: MEDICARE

## 2024-12-26 ENCOUNTER — APPOINTMENT (OUTPATIENT)
Dept: INTERVENTIONAL RADIOLOGY/VASCULAR | Facility: HOSPITAL | Age: 84
End: 2024-12-26
Attending: RADIOLOGY
Payer: MEDICARE

## 2024-12-26 ENCOUNTER — APPOINTMENT (OUTPATIENT)
Dept: INTERVENTIONAL RADIOLOGY/VASCULAR | Facility: HOSPITAL | Age: 84
DRG: 477 | End: 2024-12-26
Attending: RADIOLOGY
Payer: MEDICARE

## 2024-12-26 LAB
ALBUMIN SERPL-MCNC: 3 G/DL (ref 3.2–4.8)
ANION GAP SERPL CALC-SCNC: 2 MMOL/L (ref 0–18)
BUN BLD-MCNC: 21 MG/DL (ref 9–23)
BUN/CREAT SERPL: 27.6 (ref 10–20)
CALCIUM BLD-MCNC: 8.8 MG/DL (ref 8.7–10.4)
CHLORIDE SERPL-SCNC: 102 MMOL/L (ref 98–112)
CO2 SERPL-SCNC: 36 MMOL/L (ref 21–32)
CREAT BLD-MCNC: 0.76 MG/DL
EGFRCR SERPLBLD CKD-EPI 2021: 78 ML/MIN/1.73M2 (ref 60–?)
GLUCOSE BLD-MCNC: 140 MG/DL (ref 70–99)
GLUCOSE BLDC GLUCOMTR-MCNC: 127 MG/DL (ref 70–99)
GLUCOSE BLDC GLUCOMTR-MCNC: 179 MG/DL (ref 70–99)
GLUCOSE BLDC GLUCOMTR-MCNC: 360 MG/DL (ref 70–99)
OSMOLALITY SERPL CALC.SUM OF ELEC: 295 MOSM/KG (ref 275–295)
PHOSPHATE SERPL-MCNC: 3.4 MG/DL (ref 2.4–5.1)
POTASSIUM SERPL-SCNC: 3.9 MMOL/L (ref 3.5–5.1)
SODIUM SERPL-SCNC: 140 MMOL/L (ref 136–145)

## 2024-12-26 PROCEDURE — 0PB43ZX EXCISION OF THORACIC VERTEBRA, PERCUTANEOUS APPROACH, DIAGNOSTIC: ICD-10-PCS | Performed by: RADIOLOGY

## 2024-12-26 PROCEDURE — 0PU43JZ SUPPLEMENT THORACIC VERTEBRA WITH SYNTHETIC SUBSTITUTE, PERCUTANEOUS APPROACH: ICD-10-PCS | Performed by: RADIOLOGY

## 2024-12-26 PROCEDURE — 0PS43ZZ REPOSITION THORACIC VERTEBRA, PERCUTANEOUS APPROACH: ICD-10-PCS | Performed by: RADIOLOGY

## 2024-12-26 PROCEDURE — 99233 SBSQ HOSP IP/OBS HIGH 50: CPT | Performed by: HOSPITALIST

## 2024-12-26 RX ORDER — SODIUM CHLORIDE, SODIUM LACTATE, POTASSIUM CHLORIDE, CALCIUM CHLORIDE 600; 310; 30; 20 MG/100ML; MG/100ML; MG/100ML; MG/100ML
INJECTION, SOLUTION INTRAVENOUS CONTINUOUS
Status: DISCONTINUED | OUTPATIENT
Start: 2024-12-26 | End: 2024-12-28

## 2024-12-26 RX ORDER — LIDOCAINE HYDROCHLORIDE 20 MG/ML
INJECTION, SOLUTION INFILTRATION; PERINEURAL
Status: COMPLETED
Start: 2024-12-26 | End: 2024-12-26

## 2024-12-26 RX ORDER — DEXAMETHASONE SODIUM PHOSPHATE 4 MG/ML
VIAL (ML) INJECTION AS NEEDED
Status: DISCONTINUED | OUTPATIENT
Start: 2024-12-26 | End: 2024-12-26 | Stop reason: SURG

## 2024-12-26 RX ORDER — SODIUM CHLORIDE 9 MG/ML
INJECTION, SOLUTION INTRAVENOUS CONTINUOUS PRN
Status: DISCONTINUED | OUTPATIENT
Start: 2024-12-26 | End: 2024-12-26 | Stop reason: SURG

## 2024-12-26 RX ORDER — LIDOCAINE HYDROCHLORIDE 10 MG/ML
INJECTION, SOLUTION EPIDURAL; INFILTRATION; INTRACAUDAL; PERINEURAL AS NEEDED
Status: DISCONTINUED | OUTPATIENT
Start: 2024-12-26 | End: 2024-12-26 | Stop reason: SURG

## 2024-12-26 RX ORDER — IPRATROPIUM BROMIDE AND ALBUTEROL SULFATE 2.5; .5 MG/3ML; MG/3ML
SOLUTION RESPIRATORY (INHALATION)
Status: COMPLETED
Start: 2024-12-26 | End: 2024-12-26

## 2024-12-26 RX ORDER — NALOXONE HYDROCHLORIDE 0.4 MG/ML
0.08 INJECTION, SOLUTION INTRAMUSCULAR; INTRAVENOUS; SUBCUTANEOUS AS NEEDED
Status: ACTIVE | OUTPATIENT
Start: 2024-12-26 | End: 2024-12-27

## 2024-12-26 RX ORDER — IPRATROPIUM BROMIDE AND ALBUTEROL SULFATE 2.5; .5 MG/3ML; MG/3ML
3 SOLUTION RESPIRATORY (INHALATION) ONCE
Status: COMPLETED | OUTPATIENT
Start: 2024-12-26 | End: 2024-12-26

## 2024-12-26 RX ADMIN — SODIUM CHLORIDE: 9 INJECTION, SOLUTION INTRAVENOUS at 15:50:00

## 2024-12-26 RX ADMIN — SODIUM CHLORIDE: 9 INJECTION, SOLUTION INTRAVENOUS at 16:07:00

## 2024-12-26 RX ADMIN — LIDOCAINE HYDROCHLORIDE 50 MG: 10 INJECTION, SOLUTION EPIDURAL; INFILTRATION; INTRACAUDAL; PERINEURAL at 15:38:00

## 2024-12-26 RX ADMIN — DEXAMETHASONE SODIUM PHOSPHATE 8 MG: 4 MG/ML VIAL (ML) INJECTION at 15:38:00

## 2024-12-26 RX ADMIN — SODIUM CHLORIDE: 9 INJECTION, SOLUTION INTRAVENOUS at 15:22:00

## 2024-12-26 NOTE — PROGRESS NOTES
[facility]  part of [facility]  Progress Note    Yesenia Berkowitz Patient Status:  Inpatient    1940 MRN [id number]   Location [facility]5W Attending Robbie Tate MD   Hosp Day # 5 PCP JO-ANN YANG MD       Subjective:   Continued back pain.     Objective:   Sitting up in bed.  Family at bedside.    Blood pressure 122/51, pulse 75, temperature 97.8 °F (36.6 °C), temperature source Oral, resp. rate 20, weight 168 lb 6.4 oz (76.4 kg), SpO2 100%.        Assessment and Plan:   Reviewed kyphoplasty procedure and post procedure care with patient and her family.      SARAH CARRILLO, APRN  2024

## 2024-12-26 NOTE — ANESTHESIA POSTPROCEDURE EVALUATION
Patient: Yesenia Berkowitz    Procedure Summary       Date: 12/26/24 Room / Location: [facility]    Anesthesia Start: 1522 Anesthesia Stop: 1625    Procedures:       IR KYPHOPLASTY      IR BIOPSY Diagnosis:       Compression fracture of T6 vertebra (HCC)      (Thoracic level 6 compression fracture)      (T6 bone biopsy)    Scheduled Providers: Manuel Harper MD Anesthesiologist: Luna Farmer MD    Anesthesia Type: general ASA Status: 4            Anesthesia Type: general    Vitals Value Taken Time   /65 12/26/24 1625   Temp 97.5 12/26/24 1625   Pulse 67 12/26/24 1625   Resp 15 12/26/24 1625   SpO2 99 12/26/24 1625       [facility] AN Post Evaluation:   Patient Evaluated in PACU  Patient Participation: complete - patient participated  Level of Consciousness: awake  Pain Score: 0  Pain Management: adequate  Airway Patency:patent  Yes    Nausea/Vomiting: none  Cardiovascular Status: acceptable  Respiratory Status: acceptable  Postoperative Hydration acceptable      Luna Farmer MD  12/26/2024 4:25 PM

## 2024-12-26 NOTE — PLAN OF CARE
Problem: Patient Centered Care  Goal: Patient preferences are identified and integrated in the patient's plan of care  Description: Interventions:  - What would you like us to know as we care for you? From home alone, son lives close by  - Provide timely, complete, and accurate information to patient/family  - Incorporate patient and family knowledge, values, beliefs, and cultural backgrounds into the planning and delivery of care  - Encourage patient/family to participate in care and decision-making at the level they choose  - Honor patient and family perspectives and choices  Outcome: Progressing     Problem: Patient/Family Goals  Goal: Patient/Family Long Term Goal  Description: Patient's Long Term Goal: Feel better    Interventions:  -Cardiology & pulmonary consulted, pain control, oxygen therapy, IV and po diuretics, IR consulted for kyphoplasty   - See additional Care Plan goals for specific interventions  Outcome: Progressing  Goal: Patient/Family Short Term Goal  Description: Patient's Short Term Goal: Pain control    Interventions:   - Monitor vital signs   - Monitor appropriate labs   - Monitor blood glucose levels   - Pain management   - Administer medications per order   - Follow MD orders   - Diagnostics per order   - Update/inform patient and family on plan of care   - Discharge planning    - See additional Care Plan goals for specific interventions  Outcome: Progressing     Problem: PAIN - ADULT  Goal: Verbalizes/displays adequate comfort level or patient's stated pain goal  Description: INTERVENTIONS:  - Encourage pt to monitor pain and request assistance  - Assess pain using appropriate pain scale  - Administer analgesics based on type and severity of pain and evaluate response  - Implement non-pharmacological measures as appropriate and evaluate response  - Consider cultural and social influences on pain and pain management  - Manage/alleviate anxiety  - Utilize distraction and/or relaxation  techniques  - Monitor for opioid side effects  - Notify MD/LIP if interventions unsuccessful or patient reports new pain  - Anticipate increased pain with activity and pre-medicate as appropriate  Outcome: Progressing     Problem: RISK FOR INFECTION - ADULT  Goal: Absence of fever/infection during anticipated neutropenic period  Description: INTERVENTIONS  - Monitor WBC  - Administer growth factors as ordered  - Implement neutropenic guidelines  Outcome: Progressing     Problem: SAFETY ADULT - FALL  Goal: Free from fall injury  Description: INTERVENTIONS:  - Assess pt frequently for physical needs  - Identify cognitive and physical deficits and behaviors that affect risk of falls.  - [place] fall precautions as indicated by assessment.  - Educate pt/family on patient safety including physical limitations  - Instruct pt to call for assistance with activity based on assessment  - Modify environment to reduce risk of injury  - Provide assistive devices as appropriate  - Consider OT/PT consult to assist with strengthening/mobility  - Encourage toileting schedule  Outcome: Progressing     Problem: DISCHARGE PLANNING  Goal: Discharge to home or other facility with appropriate resources  Description: INTERVENTIONS:  - Identify barriers to discharge w/pt and caregiver  - Include patient/family/discharge partner in discharge planning  - Arrange for needed discharge resources and transportation as appropriate  - Identify discharge learning needs (meds, wound care, etc)  - Arrange for interpreters to assist at discharge as needed  - Consider post-discharge preferences of patient/family/discharge partner  - Complete POLST form as appropriate  - Assess patient's ability to be responsible for managing their own health  - Refer to Case Management Department for coordinating discharge planning if the patient needs post-hospital services based on physician/LIP order or complex needs related to functional status, cognitive ability  or social support system  Outcome: Progressing     Problem: RESPIRATORY - ADULT  Goal: Achieves optimal ventilation and oxygenation  Description: INTERVENTIONS:  - Assess for changes in respiratory status  - Assess for changes in mentation and behavior  - Position to facilitate oxygenation and minimize respiratory effort  - Oxygen supplementation based on oxygen saturation or ABGs  - Provide Smoking Cessation handout, if applicable  - Encourage broncho-pulmonary hygiene including cough, deep breathe, Incentive Spirometry  - Assess the need for suctioning and perform as needed  - Assess and instruct to report SOB or any respiratory difficulty  - Respiratory Therapy support as indicated  - Manage/alleviate anxiety  - Monitor for signs/symptoms of CO2 retention  Outcome: Progressing  AA&O X 4, slightly anxious, a little forgetful.   No complaints on pain.   On 2 L/min, saturating 100%.   Patient NPO for planned kyphoplasty in IR.   Per MD and IR RN, okay for patient to received cardiac medications prior to procedure. Diet modified, see orders.   Pt up with standby assistance and rolling walker to bathroom, steady gait.   Rounding preformed by staff, safety precautions observed.

## 2024-12-26 NOTE — ANESTHESIA PREPROCEDURE EVALUATION
Anesthesia PreOp Note    HPI:     Yesenia Berkowitz is a 84 year old female who presents for preoperative consultation requested by: * Surgery not found *    Date of Surgery:       Indication: * No surgery found *    Relevant Problems   No relevant active problems       NPO:  Last Liquid Consumption Date: 12/26/24  Last Liquid Consumption Time: 0000  Last Solid Consumption Date: 12/26/24  Last Solid Consumption Time: 0000  Last Liquid Consumption Date: 12/26/24          History Review:  Patient Active Problem List    Diagnosis Date Noted    Compression fracture of T6 vertebra ([facility]) 12/23/2024    Acute exacerbation of CHF (congestive heart failure) ([facility]) 12/21/2024    Pleural effusion on left 12/21/2024    Pleural effusion on right 12/21/2024    Acute bilateral thoracic back pain 12/21/2024    Atypical pneumonia 12/21/2024    Obesity hypoventilation syndrome ([facility]) 12/14/2024    Asthma ([facility]) 12/14/2024    SOB (shortness of breath) 12/12/2024    Acute on chronic diastolic (congestive) heart failure ([facility]) 12/12/2024    Cor pulmonale (chronic) ([facility]) 11/26/2024    Acute respiratory failure with hypoxia and hypercapnia ([facility]) 11/24/2024    Community acquired pneumonia of right lower lobe of lung 11/03/2024    Acute on chronic respiratory failure with hypoxia and hypercapnia ([facility]) 11/03/2024    Pleural effusion 10/09/2024    Pulmonary nodule 1 cm or greater in diameter 10/09/2024    Acute on chronic hypoxic respiratory failure ([facility]) 10/07/2024    Acute pulmonary edema ([facility]) 10/07/2024    Kyphoscoliosis 09/17/2024    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    Restrictive lung disease 09/17/2024    Acute cor pulmonale ([facility]) 09/17/2024    Pneumonia 09/17/2024    Acute on chronic congestive heart failure, unspecified heart failure type ([facility]) 09/13/2024    COPD with acute exacerbation ([facility]) 09/13/2024    COPD (chronic obstructive pulmonary disease) ([facility]) 09/13/2024    Palpitations 02/03/2020    Pain of upper abdomen 02/03/2020    Actinic  keratosis 2005    Inflamed seborrheic keratosis 2005       Past Medical History:    A-fib ([facility])    Anesthesia complication    Arrhythmia    AFIB    Arthritis    Asthma ([facility])    Back problem    COPD (chronic obstructive pulmonary disease) ([facility])    Deviated nasal septum    nasal septoplasty, turb reduction, smr of turbs    Difficult intubation    fiber optic if needed    Diverticulitis    colonoscopy     Diverticulosis of large intestine    Esophageal reflux    Extrinsic asthma, unspecified    Headache    Heart disease    Hepatitis    WAS TOLD SHE HAS HAD THIS WHEN SHE WAS 19 YEARS OLD    High blood pressure    High cholesterol    Irregular heart rate    Lichenoid keratosis    left chest-bx    Osteoarthritis    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    Paronychia    (RT); onychomycosis; debridement    Problems with swallowing    occasionally    Shortness of breath    2 L NC ALL THE TIME 24HR/7 DAYS PER WEEK    Unspecified essential hypertension    Visual impairment       Past Surgical History:   Procedure Laterality Date    Adenoidectomy      Cataract      cataract extraction     Hysterectomy      Sinus surgery        DEVIATED SEPTUM    T&a      Tonsillectomy         Prescriptions Prior to Admission[1]  Current Medications and Prescriptions Ordered in Epic[2]    Allergies[3]    Family History   Problem Relation Age of Onset    Ovarian Cancer Mother 76        endometrial, poss ovarian primary    Pulmonary Disease Sister         COPD    Skin cancer Other     Stroke Other     Other (Other) Other      Social History     Socioeconomic History    Marital status:    Tobacco Use    Smoking status: Former     Current packs/day: 0.00     Types: Cigarettes     Quit date: 1996     Years since quittin.0    Smokeless tobacco: Never   Vaping Use    Vaping status: Never Used   Substance and Sexual Activity    Alcohol use: Yes     Alcohol/week: 0.0 standard drinks of alcohol     Comment: one  drink once a week    Drug use: Never   Other Topics Concern    Pt has a pacemaker No    Pt has a defibrillator No    Reaction to local anesthetic No    Caffeine Concern No       Available pre-op labs reviewed.  Lab Results   Component Value Date    WBC 10.3 12/25/2024    RBC 3.34 (L) 12/25/2024    HGB 10.2 (L) 12/25/2024    HCT 33.1 (L) 12/25/2024    MCV 99.1 12/25/2024    MCH 30.5 12/25/2024    MCHC 30.8 (L) 12/25/2024    RDW 14.6 12/25/2024    .0 12/25/2024     Lab Results   Component Value Date     12/26/2024    K 3.9 12/26/2024     12/26/2024    CO2 36.0 (H) 12/26/2024    BUN 21 12/26/2024    CREATSERUM 0.76 12/26/2024     (H) 12/26/2024    PGLU 127 (H) 12/26/2024    CA 8.8 12/26/2024          Vital Signs:  Body mass index is 28.02 kg/m².   weight is 76.4 kg (168 lb 6.4 oz). Her oral temperature is 97.8 °F (36.6 °C). Her blood pressure is 122/51 and her pulse is 75. Her respiration is 20 and oxygen saturation is 100%.   Vitals:    12/25/24 2026 12/25/24 2353 12/26/24 0604 12/26/24 0838   BP: 105/50 112/52 118/48 122/51   Pulse: 79 79 77 75   Resp: 18 18 18 20   Temp: 98.6 °F (37 °C) 98.9 °F (37.2 °C) 97.8 °F (36.6 °C) 97.8 °F (36.6 °C)   TempSrc: Oral Oral Oral Oral   SpO2: 97% 94% 96% 100%   Weight:            Anesthesia Evaluation     Patient summary reviewed and Nursing notes reviewed    Airway   Mallampati: II  TM distance: >3 FB  Dental      Pulmonary    (+) COPD, asthma, shortness of breath    ROS comment: Home- oxygen- dependent- 4l    Unable to carry out her ADL w/o that .  Cardiovascular   (+) hypertension, CHF    NYHA Classification: IV  Rhythm: regular  Rate: normal    Neuro/Psych      GI/Hepatic/Renal      Endo/Other    Abdominal      Other findings: Left ventricle: The cavity size was normal. Wall thickness was mildly      increased. Systolic function was hyperdynamic. The estimated ejection      fraction was 65-70%, by visual assessment. No diagnostic evidence for       regional wall motion abnormalities. Unable to assess LV diastolic      function.   2. Right ventricle: The cavity size was mildly increased. Systolic pressure      was moderately increased.   3. Aortic valve: There was thickening. The findings were consistent with      mild stenosis. The peak systolic velocity was 2.06 m/sec. The mean      systolic gradient was 10 mm Hg. The valve area (VTI) was 2.17 cm^2. The      valve area (VTI) index was 1.19 cm^2/m^2.   4. Mitral valve: The annulus was moderately calcified. The leaflets were      mildly thickened. Transvalvular velocity was increased. The findings were      consistent with mild stenosis. The mean diastolic gradient was 5 mm Hg.   5. Pulmonary arteries: Systolic pressure was moderately increased, estimated      to be 56 mm Hg.   *                 Anesthesia Plan:   ASA:  4  Plan:   MAC  Post-op Pain Management: IV analgesics  Plan Comments: Patient remains very tachypneic , unable to speak in  full- sentences , remote possibility of not able to extubate & ventilator- dependence .  Discussed with the surgeon- ( radiologist )  & charge- anesthesia.      Plan- will postpone case for now , for pulmonary- optimisaton , will only proceed today if radiologist agrees that patient can be done with minimal- sedation .   Informed Consent Plan and Risks Discussed With:  Patient  Discussed plan with:  Attending      I have informed Yesenia Berkowitz and/or legal guardian or family member of the nature of the anesthetic plan, benefits, risks including possible dental damage if relevant, major complications, and any alternative forms of anesthetic management.   All of the patient's questions were answered to the best of my ability. The patient desires the anesthetic management as planned.  Luna Farmer MD  12/26/2024 12:48 PM  Present on Admission:   Acute exacerbation of CHF (congestive heart failure) (HCC)   Pulmonary nodule 1 cm or greater in diameter           [1]    Medications Prior to Admission   Medication Sig Dispense Refill Last Dose/Taking    furosemide 80 MG Oral Tab Take 1 tablet (80 mg total) by mouth daily. 30 tablet 3 2024 at 12:00 PM    [] doxycycline 100 MG Oral Cap Take 1 capsule (100 mg total) by mouth every 12 (twelve) hours for 10 doses. 10 capsule 0 2024 at 12:00 PM    predniSONE 20 MG Oral Tab Take 1 tablet (20 mg total) by mouth daily for 10 doses. 10 tablet 0 2024 at 12:00 PM    empagliflozin (JARDIANCE) 10 MG Oral Tab Take 1 tablet (10 mg total) by mouth daily. 30 tablet 3 2024 at 12:00 PM    acetaZOLAMIDE 250 MG Oral Tab Take 2 tablets (500 mg total) by mouth daily. 30 tablet 0 2024 at 12:00 PM    acetaminophen 500 MG Oral Tab Take 1 tablet (500 mg total) by mouth every 6 (six) hours as needed.   2024 at 12:00 PM    DIGOXIN 0.125 MG Oral Tab Take 1 tablet (125 mcg total) by mouth daily. 30 tablet 0 2024 at 12:00 PM    albuterol 108 (90 Base) MCG/ACT Inhalation Aero Soln Inhale 2 puffs into the lungs as needed.   2024 at  8:00 PM    estradiol 0.05 MG/24HR Transdermal Patch Weekly Place 1 patch onto the skin once a week. As directed.   12/15/2024    dilTIAZem HCl ER Coated Beads 360 MG Oral Capsule SR 24 Hr Take 1 capsule (360 mg total) by mouth 2 (two) times daily.  0 2024 at 12:00 PM    Cholecalciferol (VITAMIN D) 1000 UNITS Oral Tab Take 1,000 Units by mouth 2 (two) times daily.   2024 at 12:00 AM    Potassium Chloride ER (K-DUR M20) 20 MEQ Oral Tab CR Take 1 tablet (20 mEq total) by mouth nightly.   2024 at 11:00 PM    lansoprazole (PREVACID) 30 MG Oral Capsule Delayed Release Take 1 capsule (30 mg total) by mouth nightly.   2024 at 11:00 PM    aspirin 81 MG Oral Tab Take 2 tablets (162 mg total) by mouth daily as needed.   Past Week    Loratadine 10 MG Oral Cap Take 10 mg by mouth nightly.     2024 at 12:00 PM    montelukast 10 MG Oral Tab Take 1 tablet (10 mg total)  by mouth nightly.   12/20/2024 at 11:00 PM    omega-3 fatty acids (FISH OIL) 1000 MG Oral Cap Take 1,000 mg by mouth daily.   12/20/2024 at 11:00 PM    spironolactone 25 MG Oral Tab Take 1 tablet (25 mg total) by mouth daily for 90 doses. (Patient taking differently: Take 1 tablet (25 mg total) by mouth daily. Pt not started yet) 90 tablet 0 Unknown    dicyclomine 10 MG Oral Cap Take 1 capsule (10 mg total) by mouth 3 (three) times daily as needed (abdominal pain). (Patient not taking: Reported on 12/21/2024) 40 capsule 3 Not Taking   [2]   Current Facility-Administered Medications Ordered in Epic   Medication Dose Route Frequency Provider Last Rate Last Admin    lisinopril (Prinivil; Zestril) tab 2.5 mg  2.5 mg Oral Daily Joe Carter PA-C   2.5 mg at 12/26/24 0841    glucose (Dex4) 15 GM/59ML oral liquid 15 g  15 g Oral Q15 Min PRN Long, Lola, APRN        Or    glucose (Glutose) 40% oral gel 15 g  15 g Oral Q15 Min PRN Long, Lola, APRN        Or    glucose-vitamin C (Dex-4) chewable tab 4 tablet  4 tablet Oral Q15 Min PRN Long, Lola, APRN        Or    dextrose 50% injection 50 mL  50 mL Intravenous Q15 Min PRN Long, Lola, APRN        Or    glucose (Dex4) 15 GM/59ML oral liquid 30 g  30 g Oral Q15 Min PRN Long, Lola, APRN        Or    glucose (Glutose) 40% oral gel 30 g  30 g Oral Q15 Min PRN Long, Lola, APRN        Or    glucose-vitamin C (Dex-4) chewable tab 8 tablet  8 tablet Oral Q15 Min PRN Long, Lola, APRN        insulin aspart (NovoLOG) 100 Units/mL FlexPen 1-5 Units  1-5 Units Subcutaneous TID CC Long, Lola, APRN   1 Units at 12/25/24 1700    furosemide (Lasix) 10 mg/mL injection 40 mg  40 mg Intravenous TID Joe Carter PA-C   40 mg at 12/26/24 0842    docusate sodium (Colace) cap 100 mg  100 mg Oral BID Robbie Tate MD   100 mg at 12/25/24 2028    polyethylene glycol (PEG 3350) (Miralax) 17 g oral packet 17 g  17 g Oral Daily Robbie Tate MD   17 g at 12/25/24 4919     cholecalciferol (Vitamin D3) tab 1,000 Units  1,000 Units Oral BID Luz Marina Garcia MD   1,000 Units at 12/25/24 2028    estradiol (Climara) 0.05 MG/24HR patch 1 patch  1 patch Transdermal Weekly Luz Marina Garcia MD   1 patch at 12/22/24 0918    pantoprazole (Protonix) DR tab 40 mg  40 mg Oral QAM AC Luz Marina Garcia MD   40 mg at 12/25/24 0543    spironolactone (Aldactone) tab 25 mg  25 mg Oral Daily Luz Marina Garcia MD   25 mg at 12/26/24 0841    tiZANidine (Zanaflex) partial tab 1 mg  1 mg Oral Q6H PRN Lissy Blandon MD   1 mg at 12/22/24 2228    morphINE PF 2 MG/ML injection 0.5 mg  0.5 mg Intravenous Q6H PRN Lissy Blandon MD   0.5 mg at 12/26/24 0421    calcium carbonate (Oyster Shell) tab 500 mg  500 mg Oral BID with meals Enrique Padilla MD   500 mg at 12/25/24 1800    cetirizine (ZyrTEC) tab 5 mg  5 mg Oral Nightly Lissy Blandon MD   5 mg at 12/25/24 2028    albuterol (Ventolin) (2.5 MG/3ML) 0.083% nebulizer solution 2.5 mg  2.5 mg Nebulization Q4H PRN Luz Marina Garcia MD        [Held by provider] aspirin tab 162.5 mg  162.5 mg Oral Daily Luz Marina Garcia MD   162.5 mg at 12/25/24 0920    digoxin (Lanoxin) tab 125 mcg  125 mcg Oral Daily Luz Marina Garcia MD   125 mcg at 12/26/24 0841    dilTIAZem ER (CardIZEM CD) 24 hr cap 360 mg  360 mg Oral Daily Luz Marina Garcia MD   360 mg at 12/26/24 0841    doxycycline (Vibramycin) cap 100 mg  100 mg Oral 2 times per day Luz Marina Garcia MD   100 mg at 12/26/24 0841    empagliflozin (Jardiance) tab 10 mg  10 mg Oral Daily Luz Marina Garcia MD   10 mg at 12/25/24 0920    montelukast (Singulair) tab 10 mg  10 mg Oral Nightly Luz Marina Garcia MD   10 mg at 12/25/24 2028    predniSONE (Deltasone) tab 20 mg  20 mg Oral Daily Luz Marina Garcia MD   20 mg at 12/26/24 0841    umeclidinium bromide (Incruse Ellipta) 62.5 MCG/ACT inhaler 1 puff  1 puff Inhalation Daily Luz Marina Garcia MD   1 puff at 12/26/24 0858    [Held by provider] heparin (Porcine)  5000 UNIT/ML injection 5,000 Units  5,000 Units Subcutaneous Q8H [facility] Robbie Tate MD   5,000 Units at 12/25/24 0543    acetaminophen (Tylenol Extra Strength) tab 500 mg  500 mg Oral Q4H PRN Luz Marina Garcia MD   500 mg at 12/25/24 1744    melatonin tab 3 mg  3 mg Oral Nightly PRN Luz Marina Garcia MD   3 mg at 12/22/24 2058    ondansetron (Zofran) 4 MG/2ML injection 4 mg  4 mg Intravenous Q6H PRN Luz Marina Garcia MD        metoclopramide (Reglan) 5 mg/mL injection 5 mg  5 mg Intravenous Q8H PRN Luz Marina Garcia MD        guaiFENesin (Robitussin) 100 MG/5 ML oral liquid 200 mg  200 mg Oral Q4H PRN Luz Marina Garcia MD        HYDROcodone-acetaminophen (Norco) 5-325 MG per tab 1 tablet  1 tablet Oral Q6H PRN Luz Marina Garcia MD   1 tablet at 12/25/24 1401    lidocaine-menthol 4-1 % patch 1 patch  1 patch Transdermal Daily Luz Marina Garcia MD   1 patch at 12/26/24 0841     No current Epic-ordered outpatient medications on file.   [3]   Allergies  Allergen Reactions    Penicillin G ANAPHYLAXIS    Azithromycin DIARRHEA and NAUSEA AND VOMITING    Cefuroxime UNKNOWN     Other reaction(s): Vomitting    Levofloxacin UNKNOWN     Other reaction(s): LEVOFLOXACIN    Penicillins      Other reaction(s): Unknown

## 2024-12-26 NOTE — PROGRESS NOTES
Heart Failure Nurse  Progress Note    Patient was seen for Heart Failure Coaching on 12/17/24    Patient is adherent to daily weight monitoring?                                      ___ yes   __x_ no \"Not home long enough to continue\"    If no, barriers to daily weight monitoring:    Patient is adherent to revieing the Symptom Tracker Worksheet daily?  ___ yes   _x__ no    If no, barriers to reviewing daily:    Patient is following a 2000 mg sodium diet                                             __x_ yes  ___ no    If no, barriers to 2000 mg sodium diet:    Patient is adherent to medication regimen                                              __x_ yes  ___ no    If no, barriers to medication regimen:    Patient went to follow-up appointment(s)      __x_ yes  ___ no    If no, barriers to attending appointment:    Instructed patient to let their nurse know if they have any questions or need reeducation regarding heart failure and their nurse can contact the Heart Failure Coaches who will come see them again.      Juliet Hines RN HF  [phone]

## 2024-12-26 NOTE — IVS NOTE
Hand-Off     Procedure hand off report given to Apolonia KRAUSE.   Pt's vital signs are stable.   Procedural access site is dry and intact with no signs or symptoms of bleeding or hematoma. Pressure dressing in place. Dressing to remain on until 1810, endorsed to Apolonia.

## 2024-12-26 NOTE — PROGRESS NOTES
Progress Note  Yesenia Berkowitz Patient Status:  Inpatient    1940 MRN [id number]   Location [facility]W Attending Robbie Tate MD   Hosp Day # 5 PCP JO-ANN YANG MD     Subjective:  In bed on exam. Back pain especially with coughing. Denies cp, sob. Ongoing LE edema.     Objective:  /51 (BP Location: Right arm)   Pulse 75   Temp 97.8 °F (36.6 °C) (Oral)   Resp 20   Wt 168 lb 6.4 oz (76.4 kg)   SpO2 100%   BMI 28.02 kg/m²     Telemetry: nsr      Intake/Output:    Intake/Output Summary (Last 24 hours) at 2024 0854  Last data filed at 2024 0707  Gross per 24 hour   Intake 700 ml   Output 2240 ml   Net -1540 ml       Last 3 Weights   24 0529 168 lb 6.4 oz (76.4 kg)   24 0516 171 lb 9.6 oz (77.8 kg)   24 0815 174 lb 3.2 oz (79 kg)   24 0543 173 lb 4.8 oz (78.6 kg)   24 172 lb 6.4 oz (78.2 kg)   24 172 lb 6.4 oz (78.2 kg)   24 0608 175 lb 9.6 oz (79.7 kg)   24 0544 180 lb 4.8 oz (81.8 kg)   24 0906 179 lb (81.2 kg)   12/15/24 0559 179 lb 14.4 oz (81.6 kg)   24 0500 179 lb 12.8 oz (81.6 kg)   24 0700 182 lb 9.6 oz (82.8 kg)   24 0900 175 lb 3.2 oz (79.5 kg)   24 0541 169 lb 14.4 oz (77.1 kg)   24 0650 169 lb 6.4 oz (76.8 kg)   24 0700 174 lb (78.9 kg)   24 2200 173 lb 6.4 oz (78.7 kg)       Labs:  Recent Labs   Lab 24  0649 248 24  0734 24  0446   *  --  120* 140*   BUN 22  --  22 21   CREATSERUM 0.65  --  0.58 0.76   EGFRCR 88  --  90 78   CA 9.3  --  8.9 8.8     --  143 140   K 3.5 4.7 4.0 3.9     --  102 102   CO2 39.0*  --  40.0* 36.0*     Recent Labs   Lab 24  1735 24  0526 24  0734   RBC 4.33 3.69* 3.34*   HGB 13.1 11.6* 10.2*   HCT 42.5 35.8 33.1*   MCV 98.2 97.0 99.1   MCH 30.3 31.4 30.5   MCHC 30.8* 32.4 30.8*   RDW 14.8 14.5 14.6   NEPRELIM 15.25* 11.00* 7.54   WBC 17.3* 12.0* 10.3   .0 183.0  177.0         Recent Labs   Lab 12/21/24  1735   TROPHS 19       Review of Systems   Cardiovascular:  Positive for leg swelling.   Respiratory: Negative.     Musculoskeletal:  Positive for back pain.       Physical Exam:    Gen: alert, oriented x 3, NAD  Heent: pupils equal, reactive. Mucous membranes moist.   Neck: no jvd  Cardiac: regular rate and rhythm, normal S1,S2  Lungs: CTA  Abd: soft, NT/ND +bs  Ext: 1+ ble edema extending to just above knee  Skin: Warm, dry  Neuro: No focal deficits        Medications:     lisinopril  2.5 mg Oral Daily    insulin aspart  1-5 Units Subcutaneous TID CC    furosemide  40 mg Intravenous TID    docusate sodium  100 mg Oral BID    polyethylene glycol (PEG 3350)  17 g Oral Daily    cholecalciferol  1,000 Units Oral BID    estradiol  1 patch Transdermal Weekly    pantoprazole  40 mg Oral QAM AC    spironolactone  25 mg Oral Daily    calcium carbonate  500 mg Oral BID with meals    cetirizine  5 mg Oral Nightly    acetaZOLAMIDE  500 mg Oral Daily    aspirin  162.5 mg Oral Daily    digoxin  125 mcg Oral Daily    dilTIAZem HCl ER Coated Beads  360 mg Oral Daily    doxycycline  100 mg Oral 2 times per day    empagliflozin  10 mg Oral Daily    montelukast  10 mg Oral Nightly    predniSONE  20 mg Oral Daily    umeclidinium bromide  1 puff Inhalation Daily    [Held by provider] heparin  5,000 Units Subcutaneous Q8H MIOSÉS    lidocaine-menthol  1 patch Transdermal Daily             Assessment:  Acute back pain related to T6 fracture  Plans for kyphoplasty  Chronic hypoxic respiratory failure 2/2 COPD  On 4L o2 at home. Recent admit 12/12-12/18 for this  Acute on chronic HFpEF  Previous dc weight 175# although feels dry weight ~160-165#  Does admit to non-adherence with diuretics at home which has likely contributed.   Echo 9/14/24-LVEF 65-70%, no rwmas. Mild AV stenosis. Mild mitral stenosis.  Probnp 786  Moderate pHTN-pasp 56mmhg  PAfib/flutter, maintaining NSR  Dig, ccb  Not historically  on AC due to bleeding, bruising, hx GIB.   Consideration for future watchman   HTN-controlled  Acei, alena, dig, ccb  Hx tobacco use      Plan:  Excellent UO with IV diuresis. Overall negative 2.1L. remains vol OL on exam. Renal function remains stable. Cont current dosing.  Going for kyphoplasty today  Consider remainder of cardiac regimen.    Plan of care discussed with patient, RN,  at bedside.     RAFI Alvarez  12/26/2024  8:54 AM

## 2024-12-26 NOTE — PROGRESS NOTES
[facility]  [facility]     Yesenia Berkowitz Patient Status:  Inpatient    1940 MRN [id number]   Location [facility]5W Attending Lissy Blandon MD   Hosp Day # 5 PCP JO-ANN YANG MD     Subjective:     Pain ok.  No new complaints.  Agreeable to kyphoplasty.      Objective:    Review of Systems:   ROS completed; pertinent positive and negatives stated in subjective.      Vital signs:  Temp:  [97.8 °F (36.6 °C)-98.9 °F (37.2 °C)] 97.8 °F (36.6 °C)  Pulse:  [75-80] 75  Resp:  [18-20] 20  BP: (105-122)/(48-58) 122/51  SpO2:  [91 %-100 %] 100 %      Physical Exam:    Gen: NAD alert  Chest: good air entry CTABL  CVS: normal s1 and s2 RR  Abd: NABS soft NT ND  Neuro: CN 2-12 grossly intact  Ext: no edema in bilateral LE      Diagnostic Data:    Labs:  Recent Labs   Lab 24  1735 24  0526 24  0734   WBC 17.3* 12.0* 10.3   HGB 13.1 11.6* 10.2*   MCV 98.2 97.0 99.1   .0 183.0 177.0       Recent Labs   Lab 24  1735 24  0526 24  0649 24  2028 24  0734 24  0446   * 221* 128*  --  120* 140*   BUN 22 23 22  --  22 21   CREATSERUM 0.71 0.68 0.65  --  0.58 0.76   CA 8.6* 9.0 9.3  --  8.9 8.8   ALB 3.4 3.1*  --   --   --  3.0*    143 142  --  143 140   K 4.5 4.3 3.5 4.7 4.0 3.9    101 100  --  102 102   CO2 38.0* 40.0* 39.0*  --  40.0* 36.0*   ALKPHO 74 66  --   --   --   --    AST 15 9  --   --   --   --    ALT 21 18  --   --   --   --    BILT 0.4 0.2  --   --   --   --    TP 5.3* 4.9*  --   --   --   --        Estimated Creatinine Clearance: 51.4 mL/min (based on SCr of 0.76 mg/dL).    No results for input(s): \"PTP\", \"INR\" in the last 168 hours.           Imaging: Imaging data reviewed in Epic.    Medications:    lisinopril  2.5 mg Oral Daily    insulin aspart  1-5 Units Subcutaneous TID CC    furosemide  40 mg Intravenous TID    docusate sodium  100 mg Oral BID    polyethylene glycol  (PEG 3350)  17 g Oral Daily    cholecalciferol  1,000 Units Oral BID    estradiol  1 patch Transdermal Weekly    pantoprazole  40 mg Oral QAM AC    spironolactone  25 mg Oral Daily    calcium carbonate  500 mg Oral BID with meals    cetirizine  5 mg Oral Nightly    aspirin  162.5 mg Oral Daily    digoxin  125 mcg Oral Daily    dilTIAZem HCl ER Coated Beads  360 mg Oral Daily    doxycycline  100 mg Oral 2 times per day    empagliflozin  10 mg Oral Daily    montelukast  10 mg Oral Nightly    predniSONE  20 mg Oral Daily    umeclidinium bromide  1 puff Inhalation Daily    [Held by provider] heparin  5,000 Units Subcutaneous Q8H MOISÉS    lidocaine-menthol  1 patch Transdermal Daily       Assessment & Plan:     Thoracic compression fracture.  T6.  Chronicity unknown.  Seems to be quite symptomatic.  -Pain control  -IR consult appreciated, kyphoplasty  -MRI thoracic spine noted, no evidence of metastases    Right upper lobe lung nodule.  Slowly growing over the course of years.  Previous thoracentesis cytology negative, otherwise no attempts at obtaining tissue diagnosis.  -Defer to pulmonology regarding further workup    Acute on chronic diastolic congestive heart failure.  Numerous admissions for this.  -IV diuretics per cardiology, cont acetazolamide  -Cardiology following  -Telemetry    COPD.  Does not appear to be in exacerbation.  -No steroids for now  -Nebulizer treatments as needed  -Pulmonology following    Other problems  Steroid-induced leukocytosis  Hypertension  Atrial fibrillation, paroxysmal    Plan of care discussed with patient and son at bedside.      Supplementary Documentation:     Quality:  DVT Prophylaxis: heparin s/c  CODE status: Full       Estimated date of discharge: TBD  Discharge is dependent on: clinical stability  At this point Ms. Berkowitz is expected to be discharge to: home    **Certification      PHYSICIAN Certification of Need for Inpatient Hospitalization - Initial Certification    Patient  will require inpatient services that will reasonably be expected to span two midnight's based on the clinical documentation in H+P.   Based on patients current state of illness, I anticipate that, after discharge, patient will require TBD.    MDM: High, I personally reviewed the available laboratories, imaging including XR. I discussed the case with RN. I ordered laboratories, studies including AM labs.  Medical decision making high, risk is high.

## 2024-12-26 NOTE — PLAN OF CARE
Problem: Patient Centered Care  Goal: Patient preferences are identified and integrated in the patient's plan of care  Description: Interventions:  - What would you like us to know as we care for you? From home alone and has son for support.  - Provide timely, complete, and accurate information to patient/family  - Incorporate patient and family knowledge, values, beliefs, and cultural backgrounds into the planning and delivery of care  - Encourage patient/family to participate in care and decision-making at the level they choose  - Honor patient and family perspectives and choices  Outcome: Progressing     Problem: Patient/Family Goals  Goal: Patient/Family Long Term Goal  Description: Patient's Long Term Goal: To be discharged back to home     Interventions:  -  Pulm consult  -Duel Neb  -IV Solumedrol  -Supplemental O2   - See additional Care Plan goals for specific interventions  Outcome: Progressing  Goal: Patient/Family Short Term Goal  Description: Patient's Short Term Goal: To breathe better.     Interventions:   -  Pulm consult  -Duel Neb  -IV Solumedrol  -Supplemental O2   - See additional Care Plan goals for specific interventions  Outcome: Progressing     Problem: PAIN - ADULT  Goal: Verbalizes/displays adequate comfort level or patient's stated pain goal  Description: INTERVENTIONS:  - Encourage pt to monitor pain and request assistance  - Assess pain using appropriate pain scale  - Administer analgesics based on type and severity of pain and evaluate response  - Implement non-pharmacological measures as appropriate and evaluate response  - Consider cultural and social influences on pain and pain management  - Manage/alleviate anxiety  - Utilize distraction and/or relaxation techniques  - Monitor for opioid side effects  - Notify MD/LIP if interventions unsuccessful or patient reports new pain  - Anticipate increased pain with activity and pre-medicate as appropriate  Outcome: Progressing     Problem:  RISK FOR INFECTION - ADULT  Goal: Absence of fever/infection during anticipated neutropenic period  Description: INTERVENTIONS  - Monitor WBC  - Administer growth factors as ordered  - Implement neutropenic guidelines  Outcome: Progressing     Problem: SAFETY ADULT - FALL  Goal: Free from fall injury  Description: INTERVENTIONS:  - Assess pt frequently for physical needs  - Identify cognitive and physical deficits and behaviors that affect risk of falls.  - [place] fall precautions as indicated by assessment.  - Educate pt/family on patient safety including physical limitations  - Instruct pt to call for assistance with activity based on assessment  - Modify environment to reduce risk of injury  - Provide assistive devices as appropriate  - Consider OT/PT consult to assist with strengthening/mobility  - Encourage toileting schedule  Outcome: Progressing     Problem: DISCHARGE PLANNING  Goal: Discharge to home or other facility with appropriate resources  Description: INTERVENTIONS:  - Identify barriers to discharge w/pt and caregiver  - Include patient/family/discharge partner in discharge planning  - Arrange for needed discharge resources and transportation as appropriate  - Identify discharge learning needs (meds, wound care, etc)  - Arrange for interpreters to assist at discharge as needed  - Consider post-discharge preferences of patient/family/discharge partner  - Complete POLST form as appropriate  - Assess patient's ability to be responsible for managing their own health  - Refer to Case Management Department for coordinating discharge planning if the patient needs post-hospital services based on physician/LIP order or complex needs related to functional status, cognitive ability or social support system  Outcome: Progressing     Problem: RESPIRATORY - ADULT  Goal: Achieves optimal ventilation and oxygenation  Description: INTERVENTIONS:  - Assess for changes in respiratory status  - Assess for changes in  mentation and behavior  - Position to facilitate oxygenation and minimize respiratory effort  - Oxygen supplementation based on oxygen saturation or ABGs  - Provide Smoking Cessation handout, if applicable  - Encourage broncho-pulmonary hygiene including cough, deep breathe, Incentive Spirometry  - Assess the need for suctioning and perform as needed  - Assess and instruct to report SOB or any respiratory difficulty  - Respiratory Therapy support as indicated  - Manage/alleviate anxiety  - Monitor for signs/symptoms of CO2 retention  Outcome: Progressing

## 2024-12-26 NOTE — PROCEDURES
[facility]  part of [facility]  Procedure Note    Yesenia Berkowitz Patient Status:  Inpatient    1940 MRN [id number]   Location [facility] INTERVENTIONAL SUITES Attending Robbie Tate MD   Hosp Day # 5 PCP JO-ANN YANG MD     Procedure: image guided T6 vertebral body biopsy/kyphoplasty    Pre-Procedure Diagnosis:  symptomatic osteoporotic T6 compression fracture    Post-Procedure Diagnosis: same    Anesthesia:  Sedation-- MAC anesthesia    Findings:  compression fracture of T6, adequate cement deposition within vertebral body    Specimens: 2 13 gauge biopsies    Blood Loss:  minimal       Complications:  None       Manuel Harper MD  2024

## 2024-12-26 NOTE — PROGRESS NOTES
[facility]  part of [facility]    Progress Note    Yesenia Berkowitz Patient Status:  Inpatient    1940 MRN [id number]   Location [facility]5W Attending Robbie Tate MD   Hosp Day # 5 PCP JO-ANN YANG MD       Subjective:   Yesenia Berkowitz is a(n) 84 year old female.   Less back pain,less sob,sl cough  Objective:   Blood pressure 122/51, pulse 75, temperature 97.8 °F (36.6 °C), temperature source Oral, resp. rate 20, weight 168 lb 6.4 oz (76.4 kg), SpO2 100%.    HEENT: negative  Neck: no adenopathy, no carotid bruit, no JVD, supple, symmetrical, trachea midline and thyroid not enlarged, symmetric, no tenderness/mass/nodules  Pulmonary/Chest:  rales  Cardiovascular: S1, S2 normal, no S3 or S4, regular rate and rhythm, no murmur  Abdominal: normal findings: bowel sounds normal, soft, non-tender and no hepatosplenomegaly   Extremities: extremities normal, atraumatic, no cyanosis  2+ edema  Skin: Skin color, texture, turgor normal. No rashes or lesions    Results:     Lab Results   Component Value Date    WBC 10.3 2024    HGB 10.2 (L) 2024    HCT 33.1 (L) 2024    .0 2024    CREATSERUM 0.76 2024    BUN 21 2024     2024    K 3.9 2024     2024    CO2 36.0 (H) 2024     (H) 2024    CA 8.8 2024    ALB 3.0 (L) 2024    ALKPHO 66 2024    BILT 0.2 2024    TP 4.9 (L) 2024    AST 9 2024    ALT 18 2024    PTT 24.3 2024    INR 1.08 2024    T4F 0.9 2024    TSH 0.185 (L) 2024    LIP 30 2024    DDIMER 0.47 2024    MG 1.9 2024    PHOS 3.4 2024    TROP <0.045 2020       XR THORACIC SPINE (1 VIEW)  (CPT=72020)    Result Date: 2024  CONCLUSION: Slight to moderate anterior compression of the T6 vertebral body resulting in 25 % loss of height.  This is of indeterminate age.  Although no other compression deformities or  fractures are clearly identified, the study is limited secondary to bony demineralization.    Dictated by (CST): Ortiz Hickey MD on 12/24/2024 at 2:28 PM     Finalized by (CST): Ortiz Hickey MD on 12/24/2024 at 2:30 PM          XR LUMBAR SPINE (MIN 2 VIEWS) (CPT=72100)    Result Date: 12/24/2024  CONCLUSION: Slight to moderate degenerative changes in the lower thoracic/lumbar spine.  No acute or destructive bony process is clearly visualized.    Dictated by (CST): Ortiz Hickey MD on 12/24/2024 at 2:26 PM     Finalized by (CST): Ortiz Hickey MD on 12/24/2024 at 2:28 PM               Assessment and Plan:   Principal Problem:    Acute on chronic congestive heart failure, unspecified heart failure type (HCC)  Active Problems:    Pulmonary nodule 1 cm or greater in diameter    Acute exacerbation of CHF (congestive heart failure) (HCC)    Pleural effusion on left    Pleural effusion on right    Acute bilateral thoracic back pain    Atypical pneumonia    Compression fracture of T6 vertebra (HCC)     Much improved,less sob and cough,less back pain,Kyphoplasty today        ELVIA LIVINGSTON MD, MD  12/26/2024

## 2024-12-27 ENCOUNTER — APPOINTMENT (OUTPATIENT)
Dept: PICC SERVICES | Facility: HOSPITAL | Age: 84
DRG: 477 | End: 2024-12-27
Attending: HOSPITALIST
Payer: MEDICARE

## 2024-12-27 ENCOUNTER — APPOINTMENT (OUTPATIENT)
Dept: PICC SERVICES | Facility: HOSPITAL | Age: 84
End: 2024-12-27
Attending: HOSPITALIST
Payer: MEDICARE

## 2024-12-27 LAB
ANION GAP SERPL CALC-SCNC: 3 MMOL/L (ref 0–18)
BUN BLD-MCNC: 20 MG/DL (ref 9–23)
BUN/CREAT SERPL: 32.8 (ref 10–20)
CALCIUM BLD-MCNC: 9.3 MG/DL (ref 8.7–10.4)
CHLORIDE SERPL-SCNC: 102 MMOL/L (ref 98–112)
CO2 SERPL-SCNC: 36 MMOL/L (ref 21–32)
CREAT BLD-MCNC: 0.61 MG/DL
EGFRCR SERPLBLD CKD-EPI 2021: 89 ML/MIN/1.73M2 (ref 60–?)
GLUCOSE BLD-MCNC: 150 MG/DL (ref 70–99)
GLUCOSE BLDC GLUCOMTR-MCNC: 141 MG/DL (ref 70–99)
GLUCOSE BLDC GLUCOMTR-MCNC: 148 MG/DL (ref 70–99)
GLUCOSE BLDC GLUCOMTR-MCNC: 160 MG/DL (ref 70–99)
GLUCOSE BLDC GLUCOMTR-MCNC: 174 MG/DL (ref 70–99)
GLUCOSE BLDC GLUCOMTR-MCNC: 185 MG/DL (ref 70–99)
OSMOLALITY SERPL CALC.SUM OF ELEC: 297 MOSM/KG (ref 275–295)
POTASSIUM SERPL-SCNC: 3.7 MMOL/L (ref 3.5–5.1)
SODIUM SERPL-SCNC: 141 MMOL/L (ref 136–145)

## 2024-12-27 PROCEDURE — 99233 SBSQ HOSP IP/OBS HIGH 50: CPT | Performed by: HOSPITALIST

## 2024-12-27 RX ORDER — POTASSIUM CHLORIDE 1500 MG/1
40 TABLET, EXTENDED RELEASE ORAL ONCE
Status: COMPLETED | OUTPATIENT
Start: 2024-12-27 | End: 2024-12-27

## 2024-12-27 RX ORDER — ACETAZOLAMIDE 250 MG/1
500 TABLET ORAL DAILY
Status: DISCONTINUED | OUTPATIENT
Start: 2024-12-27 | End: 2024-12-29

## 2024-12-27 NOTE — PLAN OF CARE
Problem: Patient Centered Care  Goal: Patient preferences are identified and integrated in the patient's plan of care  Description: Interventions:  - What would you like us to know as we care for you? From home alone, son lives near by   - Provide timely, complete, and accurate information to patient/family  - Incorporate patient and family knowledge, values, beliefs, and cultural backgrounds into the planning and delivery of care  - Encourage patient/family to participate in care and decision-making at the level they choose  - Honor patient and family perspectives and choices  Outcome: Progressing     Problem: PAIN - ADULT  Goal: Verbalizes/displays adequate comfort level or patient's stated pain goal  Description: INTERVENTIONS:  - Encourage pt to monitor pain and request assistance  - Assess pain using appropriate pain scale  - Administer analgesics based on type and severity of pain and evaluate response  - Implement non-pharmacological measures as appropriate and evaluate response  - Consider cultural and social influences on pain and pain management  - Manage/alleviate anxiety  - Utilize distraction and/or relaxation techniques  - Monitor for opioid side effects  - Notify MD/LIP if interventions unsuccessful or patient reports new pain  - Anticipate increased pain with activity and pre-medicate as appropriate  Outcome: Progressing     Problem: RISK FOR INFECTION - ADULT  Goal: Absence of fever/infection during anticipated neutropenic period  Description: INTERVENTIONS  - Monitor WBC  - Administer growth factors as ordered  - Implement neutropenic guidelines  Outcome: Progressing     Problem: SAFETY ADULT - FALL  Goal: Free from fall injury  Description: INTERVENTIONS:  - Assess pt frequently for physical needs  - Identify cognitive and physical deficits and behaviors that affect risk of falls.  - [place] fall precautions as indicated by assessment.  - Educate pt/family on patient safety including physical  limitations  - Instruct pt to call for assistance with activity based on assessment  - Modify environment to reduce risk of injury  - Provide assistive devices as appropriate  - Consider OT/PT consult to assist with strengthening/mobility  - Encourage toileting schedule  Outcome: Progressing     Problem: DISCHARGE PLANNING  Goal: Discharge to home or other facility with appropriate resources  Description: INTERVENTIONS:  - Identify barriers to discharge w/pt and caregiver  - Include patient/family/discharge partner in discharge planning  - Arrange for needed discharge resources and transportation as appropriate  - Identify discharge learning needs (meds, wound care, etc)  - Arrange for interpreters to assist at discharge as needed  - Consider post-discharge preferences of patient/family/discharge partner  - Complete POLST form as appropriate  - Assess patient's ability to be responsible for managing their own health  - Refer to Case Management Department for coordinating discharge planning if the patient needs post-hospital services based on physician/LIP order or complex needs related to functional status, cognitive ability or social support system  Outcome: Progressing     Problem: RESPIRATORY - ADULT  Goal: Achieves optimal ventilation and oxygenation  Description: INTERVENTIONS:  - Assess for changes in respiratory status  - Assess for changes in mentation and behavior  - Position to facilitate oxygenation and minimize respiratory effort  - Oxygen supplementation based on oxygen saturation or ABGs  - Provide Smoking Cessation handout, if applicable  - Encourage broncho-pulmonary hygiene including cough, deep breathe, Incentive Spirometry  - Assess the need for suctioning and perform as needed  - Assess and instruct to report SOB or any respiratory difficulty  - Respiratory Therapy support as indicated  - Manage/alleviate anxiety  - Monitor for signs/symptoms of CO2 retention  Outcome: Progressing    AA&O x 4.    On 2 L/min via nasal cannula, saturating 92-95%.   Pain control, scheduled Lidocaine patch applied and PRN tylenol administered.   Pain improving since procedure.  Good appetite.   Ambulating in the room. Frequent rounding preformed.   Safety precautions observed and maintained.

## 2024-12-27 NOTE — PROGRESS NOTES
[facility]  part of [facility]  Progress Note      Yesenia Berkowitz Patient Status:  Inpatient    1940 MRN [id number]   Location [facility]5W Attending Robbie Tate MD   Hosp Day # 6 PCP JO-ANN YANG MD       Subjective:   Feeling much better post T6 kyphoplasty, minimal pain, not sob today.     Objective:   No longer point tender at T6, minimal oozing of dressing.    Vital Signs:  Blood pressure 112/52, pulse 79, temperature 97.8 °F (36.6 °C), temperature source Oral, resp. rate 18, weight 162 lb 6.4 oz (73.7 kg), SpO2 92%.    Input/Output:    Intake/Output Summary (Last 24 hours) at 2024 1316  Last data filed at 2024 1247  Gross per 24 hour   Intake 1120 ml   Output 1745 ml   Net -625 ml          Results:   Labs: bx of T6 pending            Assessment and Plan:   Doing well pod #1 from T6 kyphoplasty. Significantly improved pain. F/U prn.    Manuel Harper MD  2024  1:16 PM

## 2024-12-27 NOTE — PROGRESS NOTES
[facility]     Yesenia Berkowitz Patient Status:  Inpatient    1940 MRN [id number]   Location [facility]5W Attending Lissy Blandon MD   Hosp Day # 6 PCP JO-ANN YANG MD     Subjective:     Pain much better after kyphoplasty.  Breathing is about the same.      Objective:    Review of Systems:   ROS completed; pertinent positive and negatives stated in subjective.      Vital signs:  Temp:  [97.5 °F (36.4 °C)-98.9 °F (37.2 °C)] 97.8 °F (36.6 °C)  Pulse:  [] 79  Resp:  [11-22] 18  BP: (101-152)/(39-89) 112/52  SpO2:  [90 %-100 %] 92 %      Physical Exam:    Gen: NAD alert  Chest: good air entry CTABL  CVS: normal s1 and s2 RR  Abd: NABS soft NT ND  Neuro: CN 2-12 grossly intact  Ext: no edema in bilateral LE.  Tiny amount of bleeding from the kyphoplasty site.        Diagnostic Data:    Labs:  Recent Labs   Lab 24  1735 24  0526 24  0734   WBC 17.3* 12.0* 10.3   HGB 13.1 11.6* 10.2*   MCV 98.2 97.0 99.1   .0 183.0 177.0       Recent Labs   Lab 24  1735 24  0526 24  0649 24  0734 24  0446 24  0503   * 221*   < > 120* 140* 150*   BUN 22 23   < > 22 21 20   CREATSERUM 0.71 0.68   < > 0.58 0.76 0.61   CA 8.6* 9.0   < > 8.9 8.8 9.3   ALB 3.4 3.1*  --   --  3.0*  --     143   < > 143 140 141   K 4.5 4.3   < > 4.0 3.9 3.7    101   < > 102 102 102   CO2 38.0* 40.0*   < > 40.0* 36.0* 36.0*   ALKPHO 74 66  --   --   --   --    AST 15 9  --   --   --   --    ALT 21 18  --   --   --   --    BILT 0.4 0.2  --   --   --   --    TP 5.3* 4.9*  --   --   --   --     < > = values in this interval not displayed.       Estimated Creatinine Clearance: 64 mL/min (based on SCr of 0.61 mg/dL).    No results for input(s): \"PTP\", \"INR\" in the last 168 hours.           Imaging: Imaging data reviewed in Epic.    Medications:    acetaZOLAMIDE  500 mg Oral Daily    insulin aspart  1-7  Units Subcutaneous TID CC and HS    lisinopril  2.5 mg Oral Daily    furosemide  40 mg Intravenous TID    docusate sodium  100 mg Oral BID    polyethylene glycol (PEG 3350)  17 g Oral Daily    cholecalciferol  1,000 Units Oral BID    estradiol  1 patch Transdermal Weekly    pantoprazole  40 mg Oral QAM AC    spironolactone  25 mg Oral Daily    calcium carbonate  500 mg Oral BID with meals    cetirizine  5 mg Oral Nightly    [Held by provider] aspirin  162.5 mg Oral Daily    digoxin  125 mcg Oral Daily    dilTIAZem HCl ER Coated Beads  360 mg Oral Daily    empagliflozin  10 mg Oral Daily    montelukast  10 mg Oral Nightly    predniSONE  20 mg Oral Daily    umeclidinium bromide  1 puff Inhalation Daily    [Held by provider] heparin  5,000 Units Subcutaneous Q8H MOISÉS    lidocaine-menthol  1 patch Transdermal Daily       Assessment & Plan:     Thoracic compression fracture.  T6.  Chronicity unknown.  Seems to be quite symptomatic.  -Pain control  -IR consult appreciated, status post kyphoplasty 12/26  -MRI thoracic spine noted, no evidence of metastases    Right upper lobe lung nodule.  Slowly growing over the course of years.  Previous thoracentesis cytology negative, otherwise no attempts at obtaining tissue diagnosis.  -Defer to pulmonology regarding further workup    Acute on chronic diastolic congestive heart failure.  Numerous admissions for this.  -IV diuretics per cardiology, cont acetazolamide  -Cardiology following  -Telemetry    COPD.  Does not appear to be in exacerbation.  -No steroids for now  -Nebulizer treatments as needed  -Pulmonology following    Other problems  Steroid-induced leukocytosis  Hypertension  Atrial fibrillation, paroxysmal        Supplementary Documentation:     Quality:  DVT Prophylaxis: Resume subcu heparin 12/28 if no further bleeding from kyphoplasty site  CODE status: Full       Estimated date of discharge: TBD  Discharge is dependent on: clinical stability  At this point Ms. Berkowitz is  expected to be discharge to: home    **Certification      PHYSICIAN Certification of Need for Inpatient Hospitalization - Initial Certification    Patient will require inpatient services that will reasonably be expected to span two midnight's based on the clinical documentation in H+P.   Based on patients current state of illness, I anticipate that, after discharge, patient will require TBD.    MDM: High, I personally reviewed the available laboratories, imaging including XR. I discussed the case with RN. I ordered laboratories, studies including AM labs.  Medical decision making high, risk is high.

## 2024-12-27 NOTE — PROGRESS NOTES
Progress Note  Yesenia Berkowitz Patient Status:  Inpatient    1940 MRN [id number]   Location [facility]5W Attending Robbie Tate MD   Hosp Day # 6 PCP JO-ANN YANG MD     Attending Cardiologist: Malcolm     SUBJECTIVE:    Denies chest pain. Reports improvement in dyspnea.     VITALS:  /52 (BP Location: Right arm)   Pulse 79   Temp 97.8 °F (36.6 °C) (Oral)   Resp 18   Wt 162 lb 6.4 oz (73.7 kg)   SpO2 92%   BMI 27.02 kg/m²   INTAKE/OUTPUT:    Intake/Output Summary (Last 24 hours) at 2024 1047  Last data filed at 2024 0624  Gross per 24 hour   Intake 970 ml   Output 1870 ml   Net -900 ml     Last 3 Weights   24 0624 162 lb 6.4 oz (73.7 kg)   24 0529 168 lb 6.4 oz (76.4 kg)   24 0516 171 lb 9.6 oz (77.8 kg)   24 0815 174 lb 3.2 oz (79 kg)   24 0543 173 lb 4.8 oz (78.6 kg)   24 172 lb 6.4 oz (78.2 kg)   24 172 lb 6.4 oz (78.2 kg)   24 0608 175 lb 9.6 oz (79.7 kg)   24 0544 180 lb 4.8 oz (81.8 kg)   24 0906 179 lb (81.2 kg)   12/15/24 0559 179 lb 14.4 oz (81.6 kg)   24 0500 179 lb 12.8 oz (81.6 kg)   24 0700 182 lb 9.6 oz (82.8 kg)   24 0900 175 lb 3.2 oz (79.5 kg)   24 0541 169 lb 14.4 oz (77.1 kg)   24 0650 169 lb 6.4 oz (76.8 kg)   24 0700 174 lb (78.9 kg)   24 2200 173 lb 6.4 oz (78.7 kg)     LABS:  Recent Labs   Lab 24  0734 24  0446 24  0503   * 140* 150*   BUN 22 21 20   CREATSERUM 0.58 0.76 0.61   EGFRCR 90 78 89   CA 8.9 8.8 9.3    140 141   K 4.0 3.9 3.7    102 102   CO2 40.0* 36.0* 36.0*     Recent Labs   Lab 24  1735 24  0526 24  0734   RBC 4.33 3.69* 3.34*   HGB 13.1 11.6* 10.2*   HCT 42.5 35.8 33.1*   MCV 98.2 97.0 99.1   MCH 30.3 31.4 30.5   MCHC 30.8* 32.4 30.8*   RDW 14.8 14.5 14.6   NEPRELIM 15.25* 11.00* 7.54   WBC 17.3* 12.0* 10.3   .0 183.0 177.0     No results for input(s): \"TROP\", \"CK\"  in the last 168 hours.  DIAGNOSTICS:  TELEMETRY:     ECHO 9/14/2024:  Conclusions:   1. Left ventricle: The cavity size was normal. Wall thickness was mildly      increased. Systolic function was hyperdynamic. The estimated ejection      fraction was 65-70%, by visual assessment. No diagnostic evidence for      regional wall motion abnormalities. Unable to assess LV diastolic      function.   2. Right ventricle: The cavity size was mildly increased. Systolic pressure      was moderately increased.   3. Aortic valve: There was thickening. The findings were consistent with      mild stenosis. The peak systolic velocity was 2.06 m/sec. The mean      systolic gradient was 10 mm Hg. The valve area (VTI) was 2.17 cm^2. The      valve area (VTI) index was 1.19 cm^2/m^2.   4. Mitral valve: The annulus was moderately calcified. The leaflets were      mildly thickened. Transvalvular velocity was increased. The findings were      consistent with mild stenosis. The mean diastolic gradient was 5 mm Hg.   5. Pulmonary arteries: Systolic pressure was moderately increased, estimated      to be 56 mm Hg.     ROS: Negative unless noted above   PHYSICAL EXAM:  General: Alert and oriented x 3. No apparent distress.  HEENT: Normocephalic, sclera are nonicteric. Hearing appropriate bilaterally.  Neck: No JVD or Carotid bruits. Trachea midline.   Cardiac: Regular rate and rhythm. S1, S2 auscultated. No murmurs, rubs, or gallops appreciated.   Lungs: Clear anteriorly, dull posteriorly. Chest expansion symmetrical. Regular effort.  Abdomen: Soft, non-tender, +BS. No hepatosplenomegaly or appreciable masses.   Extremities: Without clubbing, cyanosis. Peripheral pulses are 2+. Edema +2 BLE that extends from foot to mid shin   Neurologic: Motor and sensory nerves grossly intact.   Psych: Appropriate affect   Skin: Warm and dry. No obvious lesions, wounds, or ulcerations.   MEDICATIONS:   insulin aspart  1-7 Units Subcutaneous TID CC and HS     lisinopril  2.5 mg Oral Daily    furosemide  40 mg Intravenous TID    docusate sodium  100 mg Oral BID    polyethylene glycol (PEG 3350)  17 g Oral Daily    cholecalciferol  1,000 Units Oral BID    estradiol  1 patch Transdermal Weekly    pantoprazole  40 mg Oral QAM AC    spironolactone  25 mg Oral Daily    calcium carbonate  500 mg Oral BID with meals    cetirizine  5 mg Oral Nightly    [Held by provider] aspirin  162.5 mg Oral Daily    digoxin  125 mcg Oral Daily    dilTIAZem HCl ER Coated Beads  360 mg Oral Daily    doxycycline  100 mg Oral 2 times per day    empagliflozin  10 mg Oral Daily    montelukast  10 mg Oral Nightly    predniSONE  20 mg Oral Daily    umeclidinium bromide  1 puff Inhalation Daily    [Held by provider] heparin  5,000 Units Subcutaneous Q8H MOISÉS    lidocaine-menthol  1 patch Transdermal Daily      lactated ringers Stopped (12/26/24 1645)     ASSESSMENT:    Acute T6 Fracture  - Kyphoplasty     Acute  Respiratory Failure   COPD   Acute on Chronic HFpEF  - Multifactorial with COPD exacerbation and volume expansion  - Pulm following, Steroids  - Baseline 2-3.5L NC use, at baseline 3.5L NC  - proBNP 786 , trop negative x1, dimer negative   - Dr Wt 160, diuresing well, Lasix, Aldactone, Jardiance, Lisinopril  - Appears mildly volume expanded      PAF  - Maintaining SR. On CCB and Digoxin, Last TSH low/T4 normal,  Dig level 0.70  - Not historically on a/c due to bruising/ bleeding risk/ Hx GIB and low Afib burden, o/p Watchman discussions in process      HTN- Controlled   Chronic Anemia- Stable  HLD- LDL 78, Not historically on Statin, unable to start with concurrent use of CCB with increased risk of myopathies, plan to explore alternatives o/p    PLAN:  - Lost IV access, has missed IV Lasix doses, once IV access regained will continue IV Lasix TID, hopefully be able to discharge by Sunday on Lasix 80 mg PO once daily. Resume home Diamox 500 mg daily   - Start Lipitor   - O/p CardioMems  eval    Plan of care discussed with patient and RN.       Cortney Dumont, MSN, FNP-BC, [facility]  12/27/24   10:47 AM  [phone] [place]  [phone] Jac

## 2024-12-27 NOTE — PROGRESS NOTES
[facility]  part of [facility]    Progress Note    Yesenia Berkowitz Patient Status:  Inpatient    1940 MRN [id number]   Location [facility]5W Attending Robbie Tate MD   Hosp Day # 6 PCP OJ-ANN YANG MD       Subjective:   Yesenia Berkowitz is a(n) 84 year old female. S/p kyphoplasty  Felt better,less back cass,sob on exertion,less cough  Objective:   Blood pressure 112/52, pulse 67, temperature 97.8 °F (36.6 °C), temperature source Oral, resp. rate 18, weight 162 lb 6.4 oz (73.7 kg), SpO2 92%.    HEENT: negative  Neck: no adenopathy, no carotid bruit, no JVD, supple, symmetrical, trachea midline and thyroid not enlarged, symmetric, no tenderness/mass/nodules  Pulmonary/Chest:  clear to auscultation bilaterally, no tenderness  Cardiovascular: S1, S2 normal, no S3 or S4, regular rate and rhythm, no murmur  Abdominal: normal findings: bowel sounds normal, soft, non-tender and no hepatosplenomegaly   Extremities: extremities normal, atraumatic, no cyanosis or edema  Skin: Skin color, texture, turgor normal. No rashes or lesions    Results:     Lab Results   Component Value Date    WBC 10.3 2024    HGB 10.2 (L) 2024    HCT 33.1 (L) 2024    .0 2024    CREATSERUM 0.61 2024    BUN 20 2024     2024    K 3.7 2024     2024    CO2 36.0 (H) 2024     (H) 2024    CA 9.3 2024    ALB 3.0 (L) 2024    ALKPHO 66 2024    BILT 0.2 2024    TP 4.9 (L) 2024    AST 9 2024    ALT 18 2024    PTT 24.3 2024    INR 1.08 2024    T4F 0.9 2024    TSH 0.185 (L) 2024    LIP 30 2024    DDIMER 0.47 2024    MG 1.9 2024    PHOS 3.4 2024    TROP <0.045 2020       No results found.        Assessment and Plan:   Principal Problem:    Acute on chronic congestive heart failure, unspecified heart failure type (HCC)  Active Problems:    Pulmonary  nodule 1 cm or greater in diameter    Acute exacerbation of CHF (congestive heart failure) (HCC)    Pleural effusion on left    Pleural effusion on right    Acute bilateral thoracic back pain    Atypical pneumonia    Compression fracture of T6 vertebra (HCC)     Less back pain,less sob and sl cough.continue prednisone,doxycycline and duoneb        ELVIA LIVINGSTON MD, MD  12/27/2024

## 2024-12-27 NOTE — PLAN OF CARE
Problem: Patient Centered Care  Goal: Patient preferences are identified and integrated in the patient's plan of care  Description: Interventions:  - What would you like us to know as we care for you? From home alone. Has support from adult son.   - Provide timely, complete, and accurate information to patient/family  - Incorporate patient and family knowledge, values, beliefs, and cultural backgrounds into the planning and delivery of care  - Encourage patient/family to participate in care and decision-making at the level they choose  - Honor patient and family perspectives and choices  Outcome: Progressing   Problem: PAIN - ADULT  Goal: Verbalizes/displays adequate comfort level or patient's stated pain goal  Description: INTERVENTIONS:  - Encourage pt to monitor pain and request assistance  - Assess pain using appropriate pain scale  - Administer analgesics based on type and severity of pain and evaluate response  - Implement non-pharmacological measures as appropriate and evaluate response  - Consider cultural and social influences on pain and pain management  - Manage/alleviate anxiety  - Utilize distraction and/or relaxation techniques  - Monitor for opioid side effects  - Notify MD/LIP if interventions unsuccessful or patient reports new pain  - Anticipate increased pain with activity and pre-medicate as appropriate  Outcome: Progressing     Problem: RISK FOR INFECTION - ADULT  Goal: Absence of fever/infection during anticipated neutropenic period  Description: INTERVENTIONS  - Monitor WBC  - Administer growth factors as ordered  - Implement neutropenic guidelines  Outcome: Progressing     Problem: SAFETY ADULT - FALL  Goal: Free from fall injury  Description: INTERVENTIONS:  - Assess pt frequently for physical needs  - Identify cognitive and physical deficits and behaviors that affect risk of falls.  - [place] fall precautions as indicated by assessment.  - Educate pt/family on patient safety including  physical limitations  - Instruct pt to call for assistance with activity based on assessment  - Modify environment to reduce risk of injury  - Provide assistive devices as appropriate  - Consider OT/PT consult to assist with strengthening/mobility  - Encourage toileting schedule  Outcome: Progressing

## 2024-12-27 NOTE — CARDIAC REHAB
Met with patient for cardiac rehab follow-up.  Questions answered.  No need for further intervention at this time.  Will continue to monitor patient needs regarding education during this admission.

## 2024-12-28 LAB
ANION GAP SERPL CALC-SCNC: 0 MMOL/L (ref 0–18)
BUN BLD-MCNC: 24 MG/DL (ref 9–23)
BUN/CREAT SERPL: 38.1 (ref 10–20)
CALCIUM BLD-MCNC: 8.9 MG/DL (ref 8.7–10.4)
CHLORIDE SERPL-SCNC: 104 MMOL/L (ref 98–112)
CO2 SERPL-SCNC: 38 MMOL/L (ref 21–32)
CREAT BLD-MCNC: 0.63 MG/DL
EGFRCR SERPLBLD CKD-EPI 2021: 89 ML/MIN/1.73M2 (ref 60–?)
GLUCOSE BLD-MCNC: 119 MG/DL (ref 70–99)
GLUCOSE BLDC GLUCOMTR-MCNC: 112 MG/DL (ref 70–99)
GLUCOSE BLDC GLUCOMTR-MCNC: 187 MG/DL (ref 70–99)
GLUCOSE BLDC GLUCOMTR-MCNC: 188 MG/DL (ref 70–99)
NT-PROBNP SERPL-MCNC: 268 PG/ML (ref ?–450)
OSMOLALITY SERPL CALC.SUM OF ELEC: 299 MOSM/KG (ref 275–295)
POTASSIUM SERPL-SCNC: 3.9 MMOL/L (ref 3.5–5.1)
POTASSIUM SERPL-SCNC: 4.5 MMOL/L (ref 3.5–5.1)
SODIUM SERPL-SCNC: 142 MMOL/L (ref 136–145)

## 2024-12-28 PROCEDURE — 99233 SBSQ HOSP IP/OBS HIGH 50: CPT | Performed by: HOSPITALIST

## 2024-12-28 RX ORDER — PREDNISONE 10 MG/1
10 TABLET ORAL DAILY
Status: DISCONTINUED | OUTPATIENT
Start: 2024-12-28 | End: 2024-12-29

## 2024-12-28 NOTE — PLAN OF CARE
Micah is A&Ox4. Pt ambulates standby assist with a rolling walker, monitoring blood glucose levels per order- ACHS, saline locked, tolerating diet, on 2 L via NC, Tylenol given prn for pain. Frequent rounding by nursing staff. Safety precautions maintained/call light within reach.   Problem: Patient Centered Care  Goal: Patient preferences are identified and integrated in the patient's plan of care  Description: Interventions:  - What would you like us to know as we care for you?   - Provide timely, complete, and accurate information to patient/family  - Incorporate patient and family knowledge, values, beliefs, and cultural backgrounds into the planning and delivery of care  - Encourage patient/family to participate in care and decision-making at the level they choose  - Honor patient and family perspectives and choices  Outcome: Progressing     Problem: PAIN - ADULT  Goal: Verbalizes/displays adequate comfort level or patient's stated pain goal  Description: INTERVENTIONS:  - Encourage pt to monitor pain and request assistance  - Assess pain using appropriate pain scale  - Administer analgesics based on type and severity of pain and evaluate response  - Implement non-pharmacological measures as appropriate and evaluate response  - Consider cultural and social influences on pain and pain management  - Manage/alleviate anxiety  - Utilize distraction and/or relaxation techniques  - Monitor for opioid side effects  - Notify MD/LIP if interventions unsuccessful or patient reports new pain  - Anticipate increased pain with activity and pre-medicate as appropriate  Outcome: Progressing     Problem: RISK FOR INFECTION - ADULT  Goal: Absence of fever/infection during anticipated neutropenic period  Description: INTERVENTIONS  - Monitor WBC  - Administer growth factors as ordered  - Implement neutropenic guidelines  Outcome: Progressing     Problem: SAFETY ADULT - FALL  Goal: Free from fall injury  Description:  INTERVENTIONS:  - Assess pt frequently for physical needs  - Identify cognitive and physical deficits and behaviors that affect risk of falls.  - [place] fall precautions as indicated by assessment.  - Educate pt/family on patient safety including physical limitations  - Instruct pt to call for assistance with activity based on assessment  - Modify environment to reduce risk of injury  - Provide assistive devices as appropriate  - Consider OT/PT consult to assist with strengthening/mobility  - Encourage toileting schedule  Outcome: Progressing     Problem: SAFETY ADULT - FALL  Goal: Free from fall injury  Description: INTERVENTIONS:  - Assess pt frequently for physical needs  - Identify cognitive and physical deficits and behaviors that affect risk of falls.  - [place] fall precautions as indicated by assessment.  - Educate pt/family on patient safety including physical limitations  - Instruct pt to call for assistance with activity based on assessment  - Modify environment to reduce risk of injury  - Provide assistive devices as appropriate  - Consider OT/PT consult to assist with strengthening/mobility  - Encourage toileting schedule  Outcome: Progressing     Problem: DISCHARGE PLANNING  Goal: Discharge to home or other facility with appropriate resources  Description: INTERVENTIONS:  - Identify barriers to discharge w/pt and caregiver  - Include patient/family/discharge partner in discharge planning  - Arrange for needed discharge resources and transportation as appropriate  - Identify discharge learning needs (meds, wound care, etc)  - Arrange for interpreters to assist at discharge as needed  - Consider post-discharge preferences of patient/family/discharge partner  - Complete POLST form as appropriate  - Assess patient's ability to be responsible for managing their own health  - Refer to Case Management Department for coordinating discharge planning if the patient needs post-hospital services based on  physician/LIP order or complex needs related to functional status, cognitive ability or social support system  Outcome: Progressing     Problem: RESPIRATORY - ADULT  Goal: Achieves optimal ventilation and oxygenation  Description: INTERVENTIONS:  - Assess for changes in respiratory status  - Assess for changes in mentation and behavior  - Position to facilitate oxygenation and minimize respiratory effort  - Oxygen supplementation based on oxygen saturation or ABGs  - Provide Smoking Cessation handout, if applicable  - Encourage broncho-pulmonary hygiene including cough, deep breathe, Incentive Spirometry  - Assess the need for suctioning and perform as needed  - Assess and instruct to report SOB or any respiratory difficulty  - Respiratory Therapy support as indicated  - Manage/alleviate anxiety  - Monitor for signs/symptoms of CO2 retention  Outcome: Progressing

## 2024-12-28 NOTE — PROGRESS NOTES
Progress Note  Yesenia Berkowitz Patient Status:  Inpatient    1940 MRN [id number]   Location [facility]5W Attending Robbie Tate MD   Hosp Day # 7 PCP JO-ANN YANG MD     Attending Cardiologist: Malcolm     SUBJECTIVE:    Denies chest pain. Reports improvement in dyspnea.     VITALS:  /49 (BP Location: Right arm)   Pulse 72   Temp 98.7 °F (37.1 °C) (Oral)   Resp 20   Wt 160 lb 3.2 oz (72.7 kg)   SpO2 98%   BMI 26.66 kg/m²   INTAKE/OUTPUT:    Intake/Output Summary (Last 24 hours) at 2024 0647  Last data filed at 2024 0300  Gross per 24 hour   Intake 919 ml   Output 250 ml   Net 669 ml     Last 3 Weights   24 0645 160 lb 3.2 oz (72.7 kg)   24 0624 162 lb 6.4 oz (73.7 kg)   24 0529 168 lb 6.4 oz (76.4 kg)   24 0516 171 lb 9.6 oz (77.8 kg)   24 0815 174 lb 3.2 oz (79 kg)   24 0543 173 lb 4.8 oz (78.6 kg)   24 172 lb 6.4 oz (78.2 kg)   24 172 lb 6.4 oz (78.2 kg)   24 0608 175 lb 9.6 oz (79.7 kg)   24 0544 180 lb 4.8 oz (81.8 kg)   24 0906 179 lb (81.2 kg)   12/15/24 0559 179 lb 14.4 oz (81.6 kg)   24 0500 179 lb 12.8 oz (81.6 kg)   24 0700 182 lb 9.6 oz (82.8 kg)   24 0900 175 lb 3.2 oz (79.5 kg)   24 0541 169 lb 14.4 oz (77.1 kg)   24 0650 169 lb 6.4 oz (76.8 kg)   24 0700 174 lb (78.9 kg)   24 2200 173 lb 6.4 oz (78.7 kg)     LABS:  Recent Labs   Lab 24  0734 24  0446 24  0503 24  0537   * 140* 150*  --    BUN 22 21 20  --    CREATSERUM 0.58 0.76 0.61  --    EGFRCR 90 78 89  --    CA 8.9 8.8 9.3  --     140 141  --    K 4.0 3.9 3.7 4.5    102 102  --    CO2 40.0* 36.0* 36.0*  --      Recent Labs   Lab 24  1735 24  0526 24  0734   RBC 4.33 3.69* 3.34*   HGB 13.1 11.6* 10.2*   HCT 42.5 35.8 33.1*   MCV 98.2 97.0 99.1   MCH 30.3 31.4 30.5   MCHC 30.8* 32.4 30.8*   RDW 14.8 14.5 14.6   NEPRELIM 15.25*  11.00* 7.54   WBC 17.3* 12.0* 10.3   .0 183.0 177.0     No results for input(s): \"TROP\", \"CK\" in the last 168 hours.  DIAGNOSTICS:  TELEMETRY:     ECHO 9/14/2024:  Conclusions:   1. Left ventricle: The cavity size was normal. Wall thickness was mildly      increased. Systolic function was hyperdynamic. The estimated ejection      fraction was 65-70%, by visual assessment. No diagnostic evidence for      regional wall motion abnormalities. Unable to assess LV diastolic      function.   2. Right ventricle: The cavity size was mildly increased. Systolic pressure      was moderately increased.   3. Aortic valve: There was thickening. The findings were consistent with      mild stenosis. The peak systolic velocity was 2.06 m/sec. The mean      systolic gradient was 10 mm Hg. The valve area (VTI) was 2.17 cm^2. The      valve area (VTI) index was 1.19 cm^2/m^2.   4. Mitral valve: The annulus was moderately calcified. The leaflets were      mildly thickened. Transvalvular velocity was increased. The findings were      consistent with mild stenosis. The mean diastolic gradient was 5 mm Hg.   5. Pulmonary arteries: Systolic pressure was moderately increased, estimated      to be 56 mm Hg.     ROS: Negative unless noted above   PHYSICAL EXAM:  General: Alert and oriented x 3. No apparent distress.  HEENT: Normocephalic, sclera are nonicteric. Hearing appropriate bilaterally.  Neck: No JVD or Carotid bruits. Trachea midline.   Cardiac: Regular rate and rhythm. S1, S2 auscultated. No murmurs, rubs, or gallops appreciated.   Lungs: Clear anteriorly, dull posteriorly. Chest expansion symmetrical. Regular effort.  Abdomen: Soft, non-tender, +BS. No hepatosplenomegaly or appreciable masses.   Extremities: Without clubbing, cyanosis. Peripheral pulses are 2+. Edema +2 BLE that extends from foot to mid shin   Neurologic: Motor and sensory nerves grossly intact.   Psych: Appropriate affect   Skin: Warm and dry. No obvious  lesions, wounds, or ulcerations.   MEDICATIONS:   acetaZOLAMIDE  500 mg Oral Daily    insulin aspart  1-7 Units Subcutaneous TID CC and HS    lisinopril  2.5 mg Oral Daily    furosemide  40 mg Intravenous TID    docusate sodium  100 mg Oral BID    polyethylene glycol (PEG 3350)  17 g Oral Daily    cholecalciferol  1,000 Units Oral BID    estradiol  1 patch Transdermal Weekly    pantoprazole  40 mg Oral QAM AC    spironolactone  25 mg Oral Daily    calcium carbonate  500 mg Oral BID with meals    cetirizine  5 mg Oral Nightly    [Held by provider] aspirin  162.5 mg Oral Daily    digoxin  125 mcg Oral Daily    dilTIAZem HCl ER Coated Beads  360 mg Oral Daily    empagliflozin  10 mg Oral Daily    montelukast  10 mg Oral Nightly    predniSONE  20 mg Oral Daily    umeclidinium bromide  1 puff Inhalation Daily    [Held by provider] heparin  5,000 Units Subcutaneous Q8H OMISÉS    lidocaine-menthol  1 patch Transdermal Daily      lactated ringers Stopped (12/26/24 1645)     ASSESSMENT:    Acute T6 Fracture  - s/p Kyphoplasty     Acute  Respiratory Failure   COPD   Acute on Chronic HFpEF  - Multifactorial with COPD exacerbation and volume expansion  - Pulm following, Steroids  - Baseline 2-3.5L NC use, at baseline 3.5L NC  - proBNP 786, repeat 268, trop negative x1, dimer negative   - Dry Wt 160, diuresing well, Lasix, Aldactone, Jardiance, Lisinopril, Diamox  - Appears volume expanded      PAF  - Maintaining SR. On CCB and Digoxin, Last TSH low/T4 normal,  Dig level 0.70  - Not historically on a/c due to bruising/ bleeding risk/ Hx GIB and low Afib burden, o/p Watchman discussions in process      HTN- Controlled   Chronic Anemia- Stable  HLD- LDL 78, Not historically on Statin, unable to start with concurrent use of CCB with increased risk of myopathies, plan to explore alternatives o/p    PLAN:  -Continue one more full day of IV diuresis, most likely be able to switch to Lasix 80 mg PO once daily tomorrow   - Check BMP  - O/p  CardioMems eval    Plan of care discussed with patient and RN.       Cortney Dumont, MSN, FNP-BC, [facility], [place]  12/28/24   6:48 AM  [phone] [place]  [phone] Jac

## 2024-12-28 NOTE — PLAN OF CARE
Problem: Patient Centered Care  Goal: Patient preferences are identified and integrated in the patient's plan of care  Description: Interventions:  - What would you like us to know as we care for you? Son at bedside  - Provide timely, complete, and accurate information to patient/family  - Incorporate patient and family knowledge, values, beliefs, and cultural backgrounds into the planning and delivery of care  - Encourage patient/family to participate in care and decision-making at the level they choose  - Honor patient and family perspectives and choices  Outcome: Progressing     Problem: Patient/Family Goals  Goal: Patient/Family Long Term Goal  Description: Patient's Long Term Goal: To go home    Interventions:  - Wean O2  - See additional Care Plan goals for specific interventions  Outcome: Progressing  Goal: Patient/Family Short Term Goal  Description: Patient's Short Term Goal: ambulate    Interventions:   - up to chair with meals and ambulate 3 times daily   - See additional Care Plan goals for specific interventions  Outcome: Progressing     Problem: PAIN - ADULT  Goal: Verbalizes/displays adequate comfort level or patient's stated pain goal  Description: INTERVENTIONS:  - Encourage pt to monitor pain and request assistance  - Assess pain using appropriate pain scale  - Administer analgesics based on type and severity of pain and evaluate response  - Implement non-pharmacological measures as appropriate and evaluate response  - Consider cultural and social influences on pain and pain management  - Manage/alleviate anxiety  - Utilize distraction and/or relaxation techniques  - Monitor for opioid side effects  - Notify MD/LIP if interventions unsuccessful or patient reports new pain  - Anticipate increased pain with activity and pre-medicate as appropriate  Outcome: Progressing     Problem: RISK FOR INFECTION - ADULT  Goal: Absence of fever/infection during anticipated neutropenic period  Description:  INTERVENTIONS  - Monitor WBC  - Administer growth factors as ordered  - Implement neutropenic guidelines  Outcome: Progressing     Problem: SAFETY ADULT - FALL  Goal: Free from fall injury  Description: INTERVENTIONS:  - Assess pt frequently for physical needs  - Identify cognitive and physical deficits and behaviors that affect risk of falls.  - [place] fall precautions as indicated by assessment.  - Educate pt/family on patient safety including physical limitations  - Instruct pt to call for assistance with activity based on assessment  - Modify environment to reduce risk of injury  - Provide assistive devices as appropriate  - Consider OT/PT consult to assist with strengthening/mobility  - Encourage toileting schedule  Outcome: Progressing     Problem: DISCHARGE PLANNING  Goal: Discharge to home or other facility with appropriate resources  Description: INTERVENTIONS:  - Identify barriers to discharge w/pt and caregiver  - Include patient/family/discharge partner in discharge planning  - Arrange for needed discharge resources and transportation as appropriate  - Identify discharge learning needs (meds, wound care, etc)  - Arrange for interpreters to assist at discharge as needed  - Consider post-discharge preferences of patient/family/discharge partner  - Complete POLST form as appropriate  - Assess patient's ability to be responsible for managing their own health  - Refer to Case Management Department for coordinating discharge planning if the patient needs post-hospital services based on physician/LIP order or complex needs related to functional status, cognitive ability or social support system  Outcome: Progressing     Problem: RESPIRATORY - ADULT  Goal: Achieves optimal ventilation and oxygenation  Description: INTERVENTIONS:  - Assess for changes in respiratory status  - Assess for changes in mentation and behavior  - Position to facilitate oxygenation and minimize respiratory effort  - Oxygen  supplementation based on oxygen saturation or ABGs  - Provide Smoking Cessation handout, if applicable  - Encourage broncho-pulmonary hygiene including cough, deep breathe, Incentive Spirometry  - Assess the need for suctioning and perform as needed  - Assess and instruct to report SOB or any respiratory difficulty  - Respiratory Therapy support as indicated  - Manage/alleviate anxiety  - Monitor for signs/symptoms of CO2 retention  Outcome: Progressing

## 2024-12-28 NOTE — PROGRESS NOTES
[facility]  part of [facility]    Progress Note    Yesenia Berkowitz Patient Status:  Inpatient    1940 MRN [id number]   Location [facility]5W Attending Robbie Tate MD   Hosp Day # 7 PCP JO-ANN YANG MD       Subjective:   Yesenia Berkowitz is a(n) 84 year old female.   Less back pain,persistent edema,no cough still sob  Objective:   Blood pressure 112/49, pulse 72, temperature 98.7 °F (37.1 °C), temperature source Oral, resp. rate 20, weight 160 lb 3.2 oz (72.7 kg), SpO2 98%.    HEENT: negative  Neck: no adenopathy, no carotid bruit, no JVD, supple, symmetrical, trachea midline and thyroid not enlarged, symmetric, no tenderness/mass/nodules  Pulmonary/Chest:  rales  Cardiovascular: S1, S2 normal, no S3 or S4, regular rate and rhythm, no murmur  Abdominal: normal findings: bowel sounds normal, soft, non-tender and no hepatosplenomegaly   Extremities: extremities normal, atraumatic, no cyanosis or 2+ edema  Skin: Skin color, texture, turgor normal. No rashes or lesions    Results:     Lab Results   Component Value Date    WBC 10.3 2024    HGB 10.2 (L) 2024    HCT 33.1 (L) 2024    .0 2024    CREATSERUM 0.63 2024    BUN 24 (H) 2024     2024    K 3.9 2024     2024    CO2 38.0 (H) 2024     (H) 2024    CA 8.9 2024    ALB 3.0 (L) 2024    ALKPHO 66 2024    BILT 0.2 2024    TP 4.9 (L) 2024    AST 9 2024    ALT 18 2024    PTT 24.3 2024    INR 1.08 2024    T4F 0.9 2024    TSH 0.185 (L) 2024    LIP 30 2024    DDIMER 0.47 2024    MG 1.9 2024    PHOS 3.4 2024    TROP <0.045 2020       No results found.        Assessment and Plan:   Principal Problem:    Acute on chronic congestive heart failure, unspecified heart failure type (HCC)  Active Problems:    Pulmonary nodule 1 cm or greater in diameter    Acute  exacerbation of CHF (congestive heart failure) (HCC)    Pleural effusion on left    Pleural effusion on right    Acute bilateral thoracic back pain    Atypical pneumonia    Compression fracture of T6 vertebra (HCC)    Less sob,no cough,lss backpain.continue diuretic,reduce prednison         ELVIA LIVINGSTON MD, MD  12/28/2024

## 2024-12-29 VITALS
BODY MASS INDEX: 25 KG/M2 | RESPIRATION RATE: 20 BRPM | SYSTOLIC BLOOD PRESSURE: 110 MMHG | WEIGHT: 151.81 LBS | HEART RATE: 82 BPM | TEMPERATURE: 98 F | OXYGEN SATURATION: 96 % | DIASTOLIC BLOOD PRESSURE: 58 MMHG

## 2024-12-29 LAB
ANION GAP SERPL CALC-SCNC: <0 MMOL/L (ref 0–18)
BUN BLD-MCNC: 22 MG/DL (ref 9–23)
BUN/CREAT SERPL: 37.9 (ref 10–20)
CALCIUM BLD-MCNC: 8.8 MG/DL (ref 8.7–10.4)
CHLORIDE SERPL-SCNC: 105 MMOL/L (ref 98–112)
CO2 SERPL-SCNC: 36 MMOL/L (ref 21–32)
CREAT BLD-MCNC: 0.58 MG/DL
EGFRCR SERPLBLD CKD-EPI 2021: 90 ML/MIN/1.73M2 (ref 60–?)
GLUCOSE BLD-MCNC: 137 MG/DL (ref 70–99)
GLUCOSE BLDC GLUCOMTR-MCNC: 143 MG/DL (ref 70–99)
OSMOLALITY SERPL CALC.SUM OF ELEC: 295 MOSM/KG (ref 275–295)
POTASSIUM SERPL-SCNC: 3.5 MMOL/L (ref 3.5–5.1)
SODIUM SERPL-SCNC: 140 MMOL/L (ref 136–145)

## 2024-12-29 PROCEDURE — 99239 HOSP IP/OBS DSCHRG MGMT >30: CPT | Performed by: HOSPITALIST

## 2024-12-29 RX ORDER — TIOTROPIUM BROMIDE 18 UG/1
18 CAPSULE ORAL; RESPIRATORY (INHALATION) NIGHTLY
Qty: 30 CAPSULE | Refills: 0 | Status: SHIPPED | OUTPATIENT
Start: 2024-12-29 | End: 2025-01-28

## 2024-12-29 RX ORDER — POTASSIUM CHLORIDE 1500 MG/1
40 TABLET, EXTENDED RELEASE ORAL EVERY 4 HOURS
Status: DISCONTINUED | OUTPATIENT
Start: 2024-12-29 | End: 2024-12-29

## 2024-12-29 RX ORDER — PREDNISONE 10 MG/1
TABLET ORAL
Qty: 7 TABLET | Refills: 0 | Status: SHIPPED | OUTPATIENT
Start: 2024-12-30 | End: 2025-01-11

## 2024-12-29 NOTE — PROGRESS NOTES
Progress Note  Yesenia Berkowitz Patient Status:  Inpatient    1940 MRN [id number]   Location [facility]5W Attending Robbie Tate MD   Hosp Day # 8 PCP JO-ANN YANG MD     Attending Cardiologist: Malcolm     SUBJECTIVE:    Denies chest pain. Able to lie flat all night, no dyspnea.     VITALS:  /43 (BP Location: Right arm)   Pulse 74   Temp 97.8 °F (36.6 °C) (Oral)   Resp 18   Wt 151 lb 12.8 oz (68.9 kg)   SpO2 96%   BMI 25.26 kg/m²   INTAKE/OUTPUT:    Intake/Output Summary (Last 24 hours) at 2024 0633  Last data filed at 2024 0539  Gross per 24 hour   Intake 1090 ml   Output 1295 ml   Net -205 ml     Last 3 Weights   24 0532 151 lb 12.8 oz (68.9 kg)   24 0645 160 lb 3.2 oz (72.7 kg)   24 0624 162 lb 6.4 oz (73.7 kg)   24 0529 168 lb 6.4 oz (76.4 kg)   24 0516 171 lb 9.6 oz (77.8 kg)   24 0815 174 lb 3.2 oz (79 kg)   24 0543 173 lb 4.8 oz (78.6 kg)   24 172 lb 6.4 oz (78.2 kg)   24 2045 172 lb 6.4 oz (78.2 kg)   24 0608 175 lb 9.6 oz (79.7 kg)   24 0544 180 lb 4.8 oz (81.8 kg)   24 0906 179 lb (81.2 kg)   12/15/24 0559 179 lb 14.4 oz (81.6 kg)   24 0500 179 lb 12.8 oz (81.6 kg)   24 0700 182 lb 9.6 oz (82.8 kg)   24 0900 175 lb 3.2 oz (79.5 kg)   24 0541 169 lb 14.4 oz (77.1 kg)   24 0650 169 lb 6.4 oz (76.8 kg)   24 0700 174 lb (78.9 kg)   24 2200 173 lb 6.4 oz (78.7 kg)     LABS:  Recent Labs   Lab 24  0446 24  0503 24  0537 24  0821   * 150*  --  119*   BUN 21 20  --  24*   CREATSERUM 0.76 0.61  --  0.63   EGFRCR 78 89  --  89   CA 8.8 9.3  --  8.9    141  --  142   K 3.9 3.7 4.5 3.9    102  --  104   CO2 36.0* 36.0*  --  38.0*     Recent Labs   Lab 24  0526 24  0734   RBC 3.69* 3.34*   HGB 11.6* 10.2*   HCT 35.8 33.1*   MCV 97.0 99.1   MCH 31.4 30.5   MCHC 32.4 30.8*   RDW 14.5 14.6   NEPRELIM 11.00*  7.54   WBC 12.0* 10.3   .0 177.0     No results for input(s): \"TROP\", \"CK\" in the last 168 hours.  DIAGNOSTICS:  TELEMETRY:     ECHO 9/14/2024:  Conclusions:   1. Left ventricle: The cavity size was normal. Wall thickness was mildly      increased. Systolic function was hyperdynamic. The estimated ejection      fraction was 65-70%, by visual assessment. No diagnostic evidence for      regional wall motion abnormalities. Unable to assess LV diastolic      function.   2. Right ventricle: The cavity size was mildly increased. Systolic pressure      was moderately increased.   3. Aortic valve: There was thickening. The findings were consistent with      mild stenosis. The peak systolic velocity was 2.06 m/sec. The mean      systolic gradient was 10 mm Hg. The valve area (VTI) was 2.17 cm^2. The      valve area (VTI) index was 1.19 cm^2/m^2.   4. Mitral valve: The annulus was moderately calcified. The leaflets were      mildly thickened. Transvalvular velocity was increased. The findings were      consistent with mild stenosis. The mean diastolic gradient was 5 mm Hg.   5. Pulmonary arteries: Systolic pressure was moderately increased, estimated      to be 56 mm Hg.     ROS: Negative unless noted above   PHYSICAL EXAM:  General: Alert and oriented x 3. No apparent distress.  HEENT: Normocephalic, sclera are nonicteric. Hearing appropriate bilaterally.  Neck: No JVD or Carotid bruits. Trachea midline.   Cardiac: IRRegular rate and rhythm. S1, S2 auscultated. No murmurs, rubs, or gallops appreciated.   Lungs: Clear anteriorly, dull posteriorly. Chest expansion symmetrical. Regular effort.  Abdomen: Soft, non-tender, +BS. No hepatosplenomegaly or appreciable masses.   Extremities: Without clubbing, cyanosis. Peripheral pulses are 2+. Edema +1 BLE that extends from foot to mid shin (chronic)   Neurologic: Motor and sensory nerves grossly intact.   Psych: Appropriate affect   Skin: Warm and dry. No obvious lesions,  wounds, or ulcerations.   MEDICATIONS:   predniSONE  10 mg Oral Daily    acetaZOLAMIDE  500 mg Oral Daily    insulin aspart  1-7 Units Subcutaneous TID CC and HS    lisinopril  2.5 mg Oral Daily    furosemide  40 mg Intravenous TID    docusate sodium  100 mg Oral BID    polyethylene glycol (PEG 3350)  17 g Oral Daily    cholecalciferol  1,000 Units Oral BID    estradiol  1 patch Transdermal Weekly    pantoprazole  40 mg Oral QAM AC    spironolactone  25 mg Oral Daily    calcium carbonate  500 mg Oral BID with meals    cetirizine  5 mg Oral Nightly    [Held by provider] aspirin  162.5 mg Oral Daily    digoxin  125 mcg Oral Daily    dilTIAZem HCl ER Coated Beads  360 mg Oral Daily    empagliflozin  10 mg Oral Daily    montelukast  10 mg Oral Nightly    umeclidinium bromide  1 puff Inhalation Daily    heparin  5,000 Units Subcutaneous Q8H MOISÉS    lidocaine-menthol  1 patch Transdermal Daily     ASSESSMENT:    Acute T6 Fracture  - s/p Kyphoplasty     Acute  Respiratory Failure   COPD   Acute on Chronic HFpEF  - Multifactorial with COPD exacerbation and volume expansion  - Pulm following, Steroids  - Baseline 2-3.5L NC use, at baseline 3.5L NC  - proBNP 786, repeat 268, trop negative x1, dimer negative   - Dry Wt 160, diuresing well, Lasix, Aldactone, Jardiance, Lisinopril, Diamox  - Appears volume expanded      PAF  - Rates controlled. On CCB and Digoxin, Last TSH low/T4 normal,  Dig level 0.70  - Not historically on a/c due to bruising/ bleeding risk/ Hx GIB and low Afib burden, o/p Watchman discussions in process      HTN- Controlled   Chronic Anemia- Stable  HLD- LDL 78, Not historically on Statin, unable to start with concurrent use of CCB with increased risk of myopathies, plan to explore alternatives o/p    PLAN:  -- Check BMP, depending on results most likely be able to switch to Lasix 80 mg PO once daily   - O/p CardioMems eval    Plan of care discussed with patient and RN.       Cortney Dumont, MSN,  FNP-BC, CCK  12/29/24   7:06 AM  [phone] [place]  [phone] [place]

## 2024-12-29 NOTE — PLAN OF CARE
Problem: Patient Centered Care  Goal: Patient preferences are identified and integrated in the patient's plan of care  Description: Interventions:  - What would you like us to know as we care for you? I live at home alone, but my son lives close by.  - Provide timely, complete, and accurate information to patient/family  - Incorporate patient and family knowledge, values, beliefs, and cultural backgrounds into the planning and delivery of care  - Encourage patient/family to participate in care and decision-making at the level they choose  - Honor patient and family perspectives and choices  12/29/2024 0033 by Michelle Taylor, RN  Outcome: Progressing      Problem: PAIN - ADULT  Goal: Verbalizes/displays adequate comfort level or patient's stated pain goal  Description: INTERVENTIONS:  - Encourage pt to monitor pain and request assistance  - Assess pain using appropriate pain scale  - Administer analgesics based on type and severity of pain and evaluate response  - Implement non-pharmacological measures as appropriate and evaluate response  - Consider cultural and social influences on pain and pain management  - Manage/alleviate anxiety  - Utilize distraction and/or relaxation techniques  - Monitor for opioid side effects  - Notify MD/LIP if interventions unsuccessful or patient reports new pain  - Anticipate increased pain with activity and pre-medicate as appropriate  12/29/2024 0033 by Michelle Taylor, RN  Outcome: Progressing    Problem: RISK FOR INFECTION - ADULT  Goal: Absence of fever/infection during anticipated neutropenic period  Description: INTERVENTIONS  - Monitor WBC  - Administer growth factors as ordered  - Implement neutropenic guidelines  12/29/2024 0033 by Michelle Taylor, RN  Outcome: Progressing      Problem: SAFETY ADULT - FALL  Goal: Free from fall injury  Description: INTERVENTIONS:  - Assess pt frequently for physical needs  - Identify cognitive and physical deficits and behaviors  that affect risk of falls.  - [place] fall precautions as indicated by assessment.  - Educate pt/family on patient safety including physical limitations  - Instruct pt to call for assistance with activity based on assessment  - Modify environment to reduce risk of injury  - Provide assistive devices as appropriate  - Consider OT/PT consult to assist with strengthening/mobility  - Encourage toileting schedule  12/29/2024 0033 by Michelle Taylor, RN  Outcome: Progressing    Problem: DISCHARGE PLANNING  Goal: Discharge to home or other facility with appropriate resources  Description: INTERVENTIONS:  - Identify barriers to discharge w/pt and caregiver  - Include patient/family/discharge partner in discharge planning  - Arrange for needed discharge resources and transportation as appropriate  - Identify discharge learning needs (meds, wound care, etc)  - Arrange for interpreters to assist at discharge as needed  - Consider post-discharge preferences of patient/family/discharge partner  - Complete POLST form as appropriate  - Assess patient's ability to be responsible for managing their own health  - Refer to Case Management Department for coordinating discharge planning if the patient needs post-hospital services based on physician/LIP order or complex needs related to functional status, cognitive ability or social support system  12/29/2024 0033 by Michelle Taylor, RN  Outcome: Progressing    Problem: RESPIRATORY - ADULT  Goal: Achieves optimal ventilation and oxygenation  Description: INTERVENTIONS:  - Assess for changes in respiratory status  - Assess for changes in mentation and behavior  - Position to facilitate oxygenation and minimize respiratory effort  - Oxygen supplementation based on oxygen saturation or ABGs  - Provide Smoking Cessation handout, if applicable  - Encourage broncho-pulmonary hygiene including cough, deep breathe, Incentive Spirometry  - Assess the need for suctioning and perform as  needed  - Assess and instruct to report SOB or any respiratory difficulty  - Respiratory Therapy support as indicated  - Manage/alleviate anxiety  - Monitor for signs/symptoms of CO2 retention  12/29/2024 0033 by Michelle Taylor RN  Outcome: Progressing    Problem: CARDIOVASCULAR - ADULT  Goal: Maintains optimal cardiac output and hemodynamic stability  Description: INTERVENTIONS:  - Monitor vital signs, rhythm, and trends  - Monitor for bleeding, hypotension and signs of decreased cardiac output  - Evaluate effectiveness of vasoactive medications to optimize hemodynamic stability  - Monitor arterial and/or venous puncture sites for bleeding and/or hematoma  - Assess quality of pulses, skin color and temperature  - Assess for signs of decreased coronary artery perfusion - ex. Angina  - Evaluate fluid balance, assess for edema, trend weights  Outcome: Progressing  Goal: Absence of cardiac arrhythmias or at baseline  Description: INTERVENTIONS:  - Continuous cardiac monitoring, monitor vital signs, obtain 12 lead EKG if indicated  - Evaluate effectiveness of antiarrhythmic and heart rate control medications as ordered  - Initiate emergency measures for life threatening arrhythmias  - Monitor electrolytes and administer replacement therapy as ordered  Outcome: Progressing

## 2024-12-29 NOTE — PLAN OF CARE
Problem: Patient Centered Care  Goal: Patient preferences are identified and integrated in the patient's plan of care  Description: Interventions:  - What would you like us to know as we care for you? I live at home alone, but my son lives close by.  - Provide timely, complete, and accurate information to patient/family  - Incorporate patient and family knowledge, values, beliefs, and cultural backgrounds into the planning and delivery of care  - Encourage patient/family to participate in care and decision-making at the level they choose  - Honor patient and family perspectives and choices  Outcome: Adequate for Discharge     Problem: Patient/Family Goals  Goal: Patient/Family Long Term Goal  Description: Patient's Long Term Goal: To go home    Interventions:  - wean O2  - See additional Care Plan goals for specific interventions  Outcome: Adequate for Discharge  Goal: Patient/Family Short Term Goal  Description: Patient's Short Term Goal: Ambulate    Interventions:   - Up to chair 3x daily with meals  - See additional Care Plan goals for specific interventions  Outcome: Adequate for Discharge     Problem: PAIN - ADULT  Goal: Verbalizes/displays adequate comfort level or patient's stated pain goal  Description: INTERVENTIONS:  - Encourage pt to monitor pain and request assistance  - Assess pain using appropriate pain scale  - Administer analgesics based on type and severity of pain and evaluate response  - Implement non-pharmacological measures as appropriate and evaluate response  - Consider cultural and social influences on pain and pain management  - Manage/alleviate anxiety  - Utilize distraction and/or relaxation techniques  - Monitor for opioid side effects  - Notify MD/LIP if interventions unsuccessful or patient reports new pain  - Anticipate increased pain with activity and pre-medicate as appropriate  Outcome: Adequate for Discharge     Problem: RISK FOR INFECTION - ADULT  Goal: Absence of  fever/infection during anticipated neutropenic period  Description: INTERVENTIONS  - Monitor WBC  - Administer growth factors as ordered  - Implement neutropenic guidelines  Outcome: Adequate for Discharge     Problem: SAFETY ADULT - FALL  Goal: Free from fall injury  Description: INTERVENTIONS:  - Assess pt frequently for physical needs  - Identify cognitive and physical deficits and behaviors that affect risk of falls.  - [place] fall precautions as indicated by assessment.  - Educate pt/family on patient safety including physical limitations  - Instruct pt to call for assistance with activity based on assessment  - Modify environment to reduce risk of injury  - Provide assistive devices as appropriate  - Consider OT/PT consult to assist with strengthening/mobility  - Encourage toileting schedule  Outcome: Adequate for Discharge     Problem: DISCHARGE PLANNING  Goal: Discharge to home or other facility with appropriate resources  Description: INTERVENTIONS:  - Identify barriers to discharge w/pt and caregiver  - Include patient/family/discharge partner in discharge planning  - Arrange for needed discharge resources and transportation as appropriate  - Identify discharge learning needs (meds, wound care, etc)  - Arrange for interpreters to assist at discharge as needed  - Consider post-discharge preferences of patient/family/discharge partner  - Complete POLST form as appropriate  - Assess patient's ability to be responsible for managing their own health  - Refer to Case Management Department for coordinating discharge planning if the patient needs post-hospital services based on physician/LIP order or complex needs related to functional status, cognitive ability or social support system  Outcome: Adequate for Discharge     Problem: RESPIRATORY - ADULT  Goal: Achieves optimal ventilation and oxygenation  Description: INTERVENTIONS:  - Assess for changes in respiratory status  - Assess for changes in mentation and  behavior  - Position to facilitate oxygenation and minimize respiratory effort  - Oxygen supplementation based on oxygen saturation or ABGs  - Provide Smoking Cessation handout, if applicable  - Encourage broncho-pulmonary hygiene including cough, deep breathe, Incentive Spirometry  - Assess the need for suctioning and perform as needed  - Assess and instruct to report SOB or any respiratory difficulty  - Respiratory Therapy support as indicated  - Manage/alleviate anxiety  - Monitor for signs/symptoms of CO2 retention  Outcome: Adequate for Discharge     Problem: CARDIOVASCULAR - ADULT  Goal: Maintains optimal cardiac output and hemodynamic stability  Description: INTERVENTIONS:  - Monitor vital signs, rhythm, and trends  - Monitor for bleeding, hypotension and signs of decreased cardiac output  - Evaluate effectiveness of vasoactive medications to optimize hemodynamic stability  - Monitor arterial and/or venous puncture sites for bleeding and/or hematoma  - Assess quality of pulses, skin color and temperature  - Assess for signs of decreased coronary artery perfusion - ex. Angina  - Evaluate fluid balance, assess for edema, trend weights  Outcome: Adequate for Discharge  Goal: Absence of cardiac arrhythmias or at baseline  Description: INTERVENTIONS:  - Continuous cardiac monitoring, monitor vital signs, obtain 12 lead EKG if indicated  - Evaluate effectiveness of antiarrhythmic and heart rate control medications as ordered  - Initiate emergency measures for life threatening arrhythmias  - Monitor electrolytes and administer replacement therapy as ordered  Outcome: Adequate for Discharge

## 2024-12-29 NOTE — DISCHARGE SUMMARY
[facility]  part of [facility]     DISCHARGE SUMMARY     Yesenia Berkowitz Patient Status:  Inpatient    1940 MRN [id number]   Location [facility]W Attending No att. providers found   Hosp Day # 8 PCP JO-ANN YANG MD     DATE OF ADMISSION: 2024  DATE OF DISCHARGE: 2024   DISPOSITION: home  CONDITION ON DISCHARGE: good    DISCHARGE DIAGNOSES:  Thoracic compression fracture  Right upper lobe lung nodule  Acute on chronic diastolic congestive heart failure  COPD  Steroid-induced leukocytosis  Hypertension  Atrial fibrillation, paroxysmal    HISTORY OF PRESENT ILLNESS (COPIED FROM ADMISSION H&P)  Yesenia Berkowitz is a 84 year old female with history of chronic respiratory failure on 4 L, COPD, CHF, HTN, A-fib, asthma, GERD with recent hospitalization between  to 2024 and treated for CHF/COPD exacerbation.  She presents today via EMS with complaints of upper back pain for the last 5-6 days.  Pain has progressively gotten worse.  She is unable to lie down.  Has been unable to sleep.  Son at bedside notes patient has been a bit forgetful.  Patient attributes this to lack of sleep.  She has noted mild worsening of lower extremity swelling but no pulmonary symptoms.  No fever or chills.  No cough above baseline.    HOSPITAL COURSE:  Patient was admitted, seen by cardiology and pulmonology.  Diuresed and felt much better.  She does have some baseline lower extremity edema which does not seem to correlate necessarily with her heart failure.  Back pain was due to subacute T6 compression fracture.  Confirmed by MRI, IR did kyphoplasty which significantly improved her pain control.  Cleared by pulmonology and cardiology for discharge.  I encouraged her to weigh herself regularly at home and abide by water restriction.    Patient understands return to the emergency room for increased pain, fever, discharge, shortness of breath, chest pain, new neurologic symptoms, other  concerning symptoms.    PHYSICAL EXAM:  Temp:  [97.8 °F (36.6 °C)-98.4 °F (36.9 °C)] 97.8 °F (36.6 °C)  Pulse:  [64-97] 82  Resp:  [18-20] 20  BP: (105-137)/(43-62) 110/58  SpO2:  [96 %-97 %] 96 %  Gen: A+Ox3.  No distress.   HEENT: NCAT, neck supple, no carotid bruit.  CV: RRR, S1S2, and intact distal pulses. No gallop, rub, murmur.  Pulm: Effort and breath sounds normal. No distress, wheezes, rales, rhonchi.  Abd: Soft, NTND, BS normal, no mass, no HSM, no rebound/guarding.   Neuro: Normal reflexes, CN. Sensory/motor exams grossly normal deficit. Coordination  and gait normal.   MS: No joint effusions.  1+ bilateral lower extremity peripheral edema.  Skin: Skin is warm and dry. No rashes, erythema, diaphoresis.   Psych: Normal mood and affect. Behavior and judgment normal.     DISCHARGE MEDICATIONS     Discharge Medications        START taking these medications        Instructions Prescription details   lidocaine-menthol 4-1 % Ptch  Start taking on: December 30, 2024      Place 1 patch onto the skin daily.   Quantity: 30 patch  Refills: 0            CHANGE how you take these medications        Instructions Prescription details   predniSONE 10 MG Tabs  Commonly known as: Deltasone  Start taking on: December 30, 2024  What changed:   medication strength  See the new instructions.      Take 1 tablet (10 mg total) by mouth daily for 4 days, THEN 0.5 tablets (5 mg total) daily for 4 days, THEN 0.5 tablets (5 mg total) every other day for 4 days.   Stop taking on: January 11, 2025  Quantity: 7 tablet  Refills: 0            CONTINUE taking these medications        Instructions Prescription details   acetaminophen 500 MG Tabs  Commonly known as: Tylenol Extra Strength      Take 1 tablet (500 mg total) by mouth every 6 (six) hours as needed.   Refills: 0     acetaZOLAMIDE 250 MG Tabs  Commonly known as: Diamox      Take 2 tablets (500 mg total) by mouth daily.   Quantity: 30 tablet  Refills: 0     albuterol 108 (90 Base)  MCG/ACT Aers  Commonly known as: Ventolin HFA      Inhale 2 puffs into the lungs as needed.   Refills: 0     aspirin 81 MG Tabs      Take 2 tablets (162 mg total) by mouth daily as needed.   Refills: 0     digoxin 0.125 MG Tabs  Commonly known as: Lanoxin      Take 1 tablet (125 mcg total) by mouth daily.   Quantity: 30 tablet  Refills: 0     dilTIAZem HCl ER Coated Beads 360 MG Cp24  Commonly known as: CARDIZEM CD      Take 1 capsule (360 mg total) by mouth 2 (two) times daily.   Refills: 0     empagliflozin 10 MG Tabs  Commonly known as: Jardiance      Take 1 tablet (10 mg total) by mouth daily.   Quantity: 30 tablet  Refills: 3     estradiol 0.05 MG/24HR Ptwk  Commonly known as: Climara      Place 1 patch onto the skin once a week. As directed.   Refills: 0     furosemide 80 MG Tabs  Commonly known as: Lasix      Take 1 tablet (80 mg total) by mouth daily.   Quantity: 30 tablet  Refills: 3     lansoprazole 30 MG Cpdr  Commonly known as: Prevacid      Take 1 capsule (30 mg total) by mouth nightly.   Refills: 0     Loratadine 10 MG Caps      Take 10 mg by mouth nightly.   Refills: 0     montelukast 10 MG Tabs  Commonly known as: Singulair      Take 1 tablet (10 mg total) by mouth nightly.   Refills: 0     omega-3 fatty acids 1000 MG Caps  Commonly known as: Fish Oil      Take 1,000 mg by mouth daily.   Refills: 0     potassium chloride 20 MEQ Tbcr  Commonly known as: Klor-Con M20      Take 1 tablet (20 mEq total) by mouth nightly.   Refills: 0     spironolactone 25 MG Tabs  Commonly known as: Aldactone      Take 1 tablet (25 mg total) by mouth daily for 90 doses.   Stop taking on: March 17, 2025  Quantity: 90 tablet  Refills: 0     tiotropium 18 MCG Caps  Commonly known as: Spiriva Handihaler      Inhale 1 capsule (18 mcg total) into the lungs nightly for 30 doses.   Stop taking on: January 28, 2025  Quantity: 30 capsule  Refills: 0     Vitamin D 1000 units Tabs      Take 1,000 Units by mouth 2 (two) times daily.    Refills: 0            STOP taking these medications      dicyclomine 10 MG Caps  Commonly known as: Bentyl        doxycycline 100 MG Caps  Commonly known as: Vibramycin                  Where to Get Your Medications        These medications were sent to [id number] - [place], [place] - [address] [phone], [phone]  [address] [address]      Phone: [phone]   lidocaine-menthol 4-1 % Ptch  predniSONE 10 MG Tabs  tiotropium 18 MCG Caps         CONSULTANTS  Consultants         Provider   Role Specialty     Abdoul Choudhury MD      Consulting Physician INTERVENTIONAL, RADIOLOGY     Bing Boyle MD      Consulting Physician PULMONARY DISEASES     Nighat Aly MD      Consulting Physician PULMONARY DISEASES     Luis Fernando Fowler MD      Consulting Physician Cardiovascular Diseases            FOLLOW UP:  Amanda Nelson, APRN  [address]  [address]  [place] [address]  [phone]    Go on 1/7/2025  @1100    Bing Boyle MD  [address]  [address]  [place] [address]  [phone]    Follow up in 1 week(s)      Brenda Wilkerson MD  [address]  [address]  [facility] [address]  [phone]    Follow up in 1 week(s)  Post Discharge Followup    Luis Fernando Fowler MD  [address]  [address]  [facility] [address]  [phone]    Follow up  Post Discharge Followup    The above plan and follow-up instructions were reviewed with the patient and they verbalized understanding and agreement.  They understand to return to the emergency room for any concerning signs or symptoms.  Greater than 30 minutes spent on discharge.  -----------------------    Hospital Discharge Diagnoses: Thoracic compression fracture    Lace+ Score: 84  59-90 High Risk  29-58 Medium Risk  0-28   Low Risk.    TCM Follow-Up Recommendation:  LACE > 58: High Risk of readmission after discharge from the hospital.

## 2024-12-29 NOTE — PROGRESS NOTES
[facility]  part of [facility]    Progress Note    Yesenia Berkowitz Patient Status:  Inpatient    1940 MRN [id number]   Location [facility]5W Attending Robbie Tate MD   Hosp Day # 8 PCP JO-ANN YANG MD       Subjective:   Yesenia Berkowitz is a(n) 84 year old female.   Felt better,less back pain,sob,no cough  Objective:   Blood pressure 105/43, pulse 74, temperature 97.8 °F (36.6 °C), temperature source Oral, resp. rate 18, weight 151 lb 12.8 oz (68.9 kg), SpO2 96%.    HEENT: negative  Neck: no adenopathy, no carotid bruit, no JVD, supple, symmetrical, trachea midline and thyroid not enlarged, symmetric, no tenderness/mass/nodules  Pulmonary/Chest:   ralesardiovascular: S1, S2 normal, no S3 or S4, regular rate and rhythm, no murmur  Abdominal: normal findings: bowel sounds normal, soft, non-tender and no hepatosplenomegaly   Extremities: extremities normal, atraumatic, no cyanosis or 2+edema  Skin: Skin color, texture, turgor normal. No rashes or lesions    Results:     Lab Results   Component Value Date    WBC 10.3 2024    HGB 10.2 (L) 2024    HCT 33.1 (L) 2024    .0 2024    CREATSERUM 0.58 2024    BUN 22 2024     2024    K 3.5 2024     2024    CO2 36.0 (H) 2024     (H) 2024    CA 8.8 2024    ALB 3.0 (L) 2024    ALKPHO 66 2024    BILT 0.2 2024    TP 4.9 (L) 2024    AST 9 2024    ALT 18 2024    PTT 24.3 2024    INR 1.08 2024    T4F 0.9 2024    TSH 0.185 (L) 2024    LIP 30 2024    DDIMER 0.47 2024    MG 1.9 2024    PHOS 3.4 2024    TROP <0.045 2020       No results found.        Assessment and Plan:   Principal Problem:    Acute on chronic congestive heart failure, unspecified heart failure type (HCC)  Active Problems:    Pulmonary nodule 1 cm or greater in diameter    Acute exacerbation of CHF  (congestive heart failure) ([facility])    Pleural effusion on left    Pleural effusion on right    Acute bilateral thoracic back pain    Atypical pneumonia    Compression fracture of T6 vertebra ([facility])     Felt better,less sob pain in the back and no cough,home        ELVIA LIVINGSTON MD, MD  12/29/2024     No change

## 2025-01-10 ENCOUNTER — APPOINTMENT (OUTPATIENT)
Dept: GENERAL RADIOLOGY | Facility: HOSPITAL | Age: 85
End: 2025-01-10
Attending: EMERGENCY MEDICINE
Payer: MEDICARE

## 2025-01-10 ENCOUNTER — HOSPITAL ENCOUNTER (INPATIENT)
Facility: HOSPITAL | Age: 85
LOS: 3 days | Discharge: HOME | End: 2025-01-13
Attending: EMERGENCY MEDICINE | Admitting: STUDENT IN AN ORGANIZED HEALTH CARE EDUCATION/TRAINING PROGRAM
Payer: MEDICARE

## 2025-01-10 ENCOUNTER — HOSPITAL ENCOUNTER (INPATIENT)
Facility: HOSPITAL | Age: 85
LOS: 3 days | Discharge: HOME HEALTH | End: 2025-01-13
Attending: EMERGENCY MEDICINE | Admitting: STUDENT IN AN ORGANIZED HEALTH CARE EDUCATION/TRAINING PROGRAM
Payer: MEDICARE

## 2025-01-10 DIAGNOSIS — R06.02 SOB (SHORTNESS OF BREATH): Primary | ICD-10-CM

## 2025-01-10 DIAGNOSIS — J44.1 COPD EXACERBATION (HCC): ICD-10-CM

## 2025-01-10 DIAGNOSIS — J18.9 COMMUNITY ACQUIRED PNEUMONIA OF RIGHT LOWER LOBE OF LUNG: ICD-10-CM

## 2025-01-10 LAB
ANION GAP SERPL CALC-SCNC: 4 MMOL/L (ref 0–18)
BASE EXCESS BLD CALC-SCNC: 15 MMOL/L (ref ?–2)
BASOPHILS # BLD AUTO: 0.02 X10(3) UL (ref 0–0.2)
BASOPHILS NFR BLD AUTO: 0.2 %
BUN BLD-MCNC: 19 MG/DL (ref 9–23)
BUN/CREAT SERPL: 27.5 (ref 10–20)
CALCIUM BLD-MCNC: 9 MG/DL (ref 8.7–10.4)
CHLORIDE SERPL-SCNC: 100 MMOL/L (ref 98–112)
CO2 SERPL-SCNC: 37 MMOL/L (ref 21–32)
CREAT BLD-MCNC: 0.69 MG/DL
DEPRECATED RDW RBC AUTO: 52.3 FL (ref 35.1–46.3)
EGFRCR SERPLBLD CKD-EPI 2021: 87 ML/MIN/1.73M2 (ref 60–?)
EOSINOPHIL # BLD AUTO: 0.04 X10(3) UL (ref 0–0.7)
EOSINOPHIL NFR BLD AUTO: 0.3 %
ERYTHROCYTE [DISTWIDTH] IN BLOOD BY AUTOMATED COUNT: 14.8 % (ref 11–15)
GLUCOSE BLD-MCNC: 186 MG/DL (ref 70–99)
HCO3 BLDA-SCNC: 36.5 MEQ/L (ref 21–27)
HCT VFR BLD AUTO: 40.1 %
HGB BLD-MCNC: 12.6 G/DL
IMM GRANULOCYTES # BLD AUTO: 0.06 X10(3) UL (ref 0–1)
IMM GRANULOCYTES NFR BLD: 0.5 %
LYMPHOCYTES # BLD AUTO: 3.04 X10(3) UL (ref 1–4)
LYMPHOCYTES NFR BLD AUTO: 23.3 %
MCH RBC QN AUTO: 30.1 PG (ref 26–34)
MCHC RBC AUTO-ENTMCNC: 31.4 G/DL (ref 31–37)
MCV RBC AUTO: 95.7 FL
MODIFIED ALLEN TEST: POSITIVE
MONOCYTES # BLD AUTO: 1.1 X10(3) UL (ref 0.1–1)
MONOCYTES NFR BLD AUTO: 8.4 %
NEUTROPHILS # BLD AUTO: 8.8 X10 (3) UL (ref 1.5–7.7)
NEUTROPHILS # BLD AUTO: 8.8 X10(3) UL (ref 1.5–7.7)
NEUTROPHILS NFR BLD AUTO: 67.3 %
O2 CT BLD-SCNC: 17 VOL% (ref 15–23)
OSMOLALITY SERPL CALC.SUM OF ELEC: 299 MOSM/KG (ref 275–295)
OXYGEN LITERS/MINUTE: 3 L/MIN
PCO2 BLDA: 62 MM HG (ref 35–45)
PH BLDA: 7.44 [PH] (ref 7.35–7.45)
PLATELET # BLD AUTO: 258 10(3)UL (ref 150–450)
PO2 BLDA: 81 MM HG (ref 80–100)
POTASSIUM SERPL-SCNC: 4 MMOL/L (ref 3.5–5.1)
PUNCTURE CHARGE: YES
RBC # BLD AUTO: 4.19 X10(6)UL
SAO2 % BLDA: 96.9 % (ref 94–100)
SODIUM SERPL-SCNC: 141 MMOL/L (ref 136–145)
TROPONIN I SERPL HS-MCNC: 18 NG/L
WBC # BLD AUTO: 13.1 X10(3) UL (ref 4–11)

## 2025-01-10 PROCEDURE — 99223 1ST HOSP IP/OBS HIGH 75: CPT | Performed by: HOSPITALIST

## 2025-01-10 PROCEDURE — 5A0945Z ASSISTANCE WITH RESPIRATORY VENTILATION, 24-96 CONSECUTIVE HOURS: ICD-10-PCS | Performed by: FAMILY MEDICINE

## 2025-01-10 PROCEDURE — 71045 X-RAY EXAM CHEST 1 VIEW: CPT | Performed by: EMERGENCY MEDICINE

## 2025-01-10 RX ORDER — DOXYCYCLINE 100 MG/1
100 CAPSULE ORAL 2 TIMES DAILY
Status: DISCONTINUED | OUTPATIENT
Start: 2025-01-10 | End: 2025-01-10

## 2025-01-10 RX ORDER — HEPARIN SODIUM 5000 [USP'U]/ML
5000 INJECTION, SOLUTION INTRAVENOUS; SUBCUTANEOUS EVERY 12 HOURS SCHEDULED
Status: DISCONTINUED | OUTPATIENT
Start: 2025-01-10 | End: 2025-01-13

## 2025-01-10 RX ORDER — MONTELUKAST SODIUM 10 MG/1
10 TABLET ORAL NIGHTLY
Status: DISCONTINUED | OUTPATIENT
Start: 2025-01-10 | End: 2025-01-13

## 2025-01-10 RX ORDER — FUROSEMIDE 40 MG/1
40 TABLET ORAL
Status: DISCONTINUED | OUTPATIENT
Start: 2025-01-11 | End: 2025-01-13

## 2025-01-10 RX ORDER — METHYLPREDNISOLONE SODIUM SUCCINATE 40 MG/ML
40 INJECTION INTRAMUSCULAR; INTRAVENOUS DAILY
Status: DISCONTINUED | OUTPATIENT
Start: 2025-01-10 | End: 2025-01-13

## 2025-01-10 RX ORDER — ACETAMINOPHEN 500 MG
500 TABLET ORAL EVERY 4 HOURS PRN
Status: DISCONTINUED | OUTPATIENT
Start: 2025-01-10 | End: 2025-01-13

## 2025-01-10 RX ORDER — ASPIRIN 81 MG/1
81 TABLET, CHEWABLE ORAL DAILY
Status: DISCONTINUED | OUTPATIENT
Start: 2025-01-11 | End: 2025-01-13

## 2025-01-10 RX ORDER — BENZONATATE 200 MG/1
200 CAPSULE ORAL 3 TIMES DAILY PRN
Status: DISCONTINUED | OUTPATIENT
Start: 2025-01-10 | End: 2025-01-13

## 2025-01-10 RX ORDER — ACETAZOLAMIDE 250 MG/1
500 TABLET ORAL DAILY
Status: DISCONTINUED | OUTPATIENT
Start: 2025-01-10 | End: 2025-01-13

## 2025-01-10 RX ORDER — POLYETHYLENE GLYCOL 3350 17 G/17G
17 POWDER, FOR SOLUTION ORAL DAILY PRN
Status: DISCONTINUED | OUTPATIENT
Start: 2025-01-10 | End: 2025-01-13

## 2025-01-10 RX ORDER — METHYLPREDNISOLONE SODIUM SUCCINATE 125 MG/2ML
125 INJECTION INTRAMUSCULAR; INTRAVENOUS ONCE
Status: DISCONTINUED | OUTPATIENT
Start: 2025-01-10 | End: 2025-01-11

## 2025-01-10 RX ORDER — SODIUM PHOSPHATE, DIBASIC AND SODIUM PHOSPHATE, MONOBASIC 7; 19 G/230ML; G/230ML
1 ENEMA RECTAL ONCE AS NEEDED
Status: DISCONTINUED | OUTPATIENT
Start: 2025-01-10 | End: 2025-01-13

## 2025-01-10 RX ORDER — DIGOXIN 125 MCG
125 TABLET ORAL DAILY
Status: DISCONTINUED | OUTPATIENT
Start: 2025-01-10 | End: 2025-01-13

## 2025-01-10 RX ORDER — FUROSEMIDE 10 MG/ML
40 INJECTION INTRAMUSCULAR; INTRAVENOUS
Status: COMPLETED | OUTPATIENT
Start: 2025-01-10 | End: 2025-01-10

## 2025-01-10 RX ORDER — CETIRIZINE HYDROCHLORIDE 10 MG/1
10 TABLET ORAL DAILY
Status: DISCONTINUED | OUTPATIENT
Start: 2025-01-10 | End: 2025-01-12

## 2025-01-10 RX ORDER — DOXYCYCLINE 100 MG/1
100 CAPSULE ORAL 2 TIMES DAILY
Status: DISCONTINUED | OUTPATIENT
Start: 2025-01-11 | End: 2025-01-13

## 2025-01-10 RX ORDER — ALBUTEROL SULFATE 0.83 MG/ML
5 SOLUTION RESPIRATORY (INHALATION) ONCE
Status: COMPLETED | OUTPATIENT
Start: 2025-01-10 | End: 2025-01-10

## 2025-01-10 RX ORDER — SENNOSIDES 8.6 MG
17.2 TABLET ORAL NIGHTLY PRN
Status: DISCONTINUED | OUTPATIENT
Start: 2025-01-10 | End: 2025-01-13

## 2025-01-10 RX ORDER — DILTIAZEM HYDROCHLORIDE 180 MG/1
360 CAPSULE, EXTENDED RELEASE ORAL 2 TIMES DAILY
Status: DISCONTINUED | OUTPATIENT
Start: 2025-01-10 | End: 2025-01-10

## 2025-01-10 RX ORDER — BISACODYL 10 MG
10 SUPPOSITORY, RECTAL RECTAL
Status: DISCONTINUED | OUTPATIENT
Start: 2025-01-10 | End: 2025-01-13

## 2025-01-10 RX ORDER — POTASSIUM CHLORIDE 1500 MG/1
40 TABLET, EXTENDED RELEASE ORAL ONCE
Status: COMPLETED | OUTPATIENT
Start: 2025-01-10 | End: 2025-01-10

## 2025-01-10 RX ORDER — DILTIAZEM HYDROCHLORIDE 180 MG/1
360 CAPSULE, EXTENDED RELEASE ORAL DAILY
Status: DISCONTINUED | OUTPATIENT
Start: 2025-01-11 | End: 2025-01-13

## 2025-01-10 RX ORDER — SPIRONOLACTONE 25 MG/1
25 TABLET ORAL DAILY
Status: DISCONTINUED | OUTPATIENT
Start: 2025-01-10 | End: 2025-01-13

## 2025-01-10 RX ORDER — ONDANSETRON 2 MG/ML
4 INJECTION INTRAMUSCULAR; INTRAVENOUS EVERY 6 HOURS PRN
Status: DISCONTINUED | OUTPATIENT
Start: 2025-01-10 | End: 2025-01-13

## 2025-01-10 NOTE — PLAN OF CARE
Problem: Patient Centered Care  Goal: Patient preferences are identified and integrated in the patient's plan of care  Description: Interventions:  - What would you like us to know as we care for you?   - Provide timely, complete, and accurate information to patient/family  - Incorporate patient and family knowledge, values, beliefs, and cultural backgrounds into the planning and delivery of care  - Encourage patient/family to participate in care and decision-making at the level they choose  - Honor patient and family perspectives and choices  Outcome: Progressing     Problem: Patient/Family Goals  Goal: Patient/Family Long Term Goal  Description: Patient's Long Term Goal:     Interventions:  -   - See additional Care Plan goals for specific interventions  Outcome: Progressing  Goal: Patient/Family Short Term Goal  Description: Patient's Short Term Goal:     Interventions:   -   - See additional Care Plan goals for specific interventions  Outcome: Progressing      Fabi Mayo 72 y o  female MRN: [id number]    Encounter: [id number]      Assessment/Plan     Assessment: This is a 72y o -year-old female with diabetes with hyperglycemia  Plan:  Diagnoses and all orders for this visit:    Type 2 diabetes mellitus with hyperglycemia, with long-term current use of insulin ([address] )  Type 2 diabetes uncontrolled   Lab Results   Component Value Date    HGBA1C 7 4 (H) 2021     Pt is very high dose of basal insulin and less short acting insulin, as fasting blood sugars usually at goal, will reduce lantus to 60 units twice a day and increase humalog to 30 units with meals   -check FS , 4 times daily, send log in 2 weeks  -goal for blood sugars  mg/dl   -keep carb's consistent with meals   -educated about hypoglycemia   -follow up with Ophthalmology and podiatry     INSULIN DOSAGE INSTRUCTIONS    Name: Fabi Mayo                        : 1948  MRN #: [id number]    Your Current Insulin  and dose is: Before Breakfast Before Lunch Before Evening Meal Bedtime     Humalog Insulin     30 units        30 units    30 units     Regular, Apidra, Humalog orNovolog Sliding Scale:   <80              151-200 + 2 +2 +2    201-250 + 4 +4 +4 +   251-300 + 6 +6 +6 +   301-350 + 8 +8 +8 +   >350 +10 +10 +10 +       Lantus Insulin   60 units    60 unit 0     Additional Instructions:   Please test your blood sugar:  _4_ Times per day  X_ Before Breakfast                _ Alternate Testing  X_ Before Lunch                _ 2 Hours After  Meal  X_ Before Evening Meal               _ 3 a m   x_ Before Bedtime Snack     Target Blood sugar range _70_to _140__  Call if your blood sugar is less than _60_ or greater than _400__  Today's Date: 2022      -     insulin glargine (LANTUS SOLOSTAR) 100 units/mL injection pen; Inject 60 units twice a day  -     HumaLOG KwikPen 100 units/mL injection pen;  Inject 30 units with meals +scale (  Scale - 150-200 +2 units, 201-250+4 units, 251-300 +6 units, 301-350+8 units, > 350+10 units  -     Ambulatory Referral to Ophthalmology; Future  -     Ambulatory referral to Podiatry; Future  -     Microalbumin / creatinine urine ratio; Future  -     Basic metabolic panel; Future  -     Hemoglobin A1C; Future    Essential hypertension  BP Readings from Last 3 Encounters:   01/11/22 130/80   01/10/22 129/55   12/17/21 134/76   continue current management as per PCP   -     Ambulatory Referral to Ophthalmology; Future  -     Ambulatory referral to Podiatry; Future    Mixed hyperlipidemia  Lab Results   Component Value Date    LDLCALC 60 04/18/2016   continue statins   -     Lipid Panel with Direct LDL reflex Lab Collect; Future    Vitamin D deficiency  Continue current dose   -     Vitamin D 25 hydroxy; Future        CC: Diabetes    History of Present Illness     HPI:    Leanne López is 72 yr old woman with medical HX of type 2 DM, for 20 yrs , No h/o CVD, hyperlipidemia, Hypertension is here for evaluation of type 2 DM and management  Current regimen- Lantus 80 units twice a day, and Humalog 26 units with meals    She lives in [address] [place] [address]   Blood sugars are being monitored 3-4 times daily   Reviewed blood sugar log     Fasting blood sugars - 100-150 mg/dl   Before lunch- 170-300 mg/dl   Before dinner - 180-230 mg/dl   Bedtime - 150-200 mg/dl   Lab Results   Component Value Date    HGBA1C 7 4 (H) 12/27/2021   she is accompanied by care taker   She has good appetite     She does not ambulate much   Takes Vitamin D, 50,000 IU once a week       Results for Abhi Linares (MRN [id number]) as of 1/11/2022 13:30   Ref   Range 11/8/2021 05:39   Sodium Latest Ref Range: 136 - 145 mmol/L 143   Potassium Latest Ref Range: 3 5 - 5 3 mmol/L 4 3   Chloride Latest Ref Range: 100 - 108 mmol/L 108   CO2 Latest Ref Range: 21 - 32 mmol/L 29   Anion Gap Latest Ref Range: 4 - 13 mmol/L 6   BUN Latest Ref Range: 5 - 25 mg/dL 33 (H)   Creatinine Latest Ref Range: 0 60 - 1 30 mg/dL 1 50 (H)   Glucose, Random Latest Ref Range: 65 - 140 mg/dL 152 (H)   Calcium Latest Ref Range: 8 3 - 10 1 mg/dL 8 8   eGFR Latest Units: ml/min/1 73sq m 35        Review of Systems   Constitutional: Positive for activity change and fatigue  Negative for diaphoresis, fever and unexpected weight change  HENT: Negative  Eyes: Negative for visual disturbance  Respiratory: Negative for cough, chest tightness and shortness of breath  Cardiovascular: Positive for leg swelling  Negative for chest pain and palpitations  Gastrointestinal: Negative for abdominal pain, constipation, diarrhea, nausea and vomiting  Endocrine: Negative for cold intolerance, heat intolerance, polydipsia, polyphagia and polyuria  Genitourinary: Negative for dysuria, enuresis, frequency and urgency  Musculoskeletal: Positive for arthralgias, back pain and myalgias  Skin: Negative for pallor, rash and wound  Allergic/Immunologic: Negative  Neurological: Positive for numbness  Negative for dizziness, tremors and weakness  Hematological: Negative  Psychiatric/Behavioral: Negative          Historical Information   Past Medical History:   Diagnosis Date    Asthma     Diabetes mellitus [address] [address] )     Hyperlipidemia     Hypertension      Past Surgical History:   Procedure Laterality Date    JOINT REPLACEMENT Bilateral     KNEE SURGERY      TOE SURGERY       Social History   Social History     Substance and Sexual Activity   Alcohol Use Not Currently     Social History     Substance and Sexual Activity   Drug Use No     Social History     Tobacco Use   Smoking Status Former Smoker    Packs/day: 1 00    Years: 52 00    Pack years: 52 00    Types: Cigarettes    Start date: 67    Quit date: 2017    Years since quittin 3   Smokeless Tobacco Never Used     Family History:   Family History   Problem Relation Age of Onset    Dementia Brother     Breast cancer Sister 76    Cancer Brother Meds/Allergies   Current Outpatient Medications   Medication Sig Dispense Refill    acetaminophen (TYLENOL) 325 mg tablet Take 3 tablets (975 mg total) by mouth every 8 (eight) hours 30 tablet 0    Advair Diskus 500-50 MCG/DOSE inhaler       aspirin (ECOTRIN LOW STRENGTH) 81 mg EC tablet       benzonatate (TESSALON) 200 MG capsule Take 200 mg by mouth 3 (three) times a day as needed for cough      bisacodyl (DULCOLAX) 10 mg suppository Insert 10 mg into the rectum daily as needed       celecoxib (CeleBREX) 100 mg capsule Take 100 mg by mouth daily       cetirizine (ZyrTEC) 10 mg tablet Take 10 mg by mouth daily      Cholecalciferol (VITAMIN D3) 54276 units CAPS Take 50,000 Units by mouth every 30 (thirty) days      fluticasone (FLONASE) 50 mcg/act nasal spray 1 spray into each nostril daily 1 Bottle 5    furosemide (LASIX) 40 mg tablet       gabapentin (NEURONTIN) 300 mg capsule Take 300 mg by mouth 3 (three) times a day      Glucose Blood (BL TEST STRIP PACK VI) by In Vitro route      glucose blood (ONE TOUCH ULTRA TEST) test strip by In Vitro route      HumaLOG KwikPen 100 units/mL injection pen Inject 30 units with meals +scale (  Scale - 150-200 +2 units, 201-250+4 units, 251-300 +6 units, 301-350+8 units, > 350+10 units 15 mL     insulin glargine (LANTUS SOLOSTAR) 100 units/mL injection pen Inject 60 units twice a day 15 mL     ipratropium-albuterol (DUO-NEB) 0 5-2 5 mg/3 mL nebulizer solution       lisinopril (ZESTRIL) 2 5 mg tablet Take 2 5 mg by mouth daily      magnesium hydroxide (EQ MILK OF MAGNESIA) 400 mg/5 mL oral suspension Take by mouth daily as needed for constipation      memantine (NAMENDA) 10 mg tablet 10 mg 2 (two) times a day       metFORMIN (GLUCOPHAGE) 500 mg tablet       montelukast (SINGULAIR) 10 mg tablet Take 10 mg by mouth daily at bedtime      Omeprazole 20 MG TBEC Take 20 mg by mouth daily      simvastatin (ZOCOR) 40 mg tablet Take 1 tablet by mouth daily at bedtime  0    traMADol (ULTRAM) 50 mg tablet Take 1 tablet (50 mg total) by mouth 2 (two) times a day 10 tablet 0    traZODone (DESYREL) 50 mg tablet Take 50 mg by mouth daily at bedtime      Trulicity 1 5 JX/8 2JR SOPN       dextromethorphan-guaiFENesin (DIABETIC SILTUSSIN-DM)  mg/5 mL oral liquid Take 5 mL by mouth every 12 (twelve) hours (Patient not taking: Reported on 1/11/2022 )      doxycycline hyclate (VIBRA-TABS) 100 mg tablet  (Patient not taking: Reported on 1/11/2022 )      polyethylene glycol (MIRALAX) 17 g packet 17 g daily (Patient not taking: Reported on 1/11/2022 )       No current facility-administered medications for this visit  Allergies   Allergen Reactions    Penicillins Shortness Of Breath    Cephalexin Other (See Comments)     Reaction Date: [id number]; unknown     Crestor [Rosuvastatin] Other (See Comments)     Reaction Date: [id number]; unknown     Zithromax [Azithromycin] Other (See Comments)     Unknown        Objective   Vitals: Blood pressure 130/80, pulse 92, temperature 97 5 °F (36 4 °C), temperature source Tympanic, height 4' 11" (1 499 m)  Physical Exam  Vitals reviewed  Constitutional:       General: She is not in acute distress  Appearance: Normal appearance  She is obese  She is not ill-appearing  HENT:      Head: Normocephalic and atraumatic  Nose: Nose normal    Eyes:      Extraocular Movements: Extraocular movements intact  Conjunctiva/sclera: Conjunctivae normal    Cardiovascular:      Rate and Rhythm: Normal rate and regular rhythm  Pulses: Normal pulses  Heart sounds: Normal heart sounds  Pulmonary:      Effort: No respiratory distress  Abdominal:      General: Bowel sounds are normal       Palpations: Abdomen is soft  Musculoskeletal:         General: Swelling present  Cervical back: Normal range of motion and neck supple  Right lower leg: Edema present  Left lower leg: Edema present     Skin: General: Skin is warm and dry  Neurological:      General: No focal deficit present  Mental Status: She is alert and oriented to person, place, and time  Psychiatric:         Mood and Affect: Mood normal          Behavior: Behavior normal          The history was obtained from the review of the chart, patient  Lab Results:   Lab Results   Component Value Date/Time    Hemoglobin A1C 7 4 (H) 12/27/2021 07:20 AM    Hemoglobin A1C 8 9 (H) 09/01/2021 04:58 AM    Hemoglobin A1C 6 5 (H) 06/07/2021 05:00 AM    WBC 7 64 11/08/2021 05:40 AM    WBC 11 59 (H) 11/07/2021 10:15 AM    WBC 12 19 (H) 11/06/2021 09:20 PM    Hemoglobin 8 1 (L) 11/08/2021 05:40 AM    Hemoglobin 8 7 (L) 11/07/2021 10:15 AM    Hemoglobin 9 8 (L) 11/06/2021 09:20 PM    Hematocrit 26 9 (L) 11/08/2021 05:40 AM    Hematocrit 28 9 (L) 11/07/2021 10:15 AM    Hematocrit 32 7 (L) 11/06/2021 09:20 PM    MCV 89 11/08/2021 05:40 AM    MCV 90 11/07/2021 10:15 AM    MCV 89 11/06/2021 09:20 PM    Platelets 601 (L) 84/74/2627 05:40 AM    Platelets 042 (L) 80/77/6814 10:15 AM    Platelets 560 (L) 45/50/5209 04:46 AM    BUN 33 (H) 11/08/2021 05:39 AM    BUN 37 (H) 11/07/2021 10:15 AM    BUN 37 (H) 11/06/2021 09:20 PM    Potassium 4 3 11/08/2021 05:39 AM    Potassium 4 7 11/07/2021 10:15 AM    Potassium 5 0 11/06/2021 09:20 PM    Chloride 108 11/08/2021 05:39 AM    Chloride 106 11/07/2021 10:15 AM    Chloride 105 11/06/2021 09:20 PM    CO2 29 11/08/2021 05:39 AM    CO2 28 11/07/2021 10:15 AM    CO2 30 11/06/2021 09:20 PM    Creatinine 1 50 (H) 11/08/2021 05:39 AM    Creatinine 1 82 (H) 11/07/2021 10:15 AM    Creatinine 1 83 (H) 11/06/2021 09:20 PM    AST 30 11/06/2021 09:20 PM    AST 53 (H) 08/08/2021 07:27 PM    ALT 54 11/06/2021 09:20 PM    ALT 49 08/08/2021 07:27 PM    Albumin 3 4 (L) 11/06/2021 09:20 PM    Albumin 3 6 08/08/2021 07:27 PM           Imaging Studies: I have personally reviewed pertinent reports        Portions of the record may have been created with voice recognition software  Occasional wrong word or "sound a like" substitutions may have occurred due to the inherent limitations of voice recognition software  Read the chart carefully and recognize, using context, where substitutions have occurred

## 2025-01-10 NOTE — ED QUICK NOTES
Orders for admission, patient is aware of plan and ready to go upstairs. Any questions, please call ED RN Felicitas at extension [phone].     Patient Covid vaccination status: Fully vaccinated     COVID Test Ordered in ED: None    COVID Suspicion at Admission: N/A    Running Infusions:  Zithromax 500mg/250ml    Mental Status/LOC at time of transport: AOx4    Other pertinent information:   CIWA score: N/A   NIH score:  N/A

## 2025-01-10 NOTE — H&P
[facility]  part of [facility]    History & Physical    Yesenia Berkowitz Patient Status:  Inpatient    1940 MRN [id number]   Location [facility]5W Attending Jose Pfeiffer MD   Hosp Day # 0 PCP JO-ANN YANG MD     Date:  1/10/2025  Date of Admission:  1/10/2025    History provided by:patient  Chief Complaint:     Chief Complaint   Patient presents with    Difficulty Breathing       HPI:   Yesenia Berkowitz is an 84-year-old female with hx of chronic dyspnea, COPD on home O2, HTN, HLD, Afib not on AC, who p/w worsening dyspnea for several days. Patient is familiar to the staff due to frequent hospitalizations. This time, she states her home oxygen concentrator is no longer working and she cannot get one delivered until next week. She endorse some dyspnea, but denies chest pain, fever, chills, headache or leg swelling. In ED, patient has stable vitals on 3L O2. WBC 14. Pulm consulted.    History     Past Medical History:    A-fib ([facility])    Anesthesia complication    Arrhythmia    AFIB    Arthritis    Asthma ([facility])    Back problem    COPD (chronic obstructive pulmonary disease) ([facility])    Deviated nasal septum    nasal septoplasty, turb reduction, smr of turbs    Difficult intubation    fiber optic if needed    Diverticulitis    colonoscopy     Diverticulosis of large intestine    Esophageal reflux    Extrinsic asthma, unspecified    Headache    Heart disease    Hepatitis    WAS TOLD SHE HAS HAD THIS WHEN SHE WAS 19 YEARS OLD    High blood pressure    High cholesterol    Irregular heart rate    Lichenoid keratosis    left chest-bx    Osteoarthritis    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    Paronychia    (RT); onychomycosis; debridement    Problems with swallowing    occasionally    Shortness of breath    2 L NC ALL THE TIME 24HR/7 DAYS PER WEEK    Unspecified essential hypertension    Visual impairment     Past Surgical History:   Procedure Laterality Date    Adenoidectomy       Cataract      cataract extraction     Hysterectomy      Sinus surgery        DEVIATED SEPTUM    T&a      Tonsillectomy       Family History   Problem Relation Age of Onset    Ovarian Cancer Mother 76        endometrial, poss ovarian primary    Pulmonary Disease Sister         COPD    Skin cancer Other     Stroke Other     Other (Other) Other      Social History:  Social History     Socioeconomic History    Marital status:    Tobacco Use    Smoking status: Former     Current packs/day: 0.00     Types: Cigarettes     Quit date: 1996     Years since quittin.0    Smokeless tobacco: Never   Vaping Use    Vaping status: Never Used   Substance and Sexual Activity    Alcohol use: Yes     Alcohol/week: 0.0 standard drinks of alcohol     Comment: one drink once a week    Drug use: Never   Other Topics Concern    Pt has a pacemaker No    Pt has a defibrillator No    Reaction to local anesthetic No    Caffeine Concern No     Social Drivers of Health     Food Insecurity: No Food Insecurity (1/10/2025)    Food Insecurity     Food Insecurity: Never true   Transportation Needs: No Transportation Needs (1/10/2025)    Transportation Needs     Lack of Transportation: No   Housing Stability: Low Risk  (1/10/2025)    Housing Stability     Housing Instability: No     Allergies/Medications:   Allergies: Allergies[1]  Medications Prior to Admission   Medication Sig    tiotropium 18 MCG Inhalation Cap Inhale 1 capsule (18 mcg total) into the lungs nightly for 30 doses.    lidocaine-menthol 4-1 % External Patch Place 1 patch onto the skin daily.    furosemide 80 MG Oral Tab Take 1 tablet (80 mg total) by mouth daily.    spironolactone 25 MG Oral Tab Take 1 tablet (25 mg total) by mouth daily for 90 doses. (Patient taking differently: Take 1 tablet (25 mg total) by mouth daily. Pt not started yet)    empagliflozin (JARDIANCE) 10 MG Oral Tab Take 1 tablet (10 mg total) by mouth daily.    acetaZOLAMIDE 250 MG Oral Tab Take 2  tablets (500 mg total) by mouth daily.    acetaminophen 500 MG Oral Tab Take 1 tablet (500 mg total) by mouth every 6 (six) hours as needed.    DIGOXIN 0.125 MG Oral Tab Take 1 tablet (125 mcg total) by mouth daily.    albuterol 108 (90 Base) MCG/ACT Inhalation Aero Soln Inhale 2 puffs into the lungs as needed.    dilTIAZem HCl ER Coated Beads 360 MG Oral Capsule SR 24 Hr Take 1 capsule (360 mg total) by mouth 2 (two) times daily.    Cholecalciferol (VITAMIN D) 1000 UNITS Oral Tab Take 1,000 Units by mouth 2 (two) times daily.    Potassium Chloride ER (K-DUR M20) 20 MEQ Oral Tab CR Take 1 tablet (20 mEq total) by mouth nightly.    lansoprazole (PREVACID) 30 MG Oral Capsule Delayed Release Take 1 capsule (30 mg total) by mouth nightly.    aspirin 81 MG Oral Tab Take 2 tablets (162 mg total) by mouth daily as needed.    Loratadine 10 MG Oral Cap Take 10 mg by mouth nightly.      montelukast 10 MG Oral Tab Take 1 tablet (10 mg total) by mouth nightly.    omega-3 fatty acids (FISH OIL) 1000 MG Oral Cap Take 1,000 mg by mouth daily.    predniSONE 10 MG Oral Tab Take 1 tablet (10 mg total) by mouth daily for 4 days, THEN 0.5 tablets (5 mg total) daily for 4 days, THEN 0.5 tablets (5 mg total) every other day for 4 days.    estradiol 0.05 MG/24HR Transdermal Patch Weekly Place 1 patch onto the skin once a week. As directed.       Review of Systems:   Negative except depicted in HPI.    A comprehensive 12 point review of systems was completed.  Pertinent positives and negatives noted in the the HPI.    Physical Exam:   Vital Signs:  Blood pressure 122/66, pulse 81, temperature 98.1 °F (36.7 °C), temperature source Oral, resp. rate 22, height 5' 5\" (1.651 m), weight 157 lb 4.8 oz (71.4 kg), SpO2 95%.     GENERAL:  The patient appeared to be in mild distress.    SKIN:  Warm and hydrated  HEENT:  Head was atraumatic and normocephalic.  Eyes:  Extraocular muscles were intact.  Sclera was anicteric.  Pupils were equally reactive  to light.  Ears:  There were no lesions.  Nose:  No lesions were noted.   NECK:  Supple.  There was no JVD.   CHEST:  Symmetrical movement on inspiration  HEART:  S1 and S2 heard.  RRR   LUNGS:  Air entry was good.  No crackles or wheezes   ABDOMEN: Soft and non-tender.  Bowel sounds were present.  MUSCULOSKELETAL:  There was no deformity.  There was full range of motion in all the extremities.   EXTREMITIES: There was no edema, clubbing or cyanosis  NEUROLOGICAL:  There was no focal deficit.  Cranial nerves II through XII were intact.  PSYCHIATRIC: Calm and cooperative     Results:     Lab Results   Component Value Date    WBC 13.1 (H) 01/10/2025    HGB 12.6 01/10/2025    HCT 40.1 01/10/2025    .0 01/10/2025    CREATSERUM 0.69 01/10/2025    BUN 19 01/10/2025     01/10/2025    K 4.0 01/10/2025     01/10/2025    CO2 37.0 (H) 01/10/2025     (H) 01/10/2025    CA 9.0 01/10/2025    ALB 3.0 (L) 12/26/2024    ALKPHO 66 12/23/2024    BILT 0.2 12/23/2024    TP 4.9 (L) 12/23/2024    AST 9 12/23/2024    ALT 18 12/23/2024    PTT 24.3 11/25/2024    INR 1.08 11/25/2024    T4F 0.9 12/17/2024    TSH 0.185 (L) 12/17/2024    LIP 30 08/30/2024    DDIMER 0.47 12/21/2024    MG 1.9 12/16/2024    PHOS 3.4 12/26/2024    TROP <0.045 02/03/2020       XR CHEST AP PORTABLE  (CPT=71045)    Result Date: 1/10/2025  CONCLUSION: Findings compatible with CHF/edema.   [facility] provided a preliminary report for this examination. This final report agrees with their preliminary findings.   Dictated by (CST): Grey Artis MD on 1/10/2025 at 7:04 AM     Finalized by (CST): Grey Artis MD on 1/10/2025 at 7:05 AM               Assessment/Plan:   Yesenia Berkowitz is an 84-year-old female with hx of chronic dyspnea, COPD on home O2, HTN, HLD, Afib not on AC, who p/w worsening dyspnea for several days.    # Acute on chronic hypoxemic respiratory failure   # COPD exacerbation  # Left phrenic nerve paralysis  # Kyphoscoliosis  - s/p  flexible bronchoscopy with bronchial washings and removal of mucus plug, by Dr. Boyle 09/17/24  - Steroids, bronchodilator, doxycycline  - Pulm on consult     # HFpEF  # Paroxysmal Afib, not on AC  # HTN  # HLD  - Euvolemic on admission  - continue home aspirin, diltiazem, digoxin, and spironolactone     Diet: regular  PT/OT: deferred  DVT ppx: Lovenox  Code status: Full  Dispo: expect greater than 2 midnights     MDM: high Jose Pfeiffer MD, PhD  Message via Secure Chat  [facility]         [1]   Allergies  Allergen Reactions    Penicillin G ANAPHYLAXIS    Azithromycin DIARRHEA and NAUSEA AND VOMITING    Cefuroxime UNKNOWN     Other reaction(s): Vomitting    Levofloxacin UNKNOWN     Other reaction(s): LEVOFLOXACIN    Penicillins      Other reaction(s): Unknown

## 2025-01-10 NOTE — ED PROVIDER NOTES
Patient Seen in: [facility] Emergency Department    History     Chief Complaint   Patient presents with    Difficulty Breathing       HPI    Patient presents to the ED complaining of shortness of breath that she states has been going on for the past several days.  She states that her home oxygen concentrator is no longer working and she cannot get one delivered until next week.  She states that she has a history of CHF, asthma and COPD.  Usually on 3 L of oxygen.  Denies other complaints.  No fever, increased cough or other complaints.    History reviewed.   Past Medical History:    A-fib ([facility])    Anesthesia complication    Arrhythmia    AFIB    Arthritis    Asthma ([facility])    Back problem    COPD (chronic obstructive pulmonary disease) ([facility])    Deviated nasal septum    nasal septoplasty, turb reduction, smr of turbs    Difficult intubation    fiber optic if needed    Diverticulitis    colonoscopy     Diverticulosis of large intestine    Esophageal reflux    Extrinsic asthma, unspecified    Headache    Heart disease    Hepatitis    WAS TOLD SHE HAS HAD THIS WHEN SHE WAS 19 YEARS OLD    High blood pressure    High cholesterol    Irregular heart rate    Lichenoid keratosis    left chest-bx    Osteoarthritis    Paralysis ([facility])    left lung and left vocal cord.    Paronychia    (RT); onychomycosis; debridement    Problems with swallowing    occasionally    Shortness of breath    2 L NC ALL THE TIME 24HR/7 DAYS PER WEEK    Unspecified essential hypertension    Visual impairment       History reviewed.   Past Surgical History:   Procedure Laterality Date    Adenoidectomy      Cataract      cataract extraction     Hysterectomy      Sinus surgery        DEVIATED SEPTUM    T&a      Tonsillectomy           Medications :  Prescriptions Prior to Admission[1]     Family History   Problem Relation Age of Onset    Ovarian Cancer Mother 76        endometrial, poss ovarian primary    Pulmonary Disease Sister         COPD     Skin cancer Other     Stroke Other     Other (Other) Other        Smoking Status:   Social History     Socioeconomic History    Marital status:    Tobacco Use    Smoking status: Former     Current packs/day: 0.00     Types: Cigarettes     Quit date: 1996     Years since quittin.0    Smokeless tobacco: Never   Vaping Use    Vaping status: Never Used   Substance and Sexual Activity    Alcohol use: Yes     Alcohol/week: 0.0 standard drinks of alcohol     Comment: one drink once a week    Drug use: Never   Other Topics Concern    Pt has a pacemaker No    Pt has a defibrillator No    Reaction to local anesthetic No    Caffeine Concern No       Constitutional and vital signs reviewed.      Social History and Family History elements reviewed from today, pertinent positives to the presenting problem noted.    Physical Exam     ED Triage Vitals   BP 01/10/25 0155 107/75   Pulse 01/10/25 0140 88   Resp 01/10/25 0140 21   Temp 01/10/25 0140 98.2 °F (36.8 °C)   Temp src 01/10/25 0140 Temporal   SpO2 01/10/25 0140 94 %   O2 Device 01/10/25 0140 Nasal cannula       All measures to prevent infection transmission during my interaction with the patient were taken. Handwashing was performed prior to and after the exam.  Stethoscope and any equipment used during my examination was cleaned with super sani-cloth germicidal wipes following the exam.     Physical Exam  Vitals and nursing note reviewed.   Constitutional:       General: She is not in acute distress.     Appearance: She is well-developed.   HENT:      Head: Normocephalic and atraumatic.   Eyes:      General:         Right eye: No discharge.         Left eye: No discharge.      Conjunctiva/sclera: Conjunctivae normal.   Neck:      Trachea: No tracheal deviation.   Cardiovascular:      Rate and Rhythm: Normal rate and regular rhythm.   Pulmonary:      Effort: Pulmonary effort is normal. No respiratory distress.      Breath sounds: Normal breath sounds. No  stridor.   Abdominal:      General: There is no distension.      Palpations: Abdomen is soft.   Musculoskeletal:         General: No deformity.   Skin:     General: Skin is warm and dry.   Neurological:      Mental Status: She is alert and oriented to person, place, and time.   Psychiatric:         Mood and Affect: Mood is anxious.         Behavior: Behavior normal.         ED Course        Labs Reviewed   BASIC METABOLIC PANEL (8) - Abnormal; Notable for the following components:       Result Value    Glucose 186 (*)     CO2 37.0 (*)     BUN/CREA Ratio 27.5 (*)     Calculated Osmolality 299 (*)     All other components within normal limits   CBC WITH DIFFERENTIAL WITH PLATELET - Abnormal; Notable for the following components:    WBC 13.1 (*)     RDW-SD 52.3 (*)     Neutrophil Absolute Prelim 8.80 (*)     Neutrophil Absolute 8.80 (*)     Monocyte Absolute 1.10 (*)     All other components within normal limits   TROPONIN I HIGH SENSITIVITY - Normal       As Interpreted by me    Imaging Results Available and Reviewed while in ED: Chest x-ray AP    Comparison: Chest x-ray 12/21/2024        IMPRESSION:  Increased groundglass opacities in the right lung  May signify mild asymmetric pulmonary edema  Infection could also be considered in the appropriate context    Chronically elevated left hemidiaphragm    Atherosclerotic calcifications aortic arch    Report faxed directly to ER at 3:48 AM ET    David Reynaga M.D.    ED Medications Administered:   Medications   methylPREDNISolone sodium succinate (Solu-MEDROL) injection 125 mg (has no administration in time range)   ondansetron (Zofran) 4 MG/2ML injection 4 mg (has no administration in time range)   heparin (Porcine) 5000 UNIT/ML injection 5,000 Units (has no administration in time range)   polyethylene glycol (PEG 3350) (Miralax) 17 g oral packet 17 g (has no administration in time range)   sennosides (Senokot) tab 17.2 mg (has no administration in time range)   bisacodyl  (Dulcolax) 10 MG rectal suppository 10 mg (has no administration in time range)   fleet enema (Fleet) rectal enema 133 mL (has no administration in time range)   benzonatate (Tessalon) cap 200 mg (has no administration in time range)   guaiFENesin (Robitussin) 100 MG/5 ML oral liquid 200 mg (has no administration in time range)   albuterol (Ventolin) (2.5 MG/3ML) 0.083% nebulizer solution 5 mg (5 mg Nebulization Given 1/10/25 0209)   cefTRIAXone (Rocephin) 2 g in sodium chloride 0.9% 100 mL IVPB-ADDV (0 g Intravenous Stopped 1/10/25 0515)   azithromycin (Zithromax) 500 mg in sodium chloride 0.9% 250mL IVPB premix (500 mg Intravenous New Bag 1/10/25 0526)         MDM     Vitals:    01/10/25 0140 01/10/25 0155 01/10/25 0210 01/10/25 0606   BP:  107/75 125/59 122/66   BP Location:    Right arm   Pulse: 88 80 80 81   Resp: 21 16 19 22   Temp: 98.2 °F (36.8 °C)   98.1 °F (36.7 °C)   TempSrc: Temporal   Oral   SpO2: 94% 95% 93% 95%   Weight: 70.8 kg   71.4 kg   Height: 165.1 cm (5' 5\")        *I personally reviewed and interpreted all ED vitals.    Pulse Ox: 95%, Room air, Normal     Monitor Interpretation:   normal sinus rhythm as interpreted by me.  The cardiac monitor was ordered to monitor heart rate.    Differential Diagnosis/ Diagnostic Considerations: COPD flare, CHF, pneumonia, viral syndrome, anxiety, other    Complicating Factors: The patient already has does not have any pertinent problems on file. to contribute to the complexity of this ED evaluation.    Medical Decision Making  The patient presents to the ED with increased shortness of breath over the past 2 days.  Anxious appearing on exam and this likely contributes to the patient's symptoms.  Home oxygen concentrator is broken.  Lungs clear, laboratory testing with trace leukocytosis and possible infiltrate on chest x-ray.  Will cover for pneumonia and admit for management.  I discussed with Dr. Pitts for admission and will consult her  pulmonologist.    Amount and/or Complexity of Data Reviewed  Labs: ordered. Decision-making details documented in ED Course.  Radiology: ordered and independent interpretation performed. Decision-making details documented in ED Course.  Discussion of management or test interpretation with external provider(s):   I discussed with Dr. Pitts for admission        Condition upon leaving the department: Stable    Disposition and Plan     Clinical Impression:  1. SOB (shortness of breath)    2. COPD exacerbation (HCC)    3. Community acquired pneumonia of right lower lobe of lung        Disposition:  Admit    Follow-up:  No follow-up provider specified.    Medications Prescribed:  Current Discharge Medication List          Hospital Problems       Present on Admission  Date Reviewed: 12/21/2024            ICD-10-CM Noted POA    * (Principal) SOB (shortness of breath) R06.02 12/12/2024 Unknown    Community acquired pneumonia of right lower lobe of lung J18.9 11/3/2024 Unknown    COPD exacerbation (HCC) J44.1 1/10/2025 Unknown                       [1]   Medications Prior to Admission   Medication Sig Dispense Refill Last Dose/Taking    tiotropium 18 MCG Inhalation Cap Inhale 1 capsule (18 mcg total) into the lungs nightly for 30 doses. 30 capsule 0 Past Month    lidocaine-menthol 4-1 % External Patch Place 1 patch onto the skin daily. 30 patch 0 Past Month    furosemide 80 MG Oral Tab Take 1 tablet (80 mg total) by mouth daily. 30 tablet 3 1/9/2025    spironolactone 25 MG Oral Tab Take 1 tablet (25 mg total) by mouth daily for 90 doses. (Patient taking differently: Take 1 tablet (25 mg total) by mouth daily. Pt not started yet) 90 tablet 0 1/9/2025    empagliflozin (JARDIANCE) 10 MG Oral Tab Take 1 tablet (10 mg total) by mouth daily. 30 tablet 3 1/9/2025    acetaZOLAMIDE 250 MG Oral Tab Take 2 tablets (500 mg total) by mouth daily. 30 tablet 0 1/9/2025    acetaminophen 500 MG Oral Tab Take 1 tablet (500 mg total) by  mouth every 6 (six) hours as needed.   Taking As Needed    DIGOXIN 0.125 MG Oral Tab Take 1 tablet (125 mcg total) by mouth daily. 30 tablet 0 1/9/2025    albuterol 108 (90 Base) MCG/ACT Inhalation Aero Soln Inhale 2 puffs into the lungs as needed.   1/9/2025    dilTIAZem HCl ER Coated Beads 360 MG Oral Capsule SR 24 Hr Take 1 capsule (360 mg total) by mouth 2 (two) times daily.  0 1/9/2025    Cholecalciferol (VITAMIN D) 1000 UNITS Oral Tab Take 1,000 Units by mouth 2 (two) times daily.   Past Week    Potassium Chloride ER (K-DUR M20) 20 MEQ Oral Tab CR Take 1 tablet (20 mEq total) by mouth nightly.   1/9/2025    lansoprazole (PREVACID) 30 MG Oral Capsule Delayed Release Take 1 capsule (30 mg total) by mouth nightly.   1/9/2025    aspirin 81 MG Oral Tab Take 2 tablets (162 mg total) by mouth daily as needed.   Past Month    Loratadine 10 MG Oral Cap Take 10 mg by mouth nightly.     1/9/2025    montelukast 10 MG Oral Tab Take 1 tablet (10 mg total) by mouth nightly.   1/9/2025    omega-3 fatty acids (FISH OIL) 1000 MG Oral Cap Take 1,000 mg by mouth daily.   Past Month    predniSONE 10 MG Oral Tab Take 1 tablet (10 mg total) by mouth daily for 4 days, THEN 0.5 tablets (5 mg total) daily for 4 days, THEN 0.5 tablets (5 mg total) every other day for 4 days. 7 tablet 0     estradiol 0.05 MG/24HR Transdermal Patch Weekly Place 1 patch onto the skin once a week. As directed.   1/5/2025

## 2025-01-10 NOTE — ED QUICK NOTES
Pt received in bed with home night gown on, bed in the lowest position, call light within reach. Pt assisted with removing cardiac and pulse ox monitoring to ambulate to the restroom. Pt ambulates with a steady gait to the restroom. Pt sts no further needs at this time.

## 2025-01-10 NOTE — ED INITIAL ASSESSMENT (HPI)
Patient arrived via [facility] for SOB. Patient on continuous O2 for COPD/Asthma. Per medics patient home concentrator went out. Patient unable to get one delivered until next week. Patient is using her portable monitor and feels she's not getting enough oxygen.Patient denies chest pain. Patient state she been SOB for the past couple days before the concentrator malfunction. Patient is placed on a monitor will call light with in reach. Patient is placed on 3lL NC with O2 sat of 94%. Patient is resting comfortably with son at bedside.

## 2025-01-10 NOTE — CM/SW NOTE
Social work received an update that the patient's home O2 concentrator with [employer] is broken and will not turn on.    Social work met with the patient at bedside and confirmed the above.    Social work got in contact with [employer] [phone] and spoke with Christiano.  The patient's new home O2 concentrator has been shipped via [employer] but is experiencing a delay due to weather.    Tracking # [id number]     Care team aware of the above.    Tacking information has been given to the patient at bedside.    DREW/PERLITA to remain available for support and/or discharge planning.     Isidra De Paz MSW, LSW  Discharge Planner [id number]

## 2025-01-10 NOTE — CONSULTS
Health system    PATIENT'S NAME: CEZAR JOVEL   ATTENDING PHYSICIAN: Jose Pfeiffer MD   CONSULTING PHYSICIAN: Bing Boyle MD   PATIENT ACCOUNT#:   [id number]    LOCATION:  [facility]  MEDICAL RECORD #:   [id number]       YOB: 1940  ADMISSION DATE:       01/10/2025      CONSULT DATE:  01/10/2025    REPORT OF CONSULTATION    HISTORY OF PRESENT ILLNESS:  Patient is an 84-year-old white female with history of asthma, COPD, elevation of left hemidiaphragm, kyphoscoliosis, compression fracture of thoracic spine with kyphoplasty.  Quit smoking 20 years ago.  Patient has been on home BiPAP for acute on chronic respiratory failure, obesity hypoventilation syndrome.  Patient was admitted to the hospital through emergency room because of increasing shortness of breath for the last 4 or 5 days.  Denying cough or wheezing.  No fever.  Patient ran out of oxygen concentrator, using her portable O2.  Her chest x-ray showed pulmonary congestion and pneumonia of the right lung and elevation of the left hemidiaphragm.  There was no pleural effusion.  Her CBC showed WBC 30,100, RBC 4.19, hemoglobin 12.6, hematocrit 40.  Electrolytes showed sodium 141, potassium 4.65, CO2 of 37, BUN 19, creatinine 0.69.    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     MEDICATIONS:  Home medications:  Lasix 80 mg daily, spironolactone 25 mg a day, Jardiance 10 mg daily, digoxin 0.125 mg a day, albuterol 2 inhalations p.r.n., Cardizem 360 mg a day, potassium chloride 20 mEq a day, Prevacid 30 mg a day, aspirin 81 mg a day, Zyrtec 10 mg a day, Singular 10 mg at bedtime, prednisone 10 mg a day.    ALLERGIES:  Penicillin, Levaquin, Zinacef.    SOCIAL HISTORY:  Patient quit smoking 20 years ago.    FAMILY HISTORY:  Father  from MI at the age of 60.  Mother  from ovarian cancer at the age  of 70.    REVIEW OF SYSTEMS:  GENERAL:  No weight loss.  HEAD:  No headache.  EYES:  No diplopia or blurred vision.  EARS:  No tinnitus or impaired hearing.  NOSE:  No rhinorrhea or epistaxis.  THROAT:  No sore throat.  NECK:  No neck stiffness.  RESPIRATORY:  See the present illness.  CARDIOVASCULAR:  No chest pain, orthopnea, or paroxysmal nocturnal dyspnea.  GASTROINTESTINAL:  No nausea, vomiting, diarrhea.  GENITOURINARY:  No dysuria or hematuria.  MUSCULOSKELETAL:  Chronic lower leg edema.      PHYSICAL EXAMINATION:    VITAL SIGNS:  Temperature 98, pulse 92, respirations 20, blood pressure 107/45.  HEENT:  Examination of head and face:  No trauma, no injury.  Pupils equal bilaterally.  Conjunctivae were not anemic.  Sclerae nonicteric.  Fundi were normal.  External ears:  No deformity.  Ear canals were patent.  Eardrums were intact.  Nose:  No congestion or polyp.  Mouth and throat:  Free.   NECK:  No neck stiffness.  No palpable nodes.  No carotid bruits.  CHEST:  Symmetrical.  LUNGS:  Basilar rales.  No rhonchi or wheezing.  HEART:  Regular.  No murmur.  Cardiac dullness was normal.  ABDOMEN:  Soft.  No hepatosplenomegaly.  No ascites.  No hernia.  EXTREMITIES:  Edema 2+.    DIAGNOSTIC IMPRESSION:    1.   Acute on chronic respiratory failure.  2.   Chronic obstructive pulmonary disease and asthma with exacerbation.  3.   Left phrenic nerve paralysis.  4.   Kyphoscoliosis.  5.   Obesity hypoventilation syndrome.  6.   Chronic and acute cor pulmonale.    SUGGESTIONS AND RECOMMENDATIONS:    1.   Solu-Medrol 40 IV every 24 hours.  2.   DuoNeb q.i.d. and p.r.n.  3.   Protonix 40 mg a day.  4.   Continue Lasix.  5.   BiPAP 15/5 at night, p.r.n. during the day.  6.   Continue Incruse 1 inhalation every 24 hours.    Dictated By Bing Boyle MD  d: 01/10/2025 09:34:08  t: 01/10/2025 10:04:40  Job [id number]  RST/

## 2025-01-10 NOTE — HOME CARE LIAISON
Patient is current with [facility] with RN,PT, OT.  We will need  resumption of care HH orders upon dischharge.

## 2025-01-11 LAB
ANION GAP SERPL CALC-SCNC: 3 MMOL/L (ref 0–18)
BASOPHILS # BLD AUTO: 0.02 X10(3) UL (ref 0–0.2)
BASOPHILS NFR BLD AUTO: 0.2 %
BNP SERPL-MCNC: 125 PG/ML (ref ?–100)
BUN BLD-MCNC: 20 MG/DL (ref 9–23)
BUN/CREAT SERPL: 29.4 (ref 10–20)
CALCIUM BLD-MCNC: 9.2 MG/DL (ref 8.7–10.4)
CHLORIDE SERPL-SCNC: 103 MMOL/L (ref 98–112)
CO2 SERPL-SCNC: 33 MMOL/L (ref 21–32)
CREAT BLD-MCNC: 0.68 MG/DL
DEPRECATED RDW RBC AUTO: 50.4 FL (ref 35.1–46.3)
EGFRCR SERPLBLD CKD-EPI 2021: 87 ML/MIN/1.73M2 (ref 60–?)
EOSINOPHIL # BLD AUTO: 0 X10(3) UL (ref 0–0.7)
EOSINOPHIL NFR BLD AUTO: 0 %
ERYTHROCYTE [DISTWIDTH] IN BLOOD BY AUTOMATED COUNT: 14.5 % (ref 11–15)
GLUCOSE BLD-MCNC: 205 MG/DL (ref 70–99)
HCT VFR BLD AUTO: 36 %
HGB BLD-MCNC: 11.4 G/DL
IMM GRANULOCYTES # BLD AUTO: 0.05 X10(3) UL (ref 0–1)
IMM GRANULOCYTES NFR BLD: 0.5 %
LYMPHOCYTES # BLD AUTO: 0.88 X10(3) UL (ref 1–4)
LYMPHOCYTES NFR BLD AUTO: 8.8 %
MCH RBC QN AUTO: 29.9 PG (ref 26–34)
MCHC RBC AUTO-ENTMCNC: 31.7 G/DL (ref 31–37)
MCV RBC AUTO: 94.5 FL
MONOCYTES # BLD AUTO: 0.71 X10(3) UL (ref 0.1–1)
MONOCYTES NFR BLD AUTO: 7.1 %
NEUTROPHILS # BLD AUTO: 8.34 X10 (3) UL (ref 1.5–7.7)
NEUTROPHILS # BLD AUTO: 8.34 X10(3) UL (ref 1.5–7.7)
NEUTROPHILS NFR BLD AUTO: 83.4 %
OSMOLALITY SERPL CALC.SUM OF ELEC: 297 MOSM/KG (ref 275–295)
PLATELET # BLD AUTO: 216 10(3)UL (ref 150–450)
POTASSIUM SERPL-SCNC: 4.2 MMOL/L (ref 3.5–5.1)
RBC # BLD AUTO: 3.81 X10(6)UL
SODIUM SERPL-SCNC: 139 MMOL/L (ref 136–145)
WBC # BLD AUTO: 10 X10(3) UL (ref 4–11)

## 2025-01-11 PROCEDURE — 99233 SBSQ HOSP IP/OBS HIGH 50: CPT | Performed by: HOSPITALIST

## 2025-01-11 NOTE — PLAN OF CARE
Problem: RESPIRATORY - ADULT  Goal: Achieves optimal ventilation and oxygenation  Description: INTERVENTIONS:  - Assess for changes in respiratory status  - Assess for changes in mentation and behavior  - Position to facilitate oxygenation and minimize respiratory effort  - Oxygen supplementation based on oxygen saturation or ABGs  - Provide Smoking Cessation handout, if applicable  - Encourage broncho-pulmonary hygiene including cough, deep breathe, Incentive Spirometry  - Assess the need for suctioning and perform as needed  - Assess and instruct to report SOB or any respiratory difficulty  - Respiratory Therapy support as indicated  - Manage/alleviate anxiety  - Monitor for signs/symptoms of CO2 retention  Outcome: Progressing     Problem: SKIN/TISSUE INTEGRITY - ADULT  Goal: Skin integrity remains intact  Description: INTERVENTIONS  - Assess and document risk factors for pressure ulcer development  - Assess and document skin integrity  - Monitor for areas of redness and/or skin breakdown  - Initiate interventions, skin care algorithm/standards of care as needed  Outcome: Progressing

## 2025-01-11 NOTE — PROGRESS NOTES
[facility]  part of [facility]    Progress Note    Yesenia Berkowitz Patient Status:  Inpatient    1940 MRN [id number]   Location [facility]5W Attending Jose Pfeiffer MD   Hosp Day # 1 PCP JO-ANN YANG MD       Subjective:   Yesenia Berkowitz is a(n) 84 year old female.   Less sob,c/c non productive cough  Objective:   Blood pressure 115/53, pulse 84, temperature 97.6 °F (36.4 °C), temperature source Oral, resp. rate 18, height 5' 5\" (1.651 m), weight 157 lb 4.8 oz (71.4 kg), SpO2 99%.    HEENT: negative  Neck: no adenopathy, no carotid bruit, no JVD, supple, symmetrical, trachea midline and thyroid not enlarged, symmetric, no tenderness/mass/nodules  Pulmonary/Chest: rales,rhonchi  Cardiovascular: S1, S2 normal, no S3 or S4, regular rate and rhythm, no murmur  Abdominal: normal findings: bowel sounds normal, soft, non-tender and no hepatosplenomegaly   Extremities: extremities normal, atraumatic, no cyanosis or edema  Skin: Skin color, texture, turgor normal. No rashes or lesions    Results:     Lab Results   Component Value Date    WBC 10.0 2025    HGB 11.4 (L) 2025    HCT 36.0 2025    .0 2025    CREATSERUM 0.68 2025    BUN 20 2025     2025    K 4.2 2025     2025    CO2 33.0 (H) 2025     (H) 2025    CA 9.2 2025    ALB 3.0 (L) 2024    ALKPHO 66 2024    BILT 0.2 2024    TP 4.9 (L) 2024    AST 9 2024    ALT 18 2024    PTT 24.3 2024    INR 1.08 2024    T4F 0.9 2024    TSH 0.185 (L) 2024    LIP 30 2024    DDIMER 0.47 2024    MG 1.9 2024    PHOS 3.4 2024    TROP <0.045 2020       XR CHEST AP PORTABLE  (CPT=71045)    Result Date: 1/10/2025  CONCLUSION: Findings compatible with CHF/edema.   [facility] provided a preliminary report for this examination. This final report agrees with their preliminary  findings.   Dictated by (CST): Grey Artis MD on 1/10/2025 at 7:04 AM     Finalized by (CST): Grey Artis MD on 1/10/2025 at 7:05 AM               Assessment and Plan:   Principal Problem:    SOB (shortness of breath)  Active Problems:    Community acquired pneumonia of right lower lobe of lung    COPD exacerbation (HCC)    Less sob,non productive cough,less edema.continue solumedrol,doxycycine duoneb and BIPAP         ELVIA LIVINGSTON MD, MD  1/11/2025

## 2025-01-11 NOTE — PROGRESS NOTES
[facility]  part of [facility]    Progress Note    Yesenia Berkowitz Patient Status:  Inpatient    1940 MRN [id number]   Location [facility]5W Attending Jose Pfeiffer MD   Hosp Day # 1 PCP JO-ANN YANG MD       Subjective:   Yesenia Berkowitz is stable in bed, no obvious distress.    Review of Systems:   10 point ROS completed and was negative, except for pertinent positive and negatives stated in subjective.    Objective:     Vitals:    01/10/25 1600 01/10/25 2110 25 0021 25 0403   BP: 116/45 133/70  123/58   BP Location: Right arm Right arm  Right arm   Pulse: 76 94 84 75   Resp: 22   18   Temp: 98.3 °F (36.8 °C) 98 °F (36.7 °C)  97.7 °F (36.5 °C)   TempSrc: Oral Oral  Oral   SpO2: 96% 95% 96% 97%   Weight:       Height:         Patient Weight for the past 72 hrs:   Weight   01/10/25 0140 156 lb (70.8 kg)   01/10/25 0606 157 lb 4.8 oz (71.4 kg)       Intake/Output Summary (Last 24 hours) at 2025 0827  Last data filed at 1/10/2025 0836  Gross per 24 hour   Intake 230 ml   Output --   Net 230 ml     GENERAL:  The patient appeared to be in no distress   HEENT:  Head was atraumatic and normocephalic.    CHEST:  Symmetrical movement on inspiration  LUNGS:  No audible wheezing  ABDOMEN: Non-distended  MUSCULOSKELETAL:  There was no deformity.    EXTREMITIES: There was no edema  NEUROLOGICAL:  There was no focal deficit.    PSYCHIATRIC: Calm and cooperative     Current Scheduled Inpatient Meds:      methylPREDNISolone  125 mg Intravenous Once    heparin  5,000 Units Subcutaneous 2 times per day    acetaZOLAMIDE  500 mg Oral Daily    aspirin  81 mg Oral Daily    digoxin  125 mcg Oral Daily    empagliflozin  10 mg Oral Daily    furosemide  40 mg Oral BID (Diuretic)    cetirizine  10 mg Oral Daily    montelukast  10 mg Oral Nightly    spironolactone  25 mg Oral Daily    umeclidinium bromide  1 puff Inhalation Daily    doxycycline  100 mg Oral BID    methylPREDNISolone  40 mg  Intravenous Daily    dilTIAZem HCl ER Coated Beads  360 mg Oral Daily       Current PRN Inpatient Meds:        ondansetron    polyethylene glycol (PEG 3350)    sennosides    bisacodyl    fleet enema    benzonatate    guaiFENesin    acetaminophen    Results:     Lab Results   Component Value Date    WBC 10.0 01/11/2025    HGB 11.4 (L) 01/11/2025    HCT 36.0 01/11/2025    .0 01/11/2025    CREATSERUM 0.68 01/11/2025    BUN 20 01/11/2025     01/11/2025    K 4.2 01/11/2025     01/11/2025    CO2 33.0 (H) 01/11/2025     (H) 01/11/2025    CA 9.2 01/11/2025    ALB 3.0 (L) 12/26/2024    ALKPHO 66 12/23/2024    BILT 0.2 12/23/2024    TP 4.9 (L) 12/23/2024    AST 9 12/23/2024    ALT 18 12/23/2024    PTT 24.3 11/25/2024    INR 1.08 11/25/2024    T4F 0.9 12/17/2024    TSH 0.185 (L) 12/17/2024    LIP 30 08/30/2024    DDIMER 0.47 12/21/2024    MG 1.9 12/16/2024    PHOS 3.4 12/26/2024    TROP <0.045 02/03/2020       Recent Labs   Lab 01/10/25  0253 01/11/25  0545   RBC 4.19 3.81   HGB 12.6 11.4*   HCT 40.1 36.0   MCV 95.7 94.5   MCH 30.1 29.9   MCHC 31.4 31.7   RDW 14.8 14.5   NEPRELIM 8.80* 8.34*   WBC 13.1* 10.0   .0 216.0     Recent Labs   Lab 01/10/25  0253 01/11/25  0545   * 205*   BUN 19 20   CREATSERUM 0.69 0.68   CA 9.0 9.2    139   K 4.0 4.2    103   CO2 37.0* 33.0*     Lab Results   Component Value Date    INR 1.08 11/25/2024    INR 1.12 03/14/2023       Culture:  No results found for this visit on 01/10/25.    Imaging/EKG:   XR CHEST AP PORTABLE  (CPT=71045)    Result Date: 1/10/2025  CONCLUSION: Findings compatible with CHF/edema.   [facility] provided a preliminary report for this examination. This final report agrees with their preliminary findings.   Dictated by (CST): Grey Artis MD on 1/10/2025 at 7:04 AM     Finalized by (CST): Grey Artis MD on 1/10/2025 at 7:05 AM             Assessment and Plan:   Yesenia Berkowitz is an 84-year-old female with hx of chronic  dyspnea, COPD on home O2, HTN, HLD, Afib not on AC, who p/w worsening dyspnea for several days.     # Acute on chronic hypoxemic respiratory failure   # COPD exacerbation  # Left phrenic nerve paralysis  # Kyphoscoliosis  - s/p flexible bronchoscopy with bronchial washings and removal of mucus plug, by Dr. Boyle 09/17/24  - Steroids, bronchodilators, and doxycycline  - Pulm on consult     # HFpEF  # Paroxysmal Afib, not on AC  # HTN  # HLD  - Euvolemic on admission  - continue home aspirin, diltiazem, digoxin, and spironolactone     Diet: regular  PT/OT: deferred  DVT ppx: Lovenox  Code status: Full  Dispo: per Pulm     MDM: high Jose Pfeiffer MD, PhD  Message via Secure Chat  [facility] Hospitalist

## 2025-01-12 LAB
ANION GAP SERPL CALC-SCNC: 5 MMOL/L (ref 0–18)
BASOPHILS # BLD AUTO: 0.02 X10(3) UL (ref 0–0.2)
BASOPHILS NFR BLD AUTO: 0.2 %
BUN BLD-MCNC: 30 MG/DL (ref 9–23)
BUN/CREAT SERPL: 39.5 (ref 10–20)
CALCIUM BLD-MCNC: 9.2 MG/DL (ref 8.7–10.4)
CHLORIDE SERPL-SCNC: 102 MMOL/L (ref 98–112)
CO2 SERPL-SCNC: 34 MMOL/L (ref 21–32)
CREAT BLD-MCNC: 0.76 MG/DL
DEPRECATED RDW RBC AUTO: 49 FL (ref 35.1–46.3)
EGFRCR SERPLBLD CKD-EPI 2021: 78 ML/MIN/1.73M2 (ref 60–?)
EOSINOPHIL # BLD AUTO: 0 X10(3) UL (ref 0–0.7)
EOSINOPHIL NFR BLD AUTO: 0 %
ERYTHROCYTE [DISTWIDTH] IN BLOOD BY AUTOMATED COUNT: 14.2 % (ref 11–15)
GLUCOSE BLD-MCNC: 202 MG/DL (ref 70–99)
HCT VFR BLD AUTO: 33.3 %
HGB BLD-MCNC: 11.2 G/DL
IMM GRANULOCYTES # BLD AUTO: 0.08 X10(3) UL (ref 0–1)
IMM GRANULOCYTES NFR BLD: 0.7 %
LYMPHOCYTES # BLD AUTO: 0.94 X10(3) UL (ref 1–4)
LYMPHOCYTES NFR BLD AUTO: 8.7 %
MCH RBC QN AUTO: 31.5 PG (ref 26–34)
MCHC RBC AUTO-ENTMCNC: 33.6 G/DL (ref 31–37)
MCV RBC AUTO: 93.5 FL
MONOCYTES # BLD AUTO: 0.75 X10(3) UL (ref 0.1–1)
MONOCYTES NFR BLD AUTO: 7 %
NEUTROPHILS # BLD AUTO: 8.96 X10 (3) UL (ref 1.5–7.7)
NEUTROPHILS # BLD AUTO: 8.96 X10(3) UL (ref 1.5–7.7)
NEUTROPHILS NFR BLD AUTO: 83.4 %
OSMOLALITY SERPL CALC.SUM OF ELEC: 304 MOSM/KG (ref 275–295)
PLATELET # BLD AUTO: 219 10(3)UL (ref 150–450)
POTASSIUM SERPL-SCNC: 4 MMOL/L (ref 3.5–5.1)
RBC # BLD AUTO: 3.56 X10(6)UL
SODIUM SERPL-SCNC: 141 MMOL/L (ref 136–145)
WBC # BLD AUTO: 10.8 X10(3) UL (ref 4–11)

## 2025-01-12 PROCEDURE — 99233 SBSQ HOSP IP/OBS HIGH 50: CPT | Performed by: HOSPITALIST

## 2025-01-12 RX ORDER — CETIRIZINE HYDROCHLORIDE 5 MG/1
5 TABLET ORAL DAILY
Status: DISCONTINUED | OUTPATIENT
Start: 2025-01-12 | End: 2025-01-13

## 2025-01-12 NOTE — PROGRESS NOTES
[facility]  part of [facility]    Progress Note    Yesenia Berkowitz Patient Status:  Inpatient    1940 MRN [id number]   Location [facility]5W Attending Jose Pfeiffer MD   Hosp Day # 2 PCP JO-ANN YANG MD       Subjective:   Yesenia Berkowitz is stable in bed, no obvious distress, playing game on her phone with TV on.    Review of Systems:   10 point ROS completed and was negative, except for pertinent positive and negatives stated in subjective.    Objective:     Vitals:    25 2038 25 0300 25 0336 25 0500   BP: 124/59  124/63    BP Location: Right arm  Right arm    Pulse: 78  75    Resp: 18  18    Temp: 98.1 °F (36.7 °C)  97.9 °F (36.6 °C)    TempSrc: Oral  Oral    SpO2: 98% 97% 97%    Weight:    160 lb 12.8 oz (72.9 kg)   Height:         Patient Weight for the past 72 hrs:   Weight   01/10/25 0140 156 lb (70.8 kg)   01/10/25 0606 157 lb 4.8 oz (71.4 kg)       Intake/Output Summary (Last 24 hours) at 2025 0806  Last data filed at 2025 0500  Gross per 24 hour   Intake 180 ml   Output 750 ml   Net -570 ml     GENERAL:  The patient appeared to be in no distress   HEENT:  Head was atraumatic and normocephalic.    CHEST:  Symmetrical movement on inspiration  LUNGS:  No audible wheezing  ABDOMEN: Non-distended  MUSCULOSKELETAL:  There was no deformity.    EXTREMITIES: There was no edema  NEUROLOGICAL:  There was no focal deficit.    PSYCHIATRIC: Calm and cooperative     Current Scheduled Inpatient Meds:      heparin  5,000 Units Subcutaneous 2 times per day    acetaZOLAMIDE  500 mg Oral Daily    aspirin  81 mg Oral Daily    digoxin  125 mcg Oral Daily    empagliflozin  10 mg Oral Daily    furosemide  40 mg Oral BID (Diuretic)    cetirizine  10 mg Oral Daily    montelukast  10 mg Oral Nightly    spironolactone  25 mg Oral Daily    umeclidinium bromide  1 puff Inhalation Daily    doxycycline  100 mg Oral BID    methylPREDNISolone  40 mg Intravenous Daily     dilTIAZem HCl ER Coated Beads  360 mg Oral Daily       Current PRN Inpatient Meds:        ondansetron    polyethylene glycol (PEG 3350)    sennosides    bisacodyl    fleet enema    benzonatate    guaiFENesin    acetaminophen    Results:     Lab Results   Component Value Date    WBC 10.8 01/12/2025    HGB 11.2 (L) 01/12/2025    HCT 33.3 (L) 01/12/2025    .0 01/12/2025    CREATSERUM 0.76 01/12/2025    BUN 30 (H) 01/12/2025     01/12/2025    K 4.0 01/12/2025     01/12/2025    CO2 34.0 (H) 01/12/2025     (H) 01/12/2025    CA 9.2 01/12/2025    ALB 3.0 (L) 12/26/2024    ALKPHO 66 12/23/2024    BILT 0.2 12/23/2024    TP 4.9 (L) 12/23/2024    AST 9 12/23/2024    ALT 18 12/23/2024    PTT 24.3 11/25/2024    INR 1.08 11/25/2024    T4F 0.9 12/17/2024    TSH 0.185 (L) 12/17/2024    LIP 30 08/30/2024    DDIMER 0.47 12/21/2024    MG 1.9 12/16/2024    PHOS 3.4 12/26/2024    TROP <0.045 02/03/2020       Recent Labs   Lab 01/10/25  0253 01/11/25  0545 01/12/25  0524   RBC 4.19 3.81 3.56*   HGB 12.6 11.4* 11.2*   HCT 40.1 36.0 33.3*   MCV 95.7 94.5 93.5   MCH 30.1 29.9 31.5   MCHC 31.4 31.7 33.6   RDW 14.8 14.5 14.2   NEPRELIM 8.80* 8.34* 8.96*   WBC 13.1* 10.0 10.8   .0 216.0 219.0     Recent Labs   Lab 01/10/25  0253 01/11/25  0545 01/12/25  0524   * 205* 202*   BUN 19 20 30*   CREATSERUM 0.69 0.68 0.76   CA 9.0 9.2 9.2    139 141   K 4.0 4.2 4.0    103 102   CO2 37.0* 33.0* 34.0*     Lab Results   Component Value Date    INR 1.08 11/25/2024    INR 1.12 03/14/2023       Culture:  No results found for this visit on 01/10/25.    Imaging/EKG:   No results found.      Assessment and Plan:   Yesenia Berkowitz is an 84-year-old female with hx of chronic dyspnea, COPD on home O2, HTN, HLD, Afib not on AC, who p/w worsening dyspnea for several days.     # Acute on chronic hypoxemic respiratory failure   # COPD exacerbation  # Left phrenic nerve paralysis  # Kyphoscoliosis  - s/p flexible  bronchoscopy with bronchial washings and removal of mucus plug, by Dr. Boyle 09/17/24  - Steroids, bronchodilators, and doxycycline  - Pulm on consult     # HFpEF  # Paroxysmal Afib, not on AC  # HTN  # HLD  - Euvolemic on admission  - continue home aspirin, diltiazem, digoxin, and spironolactone     Diet: regular  PT/OT: deferred  DVT ppx: Lovenox  Code status: Full  Dispo: per Pulm     MDM: high Jose Pfeiffer MD, PhD  Message via Secure Chat  [facility] Healthcare[facility]

## 2025-01-12 NOTE — PROGRESS NOTES
[facility]  part of [facility]    Progress Note    Yesenia Berkowitz Patient Status:  Inpatient    1940 MRN [id number]   Location [facility]5W Attending Jose Pfeiffer MD   Hosp Day # 2 PCP JO-ANN YANG MD       Subjective:   Yesenia Berkowitz is a(n) 84 year old female.   Felt better,less sob,more productive cough  Objective:   Blood pressure 120/59, pulse 73, temperature 98 °F (36.7 °C), temperature source Oral, resp. rate 18, height 5' 5\" (1.651 m), weight 160 lb 12.8 oz (72.9 kg), SpO2 96%.    HEENT: negative  Neck: no adenopathy, no carotid bruit, no JVD, supple, symmetrical, trachea midline and thyroid not enlarged, symmetric, no tenderness/mass/nodules  Pulmonary/Chest:  clear to auscultation bilaterally, no tenderness  Cardiovascular: S1, S2 normal, no S3 or S4, regular rate and rhythm, no murmur  Abdominal: normal findings: bowel sounds normal, soft, non-tender and no hepatosplenomegaly   Extremities: extremities normal, atraumatic, no cyanosis or edema  Skin: Skin color, texture, turgor normal. No rashes or lesions    Results:     Lab Results   Component Value Date    WBC 10.8 2025    HGB 11.2 (L) 2025    HCT 33.3 (L) 2025    .0 2025    CREATSERUM 0.76 2025    BUN 30 (H) 2025     2025    K 4.0 2025     2025    CO2 34.0 (H) 2025     (H) 2025    CA 9.2 2025    ALB 3.0 (L) 2024    ALKPHO 66 2024    BILT 0.2 2024    TP 4.9 (L) 2024    AST 9 2024    ALT 18 2024    PTT 24.3 2024    INR 1.08 2024    T4F 0.9 2024    TSH 0.185 (L) 2024    LIP 30 2024    DDIMER 0.47 2024    MG 1.9 2024    PHOS 3.4 2024    TROP <0.045 2020       No results found.        Assessment and Plan:   Principal Problem:    SOB (shortness of breath)  Active Problems:    Phrenic nerve paralysis    Acute pulmonary edema (HCC)     Pleural effusion    Community acquired pneumonia of right lower lobe of lung    Acute on chronic respiratory failure with hypoxia and hypercapnia (HCC)    Cor pulmonale (chronic) (HCC)    Obesity hypoventilation syndrome (HCC)    COPD exacerbation (HCC)    Felt better,less sob and more non productive cough.continue solumedrol,duoneb,doxycycline and BIPAP at [facility]         ELVIA LIVINGSTON MD, MD  1/12/2025

## 2025-01-12 NOTE — PLAN OF CARE
Problem: Patient Centered Care  Goal: Patient preferences are identified and integrated in the patient's plan of care  Description: Interventions:  - What would you like us to know as we care for you?   - Provide timely, complete, and accurate information to patient/family  - Incorporate patient and family knowledge, values, beliefs, and cultural backgrounds into the planning and delivery of care  - Encourage patient/family to participate in care and decision-making at the level they choose  - Honor patient and family perspectives and choices  Outcome: Progressing      Problem: RESPIRATORY - ADULT  Goal: Achieves optimal ventilation and oxygenation  Description: INTERVENTIONS:  - Assess for changes in respiratory status  - Assess for changes in mentation and behavior  - Position to facilitate oxygenation and minimize respiratory effort  - Oxygen supplementation based on oxygen saturation or ABGs  - Provide Smoking Cessation handout, if applicable  - Encourage broncho-pulmonary hygiene including cough, deep breathe, Incentive Spirometry  - Assess the need for suctioning and perform as needed  - Assess and instruct to report SOB or any respiratory difficulty  - Respiratory Therapy support as indicated  - Manage/alleviate anxiety  - Monitor for signs/symptoms of CO2 retention  Outcome: Progressing     Problem: SKIN/TISSUE INTEGRITY - ADULT  Goal: Skin integrity remains intact  Description: INTERVENTIONS  - Assess and document risk factors for pressure ulcer development  - Assess and document skin integrity  - Monitor for areas of redness and/or skin breakdown  - Initiate interventions, skin care algorithm/standards of care as needed  Outcome: Progressing     No acute changes during the shift. Vital signs are stable. Denies pain or discomfort.  Currently at 3L of oxygen, which is at baseline.  Safety precautions in place.

## 2025-01-12 NOTE — PLAN OF CARE
Problem: Patient Centered Care  Goal: Patient preferences are identified and integrated in the patient's plan of care  Description: Interventions:  - What would you like us to know as we care for you?  - Provide timely, complete, and accurate information to patient/family  - Incorporate patient and family knowledge, values, beliefs, and cultural backgrounds into the planning and delivery of care  - Encourage patient/family to participate in care and decision-making at the level they choose  - Honor patient and family perspectives and choices  Outcome: Progressing     Problem: RESPIRATORY - ADULT  Goal: Achieves optimal ventilation and oxygenation  Description: INTERVENTIONS:  - Assess for changes in respiratory status  - Assess for changes in mentation and behavior  - Position to facilitate oxygenation and minimize respiratory effort  - Oxygen supplementation based on oxygen saturation or ABGs  - Provide Smoking Cessation handout, if applicable  - Encourage broncho-pulmonary hygiene including cough, deep breathe, Incentive Spirometry  - Assess the need for suctioning and perform as needed  - Assess and instruct to report SOB or any respiratory difficulty  - Respiratory Therapy support as indicated  - Manage/alleviate anxiety  - Monitor for signs/symptoms of CO2 retention  Outcome: Progressing     Problem: SKIN/TISSUE INTEGRITY - ADULT  Goal: Skin integrity remains intact  Description: INTERVENTIONS  - Assess and document risk factors for pressure ulcer development  - Assess and document skin integrity  - Monitor for areas of redness and/or skin breakdown  - Initiate interventions, skin care algorithm/standards of care as needed  Outcome: Progressing     No acute changes during the shift. Vital signs are stable. Denies pain or discomfort.  On 3L of oxygen, which is at baseline.  Safety precautions in place.

## 2025-01-13 VITALS
SYSTOLIC BLOOD PRESSURE: 119 MMHG | TEMPERATURE: 98 F | OXYGEN SATURATION: 97 % | BODY MASS INDEX: 26.85 KG/M2 | DIASTOLIC BLOOD PRESSURE: 60 MMHG | WEIGHT: 161.13 LBS | RESPIRATION RATE: 18 BRPM | HEART RATE: 68 BPM | HEIGHT: 65 IN

## 2025-01-13 LAB
ANION GAP SERPL CALC-SCNC: 5 MMOL/L (ref 0–18)
BASOPHILS # BLD AUTO: 0.02 X10(3) UL (ref 0–0.2)
BASOPHILS NFR BLD AUTO: 0.2 %
BUN BLD-MCNC: 33 MG/DL (ref 9–23)
BUN/CREAT SERPL: 47.1 (ref 10–20)
CALCIUM BLD-MCNC: 9.2 MG/DL (ref 8.7–10.4)
CHLORIDE SERPL-SCNC: 103 MMOL/L (ref 98–112)
CO2 SERPL-SCNC: 36 MMOL/L (ref 21–32)
CREAT BLD-MCNC: 0.7 MG/DL
DEPRECATED RDW RBC AUTO: 48.4 FL (ref 35.1–46.3)
EGFRCR SERPLBLD CKD-EPI 2021: 86 ML/MIN/1.73M2 (ref 60–?)
EOSINOPHIL # BLD AUTO: 0 X10(3) UL (ref 0–0.7)
EOSINOPHIL NFR BLD AUTO: 0 %
ERYTHROCYTE [DISTWIDTH] IN BLOOD BY AUTOMATED COUNT: 14.2 % (ref 11–15)
GLUCOSE BLD-MCNC: 180 MG/DL (ref 70–99)
HCT VFR BLD AUTO: 34.3 %
HGB BLD-MCNC: 11.4 G/DL
IMM GRANULOCYTES # BLD AUTO: 0.09 X10(3) UL (ref 0–1)
IMM GRANULOCYTES NFR BLD: 0.8 %
LYMPHOCYTES # BLD AUTO: 0.97 X10(3) UL (ref 1–4)
LYMPHOCYTES NFR BLD AUTO: 8.9 %
MCH RBC QN AUTO: 31 PG (ref 26–34)
MCHC RBC AUTO-ENTMCNC: 33.2 G/DL (ref 31–37)
MCV RBC AUTO: 93.2 FL
MONOCYTES # BLD AUTO: 0.89 X10(3) UL (ref 0.1–1)
MONOCYTES NFR BLD AUTO: 8.1 %
NEUTROPHILS # BLD AUTO: 8.99 X10 (3) UL (ref 1.5–7.7)
NEUTROPHILS # BLD AUTO: 8.99 X10(3) UL (ref 1.5–7.7)
NEUTROPHILS NFR BLD AUTO: 82 %
OSMOLALITY SERPL CALC.SUM OF ELEC: 310 MOSM/KG (ref 275–295)
PLATELET # BLD AUTO: 213 10(3)UL (ref 150–450)
POTASSIUM SERPL-SCNC: 4 MMOL/L (ref 3.5–5.1)
RBC # BLD AUTO: 3.68 X10(6)UL
SODIUM SERPL-SCNC: 144 MMOL/L (ref 136–145)
WBC # BLD AUTO: 11 X10(3) UL (ref 4–11)

## 2025-01-13 PROCEDURE — 99239 HOSP IP/OBS DSCHRG MGMT >30: CPT | Performed by: FAMILY MEDICINE

## 2025-01-13 RX ORDER — DOXYCYCLINE 100 MG/1
100 CAPSULE ORAL 2 TIMES DAILY
Qty: 10 CAPSULE | Refills: 0 | Status: SHIPPED | OUTPATIENT
Start: 2025-01-13 | End: 2025-01-22 | Stop reason: ALTCHOICE

## 2025-01-13 RX ORDER — PREDNISONE 10 MG/1
TABLET ORAL
Qty: 13 TABLET | Refills: 0 | Status: SHIPPED | OUTPATIENT
Start: 2025-01-13 | End: 2025-01-31

## 2025-01-13 NOTE — CM/SW NOTE
01/13/25 1500   Discharge disposition   Expected discharge disposition Home or Self   Post Acute Care Provider Home   DME/Infusion Providers Other (comment)  ([facility])   Discharge transportation Private car     The patient received a MDO for discharge.    The patient is current with [facility].    Per the RN the patient's new home O2 concentrator arrived at her home this morning at 9am with [employer].    The patient's son will be transporting her home via private car.    SW/CM to remain available for support and/or discharge planning.     Isidra De Paz MSW, LSW  Discharge Planner [id number]

## 2025-01-13 NOTE — PROGRESS NOTES
[facility]  part of [facility]    Progress Note    Yesenia Berkowitz Patient Status:  Inpatient    1940 MRN [id number]   Location [facility] Attending Lidia Levy MD   Hosp Day # 3 PCP JO-ANN YANG MD       Subjective:   Yesenia Berkowitz is a(n) 84 year old female.   Felt better,less sob and cough  Objective:   Blood pressure 119/60, pulse 68, temperature 97.8 °F (36.6 °C), temperature source Oral, resp. rate 18, height 5' 5\" (1.651 m), weight 161 lb 1.6 oz (73.1 kg), SpO2 97%.    HEENT: negative  Neck: no adenopathy, no carotid bruit, no JVD, supple, symmetrical, trachea midline and thyroid not enlarged, symmetric, no tenderness/mass/nodules  Pulmonary/Chest: rales  Cardiovascular: S1, S2 normal, no S3 or S4, regular rate and rhythm, no murmur  Abdominal: normal findings: bowel sounds normal, soft, non-tender and no hepatosplenomegaly   Extremities: extremities normal, atraumatic, no cyanosis or edema  Skin: Skin color, texture, turgor normal. No rashes or lesions    Results:     Lab Results   Component Value Date    WBC 11.0 2025    HGB 11.4 (L) 2025    HCT 34.3 (L) 2025    .0 2025    CREATSERUM 0.70 2025    BUN 33 (H) 2025     2025    K 4.0 2025     2025    CO2 36.0 (H) 2025     (H) 2025    CA 9.2 2025    ALB 3.0 (L) 2024    ALKPHO 66 2024    BILT 0.2 2024    TP 4.9 (L) 2024    AST 9 2024    ALT 18 2024    PTT 24.3 2024    INR 1.08 2024    T4F 0.9 2024    TSH 0.185 (L) 2024    LIP 30 2024    DDIMER 0.47 2024    MG 1.9 2024    PHOS 3.4 2024    TROP <0.045 2020       No results found.        Assessment and Plan:   Principal Problem:    SOB (shortness of breath)  Active Problems:    Phrenic nerve paralysis    Acute pulmonary edema (HCC)    Pleural effusion    Community acquired pneumonia of  right lower lobe of lung    Acute on chronic respiratory failure with hypoxia and hypercapnia (HCC)    Cor pulmonale (chronic) (HCC)    Obesity hypoventilation syndrome (HCC)    COPD exacerbation (HCC)       Felt better,less sob and cough,home      ELVIA LIVINGSTON MD, MD  1/13/2025

## 2025-01-13 NOTE — PROGRESS NOTES
Progress Note     Yesenia Berkowitz Patient Status:  Inpatient    1940 MRN [id number]   Location [facility]5W Attending Lidia Levy MD   Hosp Day # 3 PCP JO-ANN YANG MD     Chief Complaint: patient presented with   Chief Complaint   Patient presents with    Difficulty Breathing       Subjective:   S: Patient feels ok, SOB is about the same since she is admitted    Review of Systems:   10 point ROS completed and was negative, except for pertinent positive and negatives stated in subjective.    Objective:   Vital signs:  Temp:  [97.7 °F (36.5 °C)-98.1 °F (36.7 °C)] 97.8 °F (36.6 °C)  Pulse:  [68-78] 68  Resp:  [18-20] 18  BP: (114-142)/(55-67) 119/60  SpO2:  [97 %-98 %] 97 %    Wt Readings from Last 6 Encounters:   25 161 lb 1.6 oz (73.1 kg)   25 151 lb (68.5 kg)   24 175 lb 9.6 oz (79.7 kg)   24 169 lb 14.4 oz (77.1 kg)   24 170 lb (77.1 kg)   24 171 lb 6.4 oz (77.7 kg)         Physical Exam:    General: No acute distress. Alert ,         Respiratory: Clear to auscultation bilaterally. No wheezes. No rhonchi.  Cardiovascular: S1, S2. Regular rate and rhythm. No murmurs, rubs or gallops.   Abdomen: Soft, nontender, nondistended.  Positive bowel sounds. No rebound or guarding.  Neurologic: No focal neurological deficits.   Musculoskeletal: Moves all extremities.  Extremities: No edema.    Results:   Diagnostic Data:      Labs:    Labs Last 24 Hours:   BMP     CBC    Other     Na 144 Cl 103 BUN 33 Glu 180   Hb 11.4   PTT - Procal -   K 4.0 CO2 36.0 Cr 0.70   WBC 11.0 >< .0  INR - CRP -   Renal Lytes Endo    Hct 34.3   Trop - D dim -   eGFR - Ca 9.2 POC Gluc  -    LFT   pBNP - Lactic -   eGFR AA - PO4 - A1c -   AST - APk - Prot -  LDL -     Mg - TSH -   ALT - T katie - Alb -        COVID-19 Lab Results    COVID-19  Lab Results   Component Value Date    COVID19 Not Detected 2024    COVID19 Not Detected 2024       Pro-Calcitonin  No results for  input(s): \"PCT\" in the last 168 hours.    Cardiac  No results for input(s): \"TROP\", \"PBNP\" in the last 168 hours.    Creatinine Kinase  No results for input(s): \"CK\" in the last 168 hours.    Inflammatory Markers  No results for input(s): \"CRP\", \"SONA\", \"LDH\", \"DDIMER\" in the last 168 hours.    Imaging: Imaging data reviewed in Epic.    Medications:    cetirizine  5 mg Oral Daily    heparin  5,000 Units Subcutaneous 2 times per day    acetaZOLAMIDE  500 mg Oral Daily    aspirin  81 mg Oral Daily    digoxin  125 mcg Oral Daily    empagliflozin  10 mg Oral Daily    furosemide  40 mg Oral BID (Diuretic)    montelukast  10 mg Oral Nightly    spironolactone  25 mg Oral Daily    umeclidinium bromide  1 puff Inhalation Daily    doxycycline  100 mg Oral BID    methylPREDNISolone  40 mg Intravenous Daily    dilTIAZem HCl ER Coated Beads  360 mg Oral Daily       Assessment & Plan:   ASSESSMENT / PLAN:     Problem List Items Addressed This Visit          HCC Problems    COPD exacerbation (HCC)    Relevant Medications    albuterol (Ventolin) (2.5 MG/3ML) 0.083% nebulizer solution 5 mg (Completed)    benzonatate (Tessalon) cap 200 mg    guaiFENesin (Robitussin) 100 MG/5 ML oral liquid 200 mg    umeclidinium bromide (Incruse Ellipta) 62.5 MCG/ACT inhaler 1 puff    doxycycline (Vibramycin) cap 100 mg    methylPREDNISolone sodium succinate (Solu-MEDROL) injection 40 mg       Pulmonary and Pneumonias    Community acquired pneumonia of right lower lobe of lung    Relevant Medications    albuterol (Ventolin) (2.5 MG/3ML) 0.083% nebulizer solution 5 mg (Completed)    cefTRIAXone (Rocephin) 2 g in sodium chloride 0.9% 100 mL IVPB-ADDV (Completed)    azithromycin (Zithromax) 500 mg in sodium chloride 0.9% 250mL IVPB premix (Completed)    benzonatate (Tessalon) cap 200 mg    guaiFENesin (Robitussin) 100 MG/5 ML oral liquid 200 mg    umeclidinium bromide (Incruse Ellipta) 62.5 MCG/ACT inhaler 1 puff    doxycycline (Vibramycin) cap 100 mg     methylPREDNISolone sodium succinate (Solu-MEDROL) injection 40 mg    * (Principal) SOB (shortness of breath) - Primary    Relevant Medications    albuterol (Ventolin) (2.5 MG/3ML) 0.083% nebulizer solution 5 mg (Completed)    benzonatate (Tessalon) cap 200 mg    guaiFENesin (Robitussin) 100 MG/5 ML oral liquid 200 mg    umeclidinium bromide (Incruse Ellipta) 62.5 MCG/ACT inhaler 1 puff    doxycycline (Vibramycin) cap 100 mg    methylPREDNISolone sodium succinate (Solu-MEDROL) injection 40 mg       Yesenia Berkowitz is an 84-year-old female with hx of chronic dyspnea, COPD on home O2, HTN, HLD, Afib not on AC, who p/w worsening dyspnea for several days.     # Acute on chronic hypoxemic respiratory failure   # COPD exacerbation  # Left phrenic nerve paralysis  # Kyphoscoliosis  - s/p flexible bronchoscopy with bronchial washings and removal of mucus plug, by Dr. Boyle 09/17/24  - Steroids, bronchodilators, and doxycycline  - Pulm on consult     # HFpEF  # Paroxysmal Afib, not on AC  # HTN  # HLD  - Euvolemic on admission  - continue home aspirin, diltiazem, digoxin, and spironolactone     Diet: regular  PT/OT: deferred  DVT ppx: Lovenox  Code status: Full  Dispo: per Pulm-Await pulm recs         Quality:  DVT Prophylaxis: Heparin   CODE status: FULL   DISPO: pending clinical improvement.   Estimated date of discharge: To be determined  Discharge is dependent on: Improved clinical status  At this point Patient is expected to be discharge to: Home versus rehab      Plan of care discussed with Patient and RN.     Coordinated care with providers and counseling re: treatment plan and workup     Lidia Levy MD    Supplementary Documentation:              I personally reviewed the available laboratories, imaging including operative report. I discussed/will discuss the case with patient and her nurse. I ordered laboratories studies. I adjusted medications including not applicable today. Medical decision making high, risk is  high.     >55min spent, >50% spent counseling and coordinating care in the form of educating pt/family and d/w consultants and staff. Most of the time spent discussing the above plan.

## 2025-01-13 NOTE — DISCHARGE SUMMARY
[facility]  part of [facility]    Discharge Summary    Yesenia Berkowitz Patient Status:  Inpatient    1940 MRN [id number]   Location [facility] Attending Lidia Levy MD   Hosp Day # 3 PCP JO-ANN YANG MD     Date of Admission: 1/10/2025 Disposition: Home Health Care Services     Date of Discharge: 2025    Admitting Diagnosis: SOB (shortness of breath) [R06.02]  COPD exacerbation (HCC) [J44.1]  Community acquired pneumonia of right lower lobe of lung [J18.9]    Hospital Discharge Diagnoses:   # Acute on chronic hypoxemic respiratory failure   # COPD exacerbation  # Left phrenic nerve paralysis  # Kyphoscoliosis  # HFpEF  # Paroxysmal Afib, not on AC  # HTN  # HLD    Lace+ Score: 80  59-90 High Risk  29-58 Medium Risk  0-28   Low Risk.    TCM Follow-Up Recommendation:  LACE > 58: High Risk of readmission after discharge from the hospital.      Problem List:   Patient Active Problem List   Diagnosis    Actinic keratosis    Inflamed seborrheic keratosis    Palpitations    Pain of upper abdomen    Acute on chronic congestive heart failure, unspecified heart failure type (HCC)    COPD with acute exacerbation (HCC)    COPD (chronic obstructive pulmonary disease) (HCC)    Kyphoscoliosis    Phrenic nerve paralysis    Restrictive lung disease    Acute cor pulmonale (HCC)    Pneumonia    Acute on chronic hypoxic respiratory failure (HCC)    Acute pulmonary edema (HCC)    Pleural effusion    Pulmonary nodule 1 cm or greater in diameter    Community acquired pneumonia of right lower lobe of lung    Acute on chronic respiratory failure with hypoxia and hypercapnia (HCC)    Acute respiratory failure with hypoxia and hypercapnia (HCC)    Cor pulmonale (chronic) (HCC)    SOB (shortness of breath)    Acute on chronic diastolic (congestive) heart failure (HCC)    Obesity hypoventilation syndrome (HCC)    Asthma (HCC)    Acute exacerbation of CHF (congestive heart failure) (HCC)    Pleural  effusion on left    Pleural effusion on right    Acute bilateral thoracic back pain    Atypical pneumonia    Compression fracture of T6 vertebra (HCC)    COPD exacerbation (HCC)       Reason for Admission:     Physical Exam:   General appearance: alert, appears stated age and cooperative  Pulmonary:  clear to auscultation bilaterally  Cardiovascular: S1, S2 normal, no murmur, click, rub or gallop, regular rate and rhythm  Abdominal: soft, non-tender; bowel sounds normal; no masses,  no organomegaly  Extremities: extremities normal, atraumatic, no cyanosis or edema  Psychiatric: calm      History of Present Illness:     Hospital Course: Yesenia Berkowitz is an 84-year-old female with hx of chronic dyspnea, COPD on home O2, HTN, HLD, Afib not on AC, who p/w worsening dyspnea for several days.     # Acute on chronic hypoxemic respiratory failure   # COPD exacerbation  # Left phrenic nerve paralysis  # Kyphoscoliosis  - s/p flexible bronchoscopy with bronchial washings and removal of mucus plug, by Dr. Boyle 09/17/24  - Steroids, bronchodilators, and doxycycline  Pt did well and was discharged on PO steroids and Doxy once cleared by pulmonary.    Consultations: Pulmonary     Procedures:   CXR : Edema/CHF        Complications: see hospital course     Discharge Condition: Good    Discharge Medications:      Discharge Medications        START taking these medications        Instructions Prescription details   doxycycline 100 MG Caps  Commonly known as: Vibramycin      Take 1 capsule (100 mg total) by mouth 2 (two) times daily for 10 doses.   Stop taking on: January 18, 2025  Quantity: 10 capsule  Refills: 0            CHANGE how you take these medications        Instructions Prescription details   predniSONE 10 MG Tabs  Commonly known as: Deltasone  Start taking on: January 13, 2025  What changed: See the new instructions.      Take 1 tablet (10 mg total) by mouth daily for 10 days, THEN 0.5 tablets (5 mg total) daily for 4 days,  THEN 0.5 tablets (5 mg total) every other day for 4 days.   Stop taking on: January 31, 2025  Quantity: 13 tablet  Refills: 0            CONTINUE taking these medications        Instructions Prescription details   acetaminophen 500 MG Tabs  Commonly known as: Tylenol Extra Strength      Take 1 tablet (500 mg total) by mouth every 6 (six) hours as needed.   Refills: 0     acetaZOLAMIDE 250 MG Tabs  Commonly known as: Diamox      Take 2 tablets (500 mg total) by mouth daily.   Quantity: 30 tablet  Refills: 0     albuterol 108 (90 Base) MCG/ACT Aers  Commonly known as: Ventolin HFA      Inhale 2 puffs into the lungs as needed.   Refills: 0     aspirin 81 MG Tabs      Take 2 tablets (162 mg total) by mouth daily as needed.   Refills: 0     digoxin 0.125 MG Tabs  Commonly known as: Lanoxin      Take 1 tablet (125 mcg total) by mouth daily.   Quantity: 30 tablet  Refills: 0     dilTIAZem HCl ER Coated Beads 360 MG Cp24  Commonly known as: CARDIZEM CD      Take 1 capsule (360 mg total) by mouth 2 (two) times daily.   Refills: 0     empagliflozin 10 MG Tabs  Commonly known as: Jardiance      Take 1 tablet (10 mg total) by mouth daily.   Quantity: 30 tablet  Refills: 3     estradiol 0.05 MG/24HR Ptwk  Commonly known as: Climara      Place 1 patch onto the skin once a week. As directed.   Refills: 0     furosemide 80 MG Tabs  Commonly known as: Lasix      Take 1 tablet (80 mg total) by mouth daily.   Quantity: 30 tablet  Refills: 3     lansoprazole 30 MG Cpdr  Commonly known as: Prevacid      Take 1 capsule (30 mg total) by mouth nightly.   Refills: 0     lidocaine-menthol 4-1 % Ptch      Place 1 patch onto the skin daily.   Quantity: 30 patch  Refills: 0     Loratadine 10 MG Caps      Take 10 mg by mouth nightly.   Refills: 0     montelukast 10 MG Tabs  Commonly known as: Singulair      Take 1 tablet (10 mg total) by mouth nightly.   Refills: 0     omega-3 fatty acids 1000 MG Caps  Commonly known as: Fish Oil      Take 1,000  mg by mouth daily.   Refills: 0     potassium chloride 20 MEQ Tbcr  Commonly known as: Klor-Con M20      Take 1 tablet (20 mEq total) by mouth nightly.   Refills: 0     spironolactone 25 MG Tabs  Commonly known as: Aldactone      Take 1 tablet (25 mg total) by mouth daily for 90 doses.   Stop taking on: March 17, 2025  Quantity: 90 tablet  Refills: 0     tiotropium 18 MCG Caps  Commonly known as: Spiriva Handihaler      Inhale 1 capsule (18 mcg total) into the lungs nightly for 30 doses.   Stop taking on: January 28, 2025  Quantity: 30 capsule  Refills: 0     Vitamin D 1000 units Tabs      Take 1,000 Units by mouth 2 (two) times daily.   Refills: 0               Where to Get Your Medications        These medications were sent to [id number] - [place], [place] - [address] [facility] [address] [facility] [address] [facility] [phone], [phone]  [address] [place] [address], [facility] [address]      Phone: [phone]   doxycycline 100 MG Caps  predniSONE 10 MG Tabs         Follow up Visits: Follow-up with PCP and pulmonary        Lidia Levy MD  1/13/2025  2:33 PM    > 35 min

## 2025-01-13 NOTE — PLAN OF CARE
.Discharge RN Summary: Patient has discharge order in. Patient to discharge home, self care. IV removed by Montrell KRAUSE.  Understands to follow up with PCP in 1 week and MD LIVINGSTON in 2 weeks. Patient understands to  prescriptions from the pharmacy.    Problem: Patient Centered Care  Goal: Patient preferences are identified and integrated in the patient's plan of care  Description: Interventions:  - What would you like us to know as we care for you? Patient excited to go home   - Provide timely, complete, and accurate information to patient/family  - Incorporate patient and family knowledge, values, beliefs, and cultural backgrounds into the planning and delivery of care  - Encourage patient/family to participate in care and decision-making at the level they choose  - Honor patient and family perspectives and choices  Outcome: Adequate for Discharge      Problem: RESPIRATORY - ADULT  Goal: Achieves optimal ventilation and oxygenation  Description: INTERVENTIONS:  - Assess for changes in respiratory status  - Assess for changes in mentation and behavior  - Position to facilitate oxygenation and minimize respiratory effort  - Oxygen supplementation based on oxygen saturation or ABGs  - Provide Smoking Cessation handout, if applicable  - Encourage broncho-pulmonary hygiene including cough, deep breathe, Incentive Spirometry  - Assess the need for suctioning and perform as needed  - Assess and instruct to report SOB or any respiratory difficulty  - Respiratory Therapy support as indicated  - Manage/alleviate anxiety  - Monitor for signs/symptoms of CO2 retention  Outcome: Adequate for Discharge     Problem: SKIN/TISSUE INTEGRITY - ADULT  Goal: Skin integrity remains intact  Description: INTERVENTIONS  - Assess and document risk factors for pressure ulcer development  - Assess and document skin integrity  - Monitor for areas of redness and/or skin breakdown  - Initiate interventions, skin care algorithm/standards of care as  needed  Outcome: Adequate for Discharge

## 2025-01-14 ENCOUNTER — APPOINTMENT (OUTPATIENT)
Dept: CT IMAGING | Facility: HOSPITAL | Age: 85
End: 2025-01-14
Attending: EMERGENCY MEDICINE
Payer: MEDICARE

## 2025-01-14 ENCOUNTER — HOSPITAL ENCOUNTER (EMERGENCY)
Facility: HOSPITAL | Age: 85
Discharge: HOME | End: 2025-01-14
Attending: EMERGENCY MEDICINE
Payer: MEDICARE

## 2025-01-14 VITALS
SYSTOLIC BLOOD PRESSURE: 127 MMHG | WEIGHT: 156 LBS | HEIGHT: 65 IN | OXYGEN SATURATION: 98 % | BODY MASS INDEX: 25.99 KG/M2 | DIASTOLIC BLOOD PRESSURE: 59 MMHG | TEMPERATURE: 98 F | HEART RATE: 76 BPM | RESPIRATION RATE: 15 BRPM

## 2025-01-14 DIAGNOSIS — S09.90XA INJURY OF HEAD, INITIAL ENCOUNTER: Primary | ICD-10-CM

## 2025-01-14 PROCEDURE — 99284 EMERGENCY DEPT VISIT MOD MDM: CPT

## 2025-01-14 PROCEDURE — 70450 CT HEAD/BRAIN W/O DYE: CPT | Performed by: EMERGENCY MEDICINE

## 2025-01-14 NOTE — PROGRESS NOTES
Provider Clarification     Additional clarification of the pneumonia being treated.    Type:   Community Acquired Pneumonia.    This note is part of the patient's medical record.

## 2025-01-14 NOTE — ED INITIAL ASSESSMENT (HPI)
Patient arrives via EMS for mechanical fall while getting out of recliner, hitting head on television stand. No LOC. C-collar in place via EMS. No c-spine tenderness on auscultation. A&Ox4. On baby ASA.

## 2025-01-15 NOTE — ED PROVIDER NOTES
Patient Seen in: [facility] Emergency Department    History     Chief Complaint   Patient presents with    Head Injury       HPI    Patient presents to the ED after falling while trying out of her recliner.  She states that instead of putting the foot rest down she flipped out instead and hit her head on a TV stand.  No LOC, no neck pain, no other complaints.  Denies vomiting or other symptoms.    History reviewed.   Past Medical History:    A-fib ([facility])    Anesthesia complication    Arrhythmia    AFIB    Arthritis    Asthma ([facility])    Back problem    COPD (chronic obstructive pulmonary disease) ([facility])    Deviated nasal septum    nasal septoplasty, turb reduction, smr of turbs    Difficult intubation    fiber optic if needed    Diverticulitis    colonoscopy     Diverticulosis of large intestine    Esophageal reflux    Extrinsic asthma, unspecified    Headache    Heart disease    Hepatitis    WAS TOLD SHE HAS HAD THIS WHEN SHE WAS 19 YEARS OLD    High blood pressure    High cholesterol    Irregular heart rate    Lichenoid keratosis    left chest-bx    Osteoarthritis    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    Paronychia    (RT); onychomycosis; debridement    Problems with swallowing    occasionally    Shortness of breath    2 L NC ALL THE TIME 24HR/7 DAYS PER WEEK    Unspecified essential hypertension    Visual impairment       History reviewed.   Past Surgical History:   Procedure Laterality Date    Adenoidectomy      Cataract      cataract extraction     Hysterectomy      Sinus surgery        DEVIATED SEPTUM    T&a      Tonsillectomy           Medications :  Prescriptions Prior to Admission[1]     Family History   Problem Relation Age of Onset    Ovarian Cancer Mother 76        endometrial, poss ovarian primary    Pulmonary Disease Sister         COPD    Skin cancer Other     Stroke Other     Other (Other) Other        Smoking Status:   Social History     Socioeconomic History    Marital status:     Tobacco Use    Smoking status: Former     Current packs/day: 0.00     Types: Cigarettes     Quit date: 1996     Years since quittin.0    Smokeless tobacco: Never   Vaping Use    Vaping status: Never Used   Substance and Sexual Activity    Alcohol use: Yes     Alcohol/week: 0.0 standard drinks of alcohol     Comment: one drink once a week    Drug use: Never   Other Topics Concern    Pt has a pacemaker No    Pt has a defibrillator No    Reaction to local anesthetic No    Caffeine Concern No       Constitutional and vital signs reviewed.      Social History and Family History elements reviewed from today, pertinent positives to the presenting problem noted.    Physical Exam     ED Triage Vitals [25 1702]   /67   Pulse 85   Resp 16   Temp 98.1 °F (36.7 °C)   Temp src Temporal   SpO2 97 %   O2 Device None (Room air)       All measures to prevent infection transmission during my interaction with the patient were taken. Handwashing was performed prior to and after the exam.  Stethoscope and any equipment used during my examination was cleaned with super sani-cloth germicidal wipes following the exam.     Physical Exam  Vitals and nursing note reviewed.   Constitutional:       General: She is not in acute distress.     Appearance: She is well-developed.   HENT:      Head: Normocephalic.      Comments: Contusion to the superior scalp  Eyes:      General:         Right eye: No discharge.         Left eye: No discharge.      Conjunctiva/sclera: Conjunctivae normal.   Neck:      Trachea: No tracheal deviation.   Cardiovascular:      Rate and Rhythm: Normal rate.   Pulmonary:      Effort: Pulmonary effort is normal. No respiratory distress.      Breath sounds: No stridor.   Abdominal:      General: There is no distension.      Palpations: Abdomen is soft.   Musculoskeletal:         General: No deformity.   Skin:     General: Skin is warm and dry.   Neurological:      Mental Status: She is alert and  oriented to person, place, and time.   Psychiatric:         Mood and Affect: Mood normal.         Behavior: Behavior normal.         ED Course      Labs Reviewed - No data to display    As Interpreted by me    Imaging Results Available and Reviewed while in ED: CT BRAIN OR HEAD (CPT=70450)    Result Date: 1/14/2025  CONCLUSION:  1. No acute intracranial process by noncontrast CT technique. 2. Nodular focus of soft tissue injury/small subcutaneous hematoma at the vertex. 3. Intracranial atherosclerosis. 4. Numerous bilateral parotid sialoliths. 5. Morphologic changes of chronic left sphenoid sinusitis.   Dictated by (CST): Ellis Garza MD on 1/14/2025 at 5:57 PM     Finalized by (CST): Ellis Garza MD on 1/14/2025 at 6:00 PM         ED Medications Administered: Medications - No data to display      MDM     Vitals:    01/14/25 1702 01/14/25 1800 01/14/25 1830   BP: 126/67 121/68 127/59   Pulse: 85 84 76   Resp: 16 18 15   Temp: 98.1 °F (36.7 °C)     TempSrc: Temporal     SpO2: 97% 96% 98%   Weight: 70.8 kg     Height: 165.1 cm (5' 5\")       *I personally reviewed and interpreted all ED vitals.    Pulse Ox: 98%, Room air, Normal       Differential Diagnosis/ Diagnostic Considerations: Head injury, ICH, other    Complicating Factors: The patient already has does not have any pertinent problems on file. to contribute to the complexity of this ED evaluation.    Medical Decision Making  Patient presents to the ED after a fall out of her recliner.  Head injury but CT scan shows no intracranial injury.  Patient otherwise doing well.  Recently discharged in the hospital after an inpatient stay for pneumonia.  She feels comfortable going home and will follow-up with her doctor.    Problems Addressed:  Injury of head, initial encounter: acute illness or injury    Amount and/or Complexity of Data Reviewed  Radiology: ordered and independent interpretation performed. Decision-making details documented in ED Course.      Details: I personally reviewed the patient's CT brain and it noted no ICH        Condition upon leaving the department: Stable    Disposition and Plan     Clinical Impression:  1. Injury of head, initial encounter        Disposition:  Discharge    Follow-up:  Bernda Wilkerson MD  [address]  [facility] [address]  [facility] [address]  [phone]    Schedule an appointment as soon as possible for a visit in 3 day(s)        Medications Prescribed:  Discharge Medication List as of 1/14/2025  6:45 PM                           [1] (Not in a hospital admission)

## 2025-01-18 ENCOUNTER — APPOINTMENT (OUTPATIENT)
Dept: GENERAL RADIOLOGY | Facility: HOSPITAL | Age: 85
End: 2025-01-18
Attending: EMERGENCY MEDICINE
Payer: MEDICARE

## 2025-01-18 ENCOUNTER — HOSPITAL ENCOUNTER (EMERGENCY)
Facility: HOSPITAL | Age: 85
Discharge: HOME | End: 2025-01-18
Attending: EMERGENCY MEDICINE
Payer: MEDICARE

## 2025-01-18 VITALS
WEIGHT: 156 LBS | HEIGHT: 65 IN | RESPIRATION RATE: 16 BRPM | DIASTOLIC BLOOD PRESSURE: 66 MMHG | OXYGEN SATURATION: 92 % | BODY MASS INDEX: 25.99 KG/M2 | HEART RATE: 79 BPM | SYSTOLIC BLOOD PRESSURE: 134 MMHG

## 2025-01-18 DIAGNOSIS — M54.9 MUSCULOSKELETAL BACK PAIN: Primary | ICD-10-CM

## 2025-01-18 LAB
ANION GAP SERPL CALC-SCNC: 5 MMOL/L (ref 0–18)
BASOPHILS # BLD AUTO: 0.05 X10(3) UL (ref 0–0.2)
BASOPHILS NFR BLD AUTO: 0.4 %
BUN BLD-MCNC: 16 MG/DL (ref 9–23)
BUN/CREAT SERPL: 18.8 (ref 10–20)
CALCIUM BLD-MCNC: 9.1 MG/DL (ref 8.7–10.4)
CHLORIDE SERPL-SCNC: 100 MMOL/L (ref 98–112)
CO2 SERPL-SCNC: 38 MMOL/L (ref 21–32)
CREAT BLD-MCNC: 0.85 MG/DL
DEPRECATED RDW RBC AUTO: 50.9 FL (ref 35.1–46.3)
EGFRCR SERPLBLD CKD-EPI 2021: 68 ML/MIN/1.73M2 (ref 60–?)
EOSINOPHIL # BLD AUTO: 0 X10(3) UL (ref 0–0.7)
EOSINOPHIL NFR BLD AUTO: 0 %
ERYTHROCYTE [DISTWIDTH] IN BLOOD BY AUTOMATED COUNT: 14.6 % (ref 11–15)
GLUCOSE BLD-MCNC: 218 MG/DL (ref 70–99)
HCT VFR BLD AUTO: 40.9 %
HGB BLD-MCNC: 13.3 G/DL
IMM GRANULOCYTES # BLD AUTO: 0.19 X10(3) UL (ref 0–1)
IMM GRANULOCYTES NFR BLD: 1.5 %
LYMPHOCYTES # BLD AUTO: 1.81 X10(3) UL (ref 1–4)
LYMPHOCYTES NFR BLD AUTO: 13.9 %
MCH RBC QN AUTO: 30.7 PG (ref 26–34)
MCHC RBC AUTO-ENTMCNC: 32.5 G/DL (ref 31–37)
MCV RBC AUTO: 94.5 FL
MONOCYTES # BLD AUTO: 0.93 X10(3) UL (ref 0.1–1)
MONOCYTES NFR BLD AUTO: 7.1 %
NEUTROPHILS # BLD AUTO: 10.07 X10 (3) UL (ref 1.5–7.7)
NEUTROPHILS # BLD AUTO: 10.07 X10(3) UL (ref 1.5–7.7)
NEUTROPHILS NFR BLD AUTO: 77.1 %
OSMOLALITY SERPL CALC.SUM OF ELEC: 304 MOSM/KG (ref 275–295)
PLATELET # BLD AUTO: 277 10(3)UL (ref 150–450)
POTASSIUM SERPL-SCNC: 4.8 MMOL/L (ref 3.5–5.1)
RBC # BLD AUTO: 4.33 X10(6)UL
SODIUM SERPL-SCNC: 143 MMOL/L (ref 136–145)
TROPONIN I SERPL HS-MCNC: 19 NG/L
WBC # BLD AUTO: 13.1 X10(3) UL (ref 4–11)

## 2025-01-18 PROCEDURE — 99284 EMERGENCY DEPT VISIT MOD MDM: CPT

## 2025-01-18 PROCEDURE — 84484 ASSAY OF TROPONIN QUANT: CPT | Performed by: EMERGENCY MEDICINE

## 2025-01-18 PROCEDURE — 93010 ELECTROCARDIOGRAM REPORT: CPT

## 2025-01-18 PROCEDURE — 85025 COMPLETE CBC W/AUTO DIFF WBC: CPT | Performed by: EMERGENCY MEDICINE

## 2025-01-18 PROCEDURE — 99285 EMERGENCY DEPT VISIT HI MDM: CPT

## 2025-01-18 PROCEDURE — 36415 COLL VENOUS BLD VENIPUNCTURE: CPT

## 2025-01-18 PROCEDURE — 71045 X-RAY EXAM CHEST 1 VIEW: CPT | Performed by: EMERGENCY MEDICINE

## 2025-01-18 PROCEDURE — 80048 BASIC METABOLIC PNL TOTAL CA: CPT | Performed by: EMERGENCY MEDICINE

## 2025-01-18 PROCEDURE — 93005 ELECTROCARDIOGRAM TRACING: CPT

## 2025-01-18 RX ORDER — LIDOCAINE 50 MG/G
1 PATCH TOPICAL EVERY 24 HOURS
Qty: 15 PATCH | Refills: 0 | Status: SHIPPED | OUTPATIENT
Start: 2025-01-18 | End: 2025-02-02

## 2025-01-18 NOTE — ED INITIAL ASSESSMENT (HPI)
Patient arrives with reports of back pain. Was recently admitted for SOB. On 3.5-4L O2 baseline.   Also reports cough, worsening back pain, and SOB.

## 2025-01-19 LAB
ATRIAL RATE: 81 BPM
P AXIS: 12 DEGREES
P-R INTERVAL: 156 MS
Q-T INTERVAL: 356 MS
QRS DURATION: 90 MS
QTC CALCULATION (BEZET): 413 MS
R AXIS: -6 DEGREES
T AXIS: 47 DEGREES
VENTRICULAR RATE: 81 BPM

## 2025-01-19 NOTE — ED PROVIDER NOTES
Patient Seen in: [facility] Emergency Department    History     Chief Complaint   Patient presents with    Back Pain    Shortness Of Breath       HPI    Patient presents to the ED complaining of pain in her left back.  She states pain starts just inside of her scapula and radiates down into her mid back.  She also reports a chronic cough that is somewhat worse recently.  Chronic shortness of breath as well.  History of COPD.  She denies fevers or chills, currently on oral steroids.    History reviewed.   Past Medical History:    A-fib ([facility])    Anesthesia complication    Arrhythmia    AFIB    Arthritis    Asthma ([facility])    Back problem    COPD (chronic obstructive pulmonary disease) ([facility])    Deviated nasal septum    nasal septoplasty, turb reduction, smr of turbs    Difficult intubation    fiber optic if needed    Diverticulitis    colonoscopy     Diverticulosis of large intestine    Esophageal reflux    Extrinsic asthma, unspecified    Headache    Heart disease    Hepatitis    WAS TOLD SHE HAS HAD THIS WHEN SHE WAS 19 YEARS OLD    High blood pressure    High cholesterol    Irregular heart rate    Lichenoid keratosis    left chest-bx    Osteoarthritis    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    Paronychia    (RT); onychomycosis; debridement    Problems with swallowing    occasionally    Shortness of breath    2 L NC ALL THE TIME 24HR/7 DAYS PER WEEK    Unspecified essential hypertension    Visual impairment       History reviewed.   Past Surgical History:   Procedure Laterality Date    Adenoidectomy      Cataract      cataract extraction     Hysterectomy      Sinus surgery        DEVIATED SEPTUM    T&a      Tonsillectomy           Medications :  Prescriptions Prior to Admission[1]     Family History   Problem Relation Age of Onset    Ovarian Cancer Mother 76        endometrial, poss ovarian primary    Pulmonary Disease Sister         COPD    Skin cancer Other     Stroke Other     Other (Other)  Other        Smoking Status:   Social History     Socioeconomic History    Marital status:    Tobacco Use    Smoking status: Former     Current packs/day: 0.00     Types: Cigarettes     Quit date: 1996     Years since quittin.0    Smokeless tobacco: Never   Vaping Use    Vaping status: Never Used   Substance and Sexual Activity    Alcohol use: Not Currently     Comment: one drink once a week    Drug use: Never   Other Topics Concern    Pt has a pacemaker No    Pt has a defibrillator No    Reaction to local anesthetic No    Caffeine Concern No       Constitutional and vital signs reviewed.      Social History and Family History elements reviewed from today, pertinent positives to the presenting problem noted.    Physical Exam     ED Triage Vitals [25 1412]   /68   Pulse 88   Resp 24   Temp    Temp src    SpO2 97 %   O2 Device Nasal cannula       All measures to prevent infection transmission during my interaction with the patient were taken. Handwashing was performed prior to and after the exam.  Stethoscope and any equipment used during my examination was cleaned with super sani-cloth germicidal wipes following the exam.     Physical Exam  Vitals and nursing note reviewed.   Constitutional:       General: She is not in acute distress.     Appearance: She is well-developed. She is not ill-appearing or toxic-appearing.   HENT:      Head: Normocephalic and atraumatic.   Eyes:      General:         Right eye: No discharge.         Left eye: No discharge.      Conjunctiva/sclera: Conjunctivae normal.   Neck:      Trachea: No tracheal deviation.   Cardiovascular:      Rate and Rhythm: Normal rate.   Pulmonary:      Effort: Pulmonary effort is normal. No respiratory distress.      Breath sounds: Normal breath sounds. No stridor.   Abdominal:      General: There is no distension.      Palpations: Abdomen is soft.   Musculoskeletal:         General: No deformity.      Comments: Tenderness to the  left rhomboid muscles and left thoracic paraspinal musculature.  Pain is reproduced with palpation of these muscle bodies.   Skin:     General: Skin is warm and dry.   Neurological:      Mental Status: She is alert and oriented to person, place, and time.   Psychiatric:         Mood and Affect: Mood normal.         Behavior: Behavior normal.         ED Course        Labs Reviewed   BASIC METABOLIC PANEL (8) - Abnormal; Notable for the following components:       Result Value    Glucose 218 (*)     CO2 38.0 (*)     Calculated Osmolality 304 (*)     All other components within normal limits   CBC WITH DIFFERENTIAL WITH PLATELET - Abnormal; Notable for the following components:    WBC 13.1 (*)     RDW-SD 50.9 (*)     Neutrophil Absolute Prelim 10.07 (*)     Neutrophil Absolute 10.07 (*)     All other components within normal limits   TROPONIN I HIGH SENSITIVITY - Normal   RAINBOW DRAW LAVENDER   RAINBOW DRAW LIGHT GREEN   RAINBOW DRAW BLUE     EKG    Rate, intervals and axes as noted on EKG Report.  Rate: Normal, 81 bpm  Rhythm: Sinus Rhythm  Reading: Inferior Q waves, normal intervals, abnormal EKG           As Interpreted by me    Imaging Results Available and Reviewed while in ED: XR CHEST AP PORTABLE  (CPT=71045)    Result Date: 1/18/2025  CONCLUSION:  1. Small pleural effusions and associated basilar atelectasis, with or without superimposed pneumonia.   2. Pulmonary interstitial edema.    Dictated by (CST): Grant Ferreira MD on 1/18/2025 at 3:20 PM     Finalized by (CST): Grant Ferreira MD on 1/18/2025 at 3:22 PM         ED Medications Administered: Medications - No data to display      MDM     Vitals:    01/18/25 1445 01/18/25 1515 01/18/25 1600 01/18/25 1615   BP:   131/63 134/66   Pulse: 83 80 86 79   Resp: 18 16     SpO2: 94% 97% 97% 92%   Weight:       Height:         *I personally reviewed and interpreted all ED vitals.    Pulse Ox: 92%, Room air, Normal     Monitor Interpretation:   normal sinus rhythm  as interpreted by me.  The cardiac monitor was ordered to monitor heart rate.    Differential Diagnosis/ Diagnostic Considerations: COPD flare, back muscle strain, pneumonia, pneumothorax, other    Complicating Factors: The patient already has does not have any pertinent problems on file. to contribute to the complexity of this ED evaluation.    Medical Decision Making  Patient presents to the ED with left upper back pain.  Symptoms seem consistent with a muscular strain given increased cough and reproduced pain with palpation of rhomboid and paraspinal muscle bodies.  X-ray without pneumonia or pneumothorax.  Laboratory test without concerning findings.  Oxygen saturation 96% on 2 L which is lower than the patient's usual oxygen support.  Stable for discharge with pain control and outpatient follow-up    Problems Addressed:  Musculoskeletal back pain: acute illness or injury    Amount and/or Complexity of Data Reviewed  Labs: ordered. Decision-making details documented in ED Course.  Radiology: ordered and independent interpretation performed. Decision-making details documented in ED Course.     Details: I peronally reviewed the patient's chest x-ray image and noted no pneumothorax  ECG/medicine tests: ordered and independent interpretation performed. Decision-making details documented in ED Course.    Risk  Prescription drug management.        Condition upon leaving the department: Stable    Disposition and Plan     Clinical Impression:  1. Musculoskeletal back pain        Disposition:  Discharge    Follow-up:  Brenda Wilkerson MD  [address] [id number] [phone]    Schedule an appointment as soon as possible for a visit in 3 day(s)        Medications Prescribed:  Discharge Medication List as of 1/18/2025  4:16 PM        START taking these medications    Details   lidocaine 5 % External Patch Place 1 patch onto the skin daily for 15 days., Normal, Disp-15 patch, R-0                               [1] (Not in a hospital admission)

## 2025-01-21 ENCOUNTER — APPOINTMENT (OUTPATIENT)
Dept: GENERAL RADIOLOGY | Facility: HOSPITAL | Age: 85
End: 2025-01-21
Attending: EMERGENCY MEDICINE
Payer: MEDICARE

## 2025-01-21 ENCOUNTER — HOSPITAL ENCOUNTER (EMERGENCY)
Facility: HOSPITAL | Age: 85
Discharge: HOME | End: 2025-01-21
Attending: EMERGENCY MEDICINE
Payer: MEDICARE

## 2025-01-21 VITALS
TEMPERATURE: 98 F | HEART RATE: 85 BPM | OXYGEN SATURATION: 98 % | RESPIRATION RATE: 23 BRPM | SYSTOLIC BLOOD PRESSURE: 114 MMHG | DIASTOLIC BLOOD PRESSURE: 53 MMHG

## 2025-01-21 DIAGNOSIS — R10.13 DYSPEPSIA: ICD-10-CM

## 2025-01-21 DIAGNOSIS — J44.1 COPD EXACERBATION (HCC): Primary | ICD-10-CM

## 2025-01-21 LAB
ANION GAP SERPL CALC-SCNC: 5 MMOL/L (ref 0–18)
ATRIAL RATE: 82 BPM
BASOPHILS # BLD AUTO: 0.07 X10(3) UL (ref 0–0.2)
BASOPHILS NFR BLD AUTO: 0.3 %
BUN BLD-MCNC: 20 MG/DL (ref 9–23)
BUN/CREAT SERPL: 26.3 (ref 10–20)
CALCIUM BLD-MCNC: 8.4 MG/DL (ref 8.7–10.4)
CHLORIDE SERPL-SCNC: 102 MMOL/L (ref 98–112)
CO2 SERPL-SCNC: 34 MMOL/L (ref 21–32)
CREAT BLD-MCNC: 0.76 MG/DL
DEPRECATED RDW RBC AUTO: 50.5 FL (ref 35.1–46.3)
EGFRCR SERPLBLD CKD-EPI 2021: 78 ML/MIN/1.73M2 (ref 60–?)
EOSINOPHIL # BLD AUTO: 0 X10(3) UL (ref 0–0.7)
EOSINOPHIL NFR BLD AUTO: 0 %
ERYTHROCYTE [DISTWIDTH] IN BLOOD BY AUTOMATED COUNT: 14.6 % (ref 11–15)
FLUAV + FLUBV RNA SPEC NAA+PROBE: NEGATIVE
FLUAV + FLUBV RNA SPEC NAA+PROBE: NEGATIVE
GLUCOSE BLD-MCNC: 191 MG/DL (ref 70–99)
HCT VFR BLD AUTO: 43.5 %
HGB BLD-MCNC: 13.8 G/DL
IMM GRANULOCYTES # BLD AUTO: 0.2 X10(3) UL (ref 0–1)
IMM GRANULOCYTES NFR BLD: 0.9 %
LYMPHOCYTES # BLD AUTO: 1.73 X10(3) UL (ref 1–4)
LYMPHOCYTES NFR BLD AUTO: 8.1 %
MCH RBC QN AUTO: 29.7 PG (ref 26–34)
MCHC RBC AUTO-ENTMCNC: 31.7 G/DL (ref 31–37)
MCV RBC AUTO: 93.8 FL
MONOCYTES # BLD AUTO: 1.12 X10(3) UL (ref 0.1–1)
MONOCYTES NFR BLD AUTO: 5.3 %
NEUTROPHILS # BLD AUTO: 18.19 X10 (3) UL (ref 1.5–7.7)
NEUTROPHILS # BLD AUTO: 18.19 X10(3) UL (ref 1.5–7.7)
NEUTROPHILS NFR BLD AUTO: 85.4 %
NT-PROBNP SERPL-MCNC: 341 PG/ML (ref ?–450)
OSMOLALITY SERPL CALC.SUM OF ELEC: 300 MOSM/KG (ref 275–295)
P AXIS: -6 DEGREES
P-R INTERVAL: 154 MS
PLATELET # BLD AUTO: 245 10(3)UL (ref 150–450)
POTASSIUM SERPL-SCNC: 3.8 MMOL/L (ref 3.5–5.1)
Q-T INTERVAL: 360 MS
QRS DURATION: 92 MS
QTC CALCULATION (BEZET): 420 MS
R AXIS: -16 DEGREES
RBC # BLD AUTO: 4.64 X10(6)UL
RSV RNA SPEC NAA+PROBE: NEGATIVE
SARS-COV-2 RNA RESP QL NAA+PROBE: NOT DETECTED
SODIUM SERPL-SCNC: 141 MMOL/L (ref 136–145)
T AXIS: 48 DEGREES
TROPONIN I SERPL HS-MCNC: 13 NG/L
VENTRICULAR RATE: 82 BPM
WBC # BLD AUTO: 21.3 X10(3) UL (ref 4–11)

## 2025-01-21 PROCEDURE — 84484 ASSAY OF TROPONIN QUANT: CPT | Performed by: EMERGENCY MEDICINE

## 2025-01-21 PROCEDURE — 80048 BASIC METABOLIC PNL TOTAL CA: CPT | Performed by: EMERGENCY MEDICINE

## 2025-01-21 PROCEDURE — 94644 CONT INHLJ TX 1ST HOUR: CPT

## 2025-01-21 PROCEDURE — 36415 COLL VENOUS BLD VENIPUNCTURE: CPT

## 2025-01-21 PROCEDURE — 99285 EMERGENCY DEPT VISIT HI MDM: CPT

## 2025-01-21 PROCEDURE — 0241U SARS-COV-2/FLU A AND B/RSV BY PCR (GENEXPERT): CPT | Performed by: EMERGENCY MEDICINE

## 2025-01-21 PROCEDURE — 93005 ELECTROCARDIOGRAM TRACING: CPT

## 2025-01-21 PROCEDURE — 85025 COMPLETE CBC W/AUTO DIFF WBC: CPT | Performed by: EMERGENCY MEDICINE

## 2025-01-21 PROCEDURE — 71045 X-RAY EXAM CHEST 1 VIEW: CPT | Performed by: EMERGENCY MEDICINE

## 2025-01-21 PROCEDURE — 93010 ELECTROCARDIOGRAM REPORT: CPT

## 2025-01-21 PROCEDURE — 94799 UNLISTED PULMONARY SVC/PX: CPT

## 2025-01-21 PROCEDURE — 83880 ASSAY OF NATRIURETIC PEPTIDE: CPT | Performed by: EMERGENCY MEDICINE

## 2025-01-21 RX ORDER — ALBUTEROL SULFATE 5 MG/ML
10 SOLUTION RESPIRATORY (INHALATION) ONCE
Status: COMPLETED | OUTPATIENT
Start: 2025-01-21 | End: 2025-01-21

## 2025-01-21 RX ORDER — FLUTICASONE PROPIONATE 110 UG/1
1 AEROSOL, METERED RESPIRATORY (INHALATION) 2 TIMES DAILY
Qty: 12 G | Refills: 0 | Status: SHIPPED | OUTPATIENT
Start: 2025-01-21

## 2025-01-21 RX ORDER — FAMOTIDINE 20 MG/1
20 TABLET, FILM COATED ORAL 2 TIMES DAILY PRN
Qty: 30 TABLET | Refills: 0 | Status: SHIPPED | OUTPATIENT
Start: 2025-01-21 | End: 2025-02-20

## 2025-01-21 RX ORDER — ONDANSETRON 4 MG/1
4 TABLET, ORALLY DISINTEGRATING ORAL EVERY 4 HOURS PRN
Qty: 12 TABLET | Refills: 0 | Status: SHIPPED | OUTPATIENT
Start: 2025-01-21 | End: 2025-01-28

## 2025-01-21 NOTE — ED INITIAL ASSESSMENT (HPI)
C/o difficulty breathing starting this am, increased swelling,  able to talk in full sentences but on normal 3L was at 82%

## 2025-01-22 ENCOUNTER — OFFICE VISIT (OUTPATIENT)
Dept: CARDIOLOGY CLINIC | Facility: HOSPITAL | Age: 85
End: 2025-01-22
Attending: NURSE PRACTITIONER
Payer: MEDICARE

## 2025-01-22 VITALS — OXYGEN SATURATION: 72 % | SYSTOLIC BLOOD PRESSURE: 152 MMHG | HEART RATE: 57 BPM | DIASTOLIC BLOOD PRESSURE: 62 MMHG

## 2025-01-22 DIAGNOSIS — J96.21 ACUTE ON CHRONIC HYPOXIC RESPIRATORY FAILURE (HCC): ICD-10-CM

## 2025-01-22 DIAGNOSIS — I50.9 ACUTE ON CHRONIC CONGESTIVE HEART FAILURE, UNSPECIFIED HEART FAILURE TYPE (HCC): Primary | ICD-10-CM

## 2025-01-22 DIAGNOSIS — I48.0 PAROXYSMAL ATRIAL FIBRILLATION (HCC): ICD-10-CM

## 2025-01-22 DIAGNOSIS — J44.9 CHRONIC OBSTRUCTIVE PULMONARY DISEASE, UNSPECIFIED COPD TYPE (HCC): ICD-10-CM

## 2025-01-22 DIAGNOSIS — I10 PRIMARY HYPERTENSION: ICD-10-CM

## 2025-01-22 PROCEDURE — G2212 PROLONG OUTPT/OFFICE VIS: HCPCS | Performed by: NURSE PRACTITIONER

## 2025-01-22 PROCEDURE — 99205 OFFICE O/P NEW HI 60 MIN: CPT | Performed by: NURSE PRACTITIONER

## 2025-01-22 RX ORDER — POTASSIUM CHLORIDE 1500 MG/1
TABLET, EXTENDED RELEASE ORAL
Status: COMPLETED
Start: 2025-01-22 | End: 2025-01-22

## 2025-01-22 RX ORDER — FUROSEMIDE 10 MG/ML
INJECTION INTRAMUSCULAR; INTRAVENOUS
Status: COMPLETED
Start: 2025-01-22 | End: 2025-01-22

## 2025-01-22 RX ORDER — ALBUTEROL SULFATE 0.83 MG/ML
SOLUTION RESPIRATORY (INHALATION)
Status: COMPLETED
Start: 2025-01-22 | End: 2025-01-22

## 2025-01-22 RX ADMIN — FUROSEMIDE 40 MG: 10 INJECTION INTRAMUSCULAR; INTRAVENOUS at 14:48:00

## 2025-01-22 RX ADMIN — ALBUTEROL SULFATE 2.5 MG: 0.83 SOLUTION RESPIRATORY (INHALATION) at 14:17:00

## 2025-01-22 RX ADMIN — POTASSIUM CHLORIDE 40 MEQ: 1500 TABLET, EXTENDED RELEASE ORAL at 14:54:00

## 2025-01-22 NOTE — PROGRESS NOTES
[facility]    Yesenia Berkowitz Patient Status:  Outpatient    1940 MRN [id number]   Location [facility] MD Dr. Malcolm ROPER       Yesenia Berkowitz is a 84 year old female who presents to clinic after recent hospitalization for ***.     Hospitalized    Problem List:  ***       Subjective:***  154-157 weight ranging at home      Review of Systems:  Constitutional: negative for fatigue, chills or fever  Respiratory: negative for cough,  negative hemoptysis and wheezing  Cardiovascular: negative for chest pain, exertional chest pressure/discomfort, near-syncope, orthopnea and palpitations  Gastrointestinal: negative for abdominal pain, diarrhea, melena, nausea and vomiting  Hematologic/lymphatic: negative  Musculoskeletal: negative for muscle weakness and myalgias    Objective:  Lab Results   Component Value Date/Time    WBC 21.3 (H) 2025 08:51 AM    HGB 13.8 2025 08:51 AM    HCT 43.5 2025 08:51 AM    .0 2025 08:51 AM    CREATSERUM 0.76 2025 08:51 AM    BUN 20 2025 08:51 AM     2025 08:51 AM    K 3.8 2025 08:51 AM     2025 08:51 AM    CO2 34.0 (H) 2025 08:51 AM     (H) 2025 08:51 AM    CA 8.4 (L) 2025 08:51 AM    ALB 3.0 (L) 2024 04:46 AM    ALKPHO 66 2024 05:26 AM    BILT 0.2 2024 05:26 AM    TP 4.9 (L) 2024 05:26 AM    AST 9 2024 05:26 AM    ALT 18 2024 05:26 AM    PTT 24.3 2024 12:21 AM    INR 1.08 2024 12:21 AM    PTP 14.6 2024 12:21 AM    T4F 0.9 2024 05:02 AM    TSH 0.185 (L) 2024 05:02 AM    LIP 30 2024 10:31 AM    DDIMER 0.47 2024 05:35 PM    MG 1.9 2024 04:40 AM    PHOS 3.4 2024 04:46 AM    TROP <0.045 2020 06:00 AM    PGLU 143 (H) 2024 08:58 AM       Clinical labs drawn by MA: BMP, CBC-Results reviewed with patient and family    /62   Pulse 78   SpO2  98%     Date Clinic Weight (lbs) Home Weight (lbs) Other weight Intervention   24   169    24   175    24   151    25  154 161    25 155 153                General appearance: {Exam; general:50857::\"alert\",\"appears stated age\",\"cooperative\"}  Neck: {Exam; neck:59003::\"no adenopathy\",\"no carotid bruit\",\"no JVD\",\"supple, symmetrical, trachea midline\",\"thyroid not enlarged, symmetric, no tenderness/mass/nodules\"}  Lungs: {Exam; lun::\"clear to auscultation bilaterally\"}  Chest wall: {exam; chest wall:09098::\"no tenderness\"}  Heart: {MPN_HEART EXAM:5510::\"S1, S2 normal, no murmur, click, rub or gallop, regular rate and rhythm\"}  Abdomen: {Exam; abdomen:46125::\"soft, non-tender; bowel sounds normal; no masses,  no organomegaly\"}  Extremities: {Exam; extremity:2149::\"extremities normal, atraumatic, no cyanosis or edema\"}  Pulses: {Findings; pulse exam:47397::\"2+ and symmetric\"}  Neurologic: {Exam; neuro:88404::\"Grossly normal\"}    Diagnostic Tests:  CXR    6 Minute Walk     Results at Rest  Resting SpO2 (minimum):   Resting HR (max):   Resting Oxygen Device:      Number of laps made:   Distance Ambulated (ft):         Recovery Results  Minutes required to return to resting level:   Recovery SpO2:   Recovery HR:   Recovery oxygen flow (liters per minute):      Repeat walk:      Exertion Scale:   Angina Scale:   Dyspnea Scale:       Vaccines:  Flu:  PNA:    Education:  Patient and family instructed at length regarding clinic procedures, hours, purpose of clinic visits, sodium restricted diet, low sodium foods, fluid restrictions, daily weights, medication regimen s/s of pneumonia and copd exacerbation and when to call APN/clinic.Patient and family receptive.      Assessment:  COPD  -Home on prednisone taper  -ICS/LAMA/LABA***  -IS/Acapella 4 times daily 10 times each  -Keep well hydrated 32-64 oz per day    HFpEF     Plan:  Start taking nebs daily and every 6 hours as needed for increased  cough, wheezing or shortness of breath    Use your incentive spirometer 2 sets of 10 breaths daily - use after using your nebulizer    Have influenza vaccine if appropriate     Continue all your same medications     Call if having any dizziness, lightheadedness, heart racing, palpitations, chest pain, shortness of breath, coughing, swelling, weight gain, fever, chills or worsening symptoms.      Weigh yourself daily in the morning before breakfast, call if gaining 3 lbs or more overnight or more than 5 lbs in one week.     Follow 2000 mg sodium restricted , heart healthy diet,     limit daily fluids to 48-64 ounces per day      Follow up with the specialty care clinic in 1 week         I spent greater than *** minutes with this patient providing counseling, coordination of care and education related specifically to heart failure.      Michelle Carson, APRN  1/22/2025           not on AC  -on diltiazem and digoxin  -hot historically on a/c due to bruising/bleeding risk/Hx GIB, o/p discussion of watchman in process    HTN  -128/62, stable  -on diamox, furosemide, jardiance, spironolactone     Plan:  Start taking nebs daily and every 6 hours as needed for increased cough, wheezing or shortness of breath    Use your incentive spirometer 2 sets of 10 breaths daily - use after using your nebulizer    Have influenza vaccine if appropriate     Continue all your same medications     Call if having any dizziness, lightheadedness, heart racing, palpitations, chest pain, shortness of breath, coughing, swelling, weight gain, fever, chills or worsening symptoms.      Weigh yourself daily in the morning before breakfast, call if gaining 3 lbs or more overnight or more than 5 lbs in one week.     Follow 2000 mg sodium restricted , heart healthy diet,     limit daily fluids to 48-64 ounces per day      Follow up with the specialty care clinic in 1 week         I spent 95 minutes with this patient providing counseling, coordination of care and education related specifically to heart failure.      Michelle Carson, APRN  1/22/2025

## 2025-01-22 NOTE — PROGRESS NOTES
Yesenia arrived in wheelchair, on 3.5L oxygen per concentrator. She is accompanied by her son. She reports living alone with son 10-15 minutes away.    Subjective: Yesenia reports SOB with activity and some at rest. Feels some improvement after discharge then increased SOB lead to ED visits. 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    RE: COPD:  Pulmonologist Dr. Boyle, next appt 1/28  Home equipment: Nebulizer YES (doesn't use)   oxygen YES 3.5L  IS YES, no, acupella  NO inhalers YES  Intervention:  6MW NO  Nebulizer treatment YES Labs 1/21    RE: CHF:  Weight:  Today 155.8 LB  Home 1/20 154 lb (153-155) Discharge 1/13/25: 161 lb  Labs completed: at lab BMP/PROBNP +: ED 1/21  Device:  CardioMEMS Interrogation N/A  IV placed:  NO  Measurements:  RLE Calf: 14.25\" Ankle: 9.25\"   LLE Calf: 15.25\" Ankle: 10\"  Medication given: nebulizer treatment, lasix 40 mg IV push, potassium 40 meq    Patient and medication assessed. Discussed with APN. Treatments completed: leg measurements. Nebulizer treatment, IV and oral medication administration, tubi  size E application. After IV and oral medication administration and ambulation to bathroom, patient returned feeling lightheaded. Vitals reassessed, BP and HR, stable, oxygen on 4L NC portable concentrator was in the 70's.  Assessed equipment and tubing, tear near ear in tubing present. Changed tubing and sats improved to 90's. Medication given: nebulizer treatment, lasix 40 mg IV push, potassium 40 meq. Extensive education on disease management including IS use, NEB use, compression stocking on during the day and off at night.  Reviewed AVS instructions. Patient verbalized understanding.

## 2025-01-22 NOTE — ED PROVIDER NOTES
Patient Seen in: [facility] Emergency Department    History     Chief Complaint   Patient presents with    Difficulty Breathing     Stated Complaint:     HPI    HPI: Yesenia Berkowitz is a 84 year old female with a history of COPD who presents with sob, nausea.  Has severe copd recently hospitalized on long steroid taper just finished abx. On home oxygen.  . Worsening symptoms for the past couple days..    no fever.  no rhinorrhea.   Past Medical History:    A-fib ([facility])    Anesthesia complication    Arrhythmia    AFIB    Arthritis    Asthma ([facility])    Back problem    COPD (chronic obstructive pulmonary disease) ([facility])    Deviated nasal septum    nasal septoplasty, turb reduction, smr of turbs    Difficult intubation    fiber optic if needed    Diverticulitis    colonoscopy     Diverticulosis of large intestine    Esophageal reflux    Extrinsic asthma, unspecified    Headache    Heart disease    Hepatitis    WAS TOLD SHE HAS HAD THIS WHEN SHE WAS 19 YEARS OLD    High blood pressure    High cholesterol    Irregular heart rate    Lichenoid keratosis    left chest-bx    Osteoarthritis    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    Paronychia    (RT); onychomycosis; debridement    Problems with swallowing    occasionally    Shortness of breath    2 L NC ALL THE TIME 24HR/7 DAYS PER WEEK    Unspecified essential hypertension    Visual impairment       Past Surgical History:   Procedure Laterality Date    Adenoidectomy      Cataract      cataract extraction     Hysterectomy      Sinus surgery        DEVIATED SEPTUM    T&a      Tonsillectomy              Family History   Problem Relation Age of Onset    Ovarian Cancer Mother 76        endometrial, poss ovarian primary    Pulmonary Disease Sister         COPD    Skin cancer Other     Stroke Other     Other (Other) Other        Social History     Socioeconomic History    Marital status:    Tobacco Use    Smoking status: Former     Current packs/day: 0.00      Types: Cigarettes     Quit date: 1996     Years since quittin.0    Smokeless tobacco: Never   Vaping Use    Vaping status: Never Used   Substance and Sexual Activity    Alcohol use: Not Currently     Comment: one drink once a week    Drug use: Never   Other Topics Concern    Pt has a pacemaker No    Pt has a defibrillator No    Reaction to local anesthetic No    Caffeine Concern No     Social Drivers of Health     Food Insecurity: No Food Insecurity (1/10/2025)    Food Insecurity     Food Insecurity: Never true   Transportation Needs: No Transportation Needs (1/10/2025)    Transportation Needs     Lack of Transportation: No   Housing Stability: Low Risk  (1/10/2025)    Housing Stability     Housing Instability: No       Review of Systems    Positive for stated complaint:   Other systems are as noted in HPI.  Constitutional and vital signs reviewed.      All other systems reviewed and negative except as noted above.    PSFH elements reviewed from today and agreed except as otherwise stated in HPI.    Physical Exam     ED Triage Vitals   BP 25 0822 113/62   Pulse 25 0822 82   Resp 25 0822 22   Temp 25 0841 98.3 °F (36.8 °C)   Temp src 25 0841 Temporal   SpO2 25 0822 94 %   O2 Device --        Current:/53   Pulse 85   Temp 98.3 °F (36.8 °C) (Temporal)   Resp 23   SpO2 98%   PULSE OX nl        Physical Exam  Constitutional: awake alert no sig resp distress  Eyes: EOMI, PERRL, conjunctiva normal.  HENT: Atraumatic, oropharynx moist. Neck supple.  Respiratory: prolonged exp phase with diminished bs bilateral no acc use  Cardiovascular: rr,   Abdomen : Normal Bowel sounds, Soft, no tenderness, not distended  Back: Atraumatic, no tenderness, no CVA tenderness  Musculoskeletal: No sig edema, atraumatic  Skin: Warm, dry, no rash.  Neurologic: Alert & oriented x 3, no focal deficits  Psych: Calm, cooperative, nl affect        ED Course     Labs Reviewed   CBC WITH  DIFFERENTIAL WITH PLATELET - Abnormal; Notable for the following components:       Result Value    WBC 21.3 (*)     RDW-SD 50.5 (*)     Neutrophil Absolute Prelim 18.19 (*)     Neutrophil Absolute 18.19 (*)     Monocyte Absolute 1.12 (*)     All other components within normal limits   BASIC METABOLIC PANEL (8) - Abnormal; Notable for the following components:    Glucose 191 (*)     CO2 34.0 (*)     BUN/CREA Ratio 26.3 (*)     Calcium, Total 8.4 (*)     Calculated Osmolality 300 (*)     All other components within normal limits   TROPONIN I HIGH SENSITIVITY - Normal   PRO BETA NATRIURETIC PEPTIDE - Normal   SARS-COV-2/FLU A AND B/RSV BY PCR (GENEXPERT) - Normal    Narrative:     This test is intended for the qualitative detection and differentiation of SARS-CoV-2, influenza A, influenza B, and respiratory syncytial virus (RSV) viral RNA in nasopharyngeal or nares swabs from individuals suspected of respiratory viral infection consistent with COVID-19 by their healthcare provider. Signs and symptoms of respiratory viral infection due to SARS-CoV-2, influenza, and RSV can be similar.    Test performed using the Xpert Xpress SARS-CoV-2/FLU/RSV (real time RT-PCR)  assay on the [employer] instrument, [employer], [place] [address].   This test is being used under the Food and Drug Administration's Emergency Use Authorization.    The authorized Fact Sheet for Healthcare Providers for this assay is available upon request from the laboratory.   RAINBOW DRAW LAVENDER   RAINBOW DRAW LIGHT GREEN   RAINBOW DRAW BLUE   RAINBOW DRAW GOLD     EKG    Rate, intervals and axes as noted on EKG Report.  Rate: 82  Rhythm: Sinus Rhythm  Reading: nsr with lvh and non spec st changes           MDM       Monitor Interpretation:  Nsr 78    Radiology Interpretation:  XR CHEST AP PORTABLE  (CPT=71045)    Result Date: 1/21/2025  CONCLUSION:   No new abnormality.  Persistent small bilateral pleural effusions and bibasilar atelectasis.  Superimposed  pneumonia is not excluded.  Persistent pulmonary edema.      Dictated by (CST): Isaac Holley MD on 1/21/2025 at 10:27 AM     Finalized by (CST): Isaac Holley MD on 1/21/2025 at 10:55 AM           I reviewed xray noted no infiltrates no pneumothorax      Medical Decision Making  Problems Addressed:  COPD exacerbation (HCC): chronic illness or injury with exacerbation, progression, or side effects of treatment  Dyspepsia: acute illness or injury     Details: Likely due to steroids and abx.    Amount and/or Complexity of Data Reviewed  Labs: ordered. Decision-making details documented in ED Course.  Radiology: ordered and independent interpretation performed. Decision-making details documented in ED Course.  ECG/medicine tests: ordered and independent interpretation performed. Decision-making details documented in ED Course.  Discussion of management or test interpretation with external provider(s): Spoke with Dr. Montana tse will fu in office tomorrow.    Risk  Prescription drug management.          Disposition and Plan     Clinical Impression:  1. COPD exacerbation (HCC)    2. Dyspepsia        Disposition:  Discharge    Follow-up:  Bing Boyle MD  [address]  [place] [address]  [phone]    Follow up        Medications Prescribed:  Discharge Medication List as of 1/21/2025 12:52 PM        START taking these medications    Details   fluticasone propionate 110 MCG/ACT Inhalation Aerosol Inhale 1 puff into the lungs 2 (two) times daily., Normal, Disp-12 g, R-0      ondansetron 4 MG Oral Tablet Dispersible Take 1 tablet (4 mg total) by mouth every 4 (four) hours as needed for Nausea., Normal, Disp-12 tablet, R-0      famotidine (PEPCID) 20 MG Oral Tab Take 1 tablet (20 mg total) by mouth 2 (two) times daily as needed for Heartburn., Normal, Disp-30 tablet, R-0

## 2025-01-22 NOTE — PATIENT INSTRUCTIONS
Start taking nebs daily and every 6 hours as needed for increased cough, wheezing or shortness of breath    Use your incentive spirometer 2 sets of 10 breaths daily - use after using your nebulizer    Have influenza vaccine if appropriate     Continue all your same medications     Call if having any dizziness, lightheadedness, heart racing, palpitations, chest pain, shortness of breath, coughing, swelling, weight gain, fever, chills or worsening symptoms.      Weigh yourself daily in the morning before breakfast, call if gaining 3 lbs or more overnight or more than 5 lbs in one week.     Follow 2000 mg sodium restricted , heart healthy diet,     limit daily fluids to 48-64 ounces per day      Follow up with the specialty care clinic in 1 week

## 2025-01-28 ENCOUNTER — TELEPHONE (OUTPATIENT)
Dept: CARDIOLOGY CLINIC | Facility: HOSPITAL | Age: 85
End: 2025-01-28

## 2025-01-28 PROBLEM — I48.0 PAROXYSMAL ATRIAL FIBRILLATION (HCC): Status: ACTIVE | Noted: 2025-01-28

## 2025-01-28 PROBLEM — I10 PRIMARY HYPERTENSION: Status: ACTIVE | Noted: 2025-01-28

## 2025-01-28 RX ORDER — ACETAZOLAMIDE 250 MG/1
500 TABLET ORAL DAILY
Qty: 60 TABLET | Refills: 0 | Status: ON HOLD | OUTPATIENT
Start: 2025-01-28

## 2025-01-28 NOTE — TELEPHONE ENCOUNTER
Yesenia called in that she is out of diamox , verified dose from [facility]. Approved per Michelle. Confirmed appt on Thursday.

## 2025-01-29 ENCOUNTER — APPOINTMENT (OUTPATIENT)
Dept: GENERAL RADIOLOGY | Facility: HOSPITAL | Age: 85
End: 2025-01-29
Payer: MEDICARE

## 2025-01-29 ENCOUNTER — HOSPITAL ENCOUNTER (INPATIENT)
Facility: HOSPITAL | Age: 85
LOS: 7 days | Discharge: HOME HEALTH | End: 2025-02-05
Attending: EMERGENCY MEDICINE
Payer: MEDICARE

## 2025-01-29 ENCOUNTER — HOSPITAL ENCOUNTER (INPATIENT)
Facility: HOSPITAL | Age: 85
LOS: 7 days | Discharge: HOME HEALTH | End: 2025-02-05
Attending: EMERGENCY MEDICINE | Admitting: EMERGENCY MEDICINE
Payer: MEDICARE

## 2025-01-29 DIAGNOSIS — J96.02 ACUTE RESPIRATORY FAILURE WITH HYPOXIA AND HYPERCAPNIA (HCC): Primary | ICD-10-CM

## 2025-01-29 DIAGNOSIS — J96.01 ACUTE RESPIRATORY FAILURE WITH HYPOXIA AND HYPERCAPNIA (HCC): Primary | ICD-10-CM

## 2025-01-29 DIAGNOSIS — J44.1 COPD EXACERBATION (HCC): ICD-10-CM

## 2025-01-29 PROBLEM — J96.00 ACUTE RESPIRATORY FAILURE (HCC): Status: ACTIVE | Noted: 2025-01-29

## 2025-01-29 LAB
ALBUMIN SERPL-MCNC: 3.6 G/DL (ref 3.2–4.8)
ALBUMIN/GLOB SERPL: 1.6 {RATIO} (ref 1–2)
ALP LIVER SERPL-CCNC: 90 U/L
ALT SERPL-CCNC: 21 U/L
ANION GAP SERPL CALC-SCNC: 6 MMOL/L (ref 0–18)
AST SERPL-CCNC: 17 U/L (ref ?–34)
ATRIAL RATE: 75 BPM
BASE EXCESS BLD CALC-SCNC: 10.4 MMOL/L (ref ?–2)
BASOPHILS # BLD AUTO: 0.03 X10(3) UL (ref 0–0.2)
BASOPHILS NFR BLD AUTO: 0.2 %
BILIRUB SERPL-MCNC: 0.2 MG/DL (ref 0.2–1.1)
BLOOD GAS EPAP: 5 CM H2O
BLOOD GAS IPAP: 8 CM H2O
BUN BLD-MCNC: 13 MG/DL (ref 9–23)
BUN/CREAT SERPL: 18.3 (ref 10–20)
CALCIUM BLD-MCNC: 9.2 MG/DL (ref 8.7–10.4)
CHLORIDE SERPL-SCNC: 96 MMOL/L (ref 98–112)
CO2 SERPL-SCNC: 35 MMOL/L (ref 21–32)
CREAT BLD-MCNC: 0.71 MG/DL
DEPRECATED RDW RBC AUTO: 50.4 FL (ref 35.1–46.3)
EGFRCR SERPLBLD CKD-EPI 2021: 85 ML/MIN/1.73M2 (ref 60–?)
EOSINOPHIL # BLD AUTO: 0.01 X10(3) UL (ref 0–0.7)
EOSINOPHIL NFR BLD AUTO: 0.1 %
ERYTHROCYTE [DISTWIDTH] IN BLOOD BY AUTOMATED COUNT: 14.3 % (ref 11–15)
FLUAV + FLUBV RNA SPEC NAA+PROBE: NEGATIVE
FLUAV + FLUBV RNA SPEC NAA+PROBE: NEGATIVE
GLOBULIN PLAS-MCNC: 2.2 G/DL (ref 2–3.5)
GLUCOSE BLD-MCNC: 136 MG/DL (ref 70–99)
HCO3 BLDV-SCNC: 32.8 MEQ/L (ref 22–26)
HCT VFR BLD AUTO: 41.1 %
HGB BLD-MCNC: 12.9 G/DL
IMM GRANULOCYTES # BLD AUTO: 0.04 X10(3) UL (ref 0–1)
IMM GRANULOCYTES NFR BLD: 0.3 %
LACTATE SERPL-SCNC: 0.9 MMOL/L (ref 0.5–2)
LYMPHOCYTES # BLD AUTO: 2.31 X10(3) UL (ref 1–4)
LYMPHOCYTES NFR BLD AUTO: 19.2 %
MCH RBC QN AUTO: 29.7 PG (ref 26–34)
MCHC RBC AUTO-ENTMCNC: 31.4 G/DL (ref 31–37)
MCV RBC AUTO: 94.5 FL
MONOCYTES # BLD AUTO: 1.14 X10(3) UL (ref 0.1–1)
MONOCYTES NFR BLD AUTO: 9.5 %
MRSA DNA SPEC QL NAA+PROBE: NEGATIVE
NEUTROPHILS # BLD AUTO: 8.48 X10 (3) UL (ref 1.5–7.7)
NEUTROPHILS # BLD AUTO: 8.48 X10(3) UL (ref 1.5–7.7)
NEUTROPHILS NFR BLD AUTO: 70.7 %
NT-PROBNP SERPL-MCNC: 344 PG/ML (ref ?–450)
O2/TOTAL GAS SETTING VFR VENT: 60 %
OSMOLALITY SERPL CALC.SUM OF ELEC: 286 MOSM/KG (ref 275–295)
P AXIS: 28 DEGREES
P-R INTERVAL: 158 MS
PCO2 BLDV: 59 MM HG (ref 38–50)
PH BLDV: 7.41 [PH] (ref 7.32–7.43)
PLATELET # BLD AUTO: 223 10(3)UL (ref 150–450)
PO2 BLDV: 52 MM HG (ref 35–40)
POTASSIUM SERPL-SCNC: 3.4 MMOL/L (ref 3.5–5.1)
PROT SERPL-MCNC: 5.8 G/DL (ref 5.7–8.2)
PUNCTURE CHARGE: NO
Q-T INTERVAL: 378 MS
QRS DURATION: 94 MS
QTC CALCULATION (BEZET): 422 MS
R AXIS: 10 DEGREES
RBC # BLD AUTO: 4.35 X10(6)UL
RESP RATE: 16 BPM
RSV RNA SPEC NAA+PROBE: NEGATIVE
SAO2 % BLDV: 87.2 % (ref 60–85)
SARS-COV-2 RNA RESP QL NAA+PROBE: NOT DETECTED
SODIUM SERPL-SCNC: 137 MMOL/L (ref 136–145)
T AXIS: 2 DEGREES
TROPONIN I SERPL HS-MCNC: 14 NG/L
VENTRICULAR RATE: 75 BPM
WBC # BLD AUTO: 12 X10(3) UL (ref 4–11)

## 2025-01-29 PROCEDURE — 71045 X-RAY EXAM CHEST 1 VIEW: CPT

## 2025-01-29 PROCEDURE — 99223 1ST HOSP IP/OBS HIGH 75: CPT | Performed by: INTERNAL MEDICINE

## 2025-01-29 RX ORDER — DIGOXIN 125 MCG
125 TABLET ORAL DAILY
Status: DISCONTINUED | OUTPATIENT
Start: 2025-01-29 | End: 2025-02-05

## 2025-01-29 RX ORDER — FLUTICASONE FUROATE, UMECLIDINIUM BROMIDE AND VILANTEROL TRIFENATATE 100; 62.5; 25 UG/1; UG/1; UG/1
1 POWDER RESPIRATORY (INHALATION) DAILY
COMMUNITY

## 2025-01-29 RX ORDER — ENOXAPARIN SODIUM 100 MG/ML
40 INJECTION SUBCUTANEOUS DAILY
Status: DISCONTINUED | OUTPATIENT
Start: 2025-01-29 | End: 2025-02-05

## 2025-01-29 RX ORDER — METHYLPREDNISOLONE SODIUM SUCCINATE 40 MG/ML
40 INJECTION INTRAMUSCULAR; INTRAVENOUS EVERY 12 HOURS
Status: DISCONTINUED | OUTPATIENT
Start: 2025-01-29 | End: 2025-02-01

## 2025-01-29 RX ORDER — FUROSEMIDE 80 MG/1
80 TABLET ORAL DAILY
Status: DISCONTINUED | OUTPATIENT
Start: 2025-01-29 | End: 2025-02-02

## 2025-01-29 RX ORDER — MONTELUKAST SODIUM 10 MG/1
10 TABLET ORAL NIGHTLY
Status: DISCONTINUED | OUTPATIENT
Start: 2025-01-29 | End: 2025-02-05

## 2025-01-29 RX ORDER — METHYLPREDNISOLONE SODIUM SUCCINATE 40 MG/ML
40 INJECTION INTRAMUSCULAR; INTRAVENOUS EVERY 8 HOURS
Status: DISCONTINUED | OUTPATIENT
Start: 2025-01-29 | End: 2025-01-30

## 2025-01-29 RX ORDER — SPIRONOLACTONE 25 MG/1
25 TABLET ORAL DAILY
Status: DISCONTINUED | OUTPATIENT
Start: 2025-01-29 | End: 2025-02-05

## 2025-01-29 RX ORDER — IPRATROPIUM BROMIDE AND ALBUTEROL SULFATE 2.5; .5 MG/3ML; MG/3ML
3 SOLUTION RESPIRATORY (INHALATION) EVERY 6 HOURS
Status: DISCONTINUED | OUTPATIENT
Start: 2025-01-29 | End: 2025-02-05

## 2025-01-29 RX ORDER — DILTIAZEM HYDROCHLORIDE 180 MG/1
360 CAPSULE, EXTENDED RELEASE ORAL 2 TIMES DAILY
Status: DISCONTINUED | OUTPATIENT
Start: 2025-01-29 | End: 2025-02-03

## 2025-01-29 RX ORDER — POTASSIUM CHLORIDE 1500 MG/1
40 TABLET, EXTENDED RELEASE ORAL EVERY 4 HOURS
Status: COMPLETED | OUTPATIENT
Start: 2025-01-29 | End: 2025-01-29

## 2025-01-29 RX ORDER — ALBUTEROL SULFATE 0.83 MG/ML
10 SOLUTION RESPIRATORY (INHALATION) CONTINUOUS
Status: DISCONTINUED | OUTPATIENT
Start: 2025-01-29 | End: 2025-01-29

## 2025-01-29 RX ORDER — GUAIFENESIN 600 MG/1
600 TABLET, EXTENDED RELEASE ORAL 2 TIMES DAILY
Status: DISCONTINUED | OUTPATIENT
Start: 2025-01-29 | End: 2025-02-05

## 2025-01-29 RX ORDER — IPRATROPIUM BROMIDE AND ALBUTEROL SULFATE 2.5; .5 MG/3ML; MG/3ML
3 SOLUTION RESPIRATORY (INHALATION) EVERY 6 HOURS PRN
Status: DISCONTINUED | OUTPATIENT
Start: 2025-01-29 | End: 2025-02-05

## 2025-01-29 RX ORDER — ACETAMINOPHEN 500 MG
500 TABLET ORAL EVERY 4 HOURS PRN
Status: DISCONTINUED | OUTPATIENT
Start: 2025-01-29 | End: 2025-02-05

## 2025-01-29 RX ORDER — BUMETANIDE 0.25 MG/ML
1 INJECTION, SOLUTION INTRAMUSCULAR; INTRAVENOUS ONCE
Status: COMPLETED | OUTPATIENT
Start: 2025-01-29 | End: 2025-01-29

## 2025-01-29 RX ORDER — BENZONATATE 200 MG/1
200 CAPSULE ORAL 3 TIMES DAILY PRN
Status: DISCONTINUED | OUTPATIENT
Start: 2025-01-29 | End: 2025-02-05

## 2025-01-29 RX ORDER — METHYLPREDNISOLONE SODIUM SUCCINATE 125 MG/2ML
80 INJECTION INTRAMUSCULAR; INTRAVENOUS ONCE
Status: COMPLETED | OUTPATIENT
Start: 2025-01-29 | End: 2025-01-29

## 2025-01-29 RX ORDER — ACETAZOLAMIDE 250 MG/1
500 TABLET ORAL DAILY
Status: DISCONTINUED | OUTPATIENT
Start: 2025-01-29 | End: 2025-02-05

## 2025-01-29 NOTE — ED INITIAL ASSESSMENT (HPI)
SOB X2DAYS, 85%ON 3l. GIVEN NEB IN FIELD hX OF AFIB, PER EMS AFIB ON MONITOR IN ROUTE. HX OF COPD

## 2025-01-29 NOTE — CONSULTS
[facility]    PATIENT'S NAME: CEZAR JOVEL   ATTENDING PHYSICIAN: Lissy Blandon MD   CONSULTING PHYSICIAN: Bing Boyle MD   PATIENT ACCOUNT#:   [id number]    LOCATION:  [address]  MEDICAL RECORD #:   [id number]       YOB: 1940  ADMISSION DATE:       2025      CONSULT DATE:  2025    REPORT OF CONSULTATION      HISTORY OF PRESENT ILLNESS:  The patient is an 84-year-old white female with history of asthma, COPD, elevation of left hemidiaphragm, kyphoscoliosis, compression fracture of T8 treated with kyphoplasty, quit smoking 20 years ago, was seen by me in the office 2 days ago.  Since, the patient has developed increasing shortness of breath, nonproductive cough, increasing lower leg edema.  The patient was on BiPAP at night.  The patient presented to the emergency room.  Chest x-ray showed bilateral basal infiltrate.  Electrolytes showed sodium 137, potassium 3.4, chloride 96, CO2 of 35, BUN 13, creatinine 0.71.  ProBNP 344.  Troponin 14.  CBC showed WBC 12,000, hemoglobin 12.9, hematocrit 41.  The patient was admitted for further management.    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    MEDICATIONS:  Home medications of Lasix 80 mg a day, spironolactone 25 mg a day, Jardiance 10 mg a day, digoxin 0.125 mg a day, albuterol 2 inhalations p.r.n., Cardizem 300 mg a day, potassium chloride 20 mEq a day, Prevacid 30 mg a day, aspirin 81 mg a day, Zyrtec 10 mg a day, Singulair 10 mg at bedtime, prednisone 10 mg a day.    ALLERGIES:  Penicillin, Levaquin, Zinacef.    SOCIAL HISTORY:  The patient quit smoking 20 years ago.    FAMILY HISTORY:  Father  of an MI at age of 60.  Mother  from ovarian CA at age of 70.    REVIEW OF SYSTEMS:  GENERAL:  No weight loss.  HEAD:  No headache.  EYES:  No diplopia, blurred vision.  EARS:  No  tinnitus, impaired hearing.  NOSE:  No rhinorrhea, epistaxis.  THROAT:  No sore throat.  NECK:  No neck stiffness.  RESPIRATORY:  Shortness of breath, cough nonproductive.  CARDIOVASCULAR:  No orthopnea, paroxysmal nocturnal dyspnea.  GASTROINTESTINAL:  No nausea, vomiting, diarrhea.  GENITOURINARY:  No dysuria or hematuria.      PHYSICAL EXAMINATION:    VITAL SIGNS:  Temperature 99.3, pulse 64, respirations 20, blood pressure 113/56.    HEENT:  Head and face:  No trauma, no injury.  Pupils equal bilaterally.  Conjunctivae were not anemic.  Sclerae nonicteric.  Eye fundi were normal.  External ears, no deformity.  Ear canals were patent.  Eardrums were intact.  Nose:  No congestion, polyp.  Mouth, throat free.  NECK:  No neck stiffness.  No palpable nodes.  No carotid bruit.  CHEST:  Symmetrical.  LUNGS:  Rales bilaterally.  HEART:  Regular.  No murmur.  Cardiac dullness was normal.  ABDOMEN:  Soft.  No hepatosplenomegaly.  No ascites.  No hernia.  EXTREMITIES:  No clubbing of fingers or lower leg edema.    DIAGNOSTIC IMPRESSION:    1.   Acute on chronic respiratory failure.  2.   Chronic obstructive pulmonary disease and asthma with exacerbation.  3.   Bilateral pneumonia.  4.   Left phrenic nerve paralysis.  5.   Kyphoscoliosis.  6.   Obesity hypoventilation syndrome.  7.   Acute on chronic cor pulmonale.    SUGGESTIONS AND RECOMMENDATIONS:    1.   Solu-Medrol IV 40 q.12.  2.   DuoNeb q.i.d. and p.r.n.   3.   Protonix 40 mg a day.    4.   Continue Lasix 80 mg a day, spironolactone 25 mg a day.  5.   BiPAP 13/5 at night.   6.   Antibiotic with doxycycline 100 mg IV q.12.    Dictated By Bing Boyle MD  d: 01/29/2025 09:14:30  t: 01/29/2025 09:31:26  Job [id number]  T/    cc: Lissy Blandon MD

## 2025-01-29 NOTE — ED QUICK NOTES
Orders for admission, patient is aware of plan and ready to go upstairs. Any questions, please call ED JIMI Harrington  at extension [phone].     Patient Covid vaccination status: Fully vaccinated     COVID Test Ordered in ED: SARS-CoV-2/Flu A and B/RSV by PCR (GeneXpert)    COVID Suspicion at Admission: N/A    Running Infusions:    albuterol Stopped (01/29/25 0130)    None    Mental Status/LOC at time of transport: A/Ox3    Other pertinent information: Bi pap  CIWA score: N/A   NIH score:  N/A

## 2025-01-29 NOTE — H&P
[facility]  part of [facility]                                                                                                          History and Physical     Yesenia Berkowitz Patient Status:  Emergency    1940 MRN [id number]   Location [facility] EMERGENCY DEPARTMENT Attending Clare Quintero MD   Hosp Day # 0 PCP JO-ANN YANG MD       Chief Complaint: Shortness of breath    Subjective:    Yesenia Berkowitz is a 84 year old female with history of COPD, A-fib, asthma, GERD, heart disease, chronic respiratory failure on 3.5-4 L, HTN and others who presented to the ED today with complaints of shortness of breath for the last 2 days.  Tried usual home medications without improvement.  She developed a cough a day ago.  Nonproductive.  No fever or chills.  No sick contacts.  No recent travel.  She does note her legs are slightly more swollen than baseline but has not documented any weight gain.    On EMT arrival to home, she was noted to be 85% on 3 L.  Nebs administered and patient transported to the ED.  On arrival to ED, vital stable almost to 88% on 5 L.  She was placed on a nonrebreather.  Steroids nebs and antibiotics initiated.  Pulmonology on consult.    She will be admitted for further treatment.    CXR: Possible mild interstitial pulmonary edema.  Slight interval increase in mild right lower lobe patchy groundglass airspace disease, mild left basilar atelectasis and/or small pleural effusion  Significant labs potassium 3.4, WBC 12.0    History/Other:      Past Medical History:  Past Medical History:    A-fib (HCC)    Anesthesia complication    Arrhythmia    AFIB    Arthritis    Asthma (HCC)    Back problem    COPD (chronic obstructive pulmonary disease) ([facility])    Deviated nasal septum    nasal septoplasty, turb reduction, smr of turbs    Difficult intubation    fiber optic if needed    Diverticulitis    colonoscopy     Diverticulosis of large intestine    Esophageal reflux     Extrinsic asthma, unspecified    Headache    Heart disease    Hepatitis    WAS TOLD SHE HAS HAD THIS WHEN SHE WAS 19 YEARS OLD    High blood pressure    High cholesterol    Irregular heart rate    Lichenoid keratosis    left chest-bx    Osteoarthritis    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    Paronychia    (RT); onychomycosis; debridement    Problems with swallowing    occasionally    Shortness of breath    2 L NC ALL THE TIME 24HR/7 DAYS PER WEEK    Unspecified essential hypertension    Visual impairment        Past Surgical History:   Past Surgical History:   Procedure Laterality Date    Adenoidectomy      Cataract      cataract extraction     Hysterectomy      Sinus surgery        DEVIATED SEPTUM    T&a      Tonsillectomy         Social History:  reports that she quit smoking about 29 years ago. Her smoking use included cigarettes. She has never used smokeless tobacco. She reports that she does not currently use alcohol. She reports that she does not use drugs.    Family History:   Family History   Problem Relation Age of Onset    Ovarian Cancer Mother 76        endometrial, poss ovarian primary    Pulmonary Disease Sister         COPD    Skin cancer Other     Stroke Other     Other (Other) Other        Allergies: Allergies[1]    Medications:  Medications Ordered Prior to Encounter[2]    Review of Systems:   A comprehensive 14 point review of systems was completed.    Pertinent positives and negatives noted in the HPI.    Objective:     /52   Pulse 75   Temp 99.3 °F (37.4 °C) (Temporal)   Resp 25   SpO2 99%   General: Mild respiratory distress.  Talking in full sentences.  Nonrebreather in place  HEENT: Normocephalic, atraumatic.  Neck: Supple.  Respiratory: Few wheezes, rhonchi.  Tachypneic  Cardiovascular: S1, S2.  Normal rate.   Abdomen: Soft, nontender distended.  Neurologic: Alert, oriented x 3.  Nonfocal.  Musculoskeletal: No tenderness or deformity.  Extremities: 2+ BLE edema    Psychiatric: Appropriate    Results:      Labs:  Labs Last 24 Hours:   BMP     CBC    Other     Na 137 Cl 96 BUN 13 Glu 136   Hb 12.9   PTT - Procal -   K 3.4 CO2 35.0 Cr 0.71   WBC 12.0 >< .0  INR - CRP -   Renal Lytes Endo    Hct 41.1   Trop - D dim -   eGFR - Ca 9.2 POC Gluc  -    LFT   pBNP 344 Lactic -   eGFR AA - PO4 - A1c -   AST 17 APk 90 Prot 5.8  LDL -     Mg - TSH -   ALT 21 T katie 0.2 Alb 3.6          COVID-19 Lab Results    COVID-19  Lab Results   Component Value Date    COVID19 Not Detected 01/29/2025    COVID19 Not Detected 01/21/2025    COVID19 Not Detected 12/21/2024       Pro-Calcitonin  No results for input(s): \"PCT\" in the last 168 hours.    Cardiac  Recent Labs   Lab 01/29/25  0026   PBNP 344       Creatinine Kinase  No results for input(s): \"CK\" in the last 168 hours.    Inflammatory Markers  No results for input(s): \"CRP\", \"SONA\", \"LDH\", \"DDIMER\" in the last 168 hours.    Imaging: Imaging data reviewed in Epic.    Assessment & Plan:    Acute on chronic respiratory failure with hypoxia  COPD exacerbation  Leukocytosis  -Admit  -Continue IV steroids, antibiotics with doxycycline.  Scheduled and as needed nebs.  -Pulmonology on consult  -Follow-up cultures  -Wean oxygen as tolerated    History of heart failure with preserved EF  -CXR noted  -BNP within normal.  Last echo with EF 65 to 70%  -Per chart, she has chronic left arm and leg swelling due to atypical lymphedema  -Monitor weight, strict I's and O's  -Give 1 dose IV Bumex, then resume baseline meds  -Consider cardiology consult if not improving    Hypokalemia  -Replace per cardiac protocol    History of A-fib  Heart disease  HTN  -Resume home meds as appropriate  -Per patient, not on anticoagulation    Quality:  DVT Prophylaxis: Lovenox  CODE status: Full code  ANGEL: 4 to 5 days      Plan of care discussed with patient. Acknowledged understanding and agrees to plan. Also discussed with the ED physician.    High MDM  Time spent on this  admission - examining patient, obtaining history, reviewing previous medical records, going over test results/imaging and discussing plan of care. More than 50% of the time was spent in counseling and/or coordination of care related to OPD exacerbation, acute respiratory failure.   All questions answered.     Luz Marina Garcai MD  1/29/2025                   [1]   Allergies  Allergen Reactions    Penicillin G ANAPHYLAXIS    Azithromycin DIARRHEA and NAUSEA AND VOMITING    Cefuroxime UNKNOWN     Other reaction(s): Vomitting    Levofloxacin UNKNOWN     Other reaction(s): LEVOFLOXACIN    Penicillins      Other reaction(s): Unknown   [2]   Current Facility-Administered Medications on File Prior to Encounter   Medication Dose Route Frequency Provider Last Rate Last Admin    [COMPLETED] albuterol (Ventolin) (2.5 MG/3ML) 0.083% nebulizer solution        2.5 mg at 01/22/25 1417    [COMPLETED] potassium chloride (Klor-Con M20) 20 MEQ tab        40 mEq at 01/22/25 1454    [COMPLETED] furosemide (Lasix) 10 mg/mL injection        40 mg at 01/22/25 1448    [COMPLETED] albuterol (Ventolin) (5 MG/ML) 0.5% nebulizer solution 10 mg  10 mg Nebulization Once Aakash Hurt MD   10 mg at 01/21/25 0901    [COMPLETED] ipratropium (Atrovent) 0.02 % nebulizer solution 1 mg  1 mg Nebulization Once Aakash Hurt MD   1 mg at 01/21/25 0901    [COMPLETED] albuterol (Ventolin) (2.5 MG/3ML) 0.083% nebulizer solution 5 mg  5 mg Nebulization Once Russ Su MD   5 mg at 01/10/25 0209    [COMPLETED] cefTRIAXone (Rocephin) 2 g in sodium chloride 0.9% 100 mL IVPB-ADDV  2 g Intravenous Once Russ Su MD   Stopped at 01/10/25 0515    [COMPLETED] azithromycin (Zithromax) 500 mg in sodium chloride 0.9% 250mL IVPB premix  500 mg Intravenous Once Russ Su  mL/hr at 01/10/25 0526 500 mg at 01/10/25 0526    [COMPLETED] furosemide (Lasix) 10 mg/mL injection 40 mg  40 mg Intravenous BID (Diuretic) Jose Pfeiffer MD   40 mg at  01/10/25 1600    [COMPLETED] potassium chloride (Klor-Con M20) tab 40 mEq  40 mEq Oral Once Jose Pfeiffer MD   40 mEq at 01/10/25 1600    [COMPLETED] potassium chloride (Klor-Con M20) tab 40 mEq  40 mEq Oral Once Robbie Tate MD   40 mEq at 24 0941    [COMPLETED] lidocaine (Xylocaine) 2 % injection             [COMPLETED] ipratropium-albuterol (Duoneb) 0.5-2.5 (3) MG/3ML inhalation solution 3 mL  3 mL Nebulization Once Luna Farmer MD   3 mL at 24    [COMPLETED] ipratropium-albuterol (Duoneb) 0.5-2.5 (3) MG/3ML inhalation solution        3 mL at 24 1340    [] atropine 0.1 MG/ML injection 0.5 mg  0.5 mg Intravenous PRN Luna Farmer MD        [] naloxone (Narcan) 0.4 MG/ML injection 0.08 mg  0.08 mg Intravenous PRN Luna Farmer MD        [COMPLETED] lidocaine (Xylocaine) 2 % injection             [COMPLETED] ceFAZolin (Ancef) 2 g/10mL IV syringe premix             [COMPLETED] gadoterate meglumine (Dotarem) 10 MMOL/20ML injection 20 mL  20 mL Intravenous ONCE PRN Robbie Tate MD   16 mL at 24 0452    [COMPLETED] potassium chloride (Klor-Con M20) tab 40 mEq  40 mEq Oral Q4H Robbie Tate MD   40 mEq at 24 1729    [COMPLETED] LORazepam (Ativan) 2 mg/mL injection 0.5 mg  0.5 mg Intravenous Once PRN Robbie Tate MD   0.5 mg at 24 0207    [COMPLETED] HYDROcodone-acetaminophen (Norco) 5-325 MG per tab 1 tablet  1 tablet Oral Once Sapadin, Tonia, DO   1 tablet at 24 1813    [COMPLETED] furosemide (Lasix) 10 mg/mL injection 80 mg  80 mg Intravenous Once Sapadin, Tonia, DO   80 mg at 24 1858    [COMPLETED] doxycycline (Vibramycin) cap 100 mg  100 mg Oral Once Sapadin, Tonia DO   100 mg at 24    [COMPLETED] ceFEPIme (Maxpime) 2 g in sodium chloride 0.9% 100 mL IVPB-MBP  2 g Intravenous Once Tonia Maguire DO   Stopped at 24    [COMPLETED] acetaZOLAMIDE (Diamox) tab 500 mg  500 mg Oral Once Cortney Dumont, APRN    500 mg at 12/17/24 1600    [COMPLETED] furosemide (Lasix) 10 mg/mL injection 80 mg  80 mg Intravenous BID (Diuretic) Cortney Dumont APRN   80 mg at 12/17/24 0951    [COMPLETED] furosemide (Lasix) 10 mg/mL injection 40 mg  40 mg Intravenous Once Luis Fernando Fowler MD   40 mg at 12/15/24 1242    [COMPLETED] ipratropium-albuterol (Duoneb) 0.5-2.5 (3) MG/3ML inhalation solution 3 mL  3 mL Nebulization Once Korey Luna MD   3 mL at 12/12/24 0541    [COMPLETED] acetaminophen (Tylenol Extra Strength) tab 1,000 mg  1,000 mg Oral Once Korey Luna MD   1,000 mg at 12/12/24 0554    [COMPLETED] furosemide (Lasix) 10 mg/mL injection 10 mg  10 mg Intravenous Once Norma Kerns MD   10 mg at 12/12/24 0808    [COMPLETED] ipratropium-albuterol (Duoneb) 0.5-2.5 (3) MG/3ML inhalation solution 3 mL  3 mL Nebulization Once Norma Kerns MD   3 mL at 12/12/24 0758    [COMPLETED] potassium chloride (Klor-Con M20) tab 40 mEq  40 mEq Oral Q4H Obdulia Lovell DO   40 mEq at 11/27/24 1234    [COMPLETED] furosemide (Lasix) 10 mg/mL injection 40 mg  40 mg Intravenous Once Molly Carrillo APRN   40 mg at 11/27/24 1726    [COMPLETED] potassium chloride (Klor-Con M20) tab 40 mEq  40 mEq Oral Once Rex Tabor MD   40 mEq at 11/26/24 1006    [COMPLETED] potassium chloride (Klor-Con M20) tab 40 mEq  40 mEq Oral Q4H Luz Marina Garcia MD   40 mEq at 11/25/24 1301    [COMPLETED] albuterol (Ventolin) (5 MG/ML) 0.5% nebulizer solution 10 mg  10 mg Nebulization Once Thierry Lay MD   10 mg at 11/24/24 1934    [COMPLETED] ipratropium (Atrovent) 0.02 % nebulizer solution 1 mg  1 mg Nebulization Once Thierry Lay MD   1 mg at 11/24/24 1933    [COMPLETED] methylPREDNISolone sodium succinate (Solu-MEDROL) injection 125 mg  125 mg Intravenous Once Thierry Lay MD   125 mg at 11/24/24 2111    [COMPLETED] furosemide (Lasix) 10 mg/mL injection 40 mg  40 mg Intravenous Once Aakash Hurt MD   40 mg at 11/18/24 1300     [COMPLETED] dexamethasone (Decadron) 4 MG/ML injection 6 mg  6 mg Intravenous Once Aakash Hurt MD   6 mg at 24 1255    [COMPLETED] ipratropium-albuterol (Duoneb) 0.5-2.5 (3) MG/3ML inhalation solution 3 mL  3 mL Nebulization Once Aakash Hurt MD   3 mL at 24 1250    [COMPLETED] doxycycline hyclate (Vibramycin) 100 mg in sodium chloride 0.9% 100 mL IVPB  100 mg Intravenous Once Clare Quintero  mL/hr at 24 0114 100 mg at 24 0114    [COMPLETED] iopamidol 76% (ISOVUE-370) injection for power injector  80 mL Intravenous ONCE PRN Obdulia Lovell DO   80 mL at 24 1238    [COMPLETED] predniSONE (Deltasone) tab 60 mg  60 mg Oral Once Russ Su MD   60 mg at 24 2140     Current Outpatient Medications on File Prior to Encounter   Medication Sig Dispense Refill    acetaZOLAMIDE 250 MG Oral Tab Take 2 tablets (500 mg total) by mouth daily. 60 tablet 0    fluticasone propionate 110 MCG/ACT Inhalation Aerosol Inhale 1 puff into the lungs 2 (two) times daily. 12 g 0    [] ondansetron 4 MG Oral Tablet Dispersible Take 1 tablet (4 mg total) by mouth every 4 (four) hours as needed for Nausea. 12 tablet 0    famotidine (PEPCID) 20 MG Oral Tab Take 1 tablet (20 mg total) by mouth 2 (two) times daily as needed for Heartburn. 30 tablet 0    lidocaine 5 % External Patch Place 1 patch onto the skin daily for 15 days. 15 patch 0    predniSONE 10 MG Oral Tab Take 1 tablet (10 mg total) by mouth daily for 10 days, THEN 0.5 tablets (5 mg total) daily for 4 days, THEN 0.5 tablets (5 mg total) every other day for 4 days. 13 tablet 0    [] tiotropium 18 MCG Inhalation Cap Inhale 1 capsule (18 mcg total) into the lungs nightly for 30 doses. 30 capsule 0    lidocaine-menthol 4-1 % External Patch Place 1 patch onto the skin daily. 30 patch 0    furosemide 80 MG Oral Tab Take 1 tablet (80 mg total) by mouth daily. 30 tablet 3    spironolactone 25 MG Oral Tab Take 1 tablet (25  mg total) by mouth daily for 90 doses. 90 tablet 0    empagliflozin (JARDIANCE) 10 MG Oral Tab Take 1 tablet (10 mg total) by mouth daily. 30 tablet 3    acetaminophen 500 MG Oral Tab Take 1 tablet (500 mg total) by mouth every 6 (six) hours as needed.      DIGOXIN 0.125 MG Oral Tab Take 1 tablet (125 mcg total) by mouth daily. 30 tablet 0    albuterol 108 (90 Base) MCG/ACT Inhalation Aero Soln Inhale 2 puffs into the lungs as needed.      estradiol 0.05 MG/24HR Transdermal Patch Weekly Place 1 patch onto the skin once a week. As directed.      dilTIAZem HCl ER Coated Beads 360 MG Oral Capsule SR 24 Hr Take 1 capsule (360 mg total) by mouth 2 (two) times daily.  0    Cholecalciferol (VITAMIN D) 1000 UNITS Oral Tab Take 1,000 Units by mouth 2 (two) times daily.      Potassium Chloride ER (K-DUR M20) 20 MEQ Oral Tab CR Take 1 tablet (20 mEq total) by mouth nightly.      lansoprazole (PREVACID) 30 MG Oral Capsule Delayed Release Take 1 capsule (30 mg total) by mouth nightly. (Patient not taking: Reported on 1/22/2025)      aspirin 81 MG Oral Tab Take 2 tablets (162 mg total) by mouth daily as needed. (Patient not taking: Reported on 1/22/2025)      Loratadine 10 MG Oral Cap Take 10 mg by mouth nightly.        montelukast 10 MG Oral Tab Take 1 tablet (10 mg total) by mouth nightly.      omega-3 fatty acids (FISH OIL) 1000 MG Oral Cap Take 1,000 mg by mouth daily.

## 2025-01-29 NOTE — ED PROVIDER NOTES
Patient Seen in: [facility] Emergency Department      History     Chief Complaint   Patient presents with    Difficulty Breathing     Stated Complaint: OWEN x 2 days    Subjective:   HPI      84 year old female with multiple ongoing medical issues including atrial fibrillation, arthritis, COPD, GERD, hypertension, high cholesterol who now presents with shortness of breath for the past 2-3 days.  Patient is here frequently with COPD symptoms.  She states her cough changed at home, became more productive.  She denies any pain with deep breathing.  She is not sure if her legs look more swollen than usual.  She was recently hospitalized with similar presentation approximately 2 weeks ago.    Objective:     Past Medical History:    A-fib ([facility])    Anesthesia complication    Arrhythmia    AFIB    Arthritis    Asthma ([facility])    Back problem    COPD (chronic obstructive pulmonary disease) ([facility])    Deviated nasal septum    nasal septoplasty, turb reduction, smr of turbs    Difficult intubation    fiber optic if needed    Diverticulitis    colonoscopy     Diverticulosis of large intestine    Esophageal reflux    Extrinsic asthma, unspecified    Headache    Heart disease    Hepatitis    WAS TOLD SHE HAS HAD THIS WHEN SHE WAS 19 YEARS OLD    High blood pressure    High cholesterol    Irregular heart rate    Lichenoid keratosis    left chest-bx    Osteoarthritis    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    Paronychia    (RT); onychomycosis; debridement    Problems with swallowing    occasionally    Shortness of breath    2 L NC ALL THE TIME 24HR/7 DAYS PER WEEK    Unspecified essential hypertension    Visual impairment              Past Surgical History:   Procedure Laterality Date    Adenoidectomy      Cataract      cataract extraction     Hysterectomy      Sinus surgery        DEVIATED SEPTUM    T&a      Tonsillectomy                  Social History     Socioeconomic History    Marital status:    Tobacco  Use    Smoking status: Former     Current packs/day: 0.00     Types: Cigarettes     Quit date: 1996     Years since quittin.0    Smokeless tobacco: Never   Vaping Use    Vaping status: Never Used   Substance and Sexual Activity    Alcohol use: Not Currently     Comment: one drink once a week    Drug use: Never   Other Topics Concern    Pt has a pacemaker No    Pt has a defibrillator No    Reaction to local anesthetic No    Caffeine Concern No     Social Drivers of Health     Food Insecurity: No Food Insecurity (1/10/2025)    Food Insecurity     Food Insecurity: Never true   Transportation Needs: No Transportation Needs (1/10/2025)    Transportation Needs     Lack of Transportation: No   Housing Stability: Low Risk  (1/10/2025)    Housing Stability     Housing Instability: No                  Physical Exam     ED Triage Vitals   BP 25 0020 133/62   Pulse 25 0020 87   Resp 25 0020 24   Temp 25 0023 99.3 °F (37.4 °C)   Temp src 25 0023 Temporal   SpO2 25 0020 (!) 88 %   O2 Device 25 0020 Nasal cannula       Current Vitals:   Vital Signs  BP: 118/56  Pulse: 66  Resp: 21  Temp: 99.3 °F (37.4 °C)  Temp src: Temporal  MAP (mmHg): 74    Oxygen Therapy  SpO2: 98 %  O2 Device: Non-rebreather mask  Mode: Spontaneous/Timed  O2 Flow Rate (L/min): 5 L/min        Physical Exam  Vitals and nursing note reviewed.   Constitutional:       Appearance: Normal appearance. She is well-developed. She is not ill-appearing or diaphoretic.   HENT:      Head: Normocephalic and atraumatic.   Eyes:      Conjunctiva/sclera: Conjunctivae normal.      Pupils: Pupils are equal, round, and reactive to light.   Neck:      Trachea: Trachea and phonation normal.   Cardiovascular:      Rate and Rhythm: Normal rate and regular rhythm.      Pulses:           Dorsalis pedis pulses are 2+ on the right side and 2+ on the left side.      Heart sounds: Normal heart sounds. No murmur heard.  Pulmonary:       Effort: Tachypnea present.      Breath sounds: No stridor. Decreased breath sounds (Bilateral bases) and wheezing present.   Musculoskeletal:         General: No tenderness. Normal range of motion.      Cervical back: Normal range of motion and neck supple.      Right lower le+ Pitting Edema present.      Left lower le+ Pitting Edema present.   Skin:     General: Skin is warm and dry.      Findings: No rash.   Neurological:      Mental Status: She is alert and oriented to person, place, and time.      Coordination: Coordination normal.   Psychiatric:         Behavior: Behavior normal.           ED Course     Labs Reviewed   COMP METABOLIC PANEL (14) - Abnormal; Notable for the following components:       Result Value    Glucose 136 (*)     Potassium 3.4 (*)     Chloride 96 (*)     CO2 35.0 (*)     All other components within normal limits   CBC WITH DIFFERENTIAL WITH PLATELET - Abnormal; Notable for the following components:    WBC 12.0 (*)     RDW-SD 50.4 (*)     Neutrophil Absolute Prelim 8.48 (*)     Neutrophil Absolute 8.48 (*)     Monocyte Absolute 1.14 (*)     All other components within normal limits   VENOUS BLOOD GAS - Abnormal; Notable for the following components:    Venous pCO2 59 (*)     Venous pO2 52 (*)     Venous Bicarbonate 32.8 (*)     Blood Gas Base Excess 10.4 (*)     Venous O2 Saturation 87.2 (*)     All other components within normal limits   PRO BETA NATRIURETIC PEPTIDE - Normal   TROPONIN I HIGH SENSITIVITY - Normal   LACTIC ACID, PLASMA - Normal   SARS-COV-2/FLU A AND B/RSV BY PCR (GENEXPERT) - Normal    Narrative:     This test is intended for the qualitative detection and differentiation of SARS-CoV-2, influenza A, influenza B, and respiratory syncytial virus (RSV) viral RNA in nasopharyngeal or nares swabs from individuals suspected of respiratory viral infection consistent with COVID-19 by their healthcare provider. Signs and symptoms of respiratory viral infection due to  SARS-CoV-2, influenza, and RSV can be similar.    Test performed using the Xpert Xpress SARS-CoV-2/FLU/RSV (real time RT-PCR)  assay on the [employer]ION instrument, [employer], [employer] [place] [address].   This test is being used under the Food and Drug Administration's Emergency Use Authorization.    The authorized Fact Sheet for Healthcare Providers for this assay is available upon request from the laboratory.   RAINBOW DRAW LAVENDER   RAINBOW DRAW LIGHT GREEN   RAINBOW DRAW BLUE   RAINBOW DRAW GOLD   BLOOD CULTURE   BLOOD CULTURE   ED/MRSA SCREEN BY PCR-CC     EKG    Rate, intervals and axes as noted on EKG Report.  Rate: 75  Rhythm: Sinus Rhythm  Reading: NSR           ED Course as of 01/29/25 0755  ------------------------------------------------------------  Time: 01/29 0222  Value: XR CHEST AP PORTABLE  (CPT=71045)  Comment: CHEST, SINGLE VIEW -0024 HRS.    COMPARISON: Chest, single view 1/21/25 at 0955 hours, 1/10/25 at 0153 hours.    FINDINGS:  Portable upright AP view obtained.  Stable moderate elevation of the left hemidiaphragm obscuring the left cardiac silhouette.  Possible mild interstitial pulmonary edema.  Slight interval increase in mild right lower lobe patchy groundglass airspace disease.  Slight interval increase in mild left basilar atelectasis and/or small pleural effusion.  No pneumothorax.      IMPRESSION:  1. Possible mild interstitial pulmonary edema.  2. Slight interval increase in mild right lower lobe patchy groundglass airspace disease.  3. Slight interval increase in mild left basilar atelectasis and/or small pleural effusion.    ------------------------------------------------------------  Time: 01/29 0307  Value: EKG 12 Lead  Comment: EKG    Rate, intervals and axes as noted on EKG Report.  Rate: 75  Rhythm: Sinus Rhythm  Reading: sinus rhythm                MDM      Pulse Ox: 98%, Normal, RA    Cardiac Monitor:   Pulse Readings from Last 1 Encounters:   01/29/25 64   , sinus, normal for rate  and rhythm     Prior notes reviewed: yes - pt was just admitted for same earlier this month    Radiology findings: XR CHEST AP PORTABLE  (CPT=71045)    Result Date: 1/29/2025  CONCLUSION:  1. Small bilateral pleural effusions are evident. Worsening bibasilar airspace opacities, which may reflect superimposed infectious process.  2. Cardiomegaly with suggestion of mild, diffuse pulmonary interstitial edema.  3. Radiographic findings of COPD.    A preliminary report was issued by the [facility] Radiology teleradiology service. There are no major discrepancies.   Dictated by (CST): Grant Ferreira MD on 1/29/2025 at 7:42 AM     Finalized by (CST): Grant Ferreira MD on 1/29/2025 at 7:45 AM            Chronic Medical Conditions Potentially Contributing: see HPI     Critical Care: I spent a total of 35 minutes of critical care time in obtaining history, performing a physical exam, bedside monitoring of interventions, collecting and interpreting tests and discussion with consultants but not including time spent performing procedures.    Medications   albuterol (Ventolin) (2.5 MG/3ML) 0.083% nebulizer solution 10 mg (0 mg Nebulization Stopped 1/29/25 0130)   methylPREDNISolone sodium succinate (Solu-MEDROL) injection 80 mg (80 mg Intravenous Given 1/29/25 0114)   ipratropium (Atrovent) 0.02 % nebulizer solution 0.5 mg (0.5 mg Nebulization Given 1/29/25 0050)   doxycycline hyclate (Vibramycin) 100 mg in sodium chloride 0.9% 100 mL IVPB (0 mg Intravenous Stopped 1/29/25 0436)     Is a patient with multiple medical issues, presents with shortness of breath, was hypoxic in the field on 3 L.  She was placed on BiPAP, given 1 hour continuous neb, steroids and after is improving.  Patient has severe COPD, has been admitted for the same multiple times in the past.  Her chest x-ray is read as possible worsening of chronic bilateral lower lobe opacities.  I discussed with her pulmonologist, Dr Boyle, who advises start IV doxycycline,  continue BiPAP, continue steroids and nebs, he will consult.  Discussed with hospitalist, Dr. Garcia - will admit    Admission disposition: 1/29/2025  2:33 AM             Disposition and Plan     Clinical Impression:  1. Acute respiratory failure with hypoxia and hypercapnia (HCC)    2. COPD exacerbation (HCC)         Disposition:  Admit  1/29/2025  2:33 am    Follow-up:  No follow-up provider specified.  We recommend that you schedule follow up care with a primary care provider within the next three months to obtain basic health screening including reassessment of your blood pressure.      Medications Prescribed:  Current Discharge Medication List              Supplementary Documentation:         Hospital Problems       Present on Admission  Date Reviewed: 1/29/2025            ICD-10-CM Noted POA    Acute respiratory failure (HCC) J96.00 1/29/2025 Yes    COPD exacerbation (HCC) J44.1 1/10/2025 Yes

## 2025-01-30 LAB — POTASSIUM SERPL-SCNC: 3.9 MMOL/L (ref 3.5–5.1)

## 2025-01-30 PROCEDURE — 99233 SBSQ HOSP IP/OBS HIGH 50: CPT | Performed by: INTERNAL MEDICINE

## 2025-01-30 RX ORDER — CETIRIZINE HYDROCHLORIDE 5 MG/1
5 TABLET ORAL NIGHTLY
Status: DISCONTINUED | OUTPATIENT
Start: 2025-01-30 | End: 2025-02-05

## 2025-01-30 RX ORDER — DOCUSATE SODIUM 100 MG/1
100 CAPSULE, LIQUID FILLED ORAL
Status: DISCONTINUED | OUTPATIENT
Start: 2025-01-30 | End: 2025-02-05

## 2025-01-30 NOTE — PROGRESS NOTES
[facility]  part of [facility] Progress Note     Yesenia Berkowitz Patient Status:  Inpatient    1940 MRN [id number]   Location [facility]5W Attending Lissy Blandon MD   Hosp Day # 1 PCP JO-ANN YANG MD     Subjective:     Patient sitting up in bed, NAD. She reports feeling better than on admission.  No overnight events reported by the nursing staff.   All questions and concerns addressed.       Objective:    Review of Systems:   ROS completed; pertinent positive and negatives stated in subjective.      Vital signs:  Temp:  [97.3 °F (36.3 °C)-98 °F (36.7 °C)] 97.3 °F (36.3 °C)  Pulse:  [67-96] 67  Resp:  [18-23] 18  BP: (120-148)/(51-69) 122/51  SpO2:  [95 %-100 %] 100 %      Physical Exam:    Gen: NAD AO x3  Chest: good air entry, coarse breath sounds bilaterally  CVS: normal s1 and s2 RR  Abd: NABS soft NT ND  Neuro: CN 2-12 grossly intact  Ext: chronic left arm and left leg swelling      Diagnostic Data:    Labs:  Recent Labs   Lab 25  0026   WBC 12.0*   HGB 12.9   MCV 94.5   .0       Recent Labs   Lab 25  0026 25  0020   *  --    BUN 13  --    CREATSERUM 0.71  --    CA 9.2  --    ALB 3.6  --      --    K 3.4* 3.9   CL 96*  --    CO2 35.0*  --    ALKPHO 90  --    AST 17  --    ALT 21  --    BILT 0.2  --    TP 5.8  --        Estimated Creatinine Clearance: 55 mL/min (based on SCr of 0.71 mg/dL).    No results for input(s): \"PTP\", \"INR\" in the last 168 hours.           Imaging: Imaging data reviewed in Epic.    Medications:    enoxaparin  40 mg Subcutaneous Daily    ipratropium-albuterol  3 mL Nebulization Q6H    methylPREDNISolone  40 mg Intravenous Q8H    doxycycline  100 mg Intravenous Q12H    guaiFENesin ER  600 mg Oral BID    furosemide  80 mg Oral Daily    fluticasone furoate  1 puff Inhalation Daily    spironolactone  25 mg Oral Daily    montelukast  10 mg Oral Nightly    digoxin  125 mcg Oral Daily    dilTIAZem HCl ER  Coated Beads  360 mg Oral BID    empagliflozin  10 mg Oral Daily    acetaZOLAMIDE  500 mg Oral Daily    methylPREDNISolone  40 mg Intravenous Q12H       Assessment & Plan:     Acute on chronic respiratory failure  COPD exacerbation  Asthma exacerbation  Bilateral PNA  Phrenic nerve paralysis  Imaging reviewed  Cultures pending  Pulm on on consult  Continue Solu-Medrol 40 mg IV q12  Duonebs qid PRN  Lasix 80 mg daily, spironolactone 25 mg daily  Continue BiPAP at night  Wean O2 as tolerated  Hx of HFpEF  Imaging reviewed  BNP WNL  Last EF 65-70%  Strict Is and Os, daily weights  Hypokalemia  Replace per protocol  Hx of Afib  HTN  Continue home meds  Not on anticoagulation      Plan of care discussed with patient or family at bedside.      Supplementary Documentation:     Quality:  DVT Prophylaxis: Lovenox s/c  CODE status: Full      Estimated date of discharge: TBD  Discharge is dependent on: clinical stability  At this point Ms. Berkowitz is expected to be discharge to: home             **Certification      PHYSICIAN Certification of Need for Inpatient Hospitalization - Initial Certification    Patient will require inpatient services that will reasonably be expected to span two midnight's based on the clinical documentation in H+P.   Based on patients current state of illness, I anticipate that, after discharge, patient will require TBD.      Lissy Blandon MD  Hospitalist    MDM: High, I personally reviewed the available laboratories, imaging including XR. I discussed the case with RN. I ordered laboratories, studies including AM labs.    Medical decision making high, risk is high.       The 21st Century Cures Act makes medical notes like these available to patients in the interest of transparency. Please be advised this is a medical document. Medical documents are intended to carry relevant information, facts as evident, and the clinical opinion of the practitioner. The medical note is intended as peer to peer  communication and may appear blunt or direct. It is written in medical language and may contain abbreviations or verbiage that are unfamiliar.

## 2025-01-30 NOTE — PLAN OF CARE
Pt alert and oriented, on 4L of O2, which is baseline. Pt ambulated within the room and to the bathroom. Pt updated on plan of care, frequent rounding by staff, call light within reach.  Problem: Patient Centered Care  Goal: Patient preferences are identified and integrated in the patient's plan of care  Description: Interventions:  - What would you like us to know as we care for you?   - Provide timely, complete, and accurate information to patient/family  - Incorporate patient and family knowledge, values, beliefs, and cultural backgrounds into the planning and delivery of care  - Encourage patient/family to participate in care and decision-making at the level they choose  - Honor patient and family perspectives and choices  Outcome: Progressing     Problem: RESPIRATORY - ADULT  Goal: Achieves optimal ventilation and oxygenation  Description: INTERVENTIONS:  - Assess for changes in respiratory status  - Assess for changes in mentation and behavior  - Position to facilitate oxygenation and minimize respiratory effort  - Oxygen supplementation based on oxygen saturation or ABGs  - Provide Smoking Cessation handout, if applicable  - Encourage broncho-pulmonary hygiene including cough, deep breathe, Incentive Spirometry  - Assess the need for suctioning and perform as needed  - Assess and instruct to report SOB or any respiratory difficulty  - Respiratory Therapy support as indicated  - Manage/alleviate anxiety  - Monitor for signs/symptoms of CO2 retention  Outcome: Progressing     Problem: PAIN - ADULT  Goal: Verbalizes/displays adequate comfort level or patient's stated pain goal  Description: INTERVENTIONS:  - Encourage pt to monitor pain and request assistance  - Assess pain using appropriate pain scale  - Administer analgesics based on type and severity of pain and evaluate response  - Implement non-pharmacological measures as appropriate and evaluate response  - Consider cultural and social influences on pain  and pain management  - Manage/alleviate anxiety  - Utilize distraction and/or relaxation techniques  - Monitor for opioid side effects  - Notify MD/LIP if interventions unsuccessful or patient reports new pain  - Anticipate increased pain with activity and pre-medicate as appropriate  Outcome: Progressing     Problem: RISK FOR INFECTION - ADULT  Goal: Absence of fever/infection during anticipated neutropenic period  Description: INTERVENTIONS  - Monitor WBC  - Administer growth factors as ordered  - Implement neutropenic guidelines  Outcome: Progressing     Problem: SAFETY ADULT - FALL  Goal: Free from fall injury  Description: INTERVENTIONS:  - Assess pt frequently for physical needs  - Identify cognitive and physical deficits and behaviors that affect risk of falls.  - [place] fall precautions as indicated by assessment.  - Educate pt/family on patient safety including physical limitations  - Instruct pt to call for assistance with activity based on assessment  - Modify environment to reduce risk of injury  - Provide assistive devices as appropriate  - Consider OT/PT consult to assist with strengthening/mobility  - Encourage toileting schedule  Outcome: Progressing     Problem: DISCHARGE PLANNING  Goal: Discharge to home or other facility with appropriate resources  Description: INTERVENTIONS:  - Identify barriers to discharge w/pt and caregiver  - Include patient/family/discharge partner in discharge planning  - Arrange for needed discharge resources and transportation as appropriate  - Identify discharge learning needs (meds, wound care, etc)  - Arrange for interpreters to assist at discharge as needed  - Consider post-discharge preferences of patient/family/discharge partner  - Complete POLST form as appropriate  - Assess patient's ability to be responsible for managing their own health  - Refer to Case Management Department for coordinating discharge planning if the patient needs post-hospital services based  on physician/LIP order or complex needs related to functional status, cognitive ability or social support system  Outcome: Progressing     Problem: Patient/Family Goals  Goal: Patient/Family Long Term Goal  Description: Patient's Long Term Goal:     Interventions:  - See additional Care Plan goals for specific interventions  Outcome: Progressing  Goal: Patient/Family Short Term Goal  Description: Patient's Short Term Goal:     Interventions:  - See additional Care Plan goals for specific interventions  Outcome: Progressing     Problem: CARDIOVASCULAR - ADULT  Goal: Maintains optimal cardiac output and hemodynamic stability  Description: INTERVENTIONS:  - Monitor vital signs, rhythm, and trends  - Monitor for bleeding, hypotension and signs of decreased cardiac output  - Evaluate effectiveness of vasoactive medications to optimize hemodynamic stability  - Monitor arterial and/or venous puncture sites for bleeding and/or hematoma  - Assess quality of pulses, skin color and temperature  - Assess for signs of decreased coronary artery perfusion - ex. Angina  - Evaluate fluid balance, assess for edema, trend weights  Outcome: Progressing  Goal: Absence of cardiac arrhythmias or at baseline  Description: INTERVENTIONS:  - Continuous cardiac monitoring, monitor vital signs, obtain 12 lead EKG if indicated  - Evaluate effectiveness of antiarrhythmic and heart rate control medications as ordered  - Initiate emergency measures for life threatening arrhythmias  - Monitor electrolytes and administer replacement therapy as ordered  Outcome: Progressing

## 2025-01-30 NOTE — CM/SW NOTE
01/30/25 1700   PERLITA/DREW Referral Data   Referral Source Social Work (self-referral)   Reason for Referral Discharge planning;Readmission   Informant EMR;Clinical Staff Member   Readmission Assessment   Factors that patient feels contributed to this readmission Acute/Chronic Clinical Presentation   Pt's living situation prior to admission? Home alone   Pt's level of independence at discharge? Some assist (mod)   Pt. received education on diagnoses at time of discharge? Yes   Did you know who and how to call someone if you felt worse? Yes   Did any new symptoms or issues develop after you were discharged? Yes   ----Post D/C symptoms: Symptoms/issue related to previous hospitalization Yes   Did you understand your discharge instructions? Yes   Did you have a follow-up appointment scheduled at time of discharge? Yes   ----F/U appt: Did you go to that appointment? Yes   ---- F/U appt: How many days after discharge was follow-up appointment? 0-14 days   Was patient a candidate for: Home Health   ----Home Health Recommendation: Recommended, arranged   Was full assessment completed by DREW/PERLITA on prior admission? Yes   Was the recommended discharge plan achieved? Yes   Was pt. discharged w/out services? No   Medical Hx   Does patient have an established PCP? Yes  (Dr. Brenda Wilkerson)   Significant Past Medical/Mental Health Hx COPD; pulm edema; CHF; ADELSO   Patient Info   Advanced directives? Yes   Patient's Current Mental Status at Time of Assessment Alert;Oriented   Patient's Home Environment House   Number of Levels in Home 1   Number of Stair in Home 5 ANDREAS   Patient lives with Alone   Patient Status Prior to Admission   Independent with ADLs and Mobility Yes   Services in place prior to admission Home Health Care;DME/Supplies at home   Home Health Provider Info [facility]   DME Provider/Supplier [employer]; HME   Type of DME/Supplies Rollator Walker;BiPAP;Home Oxygen;Home Pulse Oximetry;Nebulizer   Discharge Needs    Anticipated D/C needs Home health care     SW self-referred to this case for discharge planning and readmission.    Pt admitted for respiratory failure- pt has PMH of COPD/asthma.  Pt's primary pulmonologist is Dr. Boyle who is following case.    Per EMR review, pt was admitted  11/10-1/13 for same concern.  Pt followed up with Dr. Wilkerson 1/22 for post-hospital visit.  Pt saw Dr. Boyle in-office 1/28.    Pt is from home alone and uses a rollator at baseline.    Pt uses 3 L O2 at baseline- supplier is [employer].  Pt is current with [facility] for BiPAP.    Pt is compliant with home neb treatments.    Pt is current w/[facility] for RN/PT/OT/HHA. LEIDY entered.     SW does not anticipate additional DC needs.    PLAN: DC home w/[facility] pending med clear    / to remain available for support and/or discharge planning.   Liyah WHEELER, LSW  Discharge Planner

## 2025-01-30 NOTE — HOME CARE LIAISON
This is a current pt with [facility]. Pt will need a LEIDY order entered prior to DC for the below services. Please add a F2F if more services need to be added. RN/PT/OT/HHA.

## 2025-01-30 NOTE — DIETARY NOTE
Brief Nutrition Note:    Pt admitted for acute respiratory failure with hypoxia and hypercapnia. Pt screened at nutrition risk at admission by RN. Intakes reviewed, 1 meal at 100%. Pt was eating lunch at visit, family member in room. Pt stated her intake since admit is fair, typically at home has good intake. Current weight 152 lbs, pt states usual weight between 154 and 160 lbs. Weight fluctuations most likely due to chronic CHF and edema. Pt demonstrated understanding of heart healthy diet. Recently started a meal service since last admit (Griselda) as pt stated her quality of food was not good, consumed frozen meals and canned foods. Nutrition focused physical exam (NFPE) not needed a this time, pt appears well nourished.  Updating to no risk at this time. F/u per protocol. Please consult nutrition services if earlier intervention is indicated.    Wt Readings from Last 6 Encounters:   01/30/25 69 kg (152 lb 3.2 oz)   01/18/25 70.8 kg (156 lb)   01/14/25 70.8 kg (156 lb)   01/13/25 73.1 kg (161 lb 1.6 oz)   01/07/25 68.5 kg (151 lb)   12/18/24 79.7 kg (175 lb 9.6 oz)       Patient Weight(s) for the past 336 hrs:   Weight   01/30/25 0546 69 kg (152 lb 3.2 oz)   01/29/25 1653 68 kg (150 lb)   01/29/25 1556 68 kg (150 lb)       Percent Meals Eaten (last 6 days)       Date/Time Percent Meals Eaten (%)    01/30/25 1100 100 %                Brittney Kilpatrick  Dietetic Intern  Phone: [phone]

## 2025-01-30 NOTE — PLAN OF CARE
Problem: Patient Centered Care  Goal: Patient preferences are identified and integrated in the patient's plan of care  Description: Interventions:  - What would you like us to know as we care for you? From home alone   - Provide timely, complete, and accurate information to patient/family  - Incorporate patient and family knowledge, values, beliefs, and cultural backgrounds into the planning and delivery of care  - Encourage patient/family to participate in care and decision-making at the level they choose  - Honor patient and family perspectives and choices  Outcome: Progressing     Problem: RESPIRATORY - ADULT  Goal: Achieves optimal ventilation and oxygenation  Description: INTERVENTIONS:  - Assess for changes in respiratory status  - Assess for changes in mentation and behavior  - Position to facilitate oxygenation and minimize respiratory effort  - Oxygen supplementation based on oxygen saturation or ABGs  - Provide Smoking Cessation handout, if applicable  - Encourage broncho-pulmonary hygiene including cough, deep breathe, Incentive Spirometry  - Assess the need for suctioning and perform as needed  - Assess and instruct to report SOB or any respiratory difficulty  - Respiratory Therapy support as indicated  - Manage/alleviate anxiety  - Monitor for signs/symptoms of CO2 retention  Outcome: Progressing     Problem: PAIN - ADULT  Goal: Verbalizes/displays adequate comfort level or patient's stated pain goal  Description: INTERVENTIONS:  - Encourage pt to monitor pain and request assistance  - Assess pain using appropriate pain scale  - Administer analgesics based on type and severity of pain and evaluate response  - Implement non-pharmacological measures as appropriate and evaluate response  - Consider cultural and social influences on pain and pain management  - Manage/alleviate anxiety  - Utilize distraction and/or relaxation techniques  - Monitor for opioid side effects  - Notify MD/LIP if interventions  unsuccessful or patient reports new pain  - Anticipate increased pain with activity and pre-medicate as appropriate  Outcome: Progressing     Problem: RISK FOR INFECTION - ADULT  Goal: Absence of fever/infection during anticipated neutropenic period  Description: INTERVENTIONS  - Monitor WBC  - Administer growth factors as ordered  - Implement neutropenic guidelines  Outcome: Progressing     Problem: SAFETY ADULT - FALL  Goal: Free from fall injury  Description: INTERVENTIONS:  - Assess pt frequently for physical needs  - Identify cognitive and physical deficits and behaviors that affect risk of falls.  - [place] fall precautions as indicated by assessment.  - Educate pt/family on patient safety including physical limitations  - Instruct pt to call for assistance with activity based on assessment  - Modify environment to reduce risk of injury  - Provide assistive devices as appropriate  - Consider OT/PT consult to assist with strengthening/mobility  - Encourage toileting schedule  Outcome: Progressing     Problem: DISCHARGE PLANNING  Goal: Discharge to home or other facility with appropriate resources  Description: INTERVENTIONS:  - Identify barriers to discharge w/pt and caregiver  - Include patient/family/discharge partner in discharge planning  - Arrange for needed discharge resources and transportation as appropriate  - Identify discharge learning needs (meds, wound care, etc)  - Arrange for interpreters to assist at discharge as needed  - Consider post-discharge preferences of patient/family/discharge partner  - Complete POLST form as appropriate  - Assess patient's ability to be responsible for managing their own health  - Refer to Case Management Department for coordinating discharge planning if the patient needs post-hospital services based on physician/LIP order or complex needs related to functional status, cognitive ability or social support system  Outcome: Progressing     Problem: Patient/Family  Goals  Goal: Patient/Family Long Term Goal  Description: Patient's Long Term Goal: To discharge from hospital     Interventions:  -  Monitor vital signs   - Monitor appropriate labs   - Pain management   - Administer medications per order   - Follow MD orders   - Diagnostics per order   - Update/inform patient and family on plan of care   - Discharge planning    - See additional Care Plan goals for specific interventions  Outcome: Progressing  Goal: Patient/Family Short Term Goal  Description: Patient's Short Term Goal: To improve breathing     Interventions:   - Monitor vital signs   - Monitor appropriate labs   - Pain management   - Administer medications per order   - Follow MD orders   - Diagnostics per order   - Update/inform patient and family on plan of care   - See additional Care Plan goals for specific interventions  Outcome: Progressing     Problem: CARDIOVASCULAR - ADULT  Goal: Maintains optimal cardiac output and hemodynamic stability  Description: INTERVENTIONS:  - Monitor vital signs, rhythm, and trends  - Monitor for bleeding, hypotension and signs of decreased cardiac output  - Evaluate effectiveness of vasoactive medications to optimize hemodynamic stability  - Monitor arterial and/or venous puncture sites for bleeding and/or hematoma  - Assess quality of pulses, skin color and temperature  - Assess for signs of decreased coronary artery perfusion - ex. Angina  - Evaluate fluid balance, assess for edema, trend weights  Outcome: Progressing  Goal: Absence of cardiac arrhythmias or at baseline  Description: INTERVENTIONS:  - Continuous cardiac monitoring, monitor vital signs, obtain 12 lead EKG if indicated  - Evaluate effectiveness of antiarrhythmic and heart rate control medications as ordered  - Initiate emergency measures for life threatening arrhythmias  - Monitor electrolytes and administer replacement therapy as ordered  Outcome: Progressing     Monitoring vital signs, stable at this time. No  acute changes at this moment. Pain medications provided as needed. Fall precautions in place- bed alarm on, bed locked in lowest position, call light within reach. Frequent rounding by nursing staff.

## 2025-01-30 NOTE — PLAN OF CARE
Pt alert and oriented, on 4L of O2. Ambulated within the room and up to the bathroom. Pt and son updated on plan of care.   Problem: Patient Centered Care  Goal: Patient preferences are identified and integrated in the patient's plan of care  Description: Interventions:  - What would you like us to know as we care for you?  - Provide timely, complete, and accurate information to patient/family  - Incorporate patient and family knowledge, values, beliefs, and cultural backgrounds into the planning and delivery of care  - Encourage patient/family to participate in care and decision-making at the level they choose  - Honor patient and family perspectives and choices  Outcome: Progressing     Problem: RESPIRATORY - ADULT  Goal: Achieves optimal ventilation and oxygenation  Description: INTERVENTIONS:  - Assess for changes in respiratory status  - Assess for changes in mentation and behavior  - Position to facilitate oxygenation and minimize respiratory effort  - Oxygen supplementation based on oxygen saturation or ABGs  - Provide Smoking Cessation handout, if applicable  - Encourage broncho-pulmonary hygiene including cough, deep breathe, Incentive Spirometry  - Assess the need for suctioning and perform as needed  - Assess and instruct to report SOB or any respiratory difficulty  - Respiratory Therapy support as indicated  - Manage/alleviate anxiety  - Monitor for signs/symptoms of CO2 retention  Outcome: Progressing     Problem: PAIN - ADULT  Goal: Verbalizes/displays adequate comfort level or patient's stated pain goal  Description: INTERVENTIONS:  - Encourage pt to monitor pain and request assistance  - Assess pain using appropriate pain scale  - Administer analgesics based on type and severity of pain and evaluate response  - Implement non-pharmacological measures as appropriate and evaluate response  - Consider cultural and social influences on pain and pain management  - Manage/alleviate anxiety  - Utilize  distraction and/or relaxation techniques  - Monitor for opioid side effects  - Notify MD/LIP if interventions unsuccessful or patient reports new pain  - Anticipate increased pain with activity and pre-medicate as appropriate  Outcome: Progressing     Problem: Patient/Family Goals  Goal: Patient/Family Long Term Goal  Description: Patient's Long Term Goal:     Interventions:  - See additional Care Plan goals for specific interventions  Outcome: Progressing  Goal: Patient/Family Short Term Goal  Description: Patient's Short Term Goal:     Interventions:   - See additional Care Plan goals for specific interventions  Outcome: Progressing     Problem: CARDIOVASCULAR - ADULT  Goal: Maintains optimal cardiac output and hemodynamic stability  Description: INTERVENTIONS:  - Monitor vital signs, rhythm, and trends  - Monitor for bleeding, hypotension and signs of decreased cardiac output  - Evaluate effectiveness of vasoactive medications to optimize hemodynamic stability  - Monitor arterial and/or venous puncture sites for bleeding and/or hematoma  - Assess quality of pulses, skin color and temperature  - Assess for signs of decreased coronary artery perfusion - ex. Angina  - Evaluate fluid balance, assess for edema, trend weights  Outcome: Progressing  Goal: Absence of cardiac arrhythmias or at baseline  Description: INTERVENTIONS:  - Continuous cardiac monitoring, monitor vital signs, obtain 12 lead EKG if indicated  - Evaluate effectiveness of antiarrhythmic and heart rate control medications as ordered  - Initiate emergency measures for life threatening arrhythmias  - Monitor electrolytes and administer replacement therapy as ordered  Outcome: Progressing

## 2025-01-30 NOTE — DISCHARGE INSTRUCTIONS
Sometimes managing your health at home requires assistance.  The [facility] team has recognized your preference to use [facility].  They can be reached by phone at [phone].  The fax number for your reference is [phone].  A representative from the home health agency will contact you or your family to schedule your first visit.

## 2025-01-30 NOTE — PROGRESS NOTES
[facility]  part of [facility]    Progress Note    Yesenia Berkowitz Patient Status:  Inpatient    1940 MRN [id number]   Location [facility]5W Attending Lissy Blandon MD   Hosp Day # 1 PCP JO-ANN YANG MD       Subjective:   Yesenia Berkowitz is a(n) 84 year old female.   Felt better,less sob, c/c persistent cough non productive  Objective:   Blood pressure 127/86, pulse 77, temperature 97.6 °F (36.4 °C), temperature source Oral, resp. rate 20, weight 152 lb 3.2 oz (69 kg), SpO2 100%.    HEENT: negative  Neck: no adenopathy, no carotid bruit, no JVD, supple, symmetrical, trachea midline and thyroid not enlarged, symmetric, no tenderness/mass/nodules  Pulmonary/Chest:rhonchi  Cardiovascular: S1, S2 normal, no S3 or S4, regular rate and rhythm, no murmur  Abdominal: normal findings: bowel sounds normal, soft, non-tender and no hepatosplenomegaly   Extremities: extremities normal, atraumatic, no cyanosis or edema  Skin: Skin color, texture, turgor normal. No rashes or lesions,2+ edema    Results:     Lab Results   Component Value Date    WBC 12.0 (H) 2025    HGB 12.9 2025    HCT 41.1 2025    .0 2025    CREATSERUM 0.71 2025    BUN 13 2025     2025    K 3.9 2025    CL 96 (L) 2025    CO2 35.0 (H) 2025     (H) 2025    CA 9.2 2025    ALB 3.6 2025    ALKPHO 90 2025    BILT 0.2 2025    TP 5.8 2025    AST 17 2025    ALT 21 2025    PTT 24.3 2024    INR 1.08 2024    T4F 0.9 2024    TSH 0.185 (L) 2024    LIP 30 2024    DDIMER 0.47 2024    MG 1.9 2024    PHOS 3.4 2024    TROP <0.045 2020       XR CHEST AP PORTABLE  (CPT=71045)    Result Date: 2025  CONCLUSION:  1. Small bilateral pleural effusions are evident. Worsening bibasilar airspace opacities, which may reflect superimposed infectious process.  2. Cardiomegaly  with suggestion of mild, diffuse pulmonary interstitial edema.  3. Radiographic findings of COPD.    A preliminary report was issued by the [facility] teleradiology service. There are no major discrepancies.   Dictated by (CST): Grant Ferreira MD on 1/29/2025 at 7:42 AM     Finalized by (CST): Grant Ferreira MD on 1/29/2025 at 7:45 AM         EKG 12 Lead    Result Date: 1/29/2025  Normal sinus rhythm Low voltage QRS, consider pulmonary disease, pericardial effusion, or normal variant Borderline ECG When compared with ECG of 21-JAN-2025 08:24, Borderline criteria for Anterior infarct are no longer Present Borderline criteria for Anterolateral infarct are no longer Present No significant change was found Confirmed by Maciej Carlson ([id number]) on 1/29/2025 5:12:50 PM     Assessment and Plan:   Principal Problem:    Acute respiratory failure with hypoxia and hypercapnia (HCC)  Active Problems:    Kyphoscoliosis    Phrenic nerve paralysis    Acute cor pulmonale (HCC)    Pneumonia    Cor pulmonale (chronic) (HCC)    COPD exacerbation (HCC)    Acute respiratory failure (HCC)     Felt better,less sob  c/c persistent non productive cough.continue solumedrol doxccline and duoneb,bumex              ELVIA LIVINGSTON MD, MD  1/30/2025

## 2025-01-31 LAB
ALBUMIN SERPL-MCNC: 3.5 G/DL (ref 3.2–4.8)
ALBUMIN/GLOB SERPL: 1.8 {RATIO} (ref 1–2)
ALP LIVER SERPL-CCNC: 70 U/L
ALT SERPL-CCNC: 23 U/L
ANION GAP SERPL CALC-SCNC: 9 MMOL/L (ref 0–18)
AST SERPL-CCNC: 16 U/L (ref ?–34)
BASOPHILS # BLD AUTO: 0.02 X10(3) UL (ref 0–0.2)
BASOPHILS NFR BLD AUTO: 0.1 %
BILIRUB SERPL-MCNC: <0.2 MG/DL (ref 0.2–1.1)
BUN BLD-MCNC: 33 MG/DL (ref 9–23)
BUN/CREAT SERPL: 35.1 (ref 10–20)
CALCIUM BLD-MCNC: 9.2 MG/DL (ref 8.7–10.4)
CHLORIDE SERPL-SCNC: 97 MMOL/L (ref 98–112)
CO2 SERPL-SCNC: 30 MMOL/L (ref 21–32)
CREAT BLD-MCNC: 0.94 MG/DL
DEPRECATED RDW RBC AUTO: 48 FL (ref 35.1–46.3)
EGFRCR SERPLBLD CKD-EPI 2021: 61 ML/MIN/1.73M2 (ref 60–?)
EOSINOPHIL # BLD AUTO: 0 X10(3) UL (ref 0–0.7)
EOSINOPHIL NFR BLD AUTO: 0 %
ERYTHROCYTE [DISTWIDTH] IN BLOOD BY AUTOMATED COUNT: 14.1 % (ref 11–15)
GLOBULIN PLAS-MCNC: 2 G/DL (ref 2–3.5)
GLUCOSE BLD-MCNC: 276 MG/DL (ref 70–99)
HCT VFR BLD AUTO: 35 %
HGB BLD-MCNC: 11.3 G/DL
IMM GRANULOCYTES # BLD AUTO: 0.16 X10(3) UL (ref 0–1)
IMM GRANULOCYTES NFR BLD: 1 %
LYMPHOCYTES # BLD AUTO: 0.31 X10(3) UL (ref 1–4)
LYMPHOCYTES NFR BLD AUTO: 2 %
MCH RBC QN AUTO: 29.7 PG (ref 26–34)
MCHC RBC AUTO-ENTMCNC: 32.3 G/DL (ref 31–37)
MCV RBC AUTO: 91.9 FL
MONOCYTES # BLD AUTO: 0.33 X10(3) UL (ref 0.1–1)
MONOCYTES NFR BLD AUTO: 2.1 %
NEUTROPHILS # BLD AUTO: 14.63 X10 (3) UL (ref 1.5–7.7)
NEUTROPHILS # BLD AUTO: 14.63 X10(3) UL (ref 1.5–7.7)
NEUTROPHILS NFR BLD AUTO: 94.8 %
OSMOLALITY SERPL CALC.SUM OF ELEC: 299 MOSM/KG (ref 275–295)
PLATELET # BLD AUTO: 217 10(3)UL (ref 150–450)
POTASSIUM SERPL-SCNC: 3.6 MMOL/L (ref 3.5–5.1)
POTASSIUM SERPL-SCNC: 5.5 MMOL/L (ref 3.5–5.1)
PROT SERPL-MCNC: 5.5 G/DL (ref 5.7–8.2)
RBC # BLD AUTO: 3.81 X10(6)UL
SODIUM SERPL-SCNC: 136 MMOL/L (ref 136–145)
WBC # BLD AUTO: 15.5 X10(3) UL (ref 4–11)

## 2025-01-31 PROCEDURE — 99233 SBSQ HOSP IP/OBS HIGH 50: CPT | Performed by: FAMILY MEDICINE

## 2025-01-31 RX ORDER — POTASSIUM CHLORIDE 1500 MG/1
40 TABLET, EXTENDED RELEASE ORAL EVERY 4 HOURS
Status: COMPLETED | OUTPATIENT
Start: 2025-01-31 | End: 2025-01-31

## 2025-01-31 RX ORDER — DOXYCYCLINE 100 MG/1
100 CAPSULE ORAL 2 TIMES DAILY
Status: DISCONTINUED | OUTPATIENT
Start: 2025-01-31 | End: 2025-02-05

## 2025-01-31 NOTE — PLAN OF CARE
Problem: Patient Centered Care  Goal: Patient preferences are identified and integrated in the patient's plan of care  Description: Interventions:  - What would you like us to know as we care for you? From home alone  - Provide timely, complete, and accurate information to patient/family  - Incorporate patient and family knowledge, values, beliefs, and cultural backgrounds into the planning and delivery of care  - Encourage patient/family to participate in care and decision-making at the level they choose  - Honor patient and family perspectives and choices  Outcome: Progressing     Problem: RESPIRATORY - ADULT  Goal: Achieves optimal ventilation and oxygenation  Description: INTERVENTIONS:  - Assess for changes in respiratory status  - Assess for changes in mentation and behavior  - Position to facilitate oxygenation and minimize respiratory effort  - Oxygen supplementation based on oxygen saturation or ABGs  - Provide Smoking Cessation handout, if applicable  - Encourage broncho-pulmonary hygiene including cough, deep breathe, Incentive Spirometry  - Assess the need for suctioning and perform as needed  - Assess and instruct to report SOB or any respiratory difficulty  - Respiratory Therapy support as indicated  - Manage/alleviate anxiety  - Monitor for signs/symptoms of CO2 retention  Outcome: Progressing     Problem: PAIN - ADULT  Goal: Verbalizes/displays adequate comfort level or patient's stated pain goal  Description: INTERVENTIONS:  - Encourage pt to monitor pain and request assistance  - Assess pain using appropriate pain scale  - Administer analgesics based on type and severity of pain and evaluate response  - Implement non-pharmacological measures as appropriate and evaluate response  - Consider cultural and social influences on pain and pain management  - Manage/alleviate anxiety  - Utilize distraction and/or relaxation techniques  - Monitor for opioid side effects  - Notify MD/LIP if interventions  unsuccessful or patient reports new pain  - Anticipate increased pain with activity and pre-medicate as appropriate  Outcome: Progressing     Problem: RISK FOR INFECTION - ADULT  Goal: Absence of fever/infection during anticipated neutropenic period  Description: INTERVENTIONS  - Monitor WBC  - Administer growth factors as ordered  - Implement neutropenic guidelines  Outcome: Progressing     Problem: SAFETY ADULT - FALL  Goal: Free from fall injury  Description: INTERVENTIONS:  - Assess pt frequently for physical needs  - Identify cognitive and physical deficits and behaviors that affect risk of falls.  - [place] fall precautions as indicated by assessment.  - Educate pt/family on patient safety including physical limitations  - Instruct pt to call for assistance with activity based on assessment  - Modify environment to reduce risk of injury  - Provide assistive devices as appropriate  - Consider OT/PT consult to assist with strengthening/mobility  - Encourage toileting schedule  Outcome: Progressing     Problem: DISCHARGE PLANNING  Goal: Discharge to home or other facility with appropriate resources  Description: INTERVENTIONS:  - Identify barriers to discharge w/pt and caregiver  - Include patient/family/discharge partner in discharge planning  - Arrange for needed discharge resources and transportation as appropriate  - Identify discharge learning needs (meds, wound care, etc)  - Arrange for interpreters to assist at discharge as needed  - Consider post-discharge preferences of patient/family/discharge partner  - Complete POLST form as appropriate  - Assess patient's ability to be responsible for managing their own health  - Refer to Case Management Department for coordinating discharge planning if the patient needs post-hospital services based on physician/LIP order or complex needs related to functional status, cognitive ability or social support system  Outcome: Progressing     Problem: Patient/Family  Goals  Goal: Patient/Family Long Term Goal  Description: Patient's Long Term Goal: Return back to baseline    Interventions:  - PT/OT, MAR, POC  - See additional Care Plan goals for specific interventions  Outcome: Progressing  Goal: Patient/Family Short Term Goal  Description: Patient's Short Term Goal: Discharge back home     Interventions:   - POC, medication administration (steroids, abx)  - See additional Care Plan goals for specific interventions  Outcome: Progressing      Problem: CARDIOVASCULAR - ADULT  Goal: Maintains optimal cardiac output and hemodynamic stability  Description: INTERVENTIONS:  - Monitor vital signs, rhythm, and trends  - Monitor for bleeding, hypotension and signs of decreased cardiac output  - Evaluate effectiveness of vasoactive medications to optimize hemodynamic stability  - Monitor arterial and/or venous puncture sites for bleeding and/or hematoma  - Assess quality of pulses, skin color and temperature  - Assess for signs of decreased coronary artery perfusion - ex. Angina  - Evaluate fluid balance, assess for edema, trend weights  Outcome: Progressing  Goal: Absence of cardiac arrhythmias or at baseline  Description: INTERVENTIONS:  - Continuous cardiac monitoring, monitor vital signs, obtain 12 lead EKG if indicated  - Evaluate effectiveness of antiarrhythmic and heart rate control medications as ordered  - Initiate emergency measures for life threatening arrhythmias  - Monitor electrolytes and administer replacement therapy as ordered  Outcome: Progressing

## 2025-01-31 NOTE — DIETARY NOTE
Brief Nutrition Note:    Pt admitted for acute respiratory failure with hypoxia and hypercapnia. Pt seen yesterday and determined to be at no nutrition risk. During visit yesterday, pt reported started using [employer] meal delivery program. Reviewed plan/meals available on website - appears meals offered can be high in sodium with some lower sodium options available. Pt re-visited to discuss sodium content of meals - pt aware of recommendation of sodium restriction. Discussed alternative meal program option if interested (such as [employer]) which provides lower sodium options/provide handout and coupon for trial. Pt aware of [employer] and has tried in past and did not like. Handout/coupons not provided at this time given dislike of program in past. Encouraged monitoring sodium content of meals chosen once discharged home. F/u per protocol. Please consult nutrition services if earlier intervention is indicated.    Tracy Salvador MS, DEMETRIUS, RDN, LDN  Clinical Dietitian  P: [phone]

## 2025-01-31 NOTE — PROGRESS NOTES
[facility]  part of [facility]    Progress Note    Yesenia Berkowitz Patient Status:  Inpatient    1940 MRN [id number]   Location [facility]5W Attending Lissy Blandon MD   Hosp Day # 2 PCP JO-ANN YANG MD       Subjective:   Yesenia Berkowitz is a(n) 84 year old female.   Pt with OJEDA and currently on 3L.  Objective:   Blood pressure 132/64, pulse 70, temperature 97.5 °F (36.4 °C), temperature source Oral, resp. rate 18, weight 152 lb 8 oz (69.2 kg), SpO2 94%.    HEENT: negative  Neck: no adenopathy, no carotid bruit, no JVD, supple, symmetrical, trachea midline and thyroid not enlarged, symmetric, no tenderness/mass/nodules  Pulmonary/Chest:rhonchi  Cardiovascular: S1, S2 normal, no S3 or S4, regular rate and rhythm, no murmur  Abdominal: normal findings: bowel sounds normal, soft, non-tender and no hepatosplenomegaly   Extremities: extremities normal, atraumatic, no cyanosis or edema  Skin: Skin color, texture, turgor normal. No rashes or lesions,2+ edema    Results:     Lab Results   Component Value Date    WBC 15.5 (H) 2025    HGB 11.3 (L) 2025    HCT 35.0 2025    .0 2025    CREATSERUM 0.94 2025    BUN 33 (H) 2025     2025    K 5.5 (H) 2025    CL 97 (L) 2025    CO2 30.0 2025     (H) 2025    CA 9.2 2025    ALB 3.5 2025    ALKPHO 70 2025    BILT <0.2 (L) 2025    TP 5.5 (L) 2025    AST 16 2025    ALT 23 2025    PTT 24.3 2024    INR 1.08 2024    T4F 0.9 2024    TSH 0.185 (L) 2024    LIP 30 2024    DDIMER 0.47 2024    MG 1.9 2024    PHOS 3.4 2024    TROP <0.045 2020       No results found.        Assessment and Plan:   Principal Problem:    Acute respiratory failure with hypoxia and hypercapnia (HCC)  Active Problems:    Kyphoscoliosis    Phrenic nerve paralysis    Acute cor pulmonale (HCC)    Pneumonia    Cor  pulmonale (chronic) (HCC)    COPD exacerbation (HCC)    Acute respiratory failure (HCC)    Wean O2 as able  On diuretics  Salumedrol IV  Doxy PO   Arnuity, duonebs and singulair  Wants to stay for few more days till her edema and sob gets stable

## 2025-01-31 NOTE — PROGRESS NOTES
Progress Note     Yesenia Berkowitz Patient Status:  Inpatient    1940 MRN [id number]   Location [facility]5W Attending Lidia Levy MD   Hosp Day # 2 PCP JO-ANN YANG MD     Chief Complaint: patient presented with   Chief Complaint   Patient presents with    Difficulty Breathing       Subjective:   S: Patient continue to be on 5 Litres of oxygen., feel more sob while walking in room    Review of Systems:   10 point ROS completed and was negative, except for pertinent positive and negatives stated in subjective.    Objective:   Vital signs:  Temp:  [97.5 °F (36.4 °C)-98 °F (36.7 °C)] 97.5 °F (36.4 °C)  Pulse:  [65-77] 74  Resp:  [16-22] 16  BP: (118-145)/(51-63) 126/57  SpO2:  [97 %-100 %] 97 %    Wt Readings from Last 6 Encounters:   25 152 lb 8 oz (69.2 kg)   25 156 lb (70.8 kg)   25 156 lb (70.8 kg)   25 161 lb 1.6 oz (73.1 kg)   25 151 lb (68.5 kg)   24 175 lb 9.6 oz (79.7 kg)         Physical Exam:    General: No acute distress. Alert ,         Respiratory: Clear to auscultation bilaterally. No wheezes. No rhonchi.  Cardiovascular: S1, S2. Regular rate and rhythm. No murmurs, rubs or gallops.   Abdomen: Soft, nontender, nondistended.  Positive bowel sounds. No rebound or guarding.  Neurologic: No focal neurological deficits.   Musculoskeletal: Moves all extremities.  Extremities: No edema.    Results:   Diagnostic Data:      Labs:    Labs Last 24 Hours:   BMP     CBC    Other     Na 136 Cl 97 BUN 33 Glu 276   Hb 11.3   PTT - Procal -   K 3.6 CO2 30.0 Cr 0.94   WBC 15.5 >< .0  INR - CRP -   Renal Lytes Endo    Hct 35.0   Trop - D dim -   eGFR - Ca 9.2 POC Gluc  -    LFT   pBNP - Lactic -   eGFR AA - PO4 - A1c -   AST 16 APk 70 Prot 5.5  LDL -     Mg - TSH -   ALT 23 T katie <0.2 Alb 3.5        COVID-19 Lab Results    COVID-19  Lab Results   Component Value Date    COVID19 Not Detected 2025    COVID19 Not Detected 2025    COVID19 Not Detected  12/21/2024       Pro-Calcitonin  No results for input(s): \"PCT\" in the last 168 hours.    Cardiac  Recent Labs   Lab 01/29/25  0026   PBNP 344       Creatinine Kinase  No results for input(s): \"CK\" in the last 168 hours.    Inflammatory Markers  No results for input(s): \"CRP\", \"SONA\", \"LDH\", \"DDIMER\" in the last 168 hours.    Imaging: Imaging data reviewed in Epic.    Medications:    doxycycline  100 mg Oral BID    potassium chloride  40 mEq Oral Q4H    cetirizine  5 mg Oral Nightly    enoxaparin  40 mg Subcutaneous Daily    ipratropium-albuterol  3 mL Nebulization Q6H    guaiFENesin ER  600 mg Oral BID    furosemide  80 mg Oral Daily    fluticasone furoate  1 puff Inhalation Daily    spironolactone  25 mg Oral Daily    montelukast  10 mg Oral Nightly    digoxin  125 mcg Oral Daily    dilTIAZem HCl ER Coated Beads  360 mg Oral BID    empagliflozin  10 mg Oral Daily    acetaZOLAMIDE  500 mg Oral Daily    methylPREDNISolone  40 mg Intravenous Q12H       Assessment & Plan:   ASSESSMENT / PLAN:     Problem List Items Addressed This Visit          HCC Problems    * (Principal) Acute respiratory failure with hypoxia and hypercapnia (HCC) - Primary    Relevant Medications    methylPREDNISolone sodium succinate (Solu-MEDROL) injection 80 mg (Completed)    ipratropium (Atrovent) 0.02 % nebulizer solution 0.5 mg (Completed)    doxycycline hyclate (Vibramycin) 100 mg in sodium chloride 0.9% 100 mL IVPB (Completed)    ipratropium-albuterol (Duoneb) 0.5-2.5 (3) MG/3ML inhalation solution 3 mL    ipratropium-albuterol (Duoneb) 0.5-2.5 (3) MG/3ML inhalation solution 3 mL    benzonatate (Tessalon) cap 200 mg    guaiFENesin ER (Mucinex) 12 hr tab 600 mg    fluticasone furoate (Arnuity Ellipta) 100 MCG/ACT inhaler 1 puff    ipratropium (Atrovent) 0.02 % nebulizer solution 0.5 mg (Completed)    methylPREDNISolone sodium succinate (Solu-MEDROL) injection 40 mg    fluticasone-umeclidin-vilant (TRELEGY ELLIPTA) 100-62.5-25 MCG/ACT  Inhalation Aerosol Powder, Breath Activated    doxycycline (Vibramycin) cap 100 mg (Start on 1/31/2025  9:00 PM)    COPD exacerbation (HCC)    Relevant Medications    methylPREDNISolone sodium succinate (Solu-MEDROL) injection 80 mg (Completed)    ipratropium (Atrovent) 0.02 % nebulizer solution 0.5 mg (Completed)    doxycycline hyclate (Vibramycin) 100 mg in sodium chloride 0.9% 100 mL IVPB (Completed)    ipratropium-albuterol (Duoneb) 0.5-2.5 (3) MG/3ML inhalation solution 3 mL    ipratropium-albuterol (Duoneb) 0.5-2.5 (3) MG/3ML inhalation solution 3 mL    benzonatate (Tessalon) cap 200 mg    guaiFENesin ER (Mucinex) 12 hr tab 600 mg    fluticasone furoate (Arnuity Ellipta) 100 MCG/ACT inhaler 1 puff    ipratropium (Atrovent) 0.02 % nebulizer solution 0.5 mg (Completed)    methylPREDNISolone sodium succinate (Solu-MEDROL) injection 40 mg    fluticasone-umeclidin-vilant (TRELEGY ELLIPTA) 100-62.5-25 MCG/ACT Inhalation Aerosol Powder, Breath Activated    doxycycline (Vibramycin) cap 100 mg (Start on 1/31/2025  9:00 PM)      84 year old female with history of COPD, A-fib, asthma, GERD, heart disease, chronic respiratory failure on 3.5-4 L, HTN and others who presented to the ED with complaints of shortness of breath for the last 2 days.       Acute on chronic respiratory failure  COPD exacerbation  Asthma exacerbation  Bilateral PNA  Phrenic nerve paralysis  Imaging reviewed  Cultures pending  Pulm on on consult  Continue Solu-Medrol 40 mg IV q12  Duonebs qid PRN  Lasix 80 mg daily, spironolactone 25 mg daily  Continue BiPAP at night  Wean O2 as tolerated  1/31: pt continues to feel tired and getting Sob by walking to Bathroom in Hospital. She does not feel like she is at her baseline, continue current mx   Hx of HFpEF  Imaging reviewed  BNP WNL  Last EF 65-70%  Strict Is and Os, daily weights  Hypokalemia  Replace per protocol  Hx of Afib  HTN  Continue home meds  Not on anticoagulation      Quality:  DVT  Prophylaxis: Lovenox   CODE status: FULL   DISPO: pending clinical improvement.   Estimated date of discharge: To be determined  Discharge is dependent on: Improved clinical status  At this point Patient is expected to be discharge to: Home versus rehab      Plan of care discussed with Patient and RN.     Coordinated care with providers and counseling re: treatment plan and workup     Lidia Levy MD    Supplementary Documentation:       I personally reviewed the available laboratories, imaging including operative report. I discussed/will discuss the case with patient and her nurse. I ordered laboratories studies. I adjusted medications including not applicable today. Medical decision making high, risk is high.     >55min spent, >50% spent counseling and coordinating care in the form of educating pt/family and d/w consultants and staff. Most of the time spent discussing the above plan.

## 2025-02-01 LAB
ANION GAP SERPL CALC-SCNC: 5 MMOL/L (ref 0–18)
BASOPHILS # BLD AUTO: 0.02 X10(3) UL (ref 0–0.2)
BASOPHILS NFR BLD AUTO: 0.2 %
BUN BLD-MCNC: 38 MG/DL (ref 9–23)
BUN/CREAT SERPL: 40.4 (ref 10–20)
CALCIUM BLD-MCNC: 8.3 MG/DL (ref 8.7–10.4)
CHLORIDE SERPL-SCNC: 100 MMOL/L (ref 98–112)
CO2 SERPL-SCNC: 34 MMOL/L (ref 21–32)
CREAT BLD-MCNC: 0.94 MG/DL
DEPRECATED RDW RBC AUTO: 48.2 FL (ref 35.1–46.3)
EGFRCR SERPLBLD CKD-EPI 2021: 61 ML/MIN/1.73M2 (ref 60–?)
EOSINOPHIL # BLD AUTO: 0 X10(3) UL (ref 0–0.7)
EOSINOPHIL NFR BLD AUTO: 0 %
ERYTHROCYTE [DISTWIDTH] IN BLOOD BY AUTOMATED COUNT: 14.1 % (ref 11–15)
GLUCOSE BLD-MCNC: 292 MG/DL (ref 70–99)
HCT VFR BLD AUTO: 35.7 %
HGB BLD-MCNC: 11.4 G/DL
IMM GRANULOCYTES # BLD AUTO: 0.12 X10(3) UL (ref 0–1)
IMM GRANULOCYTES NFR BLD: 1 %
LYMPHOCYTES # BLD AUTO: 0.37 X10(3) UL (ref 1–4)
LYMPHOCYTES NFR BLD AUTO: 3 %
MCH RBC QN AUTO: 29.5 PG (ref 26–34)
MCHC RBC AUTO-ENTMCNC: 31.9 G/DL (ref 31–37)
MCV RBC AUTO: 92.2 FL
MONOCYTES # BLD AUTO: 0.49 X10(3) UL (ref 0.1–1)
MONOCYTES NFR BLD AUTO: 4 %
NEUTROPHILS # BLD AUTO: 11.19 X10 (3) UL (ref 1.5–7.7)
NEUTROPHILS # BLD AUTO: 11.19 X10(3) UL (ref 1.5–7.7)
NEUTROPHILS NFR BLD AUTO: 91.8 %
OSMOLALITY SERPL CALC.SUM OF ELEC: 308 MOSM/KG (ref 275–295)
PLATELET # BLD AUTO: 217 10(3)UL (ref 150–450)
POTASSIUM SERPL-SCNC: 4.8 MMOL/L (ref 3.5–5.1)
RBC # BLD AUTO: 3.87 X10(6)UL
SODIUM SERPL-SCNC: 139 MMOL/L (ref 136–145)
WBC # BLD AUTO: 12.2 X10(3) UL (ref 4–11)

## 2025-02-01 PROCEDURE — 99233 SBSQ HOSP IP/OBS HIGH 50: CPT | Performed by: FAMILY MEDICINE

## 2025-02-01 RX ORDER — PREDNISONE 20 MG/1
20 TABLET ORAL
Status: DISCONTINUED | OUTPATIENT
Start: 2025-02-02 | End: 2025-02-03

## 2025-02-01 NOTE — PROGRESS NOTES
[facility]  part of [facility]    Progress Note    Yesenia Berkowitz Patient Status:  Inpatient    1940 MRN [id number]   Location [facility]5W Attending Lissy Blandon MD   Hosp Day # 3 PCP JO-ANN YANG MD       Subjective:   Yesenia Berkowitz is a(n) 84 year old female.   Pt with OJEDA and currently on 3L.  Wants ice and water  Objective:   Blood pressure 152/65, pulse 80, temperature 97.4 °F (36.3 °C), temperature source Oral, resp. rate 18, weight 153 lb 14.4 oz (69.8 kg), SpO2 96%.    HEENT: negative  Neck: no adenopathy, no carotid bruit, no JVD, supple, symmetrical, trachea midline and thyroid not enlarged, symmetric, no tenderness/mass/nodules  Pulmonary/Chest:rhonchi  Cardiovascular: S1, S2 normal, no S3 or S4, regular rate and rhythm, no murmur  Abdominal: normal findings: bowel sounds normal, soft, non-tender and no hepatosplenomegaly   Extremities: extremities normal, atraumatic, no cyanosis or edema  Skin: Skin color, texture, turgor normal. No rashes or lesions,2+ edema    Results:     Lab Results   Component Value Date    WBC 12.2 (H) 2025    HGB 11.4 (L) 2025    HCT 35.7 2025    .0 2025    CREATSERUM 0.94 2025    BUN 38 (H) 2025     2025    K 4.8 2025     2025    CO2 34.0 (H) 2025     (H) 2025    CA 8.3 (L) 2025    ALB 3.5 2025    ALKPHO 70 2025    BILT <0.2 (L) 2025    TP 5.5 (L) 2025    AST 16 2025    ALT 23 2025    PTT 24.3 2024    INR 1.08 2024    T4F 0.9 2024    TSH 0.185 (L) 2024    LIP 30 2024    DDIMER 0.47 2024    MG 1.9 2024    PHOS 3.4 2024    TROP <0.045 2020       No results found.        Assessment and Plan:   Principal Problem:    Acute respiratory failure with hypoxia and hypercapnia (HCC)  Active Problems:    Kyphoscoliosis    Phrenic nerve paralysis    Acute cor pulmonale  (HCC)    Pneumonia    Cor pulmonale (chronic) (HCC)    COPD exacerbation (HCC)    Acute respiratory failure (HCC)    Wean O2 as able  On diuretics  Salumedrol IV switch to PO prednisone  Doxy PO   Arnuity, duonebs and singulair  Wants to stay for few more days till her edema and sob gets stable  Dw RN and Pt

## 2025-02-01 NOTE — PROGRESS NOTES
Progress Note     Yesenia Berkowitz Patient Status:  Inpatient    1940 MRN [id number]   Location [facility]5W Attending Lidia Levy MD   Hosp Day # 3 PCP JO-ANN YANG MD     Chief Complaint: patient presented with   Chief Complaint   Patient presents with    Difficulty Breathing       Subjective:   S:   : Patient continue to be on 5 Litres of oxygen., feel more sob while walking in room  ; pt continues to feel tired     Review of Systems:   10 point ROS completed and was negative, except for pertinent positive and negatives stated in subjective.    Objective:   Vital signs:  Temp:  [97.3 °F (36.3 °C)-98.2 °F (36.8 °C)] 97.3 °F (36.3 °C)  Pulse:  [66-76] 66  Resp:  [16-20] 20  BP: (128-143)/(56-64) 130/61  SpO2:  [92 %-99 %] 97 %    Wt Readings from Last 6 Encounters:   25 153 lb 14.4 oz (69.8 kg)   25 156 lb (70.8 kg)   25 156 lb (70.8 kg)   25 161 lb 1.6 oz (73.1 kg)   25 151 lb (68.5 kg)   24 175 lb 9.6 oz (79.7 kg)         Physical Exam:    General: No acute distress. Alert ,         Respiratory: Clear to auscultation bilaterally. No wheezes. No rhonchi.  Cardiovascular: S1, S2. Regular rate and rhythm. No murmurs, rubs or gallops.   Abdomen: Soft, nontender, nondistended.  Positive bowel sounds. No rebound or guarding.  Neurologic: No focal neurological deficits.   Musculoskeletal: Moves all extremities.  Extremities: No edema.    Results:   Diagnostic Data:      Labs:    Labs Last 24 Hours:   BMP     CBC    Other     Na - Cl - BUN - Glu -   Hb -   PTT - Procal -   K 5.5 CO2 - Cr -   WBC - >< PLT -  INR - CRP -   Renal Lytes Endo    Hct -   Trop - D dim -   eGFR - Ca - POC Gluc  -    LFT   pBNP - Lactic -   eGFR AA - PO4 - A1c -   AST - APk - Prot -  LDL -     Mg - TSH -   ALT - T katie - Alb -        COVID-19 Lab Results    COVID-19  Lab Results   Component Value Date    COVID19 Not Detected 2025    COVID19 Not Detected 2025    COVID19 Not  Detected 12/21/2024       Pro-Calcitonin  No results for input(s): \"PCT\" in the last 168 hours.    Cardiac  Recent Labs   Lab 01/29/25  0026   PBNP 344       Creatinine Kinase  No results for input(s): \"CK\" in the last 168 hours.    Inflammatory Markers  No results for input(s): \"CRP\", \"SONA\", \"LDH\", \"DDIMER\" in the last 168 hours.    Imaging: Imaging data reviewed in Epic.    Medications:    doxycycline  100 mg Oral BID    cetirizine  5 mg Oral Nightly    enoxaparin  40 mg Subcutaneous Daily    ipratropium-albuterol  3 mL Nebulization Q6H    guaiFENesin ER  600 mg Oral BID    furosemide  80 mg Oral Daily    fluticasone furoate  1 puff Inhalation Daily    spironolactone  25 mg Oral Daily    montelukast  10 mg Oral Nightly    digoxin  125 mcg Oral Daily    dilTIAZem HCl ER Coated Beads  360 mg Oral BID    empagliflozin  10 mg Oral Daily    acetaZOLAMIDE  500 mg Oral Daily    methylPREDNISolone  40 mg Intravenous Q12H       Assessment & Plan:   ASSESSMENT / PLAN:     Problem List Items Addressed This Visit          HCC Problems    * (Principal) Acute respiratory failure with hypoxia and hypercapnia (HCC) - Primary    Relevant Medications    methylPREDNISolone sodium succinate (Solu-MEDROL) injection 80 mg (Completed)    ipratropium (Atrovent) 0.02 % nebulizer solution 0.5 mg (Completed)    doxycycline hyclate (Vibramycin) 100 mg in sodium chloride 0.9% 100 mL IVPB (Completed)    ipratropium-albuterol (Duoneb) 0.5-2.5 (3) MG/3ML inhalation solution 3 mL    ipratropium-albuterol (Duoneb) 0.5-2.5 (3) MG/3ML inhalation solution 3 mL    benzonatate (Tessalon) cap 200 mg    guaiFENesin ER (Mucinex) 12 hr tab 600 mg    fluticasone furoate (Arnuity Ellipta) 100 MCG/ACT inhaler 1 puff    ipratropium (Atrovent) 0.02 % nebulizer solution 0.5 mg (Completed)    methylPREDNISolone sodium succinate (Solu-MEDROL) injection 40 mg    fluticasone-umeclidin-vilant (TRELEGY ELLIPTA) 100-62.5-25 MCG/ACT Inhalation Aerosol Powder, Breath  Activated    doxycycline (Vibramycin) cap 100 mg    COPD exacerbation (HCC)    Relevant Medications    methylPREDNISolone sodium succinate (Solu-MEDROL) injection 80 mg (Completed)    ipratropium (Atrovent) 0.02 % nebulizer solution 0.5 mg (Completed)    doxycycline hyclate (Vibramycin) 100 mg in sodium chloride 0.9% 100 mL IVPB (Completed)    ipratropium-albuterol (Duoneb) 0.5-2.5 (3) MG/3ML inhalation solution 3 mL    ipratropium-albuterol (Duoneb) 0.5-2.5 (3) MG/3ML inhalation solution 3 mL    benzonatate (Tessalon) cap 200 mg    guaiFENesin ER (Mucinex) 12 hr tab 600 mg    fluticasone furoate (Arnuity Ellipta) 100 MCG/ACT inhaler 1 puff    ipratropium (Atrovent) 0.02 % nebulizer solution 0.5 mg (Completed)    methylPREDNISolone sodium succinate (Solu-MEDROL) injection 40 mg    fluticasone-umeclidin-vilant (TRELEGY ELLIPTA) 100-62.5-25 MCG/ACT Inhalation Aerosol Powder, Breath Activated    doxycycline (Vibramycin) cap 100 mg      84 year old female with history of COPD, A-fib, asthma, GERD, heart disease, chronic respiratory failure on 3.5-4 L, HTN and others who presented to the ED with complaints of shortness of breath for the last 2 days.       Acute on chronic respiratory failure  COPD exacerbation  Asthma exacerbation  Bilateral PNA  Phrenic nerve paralysis  Imaging reviewed  Cultures pending  Pulm on on consult  Continue Solu-Medrol 40 mg IV q12  Duonebs qid PRN  Lasix 80 mg daily, spironolactone 25 mg daily  Continue BiPAP at night  Wean O2 as tolerated  1/31: pt continues to feel tired and getting Sob by walking to Bathroom in Hospital. She does not feel like she is at her baseline, continue current mx   2/1: follwoed by pulmonary, gradually improving, labs this am pending, po PO doxy and IV steroids   Hx of HFpEF  Imaging reviewed  BNP WNL  Last EF 65-70%  Strict Is and Os, daily weights  Hypokalemia  Replace per protocol  Hx of Afib  HTN  Continue home meds  Not on anticoagulation      Quality:  DVT  Prophylaxis: Lovenox   CODE status: FULL   DISPO: pending clinical improvement.   Estimated date of discharge: To be determined  Discharge is dependent on: Improved clinical status  At this point Patient is expected to be discharge to: Home versus rehab      Plan of care discussed with Patient and RN.     Coordinated care with providers and counseling re: treatment plan and workup     Lidia Levy MD    Supplementary Documentation:       I personally reviewed the available laboratories, imaging including operative report. I discussed/will discuss the case with patient and her nurse. I ordered laboratories studies. I adjusted medications including not applicable today. Medical decision making high, risk is high.     >55min spent, >50% spent counseling and coordinating care in the form of educating pt/family and d/w consultants and staff. Most of the time spent discussing the above plan.

## 2025-02-01 NOTE — PLAN OF CARE
Pt is refusing to be compliant with fluid restriction. Education provided multiple times related to how fluid volume overload contributes to difficulty breathing in the setting of heart failure. Explained to patient that fluid intake must be spaced out appropriately over 24 hours period to not exceed 2,000ml fluid restriction.   Problem: Patient Centered Care  Goal: Patient preferences are identified and integrated in the patient's plan of care  Description: Interventions:  - What would you like us to know as we care for you? Pt is frustrated about water restriction  - Provide timely, complete, and accurate information to patient/family  - Incorporate patient and family knowledge, values, beliefs, and cultural backgrounds into the planning and delivery of care  - Encourage patient/family to participate in care and decision-making at the level they choose  - Honor patient and family perspectives and choices  Outcome: Progressing     Problem: RESPIRATORY - ADULT  Goal: Achieves optimal ventilation and oxygenation  Description: INTERVENTIONS:  - Assess for changes in respiratory status  - Assess for changes in mentation and behavior  - Position to facilitate oxygenation and minimize respiratory effort  - Oxygen supplementation based on oxygen saturation or ABGs  - Provide Smoking Cessation handout, if applicable  - Encourage broncho-pulmonary hygiene including cough, deep breathe, Incentive Spirometry  - Assess the need for suctioning and perform as needed  - Assess and instruct to report SOB or any respiratory difficulty  - Respiratory Therapy support as indicated  - Manage/alleviate anxiety  - Monitor for signs/symptoms of CO2 retention  Outcome: Progressing     Problem: PAIN - ADULT  Goal: Verbalizes/displays adequate comfort level or patient's stated pain goal  Description: INTERVENTIONS:  - Encourage pt to monitor pain and request assistance  - Assess pain using appropriate pain scale  - Administer analgesics  based on type and severity of pain and evaluate response  - Implement non-pharmacological measures as appropriate and evaluate response  - Consider cultural and social influences on pain and pain management  - Manage/alleviate anxiety  - Utilize distraction and/or relaxation techniques  - Monitor for opioid side effects  - Notify MD/LIP if interventions unsuccessful or patient reports new pain  - Anticipate increased pain with activity and pre-medicate as appropriate  Outcome: Progressing     Problem: RISK FOR INFECTION - ADULT  Goal: Absence of fever/infection during anticipated neutropenic period  Description: INTERVENTIONS  - Monitor WBC  - Administer growth factors as ordered  - Implement neutropenic guidelines  Outcome: Progressing     Problem: SAFETY ADULT - FALL  Goal: Free from fall injury  Description: INTERVENTIONS:  - Assess pt frequently for physical needs  - Identify cognitive and physical deficits and behaviors that affect risk of falls.  - [place] fall precautions as indicated by assessment.  - Educate pt/family on patient safety including physical limitations  - Instruct pt to call for assistance with activity based on assessment  - Modify environment to reduce risk of injury  - Provide assistive devices as appropriate  - Consider OT/PT consult to assist with strengthening/mobility  - Encourage toileting schedule  Outcome: Progressing     Problem: DISCHARGE PLANNING  Goal: Discharge to home or other facility with appropriate resources  Description: INTERVENTIONS:  - Identify barriers to discharge w/pt and caregiver  - Include patient/family/discharge partner in discharge planning  - Arrange for needed discharge resources and transportation as appropriate  - Identify discharge learning needs (meds, wound care, etc)  - Arrange for interpreters to assist at discharge as needed  - Consider post-discharge preferences of patient/family/discharge partner  - Complete POLST form as appropriate  - Assess  patient's ability to be responsible for managing their own health  - Refer to Case Management Department for coordinating discharge planning if the patient needs post-hospital services based on physician/LIP order or complex needs related to functional status, cognitive ability or social support system  Outcome: Progressing     Problem: Patient/Family Goals  Goal: Patient/Family Long Term Goal  Description: Patient's Long Term Goal: To go home    Interventions:  - ambulate without shortness of breath  - See additional Care Plan goals for specific interventions  Outcome: Progressing  Goal: Patient/Family Short Term Goal  Description: Patient's Short Term Goal: To rest    Interventions:   - Cluster care to maximize rest  - See additional Care Plan goals for specific interventions  Outcome: Progressing     Problem: CARDIOVASCULAR - ADULT  Goal: Maintains optimal cardiac output and hemodynamic stability  Description: INTERVENTIONS:  - Monitor vital signs, rhythm, and trends  - Monitor for bleeding, hypotension and signs of decreased cardiac output  - Evaluate effectiveness of vasoactive medications to optimize hemodynamic stability  - Monitor arterial and/or venous puncture sites for bleeding and/or hematoma  - Assess quality of pulses, skin color and temperature  - Assess for signs of decreased coronary artery perfusion - ex. Angina  - Evaluate fluid balance, assess for edema, trend weights  Outcome: Progressing  Goal: Absence of cardiac arrhythmias or at baseline  Description: INTERVENTIONS:  - Continuous cardiac monitoring, monitor vital signs, obtain 12 lead EKG if indicated  - Evaluate effectiveness of antiarrhythmic and heart rate control medications as ordered  - Initiate emergency measures for life threatening arrhythmias  - Monitor electrolytes and administer replacement therapy as ordered  Outcome: Progressing

## 2025-02-01 NOTE — PLAN OF CARE
Pt ambulating and voiding independently. Frequent rounding by nursing staff. Safety precautions maintained/call light within reach.     Problem: Patient Centered Care  Goal: Patient preferences are identified and integrated in the patient's plan of care  Description: Interventions:  - What would you like us to know as we care for you?   - Provide timely, complete, and accurate information to patient/family  - Incorporate patient and family knowledge, values, beliefs, and cultural backgrounds into the planning and delivery of care  - Encourage patient/family to participate in care and decision-making at the level they choose  - Honor patient and family perspectives and choices  Outcome: Progressing     Problem: RESPIRATORY - ADULT  Goal: Achieves optimal ventilation and oxygenation  Description: INTERVENTIONS:  - Assess for changes in respiratory status  - Assess for changes in mentation and behavior  - Position to facilitate oxygenation and minimize respiratory effort  - Oxygen supplementation based on oxygen saturation or ABGs  - Provide Smoking Cessation handout, if applicable  - Encourage broncho-pulmonary hygiene including cough, deep breathe, Incentive Spirometry  - Assess the need for suctioning and perform as needed  - Assess and instruct to report SOB or any respiratory difficulty  - Respiratory Therapy support as indicated  - Manage/alleviate anxiety  - Monitor for signs/symptoms of CO2 retention  Outcome: Progressing     Problem: PAIN - ADULT  Goal: Verbalizes/displays adequate comfort level or patient's stated pain goal  Description: INTERVENTIONS:  - Encourage pt to monitor pain and request assistance  - Assess pain using appropriate pain scale  - Administer analgesics based on type and severity of pain and evaluate response  - Implement non-pharmacological measures as appropriate and evaluate response  - Consider cultural and social influences on pain and pain management  - Manage/alleviate anxiety  -  Utilize distraction and/or relaxation techniques  - Monitor for opioid side effects  - Notify MD/LIP if interventions unsuccessful or patient reports new pain  - Anticipate increased pain with activity and pre-medicate as appropriate  Outcome: Progressing     Problem: RISK FOR INFECTION - ADULT  Goal: Absence of fever/infection during anticipated neutropenic period  Description: INTERVENTIONS  - Monitor WBC  - Administer growth factors as ordered  - Implement neutropenic guidelines  Outcome: Progressing     Problem: SAFETY ADULT - FALL  Goal: Free from fall injury  Description: INTERVENTIONS:  - Assess pt frequently for physical needs  - Identify cognitive and physical deficits and behaviors that affect risk of falls.  - [place] fall precautions as indicated by assessment.  - Educate pt/family on patient safety including physical limitations  - Instruct pt to call for assistance with activity based on assessment  - Modify environment to reduce risk of injury  - Provide assistive devices as appropriate  - Consider OT/PT consult to assist with strengthening/mobility  - Encourage toileting schedule  Outcome: Progressing     Problem: DISCHARGE PLANNING  Goal: Discharge to home or other facility with appropriate resources  Description: INTERVENTIONS:  - Identify barriers to discharge w/pt and caregiver  - Include patient/family/discharge partner in discharge planning  - Arrange for needed discharge resources and transportation as appropriate  - Identify discharge learning needs (meds, wound care, etc)  - Arrange for interpreters to assist at discharge as needed  - Consider post-discharge preferences of patient/family/discharge partner  - Complete POLST form as appropriate  - Assess patient's ability to be responsible for managing their own health  - Refer to Case Management Department for coordinating discharge planning if the patient needs post-hospital services based on physician/LIP order or complex needs related to  functional status, cognitive ability or social support system  Outcome: Progressing       Problem: CARDIOVASCULAR - ADULT  Goal: Maintains optimal cardiac output and hemodynamic stability  Description: INTERVENTIONS:  - Monitor vital signs, rhythm, and trends  - Monitor for bleeding, hypotension and signs of decreased cardiac output  - Evaluate effectiveness of vasoactive medications to optimize hemodynamic stability  - Monitor arterial and/or venous puncture sites for bleeding and/or hematoma  - Assess quality of pulses, skin color and temperature  - Assess for signs of decreased coronary artery perfusion - ex. Angina  - Evaluate fluid balance, assess for edema, trend weights  Outcome: Progressing  Goal: Absence of cardiac arrhythmias or at baseline  Description: INTERVENTIONS:  - Continuous cardiac monitoring, monitor vital signs, obtain 12 lead EKG if indicated  - Evaluate effectiveness of antiarrhythmic and heart rate control medications as ordered  - Initiate emergency measures for life threatening arrhythmias  - Monitor electrolytes and administer replacement therapy as ordered  Outcome: Progressing

## 2025-02-02 PROCEDURE — 99233 SBSQ HOSP IP/OBS HIGH 50: CPT | Performed by: FAMILY MEDICINE

## 2025-02-02 RX ORDER — NYSTATIN 100000 [USP'U]/ML
5 SUSPENSION ORAL 4 TIMES DAILY
Status: DISCONTINUED | OUTPATIENT
Start: 2025-02-02 | End: 2025-02-05

## 2025-02-02 RX ORDER — FUROSEMIDE 80 MG/1
80 TABLET ORAL
Status: DISCONTINUED | OUTPATIENT
Start: 2025-02-02 | End: 2025-02-05

## 2025-02-02 NOTE — PROGRESS NOTES
[facility]  part of [facility]    Progress Note    Yesenia Berkowitz Patient Status:  Inpatient    1940 MRN [id number]   Location [facility]5W Attending Lissy Blandon MD   Hosp Day # 4 PCP JO-ANN YANG MD       Subjective:   Yesenia Berkowitz is a(n) 84 year old female.   Pt with OJEDA and currently on 3L.  Wants ice and water  C/o sore throat  Objective:   Blood pressure 134/69, pulse 76, temperature 98.1 °F (36.7 °C), temperature source Oral, resp. rate 20, weight 148 lb 14.4 oz (67.5 kg), SpO2 100%.    HEENT: Thrush  Neck: no adenopathy, no carotid bruit, no JVD, supple, symmetrical, trachea midline and thyroid not enlarged, symmetric, no tenderness/mass/nodules  Pulmonary/Chest:crackles  Cardiovascular: S1, S2 normal, no S3 or S4, regular rate and rhythm, no murmur  Abdominal: normal findings: bowel sounds normal, soft, non-tender and no hepatosplenomegaly   Skin: Skin color, texture, turgor normal. No rashes or lesions,2+ edema    Results:     Lab Results   Component Value Date    WBC 12.2 (H) 2025    HGB 11.4 (L) 2025    HCT 35.7 2025    .0 2025    CREATSERUM 0.94 2025    BUN 38 (H) 2025     2025    K 4.8 2025     2025    CO2 34.0 (H) 2025     (H) 2025    CA 8.3 (L) 2025    ALB 3.5 2025    ALKPHO 70 2025    BILT <0.2 (L) 2025    TP 5.5 (L) 2025    AST 16 2025    ALT 23 2025    PTT 24.3 2024    INR 1.08 2024    T4F 0.9 2024    TSH 0.185 (L) 2024    LIP 30 2024    DDIMER 0.47 2024    MG 1.9 2024    PHOS 3.4 2024    TROP <0.045 2020       No results found.        Assessment and Plan:   Principal Problem:    Acute respiratory failure with hypoxia and hypercapnia (HCC)  Active Problems:    Kyphoscoliosis    Phrenic nerve paralysis    Acute cor pulmonale (HCC)    Pneumonia    Cor pulmonale (chronic)  (HCC)    COPD exacerbation (HCC)    Acute respiratory failure (HCC)    Wean O2 as able  On diuretics, increased lasix to BID  Po prednisone and doxy  Arnuity, duonebs and singulair  Nystatin for thrush  Wants to stay for few more days till her edema and sob gets stable  Dw RN and Pt

## 2025-02-02 NOTE — PLAN OF CARE
Pt is refusing to be compliant with fluid restriction. Education provided multiple times related to how fluid volume overload contributes to difficulty breathing in the setting of heart failure. Explained to patient that fluid intake must be spaced out appropriately over 24 hours period to not exceed 2,000ml fluid restriction.     Problem: Patient Centered Care  Goal: Patient preferences are identified and integrated in the patient's plan of care  Description: Interventions:  - What would you like us to know as we care for you? Pt not happy with fluid restriction   - Provide timely, complete, and accurate information to patient/family  - Incorporate patient and family knowledge, values, beliefs, and cultural backgrounds into the planning and delivery of care  - Encourage patient/family to participate in care and decision-making at the level they choose  - Honor patient and family perspectives and choices  Outcome: Progressing     Problem: RESPIRATORY - ADULT  Goal: Achieves optimal ventilation and oxygenation  Description: INTERVENTIONS:  - Assess for changes in respiratory status  - Assess for changes in mentation and behavior  - Position to facilitate oxygenation and minimize respiratory effort  - Oxygen supplementation based on oxygen saturation or ABGs  - Provide Smoking Cessation handout, if applicable  - Encourage broncho-pulmonary hygiene including cough, deep breathe, Incentive Spirometry  - Assess the need for suctioning and perform as needed  - Assess and instruct to report SOB or any respiratory difficulty  - Respiratory Therapy support as indicated  - Manage/alleviate anxiety  - Monitor for signs/symptoms of CO2 retention  Outcome: Progressing     Problem: PAIN - ADULT  Goal: Verbalizes/displays adequate comfort level or patient's stated pain goal  Description: INTERVENTIONS:  - Encourage pt to monitor pain and request assistance  - Assess pain using appropriate pain scale  - Administer analgesics based  on type and severity of pain and evaluate response  - Implement non-pharmacological measures as appropriate and evaluate response  - Consider cultural and social influences on pain and pain management  - Manage/alleviate anxiety  - Utilize distraction and/or relaxation techniques  - Monitor for opioid side effects  - Notify MD/LIP if interventions unsuccessful or patient reports new pain  - Anticipate increased pain with activity and pre-medicate as appropriate  Outcome: Progressing     Problem: RISK FOR INFECTION - ADULT  Goal: Absence of fever/infection during anticipated neutropenic period  Description: INTERVENTIONS  - Monitor WBC  - Administer growth factors as ordered  - Implement neutropenic guidelines  Outcome: Progressing     Problem: SAFETY ADULT - FALL  Goal: Free from fall injury  Description: INTERVENTIONS:  - Assess pt frequently for physical needs  - Identify cognitive and physical deficits and behaviors that affect risk of falls.  - [place] fall precautions as indicated by assessment.  - Educate pt/family on patient safety including physical limitations  - Instruct pt to call for assistance with activity based on assessment  - Modify environment to reduce risk of injury  - Provide assistive devices as appropriate  - Consider OT/PT consult to assist with strengthening/mobility  - Encourage toileting schedule  Outcome: Progressing     Problem: DISCHARGE PLANNING  Goal: Discharge to home or other facility with appropriate resources  Description: INTERVENTIONS:  - Identify barriers to discharge w/pt and caregiver  - Include patient/family/discharge partner in discharge planning  - Arrange for needed discharge resources and transportation as appropriate  - Identify discharge learning needs (meds, wound care, etc)  - Arrange for interpreters to assist at discharge as needed  - Consider post-discharge preferences of patient/family/discharge partner  - Complete POLST form as appropriate  - Assess patient's  ability to be responsible for managing their own health  - Refer to Case Management Department for coordinating discharge planning if the patient needs post-hospital services based on physician/LIP order or complex needs related to functional status, cognitive ability or social support system  Outcome: Progressing     Problem: Patient/Family Goals  Goal: Patient/Family Long Term Goal  Description: Patient's Long Term Goal: to go home    Interventions:  - ambulate without dyspnea  - See additional Care Plan goals for specific interventions  Outcome: Progressing  Goal: Patient/Family Short Term Goal  Description: Patient's Short Term Goal: To rest    Interventions:   - cluster care to maximize rest  - See additional Care Plan goals for specific interventions  Outcome: Progressing     Problem: CARDIOVASCULAR - ADULT  Goal: Maintains optimal cardiac output and hemodynamic stability  Description: INTERVENTIONS:  - Monitor vital signs, rhythm, and trends  - Monitor for bleeding, hypotension and signs of decreased cardiac output  - Evaluate effectiveness of vasoactive medications to optimize hemodynamic stability  - Monitor arterial and/or venous puncture sites for bleeding and/or hematoma  - Assess quality of pulses, skin color and temperature  - Assess for signs of decreased coronary artery perfusion - ex. Angina  - Evaluate fluid balance, assess for edema, trend weights  Outcome: Progressing  Goal: Absence of cardiac arrhythmias or at baseline  Description: INTERVENTIONS:  - Continuous cardiac monitoring, monitor vital signs, obtain 12 lead EKG if indicated  - Evaluate effectiveness of antiarrhythmic and heart rate control medications as ordered  - Initiate emergency measures for life threatening arrhythmias  - Monitor electrolytes and administer replacement therapy as ordered  Outcome: Progressing

## 2025-02-02 NOTE — PLAN OF CARE
Pt ambulating and voiding independently. Frequent rounding by nursing staff. Safety precautions maintained/call light within reach.     Problem: Patient Centered Care  Goal: Patient preferences are identified and integrated in the patient's plan of care  Description: Interventions:  - What would you like us to know as we care for you?   - Provide timely, complete, and accurate information to patient/family  - Incorporate patient and family knowledge, values, beliefs, and cultural backgrounds into the planning and delivery of care  - Encourage patient/family to participate in care and decision-making at the level they choose  - Honor patient and family perspectives and choices  Outcome: Progressing     Problem: RESPIRATORY - ADULT  Goal: Achieves optimal ventilation and oxygenation  Description: INTERVENTIONS:  - Assess for changes in respiratory status  - Assess for changes in mentation and behavior  - Position to facilitate oxygenation and minimize respiratory effort  - Oxygen supplementation based on oxygen saturation or ABGs  - Provide Smoking Cessation handout, if applicable  - Encourage broncho-pulmonary hygiene including cough, deep breathe, Incentive Spirometry  - Assess the need for suctioning and perform as needed  - Assess and instruct to report SOB or any respiratory difficulty  - Respiratory Therapy support as indicated  - Manage/alleviate anxiety  - Monitor for signs/symptoms of CO2 retention  Outcome: Progressing     Problem: PAIN - ADULT  Goal: Verbalizes/displays adequate comfort level or patient's stated pain goal  Description: INTERVENTIONS:  - Encourage pt to monitor pain and request assistance  - Assess pain using appropriate pain scale  - Administer analgesics based on type and severity of pain and evaluate response  - Implement non-pharmacological measures as appropriate and evaluate response  - Consider cultural and social influences on pain and pain management  - Manage/alleviate anxiety  -  Utilize distraction and/or relaxation techniques  - Monitor for opioid side effects  - Notify MD/LIP if interventions unsuccessful or patient reports new pain  - Anticipate increased pain with activity and pre-medicate as appropriate  Outcome: Progressing     Problem: RISK FOR INFECTION - ADULT  Goal: Absence of fever/infection during anticipated neutropenic period  Description: INTERVENTIONS  - Monitor WBC  - Administer growth factors as ordered  - Implement neutropenic guidelines  Outcome: Progressing     Problem: SAFETY ADULT - FALL  Goal: Free from fall injury  Description: INTERVENTIONS:  - Assess pt frequently for physical needs  - Identify cognitive and physical deficits and behaviors that affect risk of falls.  - [place] fall precautions as indicated by assessment.  - Educate pt/family on patient safety including physical limitations  - Instruct pt to call for assistance with activity based on assessment  - Modify environment to reduce risk of injury  - Provide assistive devices as appropriate  - Consider OT/PT consult to assist with strengthening/mobility  - Encourage toileting schedule  Outcome: Progressing     Problem: DISCHARGE PLANNING  Goal: Discharge to home or other facility with appropriate resources  Description: INTERVENTIONS:  - Identify barriers to discharge w/pt and caregiver  - Include patient/family/discharge partner in discharge planning  - Arrange for needed discharge resources and transportation as appropriate  - Identify discharge learning needs (meds, wound care, etc)  - Arrange for interpreters to assist at discharge as needed  - Consider post-discharge preferences of patient/family/discharge partner  - Complete POLST form as appropriate  - Assess patient's ability to be responsible for managing their own health  - Refer to Case Management Department for coordinating discharge planning if the patient needs post-hospital services based on physician/LIP order or complex needs related to  functional status, cognitive ability or social support system  Outcome: Progressing       Problem: CARDIOVASCULAR - ADULT  Goal: Maintains optimal cardiac output and hemodynamic stability  Description: INTERVENTIONS:  - Monitor vital signs, rhythm, and trends  - Monitor for bleeding, hypotension and signs of decreased cardiac output  - Evaluate effectiveness of vasoactive medications to optimize hemodynamic stability  - Monitor arterial and/or venous puncture sites for bleeding and/or hematoma  - Assess quality of pulses, skin color and temperature  - Assess for signs of decreased coronary artery perfusion - ex. Angina  - Evaluate fluid balance, assess for edema, trend weights  Outcome: Progressing  Goal: Absence of cardiac arrhythmias or at baseline  Description: INTERVENTIONS:  - Continuous cardiac monitoring, monitor vital signs, obtain 12 lead EKG if indicated  - Evaluate effectiveness of antiarrhythmic and heart rate control medications as ordered  - Initiate emergency measures for life threatening arrhythmias  - Monitor electrolytes and administer replacement therapy as ordered  Outcome: Progressing

## 2025-02-02 NOTE — PROGRESS NOTES
Progress Note     Yesenia Berkowitz Patient Status:  Inpatient    1940 MRN [id number]   Location [facility]5W Attending Lidia Levy MD   Hosp Day # 4 PCP JO-ANN YANG MD     Chief Complaint: patient presented with   Chief Complaint   Patient presents with    Difficulty Breathing       Subjective:   S:   : Patient continue to be on 5 Litres of oxygen., feel more sob while walking in room  ; pt continues to feel tired    : pt continues to be on 5 Litres of oxygen     Review of Systems:   10 point ROS completed and was negative, except for pertinent positive and negatives stated in subjective.    Objective:   Vital signs:  Temp:  [97.3 °F (36.3 °C)-98.2 °F (36.8 °C)] 98.1 °F (36.7 °C)  Pulse:  [70-84] 76  Resp:  [18-20] 20  BP: (134-147)/(60-70) 134/69  SpO2:  [95 %-100 %] 100 %    Wt Readings from Last 6 Encounters:   25 148 lb 14.4 oz (67.5 kg)   25 156 lb (70.8 kg)   25 156 lb (70.8 kg)   25 161 lb 1.6 oz (73.1 kg)   25 151 lb (68.5 kg)   24 175 lb 9.6 oz (79.7 kg)         Physical Exam:    General: No acute distress. Alert ,         Respiratory: Clear to auscultation bilaterally. No wheezes. No rhonchi.  Cardiovascular: S1, S2. Regular rate and rhythm. No murmurs, rubs or gallops.   Abdomen: Soft, nontender, nondistended.  Positive bowel sounds. No rebound or guarding.  Neurologic: No focal neurological deficits.   Musculoskeletal: Moves all extremities.  Extremities: No edema.    Results:   Diagnostic Data:      Labs:    Labs Last 24 Hours:   BMP     CBC    Other     Na - Cl - BUN - Glu -   Hb -   PTT - Procal -   K - CO2 - Cr -   WBC - >< PLT -  INR - CRP -   Renal Lytes Endo    Hct -   Trop - D dim -   eGFR - Ca - POC Gluc  -    LFT   pBNP - Lactic -   eGFR AA - PO4 - A1c -   AST - APk - Prot -  LDL -     Mg - TSH -   ALT - T katie - Alb -        COVID-19 Lab Results    COVID-19  Lab Results   Component Value Date    COVID19 Not Detected 2025     COVID19 Not Detected 01/21/2025    COVID19 Not Detected 12/21/2024       Pro-Calcitonin  No results for input(s): \"PCT\" in the last 168 hours.    Cardiac  Recent Labs   Lab 01/29/25  0026   PBNP 344       Creatinine Kinase  No results for input(s): \"CK\" in the last 168 hours.    Inflammatory Markers  No results for input(s): \"CRP\", \"SONA\", \"LDH\", \"DDIMER\" in the last 168 hours.    Imaging: Imaging data reviewed in Epic.    Medications:    nystatin  5 mL Oral QID    furosemide  80 mg Oral BID (Diuretic)    predniSONE  20 mg Oral Daily with breakfast    doxycycline  100 mg Oral BID    cetirizine  5 mg Oral Nightly    enoxaparin  40 mg Subcutaneous Daily    ipratropium-albuterol  3 mL Nebulization Q6H    guaiFENesin ER  600 mg Oral BID    fluticasone furoate  1 puff Inhalation Daily    spironolactone  25 mg Oral Daily    montelukast  10 mg Oral Nightly    digoxin  125 mcg Oral Daily    dilTIAZem HCl ER Coated Beads  360 mg Oral BID    empagliflozin  10 mg Oral Daily    acetaZOLAMIDE  500 mg Oral Daily       Assessment & Plan:   ASSESSMENT / PLAN:     Problem List Items Addressed This Visit          HCC Problems    * (Principal) Acute respiratory failure with hypoxia and hypercapnia (HCC) - Primary    Relevant Medications    methylPREDNISolone sodium succinate (Solu-MEDROL) injection 80 mg (Completed)    ipratropium (Atrovent) 0.02 % nebulizer solution 0.5 mg (Completed)    doxycycline hyclate (Vibramycin) 100 mg in sodium chloride 0.9% 100 mL IVPB (Completed)    ipratropium-albuterol (Duoneb) 0.5-2.5 (3) MG/3ML inhalation solution 3 mL    ipratropium-albuterol (Duoneb) 0.5-2.5 (3) MG/3ML inhalation solution 3 mL    benzonatate (Tessalon) cap 200 mg    guaiFENesin ER (Mucinex) 12 hr tab 600 mg    fluticasone furoate (Arnuity Ellipta) 100 MCG/ACT inhaler 1 puff    ipratropium (Atrovent) 0.02 % nebulizer solution 0.5 mg (Completed)    fluticasone-umeclidin-vilant (TRELEGY ELLIPTA) 100-62.5-25 MCG/ACT Inhalation Aerosol  Powder, Breath Activated    doxycycline (Vibramycin) cap 100 mg    predniSONE (Deltasone) tab 20 mg    COPD exacerbation (HCC)    Relevant Medications    methylPREDNISolone sodium succinate (Solu-MEDROL) injection 80 mg (Completed)    ipratropium (Atrovent) 0.02 % nebulizer solution 0.5 mg (Completed)    doxycycline hyclate (Vibramycin) 100 mg in sodium chloride 0.9% 100 mL IVPB (Completed)    ipratropium-albuterol (Duoneb) 0.5-2.5 (3) MG/3ML inhalation solution 3 mL    ipratropium-albuterol (Duoneb) 0.5-2.5 (3) MG/3ML inhalation solution 3 mL    benzonatate (Tessalon) cap 200 mg    guaiFENesin ER (Mucinex) 12 hr tab 600 mg    fluticasone furoate (Arnuity Ellipta) 100 MCG/ACT inhaler 1 puff    ipratropium (Atrovent) 0.02 % nebulizer solution 0.5 mg (Completed)    fluticasone-umeclidin-vilant (TRELEGY ELLIPTA) 100-62.5-25 MCG/ACT Inhalation Aerosol Powder, Breath Activated    doxycycline (Vibramycin) cap 100 mg    predniSONE (Deltasone) tab 20 mg      84 year old female with history of COPD, A-fib, asthma, GERD, heart disease, chronic respiratory failure on 3.5-4 L, HTN and others who presented to the ED with complaints of shortness of breath for the last 2 days.       Acute on chronic respiratory failure  COPD exacerbation  Asthma exacerbation  Bilateral PNA  Phrenic nerve paralysis  Imaging reviewed  Cultures pending  Pulm on on consult  Continue Solu-Medrol 40 mg IV q12  Duonebs qid PRN  Lasix 80 mg daily, spironolactone 25 mg daily  Continue BiPAP at night  Wean O2 as tolerated  1/31: pt continues to feel tired and getting Sob by walking to Bathroom in Hospital. She does not feel like she is at her baseline, continue current mx   2/1: follwoed by pulmonary, gradually improving, labs this am pending, po PO doxy and IV steroids   2/2: pt continues to be on 5L ofoxygen, does not feel ready to go home, await clearance from Pulmonary, her home o2 is around 3-4L/   Hx of HFpEF  Imaging reviewed  BNP WNL  Last EF  65-70%  Strict Is and Os, daily weights  Hypokalemia  Replace per protocol  Hx of Afib  HTN  Continue home meds  Not on anticoagulation      Quality:  DVT Prophylaxis: Lovenox   CODE status: FULL   DISPO: pending clinical improvement.   Estimated date of discharge: To be determined  Discharge is dependent on: Improved clinical status  At this point Patient is expected to be discharge to: Home versus rehab      Plan of care discussed with Patient and RN.     Coordinated care with providers and counseling re: treatment plan and workup     Lidia Levy MD    Supplementary Documentation:       I personally reviewed the available laboratories, imaging including operative report. I discussed/will discuss the case with patient and her nurse. I ordered laboratories studies. I adjusted medications including not applicable today. Medical decision making high, risk is high.     >55min spent, >50% spent counseling and coordinating care in the form of educating pt/family and d/w consultants and staff. Most of the time spent discussing the above plan.

## 2025-02-03 ENCOUNTER — APPOINTMENT (OUTPATIENT)
Dept: GENERAL RADIOLOGY | Facility: HOSPITAL | Age: 85
End: 2025-02-03
Attending: HOSPITALIST
Payer: MEDICARE

## 2025-02-03 LAB — GLUCOSE BLDC GLUCOMTR-MCNC: 167 MG/DL (ref 70–99)

## 2025-02-03 PROCEDURE — 71045 X-RAY EXAM CHEST 1 VIEW: CPT | Performed by: HOSPITALIST

## 2025-02-03 PROCEDURE — 99233 SBSQ HOSP IP/OBS HIGH 50: CPT | Performed by: INTERNAL MEDICINE

## 2025-02-03 RX ORDER — ONDANSETRON 2 MG/ML
4 INJECTION INTRAMUSCULAR; INTRAVENOUS EVERY 4 HOURS PRN
Status: DISCONTINUED | OUTPATIENT
Start: 2025-02-03 | End: 2025-02-05

## 2025-02-03 RX ORDER — PREDNISONE 20 MG/1
40 TABLET ORAL
Status: DISCONTINUED | OUTPATIENT
Start: 2025-02-04 | End: 2025-02-05

## 2025-02-03 RX ORDER — MAGNESIUM HYDROXIDE/ALUMINUM HYDROXICE/SIMETHICONE 120; 1200; 1200 MG/30ML; MG/30ML; MG/30ML
30 SUSPENSION ORAL 4 TIMES DAILY PRN
Status: DISCONTINUED | OUTPATIENT
Start: 2025-02-03 | End: 2025-02-05

## 2025-02-03 RX ORDER — DILTIAZEM HYDROCHLORIDE 5 MG/ML
10 INJECTION INTRAVENOUS ONCE
Status: COMPLETED | OUTPATIENT
Start: 2025-02-03 | End: 2025-02-03

## 2025-02-03 NOTE — PLAN OF CARE
Problem: Patient Centered Care  Goal: Patient preferences are identified and integrated in the patient's plan of care  Description: Interventions:  - What would you like us to know as we care for you? From home alone   - Provide timely, complete, and accurate information to patient/family  - Incorporate patient and family knowledge, values, beliefs, and cultural backgrounds into the planning and delivery of care  - Encourage patient/family to participate in care and decision-making at the level they choose  - Honor patient and family perspectives and choices  Outcome: Progressing     Problem: RESPIRATORY - ADULT  Goal: Achieves optimal ventilation and oxygenation  Description: INTERVENTIONS:  - Assess for changes in respiratory status  - Assess for changes in mentation and behavior  - Position to facilitate oxygenation and minimize respiratory effort  - Oxygen supplementation based on oxygen saturation or ABGs  - Provide Smoking Cessation handout, if applicable  - Encourage broncho-pulmonary hygiene including cough, deep breathe, Incentive Spirometry  - Assess the need for suctioning and perform as needed  - Assess and instruct to report SOB or any respiratory difficulty  - Respiratory Therapy support as indicated  - Manage/alleviate anxiety  - Monitor for signs/symptoms of CO2 retention  Outcome: Progressing     Problem: PAIN - ADULT  Goal: Verbalizes/displays adequate comfort level or patient's stated pain goal  Description: INTERVENTIONS:  - Encourage pt to monitor pain and request assistance  - Assess pain using appropriate pain scale  - Administer analgesics based on type and severity of pain and evaluate response  - Implement non-pharmacological measures as appropriate and evaluate response  - Consider cultural and social influences on pain and pain management  - Manage/alleviate anxiety  - Utilize distraction and/or relaxation techniques  - Monitor for opioid side effects  - Notify MD/LIP if interventions  unsuccessful or patient reports new pain  - Anticipate increased pain with activity and pre-medicate as appropriate  Outcome: Progressing     Problem: RISK FOR INFECTION - ADULT  Goal: Absence of fever/infection during anticipated neutropenic period  Description: INTERVENTIONS  - Monitor WBC  - Administer growth factors as ordered  - Implement neutropenic guidelines  Outcome: Progressing     Problem: SAFETY ADULT - FALL  Goal: Free from fall injury  Description: INTERVENTIONS:  - Assess pt frequently for physical needs  - Identify cognitive and physical deficits and behaviors that affect risk of falls.  - [place] fall precautions as indicated by assessment.  - Educate pt/family on patient safety including physical limitations  - Instruct pt to call for assistance with activity based on assessment  - Modify environment to reduce risk of injury  - Provide assistive devices as appropriate  - Consider OT/PT consult to assist with strengthening/mobility  - Encourage toileting schedule  Outcome: Progressing     Problem: DISCHARGE PLANNING  Goal: Discharge to home or other facility with appropriate resources  Description: INTERVENTIONS:  - Identify barriers to discharge w/pt and caregiver  - Include patient/family/discharge partner in discharge planning  - Arrange for needed discharge resources and transportation as appropriate  - Identify discharge learning needs (meds, wound care, etc)  - Arrange for interpreters to assist at discharge as needed  - Consider post-discharge preferences of patient/family/discharge partner  - Complete POLST form as appropriate  - Assess patient's ability to be responsible for managing their own health  - Refer to Case Management Department for coordinating discharge planning if the patient needs post-hospital services based on physician/LIP order or complex needs related to functional status, cognitive ability or social support system  Outcome: Progressing     Problem: Patient/Family  Goals  Goal: Patient/Family Long Term Goal  Description: Patient's Long Term Goal: to discharge home     Interventions:  - Monitor vital signs  - Monitor appropriate labs   - Pain management   - Administer medications per order   - Follow MD orders   - Diagnostics per order   - Update/inform patient and family on plan of care   - Discharge planning   - See additional Care Plan goals for specific interventions  Outcome: Progressing  Goal: Patient/Family Short Term Goal  Description: Patient's Short Term Goal: to breathe better     Interventions:   - Monitor vital signs  - Monitor appropriate labs   - Pain management   - Administer medications per order   - Follow MD orders   - Diagnostics per order   - Update/inform patient and family on plan of care   - Discharge planning   - See additional Care Plan goals for specific interventions  Outcome: Progressing     Problem: CARDIOVASCULAR - ADULT  Goal: Maintains optimal cardiac output and hemodynamic stability  Description: INTERVENTIONS:  - Monitor vital signs, rhythm, and trends  - Monitor for bleeding, hypotension and signs of decreased cardiac output  - Evaluate effectiveness of vasoactive medications to optimize hemodynamic stability  - Monitor arterial and/or venous puncture sites for bleeding and/or hematoma  - Assess quality of pulses, skin color and temperature  - Assess for signs of decreased coronary artery perfusion - ex. Angina  - Evaluate fluid balance, assess for edema, trend weights  Outcome: Progressing  Goal: Absence of cardiac arrhythmias or at baseline  Description: INTERVENTIONS:  - Continuous cardiac monitoring, monitor vital signs, obtain 12 lead EKG if indicated  - Evaluate effectiveness of antiarrhythmic and heart rate control medications as ordered  - Initiate emergency measures for life threatening arrhythmias  - Monitor electrolytes and administer replacement therapy as ordered  Outcome: Progressing

## 2025-02-03 NOTE — PROGRESS NOTES
[facility]  part of [facility]     Hospitalist Progress Note     Yesenia Berkowitz Patient Status:  Inpatient    1940 MRN [id number]   Location [facility]5W Attending Lissy Blandon MD   Hosp Day # 5 PCP JO-ANN YANG MD     Subjective:     Patient resting comfortbaly and in NAD. Denied any active complaints at the time of interview.   All questions and concerns addressed.  Continues to require 3-4L NC.      Objective:    Review of Systems:   ROS completed; pertinent positive and negatives stated in subjective.      Vital signs:  Temp:  [97.7 °F (36.5 °C)-98.2 °F (36.8 °C)] 97.7 °F (36.5 °C)  Pulse:  [69-91] 78  Resp:  [16-20] 18  BP: (134-166)/(44-75) 142/62  SpO2:  [94 %-100 %] 98 %      Physical Exam:    Gen: NAD AO x3  Chest: good air entry CTABL  CVS: normal s1 and s2 RR  Abd: NABS soft NT ND  Neuro: CN 2-12 grossly intact  Ext: no edema in bilateral LE      Diagnostic Data:    Labs:  Recent Labs   Lab 25  0026 25  0538 25  0928   WBC 12.0* 15.5* 12.2*   HGB 12.9 11.3* 11.4*   MCV 94.5 91.9 92.2   .0 217.0 217.0       Recent Labs   Lab 25  0026 25  0020 25  0538 25  1543 25  0928   *  --  276*  --  292*   BUN 13  --  33*  --  38*   CREATSERUM 0.71  --  0.94  --  0.94   CA 9.2  --  9.2  --  8.3*   ALB 3.6  --  3.5  --   --      --  136  --  139   K 3.4*   < > 3.6 5.5* 4.8   CL 96*  --  97*  --  100   CO2 35.0*  --  30.0  --  34.0*   ALKPHO 90  --  70  --   --    AST 17  --  16  --   --    ALT 21  --  23  --   --    BILT 0.2  --  <0.2*  --   --    TP 5.8  --  5.5*  --   --     < > = values in this interval not displayed.       Estimated Creatinine Clearance: 41.5 mL/min (based on SCr of 0.94 mg/dL).    No results for input(s): \"PTP\", \"INR\" in the last 168 hours.           Imaging: Imaging data reviewed in Epic.    Medications:    nystatin  5 mL Oral QID    furosemide  80 mg Oral BID (Diuretic)    predniSONE  20 mg  Oral Daily with breakfast    doxycycline  100 mg Oral BID    cetirizine  5 mg Oral Nightly    enoxaparin  40 mg Subcutaneous Daily    ipratropium-albuterol  3 mL Nebulization Q6H    guaiFENesin ER  600 mg Oral BID    fluticasone furoate  1 puff Inhalation Daily    spironolactone  25 mg Oral Daily    montelukast  10 mg Oral Nightly    digoxin  125 mcg Oral Daily    dilTIAZem HCl ER Coated Beads  360 mg Oral BID    empagliflozin  10 mg Oral Daily    acetaZOLAMIDE  500 mg Oral Daily       Assessment & Plan:     Acute on chronic respiratory failure  COPD exacerbation  Asthma exacerbation  Bilateral PNA  Phrenic nervie paralysis  Imaging reviewed  Cultures showing NGTD  Pulm on consult  Continue prednisone  Continue nebs  Continue lasix and spironolactone  Continue BiPAP at night  Wean o2 as tolerated  Hx of HFpEF  Imaging reviewed  BNP WNL  Last EF 65-70%  Net IO Since Admission: -2,756 mL [02/03/25 1115]  Strict Is and Os, daily weights  Hypokalemia  Replace per protocol  Hx of Afib  HTN  Continue home meds  Not on anticoagulation      Plan of care discussed with patient or family at bedside.      Supplementary Documentation:     Quality:  DVT Prophylaxis: Lovenox s/c  CODE status: Full        Estimated date of discharge: TBD  Discharge is dependent on: clinical stability  At this point Ms. Berkowitz is expected to be discharge to: home                   Lissy Blandon MD  Hospitalist    MDM: High, I personally reviewed the available laboratories, imaging. I discussed the case with RN. I ordered laboratories, studies including AM labs.  Medical decision making high, risk is high.       The 21st Century Cures Act makes medical notes like these available to patients in the interest of transparency. Please be advised this is a medical document. Medical documents are intended to carry relevant information, facts as evident, and the clinical opinion of the practitioner. The medical note is intended as peer to peer communication  and may appear blunt or direct. It is written in medical language and may contain abbreviations or verbiage that are unfamiliar.

## 2025-02-03 NOTE — PLAN OF CARE
Problem: Patient Centered Care  Goal: Patient preferences are identified and integrated in the patient's plan of care  Description: Interventions:  - What would you like us to know as we care for you? I am from home alone  - Provide timely, complete, and accurate information to patient/family  - Incorporate patient and family knowledge, values, beliefs, and cultural backgrounds into the planning and delivery of care  - Encourage patient/family to participate in care and decision-making at the level they choose  - Honor patient and family perspectives and choices  Outcome: Progressing     Problem: RESPIRATORY - ADULT  Goal: Achieves optimal ventilation and oxygenation  Description: INTERVENTIONS:  - Assess for changes in respiratory status  - Assess for changes in mentation and behavior  - Position to facilitate oxygenation and minimize respiratory effort  - Oxygen supplementation based on oxygen saturation or ABGs  - Provide Smoking Cessation handout, if applicable  - Encourage broncho-pulmonary hygiene including cough, deep breathe, Incentive Spirometry  - Assess the need for suctioning and perform as needed  - Assess and instruct to report SOB or any respiratory difficulty  - Respiratory Therapy support as indicated  - Manage/alleviate anxiety  - Monitor for signs/symptoms of CO2 retention  Outcome: Progressing     Problem: PAIN - ADULT  Goal: Verbalizes/displays adequate comfort level or patient's stated pain goal  Description: INTERVENTIONS:  - Encourage pt to monitor pain and request assistance  - Assess pain using appropriate pain scale  - Administer analgesics based on type and severity of pain and evaluate response  - Implement non-pharmacological measures as appropriate and evaluate response  - Consider cultural and social influences on pain and pain management  - Manage/alleviate anxiety  - Utilize distraction and/or relaxation techniques  - Monitor for opioid side effects  - Notify MD/LIP if  interventions unsuccessful or patient reports new pain  - Anticipate increased pain with activity and pre-medicate as appropriate  Outcome: Progressing     Problem: RISK FOR INFECTION - ADULT  Goal: Absence of fever/infection during anticipated neutropenic period  Description: INTERVENTIONS  - Monitor WBC  - Administer growth factors as ordered  - Implement neutropenic guidelines  Outcome: Progressing     Problem: SAFETY ADULT - FALL  Goal: Free from fall injury  Description: INTERVENTIONS:  - Assess pt frequently for physical needs  - Identify cognitive and physical deficits and behaviors that affect risk of falls.  - [place] fall precautions as indicated by assessment.  - Educate pt/family on patient safety including physical limitations  - Instruct pt to call for assistance with activity based on assessment  - Modify environment to reduce risk of injury  - Provide assistive devices as appropriate  - Consider OT/PT consult to assist with strengthening/mobility  - Encourage toileting schedule  Outcome: Progressing     Problem: DISCHARGE PLANNING  Goal: Discharge to home or other facility with appropriate resources  Description: INTERVENTIONS:  - Identify barriers to discharge w/pt and caregiver  - Include patient/family/discharge partner in discharge planning  - Arrange for needed discharge resources and transportation as appropriate  - Identify discharge learning needs (meds, wound care, etc)  - Arrange for interpreters to assist at discharge as needed  - Consider post-discharge preferences of patient/family/discharge partner  - Complete POLST form as appropriate  - Assess patient's ability to be responsible for managing their own health  - Refer to Case Management Department for coordinating discharge planning if the patient needs post-hospital services based on physician/LIP order or complex needs related to functional status, cognitive ability or social support system  Outcome: Progressing     Problem:  Patient/Family Goals  Goal: Patient/Family Long Term Goal  Description: Patient's Long Term Goal: discharge from hospital    Interventions:  -  Monitor vital signs  - Monitor appropriate labs  - Pain management  - Oxygen and respiratory intervention as needed  - Administer medications per order  - Follow MD orders  - Diagnostics per order  - Update / inform patient and family on plan of care  - Discharge planning  - See additional Care Plan goals for specific interventions  Outcome: Progressing  Goal: Patient/Family Short Term Goal  Description: Patient's Short Term Goal: Improve swelling    Interventions:   - Monitor vital signs  - Monitor appropriate labs  - Pain management  - Oxygen and respiratory intervention as needed  - Administer medications per order  - Follow MD orders  - Diagnostics per order  - Update / inform patient and family on plan of care  - Discharge planning  - See additional Care Plan goals for specific interventions  Outcome: Progressing     Problem: CARDIOVASCULAR - ADULT  Goal: Maintains optimal cardiac output and hemodynamic stability  Description: INTERVENTIONS:  - Monitor vital signs, rhythm, and trends  - Monitor for bleeding, hypotension and signs of decreased cardiac output  - Evaluate effectiveness of vasoactive medications to optimize hemodynamic stability  - Monitor arterial and/or venous puncture sites for bleeding and/or hematoma  - Assess quality of pulses, skin color and temperature  - Assess for signs of decreased coronary artery perfusion - ex. Angina  - Evaluate fluid balance, assess for edema, trend weights  Outcome: Progressing  Goal: Absence of cardiac arrhythmias or at baseline  Description: INTERVENTIONS:  - Continuous cardiac monitoring, monitor vital signs, obtain 12 lead EKG if indicated  - Evaluate effectiveness of antiarrhythmic and heart rate control medications as ordered  - Initiate emergency measures for life threatening arrhythmias  - Monitor electrolytes and  administer replacement therapy as ordered  Outcome: Progressing

## 2025-02-04 ENCOUNTER — APPOINTMENT (OUTPATIENT)
Dept: GENERAL RADIOLOGY | Facility: HOSPITAL | Age: 85
End: 2025-02-04
Attending: INTERNAL MEDICINE
Payer: MEDICARE

## 2025-02-04 LAB
ALBUMIN SERPL-MCNC: 3.3 G/DL (ref 3.2–4.8)
ALBUMIN/GLOB SERPL: 1.7 {RATIO} (ref 1–2)
ALP LIVER SERPL-CCNC: 58 U/L
ALT SERPL-CCNC: 30 U/L
ANION GAP SERPL CALC-SCNC: 7 MMOL/L (ref 0–18)
AST SERPL-CCNC: 15 U/L (ref ?–34)
ATRIAL RATE: 312 BPM
ATRIAL RATE: 83 BPM
BASOPHILS # BLD AUTO: 0.07 X10(3) UL (ref 0–0.2)
BASOPHILS NFR BLD AUTO: 0.5 %
BILIRUB SERPL-MCNC: 0.2 MG/DL (ref 0.2–1.1)
BUN BLD-MCNC: 32 MG/DL (ref 9–23)
BUN/CREAT SERPL: 34.4 (ref 10–20)
CALCIUM BLD-MCNC: 8.3 MG/DL (ref 8.7–10.4)
CHLORIDE SERPL-SCNC: 95 MMOL/L (ref 98–112)
CHOLEST SERPL-MCNC: 208 MG/DL (ref ?–200)
CO2 SERPL-SCNC: 37 MMOL/L (ref 21–32)
CREAT BLD-MCNC: 0.93 MG/DL
DEPRECATED RDW RBC AUTO: 47.6 FL (ref 35.1–46.3)
EGFRCR SERPLBLD CKD-EPI 2021: 61 ML/MIN/1.73M2 (ref 60–?)
EOSINOPHIL # BLD AUTO: 0.01 X10(3) UL (ref 0–0.7)
EOSINOPHIL NFR BLD AUTO: 0.1 %
ERYTHROCYTE [DISTWIDTH] IN BLOOD BY AUTOMATED COUNT: 14 % (ref 11–15)
GLOBULIN PLAS-MCNC: 2 G/DL (ref 2–3.5)
GLUCOSE BLD-MCNC: 266 MG/DL (ref 70–99)
GLUCOSE BLDC GLUCOMTR-MCNC: 155 MG/DL (ref 70–99)
GLUCOSE BLDC GLUCOMTR-MCNC: 171 MG/DL (ref 70–99)
GLUCOSE BLDC GLUCOMTR-MCNC: 201 MG/DL (ref 70–99)
GLUCOSE BLDC GLUCOMTR-MCNC: 251 MG/DL (ref 70–99)
HCT VFR BLD AUTO: 39.4 %
HDLC SERPL-MCNC: 81 MG/DL (ref 40–59)
HGB BLD-MCNC: 12.9 G/DL
IMM GRANULOCYTES # BLD AUTO: 0.39 X10(3) UL (ref 0–1)
IMM GRANULOCYTES NFR BLD: 2.7 %
LDLC SERPL CALC-MCNC: 103 MG/DL (ref ?–100)
LYMPHOCYTES # BLD AUTO: 1.58 X10(3) UL (ref 1–4)
LYMPHOCYTES NFR BLD AUTO: 10.9 %
MCH RBC QN AUTO: 29.9 PG (ref 26–34)
MCHC RBC AUTO-ENTMCNC: 32.7 G/DL (ref 31–37)
MCV RBC AUTO: 91.4 FL
MONOCYTES # BLD AUTO: 1.01 X10(3) UL (ref 0.1–1)
MONOCYTES NFR BLD AUTO: 7 %
NEUTROPHILS # BLD AUTO: 11.37 X10 (3) UL (ref 1.5–7.7)
NEUTROPHILS # BLD AUTO: 11.37 X10(3) UL (ref 1.5–7.7)
NEUTROPHILS NFR BLD AUTO: 78.8 %
NONHDLC SERPL-MCNC: 127 MG/DL (ref ?–130)
OSMOLALITY SERPL CALC.SUM OF ELEC: 304 MOSM/KG (ref 275–295)
P AXIS: 14 DEGREES
P AXIS: 264 DEGREES
P-R INTERVAL: 158 MS
PLATELET # BLD AUTO: 277 10(3)UL (ref 150–450)
POTASSIUM SERPL-SCNC: 3.2 MMOL/L (ref 3.5–5.1)
POTASSIUM SERPL-SCNC: 4.4 MMOL/L (ref 3.5–5.1)
PROCALCITONIN SERPL-MCNC: 0.04 NG/ML (ref ?–0.05)
PROT SERPL-MCNC: 5.3 G/DL (ref 5.7–8.2)
Q-T INTERVAL: 328 MS
Q-T INTERVAL: 346 MS
QRS DURATION: 94 MS
QRS DURATION: 94 MS
QTC CALCULATION (BEZET): 406 MS
QTC CALCULATION (BEZET): 452 MS
R AXIS: -9 DEGREES
R AXIS: -9 DEGREES
RBC # BLD AUTO: 4.31 X10(6)UL
SODIUM SERPL-SCNC: 139 MMOL/L (ref 136–145)
T AXIS: 119 DEGREES
T AXIS: 46 DEGREES
TRIGL SERPL-MCNC: 143 MG/DL (ref 30–149)
TROPONIN I SERPL HS-MCNC: 32 NG/L
TROPONIN I SERPL HS-MCNC: 38 NG/L
TROPONIN I SERPL HS-MCNC: 41 NG/L
VENTRICULAR RATE: 114 BPM
VENTRICULAR RATE: 83 BPM
VLDLC SERPL CALC-MCNC: 24 MG/DL (ref 0–30)
WBC # BLD AUTO: 14.4 X10(3) UL (ref 4–11)

## 2025-02-04 PROCEDURE — 99233 SBSQ HOSP IP/OBS HIGH 50: CPT | Performed by: INTERNAL MEDICINE

## 2025-02-04 PROCEDURE — 71046 X-RAY EXAM CHEST 2 VIEWS: CPT | Performed by: INTERNAL MEDICINE

## 2025-02-04 RX ORDER — DILTIAZEM HYDROCHLORIDE 180 MG/1
360 CAPSULE, EXTENDED RELEASE ORAL DAILY
Status: DISCONTINUED | OUTPATIENT
Start: 2025-02-04 | End: 2025-02-05

## 2025-02-04 RX ORDER — NICOTINE POLACRILEX 4 MG
30 LOZENGE BUCCAL
Status: DISCONTINUED | OUTPATIENT
Start: 2025-02-04 | End: 2025-02-05

## 2025-02-04 RX ORDER — DEXTROSE MONOHYDRATE 25 G/50ML
50 INJECTION, SOLUTION INTRAVENOUS
Status: DISCONTINUED | OUTPATIENT
Start: 2025-02-04 | End: 2025-02-05

## 2025-02-04 RX ORDER — POTASSIUM CHLORIDE 1500 MG/1
40 TABLET, EXTENDED RELEASE ORAL EVERY 4 HOURS
Status: COMPLETED | OUTPATIENT
Start: 2025-02-04 | End: 2025-02-04

## 2025-02-04 RX ORDER — NICOTINE POLACRILEX 4 MG
15 LOZENGE BUCCAL
Status: DISCONTINUED | OUTPATIENT
Start: 2025-02-04 | End: 2025-02-05

## 2025-02-04 NOTE — PROGRESS NOTES
[facility]  part of [facility]     Hospitalist Progress Note     Yesenia Berkowitz Patient Status:  Inpatient    1940 MRN [id number]   Location [facility]5W Attending Lissy Blandon MD   Hosp Day # 6 PCP JO-ANN YANG MD     Subjective:     Patient resting comfortbaly and in NAD. Denied any active complaints at the time of interview.   Continues to require 3-4L NC.  She went into afib/aflutter last night and required a diltiazem drip. Currently back in sinus rhythm.      Objective:    Review of Systems:   ROS completed; pertinent positive and negatives stated in subjective.      Vital signs:  Temp:  [97.3 °F (36.3 °C)-98.2 °F (36.8 °C)] 98.2 °F (36.8 °C)  Pulse:  [] 72  Resp:  [18-22] 20  BP: (110-142)/(47-93) 115/47  SpO2:  [93 %-98 %] 97 %      Physical Exam:    Gen: NAD AO x3  Chest: good air entry CTABL  CVS: normal s1 and s2 RR  Abd: NABS soft NT ND  Neuro: CN 2-12 grossly intact  Ext: no edema in bilateral LE      Diagnostic Data:    Labs:  Recent Labs   Lab 25  0026 25  0538 25  0523   WBC 12.0* 15.5* 12.2* 14.4*   HGB 12.9 11.3* 11.4* 12.9   MCV 94.5 91.9 92.2 91.4   .0 217.0 217.0 277.0       Recent Labs   Lab 25  0026 25  0020 25  0538 25  1543 25  0928 25  0523   *  --  276*  --  292* 266*   BUN 13  --  33*  --  38* 32*   CREATSERUM 0.71  --  0.94  --  0.94 0.93   CA 9.2  --  9.2  --  8.3* 8.3*   ALB 3.6  --  3.5  --   --  3.3     --  136  --  139 139   K 3.4*   < > 3.6 5.5* 4.8 3.2*   CL 96*  --  97*  --  100 95*   CO2 35.0*  --  30.0  --  34.0* 37.0*   ALKPHO 90  --  70  --   --  58   AST 17  --  16  --   --  15   ALT 21  --  23  --   --  30   BILT 0.2  --  <0.2*  --   --  0.2   TP 5.8  --  5.5*  --   --  5.3*    < > = values in this interval not displayed.       Estimated Creatinine Clearance: 42 mL/min (based on SCr of 0.93 mg/dL).    No results for input(s): \"PTP\", \"INR\" in  the last 168 hours.           Imaging: Imaging data reviewed in Epic.    Medications:    predniSONE  40 mg Oral Daily with breakfast    nystatin  5 mL Oral QID    furosemide  80 mg Oral BID (Diuretic)    doxycycline  100 mg Oral BID    cetirizine  5 mg Oral Nightly    enoxaparin  40 mg Subcutaneous Daily    ipratropium-albuterol  3 mL Nebulization Q6H    guaiFENesin ER  600 mg Oral BID    fluticasone furoate  1 puff Inhalation Daily    spironolactone  25 mg Oral Daily    montelukast  10 mg Oral Nightly    digoxin  125 mcg Oral Daily    empagliflozin  10 mg Oral Daily    acetaZOLAMIDE  500 mg Oral Daily       Assessment & Plan:     Acute on chronic respiratory failure  COPD exacerbation  Asthma exacerbation  Bilateral PNA  Phrenic nervie paralysis  Imaging reviewed  Cultures showing NGTD  Pulm on consult  Prednisone increased to 40 mg PO  Continue nebs, PPI  Continue lasix and spironolactone  Continue BiPAP at night  Continue doxycycline 100 mg IV BID  Wean o2 as tolerated  Hx of Afib  HTN  Continue home meds  Not on anticoagulation  Cardiology on consult  Appreciate recs  Hx of HFpEF  Imaging reviewed  BNP WNL  Last EF 65-70%  Net IO Since Admission: -2,846 mL [02/04/25 0845]  Strict Is and Os, daily weights  Hypokalemia  Replace per protocol      Plan of care discussed with patient or family at bedside.      Supplementary Documentation:     Quality:  DVT Prophylaxis: Lovenox s/c  CODE status: Full        Estimated date of discharge: TBD  Discharge is dependent on: clinical stability  At this point Ms. Berkowitz is expected to be discharge to: home                   Lissy Blandon MD  Hospitalist    MDM: High, I personally reviewed the available laboratories, imaging. I discussed the case with RN. I ordered laboratories, studies including AM labs.  Medical decision making high, risk is high.       The 21st Century Cures Act makes medical notes like these available to patients in the interest of transparency. Please be  advised this is a medical document. Medical documents are intended to carry relevant information, facts as evident, and the clinical opinion of the practitioner. The medical note is intended as peer to peer communication and may appear blunt or direct. It is written in medical language and may contain abbreviations or verbiage that are unfamiliar.

## 2025-02-04 NOTE — OCCUPATIONAL THERAPY NOTE
OCCUPATIONAL THERAPY EVALUATION - INPATIENT    Room Number: 502/502-A  Evaluation Date: 2025     Type of Evaluation: Initial       Physician Order: IP Consult to Occupational Therapy  Reason for Therapy:  ADL/IADL Dysfunction and Discharge Planning    OCCUPATIONAL THERAPY ASSESSMENT   Patient is a 84 year old female admitted on 2025 with acute respiratory failure with hypoxia and hypercapnia  . Co-Morbidities : paroxysmal atrial fibrillation, oxygen dependent COPD, HTN, HLD  Patient is currently functioning at baseline with ADLs and functional transfers.  Prior to admission, patient's baseline is independent.  Patient met all OT goals at RW level.  Pt has many guidelines to follow at home and would benefit from increased supervision to ensure adherence as well as assist with IADLs. Pt reports her rollator does not fit in the house.         Recommendations for nursing staff:   Transfers: ind  Toileting location: Toilet    EVALUATION SESSION:  Patient at start of session: at EOB    FUNCTIONAL TRANSFER ASSESSMENT  Sit to Stand: Edge of Bed  Edge of Bed: Independent  Toilet Transfer: Independent    BED MOBILITY  Supine to Sit : Independent  Sit to Supine (OT): Independent         FUNCTIONAL ADL ASSESSMENT  independent                      O2 SATURATIONS  Oxygen Therapy  SPO2% on Oxygen at Rest: 91  At rest oxygen flow (liters per minute): 3  SPO2% Ambulation on Oxygen: 903    COGNITION  WFL         HOME SITUATION  Type of Home: House  Home Layout: One level  Lives With:  (son is local)                             Prior Level of Function: ind    SUBJECTIVE  \"I have to go to rehab. Nothing else is working.\"    PAIN ASSESSMENT  Ratin          OBJECTIVE  Precautions: Bed/chair alarm  Fall Risk: Standard fall risk            AM-PAC ‘6-Clicks’ Inpatient Daily Activity Short Form  -   Putting on and taking off regular lower body clothing?: None  -   Bathing (including washing, rinsing, drying)?: None  -    Toileting, which includes using toilet, bedpan or urinal? : None  -   Putting on and taking off regular upper body clothing?: None  -   Taking care of personal grooming such as brushing teeth?: None  -   Eating meals?: None    AM-PAC Score:  Score: 24  Approx Degree of Impairment: 0%  Standardized Score (AM-PAC Scale): 57.54     PLAN   Patient has been evaluated and presents with no skilled Occupational Therapy needs at this time.  Patient discharged from Occupational Therapy services.  Please re-order if a new functional limitation presents during this admission.         Patient Evaluation Complexity Level:   Occupational Profile/Medical History LOW - Brief history including review of medical or therapy records    Specific performance deficits impacting engagement in ADL/IADL LOW  1 - 3 performance deficits    Client Assessment/Performance Deficits LOW - No comorbidities nor modifications of tasks    Clinical Decision Making LOW - Analysis of occupational profile, problem-focused assessments, limited treatment options    Overall Complexity LOW     OT Session Time: 20 minutes  Self-Care Home Management: 10 minutes       Rosalba Fallon OT  [facility]  Inpatient Rehabilitation  Occupational Therapy  [phone]

## 2025-02-04 NOTE — HISTORICAL OFFICE NOTE
[facility]  [address], [address] [place] [address]  [phone]      Yesenia Berkowitz  Progress Note  Demographics:  Name: Yesenia Berkowitz YOB: 1940  Age: 84, Female Medical Record No: [id number]  Visited Date/Time: 10/23/2024 10:30 AM    Chief Complaints  Follow up  History of Present Illness  Patient is an 83-year-old female with a history of paroxysmal atrial fibrillation, oxygen dependent COPD, HTN, HLD who presents establish care.  Previously followed with Dr. Boles, and prior to that with Dr. Preston.  Patient is a former smoker and quit 30 years ago, has been on 2 L of oxygen at baseline for the last 15 years.  She has relatively unchanged exertional dyspnea.  No chest pain, palpitations, edema, dizziness, or syncope.  She also has a remote history of paroxysmal atrial fibrillation, when this comes on she will feel more short of breath and fatigued and then checks her pulse which will feel irregular.  This is not occurred for the last several years.  She is not on anticoagulation due to frequent bruising and infrequent A-fib.  She has mild bilateral lower extremity edema but only takes her Lasix as needed.  Patient had a coronary CT in 2020 which demonstrated a small speck of calcium in the LAD with a calcium score of 1.    4/15/2024  Her last few months has had increased shortness of breath, put on steroids by her pulmonologist with significant permanent breathing.  Denies significant palpitations.  Has had increased bilateral lower extremity edema, 2 months ago was on increased Lasix 40 mg daily which seemed to improve although will back to 20 mg daily after 10 days per instruction.    8/26/2024  Reports worsened shortness of breath, currently on prednisone for COPD exacerbation.  Typically has allergies in the summer as well.  Mild gradual increase in lower extremity edema although not too bothersome at this point.  Remains on Lasix 40 mg  daily.    9/27/2024  Unfortunately hospitalized again earlier this month primarily with COPD exacerbation, was diuresed with IV Lasix for a few days but did not appear clinically overloaded at that time.  More recently's been feeling dizzy/lightheaded, fatigued, with persistent shortness of breath.  Blood pressures were running low at home in the 90s systolic.  Today 82/54.    10/23/2024  Patient was again hospitalized with COPD exacerbation, had a thoracentesis for pleural effusion, diuresed with IV Lasix.  Discharged on Lasix 40 mg daily alternating with 40 mg twice daily.  She saw Dr. Li few days ago recommend that she stay on 40 mg twice daily for a week.  Recently had an episode of atrial fibrillation, felt abnormal sensation in her chest.  Was confirmed on [employer] mobile device but now back in sinus rhythm.  Blood pressure creeping up 130s 140s systolic.  Persistent unchanged exertional dyspnea since discharge.    Cardiac history:  Paroxysmal atrial fibrillation  COPD, on 2-3 L O2  HFpEF  HTN  Cardiac risk factors Hypertension and Former smoker  Past Medical History  1.Chronic hypoxemic respiratory failure  2.COPD (chronic obstructive pulmonary disease)  3.Syncope and collapse  4.PAF (paroxysmal atrial fibrillation)  5.Essential hypertension  6.PVC's (premature ventricular contractions)  7.Localized edema  Past Surgical History  1.Cataract surgery, unspecified eye  Family History  1. Father Age 62 - Hypertension (HTN), primary; Old MI (myocardial infarction) (Reason for death)  2. Mother - Hypertension (HTN), primary  No family history of heart disease.  Social History  Smoking status Former [id number] (End Date)  Tobacco usage - No (Non-smoker for [employer] reasons (finding))  Patient lives at home alone.  She is a former smoker and quit 30 years ago.  She denies regular alcohol use.  She is a former RN.  Review of systems  Cardiovascular No history of Chest pain, OJEDA, Palpitations, Syncope, PND,  Orthopnea, Edema and Claudication  Respiratory No history of SOB, Wheezing and Sputum  Hem/Lymphatic No history of Easy bruising, Blood clots, Hx of blood transfusion, Anemia and Bleeding problems  Physical Examination  Constitutional 95%o2  Vitals Left Arm Sitting  / 68 mmHg, Pulse rate 101 bpm, Height in 5' 5\", BMI: 27.6, Weight in 166 lbs (or) 75 kgs and BSA : 1.88 cc/m²  General Appearance No Acute Distress and Normal Body habitus  Cardiovascular   EKG/Other abnormalities  GENERAL: in no apparent distress  HEENT: Normal  NECK: no JVD, normal carotid pulses without bruits. No lymphadenopathy  LUNGS: normal excursion, clear to auscultation bilaterally  HEART: RRR, nl S1/S2, no gallop, no murmur, no rub  ABD: soft, nontender, nondistended, normal bowel sounds  EXTREMITIES: no cyanosis, clubbing.  1+ lower extremity edema.  SKIN: warm, dry, normal turgor  NEURO: alert, oriented x3, symmetrical face, no dysarthria, no focal deficits  PSYCH: cooperative, calm  Allergies  1.Cefuroxime(Reaction:, Severity:Mild)  2.Levofloxacin(Reaction:, Severity:Mild)  3.penicillin - Ingredient(Reaction:unknown, Severity:Moderate)  Medications (Info obtained by: Verbal)  1.albuterol (PROAIR HFA) 108 (90 Base) MCG/ACT inhaler, as needed  2.amitriptyline 50 mg tablet, Take 1 tablet orally once a day.  3.aspirin 81mg, 2 tablets once a day  4.B complex capsule  5.Calcium Carb-Cholecalciferol (CALCIUM + D3) 600-200 MG-UNIT Tab, 1 daily  6.dicyclomine 10 mg capsule, Take 1 capsule orally 3 times a day as needed. for pain  7.digoxin 125 mcg (0.125 mg) tablet, Take 1 tablet orally once a day.  8.dilTIAZem (CARDIZEM CD) 240 MG 24 hr capsule, Take 240 mg by mouth daily. 1 daily  9.estradiol (CLIMARA) 0.05 MG/24HR once weekly patch  10.furosemide 40 mg tablet, Take 1 tablet orally 2 times a day. For a week  11.HYDROcodone-acetaminophen (NORCO) 5-325 MG per tablet, as needed  12.lansoprazole (PREVACID SOLUTAB) 30 MG disintegrating tablet,  Take 30 mg by mouth daily. 1 daily  13.loratadine (CLARITIN) 10 MG tablet, 1 daily  14.montelukast (SINGULAIR) 10 MG tablet, 1 daily  15.Multiple Vitamin (MULTI-VITAMINS) Tab  16.olopatadine (PATANOL) 0.1 % ophthalmic solution  17.Omega-3 Fatty Acids (FISH OIL) 1200 MG capsule, 1 daily  18.potassium chloride (KLOR-CON M20) 20 MEQ cruz ER tablet, 1 daily  19.tiotropium (SPIRIVA HANDIHALER) 18 MCG capsule for inhaler, daily  20.sodium fluoride (PREVIDENT 5000 BOOSTER PLUS) 1.1 % dental paste  21.triamcinolone (ARISTOCORT) 0.1 % cream, APPLY TO AFFECTED AREA(S) 2 (TWO) TIMES DAILY.  22.dilTIAZem  mg capsule,24 hr,extended release, Take 1 capsule orally 2 times a day.  Impression  1.Acute heart failure with preserved ejection fraction (HFpEF)  2.Chronic hypoxemic respiratory failure  3.COPD (chronic obstructive pulmonary disease)  4.Syncope and collapse  5.PAF (paroxysmal atrial fibrillation)  6.Essential hypertension  7.PVC's (premature ventricular contractions)  Assessment & Plan  Paroxysmal atrial fibrillation  - Recent recurrence on [employer] mobile, back in sinus rhythm  - Continue aspirin 81 mg once daily, digoxin daily  - Increase diltiazem to 360 mg once daily  - Per patient preference avoiding anticoagulation, history of GI bleed in the past and low A-fib burden; discussed Watchman as an alternative, will provide patient with pamphlet  - Check twelve-lead ECG today    HFpEF  - Increase bilateral lower extremity edema, TTE noted diastolic dysfunction, edema improved on higher dose of Lasix  - Continue Lasix 40 mg twice daily  - Check BMP in 1 week  - Additionally discussed SGLT2 inhibitor, would like to reassess symptoms before making a decision    HTN  - Blood pressure 130s 140s systolic  - Previously on lisinopril-hydrochlorothiazide, discontinued due to hypotension  - Increase diltiazem to 360 mg once daily    Plan  Increase diltiazem to 360 mg daily  Twelve-lead ECG today  BMP in 1 week  Follow-up with me  in 1 month or sooner as needed  Labs and Diagnostics ordered  1.EKG (electrocardiogram) (Today)  Miscellaneous  1.Reviewed trans thoracic echocardiogram, ambulatory telemetry monitor with the patient.  2.Weight monitoring (regime/therapy)  Nurses documentation  EKG: None   refills: none  assistive devices: none  upcoming sx or procedures: none     Patient instructions  Plan  Increase diltiazem to 360 mg daily  Twelve-lead ECG today  BMP in 1 week  Follow-up with me in 1 month or sooner as needed    Please bring in you medication bottles or updated medicine list to your next appointment. Call [facility] if you have any problems or concerns at [phone].   Lab Details  DIGOXIN (LANOXIN)  01/16/2024 04:34:35 PM  DIGOXIN 0.31 0.80-2.00 ng/mL L F  Diagnostics Details  Trans Thoracic Echocardiogram 03/18/2024  1.The left ventricle is normal in size. Left ventricular wall thickness is normal. Global left ventricular systolic function is hyperdynamic. The left ventricular ejection fraction is >70%. No regional wall motion abnormalities. The left ventricle diastolic function is impaired (Grade II) with an elevated left atrial pressure.    2.The right ventricle is mildly enlarged. Right ventricular systolic function is mildly decreased.    ACTMonitoring 02/15/2024  1.This is an average quality study.    2.Predominant rhythm is normal sinus rhythm.    3.The minimum heart rate recorded was 62 beats / minute (normal sinus rhythm, 3/10/2024). The maximum heart rate is 111 beats / minute (sinus tachycardia, 2/27/2024). The mean heart rate is 82 beats / minute.    4.- There were rare PVCs with a burden of <1%. - There were rare PACs with a burden of 2%. - 113 Supraventricular Tachycardia events -- the longest episode was 6.8s and the fastest episode was 181 BPM. - No atrial fibrillation. - No other arrhythmias. - There were 7 patient triggered events with symptoms of palpitations, cough, and shortness of breath, associated with sinus  rhythm, PVCs, PACs, and brief PAT.    CPOE Orders carried out by: Norma CROCKETT  Care Providers: Luis Fernando Fowler MD, Norma CROCKETT and Vonda Prajapati  Electronically Authenticated by  Luis Fernando Fowler MD  10/23/2024 12:23:02 PM  Disclaimer: Components of this note were documented using voice recognition system and are subject to errors not corrected at proofreading. Contact the author of this note for any clarifications.

## 2025-02-04 NOTE — PLAN OF CARE
Problem: Patient Centered Care  Goal: Patient preferences are identified and integrated in the patient's plan of care  Description: Interventions:  - What would you like us to know as we care for you? I live alone, my son comes by to help me  - Provide timely, complete, and accurate information to patient/family  - Incorporate patient and family knowledge, values, beliefs, and cultural backgrounds into the planning and delivery of care  - Encourage patient/family to participate in care and decision-making at the level they choose  - Honor patient and family perspectives and choices  Outcome: Progressing     Problem: RESPIRATORY - ADULT  Goal: Achieves optimal ventilation and oxygenation  Description: INTERVENTIONS:  - Assess for changes in respiratory status  - Assess for changes in mentation and behavior  - Position to facilitate oxygenation and minimize respiratory effort  - Oxygen supplementation based on oxygen saturation or ABGs  - Provide Smoking Cessation handout, if applicable  - Encourage broncho-pulmonary hygiene including cough, deep breathe, Incentive Spirometry  - Assess the need for suctioning and perform as needed  - Assess and instruct to report SOB or any respiratory difficulty  - Respiratory Therapy support as indicated  - Manage/alleviate anxiety  - Monitor for signs/symptoms of CO2 retention  Outcome: Progressing     Problem: PAIN - ADULT  Goal: Verbalizes/displays adequate comfort level or patient's stated pain goal  Description: INTERVENTIONS:  - Encourage pt to monitor pain and request assistance  - Assess pain using appropriate pain scale  - Administer analgesics based on type and severity of pain and evaluate response  - Implement non-pharmacological measures as appropriate and evaluate response  - Consider cultural and social influences on pain and pain management  - Manage/alleviate anxiety  - Utilize distraction and/or relaxation techniques  - Monitor for opioid side effects  - Notify  MD/LIP if interventions unsuccessful or patient reports new pain  - Anticipate increased pain with activity and pre-medicate as appropriate  Outcome: Progressing     Problem: RISK FOR INFECTION - ADULT  Goal: Absence of fever/infection during anticipated neutropenic period  Description: INTERVENTIONS  - Monitor WBC  - Administer growth factors as ordered  - Implement neutropenic guidelines  Outcome: Progressing     Problem: SAFETY ADULT - FALL  Goal: Free from fall injury  Description: INTERVENTIONS:  - Assess pt frequently for physical needs  - Identify cognitive and physical deficits and behaviors that affect risk of falls.  - [place] fall precautions as indicated by assessment.  - Educate pt/family on patient safety including physical limitations  - Instruct pt to call for assistance with activity based on assessment  - Modify environment to reduce risk of injury  - Provide assistive devices as appropriate  - Consider OT/PT consult to assist with strengthening/mobility  - Encourage toileting schedule  Outcome: Progressing     Problem: DISCHARGE PLANNING  Goal: Discharge to home or other facility with appropriate resources  Description: INTERVENTIONS:  - Identify barriers to discharge w/pt and caregiver  - Include patient/family/discharge partner in discharge planning  - Arrange for needed discharge resources and transportation as appropriate  - Identify discharge learning needs (meds, wound care, etc)  - Arrange for interpreters to assist at discharge as needed  - Consider post-discharge preferences of patient/family/discharge partner  - Complete POLST form as appropriate  - Assess patient's ability to be responsible for managing their own health  - Refer to Case Management Department for coordinating discharge planning if the patient needs post-hospital services based on physician/LIP order or complex needs related to functional status, cognitive ability or social support system  Outcome: Progressing      Problem: Patient/Family Goals  Goal: Patient/Family Long Term Goal  Description: Patient's Long Term Goal: Discharge from hospital    Interventions:  - Monitor vital signs  - Monitor appropriate labs  - Monitor blood glucose levels  - Pain management  - Oxygen and respiratory intervention as needed  - Administer medications per order  - Follow MD orders  - Diagnostics per order  - Update / inform patient and family on plan of care  - Discharge planning  - See additional Care Plan goals for specific interventions  Outcome: Progressing  Goal: Patient/Family Short Term Goal  Description: Patient's Short Term Goal: Improve blood sugars    Interventions:   - Monitor vital signs  - Monitor appropriate labs  - Monitor blood glucose levels  - Pain management  - Oxygen and respiratory intervention as needed  - Administer medications per order  - Follow MD orders  - Diagnostics per order  - Update / inform patient and family on plan of care  - Discharge planning  - See additional Care Plan goals for specific interventions  Outcome: Progressing     Problem: CARDIOVASCULAR - ADULT  Goal: Maintains optimal cardiac output and hemodynamic stability  Description: INTERVENTIONS:  - Monitor vital signs, rhythm, and trends  - Monitor for bleeding, hypotension and signs of decreased cardiac output  - Evaluate effectiveness of vasoactive medications to optimize hemodynamic stability  - Monitor arterial and/or venous puncture sites for bleeding and/or hematoma  - Assess quality of pulses, skin color and temperature  - Assess for signs of decreased coronary artery perfusion - ex. Angina  - Evaluate fluid balance, assess for edema, trend weights  Outcome: Progressing  Goal: Absence of cardiac arrhythmias or at baseline  Description: INTERVENTIONS:  - Continuous cardiac monitoring, monitor vital signs, obtain 12 lead EKG if indicated  - Evaluate effectiveness of antiarrhythmic and heart rate control medications as ordered  - Initiate  emergency measures for life threatening arrhythmias  - Monitor electrolytes and administer replacement therapy as ordered  Outcome: Progressing

## 2025-02-04 NOTE — CONSULTS
[facility]  Cardiology Consultation    Yesenia Berkowitz Patient Status:  Inpatient    1940 MRN [id number]   Location [facility]5W Attending Lissy Blandon MD   Hosp Day # 6 PCP JO-ANN YANG MD     Date of Admission:  2025  Date of Consult:  2025  Reason for Consultation:   Atrial fibrillation    History of Present Illness:   Patient is a 84-year-old female with a history of severe COPD with multiple recurrent hospitalizations, HFpEF, HTN, and paroxysmal atrial fibrillation who presented last week with increased shortness of breath, cough, and bilateral lower extremity edema.  Patient is currently being treated for COPD exacerbation and diuresed, now feeling better but not yet back to her baseline.  For 3 to 4 hours last night was having intermittent paroxysmal atrial fibrillation/flutter with rates up to 150 bpm but typically nonsustained.  During the day today has been in sinus rhythm.  She was asymptomatic throughout all these episodes overnight.    Cardiac history:  Paroxysmal atrial fibrillation  COPD, on 3-4L O2  HFpEF  HTN    Past Medical History  Past Medical History:    A-fib ([facility])    Anesthesia complication    Arrhythmia    AFIB    Arthritis    Asthma ([facility])    Back problem    COPD (chronic obstructive pulmonary disease) ([facility])    Deviated nasal septum    nasal septoplasty, turb reduction, smr of turbs    Difficult intubation    fiber optic if needed    Diverticulitis    colonoscopy     Diverticulosis of large intestine    Esophageal reflux    Extrinsic asthma, unspecified    Headache    Heart disease    Hepatitis    WAS TOLD SHE HAS HAD THIS WHEN SHE WAS 19 YEARS OLD    High blood pressure    High cholesterol    Irregular heart rate    Lichenoid keratosis    left chest-bx    Osteoarthritis    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    Paronychia    (RT); onychomycosis; debridement    Problems with swallowing    occasionally    Shortness of breath    2 L NC  ALL THE TIME 24HR/7 DAYS PER WEEK    Unspecified essential hypertension    Visual impairment     Past Surgical History  Past Surgical History:   Procedure Laterality Date    Adenoidectomy      Cataract      cataract extraction     Hysterectomy      Sinus surgery        DEVIATED SEPTUM    T&a      Tonsillectomy       Family History  No family history of heart disease.  Family History   Problem Relation Age of Onset    Ovarian Cancer Mother 76        endometrial, poss ovarian primary    Pulmonary Disease Sister         COPD    Skin cancer Other     Stroke Other     Other (Other) Other      Social History  Lives at home alone. Former smoker, quit 30 years ago. No alcohol use. She is a former RN.   Pediatric History   Patient Parents    Not on file     Other Topics Concern    [place] up [place] a farm Not Asked    History of tanning Not Asked    Outdoor occupation Not Asked    Pt has a pacemaker No    Pt has a defibrillator No    Breast feeding Not Asked    Reaction to local anesthetic No     Service Not Asked    Blood Transfusions Not Asked    Caffeine Concern No    Occupational Exposure Not Asked    Hobby Hazards Not Asked    Sleep Concern Not Asked    Stress Concern Not Asked    Weight Concern Not Asked    Special Diet Not Asked    Back Care Not Asked    Exercise Not Asked    Bike Helmet Not Asked    Seat Belt Not Asked    Self-Exams Not Asked   Social History Narrative    Not on file         Current Medications:  Current Facility-Administered Medications   Medication Dose Route Frequency    glucose (Dex4) 15 GM/59ML oral liquid 15 g  15 g Oral Q15 Min PRN    Or    glucose (Glutose) 40% oral gel 15 g  15 g Oral Q15 Min PRN    Or    glucose-vitamin C (Dex-4) chewable tab 4 tablet  4 tablet Oral Q15 Min PRN    Or    dextrose 50% injection 50 mL  50 mL Intravenous Q15 Min PRN    Or    glucose (Dex4) 15 GM/59ML oral liquid 30 g  30 g Oral Q15 Min PRN    Or    glucose (Glutose) 40% oral gel 30 g  30 g Oral Q15 Min PRN     Or    glucose-vitamin C (Dex-4) chewable tab 8 tablet  8 tablet Oral Q15 Min PRN    insulin aspart (NovoLOG) 100 Units/mL FlexPen 1-11 Units  1-11 Units Subcutaneous TID CC    potassium chloride (Klor-Con M20) tab 40 mEq  40 mEq Oral Q4H    predniSONE (Deltasone) tab 40 mg  40 mg Oral Daily with breakfast    dilTIAZem 10 mg BOLUS FROM BAG infusion  10 mg Intravenous Q1H PRN    alum-mag hydroxide-simethicone (Maalox) 200-200-20 MG/5ML oral suspension 30 mL  30 mL Oral QID PRN    ondansetron (Zofran) 4 MG/2ML injection 4 mg  4 mg Intravenous Q4H PRN    benzocaine-menthol (Cepacol) lozenge 1 lozenge  1 lozenge Oral PRN    nystatin (Mycostatin) 063792 UNIT/ML oral suspension 500,000 Units  5 mL Oral QID    furosemide (Lasix) tab 80 mg  80 mg Oral BID (Diuretic)    doxycycline (Vibramycin) cap 100 mg  100 mg Oral BID    docusate sodium (Colace) cap 100 mg  100 mg Oral Daily PRN    cetirizine (ZyrTEC) tab 5 mg  5 mg Oral Nightly    enoxaparin (Lovenox) 40 MG/0.4ML SUBQ injection 40 mg  40 mg Subcutaneous Daily    acetaminophen (Tylenol Extra Strength) tab 500 mg  500 mg Oral Q4H PRN    melatonin tab 3 mg  3 mg Oral Nightly PRN    ipratropium-albuterol (Duoneb) 0.5-2.5 (3) MG/3ML inhalation solution 3 mL  3 mL Nebulization Q6H    ipratropium-albuterol (Duoneb) 0.5-2.5 (3) MG/3ML inhalation solution 3 mL  3 mL Nebulization Q6H PRN    benzonatate (Tessalon) cap 200 mg  200 mg Oral TID PRN    guaiFENesin ER (Mucinex) 12 hr tab 600 mg  600 mg Oral BID    fluticasone furoate (Arnuity Ellipta) 100 MCG/ACT inhaler 1 puff  1 puff Inhalation Daily    spironolactone (Aldactone) tab 25 mg  25 mg Oral Daily    montelukast (Singulair) tab 10 mg  10 mg Oral Nightly    digoxin (Lanoxin) tab 125 mcg  125 mcg Oral Daily    empagliflozin (Jardiance) tab 10 mg  10 mg Oral Daily    acetaZOLAMIDE (Diamox) tab 500 mg  500 mg Oral Daily     Medications Prior to Admission   Medication Sig    fluticasone-umeclidin-vilant (TRELEGY ELLIPTA)  100-62.5-25 MCG/ACT Inhalation Aerosol Powder, Breath Activated Inhale 1 puff into the lungs daily.    acetaZOLAMIDE 250 MG Oral Tab Take 2 tablets (500 mg total) by mouth daily.    famotidine (PEPCID) 20 MG Oral Tab Take 1 tablet (20 mg total) by mouth 2 (two) times daily as needed for Heartburn.    [] lidocaine 5 % External Patch Place 1 patch onto the skin daily for 15 days. (Patient taking differently: Place 1 patch onto the skin daily as needed (back pain).)    furosemide 80 MG Oral Tab Take 1 tablet (80 mg total) by mouth daily.    spironolactone 25 MG Oral Tab Take 1 tablet (25 mg total) by mouth daily for 90 doses.    empagliflozin (JARDIANCE) 10 MG Oral Tab Take 1 tablet (10 mg total) by mouth daily.    acetaminophen 500 MG Oral Tab Take 2 tablets (1,000 mg total) by mouth every 6 (six) hours as needed for Pain or Fever.    DIGOXIN 0.125 MG Oral Tab Take 1 tablet (125 mcg total) by mouth daily.    albuterol 108 (90 Base) MCG/ACT Inhalation Aero Soln Inhale 2 puffs into the lungs as needed.    estradiol 0.05 MG/24HR Transdermal Patch Weekly Place 1 patch onto the skin once a week. As directed.    dilTIAZem HCl ER Coated Beads 360 MG Oral Capsule SR 24 Hr Take 1 capsule (360 mg total) by mouth 2 (two) times daily.    Cholecalciferol (VITAMIN D) 1000 UNITS Oral Tab Take 1,000 Units by mouth 2 (two) times daily.    Potassium Chloride ER (K-DUR M20) 20 MEQ Oral Tab CR Take 1 tablet (20 mEq total) by mouth nightly.    Loratadine 10 MG Oral Cap Take 10 mg by mouth nightly.      montelukast 10 MG Oral Tab Take 1 tablet (10 mg total) by mouth nightly.    fluticasone propionate 110 MCG/ACT Inhalation Aerosol Inhale 1 puff into the lungs 2 (two) times daily. (Patient not taking: Reported on 2025)    aspirin 81 MG Oral Tab Take 2 tablets (162 mg total) by mouth daily as needed. (Patient not taking: Reported on 2025)     Allergies  Allergies[1]    Review of Systems:     14 point review of systems  completed and negative except as noted.    Physical Exam:   Patient Vitals for the past 24 hrs:   BP Temp Temp src Pulse Resp SpO2 Weight   02/04/25 1009 137/53 97.9 °F (36.6 °C) Oral 78 20 99 % --   02/04/25 0851 -- -- -- -- -- 97 % --   02/04/25 0500 -- -- -- -- -- -- 145 lb 8 oz (66 kg)   02/04/25 0409 115/47 98.2 °F (36.8 °C) Oral 72 20 97 % --   02/03/25 2349 121/70 -- -- 85 -- 93 % --   02/03/25 2336 112/65 98 °F (36.7 °C) Oral 86 22 98 % --   02/03/25 2320 -- -- -- 84 -- 96 % --   02/03/25 2258 -- 98.2 °F (36.8 °C) -- -- -- -- --   02/03/25 2255 111/70 98.1 °F (36.7 °C) Oral 98 22 94 % --   02/03/25 2250 -- -- -- (!) 148 -- -- --   02/03/25 2247 -- -- -- (!) 150 -- 93 % --   02/03/25 2240 -- -- -- 87 -- 95 % --   02/03/25 2238 -- -- -- 86 -- 96 % --   02/03/25 2235 110/60 98 °F (36.7 °C) Oral 77 22 95 % --   02/03/25 2208 -- -- -- 94 -- -- --   02/03/25 2112 -- -- -- 101 -- 94 % --   02/03/25 2110 -- -- -- 108 -- -- --   02/03/25 2052 -- -- -- (!) 151 -- -- --   02/03/25 2050 -- -- -- 109 -- 96 % --   02/03/25 2047 130/56 97.3 °F (36.3 °C) Axillary (!) 156 22 95 % --   02/03/25 1955 -- -- -- 99 -- -- --   02/03/25 1836 (!) 122/93 -- -- (!) 130 -- -- --   02/03/25 1650 120/68 98 °F (36.7 °C) Oral 82 18 95 % --   02/03/25 1139 122/67 97.4 °F (36.3 °C) Oral 85 18 95 % --     Intake/Output:   Last 3 shifts:   Intake/Output                   02/02/25 0700 - 02/03/25 0659 02/03/25 0700 - 02/04/25 0659 02/04/25 0700 - 02/05/25 0659       Intake    P.O.  920  880  --    P.O. 920 880 --    I.V.  --  30  --    I.V. -- 30 --    Total Intake 920 910 --       Output    Urine  2000  1000  --    Urine 2000 1000 --    Urine Occurrence 6 x 9 x --    Incontinent Urine Occurrence 0 x -- --    Stool  --  --  --    Stool Count Calculated for I/O -- 2 x --    Total Output 2000 1000 --       Net I/O     -1080 -90 --            Scheduled Meds:    insulin aspart  1-11 Units Subcutaneous TID CC    potassium chloride  40 mEq Oral Q4H     predniSONE  40 mg Oral Daily with breakfast    nystatin  5 mL Oral QID    furosemide  80 mg Oral BID (Diuretic)    doxycycline  100 mg Oral BID    cetirizine  5 mg Oral Nightly    enoxaparin  40 mg Subcutaneous Daily    ipratropium-albuterol  3 mL Nebulization Q6H    guaiFENesin ER  600 mg Oral BID    fluticasone furoate  1 puff Inhalation Daily    spironolactone  25 mg Oral Daily    montelukast  10 mg Oral Nightly    digoxin  125 mcg Oral Daily    empagliflozin  10 mg Oral Daily    acetaZOLAMIDE  500 mg Oral Daily     General: Alert and oriented x 3. No apparent distress.   HEENT: Normocephalic, anicteric sclera, neck supple, no thyromegaly or adenopathy.  Neck: No JVD, carotids 2+, no bruits.  Cardiac: Regular rate and rhythm. S1, S2 normal. No murmur, pericardial rub, S3, or extra cardiac sounds.  Lungs: Clear without wheezes, rales, rhonchi or dullness.  Normal excursions and effort.  Abdomen: Soft, non-tender. No organosplenomegally, mass or rebound, BS-present.  Extremities: Without clubbing or cyanosis. 1+ LE edema.    Neurologic: Alert and oriented, normal affect. No focal defects  Skin: Warm and dry.     Results:   Laboratory Data:  Lab Results   Component Value Date    WBC 14.4 (H) 02/04/2025    HGB 12.9 02/04/2025    HCT 39.4 02/04/2025    .0 02/04/2025    CREATSERUM 0.93 02/04/2025    BUN 32 (H) 02/04/2025     02/04/2025    K 3.2 (L) 02/04/2025    CL 95 (L) 02/04/2025    CO2 37.0 (H) 02/04/2025     (H) 02/04/2025    CA 8.3 (L) 02/04/2025    ALB 3.3 02/04/2025    ALKPHO 58 02/04/2025    TP 5.3 (L) 02/04/2025    AST 15 02/04/2025    ALT 30 02/04/2025    PTT 24.3 11/25/2024    INR 1.08 11/25/2024    PTP 14.6 11/25/2024    T4F 0.9 12/17/2024    TSH 0.185 (L) 12/17/2024    LIP 30 08/30/2024    DDIMER 0.47 12/21/2024    MG 1.9 12/16/2024    PHOS 3.4 12/26/2024    TROP <0.045 02/03/2020         Recent Labs   Lab 01/31/25  0538 01/31/25  1543 02/01/25  0928 02/04/25  0523   *  --   292* 266*   BUN 33*  --  38* 32*   CREATSERUM 0.94  --  0.94 0.93   CA 9.2  --  8.3* 8.3*     --  139 139   K 3.6 5.5* 4.8 3.2*   CL 97*  --  100 95*   CO2 30.0  --  34.0* 37.0*     Recent Labs   Lab 01/31/25  0538 02/01/25  0928 02/04/25  0523   RBC 3.81 3.87 4.31   HGB 11.3* 11.4* 12.9   HCT 35.0 35.7 39.4   MCV 91.9 92.2 91.4   MCH 29.7 29.5 29.9   MCHC 32.3 31.9 32.7   RDW 14.1 14.1 14.0   NEPRELIM 14.63* 11.19* 11.37*   WBC 15.5* 12.2* 14.4*   .0 217.0 277.0       No results for input(s): \"BNPML\" in the last 168 hours.    No results for input(s): \"TROP\", \"CK\" in the last 168 hours.    Imaging:  No results found.    Impression:   Assessment:  COPD exacerbation  HFpEF, right-sided symptoms with primarily lower extremity edema  Paroxysmal atrial fibrillation  HTN      Plan:  - Presents with increased cough, hypoxia, and shortness of breath, secondary to COPD exacerbation with component of congestive heart failure  - Overnight had intermittent atrial fibrillation/flutter with rates up to 150s although nonsustained, in sinus rhythm this morning.  All asymptomatic.  - Given brief asymptomatic episodes of RVR would prefer to be more conservative  - Continue diltiazem 360 mg once daily, can consider increasing to 480 mg once daily if needed in the future  - Continue digoxin 0.125 mg daily  - Continue Lasix 80 mg twice daily, Jardiance 10 mg daily, spironolactone 25 mg daily  - Continue steroids, antibiotics, nebulizers per pulmonary  - Not on anticoagulation per patient preference    Thank you for allowing me to participate in the care of your patient.    Luis Fernando Fowler MD  [facility]  2/4/2025         [1]   Allergies  Allergen Reactions    Penicillin G ANAPHYLAXIS    Azithromycin DIARRHEA and NAUSEA AND VOMITING    Cefuroxime UNKNOWN     Other reaction(s): Vomitting    Levofloxacin UNKNOWN     Other reaction(s): LEVOFLOXACIN    Penicillins      Other reaction(s): Unknown

## 2025-02-04 NOTE — PHYSICAL THERAPY NOTE
PHYSICAL THERAPY EVALUATION - INPATIENT     Room Number: 502/502-A  Evaluation Date: 2/4/2025  Type of Evaluation: Initial   Physician Order: PT Eval and Treat    Presenting Problem: acute resp failure  Co-Morbidities : paroxysmal atrial fibrillation, oxygen dependent COPD, HTN, HLD  Reason for Therapy: Mobility Dysfunction and Discharge Planning    PHYSICAL THERAPY ASSESSMENT   Patient is a 84 year old female admitted 1/29/2025 for acute respiratory failure.  Prior to admission, patient's baseline is independent at home but states she has been struggling managing her IADLs.  Patient is currently functioning near baseline with bed mobility, transfers, gait, and stair negotiation.  Patient is requiring supervision and stand-by assist as a result of the following impairments: decreased functional strength, decreased endurance/aerobic capacity, and medical status.  Physical Therapy will continue to follow for duration of hospitalization.    Patient will benefit from continued skilled PT Services at discharge to promote prior level of function and safety with additional support and return home with home health PT. Patient is expressing interest in a rehab stay, this date appears too high level to benefit from gradual rehab but could benefit from additional in home IADL support.     PLAN DURING HOSPITALIZATION  Nursing Mobility Recommendation : 1 Assist  PT Device Recommendation: Rolling walker  PT Treatment Plan: Bed mobility;Body mechanics;Endurance;Energy conservation;Family education;Gait training;Range of motion;Stoop training;Transfer training;Balance training  Rehab Potential : Good  Frequency (Obs): 5x/week     PHYSICAL THERAPY MEDICAL/SOCIAL HISTORY   History related to current admission: acute respiratory failure      Problem List  Principal Problem:    Acute respiratory failure with hypoxia and hypercapnia (HCC)  Active Problems:    Kyphoscoliosis    Phrenic nerve paralysis    Acute cor pulmonale (HCC)     Pneumonia    Cor pulmonale (chronic) (HCC)    COPD exacerbation (HCC)    Acute respiratory failure (HCC)      HOME SITUATION  Type of Home: House  Home Layout: One level  Stairs to Enter : 5   Railing: Yes              Lives With:  (son is local)              Prior Level of Teller: independent     SUBJECTIVE  \"I tried going home and it didn't work.\"     PHYSICAL THERAPY EXAMINATION   OBJECTIVE  Precautions: Bed/chair alarm  Fall Risk: Standard fall risk    WEIGHT BEARING RESTRICTION       PAIN ASSESSMENT     Location: body aches       COGNITION  Overall Cognitive Status:  WFL - within functional limits    BALANCE  Static Sitting: Fair +  Dynamic Sitting: Fair  Static Standing: Fair -  Dynamic Standing: Poor +    O2 WALK  Oxygen Therapy  SPO2% on Oxygen at Rest: 91  At rest oxygen flow (liters per minute): 3  SPO2% Ambulation on Oxygen: 90  Ambulation oxygen flow (liters per minute): 3    AM-PAC '6-Clicks' INPATIENT SHORT FORM - BASIC MOBILITY  How much difficulty does the patient currently have...  Patient Difficulty: Turning over in bed (including adjusting bedclothes, sheets and blankets)?: A Little   Patient Difficulty: Sitting down on and standing up from a chair with arms (e.g., wheelchair, bedside commode, etc.): A Little   Patient Difficulty: Moving from lying on back to sitting on the side of the bed?: A Little   How much help from another person does the patient currently need...   Help from Another: Moving to and from a bed to a chair (including a wheelchair)?: A Little   Help from Another: Need to walk in hospital room?: A Little   Help from Another: Climbing 3-5 steps with a railing?: A Lot     AM-PAC Score:  Raw Score: 17   Approx Degree of Impairment: 50.57%   Standardized Score (AM-PAC Scale): 42.13   CMS Modifier (G-Code): CK    FUNCTIONAL ABILITY STATUS  Functional Mobility/Gait Assessment  Gait Assistance: Supervision  Distance (ft): 20' x 2  Assistive Device: Rolling walker  Pattern: Within  Functional Limits  Rolling: supervision  Supine to Sit: supervision  Sit to Supine: supervision  Sit to Stand: stand-by assist    Exercise/Education Provided:  Bed mobility  Body mechanics  Functional activity tolerated  Gait training  Transfer training    The patient's Approx Degree of Impairment: 50.57% has been calculated based on documentation in the [facility] '6 clicks' Inpatient Basic Mobility Short Form.  Research supports that patients with this level of impairment may benefit from HHPT.  Final disposition will be made by interdisciplinary medical team.    Patient End of Session: Up in chair;Needs met;Call light within reach;RN aware of session/findings;All patient questions and concerns addressed;Hospital anti-slip socks    CURRENT GOALS  Goals to be met by: 3/1  Patient Goal Patient's self-stated goal is: unstated   Goal #1 Patient is able to demonstrate supine - sit EOB @ level: independent     Goal #1   Current Status    Goal #2 Patient is able to demonstrate transfers Sit to/from Stand at assistance level: independent with none     Goal #2  Current Status    Goal #3 Patient is able to ambulate 70 feet with assist device: walker - rolling at assistance level: supervision   Goal #3   Current Status    Goal #4 Patient will negotiate 5 stairs/one curb w/ assistive device and supervision   Goal #4   Current Status    Goal #5 Patient to demonstrate independence with home activity/exercise instructions provided to patient in preparation for discharge.   Goal #5   Current Status    Goal #6    Goal #6  Current Status      Patient Evaluation Complexity Level:  History Moderate - 1 or 2 personal factors and/or co-morbidities   Examination of body systems Moderate - addressing a total of 3 or more elements   Clinical Presentation  Moderate - Evolving   Clinical Decision Making  Moderate Complexity     Therapeutic Activity:  10 minutes

## 2025-02-04 NOTE — PLAN OF CARE
Pt A&Ox4, AFFIB >C/O epigastric pain, nausea, and SOB.100 multiple EKG's overnight. CXR no changes. Diltiazem gtt started. Pt converted back to NSR.dilt gtt stopped @ 0207 per protocol parameters. Trending trops. Prn antiemetic & PT/OT consult added per pulm for dc eval. Increase in daily PO prednisone per Pulm. Call light within reach. Vascular consult added, US guided RN unable to gain access w/ extended, placed PIV and RFA is extremely bruised & sensitve pt requested removal     Problem: Patient Centered Care  Goal: Patient preferences are identified and integrated in the patient's plan of care  Description: Interventions:  - What would you like us to know as we care for you? Home alone  - Provide timely, complete, and accurate information to patient/family  - Incorporate patient and family knowledge, values, beliefs, and cultural backgrounds into the planning and delivery of care  - Encourage patient/family to participate in care and decision-making at the level they choose  - Honor patient and family perspectives and choices  Outcome: Progressing     Problem: RESPIRATORY - ADULT  Goal: Achieves optimal ventilation and oxygenation  Description: INTERVENTIONS:  - Assess for changes in respiratory status  - Assess for changes in mentation and behavior  - Position to facilitate oxygenation and minimize respiratory effort  - Oxygen supplementation based on oxygen saturation or ABGs  - Provide Smoking Cessation handout, if applicable  - Encourage broncho-pulmonary hygiene including cough, deep breathe, Incentive Spirometry  - Assess the need for suctioning and perform as needed  - Assess and instruct to report SOB or any respiratory difficulty  - Respiratory Therapy support as indicated  - Manage/alleviate anxiety  - Monitor for signs/symptoms of CO2 retention  Outcome: Progressing     Problem: PAIN - ADULT  Goal: Verbalizes/displays adequate comfort level or patient's stated pain goal  Description: INTERVENTIONS:  -  Encourage pt to monitor pain and request assistance  - Assess pain using appropriate pain scale  - Administer analgesics based on type and severity of pain and evaluate response  - Implement non-pharmacological measures as appropriate and evaluate response  - Consider cultural and social influences on pain and pain management  - Manage/alleviate anxiety  - Utilize distraction and/or relaxation techniques  - Monitor for opioid side effects  - Notify MD/LIP if interventions unsuccessful or patient reports new pain  - Anticipate increased pain with activity and pre-medicate as appropriate  Outcome: Progressing     Problem: RISK FOR INFECTION - ADULT  Goal: Absence of fever/infection during anticipated neutropenic period  Description: INTERVENTIONS  - Monitor WBC  - Administer growth factors as ordered  - Implement neutropenic guidelines  Outcome: Progressing     Problem: SAFETY ADULT - FALL  Goal: Free from fall injury  Description: INTERVENTIONS:  - Assess pt frequently for physical needs  - Identify cognitive and physical deficits and behaviors that affect risk of falls.  - [place] fall precautions as indicated by assessment.  - Educate pt/family on patient safety including physical limitations  - Instruct pt to call for assistance with activity based on assessment  - Modify environment to reduce risk of injury  - Provide assistive devices as appropriate  - Consider OT/PT consult to assist with strengthening/mobility  - Encourage toileting schedule  Outcome: Progressing     Problem: DISCHARGE PLANNING  Goal: Discharge to home or other facility with appropriate resources  Description: INTERVENTIONS:  - Identify barriers to discharge w/pt and caregiver  - Include patient/family/discharge partner in discharge planning  - Arrange for needed discharge resources and transportation as appropriate  - Identify discharge learning needs (meds, wound care, etc)  - Arrange for interpreters to assist at discharge as needed  -  Consider post-discharge preferences of patient/family/discharge partner  - Complete POLST form as appropriate  - Assess patient's ability to be responsible for managing their own health  - Refer to Case Management Department for coordinating discharge planning if the patient needs post-hospital services based on physician/LIP order or complex needs related to functional status, cognitive ability or social support system  Outcome: Progressing     Problem: Patient/Family Goals  Goal: Patient/Family Long Term Goal  Description: Patient's Long Term Goal:     Interventions:  -   - See additional Care Plan goals for specific interventions  Outcome: Progressing  Goal: Patient/Family Short Term Goal  Description: Patient's Short Term Goal:     Interventions:   -   - See additional Care Plan goals for specific interventions  Outcome: Progressing     Problem: CARDIOVASCULAR - ADULT  Goal: Maintains optimal cardiac output and hemodynamic stability  Description: INTERVENTIONS:  - Monitor vital signs, rhythm, and trends  - Monitor for bleeding, hypotension and signs of decreased cardiac output  - Evaluate effectiveness of vasoactive medications to optimize hemodynamic stability  - Monitor arterial and/or venous puncture sites for bleeding and/or hematoma  - Assess quality of pulses, skin color and temperature  - Assess for signs of decreased coronary artery perfusion - ex. Angina  - Evaluate fluid balance, assess for edema, trend weights  Outcome: Progressing  Goal: Absence of cardiac arrhythmias or at baseline  Description: INTERVENTIONS:  - Continuous cardiac monitoring, monitor vital signs, obtain 12 lead EKG if indicated  - Evaluate effectiveness of antiarrhythmic and heart rate control medications as ordered  - Initiate emergency measures for life threatening arrhythmias  - Monitor electrolytes and administer replacement therapy as ordered  Outcome: Progressing

## 2025-02-04 NOTE — PROGRESS NOTES
[facility]  part of [facility]    Progress Note    Yesenia Berkowitz Patient Status:  Inpatient    1940 MRN [id number]   Location [facility]5W Attending Lissy Blandon MD   Hosp Day # 5 PCP JO-ANN YANG MD       Subjective:       Yesenia Berkowitz is a(n) 84 year old female noted approximately week ago with COPD exacerbation and coughing but nonproductive with.  Admitting chest x-ray showed small bilateral pleural effusions and infiltration concerning for possible pneumonia versus CHF.  She has been treated with doxycycline diuretics bronchodilators Solu-Medrol and still is short of breath especially this evening.  Her prednisone was reduced to 20 mg earlier today.  I recommend we bump it up to 40 mg in the morning.  She did not want want to take additional prednisone tonight because it interferes with her sleep.  She will be getting additional nebulizer treatments.  She has apparently had recurring admissions and frequent exacerbations since 2024.  She normally is on Trelegy and has a nebulizer at home but does not know how to use it.  She is on 3 to 4 L oxygen nasal cannula at home.  She lives alone but she has a son that does come and help her for a short time but most of the day she would be alone.  She has CT of the chest 2024 showing small bilateral free-flowing pleural effusions COPD and in the right upper lobe there is a 10 x 7 somewhat spiculated nodule concerning for malignancy.  Repeat noncontrast CT chest is ordered.    Objective:   Blood pressure (!) 122/93, pulse (!) 151, temperature 98 °F (36.7 °C), temperature source Oral, resp. rate 22, weight 148 lb 9.6 oz (67.4 kg), SpO2 96%.    Intake/Output Summary (Last 24 hours) at 2/3/2025 2059  Last data filed at 2/3/2025 1800  Gross per 24 hour   Intake 760 ml   Output 1400 ml   Net -640 ml     Mildabored breathing  On exam she is moving air much better  and wheezing is much improved  Cor reg and back in  SR  Modest bilat leg edema    Results:     Lab Results   Component Value Date    WBC 12.2 (H) 02/01/2025    HGB 11.4 (L) 02/01/2025    HCT 35.7 02/01/2025    .0 02/01/2025    CREATSERUM 0.94 02/01/2025    BUN 38 (H) 02/01/2025     02/01/2025    K 4.8 02/01/2025     02/01/2025    CO2 34.0 (H) 02/01/2025     (H) 02/01/2025    CA 8.3 (L) 02/01/2025    ALB 3.5 01/31/2025    ALKPHO 70 01/31/2025    BILT <0.2 (L) 01/31/2025    TP 5.5 (L) 01/31/2025    AST 16 01/31/2025    ALT 23 01/31/2025    PTT 24.3 11/25/2024    INR 1.08 11/25/2024    T4F 0.9 12/17/2024    TSH 0.185 (L) 12/17/2024    LIP 30 08/30/2024    DDIMER 0.47 12/21/2024    MG 1.9 12/16/2024    PHOS 3.4 12/26/2024    TROP <0.045 02/03/2020       No results found.      CT chest 11/25/24 ( I viewed)    CONCLUSION:  Cardiomegaly and mild coronary atherosclerosis.  Findings consistent with mild CHF/fluid overload including small bilateral pleural effusions, slightly improved on the right, with associated bibasilar atelectasis.  Mild emphysema with a 10 x 7 mm spiculated right upper lobe pulmonary nodule.  This nodule is stable dating back to October 2024 but has increased in size compared to prior studies.  A malignant etiology such as a slow growing primary bronchogenic carcinoma therefore cannot be excluded.  Consider further assessment with percutaneous biopsy or follow-up PET/CT when the patient is better able to cooperate.  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  Stable moderately dilated main pulmonary artery, which can be seen with chronic pulmonary hypertension; correlate clinically.               Assessment and Plan:   Principal Problem:    Acute respiratory failure with hypoxia and hypercapnia (HCC)  Active Problems:    Kyphoscoliosis    Phrenic nerve paralysis    Acute cor pulmonale (HCC)    Pneumonia    Cor pulmonale (chronic) (HCC)    COPD exacerbation (HCC)    Acute  respiratory failure (HCC)       DIAGNOSTIC IMPRESSION:    1.       Acute on chronic respiratory failure.  2.       Chronic obstructive pulmonary disease and asthma with exacerbation.  3.       Bilateral pneumonia ?  4.       Left phrenic nerve paralysis.  5.       Kyphoscoliosis.  6.       Obesity hypoventilation syndrome.  7.       Acute on chronic cor pulmonale.  8.       Chronic prednisone 10 mg daily  9.       R upper lobe 10 x 7 nodule November 2024  10.     hyperglycemia    SUGGESTIONS AND RECOMMENDATIONS:    1.       Prednisone from 20 to 40 po daily and taper over a week or so back to out pt dose of 10 mg  2.       DuoNeb q.i.d. and p.r.n.   3.       Protonix 40 mg a day.    4.       Diuretics such as Lasix 80 mg a day, spironolactone 25 mg a day.on board  5.       Prescribed BiPAP 13/5 at night but now pt declines  6.       Doxycycline 100 mg po q.12.  7.       PT and OT eval for  possible rehab ([facility])- discussed w pt who is ambivalent  8.       Non con CT chest to d/u lung nodule seen in November CT      OLIMPIA JONES MD  2/3/2025

## 2025-02-05 ENCOUNTER — APPOINTMENT (OUTPATIENT)
Dept: CT IMAGING | Facility: HOSPITAL | Age: 85
End: 2025-02-05
Attending: INTERNAL MEDICINE
Payer: MEDICARE

## 2025-02-05 VITALS
BODY MASS INDEX: 24 KG/M2 | RESPIRATION RATE: 21 BRPM | TEMPERATURE: 98 F | SYSTOLIC BLOOD PRESSURE: 118 MMHG | OXYGEN SATURATION: 98 % | WEIGHT: 143.88 LBS | HEART RATE: 81 BPM | DIASTOLIC BLOOD PRESSURE: 81 MMHG

## 2025-02-05 LAB
ALBUMIN SERPL-MCNC: 3.1 G/DL (ref 3.2–4.8)
ALBUMIN/GLOB SERPL: 1.7 {RATIO} (ref 1–2)
ALP LIVER SERPL-CCNC: 53 U/L
ALT SERPL-CCNC: 24 U/L
ANION GAP SERPL CALC-SCNC: 6 MMOL/L (ref 0–18)
AST SERPL-CCNC: 13 U/L (ref ?–34)
BASOPHILS # BLD AUTO: 0.11 X10(3) UL (ref 0–0.2)
BASOPHILS NFR BLD AUTO: 0.9 %
BILIRUB SERPL-MCNC: 0.2 MG/DL (ref 0.2–1.1)
BUN BLD-MCNC: 21 MG/DL (ref 9–23)
BUN/CREAT SERPL: 28.4 (ref 10–20)
CALCIUM BLD-MCNC: 8.7 MG/DL (ref 8.7–10.4)
CHLORIDE SERPL-SCNC: 99 MMOL/L (ref 98–112)
CO2 SERPL-SCNC: 34 MMOL/L (ref 21–32)
CREAT BLD-MCNC: 0.74 MG/DL
DEPRECATED RDW RBC AUTO: 49.1 FL (ref 35.1–46.3)
EGFRCR SERPLBLD CKD-EPI 2021: 81 ML/MIN/1.73M2 (ref 60–?)
EOSINOPHIL # BLD AUTO: 0 X10(3) UL (ref 0–0.7)
EOSINOPHIL NFR BLD AUTO: 0 %
ERYTHROCYTE [DISTWIDTH] IN BLOOD BY AUTOMATED COUNT: 14.4 % (ref 11–15)
GLOBULIN PLAS-MCNC: 1.8 G/DL (ref 2–3.5)
GLUCOSE BLD-MCNC: 154 MG/DL (ref 70–99)
GLUCOSE BLDC GLUCOMTR-MCNC: 255 MG/DL (ref 70–99)
HCT VFR BLD AUTO: 38.8 %
HGB BLD-MCNC: 12.3 G/DL
IMM GRANULOCYTES # BLD AUTO: 0.36 X10(3) UL (ref 0–1)
IMM GRANULOCYTES NFR BLD: 3 %
LYMPHOCYTES # BLD AUTO: 1.2 X10(3) UL (ref 1–4)
LYMPHOCYTES NFR BLD AUTO: 9.9 %
MCH RBC QN AUTO: 29.5 PG (ref 26–34)
MCHC RBC AUTO-ENTMCNC: 31.7 G/DL (ref 31–37)
MCV RBC AUTO: 93 FL
MONOCYTES # BLD AUTO: 0.73 X10(3) UL (ref 0.1–1)
MONOCYTES NFR BLD AUTO: 6 %
NEUTROPHILS # BLD AUTO: 9.71 X10 (3) UL (ref 1.5–7.7)
NEUTROPHILS # BLD AUTO: 9.71 X10(3) UL (ref 1.5–7.7)
NEUTROPHILS NFR BLD AUTO: 80.2 %
OSMOLALITY SERPL CALC.SUM OF ELEC: 294 MOSM/KG (ref 275–295)
PLATELET # BLD AUTO: 248 10(3)UL (ref 150–450)
POTASSIUM SERPL-SCNC: 4 MMOL/L (ref 3.5–5.1)
PROT SERPL-MCNC: 4.9 G/DL (ref 5.7–8.2)
RBC # BLD AUTO: 4.17 X10(6)UL
SODIUM SERPL-SCNC: 139 MMOL/L (ref 136–145)
WBC # BLD AUTO: 12.1 X10(3) UL (ref 4–11)

## 2025-02-05 PROCEDURE — 99239 HOSP IP/OBS DSCHRG MGMT >30: CPT | Performed by: INTERNAL MEDICINE

## 2025-02-05 PROCEDURE — 71250 CT THORAX DX C-: CPT | Performed by: INTERNAL MEDICINE

## 2025-02-05 RX ORDER — FUROSEMIDE 80 MG/1
80 TABLET ORAL
Qty: 60 TABLET | Refills: 0 | Status: SHIPPED | OUTPATIENT
Start: 2025-02-05

## 2025-02-05 RX ORDER — PREDNISONE 10 MG/1
TABLET ORAL
Qty: 30 TABLET | Refills: 0 | Status: SHIPPED | OUTPATIENT
Start: 2025-02-05 | End: 2025-02-05

## 2025-02-05 RX ORDER — IPRATROPIUM BROMIDE AND ALBUTEROL SULFATE 2.5; .5 MG/3ML; MG/3ML
3 SOLUTION RESPIRATORY (INHALATION)
Status: DISCONTINUED | OUTPATIENT
Start: 2025-02-05 | End: 2025-02-05

## 2025-02-05 RX ORDER — PREDNISONE 10 MG/1
TABLET ORAL
Qty: 15 TABLET | Refills: 0 | Status: SHIPPED | OUTPATIENT
Start: 2025-02-05 | End: 2025-02-20 | Stop reason: ALTCHOICE

## 2025-02-05 RX ORDER — DILTIAZEM HYDROCHLORIDE 360 MG/1
360 CAPSULE, EXTENDED RELEASE ORAL DAILY
Qty: 30 CAPSULE | Refills: 11 | Status: SHIPPED | OUTPATIENT
Start: 2025-02-06 | End: 2026-02-06

## 2025-02-05 NOTE — CM/SW NOTE
02/05/25 1000   Discharge disposition   Expected discharge disposition Home-Health   Post Acute Care Provider [facility]   DME/Infusion Providers   ([employer] for home O2)   Discharge transportation Private car     Pt discussed during nursing rounds. Pt is stable for dc today. MD dc order entered. Pt now agreeable to dc home instead of [facility]. [facility] will resumed RN and therapy services at [place], liaison Vanessa notified of dc home today. [facility] will resume home O2 services at [place]. Pt's son will provide transport at [place].    Plan: Home w/[facility] and [employer] for home O2 today.    / to remain available for support and/or discharge planning.     GEORGINA Samuel    [phone]

## 2025-02-05 NOTE — CM/SW NOTE
SW followed up on DC planning.     SW received messages from PT stating pt would like [facility] - stating she does not want to go back home as she lives home alone and HHC is \"not working\"    Will need to discuss eventual LT Plan - SNF referral tentatively sent in aidin     [facility]     [facility]    PLAN: TBD - pending discussion with pt on SNF vs CG services    Kellie BUSTOS LSW, MSW ext. [phone]

## 2025-02-05 NOTE — PLAN OF CARE
Problem: Patient Centered Care  Goal: Patient preferences are identified and integrated in the patient's plan of care  Description: Interventions:  - What would you like us to know as we care for you? From home alone   - Provide timely, complete, and accurate information to patient/family  - Incorporate patient and family knowledge, values, beliefs, and cultural backgrounds into the planning and delivery of care  - Encourage patient/family to participate in care and decision-making at the level they choose  - Honor patient and family perspectives and choices  Outcome: Progressing     Problem: RESPIRATORY - ADULT  Goal: Achieves optimal ventilation and oxygenation  Description: INTERVENTIONS:  - Assess for changes in respiratory status  - Assess for changes in mentation and behavior  - Position to facilitate oxygenation and minimize respiratory effort  - Oxygen supplementation based on oxygen saturation or ABGs  - Provide Smoking Cessation handout, if applicable  - Encourage broncho-pulmonary hygiene including cough, deep breathe, Incentive Spirometry  - Assess the need for suctioning and perform as needed  - Assess and instruct to report SOB or any respiratory difficulty  - Respiratory Therapy support as indicated  - Manage/alleviate anxiety  - Monitor for signs/symptoms of CO2 retention  Outcome: Progressing     Problem: PAIN - ADULT  Goal: Verbalizes/displays adequate comfort level or patient's stated pain goal  Description: INTERVENTIONS:  - Encourage pt to monitor pain and request assistance  - Assess pain using appropriate pain scale  - Administer analgesics based on type and severity of pain and evaluate response  - Implement non-pharmacological measures as appropriate and evaluate response  - Consider cultural and social influences on pain and pain management  - Manage/alleviate anxiety  - Utilize distraction and/or relaxation techniques  - Monitor for opioid side effects  - Notify MD/LIP if interventions  unsuccessful or patient reports new pain  - Anticipate increased pain with activity and pre-medicate as appropriate  Outcome: Progressing     Problem: RISK FOR INFECTION - ADULT  Goal: Absence of fever/infection during anticipated neutropenic period  Description: INTERVENTIONS  - Monitor WBC  - Administer growth factors as ordered  - Implement neutropenic guidelines  Outcome: Progressing     Problem: SAFETY ADULT - FALL  Goal: Free from fall injury  Description: INTERVENTIONS:  - Assess pt frequently for physical needs  - Identify cognitive and physical deficits and behaviors that affect risk of falls.  - [place] fall precautions as indicated by assessment.  - Educate pt/family on patient safety including physical limitations  - Instruct pt to call for assistance with activity based on assessment  - Modify environment to reduce risk of injury  - Provide assistive devices as appropriate  - Consider OT/PT consult to assist with strengthening/mobility  - Encourage toileting schedule  Outcome: Progressing     Problem: DISCHARGE PLANNING  Goal: Discharge to home or other facility with appropriate resources  Description: INTERVENTIONS:  - Identify barriers to discharge w/pt and caregiver  - Include patient/family/discharge partner in discharge planning  - Arrange for needed discharge resources and transportation as appropriate  - Identify discharge learning needs (meds, wound care, etc)  - Arrange for interpreters to assist at discharge as needed  - Consider post-discharge preferences of patient/family/discharge partner  - Complete POLST form as appropriate  - Assess patient's ability to be responsible for managing their own health  - Refer to Case Management Department for coordinating discharge planning if the patient needs post-hospital services based on physician/LIP order or complex needs related to functional status, cognitive ability or social support system  Outcome: Progressing     Problem: Patient/Family  Goals  Goal: Patient/Family Long Term Goal  Description: Patient's Long Term Goal: To discharge from hospital     Interventions:  -  Monitor vital signs   - Monitor appropriate labs   - Pain management   - Administer medications per order   - Follow MD orders   - Diagnostics per order   - Update/inform patient and family on plan of care   - Discharge planning    - See additional Care Plan goals for specific interventions  Outcome: Progressing  Goal: Patient/Family Short Term Goal  Description: Patient's Short Term Goal: To improve breathing     Interventions:   - Monitor vital signs   - Monitor appropriate labs   - Pain management   - Administer medications per order   - Follow MD orders   - Diagnostics per order   - Update/inform patient and family on plan of care   - See additional Care Plan goals for specific interventions  Outcome: Progressing     Problem: CARDIOVASCULAR - ADULT  Goal: Maintains optimal cardiac output and hemodynamic stability  Description: INTERVENTIONS:  - Monitor vital signs, rhythm, and trends  - Monitor for bleeding, hypotension and signs of decreased cardiac output  - Evaluate effectiveness of vasoactive medications to optimize hemodynamic stability  - Monitor arterial and/or venous puncture sites for bleeding and/or hematoma  - Assess quality of pulses, skin color and temperature  - Assess for signs of decreased coronary artery perfusion - ex. Angina  - Evaluate fluid balance, assess for edema, trend weights  Outcome: Progressing  Goal: Absence of cardiac arrhythmias or at baseline  Description: INTERVENTIONS:  - Continuous cardiac monitoring, monitor vital signs, obtain 12 lead EKG if indicated  - Evaluate effectiveness of antiarrhythmic and heart rate control medications as ordered  - Initiate emergency measures for life threatening arrhythmias  - Monitor electrolytes and administer replacement therapy as ordered  Outcome: Progressing     Monitoring vital signs, stable at this time. No  acute changes at this moment. Fall precautions in place- bed alarm on, bed locked in lowest position, call light within reach. Frequent rounding by nursing staff.

## 2025-02-05 NOTE — CONGREGATE LIVING REVIEW
Congregate Living Authorization    The [facility] Review Committee ([facility]RC) has reviewed this case and the committee DOES NOT RECOMMEND discharge to a skilled nursing facility under skilled care.    The CLRC recommends:  Home with home health and any other appropriate caregiver assistance or medical equipment as needed vs respite in SNF.     Does not appear to have skilled services for [facility], but additional home support appears to be needed.    For questions regarding CLRC approval process, please contact the CM assigned to the case.  For questions regarding RN discharge workflow, please contact the unit Clinical Leader.

## 2025-02-05 NOTE — DISCHARGE SUMMARY
[facility]  part of [facility]    DISCHARGE SUMMARY     Yesenia Berkowitz Patient Status:  Inpatient    1940 MRN [id number]   Location [facility]5W Attending Lissy Blandon MD   Hosp Day # 7 PCP JO-ANN YANG MD     Date of Admission: 2025  Date of Discharge:  2025    Discharge Disposition: Home or Self Care    Discharge Diagnosis:     Acute on chronic respiratory failure  COPD exacerbation  Asthma exacerbation  Bilateral PNA  Phrenic nervie paralysis  Hx of Afib  HTN  Hx of HFpEF  Hypokalemia    History of Present Illness:     Yesenia Berkowitz is a 84 year old female with history of COPD, A-fib, asthma, GERD, heart disease, chronic respiratory failure on 3.5-4 L, HTN and others who presented to the ED today with complaints of shortness of breath for the last 2 days.  Tried usual home medications without improvement.  She developed a cough a day ago.  Nonproductive.  No fever or chills.  No sick contacts.  No recent travel.  She does note her legs are slightly more swollen than baseline but has not documented any weight gain.     On EMT arrival to home, she was noted to be 85% on 3 L.  Nebs administered and patient transported to the ED.  On arrival to ED, vital stable almost to 88% on 5 L.  She was placed on a nonrebreather.  Steroids nebs and antibiotics initiated.  Pulmonology on consult.     She will be admitted for further treatment.     CXR: Possible mild interstitial pulmonary edema.  Slight interval increase in mild right lower lobe patchy groundglass airspace disease, mild left basilar atelectasis and/or small pleural effusion  Significant labs potassium 3.4, WBC 12.0    Brief Synopsis:     Acute on chronic respiratory failure  COPD exacerbation  Asthma exacerbation  Bilateral PNA  Phrenic nervie paralysis  Imaging reviewed  Cultures showing NGTD  Pulm on consult  Prednisone increased to 40 mg PO - taper and discharge on 10 mg daily  No abx at discharge  Continue nebs,  PPI  Continue lasix and spironolactone  Continue BiPAP at night  Hx of Afib  HTN  Continue home meds  Not on anticoagulation  Cardiology on consult  Appreciate recs  Close opt follow up  Hx of HFpEF  Imaging reviewed  BNP WNL  Last EF 65-70%  Net IO Since Admission: -2,846 mL [02/04/25 0845]  Strict Is and Os, daily weights  Hypokalemia  Replace per protocol  Patient is to follow up with PCP, Cardiology and Pulm as opt. Discharge meds ordered per pulm and cardiology   Patient is to remain compliant with all discharge medications and instructions and to follow up as advised.   Patient encouraged to make healthy lifestyle and dietary changes.    Attempted to call the son to update him with the plan of care but was unable to reach him.    Lace+ Score: 83  59-90 High Risk  29-58 Medium Risk  0-28   Low Risk       TCM Follow-Up Recommendation:  LACE > 58: High Risk of readmission after discharge from the hospital.      Procedures during hospitalization:   None    Incidental or significant findings and recommendations (brief descriptions):  None    Lab/Test results pending at Discharge:   None    Consultants:  Consultants         Provider   Role Specialty     Loi Greenberg MD      Consulting Physician Interventional, Cardiology     Bing Boyle MD      Consulting Physician PULMONARY DISEASES          Discharge Medication List:     Discharge Medications        START taking these medications        Instructions Prescription details   dilTIAZem  MG Cp24  Commonly known as: Tiazac  Start taking on: February 6, 2025      Take 1 capsule (360 mg total) by mouth daily.   Quantity: 30 capsule  Refills: 11     predniSONE 10 MG Tabs  Commonly known as: Deltasone      20 mg (two tabs) daily for 5 days followed by 10 mg (one tab) daily for 5 days.   Quantity: 15 tablet  Refills: 0            CHANGE how you take these medications        Instructions Prescription details   furosemide 80 MG Tabs  Commonly known as: Lasix  What  changed: when to take this      Take 1 tablet (80 mg total) by mouth BID (Diuretic).   Quantity: 60 tablet  Refills: 0            CONTINUE taking these medications        Instructions Prescription details   acetaminophen 500 MG Tabs  Commonly known as: Tylenol Extra Strength      Take 2 tablets (1,000 mg total) by mouth every 6 (six) hours as needed for Pain or Fever.   Refills: 0     acetaZOLAMIDE 250 MG Tabs  Commonly known as: Diamox      Take 2 tablets (500 mg total) by mouth daily.   Quantity: 60 tablet  Refills: 0     albuterol 108 (90 Base) MCG/ACT Aers  Commonly known as: Ventolin HFA      Inhale 2 puffs into the lungs as needed.   Refills: 0     digoxin 0.125 MG Tabs  Commonly known as: Lanoxin      Take 1 tablet (125 mcg total) by mouth daily.   Quantity: 30 tablet  Refills: 0     empagliflozin 10 MG Tabs  Commonly known as: Jardiance      Take 1 tablet (10 mg total) by mouth daily.   Quantity: 30 tablet  Refills: 3     estradiol 0.05 MG/24HR Ptwk  Commonly known as: Climara      Place 1 patch onto the skin once a week. As directed.   Refills: 0     famotidine 20 MG Tabs  Commonly known as: Pepcid      Take 1 tablet (20 mg total) by mouth 2 (two) times daily as needed for Heartburn.   Stop taking on: February 20, 2025  Quantity: 30 tablet  Refills: 0     Loratadine 10 MG Caps      Take 10 mg by mouth nightly.   Refills: 0     montelukast 10 MG Tabs  Commonly known as: Singulair      Take 1 tablet (10 mg total) by mouth nightly.   Refills: 0     potassium chloride 20 MEQ Tbcr  Commonly known as: Klor-Con M20      Take 1 tablet (20 mEq total) by mouth nightly.   Refills: 0     spironolactone 25 MG Tabs  Commonly known as: Aldactone      Take 1 tablet (25 mg total) by mouth daily for 90 doses.   Stop taking on: March 17, 2025  Quantity: 90 tablet  Refills: 0     Trelegy Ellipta 100-62.5-25 MCG/ACT Aepb  Generic drug: fluticasone-umeclidin-vilant      Inhale 1 puff into the lungs daily.   Refills: 0      Vitamin D 1000 units Tabs      Take 1,000 Units by mouth 2 (two) times daily.   Refills: 0            STOP taking these medications      aspirin 81 MG Tabs        dilTIAZem HCl ER Coated Beads 360 MG Cp24  Commonly known as: CARDIZEM CD        fluticasone propionate 110 MCG/ACT Aero  Commonly known as: Flovent HFA        lidocaine 5 % Ptch  Commonly known as: Lidoderm                  Where to Get Your Medications        These medications were sent to [id number] - [place], [place] - [address] [phone], [phone]  [address] [place] [address]      Phone: [phone]   dilTIAZem  MG Cp24  furosemide 80 MG Tabs  predniSONE 10 MG Tabs         [place] reviewed[place]    Follow-up appointment:   [facility]  [address]  [place] [facility] [id number] [phone]  Schedule an appointment as soon as possible for a visit      Brenda Wilkerson MD  [address]  [place] [id number] [phone]    Schedule an appointment as soon as possible for a visit in 1 week(s)      Luis Fernando Fowler MD  [address]  [facility] [id number] [phone]    Follow up in 1 week(s)      Jose Ruelas MD  [address]  [place] [id number] [phone]    Follow up in 1 week(s)      Appointments for Next 30 Days 2/5/2025 - 3/7/2025        Date Arrival Time Visit Type Length Department Provider     2/24/2025  1:15 PM  PROCEDURE MAC [0772] 45 min [facility] JESUS SANZ    Patient Instructions:                           Vital signs:  Temp:  [97.9 °F (36.6 °C)-98.6 °F (37 °C)] 98.1 °F (36.7 °C)  Pulse:  [75-88] 81  Resp:  [16-21] 21  BP: (115-127)/(49-81) 118/81  SpO2:  [95 %-100 %] 98 %    Physical Exam:    Gen: NAD AO x3  Chest: good air entry CTABL  CVS: normal s1 and s2 RR  Abd: NABS soft NT ND  Neuro: CN 2-12 grossly intact  Ext: no edema in bilateral  FERDINAND    -----------------------------------------------------------------------------------------------  PATIENT DISCHARGE INSTRUCTIONS: See electronic chart    Lissy Blandon MD  Hospitalist    Time spent:  > 30 minutes    The 21st Century Cures Act makes medical notes like these available to patients in the interest of transparency. Please be advised this is a medical document. Medical documents are intended to carry relevant information, facts as evident, and the clinical opinion of the practitioner. The medical note is intended as peer to peer communication and may appear blunt or direct. It is written in medical language and may contain abbreviations or verbiage that are unfamiliar.

## 2025-02-05 NOTE — PLAN OF CARE
Pt okay to dc per MD. On 3L of O2, at baseline. AVS printed and reviewed with, all questions answered.  Problem: Patient Centered Care  Goal: Patient preferences are identified and integrated in the patient's plan of care  Description: Interventions:  - What would you like us to know as we care for you?   - Provide timely, complete, and accurate information to patient/family  - Incorporate patient and family knowledge, values, beliefs, and cultural backgrounds into the planning and delivery of care  - Encourage patient/family to participate in care and decision-making at the level they choose  - Honor patient and family perspectives and choices  Outcome: Adequate for Discharge     Problem: RESPIRATORY - ADULT  Goal: Achieves optimal ventilation and oxygenation  Description: INTERVENTIONS:  - Assess for changes in respiratory status  - Assess for changes in mentation and behavior  - Position to facilitate oxygenation and minimize respiratory effort  - Oxygen supplementation based on oxygen saturation or ABGs  - Provide Smoking Cessation handout, if applicable  - Encourage broncho-pulmonary hygiene including cough, deep breathe, Incentive Spirometry  - Assess the need for suctioning and perform as needed  - Assess and instruct to report SOB or any respiratory difficulty  - Respiratory Therapy support as indicated  - Manage/alleviate anxiety  - Monitor for signs/symptoms of CO2 retention  Outcome: Adequate for Discharge     Problem: PAIN - ADULT  Goal: Verbalizes/displays adequate comfort level or patient's stated pain goal  Description: INTERVENTIONS:  - Encourage pt to monitor pain and request assistance  - Assess pain using appropriate pain scale  - Administer analgesics based on type and severity of pain and evaluate response  - Implement non-pharmacological measures as appropriate and evaluate response  - Consider cultural and social influences on pain and pain management  - Manage/alleviate anxiety  - Utilize  distraction and/or relaxation techniques  - Monitor for opioid side effects  - Notify MD/LIP if interventions unsuccessful or patient reports new pain  - Anticipate increased pain with activity and pre-medicate as appropriate  Outcome: Adequate for Discharge     Problem: RISK FOR INFECTION - ADULT  Goal: Absence of fever/infection during anticipated neutropenic period  Description: INTERVENTIONS  - Monitor WBC  - Administer growth factors as ordered  - Implement neutropenic guidelines  Outcome: Adequate for Discharge     Problem: SAFETY ADULT - FALL  Goal: Free from fall injury  Description: INTERVENTIONS:  - Assess pt frequently for physical needs  - Identify cognitive and physical deficits and behaviors that affect risk of falls.  - [place] fall precautions as indicated by assessment.  - Educate pt/family on patient safety including physical limitations  - Instruct pt to call for assistance with activity based on assessment  - Modify environment to reduce risk of injury  - Provide assistive devices as appropriate  - Consider OT/PT consult to assist with strengthening/mobility  - Encourage toileting schedule  Outcome: Adequate for Discharge     Problem: DISCHARGE PLANNING  Goal: Discharge to home or other facility with appropriate resources  Description: INTERVENTIONS:  - Identify barriers to discharge w/pt and caregiver  - Include patient/family/discharge partner in discharge planning  - Arrange for needed discharge resources and transportation as appropriate  - Identify discharge learning needs (meds, wound care, etc)  - Arrange for interpreters to assist at discharge as needed  - Consider post-discharge preferences of patient/family/discharge partner  - Complete POLST form as appropriate  - Assess patient's ability to be responsible for managing their own health  - Refer to Case Management Department for coordinating discharge planning if the patient needs post-hospital services based on physician/LIP order or  complex needs related to functional status, cognitive ability or social support system  Outcome: Adequate for Discharge     Problem: Patient/Family Goals  Goal: Patient/Family Long Term Goal  Description: Patient's Long Term Goal:     Interventions:  - See additional Care Plan goals for specific interventions  Outcome: Adequate for Discharge  Goal: Patient/Family Short Term Goal  Description: Patient's Short Term Goal:     Interventions:   - See additional Care Plan goals for specific interventions  Outcome: Adequate for Discharge     Problem: CARDIOVASCULAR - ADULT  Goal: Maintains optimal cardiac output and hemodynamic stability  Description: INTERVENTIONS:  - Monitor vital signs, rhythm, and trends  - Monitor for bleeding, hypotension and signs of decreased cardiac output  - Evaluate effectiveness of vasoactive medications to optimize hemodynamic stability  - Monitor arterial and/or venous puncture sites for bleeding and/or hematoma  - Assess quality of pulses, skin color and temperature  - Assess for signs of decreased coronary artery perfusion - ex. Angina  - Evaluate fluid balance, assess for edema, trend weights  Outcome: Adequate for Discharge  Goal: Absence of cardiac arrhythmias or at baseline  Description: INTERVENTIONS:  - Continuous cardiac monitoring, monitor vital signs, obtain 12 lead EKG if indicated  - Evaluate effectiveness of antiarrhythmic and heart rate control medications as ordered  - Initiate emergency measures for life threatening arrhythmias  - Monitor electrolytes and administer replacement therapy as ordered  Outcome: Adequate for Discharge

## 2025-02-05 NOTE — PROGRESS NOTES
Progress Note  Yesenia Berkowitz Patient Status:  Inpatient    1940 MRN [id number]   Location [facility]5W Attending Lissy Blandon MD   Hosp Day # 7 PCP JO-ANN YANG MD     SUBJECTIVE:      VITALS:  /81 (BP Location: Right arm)   Pulse 81   Temp 98.1 °F (36.7 °C) (Oral)   Resp 21   Wt 143 lb 14.4 oz (65.3 kg)   SpO2 98%   BMI 23.95 kg/m²   INTAKE/OUTPUT:    Intake/Output Summary (Last 24 hours) at 2025 0924  Last data filed at 2025 0624  Gross per 24 hour   Intake 800 ml   Output 1925 ml   Net -1125 ml     Last 3 Weights   25 0427 143 lb 14.4 oz (65.3 kg)   25 0500 145 lb 8 oz (66 kg)   25 0700 148 lb 9.6 oz (67.4 kg)   25 0700 148 lb 14.4 oz (67.5 kg)   25 0526 153 lb 14.4 oz (69.8 kg)   25 0656 152 lb 8 oz (69.2 kg)   25 0546 152 lb 3.2 oz (69 kg)   25 1653 150 lb (68 kg)   25 1556 150 lb (68 kg)   25 1412 156 lb (70.8 kg)   25 1702 156 lb (70.8 kg)     LABS:  Recent Labs   Lab 25  0928 25  0523 25  1805 25  0520   * 266*  --  154*   BUN 38* 32*  --  21   CREATSERUM 0.94 0.93  --  0.74   EGFRCR 61 61  --  81   CA 8.3* 8.3*  --  8.7    139  --  139   K 4.8 3.2* 4.4 4.0    95*  --  99   CO2 34.0* 37.0*  --  34.0*     Recent Labs   Lab 25  0928 25  0523 25  0520   RBC 3.87 4.31 4.17   HGB 11.4* 12.9 12.3   HCT 35.7 39.4 38.8   MCV 92.2 91.4 93.0   MCH 29.5 29.9 29.5   MCHC 31.9 32.7 31.7   RDW 14.1 14.0 14.4   NEPRELIM 11.19* 11.37* 9.71*   WBC 12.2* 14.4* 12.1*   .0 277.0 248.0       No results for input(s): \"TROP\", \"CK\" in the last 168 hours.  DIAGNOSTICS:  TELEMETRY:     ECHO 2024:  Conclusions:   1. Left ventricle: The cavity size was normal. Wall thickness was mildly      increased. Systolic function was hyperdynamic. The estimated ejection      fraction was 65-70%, by visual assessment. No diagnostic evidence for      regional wall motion  abnormalities. Unable to assess LV diastolic      function.   2. Right ventricle: The cavity size was mildly increased. Systolic pressure      was moderately increased.   3. Aortic valve: There was thickening. The findings were consistent with      mild stenosis. The peak systolic velocity was 2.06 m/sec. The mean      systolic gradient was 10 mm Hg. The valve area (VTI) was 2.17 cm^2. The      valve area (VTI) index was 1.19 cm^2/m^2.   4. Mitral valve: The annulus was moderately calcified. The leaflets were      mildly thickened. Transvalvular velocity was increased. The findings were      consistent with mild stenosis. The mean diastolic gradient was 5 mm Hg.   5. Pulmonary arteries: Systolic pressure was moderately increased, estimated      to be 56 mm Hg.     ROS: Negative unless noted above   PHYSICAL EXAM:  General: Alert and oriented x 3. No apparent distress.  HEENT: Normocephalic, sclera are nonicteric. Hearing appropriate bilaterally.  Neck: No JVD or Carotid bruits. Trachea midline.   Cardiac: Regular rate and rhythm. S1, S2 auscultated. No murmurs, rubs, or gallops appreciated.   Lungs: a/p dullness. Chest expansion symmetrical. Regular effort. 3L NC (baseline)  Abdomen: Soft, non-tender, +BS. No hepatosplenomegaly or appreciable masses.   Extremities: Without clubbing, cyanosis. Peripheral pulses are 2+. Edema   Neurologic: Motor and sensory nerves grossly intact.   Psych: Appropriate affect   Skin: Warm and dry. No obvious lesions, wounds, or ulcerations.   MEDICATIONS:   ipratropium-albuterol  3 mL Nebulization Q6H WA    insulin aspart  1-11 Units Subcutaneous TID CC    dilTIAZem ER  360 mg Oral Daily    predniSONE  40 mg Oral Daily with breakfast    nystatin  5 mL Oral QID    furosemide  80 mg Oral BID (Diuretic)    doxycycline  100 mg Oral BID    cetirizine  5 mg Oral Nightly    enoxaparin  40 mg Subcutaneous Daily    guaiFENesin ER  600 mg Oral BID    fluticasone furoate  1 puff Inhalation Daily     spironolactone  25 mg Oral Daily    montelukast  10 mg Oral Nightly    digoxin  125 mcg Oral Daily    empagliflozin  10 mg Oral Daily    acetaZOLAMIDE  500 mg Oral Daily     ASSESSMENT:    Presented with increase dyspnea, cough, and LE edema.      Acute  Respiratory Failure   COPD   Acute on Chronic HFpEF  - Multifactorial with COPD exacerbation and volume expansion  - Pulm following, Steroids/ Nebs   - Baseline 3-4L NC use, at baseline 3.5L NC  - proBNP 384   - Below dry Wt 160, PO Lasix, Aldactone, Jardiance, Lisinopril, Diamox  - Appears compensated     PAF/ Flutter  - Rates controlled. On CCB and Digoxin, Last TSH low/T4 normal  - Not historically on a/c due to bruising/ bleeding risk/ Hx GIB and low Afib burden, o/p Watchman discussions in process      Elevated Troponin - Flat rise, likely demand ischemia not ACS  HTN- Controlled   Chronic Anemia- Stable  HLD- LDL 78, Not historically on Statin, unable to start with concurrent use of CCB with increased risk of myopathies, plan to explore alternatives o/p  Hx T6 Fracture- s/p Kyphoplasty     PLAN:  - Rates have been controlled. Appears stable to discharge from a cardiac standpoint. Our office will call her to schedule a follow up visit.     Plan of care discussed with patient and RN.     Cortney Dumont, MSN, FNP-BC, CCK  02/05/25   9:24 AM  [phone] [place]  [phone] [place]

## 2025-02-05 NOTE — PROGRESS NOTES
[facility]  part of [facility]    Progress Note    Yesenia Berkowitz Patient Status:  Inpatient    1940 MRN [id number]   Location [facility]5W Attending Lissy Blandon MD   Hosp Day # 6 PCP JO-ANN YANG MD       Subjective:   26 today pt says she her breathing is still not very good.  She feels she would benefit from a rehab stay.    she is on 3-4 : at rest  On exam she is moving air much better  and wheezing is much improved    Yesenia Berkowitz is a(n) 84 year old female noted approximately week ago with COPD exacerbation and coughing but nonproductive with.  Admitting chest x-ray showed small bilateral pleural effusions and infiltration concerning for possible pneumonia versus CHF.  She has been treated with doxycycline diuretics bronchodilators Solu-Medrol and still is short of breath especially this evening.  Her prednisone was reduced to 20 mg earlier today.  I recommend we bump it up to 40 mg in the morning.  She did not want want to take additional prednisone tonight because it interferes with her sleep.  She will be getting additional nebulizer treatments.  She has apparently had recurring admissions and frequent exacerbations since 2024.  She normally is on Trelegy and has a nebulizer at home but does not know how to use it.  She is on 3 to 4 L oxygen nasal cannula at home.  She lives alone but she has a son that does come and help her for a short time but most of the day she would be alone.  She has CT of the chest 2024 showing small bilateral free-flowing pleural effusions COPD and in the right upper lobe there is a 10 x 7 somewhat spiculated nodule concerning for malignancy.  Repeat noncontrast CT chest is ordered.    Objective:   Blood pressure 118/65, pulse 81, temperature 98.6 °F (37 °C), temperature source Oral, resp. rate 20, weight 145 lb 8 oz (66 kg), SpO2 95%.    Intake/Output Summary (Last 24 hours) at 2025 8241  Last data filed at 2025  1800  Gross per 24 hour   Intake 820 ml   Output 1630 ml   Net -810 ml     Mildabored breathing  On exam she is moving air much better  and wheezing is much improved  Cor reg and back in SR  Modest bilat leg edema    Results:     Lab Results   Component Value Date    WBC 14.4 (H) 02/04/2025    HGB 12.9 02/04/2025    HCT 39.4 02/04/2025    .0 02/04/2025    CREATSERUM 0.93 02/04/2025    BUN 32 (H) 02/04/2025     02/04/2025    K 4.4 02/04/2025    CL 95 (L) 02/04/2025    CO2 37.0 (H) 02/04/2025     (H) 02/04/2025    CA 8.3 (L) 02/04/2025    ALB 3.3 02/04/2025    ALKPHO 58 02/04/2025    BILT 0.2 02/04/2025    TP 5.3 (L) 02/04/2025    AST 15 02/04/2025    ALT 30 02/04/2025    PTT 24.3 11/25/2024    INR 1.08 11/25/2024    T4F 0.9 12/17/2024    TSH 0.185 (L) 12/17/2024    LIP 30 08/30/2024    DDIMER 0.47 12/21/2024    MG 1.9 12/16/2024    PHOS 3.4 12/26/2024    TROP <0.045 02/03/2020       XR CHEST PA + LAT CHEST (CPT=71046)    Result Date: 2/4/2025  CONCLUSION:   Diffuse bilateral interstitial opacity which may be more prominent since the prior study 01/21/2025 and could reflect component of interstitial edema.  Stable mild patchy opacity right lung base which could reflect atelectasis with or without superimposed pneumonia since the prior examination.  Short-term follow-up chest radiographs advised.    Dictated by (CST): Jayro Samuel MD on 2/04/2025 at 2:28 PM     Finalized by (CST): Jayro Samuel MD on 2/04/2025 at 2:30 PM             CT chest 11/25/24 ( I viewed)    CONCLUSION:  Cardiomegaly and mild coronary atherosclerosis.  Findings consistent with mild CHF/fluid overload including small bilateral pleural effusions, slightly improved on the right, with associated bibasilar atelectasis.  Mild emphysema with a 10 x 7 mm spiculated right upper lobe pulmonary nodule.  This nodule is stable dating back to October 2024 but has increased in size compared to prior studies.  A malignant etiology  such as a slow growing primary bronchogenic carcinoma therefore cannot be excluded.  Consider further assessment with percutaneous biopsy or follow-up PET/CT when the patient is better able to cooperate.  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  Stable moderately dilated main pulmonary artery, which can be seen with chronic pulmonary hypertension; correlate clinically.         EKG 12 Lead    Result Date: 2/4/2025  Normal sinus rhythm Cannot rule out Anterior infarct (cited on or before 21-JAN-2025) Abnormal ECG When compared with ECG of 03-FEB-2025 21:08, Sinus rhythm has replaced Atrial flutter Confirmed by SAGAR HERNANDEZ CASH ([id number]) on 2/4/2025 8:26:29 AM    EKG 12 Lead    Result Date: 2/4/2025  Atrial flutter with variable A-V block Inferior infarct , age undetermined Possible Anterolateral infarct , age undetermined Abnormal ECG When compared with ECG of 29-JAN-2025 02:52, Atrial flutter has replaced Sinus rhythm Confirmed by SAGAR HERNANDEZ CASH ([id number]) on 2/4/2025 8:25:15 AM     Assessment and Plan:   Principal Problem:    Acute respiratory failure with hypoxia and hypercapnia (HCC)  Active Problems:    Kyphoscoliosis    Phrenic nerve paralysis    Acute cor pulmonale (HCC)    Pneumonia    Cor pulmonale (chronic) (HCC)    COPD exacerbation (HCC)    Acute respiratory failure (HCC)       DIAGNOSTIC IMPRESSION:    1.       Acute on chronic respiratory failure.  2.       Chronic obstructive pulmonary disease and asthma with exacerbation.  3.       Bilateral pneumonia ?  4.       Left phrenic nerve paralysis.  5.       Kyphoscoliosis.  6.       Obesity hypoventilation syndrome.  7.       Acute on chronic cor pulmonale.  8.       Chronic prednisone 10 mg daily  9.       R upper lobe 10 x 7 nodule November 2024  10.     hyperglycemia    SUGGESTIONS AND RECOMMENDATIONS:    1.       Prednisone to 40 po daily and taper over a week or so back to out pt dose of 10 mg  2.        DuoNeb q.i.d. and p.r.n.   3.       Protonix 40 mg a day.    4.       Diuretics such as Lasix 80 mg a day, spironolactone 25 mg a day.per cards  5.       Prescribed BiPAP 13/5 at night but now pt declines  6.       Doxycycline 100 mg po q.12.  7.       PT and OT eval for  possible rehab ([facility])- discussed w pt who is now interested  8.       Non con CT chest to d/u lung nodule seen in November CT      OLIMPIA JONES MD  2/3/2025

## 2025-02-20 ENCOUNTER — OFFICE VISIT (OUTPATIENT)
Dept: CARDIOLOGY CLINIC | Facility: HOSPITAL | Age: 85
End: 2025-02-20
Attending: NURSE PRACTITIONER
Payer: MEDICARE

## 2025-02-20 VITALS
SYSTOLIC BLOOD PRESSURE: 107 MMHG | BODY MASS INDEX: 24 KG/M2 | WEIGHT: 145.88 LBS | HEART RATE: 82 BPM | RESPIRATION RATE: 24 BRPM | DIASTOLIC BLOOD PRESSURE: 65 MMHG | OXYGEN SATURATION: 97 %

## 2025-02-20 DIAGNOSIS — I50.9 ACUTE ON CHRONIC CONGESTIVE HEART FAILURE, UNSPECIFIED HEART FAILURE TYPE (HCC): ICD-10-CM

## 2025-02-20 DIAGNOSIS — J43.2 CENTRILOBULAR EMPHYSEMA (HCC): ICD-10-CM

## 2025-02-20 DIAGNOSIS — I50.32 CHRONIC HEART FAILURE WITH PRESERVED EJECTION FRACTION (HFPEF) (HCC): ICD-10-CM

## 2025-02-20 DIAGNOSIS — I10 PRIMARY HYPERTENSION: ICD-10-CM

## 2025-02-20 DIAGNOSIS — J18.9 COMMUNITY ACQUIRED PNEUMONIA, UNSPECIFIED LATERALITY: Primary | ICD-10-CM

## 2025-02-20 DIAGNOSIS — J44.1 COPD WITH ACUTE EXACERBATION (HCC): ICD-10-CM

## 2025-02-20 DIAGNOSIS — J96.21 ACUTE ON CHRONIC HYPOXIC RESPIRATORY FAILURE (HCC): ICD-10-CM

## 2025-02-20 DIAGNOSIS — I48.0 PAROXYSMAL ATRIAL FIBRILLATION (HCC): ICD-10-CM

## 2025-02-20 LAB
ANION GAP SERPL CALC-SCNC: 7 MMOL/L (ref 0–18)
BUN BLD-MCNC: 15 MG/DL (ref 9–23)
BUN/CREAT SERPL: 17.2 (ref 10–20)
CALCIUM BLD-MCNC: 8.6 MG/DL (ref 8.7–10.4)
CHLORIDE SERPL-SCNC: 97 MMOL/L (ref 98–112)
CO2 SERPL-SCNC: 34 MMOL/L (ref 21–32)
CREAT BLD-MCNC: 0.87 MG/DL
EGFRCR SERPLBLD CKD-EPI 2021: 66 ML/MIN/1.73M2 (ref 60–?)
FASTING STATUS PATIENT QL REPORTED: NO
GLUCOSE BLD-MCNC: 202 MG/DL (ref 70–99)
NT-PROBNP SERPL-MCNC: 246 PG/ML (ref ?–450)
OSMOLALITY SERPL CALC.SUM OF ELEC: 293 MOSM/KG (ref 275–295)
POTASSIUM SERPL-SCNC: 4 MMOL/L (ref 3.5–5.1)
SODIUM SERPL-SCNC: 138 MMOL/L (ref 136–145)

## 2025-02-20 PROCEDURE — G2212 PROLONG OUTPT/OFFICE VIS: HCPCS | Performed by: NURSE PRACTITIONER

## 2025-02-20 PROCEDURE — 99215 OFFICE O/P EST HI 40 MIN: CPT | Performed by: NURSE PRACTITIONER

## 2025-02-20 RX ORDER — ALBUTEROL SULFATE 0.83 MG/ML
2.5 SOLUTION RESPIRATORY (INHALATION) EVERY 6 HOURS PRN
COMMUNITY

## 2025-02-20 NOTE — PROGRESS NOTES
Subjective:  Yesenia is here per w/c and oxygen, accompanied by her son. States she feels OJEDA and feels that her chest is often \"tight\" . Sat's at 3L and little activity down to 90 % , up to 97% with rest. States she is usually like that. Reports feeling good only for a few days after hospital stays.     Home equipment:     oxygen - yes continuously,    IS - yes,   acupella  - no  inhalers - yes   Intervention:  6MW -no   Nebulizer treatment -uses at home daily,  Labs - - BMP, pro.   Weight:  Today 145.9 #    Home - no  Last visit 143#   Measurements :  RLE calf- 13.5\" ankle- 8.5\"     LLE calf- 14.5\" ankle-0 9\"   Tubigrips size F given to patient, for BLE.    Accupella given and instructed I use.     Patient and medication assessed. Discussed with APN. Treatments completed- venipuncture, compression given, leg measurements/assessments, accupella instruction.  Medications given- none. Extensive education of disease management including- deep breathing exercises. .  Reviewed AVS instructions. Patient verbalized understanding.

## 2025-02-20 NOTE — PATIENT INSTRUCTIONS
Change furosemide 80 mg tabs twice daily to 11 am and 6 pm     Continue all your medications as prescribed     Use your nebulizer twice a day, then use the incentive spirometer and Acapella with your deep breathing exercises following your nebulizer    Use incentive spirometer- 4 sets of 10 slow inhaled breaths daily and use acapella, 5 blowing breaths with pause and cough then another 5 blowing breaths and cough, 4 times a day    Follow up with Dr. Boyle in March    Follow up with the specialty care clinic on 3/6/25     Call centralized scheduling to make an appointment for your CT of the chest at [phone]    Call [facility] to set up an orientation appointment at [phone]    Wear knee hesham tubigrips during the day and remove before bedtime       Call if you are having shortness of breath, cough, wheezing, chest pain, dizziness, lightheadedness, heart racing, palpitations, swelling, rapid weight gain, fatigue, weakness, fever or chills.  Call 911 with severe shortness of breath, chest pain or chest pressure not improved after 15 minutes of rest or if feeling faint,  passing out or having a fall     Weigh yourself daily in the morning before breakfast, call if gaining 3 lbs or more overnight or more than 5 lbs in one week.    Keep daily fluids at 48-64 ounces per day (1.5-2 liters of liquid or 6-8 , 8 oz glasses of liquid)    Heart healthy diet. Limit sodium to  2000 mg daily. Common high sodium foods include frozen dinners, soups (not homemade), some cereal, vegetable juice, canned vegetables, lunch meats, processed meats like hotdogs, sausage, kraft, pepperoni, soy sauce, pre-packaged rice or potatoes. Please remember to read nutrition labels for sodium content.   www.healthyeating.nhlbi.nih.gov    Exercise daily as tolerated, with goal of doing moderate aerobic exercise like walking for about 30 minutes 5 days per week. Start by walking at a slow to moderate pace for 3-5 minutes 2-3 times a  day. Pace your activity to prevent shortness of breath or fatigue. Stop exercise if you develop chest pain, lightheadedness, or significant shortness of breath    Always carry a copy of your current medication list with you

## 2025-02-20 NOTE — PROGRESS NOTES
[facility] Clinic    Yesenia Berkowitz Patient Status:  Outpatient    1940 MRN [id number]   Location [facility] MD Dr. Malcolm ROPER       Yesenia Berkowitz is a 84 year old female who presents to clinic after recent hospitalization for COPD and bilateral pneumonia.    Hospitalized  - 2025 with increased shortness of breath and cough with acute respiratory failure with hypoxemia.  Treated for COPD exacerbation and bilateral pneumonia.    Hospitalized 24 - 24 for CHF and COPD.  Hospitalized 10/7/24-10/11/24 for COPD and CHF  Hospitalized 24-24 for COPD, CHF, possible pneumonia  ED 24 for COPD exacerbation  Hospitalized 24-24 for COPD.   Hospitalized 24-25 for CHF, COPD exacerbation  Hospitalized 24-24 for thoracic compression fracture, CHF, COPD  Hospitalized 1/10/25-25 for COPD exacerbation  ED 25 fell and hit head  ED 25 Back pain  ED 25 COPD exacerbation      Problem List:  Acute on chronic hypoxic respiratory failure with hypoxemia  Acute exacerbation COPD  Bilateral pneumonia  Chronic HFpEF  Left Phrenic nerve paralysis  Kyphoscoliosis  Paroxysmal Afib, not on AC  HTN  HLD       Subjective:  Patient arrives to clinic with his son.   In wheelchair  Breathing got better after dc   Increased gray and katie LE edema , gray with talking today.   Gray walking 25 feet or getting dressed or bathing  Sleeping with 2 pillow, 3-4 liters   Nocturia x1   Home wt from 140 to 144, eating more but likes ice cream, eating meat /potatoes.   + bloating, diarrhea this am   Keeping liquid at 2-2.5 liters     Review of Systems:  Constitutional: negative for chills or fever  Respiratory: negative for negative hemoptysis and wheezing  Cardiovascular: negative for chest pain, exertional chest pressure/discomfort, near-syncope, orthopnea and palpitations  Gastrointestinal: negative for abdominal pain,  melena, nausea and vomiting  Hematologic/lymphatic: negative  Musculoskeletal: negative for muscle weakness and myalgias    Objective:  Lab Results   Component Value Date/Time    WBC 12.1 (H) 02/05/2025 05:20 AM    HGB 12.3 02/05/2025 05:20 AM    HCT 38.8 02/05/2025 05:20 AM    .0 02/05/2025 05:20 AM    CREATSERUM 0.74 02/05/2025 05:20 AM    BUN 21 02/05/2025 05:20 AM     02/05/2025 05:20 AM    K 4.0 02/05/2025 05:20 AM    CL 99 02/05/2025 05:20 AM    CO2 34.0 (H) 02/05/2025 05:20 AM     (H) 02/05/2025 05:20 AM    CA 8.7 02/05/2025 05:20 AM    ALB 3.1 (L) 02/05/2025 05:20 AM    ALKPHO 53 (L) 02/05/2025 05:20 AM    BILT 0.2 02/05/2025 05:20 AM    TP 4.9 (L) 02/05/2025 05:20 AM    AST 13 02/05/2025 05:20 AM    ALT 24 02/05/2025 05:20 AM    PTT 24.3 11/25/2024 12:21 AM    INR 1.08 11/25/2024 12:21 AM    PTP 14.6 11/25/2024 12:21 AM    T4F 0.9 12/17/2024 05:02 AM    TSH 0.185 (L) 12/17/2024 05:02 AM    LIP 30 08/30/2024 10:31 AM    DDIMER 0.47 12/21/2024 05:35 PM    MG 1.9 12/16/2024 04:40 AM    PHOS 3.4 12/26/2024 04:46 AM    TROP <0.045 02/03/2020 06:00 AM    PGLU 255 (H) 02/05/2025 10:53 AM       Clinical labs drawn: BMP, pro-BNP -Results reviewed with patient and family    /65 (BP Location: Right arm)   Pulse 82   Resp 24   Wt 145 lb 14.4 oz (66.2 kg)   SpO2 97%   BMI 24.28 kg/m²     Date Clinic Weight (lbs) Home Weight (lbs) Other weight (lbs) Intervention   11/28/24   169    12/18/24   175    12/29/24   151    1/13/25  154 161    1/22/25 155 153  IV lasix given, nebulizer   2/5/25   143 -DC    2/20/25 145 144         General appearance: alert, appears stated age, and cooperative  Neck: no JVD at 90 degrees  Lungs: diminished breath sounds bibasilar and   no wheezes or crackles  Chest wall: no tenderness  Heart: regular rate and rhythm  Abdomen: soft, non-tender; bowel sounds normal; no masses,  no organomegaly  Extremities: edema 1RLE  +1-2 LLE edema below knee to pedals, no open  wounds or blisters, soft  Pulses: 2+ and symmetric  Neurologic: Grossly normal    Diagnostic Tests:  ECHO  9/14/24: Mild LVH, EF 65-70%.  No WMA.  Mild RVH and increased RV systolic pressure.  Mild aortic and mitral stenosis, PAS 56 mmHg.        CXR: 1/29/25  Possible mild interstitial pulmonary edema. Slight interval increase in mild right lower lobe patchy groundglass airspace disease, mild left basilar atelectasis and/or small pleural effusion   *     Education:  Patient and family instructed at length regarding clinic procedures, hours, purpose of clinic visits, sodium restricted diet, low sodium foods, fluid restrictions, daily weights, medication regimen s/s of pneumonia and copd exacerbation and when to call APN/clinic.Patient and family receptive.      Assessment:  Acute exacerbation COPD  Multifocal pneumonia  Acute on chronic hypoxic respiratory failure  -on trelegy daily  -using albuterol nebs daily prn  -on continuous oxygen NC 3 L n/c  -breathing and cough improved  -noting intermittent increased jackson in the last 3 days  -continue trelegy  -use albuterol nebulizer 2x daily  -IS and acapella 4 times daily 10 times each  -Follow up with Dr. Boyle in 1 month with CT of chest prior to visit  -Follow up in [facility] in [facility] weeks  -consider pulmonary rehab    Chronic HFpEF  -EF 65-70%  -on diamox 500mg daily, furosemide 80mg twice daily, Jardiance 10mg daily, spironolactone 25mg daily,   -may benefit from CardioMEMs  -Kidney Function stable BUN/Cr : 15/0.87 <-20/0.76, K 4.0, Na 138  -pro- --> 341-->246 today  -near euvolemic today on higher dose furosemide, intermittent jackson pulmonary related, mild katie LE edema, improved. Wt down 10 lbs last month  -continue furosemide 80 mg bid, spironolactone 25 mg daily, jardiance 10 mg daily and Kdur 20 meq daily.  -follow up with Dr. Fowler in 3 months    Paroxysmal Afib, not on AC  -on diltiazem and digoxin  -RRR today  -hot historically on a/c due to bruising/bleeding  risk/Hx GIB  -discussed watchman with Dr. Fowler    HTN  -normal at 107/65  -on diamox, furosemide, jardiance, spironolactone, diltiazem     Plan:   Continue all your medications as prescribed     Use your nebulizer twice a day, then use the incentive spirometer and Acapella with your deep breathing exercises following your nebulizer    Use incentive spirometer- 4 sets of 10 slow inhaled breaths daily and use acapella, 5 blowing breaths with pause and cough then another 5 blowing breaths and cough, 4 times a day    Follow up with Dr. Boyle in March    Follow up with the specialty care clinic on 3/6/25     Call centralized scheduling to make an appointment for your CT of the chest at [phone]    Call [facility] to set up an orientation appointment at [phone]    Wear knee hesham tubigrips during the day and remove before bedtime        75 minutes spent on patient education, chart review and documentation for visit.    Provided counseling, coordination of care and education related specifically to heart failure, pneumonia, copd with use of IS and acapella. Patient and son verbalized understanding.    MARIYA OCHOA NP, 02/20/25, 1:41 PM

## 2025-02-21 ENCOUNTER — TELEPHONE (OUTPATIENT)
Facility: CLINIC | Age: 85
End: 2025-02-21

## 2025-02-21 NOTE — TELEPHONE ENCOUNTER
Called patient - rescheduled colonoscopy to 6/19/2025 at [facility].    Please refer to telephone encounter dated 10/9/2023 for scheduling orders.    Telephone encounter closed.

## 2025-02-21 NOTE — TELEPHONE ENCOUNTER
Received fax from PAT: patient informed PAT that she would like to cancel and/or reschedule this procedure. Please follow up with patient.

## 2025-03-06 ENCOUNTER — OFFICE VISIT (OUTPATIENT)
Dept: CARDIOLOGY CLINIC | Facility: HOSPITAL | Age: 85
End: 2025-03-06
Attending: NURSE PRACTITIONER
Payer: MEDICARE

## 2025-03-06 VITALS
WEIGHT: 145.31 LBS | OXYGEN SATURATION: 97 % | HEART RATE: 79 BPM | BODY MASS INDEX: 24 KG/M2 | SYSTOLIC BLOOD PRESSURE: 117 MMHG | DIASTOLIC BLOOD PRESSURE: 55 MMHG

## 2025-03-06 DIAGNOSIS — I10 PRIMARY HYPERTENSION: ICD-10-CM

## 2025-03-06 DIAGNOSIS — R06.02 SOB (SHORTNESS OF BREATH): ICD-10-CM

## 2025-03-06 DIAGNOSIS — I50.32 CHRONIC HEART FAILURE WITH PRESERVED EJECTION FRACTION (HFPEF) (HCC): ICD-10-CM

## 2025-03-06 DIAGNOSIS — J44.9 CHRONIC OBSTRUCTIVE PULMONARY DISEASE, UNSPECIFIED COPD TYPE (HCC): Primary | ICD-10-CM

## 2025-03-06 DIAGNOSIS — J18.9 PNEUMONIA OF BOTH LOWER LOBES DUE TO INFECTIOUS ORGANISM: ICD-10-CM

## 2025-03-06 DIAGNOSIS — J90 PLEURAL EFFUSION ON LEFT: ICD-10-CM

## 2025-03-06 DIAGNOSIS — I48.0 PAROXYSMAL ATRIAL FIBRILLATION (HCC): ICD-10-CM

## 2025-03-06 PROCEDURE — 99215 OFFICE O/P EST HI 40 MIN: CPT | Performed by: NURSE PRACTITIONER

## 2025-03-06 NOTE — PROGRESS NOTES
Yesenia arrives via wheelchair on 3L oxygen NC and is accompanied by her son.  Subjective: continues to be short of breath, even at rest, increased with activity. She uses walker at home, feels she will fall if she doesn't. Increased feeling of shortness of breath as the evening progresses.  Eyes itchy, told by eye doctor it is allergies-has eye drops. Also has sinus drip and ends up with an upset stomach. Mid thorax and upper back pain 4-5 out of 10 worse with movement.  Right face pain along mendable, hx of shingles there as well.    Weight today 145.3 lb last visit 2/20 145.9 lb  Home equipment:  Neb: yes usually once a day, oxygen Yes Continuous, IS YES,  acupella  YES, inhalers yes  Intervention:  6MW no  Nebulizer treatment NO Labs NONE    Patient and medication assessed. Discussed with APN. Disease management teaching completed. Reviewed AVS instructions. Patient verbalized understanding.

## 2025-03-06 NOTE — PATIENT INSTRUCTIONS
Continue all your medications as prescribed     Use your nebulizer twice a day, then use the incentive spirometer and Acapella with your deep breathing exercises following your nebulizer    Do your PT/OT exercises in the early afternoon right after doing your nebulizer     Use incentive spirometer- 4 sets of 10 slow inhaled breaths daily and use acapella, 5 blowing breaths with pause and cough then another 5 blowing breaths and cough, 4 times a day    Follow up with Dr. Ruelas on 4/3/35 after Chest CT scan on 3/18/25.     Follow up with the specialty care clinic in 2 weeks, can consider coming to visit on 3/18/25 after your CT scan     Wear knee hesham tubigrips during the day and remove before bedtime     Check with Dr. Wilkerson about starting melatonin at bedtime and try to go to sleep and wake up at the same time each day, and only take a short 20-30 minute nap if needed     Call if you are having shortness of breath, cough, wheezing, chest pain, dizziness, lightheadedness, heart racing, palpitations, swelling, rapid weight gain, fatigue, weakness, fever or chills.  Call 911 with severe shortness of breath, chest pain or chest pressure not improved after 15 minutes of rest or if feeling faint,  passing out or having a fall     Weigh yourself daily in the morning before breakfast, call if gaining 3 lbs or more overnight or more than 5 lbs in one week.    Keep daily fluids at 48-64 ounces per day (1.5-2 liters of liquid or 6-8 , 8 oz glasses of liquid)    Heart healthy diet. Limit sodium to  2000 mg daily. Common high sodium foods include frozen dinners, soups (not homemade), some cereal, vegetable juice, canned vegetables, lunch meats, processed meats like hotdogs, sausage, kraft, pepperoni, soy sauce, pre-packaged rice or potatoes. Please remember to read nutrition labels for sodium content.   www.healthyeating.nhlbi.nih.gov    Exercise daily as tolerated, with goal of doing moderate aerobic exercise like walking  for about 30 minutes 5 days per week. Start by walking at a slow to moderate pace for 3-5 minutes 2-3 times a day. Pace your activity to prevent shortness of breath or fatigue. Stop exercise if you develop chest pain, lightheadedness, or significant shortness of breath    Always carry a copy of your current medication list with you

## 2025-03-06 NOTE — PROGRESS NOTES
[facility]    Yesenia Berkowitz Patient Status:  Outpatient    1940 MRN [id number]   Location [facility] MD Dr. Malcolm ROPER       Yesenia Berkowitz is a 84 year old female who presents to clinic after recent hospitalization for COPD and bilateral pneumonia.    Hospitalized  - 2025 with increased shortness of breath and cough with acute respiratory failure with hypoxemia.  Treated for COPD exacerbation and bilateral pneumonia.    Hospitalized 24 - 24 for CHF and COPD.  Hospitalized 10/7/24-10/11/24 for COPD and CHF  Hospitalized 24-24 for COPD, CHF, possible pneumonia  ED 24 for COPD exacerbation  Hospitalized 24-24 for COPD.   Hospitalized 24-25 for CHF, COPD exacerbation  Hospitalized 24-24 for thoracic compression fracture, CHF, COPD  Hospitalized 1/10/25-25 for COPD exacerbation  ED 25 fell and hit head  ED 25 Back pain  ED 25 COPD exacerbation      Problem List:  Acute on chronic hypoxic respiratory failure with hypoxemia  Acute exacerbation COPD  Bilateral pneumonia  Chronic HFpEF  Left Phrenic nerve paralysis  Kyphoscoliosis  Paroxysmal Afib, not on AC  HTN  HLD       Subjective:  Patient arrives to clinic with his son.   In wheelchair  Increased gray and katie LE edema , gray with talking and when getting up and down back to bathroom  Gray walking a couple steps,  getting dressed or bathing  Chest tightness when taking deep breath  Using neb every evening b/w 7-10 pm, breathing a little better.   + bloating, no diarrhea, mild constipation.   Not sleeping well, waking during the night Sleeping with 2 pillow, 3-4 liters   Nocturia x1   Keeping liquid at 2-2.5 liters   PT/OT coming weekly or every other week. Hasn't seen this week, son reports pt sitting most of day    Review of Systems:  Constitutional: negative for chills or fever  Respiratory: negative for negative  hemoptysis and wheezing  Cardiovascular: negative for chest pain, exertional chest pressure,near-syncope, orthopnea and palpitations  Gastrointestinal: negative for abdominal pain, melena, nausea and vomiting  Hematologic/lymphatic: negative  Musculoskeletal: negative for muscle weakness and myalgias    Objective:  Lab Results   Component Value Date/Time    WBC 12.1 (H) 02/05/2025 05:20 AM    HGB 12.3 02/05/2025 05:20 AM    HCT 38.8 02/05/2025 05:20 AM    .0 02/05/2025 05:20 AM    CREATSERUM 0.87 02/20/2025 03:16 PM    BUN 15 02/20/2025 03:16 PM     02/20/2025 03:16 PM    K 4.0 02/20/2025 03:16 PM    CL 97 (L) 02/20/2025 03:16 PM    CO2 34.0 (H) 02/20/2025 03:16 PM     (H) 02/20/2025 03:16 PM    CA 8.6 (L) 02/20/2025 03:16 PM    ALB 3.1 (L) 02/05/2025 05:20 AM    ALKPHO 53 (L) 02/05/2025 05:20 AM    BILT 0.2 02/05/2025 05:20 AM    TP 4.9 (L) 02/05/2025 05:20 AM    AST 13 02/05/2025 05:20 AM    ALT 24 02/05/2025 05:20 AM    PTT 24.3 11/25/2024 12:21 AM    INR 1.08 11/25/2024 12:21 AM    PTP 14.6 11/25/2024 12:21 AM    T4F 0.9 12/17/2024 05:02 AM    TSH 0.185 (L) 12/17/2024 05:02 AM    LIP 30 08/30/2024 10:31 AM    DDIMER 0.47 12/21/2024 05:35 PM    MG 1.9 12/16/2024 04:40 AM    PHOS 3.4 12/26/2024 04:46 AM    TROP <0.045 02/03/2020 06:00 AM    PGLU 255 (H) 02/05/2025 10:53 AM       Clinical labs drawn: NA     /55 (BP Location: Right arm)   Pulse 79   Wt 145 lb 4.8 oz (65.9 kg)   SpO2 97%   BMI 24.18 kg/m²     Date Clinic Weight (lbs) Home Weight (lbs) Other weight (lbs) Intervention   11/28/24   169    12/18/24   175    12/29/24   151    1/13/25  154 161    1/22/25 155 153  IV lasix given, nebulizer   2/5/25   143 -DC    2/20/25 145 144         General appearance: alert, appears stated age, and cooperative  Neck: no JVD at 90 degrees  Lungs: diminished breath sounds bibasilar and   no wheezes, few crackles R base.   Chest wall: no tenderness  Heart: regular rate and rhythm  Abdomen:  soft, non-tender; bowel sounds normal; no masses,  no organomegaly  Extremities: edema trace ankle  edema, no open wounds or blisters, soft  Pulses: 2+ and symmetric  Neurologic: Grossly normal    Diagnostic Tests:  ECHO  9/14/24: Mild LVH, EF 65-70%.  No WMA.  Mild RVH and increased RV systolic pressure.  Mild aortic and mitral stenosis, PAS 56 mmHg.        CXR: 1/29/25  Possible mild interstitial pulmonary edema. Slight interval increase in mild right lower lobe patchy groundglass airspace disease, mild left basilar atelectasis and/or small pleural effusion   *     Education:  Patient and family instructed at length regarding clinic procedures, hours, purpose of clinic visits, sodium restricted diet, low sodium foods, fluid restrictions, daily weights, medication regimen s/s of pneumonia and copd exacerbation and when to call APN/clinic.Patient and family receptive.      Assessment:  Acute exacerbation COPD  Multifocal pneumonia  Chronic hypoxic respiratory failure  Chronic L pleural effusion  -symptoms improved/ cough and wheezing r  Resolved  -chronic jackson  -on trelegy daily  -using albuterol nebs most pm's  -on continuous oxygen NC 3-3.5 L n/c  -breathing and cough improved  -noting intermittent increased jackson in the last 3 days  -continue trelegy  -use albuterol nebulizer 2x daily  -IS and acapella 4 times daily 10 times each  -Follow up for CT of chest on 3/18/25 and Dr. Ruelas 4/3/25  -Follow up in [facility] in 2 weeks  -consider pulmonary rehab after home PT completed    Chronic HFpEF  -EF 65-70%  -on diamox 500mg daily, furosemide 80mg twice daily, Jardiance 10mg daily, spironolactone 25mg daily,   -may benefit from CardioMEMs  -Kidney Function stable BUN/Cr : 15/0.87 <-20/0.76, K 4.0, Na 138  -pro- --> 341-->246 today  -near euvolemic, intermittent jackson pulmonary related, trace katie LE edema, wt stable.  -continue furosemide 80 mg bid, spironolactone 25 mg daily, jardiance 10 mg daily and Kdur 20 meq  daily.  -follow up with Dr. Fowler in May 2025    Paroxysmal Afib, not on AC  -on diltiazem and digoxin  -RRR today  -not on a/c due to bruising/bleeding risk/Hx GIB  -discussed watchman with Dr. Fowler    HTN  -normal at 117/55  -on diamox, furosemide, jardiance, spironolactone, diltiazem     Plan:   Continue all your medications as prescribed     Use your nebulizer twice a day, then use the incentive spirometer and Acapella with your deep breathing exercises following your nebulizer    Use incentive spirometer- 4 sets of 10 slow inhaled breaths daily and use acapella, 5 blowing breaths with pause and cough then another 5 blowing breaths and cough, 4 times a day    Follow up with Dr. Ruelas on 4/3/35 after Chest CT scan on 3/18/25.     Follow up with the specialty care clinic on 3/6/25     Call centralized scheduling to make an appointment for your CT of the chest at [phone]    Call [place] [facility] to set up an orientation appointment at [phone]    Wear knee hesham tubigrips during the day and remove before bedtime        50 minutes spent on patient education, chart review and documentation for visit.    Provided counseling, coordination of care and education related specifically to heart failure, pneumonia, copd with use of IS and acapella. Patient and son verbalized understanding.    MARIYA OCHOA NP,   3/6/25

## 2025-03-11 DIAGNOSIS — I50.32 CHRONIC HEART FAILURE WITH PRESERVED EJECTION FRACTION (HFPEF) (HCC): Primary | ICD-10-CM

## 2025-03-12 RX ORDER — ACETAZOLAMIDE 250 MG/1
500 TABLET ORAL DAILY
Qty: 60 TABLET | Refills: 5 | Status: SHIPPED | OUTPATIENT
Start: 2025-03-12

## 2025-03-18 ENCOUNTER — HOSPITAL ENCOUNTER (OUTPATIENT)
Dept: CT IMAGING | Facility: HOSPITAL | Age: 85
Discharge: HOME | End: 2025-03-18
Attending: INTERNAL MEDICINE
Payer: MEDICARE

## 2025-03-18 DIAGNOSIS — R91.1 LUNG NODULE: ICD-10-CM

## 2025-03-18 PROCEDURE — 71250 CT THORAX DX C-: CPT | Performed by: INTERNAL MEDICINE

## 2025-03-20 ENCOUNTER — HOSPITAL ENCOUNTER (INPATIENT)
Facility: HOSPITAL | Age: 85
LOS: 4 days | Discharge: HOME | End: 2025-03-24
Attending: EMERGENCY MEDICINE | Admitting: HOSPITALIST
Payer: MEDICARE

## 2025-03-20 ENCOUNTER — APPOINTMENT (OUTPATIENT)
Dept: GENERAL RADIOLOGY | Facility: HOSPITAL | Age: 85
End: 2025-03-20
Payer: MEDICARE

## 2025-03-20 DIAGNOSIS — I27.81 COR PULMONALE (CHRONIC) (HCC): ICD-10-CM

## 2025-03-20 DIAGNOSIS — J44.9 CHRONIC OBSTRUCTIVE PULMONARY DISEASE, UNSPECIFIED COPD TYPE (HCC): ICD-10-CM

## 2025-03-20 DIAGNOSIS — I50.32 CHRONIC HEART FAILURE WITH PRESERVED EJECTION FRACTION (HFPEF) (HCC): ICD-10-CM

## 2025-03-20 DIAGNOSIS — R06.02 SHORTNESS OF BREATH: Primary | ICD-10-CM

## 2025-03-20 LAB
ALBUMIN SERPL-MCNC: 3.7 G/DL (ref 3.2–4.8)
ALBUMIN/GLOB SERPL: 1.5 {RATIO} (ref 1–2)
ALP LIVER SERPL-CCNC: 67 U/L
ALT SERPL-CCNC: 8 U/L
ANION GAP SERPL CALC-SCNC: 3 MMOL/L (ref 0–18)
APTT PPP: 28.1 SECONDS (ref 23–36)
AST SERPL-CCNC: 11 U/L (ref ?–34)
ATRIAL RATE: 85 BPM
BASOPHILS # BLD AUTO: 0.03 X10(3) UL (ref 0–0.2)
BASOPHILS NFR BLD AUTO: 0.2 %
BILIRUB SERPL-MCNC: 0.3 MG/DL (ref 0.2–1.1)
BUN BLD-MCNC: 13 MG/DL (ref 9–23)
BUN/CREAT SERPL: 19.1 (ref 10–20)
CALCIUM BLD-MCNC: 9 MG/DL (ref 8.7–10.4)
CHLORIDE SERPL-SCNC: 97 MMOL/L (ref 98–112)
CO2 SERPL-SCNC: 39 MMOL/L (ref 21–32)
CREAT BLD-MCNC: 0.68 MG/DL
DEPRECATED RDW RBC AUTO: 48.1 FL (ref 35.1–46.3)
EGFRCR SERPLBLD CKD-EPI 2021: 87 ML/MIN/1.73M2 (ref 60–?)
EOSINOPHIL # BLD AUTO: 0.02 X10(3) UL (ref 0–0.7)
EOSINOPHIL NFR BLD AUTO: 0.1 %
ERYTHROCYTE [DISTWIDTH] IN BLOOD BY AUTOMATED COUNT: 14.7 % (ref 11–15)
EST. AVERAGE GLUCOSE BLD GHB EST-MCNC: 177 MG/DL (ref 68–126)
FLUAV + FLUBV RNA SPEC NAA+PROBE: NEGATIVE
FLUAV + FLUBV RNA SPEC NAA+PROBE: NEGATIVE
GLOBULIN PLAS-MCNC: 2.5 G/DL (ref 2–3.5)
GLUCOSE BLD-MCNC: 153 MG/DL (ref 70–99)
GLUCOSE BLDC GLUCOMTR-MCNC: 161 MG/DL (ref 70–99)
GLUCOSE BLDC GLUCOMTR-MCNC: 254 MG/DL (ref 70–99)
HBA1C MFR BLD: 7.8 % (ref ?–5.7)
HCT VFR BLD AUTO: 40.2 %
HGB BLD-MCNC: 12.8 G/DL
IMM GRANULOCYTES # BLD AUTO: 0.04 X10(3) UL (ref 0–1)
IMM GRANULOCYTES NFR BLD: 0.3 %
LACTATE SERPL-SCNC: 1.2 MMOL/L (ref 0.5–2)
LACTATE SERPL-SCNC: 2.1 MMOL/L (ref 0.5–2)
LYMPHOCYTES # BLD AUTO: 1.61 X10(3) UL (ref 1–4)
LYMPHOCYTES NFR BLD AUTO: 12 %
MCH RBC QN AUTO: 28.2 PG (ref 26–34)
MCHC RBC AUTO-ENTMCNC: 31.8 G/DL (ref 31–37)
MCV RBC AUTO: 88.5 FL
MONOCYTES # BLD AUTO: 1.1 X10(3) UL (ref 0.1–1)
MONOCYTES NFR BLD AUTO: 8.2 %
NEUTROPHILS # BLD AUTO: 10.64 X10 (3) UL (ref 1.5–7.7)
NEUTROPHILS # BLD AUTO: 10.64 X10(3) UL (ref 1.5–7.7)
NEUTROPHILS NFR BLD AUTO: 79.2 %
NT-PROBNP SERPL-MCNC: 276 PG/ML (ref ?–450)
OSMOLALITY SERPL CALC.SUM OF ELEC: 291 MOSM/KG (ref 275–295)
P AXIS: 15 DEGREES
P-R INTERVAL: 162 MS
PLATELET # BLD AUTO: 289 10(3)UL (ref 150–450)
POTASSIUM SERPL-SCNC: 3.4 MMOL/L (ref 3.5–5.1)
PROCALCITONIN SERPL-MCNC: 0.1 NG/ML (ref ?–0.05)
PROT SERPL-MCNC: 6.2 G/DL (ref 5.7–8.2)
Q-T INTERVAL: 350 MS
QRS DURATION: 96 MS
QTC CALCULATION (BEZET): 416 MS
R AXIS: -9 DEGREES
RBC # BLD AUTO: 4.54 X10(6)UL
RSV RNA SPEC NAA+PROBE: NEGATIVE
SARS-COV-2 RNA RESP QL NAA+PROBE: NOT DETECTED
SODIUM SERPL-SCNC: 139 MMOL/L (ref 136–145)
T AXIS: 12 DEGREES
TROPONIN I SERPL HS-MCNC: 11 NG/L
VENTRICULAR RATE: 85 BPM
WBC # BLD AUTO: 13.4 X10(3) UL (ref 4–11)

## 2025-03-20 PROCEDURE — 71045 X-RAY EXAM CHEST 1 VIEW: CPT | Performed by: EMERGENCY MEDICINE

## 2025-03-20 PROCEDURE — 99223 1ST HOSP IP/OBS HIGH 75: CPT | Performed by: HOSPITALIST

## 2025-03-20 RX ORDER — NICOTINE POLACRILEX 4 MG
30 LOZENGE BUCCAL
Status: DISCONTINUED | OUTPATIENT
Start: 2025-03-20 | End: 2025-03-24

## 2025-03-20 RX ORDER — POTASSIUM CHLORIDE 1500 MG/1
40 TABLET, EXTENDED RELEASE ORAL ONCE
Status: COMPLETED | OUTPATIENT
Start: 2025-03-20 | End: 2025-03-20

## 2025-03-20 RX ORDER — DILTIAZEM HYDROCHLORIDE 120 MG/1
360 CAPSULE, EXTENDED RELEASE ORAL DAILY
Status: DISCONTINUED | OUTPATIENT
Start: 2025-03-20 | End: 2025-03-24

## 2025-03-20 RX ORDER — CETIRIZINE HYDROCHLORIDE 10 MG/1
10 TABLET ORAL NIGHTLY
Status: DISCONTINUED | OUTPATIENT
Start: 2025-03-20 | End: 2025-03-24

## 2025-03-20 RX ORDER — FUROSEMIDE 10 MG/ML
40 INJECTION INTRAMUSCULAR; INTRAVENOUS ONCE
Status: COMPLETED | OUTPATIENT
Start: 2025-03-20 | End: 2025-03-20

## 2025-03-20 RX ORDER — NICOTINE POLACRILEX 4 MG
15 LOZENGE BUCCAL
Status: DISCONTINUED | OUTPATIENT
Start: 2025-03-20 | End: 2025-03-24

## 2025-03-20 RX ORDER — METOCLOPRAMIDE HYDROCHLORIDE 5 MG/ML
5 INJECTION INTRAMUSCULAR; INTRAVENOUS EVERY 8 HOURS PRN
Status: DISCONTINUED | OUTPATIENT
Start: 2025-03-20 | End: 2025-03-24

## 2025-03-20 RX ORDER — FUROSEMIDE 10 MG/ML
40 INJECTION INTRAMUSCULAR; INTRAVENOUS
Status: CANCELLED | OUTPATIENT
Start: 2025-03-20

## 2025-03-20 RX ORDER — ONDANSETRON 2 MG/ML
4 INJECTION INTRAMUSCULAR; INTRAVENOUS EVERY 6 HOURS PRN
Status: DISCONTINUED | OUTPATIENT
Start: 2025-03-20 | End: 2025-03-24

## 2025-03-20 RX ORDER — AZITHROMYCIN 250 MG/1
500 TABLET, FILM COATED ORAL DAILY
Status: COMPLETED | OUTPATIENT
Start: 2025-03-21 | End: 2025-03-23

## 2025-03-20 RX ORDER — BENZONATATE 200 MG/1
200 CAPSULE ORAL 3 TIMES DAILY PRN
Status: DISCONTINUED | OUTPATIENT
Start: 2025-03-20 | End: 2025-03-24

## 2025-03-20 RX ORDER — METHYLPREDNISOLONE SODIUM SUCCINATE 40 MG/ML
40 INJECTION INTRAMUSCULAR; INTRAVENOUS ONCE
Status: COMPLETED | OUTPATIENT
Start: 2025-03-20 | End: 2025-03-20

## 2025-03-20 RX ORDER — ACETAMINOPHEN 500 MG
1000 TABLET ORAL ONCE
Status: COMPLETED | OUTPATIENT
Start: 2025-03-20 | End: 2025-03-20

## 2025-03-20 RX ORDER — ONDANSETRON 2 MG/ML
4 INJECTION INTRAMUSCULAR; INTRAVENOUS ONCE
Status: COMPLETED | OUTPATIENT
Start: 2025-03-20 | End: 2025-03-20

## 2025-03-20 RX ORDER — METHYLPREDNISOLONE SODIUM SUCCINATE 40 MG/ML
40 INJECTION INTRAMUSCULAR; INTRAVENOUS EVERY 8 HOURS
Status: DISCONTINUED | OUTPATIENT
Start: 2025-03-20 | End: 2025-03-21

## 2025-03-20 RX ORDER — IPRATROPIUM BROMIDE AND ALBUTEROL SULFATE 2.5; .5 MG/3ML; MG/3ML
3 SOLUTION RESPIRATORY (INHALATION) EVERY 6 HOURS PRN
Status: DISCONTINUED | OUTPATIENT
Start: 2025-03-20 | End: 2025-03-24

## 2025-03-20 RX ORDER — ACETAMINOPHEN 500 MG
500 TABLET ORAL EVERY 4 HOURS PRN
Status: DISCONTINUED | OUTPATIENT
Start: 2025-03-20 | End: 2025-03-24

## 2025-03-20 RX ORDER — ENOXAPARIN SODIUM 100 MG/ML
40 INJECTION SUBCUTANEOUS DAILY
Status: DISCONTINUED | OUTPATIENT
Start: 2025-03-20 | End: 2025-03-24

## 2025-03-20 RX ORDER — DIGOXIN 125 MCG
125 TABLET ORAL DAILY
Status: DISCONTINUED | OUTPATIENT
Start: 2025-03-20 | End: 2025-03-24

## 2025-03-20 RX ORDER — DOXYCYCLINE 100 MG/1
100 CAPSULE ORAL 2 TIMES DAILY
Qty: 14 CAPSULE | Refills: 0 | Status: SHIPPED | OUTPATIENT
Start: 2025-03-20 | End: 2025-03-27

## 2025-03-20 RX ORDER — DEXTROSE MONOHYDRATE 25 G/50ML
50 INJECTION, SOLUTION INTRAVENOUS
Status: DISCONTINUED | OUTPATIENT
Start: 2025-03-20 | End: 2025-03-24

## 2025-03-20 RX ORDER — IPRATROPIUM BROMIDE AND ALBUTEROL SULFATE 2.5; .5 MG/3ML; MG/3ML
3 SOLUTION RESPIRATORY (INHALATION) ONCE
Status: COMPLETED | OUTPATIENT
Start: 2025-03-20 | End: 2025-03-20

## 2025-03-20 RX ORDER — MONTELUKAST SODIUM 10 MG/1
10 TABLET ORAL NIGHTLY
Status: DISCONTINUED | OUTPATIENT
Start: 2025-03-20 | End: 2025-03-24

## 2025-03-20 NOTE — ED PROVIDER NOTES
Patient Seen in: [facility] Emergency Department      History     Chief Complaint   Patient presents with    Difficulty Breathing     Stated Complaint: alejandro    Subjective:   HPI          Objective:     Past Medical History:    A-fib ([facility])    Anesthesia complication    Arrhythmia    AFIB    Arthritis    Asthma ([facility])    Back problem    COPD (chronic obstructive pulmonary disease) ([facility])    Deviated nasal septum    nasal septoplasty, turb reduction, smr of turbs    Difficult intubation    fiber optic if needed    Diverticulitis    colonoscopy     Diverticulosis of large intestine    Esophageal reflux    Extrinsic asthma, unspecified    Headache    Heart disease    Hepatitis    WAS TOLD SHE HAS HAD THIS WHEN SHE WAS 19 YEARS OLD    High blood pressure    High cholesterol    Irregular heart rate    Lichenoid keratosis    left chest-bx    Osteoarthritis    Paralysis ([facility])    left lung and left vocal cord.    Paronychia    (RT); onychomycosis; debridement    Problems with swallowing    occasionally    Shortness of breath    2 L NC ALL THE TIME 24HR/7 DAYS PER WEEK    Unspecified essential hypertension    Visual impairment              Past Surgical History:   Procedure Laterality Date    Adenoidectomy      Cataract      cataract extraction     Hysterectomy      Sinus surgery        DEVIATED SEPTUM    T&a      Tonsillectomy                  Social History     Socioeconomic History    Marital status:    Tobacco Use    Smoking status: Former     Current packs/day: 0.00     Types: Cigarettes     Quit date: 1996     Years since quittin.2    Smokeless tobacco: Never   Vaping Use    Vaping status: Never Used   Substance and Sexual Activity    Alcohol use: Not Currently     Comment: one drink once a month    Drug use: Never   Other Topics Concern    Pt has a pacemaker No    Pt has a defibrillator No    Reaction to local anesthetic No    Caffeine Concern No     Social Drivers of Health     Food Insecurity: No  Food Insecurity (1/29/2025)    Food Insecurity     Food Insecurity: Never true   Transportation Needs: No Transportation Needs (1/29/2025)    Transportation Needs     Lack of Transportation: No   Housing Stability: Low Risk  (1/29/2025)    Housing Stability     Housing Instability: No                  Physical Exam     ED Triage Vitals [03/20/25 1120]   /65   Pulse 84   Resp 22   Temp 98.1 °F (36.7 °C)   Temp src Oral   SpO2 99 %   O2 Device Nasal cannula       Current Vitals:   Vital Signs  BP: 115/60  Pulse: 85  Resp: 20  Temp: 98.1 °F (36.7 °C)  Temp src: Oral  MAP (mmHg): 76    Oxygen Therapy  SpO2: 93 %  O2 Device: Nasal cannula  O2 Flow Rate (L/min): 4 L/min        Physical Exam        ED Course     Labs Reviewed   CBC WITH DIFFERENTIAL WITH PLATELET - Abnormal; Notable for the following components:       Result Value    WBC 13.4 (*)     RDW-SD 48.1 (*)     Neutrophil Absolute Prelim 10.64 (*)     Neutrophil Absolute 10.64 (*)     Monocyte Absolute 1.10 (*)     All other components within normal limits   COMP METABOLIC PANEL (14) - Abnormal; Notable for the following components:    Glucose 153 (*)     Potassium 3.4 (*)     Chloride 97 (*)     CO2 39.0 (*)     ALT 8 (*)     All other components within normal limits   LACTIC ACID, PLASMA - Abnormal; Notable for the following components:    Lactic Acid 2.1 (*)     All other components within normal limits   PROCALCITONIN - Abnormal; Notable for the following components:    Procalcitonin 0.10 (*)     All other components within normal limits   PTT, ACTIVATED - Normal   PRO BETA NATRIURETIC PEPTIDE - Normal   TROPONIN I HIGH SENSITIVITY - Normal   SARS-COV-2/FLU A AND B/RSV BY PCR (GENEXPERT) - Normal    Narrative:     This test is intended for the qualitative detection and differentiation of SARS-CoV-2, influenza A, influenza B, and respiratory syncytial virus (RSV) viral RNA in nasopharyngeal or nares swabs from individuals suspected of respiratory viral  infection consistent with COVID-19 by their healthcare provider. Signs and symptoms of respiratory viral infection due to SARS-CoV-2, influenza, and RSV can be similar.    Test performed using the Xpert Xpress SARS-CoV-2/FLU/RSV (real time RT-PCR)  assay on the GeneXpert instrument, [employer], [place] [address].   This test is being used under the Food and Drug Administration's Emergency Use Authorization.    The authorized Fact Sheet for Healthcare Providers for this assay is available upon request from the laboratory.   LACTIC ACID REFLEX POST POSTIVE   BLOOD CULTURE   BLOOD CULTURE     EKG    Rate, intervals and axes as noted on EKG Report.  Rate: 85  Rhythm: Sinus Rhythm  Reading: Normal sinus rhythm without signs of acute ischemia or abnormal intervals.                         MDM      84-year-old female with history of A-fib, hypertension, COPD, left phrenic nerve paralysis presents today with acute on chronic shortness of breath.  Patient on 4 L nasal cannula baseline.  Patient states that in the last 3 days she has had some increased shortness of breath particularly last night.  Also reporting some slight increase in her lower extremity edema.  Denies fevers, cough, chest pain, belly pain, or other symptoms.  Her pulmonology team has been weaning her off of steroids.  She had a CT 2 days ago to track progress of a lung tumor though no results yet.  Also of note treated for pneumonia in January with an extended course of doxycycline.    On exam, vitals normal on her 4 L, lungs with some right-sided crackles but no current wheezing.  Mild pitting edema distally.  Abdomen benign.    Differential: Recurrent pneumonia, COPD exacerbation, CHF,    Patient evaluated with labs which showed a chronically elevated white blood cell count, elevated bicarb likely from chronic respiratory acidosis, and slightly elevated lactic acid level.    Chest x-ray interpreted by myself shows stable consolidations.    Sepsis IV fluids  not administered due to volume overload.  Patient was given 1 dose of Lasix.  She was ordered for ceftriaxone and azithromycin per her pulmonology team who were consulted.  They also recommended 40 mg IV Solu-Medrol.    Patient admitted for further management.    Admission disposition: 3/20/2025  1:44 PM           MDM    Disposition and Plan     Clinical Impression:  1. Shortness of breath         Disposition:  Admit  3/20/2025  1:44 pm    Follow-up:  Jose Ruelas MD  [facility] [place] [address]  [phone]    Schedule an appointment as soon as possible for a visit      We recommend that you schedule follow up care with a primary care provider within the next three months to obtain basic health screening including reassessment of your blood pressure.      Medications Prescribed:  Current Discharge Medication List        START taking these medications    Details   doxycycline 100 MG Oral Cap Take 1 capsule (100 mg total) by mouth 2 (two) times daily for 7 days.  Qty: 14 capsule, Refills: 0                 Supplementary Documentation:         Hospital Problems       Present on Admission  Date Reviewed: 3/6/2025            ICD-10-CM Noted POA    * (Principal) Shortness of breath R06.02 3/20/2025 Unknown

## 2025-03-20 NOTE — ED INITIAL ASSESSMENT (HPI)
Pt arrives from via EMS for shortness of breath. Uses 4 L oxygen at home, currently on 4 L oxygen. Pt able to speak full sentences.  Per EMS, in NSR then went into Afib shortly before arrival to hospital.  Hx Afib, has not taken medications this AM.

## 2025-03-20 NOTE — ED QUICK NOTES
Orders for admission, patient is aware of plan and ready to go upstairs. Any questions, please call ED RN Zora at extension [phone].     Patient Covid vaccination status: Fully vaccinated     COVID Test Ordered in ED: SARS-CoV-2/Flu A and B/RSV by PCR (GeneXpert)    COVID Suspicion at Admission: N/A    Running Infusions:  None    Mental Status/LOC at time of transport: A/O x4    Other pertinent information:   CIWA score: N/A   NIH score:  N/A

## 2025-03-20 NOTE — H&P
[facility]    PATIENT'S NAME: CEZAR JOVEL   ATTENDING PHYSICIAN: Korey Madden MD   PATIENT ACCOUNT#:   [id number]    LOCATION:  [address]  MEDICAL RECORD #:   [id number]       YOB: 1940  ADMISSION DATE:       03/20/2025    HISTORY AND PHYSICAL EXAMINATION    (With Addendum added 03/21/2025)    CHIEF COMPLAINT:  COPD exacerbation and possible fluid overload status.     HISTORY OF PRESENT ILLNESS:  Patient is an 84-year-old  female with underlying severe chronic obstructive pulmonary disease with multiple recent hospitalizations for COPD exacerbation.  Came in today to the emergency department for evaluation of progressive shortness of breath for the last 2 to 3 days.  CBC showed white blood cell count of 13.4 with left shift.  Chemistry showed bicarb 39, chloride 97, and potassium 3.4.  Procalcitonin still pending.  GeneXpert viral panel was negative.  ProBNP 276.  Chest x-ray showed small right pleural effusion with associated right basilar perihilar opacities which appear similar to comparison of February 2025, redemonstration of elevated left hemidiaphragm with associated left basilar opacities may reflect atelectasis with or without superimposed pneumonia.  Patient was started on IV antibiotics, steroids, and nebulizer treatments, and she will be admitted to the hospital for further management.     PAST MEDICAL HISTORY:  Chronic obstructive pulmonary disease with multiple recent hospitalizations.  She has left ventricular grade 1 diastolic dysfunction and pulmonary artery hypertension.  Paroxysmal atrial fibrillation, low burden, declined anticoagulation, on rate control medications.  She has heart failure with preserved ejection fraction, mainly right-sided heart failure, being treated with Lasix.  History of gastrointestinal bleed, hypertension, hyperlipidemia, gastroesophageal reflux disease, diverticulosis, osteoarthritis, degenerative joint disease of lumbar  spine.     PAST SURGICAL HISTORY:  Tonsillectomy, thyroidectomy, hysterectomy, and cataract procedure.      MEDICATIONS:  Please see medication reconciliation list.     ALLERGIES:  Penicillins.    FAMILY HISTORY:  Mother had ovarian cancer.  Sister had COPD.    SOCIAL HISTORY:  Ex-tobacco user.  Social alcohol.  Sedentary lifestyle.  Usually independent for basic activities of daily living.     REVIEW OF SYSTEMS:  Patient reports she had progressive shortness of breath more than baseline for the last 3 to 4 days.  She denies any fever or chills.  She has chronic cough, nonproductive of phlegm.  She had some increased leg swelling.  She admits being noncompliant with low-salt diet.  Other 12-point review of systems is negative.        PHYSICAL EXAMINATION:    GENERAL:  Alert and oriented to time, place and person.  Visibly dyspneic.    VITAL SIGNS:  Temperature 98.1, pulse 85, respiratory rate 20, blood pressure 115/60, pulse ox 93% on 4L nasal cannula oxygen.   HEENT:  Atraumatic.  Oropharynx clear.  Dry mucous membranes.  Ears and nose normal.  Eyes:  Anicteric sclerae.   NECK:  Supple.  No lymphadenopathy.  Trachea midline.  Full range of motion.  Positive jugular venous distention.  LUNGS:  Diminished breathing sounds bilaterally with faint end-expiratory wheezing.   Increased respiratory effort.    HEART:  Regular rate and rhythm.  S1 and S2 auscultated.    ABDOMEN:  Soft, nondistended.  No tenderness.  Positive bowel sounds.   EXTREMITIES:  There is +1 edema, both legs.  No clubbing or cyanosis.   NEUROLOGIC:  Motor and sensory intact.    ASSESSMENT:    1.   Chronic obstructive pulmonary disease exacerbation.  Possibility of pneumonia but received antibiotics in the emergency room.   2.   Leg edema.  Noncompliant with low-salt diet.  Chronic right-sided heart failure.  3.   Essential hypertension.   4.   Diabetes mellitus type 2.    PLAN:  Patient will be admitted to general medical floor.  IV Lasix.   Solu-Medrol.  Nebulizer treatments.  Cough suppressants.  We will obtain procalcitonin level and hold use of further antibiotics.  Pulmonary consult was notified.  Continue oxygen support.  DVT prophylaxis.  Further recommendations to follow.     ADDENDUM (Job [id number])    ASSESSMENT AND PLAN:  Procalcitonin 0.1.  Lactic acid 2.1.  We will hold Lasix and diuretics.  Continue azithromycin and Rocephin started in the emergency room.  Continue to monitor hemodynamic status.    Dictated By Yamil Khan MD  d: 03/20/2025 14:30:24  t: 03/20/2025 15:09:53  Job [id number]  FB/    cc: Korey Madden MD

## 2025-03-21 LAB
ANION GAP SERPL CALC-SCNC: 3 MMOL/L (ref 0–18)
BASOPHILS # BLD AUTO: 0.01 X10(3) UL (ref 0–0.2)
BASOPHILS NFR BLD AUTO: 0.1 %
BUN BLD-MCNC: 19 MG/DL (ref 9–23)
BUN/CREAT SERPL: 32.2 (ref 10–20)
CALCIUM BLD-MCNC: 8.8 MG/DL (ref 8.7–10.4)
CHLORIDE SERPL-SCNC: 100 MMOL/L (ref 98–112)
CO2 SERPL-SCNC: 36 MMOL/L (ref 21–32)
CREAT BLD-MCNC: 0.59 MG/DL
DEPRECATED RDW RBC AUTO: 47 FL (ref 35.1–46.3)
EGFRCR SERPLBLD CKD-EPI 2021: 90 ML/MIN/1.73M2 (ref 60–?)
EOSINOPHIL # BLD AUTO: 0 X10(3) UL (ref 0–0.7)
EOSINOPHIL NFR BLD AUTO: 0 %
ERYTHROCYTE [DISTWIDTH] IN BLOOD BY AUTOMATED COUNT: 14.7 % (ref 11–15)
GLUCOSE BLD-MCNC: 170 MG/DL (ref 70–99)
GLUCOSE BLDC GLUCOMTR-MCNC: 167 MG/DL (ref 70–99)
GLUCOSE BLDC GLUCOMTR-MCNC: 168 MG/DL (ref 70–99)
GLUCOSE BLDC GLUCOMTR-MCNC: 237 MG/DL (ref 70–99)
GLUCOSE BLDC GLUCOMTR-MCNC: 314 MG/DL (ref 70–99)
HCT VFR BLD AUTO: 34.5 %
HGB BLD-MCNC: 10.8 G/DL
IMM GRANULOCYTES # BLD AUTO: 0.05 X10(3) UL (ref 0–1)
IMM GRANULOCYTES NFR BLD: 0.5 %
LYMPHOCYTES # BLD AUTO: 0.75 X10(3) UL (ref 1–4)
LYMPHOCYTES NFR BLD AUTO: 7.6 %
MCH RBC QN AUTO: 27.6 PG (ref 26–34)
MCHC RBC AUTO-ENTMCNC: 31.3 G/DL (ref 31–37)
MCV RBC AUTO: 88 FL
MONOCYTES # BLD AUTO: 0.14 X10(3) UL (ref 0.1–1)
MONOCYTES NFR BLD AUTO: 1.4 %
NEUTROPHILS # BLD AUTO: 8.95 X10 (3) UL (ref 1.5–7.7)
NEUTROPHILS # BLD AUTO: 8.95 X10(3) UL (ref 1.5–7.7)
NEUTROPHILS NFR BLD AUTO: 90.4 %
OSMOLALITY SERPL CALC.SUM OF ELEC: 294 MOSM/KG (ref 275–295)
PLATELET # BLD AUTO: 257 10(3)UL (ref 150–450)
POTASSIUM SERPL-SCNC: 4.2 MMOL/L (ref 3.5–5.1)
POTASSIUM SERPL-SCNC: 4.2 MMOL/L (ref 3.5–5.1)
RBC # BLD AUTO: 3.92 X10(6)UL
SODIUM SERPL-SCNC: 139 MMOL/L (ref 136–145)
WBC # BLD AUTO: 9.9 X10(3) UL (ref 4–11)

## 2025-03-21 PROCEDURE — 99233 SBSQ HOSP IP/OBS HIGH 50: CPT | Performed by: HOSPITALIST

## 2025-03-21 RX ORDER — INSULIN DEGLUDEC 100 U/ML
15 INJECTION, SOLUTION SUBCUTANEOUS NIGHTLY
Status: DISCONTINUED | OUTPATIENT
Start: 2025-03-21 | End: 2025-03-24

## 2025-03-21 RX ORDER — PANTOPRAZOLE SODIUM 40 MG/1
40 TABLET, DELAYED RELEASE ORAL
Status: DISCONTINUED | OUTPATIENT
Start: 2025-03-22 | End: 2025-03-24

## 2025-03-21 RX ORDER — METHYLPREDNISOLONE SODIUM SUCCINATE 40 MG/ML
30 INJECTION INTRAMUSCULAR; INTRAVENOUS EVERY 12 HOURS
Status: DISCONTINUED | OUTPATIENT
Start: 2025-03-22 | End: 2025-03-22

## 2025-03-21 NOTE — SPIRITUAL CARE NOTE
Spiritual Care Visit Note    Patient Name: Yesenia Berkoiwtz Date of Spiritual Care Visit: 25   : 1940 Primary Dx: Shortness of breath       Referred By: Referral From: Patient, Nurse, Confession,  Visit    Spiritual Care Taxonomy:    Intended Effects: Aligning care plan with patient's values    Methods: Encouraging spiritual/Orthodoxy practices    Interventions: Perform a Orthodoxy rite or ritual, Connect someone with their coral community/clergy    Visit Type/Summary:     received consult for prayer, Confession and [employer] Communion request from patient. Writer attempted to visit, patient speaking with staff. Patient added to communion list.      - Spiritual Care: Offered empathic listening and emotional support. Provided support for Patient's spiritual/Orthodoxy requests. Coordinated  visit for confession. Rev Correa from [facility] visited with patient and offered the sacraments.   remains available for follow up.    Spiritual Care support can be requested via an Epic consult.   For urgent/immediate needs, please contact the On Call  at: [place]: ext [phone]    -Chaplain Elizabeth.

## 2025-03-21 NOTE — PROGRESS NOTES
Progress Note     Yesenia Berkowitz Patient Status:  Inpatient    1940 MRN [id number]   Location [facility] 5SW/SE Attending Rafael Parrish MD   Hosp Day # 1 PCP JO-ANN YANG MD     Chief Complaint: sob    Subjective:   S: Patient here with sob  Feels 25% better  Denies cp, fever dizziness  Worried about need for iospy    Review of Systems:   10 point ROS completed and was negative, except for pertinent positive and negatives stated in subjective.    Objective:   Vital signs:  Temp:  [97.6 °F (36.4 °C)-98.6 °F (37 °C)] 98.1 °F (36.7 °C)  Pulse:  [] 75  Resp:  [18-21] 18  BP: (101-134)/(51-95) 114/79  SpO2:  [93 %-99 %] 93 %    Wt Readings from Last 6 Encounters:   25 150 lb 12.7 oz (68.4 kg)   25 145 lb 4.8 oz (65.9 kg)   25 145 lb 14.4 oz (66.2 kg)   25 143 lb 14.4 oz (65.3 kg)   25 156 lb (70.8 kg)   25 156 lb (70.8 kg)         Physical Exam:    General: No acute distress. Alert ,         Respiratory: poor air movement b/l  Cardiovascular: S1, S2. Regular rate and rhythm. No murmurs, rubs or gallops.   Abdomen: Soft, nontender, nondistended.  Positive bowel sounds. No rebound or guarding.  Neurologic: No focal neurological deficits.   Musculoskeletal: Moves all extremities.  Extremities: No edema.    Results:   Diagnostic Data:      Labs:    Labs Last 24 Hours:   BMP     CBC    Other     Na 139 Cl 100 BUN 19 Glu 170   Hb 10.8   PTT - Procal -   K 4.2; 4.2 CO2 36.0 Cr 0.59   WBC 9.9 >< .0  INR - CRP -   Renal Lytes Endo    Hct 34.5   Trop - D dim -   eGFR - Ca 8.8 POC Gluc  167    LFT   pBNP - Lactic 1.2   eGFR AA - PO4 - A1c -   AST - APk - Prot -  LDL -     Mg - TSH -   ALT - T katie - Alb -        COVID-19 Lab Results    COVID-19  Lab Results   Component Value Date    COVID19 Not Detected 2025    COVID19 Not Detected 2025    COVID19 Not Detected 2025       Pro-Calcitonin  Recent Labs   Lab 25  1118   PCT 0.10*        Cardiac  Recent Labs   Lab 03/20/25  1118   PBNP 276       Creatinine Kinase  No results for input(s): \"CK\" in the last 168 hours.    Inflammatory Markers  No results for input(s): \"CRP\", \"SONA\", \"LDH\", \"DDIMER\" in the last 168 hours.    Imaging: Imaging data reviewed in Epic.    Medications:    methylPREDNISolone  40 mg Intravenous Q8H    insulin aspart  1-7 Units Subcutaneous TID CC    enoxaparin  40 mg Subcutaneous Daily    azithromycin  500 mg Oral Daily    cefTRIAXone  2 g Intravenous Q24H    digoxin  125 mcg Oral Daily    dilTIAZem ER  360 mg Oral Daily    cetirizine  10 mg Oral Nightly    montelukast  10 mg Oral Nightly       Assessment & Plan:   ASSESSMENT / PLAN:     Acute on chronic respiratory failure  COPD exacerbation  Asthma exacerbation  Bilateral PNA  Phrenic nervie paralysis  -Imaging reviewed  -follow-up cultures showing NGTD  -Pulm on consult  -cont solumedrol 40mg iv  q 8 h  -wean as able  -Continue nebs, PPI  -Continue lasix and spironolactone  -wean o2 as tolerated    Hx of Afib  HTN  -Continue home meds  -Not on anticoagulation    Chronic HFpEF  -Imaging reviewed  -P-bnp 276  -Last EF 65-70%  -Strict Is and Os, daily weights       Quality:  DVT Prophylaxis: lovenox  CODE status: full  García:    Central line: n/a  Dispo: further recs pending clinical course      Will the patient be referred to TCC on discharge?: yes  Estimated date of discharge: tbd  Discharge is dependent on: clinical improvement  At this point Ms. Berkowitz is expected to be discharge to: home    Plan of care discussed with patient, nursing    Outpatient records or previous hospital records reviewed.   Patient and/or patient's family given opportunity to ask questions and note understanding and agreeing with therapeutic plan as outlined     Coordinated care with providers and counseling re: treatment plan and workup    MDM: High complexity     NEGRITO KYLE MD    Supplementary Documentation:          **Certification      PHYSICIAN Certification of Need for Inpatient Hospitalization - Initial Certification    Patient will require inpatient services that will reasonably be expected to span two midnight's based on the clinical documentation in H+P.   Based on patients current state of illness, I anticipate that, after discharge, patient will require TBD.

## 2025-03-21 NOTE — PLAN OF CARE
Problem: Patient Centered Care  Goal: Patient preferences are identified and integrated in the patient's plan of care  Description: Interventions:- What would you like us to know as we care for you? Home with son  - Provide timely, complete, and accurate information to patient/family- Incorporate patient and family knowledge, values, beliefs, and cultural backgrounds into the planning and delivery of care- Encourage patient/family to participate in care and decision-making at the level they choose- Honor patient and family perspectives and choices  Outcome: Progressing     Problem: Diabetes/Glucose Control  Goal: Glucose maintained within prescribed range  Description: INTERVENTIONS:- Monitor Blood Glucose as ordered- Assess for signs and symptoms of hyperglycemia and hypoglycemia- Administer ordered medications to maintain glucose within target range- Assess barriers to adequate nutritional intake and initiate nutrition consult as needed- Instruct patient on self management of diabetes  Outcome: Progressing     Problem: RESPIRATORY - ADULT  Goal: Achieves optimal ventilation and oxygenation  Description: INTERVENTIONS:- Assess for changes in respiratory status- Assess for changes in mentation and behavior- Position to facilitate oxygenation and minimize respiratory effort- Oxygen supplementation based on oxygen saturation or ABGs- Provide Smoking Cessation handout, if applicable- Encourage broncho-pulmonary hygiene including cough, deep breathe, Incentive Spirometry- Assess the need for suctioning and perform as needed- Assess and instruct to report SOB or any respiratory difficulty- Respiratory Therapy support as indicated- Manage/alleviate anxiety- Monitor for signs/symptoms of CO2 retention  Outcome: Progressing     Problem: PAIN - ADULT  Goal: Verbalizes/displays adequate comfort level or patient's stated pain goal  Description: INTERVENTIONS:- Encourage pt to monitor pain and request assistance- Assess pain  using appropriate pain scale- Administer analgesics based on type and severity of pain and evaluate response- Implement non-pharmacological measures as appropriate and evaluate response- Consider cultural and social influences on pain and pain management- Manage/alleviate anxiety- Utilize distraction and/or relaxation techniques- Monitor for opioid side effects- Notify MD/LIP if interventions unsuccessful or patient reports new pain- Anticipate increased pain with activity and pre-medicate as appropriate  Outcome: Progressing     Problem: RISK FOR INFECTION - ADULT  Goal: Absence of fever/infection during anticipated neutropenic period  Description: INTERVENTIONS- Monitor WBC- Administer growth factors as ordered- Implement neutropenic guidelines  Outcome: Progressing     Problem: SAFETY ADULT - FALL  Goal: Free from fall injury  Description: INTERVENTIONS:- Assess pt frequently for physical needs- Identify cognitive and physical deficits and behaviors that affect risk of falls.- [place] fall precautions as indicated by assessment.- Educate pt/family on patient safety including physical limitations- Instruct pt to call for assistance with activity based on assessment- Modify environment to reduce risk of injury- Provide assistive devices as appropriate- Consider OT/PT consult to assist with strengthening/mobility- Encourage toileting schedule  Outcome: Progressing     Problem: DISCHARGE PLANNING  Goal: Discharge to home or other facility with appropriate resources  Description: INTERVENTIONS:- Identify barriers to discharge w/pt and caregiver- Include patient/family/discharge partner in discharge planning- Arrange for needed discharge resources and transportation as appropriate- Identify discharge learning needs (meds, wound care, etc)- Arrange for interpreters to assist at discharge as needed- Consider post-discharge preferences of patient/family/discharge partner- Complete POLST form as appropriate- Assess  patient's ability to be responsible for managing their own health- Refer to Case Management Department for coordinating discharge planning if the patient needs post-hospital services based on physician/LIP order or complex needs related to functional status, cognitive ability or social support system  Outcome: Progressing

## 2025-03-21 NOTE — CONSULTS
[facility]  part of [facility]    Report of Consultation    Yesenia Berkowitz Patient Status:  Inpatient    1940 MRN [id number]   Location [facility] 5SW/SE Attending Yamil Khan MD   Hosp Day # 0 PCP JO-ANN YANG MD     Date of Admission:  3/20/2025  Date of Consult:  3/20/25  Reason for Consultation:   SOB    History of Present Illness:   Patient is a 84 year old female who was admitted to the hospital for Shortness of breath:      Past Medical History  Past Medical History:    A-fib ([facility])    Anesthesia complication    Arrhythmia    AFIB    Arthritis    Asthma ([facility])    Back problem    COPD (chronic obstructive pulmonary disease) ([facility])    Deviated nasal septum    nasal septoplasty, turb reduction, smr of turbs    Difficult intubation    fiber optic if needed    Diverticulitis    colonoscopy     Diverticulosis of large intestine    Esophageal reflux    Extrinsic asthma, unspecified    Headache    Heart disease    Hepatitis    WAS TOLD SHE HAS HAD THIS WHEN SHE WAS 19 YEARS OLD    High blood pressure    High cholesterol    Irregular heart rate    Lichenoid keratosis    left chest-bx    Osteoarthritis    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    Paronychia    (RT); onychomycosis; debridement    Problems with swallowing    occasionally    Shortness of breath    2 L NC ALL THE TIME 24HR/7 DAYS PER WEEK    Unspecified essential hypertension    Visual impairment       Past Surgical History  Past Surgical History:   Procedure Laterality Date    Adenoidectomy      Cataract      cataract extraction     Hysterectomy      Sinus surgery        DEVIATED SEPTUM    T&a      Tonsillectomy         Family History  Family History   Problem Relation Age of Onset    Ovarian Cancer Mother 76        endometrial, poss ovarian primary    Pulmonary Disease Sister         COPD    Skin cancer Other     Stroke Other     Other (Other) Other        Social History  Social History     Socioeconomic  History    Marital status:    Tobacco Use    Smoking status: Former     Current packs/day: 0.00     Types: Cigarettes     Quit date: 1996     Years since quittin.2    Smokeless tobacco: Never   Vaping Use    Vaping status: Never Used   Substance and Sexual Activity    Alcohol use: Not Currently     Comment: one drink once a month    Drug use: Never   Other Topics Concern    Pt has a pacemaker No    Pt has a defibrillator No    Reaction to local anesthetic No    Caffeine Concern No     Social Drivers of Health     Food Insecurity: No Food Insecurity (3/20/2025)    NCSS - Food Insecurity     Worried About Running Out of Food in the Last Year: No     Ran Out of Food in the Last Year: No   Transportation Needs: No Transportation Needs (3/20/2025)    NCSS - Transportation     Lack of Transportation: No   Housing Stability: At Risk (3/20/2025)    NCSS - Housing/Utilities     Has Housing: No     Worried About Losing Housing: No     Unable to Get Utilities: No          Current Medications:  Current Facility-Administered Medications   Medication Dose Route Frequency    ipratropium-albuterol (Duoneb) 0.5-2.5 (3) MG/3ML inhalation solution 3 mL  3 mL Nebulization Q6H PRN    methylPREDNISolone sodium succinate (Solu-MEDROL) injection 40 mg  40 mg Intravenous Q8H    glucose (Dex4) 15 GM/59ML oral liquid 15 g  15 g Oral Q15 Min PRN    Or    glucose (Glutose) 40% oral gel 15 g  15 g Oral Q15 Min PRN    Or    glucose-vitamin C (Dex-4) chewable tab 4 tablet  4 tablet Oral Q15 Min PRN    Or    dextrose 50% injection 50 mL  50 mL Intravenous Q15 Min PRN    Or    glucose (Dex4) 15 GM/59ML oral liquid 30 g  30 g Oral Q15 Min PRN    Or    glucose (Glutose) 40% oral gel 30 g  30 g Oral Q15 Min PRN    Or    glucose-vitamin C (Dex-4) chewable tab 8 tablet  8 tablet Oral Q15 Min PRN    insulin aspart (NovoLOG) 100 Units/mL FlexPen 1-7 Units  1-7 Units Subcutaneous TID CC    enoxaparin (Lovenox) 40 MG/0.4ML SUBQ injection 40  mg  40 mg Subcutaneous Daily    acetaminophen (Tylenol Extra Strength) tab 500 mg  500 mg Oral Q4H PRN    ondansetron (Zofran) 4 MG/2ML injection 4 mg  4 mg Intravenous Q6H PRN    metoclopramide (Reglan) 5 mg/mL injection 5 mg  5 mg Intravenous Q8H PRN    guaiFENesin (Robitussin) 100 MG/5 ML oral liquid 200 mg  200 mg Oral Q4H PRN    benzonatate (Tessalon) cap 200 mg  200 mg Oral TID PRN    [START ON 3/21/2025] azithromycin (Zithromax) tab 500 mg  500 mg Oral Daily    [START ON 3/21/2025] cefTRIAXone (Rocephin) 2 g in sodium chloride 0.9% 100 mL IVPB-ADDV  2 g Intravenous Q24H    digoxin (Lanoxin) tab 125 mcg  125 mcg Oral Daily    dilTIAZem ER (Dilacor XR) 24 hr cap 360 mg  360 mg Oral Daily    cetirizine (ZyrTEC) tab 10 mg  10 mg Oral Nightly    montelukast (Singulair) tab 10 mg  10 mg Oral Nightly     Medications Prior to Admission   Medication Sig    acetaZOLAMIDE 250 MG Oral Tab Take 2 tablets (500 mg total) by mouth daily.    albuterol (2.5 MG/3ML) 0.083% Inhalation Nebu Soln Take 3 mL (2.5 mg total) by nebulization every 6 (six) hours as needed for Wheezing.    dilTIAZem  MG Oral Capsule SR 24 Hr Take 1 capsule (360 mg total) by mouth daily.    furosemide 80 MG Oral Tab Take 1 tablet (80 mg total) by mouth BID (Diuretic).    fluticasone-umeclidin-vilant 200-62.5-25 MCG/ACT Inhalation Aerosol Powder, Breath Activated Inhale 1 puff into the lungs daily.    empagliflozin (JARDIANCE) 10 MG Oral Tab Take 1 tablet (10 mg total) by mouth daily.    acetaminophen 500 MG Oral Tab Take 2 tablets (1,000 mg total) by mouth every 6 (six) hours as needed for Pain or Fever.    DIGOXIN 0.125 MG Oral Tab Take 1 tablet (125 mcg total) by mouth daily.    albuterol 108 (90 Base) MCG/ACT Inhalation Aero Soln Inhale 2 puffs into the lungs as needed.    estradiol 0.05 MG/24HR Transdermal Patch Weekly Place 1 patch onto the skin once a week. As directed.    Cholecalciferol (VITAMIN D) 1000 UNITS Oral Tab Take 1,000 Units by  mouth 2 (two) times daily.    Potassium Chloride ER (K-DUR M20) 20 MEQ Oral Tab CR Take 1 tablet (20 mEq total) by mouth nightly.    Loratadine 10 MG Oral Cap Take 10 mg by mouth nightly.      montelukast 10 MG Oral Tab Take 1 tablet (10 mg total) by mouth nightly.       Allergies  Allergies[1]    Review of Systems:    Pertinent items are noted in HPI.    Physical Exam:   Blood pressure 101/51, pulse 78, temperature 98.6 °F (37 °C), temperature source Oral, resp. rate 20, height 5' 5\" (1.651 m), weight 150 lb 12.7 oz (68.4 kg), SpO2 97%.    No exudate  Crackles on the right side  RRR  Spft NT  edema    Results:     Laboratory Data:  Lab Results   Component Value Date    WBC 13.4 (H) 03/20/2025    HGB 12.8 03/20/2025    HCT 40.2 03/20/2025    .0 03/20/2025    CREATSERUM 0.68 03/20/2025    BUN 13 03/20/2025     03/20/2025    K 3.4 (L) 03/20/2025    CL 97 (L) 03/20/2025    CO2 39.0 (H) 03/20/2025     (H) 03/20/2025    CA 9.0 03/20/2025    ALB 3.7 03/20/2025    ALKPHO 67 03/20/2025    TP 6.2 03/20/2025    AST 11 03/20/2025    ALT 8 (L) 03/20/2025    PTT 28.1 03/20/2025    INR 1.08 11/25/2024    PTP 14.6 11/25/2024    T4F 0.9 12/17/2024    TSH 0.185 (L) 12/17/2024    LIP 30 08/30/2024    DDIMER 0.47 12/21/2024    MG 1.9 12/16/2024    PHOS 3.4 12/26/2024    TROPHS 11 03/20/2025         Imaging:  XR CHEST AP PORTABLE  (CPT=71045)    Result Date: 3/20/2025  CONCLUSION:   1. Small right pleural effusion with associated right basilar/perihilar opacities, which appear similar when compared to 02/04/2025. 2. Redemonstrated elevation of the left hemidiaphragm with associated left basilar opacities, which may reflect atelectasis with or without superimposed pneumonia. 3. Lesser incidental findings described above.    Dictated by (CST): Joe Hayes MD on 3/20/2025 at 12:02 PM     Finalized by (CST): Joe Hayes MD on 3/20/2025 at 12:06 PM              Impression:   Shortness of breath  COPD exacerbation  (HCC)  Chronic Hypoxic Resp failure  Lung Nodule  Left Phrenic Nerve Paralysis  Kyphoscoliosis  Corpulmonale  Leg Edema  HTN  DM            Recommendations:  Treat with steroids and abc  Needs out pt biopsy for lung nodule and PET scan    Thank you for allowing me to participate in the care of your patient.    VIVIANE MIKE MD  3/20/2025         [1]   Allergies  Allergen Reactions    Penicillin G ANAPHYLAXIS    Azithromycin DIARRHEA and NAUSEA AND VOMITING    Cefuroxime UNKNOWN     Other reaction(s): Vomitting    Levofloxacin UNKNOWN     Other reaction(s): LEVOFLOXACIN    Penicillins      Other reaction(s): Unknown    Seasonal ITCHING

## 2025-03-21 NOTE — PLAN OF CARE
Problem: Patient Centered Care  Goal: Patient preferences are identified and integrated in the patient's plan of care  Description: Interventions:- What would you like us to know as we care for you? - Provide timely, complete, and accurate information to patient/family- Incorporate patient and family knowledge, values, beliefs, and cultural backgrounds into the planning and delivery of care- Encourage patient/family to participate in care and decision-making at the level they choose- Honor patient and family perspectives and choices  Outcome: Progressing     Problem: Diabetes/Glucose Control  Goal: Glucose maintained within prescribed range  Description: INTERVENTIONS:- Monitor Blood Glucose as ordered- Assess for signs and symptoms of hyperglycemia and hypoglycemia- Administer ordered medications to maintain glucose within target range- Assess barriers to adequate nutritional intake and initiate nutrition consult as needed- Instruct patient on self management of diabetes  Outcome: Progressing

## 2025-03-21 NOTE — CM/SW NOTE
Patient discussed in RN DC Rounds. SW received MDO for Home health Eval. Per Care everywhere, patient was arranged with [facility] in 02/2025. SW reached out to [facility] liaison to see if patient is current with them for services. [facility] liaison confirmed that patient is current with them for PT OT RN HHA. DREW entered [facility], [facility] liaison aware.       DREW/CM to remain available for support and/or discharge planning.     Angela Smith, MSW, LSW   x [phone]

## 2025-03-21 NOTE — HOME CARE LIAISON
This is a current pt with [facility]. Pt will need a LEIDY order entered prior to DC for the below services. Please add a F2F if more services need to be added. Notified DREW Miller.   JIMI/PT/MARCA

## 2025-03-22 LAB
ANION GAP SERPL CALC-SCNC: 4 MMOL/L (ref 0–18)
BASOPHILS # BLD AUTO: 0.04 X10(3) UL (ref 0–0.2)
BASOPHILS NFR BLD AUTO: 0.2 %
BUN BLD-MCNC: 20 MG/DL (ref 9–23)
BUN/CREAT SERPL: 31.3 (ref 10–20)
CALCIUM BLD-MCNC: 9 MG/DL (ref 8.7–10.4)
CHLORIDE SERPL-SCNC: 100 MMOL/L (ref 98–112)
CO2 SERPL-SCNC: 32 MMOL/L (ref 21–32)
CREAT BLD-MCNC: 0.64 MG/DL
DEPRECATED RDW RBC AUTO: 48.8 FL (ref 35.1–46.3)
EGFRCR SERPLBLD CKD-EPI 2021: 88 ML/MIN/1.73M2 (ref 60–?)
EOSINOPHIL # BLD AUTO: 0 X10(3) UL (ref 0–0.7)
EOSINOPHIL NFR BLD AUTO: 0 %
ERYTHROCYTE [DISTWIDTH] IN BLOOD BY AUTOMATED COUNT: 14.9 % (ref 11–15)
GLUCOSE BLD-MCNC: 133 MG/DL (ref 70–99)
GLUCOSE BLDC GLUCOMTR-MCNC: 146 MG/DL (ref 70–99)
GLUCOSE BLDC GLUCOMTR-MCNC: 192 MG/DL (ref 70–99)
GLUCOSE BLDC GLUCOMTR-MCNC: 216 MG/DL (ref 70–99)
GLUCOSE BLDC GLUCOMTR-MCNC: 227 MG/DL (ref 70–99)
HCT VFR BLD AUTO: 37.8 %
HGB BLD-MCNC: 12.2 G/DL
IMM GRANULOCYTES # BLD AUTO: 0.15 X10(3) UL (ref 0–1)
IMM GRANULOCYTES NFR BLD: 0.8 %
LYMPHOCYTES # BLD AUTO: 1.27 X10(3) UL (ref 1–4)
LYMPHOCYTES NFR BLD AUTO: 7 %
MCH RBC QN AUTO: 29 PG (ref 26–34)
MCHC RBC AUTO-ENTMCNC: 32.3 G/DL (ref 31–37)
MCV RBC AUTO: 90 FL
MONOCYTES # BLD AUTO: 1.11 X10(3) UL (ref 0.1–1)
MONOCYTES NFR BLD AUTO: 6.1 %
NEUTROPHILS # BLD AUTO: 15.49 X10 (3) UL (ref 1.5–7.7)
NEUTROPHILS # BLD AUTO: 15.49 X10(3) UL (ref 1.5–7.7)
NEUTROPHILS NFR BLD AUTO: 85.9 %
OSMOLALITY SERPL CALC.SUM OF ELEC: 287 MOSM/KG (ref 275–295)
PLATELET # BLD AUTO: 260 10(3)UL (ref 150–450)
POTASSIUM SERPL-SCNC: 5.1 MMOL/L (ref 3.5–5.1)
RBC # BLD AUTO: 4.2 X10(6)UL
SODIUM SERPL-SCNC: 136 MMOL/L (ref 136–145)
WBC # BLD AUTO: 18.1 X10(3) UL (ref 4–11)

## 2025-03-22 PROCEDURE — 99233 SBSQ HOSP IP/OBS HIGH 50: CPT | Performed by: HOSPITALIST

## 2025-03-22 RX ORDER — FUROSEMIDE 40 MG/1
80 TABLET ORAL
Status: DISCONTINUED | OUTPATIENT
Start: 2025-03-22 | End: 2025-03-24

## 2025-03-22 RX ORDER — METHYLPREDNISOLONE SODIUM SUCCINATE 40 MG/ML
20 INJECTION INTRAMUSCULAR; INTRAVENOUS EVERY 12 HOURS
Status: DISCONTINUED | OUTPATIENT
Start: 2025-03-23 | End: 2025-03-24

## 2025-03-22 RX ORDER — ACETAZOLAMIDE 250 MG/1
500 TABLET ORAL DAILY
Status: DISCONTINUED | OUTPATIENT
Start: 2025-03-22 | End: 2025-03-24

## 2025-03-22 RX ORDER — ALBUTEROL SULFATE 90 UG/1
2 INHALANT RESPIRATORY (INHALATION) EVERY 4 HOURS PRN
Status: DISCONTINUED | OUTPATIENT
Start: 2025-03-22 | End: 2025-03-24

## 2025-03-22 RX ORDER — FLUTICASONE PROPIONATE AND SALMETEROL 500; 50 UG/1; UG/1
1 POWDER RESPIRATORY (INHALATION) 2 TIMES DAILY
Status: DISCONTINUED | OUTPATIENT
Start: 2025-03-22 | End: 2025-03-24

## 2025-03-22 NOTE — PROGRESS NOTES
Progress Note     Yesenia Berkowitz Patient Status:  Inpatient    1940 MRN [id number]   Location [facility] 5SW/SE Attending Rafael Parrish MD   Hosp Day # 2 PCP JO-ANN YANG MD     Chief Complaint: sob    Subjective:   S: Patient here with sob  Feels more short of breath today  Has lower extremity edema  Denies cp, fever dizziness  Worried about need for iospy    Review of Systems:   10 point ROS completed and was negative, except for pertinent positive and negatives stated in subjective.    Objective:   Vital signs:  Temp:  [97.7 °F (36.5 °C)-98.5 °F (36.9 °C)] 98.4 °F (36.9 °C)  Pulse:  [76-87] 82  Resp:  [16] 16  BP: (117-121)/(58-63) 121/63  SpO2:  [92 %-94 %] 94 %    Wt Readings from Last 6 Encounters:   25 150 lb 12.7 oz (68.4 kg)   25 145 lb 4.8 oz (65.9 kg)   25 145 lb 14.4 oz (66.2 kg)   25 143 lb 14.4 oz (65.3 kg)   25 156 lb (70.8 kg)   25 156 lb (70.8 kg)         Physical Exam:    General: No acute distress. Alert ,         Respiratory: poor air movement b/l  Cardiovascular: S1, S2. Regular rate and rhythm. No murmurs, rubs or gallops.   Abdomen: Soft, nontender, nondistended.  Positive bowel sounds. No rebound or guarding.  Neurologic: No focal neurological deficits.   Musculoskeletal: Moves all extremities.  Extremities: No edema.    Results:   Diagnostic Data:      Labs:    Labs Last 24 Hours:   BMP     CBC    Other     Na 136 Cl 100 BUN 20 Glu 133   Hb 12.2   PTT - Procal -   K 5.1 CO2 32.0 Cr 0.64   WBC 18.1 >< .0  INR - CRP -   Renal Lytes Endo    Hct 37.8   Trop - D dim -   eGFR - Ca 9.0 POC Gluc  216    LFT   pBNP - Lactic -   eGFR AA - PO4 - A1c -   AST - APk - Prot -  LDL -     Mg - TSH -   ALT - T katie - Alb -        COVID-19 Lab Results    COVID-19  Lab Results   Component Value Date    COVID19 Not Detected 2025    COVID19 Not Detected 2025    COVID19 Not Detected 2025       Pro-Calcitonin  Recent Labs   Lab  03/20/25  1118   PCT 0.10*       Cardiac  Recent Labs   Lab 03/20/25  1118   PBNP 276       Creatinine Kinase  No results for input(s): \"CK\" in the last 168 hours.    Inflammatory Markers  No results for input(s): \"CRP\", \"SONA\", \"LDH\", \"DDIMER\" in the last 168 hours.    Imaging: Imaging data reviewed in Epic.    Medications:    furosemide  80 mg Oral BID (Diuretic)    fluticasone-salmeterol  1 puff Inhalation BID    umeclidinium bromide  1 puff Inhalation Daily    acetaZOLAMIDE  500 mg Oral Daily    pantoprazole  40 mg Oral QAM AC    methylPREDNISolone  32 mg Intravenous Q12H    insulin aspart  1-7 Units Subcutaneous TID CC and HS    insulin degludec  15 Units Subcutaneous Nightly    enoxaparin  40 mg Subcutaneous Daily    azithromycin  500 mg Oral Daily    cefTRIAXone  2 g Intravenous Q24H    digoxin  125 mcg Oral Daily    dilTIAZem ER  360 mg Oral Daily    cetirizine  10 mg Oral Nightly    montelukast  10 mg Oral Nightly       Assessment & Plan:   ASSESSMENT / PLAN:     Acute on chronic respiratory failure  COPD exacerbation  Asthma exacerbation  Bilateral PNA  Phrenic nervie paralysis  -Imaging reviewed  -follow-up cultures showing NGTD  -Pulm on consult  -cont solumedrol 40mg iv  q 8 h  -wean as able  -Continue nebs, PPI  -Continue lasix and spironolactone  -wean o2 as tolerated  -Feeling more short of breath will resume inhalers and diuretics.    Hx of Afib  HTN  -Continue home meds  -Not on anticoagulation    Chronic HFpEF  -Imaging reviewed  -P-bnp 276  -Last EF 65-70%  -Strict Is and Os, daily weights  -Resume home Lasix    Goals of care conversation: Patient is clear that she does not want to be intubated we will add DNI order  She is interested in talking to palliative care for goals of care conversation and also symptom management options       Quality:  DVT Prophylaxis: lovenox  CODE status: full  García:    Central line: n/a  Dispo: further recs pending clinical course      Will the patient be referred to  TCC on discharge?: yes  Estimated date of discharge: tbd  Discharge is dependent on: clinical improvement  At this point Ms. Berkowitz is expected to be discharge to: Home    Plan of care discussed with patient, nursing    Outpatient records or previous hospital records reviewed.   Patient and/or patient's family given opportunity to ask questions and note understanding and agreeing with therapeutic plan as outlined     Coordinated care with providers and counseling re: treatment plan and workup    MDM: High complexity     NEGRITO KYLE MD    Supplementary Documentation:         **Certification      PHYSICIAN Certification of Need for Inpatient Hospitalization - Initial Certification    Patient will require inpatient services that will reasonably be expected to span two midnight's based on the clinical documentation in H+P.   Based on patients current state of illness, I anticipate that, after discharge, patient will require TBD.

## 2025-03-22 NOTE — PLAN OF CARE
Problem: Diabetes/Glucose Control  Goal: Glucose maintained within prescribed range  Description: INTERVENTIONS:- Monitor Blood Glucose as ordered- Assess for signs and symptoms of hyperglycemia and hypoglycemia- Administer ordered medications to maintain glucose within target range- Assess barriers to adequate nutritional intake and initiate nutrition consult as needed- Instruct patient on self management of diabetes  Outcome: Progressing     Problem: RESPIRATORY - ADULT  Goal: Achieves optimal ventilation and oxygenation  Description: INTERVENTIONS:- Assess for changes in respiratory status- Assess for changes in mentation and behavior- Position to facilitate oxygenation and minimize respiratory effort- Oxygen supplementation based on oxygen saturation or ABGs- Provide Smoking Cessation handout, if applicable- Encourage broncho-pulmonary hygiene including cough, deep breathe, Incentive Spirometry- Assess the need for suctioning and perform as needed- Assess and instruct to report SOB or any respiratory difficulty- Respiratory Therapy support as indicated- Manage/alleviate anxiety- Monitor for signs/symptoms of CO2 retention  Outcome: Progressing     Problem: PAIN - ADULT  Goal: Verbalizes/displays adequate comfort level or patient's stated pain goal  Description: INTERVENTIONS:- Encourage pt to monitor pain and request assistance- Assess pain using appropriate pain scale- Administer analgesics based on type and severity of pain and evaluate response- Implement non-pharmacological measures as appropriate and evaluate response- Consider cultural and social influences on pain and pain management- Manage/alleviate anxiety- Utilize distraction and/or relaxation techniques- Monitor for opioid side effects- Notify MD/LIP if interventions unsuccessful or patient reports new pain- Anticipate increased pain with activity and pre-medicate as appropriate  Outcome: Progressing

## 2025-03-22 NOTE — PLAN OF CARE
Problem: Patient Centered Care  Goal: Patient preferences are identified and integrated in the patient's plan of care  Description: Interventions:  - Provide timely, complete, and accurate information to patient/family- Incorporate patient and family knowledge, values, beliefs, and cultural backgrounds into the planning and delivery of care- Encourage patient/family to participate in care and decision-making at the level they choose- Honor patient and family perspectives and choices  Outcome: Progressing     Problem: Diabetes/Glucose Control  Goal: Glucose maintained within prescribed range  Description: INTERVENTIONS:- Monitor Blood Glucose as ordered- Assess for signs and symptoms of hyperglycemia and hypoglycemia- Administer ordered medications to maintain glucose within target range- Assess barriers to adequate nutritional intake and initiate nutrition consult as needed- Instruct patient on self management of diabetes  Outcome: Not Progressing     Problem: RESPIRATORY - ADULT  Goal: Achieves optimal ventilation and oxygenation  Description: INTERVENTIONS:- Assess for changes in respiratory status- Assess for changes in mentation and behavior- Position to facilitate oxygenation and minimize respiratory effort- Oxygen supplementation based on oxygen saturation or ABGs- Provide Smoking Cessation handout, if applicable- Encourage broncho-pulmonary hygiene including cough, deep breathe, Incentive Spirometry- Assess the need for suctioning and perform as needed- Assess and instruct to report SOB or any respiratory difficulty- Respiratory Therapy support as indicated- Manage/alleviate anxiety- Monitor for signs/symptoms of CO2 retention  Outcome: Progressing     Problem: RISK FOR INFECTION - ADULT  Goal: Absence of fever/infection during anticipated neutropenic period  Description: INTERVENTIONS- Monitor WBC- Administer growth factors as ordered- Implement neutropenic guidelines  Outcome: Progressing     Problem:  SAFETY ADULT - FALL  Goal: Free from fall injury  Description: INTERVENTIONS:- Assess pt frequently for physical needs- Identify cognitive and physical deficits and behaviors that affect risk of falls.- [place] fall precautions as indicated by assessment.- Educate pt/family on patient safety including physical limitations- Instruct pt to call for assistance with activity based on assessment- Modify environment to reduce risk of injury- Provide assistive devices as appropriate- Consider OT/PT consult to assist with strengthening/mobility- Encourage toileting schedule  Outcome: Progressing

## 2025-03-22 NOTE — PROGRESS NOTES
[facility]  part of [facility]    Progress Note    Yesenia Berkowitz Patient Status:  Inpatient    1940 MRN [id number]   Location [facility] 5SW/SE Attending Rafael Parrish MD   Hosp Day # 1 PCP JO-ANN YANG MD       Subjective:   Yesenia Berkowitz is a(n) 84 year old female admitted for dyspnea and leg edema.  She is feeling better. She tells me she is not on chronic steroid at home.    She has apparently had recurring admissions and frequent exacerbations since 2024.  She normally is on Trelegy, albuterol , and 4 L nc continuously  She lives alone but she has a son that does come and help her for a short time but most of the day she would be alone.  She has CT of the chest 2024 showing small bilateral free-flowing pleural effusions COPD and in the right upper lobe there was a 10 x 7 somewhat spiculated nodule concerning for malignancy.  Repeat noncontrast CT chest is shows spiculated 1.1cm nodule RUL.     Objective:   Blood pressure 114/79, pulse 75, temperature 98.1 °F (36.7 °C), temperature source Oral, resp. rate 18, height 5' 5\" (1.651 m), weight 150 lb 12.7 oz (68.4 kg), SpO2 93%.    Intake/Output Summary (Last 24 hours) at 3/21/2025 1906  Last data filed at 3/21/2025 1339  Gross per 24 hour   Intake 2180 ml   Output 1 ml   Net 2179 ml     General:aler t and WD speaking full sentences  HEENT: Moist mms  Neck: no jvd or acc ms use  Pulmonary/Chest: fair air w occ creps but min whz and no egophony  Cardiovascular: S1, S2 normal, no evident murmur  Extremities: extremities well perfused, no cyanosis w mild edema  Neuro: alert, no gross weakness of face or extremities    Results:     Lab Results   Component Value Date    WBC 9.9 2025    HGB 10.8 (L) 2025    HCT 34.5 (L) 2025    .0 2025    CREATSERUM 0.59 2025    BUN 19 2025     2025    K 4.2 2025    K 4.2 2025     2025    CO2 36.0 (H)  03/21/2025     (H) 03/21/2025    CA 8.8 03/21/2025    ALB 3.7 03/20/2025    ALKPHO 67 03/20/2025    BILT 0.3 03/20/2025    TP 6.2 03/20/2025    AST 11 03/20/2025    ALT 8 (L) 03/20/2025    PTT 28.1 03/20/2025    INR 1.08 11/25/2024    T4F 0.9 12/17/2024    TSH 0.185 (L) 12/17/2024    LIP 30 08/30/2024    DDIMER 0.47 12/21/2024    MG 1.9 12/16/2024    PHOS 3.4 12/26/2024    TROP <0.045 02/03/2020       XR CHEST AP PORTABLE  (CPT=71045)    Result Date: 3/20/2025  CONCLUSION:   1. Small right pleural effusion with associated right basilar/perihilar opacities, which appear similar when compared to 02/04/2025. 2. Redemonstrated elevation of the left hemidiaphragm with associated left basilar opacities, which may reflect atelectasis with or without superimposed pneumonia. 3. Lesser incidental findings described above.    Dictated by (CST): Joe Hayes MD on 3/20/2025 at 12:02 PM     Finalized by (CST): Joe Hayes MD on 3/20/2025 at 12:06 PM         EKG 12 Lead    Result Date: 3/20/2025  Normal sinus rhythm Possible Septal infarct (cited on or before 21-JAN-2025) Abnormal ECG When compared with ECG of 03-FEB-2025 23:07, No significant change was found Confirmed by SAGAR HANSEN JORDAN ([id number]) on 3/20/2025 12:23:26 PM       CT chest viewed    Assessment and Plan:   Principal Problem:    Shortness of breath  Active Problems:    COPD exacerbation (HCC)         DIAGNOSTIC IMPRESSION:    1.       Acute on chronic respiratory failure.  2.       Chronic obstructive pulmonary disease and asthma with exacerbation.  3.       Enlarging spiculated RUL 1.1 cm nodule that was 0.9 cm in November 2024  Small R > L pleural effusion  4.       Left phrenic nerve paralysis.  5.       Kyphoscoliosis.  6.       Obesity hypoventilation syndrome.  7.       Cor pulmonale.  8.       Chronic prednisone 10 mg daily  9.       hyperglycemia     SUGGESTIONS AND RECOMMENDATIONS:    1.       Taper Solumedrol  2.       DuoNeb q.i.d. and  p.r.n.   3.       Protonix 40 mg a day.    4.       Previously prescribed BiPAP 13/5 at night but now pt declines  6.       Rocephin and Zithromax  7.       Discussed w pt and son, Tyler need for outpt PET  scan and then likely biopsy of lung nodule if possibility of cancer      OLIMPIA JONES MD  3/21/2025

## 2025-03-23 LAB
ANION GAP SERPL CALC-SCNC: 6 MMOL/L (ref 0–18)
BASOPHILS # BLD AUTO: 0.01 X10(3) UL (ref 0–0.2)
BASOPHILS NFR BLD AUTO: 0.1 %
BUN BLD-MCNC: 29 MG/DL (ref 9–23)
BUN/CREAT SERPL: 43.3 (ref 10–20)
CALCIUM BLD-MCNC: 8.4 MG/DL (ref 8.7–10.4)
CHLORIDE SERPL-SCNC: 98 MMOL/L (ref 98–112)
CO2 SERPL-SCNC: 35 MMOL/L (ref 21–32)
CREAT BLD-MCNC: 0.67 MG/DL
DEPRECATED RDW RBC AUTO: 49.1 FL (ref 35.1–46.3)
EGFRCR SERPLBLD CKD-EPI 2021: 87 ML/MIN/1.73M2 (ref 60–?)
EOSINOPHIL # BLD AUTO: 0 X10(3) UL (ref 0–0.7)
EOSINOPHIL NFR BLD AUTO: 0 %
ERYTHROCYTE [DISTWIDTH] IN BLOOD BY AUTOMATED COUNT: 14.8 % (ref 11–15)
GLUCOSE BLD-MCNC: 193 MG/DL (ref 70–99)
GLUCOSE BLDC GLUCOMTR-MCNC: 157 MG/DL (ref 70–99)
GLUCOSE BLDC GLUCOMTR-MCNC: 165 MG/DL (ref 70–99)
GLUCOSE BLDC GLUCOMTR-MCNC: 232 MG/DL (ref 70–99)
GLUCOSE BLDC GLUCOMTR-MCNC: 362 MG/DL (ref 70–99)
HCT VFR BLD AUTO: 34.1 %
HGB BLD-MCNC: 10.5 G/DL
IMM GRANULOCYTES # BLD AUTO: 0.09 X10(3) UL (ref 0–1)
IMM GRANULOCYTES NFR BLD: 0.8 %
LYMPHOCYTES # BLD AUTO: 0.56 X10(3) UL (ref 1–4)
LYMPHOCYTES NFR BLD AUTO: 5 %
MAGNESIUM SERPL-MCNC: 2.2 MG/DL (ref 1.6–2.6)
MCH RBC QN AUTO: 27.9 PG (ref 26–34)
MCHC RBC AUTO-ENTMCNC: 30.8 G/DL (ref 31–37)
MCV RBC AUTO: 90.5 FL
MONOCYTES # BLD AUTO: 0.51 X10(3) UL (ref 0.1–1)
MONOCYTES NFR BLD AUTO: 4.5 %
NEUTROPHILS # BLD AUTO: 10.13 X10 (3) UL (ref 1.5–7.7)
NEUTROPHILS # BLD AUTO: 10.13 X10(3) UL (ref 1.5–7.7)
NEUTROPHILS NFR BLD AUTO: 89.6 %
OSMOLALITY SERPL CALC.SUM OF ELEC: 299 MOSM/KG (ref 275–295)
PLATELET # BLD AUTO: 278 10(3)UL (ref 150–450)
POTASSIUM SERPL-SCNC: 3.8 MMOL/L (ref 3.5–5.1)
RBC # BLD AUTO: 3.77 X10(6)UL
SODIUM SERPL-SCNC: 139 MMOL/L (ref 136–145)
WBC # BLD AUTO: 11.3 X10(3) UL (ref 4–11)

## 2025-03-23 PROCEDURE — 99222 1ST HOSP IP/OBS MODERATE 55: CPT | Performed by: NURSE PRACTITIONER

## 2025-03-23 PROCEDURE — 99233 SBSQ HOSP IP/OBS HIGH 50: CPT | Performed by: HOSPITALIST

## 2025-03-23 RX ORDER — FUROSEMIDE 10 MG/ML
20 INJECTION INTRAMUSCULAR; INTRAVENOUS ONCE
Status: COMPLETED | OUTPATIENT
Start: 2025-03-23 | End: 2025-03-23

## 2025-03-23 NOTE — PROGRESS NOTES
[facility]  part of [facility]    Progress Note    Yesenia Berkowitz Patient Status:  Inpatient    1940 MRN [id number]   Location [facility] 5SW/SE Attending Rafael Parrish MD   Hosp Day # 2 PCP JO-ANN YANG MD       Subjective:   Yesenia Berkowitz is a(n) 84 year old female admitted for dyspnea and leg edema.  Although she was breathing better yesterday this morning she said she was very short of breath.  She said she believes that the diuretic she received today were helpful for her.  She is breathing much better tonight.  She remains on 3 to 4 L nasal cannula.    History:  She has apparently had recurring admissions and frequent exacerbations since 2024.  She normally is on Trelegy, albuterol , and 4 L nc continuously  She lives alone but she has a son that does come and help her for a short time but most of the day she would be alone.  She has CT of the chest 2024 showing small bilateral free-flowing pleural effusions COPD and in the right upper lobe there was a 10 x 7 somewhat spiculated nodule concerning for malignancy.  Repeat noncontrast CT chest is shows spiculated 1.1cm nodule RUL.     Objective:   Blood pressure 133/60, pulse 82, temperature 97.7 °F (36.5 °C), temperature source Oral, resp. rate 18, height 5' 5\" (1.651 m), weight 150 lb 12.7 oz (68.4 kg), SpO2 97%.    Intake/Output Summary (Last 24 hours) at 3/22/2025 2151  Last data filed at 3/22/2025 1620  Gross per 24 hour   Intake 840 ml   Output --   Net 840 ml     General:aler t and WD speaking full sentences  HEENT: Moist mms  Neck: no jvd or acc ms use  Pulmonary/Chest: Fairly good air exchange all lung fields without crackles wheezes or egophony  Cardiovascular: S1, S2 normal, no evident murmur  Extremities: extremities well perfused, no cyanosis w mild bilateral leg edema  Neuro: alert, no gross weakness of face or extremities    Results:     Lab Results   Component Value Date    WBC 18.1 (H)  03/22/2025    HGB 12.2 03/22/2025    HCT 37.8 03/22/2025    .0 03/22/2025    CREATSERUM 0.64 03/22/2025    BUN 20 03/22/2025     03/22/2025    K 5.1 03/22/2025     03/22/2025    CO2 32.0 03/22/2025     (H) 03/22/2025    CA 9.0 03/22/2025    ALB 3.7 03/20/2025    ALKPHO 67 03/20/2025    BILT 0.3 03/20/2025    TP 6.2 03/20/2025    AST 11 03/20/2025    ALT 8 (L) 03/20/2025    PTT 28.1 03/20/2025    INR 1.08 11/25/2024    T4F 0.9 12/17/2024    TSH 0.185 (L) 12/17/2024    LIP 30 08/30/2024    DDIMER 0.47 12/21/2024    MG 1.9 12/16/2024    PHOS 3.4 12/26/2024    TROP <0.045 02/03/2020       No results found.          CT chest viewed    Assessment and Plan:   Principal Problem:    Shortness of breath  Active Problems:    COPD exacerbation (HCC)         DIAGNOSTIC IMPRESSION:    1.       Acute on chronic hypoxemic respiratory failure.  2.       Chronic obstructive pulmonary disease and asthma with exacerbation.  3.       Enlarging spiculated RUL 1.1 cm nodule that was 0.9 cm in November 2024  Small R > L pleural effusion  4.       Left phrenic nerve paralysis.  5.       Kyphoscoliosis.  6.       Obesity hypoventilation syndrome.  7.       Cor pulmonale.  8.       Chronic prednisone 10 mg daily so we will taper back to this chronic dose  9.       hyperglycemia     SUGGESTIONS AND RECOMMENDATIONS:    1.       Taper Solumedrol 20 every 12  2.       DuoNeb q.i.d. and p.r.n.   3.       Protonix 40 mg a day.    4.       Previously prescribed BiPAP 13/5 at night but now pt declines  6.       Rocephin and Zithromax  7.       I reminded Yesenia she will need outpt PET  scan and then likely biopsy of lung nodule if possibility of cancer      OLIMPIA JONES MD  3/21/2025

## 2025-03-23 NOTE — PROGRESS NOTES
Progress Note     Yesenia Berkowitz Patient Status:  Inpatient    1940 MRN [id number]   Location [facility] 5SW/SE Attending Rafael Parrish MD   Hosp Day # 3 PCP JO-ANN YANG MD     Chief Complaint: sob    Subjective:   S: Patient here with sob  Feels more short of breath today  Has lower extremity edema, no better after starting lasix  Denies cp, fever dizziness      Review of Systems:   10 point ROS completed and was negative, except for pertinent positive and negatives stated in subjective.    Objective:   Vital signs:  Temp:  [97.7 °F (36.5 °C)-98.6 °F (37 °C)] 97.8 °F (36.6 °C)  Pulse:  [67-78] 78  Resp:  [18-20] 18  BP: (111-131)/(33-65) 111/33  SpO2:  [94 %-96 %] 95 %    Wt Readings from Last 6 Encounters:   25 150 lb 12.7 oz (68.4 kg)   25 145 lb 4.8 oz (65.9 kg)   25 145 lb 14.4 oz (66.2 kg)   25 143 lb 14.4 oz (65.3 kg)   25 156 lb (70.8 kg)   25 156 lb (70.8 kg)         Physical Exam:    General: No acute distress. Alert ,         Respiratory: poor air movement b/l  Cardiovascular: S1, S2. Regular rate and rhythm. No murmurs, rubs or gallops.   Abdomen: Soft, nontender, nondistended.  Positive bowel sounds. No rebound or guarding.  Neurologic: No focal neurological deficits.   Musculoskeletal: Moves all extremities.  Extremities: No edema.    Results:   Diagnostic Data:      Labs:    Labs Last 24 Hours:   BMP     CBC    Other     Na 139 Cl 98 BUN 29 Glu 193   Hb 10.5   PTT - Procal -   K 3.8 CO2 35.0 Cr 0.67   WBC 11.3 >< .0  INR - CRP -   Renal Lytes Endo    Hct 34.1   Trop - D dim -   eGFR - Ca 8.4 POC Gluc  362    LFT   pBNP - Lactic -   eGFR AA - PO4 - A1c -   AST - APk - Prot -  LDL -     Mg 2.2 TSH -   ALT - T katie - Alb -        COVID-19 Lab Results    COVID-19  Lab Results   Component Value Date    COVID19 Not Detected 2025    COVID19 Not Detected 2025    COVID19 Not Detected 2025       Pro-Calcitonin  Recent Labs   Lab  03/20/25  1118   PCT 0.10*       Cardiac  Recent Labs   Lab 03/20/25  1118   PBNP 276       Creatinine Kinase  No results for input(s): \"CK\" in the last 168 hours.    Inflammatory Markers  No results for input(s): \"CRP\", \"SONA\", \"LDH\", \"DDIMER\" in the last 168 hours.    Imaging: Imaging data reviewed in Epic.    Medications:    furosemide  20 mg Intravenous Once    furosemide  80 mg Oral BID (Diuretic)    fluticasone-salmeterol  1 puff Inhalation BID    umeclidinium bromide  1 puff Inhalation Daily    acetaZOLAMIDE  500 mg Oral Daily    methylPREDNISolone  20 mg Intravenous Q12H    pantoprazole  40 mg Oral QAM AC    insulin aspart  1-7 Units Subcutaneous TID CC and HS    insulin degludec  15 Units Subcutaneous Nightly    enoxaparin  40 mg Subcutaneous Daily    cefTRIAXone  2 g Intravenous Q24H    digoxin  125 mcg Oral Daily    dilTIAZem ER  360 mg Oral Daily    cetirizine  10 mg Oral Nightly    montelukast  10 mg Oral Nightly       Assessment & Plan:   ASSESSMENT / PLAN:     Acute on chronic respiratory failure  COPD exacerbation  Asthma exacerbation  Bilateral PNA  Phrenic nervie paralysis  -Imaging reviewed  -follow-up cultures showing NGTD  -Pulm on consult  -cont solumedrol 40mg iv  q 8 h--> bid  --> 20mg bid  -wean as able  -Continue nebs, PPI  -Continue lasix and spironolactone  -wean o2 as tolerated  -Feeling more short of breath will resume inhalers and diuretics.  -added extra dose of lasix today    Hx of Afib  HTN  -Continue home meds  -Not on anticoagulation    Chronic HFpEF  -Imaging reviewed  -P-bnp 276  -Last EF 65-70%  -Strict Is and Os, daily weights  -Resume home Lasix    Goals of care conversation: Patient is clear that she does not want to be intubated we will add DNI order  She is interested in talking to palliative care for goals of care conversation and also symptom management options       Quality:  DVT Prophylaxis: lovenox  CODE status: full  García:    Central line: n/a  Dispo: further recs  pending clinical course      Will the patient be referred to TCC on discharge?: yes  Estimated date of discharge: tbd  Discharge is dependent on: clinical improvement  At this point Ms. Berkowitz is expected to be discharge to: Home    Plan of care discussed with patient, nursing    Outpatient records or previous hospital records reviewed.   Patient and/or patient's family given opportunity to ask questions and note understanding and agreeing with therapeutic plan as outlined     Coordinated care with providers and counseling re: treatment plan and workup    MDM: High complexity     NEGRITO KYLE MD    Supplementary Documentation:         **Certification      PHYSICIAN Certification of Need for Inpatient Hospitalization - Initial Certification    Patient will require inpatient services that will reasonably be expected to span two midnight's based on the clinical documentation in H+P.   Based on patients current state of illness, I anticipate that, after discharge, patient will require TBD.

## 2025-03-23 NOTE — PLAN OF CARE
Problem: Patient Centered Care  Goal: Patient preferences are identified and integrated in the patient's plan of care  Description: Interventions:- What would you like us to know as we care for you? - Provide timely, complete, and accurate information to patient/family- Incorporate patient and family knowledge, values, beliefs, and cultural backgrounds into the planning and delivery of care- Encourage patient/family to participate in care and decision-making at the level they choose- Honor patient and family perspectives and choices  Outcome: Progressing     Problem: Diabetes/Glucose Control  Goal: Glucose maintained within prescribed range  Description: INTERVENTIONS:- Monitor Blood Glucose as ordered- Assess for signs and symptoms of hyperglycemia and hypoglycemia- Administer ordered medications to maintain glucose within target range- Assess barriers to adequate nutritional intake and initiate nutrition consult as needed- Instruct patient on self management of diabetes  Outcome: Progressing     Problem: RESPIRATORY - ADULT  Goal: Achieves optimal ventilation and oxygenation  Description: INTERVENTIONS:- Assess for changes in respiratory status- Assess for changes in mentation and behavior- Position to facilitate oxygenation and minimize respiratory effort- Oxygen supplementation based on oxygen saturation or ABGs- Provide Smoking Cessation handout, if applicable- Encourage broncho-pulmonary hygiene including cough, deep breathe, Incentive Spirometry- Assess the need for suctioning and perform as needed- Assess and instruct to report SOB or any respiratory difficulty- Respiratory Therapy support as indicated- Manage/alleviate anxiety- Monitor for signs/symptoms of CO2 retention  Outcome: Progressing     Problem: PAIN - ADULT  Goal: Verbalizes/displays adequate comfort level or patient's stated pain goal  Description: INTERVENTIONS:- Encourage pt to monitor pain and request assistance- Assess pain using  appropriate pain scale- Administer analgesics based on type and severity of pain and evaluate response- Implement non-pharmacological measures as appropriate and evaluate response- Consider cultural and social influences on pain and pain management- Manage/alleviate anxiety- Utilize distraction and/or relaxation techniques- Monitor for opioid side effects- Notify MD/LIP if interventions unsuccessful or patient reports new pain- Anticipate increased pain with activity and pre-medicate as appropriate  Outcome: Progressing     Problem: RISK FOR INFECTION - ADULT  Goal: Absence of fever/infection during anticipated neutropenic period  Description: INTERVENTIONS- Monitor WBC- Administer growth factors as ordered- Implement neutropenic guidelines  Outcome: Progressing     Problem: SAFETY ADULT - FALL  Goal: Free from fall injury  Description: INTERVENTIONS:- Assess pt frequently for physical needs- Identify cognitive and physical deficits and behaviors that affect risk of falls.- [place] fall precautions as indicated by assessment.- Educate pt/family on patient safety including physical limitations- Instruct pt to call for assistance with activity based on assessment- Modify environment to reduce risk of injury- Provide assistive devices as appropriate- Consider OT/PT consult to assist with strengthening/mobility- Encourage toileting schedule  Outcome: Progressing     Problem: DISCHARGE PLANNING  Goal: Discharge to home or other facility with appropriate resources  Description: INTERVENTIONS:- Identify barriers to discharge w/pt and caregiver- Include patient/family/discharge partner in discharge planning- Arrange for needed discharge resources and transportation as appropriate- Identify discharge learning needs (meds, wound care, etc)- Arrange for interpreters to assist at discharge as needed- Consider post-discharge preferences of patient/family/discharge partner- Complete POLST form as appropriate- Assess patient's  ability to be responsible for managing their own health- Refer to Case Management Department for coordinating discharge planning if the patient needs post-hospital services based on physician/LIP order or complex needs related to functional status, cognitive ability or social support system  Outcome: Progressing

## 2025-03-24 ENCOUNTER — TELEPHONE (OUTPATIENT)
Dept: PULMONOLOGY | Facility: HOSPITAL | Age: 85
End: 2025-03-24

## 2025-03-24 VITALS
OXYGEN SATURATION: 97 % | HEIGHT: 65 IN | RESPIRATION RATE: 20 BRPM | HEART RATE: 73 BPM | DIASTOLIC BLOOD PRESSURE: 65 MMHG | BODY MASS INDEX: 25.13 KG/M2 | TEMPERATURE: 98 F | SYSTOLIC BLOOD PRESSURE: 134 MMHG | WEIGHT: 150.81 LBS

## 2025-03-24 DIAGNOSIS — R91.1 LUNG NODULE: Primary | ICD-10-CM

## 2025-03-24 LAB
ANION GAP SERPL CALC-SCNC: 6 MMOL/L (ref 0–18)
BASOPHILS # BLD AUTO: 0.02 X10(3) UL (ref 0–0.2)
BASOPHILS NFR BLD AUTO: 0.2 %
BUN BLD-MCNC: 33 MG/DL (ref 9–23)
BUN/CREAT SERPL: 46.5 (ref 10–20)
CALCIUM BLD-MCNC: 8 MG/DL (ref 8.7–10.4)
CHLORIDE SERPL-SCNC: 98 MMOL/L (ref 98–112)
CO2 SERPL-SCNC: 35 MMOL/L (ref 21–32)
CREAT BLD-MCNC: 0.71 MG/DL
DEPRECATED RDW RBC AUTO: 49 FL (ref 35.1–46.3)
EGFRCR SERPLBLD CKD-EPI 2021: 85 ML/MIN/1.73M2 (ref 60–?)
EOSINOPHIL # BLD AUTO: 0 X10(3) UL (ref 0–0.7)
EOSINOPHIL NFR BLD AUTO: 0 %
ERYTHROCYTE [DISTWIDTH] IN BLOOD BY AUTOMATED COUNT: 14.9 % (ref 11–15)
GLUCOSE BLD-MCNC: 240 MG/DL (ref 70–99)
GLUCOSE BLDC GLUCOMTR-MCNC: 206 MG/DL (ref 70–99)
GLUCOSE BLDC GLUCOMTR-MCNC: 240 MG/DL (ref 70–99)
HCT VFR BLD AUTO: 33.4 %
HGB BLD-MCNC: 10.3 G/DL
IMM GRANULOCYTES # BLD AUTO: 0.12 X10(3) UL (ref 0–1)
IMM GRANULOCYTES NFR BLD: 0.9 %
LYMPHOCYTES # BLD AUTO: 0.8 X10(3) UL (ref 1–4)
LYMPHOCYTES NFR BLD AUTO: 6.2 %
MAGNESIUM SERPL-MCNC: 2.1 MG/DL (ref 1.6–2.6)
MCH RBC QN AUTO: 27.8 PG (ref 26–34)
MCHC RBC AUTO-ENTMCNC: 30.8 G/DL (ref 31–37)
MCV RBC AUTO: 90 FL
MONOCYTES # BLD AUTO: 1.01 X10(3) UL (ref 0.1–1)
MONOCYTES NFR BLD AUTO: 7.8 %
NEUTROPHILS # BLD AUTO: 10.93 X10 (3) UL (ref 1.5–7.7)
NEUTROPHILS # BLD AUTO: 10.93 X10(3) UL (ref 1.5–7.7)
NEUTROPHILS NFR BLD AUTO: 84.9 %
OSMOLALITY SERPL CALC.SUM OF ELEC: 303 MOSM/KG (ref 275–295)
PLATELET # BLD AUTO: 279 10(3)UL (ref 150–450)
POTASSIUM SERPL-SCNC: 3.7 MMOL/L (ref 3.5–5.1)
RBC # BLD AUTO: 3.71 X10(6)UL
SODIUM SERPL-SCNC: 139 MMOL/L (ref 136–145)
WBC # BLD AUTO: 12.9 X10(3) UL (ref 4–11)

## 2025-03-24 PROCEDURE — 99239 HOSP IP/OBS DSCHRG MGMT >30: CPT | Performed by: HOSPITALIST

## 2025-03-24 RX ORDER — MORPHINE SULFATE 10 MG/5ML
2.5 SOLUTION ORAL 3 TIMES DAILY PRN
Status: DISCONTINUED | OUTPATIENT
Start: 2025-03-24 | End: 2025-03-24

## 2025-03-24 RX ORDER — MORPHINE SULFATE 10 MG/5ML
2.6 SOLUTION ORAL 3 TIMES DAILY PRN
Qty: 60 ML | Refills: 0 | Status: SHIPPED | OUTPATIENT
Start: 2025-03-24

## 2025-03-24 RX ORDER — BENZONATATE 200 MG/1
200 CAPSULE ORAL 3 TIMES DAILY PRN
Qty: 30 CAPSULE | Refills: 0 | Status: SHIPPED | OUTPATIENT
Start: 2025-03-24

## 2025-03-24 RX ORDER — PREDNISONE 10 MG/1
TABLET ORAL
Qty: 9 TABLET | Refills: 0 | Status: SHIPPED | OUTPATIENT
Start: 2025-03-24 | End: 2025-03-30

## 2025-03-24 NOTE — PROGRESS NOTES
[facility]  part of [facility]    Progress Note    Yesenia Berkowitz Patient Status:  Inpatient    1940 MRN [id number]   Location [facility] 5SW/SE Attending Rafael Parrish MD   Hosp Day # 3 PCP JO-ANN YANG MD       Subjective:   Yesenia Berkowitz is a(n) 84 year old female admitted for dyspnea and leg edema.    The patient had a much better day without severe shortness of breath like yesterday.  Again she believes that diuretics were helpful in relieving yesterday's dyspnea.  Solu-Medrol dose 20 every 12.  Discontinue and switch to prednisone in the morning.    History:  She has apparently had recurring admissions and frequent exacerbations since 2024.  She normally is on Trelegy, albuterol , and 4 L nc continuously  She lives alone but she has a son that does come and help her for a short time but most of the day she would be alone.  She has CT of the chest 2024 showing small bilateral free-flowing pleural effusions COPD and in the right upper lobe there was a 10 x 7 somewhat spiculated nodule concerning for malignancy.  Repeat noncontrast CT chest is shows spiculated 1.1cm nodule RUL.     Objective:   Blood pressure 110/58, pulse 70, temperature 97.8 °F (36.6 °C), temperature source Oral, resp. rate 18, height 5' 5\" (1.651 m), weight 150 lb 12.7 oz (68.4 kg), SpO2 95%.    Intake/Output Summary (Last 24 hours) at 3/23/2025 1920  Last data filed at 3/23/2025 1542  Gross per 24 hour   Intake 750 ml   Output --   Net 750 ml     General:aler t and WD speaking full sentences  HEENT: Moist mms  Neck: no jvd or acc ms use  Pulmonary/Chest: Fairly good air exchange all lung fields without crackles wheezes or egophony  Cardiovascular: S1, S2 normal, no evident murmur  Extremities: extremities well perfused, no cyanosis w mild bilateral leg edema  Neuro: alert, no gross weakness of face or extremities    Results:     Lab Results   Component Value Date    WBC 11.3 (H)  03/23/2025    HGB 10.5 (L) 03/23/2025    HCT 34.1 (L) 03/23/2025    .0 03/23/2025    CREATSERUM 0.67 03/23/2025    BUN 29 (H) 03/23/2025     03/23/2025    K 3.8 03/23/2025    CL 98 03/23/2025    CO2 35.0 (H) 03/23/2025     (H) 03/23/2025    CA 8.4 (L) 03/23/2025    ALB 3.7 03/20/2025    ALKPHO 67 03/20/2025    BILT 0.3 03/20/2025    TP 6.2 03/20/2025    AST 11 03/20/2025    ALT 8 (L) 03/20/2025    PTT 28.1 03/20/2025    INR 1.08 11/25/2024    T4F 0.9 12/17/2024    TSH 0.185 (L) 12/17/2024    LIP 30 08/30/2024    DDIMER 0.47 12/21/2024    MG 2.2 03/23/2025    PHOS 3.4 12/26/2024    TROP <0.045 02/03/2020       No results found.          CT chest viewed    Assessment and Plan:   Principal Problem:    Shortness of breath  Active Problems:    COPD exacerbation (HCC)         DIAGNOSTIC IMPRESSION:    1.       Acute on chronic hypoxemic respiratory failure.  2.       Chronic obstructive pulmonary disease and asthma with exacerbation.  3.       Enlarging spiculated RUL 1.1 cm nodule that was 0.9 cm in November 2024  Small R > L pleural effusion  4.       Left phrenic nerve paralysis.  5.       Kyphoscoliosis.  6.       Obesity hypoventilation syndrome.  7.       Cor pulmonale.  8.       Chronic prednisone 10 mg daily so we will taper back to this chronic dose  9.       hyperglycemia     SUGGESTIONS AND RECOMMENDATIONS:    1.       Discontinue Solumedrol 20 every 12 and start prednisone taper in the morning  2.       DuoNeb q.i.d. and p.r.n.   3.       Protonix 40 mg a day.    4.       Previously prescribed BiPAP 13/5 at night but now pt declines  6.       Rocephin and Zithromax  7.       I reminded Yesenia and son, Tyler that she will need outpt PET  scan  for this suspicious 1.2 cm nodule and then likely biopsy of lung nodule if possibility of cancer  8.        If stable, possibly discharge tomorrow morning      OLIMPIA JONES MD  3/21/2025

## 2025-03-24 NOTE — CM/SW NOTE
03/24/25 1300   CM/SW Referral Data   Referral Source Nurse   Medical Hx   Does patient have an established PCP? Yes   Patient Info   Patient's Current Mental Status at Time of Assessment Alert;Oriented   Patient's Home Environment House   Patient lives with Alone   Patient Status Prior to Admission   Independent with ADLs and Mobility No   Pt. requires assistance with Housework;Driving   Services in place prior to admission Home Health Care   Discharge Needs   Anticipated D/C needs Palliative care     Sw received MDO for [facility]PC. SW met with patient who provided above information. Patient lives address on file. Patient reports that she is able to things on her own. Her son does her groceries, transportation. Patient receives services twice a week through the [facility] for  services. Patient is has hx of HH and MICAH. Patient has a walker, home oxygen- [employer] at home. SW asked about CPC. Patient is agreeable to referrals being sent. SW sent CPC referrals via aidin, CPC order is attached.          03/24/25 1500   Discharge disposition   Expected discharge disposition Home or Self   Additional Home Care/Hospice Provider [facility]  (residential pallaitive)   DME/Infusion Providers Other (comment)  ([facility])   Discharge transportation Private car     Patient received MDO for discharge. Patient is current with [facility] for HH services. Patient has home oxygen through [employer]. Sw asked [facility] to follow up with patient in the community.     SW/CM to remain available for support and/or discharge planning.     Angela Smith, MSW, LSW   x [phone]

## 2025-03-24 NOTE — PLAN OF CARE
Problem: Patient Centered Care  Goal: Patient preferences are identified and integrated in the patient's plan of care  Description: Interventions:- What would you like us to know as we care for you?home with spouse   Provide timely, complete, and accurate information to patient/family- Incorporate patient and family knowledge, values, beliefs, and cultural backgrounds into the planning and delivery of care- Encourage patient/family to participate in care and decision-making at the level they choose- Honor patient and family perspectives and choices  Outcome: Progressing     Problem: Diabetes/Glucose Control  Goal: Glucose maintained within prescribed range  Description: INTERVENTIONS:- Monitor Blood Glucose as ordered- Assess for signs and symptoms of hyperglycemia and hypoglycemia- Administer ordered medications to maintain glucose within target range- Assess barriers to adequate nutritional intake and initiate nutrition consult as needed- Instruct patient on self management of diabetes  Outcome: Progressing     Problem: RESPIRATORY - ADULT  Goal: Achieves optimal ventilation and oxygenation  Description: INTERVENTIONS:- Assess for changes in respiratory status- Assess for changes in mentation and behavior- Position to facilitate oxygenation and minimize respiratory effort- Oxygen supplementation based on oxygen saturation or ABGs- Provide Smoking Cessation handout, if applicable- Encourage broncho-pulmonary hygiene including cough, deep breathe, Incentive Spirometry- Assess the need for suctioning and perform as needed- Assess and instruct to report SOB or any respiratory difficulty- Respiratory Therapy support as indicated- Manage/alleviate anxiety- Monitor for signs/symptoms of CO2 retention  Outcome: Progressing     Problem: PAIN - ADULT  Goal: Verbalizes/displays adequate comfort level or patient's stated pain goal  Description: INTERVENTIONS:- Encourage pt to monitor pain and request assistance- Assess pain  using appropriate pain scale- Administer analgesics based on type and severity of pain and evaluate response- Implement non-pharmacological measures as appropriate and evaluate response- Consider cultural and social influences on pain and pain management- Manage/alleviate anxiety- Utilize distraction and/or relaxation techniques- Monitor for opioid side effects- Notify MD/LIP if interventions unsuccessful or patient reports new pain- Anticipate increased pain with activity and pre-medicate as appropriate  Outcome: Progressing     Problem: RISK FOR INFECTION - ADULT  Goal: Absence of fever/infection during anticipated neutropenic period  Description: INTERVENTIONS- Monitor WBC- Administer growth factors as ordered- Implement neutropenic guidelines  Outcome: Progressing     Problem: SAFETY ADULT - FALL  Goal: Free from fall injury  Description: INTERVENTIONS:- Assess pt frequently for physical needs- Identify cognitive and physical deficits and behaviors that affect risk of falls.- [facility] fall precautions as indicated by assessment.- Educate pt/family on patient safety including physical limitations- Instruct pt to call for assistance with activity based on assessment- Modify environment to reduce risk of injury- Provide assistive devices as appropriate- Consider OT/PT consult to assist with strengthening/mobility- Encourage toileting schedule  Outcome: Progressing     Problem: DISCHARGE PLANNING  Goal: Discharge to home or other facility with appropriate resources  Description: INTERVENTIONS:- Identify barriers to discharge w/pt and caregiver- Include patient/family/discharge partner in discharge planning- Arrange for needed discharge resources and transportation as appropriate- Identify discharge learning needs (meds, wound care, etc)- Arrange for interpreters to assist at discharge as needed- Consider post-discharge preferences of patient/family/discharge partner- Complete POLST form as appropriate- Assess  patient's ability to be responsible for managing their own health- Refer to Case Management Department for coordinating discharge planning if the patient needs post-hospital services based on physician/LIP order or complex needs related to functional status, cognitive ability or social support system  Outcome: Progressing

## 2025-03-24 NOTE — DISCHARGE INSTRUCTIONS
Follow-up with PCP in 1 to 2 weeks  Follow-up with Dr. Ruelas as instructed follow-up with  Plan to be set up for pulmonary rehabilitation as an outpatient  Plan order for PET scan after discharge per pulmonology  Follow-up with Dr. Ruelas within a week    PALLIATIVE CARE DISCHARGE INSTRUCTIONS  3/24/25  I sent in prescription to your [facility] for oral solution morphine   May take Morphine 2.6 mg by mouth up to 3 times per day as needed for shortness of breath.      > If you have a day you need to do things.  May want to try to take to help you do better with your breathing. First few doses I would make sure  family is around when you try and stay around home to see how you do with it.  Even the lowest doses can give you constipation , so I included info below how to treat. Please call with any questions before community palliative care gets started to see you. My  contact info is   RAFI Bellamy, CNP In Palliative Care  at [facility]   [phone]    They may have to order the medication so may need to wait to get it. I also sent in order for [facility] community palliative care, so  they will call to set up time to meet             CONSTIPATION (related to pain medications - called opioid induced constipation)    Constipation is a common symptom especially in those who need to take opioid pain medications . Constipation is an expected outcome when taking opioid pain medications and can be prevented . A person develops tolerance to some opioid related side effects like (drowsiness or nausea and vomiting) but no develop tolerance to constipation side effect).       Constipation can mean different things to different people. By definition,  it can mean stools too hard or too small, having a bowel movement (BM) is too difficult or too infrequent, or can mean change in one's normal bowel movement (BM) pattern.     Main goal is to focus on PREVENTION of constipation first and then treatment  second!     Things to keep track of some of the following:    Frequency and pattern of your BMs  Stool volume (small, medium, large etc.)  Stool consistency ( what does it look like) formed soft , hard etc. ( However, you want to describe it)  Stool color and odor  Presence of blood and/or mucous with BM  Are you passing gas ( medical term is flatulence) ?  Do you have abdominal pain or discomfort?   Do you feel need to go and not able to do so/ nothing comes out. Is there associated straining, pain or abdominal cramping when you try to have BM?    Preventive measures like dietary changes can be helpful but usually not enough by themselves to manage constipation from pain medications. One usually needs medications (like taking a preventative daily laxative) when needing pain medications to control your pain . Below is a suggested plan to help prevent and treat constipation if it develops while taking opioid pain medications.     PROTOCOL    > START with taking Senna (8.6 mg)  2 tabs at bedtime.   > If no BM in 24 to max 48 hours,  Increase Senna (8.6 mg) tabs by taking 2 - 3 tabs 2 times per day (morning and night)    > If this does not work (no BM in next 24 to max 48 hours, after above increase Senna: Take Senna 3 tabs 2 times per day (morning and night)    > If again no BM in 24 - 48 hours, do the following: Take one of these 3 laxatives listed below until you have good BM and then return to step above as your daily bowel regimen.  All of the below listed products can be purchased over the counter.     MiraLax* (or generic equivalent called polyethylene glycol (PEG): take 17 grams in 8 oz. water 1 - 2 times daily (works in 24- 72 hours)   Milk of magnesia* (MOM): Take 2 - 4 tablespoons once daily ( works in 6-12 hours)  Magnesium citrate* - take 8 oz. bottle daily ( works in 0.5- 3 hours)  *Laxatives may cause abdominal cramping     > If no BM in 2 days  try one of the following:   Dulcolax (generic Bisacodyl)  suppository 1 suppository rectally daily to 2 times per day  ( if you are an oncology pt taking cancer directed treatment -please check with your cancer provider if you can safely do this route of medication) OR  Dulcolax  (generic Bisacodyl)  tabs 5 - 15 mg ( 1 - 3 tabs)  once per day   > If No BM after 1 day,  use a different laxative from above list of 3 products ( MiraLAX, Milk of magnesia or Magnesium citrate)     * If you develop any severe abdominal pain or nausea/ vomiting please contact your provider. If above over the counter medications do not work, there are other medications that can be ordered but they require prescription send to your pharmacy.  If in doubt always call. Call your provider if you do not have a BM within 7 days of starting this protocol.  *If you develop loose stool/s - hold the dose of laxative at that time and reassess at next scheduled dose.     Adapted from [facility]: Constipation module.    [facility] palliative care   [phone]

## 2025-03-24 NOTE — CONSULTS
[facility]  part of [facility]  Palliative Care Initial Consult Note    Yesenia Berkowitz Patient Status:  Inpatient    1940 MRN [id number]   Location [facility] 5SW/SE Attending Rafael Parrish MD   Hosp Day # 4 PCP JO-ANN YANG MD     Date of Consult: 3/24/2025  Patient seen at: [facility] Inpatient-room 564    The  Cures Act makes medical notes like these available to patients in the interest of transparency. Please be advised this is a medical document. Medical documents are intended to carry relevant information, facts as evident, and the clinical opinion of the practitioner. The medical note is intended as peer to peer communication and may appear blunt or direct. It is written in medical language and may contain abbreviations or verbiage that are unfamiliar.     Reason for Consultation: Consult ordered by:: Dr. Rafael Parrish for evaluation of Palliative Care needs and Uncontrolled symptoms;Goals of care discussion.    Subjective     History of Present Illness: Yesenia Berkowitz is a 84 year old female with history of severe COPD & asthma w/ chronic hypoxic resp failure on home O2 at 2-4 lpm cont NC w/ cor pulmonale, HFpEF (EF 65- 70 % - last EF  w/  AV- mild stenosis  and MV- mild stenosis); pAfib  decline AC, h/o GI bleed, HTN, HLD, GERD,diverticulosis and kyphoscoliosis with . OA/ DJD of lumbar spine.Patient was admitted on 3/20/2025 for progressive worsening of shortness of breath over 2-3 days PTA .   Work up in our hospital has included imaging with chest X-ray that showed small right pleural effusion with associated right basilar/perihilar opacities, which appear similar when compared to 2025 and re-demonstrated elevation of the left hemidiaphragm with associated left basilar opacities, which may reflect atelectasis with or without superimposed pneumonia.  CTA /PE protocol  showed enlarging spiculated right upper lobe lung nodule along the major  fissure highly suspicious for  primary pulmonary neoplasm with continued surveillance imaging  advised, small wedge-shaped opacity at the periphery of the right middle lobe could relate to atelectasis or a small focus of pneumonia and is new since most recent comparison chest CT from February, 2025, emphysema, small dependent right pleural effusion has slightly increased in size since most recent chest CT from February, 2025, dilatation of the main pulmonary artery trunk may relate to underlying pulmonary hypertension and stable chronic T6 vertebral body compression fracture with associated kyphoplasty.. 12 lead EKG showed Normal sinus rhythm with HR 85 bpm QTc 406 ms without signs of acute ischemia or abnormal intervals   Admission labs revealed leukocytosis with WBC at 13.4 and H/H 12.8/40/2 with adequate plt count at 289. CMP showed hyperglycemia with glucose at 153,  K at 3.4 , decreased Cl - 97, elevated CO2 at 39, lactic acid elevated  at 2.1, procalcitonin elevated at 0.10, proBNP normal at 276 and troponin HS - normal. Genexpert PCR negative. Blood cultures x 2  taken with no growth to date. History was obtained from [facility] and pt herself .      Patient is being followed by one  specialist this admission: pulmonary   3/20/25 Pulmonary Consult   DIAGNOSTIC IMPRESSION:    1.       Acute on chronic hypoxemic respiratory failure.  2.       Chronic obstructive pulmonary disease and asthma with exacerbation.  3.       Enlarging spiculated RUL 1.1 cm nodule that was 0.9 cm in November 2024  Small R > L pleural effusion  4.       Left phrenic nerve paralysis.  5.       Kyphoscoliosis.  6.       Obesity hypoventilation syndrome.  7.       Cor pulmonale.  8.       Chronic prednisone 10 mg daily so we will taper back to this chronic dose  9.       hyperglycemia  SUGGESTIONS AND RECOMMENDATIONS:    1.       Prednisone 20 x 3 d then 10 x 3 d  2.       DuoNeb Q.i.d. and p.r.n.   3.       Protonix 40 mg a day.    4.        Previously prescribed BiPAP 13/5 at night but now pt declines  6.       Rocephin and Zithromax  7.       I reminded Yesenia and son, Tyler that she will need outpt PET  scan  for this suspicious 1.2 cm nodule and then likely biopsy of lung nodule if possibility of cancer  8.        Pulmonary rehab recommended  9.        Discussed w Dr Rafael Parrish    Today is day #4 of hospitalization. This is the 7th hospitalization in the past 4 months.  Hospitalization Summary  # 7: 3/20/25- present: SOB; COPD/asthma exacerbation/ possible fluid overload ; acute on chronic resp failure   # 6:1/29-2/5/25 : SOB & cough progressive  & bilat LE swelling; acute on chronic hypoxic resp failure; COPD/ asthma exacerbation, bilat PNA; mild interstitial pulm edema ; WBC- 2 and K- 3.4   # 5: 1/10-13/25: SOB; COPD exacerbation; CAP; acute on chronic hypoxic resp failure; home po steroids and doxy  # 4 12/21-29/24:upper back pain for 5-6 days PTA;increased katie LE edema ; diuresed and felt better   thoracic compression Fx: subacute T6 s/p kyphoplasty w/ good pain control; RUL nodule; acute on chronic CHF; COPD   # 3: 12/12-18/24: SOB: Acute on chronic diastolic HF, Acute on chronic  COPD exacerbation ;    Acute on chronic hypoxemic and hypercarbic respiratory failure; back pain .   # 2 :11/24 - 11/28/24:  COPD exacerbation; Acute respiratory failure with hypoxia and hypercapnia   # 1: 11/2/24- 11/7/24: Acute on chronic respiratory failure with hypoxia and hypercapnia, on chronic O2 at home, with community-acquired pneumonia of right lower lung complicated by chronic obstructive pulmonary disease exacerbation.     When I entered the room, the patient was sitting up in bed resting awake and alert. She easily engaged in conversation and discussion. She did a lot of reminiscing about all the good things she did in her life and the loss of her spouse 8 yrs ago.   No family present at bedside.      Review of Systems/ Symptoms:   Patient was able to  provide subjective input. Assessment is assisted by RN and pt herself .  Fatigue:  Yes, easily fatigues and gets winded when doing things around the house, has been getting help from her son   Overall Functional status:  lives by herself and son lives nearby and assists her ; he is not working right now and assisting her   Dyspnea: present  At rest: at times;  OJEDA: yes    Conversational: yes      Current O2 therapy: 3- 4 lpm   Drowsiness:  none noted during visit   Cough: denies at present and no use of prn meds   Pain: denies at present; occ back pain   Non-verbal signs of pain present: No  Appetite/Nutritional Status: reports fair to good; varies ; here recorded Of meals recorded 100 %   Dysphagia: denies any issues with chocking or coughing . No SLP eval   Constipation: denies   Last BM 3/23 SOFYA  Diarrhea: none documented   Nausea/Vomiting: denies  Depression: denies  Anxiety: denies  Emotional / coping response to illness: Yesenia tells me she understands her illness is progressing and she has been more and more ill these lat several months with repeated admissions, but she is just not ready \" to give up yet\" and still has some more living to do and has feels she still has some QOL.  She tells me she is going to follow-up about the recommended PET Scan in regards  to follow-up on the RUL lesion.  We talked about it she would want to know more and want treatment options and responded yes.       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 Her smoking use included cigarettes. She has never used smokeless tobacco.  reports that she does not currently use alcohol.  reports no history of drug use.  Hx of Substance Use/Abuse: No    Spiritual Assessment:   [facility]  spiritual care following    Past Medical History/Past Surgical History:     Medical History: obtained from [facility]  Past Medical History:    A-fib ([facility])    Anesthesia complication    Arrhythmia    AFIB    Arthritis    Asthma ([facility])    Back problem    COPD (chronic obstructive pulmonary disease) ([facility])    Deviated nasal septum    nasal septoplasty, turb reduction, smr of turbs    Difficult intubation    fiber optic if needed    Diverticulitis    colonoscopy     Diverticulosis of large intestine    Esophageal reflux    Extrinsic asthma, unspecified    Headache    Heart disease    Hepatitis    WAS TOLD SHE HAS HAD THIS WHEN SHE WAS 19 YEARS OLD    High blood pressure    High cholesterol    Irregular heart rate    Lichenoid keratosis    left chest-bx    Osteoarthritis    Paralysis ([facility])    left lung and left vocal cord.    Paronychia    (RT); onychomycosis; debridement    Problems with swallowing    occasionally    Shortness of breath    2 L NC ALL THE TIME 24HR/7 DAYS PER WEEK    Unspecified essential hypertension    Visual impairment     Past Surgical History:   Procedure Laterality Date    Adenoidectomy      Cataract      cataract extraction     Hysterectomy      Sinus surgery        DEVIATED SEPTUM    T&a      Tonsillectomy       Family History: obtained from [facility]  Family History   Problem Relation Age of Onset    Ovarian Cancer Mother 76        endometrial, poss ovarian primary    Pulmonary Disease Sister         COPD    Skin cancer Other     Stroke Other     Other (Other) Other       Allergies:  Allergies[1]    Medications:     Current Facility-Administered Medications:     furosemide (Lasix) tab 80 mg, 80 mg, Oral, BID (Diuretic)    fluticasone-salmeterol (Advair Diskus) 500-50 MCG/ACT inhaler 1 puff, 1 puff, Inhalation, BID    umeclidinium bromide (Incruse Ellipta) 62.5 MCG/ACT inhaler 1 puff, 1 puff, Inhalation, Daily    albuterol (Ventolin HFA) 108 (90 Base) MCG/ACT inhaler 2 puff, 2 puff, Inhalation, Q4H PRN    acetaZOLAMIDE (Diamox) tab 500 mg, 500 mg, Oral, Daily    methylPREDNISolone sodium succinate (Solu-MEDROL) injection 20 mg, 20 mg, Intravenous, Q12H    pantoprazole (Protonix) DR tab 40 mg, 40 mg, Oral, QAM AC    insulin aspart (NovoLOG) 100 Units/mL FlexPen 1-7 Units, 1-7 Units, Subcutaneous, TID CC and HS    insulin degludec (Tresiba) 100 units/mL flextouch 15 Units, 15 Units, Subcutaneous, Nightly    ipratropium-albuterol (Duoneb) 0.5-2.5 (3) MG/3ML inhalation solution 3 mL, 3 mL, Nebulization, Q6H PRN    glucose (Dex4) 15 GM/59ML oral liquid 15 g, 15 g, Oral, Q15 Min PRN **OR** glucose (Glutose) 40% oral gel 15 g, 15 g, Oral, Q15 Min PRN **OR** glucose-vitamin C (Dex-4) chewable tab 4 tablet, 4 tablet, Oral, Q15 Min PRN **OR** dextrose 50% injection 50 mL, 50 mL, Intravenous, Q15 Min PRN **OR** glucose (Dex4) 15 GM/59ML oral liquid 30 g, 30 g, Oral, Q15 Min PRN **OR** glucose (Glutose) 40% oral gel 30 g, 30 g, Oral, Q15 Min PRN **OR** glucose-vitamin C (Dex-4) chewable tab 8 tablet, 8 tablet, Oral, Q15 Min PRN    enoxaparin (Lovenox) 40 MG/0.4ML SUBQ injection 40 mg, 40 mg, Subcutaneous, Daily    acetaminophen (Tylenol Extra Strength) tab 500 mg, 500 mg, Oral, Q4H PRN    ondansetron (Zofran) 4 MG/2ML injection 4 mg, 4 mg, Intravenous, Q6H PRN    metoclopramide (Reglan) 5 mg/mL injection 5 mg, 5 mg, Intravenous, Q8H PRN    guaiFENesin (Robitussin) 100 MG/5 ML oral liquid 200 mg, 200 mg, Oral, Q4H PRN    benzonatate (Tessalon) cap 200 mg, 200 mg, Oral, TID PRN    cefTRIAXone  (Rocephin) 2 g in sodium chloride 0.9% 100 mL IVPB-ADDV, 2 g, Intravenous, Q24H    digoxin (Lanoxin) tab 125 mcg, 125 mcg, Oral, Daily    dilTIAZem ER (Dilacor XR) 24 hr cap 360 mg, 360 mg, Oral, Daily    cetirizine (ZyrTEC) tab 10 mg, 10 mg, Oral, Nightly    montelukast (Singulair) tab 10 mg, 10 mg, Oral, Nightly    Functional Status History:  ADLs: bathing or showering, dressing, getting in and out of bed or a chair, walking, using the toilet, and eating  - Requires some assistance  IADLs: use the phone, shop for groceries, meal preparation, manage medicines, clean living area, use transportation by self, manage money  Requires some assistance  DME: Walker  Falls: no    Palliative Performance Scale:   Prior to admission: (pt/family reported) 60 %  Observed during hospitalization: 50 %  % Ambulation Activity Level Self-Care Intake Consciousness   100 Full  Normal  No Disease Full Normal Full   90 Full  Normal  Some Disease Full Normal Full   80 Full  Normal w/effort  Some Disease Full Normal or reduced Full   70 Reduced  Can't Perform Job  Some Disease Full Normal or reduced Full   60 Reduced  Can't Perform Hobby   Significant Disease Occ Assist Normal or reduced Full or confused   50 Mainly sit/lie Can't do any work  Extensive Disease Partial Assist Normal or reduced Full or confused   40 Mainly in bed Can't do any work  Extensive Disease Mainly Assist Normal or reduced Full or confused   30 Bed Bound Can't do any work  Extensive Disease Max Assist  Total Care Reduced  Drowsy/confused   20 Bed Bound Can't do any work  Extensive Disease Max Assist  Total Care Minimal  Drowsy/confused   10 Bed Bound Can't do any work  Extensive Disease Max Assist  Total Care Mouth Care  Drowsy/confused   0 Death      Objective      Vital Signs:  Blood pressure 134/65, pulse 78, temperature 97.6 °F (36.4 °C), temperature source Oral, resp. rate 18, height 5' 5\" (1.651 m), weight 150 lb 12.7 oz (68.4 kg), SpO2 92%.  Body mass index is  25.09 kg/m².  Present Level of pain: 1/10  Non-verbal signs of pain present: No    Physical Exam:  General: Alert & Awake. In no apparent respiratory distress. Body habitus- ill and frail appearing    HEENT: AT/NC. No gross focal deficits. MMM no thrush   Cardiac: RRR; no murmur On tele shows NSR - PVC rare  Lungs: Bilateral breath sounds diminished throughout no wheeze Normal effort at rest ; OJEDA on NC 4 lpm   Abdomen: Soft, non-tender, non-distended, BS X 4  Extremities: 1+ LE edema present.  Neurologic: Alert and oriented to person, place, time, and situation . No gross focal deficits. Jose Eduardo. No myoclonus, tremors or seizures   Psychiatric: Mood pleasant mood, interactive   Skin: Pale, warm and dry.No rash; scattered bruising on bilat UEs    Hematology:  Lab Results   Component Value Date    WBC 12.9 (H) 03/24/2025    HGB 10.3 (L) 03/24/2025    HCT 33.4 (L) 03/24/2025    .0 03/24/2025   Coags:  Lab Results   Component Value Date    INR 1.08 11/25/2024    PTT 28.1 03/20/2025   Chemistry:  Lab Results   Component Value Date    CREATSERUM 0.71 03/24/2025    BUN 33 (H) 03/24/2025     03/24/2025    K 3.7 03/24/2025    CL 98 03/24/2025    CO2 35.0 (H) 03/24/2025     (H) 03/24/2025    CA 8.0 (L) 03/24/2025    ALB 3.7 03/20/2025    ALKPHO 67 03/20/2025    BILT 0.3 03/20/2025    TP 6.2 03/20/2025    AST 11 03/20/2025    ALT 8 (L) 03/20/2025    DDIMER 0.47 12/21/2024    MG 2.1 03/24/2025    PHOS 3.4 12/26/2024    TROP <0.045 02/03/2020     3/20/25 Blood culture x 2 : NGTD x 3 days  3/20/25 SARS-CoV-2/Flu A and B/RSV by PCR (GeneXpert):  negative   Imaging:    3/18/25 CT PE protocol      FINDINGS:  COMMENT: Evaluation of the vasculature and chari is suboptimal in the absence of contrast infusion.  CARDIAC: The heart is not enlarged.  Coronary artery and mitral annular calcification with a small pericardial effusion.  VASCULATURE: The thoracic aorta has unremarkable configuration without aneurysmal  dilatation.  Dilatation of the main pulmonary artery trunk may relate to underlying pulmonary hypertension.  LUNGS/PLEURA: Small dependent right pleural effusion has slightly increased in size since prior exam.  There is no significant left pleural effusion.  No pneumothorax.  Stable chronic elevation of the left hemidiaphragm with discoid opacities in the  lingula and to a lesser degree left lower lobe.  Moderate emphysema with a stable right apical bleb. There is dependent subsegmental atelectasis bilaterally.  Spiculated 1.2 x 1.1 cm right upper lobe nodule along the right major fissure previously  measured 1.1 x 1.0 cm on chest CT in November, 2024 and 0.8 x 0.7 cm on chest CT from March, 2023. Small 0.4 cm perivascular nodule in the right upper lobe (series 3, image 26), which is new since prior exam.  Additional triangular morphology 0.4 cm  perivascular nodule in the anterior segment right upper lobe (series 3, image 44) is unchanged.  Approximate 1 cm wedge-shaped subpleural nodular focus in the lingula is new since prior, but may relate to atelectasis (series 3, image 54).  Similarly, a  0.6 cm linear subpleural ground-glass density nodule in the superior segment left lower lobe (series 3, image 47) is new since prior, but may relate to atelectasis.  Small scattered calcified granulomas.    AIRWAYS: The tracheobronchial tree is without central mass or obstructing lesion.  MEDIASTINUM/AURELIO: Stable prominent but nonenlarged 0.9 cm short axis left hilar lymph node.  Other prominent but nonenlarged mediastinal and hilar nodes are grossly unchanged.  CHEST WALL: No axillary mass or lymphadenopathy.    LIMITED ABDOMEN: Stable 1.5 cm fluid attenuation right retrocrural nodule.  BONES: Demineralization.  Stable chronic moderate T6 vertebral body compression fracture with associated kyphoplasty.  Chronic/healed left anterior 2nd rib fracture.  There is also a chronic/healed right posterior 9th rib fracture.  OTHER:  Stable heterogeneous density thyroid nodules, which measure up to 1.4 cm in the right lobe.    CONCLUSION:  1. Spiculated 1.2 x 1.1 cm right upper lobe lung nodule along the major fissure previously measured 1.1 x 1.0 cm on chest CT in November, 2024 and 0.8 x 0.7 cm chest CT in March, 2023. A slowly enlarging primary pulmonary neoplasm is suspected.  If  further intervention is deferred continued surveillance imaging is advised.  2. Small wedge-shaped opacity at the periphery of the right middle lobe could relate to atelectasis or a small focus of pneumonia and is new since most recent comparison chest CT from February, 2025. There are also new wedge-shaped subpleural nodules in  the lingula (1 cm) and superior segment left lower lobe (0.6 cm).  The smaller nodules could also relate to atelectasis.  These regions should be assessed on anticipated follow-up exam to ensure stability/resolution.  3. Small 0.4 cm perivascular nodule in the right upper lobe is also new since prior and should be reassessed on anticipated follow-up.  4. Additional subcentimeter right upper lobe lung nodule is unchanged over serial prior exams and therefore likely benign.  5. Emphysema.  6. Small dependent right pleural effusion has slightly increased in size since most recent chest CT from February, 2025.  7. Borderline enlarged left hilar lymph node is unchanged over serial prior exams and therefore likely benign.  8. Coronary artery and mitral annular calcification with small chronic pericardial effusion.  9. Dilatation of the main pulmonary artery trunk may relate to underlying pulmonary hypertension.  10. Stable scattered thyroid nodules.  11. Stable fluid density right retrocrural nodule, which probably represents a duplication cyst or lymphangioma.  12. Stable chronic T6 vertebral body compression fracture with associated kyphoplasty.        3/20/25 12  lead EKG  HR 85 bpm QTc 406 ms   Normal sinus rhythm Possible Septal infarct  (cited on or before 21-JAN-2025)   Abnormal ECG   When compared with ECG of 03-FEB-2025 23:07,   No significant change was found   Confirmed by SAGAR HANSEN JORDAN ([id number]) on 3/20/2025 12:23:26 PM     3/20/25 Chest X-ray  FINDINGS:  CARDIAC/VASC: The cardiomediastinal silhouette is unchanged in size.  There is atherosclerotic calcification of the thoracic aorta.  MEDIAST/AURELIO:   The mediastinum and hilum are unchanged.  LUNGS/PLEURA: There is elevation of the left hemidiaphragm with associated left basilar opacity.  There is a small right pleural effusion with associated right basilar/perihilar opacities.  There are emphysematous changes.  Mild biapical pleural  parenchymal scarring.  No pneumothorax.  BONES: Multilevel degenerative changes of the thoracic spine.  Post kyphoplasty changes involving a thoracic vertebra.  Bilateral shoulder degenerative change.  OTHER: Negative.    CONCLUSION:   1. Small right pleural effusion with associated right basilar/perihilar opacities, which appear similar when compared to 02/04/2025.  2. Redemonstrated elevation of the left hemidiaphragm with associated left basilar opacities, which may reflect atelectasis with or without superimposed pneumonia.  3. Lesser incidental findings described above.      Summary of Discussion      I discussed reason for palliative care consultation with Patient    I differentiated the palliative treatment-focus model versus the hospice comfort-focused philosophy of care. I informed the patient/family that having palliative care support does not limit medical treatment options or decisions to those who wish to continue curative or restorative medical therapies. I discussed the benefits of palliative care to include assistance with arising symptom management needs, an extra layer of support, to ensure GOC are respected throughout healthcare continuum, and assist with transition to hospice care when appropriate.      Outpatient(office setting  /Community Palliative Care Services( home or facility) :  Usually visit once every 4- 6  weeks  Focus on GOC and symptom management   Palliative Care criteria:  Serious illness  Not altered by prognosis   Does not limit curative or restorative therapies, but complement to usual care      Hospice services:   phone triage services available and prn nurse visits also available    Interdisciplinary team: RN as PERLITA, DYLON, SW, , volunteers and some have complementary services (music therapy, massage etc)    Visits are based on need/ no limits- can range from one or more times per week   Hospice criteria:  Prognosis: six-month or less if disease takes  it's natural course   Life-prolonging measures/treatments are forgone; identified as no longer helpful or align with comfort goals        Focus solely on comfort / symptom management   Involves consent process to enroll into hospice benefit with specific agency     PROGNOSTIC AWARENESS/UNDERSTANDING: Fair.and remains in the  information gathering stage   We discussed the disease trajectory of COPD w/ cor pulmonale/ HFpEF and other co-morbidities and the associated symptoms and decline over time.   We discussed current clinical condition and overall Guarded prognosis per clinical team based on her recurring admissions multiple since Nov.     HOPES/GOALS:    Gain some stability and stay out of the hospital for a longer period of time after this admission   Follow up with PET scan and get this testing completed and move forward on these results and recommendations     FEARS/CONCERNS:    Concerned that the lesion can be cancer but I am trying not to worry myself too much     Worried about my son as I am the last remaining parent as my   8 yrs ago     PROVIDED EMOTIONAL SUPPORT TO Patient through active listening as she reviewed her life and illness trajectory and provided supportive education and guidance     Advance Care Planning counseling and discussion:    HCPOA Documentation Completed--Document in [facility]. Healthcare Agent's Name: Tyler Rose, which is pt's Son and successor is Lavelle Valerio   CODE STATUS DISCUSSION:  Voluntarily reviewed pt's wishes as DNAR/ Selective and she confirmed . Se below for further discussions      POLST FORM: reviewed form and provided written info on how to complete and sheet frequently asked questions    Not completed & Form provided. Discussed importance of having completed at home so have when EMS is called and can be honored. Reviewed Section by Section   CODE STATUS: DNAR/Select and pt stated she would likely not want feeding tube on a temporary or long term basis     Assessment and Recommendations       Palliative Care encounter   Counseling regarding goals of care and advance care planning    Shortness of breath/ dyspnea with resp abnormalities    Acute on chronic hypoxemic respiratory failure.  Chronic obstructive pulmonary disease and asthma with exacerbation.  Enlarging spiculated RUL nodule ( now 1.1 cm that was 0.9 cm in November 2024)   Bilateral pneumonia   Small R > L pleural effusion  H/O   Left phrenic nerve paralysis, pA-fib  Kyphoscoliosis, Obesity hypoventilation syndrome, chronic HFpEF, HTN,.    Goals of care: continue supportive medicare care including follow-up outpt testing on lung nodule (PET scan) and restorative therapies to help support & improve function and QOL . Open to community PC follow-up  for ongoing discussions and symptom management      Code Status: DNAR/Selective Treatment. POLST form reviewed and pt to complete. Discussed I can sign or can follow-up with PCP or pulmonary. She wanted to look over form    Healthcare Paperwork - completed and on file in EMR  completed 11 3/24    Primary HCPOA: Tyler Rose at [phone]  Successor: #1 Lavelle Valerio [phone]  Symptoms:   Dyspnea: Morphine 2.6 mg po TID prn for SOB; T try prior to activities that cause SOB . See discharge instructions below (  e-scribed to pt pharmacy)     -Discussed today's visit with:   pt,, RN, SW/CM, Care team, and hospitalist.    These instructions where placed in [facility]   PALLIATIVE CARE DISCHARGE INSTRUCTIONS  3/24/25  I sent in prescription to your pharmacy [place] for oral solution morphine   May take Morphine 2.6 mg by mouth up to 3 times per day as needed for shortness of breath.      > If you have a day you need to do things.  May want to try to take to help you do better with your breathing. First few doses I would make sure  family is around when you try and stay around home to see how you do with it.  Even the lowest doses can give you constipation , so I included info below how to treat. Please call with any questions before community palliative care gets started to see you. My  contact info is   RAFI Bellamy, CNP In Palliative Care  at [facility]   [phone]    They may have to order the medication so may need to wait to get it. I also sent in order for [facility], so  they will call to set up time to meet         CONSTIPATION (related to pain medications - called opioid induced constipation)    Palliative Care Follow Up: Palliative care team will Continue to follow while inpatient.  Palliative care follow up outpatient: Community palliative care ordered.    Thank you for allowing Palliative Care services to participate in the care of Yesenia Berkowitz.    A total of 75 minutes were spent on this consult, which included all of the following: chart review, direct face to face contact, history taking, physical examination, counseling and coordinating care, and documentation.     RAFI Bellamy  3/24/2025  Palliative Care Services at [facility] :  extension [phone]   or [phone]         [1]   Allergies  Allergen Reactions    Penicillin G ANAPHYLAXIS    Azithromycin DIARRHEA and NAUSEA AND VOMITING    Cefuroxime UNKNOWN     Other reaction(s): Vomitting    Levofloxacin  UNKNOWN     Other reaction(s): LEVOFLOXACIN    Penicillins      Other reaction(s): Unknown    Seasonal ITCHING

## 2025-03-24 NOTE — DISCHARGE SUMMARY
[facility]  part of [facility]    Discharge Summary    Yesenia Berkowitz Patient Status:  Inpatient    1940 MRN [id number]   Location [facility] 5SW/SE Attending Rafael Parrish MD   Hosp Day # 4 PCP JO-ANN YANG MD     Date of Admission: 3/20/2025 Disposition: Home Health Care Services     Date of Discharge: 25      Admitting Diagnosis: Shortness of breath [R06.02]    Hospital Discharge Diagnoses:  Dyspnea    Lace+ Score: 81  59-90 High Risk  29-58 Medium Risk  0-28   Low Risk.    TCM Follow-Up Recommendation:  LACE > 58: High Risk of readmission after discharge from the hospital.      Problem List:   Patient Active Problem List   Diagnosis    Actinic keratosis    Inflamed seborrheic keratosis    Palpitations    Pain of upper abdomen    Acute on chronic congestive heart failure, unspecified heart failure type (HCC)    COPD with acute exacerbation (HCC)    COPD (chronic obstructive pulmonary disease) (HCC)    Kyphoscoliosis    Phrenic nerve paralysis    Restrictive lung disease    Acute cor pulmonale (HCC)    Pneumonia    Acute on chronic hypoxic respiratory failure (HCC)    Acute pulmonary edema (HCC)    Pleural effusion    Pulmonary nodule 1 cm or greater in diameter    Community acquired pneumonia of right lower lobe of lung    Acute on chronic respiratory failure with hypoxia and hypercapnia (HCC)    Acute respiratory failure with hypoxia and hypercapnia (HCC)    Cor pulmonale (chronic) (HCC)    SOB (shortness of breath)    Acute on chronic diastolic (congestive) heart failure (HCC)    Obesity hypoventilation syndrome (HCC)    Asthma (HCC)    Acute exacerbation of CHF (congestive heart failure) (HCC)    Pleural effusion on left    Pleural effusion on right    Acute bilateral thoracic back pain    Atypical pneumonia    Compression fracture of T6 vertebra (HCC)    COPD exacerbation (HCC)    Primary hypertension    Paroxysmal atrial fibrillation (HCC)    Acute respiratory  failure (HCC)    Chronic heart failure with preserved ejection fraction (HFpEF) (HCC)    Shortness of breath       Reason for Admission:   Acute on chronic respiratory failure  COPD exacerbation  Asthma exacerbation  Bilateral PNA  Phrenic nervie paralysis  Hx of Afib  HTN  Chronic HFpEF    Physical Exam:   General appearance: alert, appears stated age and cooperative  Pulmonary:  clear to auscultation bilaterally  Cardiovascular: S1, S2 normal, no murmur, click, rub or gallop, regular rate and rhythm  Abdominal: soft, non-tender; bowel sounds normal; no masses,  no organomegaly  Extremities: extremities normal, atraumatic, no cyanosis or edema  Psychiatric: calm      History of Present Illness:   Per Dr. Khan  Patient is an 84-year-old  female with underlying severe chronic obstructive pulmonary disease with multiple recent hospitalizations for COPD exacerbation. Came in today to the emergency department for evaluation of progressive shortness of breath for the last 2 to 3 days. CBC showed white blood cell count of 13.4 with left shift. Chemistry showed bicarb 39, chloride 97, and potassium 3.4. Procalcitonin still pending. GeneXpert viral panel was negative. ProBNP 276. Chest x-ray showed small right pleural effusion with associated right basilar perihilar opacities which appear similar to comparison of February 2025, redemonstration of elevated left hemidiaphragm with associated left basilar opacities may reflect atelectasis with or without superimposed pneumonia. Patient was started on IV antibiotics, steroids, and nebulizer treatments, and she will be admitted to the hospital for further management.     Hospital Course:   Acute on chronic respiratory failure  COPD exacerbation  Asthma exacerbation  Bilateral PNA  Phrenic nervie paralysis  -Imaging reviewed  -follow-up cultures showing NGTD  -Pulm on consult  -cont solumedrol 40mg iv  q 8 h--> bid  --> 20mg bid--> prednisone on dc  -Continue lasix  and spironolactone  -wean o2 as tolerated  -improved  -plan OP pulmonary rehab on dc  -PET scan for pulm nodule to be ordered by pulmonology  -seen by palliative care, plan morphine on dc for symptom management      Hx of Afib  HTN  -Continue home meds  -Not on anticoagulation     Chronic HFpEF  -Imaging reviewed  -P-bnp 276  -Last EF 65-70%  -Strict Is and Os, daily weights  -Resume home Lasix    Consultations:   pulmonology    Procedures: n/a    Complications: n/a    Discharge Condition: Good    Discharge Medications:      Medication List        START taking these medications      benzonatate 200 MG Caps  Commonly known as: Tessalon  Take 1 capsule (200 mg total) by mouth 3 (three) times daily as needed for cough.     doxycycline 100 MG Caps  Commonly known as: Vibramycin  Take 1 capsule (100 mg total) by mouth 2 (two) times daily for 7 days.     morphINE 10 MG/5ML Soln  Take 1.3 mL (2.6 mg total) by mouth 3 (three) times daily as needed (Shortness of breath/ take prior to activity that cased shortness of breath). Please add an adapt a cap top for ease of drawing up.  Please give pt 2 oral syringes     predniSONE 10 MG Tabs  Commonly known as: Deltasone  Take 2 tablets (20 mg total) by mouth daily for 3 days, THEN 1 tablet (10 mg total) daily for 3 days.  Start taking on: March 24, 2025            CONTINUE taking these medications      acetaminophen 500 MG Tabs  Commonly known as: Tylenol Extra Strength     acetaZOLAMIDE 250 MG Tabs  Commonly known as: Diamox  Take 2 tablets (500 mg total) by mouth daily.     * albuterol 108 (90 Base) MCG/ACT Aers  Commonly known as: Ventolin HFA     * albuterol (2.5 MG/3ML) 0.083% Nebu  Commonly known as: Ventolin     digoxin 0.125 MG Tabs  Commonly known as: Lanoxin  Take 1 tablet (125 mcg total) by mouth daily.     dilTIAZem  MG Cp24  Commonly known as: Tiazac  Take 1 capsule (360 mg total) by mouth daily.     empagliflozin 10 MG Tabs  Commonly known as: Jardiance  Take 1  tablet (10 mg total) by mouth daily.     estradiol 0.05 MG/24HR Ptwk  Commonly known as: Climara     fluticasone-umeclidin-vilant 200-62.5-25 MCG/ACT Aepb  Commonly known as: Trelegy Ellipta     furosemide 80 MG Tabs  Commonly known as: Lasix  Take 1 tablet (80 mg total) by mouth BID (Diuretic).     Loratadine 10 MG Caps     montelukast 10 MG Tabs  Commonly known as: Singulair     potassium chloride 20 MEQ Tbcr  Commonly known as: Klor-Con M20     Vitamin D 1000 units Tabs           * This list has 2 medication(s) that are the same as other medications prescribed for you. Read the directions carefully, and ask your doctor or other care provider to review them with you.                   Where to Get Your Medications        These medications were sent to [id number] - [place], [place] - [address] [facility] [phone], [phone]  [address] [place] [address], [facility] [address]      Phone: [phone]   benzonatate 200 MG Caps  doxycycline 100 MG Caps  morphINE 10 MG/5ML Soln  predniSONE 10 MG Tabs             Follow up Visits: Follow-up with pcp in 1 week    Follow up Labs: n/a     Other Discharge Instructions: follow-up with pulmonology as instructed    NEGRITO KYLE MD  3/24/2025  2:30 PM    > 35 min

## 2025-03-24 NOTE — PLAN OF CARE
Problem: Patient Centered Care  Goal: Patient preferences are identified and integrated in the patient's plan of care  Description: Interventions:- What would you like us to know as we care for you? - Provide timely, complete, and accurate information to patient/family- Incorporate patient and family knowledge, values, beliefs, and cultural backgrounds into the planning and delivery of care- Encourage patient/family to participate in care and decision-making at the level they choose- Honor patient and family perspectives and choices  3/24/2025 1623 by Ragini Madden RN  Outcome: Adequate for Discharge  3/24/2025 1316 by Ragini Madden RN  Outcome: Progressing     Problem: Diabetes/Glucose Control  Goal: Glucose maintained within prescribed range  Description: INTERVENTIONS:- Monitor Blood Glucose as ordered- Assess for signs and symptoms of hyperglycemia and hypoglycemia- Administer ordered medications to maintain glucose within target range- Assess barriers to adequate nutritional intake and initiate nutrition consult as needed- Instruct patient on self management of diabetes  3/24/2025 1623 by Ragini Madden RN  Outcome: Adequate for Discharge  3/24/2025 1316 by Ragini Madden RN  Outcome: Progressing     Problem: RESPIRATORY - ADULT  Goal: Achieves optimal ventilation and oxygenation  Description: INTERVENTIONS:- Assess for changes in respiratory status- Assess for changes in mentation and behavior- Position to facilitate oxygenation and minimize respiratory effort- Oxygen supplementation based on oxygen saturation or ABGs- Provide Smoking Cessation handout, if applicable- Encourage broncho-pulmonary hygiene including cough, deep breathe, Incentive Spirometry- Assess the need for suctioning and perform as needed- Assess and instruct to report SOB or any respiratory difficulty- Respiratory Therapy support as indicated- Manage/alleviate anxiety- Monitor for signs/symptoms of CO2 retention  3/24/2025 1623 by  Maryanne, Ragini, RN  Outcome: Adequate for Discharge  3/24/2025 1316 by Ragini Madden RN  Outcome: Progressing     Problem: PAIN - ADULT  Goal: Verbalizes/displays adequate comfort level or patient's stated pain goal  Description: INTERVENTIONS:- Encourage pt to monitor pain and request assistance- Assess pain using appropriate pain scale- Administer analgesics based on type and severity of pain and evaluate response- Implement non-pharmacological measures as appropriate and evaluate response- Consider cultural and social influences on pain and pain management- Manage/alleviate anxiety- Utilize distraction and/or relaxation techniques- Monitor for opioid side effects- Notify MD/LIP if interventions unsuccessful or patient reports new pain- Anticipate increased pain with activity and pre-medicate as appropriate  3/24/2025 1623 by Ragini Madden RN  Outcome: Adequate for Discharge  3/24/2025 1316 by Ragini Madden RN  Outcome: Progressing     Problem: RISK FOR INFECTION - ADULT  Goal: Absence of fever/infection during anticipated neutropenic period  Description: INTERVENTIONS- Monitor WBC- Administer growth factors as ordered- Implement neutropenic guidelines  3/24/2025 1623 by Ragini Madden RN  Outcome: Adequate for Discharge  3/24/2025 1316 by Ragini Madden RN  Outcome: Progressing     Problem: SAFETY ADULT - FALL  Goal: Free from fall injury  Description: INTERVENTIONS:- Assess pt frequently for physical needs- Identify cognitive and physical deficits and behaviors that affect risk of falls.- [place] fall precautions as indicated by assessment.- Educate pt/family on patient safety including physical limitations- Instruct pt to call for assistance with activity based on assessment- Modify environment to reduce risk of injury- Provide assistive devices as appropriate- Consider OT/PT consult to assist with strengthening/mobility- Encourage toileting schedule  3/24/2025 1623 by Ragini Madden, JIMI  Outcome:  Adequate for Discharge  3/24/2025 1316 by Ragini Madden, RN  Outcome: Progressing     Problem: DISCHARGE PLANNING  Goal: Discharge to home or other facility with appropriate resources  Description: INTERVENTIONS:- Identify barriers to discharge w/pt and caregiver- Include patient/family/discharge partner in discharge planning- Arrange for needed discharge resources and transportation as appropriate- Identify discharge learning needs (meds, wound care, etc)- Arrange for interpreters to assist at discharge as needed- Consider post-discharge preferences of patient/family/discharge partner- Complete POLST form as appropriate- Assess patient's ability to be responsible for managing their own health- Refer to Case Management Department for coordinating discharge planning if the patient needs post-hospital services based on physician/LIP order or complex needs related to functional status, cognitive ability or social support system  3/24/2025 1623 by Ragini Madden, RN  Outcome: Adequate for Discharge  3/24/2025 1316 by Ragini Madden, RN  Outcome: Progressing

## 2025-03-24 NOTE — PROGRESS NOTES
[facility] Lia[facility] received palliative referral for community PC services. Waiting to obtain insurance (verification/authorization) prior to pursuing.      Mili Puente  [facility]idential Liaison  [phone]   MVC

## 2025-03-24 NOTE — PROGRESS NOTES
[facility]  part of [facility]    Progress Note    Yesenia Berkowitz Patient Status:  Inpatient    1940 MRN [id number]   Location [facility] 5SW/SE Attending Rafael Parrish MD   Hosp Day # 4 PCP JO-ANN YANG MD       Subjective:   Yesenia Berkowitz is a(n) 84 year old female admitted for dyspnea and leg edema.    Breathing better overall and she has her inhalational Rx at home. Solu-Medrol dose 20 every 12 discontinued and switch to prednisone in the morning.  We ordered her PET scan and she agrees to get this done prior to her office visit on about .    History:  She has apparently had recurring admissions and frequent exacerbations since 2024.  She normally is on Trelegy, albuterol , and 4 L nc continuously  She lives alone but she has a son that does come and help her for a short time but most of the day she would be alone.  She has CT of the chest 2024 showing small bilateral free-flowing pleural effusions COPD and in the right upper lobe there was a 10 x 7 somewhat spiculated nodule concerning for malignancy.  Repeat noncontrast CT chest is shows spiculated 1.1cm nodule RUL.     Objective:   Blood pressure 134/65, pulse 73, temperature 98.2 °F (36.8 °C), temperature source Oral, resp. rate 20, height 5' 5\" (1.651 m), weight 150 lb 12.7 oz (68.4 kg), SpO2 97%.    Intake/Output Summary (Last 24 hours) at 3/24/2025 1036  Last data filed at 3/24/2025 0736  Gross per 24 hour   Intake 720 ml   Output 1 ml   Net 719 ml     General:aler t and WD speaking full sentences  HEENT: Moist mms  Neck: no jvd or acc ms use  Pulmonary/Chest: Fairly good air exchange all lung fields without crackles wheezes or egophony  Cardiovascular: S1, S2 normal, no evident murmur  Extremities: extremities well perfused, no cyanosis w mild bilateral leg edema  Neuro: alert, no gross weakness of face or extremities    Results:     Lab Results   Component Value Date    WBC 12.9 (H) 2025     HGB 10.3 (L) 03/24/2025    HCT 33.4 (L) 03/24/2025    .0 03/24/2025    CREATSERUM 0.71 03/24/2025    BUN 33 (H) 03/24/2025     03/24/2025    K 3.7 03/24/2025    CL 98 03/24/2025    CO2 35.0 (H) 03/24/2025     (H) 03/24/2025    CA 8.0 (L) 03/24/2025    ALB 3.7 03/20/2025    ALKPHO 67 03/20/2025    BILT 0.3 03/20/2025    TP 6.2 03/20/2025    AST 11 03/20/2025    ALT 8 (L) 03/20/2025    PTT 28.1 03/20/2025    INR 1.08 11/25/2024    T4F 0.9 12/17/2024    TSH 0.185 (L) 12/17/2024    LIP 30 08/30/2024    DDIMER 0.47 12/21/2024    MG 2.1 03/24/2025    PHOS 3.4 12/26/2024    TROP <0.045 02/03/2020       No results found.          CT chest viewed    Assessment and Plan:   Principal Problem:    Shortness of breath  Active Problems:    COPD exacerbation ([facility])         DIAGNOSTIC IMPRESSION:    1.       Acute on chronic hypoxemic respiratory failure stabilized  2.       Chronic obstructive pulmonary disease and asthma with exacerbation.  3.       Enlarging spiculated RUL 1.1 cm nodule that was 0.9 cm in November 2024  Small R > L pleural effusion  4.       Left phrenic nerve paralysis.  5.       Kyphoscoliosis.  6.       Obesity hypoventilation syndrome.  7.       Cor pulmonale.  8.       Chronic prednisone 10 mg daily so we will taper back to this chronic dose  9.       hyperglycemia     SUGGESTIONS AND RECOMMENDATIONS:    1.       Prednisone 20 x 3 d then 10 x 3 d  2.       DuoNeb q.i.d. and p.r.n.   3.       Protonix 40 mg a day.    4.       Previously prescribed BiPAP 13/5 at night but now pt declines so this will need to be readdressed as out pt  6.       Rocephin and Zithromax completing  7.       Yesterday I reminded Yesenia and son, Tyler that she will need outpt PET  scan  for this suspicious 1.2 cm nodule and then likely biopsy of lung nodule if possibility of cancer  8.        Pulmoonary rehab recommended  9.        Discussed w Dr Rafael JONES MD  3/21/2025

## 2025-03-24 NOTE — PLAN OF CARE
Problem: Patient Centered Care  Goal: Patient preferences are identified and integrated in the patient's plan of care  Description: Interventions:- What would you like us to know as we care for you? - Provide timely, complete, and accurate information to patient/family- Incorporate patient and family knowledge, values, beliefs, and cultural backgrounds into the planning and delivery of care- Encourage patient/family to participate in care and decision-making at the level they choose- Honor patient and family perspectives and choices  Outcome: Progressing     Problem: Diabetes/Glucose Control  Goal: Glucose maintained within prescribed range  Description: INTERVENTIONS:- Monitor Blood Glucose as ordered- Assess for signs and symptoms of hyperglycemia and hypoglycemia- Administer ordered medications to maintain glucose within target range- Assess barriers to adequate nutritional intake and initiate nutrition consult as needed- Instruct patient on self management of diabetes  Outcome: Progressing     Problem: RESPIRATORY - ADULT  Goal: Achieves optimal ventilation and oxygenation  Description: INTERVENTIONS:- Assess for changes in respiratory status- Assess for changes in mentation and behavior- Position to facilitate oxygenation and minimize respiratory effort- Oxygen supplementation based on oxygen saturation or ABGs- Provide Smoking Cessation handout, if applicable- Encourage broncho-pulmonary hygiene including cough, deep breathe, Incentive Spirometry- Assess the need for suctioning and perform as needed- Assess and instruct to report SOB or any respiratory difficulty- Respiratory Therapy support as indicated- Manage/alleviate anxiety- Monitor for signs/symptoms of CO2 retention  Outcome: Progressing     Problem: PAIN - ADULT  Goal: Verbalizes/displays adequate comfort level or patient's stated pain goal  Description: INTERVENTIONS:- Encourage pt to monitor pain and request assistance- Assess pain using  appropriate pain scale- Administer analgesics based on type and severity of pain and evaluate response- Implement non-pharmacological measures as appropriate and evaluate response- Consider cultural and social influences on pain and pain management- Manage/alleviate anxiety- Utilize distraction and/or relaxation techniques- Monitor for opioid side effects- Notify MD/LIP if interventions unsuccessful or patient reports new pain- Anticipate increased pain with activity and pre-medicate as appropriate  Outcome: Progressing     Problem: RISK FOR INFECTION - ADULT  Goal: Absence of fever/infection during anticipated neutropenic period  Description: INTERVENTIONS- Monitor WBC- Administer growth factors as ordered- Implement neutropenic guidelines  Outcome: Progressing     Problem: SAFETY ADULT - FALL  Goal: Free from fall injury  Description: INTERVENTIONS:- Assess pt frequently for physical needs- Identify cognitive and physical deficits and behaviors that affect risk of falls.- [place] fall precautions as indicated by assessment.- Educate pt/family on patient safety including physical limitations- Instruct pt to call for assistance with activity based on assessment- Modify environment to reduce risk of injury- Provide assistive devices as appropriate- Consider OT/PT consult to assist with strengthening/mobility- Encourage toileting schedule  Outcome: Progressing     Problem: DISCHARGE PLANNING  Goal: Discharge to home or other facility with appropriate resources  Description: INTERVENTIONS:- Identify barriers to discharge w/pt and caregiver- Include patient/family/discharge partner in discharge planning- Arrange for needed discharge resources and transportation as appropriate- Identify discharge learning needs (meds, wound care, etc)- Arrange for interpreters to assist at discharge as needed- Consider post-discharge preferences of patient/family/discharge partner- Complete POLST form as appropriate- Assess patient's  ability to be responsible for managing their own health- Refer to Case Management Department for coordinating discharge planning if the patient needs post-hospital services based on physician/LIP order or complex needs related to functional status, cognitive ability or social support system  Outcome: Progressing

## 2025-03-25 PROBLEM — Z51.5 PALLIATIVE CARE BY SPECIALIST: Status: ACTIVE | Noted: 2025-03-25

## 2025-03-25 PROBLEM — Z71.89 COUNSELING REGARDING ADVANCE CARE PLANNING AND GOALS OF CARE: Status: ACTIVE | Noted: 2025-03-25

## 2025-03-25 PROBLEM — R91.1 RIGHT UPPER LOBE PULMONARY NODULE: Status: ACTIVE | Noted: 2025-03-25

## 2025-03-25 PROBLEM — R06.00 DYSPNEA AND RESPIRATORY ABNORMALITIES: Status: ACTIVE | Noted: 2025-03-25

## 2025-03-25 PROBLEM — R06.89 DYSPNEA AND RESPIRATORY ABNORMALITIES: Status: ACTIVE | Noted: 2025-03-25

## 2025-03-27 ENCOUNTER — HOSPITAL ENCOUNTER (OUTPATIENT)
Age: 85
Discharge: HOME | End: 2025-03-27
Attending: EMERGENCY MEDICINE
Payer: MEDICARE

## 2025-03-27 VITALS
SYSTOLIC BLOOD PRESSURE: 102 MMHG | OXYGEN SATURATION: 95 % | RESPIRATION RATE: 16 BRPM | TEMPERATURE: 99 F | DIASTOLIC BLOOD PRESSURE: 45 MMHG | HEART RATE: 80 BPM

## 2025-03-27 DIAGNOSIS — R35.89 POLYURIA: ICD-10-CM

## 2025-03-27 DIAGNOSIS — R73.9 HYPERGLYCEMIA: Primary | ICD-10-CM

## 2025-03-27 LAB
BILIRUB UR QL STRIP: NEGATIVE
CLARITY UR: CLEAR
COLOR UR: YELLOW
GLUCOSE BLDC GLUCOMTR-MCNC: 208 MG/DL (ref 70–99)
GLUCOSE UR STRIP-MCNC: >=1000 MG/DL
HGB UR QL STRIP: NEGATIVE
KETONES UR STRIP-MCNC: NEGATIVE MG/DL
LEUKOCYTE ESTERASE UR QL STRIP: NEGATIVE
NITRITE UR QL STRIP: NEGATIVE
PH UR STRIP: 6 [PH]
PROT UR STRIP-MCNC: NEGATIVE MG/DL
SP GR UR STRIP: 1.01
UROBILINOGEN UR STRIP-ACNC: <2 MG/DL

## 2025-03-27 PROCEDURE — 81002 URINALYSIS NONAUTO W/O SCOPE: CPT

## 2025-03-27 PROCEDURE — 99214 OFFICE O/P EST MOD 30 MIN: CPT

## 2025-03-27 PROCEDURE — 82962 GLUCOSE BLOOD TEST: CPT

## 2025-03-27 PROCEDURE — 99213 OFFICE O/P EST LOW 20 MIN: CPT

## 2025-03-27 RX ORDER — PHENAZOPYRIDINE HYDROCHLORIDE 200 MG/1
200 TABLET, FILM COATED ORAL 3 TIMES DAILY PRN
Qty: 6 TABLET | Refills: 0 | Status: SHIPPED | OUTPATIENT
Start: 2025-03-27 | End: 2025-04-03

## 2025-03-27 NOTE — ED PROVIDER NOTES
Patient Seen in: [facility]      History     Chief Complaint   Patient presents with    Urinary Symptoms     Stated Complaint: Urinary Problem    Subjective:   HPI      Patient is an 84-year-old female who presents with urinary frequency and urgency x 2 days.  Denies any abdominal pain or back pain.  No fevers.  No vomiting.  No pain with urination.  Recently discharged from the hospital 3 days ago and started on prednisone.    Objective:     Past Medical History:    A-fib ([facility])    Anesthesia complication    Arrhythmia    AFIB    Arthritis    Asthma ([facility])    Back problem    COPD (chronic obstructive pulmonary disease) ([facility])    Deviated nasal septum    nasal septoplasty, turb reduction, smr of turbs    Difficult intubation    fiber optic if needed    Diverticulitis    colonoscopy     Diverticulosis of large intestine    Esophageal reflux    Extrinsic asthma, unspecified    Headache    Heart disease    Hepatitis    WAS TOLD SHE HAS HAD THIS WHEN SHE WAS 19 YEARS OLD    High blood pressure    High cholesterol    Irregular heart rate    Lichenoid keratosis    left chest-bx    Osteoarthritis    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    Paronychia    (RT); onychomycosis; debridement    Problems with swallowing    occasionally    Shortness of breath    2 L NC ALL THE TIME 24HR/7 DAYS PER WEEK    Unspecified essential hypertension    Visual impairment              No pertinent past surgical history.              Social History     Socioeconomic History    Marital status:    Tobacco Use    Smoking status: Former     Current packs/day: 0.00     Types: Cigarettes     Quit date: 1996     Years since quittin.2    Smokeless tobacco: Never   Vaping Use    Vaping status: Never Used   Substance and Sexual Activity    Alcohol use: Not Currently     Comment: one drink once a month    Drug use: Never   Other Topics Concern    Pt has a pacemaker No    Pt has a defibrillator No    Reaction to  local anesthetic No    Caffeine Concern No     Social Drivers of Health     Food Insecurity: No Food Insecurity (3/20/2025)    NCSS - Food Insecurity     Worried About Running Out of Food in the Last Year: No     Ran Out of Food in the Last Year: No   Transportation Needs: No Transportation Needs (3/20/2025)    NCSS - Transportation     Lack of Transportation: No   Housing Stability: Not At Risk (3/21/2025)    NCSS - Housing/Utilities     Has Housing: Yes     Worried About Losing Housing: No     Unable to Get Utilities: No   Recent Concern: Housing Stability - At Risk (3/20/2025)    NCSS - Housing/Utilities     Has Housing: No     Worried About Losing Housing: No     Unable to Get Utilities: No              Review of Systems    Positive for stated complaint: Urinary Problem  Other systems are as noted in HPI.  Constitutional and vital signs reviewed.      All other systems reviewed and negative except as noted above.    Physical Exam     ED Triage Vitals [03/27/25 2887]   /45   Pulse 80   Resp 16   Temp 98.5 °F (36.9 °C)   Temp src Oral   SpO2 95 %   O2 Device Nasal cannula       Current Vitals:   Vital Signs  BP: 102/45  Pulse: 80  Resp: 16  Temp: 98.5 °F (36.9 °C)  Temp src: Oral    Oxygen Therapy  SpO2: 95 %  O2 Device: Nasal cannula  O2 Flow Rate (L/min): 4 L/min        Physical Exam  GENERAL: No acute distress, awake and alert  HEENT: EOMI, PERRL  Abd: soft, no distension  Extremities: FROM of all extremities  Neuro: CN intact, normal speech,  5/5 motor strength in all extremities, no focal deficits      ED Course     Labs Reviewed   [facility] POCT URINALYSIS DIPSTICK - Abnormal; Notable for the following components:       Result Value    Glucose, Urine >=1000 (*)     All other components within normal limits        MDM     Medical Decision Making  Suspect hypoglycemia as cause of patient's polyuria.  Advised on return precautions and close follow-up with PCP.  She states her glucose typically is elevated when  she is on prednisone but she is not on any hypoglycemics or insulin.  She will stop the prednisone starting tomorrow and try Pyridium as needed for symptoms.    Amount and/or Complexity of Data Reviewed  External Data Reviewed: notes.     Details: Recent discharge summary from this week reviewed  Labs: ordered.    Risk  Prescription drug management.        Disposition and Plan     Clinical Impression:  1. Hyperglycemia    2. Polyuria         Disposition:  Discharge  3/27/2025  6:57 pm    Follow-up:  Brenda Wilkerson MD  [address]  [facility] [address]  [facility] [address]  [phone]    In 2 days            Medications Prescribed:  Current Discharge Medication List        START taking these medications    Details   phenazopyridine 200 MG Oral Tab Take 1 tablet (200 mg total) by mouth 3 (three) times daily as needed for Pain.  Qty: 6 tablet, Refills: 0                 Supplementary Documentation:

## 2025-03-27 NOTE — ED INITIAL ASSESSMENT (HPI)
Patient reports urinary frequency since yesterday.  Denies fevers, chills.  Patient recently hospitalized.

## 2025-04-04 ENCOUNTER — LAB REQUISITION (OUTPATIENT)
Dept: LAB | Facility: HOSPITAL | Age: 85
End: 2025-04-04
Payer: MEDICARE

## 2025-04-04 DIAGNOSIS — R35.0 FREQUENCY OF MICTURITION: ICD-10-CM

## 2025-04-04 LAB
BILIRUB UR QL: NEGATIVE
CLARITY UR: CLEAR
GLUCOSE UR-MCNC: >1000 MG/DL
HGB UR QL STRIP.AUTO: NEGATIVE
KETONES UR-MCNC: NEGATIVE MG/DL
LEUKOCYTE ESTERASE UR QL STRIP.AUTO: NEGATIVE
NITRITE UR QL STRIP.AUTO: NEGATIVE
PH UR: 6.5 [PH] (ref 5–8)
PROT UR-MCNC: NEGATIVE MG/DL
SP GR UR STRIP: 1.03 (ref 1–1.03)
UROBILINOGEN UR STRIP-ACNC: 2

## 2025-04-04 PROCEDURE — 81003 URINALYSIS AUTO W/O SCOPE: CPT | Performed by: INTERNAL MEDICINE

## 2025-04-04 PROCEDURE — 87086 URINE CULTURE/COLONY COUNT: CPT | Performed by: INTERNAL MEDICINE

## 2025-04-08 ENCOUNTER — HOSPITAL ENCOUNTER (OUTPATIENT)
Dept: NUCLEAR MEDICINE | Facility: HOSPITAL | Age: 85
Discharge: HOME | End: 2025-04-08
Attending: INTERNAL MEDICINE
Payer: MEDICARE

## 2025-04-08 DIAGNOSIS — R91.1 LUNG NODULE: ICD-10-CM

## 2025-04-08 LAB — GLUCOSE BLDC GLUCOMTR-MCNC: 175 MG/DL (ref 70–99)

## 2025-04-08 PROCEDURE — 78815 PET IMAGE W/CT SKULL-THIGH: CPT | Performed by: INTERNAL MEDICINE

## 2025-04-08 PROCEDURE — 82962 GLUCOSE BLOOD TEST: CPT

## 2025-04-10 NOTE — PROGRESS NOTES
Provider Clarification    Additional information on the patient's respiratory status    Acuity: Acute  Type: acute on ch. Resp failure  Underlying cause: secondary to copd exacerbation and acute pneumonia  Tx. With iv abx and solumedrol along with nebulizers    This note is part of the patient's medical record.

## 2025-04-18 RX ORDER — MUPIROCIN 20 MG/G
1 OINTMENT TOPICAL 3 TIMES DAILY
Qty: 22 G | Refills: 0 | OUTPATIENT
Start: 2025-04-18

## 2025-04-18 NOTE — TELEPHONE ENCOUNTER
Medication refill request for Mupirocin ointment, last appt was 8/15/24, was asked to return in 3-6 months.  Will need an appt.

## 2025-05-01 ENCOUNTER — LAB REQUISITION (OUTPATIENT)
Dept: LAB | Facility: HOSPITAL | Age: 85
End: 2025-05-01
Payer: MEDICARE

## 2025-05-01 DIAGNOSIS — R30.0 DYSURIA: ICD-10-CM

## 2025-05-01 LAB
BILIRUB UR QL: NEGATIVE
GLUCOSE UR-MCNC: >1000 MG/DL
HGB UR QL STRIP.AUTO: NEGATIVE
KETONES UR-MCNC: NEGATIVE MG/DL
LEUKOCYTE ESTERASE UR QL STRIP.AUTO: 25
NITRITE UR QL STRIP.AUTO: NEGATIVE
PH UR: 6.5 [PH] (ref 5–8)
PROT UR-MCNC: NEGATIVE MG/DL
SP GR UR STRIP: 1.02 (ref 1–1.03)
UROBILINOGEN UR STRIP-ACNC: 6

## 2025-05-01 PROCEDURE — 87086 URINE CULTURE/COLONY COUNT: CPT | Performed by: INTERNAL MEDICINE

## 2025-05-01 PROCEDURE — 87186 SC STD MICRODIL/AGAR DIL: CPT | Performed by: INTERNAL MEDICINE

## 2025-05-01 PROCEDURE — 81001 URINALYSIS AUTO W/SCOPE: CPT | Performed by: INTERNAL MEDICINE

## 2025-05-01 PROCEDURE — 87077 CULTURE AEROBIC IDENTIFY: CPT | Performed by: INTERNAL MEDICINE

## 2025-05-05 ENCOUNTER — TELEPHONE (OUTPATIENT)
Facility: LOCATION | Age: 85
End: 2025-05-05

## 2025-05-05 ENCOUNTER — TELEPHONE (OUTPATIENT)
Age: 85
End: 2025-05-05

## 2025-05-12 ENCOUNTER — TELEPHONE (OUTPATIENT)
Age: 85
End: 2025-05-12

## 2025-05-12 ENCOUNTER — TELEPHONE (OUTPATIENT)
Facility: LOCATION | Age: 85
End: 2025-05-12

## 2025-05-13 ENCOUNTER — HOSPITAL ENCOUNTER (INPATIENT)
Facility: HOSPITAL | Age: 85
LOS: 8 days | Discharge: HOME HEALTH | End: 2025-05-21
Attending: EMERGENCY MEDICINE | Admitting: HOSPITALIST
Payer: MEDICARE

## 2025-05-13 ENCOUNTER — APPOINTMENT (OUTPATIENT)
Dept: GENERAL RADIOLOGY | Facility: HOSPITAL | Age: 85
End: 2025-05-13
Attending: EMERGENCY MEDICINE
Payer: MEDICARE

## 2025-05-13 DIAGNOSIS — J44.1 COPD EXACERBATION (HCC): Primary | ICD-10-CM

## 2025-05-13 DIAGNOSIS — M41.9 KYPHOSCOLIOSIS: ICD-10-CM

## 2025-05-13 LAB
ALBUMIN SERPL-MCNC: 3.9 G/DL (ref 3.2–4.8)
ALP LIVER SERPL-CCNC: 65 U/L (ref 55–142)
ALT SERPL-CCNC: <7 U/L (ref 10–49)
ANION GAP SERPL CALC-SCNC: 8 MMOL/L (ref 0–18)
AST SERPL-CCNC: 13 U/L (ref ?–34)
BASOPHILS # BLD AUTO: 0.04 X10(3) UL (ref 0–0.2)
BASOPHILS NFR BLD AUTO: 0.4 %
BILIRUB DIRECT SERPL-MCNC: <0.1 MG/DL (ref ?–0.3)
BILIRUB SERPL-MCNC: 0.2 MG/DL (ref 0.2–1.1)
BUN BLD-MCNC: 14 MG/DL (ref 9–23)
BUN/CREAT SERPL: 18.9 (ref 10–20)
CALCIUM BLD-MCNC: 8.8 MG/DL (ref 8.7–10.4)
CHLORIDE SERPL-SCNC: 96 MMOL/L (ref 98–112)
CO2 SERPL-SCNC: 36 MMOL/L (ref 21–32)
CREAT BLD-MCNC: 0.74 MG/DL (ref 0.55–1.02)
DEPRECATED RDW RBC AUTO: 54.1 FL (ref 35.1–46.3)
EGFRCR SERPLBLD CKD-EPI 2021: 81 ML/MIN/1.73M2 (ref 60–?)
EOSINOPHIL # BLD AUTO: 0.01 X10(3) UL (ref 0–0.7)
EOSINOPHIL NFR BLD AUTO: 0.1 %
ERYTHROCYTE [DISTWIDTH] IN BLOOD BY AUTOMATED COUNT: 16.1 % (ref 11–15)
GLUCOSE BLD-MCNC: 133 MG/DL (ref 70–99)
GLUCOSE BLDC GLUCOMTR-MCNC: 162 MG/DL (ref 70–99)
HCT VFR BLD AUTO: 41.5 % (ref 35–48)
HGB BLD-MCNC: 12.5 G/DL (ref 12–16)
IMM GRANULOCYTES # BLD AUTO: 0.02 X10(3) UL (ref 0–1)
IMM GRANULOCYTES NFR BLD: 0.2 %
LYMPHOCYTES # BLD AUTO: 1.82 X10(3) UL (ref 1–4)
LYMPHOCYTES NFR BLD AUTO: 16.6 %
MCH RBC QN AUTO: 27.8 PG (ref 26–34)
MCHC RBC AUTO-ENTMCNC: 30.1 G/DL (ref 31–37)
MCV RBC AUTO: 92.4 FL (ref 80–100)
MONOCYTES # BLD AUTO: 0.88 X10(3) UL (ref 0.1–1)
MONOCYTES NFR BLD AUTO: 8 %
NEUTROPHILS # BLD AUTO: 8.21 X10 (3) UL (ref 1.5–7.7)
NEUTROPHILS # BLD AUTO: 8.21 X10(3) UL (ref 1.5–7.7)
NEUTROPHILS NFR BLD AUTO: 74.7 %
OSMOLALITY SERPL CALC.SUM OF ELEC: 292 MOSM/KG (ref 275–295)
PLATELET # BLD AUTO: 290 10(3)UL (ref 150–450)
POTASSIUM SERPL-SCNC: 3.1 MMOL/L (ref 3.5–5.1)
PROT SERPL-MCNC: 6.5 G/DL (ref 5.7–8.2)
RBC # BLD AUTO: 4.49 X10(6)UL (ref 3.8–5.3)
SODIUM SERPL-SCNC: 140 MMOL/L (ref 136–145)
WBC # BLD AUTO: 11 X10(3) UL (ref 4–11)

## 2025-05-13 PROCEDURE — 99223 1ST HOSP IP/OBS HIGH 75: CPT | Performed by: HOSPITALIST

## 2025-05-13 PROCEDURE — 71045 X-RAY EXAM CHEST 1 VIEW: CPT | Performed by: EMERGENCY MEDICINE

## 2025-05-13 RX ORDER — POTASSIUM CHLORIDE 1.5 G/1.58G
40 POWDER, FOR SOLUTION ORAL ONCE
Status: COMPLETED | OUTPATIENT
Start: 2025-05-13 | End: 2025-05-13

## 2025-05-13 RX ORDER — PHENAZOPYRIDINE HYDROCHLORIDE 200 MG/1
200 TABLET, FILM COATED ORAL ONCE
Status: COMPLETED | OUTPATIENT
Start: 2025-05-13 | End: 2025-05-13

## 2025-05-13 RX ORDER — ENOXAPARIN SODIUM 100 MG/ML
40 INJECTION SUBCUTANEOUS DAILY
Status: DISCONTINUED | OUTPATIENT
Start: 2025-05-14 | End: 2025-05-19

## 2025-05-13 RX ORDER — DEXTROSE MONOHYDRATE 25 G/50ML
50 INJECTION, SOLUTION INTRAVENOUS
Status: DISCONTINUED | OUTPATIENT
Start: 2025-05-13 | End: 2025-05-21

## 2025-05-13 RX ORDER — METHYLPREDNISOLONE SODIUM SUCCINATE 125 MG/2ML
125 INJECTION INTRAMUSCULAR; INTRAVENOUS ONCE
Status: COMPLETED | OUTPATIENT
Start: 2025-05-13 | End: 2025-05-13

## 2025-05-13 RX ORDER — BENZONATATE 100 MG/1
200 CAPSULE ORAL 3 TIMES DAILY PRN
Status: DISCONTINUED | OUTPATIENT
Start: 2025-05-13 | End: 2025-05-21

## 2025-05-13 RX ORDER — NICOTINE POLACRILEX 4 MG
15 LOZENGE BUCCAL
Status: DISCONTINUED | OUTPATIENT
Start: 2025-05-13 | End: 2025-05-21

## 2025-05-13 RX ORDER — IPRATROPIUM BROMIDE AND ALBUTEROL SULFATE 2.5; .5 MG/3ML; MG/3ML
3 SOLUTION RESPIRATORY (INHALATION) EVERY 4 HOURS PRN
Status: DISCONTINUED | OUTPATIENT
Start: 2025-05-13 | End: 2025-05-21

## 2025-05-13 RX ORDER — ONDANSETRON 2 MG/ML
4 INJECTION INTRAMUSCULAR; INTRAVENOUS ONCE
Status: COMPLETED | OUTPATIENT
Start: 2025-05-13 | End: 2025-05-13

## 2025-05-13 RX ORDER — NITROFURANTOIN 25; 75 MG/1; MG/1
100 CAPSULE ORAL 2 TIMES DAILY
COMMUNITY

## 2025-05-13 RX ORDER — IPRATROPIUM BROMIDE AND ALBUTEROL SULFATE 2.5; .5 MG/3ML; MG/3ML
3 SOLUTION RESPIRATORY (INHALATION) ONCE
Status: COMPLETED | OUTPATIENT
Start: 2025-05-13 | End: 2025-05-13

## 2025-05-13 RX ORDER — METHYLPREDNISOLONE SODIUM SUCCINATE 40 MG/ML
40 INJECTION INTRAMUSCULAR; INTRAVENOUS EVERY 8 HOURS
Status: DISCONTINUED | OUTPATIENT
Start: 2025-05-14 | End: 2025-05-17

## 2025-05-13 RX ORDER — ACETAMINOPHEN 325 MG/1
650 TABLET ORAL EVERY 6 HOURS PRN
Status: DISCONTINUED | OUTPATIENT
Start: 2025-05-13 | End: 2025-05-21

## 2025-05-13 RX ORDER — NICOTINE POLACRILEX 4 MG
30 LOZENGE BUCCAL
Status: DISCONTINUED | OUTPATIENT
Start: 2025-05-13 | End: 2025-05-21

## 2025-05-13 RX ORDER — PHENAZOPYRIDINE HYDROCHLORIDE 200 MG/1
200 TABLET, FILM COATED ORAL 3 TIMES DAILY
COMMUNITY
End: 2025-05-21

## 2025-05-13 RX ORDER — ONDANSETRON 2 MG/ML
4 INJECTION INTRAMUSCULAR; INTRAVENOUS EVERY 6 HOURS PRN
Status: DISCONTINUED | OUTPATIENT
Start: 2025-05-13 | End: 2025-05-21

## 2025-05-13 RX ORDER — NITROFURANTOIN 25; 75 MG/1; MG/1
100 CAPSULE ORAL 2 TIMES DAILY
Status: DISCONTINUED | OUTPATIENT
Start: 2025-05-13 | End: 2025-05-14

## 2025-05-13 RX ORDER — METOCLOPRAMIDE HYDROCHLORIDE 5 MG/ML
10 INJECTION INTRAMUSCULAR; INTRAVENOUS EVERY 8 HOURS PRN
Status: DISCONTINUED | OUTPATIENT
Start: 2025-05-13 | End: 2025-05-14

## 2025-05-13 RX ORDER — PHENAZOPYRIDINE HYDROCHLORIDE 200 MG/1
200 TABLET, FILM COATED ORAL 3 TIMES DAILY PRN
Status: COMPLETED | OUTPATIENT
Start: 2025-05-13 | End: 2025-05-21

## 2025-05-13 NOTE — ED INITIAL ASSESSMENT (HPI)
Pt to ED via [facility] with c/o SOB and general weakness x3 days. Hx of COPD and asthma, 4L of O2 via NC at baseline. Pt A&ox4.

## 2025-05-13 NOTE — ED QUICK NOTES
Orders for admission, patient is aware of plan and ready to go upstairs. Any questions, please call ED RN Mary at extension [phone].     Patient Covid vaccination status: Fully vaccinated     COVID Test Ordered in ED: None    COVID Suspicion at Admission: N/A    Running Infusions: Medication Infusions[1] None    Mental Status/LOC at time of transport: alert and oriented X4.     Other pertinent information: Pt currently taking abx for bladder infection. 22G to R wrist. Pt with lymphedema to left upper extremity. Slightly anxious and irritable to room.     CIWA score: N/A   NIH score:  N/A             [1]

## 2025-05-13 NOTE — ED PROVIDER NOTES
Patient Seen in: [facility] Emergency Department      History     Chief Complaint   Patient presents with    Shortness Of Breath     Stated Complaint: SOB    Subjective:   HPI  History of Present Illness            Patient is an 84-year-old female who arrives by EMS for shortness of breath times several days.  No relief with albuterol at home.  Most recent hospitalization in March with no history of intubation in the past.  Short of breath even at rest with no PND or orthopnea.  No chest pain or leg swelling.  No vomiting, fevers or cough.      Objective:     No pertinent past medical history.            No pertinent past surgical history.              No pertinent social history.                              Physical Exam     ED Triage Vitals   BP 05/13/25 1824 (!) 107/98   Pulse 05/13/25 1803 82   Resp 05/13/25 1803 20   Temp 05/13/25 1803 98.1 °F (36.7 °C)   Temp src 05/13/25 1803 Temporal   SpO2 05/13/25 1803 92 %   O2 Device 05/13/25 1803 None (Room air)       Current Vitals:   Vital Signs  BP: (!) 107/98  Pulse: 82  Resp: 20  Temp: 98.1 °F (36.7 °C)  Temp src: Temporal    Oxygen Therapy  SpO2: 92 %  O2 Device: Nasal cannula  O2 Flow Rate (L/min): 4 L/min          Physical Exam  GENERAL: moderate distress, awake and alert, speaking in short sentences  HEENT: EOMI, PERRL  Neck: supple, no jvd  CV: RRR, no murmurs  Resp: CTAB, no wheezes or retractions  Extremities: FROM of all extremities, no edema  Neuro: CN intact, 5/5 motor strength in all extremities, no focal deficits  SKIN: warm, dry, no rashes          ED Course     Labs Reviewed   BASIC METABOLIC PANEL (8) - Abnormal; Notable for the following components:       Result Value    Glucose 133 (*)     Potassium 3.1 (*)     Chloride 96 (*)     CO2 36.0 (*)     All other components within normal limits   HEPATIC FUNCTION PANEL (7) - Abnormal; Notable for the following components:    ALT <7 (*)     All other components within normal limits   CBC WITH  DIFFERENTIAL WITH PLATELET - Abnormal; Notable for the following components:    MCHC 30.1 (*)     RDW-SD 54.1 (*)     RDW 16.1 (*)     Neutrophil Absolute Prelim 8.21 (*)     Neutrophil Absolute 8.21 (*)     All other components within normal limits   RAINBOW DRAW LAVENDER   RAINBOW DRAW LIGHT GREEN   RAINBOW DRAW GOLD     EKG    Rate, intervals and axes as noted on EKG Report.  Rate: 77  Rhythm: Sinus Rhythm  Reading: no ANDREAS, normal EKG              Results                 MDM    Admission disposition: 5/13/2025  7:29 PM           Medical Decision Making  Pt sob in h/o COPD, pleural effusion on home O2  Duoneb/solumedrol given     Reassessment: breathing somewhat improved. Advised on admission for nebs/steroids and pt agreeable to plan. Remains stable on nasal cannula O2.     Amount and/or Complexity of Data Reviewed  External Data Reviewed: radiology and notes.     Details: Recent admission notes 3/2025 reviewed  Cxr 3/2025 reviewed  Labs: ordered.  Radiology: ordered.  Discussion of management or test interpretation with external provider(s): D/w dr Khan  D/w dr Padilla pulmonology    Risk  Decision regarding hospitalization.        Disposition and Plan     Clinical Impression:  1. COPD exacerbation (HCC)         Disposition:  Admit  5/13/2025  7:29 pm    Follow-up:  No follow-up provider specified.  We recommend that you schedule follow up care with a primary care provider within the next three months to obtain basic health screening including reassessment of your blood pressure.      Medications Prescribed:  Current Discharge Medication List                Supplementary Documentation:         Hospital Problems       Present on Admission  Date Reviewed: 3/6/2025          ICD-10-CM Noted POA    * (Principal) COPD exacerbation (HCC) J44.1 1/10/2025 Unknown

## 2025-05-14 ENCOUNTER — APPOINTMENT (OUTPATIENT)
Age: 85
End: 2025-05-14
Attending: INTERNAL MEDICINE
Payer: MEDICARE

## 2025-05-14 LAB
ANION GAP SERPL CALC-SCNC: 10 MMOL/L (ref 0–18)
ATRIAL RATE: 71 BPM
ATRIAL RATE: 77 BPM
BASOPHILS # BLD AUTO: 0.01 X10(3) UL (ref 0–0.2)
BASOPHILS NFR BLD AUTO: 0.1 %
BUN BLD-MCNC: 19 MG/DL (ref 9–23)
BUN/CREAT SERPL: 20.7 (ref 10–20)
CALCIUM BLD-MCNC: 8.7 MG/DL (ref 8.7–10.4)
CHLORIDE SERPL-SCNC: 96 MMOL/L (ref 98–112)
CO2 SERPL-SCNC: 32 MMOL/L (ref 21–32)
CREAT BLD-MCNC: 0.92 MG/DL (ref 0.55–1.02)
DEPRECATED RDW RBC AUTO: 54.8 FL (ref 35.1–46.3)
DIGOXIN SERPL-MCNC: 1.93 NG/ML (ref 0.8–2)
EGFRCR SERPLBLD CKD-EPI 2021: 62 ML/MIN/1.73M2 (ref 60–?)
EOSINOPHIL # BLD AUTO: 0 X10(3) UL (ref 0–0.7)
EOSINOPHIL NFR BLD AUTO: 0 %
ERYTHROCYTE [DISTWIDTH] IN BLOOD BY AUTOMATED COUNT: 16.1 % (ref 11–15)
GLUCOSE BLD-MCNC: 216 MG/DL (ref 70–99)
GLUCOSE BLDC GLUCOMTR-MCNC: 151 MG/DL (ref 70–99)
GLUCOSE BLDC GLUCOMTR-MCNC: 184 MG/DL (ref 70–99)
GLUCOSE BLDC GLUCOMTR-MCNC: 213 MG/DL (ref 70–99)
GLUCOSE BLDC GLUCOMTR-MCNC: 226 MG/DL (ref 70–99)
HCT VFR BLD AUTO: 39.6 % (ref 35–48)
HGB BLD-MCNC: 12 G/DL (ref 12–16)
IMM GRANULOCYTES # BLD AUTO: 0.02 X10(3) UL (ref 0–1)
IMM GRANULOCYTES NFR BLD: 0.3 %
LYMPHOCYTES # BLD AUTO: 0.57 X10(3) UL (ref 1–4)
LYMPHOCYTES NFR BLD AUTO: 8.1 %
MAGNESIUM SERPL-MCNC: 2 MG/DL (ref 1.6–2.6)
MCH RBC QN AUTO: 27.9 PG (ref 26–34)
MCHC RBC AUTO-ENTMCNC: 30.3 G/DL (ref 31–37)
MCV RBC AUTO: 92.1 FL (ref 80–100)
MONOCYTES # BLD AUTO: 0.04 X10(3) UL (ref 0.1–1)
MONOCYTES NFR BLD AUTO: 0.6 %
NEUTROPHILS # BLD AUTO: 6.42 X10 (3) UL (ref 1.5–7.7)
NEUTROPHILS # BLD AUTO: 6.42 X10(3) UL (ref 1.5–7.7)
NEUTROPHILS NFR BLD AUTO: 90.9 %
OSMOLALITY SERPL CALC.SUM OF ELEC: 295 MOSM/KG (ref 275–295)
P AXIS: -21 DEGREES
P AXIS: -24 DEGREES
P-R INTERVAL: 186 MS
P-R INTERVAL: 248 MS
PLATELET # BLD AUTO: 258 10(3)UL (ref 150–450)
POTASSIUM SERPL-SCNC: 3.3 MMOL/L (ref 3.5–5.1)
POTASSIUM SERPL-SCNC: 3.3 MMOL/L (ref 3.5–5.1)
Q-T INTERVAL: 328 MS
Q-T INTERVAL: 366 MS
QRS DURATION: 108 MS
QRS DURATION: 94 MS
QTC CALCULATION (BEZET): 356 MS
QTC CALCULATION (BEZET): 414 MS
R AXIS: -15 DEGREES
R AXIS: 2 DEGREES
RBC # BLD AUTO: 4.3 X10(6)UL (ref 3.8–5.3)
SODIUM SERPL-SCNC: 138 MMOL/L (ref 136–145)
T AXIS: 270 DEGREES
T AXIS: 30 DEGREES
VENTRICULAR RATE: 71 BPM
VENTRICULAR RATE: 77 BPM
WBC # BLD AUTO: 7.1 X10(3) UL (ref 4–11)

## 2025-05-14 PROCEDURE — 99233 SBSQ HOSP IP/OBS HIGH 50: CPT | Performed by: HOSPITALIST

## 2025-05-14 RX ORDER — POTASSIUM CHLORIDE 1500 MG/1
40 TABLET, EXTENDED RELEASE ORAL ONCE
Status: COMPLETED | OUTPATIENT
Start: 2025-05-14 | End: 2025-05-14

## 2025-05-14 RX ORDER — POLYETHYLENE GLYCOL 3350 17 G/17G
17 POWDER, FOR SOLUTION ORAL DAILY
Status: DISCONTINUED | OUTPATIENT
Start: 2025-05-14 | End: 2025-05-21

## 2025-05-14 RX ORDER — IPRATROPIUM BROMIDE AND ALBUTEROL SULFATE 2.5; .5 MG/3ML; MG/3ML
3 SOLUTION RESPIRATORY (INHALATION)
Status: DISCONTINUED | OUTPATIENT
Start: 2025-05-14 | End: 2025-05-18

## 2025-05-14 RX ORDER — CHOLECALCIFEROL (VITAMIN D3) 25 MCG
1000 TABLET ORAL 2 TIMES DAILY
Status: DISCONTINUED | OUTPATIENT
Start: 2025-05-14 | End: 2025-05-21

## 2025-05-14 RX ORDER — MORPHINE SULFATE 10 MG/5ML
2.6 SOLUTION ORAL 3 TIMES DAILY PRN
Refills: 0 | Status: DISCONTINUED | OUTPATIENT
Start: 2025-05-14 | End: 2025-05-21

## 2025-05-14 RX ORDER — METOCLOPRAMIDE HYDROCHLORIDE 5 MG/ML
5 INJECTION INTRAMUSCULAR; INTRAVENOUS EVERY 8 HOURS PRN
Status: DISCONTINUED | OUTPATIENT
Start: 2025-05-14 | End: 2025-05-21

## 2025-05-14 RX ORDER — FUROSEMIDE 40 MG/1
80 TABLET ORAL
Status: DISCONTINUED | OUTPATIENT
Start: 2025-05-14 | End: 2025-05-21

## 2025-05-14 RX ORDER — DILTIAZEM HYDROCHLORIDE 120 MG/1
360 CAPSULE, EXTENDED RELEASE ORAL DAILY
Status: DISCONTINUED | OUTPATIENT
Start: 2025-05-14 | End: 2025-05-21

## 2025-05-14 RX ORDER — FLUTICASONE PROPIONATE AND SALMETEROL 500; 50 UG/1; UG/1
1 POWDER RESPIRATORY (INHALATION) 2 TIMES DAILY
Status: DISCONTINUED | OUTPATIENT
Start: 2025-05-14 | End: 2025-05-21

## 2025-05-14 RX ORDER — ACETAZOLAMIDE 250 MG/1
500 TABLET ORAL DAILY
Status: DISCONTINUED | OUTPATIENT
Start: 2025-05-14 | End: 2025-05-21

## 2025-05-14 RX ORDER — MONTELUKAST SODIUM 10 MG/1
10 TABLET ORAL NIGHTLY
Status: DISCONTINUED | OUTPATIENT
Start: 2025-05-14 | End: 2025-05-21

## 2025-05-14 RX ORDER — DIGOXIN 125 MCG
125 TABLET ORAL DAILY
Status: DISCONTINUED | OUTPATIENT
Start: 2025-05-14 | End: 2025-05-19

## 2025-05-14 RX ORDER — CETIRIZINE HYDROCHLORIDE 5 MG/1
5 TABLET ORAL NIGHTLY
Status: DISCONTINUED | OUTPATIENT
Start: 2025-05-14 | End: 2025-05-21

## 2025-05-14 NOTE — H&P
[facility]    PATIENT'S NAME: CEZAR JOVEL   ATTENDING PHYSICIAN: Paola Hayes MD   PATIENT ACCOUNT#:   [id number]    LOCATION:  [address]  MEDICAL RECORD #:   [id number]       YOB: 1940  ADMISSION DATE:       05/13/2025    HISTORY AND PHYSICAL EXAMINATION    CHIEF COMPLAINT:  COPD exacerbation.    HISTORY OF PRESENT ILLNESS:  The patient is an 84-year-old  female with underlying severe chronic obstructive pulmonary disease, presented today to the emergency department for evaluation of progressive wheezing and shortness of breath more than her baseline for last 2 days.  In the emergency room, noted to have pulse ox of 90% to 94% on room air.  Started on nasal cannula oxygen and Solu-Medrol, nebulizer treatment, and she will be admitted to the hospital for further management.  Chest x-ray showed no acute findings compared to prior x-rays.  CBC showed white blood cell count of 11.0 with left shift.  Chemistry, basic metabolic panel, and liver function test still pending.    PAST MEDICAL HISTORY:  She had right upper lobe lung nodule positive on PET scan concerning for pulmonary malignancy, follows up with her pulmonologist.  Underlying chronic obstructive pulmonary disease; left ventricular diastolic dysfunction; paroxysmal atrial fibrillation, low burden, declined anticoagulation; pulmonary artery hypertension; gastrointestinal bleed; essential hypertension; hyperlipidemia; gastroesophageal reflux disease; diverticulosis; osteoarthritis; osteoporosis; degenerative joint disease of lumbar spine.    PAST SURGICAL HISTORY:  Tonsillectomy, thyroidectomy, hysterectomy, cataract procedure, T6 kyphoplasty.    MEDICATIONS:  Please see medication reconciliation list.     ALLERGIES:  Penicillin.    FAMILY HISTORY:  Mother had ovarian cancer.  Sister had COPD.    SOCIAL HISTORY:  Remote tobacco use.  Social alcohol.  No drug use.  Retired nurse.  Usually independent for basic  activities of daily living.     REVIEW OF SYSTEMS:  Progressive wheezing and shortness of breath which has been worsening for last 2 to 3 days.  She denies any fever or chills.  She has cough productive of clear phlegm.  Other 12-point review of systems is negative.       PHYSICAL EXAMINATION:    GENERAL:  Alert and oriented to time, place, and person.  Visibly dyspneic, moderate distress.  VITAL SIGNS:  Temperature 98.1, pulse 82, respiratory rate 20, blood pressure 107/98, pulse ox 92% on room air.    HEENT:  Atraumatic.  Oropharynx clear.  Dry mucous membranes.  Ears, nose normal.  Eyes:  Anicteric sclerae.   NECK:  Supple.  No lymphadenopathy.  Trachea midline.   LUNGS:  Bilateral expiratory wheezing.  Increased respiratory effort and accessory muscle use.  HEART:  Regular rate, rhythm.  S1 and S2 auscultated.  No murmur.   ABDOMEN:  Soft, nondistended.  No tenderness.  Positive bowel sounds.    EXTREMITIES:  Trace edema.  No clubbing or cyanosis.  NEUROLOGIC:  Motor and sensory intact.     ASSESSMENT:    1.   Acute chronic obstructive pulmonary disease exacerbation.  2.   Diabetes mellitus type 2.  3.   Essential hypertension.    PLAN:  Patient will be admitted to general medical floor.  IV Solu-Medrol and nebulizer treatments.  Cough suppressants.  Oxygen support.  DVT prophylaxis.  Pulmonary consult.  Further recommendations to follow.     Dictated By Yamil Khan MD  d: 05/13/2025 18:58:39  t: 05/13/2025 19:07:47  Job [id number]  FB/

## 2025-05-14 NOTE — HISTORICAL OFFICE NOTE
Facility Logo [facility]  [address], [address] [place] [address]  [phone]      Yesenia Berkowitz  Progress Note  Demographics:  Name: Yesenia Berkowitz YOB: 1940  Age: 84, Female Medical Record No: [id number]  Visited Date/Time: 04/23/2025 01:40 PM    Chief Complaints  CRA   robotic bronchoscopy  Bing Boyle MD  History of Present Illness  Patient is an 83-year-old female with a history of paroxysmal atrial fibrillation, oxygen dependent COPD, HTN, HLD who presents establish care.  Previously followed with Dr. Boles, and prior to that with Dr. Preston.  Patient is a former smoker and quit 30 years ago, has been on 2 L of oxygen at baseline for the last 15 years.  She has relatively unchanged exertional dyspnea.  No chest pain, palpitations, edema, dizziness, or syncope.  She also has a remote history of paroxysmal atrial fibrillation, when this comes on she will feel more short of breath and fatigued and then checks her pulse which will feel irregular.  This is not occurred for the last several years.  She is not on anticoagulation due to frequent bruising and infrequent A-fib.  She has mild bilateral lower extremity edema but only takes her Lasix as needed.  Patient had a coronary CT in 2020 which demonstrated a small speck of calcium in the LAD with a calcium score of 1.    4/15/2024  Her last few months has had increased shortness of breath, put on steroids by her pulmonologist with significant permanent breathing.  Denies significant palpitations.  Has had increased bilateral lower extremity edema, 2 months ago was on increased Lasix 40 mg daily which seemed to improve although will back to 20 mg daily after 10 days per instruction.    8/26/2024  Reports worsened shortness of breath, currently on prednisone for COPD exacerbation.  Typically has allergies in the summer as well.  Mild gradual increase in lower extremity edema although not too bothersome at this point.   Remains on Lasix 40 mg daily.    9/27/2024  Unfortunately hospitalized again earlier this month primarily with COPD exacerbation, was diuresed with IV Lasix for a few days but did not appear clinically overloaded at that time.  More recently's been feeling dizzy/lightheaded, fatigued, with persistent shortness of breath.  Blood pressures were running low at home in the 90s systolic.  Today 82/54.    10/23/2024  Patient was again hospitalized with COPD exacerbation, had a thoracentesis for pleural effusion, diuresed with IV Lasix.  Discharged on Lasix 40 mg daily alternating with 40 mg twice daily.  She saw Dr. Li few days ago recommend that she stay on 40 mg twice daily for a week.  Recently had an episode of atrial fibrillation, felt abnormal sensation in her chest.  Was confirmed on [employer] mobile device but now back in sinus rhythm.  Blood pressure creeping up 130s 140s systolic.  Persistent unchanged exertional dyspnea since discharge.    2/10/2025  Unfortunately patient has been hospitalized 8 times in September, mostly for COPD exacerbation but occasionally requiring IV diuresis.  Was just discharged 5 days ago for this reason.  There seems to be some confusion about diltiazem and is currently taking 360 mg twice daily.  1+ lower extremity edema.    4/23/2025  Patient was hospitalized again last month with COPD exacerbation pneumonia.  Breathing is poor but stable.  Recently had a PET scan which showed a right upper lung nodule that may be malignant, may need biopsy and possible bronchoscopy.    Cardiac history:  Paroxysmal atrial fibrillation  COPD, on 4 to 5 L  HFpEF  HTN  Cardiac risk factors Hypertension and Former smoker  Past Medical History  1.Chronic hypoxemic respiratory failure  2.COPD (chronic obstructive pulmonary disease)  3.Syncope and collapse  4.PAF (paroxysmal atrial fibrillation)  5.Essential hypertension  6.PVC's (premature ventricular contractions)  7.Localized edema  Past Surgical  History  1.Cataract surgery, unspecified eye  Family History  1. Father Age 62 - Hypertension (HTN), primary; Old MI (myocardial infarction) (Reason for death)  2. Mother - Hypertension (HTN), primary  No family history of heart disease.  Social History  Smoking status Former [id number] (End Date)  Tobacco usage - No (Non-smoker (finding))  Patient lives at home alone.  She is a former smoker and quit 30 years ago.  She denies regular alcohol use.  She is a former RN.  Review of systems  Constitutional No history of Weight Loss and Anorexia  Cardiovascular No history of Chest pain, OJEDA, Palpitations, Syncope, PND, Orthopnea, Edema and Claudication  Respiratory No history of SOB, Wheezing and Sputum  Hem/Lymphatic No history of Easy bruising, Blood clots, Hx of blood transfusion, Anemia and Bleeding problems  Physical Examination  Constitutional 92%o2  Vitals Right Arm Sitting  / 58 mmHg, Pulse rate 78 bpm, Height in 5' 5\", BMI: 24, Weight in 144 lbs (or) 65 kgs and BSA : 1.74 cc/m²  General Appearance No Acute Distress and Normal Body habitus  Cardiovascular   EKG/Other abnormalities  GENERAL: in no apparent distress  HEENT: Normal  NECK: no JVD, normal carotid pulses without bruits. No lymphadenopathy  LUNGS: normal excursion, clear to auscultation bilaterally  HEART: RRR, nl S1/S2, no gallop, no murmur, no rub  ABD: soft, nontender, nondistended, normal bowel sounds  EXTREMITIES: no cyanosis, clubbing.  1+ lower extremity edema.  SKIN: warm, dry, normal turgor  NEURO: alert, oriented x3, symmetrical face, no dysarthria, no focal deficits  PSYCH: cooperative, calm  Allergies  1.Cefuroxime(Reaction:, Severity:Mild)  2.Levofloxacin(Reaction:, Severity:Mild)  3.penicillin - Ingredient(Reaction:unknown, Severity:Moderate)  Medications (Info obtained by: Verbal)  1.acetaZOLAMIDE 250 mg tablet, Take 2 tablets orally once a day.  2.albuterol sulfate 2.5 mg/3 mL (0.083 %) solution for nebulization, Take 3 mL  (inhalation) every 6 hours as needed.  3.albuterol sulfate HFA 90 mcg/actuation aerosol inhaler, Inhale 2 puffs by mouth every 4-6 hours as needed.  4.cholecalciferol (vitamin D3) 50 mcg (2,000 unit) tablet, Take 1 tablet orally once a day.  5.digoxin 125 mcg (0.125 mg) tablet, Take 1 tablet orally once a day.  6.dilTIAZem  mg capsule,extended release 24 hr, Take 1 capsule orally once a day.  7.estradioL 0.05 mg/24 hr weekly transdermal patch, patch, transdermal weekly (transdermal) once weekly  8.famotidine 20 mg tablet, Take 1 tablet orally once a day.  9.furosemide 80 mg tablet, Take 1 tablet orally 2 times a day.  10.Jardiance 10 mg tablet, Take 1 tablet orally once a day.  11.loratadine 10 mg tablet, Take 1 tablet orally once a day.  12.montelukast 10 mg tablet, Take 1 tablet orally once a day.  13.Pain Reliever Extra Strength (acetaminophen) 500 mg tablet, Take 2 tablets orally every 6 hours as needed.  14.potassium chloride ER 20 mEq tablet,extended release, Take 1 tablet orally once in the evening.  15.spironolactone 25 mg tablet, Take 1 tablet orally once a day.  16.Trelegy Ellipta 100 mcg-62.5 mcg-25 mcg powder for inhalation, as directed  Impression  1.Acute heart failure with preserved ejection fraction (HFpEF)  2.Chronic hypoxemic respiratory failure  3.COPD (chronic obstructive pulmonary disease)  4.Syncope and collapse  5.PAF (paroxysmal atrial fibrillation)  6.Essential hypertension  7.PVC's (premature ventricular contractions)  Assessment & Plan  RUL nodule  - Recent PET scan suggestive of possible malignancy, per pulmonary may need biopsy and bronchoscopy  - Patient was told that would not be a candidate for chemotherapy, require inpatient radiation, as well as some of the risk from the procedure which in this case is primary related to severe COPD and RV dysfunction  - At this point she is not sure if she would even want to pursue treatment, I suggested she collects more permission from her  oncologist before deciding on proceeding with biopsy    Paroxysmal atrial fibrillation  - Recent recurrence on [employer]ia mobile, back in sinus rhythm  - Continue aspirin 81 mg once daily, digoxin daily, diltiazem 360 mg once daily  - Per patient preference avoiding anticoagulation, history of GI bleed in the past and low A-fib burden; discussed Watchman as an alternative, will provide patient with pamphlet    HFpEF  - Increase bilateral lower extremity edema, TTE noted diastolic dysfunction, edema improved on higher dose of Lasix  - Continue Lasix 80 mg daily, spironolactone 25 mg daily, Jardiance 10 mg daily  - Reduce diltiazem to 360 mg daily (previously taken twice daily which may be contributing to lower extremity edema)    HTN  - Normotensive today at home  - Continue diltiazem to 360 mg once daily    Plan  Follow-up with me in 6 weeks or sooner as needed  Labs and Diagnostics ordered  1.EKG (electrocardiogram) (Today)  Miscellaneous  1.Reviewed glucose, sodium, potassium, chloride, co2, anion gap, bun, creatinine, bun/ creat ratio, calcium, osmolality calculated, e gfr cr, fasting patient bmp answer and digoxin with the patient.  2.Reviewed trans thoracic echocardiogram, ambulatory telemetry monitor with the patient.  3.Weight monitoring (regime/therapy)  Nurses documentation  EKG:   Refills: None  Upcoming surgeries: None  Use of assistive devices(s): None   Patient instructions  Plan  Follow-up with me in 6 weeks or sooner as needed  -Please bring in you medication bottles or updated medicine list to your next appointment  -Call [facility] if you have any problems or concerns at [phone]   Lab Details  BASIC METABOLIC PANEL (8)  10/30/2024 06:24:22 PM  GLUCOSE 137 70-99 mg/dL H F  SODIUM 144 136-145 mmol/L  F  POTASSIUM 4.0 3.5-5.1 mmol/L  F  CHLORIDE 100  mmol/L  F  CO2 >40.0 21.0-32.0 mmol/L HH F  ANION GAP    F  BUN 15 9-23 mg/dL  F  CREATININE 0.79 0.55-1.02 mg/dL  F  BUN/ CREAT  RATIO 19.0 10.0-20.0  F  CALCIUM 8.8 8.7-10.4 mg/dL  F  OSMOLALITY CALCULATED 301 275-295 mOsm/kg H F  E GFR CR 75 >=60 mL/min/1.73m2  F  FASTING PATIENT BMP ANSWER Yes   F  DIGOXIN (LANOXIN)  01/16/2024 04:34:35 PM  DIGOXIN 0.31 0.80-2.00 ng/mL L F  Diagnostics Details  Trans Thoracic Echocardiogram 03/18/2024  1.The left ventricle is normal in size. Left ventricular wall thickness is normal. Global left ventricular systolic function is hyperdynamic. The left ventricular ejection fraction is >70%. No regional wall motion abnormalities. The left ventricle diastolic function is impaired (Grade II) with an elevated left atrial pressure.    2.The right ventricle is mildly enlarged. Right ventricular systolic function is mildly decreased.    ACTMonitoring 02/15/2024  1.This is an average quality study.    2.Predominant rhythm is normal sinus rhythm.    3.The minimum heart rate recorded was 62 beats / minute (normal sinus rhythm, 3/10/2024). The maximum heart rate is 111 beats / minute (sinus tachycardia, 2/27/2024). The mean heart rate is 82 beats / minute.    4.- There were rare PVCs with a burden of <1%. - There were rare PACs with a burden of 2%. - 113 Supraventricular Tachycardia events -- the longest episode was 6.8s and the fastest episode was 181 BPM. - No atrial fibrillation. - No other arrhythmias. - There were 7 patient triggered events with symptoms of palpitations, cough, and shortness of breath, associated with sinus rhythm, PVCs, PACs, and brief PAT.    CPOE Orders carried out by: Sumaya AMARAL  Care Providers: Luis Fernando Fowler MD, Sumaya AAMRAL and Timmy Padilla  Electronically Authenticated by  Luis Fernando Fowler MD  04/23/2025 05:00:35 PM  Disclaimer: Components of this note were documented using voice recognition system and are subject to errors not corrected at proofreading. Contact the author of this note for any clarifications.

## 2025-05-14 NOTE — ED QUICK NOTES
Pt made aware of admission. NO further concerns or questions. Bags provided for belongings. Son remains to bedside.

## 2025-05-14 NOTE — PLAN OF CARE
Problem: Patient Centered Care  Goal: Patient preferences are identified and integrated in the patient's plan of care  Description: Interventions:- What would you like us to know as we care for you?   - Provide timely, complete, and accurate information to patient/family- Incorporate patient and family knowledge, values, beliefs, and cultural backgrounds into the planning and delivery of care- Encourage patient/family to participate in care and decision-making at the level they choose- Honor patient and family perspectives and choices  5/14/2025 1041 by Michelle Fernandez RN  Outcome: Progressing  5/14/2025 1040 by Michelle Fernandez RN  Outcome: Progressing     Problem: Patient/Family Goals  Goal: Patient/Family Long Term Goal  Description: Patient's Long Term Goal:   Interventions:-   - See additional Care Plan goals for specific interventions  5/14/2025 1041 by Michelle Fernandez RN  Outcome: Progressing  5/14/2025 1040 by Michelle Fernandez RN  Outcome: Progressing  Goal: Patient/Family Short Term Goal  Description: Patient's Short Term Goal:   Interventions: -   - See additional Care Plan goals for specific interventions  5/14/2025 1041 by Michelle Fernandez RN  Outcome: Progressing  5/14/2025 1040 by Michelle Fernandez RN  Outcome: Progressing     Problem: CARDIOVASCULAR - ADULT  Goal: Maintains optimal cardiac output and hemodynamic stability  Description: INTERVENTIONS:  - Monitor vital signs, rhythm, and trends  - Monitor for bleeding, hypotension and signs of decreased cardiac output  - Evaluate effectiveness of vasoactive medications to optimize hemodynamic stability  - Monitor arterial and/or venous puncture sites for bleeding and/or hematoma  - Assess quality of pulses, skin color and temperature  - Assess for signs of decreased coronary artery perfusion - ex. Angina  - Evaluate fluid balance, assess for edema, trend weights  Outcome: Progressing  Goal: Absence of cardiac arrhythmias or at  baseline  Description: INTERVENTIONS:  - Continuous cardiac monitoring, monitor vital signs, obtain 12 lead EKG if indicated  - Evaluate effectiveness of antiarrhythmic and heart rate control medications as ordered  - Initiate emergency measures for life threatening arrhythmias  - Monitor electrolytes and administer replacement therapy as ordered  Outcome: Progressing     Problem: RESPIRATORY - ADULT  Goal: Achieves optimal ventilation and oxygenation  Description: INTERVENTIONS:  - Assess for changes in respiratory status  - Assess for changes in mentation and behavior  - Position to facilitate oxygenation and minimize respiratory effort  - Oxygen supplementation based on oxygen saturation or ABGs  - Provide Smoking Cessation handout, if applicable  - Encourage broncho-pulmonary hygiene including cough, deep breathe, Incentive Spirometry  - Assess the need for suctioning and perform as needed  - Assess and instruct to report SOB or any respiratory difficulty  - Respiratory Therapy support as indicated  - Manage/alleviate anxiety  - Monitor for signs/symptoms of CO2 retention  Outcome: Progressing     Problem: GENITOURINARY - ADULT  Goal: Absence of urinary retention  Description: INTERVENTIONS:  - Assess patient’s ability to void and empty bladder  - Monitor intake/output and perform bladder scan as needed  - Follow urinary retention protocol/standard of care  - Consider collaborating with pharmacy to review patient's medication profile  - Implement strategies to promote bladder emptying  Outcome: Progressing     Problem: METABOLIC/FLUID AND ELECTROLYTES - ADULT  Goal: Glucose maintained within prescribed range  Description: INTERVENTIONS:  - Monitor Blood Glucose as ordered  - Assess for signs and symptoms of hyperglycemia and hypoglycemia  - Administer ordered medications to maintain glucose within target range  - Assess barriers to adequate nutritional intake and initiate nutrition consult as needed  - Instruct  patient on self management of diabetes  Outcome: Progressing  Goal: Electrolytes maintained within normal limits  Description: INTERVENTIONS:  - Monitor labs and rhythm and assess patient for signs and symptoms of electrolyte imbalances  - Administer electrolyte replacement as ordered  - Monitor response to electrolyte replacements, including rhythm and repeat lab results as appropriate  - Fluid restriction as ordered  - Instruct patient on fluid and nutrition restrictions as appropriate  Outcome: Progressing  Goal: Hemodynamic stability and optimal renal function maintained  Description: INTERVENTIONS:  - Monitor labs and assess for signs and symptoms of volume excess or deficit  - Monitor intake, output and patient weight  - Monitor urine specific gravity, serum osmolarity and serum sodium as indicated or ordered  - Monitor response to interventions for patient's volume status, including labs, urine output, blood pressure (other measures as available)  - Encourage oral intake as appropriate  - Instruct patient on fluid and nutrition restrictions as appropriate  Outcome: Progressing

## 2025-05-14 NOTE — CONSULTS
Cardiology (consult dictated)    Assessment:  1.  Admission for acute exacerbation of COPD.    2.  Telemetry monitoring showing brief third-degree AV block.  Patient is on digoxin and diltiazem.  Dig level has been drawn but is not yet available.  Historically normal LV systolic function.    3.  Paroxysmal atrial fibrillation    Plan:  1.  Check digoxin level.    2.  For now hold diltiazem and digoxin.    3.  Echocardiogram.    4.  Further recommendations pending clinical course.  May need permanent pacemaker.  Will reassess rhythm after holding the above medications.      Thank you

## 2025-05-14 NOTE — CONSULTS
[facility]    PATIENT'S NAME: CEZAR JOVEL   ATTENDING PHYSICIAN: Ez Taylor MD   CONSULTING PHYSICIAN: Loi Greenberg MD   PATIENT ACCOUNT#:   [id number]    LOCATION:  [address] [place] [address]  MEDICAL RECORD #:   [id number]       YOB: 1940  ADMISSION DATE:       05/13/2025      CONSULT DATE:  05/14/2025    REPORT OF CONSULTATION    HISTORY OF PRESENT ILLNESS:  The patient is an 84-year-old female admitted to the hospital with shortness of breath coming in last evening with that and generalized weakness.  Denies fever, chills, sweats, or chest pain.  Her telemetry monitor has shown dropped QRS complexes and what appears to be advanced AV block and we were asked to consult.  The patient does not have a history of clinically significant arrhythmias in the past.  She is on digoxin and diltiazem.    PAST MEDICAL HISTORY:  Severe COPD, lung nodule being followed, hypertension, GERD, dyslipidemia.    PAST SURGICAL HISTORY:  Tonsillectomy, thyroidectomy, hysterectomy with bilateral salpingo-oophorectomy, and cataracts.    MEDICATIONS:  Home medications of albuterol inhaler, diltiazem  q.a.m., digoxin 0.125 q.a.m., famotidine, furosemide 80 mg b.i.d., Jardiance 10 mg q.a.m., Singulair, loratadine, potassium, spironolactone 25 mg daily, Trelegy Ellipta.    ALLERGIES:  Penicillin, cefuroxime, Levaquin.    PHYSICAL EXAMINATION:    GENERAL:  Well-developed, well-nourished female in no acute distress.  Alert and oriented x3.  VITAL SIGNS:  Blood pressure 116/48; respirations 18, unlabored; pulse 70, regular rhythm; afebrile; saturation 95% on 4L.   NECK:  Veins are mildly distended.  Carotids are brisk without bruits.  LUNGS:  Diffusely diminished breath sounds with no wheezes or crackles.  HEART:  Tones are distant.  There is a grade 3/6 systolic ejection murmur.  No S3, rub, or click.  ABDOMEN:  Soft, nontender.  No organomegaly, masses, bruits, or pulsations.  EXTREMITIES:  Mild edema left greater than  right.  Palpable pedal pulses.  Good distal perfusion.    LABORATORY DATA:  Sodium 138, potassium 3.3, bicarb 32, BUN 19, creatinine 0.92.  WBC 7.1, hemoglobin 12.0, platelets 256,000.    EKG shows sinus rhythm with first-degree AV block.  Telemetry shows dropped QRS complexes with junctional escape beats.  Chest x-ray report shows emphysema with stable scarring.  No pneumonia or heart failure.  Chronically elevated left hemidiaphragm.      ASSESSMENT:    1.   Admission for acute exacerbation of chronic obstructive pulmonary disease.  2.   Overall cardiac status appears stable.  Patient does not appear volume expanded.  3.   Atrioventricular block.  Patient is on digoxin and diltiazem.  Digoxin level is pending.  4.   History of atrial fibrillation, currently sinus rhythm.    PLAN:    1.   Check digoxin level.  2.   For now, hold diltiazem and digoxin.  3.   Echocardiogram to assess murmur.  4.   Further recommendations pending clinical course.  Patient may need permanent pacemaker.  We will reassess rhythm after holding the above medications.    Thank you for this consultation.    Dictated By Loi Greenberg MD  d: 05/14/2025 15:48:35  t: 05/14/2025 15:56:56  Job [id number]  [facility]/

## 2025-05-14 NOTE — PROGRESS NOTES
[facility]  Progress Note     Yesenia Berkowitz  : 1940    Status: Inpatient  Day #: 1    Attending: Ez Taylor MD  PCP: JO-ANN YANG MD     Assessment and Plan:    AECOPD  -pulm on consult  -cont nebs  -cont steroids IV  -cont advair, incruse    RUL lung nodule  -recent PET scan +  -await pulm recs    DM2  -monitor BG, cover ISS    Paroxysmal afib  -cont digoxin, diltiazem  -not on AC per patient preference per prior cardiology note    HTN  -cont meds and monitor    Chronic HFpEF  -cont lasix PO          DVT Mechanical Prophylaxis:        DVT Pharmacologic Prophylaxis   Medication    enoxaparin (Lovenox) 40 MG/0.4ML SUBQ injection 40 mg               Subjective:      Initial Chief Complaint:  SOB    Still SOB, doesn't feel better. No CP.     10 point ROS completed and was negative, except for pertinent positive and negatives stated in subjective.      Objective:      Temp:  [97.9 °F (36.6 °C)-98.2 °F (36.8 °C)] 97.9 °F (36.6 °C)  Pulse:  [67-82] 80  Resp:  [18-20] 19  BP: (107-120)/(43-98) 113/58  SpO2:  [92 %-97 %] 95 %  General:  Alert, no distress  HEENT:  Normocephalic, atraumatic  Cardiac:  Regular rate, regular rhythm  Pulmonary:  diminished breath sounds. No wheeze. Crackles at bases  Gastrointestinal:  Soft, non-tender, normal bowel sounds  Musculoskeletal:  No joint swelling  Extremities:  No edema, no cyanosis, no clubbing  Neurologic:  nonfocal  Psychiatric:  Normal affect, calm and appropriate    Intake/Output Summary (Last 24 hours) at 2025 1309  Last data filed at 2025 1215  Gross per 24 hour   Intake 390 ml   Output 2 ml   Net 388 ml         Recent Labs   Lab 25  1821 25  0603   WBC 11.0 7.1   HGB 12.5 12.0   HCT 41.5 39.6   .0 258.0   RBC 4.49 4.30   MCV 92.4 92.1   MCH 27.8 27.9   MCHC 30.1* 30.3*   RDW 16.1* 16.1*   NEPRELIM 8.21* 6.42     Recent Labs   Lab 25  1821 25  0603   BUN 14 19   CREATSERUM 0.74 0.92   CA 8.8 8.7   ALB 3.9  --      138   K 3.1* 3.3*  3.3*   CL 96* 96*   CO2 36.0* 32.0   * 216*   BILT 0.2  --    AST 13  --    ALT <7*  --    ALKPHO 65  --    TP 6.5  --        XR CHEST AP PORTABLE  (CPT=71045)  Result Date: 5/13/2025  CONCLUSION:  1. Emphysema with areas of stable scarring.  No pneumonia or other acute finding. 2. Chronically elevated left hemidiaphragm.     Dictated by (CST): Bijan Orona MD on 5/13/2025 at 7:22 PM     Finalized by (CST): Bijan Orona MD on 5/13/2025 at 7:26 PM          EKG  Result Date: 5/14/2025  Normal sinus rhythm with sinus arrhythmia Minimal voltage criteria for LVH, may be normal variant ( [place] product ) Inferior infarct , age undetermined Cannot rule out Anterior infarct (cited on or before 21-JAN-2025) Abnormal ECG When compared with ECG of 20-MAR-2025 11:16, No significant change was found    Medications:  Scheduled Medications[1]   PRN Meds: PRN Medications[2]    Supplementary Documentation:   DVT Mechanical Prophylaxis:        DVT Pharmacologic Prophylaxis   Medication    enoxaparin (Lovenox) 40 MG/0.4ML SUBQ injection 40 mg                Code Status: DNAR/Selective Treatment  García: No urinary catheter in place  García Duration (in days):   Central line:    ANGEL: 5/17/2025        **Certification      PHYSICIAN Certification of Need for Inpatient Hospitalization - Initial Certification    Patient will require inpatient services that will reasonably be expected to span two midnight's based on the clinical documentation in H+P.   Based on patients current state of illness, I anticipate that, after discharge, patient will require TBD.            [facility]. Time spent on chart/note review, review of labs/imaging, discussion with patient, physical exam, discussion with staff, consultants, coordinating care, writing progress note, discussion of plan of care.      Ez Taylor MD         [1]    ipratropium-albuterol  3 mL Nebulization Q6H WA    acetaZOLAMIDE  500 mg Oral Daily     cholecalciferol  1,000 Units Oral BID    digoxin  125 mcg Oral Daily    dilTIAZem ER  360 mg Oral Daily    fluticasone-salmeterol  1 puff Inhalation BID    umeclidinium bromide  1 puff Inhalation Daily    furosemide  80 mg Oral BID (Diuretic)    cetirizine  5 mg Oral Nightly    montelukast  10 mg Oral Nightly    methylPREDNISolone  40 mg Intravenous Q8H    insulin aspart  1-7 Units Subcutaneous TID CC and HS    enoxaparin  40 mg Subcutaneous Daily    nitrofurantoin monohydrate macro  100 mg Oral BID   [2]   morphINE    ipratropium-albuterol    glucose **OR** glucose **OR** glucose-vitamin C **OR** dextrose **OR** glucose **OR** glucose **OR** glucose-vitamin C    ondansetron    metoclopramide    guaiFENesin    benzonatate    acetaminophen    phenazopyridine

## 2025-05-14 NOTE — CM/SW NOTE
DREW received MDO for POLST. DREW placed POLST form on chart. MD to discuss w/ pt/family, fill out middle section of POLST form, and sign.     Patient is current with [facility]- PT/RN/HHA, SW entered LEIDY.     DREW/PERLITA to remain available for support and/or discharge planning.     Angela Smith, MSW, LSW   x [phone]

## 2025-05-14 NOTE — CONSULTS
Spiritual Care Visit Note    Patient Name: Yesenia Berkowitz Date of Spiritual Care Visit: 25   : 1940 Primary Dx: COPD exacerbation (HCC)       Referred By: Referral From: Nurse, Patient    Visit Type/Summary:     - Spiritual Care: Consulted with RN prior to visit. Provided support for patient spiritual/Muslim requests. Coordinated Jainism Communion and verified NPO status.  remains available for follow up.     Xu Kong, [facility]   [id number]     Spiritual Care support can be requested via an [facility] consult. For urgent/immediate needs, please contact the On Call  at: [place]: ext [phone]

## 2025-05-14 NOTE — DISCHARGE INSTRUCTIONS
Sometimes managing your health at home requires assistance.  The [facility] team has recognized your preference to use [facility].  They can be reached by phone at [phone].  The fax number for your reference is [phone].  A representative from the home health agency will contact you or your family to schedule you to resume your services.       .resident            Pacemaker and Defibrillator Discharge Instructions    Activity  Plan to rest tonight and tomorrow.  Avoid drinking alcohol for next 24 hours.  Do not drive after procedure until 1-week device check appointment, unless otherwise instructed by your cardiologist.   Wear sling for next 24 hours, then only at night as needed for 30 days to help assist with arm restrictions while sleeping.     Arm Restrictions:  For 30 days after implant:  do not lift arm with implanted device above your head or behind your back. Arm is to remain below your shoulder and in front of your body.   do not lift more than 10 lbs with affected arm   do not perform repetitive cycling motion with affected arm (swimming, golfing, bowling, tennis, exercise machines, raking, vacuuming, snow shoveling).   Dr. Vasquez Only Patients: Do not perform repetitive cycling motion for 90 days total    Incision Care/Bathing  Keep incision site completely dry for 5 days after surgery. After day 5, you can remove top dressing and shower, letting clean soapy water run over incision area, patting dry.   The white steri-strips placed directly over the incision will gradually fall off over the next few weeks and can be physically removed after 3 weeks. Do not take them off before this time. (If you have a zipline dressing, this will be removed by device nurse or MD in 4 weeks)   Keep procedure site clean and dry, do not apply any ointments, creams, lotions to the incision.   Look at your incision daily to monitor for signs and symptoms of infection (redness, swelling,  drainage, foul odor from procedure site)  Do not cover incision with extra dressings or gauze unless otherwise instructed.   Do not submerge incision in water, take whirlpool baths or swim for 30 days or until site is completely healed.     When to notify your physician:   If you have chest pain, shortness of breath or persistent cough  If you experience any signs of fever (temperature >101), chills, infection (redness, swelling, thick yellow drainage, foul order from procedure site)  New or worsening swelling of arm with implanted device   If an ICD was inserted and you receive a shock and have no symptoms, call [facility] device clinic. If you have symptoms (fainting, near fainting, dizziness, lightheadedness, chest pain, shortness of breath, palpitations), call 911.    [facility] Device Clinic Direct Line: [phone]. Monday-Friday. 8:30AM-4PM.   [facility] Main Office: [phone] Monday- Friday 8AM-5PM   [facility] [place] Main Office: [phone] Monday-Friday 8AM-5PM

## 2025-05-14 NOTE — CONSULTS
[facility]  part of [facility]    Report of Consultation    Yesenia Berkowitz Patient Status:  Inpatient    1940 MRN [id number]   Location [facility] 5SW/SE Attending Ez Taylor MD   Hosp Day # 1 PCP JO-ANN YANG MD     Date of Admission:  2025  Date of Consult:  25  Reason for Consultation:   COPD    History of Present Illness:   Patient is a 84 year old female who was admitted to the hospital for COPD exacerbation (HCC):      Past Medical History  Past Medical History[1]    Past Surgical History  Past Surgical History[2]    Family History  Family History[3]    Social History  Short Social Hx on File[4]       Current Medications:  Current Hospital Medications[5]  Prescriptions Prior to Admission[6]    Allergies  Allergies[7]    Review of Systems:    Pertinent items are noted in HPI.    Physical Exam:   Blood pressure 113/58, pulse 74, temperature 97.9 °F (36.6 °C), temperature source Tympanic, resp. rate 19, height 5' 5\" (1.651 m), weight 132 lb 8 oz (60.1 kg), SpO2 95%.    Dec bs  RRR  Soft NT  No edema    Results:     Laboratory Data:  Lab Results   Component Value Date    WBC 7.1 2025    HGB 12.0 2025    HCT 39.6 2025    .0 2025    CREATSERUM 0.92 2025    BUN 19 2025     2025    K 3.3 (L) 2025    K 3.3 (L) 2025    CL 96 (L) 2025    CO2 32.0 2025     (H) 2025    CA 8.7 2025    ALB 3.9 2025    ALKPHO 65 2025    TP 6.5 2025    AST 13 2025    ALT <7 (L) 2025    PTT 28.1 2025    INR 1.08 2024    PTP 14.6 2024    T4F 0.9 2024    TSH 0.185 (L) 2024    LIP 30 2024    DDIMER 0.47 2024    MG 2.1 2025    PHOS 3.4 2024    TROPHS 11 2025         Imaging:  XR CHEST AP PORTABLE  (CPT=71045)  Result Date: 2025  CONCLUSION:  1. Emphysema with areas of stable scarring.  No pneumonia or other acute  finding. 2. Chronically elevated left hemidiaphragm.     Dictated by (CST): Bijan Orona MD on 5/13/2025 at 7:22 PM     Finalized by (CST): Bijan Orona MD on 5/13/2025 at 7:26 PM               Impression:     COPD exacerbation (HCC)    Acute on Chronic Hypoxic Resp failure    RUL Nodule with increased uptake on PET    Lesion near vulva ?infectious    Anxiety        Trial of Bipap tonight    Bronchodilation    IV steroids    OOB to chair    Out pt ION Bronch for lung Nodule      Thank you for allowing me to participate in the care of your patient.    VIVIANE MIKE MD  [facility]  Pulmonary and Sleep Medicine  [phone]  Perfect Serve  5/14/2025         [1]   Past Medical History:   A-fib (HCC)    Anesthesia complication    Arrhythmia    AFIB    Arthritis    Asthma (HCC)    Back problem    COPD (chronic obstructive pulmonary disease) (HCC)    Deviated nasal septum    nasal septoplasty, turb reduction, smr of turbs    Difficult intubation    fiber optic if needed    Diverticulitis    colonoscopy     Diverticulosis of large intestine    Esophageal reflux    Extrinsic asthma, unspecified    Headache    Heart disease    Hepatitis    WAS TOLD SHE HAS HAD THIS WHEN SHE WAS 19 YEARS OLD    High blood pressure    High cholesterol    Irregular heart rate    Lichenoid keratosis    left chest-bx    Osteoarthritis    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    Paronychia    (RT); onychomycosis; debridement    Problems with swallowing    occasionally    Shortness of breath    2 L NC ALL THE TIME 24HR/7 DAYS PER WEEK    Unspecified essential hypertension    Visual impairment   [2]   Past Surgical History:  Procedure Laterality Date    Adenoidectomy      Cataract      cataract extraction     Hysterectomy      Sinus surgery        DEVIATED SEPTUM    T&a      Tonsillectomy     [3]   Family History  Problem Relation Age of Onset    Ovarian Cancer Mother 76        endometrial, poss ovarian primary     Pulmonary Disease Sister         COPD    Skin cancer Other     Stroke Other     Other (Other) Other    [4]   Social History  Socioeconomic History    Marital status:    Tobacco Use    Smoking status: Former     Current packs/day: 0.00     Types: Cigarettes     Quit date: 1996     Years since quittin.3    Smokeless tobacco: Never   Vaping Use    Vaping status: Never Used   Substance and Sexual Activity    Alcohol use: Not Currently     Comment: one drink once a month    Drug use: Never   Other Topics Concern    Pt has a pacemaker No    Pt has a defibrillator No    Reaction to local anesthetic No    Caffeine Concern No     Social Drivers of Health     Food Insecurity: No Food Insecurity (2025)    NCSS - Food Insecurity     Worried About Running Out of Food in the Last Year: No     Ran Out of Food in the Last Year: No   Transportation Needs: No Transportation Needs (2025)    NCSS - Transportation     Lack of Transportation: No   Housing Stability: Not At Risk (2025)    NCSS - Housing/Utilities     Has Housing: Yes     Worried About Losing Housing: No     Unable to Get Utilities: No   Recent Concern: Housing Stability - At Risk (3/20/2025)    NCSS - Housing/Utilities     Has Housing: No     Worried About Losing Housing: No     Unable to Get Utilities: No   [5]   Current Facility-Administered Medications   Medication Dose Route Frequency    ipratropium-albuterol (Duoneb) 0.5-2.5 (3) MG/3ML inhalation solution 3 mL  3 mL Nebulization Q4H PRN    methylPREDNISolone sodium succinate (Solu-MEDROL) injection 40 mg  40 mg Intravenous Q8H    glucose (Dex4) 15 GM/59ML oral liquid 15 g  15 g Oral Q15 Min PRN    Or    glucose (Glutose) 40% oral gel 15 g  15 g Oral Q15 Min PRN    Or    glucose-vitamin C (Dex-4) chewable tab 4 tablet  4 tablet Oral Q15 Min PRN    Or    dextrose 50% injection 50 mL  50 mL Intravenous Q15 Min PRN    Or    glucose (Dex4) 15 GM/59ML oral liquid 30 g  30 g Oral Q15 Min PRN     Or    glucose (Glutose) 40% oral gel 30 g  30 g Oral Q15 Min PRN    Or    glucose-vitamin C (Dex-4) chewable tab 8 tablet  8 tablet Oral Q15 Min PRN    insulin aspart (NovoLOG) 100 Units/mL FlexPen 1-7 Units  1-7 Units Subcutaneous TID CC and HS    enoxaparin (Lovenox) 40 MG/0.4ML SUBQ injection 40 mg  40 mg Subcutaneous Daily    ondansetron (Zofran) 4 MG/2ML injection 4 mg  4 mg Intravenous Q6H PRN    metoclopramide (Reglan) 5 mg/mL injection 10 mg  10 mg Intravenous Q8H PRN    guaiFENesin (Robitussin) 100 MG/5 ML oral liquid 200 mg  200 mg Oral Q4H PRN    benzonatate (Tessalon) cap 200 mg  200 mg Oral TID PRN    acetaminophen (Tylenol) tab 650 mg  650 mg Oral Q6H PRN    phenazopyridine (Pyridium) tab 200 mg  200 mg Oral TID PRN    nitrofurantoin monohydrate macro (Macrobid) cap 100 mg  100 mg Oral BID   [6]   Medications Prior to Admission   Medication Sig    phenazopyridine 200 MG Oral Tab Take 1 tablet (200 mg total) by mouth in the morning, at noon, and at bedtime.    nitrofurantoin monohydrate macro 100 MG Oral Cap Take 1 capsule (100 mg total) by mouth 2 (two) times daily.    benzonatate 200 MG Oral Cap Take 1 capsule (200 mg total) by mouth 3 (three) times daily as needed for cough.    acetaZOLAMIDE 250 MG Oral Tab Take 2 tablets (500 mg total) by mouth daily.    albuterol (2.5 MG/3ML) 0.083% Inhalation Nebu Soln Take 3 mL (2.5 mg total) by nebulization every 6 (six) hours as needed for Wheezing.    dilTIAZem  MG Oral Capsule SR 24 Hr Take 1 capsule (360 mg total) by mouth daily.    furosemide 80 MG Oral Tab Take 1 tablet (80 mg total) by mouth BID (Diuretic).    fluticasone-umeclidin-vilant 200-62.5-25 MCG/ACT Inhalation Aerosol Powder, Breath Activated Inhale 1 puff into the lungs as needed (sob).    empagliflozin (JARDIANCE) 10 MG Oral Tab Take 1 tablet (10 mg total) by mouth daily.    acetaminophen 500 MG Oral Tab Take 2 tablets (1,000 mg total) by mouth every 6 (six) hours as needed for Pain or  Fever.    DIGOXIN 0.125 MG Oral Tab Take 1 tablet (125 mcg total) by mouth daily.    albuterol 108 (90 Base) MCG/ACT Inhalation Aero Soln Inhale 2 puffs into the lungs as needed.    Cholecalciferol (VITAMIN D) 1000 UNITS Oral Tab Take 1,000 Units by mouth in the morning and 1,000 Units before bedtime.    Potassium Chloride ER (K-DUR M20) 20 MEQ Oral Tab CR Take 1 tablet (20 mEq total) by mouth nightly.    Loratadine 10 MG Oral Cap Take 10 mg by mouth nightly.    montelukast 10 MG Oral Tab Take 1 tablet (10 mg total) by mouth nightly.    [] phenazopyridine 200 MG Oral Tab Take 1 tablet (200 mg total) by mouth 3 (three) times daily as needed for Pain.    [] predniSONE 10 MG Oral Tab Take 2 tablets (20 mg total) by mouth daily for 3 days, THEN 1 tablet (10 mg total) daily for 3 days.    morphINE 10 MG/5ML Oral Solution Take 1.3 mL (2.6 mg total) by mouth 3 (three) times daily as needed (Shortness of breath/ take prior to activity that cased shortness of breath). Please add an adapt a cap top for ease of drawing up.  Please give pt 2 oral syringes    [] doxycycline 100 MG Oral Cap Take 1 capsule (100 mg total) by mouth 2 (two) times daily for 7 days.    estradiol 0.05 MG/24HR Transdermal Patch Weekly Place 1 patch onto the skin once a week. As directed.   [7]   Allergies  Allergen Reactions    Penicillin G ANAPHYLAXIS    Azithromycin DIARRHEA and NAUSEA AND VOMITING    Cefuroxime UNKNOWN     Other reaction(s): Vomitting    Levofloxacin UNKNOWN     Other reaction(s): LEVOFLOXACIN    Penicillins      Other reaction(s): Unknown    Seasonal ITCHING

## 2025-05-14 NOTE — DIETARY NOTE
ADULT NUTRITION INITIAL ASSESSMENT    Pt is at high nutrition risk.  Pt meets severe malnutrition criteria.      RECOMMENDATIONS TO MD: See nutrition intervention for ONS (oral nutrition supplements)    ADMITTING DIAGNOSIS:  COPD exacerbation (HCC) [J44.1]  PERTINENT PAST MEDICAL HISTORY: Past Medical History[1]    PATIENT STATUS: Initial 05/14/25: Pt assessed due to screening at risk for malnutrition, MST score of 2. Pt admitted for SOB x several days. PMH listed above including COPD, HTN, HLD, GERD, and diverticulosis. Per EMR review, pt last weighed 150 lbs 3/20/25 (12% weight loss within 2 months, significant and severe). Pt endorses this weight loss, reports a recent UBW of ~150 lbs. Reports appetite is much better than PTA. She reports a decreased appetite about 2 weeks PTA. States she was eating very small amounts (1-2 small meals) such as toast for breakfast. Discussed ONS to optimize nutrition. Pt agreeable to trying Glucerna ONS daily as an afternoon snack. Pt inquired about getting a sandwich sent in the evening, since she tends to get very hungry around 10-11 pm. Discussed sending up an evening snack at 7 pm and keeping it in fridge until she wants it. Will monitor PO + ONS tolerance.     FOOD/NUTRITION RELATED HISTORY:  Appetite: Fair and improving   Intake: ~85% x 1  meals documented since admit  Intake Meeting Needs: Marginal, added oral nutrition supplements (ONS)  Percent Meals Eaten (last 6 days)       Date/Time Percent Meals Eaten (%)    05/14/25 0900 85 %           Food Allergies: No Known Food Allergies (NKFA)  Cultural/Ethnic/Restorationism Preferences: Not Obtained    GASTROINTESTINAL: +BM 5/14    MEDICATIONS: reviewed  Scheduled Medications[2]    LABS: reviewed hyperglycemia and hypokalemia noted   Recent Labs     05/13/25  1821 05/14/25  0603   * 216*   BUN 14 19   CREATSERUM 0.74 0.92   CA 8.8 8.7    138   K 3.1* 3.3*  3.3*   CL 96* 96*   CO2 36.0* 32.0   OSMOCALC 292 295        WEIGHT HISTORY:  Patient Weight(s) for the past 336 hrs:   Weight   05/13/25 2037 60.1 kg (132 lb 8 oz)   05/13/25 1804 61.2 kg (135 lb)     Wt Readings from Last 10 Encounters:   05/13/25 60.1 kg (132 lb 8 oz)   03/20/25 68.4 kg (150 lb 12.7 oz)   03/06/25 65.9 kg (145 lb 4.8 oz)   02/20/25 66.2 kg (145 lb 14.4 oz)   02/05/25 65.3 kg (143 lb 14.4 oz)   01/18/25 70.8 kg (156 lb)   01/14/25 70.8 kg (156 lb)   01/13/25 73.1 kg (161 lb 1.6 oz)   01/07/25 68.5 kg (151 lb)   12/18/24 79.7 kg (175 lb 9.6 oz)       ANTHROPOMETRICS:  HT: 165.1 cm (5' 5\")  Wt Readings from Last 1 Encounters:   05/13/25 60.1 kg (132 lb 8 oz)     Last weight: Likely accurate  BMI: Body mass index is 22.05 kg/m².  Dosing Weight: 60.1 kg - recent weight, utilized for anthropometric calculations  BMI CLASSIFICATION: 18.5-24.9 kg/m2 - WNL  IBW/lbs (Calculated) Female: 125 lbs             106% IBW  Usual Body Wt: 150 lbs       88% UBW    NUTRITION RELATED PHYSICAL FINDINGS:  - Nutrition Focused Physical Exam (NFPE): Appropriate age related deficits noted   - Fluid Accumulation: none . See RN documentation for details  - Skin Integrity: low skin risk. See RN documentation for details    CRITERIA FOR MALNUTRITION DIAGNOSIS:  Criteria for severe malnutrition diagnosis: acute illness/injury related to wt loss greater than 5% in 1 month and energy intake less than 50% for greater than 5 days.    NUTRITION DIAGNOSIS/PROBLEM:   Severe Malnutrition related to Acute - Physiological causes resulting in anorexia or diminished intake d/t decreased appetite as evidenced by 12% weight loss within 2 months and <50% energy intake for ~2 weeks    NUTRITION INTERVENTION:     NUTRITION PRESCRIPTION:   Estimated Nutrition needs: --dosing wt of 60.1 kg - wt taken on 5/13/25  Calories: 3151-1786 calories/day (27-30 calories per kg Dosing wt)  Protein: 78-90 g protein/day (1.3-1.5 g protein/kg Dosing wt)    - Diet:       Procedures    Cardiac diet Cardiac; Calorie  Restriction/Carb Controlled: 1800 kcal/60 grams; Is Patient on Accuchecks? No      - Nutrition Care Plan: Encouraged increased PO intake, Encouraged small frequent meals with emphasis on high calorie/high protein, and Initiated ONS (oral nutritional supplements)  - ONS (Oral Nutrition Supplements)/Meals/Snacks: Glucerna (220 calories/ 10 g protein each) Daily Henriette and 7 pm snack of turkey sandwich per pt request    - Vitamin and mineral supplements: none  - Feeding assistance: independent  - Nutrition education: Discussed importance of adequate energy and protein intake    - Coordination of nutrition care: collaboration with other providers  - Discharge and transfer of nutrition care to new setting or provider: monitor plans    MONITOR AND EVALUATE/NUTRITION GOALS:  - Food and Nutrient Intake:      Monitor: adequacy of PO intake, adequacy of supplement intake, and tolerance of supplement intake  - Food and Nutrient Administration:      Monitor: N/A  - Anthropometric Measurement:    Monitor weight  - Nutrition Goals:      halt wt loss, maintain wt within 5%, PO and supplement greater than 75% of needs, intake to meet needs, good supplement intake, labs within acceptable limits, euglycemia, and support body systems    DIETITIAN FOLLOW UP: RD to follow and monitor nutrition status    Jessie Lopez MS, RDN, LDN  Clinical Dietitian  [phone]        [1]   Past Medical History:   A-fib (HCC)    Anesthesia complication    Arrhythmia    AFIB    Arthritis    Asthma (HCC)    Back problem    COPD (chronic obstructive pulmonary disease) (HCC)    Deviated nasal septum    nasal septoplasty, turb reduction, smr of turbs    Difficult intubation    fiber optic if needed    Diverticulitis    colonoscopy     Diverticulosis of large intestine    Esophageal reflux    Extrinsic asthma, unspecified    Headache    Heart disease    Hepatitis    WAS TOLD SHE HAS HAD THIS WHEN SHE WAS 19 YEARS OLD    High blood pressure    High  cholesterol    Irregular heart rate    Lichenoid keratosis    left chest-bx    Osteoarthritis    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    Paronychia    (RT); onychomycosis; debridement    Problems with swallowing    occasionally    Shortness of breath    2 L NC ALL THE TIME 24HR/7 DAYS PER WEEK    Unspecified essential hypertension    Visual impairment   [2]    ipratropium-albuterol  3 mL Nebulization Q6H WA    acetaZOLAMIDE  500 mg Oral Daily    cholecalciferol  1,000 Units Oral BID    digoxin  125 mcg Oral Daily    dilTIAZem ER  360 mg Oral Daily    fluticasone-salmeterol  1 puff Inhalation BID    umeclidinium bromide  1 puff Inhalation Daily    furosemide  80 mg Oral BID (Diuretic)    cetirizine  5 mg Oral Nightly    montelukast  10 mg Oral Nightly    methylPREDNISolone  40 mg Intravenous Q8H    insulin aspart  1-7 Units Subcutaneous TID CC and HS    enoxaparin  40 mg Subcutaneous Daily    nitrofurantoin monohydrate macro  100 mg Oral BID

## 2025-05-15 ENCOUNTER — APPOINTMENT (OUTPATIENT)
Dept: CV DIAGNOSTICS | Facility: HOSPITAL | Age: 85
End: 2025-05-15
Payer: MEDICARE

## 2025-05-15 LAB
ANION GAP SERPL CALC-SCNC: 9 MMOL/L (ref 0–18)
BASOPHILS # BLD AUTO: 0.01 X10(3) UL (ref 0–0.2)
BASOPHILS NFR BLD AUTO: 0.1 %
BUN BLD-MCNC: 29 MG/DL (ref 9–23)
BUN/CREAT SERPL: 27.9 (ref 10–20)
CALCIUM BLD-MCNC: 8.3 MG/DL (ref 8.7–10.4)
CHLORIDE SERPL-SCNC: 97 MMOL/L (ref 98–112)
CO2 SERPL-SCNC: 31 MMOL/L (ref 21–32)
CREAT BLD-MCNC: 1.04 MG/DL (ref 0.55–1.02)
DEPRECATED RDW RBC AUTO: 56 FL (ref 35.1–46.3)
EGFRCR SERPLBLD CKD-EPI 2021: 54 ML/MIN/1.73M2 (ref 60–?)
EOSINOPHIL # BLD AUTO: 0 X10(3) UL (ref 0–0.7)
EOSINOPHIL NFR BLD AUTO: 0 %
ERYTHROCYTE [DISTWIDTH] IN BLOOD BY AUTOMATED COUNT: 16.4 % (ref 11–15)
GLUCOSE BLD-MCNC: 268 MG/DL (ref 70–99)
GLUCOSE BLDC GLUCOMTR-MCNC: 211 MG/DL (ref 70–99)
GLUCOSE BLDC GLUCOMTR-MCNC: 246 MG/DL (ref 70–99)
GLUCOSE BLDC GLUCOMTR-MCNC: 265 MG/DL (ref 70–99)
GLUCOSE BLDC GLUCOMTR-MCNC: 361 MG/DL (ref 70–99)
HCT VFR BLD AUTO: 34.5 % (ref 35–48)
HGB BLD-MCNC: 10.4 G/DL (ref 12–16)
IMM GRANULOCYTES # BLD AUTO: 0.07 X10(3) UL (ref 0–1)
IMM GRANULOCYTES NFR BLD: 0.5 %
LYMPHOCYTES # BLD AUTO: 0.4 X10(3) UL (ref 1–4)
LYMPHOCYTES NFR BLD AUTO: 2.9 %
MCH RBC QN AUTO: 28.1 PG (ref 26–34)
MCHC RBC AUTO-ENTMCNC: 30.1 G/DL (ref 31–37)
MCV RBC AUTO: 93.2 FL (ref 80–100)
MONOCYTES # BLD AUTO: 0.46 X10(3) UL (ref 0.1–1)
MONOCYTES NFR BLD AUTO: 3.3 %
NEUTROPHILS # BLD AUTO: 12.81 X10 (3) UL (ref 1.5–7.7)
NEUTROPHILS # BLD AUTO: 12.81 X10(3) UL (ref 1.5–7.7)
NEUTROPHILS NFR BLD AUTO: 93.2 %
OSMOLALITY SERPL CALC.SUM OF ELEC: 299 MOSM/KG (ref 275–295)
PLATELET # BLD AUTO: 232 10(3)UL (ref 150–450)
POTASSIUM SERPL-SCNC: 4 MMOL/L (ref 3.5–5.1)
POTASSIUM SERPL-SCNC: 4 MMOL/L (ref 3.5–5.1)
RBC # BLD AUTO: 3.7 X10(6)UL (ref 3.8–5.3)
SODIUM SERPL-SCNC: 137 MMOL/L (ref 136–145)
T3FREE SERPL-MCNC: 2.26 PG/ML (ref 2.4–4.2)
T4 FREE SERPL-MCNC: 0.9 NG/DL (ref 0.8–1.7)
TSI SER-ACNC: 0.17 UIU/ML (ref 0.55–4.78)
WBC # BLD AUTO: 13.8 X10(3) UL (ref 4–11)

## 2025-05-15 PROCEDURE — 93306 TTE W/DOPPLER COMPLETE: CPT

## 2025-05-15 PROCEDURE — 99233 SBSQ HOSP IP/OBS HIGH 50: CPT | Performed by: HOSPITALIST

## 2025-05-15 RX ORDER — SENNA AND DOCUSATE SODIUM 50; 8.6 MG/1; MG/1
2 TABLET, FILM COATED ORAL
Status: DISCONTINUED | OUTPATIENT
Start: 2025-05-15 | End: 2025-05-21

## 2025-05-15 NOTE — RESPIRATORY THERAPY NOTE
Patient is not compliant with CPAP at home. Agreeable to wear CPAP starting tomorrow. RT will provide equipment.

## 2025-05-15 NOTE — PLAN OF CARE
Problem: Patient Centered Care  Goal: Patient preferences are identified and integrated in the patient's plan of care  Description: Interventions:- What would you like us to know as we care for you? - Provide timely, complete, and accurate information to patient/family- Incorporate patient and family knowledge, values, beliefs, and cultural backgrounds into the planning and delivery of care- Encourage patient/family to participate in care and decision-making at the level they choose- Honor patient and family perspectives and choices  Outcome: Progressing     Problem: Patient/Family Goals  Goal: Patient/Family Long Term Goal  Description: Patient's Long Term Goal: Interventions:- - See additional Care Plan goals for specific interventions  Outcome: Progressing  Goal: Patient/Family Short Term Goal  Description: Patient's Short Term Goal: Interventions: - - See additional Care Plan goals for specific interventions  Outcome: Progressing     Problem: CARDIOVASCULAR - ADULT  Goal: Maintains optimal cardiac output and hemodynamic stability  Description: INTERVENTIONS:  - Monitor vital signs, rhythm, and trends  - Monitor for bleeding, hypotension and signs of decreased cardiac output  - Evaluate effectiveness of vasoactive medications to optimize hemodynamic stability  - Monitor arterial and/or venous puncture sites for bleeding and/or hematoma  - Assess quality of pulses, skin color and temperature  - Assess for signs of decreased coronary artery perfusion - ex. Angina  - Evaluate fluid balance, assess for edema, trend weights  Outcome: Progressing  Goal: Absence of cardiac arrhythmias or at baseline  Description: INTERVENTIONS:  - Continuous cardiac monitoring, monitor vital signs, obtain 12 lead EKG if indicated  - Evaluate effectiveness of antiarrhythmic and heart rate control medications as ordered  - Initiate emergency measures for life threatening arrhythmias  - Monitor electrolytes and administer replacement  therapy as ordered  Outcome: Progressing     Problem: RESPIRATORY - ADULT  Goal: Achieves optimal ventilation and oxygenation  Description: INTERVENTIONS:  - Assess for changes in respiratory status  - Assess for changes in mentation and behavior  - Position to facilitate oxygenation and minimize respiratory effort  - Oxygen supplementation based on oxygen saturation or ABGs  - Provide Smoking Cessation handout, if applicable  - Encourage broncho-pulmonary hygiene including cough, deep breathe, Incentive Spirometry  - Assess the need for suctioning and perform as needed  - Assess and instruct to report SOB or any respiratory difficulty  - Respiratory Therapy support as indicated  - Manage/alleviate anxiety  - Monitor for signs/symptoms of CO2 retention  Outcome: Progressing     Problem: GENITOURINARY - ADULT  Goal: Absence of urinary retention  Description: INTERVENTIONS:  - Assess patient’s ability to void and empty bladder  - Monitor intake/output and perform bladder scan as needed  - Follow urinary retention protocol/standard of care  - Consider collaborating with pharmacy to review patient's medication profile  - Implement strategies to promote bladder emptying  Outcome: Progressing     Problem: METABOLIC/FLUID AND ELECTROLYTES - ADULT  Goal: Glucose maintained within prescribed range  Description: INTERVENTIONS:  - Monitor Blood Glucose as ordered  - Assess for signs and symptoms of hyperglycemia and hypoglycemia  - Administer ordered medications to maintain glucose within target range  - Assess barriers to adequate nutritional intake and initiate nutrition consult as needed  - Instruct patient on self management of diabetes  Outcome: Progressing  Goal: Electrolytes maintained within normal limits  Description: INTERVENTIONS:  - Monitor labs and rhythm and assess patient for signs and symptoms of electrolyte imbalances  - Administer electrolyte replacement as ordered  - Monitor response to electrolyte  replacements, including rhythm and repeat lab results as appropriate  - Fluid restriction as ordered  - Instruct patient on fluid and nutrition restrictions as appropriate  Outcome: Progressing  Goal: Hemodynamic stability and optimal renal function maintained  Description: INTERVENTIONS:  - Monitor labs and assess for signs and symptoms of volume excess or deficit  - Monitor intake, output and patient weight  - Monitor urine specific gravity, serum osmolarity and serum sodium as indicated or ordered  - Monitor response to interventions for patient's volume status, including labs, urine output, blood pressure (other measures as available)  - Encourage oral intake as appropriate  - Instruct patient on fluid and nutrition restrictions as appropriate  Outcome: Progressing

## 2025-05-15 NOTE — PROGRESS NOTES
[facility]  Progress Note     Yesenia Berkowitz  : 1940    Status: Inpatient  Day #: 2    Attending: Ez Taylor MD  PCP: JO-ANN YANG MD     Assessment and Plan:    AECOPD  -pulm on consult  -cont nebs  -cont steroids IV  -cont advair, incruse    RUL lung nodule  -recent PET scan +  -outpatient f/u - d/w patient and Dr. Padilla    DM2  -monitor BG, cover ISS    Paroxysmal afib  -was on digoxin, diltiazem  -not on AC per patient preference per prior cardiology note    Third degree AVB - briefly on  on tele. asymptomatic  -EKG - 1st degree AVB   -cardiology consulted  -digoxin, diltiazem held  -echo pending  -digoxin level ok    HTN  -cont meds and monitor    Chronic HFpEF  -cont lasix PO    Dietitian Malnutrition Assessment    Evaluation for Malnutrition: Criteria for severe malnutrition diagnosis- acute illness/injury related to Wt loss greater than 5% in 1 month., Energy intake less than 50% for greater than 5 days.                 RD Malnutrition Care Plan: Encouraged increased PO intake., Encouraged small frequent meals with emphasis on high calorie/high protein., Intiated ONS (oral nutritional supplements).    Body Fat/Muscle Mass:          Physician Assessment     Patient has a diagnosis of severe malnutrition     DVT Mechanical Prophylaxis:        DVT Pharmacologic Prophylaxis   Medication    enoxaparin (Lovenox) 40 MG/0.4ML SUBQ injection 40 mg               Subjective:      Initial Chief Complaint:  SOB    Still SOB, doesn't feel better. No CP.     10 point ROS completed and was negative, except for pertinent positive and negatives stated in subjective.      Objective:      Temp:  [97 °F (36.1 °C)-98.4 °F (36.9 °C)] 97 °F (36.1 °C)  Pulse:  [70-81] 74  Resp:  [16-17] 16  BP: (103-122)/(48-63) 103/63  SpO2:  [93 %-95 %] 93 %  General:  Alert, no distress  HEENT:  Normocephalic, atraumatic  Cardiac:  Regular rate, regular rhythm  Pulmonary:  diminished breath sounds. No wheeze. Crackles  at bases  Gastrointestinal:  Soft, non-tender, normal bowel sounds  Musculoskeletal:  No joint swelling  Extremities:  No edema, no cyanosis, no clubbing  Neurologic:  nonfocal  Psychiatric:  Normal affect, calm and appropriate    Intake/Output Summary (Last 24 hours) at 5/15/2025 0917  Last data filed at 5/14/2025 1502  Gross per 24 hour   Intake 120 ml   Output 2 ml   Net 118 ml         Recent Labs   Lab 05/13/25  1821 05/14/25  0603 05/15/25  0610   WBC 11.0 7.1 13.8*   HGB 12.5 12.0 10.4*   HCT 41.5 39.6 34.5*   .0 258.0 232.0   RBC 4.49 4.30 3.70*   MCV 92.4 92.1 93.2   MCH 27.8 27.9 28.1   MCHC 30.1* 30.3* 30.1*   RDW 16.1* 16.1* 16.4*   NEPRELIM 8.21* 6.42 12.81*     Recent Labs   Lab 05/13/25  1821 05/14/25  0603 05/14/25  1546 05/15/25  0610   BUN 14 19  --  29*   CREATSERUM 0.74 0.92  --  1.04*   CA 8.8 8.7  --  8.3*   ALB 3.9  --   --   --     138  --  137   K 3.1* 3.3*  3.3*  --  4.0  4.0   CL 96* 96*  --  97*   CO2 36.0* 32.0  --  31.0   * 216*  --  268*   MG  --   --  2.0  --    BILT 0.2  --   --   --    AST 13  --   --   --    ALT <7*  --   --   --    ALKPHO 65  --   --   --    TP 6.5  --   --   --    TSH  --   --   --  0.172*   T4F  --   --   --  0.9       XR CHEST AP PORTABLE  (CPT=71045)  Result Date: 5/13/2025  CONCLUSION:  1. Emphysema with areas of stable scarring.  No pneumonia or other acute finding. 2. Chronically elevated left hemidiaphragm.     Dictated by (CST): Bijan Orona MD on 5/13/2025 at 7:22 PM     Finalized by (CST): Bijan Orona MD on 5/13/2025 at 7:26 PM          EKG 12 Lead  Result Date: 5/14/2025  Sinus rhythm with 1st degree A-V block ST & T wave abnormality, consider inferior ischemia Abnormal ECG When compared with ECG of 13-MAY-2025 18:06, Criteria for Inferior infarct are no longer Present Non-specific change in ST segment in Anterior leads T wave inversion now evident in Inferior leads Nonspecific T wave abnormality, worse in Anterior-lateral  leads Confirmed by SAGAR SIDDIQUI ELMER ([id number]) on 5/14/2025 4:38:31 PM    EKG  Result Date: 5/14/2025  Normal sinus rhythm with sinus arrhythmia Minimal voltage criteria for LVH, may be normal variant ( Broussard product ) Inferior infarct , age undetermined Abnormal ECG When compared with ECG of 20-MAR-2025 11:16, No significant change was found Confirmed by SAGAR SIDDIQUI ELMER ([id number]) on 5/14/2025 4:38:19 PM    Medications:  Scheduled Medications[1]   PRN Meds: PRN Medications[2]    Supplementary Documentation:   DVT Mechanical Prophylaxis:        DVT Pharmacologic Prophylaxis   Medication    enoxaparin (Lovenox) 40 MG/0.4ML SUBQ injection 40 mg                Code Status: DNAR/Selective Treatment  García: No urinary catheter in place  García Duration (in days):   Central line:    ANGEL: 5/17/2025        **Certification      PHYSICIAN Certification of Need for Inpatient Hospitalization - Initial Certification    Patient will require inpatient services that will reasonably be expected to span two midnight's based on the clinical documentation in H+P.   Based on patients current state of illness, I anticipate that, after discharge, patient will require TBD.         [facility]. Time spent on chart/note review, review of labs/imaging, discussion with patient, physical exam, discussion with staff, consultants, coordinating care, writing progress note, discussion of plan of care.      Ez Taylor MD         [1]    ipratropium-albuterol  3 mL Nebulization Q6H WA    acetaZOLAMIDE  500 mg Oral Daily    cholecalciferol  1,000 Units Oral BID    [Held by provider] digoxin  125 mcg Oral Daily    [Held by provider] dilTIAZem ER  360 mg Oral Daily    fluticasone-salmeterol  1 puff Inhalation BID    umeclidinium bromide  1 puff Inhalation Daily    furosemide  80 mg Oral BID (Diuretic)    cetirizine  5 mg Oral Nightly    montelukast  10 mg Oral Nightly    polyethylene glycol (PEG 3350)  17 g Oral Daily    methylPREDNISolone  40 mg Intravenous  Q8H    insulin aspart  1-7 Units Subcutaneous TID CC and HS    enoxaparin  40 mg Subcutaneous Daily   [2]   morphINE    metoclopramide    ipratropium-albuterol    glucose **OR** glucose **OR** glucose-vitamin C **OR** dextrose **OR** glucose **OR** glucose **OR** glucose-vitamin C    ondansetron    guaiFENesin    benzonatate    acetaminophen    phenazopyridine

## 2025-05-15 NOTE — PROGRESS NOTES
[facility]  Cardiology Progress Note    Yesenia Berkowitz Patient Status:  Inpatient    1940 MRN [id number]   Location [facility] 5SW/SE Attending Ez Taylor MD   Hosp Day # 2 PCP JO-ANN YANG MD     Subjective     Unchanged shortness of breath.  Still having episodes of second-degree AV block type II.    Objective:     Patient Vitals for the past 24 hrs:   BP Temp Temp src Pulse Resp SpO2   05/15/25 0953 -- 98.2 °F (36.8 °C) Oral 73 18 97 %   05/15/25 0612 103/63 97 °F (36.1 °C) Oral 74 16 93 %   25 2100 122/51 98.4 °F (36.9 °C) Oral 81 16 95 %   25 1439 116/48 -- -- 70 17 95 %       Intake/Output:   Last 3 shifts:   Intake/Output                   25 0700 - 25 0659 25 0700 - 05/15/25 0659 05/15/25 0700 - 25 0659       Intake    P.O.  100  400  --    P.O. 100 400 --    I.V.  10  --  --    I.V. 10 -- --    Total Intake 110 400 --       Output    Urine  0  2  --    Urine 0 2 --    Urine Occurrence 1 x 3 x --    Stool  --  --  --    Stool Count Calculated for I/O -- 1 x --    Total Output 0 2 --       Net I/O     110 398 --             Vent Settings:      Hemodynamic parameters (last 24 hours):      Scheduled Meds: Scheduled Medications[1]    Continuous Infusions: Medication Infusions[2]    Results:     Lab Results   Component Value Date    WBC 13.8 (H) 05/15/2025    HGB 10.4 (L) 05/15/2025    HCT 34.5 (L) 05/15/2025    .0 05/15/2025    CREATSERUM 1.04 (H) 05/15/2025    BUN 29 (H) 05/15/2025     05/15/2025    K 4.0 05/15/2025    K 4.0 05/15/2025    CL 97 (L) 05/15/2025    CO2 31.0 05/15/2025     (H) 05/15/2025    CA 8.3 (L) 05/15/2025    ALB 3.9 2025    ALKPHO 65 2025    BILT 0.2 2025    TP 6.5 2025    AST 13 2025    ALT <7 (L) 2025    PTT 28.1 2025    INR 1.08 2024    T4F 0.9 05/15/2025    TSH 0.172 (L) 05/15/2025    LIP 30 2024    DDIMER 0.47 2024    MG 2.0 2025     PHOS 3.4 12/26/2024    TROP <0.045 02/03/2020       Recent Labs   Lab 05/13/25  1821 05/14/25  0603 05/15/25  0610   * 216* 268*   BUN 14 19 29*   CREATSERUM 0.74 0.92 1.04*   CA 8.8 8.7 8.3*    138 137   K 3.1* 3.3*  3.3* 4.0  4.0   CL 96* 96* 97*   CO2 36.0* 32.0 31.0     Recent Labs   Lab 05/13/25 1821 05/14/25  0603 05/15/25  0610   RBC 4.49 4.30 3.70*   HGB 12.5 12.0 10.4*   HCT 41.5 39.6 34.5*   MCV 92.4 92.1 93.2   MCH 27.8 27.9 28.1   MCHC 30.1* 30.3* 30.1*   RDW 16.1* 16.1* 16.4*   NEPRELIM 8.21* 6.42 12.81*   WBC 11.0 7.1 13.8*   .0 258.0 232.0       No results for input(s): \"BNPML\" in the last 168 hours.    No results for input(s): \"TROP\", \"CK\" in the last 168 hours.    XR CHEST AP PORTABLE  (CPT=71045)  Result Date: 5/13/2025  CONCLUSION:  1. Emphysema with areas of stable scarring.  No pneumonia or other acute finding. 2. Chronically elevated left hemidiaphragm.     Dictated by (CST): Bijan Orona MD on 5/13/2025 at 7:22 PM     Finalized by (CST): Bijan Orona MD on 5/13/2025 at 7:26 PM          EKG 12 Lead  Result Date: 5/14/2025  Sinus rhythm with 1st degree A-V block ST & T wave abnormality, consider inferior ischemia Abnormal ECG When compared with ECG of 13-MAY-2025 18:06, Criteria for Inferior infarct are no longer Present Non-specific change in ST segment in Anterior leads T wave inversion now evident in Inferior leads Nonspecific T wave abnormality, worse in Anterior-lateral leads Confirmed by SAGAR SIDDIQUI, RADHA ([id number]) on 5/14/2025 4:38:31 PM    EKG  Result Date: 5/14/2025  Normal sinus rhythm with sinus arrhythmia Minimal voltage criteria for LVH, may be normal variant ( Bassam product ) Inferior infarct , age undetermined Abnormal ECG When compared with ECG of 20-MAR-2025 11:16, No significant change was found Confirmed by SAGAR SIDDIQUI ELMER ([id number]) on 5/14/2025 4:38:19 PM           Exam:     General: Alert and oriented x 3. No apparent distress.   HEENT:  Normocephalic, anicteric sclera, neck supple, no thyromegaly or adenopathy.  Neck: No JVD, carotids 2+, no bruits.  Cardiac: Regular rate and rhythm. S1, S2 normal. No murmur, pericardial rub, S3, or extra cardiac sounds.  Lungs: Clear without wheezes, rales, rhonchi or dullness.  Normal excursions and effort.  Abdomen: Soft, non-tender. No organosplenomegally, mass or rebound, BS-present.  Extremities: Without clubbing or cyanosis. No edema.    Neurologic: Alert and oriented, normal affect. No focal defects  Skin: Warm and dry.     Assessment and Plan:   Assessment:  1.  Admission for acute exacerbation of COPD.     2.  Telemetry monitoring showing type II second-degree AV block and possible brief third-degree AV block.  Patient is on digoxin and diltiazem.      3.  Paroxysmal atrial fibrillation     Plan:  1.  Continue to hold diltiazem and digoxin.  May need permanent pacemaker depending on telemetry off of above medications.     2.  Echocardiogram today.    Luis Fernando Fowler MD  [facility]  5/15/2025             [1]    ipratropium-albuterol  3 mL Nebulization Q6H WA    acetaZOLAMIDE  500 mg Oral Daily    cholecalciferol  1,000 Units Oral BID    [Held by provider] digoxin  125 mcg Oral Daily    [Held by provider] dilTIAZem ER  360 mg Oral Daily    fluticasone-salmeterol  1 puff Inhalation BID    umeclidinium bromide  1 puff Inhalation Daily    furosemide  80 mg Oral BID (Diuretic)    cetirizine  5 mg Oral Nightly    montelukast  10 mg Oral Nightly    polyethylene glycol (PEG 3350)  17 g Oral Daily    methylPREDNISolone  40 mg Intravenous Q8H    insulin aspart  1-7 Units Subcutaneous TID CC and HS    enoxaparin  40 mg Subcutaneous Daily   [2]

## 2025-05-15 NOTE — PROGRESS NOTES
Progress Note  Yesenia Berkowitz Patient Status:  Inpatient    1940 MRN [id number]   Location [facility] 5SW/SE Attending Ez Taylor MD   Hosp Day # 2 PCP JO-ANN YANG MD     Attending Cardiologist: Malcolm     SUBJECTIVE:    Denies lightheadedness or dizziness. Reports wheezing.     VITALS:  /63 (BP Location: Right arm)   Pulse 74   Temp 97 °F (36.1 °C) (Oral)   Resp 16   Ht 5' 5\" (1.651 m)   Wt 132 lb 8 oz (60.1 kg)   SpO2 93%   BMI 22.05 kg/m²   INTAKE/OUTPUT:    Intake/Output Summary (Last 24 hours) at 5/15/2025 0709  Last data filed at 2025 1502  Gross per 24 hour   Intake 400 ml   Output 2 ml   Net 398 ml     Last 3 Weights   25 132 lb 8 oz (60.1 kg)   25 1804 135 lb (61.2 kg)   25 1753 150 lb 12.7 oz (68.4 kg)   25 1747 150 lb 12.7 oz (68.4 kg)   25 1513 145 lb 4.8 oz (65.9 kg)     LABS:  Recent Labs   Lab 25  0603   * 216*   BUN 14 19   CREATSERUM 0.74 0.92   EGFRCR 81 62   CA 8.8 8.7    138   K 3.1* 3.3*  3.3*   CL 96* 96*   CO2 36.0* 32.0     Recent Labs   Lab 25  0603 05/15/25  0610   RBC 4.49 4.30 3.70*   HGB 12.5 12.0 10.4*   HCT 41.5 39.6 34.5*   MCV 92.4 92.1 93.2   MCH 27.8 27.9 28.1   MCHC 30.1* 30.3* 30.1*   RDW 16.1* 16.1* 16.4*   NEPRELIM 8.21* 6.42 12.81*   WBC 11.0 7.1 13.8*   .0 258.0 232.0     No results for input(s): \"TROP\", \"CK\" in the last 168 hours.  DIAGNOSTICS:  TELEMETRY: SR, rare QRS drop, prolonged AV     ROS: Negative unless noted above   PHYSICAL EXAM:  General: Alert and oriented x 3. No apparent distress.  HEENT: Normocephalic, sclera are nonicteric. Hearing appropriate bilaterally.  Neck: No JVD or Carotid bruits. Trachea midline.   Cardiac: Regular rate and rhythm. S1, S2 auscultated. +murmur  Lungs: faint wheezes. Chest expansion symmetrical. Regular effort. 3L NC   Abdomen: Soft, non-tender, +BS. No hepatosplenomegaly or appreciable masses.    Extremities: Without clubbing, cyanosis. Peripheral pulses are 2+. Edema BLE non pitting (baseline)  Neurologic: Motor and sensory nerves grossly intact.   Psych: Appropriate affect   Skin: Warm and dry. No obvious lesions, wounds, or ulcerations.     MEDICATIONS:  Scheduled Medications[1]  Medication Infusions[2]    ASSESSMENT:    Acute Hypoxic Respiratory Failure w/ COPD Exacerbation   - Pulm following, Steroids/ Nebs   - RUL nodule; plans for o/p bronch   - Baseline 3-4L NC use, at baseline 3.5L NC    PAF/ Flutter  New/ Intermittent High Grade AV Block   - Was on CCB and Dig o/p now on hold. Dig level normal   - EKG with 1st degree AV block but intermittently dropped QRSs on beside tele on 5/14; today mainly in SR HR 60s-70s, rare QRS drop   - Not historically on a/c due to bruising/ bleeding risk/ Hx GIB and low Afib burden, o/p Watchman discussions in process   - TSH low/ T3 Low,/ T4 normal- on steroids     Chronic HFpEF- Appears compensated   HTN- Controlled   Chronic Anemia- 2g drop in Hgb in the past 24 hours though has had Hgbs run from 10-12 in the past, over bleeding?   HLD- , Not historically on Statin, was unable to start with concurrent use of CCB with increased risk of myopathies, plan to explore alternatives o/p  Hx T6 Fracture- s/p Kyphoplasty     PLAN:  - ECHO pending  - Thyroid labs mildly abnormal but likely not the cause of rhythm dysfunction; continue to hold CCB and Dig today, if ECHO normal, will consider resuming one of her rhythm medications and monitor until Saturday morning     Plan of care discussed with patient and RN.     Cortney Dumont, MSN, FNP-BC, CCK  05/15/25   7:09 AM           [1]    ipratropium-albuterol  3 mL Nebulization Q6H WA    acetaZOLAMIDE  500 mg Oral Daily    cholecalciferol  1,000 Units Oral BID    [Held by provider] digoxin  125 mcg Oral Daily    [Held by provider] dilTIAZem ER  360 mg Oral Daily    fluticasone-salmeterol  1 puff Inhalation BID     umeclidinium bromide  1 puff Inhalation Daily    furosemide  80 mg Oral BID (Diuretic)    cetirizine  5 mg Oral Nightly    montelukast  10 mg Oral Nightly    polyethylene glycol (PEG 3350)  17 g Oral Daily    methylPREDNISolone  40 mg Intravenous Q8H    insulin aspart  1-7 Units Subcutaneous TID CC and HS    enoxaparin  40 mg Subcutaneous Daily   [2]

## 2025-05-15 NOTE — PLAN OF CARE
Pt alert, oriented. Vitals stable. Pt wore bipap for 2 hours this afternoon per request of pulmonology. Pt did not tolerate more than 2 hours. Still complains of some shortness of breath. Pt will be kept in the hospital until at least Saturday morning to monitor on tele for possible need for pacemaker, per cards.   Problem: Patient Centered Care  Goal: Patient preferences are identified and integrated in the patient's plan of care  Description: Interventions:- What would you like us to know as we care for you? - Provide timely, complete, and accurate information to patient/family- Incorporate patient and family knowledge, values, beliefs, and cultural backgrounds into the planning and delivery of care- Encourage patient/family to participate in care and decision-making at the level they choose- Honor patient and family perspectives and choices  Outcome: Progressing     Problem: Patient/Family Goals  Goal: Patient/Family Long Term Goal  Description: Patient's Long Term Goal: Interventions:- - See additional Care Plan goals for specific interventions  Outcome: Progressing  Goal: Patient/Family Short Term Goal  Description: Patient's Short Term Goal: Interventions: - - See additional Care Plan goals for specific interventions  Outcome: Progressing     Problem: CARDIOVASCULAR - ADULT  Goal: Maintains optimal cardiac output and hemodynamic stability  Description: INTERVENTIONS:  - Monitor vital signs, rhythm, and trends  - Monitor for bleeding, hypotension and signs of decreased cardiac output  - Evaluate effectiveness of vasoactive medications to optimize hemodynamic stability  - Monitor arterial and/or venous puncture sites for bleeding and/or hematoma  - Assess quality of pulses, skin color and temperature  - Assess for signs of decreased coronary artery perfusion - ex. Angina  - Evaluate fluid balance, assess for edema, trend weights  Outcome: Progressing  Goal: Absence of cardiac arrhythmias or at  baseline  Description: INTERVENTIONS:  - Continuous cardiac monitoring, monitor vital signs, obtain 12 lead EKG if indicated  - Evaluate effectiveness of antiarrhythmic and heart rate control medications as ordered  - Initiate emergency measures for life threatening arrhythmias  - Monitor electrolytes and administer replacement therapy as ordered  Outcome: Progressing     Problem: RESPIRATORY - ADULT  Goal: Achieves optimal ventilation and oxygenation  Description: INTERVENTIONS:  - Assess for changes in respiratory status  - Assess for changes in mentation and behavior  - Position to facilitate oxygenation and minimize respiratory effort  - Oxygen supplementation based on oxygen saturation or ABGs  - Provide Smoking Cessation handout, if applicable  - Encourage broncho-pulmonary hygiene including cough, deep breathe, Incentive Spirometry  - Assess the need for suctioning and perform as needed  - Assess and instruct to report SOB or any respiratory difficulty  - Respiratory Therapy support as indicated  - Manage/alleviate anxiety  - Monitor for signs/symptoms of CO2 retention  Outcome: Progressing     Problem: GENITOURINARY - ADULT  Goal: Absence of urinary retention  Description: INTERVENTIONS:  - Assess patient’s ability to void and empty bladder  - Monitor intake/output and perform bladder scan as needed  - Follow urinary retention protocol/standard of care  - Consider collaborating with pharmacy to review patient's medication profile  - Implement strategies to promote bladder emptying  Outcome: Progressing     Problem: METABOLIC/FLUID AND ELECTROLYTES - ADULT  Goal: Glucose maintained within prescribed range  Description: INTERVENTIONS:  - Monitor Blood Glucose as ordered  - Assess for signs and symptoms of hyperglycemia and hypoglycemia  - Administer ordered medications to maintain glucose within target range  - Assess barriers to adequate nutritional intake and initiate nutrition consult as needed  - Instruct  patient on self management of diabetes  Outcome: Progressing  Goal: Electrolytes maintained within normal limits  Description: INTERVENTIONS:  - Monitor labs and rhythm and assess patient for signs and symptoms of electrolyte imbalances  - Administer electrolyte replacement as ordered  - Monitor response to electrolyte replacements, including rhythm and repeat lab results as appropriate  - Fluid restriction as ordered  - Instruct patient on fluid and nutrition restrictions as appropriate  Outcome: Progressing  Goal: Hemodynamic stability and optimal renal function maintained  Description: INTERVENTIONS:  - Monitor labs and assess for signs and symptoms of volume excess or deficit  - Monitor intake, output and patient weight  - Monitor urine specific gravity, serum osmolarity and serum sodium as indicated or ordered  - Monitor response to interventions for patient's volume status, including labs, urine output, blood pressure (other measures as available)  - Encourage oral intake as appropriate  - Instruct patient on fluid and nutrition restrictions as appropriate  Outcome: Progressing

## 2025-05-16 PROBLEM — C34.11 MALIGNANT NEOPLASM OF RIGHT UPPER LOBE OF LUNG (HCC): Status: ACTIVE | Noted: 2025-05-16

## 2025-05-16 PROBLEM — Z71.89 GOALS OF CARE, COUNSELING/DISCUSSION: Status: ACTIVE | Noted: 2025-05-16

## 2025-05-16 PROBLEM — Z71.89 ADVANCE CARE PLANNING: Status: ACTIVE | Noted: 2025-05-16

## 2025-05-16 LAB
ANION GAP SERPL CALC-SCNC: 7 MMOL/L (ref 0–18)
BASOPHILS # BLD AUTO: 0.01 X10(3) UL (ref 0–0.2)
BASOPHILS NFR BLD AUTO: 0.1 %
BILIRUB UR QL: NEGATIVE
BUN BLD-MCNC: 38 MG/DL (ref 9–23)
BUN/CREAT SERPL: 40 (ref 10–20)
CALCIUM BLD-MCNC: 8.2 MG/DL (ref 8.7–10.4)
CHLORIDE SERPL-SCNC: 96 MMOL/L (ref 98–112)
CLARITY UR: CLEAR
CO2 SERPL-SCNC: 34 MMOL/L (ref 21–32)
CREAT BLD-MCNC: 0.95 MG/DL (ref 0.55–1.02)
DEPRECATED RDW RBC AUTO: 54.9 FL (ref 35.1–46.3)
EGFRCR SERPLBLD CKD-EPI 2021: 60 ML/MIN/1.73M2 (ref 60–?)
EOSINOPHIL # BLD AUTO: 0 X10(3) UL (ref 0–0.7)
EOSINOPHIL NFR BLD AUTO: 0 %
ERYTHROCYTE [DISTWIDTH] IN BLOOD BY AUTOMATED COUNT: 16.8 % (ref 11–15)
GLUCOSE BLD-MCNC: 277 MG/DL (ref 70–99)
GLUCOSE BLDC GLUCOMTR-MCNC: 235 MG/DL (ref 70–99)
GLUCOSE BLDC GLUCOMTR-MCNC: 236 MG/DL (ref 70–99)
GLUCOSE BLDC GLUCOMTR-MCNC: 265 MG/DL (ref 70–99)
GLUCOSE BLDC GLUCOMTR-MCNC: 308 MG/DL (ref 70–99)
GLUCOSE BLDC GLUCOMTR-MCNC: 338 MG/DL (ref 70–99)
GLUCOSE BLDC GLUCOMTR-MCNC: 353 MG/DL (ref 70–99)
GLUCOSE UR-MCNC: 1000 MG/DL
HCT VFR BLD AUTO: 35.2 % (ref 35–48)
HGB BLD-MCNC: 10.8 G/DL (ref 12–16)
HGB UR QL STRIP.AUTO: NEGATIVE
IMM GRANULOCYTES # BLD AUTO: 0.11 X10(3) UL (ref 0–1)
IMM GRANULOCYTES NFR BLD: 0.9 %
KETONES UR-MCNC: NEGATIVE MG/DL
LEUKOCYTE ESTERASE UR QL STRIP.AUTO: NEGATIVE
LYMPHOCYTES # BLD AUTO: 0.35 X10(3) UL (ref 1–4)
LYMPHOCYTES NFR BLD AUTO: 2.7 %
MCH RBC QN AUTO: 27.3 PG (ref 26–34)
MCHC RBC AUTO-ENTMCNC: 30.7 G/DL (ref 31–37)
MCV RBC AUTO: 89.1 FL (ref 80–100)
MONOCYTES # BLD AUTO: 0.33 X10(3) UL (ref 0.1–1)
MONOCYTES NFR BLD AUTO: 2.6 %
NEUTROPHILS # BLD AUTO: 11.96 X10 (3) UL (ref 1.5–7.7)
NEUTROPHILS # BLD AUTO: 11.96 X10(3) UL (ref 1.5–7.7)
NEUTROPHILS NFR BLD AUTO: 93.7 %
NITRITE UR QL STRIP.AUTO: NEGATIVE
OSMOLALITY SERPL CALC.SUM OF ELEC: 303 MOSM/KG (ref 275–295)
PH UR: 6.5 [PH] (ref 5–8)
PLATELET # BLD AUTO: 265 10(3)UL (ref 150–450)
POTASSIUM SERPL-SCNC: 3.9 MMOL/L (ref 3.5–5.1)
PROT UR-MCNC: NEGATIVE MG/DL
Q-T INTERVAL: 328 MS
QRS DURATION: 100 MS
QTC CALCULATION (BEZET): 420 MS
R AXIS: 6 DEGREES
RBC # BLD AUTO: 3.95 X10(6)UL (ref 3.8–5.3)
SODIUM SERPL-SCNC: 137 MMOL/L (ref 136–145)
SP GR UR STRIP: 1.01 (ref 1–1.03)
T AXIS: 198 DEGREES
UROBILINOGEN UR STRIP-ACNC: NORMAL
VENTRICULAR RATE: 99 BPM
WBC # BLD AUTO: 12.8 X10(3) UL (ref 4–11)

## 2025-05-16 PROCEDURE — 99233 SBSQ HOSP IP/OBS HIGH 50: CPT | Performed by: HOSPITALIST

## 2025-05-16 PROCEDURE — 99223 1ST HOSP IP/OBS HIGH 75: CPT | Performed by: INTERNAL MEDICINE

## 2025-05-16 PROCEDURE — 99497 ADVNCD CARE PLAN 30 MIN: CPT | Performed by: REGISTERED NURSE

## 2025-05-16 PROCEDURE — 99223 1ST HOSP IP/OBS HIGH 75: CPT | Performed by: REGISTERED NURSE

## 2025-05-16 RX ORDER — DILTIAZEM HYDROCHLORIDE 5 MG/ML
5 INJECTION INTRAVENOUS ONCE
Status: COMPLETED | OUTPATIENT
Start: 2025-05-16 | End: 2025-05-16

## 2025-05-16 RX ORDER — DILTIAZEM HYDROCHLORIDE 5 MG/ML
10 INJECTION INTRAVENOUS ONCE
Status: COMPLETED | OUTPATIENT
Start: 2025-05-16 | End: 2025-05-16

## 2025-05-16 NOTE — PROGRESS NOTES
RRT    *See RRT Documentation Record*    Reason the RRT was called: Chest pain, SOB, Tachy 's  Assessment of patient leading up to RRT: Worsening sob, chest discomfort  Interventions/Testing: EKG  Patient Outcome/Disposition:Patient was transferred to Telemetry Unit  Family Notified: Son was at bedside  Name of family notified: Tyler Berkowitz

## 2025-05-16 NOTE — PROGRESS NOTES
[facility], [place]  part of [facility]    Progress Note    Yesenia Berkowitz Patient Status:  Inpatient    1940 MRN [id number]   Location [facility] 3W/SW Attending Ez Taylor MD   Hosp Day # 3 PCP JO-ANN YANG MD       Subjective:   Yesenia Berkowitz is a(n) 84 year old female known severe COPD and multiple admissions again presented to ED w severe dyspnea.  She also has a highly suspicious RUL spiculated growing lung nodule that is highly FDG + on April PET scan.        I saw Yesenia in April 10 when she came in with her son, Gaston to discuss this PET scan that was done 25.  The nodule shows a high FDG uptake very suspicious for carcinoma.  We discussed needle biopsy versus navigational bronchoscopy biopsy versus going ahead with SBRT treatment without tissue dx on the assumption that it is likely to be a cancerous tumor.  We performed spirometry in the office which shows FEV1 of 0.76 L which is 39% of her predicted.  The FEV1 to FVC ratio was 64%.  The flow-volume loop is mildly scooped out.  This is compatible with a severe obstructive pattern. There is question as to whether she would be a candidate for SBRT given this poor function. Repeat PFT will be needed before any radiation Rx.    Objective:   Blood pressure 128/56, pulse (!) 136, temperature 98.4 °F (36.9 °C), temperature source Oral, resp. rate 20, height 5' 5\" (1.651 m), weight 132 lb 8 oz (60.1 kg), SpO2 93%.    Intake/Output Summary (Last 24 hours) at 2025 1743  Last data filed at 2025 1500  Gross per 24 hour   Intake 1030 ml   Output 150 ml   Net 880 ml     General:alert and NAD sitting on side of bed on 4 L  Neck: no jvd or acc ms use  Pulmonary/Chest: fairl BS bilat and min if any whz  Cardiovascular: S1, S2 distant w AF and RVR up to 140  Extremities: extremities well perfused, no cyanosis or edema  Neuro: alert, no gross weakness of face or extremities    Results:     Lab Results   Component Value Date    WBC  12.8 (H) 05/16/2025    HGB 10.8 (L) 05/16/2025    HCT 35.2 05/16/2025    .0 05/16/2025    CREATSERUM 0.95 05/16/2025    BUN 38 (H) 05/16/2025     05/16/2025    K 3.9 05/16/2025    CL 96 (L) 05/16/2025    CO2 34.0 (H) 05/16/2025     (H) 05/16/2025    CA 8.2 (L) 05/16/2025    ALB 3.9 05/13/2025    ALKPHO 65 05/13/2025    BILT 0.2 05/13/2025    TP 6.5 05/13/2025    AST 13 05/13/2025    ALT <7 (L) 05/13/2025    PTT 28.1 03/20/2025    INR 1.08 11/25/2024    T4F 0.9 05/15/2025    TSH 0.172 (L) 05/15/2025    LIP 30 08/30/2024    DDIMER 0.47 12/21/2024    MG 2.0 05/14/2025    PHOS 3.4 12/26/2024    TROP <0.045 02/03/2020       No results found.  EKG 12 Lead  Result Date: 5/16/2025  Atrial fibrillation Marked ST abnormality, possible inferior subendocardial injury Abnormal ECG When compared with ECG of 14-MAY-2025 14:48, Atrial fibrillation has replaced Sinus rhythm T wave inversion no longer evident in Inferior leads Nonspecific T wave abnormality no longer evident in Anterior leads QT has lengthened Confirmed by YAN CERVANTES ([id number]) on 5/16/2025 4:13:57 PM Also confirmed by SAGAR SIDDIQUI ELMER ([id number])  on 5/16/2025 4:26:29 PM      Assessment and Plan:   Principal Problem:    COPD exacerbation (HCC)  Active Problems:    Palliative care by specialist    Goals of care, counseling/discussion    Advance care planning    Malignant neoplasm of right upper lobe of lung (HCC)    Impression      AF w RVR      COPD exacerbation (HCC)      Acute on Chronic Hypoxemic Resp failure      RUL Nodule with increased uptake on PET highly suspicious for malignancy    R pleural effusion (small and free flowing) - Dr Boyle removed 800 and 900 ml from   R chest in late ( Nov 5) 2024 showing cytology no malignant cells, LDH 74, protein 2.4, glucose 139 thus it was transudative. Current R effusion is quite small so not likely malignant and quite small to tap.      Lesion near vulva ?infectious      Anxiety    Rec    Transferred to  cardiac floor rm 304 for dilt drip    Trial of Bipap last night tolerated poorly by pt    Bronchodilation    IV steroids to taper down to 30 bid    Out pt ION Bronch for lung Nodule, DW Pt in detail but so far pt has declined    Discussed option of getting SBRT without dx ( see above) w pt and son at bedside  D/w RN              OLIMPIA JONES MD  5/16/2025

## 2025-05-16 NOTE — CONSULTS
[facility] Hematology Oncology  Consult Note      Yesenia Berkowitz Patient Status:  Inpatient    1940 MRN [id number]   Location [facility] 5SW/SE Attending Ez Taylor MD   Hosp Day # 3 PCP JO-ANN YANG MD     Reason for Consultation: Lung nodule    History of Present Illness:  Yesenia Berkowitz is a 84 year old White female former smoker, severe COPD, chronic respiratory failure on 4L supplemental O2, paroxysmal A fib, admitted on 25 for COPD exacerbation and 3rd AV block. Patient was recently found to have an enlarging spiculated right upper lobe nodule measuring 1.2 x 1.1 cm.  PET scan on 25 demonstrated 12 x 13 mm hypermetabolic pulmonary nodule in the right upper lobe with max SUV 7.1, concerning for primary pulmonary neoplasm.  No mediastinal/hilar lymphadenopathy.  small right pleural effusion, which could be reactive.   Patient was seen by pulmonology and recommended ION bronchoscopy for biopsy, however the patient is not interested in any invasive procedure given her age, chronic respiratory failure and co morbidities. Oncology consulted for further evaluation.     History:  Past Medical History[1]  Past Surgical History[2]  Family History[3]   reports that she quit smoking about 29 years ago. Her smoking use included cigarettes. She has never used smokeless tobacco. She reports that she does not currently use alcohol. She reports that she does not use drugs.    Allergies:  Allergies[4]    Medications:  Current Hospital Medications[5]    Review of Systems:  A 12-point review of systems was done with pertinent positives and negatives per the HPI.    Weight:  Wt Readings from Last 6 Encounters:   25 60.1 kg (132 lb 8 oz)   25 68.4 kg (150 lb 12.7 oz)   25 65.9 kg (145 lb 4.8 oz)   25 66.2 kg (145 lb 14.4 oz)   25 65.3 kg (143 lb 14.4 oz)   25 70.8 kg (156 lb)       Vital Sings:  /61 (BP Location: Right arm)   Pulse 83   Temp 98.2 °F (36.8 °C)  (Oral)   Resp 18   Ht 1.651 m (5' 5\")   Wt 60.1 kg (132 lb 8 oz)   SpO2 93%   BMI 22.05 kg/m²     Physical Exam:   General: Patient is alert and oriented x 3, not in acute distress.   HEENT: EOMs intact. Anicteric sclera. Pink conjunctiva. Oropharynx is clear.   Neck: No JVD. No palpable lymphadenopathy.   Chest: Non labored breathing   Heart: Irregular   Abdomen: Soft, non tender  Extremities: Mild LE edema  Neurological: Grossly intact.   Psych: Appropriate mood and affect.     Laboratory Data:    Recent Labs   Lab 05/14/25  0603 05/15/25  0610 05/16/25  0505   RBC 4.30 3.70* 3.95   HGB 12.0 10.4* 10.8*   HCT 39.6 34.5* 35.2   MCV 92.1 93.2 89.1   MCH 27.9 28.1 27.3   MCHC 30.3* 30.1* 30.7*   RDW 16.1* 16.4* 16.8*   NEPRELIM 6.42 12.81* 11.96*   WBC 7.1 13.8* 12.8*   .0 232.0 265.0       Recent Labs   Lab 05/13/25  1821 05/14/25  0603 05/15/25  0610 05/16/25  0505   * 216* 268* 277*   BUN 14 19 29* 38*   CREATSERUM 0.74 0.92 1.04* 0.95   EGFRCR 81 62 54* 60   CA 8.8 8.7 8.3* 8.2*   ALB 3.9  --   --   --     138 137 137   K 3.1* 3.3*  3.3* 4.0  4.0 3.9   CL 96* 96* 97* 96*   CO2 36.0* 32.0 31.0 34.0*   ALKPHO 65  --   --   --    AST 13  --   --   --    ALT <7*  --   --   --    BILT 0.2  --   --   --    TP 6.5  --   --   --         No results for input(s): \"PT\", \"INR\", \"PTT\" in the last 168 hours.     Imaging:  XR CHEST AP PORTABLE  (CPT=71045)  Result Date: 5/13/2025  CONCLUSION:  1. Emphysema with areas of stable scarring.  No pneumonia or other acute finding. 2. Chronically elevated left hemidiaphragm.     Dictated by (CST): Bijan Orona MD on 5/13/2025 at 7:22 PM     Finalized by (CST): Bijan Orona MD on 5/13/2025 at 7:26 PM             Impression and Plan:      84 year old female, smoker, with history of severe COPD, chronic respiratory failure on 4 L of supplemental O2, enlarging hypermetabolic RUL nodule highly suspicious for malignancy.   Recent PET scan showed 12 x 13 mm  right upper lobe nodule with max SUV 7.1.  No mediastinal/hilar lymphadenopathy.  No distant metastases.     Patient was evaluated by pulmonology and recommended ION bronchoscopy for biopsy and tissue diagnosis. However she declined given the risks with her age, severe emphysema, respiratory failure and cardiac comorbidities.     I discussed with the patient that her imaging and history/risk factors strongly support a clinical diagnosis of early stage lung cancer. I explained that SBRT (stereotactic body radiotherapy)   Is a non-invasive treatment option for early stage lung cancer in patients who are medically inoperable or at risk for complications from biopsy, such as those with severe comorbidities  and respiratory failure. SBRT offers high local control rates with minimal toxicity.     Patient is agreeable to meeting with rad/onc to discuss this further, before making a decision. We will arrange for an outpatient consultation. Discussed with Dr. Pawel Krueger.       Thank you for the opportunity to participate in the care of Yesenia Berkowitz. Will continue to follow along with you.   Please contact me for any questions or concerns.       Anne Marie Taylor MD  [facility] Hematology Oncology            [1]   Past Medical History:   A-fib (HCC)    Anesthesia complication    Arrhythmia    AFIB    Arthritis    Asthma (HCC)    Back problem    COPD (chronic obstructive pulmonary disease) (HCC)    Deviated nasal septum    nasal septoplasty, turb reduction, smr of turbs    Difficult intubation    fiber optic if needed    Diverticulitis    colonoscopy     Diverticulosis of large intestine    Esophageal reflux    Extrinsic asthma, unspecified    Headache    Heart disease    Hepatitis    WAS TOLD SHE HAS HAD THIS WHEN SHE WAS 19 YEARS OLD    High blood pressure    High cholesterol    Irregular heart rate    Lichenoid keratosis    left chest-bx    Osteoarthritis    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    Paronychia     (RT); onychomycosis; debridement    Problems with swallowing    occasionally    Shortness of breath    2 L NC ALL THE TIME 24HR/7 DAYS PER WEEK    Unspecified essential hypertension    Visual impairment   [2]   Past Surgical History:  Procedure Laterality Date    Adenoidectomy      Cataract      cataract extraction     Hysterectomy      Sinus surgery        DEVIATED SEPTUM    T&a      Tonsillectomy     [3]   Family History  Problem Relation Age of Onset    Ovarian Cancer Mother 76        endometrial, poss ovarian primary    Pulmonary Disease Sister         COPD    Skin cancer Other     Stroke Other     Other (Other) Other    [4]   Allergies  Allergen Reactions    Penicillin G ANAPHYLAXIS    Azithromycin DIARRHEA and NAUSEA AND VOMITING    Cefuroxime UNKNOWN     Other reaction(s): Vomitting    Levofloxacin UNKNOWN     Other reaction(s): LEVOFLOXACIN    Penicillins      Other reaction(s): Unknown    Seasonal ITCHING   [5]   Current Facility-Administered Medications:     senna-docusate (Senokot-S) 8.6-50 MG per tab 2 tablet, 2 tablet, Oral, Daily PRN    ipratropium-albuterol (Duoneb) 0.5-2.5 (3) MG/3ML inhalation solution 3 mL, 3 mL, Nebulization, Q6H WA    acetaZOLAMIDE (Diamox) tab 500 mg, 500 mg, Oral, Daily    cholecalciferol (Vitamin D3) tab 1,000 Units, 1,000 Units, Oral, BID    [Held by provider] digoxin (Lanoxin) tab 125 mcg, 125 mcg, Oral, Daily    [Held by provider] dilTIAZem ER (Dilacor XR) 24 hr cap 360 mg, 360 mg, Oral, Daily    fluticasone-salmeterol (Advair Diskus) 500-50 MCG/ACT inhaler 1 puff, 1 puff, Inhalation, BID    umeclidinium bromide (Incruse Ellipta) 62.5 MCG/ACT inhaler 1 puff, 1 puff, Inhalation, Daily    furosemide (Lasix) tab 80 mg, 80 mg, Oral, BID (Diuretic)    cetirizine (ZyrTEC) tab 5 mg, 5 mg, Oral, Nightly    montelukast (Singulair) tab 10 mg, 10 mg, Oral, Nightly    morphINE 10 MG/5ML oral solution 2.6 mg, 2.6 mg, Oral, TID PRN    metoclopramide (Reglan) 5 mg/mL injection 5 mg, 5  mg, Intravenous, Q8H PRN    polyethylene glycol (PEG 3350) (Miralax) 17 g oral packet 17 g, 17 g, Oral, Daily    ipratropium-albuterol (Duoneb) 0.5-2.5 (3) MG/3ML inhalation solution 3 mL, 3 mL, Nebulization, Q4H PRN    methylPREDNISolone sodium succinate (Solu-MEDROL) injection 40 mg, 40 mg, Intravenous, Q8H    glucose (Dex4) 15 GM/59ML oral liquid 15 g, 15 g, Oral, Q15 Min PRN **OR** glucose (Glutose) 40% oral gel 15 g, 15 g, Oral, Q15 Min PRN **OR** glucose-vitamin C (Dex-4) chewable tab 4 tablet, 4 tablet, Oral, Q15 Min PRN **OR** dextrose 50% injection 50 mL, 50 mL, Intravenous, Q15 Min PRN **OR** glucose (Dex4) 15 GM/59ML oral liquid 30 g, 30 g, Oral, Q15 Min PRN **OR** glucose (Glutose) 40% oral gel 30 g, 30 g, Oral, Q15 Min PRN **OR** glucose-vitamin C (Dex-4) chewable tab 8 tablet, 8 tablet, Oral, Q15 Min PRN    insulin aspart (NovoLOG) 100 Units/mL FlexPen 1-7 Units, 1-7 Units, Subcutaneous, TID CC and HS    enoxaparin (Lovenox) 40 MG/0.4ML SUBQ injection 40 mg, 40 mg, Subcutaneous, Daily    ondansetron (Zofran) 4 MG/2ML injection 4 mg, 4 mg, Intravenous, Q6H PRN    guaiFENesin (Robitussin) 100 MG/5 ML oral liquid 200 mg, 200 mg, Oral, Q4H PRN    benzonatate (Tessalon) cap 200 mg, 200 mg, Oral, TID PRN    acetaminophen (Tylenol) tab 650 mg, 650 mg, Oral, Q6H PRN    phenazopyridine (Pyridium) tab 200 mg, 200 mg, Oral, TID PRN

## 2025-05-16 NOTE — PROGRESS NOTES
[facility]  Progress Note     Yesenia Berkowitz  : 1940    Status: Inpatient  Day #: 3    Attending: Ez Taylor MD  PCP: JO-ANN YANG MD     Assessment and Plan:    AECOPD  -pulm on consult  -cont nebs  -cont steroids IV  -cont advair, incruse    RUL lung nodule  -recent PET scan +  -d/w patient and Dr. Padilla. ION bronchoscopy suggested, but patient does not know if she really wants to pursue this as she doesn't think she would want any treatment anyway. Will ask oncology and palliative to consult to discuss options.    DM2  -monitor BG, cover ISS    Paroxysmal afib  -was on digoxin, diltiazem - now held  -not on AC per patient preference per prior cardiology note    Third degree AVB   -EKG - 1st degree AVB   -cardiology consulted  -digoxin, diltiazem held  -echo result reviewed  -digoxin level ok  -possible PPM per cardiology    Abnormal TFTs  -low TSH and free T3 but normal free T4    HTN  -cont meds and monitor    Chronic HFpEF  -cont lasix PO    GOC  -DNAR/selective  -palliative care consulted    Dietitian Malnutrition Assessment    Evaluation for Malnutrition: Criteria for severe malnutrition diagnosis- acute illness/injury related to Wt loss greater than 5% in 1 month., Energy intake less than 50% for greater than 5 days.                 RD Malnutrition Care Plan: Encouraged increased PO intake., Encouraged small frequent meals with emphasis on high calorie/high protein., Intiated ONS (oral nutritional supplements).    Body Fat/Muscle Mass:          Physician Assessment     Patient has a diagnosis of severe malnutrition     DVT Mechanical Prophylaxis:        DVT Pharmacologic Prophylaxis   Medication    enoxaparin (Lovenox) 40 MG/0.4ML SUBQ injection 40 mg               Subjective:      Initial Chief Complaint:  SOB    Feels better today, breathing less labored. No CP, no dizziness.    10 point ROS completed and was negative, except for pertinent positive and negatives stated in  subjective.      Objective:      Temp:  [97.8 °F (36.6 °C)-98.2 °F (36.8 °C)] 98.2 °F (36.8 °C)  Pulse:  [73-89] 83  Resp:  [18] 18  BP: (117-127)/(48-72) 121/61  SpO2:  [93 %-97 %] 93 %  General:  Alert, no distress  HEENT:  Normocephalic, atraumatic  Cardiac:  Regular rate, regular rhythm  Pulmonary:  diminished breath sounds. No wheeze. Crackles at bases  Gastrointestinal:  Soft, non-tender, normal bowel sounds  Musculoskeletal:  No joint swelling  Extremities:  No edema, no cyanosis, no clubbing  Neurologic:  nonfocal  Psychiatric:  Normal affect, calm and appropriate    Intake/Output Summary (Last 24 hours) at 5/16/2025 0914  Last data filed at 5/15/2025 2300  Gross per 24 hour   Intake 790 ml   Output --   Net 790 ml         Recent Labs   Lab 05/14/25  0603 05/15/25  0610 05/16/25  0505   WBC 7.1 13.8* 12.8*   HGB 12.0 10.4* 10.8*   HCT 39.6 34.5* 35.2   .0 232.0 265.0   RBC 4.30 3.70* 3.95   MCV 92.1 93.2 89.1   MCH 27.9 28.1 27.3   MCHC 30.3* 30.1* 30.7*   RDW 16.1* 16.4* 16.8*   NEPRELIM 6.42 12.81* 11.96*     Recent Labs   Lab 05/13/25  1821 05/14/25  0603 05/14/25  1546 05/15/25  0610 05/16/25  0505   BUN 14 19  --  29* 38*   CREATSERUM 0.74 0.92  --  1.04* 0.95   CA 8.8 8.7  --  8.3* 8.2*   ALB 3.9  --   --   --   --     138  --  137 137   K 3.1* 3.3*  3.3*  --  4.0  4.0 3.9   CL 96* 96*  --  97* 96*   CO2 36.0* 32.0  --  31.0 34.0*   * 216*  --  268* 277*   MG  --   --  2.0  --   --    BILT 0.2  --   --   --   --    AST 13  --   --   --   --    ALT <7*  --   --   --   --    ALKPHO 65  --   --   --   --    TP 6.5  --   --   --   --    TSH  --   --   --  0.172*  --    T4F  --   --   --  0.9  --        No results found.    EKG 12 Lead  Result Date: 5/14/2025  Sinus rhythm with 1st degree A-V block ST & T wave abnormality, consider inferior ischemia Abnormal ECG When compared with ECG of 13-MAY-2025 18:06, Criteria for Inferior infarct are no longer Present Non-specific change in ST  segment in Anterior leads T wave inversion now evident in Inferior leads Nonspecific T wave abnormality, worse in Anterior-lateral leads Confirmed by SAGAR SIDDIQUI ELMER ([id number]) on 5/14/2025 4:38:31 PM    Medications:  Scheduled Medications[1]   PRN Meds: PRN Medications[2]    Supplementary Documentation:   DVT Mechanical Prophylaxis:        DVT Pharmacologic Prophylaxis   Medication    enoxaparin (Lovenox) 40 MG/0.4ML SUBQ injection 40 mg                Code Status: DNAR/Selective Treatment  García: No urinary catheter in place  García Duration (in days):   Central line:    ANGEL: 5/17/2025        **Certification      PHYSICIAN Certification of Need for Inpatient Hospitalization - Initial Certification    Patient will require inpatient services that will reasonably be expected to span two midnight's based on the clinical documentation in H+P.   Based on patients current state of illness, I anticipate that, after discharge, patient will require TBD.         [facility]. Time spent on chart/note review, review of labs/imaging, discussion with patient, physical exam, discussion with staff, consultants, coordinating care, writing progress note, discussion of plan of care.      Ez Taylor MD         [1]    ipratropium-albuterol  3 mL Nebulization Q6H WA    acetaZOLAMIDE  500 mg Oral Daily    cholecalciferol  1,000 Units Oral BID    [Held by provider] digoxin  125 mcg Oral Daily    [Held by provider] dilTIAZem ER  360 mg Oral Daily    fluticasone-salmeterol  1 puff Inhalation BID    umeclidinium bromide  1 puff Inhalation Daily    furosemide  80 mg Oral BID (Diuretic)    cetirizine  5 mg Oral Nightly    montelukast  10 mg Oral Nightly    polyethylene glycol (PEG 3350)  17 g Oral Daily    methylPREDNISolone  40 mg Intravenous Q8H    insulin aspart  1-7 Units Subcutaneous TID CC and HS    enoxaparin  40 mg Subcutaneous Daily   [2]   senna-docusate    morphINE    metoclopramide    ipratropium-albuterol    glucose **OR** glucose  **OR** glucose-vitamin C **OR** dextrose **OR** glucose **OR** glucose **OR** glucose-vitamin C    ondansetron    guaiFENesin    benzonatate    acetaminophen    phenazopyridine

## 2025-05-16 NOTE — SPIRITUAL CARE NOTE
Spiritual Care Visit Note    Patient Name: Yesenia Berkowitz Date of Spiritual Care Visit: 25   : 1940 Primary Dx: COPD exacerbation (HCC)       Referred By: Referral From: Other (Comment) (IDT)    Spiritual Care Taxonomy:    Intended Effects: Build relationship of care and support    Methods: Offer emotional support    Interventions: Acknowledge current situation, Active listening, Ask guided questions, Identify supportive relationship(s), Explain  role, Respond as  to a defined crisis event    Visit Type/Summary:     - Spiritual Care: Responded to a request via the on call phone Offered empathic listening and emotional support. Patient expressed appreciation for  visit. Provided information regarding how to contact Spiritual Care and left a Spiritual Care information card.  remains available as needed for follow up.    Spiritual Care support can be requested via an Epic consult. For urgent/immediate needs, please contact the On Call  at: [place]: ext [phone]    Chaplain Resident, Leny Fontana, PhD

## 2025-05-16 NOTE — CM/SW NOTE
Patient discussed in RN DC Rounds. SW received MDO for CPC, patient is current w [facility] already. SW confirmed with [facility] liaison, last visit with NP  was 4/17/2025. Patient is current with [facility]. Patient has home oxygen at home.     DREW/PERLITA to remain available for support and/or discharge planning.     Angela Smith, MSW, LSW   x [phone]

## 2025-05-16 NOTE — SIGNIFICANT EVENT
RRT called for rapid afib and chest heaviness. Patient received cardizem 5 mg IV per cardiology order and HR improved from 150 to 90s. BP stable. Chest discomfort improved. Transferring to tele.     Ez Taylor MD

## 2025-05-16 NOTE — CONSULTS
[facility]  part of [facility]  Palliative Care Initial Consult Note    Yesenia Berkowitz Patient Status:  Inpatient    1940 MRN [id number]   Location [facility] 5SW/SE Attending Ez Taylor MD   Hosp Day # 3 PCP JO-ANN YANG MD     Date of Consult: 2025  Patient seen at: [facility] Inpatient    The  Cures Act makes medical notes like these available to patients in the interest of transparency. Please be advised this is a medical document. Medical documents are intended to carry relevant information, facts as evident, and the clinical opinion of the practitioner. The medical note is intended as peer to peer communication and may appear blunt or direct. It is written in medical language and may contain abbreviations or verbiage that are unfamiliar.     Reason for Consultation: Consult ordered by:: Dr. taylor for evaluation of Palliative Care needs and Goals of care discussion.    Subjective     History of Present Illness: Yesenia Berkowitz is a 84 year old female with history of severe COPD with chronic respiratory failure on home O2 4L at baseline, RUL spiculated lung nodule with positive PET scan concerning for malignancy, HFpEF, Pafib, HTN, HLD, OA, DDD, DM type II, recurrent UTI's, GERD who was admitted on 2025 for worsening SOB. See below for reviewed labs and imaging. Pt was admitted for treatment and evaluation of COPD exacerbation, acute on chronic respiratory failure, and new high grade AV block per EKG. See below for reviewed imaging.     She is being followed by pulmonary and cardiology services. She will also be evaluated by oncology.    I reviewed labs and imaging-see below. History was obtained from [employer] and pt/son Tyler.  Today is day 3 of hospitalization. This is her 7th hospitalization in the past 6 months.    Pt is listed as DNAR/DNI-selective in Epic,  no POLST on file.I reviewed previously completed [facility] paperwork which shows her son Tyler as  HCPOA.     Reviewed symptom needs past 24 hrs: Tylenol 650 mg po X2    Patient was seen and examined in bed with her son Tyler at bedside. Pt is A&OX4 and very pleasant. She appears overall frail and thin. She reports chronic dyspnea symptoms with minimal movements or prolonged talking. She tells me her breathing has slightly improved since admission, but not significantly. She tells me her dyspnea is bothersome and she takes frequent rests to recover. +dry cough. She is currently on nc 4L which is her baseline at home.  She says she has low dose Roxanol prn at home which was prescribed during a previous admission, but she has not tried it. She says she prefers to avoid taking medications if possible and we talked about non-pharm options for dyspnea. Denies any pain symptoms. Appetite has been fairly good here as she likes the food, but at home she reports poor appetite. +wt loss down 37 pounds in past 6 months per EPIC documentation, wt fluctuates some with LE edema. Denies abdominal pain, n/v and moved her bowels yesterday. +occasional issues with constipation. Pt tells me she feels like she may have UTI symptoms with frequency and small volumes, denies dysuria and is asking for UA to be sent. I told her I would relay to MD. She reports some depression symptoms r/t her poor health, but is coping ok. +intermittent anxiety r/t dyspnea. See physical exam and ROS below.    Review of Systems/Palliative Care symptom needs assessed:   Pertinent items are noted in HPI/below    Fatigue:  Yes  Dyspnea: +chronic issue, worse with any activity   Current O2 therapy: nc 4L  Cough: +dry cough  Pain Present: denies  Non-verbal signs of pain present: NO  Appetite: fair  Constipation: +intermittent issues with constipation, takes Miralax prn  Diarrhea: denies  Nausea/Vomiting: denies  Depression: +depression symptoms r/t poor health, no SI  Anxiety: +intermittent anxiety r/t dyspnea    Medical History: obtained from [facility]  Past  Medical History[1]  Past Surgical History[2]    Family History: obtained from [facility]  Family History[3]    Palliative Care Social History:   Marital Status:   Children: 1 son Tyler  Living Situation Prior to Admit: lives alone, but son Tyler lives close and checks on her regularly  Occupational History: Retired RN    Substance History:   reports that she quit smoking about 29 years ago. Her smoking use included cigarettes. She has never used smokeless tobacco.  reports that she does not currently use alcohol.  reports no history of drug use.  Hx of Substance Use/Abuse: No    Spiritual Assessment:   Moravian: Amish   requested: already following    Allergies:  Allergies[4]    Medications:   Current Hospital Medications[5]    Nutritional status:  BMI: 22 Weight: 132  Weight loss: down 37 pounds in 6 months per EPIC documentation  Current Appetite: Fair  Dysphagia: denies    Functional Status History:  ADLs: mostly sitting now and needs some help with ADL's, uses walker for short distance ambulation, w/c when going out  Recent Falls: No, last fall in January    Palliative Performance Scale:   Prior to admission: 60%  Observed during hospitalization: 50%  % Ambulation Activity Level Self-Care Intake Consciousness   100 Full  Normal  No Disease Full Normal Full   90 Full  Normal  Some Disease Full Normal Full   80 Full  Normal w/effort  Some Disease Full Normal or reduced Full   70 Reduced  Can't Perform Job  Some Disease Full Normal or reduced Full   60 Reduced  Can't Perform Hobby   Significant Disease Occ Assist Normal or reduced Full or confused   50 Mainly sit/lie Can't do any work  Extensive Disease Partial Assist Normal or reduced Full or confused   40 Mainly in bed Can't do any work  Extensive Disease Mainly Assist Normal or reduced Full or confused   30 Bed Bound Can't do any work  Extensive Disease Max Assist  Total Care Reduced  Drowsy/confused   20 Bed Bound Can't do any work  Extensive  Disease Max Assist  Total Care Minimal  Drowsy/confused   10 Bed Bound Can't do any work  Extensive Disease Max Assist  Total Care Mouth Care  Drowsy/confused   0 Death        Objective      Vital Signs:  Blood pressure 121/61, pulse 83, temperature 98.2 °F (36.8 °C), temperature source Oral, resp. rate 18, height 5' 5\" (1.651 m), weight 132 lb 8 oz (60.1 kg), SpO2 93%.  Body mass index is 22.05 kg/m².  Present Level of pain: 0/10  Non-verbal signs of pain present: No    Physical Exam:  General: Alert and in no apparent distress. Weak, frail/thin appearing  HEENT: No focal deficits.  Cardiac: Regular rate and rhythm, S1, S2 normal, no murmur, rub or gallop.  Lungs: Diminished breath sounds bilaterally. Normal excursions and effort.  Abdomen: Soft, non-tender, normal bowel sounds X 4 quadrants, no rebound or guarding  Extremities: Without clubbing, cyanosis. Peripheral pulses are 2+. Trace pedal BLE edema present  Neurologic: Alert and oriented X4, normal affect.  Skin: Warm and dry.    Hematology:  Lab Results   Component Value Date    WBC 12.8 (H) 05/16/2025    HGB 10.8 (L) 05/16/2025    HCT 35.2 05/16/2025    .0 05/16/2025       Coags:  Lab Results   Component Value Date    INR 1.08 11/25/2024    PTT 28.1 03/20/2025       Chemistry:  Lab Results   Component Value Date    CREATSERUM 0.95 05/16/2025    BUN 38 (H) 05/16/2025     05/16/2025    K 3.9 05/16/2025    CL 96 (L) 05/16/2025    CO2 34.0 (H) 05/16/2025     (H) 05/16/2025    CA 8.2 (L) 05/16/2025    ALB 3.9 05/13/2025    ALKPHO 65 05/13/2025    BILT 0.2 05/13/2025    TP 6.5 05/13/2025    AST 13 05/13/2025    ALT <7 (L) 05/13/2025    DDIMER 0.47 12/21/2024    MG 2.0 05/14/2025    PHOS 3.4 12/26/2024    TROP <0.045 02/03/2020       Imaging:  XR CHEST AP PORTABLE  (CPT=71045)  Result Date: 5/13/2025  CONCLUSION:  1. Emphysema with areas of stable scarring.  No pneumonia or other acute finding. 2. Chronically elevated left hemidiaphragm.      Dictated by ([facility]): Bijan Orona MD on 2025 at 7:22 PM     Finalized by ([facility]): Bijan Orona MD on 2025 at 7:26 PM             CARD ECHO 2D DOPPLER (CPT=93306)  Result Date: 5/15/2025  Transthoracic Echocardiogram Name:Yesenia Berkowitz Date: 05/15/2025 :  1940 Ht:  (65in)  BP: 103 / 63 MRN:  [id number]    Age:  84years    Wt:  (132lb) HR: 67bpm Loc:  [facility]       Gndr: F          BSA: 1.66m^2 Sonographer: Samantha Ordering:    Cortney Dumont Consulting:  Luis Fernando Fowler ---------------------------------------------------------------------------- History/Indications:   Murmur.  Abnormal ECG. ---------------------------------------------------------------------------- Procedure information:  A transthoracic complete 2D study was performed. Additional evaluation included M-mode, complete spectral Doppler, and color Doppler.  Patient status:  Inpatient.  Location:  Bedside.    This was a routine study. Transthoracic echocardiography for diagnosis and ventricular function evaluation. Image quality was adequate. ECG rhythm:   Normal sinus ---------------------------------------------------------------------------- Conclusions: 1. Left ventricle: The cavity size was normal. Wall thickness was mildly    increased. Systolic function was hyperdynamic. The estimated ejection    fraction was >70%, by visual assessment. Wall motion is normal; there are    no regional wall motion abnormalities. Features are consistent with a    pseudonormal left ventricular filling pattern, with concomitant abnormal    relaxation and increased filling pressure - grade 2 diastolic    dysfunction. 2. Right ventricle: The cavity size was mildly increased. Systolic function    was normal. 3. Mitral valve: The annulus was moderately fibrotic. The leaflets were    mildly thickened. The mean diastolic gradient was 4mm Hg. The valve area    (LVOT continuity) was 1.88cm^2. 4. Left atrium: The atrium was mildly dilated. 5. Pulmonary arteries:  Systolic pressure was mildly increased, estimated to    be 35mm Hg. Impressions:  No significant change since prior study. * ---------------------------------------------------------------------------- * Findings: Left ventricle:  The cavity size was normal. Wall thickness was mildly increased. Systolic function was hyperdynamic. The estimated ejection fraction was >70%, by visual assessment. Wall motion is normal; there are no regional wall motion abnormalities. Features are consistent with a pseudonormal left ventricular filling pattern, with concomitant abnormal relaxation and increased filling pressure - grade 2 diastolic dysfunction. Left atrium:  The atrium was mildly dilated. Right ventricle:  The cavity size was mildly increased. Systolic function was normal. Right atrium:  The atrium was normal in size. Mitral valve:  The annulus was moderately fibrotic. The leaflets were mildly thickened. Leaflet separation was normal.  Doppler:  Transvalvular velocity was increased. There was trace regurgitation.    The valve area (LVOT continuity) was 1.88cm^2. The valve area index (LVOT continuity) was 1.13cm^2/m^2.    The mean diastolic gradient was 4mm Hg. Aortic valve:   The valve was trileaflet. The leaflets were mildly thickened. Cusp separation was normal.  Doppler:  Transvalvular velocity was within the normal range. There was no evidence for stenosis. There was no significant regurgitation.    The valve area (VTI) was 2.1cm^2. The valve area (VTI) index was 1.27cm^2/m^2.    The mean systolic gradient was 8mm Hg. The peak systolic gradient was 14mm Hg. Tricuspid valve:  The valve is structurally normal. Leaflet separation was normal.  Doppler:  Transvalvular velocity was within the normal range. There was no evidence for stenosis. There was trivial regurgitation. Pulmonic valve:   The valve is structurally normal. Cusp separation was normal.  Doppler:  Transvalvular velocity was within the normal range. There was  no evidence for stenosis. There was no significant regurgitation. Pericardium:   There was no pericardial effusion. Aorta: Aortic root: The aortic root was normal. Ascending aorta: The ascending aorta was normal. Pulmonary arteries: Systolic pressure was mildly increased, estimated to be 35mm Hg. Systemic veins:  Central venous respirophasic diameter changes are in the normal range (>50%). Inferior vena cava: The IVC was normal-sized. ---------------------------------------------------------------------------- Measurements  Left ventricle       Value          Ref       09/14/2024  IVS thickness,   (H) 1.0   cm       0.6 - 0.9 1.0  ED, PLAX  LV ID, ED, PLAX      4.0   cm       3.8 - 5.2 4.2  LV ID, ES, PLAX      2.7   cm       2.2 - 3.5 2.9  LV PW thickness,     0.9   cm       0.6 - 0.9 1.0  ED, PLAX  IVS/LV PW ratio,     1.11           --------- 1.00  ED, PLAX  LV PW/LV ID          0.23           --------- 0.24  ratio, ED, PLAX  LV ejection          61    %        54 - 74   59  fraction  Stroke               54    ml/m^2   --------- 51  volume/bsa, 2D  LV e', lateral   (L) 5.8   cm/sec   >=10.0    ----------  LV E/e', lateral (H) 18             <=13      ----------  LV e', medial    (L) 6.1   cm/sec   >=7.0     ----------  LV E/e', medial      17             --------- ----------  LV e', average       5.9   cm/sec   --------- ----------  LV E/e', average (H) 17             <=14      ----------  LVOT                 Value          Ref       09/14/2024  LVOT ID              1.9   cm       --------- 1.8  LVOT peak            1.45  m/sec    --------- 1.69  velocity, S  LVOT VTI, S          31.7  cm       --------- 36.4  LVOT peak            8     mm Hg    --------- 11  gradient, S  LVOT mean            5     mm Hg    --------- 6  gradient, S  Stroke volume        90    ml       --------- 93  (SV), LVOT DP  Stroke index         54    ml/m^2   --------- 51  (SV/bsa), LVOT  DP  Aortic valve         Value          Ref        09/14/2024  Aortic valve         1.88  m/sec    --------- 2.06  peak velocity, S  Aortic valve         42.8  cm       --------- 42.1  VTI, S  Aortic mean          8     mm Hg    --------- 10  gradient, S  Aortic peak          14    mm Hg    --------- 17  gradient, S  Aortic valve         2.1   cm^2     --------- 2.17  area, VTI  Aortic valve         1.27  cm^2/m^2 --------- 1.19  area/bsa, VTI  Velocity ratio,      0.77           --------- 0.82  peak, LVOT/AV  Aortic root          Value          Ref       09/14/2024  Aortic root ID       2.9   cm       2.4 - 3.9 2.9  Ascending aorta      Value          Ref       09/14/2024  Ascending aorta      3.0   cm       1.9 - 3.5 3.0  ID  Left atrium          Value          Ref       09/14/2024  LA volume, S     (H) 61    ml       22 - 52   47  LA volume/bsa, S (H) 37    ml/m^2   16 - 34   26  LA volume, ES,   (H) 58    ml       22 - 52   47  1-p A4C  LA volume, ES,   (H) 59    ml       22 - 52   44  1-p A2C  LA volume, ES,       69    ml       --------- 53  A/L  LA volume/bsa,   (H) 42    ml/m^2   16 - 34   29  ES, A/L  Mitral valve         Value          Ref       09/14/2024  Mitral E-wave        1.02  m/sec    --------- 1  peak velocity  Mitral A-wave        1.61  m/sec    --------- 1.4  peak velocity  Mitral               248   ms       --------- 243  deceleration  time  Mitral mean          4     mm Hg    --------- 5  gradient, D  Mitral peak          13    mm Hg    --------- 11  gradient, D  Mitral E/A           0.6            --------- 0.7  ratio, peak  Mitral valve         1.88  cm^2     --------- 1.88  area, LVOT  continuity  Mitral valve         1.13  cm^2/m^2 --------- 1.04  area/bsa, LVOT  continuity  Pulmonary artery     Value          Ref       09/14/2024  PA pressure, S,      35    mm Hg    --------- ----------  DP  Tricuspid valve      Value          Ref       09/14/2024  Tricuspid regurg (H) 2.82  m/sec    <=2.8     3.63  peak velocity  Tricuspid peak       32     mm Hg    --------- 53  RV-RA gradient  Right atrium         Value          Ref       09/14/2024  RA ID, S-I, ES,      4.3   cm       3.4 - 5.3 ----------  A4C  RA ID/bsa, S-I,      2.6   cm/m^2   1.9 - 3.1 ----------  ES, A4C  RA area, ES, A4C     13    cm^2     10 - 18   ----------  RA volume, ES,       31    ml       --------- ----------  1-p A4C  RA volume/bsa,       18    ml/m^2   9 - 33    ----------  ES, 1-p A4C  Systemic veins       Value          Ref       09/14/2024  Estimated CVP        3     mm Hg    --------- 3  Inferior vena        Value          Ref       09/14/2024  cava  ID                   1.4   cm       <=2.1     1.8  Right ventricle      Value          Ref       09/14/2024  TAPSE, MM            2.02  cm       >=1.70    1.98  RV pressure, S,      35    mm Hg    --------- 56  DP  RV s', lateral       14.0  cm/sec   >=9.5     14.8 Legend: (L)  and  (H)  malvin values outside specified reference range. ---------------------------------------------------------------------------- Prepared and electronically signed by Luis Fernando Fowler 05/15/2025 12:03        Summary of GOC Discussion        I discussed reason for palliative care consultation with patient and her son Tyler.     I differentiated the palliative treatment-focus model versus the hospice comfort-focused philosophy of care. I informed the patient/family that having palliative care support does not limit medical treatment options or decisions to those who wish to continue curative or restorative medical therapies. I discussed the benefits of palliative care to include assistance with arising symptom management needs, an extra layer of support, to ensure GOC are respected throughout healthcare continuum, and assist with transition to hospice care when appropriate.  Palliative care handout provided.    Outpatient/Community Palliative Care Services:  Usually visit once per 4 weeks depending on contracted agency guidelines  Focus on GOC and  symptom management   Palliative Care criteria:  Not altered by prognosis   Does not limit curative or restorative therapies      Outpatient Hospice services:  24/7 phone triage services   RN visit one or more times per week depending on need  Home health aid to assist in ADLs/hygiene   Hospice criteria:  Less than six-month prognosis   Must forego most life-prolonging  measures/treatments   Focus solely on comfort   Must sign onto hospice benefit with agency     Background provided by pt/family:  -Pt tells me she lives alone in her home, but her son Tyler lives close. Her son helps her with errands, meals and there has been some discussion about having him come to live with her in the future.     Prognostic awareness/understanding:     -I  discussed current clinical condition and explored previous discussions with MDs.    -I discussed the normal disease trajectory of severe COPD with respiratory failure, possible underlying lung ca with associated symptoms and decline over time.     Hopes/goals/concerns:   -Pt talked with me about her concerning lung lesion and says she doesn't think she wants to pursue bronch. She says she doesn't want to take the risk and doesn't think she would pursue any cancer treatment anyway. She is agreeable to talking with oncology on their opinion.    -Pt is concerned about her heart block issue and talked with me about her previous discussion with cardiology. She prefers medical management, but would consider pacemaker if needed.    -We also talked about her concerning dyspnea symptoms in her clinical picture. Pt says she was prescribed Roxanol prn at home after previous hospital admission, but never tried it. She prefers non-pharm options and I provided education on these.    -We discussed her recurrent hospital admissions and she understands she will be at risk for repeat hospitalizations given the severity of her lung dz. I did discuss option for comfort care with hospice, but she  says she is NOT yet ready for this. Recommended having a community palliative care program follow her on dc and her son says he thinks she may have had a visit by [facility] provider in past? Pt would consider being re-hospitalized again if needed.    -Pt is hopeful to return home on dc with HH.     -Ongoing GOC discussions will be needed over time. Agreeable to palliative care following.    Advance Care Planning counseling and discussion:     -I discussed the importance of advance care planning prior to crisis with pt/son Tyler. I confirmed that patient's HCPOA is son Tyler and we reviewed her paperwork on file.     -I also addressed her code status and discussed the risks vs benefits of life sustaining measures in her clinical picture.     -Patient expressed wishes for DNAR, DNI-selective, no feeding tubes and  continue supportive medical care. Discussed the importance of POLST form completion and reviewed the document with pt.    -POLST  form was completed with pt and original copy given to pt's son and a copy sent to registration for scan into Epic.     -Provided emotional support to pt/son who are coping adequately.     Procedures:  No intubation  No g-tube    Assessment and Recommendations      Problem List:    Acute COPD exacerbation  Acute on chronic respiratory failure  Spiculated RUL lung nodule/+PET concerning for malignancy  New high grade AV block  PAfib  DM type II  Abnormal thyroid function tests  Severe malnutrition  Chronic HFpEF  HTN  HLD    Dyspnea  -Monitor, O2, tx per pulm  -Pt is DNI.  -Pt has low dose Roxanol prn at home from previous admission, but has not tried it. Prefers to hold off for now.  -Education provided on non-pharmacological modalities for dyspnea.    Goals of care counseling  -see above for details  -Confirmed wishes for DNAR/DNI-selective and continue supportive care.  -Pt is declining to have bronch and says she is unlikely to pursue any cancer tx.  -Pt says she would consider  pacemaker if needed.   -Ongoing GOC discussions will be needed through clinical course and over time.  -Pt/son are aware of the option for comfort care with hospice, but are NOT ready for this right now.  - following for spiritual support.  -Agreeable to palliative care following  -Dispo:  [facility] and  recommended community palliative care program to follow. SW to help with dc planning.   -Provided emotional support to pt/son who are coping adequately.    Advance care planning  -see above for details  -Pt's son Tyler Berkowitz is HCPOA #[phone].  -Previously completed HCPOA paperwork on file in EPIC.   -Completed POLST form today, copy sent to registration for scan into [employer].    Advance Care Planning   A voluntarily discussion and explanation regarding Advance Care Planning (ACP) took place today with patient and son. We discussed the risks vs benefits of life sustaining treatments in the setting of Yesenia Berkowitz's comorbid medical conditions. Items addressed: code status  and POLST (by section in detail). This resulted in a better understanding of pt's expressed wishes/preferences , confirmation of code status , and completion of POLST.   Summary:     Total time dedicated to ACP >16 minutes.      Palliative Performance Scale 50%    Emotion support provided to patient/family today: Yes    A total of 80 mins were spent on this consult, which included all of the following:direct face to face contact, history taking, physical examination, and >50% was spent counseling and coordinating care.    Discussed today's visit with message to Tonie Broussard RN and DREW Miller.    I will follow back on Monday if still hospitalized.      Thank you for allowing Palliative Care services to participate in the care of Ms. Yesenia Berkowitz.       Charlotte Smith, ANP-BC, [facility] [id number]  5/16/2025  12:27  PM  Palliative Care Services          [1]   Past Medical History:   A-fib (HCC)    Anesthesia complication     Arrhythmia    AFIB    Arthritis    Asthma (HCC)    Back problem    COPD (chronic obstructive pulmonary disease) (HCC)    Deviated nasal septum    nasal septoplasty, turb reduction, smr of turbs    Difficult intubation    fiber optic if needed    Diverticulitis    colonoscopy     Diverticulosis of large intestine    Esophageal reflux    Extrinsic asthma, unspecified    Headache    Heart disease    Hepatitis    WAS TOLD SHE HAS HAD THIS WHEN SHE WAS 19 YEARS OLD    High blood pressure    High cholesterol    Irregular heart rate    Lichenoid keratosis    left chest-bx    Osteoarthritis    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    Paronychia    (RT); onychomycosis; debridement    Problems with swallowing    occasionally    Shortness of breath    2 L NC ALL THE TIME 24HR/7 DAYS PER WEEK    Unspecified essential hypertension    Visual impairment   [2]   Past Surgical History:  Procedure Laterality Date    Adenoidectomy      Cataract      cataract extraction     Hysterectomy      Sinus surgery        DEVIATED SEPTUM    T&a      Tonsillectomy     [3]   Family History  Problem Relation Age of Onset    Ovarian Cancer Mother 76        endometrial, poss ovarian primary    Pulmonary Disease Sister         COPD    Skin cancer Other     Stroke Other     Other (Other) Other    [4]   Allergies  Allergen Reactions    Penicillin G ANAPHYLAXIS    Azithromycin DIARRHEA and NAUSEA AND VOMITING    Cefuroxime UNKNOWN     Other reaction(s): Vomitting    Levofloxacin UNKNOWN     Other reaction(s): LEVOFLOXACIN    Penicillins      Other reaction(s): Unknown    Seasonal ITCHING   [5]   Current Facility-Administered Medications:     senna-docusate (Senokot-S) 8.6-50 MG per tab 2 tablet, 2 tablet, Oral, Daily PRN    ipratropium-albuterol (Duoneb) 0.5-2.5 (3) MG/3ML inhalation solution 3 mL, 3 mL, Nebulization, Q6H WA    acetaZOLAMIDE (Diamox) tab 500 mg, 500 mg, Oral, Daily    cholecalciferol (Vitamin D3) tab 1,000 Units, 1,000  Units, Oral, BID    [Held by provider] digoxin (Lanoxin) tab 125 mcg, 125 mcg, Oral, Daily    [Held by provider] dilTIAZem ER (Dilacor XR) 24 hr cap 360 mg, 360 mg, Oral, Daily    fluticasone-salmeterol (Advair Diskus) 500-50 MCG/ACT inhaler 1 puff, 1 puff, Inhalation, BID    umeclidinium bromide (Incruse Ellipta) 62.5 MCG/ACT inhaler 1 puff, 1 puff, Inhalation, Daily    furosemide (Lasix) tab 80 mg, 80 mg, Oral, BID (Diuretic)    cetirizine (ZyrTEC) tab 5 mg, 5 mg, Oral, Nightly    montelukast (Singulair) tab 10 mg, 10 mg, Oral, Nightly    morphINE 10 MG/5ML oral solution 2.6 mg, 2.6 mg, Oral, TID PRN    metoclopramide (Reglan) 5 mg/mL injection 5 mg, 5 mg, Intravenous, Q8H PRN    polyethylene glycol (PEG 3350) (Miralax) 17 g oral packet 17 g, 17 g, Oral, Daily    ipratropium-albuterol (Duoneb) 0.5-2.5 (3) MG/3ML inhalation solution 3 mL, 3 mL, Nebulization, Q4H PRN    methylPREDNISolone sodium succinate (Solu-MEDROL) injection 40 mg, 40 mg, Intravenous, Q8H    glucose (Dex4) 15 GM/59ML oral liquid 15 g, 15 g, Oral, Q15 Min PRN **OR** glucose (Glutose) 40% oral gel 15 g, 15 g, Oral, Q15 Min PRN **OR** glucose-vitamin C (Dex-4) chewable tab 4 tablet, 4 tablet, Oral, Q15 Min PRN **OR** dextrose 50% injection 50 mL, 50 mL, Intravenous, Q15 Min PRN **OR** glucose (Dex4) 15 GM/59ML oral liquid 30 g, 30 g, Oral, Q15 Min PRN **OR** glucose (Glutose) 40% oral gel 30 g, 30 g, Oral, Q15 Min PRN **OR** glucose-vitamin C (Dex-4) chewable tab 8 tablet, 8 tablet, Oral, Q15 Min PRN    insulin aspart (NovoLOG) 100 Units/mL FlexPen 1-7 Units, 1-7 Units, Subcutaneous, TID CC and HS    enoxaparin (Lovenox) 40 MG/0.4ML SUBQ injection 40 mg, 40 mg, Subcutaneous, Daily    ondansetron (Zofran) 4 MG/2ML injection 4 mg, 4 mg, Intravenous, Q6H PRN    guaiFENesin (Robitussin) 100 MG/5 ML oral liquid 200 mg, 200 mg, Oral, Q4H PRN    benzonatate (Tessalon) cap 200 mg, 200 mg, Oral, TID PRN    acetaminophen (Tylenol) tab 650 mg, 650 mg, Oral,  Q6H PRN    phenazopyridine (Pyridium) tab 200 mg, 200 mg, Oral, TID PRN

## 2025-05-16 NOTE — PROGRESS NOTES
[facility]  part of [facility]    Report of Consultation    Yesenia Berkowitz Patient Status:  Inpatient    1940 MRN [id number]   Location [facility] 5SW/SE Attending Ez Taylor MD   Hosp Day # 3 PCP JO-ANN YANG MD     Date of Admission:  2025  Date of Consult:  25  Reason for Consultation:   COPD    History of Present Illness:   Patient is a 84 year old female who was admitted to the hospital for COPD exacerbation (HCC):  5/15/25 didn't use bipap yesterday, had questions regarding RUL Nodule    Past Medical History  Past Medical History[1]    Past Surgical History  Past Surgical History[2]    Family History  Family History[3]    Social History  Short Social Hx on File[4]       Current Medications:  Current Hospital Medications[5]  Prescriptions Prior to Admission[6]    Allergies  Allergies[7]    Review of Systems:    Pertinent items are noted in HPI.    Physical Exam:   Blood pressure 127/72, pulse 84, temperature 98.1 °F (36.7 °C), temperature source Oral, resp. rate 18, height 5' 5\" (1.651 m), weight 132 lb 8 oz (60.1 kg), SpO2 95%.    Dec bs  RRR  Soft NT  No edema    Results:     Laboratory Data:  Lab Results   Component Value Date    WBC 13.8 (H) 05/15/2025    HGB 10.4 (L) 05/15/2025    HCT 34.5 (L) 05/15/2025    .0 05/15/2025    CREATSERUM 1.04 (H) 05/15/2025    BUN 29 (H) 05/15/2025     05/15/2025    K 4.0 05/15/2025    K 4.0 05/15/2025    CL 97 (L) 05/15/2025    CO2 31.0 05/15/2025     (H) 05/15/2025    CA 8.3 (L) 05/15/2025    ALB 3.9 2025    ALKPHO 65 2025    TP 6.5 2025    AST 13 2025    ALT <7 (L) 2025    PTT 28.1 2025    INR 1.08 2024    PTP 14.6 2024    T4F 0.9 05/15/2025    TSH 0.172 (L) 05/15/2025    LIP 30 2024    DDIMER 0.47 2024    MG 2.0 2025    PHOS 3.4 2024    TROPHS 11 2025         Imaging:  No results found.         Impression:     COPD exacerbation  (HCC)    Acute on Chronic Hypoxic Resp failure    RUL Nodule with increased uptake on PET    Lesion near vulva ?infectious    Anxiety        Trial of Bipap tonight    Bronchodilation    IV steroids    OOB to chair    Out pt ION Bronch for lung Nodule, DW Pt in detail      Thank you for allowing me to participate in the care of your patient.    VIVIANE MIKE MD  [facility]  Pulmonary and Sleep Medicine  [phone]  Perfect Serve  5/15/2025         [1]   Past Medical History:   A-fib (HCC)    Anesthesia complication    Arrhythmia    AFIB    Arthritis    Asthma (HCC)    Back problem    COPD (chronic obstructive pulmonary disease) (HCC)    Deviated nasal septum    nasal septoplasty, turb reduction, smr of turbs    Difficult intubation    fiber optic if needed    Diverticulitis    colonoscopy     Diverticulosis of large intestine    Esophageal reflux    Extrinsic asthma, unspecified    Headache    Heart disease    Hepatitis    WAS TOLD SHE HAS HAD THIS WHEN SHE WAS 19 YEARS OLD    High blood pressure    High cholesterol    Irregular heart rate    Lichenoid keratosis    left chest-bx    Osteoarthritis    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    Paronychia    (RT); onychomycosis; debridement    Problems with swallowing    occasionally    Shortness of breath    2 L NC ALL THE TIME 24HR/7 DAYS PER WEEK    Unspecified essential hypertension    Visual impairment   [2]   Past Surgical History:  Procedure Laterality Date    Adenoidectomy      Cataract      cataract extraction     Hysterectomy      Sinus surgery        DEVIATED SEPTUM    T&a      Tonsillectomy     [3]   Family History  Problem Relation Age of Onset    Ovarian Cancer Mother 76        endometrial, poss ovarian primary    Pulmonary Disease Sister         COPD    Skin cancer Other     Stroke Other     Other (Other) Other    [4]   Social History  Socioeconomic History    Marital status:    Tobacco Use    Smoking status: Former      Current packs/day: 0.00     Types: Cigarettes     Quit date: 1996     Years since quittin.3    Smokeless tobacco: Never   Vaping Use    Vaping status: Never Used   Substance and Sexual Activity    Alcohol use: Not Currently     Comment: one drink once a month    Drug use: Never   Other Topics Concern    Pt has a pacemaker No    Pt has a defibrillator No    Reaction to local anesthetic No    Caffeine Concern No     Social Drivers of Health     Food Insecurity: No Food Insecurity (2025)    NCSS - Food Insecurity     Worried About Running Out of Food in the Last Year: No     Ran Out of Food in the Last Year: No   Transportation Needs: No Transportation Needs (2025)    NCSS - Transportation     Lack of Transportation: No   Housing Stability: Not At Risk (2025)    NCSS - Housing/Utilities     Has Housing: Yes     Worried About Losing Housing: No     Unable to Get Utilities: No   Recent Concern: Housing Stability - At Risk (3/20/2025)    NCSS - Housing/Utilities     Has Housing: No     Worried About Losing Housing: No     Unable to Get Utilities: No   [5]   Current Facility-Administered Medications   Medication Dose Route Frequency    senna-docusate (Senokot-S) 8.6-50 MG per tab 2 tablet  2 tablet Oral Daily PRN    ipratropium-albuterol (Duoneb) 0.5-2.5 (3) MG/3ML inhalation solution 3 mL  3 mL Nebulization Q6H WA    acetaZOLAMIDE (Diamox) tab 500 mg  500 mg Oral Daily    cholecalciferol (Vitamin D3) tab 1,000 Units  1,000 Units Oral BID    [Held by provider] digoxin (Lanoxin) tab 125 mcg  125 mcg Oral Daily    [Held by provider] dilTIAZem ER (Dilacor XR) 24 hr cap 360 mg  360 mg Oral Daily    fluticasone-salmeterol (Advair Diskus) 500-50 MCG/ACT inhaler 1 puff  1 puff Inhalation BID    umeclidinium bromide (Incruse Ellipta) 62.5 MCG/ACT inhaler 1 puff  1 puff Inhalation Daily    furosemide (Lasix) tab 80 mg  80 mg Oral BID (Diuretic)    cetirizine (ZyrTEC) tab 5 mg  5 mg Oral Nightly     montelukast (Singulair) tab 10 mg  10 mg Oral Nightly    morphINE 10 MG/5ML oral solution 2.6 mg  2.6 mg Oral TID PRN    metoclopramide (Reglan) 5 mg/mL injection 5 mg  5 mg Intravenous Q8H PRN    polyethylene glycol (PEG 3350) (Miralax) 17 g oral packet 17 g  17 g Oral Daily    ipratropium-albuterol (Duoneb) 0.5-2.5 (3) MG/3ML inhalation solution 3 mL  3 mL Nebulization Q4H PRN    methylPREDNISolone sodium succinate (Solu-MEDROL) injection 40 mg  40 mg Intravenous Q8H    glucose (Dex4) 15 GM/59ML oral liquid 15 g  15 g Oral Q15 Min PRN    Or    glucose (Glutose) 40% oral gel 15 g  15 g Oral Q15 Min PRN    Or    glucose-vitamin C (Dex-4) chewable tab 4 tablet  4 tablet Oral Q15 Min PRN    Or    dextrose 50% injection 50 mL  50 mL Intravenous Q15 Min PRN    Or    glucose (Dex4) 15 GM/59ML oral liquid 30 g  30 g Oral Q15 Min PRN    Or    glucose (Glutose) 40% oral gel 30 g  30 g Oral Q15 Min PRN    Or    glucose-vitamin C (Dex-4) chewable tab 8 tablet  8 tablet Oral Q15 Min PRN    insulin aspart (NovoLOG) 100 Units/mL FlexPen 1-7 Units  1-7 Units Subcutaneous TID CC and HS    enoxaparin (Lovenox) 40 MG/0.4ML SUBQ injection 40 mg  40 mg Subcutaneous Daily    ondansetron (Zofran) 4 MG/2ML injection 4 mg  4 mg Intravenous Q6H PRN    guaiFENesin (Robitussin) 100 MG/5 ML oral liquid 200 mg  200 mg Oral Q4H PRN    benzonatate (Tessalon) cap 200 mg  200 mg Oral TID PRN    acetaminophen (Tylenol) tab 650 mg  650 mg Oral Q6H PRN    phenazopyridine (Pyridium) tab 200 mg  200 mg Oral TID PRN   [6]   Medications Prior to Admission   Medication Sig    phenazopyridine 200 MG Oral Tab Take 1 tablet (200 mg total) by mouth in the morning, at noon, and at bedtime.    nitrofurantoin monohydrate macro 100 MG Oral Cap Take 1 capsule (100 mg total) by mouth 2 (two) times daily.    benzonatate 200 MG Oral Cap Take 1 capsule (200 mg total) by mouth 3 (three) times daily as needed for cough.    acetaZOLAMIDE 250 MG Oral Tab Take 2 tablets  (500 mg total) by mouth daily.    albuterol (2.5 MG/3ML) 0.083% Inhalation Nebu Soln Take 3 mL (2.5 mg total) by nebulization every 6 (six) hours as needed for Wheezing.    dilTIAZem  MG Oral Capsule SR 24 Hr Take 1 capsule (360 mg total) by mouth daily.    furosemide 80 MG Oral Tab Take 1 tablet (80 mg total) by mouth BID (Diuretic).    fluticasone-umeclidin-vilant 200-62.5-25 MCG/ACT Inhalation Aerosol Powder, Breath Activated Inhale 1 puff into the lungs as needed (sob).    empagliflozin (JARDIANCE) 10 MG Oral Tab Take 1 tablet (10 mg total) by mouth daily.    acetaminophen 500 MG Oral Tab Take 2 tablets (1,000 mg total) by mouth every 6 (six) hours as needed for Pain or Fever.    DIGOXIN 0.125 MG Oral Tab Take 1 tablet (125 mcg total) by mouth daily.    albuterol 108 (90 Base) MCG/ACT Inhalation Aero Soln Inhale 2 puffs into the lungs as needed.    Cholecalciferol (VITAMIN D) 1000 UNITS Oral Tab Take 1,000 Units by mouth in the morning and 1,000 Units before bedtime.    Potassium Chloride ER (K-DUR M20) 20 MEQ Oral Tab CR Take 1 tablet (20 mEq total) by mouth nightly.    Loratadine 10 MG Oral Cap Take 10 mg by mouth nightly.    montelukast 10 MG Oral Tab Take 1 tablet (10 mg total) by mouth nightly.    [] phenazopyridine 200 MG Oral Tab Take 1 tablet (200 mg total) by mouth 3 (three) times daily as needed for Pain.    [] predniSONE 10 MG Oral Tab Take 2 tablets (20 mg total) by mouth daily for 3 days, THEN 1 tablet (10 mg total) daily for 3 days.    morphINE 10 MG/5ML Oral Solution Take 1.3 mL (2.6 mg total) by mouth 3 (three) times daily as needed (Shortness of breath/ take prior to activity that cased shortness of breath). Please add an adapt a cap top for ease of drawing up.  Please give pt 2 oral syringes    [] doxycycline 100 MG Oral Cap Take 1 capsule (100 mg total) by mouth 2 (two) times daily for 7 days.    estradiol 0.05 MG/24HR Transdermal Patch Weekly Place 1 patch onto the  skin once a week. As directed.   [7]   Allergies  Allergen Reactions    Penicillin G ANAPHYLAXIS    Azithromycin DIARRHEA and NAUSEA AND VOMITING    Cefuroxime UNKNOWN     Other reaction(s): Vomitting    Levofloxacin UNKNOWN     Other reaction(s): LEVOFLOXACIN    Penicillins      Other reaction(s): Unknown    Seasonal ITCHING

## 2025-05-16 NOTE — PLAN OF CARE
Patient alert and oriented x 4. Vitals stable on 4L. Patient denies pain. Patient's hr controlled with IV diltiazem. Plan to continue to monitor and for potential pacemaker on Monday. Call light within reach.    Problem: Patient Centered Care  Goal: Patient preferences are identified and integrated in the patient's plan of care  Description: Interventions:- What would you like us to know as we care for you? From home with family- Provide timely, complete, and accurate information to patient/family- Incorporate patient and family knowledge, values, beliefs, and cultural backgrounds into the planning and delivery of care- Encourage patient/family to participate in care and decision-making at the level they choose- Honor patient and family perspectives and choices  Outcome: Progressing     Problem: Patient/Family Goals  Goal: Patient/Family Long Term Goal  Description: Patient's Long Term Goal: Be able to discharge Interventions:- Monitor HR- See additional Care Plan goals for specific interventions  Outcome: Progressing  Goal: Patient/Family Short Term Goal  Description: Patient's Short Term Goal: Resolve elevated HR Interventions: - Cardizem per protocol- See additional Care Plan goals for specific interventions  Outcome: Progressing     Problem: CARDIOVASCULAR - ADULT  Goal: Maintains optimal cardiac output and hemodynamic stability  Description: INTERVENTIONS:  - Monitor vital signs, rhythm, and trends  - Monitor for bleeding, hypotension and signs of decreased cardiac output  - Evaluate effectiveness of vasoactive medications to optimize hemodynamic stability  - Monitor arterial and/or venous puncture sites for bleeding and/or hematoma  - Assess quality of pulses, skin color and temperature  - Assess for signs of decreased coronary artery perfusion - ex. Angina  - Evaluate fluid balance, assess for edema, trend weights  Outcome: Progressing  Goal: Absence of cardiac arrhythmias or at baseline  Description:  INTERVENTIONS:  - Continuous cardiac monitoring, monitor vital signs, obtain 12 lead EKG if indicated  - Evaluate effectiveness of antiarrhythmic and heart rate control medications as ordered  - Initiate emergency measures for life threatening arrhythmias  - Monitor electrolytes and administer replacement therapy as ordered  Outcome: Progressing     Problem: RESPIRATORY - ADULT  Goal: Achieves optimal ventilation and oxygenation  Description: INTERVENTIONS:  - Assess for changes in respiratory status  - Assess for changes in mentation and behavior  - Position to facilitate oxygenation and minimize respiratory effort  - Oxygen supplementation based on oxygen saturation or ABGs  - Provide Smoking Cessation handout, if applicable  - Encourage broncho-pulmonary hygiene including cough, deep breathe, Incentive Spirometry  - Assess the need for suctioning and perform as needed  - Assess and instruct to report SOB or any respiratory difficulty  - Respiratory Therapy support as indicated  - Manage/alleviate anxiety  - Monitor for signs/symptoms of CO2 retention  Outcome: Progressing     Problem: GENITOURINARY - ADULT  Goal: Absence of urinary retention  Description: INTERVENTIONS:  - Assess patient’s ability to void and empty bladder  - Monitor intake/output and perform bladder scan as needed  - Follow urinary retention protocol/standard of care  - Consider collaborating with pharmacy to review patient's medication profile  - Implement strategies to promote bladder emptying  Outcome: Progressing     Problem: METABOLIC/FLUID AND ELECTROLYTES - ADULT  Goal: Glucose maintained within prescribed range  Description: INTERVENTIONS:  - Monitor Blood Glucose as ordered  - Assess for signs and symptoms of hyperglycemia and hypoglycemia  - Administer ordered medications to maintain glucose within target range  - Assess barriers to adequate nutritional intake and initiate nutrition consult as needed  - Instruct patient on self  management of diabetes  Outcome: Progressing  Goal: Electrolytes maintained within normal limits  Description: INTERVENTIONS:  - Monitor labs and rhythm and assess patient for signs and symptoms of electrolyte imbalances  - Administer electrolyte replacement as ordered  - Monitor response to electrolyte replacements, including rhythm and repeat lab results as appropriate  - Fluid restriction as ordered  - Instruct patient on fluid and nutrition restrictions as appropriate  Outcome: Progressing  Goal: Hemodynamic stability and optimal renal function maintained  Description: INTERVENTIONS:  - Monitor labs and assess for signs and symptoms of volume excess or deficit  - Monitor intake, output and patient weight  - Monitor urine specific gravity, serum osmolarity and serum sodium as indicated or ordered  - Monitor response to interventions for patient's volume status, including labs, urine output, blood pressure (other measures as available)  - Encourage oral intake as appropriate  - Instruct patient on fluid and nutrition restrictions as appropriate  Outcome: Progressing

## 2025-05-16 NOTE — PROGRESS NOTES
Progress Note  Yesenia Berkowitz Patient Status:  Inpatient    1940 MRN [id number]   Location [facility] 5SW/SE Attending Ez Taylor MD   Hosp Day # 3 PCP JO-ANN YANG MD     Attending Cardiologist: Malcolm     SUBJECTIVE:    Denies lightheadedness or dizziness.     VITALS:  /59 (BP Location: Right arm)   Pulse 78   Temp 97.8 °F (36.6 °C) (Oral)   Resp 18   Ht 5' 5\" (1.651 m)   Wt 132 lb 8 oz (60.1 kg)   SpO2 96%   BMI 22.05 kg/m²   INTAKE/OUTPUT:    Intake/Output Summary (Last 24 hours) at 2025 0651  Last data filed at 5/15/2025 2300  Gross per 24 hour   Intake 790 ml   Output --   Net 790 ml     Last 3 Weights   25 132 lb 8 oz (60.1 kg)   25 1804 135 lb (61.2 kg)   25 1753 150 lb 12.7 oz (68.4 kg)   25 1747 150 lb 12.7 oz (68.4 kg)   25 1513 145 lb 4.8 oz (65.9 kg)     LABS:  Recent Labs   Lab 25  0603 05/15/25  0610 25  0505   * 268* 277*   BUN 19 29* 38*   CREATSERUM 0.92 1.04* 0.95   EGFRCR 62 54* 60   CA 8.7 8.3* 8.2*    137 137   K 3.3*  3.3* 4.0  4.0 3.9   CL 96* 97* 96*   CO2 32.0 31.0 34.0*     Recent Labs   Lab 25  1821 25  0603 05/15/25  0610   RBC 4.49 4.30 3.70*   HGB 12.5 12.0 10.4*   HCT 41.5 39.6 34.5*   MCV 92.4 92.1 93.2   MCH 27.8 27.9 28.1   MCHC 30.1* 30.3* 30.1*   RDW 16.1* 16.1* 16.4*   NEPRELIM 8.21* 6.42 12.81*   WBC 11.0 7.1 13.8*   .0 258.0 232.0     No results for input(s): \"TROP\", \"CK\" in the last 168 hours.  DIAGNOSTICS:  TELEMETRY: SR, prolonged AV, x2 dropped QRS overnight     ROS: Negative unless noted above   PHYSICAL EXAM:  General: Alert and oriented x 3. No apparent distress.  HEENT: Normocephalic, sclera are nonicteric. Hearing appropriate bilaterally.  Neck: No JVD or Carotid bruits. Trachea midline.   Cardiac: Regular rate and rhythm. S1, S2 auscultated. +murmur  Lungs: faint wheezes. Chest expansion symmetrical. Regular effort. 3L NC   Abdomen: Soft, non-tender,  +BS. No hepatosplenomegaly or appreciable masses.   Extremities: Without clubbing, cyanosis. Peripheral pulses are 2+. Edema BLE non pitting (baseline)  Neurologic: Motor and sensory nerves grossly intact.   Psych: Appropriate affect   Skin: Warm and dry. No obvious lesions, wounds, or ulcerations.     MEDICATIONS:  Scheduled Medications[1]  Medication Infusions[2]    ASSESSMENT:    Acute Hypoxic Respiratory Failure w/ COPD Exacerbation   - Pulm following, Steroids/ Nebs   - RUL nodule; plans for o/p bronch   - Baseline 3-4L NC use, at baseline 3.5L NC    PAF/ Flutter  New/ Intermittent High Grade AV Block   - Was on CCB and Dig o/p now on hold. Dig level normal   - EKG with 1st degree AV block but intermittently dropped QRSs on beside tele on 5/14; today mainly in SR HR 60s-70s, x2 QRS drops overnight   - Not historically on a/c due to bruising/ bleeding risk/ Hx GIB and low Afib burden, o/p Watchman discussions in process   - TSH low/ T3 Low,/ T4 normal- on steroids   - ECHO 5/15/25: Hyperdynamic LV, no WMA- appears euvolemic on exam     Chronic HFpEF- Appears compensated   HTN- Controlled   Chronic Anemia- Stable   HLD- , Not historically on Statin, was unable to start with concurrent use of CCB with increased risk of myopathies, plan to explore alternatives o/p  Hx T6 Fracture- s/p Kyphoplasty     PLAN:  - Thyroid labs mildly abnormal but likely not the cause of rhythm dysfunction; continue to hold CCB and Dig today, Still having dropped QRS intervals, primary cardiologist to see today to discuss if PPM is needed     Plan of care discussed with patient and RN.     Cortney Dumont, MSN, FNP-BC, CCK  05/16/25   7:09 AM      I saw and examined the patient agree with attached findings as documented by nurse practitioner.  Physically she feels a little better, continues to have chronic unchanged shortness of breath.  Reviewed telemetry which noted 1 dropped QRS consistent with Mobitz 2-second degree AV  block yesterday around 7 PM.  Nothing in the last 14 hours.  Remains off diltiazem and digoxin.  Patient is uncomfortable not being on any rate control medications, therefore we will monitor for another 24 hours and if completely clear then can resume diltiazem 120 mg daily.  On discharge will need a 7-day MCT to rule out recurrent episodes of heart block on low-dose calcium channel blocker, is already scheduled to see me in early June.    Luis Fernando Fowler MD  [facility]       [1]    ipratropium-albuterol  3 mL Nebulization Q6H WA    acetaZOLAMIDE  500 mg Oral Daily    cholecalciferol  1,000 Units Oral BID    [Held by provider] digoxin  125 mcg Oral Daily    [Held by provider] dilTIAZem ER  360 mg Oral Daily    fluticasone-salmeterol  1 puff Inhalation BID    umeclidinium bromide  1 puff Inhalation Daily    furosemide  80 mg Oral BID (Diuretic)    cetirizine  5 mg Oral Nightly    montelukast  10 mg Oral Nightly    polyethylene glycol (PEG 3350)  17 g Oral Daily    methylPREDNISolone  40 mg Intravenous Q8H    insulin aspart  1-7 Units Subcutaneous TID CC and HS    enoxaparin  40 mg Subcutaneous Daily   [2]

## 2025-05-16 NOTE — CONSULTS
[facility]                                                CARDIAC EP CONSULT NOTE      Patient Name: Yesenia Berkowitz MRN: [id number]   : 1940 CSN: [id number]     CARDIAC EP CONSULT NOTE    Reason for consultation:   Asked by general cardiology Dr. Fowler and Ez Broussard MD to provide evaluation and management of arrhythmias.    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  Goals, alts, risks of surgery reviewed.  She has chronic left upper extremity lymphedema, idiopathic.  So this will be a right chest implant.  -Surgery is planned for Monday.  -Until then, stay off diltiazem and digoxin  -Hold anticoagulation starting Yared morning    Thank you for the EP consultation.    Davey Vasquez MD  Cardiac Electrophysiology  [facility]  2025  C5-EP      History of present illness:   The patient is a 84 year old with respiratory failure due to COPD, who developed AF with RVR, and this is coupled with episodes of AV block when on rate-modulating agents.  She has now had recurrent AF with RVR, with slowed rates after receiving IV diltiazem bolus.  She is fatigued, dyspneic, but no presyncope.    ROS:   Constitutional: No fevers, chills, night sweats, + fatigue  ENT: No mouth pain, neck pain, running nose, headaches or swollen glands.  Skin: No rashes, pruritus or skin changes,  Respiratory:  + shortness of breath.  CV: No chest pain or claudication.  Left sided lymphedema chronic  Musculoskeletal: No joint pain, stiffness or swelling.  : No dysuria or hematuria.  GI: No nausea, vomiting or diarrhea. No blood in stools.  Neurologic: No seizures, tremors, weakness or numbness.    Past Medical, Surgical, Family, and Social  Histories:   Past Medical History[1]  Past Surgical History[2]  Family History[3]    SHX:  The patient reports that she quit smoking about 29 years ago. Her smoking use included cigarettes. She has never used smokeless tobacco. She reports that she does not currently use alcohol. She reports that she does not use drugs.    Allergies:   Allergies[4]    Medications:     Current Outpatient Medications   Medication Instructions    acetaminophen (TYLENOL EXTRA STRENGTH) 1,000 mg, Every 6 hours PRN    acetaZOLAMIDE (DIAMOX) 500 mg, Oral, Daily    albuterol (VENTOLIN) 2.5 mg, Every 6 hours PRN    albuterol 108 (90 Base) MCG/ACT Inhalation Aero Soln 2 puffs, As needed    benzonatate (TESSALON) 200 mg, Oral, 3 times daily PRN    digoxin (LANOXIN) 125 mcg, Oral, Daily    dilTIAZem ER (TIAZAC) 360 mg, Oral, Daily    empagliflozin (JARDIANCE) 10 mg, Oral, Daily    estradiol 0.05 MG/24HR Transdermal Patch Weekly 1 patch, Weekly    fluticasone-umeclidin-vilant 200-62.5-25 MCG/ACT Inhalation Aerosol Powder, Breath Activated 1 puff, As needed    furosemide (LASIX) 80 mg, Oral, 2 times daily (diuretic)    Loratadine 10 mg, Nightly    montelukast (SINGULAIR) 10 mg, Nightly    morphINE 2.6 mg, Oral, 3 times daily PRN, Please add an adapt a cap top for ease of drawing up.  Please give pt 2 oral syringes    nitrofurantoin monohydrate macro (MACROBID) 100 mg, 2 times daily    phenazopyridine (PYRIDIUM) 200 mg, Oral, 3 times daily    Potassium Chloride ER (K-DUR M20) 20 MEQ Oral Tab CR 20 mEq, Nightly    Vitamin D 1,000 Units, 2 times daily        Scheduled Medications[5]  Medication Infusions[6]    PHYSICAL EXAM:   /56 (BP Location: Right arm)   Pulse (!) 136   Temp 98.4 °F (36.9 °C) (Oral)   Resp 20   Ht 165.1 cm (5' 5\")   Wt 132 lb 8 oz (60.1 kg)   SpO2 93%   BMI 22.05 kg/m²   GEN - no distress  HEENT - atraumatic  Neck - no masses  Lungs - O2 by NC, nonlabored resps, normal symmetric excursion  CV - no precordial heave,  no edema  Abd - nondistended  Ext - no cyanosis  Skin - no rash  Neuro - alert, oriented    LABS AND DATA:     Lab Results   Component Value Date    WBC 12.8 (H) 05/16/2025    HGB 10.8 (L) 05/16/2025    HCT 35.2 05/16/2025    .0 05/16/2025    CREATSERUM 0.95 05/16/2025    BUN 38 (H) 05/16/2025     05/16/2025    K 3.9 05/16/2025    CL 96 (L) 05/16/2025    CO2 34.0 (H) 05/16/2025     (H) 05/16/2025    CA 8.2 (L) 05/16/2025    ALB 3.9 05/13/2025    ALKPHO 65 05/13/2025    BILT 0.2 05/13/2025    TP 6.5 05/13/2025    AST 13 05/13/2025    ALT <7 (L) 05/13/2025    PTT 28.1 03/20/2025    INR 1.08 11/25/2024    T4F 0.9 05/15/2025    TSH 0.172 (L) 05/15/2025    LIP 30 08/30/2024    DDIMER 0.47 12/21/2024    MG 2.0 05/14/2025    PHOS 3.4 12/26/2024    TROP <0.045 02/03/2020       ------------------------------------------------------------------------------------------------------------------------------           [1]   Past Medical History:   A-fib (Formerly [facility])    Anesthesia complication    Arrhythmia    AFIB    Arthritis    Asthma (Formerly [facility])    Back problem    COPD (chronic obstructive pulmonary disease) (Formerly [facility])    Deviated nasal septum    nasal septoplasty, turb reduction, smr of turbs    Difficult intubation    fiber optic if needed    Diverticulitis    colonoscopy     Diverticulosis of large intestine    Esophageal reflux    Extrinsic asthma, unspecified    Headache    Heart disease    Hepatitis    WAS TOLD SHE HAS HAD THIS WHEN SHE WAS 19 YEARS OLD    High blood pressure    High cholesterol    Irregular heart rate    Lichenoid keratosis    left chest-bx    Osteoarthritis    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    Paronychia    (RT); onychomycosis; debridement    Problems with swallowing    occasionally    Shortness of breath    2 L NC ALL THE TIME 24HR/7 DAYS PER WEEK    Unspecified essential hypertension    Visual impairment   [2]   Past Surgical History:  Procedure Laterality Date    Adenoidectomy       Cataract      cataract extraction     Hysterectomy      Sinus surgery        DEVIATED SEPTUM    T&a      Tonsillectomy     [3]   Family History  Problem Relation Age of Onset    Ovarian Cancer Mother 76        endometrial, poss ovarian primary    Pulmonary Disease Sister         COPD    Skin cancer Other     Stroke Other     Other (Other) Other    [4]   Allergies  Allergen Reactions    Penicillin G ANAPHYLAXIS    Azithromycin DIARRHEA and NAUSEA AND VOMITING    Cefuroxime UNKNOWN     Other reaction(s): Vomitting    Levofloxacin UNKNOWN     Other reaction(s): LEVOFLOXACIN    Penicillins      Other reaction(s): Unknown    Seasonal ITCHING   [5]    ipratropium-albuterol  3 mL Nebulization Q6H WA    acetaZOLAMIDE  500 mg Oral Daily    cholecalciferol  1,000 Units Oral BID    [Held by provider] digoxin  125 mcg Oral Daily    [Held by provider] dilTIAZem ER  360 mg Oral Daily    fluticasone-salmeterol  1 puff Inhalation BID    umeclidinium bromide  1 puff Inhalation Daily    furosemide  80 mg Oral BID (Diuretic)    cetirizine  5 mg Oral Nightly    montelukast  10 mg Oral Nightly    polyethylene glycol (PEG 3350)  17 g Oral Daily    methylPREDNISolone  40 mg Intravenous Q8H    insulin aspart  1-7 Units Subcutaneous TID CC and HS    enoxaparin  40 mg Subcutaneous Daily   [6]

## 2025-05-16 NOTE — PLAN OF CARE
Problem: Patient Centered Care  Goal: Patient preferences are identified and integrated in the patient's plan of care  Description: Interventions:- What would you like us to know as we care for you? - Provide timely, complete, and accurate information to patient/family- Incorporate patient and family knowledge, values, beliefs, and cultural backgrounds into the planning and delivery of care- Encourage patient/family to participate in care and decision-making at the level they choose- Honor patient and family perspectives and choices  Outcome: Progressing     Problem: Patient/Family Goals  Goal: Patient/Family Long Term Goal  Description: Patient's Long Term Goal: Interventions:- See additional Care Plan goals for specific interventions  Outcome: Progressing  Goal: Patient/Family Short Term Goal  Description: Patient's Short Term Goal: Interventions: - See additional Care Plan goals for specific interventions  Outcome: Progressing     Problem: CARDIOVASCULAR - ADULT  Goal: Maintains optimal cardiac output and hemodynamic stability  Description: INTERVENTIONS:  - Monitor vital signs, rhythm, and trends  - Monitor for bleeding, hypotension and signs of decreased cardiac output  - Evaluate effectiveness of vasoactive medications to optimize hemodynamic stability  - Monitor arterial and/or venous puncture sites for bleeding and/or hematoma  - Assess quality of pulses, skin color and temperature  - Assess for signs of decreased coronary artery perfusion - ex. Angina  - Evaluate fluid balance, assess for edema, trend weights  Outcome: Progressing  Goal: Absence of cardiac arrhythmias or at baseline  Description: INTERVENTIONS:  - Continuous cardiac monitoring, monitor vital signs, obtain 12 lead EKG if indicated  - Evaluate effectiveness of antiarrhythmic and heart rate control medications as ordered  - Initiate emergency measures for life threatening arrhythmias  - Monitor electrolytes and administer replacement therapy  as ordered  Outcome: Progressing     Problem: RESPIRATORY - ADULT  Goal: Achieves optimal ventilation and oxygenation  Description: INTERVENTIONS:  - Assess for changes in respiratory status  - Assess for changes in mentation and behavior  - Position to facilitate oxygenation and minimize respiratory effort  - Oxygen supplementation based on oxygen saturation or ABGs  - Provide Smoking Cessation handout, if applicable  - Encourage broncho-pulmonary hygiene including cough, deep breathe, Incentive Spirometry  - Assess the need for suctioning and perform as needed  - Assess and instruct to report SOB or any respiratory difficulty  - Respiratory Therapy support as indicated  - Manage/alleviate anxiety  - Monitor for signs/symptoms of CO2 retention  Outcome: Progressing     Problem: GENITOURINARY - ADULT  Goal: Absence of urinary retention  Description: INTERVENTIONS:  - Assess patient’s ability to void and empty bladder  - Monitor intake/output and perform bladder scan as needed  - Follow urinary retention protocol/standard of care  - Consider collaborating with pharmacy to review patient's medication profile  - Implement strategies to promote bladder emptying  Outcome: Progressing     Problem: METABOLIC/FLUID AND ELECTROLYTES - ADULT  Goal: Glucose maintained within prescribed range  Description: INTERVENTIONS:  - Monitor Blood Glucose as ordered  - Assess for signs and symptoms of hyperglycemia and hypoglycemia  - Administer ordered medications to maintain glucose within target range  - Assess barriers to adequate nutritional intake and initiate nutrition consult as needed  - Instruct patient on self management of diabetes  Outcome: Progressing  Goal: Electrolytes maintained within normal limits  Description: INTERVENTIONS:  - Monitor labs and rhythm and assess patient for signs and symptoms of electrolyte imbalances  - Administer electrolyte replacement as ordered  - Monitor response to electrolyte replacements,  including rhythm and repeat lab results as appropriate  - Fluid restriction as ordered  - Instruct patient on fluid and nutrition restrictions as appropriate  Outcome: Progressing  Goal: Hemodynamic stability and optimal renal function maintained  Description: INTERVENTIONS:  - Monitor labs and assess for signs and symptoms of volume excess or deficit  - Monitor intake, output and patient weight  - Monitor urine specific gravity, serum osmolarity and serum sodium as indicated or ordered  - Monitor response to interventions for patient's volume status, including labs, urine output, blood pressure (other measures as available)  - Encourage oral intake as appropriate  - Instruct patient on fluid and nutrition restrictions as appropriate  Outcome: Progressing

## 2025-05-17 LAB
ANION GAP SERPL CALC-SCNC: 6 MMOL/L (ref 0–18)
BASOPHILS # BLD AUTO: 0.02 X10(3) UL (ref 0–0.2)
BASOPHILS NFR BLD AUTO: 0.2 %
BUN BLD-MCNC: 37 MG/DL (ref 9–23)
BUN/CREAT SERPL: 44 (ref 10–20)
CALCIUM BLD-MCNC: 8 MG/DL (ref 8.7–10.4)
CHLORIDE SERPL-SCNC: 97 MMOL/L (ref 98–112)
CO2 SERPL-SCNC: 36 MMOL/L (ref 21–32)
CREAT BLD-MCNC: 0.84 MG/DL (ref 0.55–1.02)
DEPRECATED RDW RBC AUTO: 54.4 FL (ref 35.1–46.3)
EGFRCR SERPLBLD CKD-EPI 2021: 69 ML/MIN/1.73M2 (ref 60–?)
EOSINOPHIL # BLD AUTO: 0 X10(3) UL (ref 0–0.7)
EOSINOPHIL NFR BLD AUTO: 0 %
ERYTHROCYTE [DISTWIDTH] IN BLOOD BY AUTOMATED COUNT: 17 % (ref 11–15)
GLUCOSE BLD-MCNC: 263 MG/DL (ref 70–99)
GLUCOSE BLDC GLUCOMTR-MCNC: 197 MG/DL (ref 70–99)
GLUCOSE BLDC GLUCOMTR-MCNC: 204 MG/DL (ref 70–99)
GLUCOSE BLDC GLUCOMTR-MCNC: 259 MG/DL (ref 70–99)
GLUCOSE BLDC GLUCOMTR-MCNC: 335 MG/DL (ref 70–99)
HCT VFR BLD AUTO: 33.9 % (ref 35–48)
HGB BLD-MCNC: 10.5 G/DL (ref 12–16)
IMM GRANULOCYTES # BLD AUTO: 0.06 X10(3) UL (ref 0–1)
IMM GRANULOCYTES NFR BLD: 0.6 %
LYMPHOCYTES # BLD AUTO: 0.32 X10(3) UL (ref 1–4)
LYMPHOCYTES NFR BLD AUTO: 3.4 %
MCH RBC QN AUTO: 27.3 PG (ref 26–34)
MCHC RBC AUTO-ENTMCNC: 31 G/DL (ref 31–37)
MCV RBC AUTO: 88.1 FL (ref 80–100)
MONOCYTES # BLD AUTO: 0.39 X10(3) UL (ref 0.1–1)
MONOCYTES NFR BLD AUTO: 4.1 %
NEUTROPHILS # BLD AUTO: 8.69 X10 (3) UL (ref 1.5–7.7)
NEUTROPHILS # BLD AUTO: 8.69 X10(3) UL (ref 1.5–7.7)
NEUTROPHILS NFR BLD AUTO: 91.7 %
OSMOLALITY SERPL CALC.SUM OF ELEC: 306 MOSM/KG (ref 275–295)
PLATELET # BLD AUTO: 248 10(3)UL (ref 150–450)
POTASSIUM SERPL-SCNC: 3.8 MMOL/L (ref 3.5–5.1)
RBC # BLD AUTO: 3.85 X10(6)UL (ref 3.8–5.3)
SODIUM SERPL-SCNC: 139 MMOL/L (ref 136–145)
WBC # BLD AUTO: 9.5 X10(3) UL (ref 4–11)

## 2025-05-17 PROCEDURE — 99233 SBSQ HOSP IP/OBS HIGH 50: CPT | Performed by: HOSPITALIST

## 2025-05-17 RX ORDER — METHYLPREDNISOLONE SODIUM SUCCINATE 40 MG/ML
30 INJECTION INTRAMUSCULAR; INTRAVENOUS EVERY 12 HOURS
Status: DISCONTINUED | OUTPATIENT
Start: 2025-05-18 | End: 2025-05-18

## 2025-05-17 NOTE — PROGRESS NOTES
Progress Note  Yesenia Berkowitz Patient Status:  Inpatient    1940 MRN [id number]   Location [facility] 5SW/SE Attending Ez Taylor MD   Hosp Day # 4 PCP JO-ANN YANG MD     Attending Cardiologist: Malcolm     SUBJECTIVE:  Patient states she is feeling well this morning  Episodes of atrial fibrillation, currently in sinus rhythm      VITALS:  /74 (BP Location: Right arm)   Pulse 92   Temp 97.8 °F (36.6 °C) (Oral)   Resp 18   Ht 5' 5\" (1.651 m)   Wt 141 lb 3.2 oz (64 kg)   SpO2 91%   BMI 23.50 kg/m²   INTAKE/OUTPUT:    Intake/Output Summary (Last 24 hours) at 2025 1129  Last data filed at 2025 1122  Gross per 24 hour   Intake 491 ml   Output 150 ml   Net 341 ml     Last 3 Weights   25 0555 141 lb 3.2 oz (64 kg)   25 2037 132 lb 8 oz (60.1 kg)   25 1804 135 lb (61.2 kg)   25 1753 150 lb 12.7 oz (68.4 kg)   25 1747 150 lb 12.7 oz (68.4 kg)   25 1513 145 lb 4.8 oz (65.9 kg)     LABS:  Recent Labs   Lab 05/15/25  0610 25  0505 25  0531   * 277* 263*   BUN 29* 38* 37*   CREATSERUM 1.04* 0.95 0.84   EGFRCR 54* 60 69   CA 8.3* 8.2* 8.0*    137 139   K 4.0  4.0 3.9 3.8   CL 97* 96* 97*   CO2 31.0 34.0* 36.0*     Recent Labs   Lab 05/15/25  0610 25  0505 25  0531   RBC 3.70* 3.95 3.85   HGB 10.4* 10.8* 10.5*   HCT 34.5* 35.2 33.9*   MCV 93.2 89.1 88.1   MCH 28.1 27.3 27.3   MCHC 30.1* 30.7* 31.0   RDW 16.4* 16.8* 17.0*   NEPRELIM 12.81* 11.96* 8.69*   WBC 13.8* 12.8* 9.5   .0 265.0 248.0     No results for input(s): \"TROP\", \"CK\" in the last 168 hours.  DIAGNOSTICS:  TELEMETRY: SR, prolonged AV, x2 dropped QRS overnight     ROS: Negative unless noted above   PHYSICAL EXAM:  General: Alert and oriented x 3. No apparent distress.  HEENT: Normocephalic, sclera are nonicteric. Hearing appropriate bilaterally.  Neck: No JVD or Carotid bruits. Trachea midline.   Cardiac: Regular rate and rhythm. S1, S2 auscultated.  +murmur  Lungs: faint wheezes. Chest expansion symmetrical. Regular effort. 3L NC   Abdomen: Soft, non-tender, +BS. No hepatosplenomegaly or appreciable masses.   Extremities: Without clubbing, cyanosis. Peripheral pulses are 2+. Edema BLE non pitting (baseline)  Neurologic: Motor and sensory nerves grossly intact.   Psych: Appropriate affect   Skin: Warm and dry. No obvious lesions, wounds, or ulcerations.     MEDICATIONS:  Scheduled Medications[1]  Medication Infusions[2]    ASSESSMENT:    Acute Hypoxic Respiratory Failure w/ COPD Exacerbation   - Pulm following, Steroids/ Nebs   - RUL nodule; plans for o/p bronch   - Baseline 3-4L NC use, at baseline 3.5L NC    PAF/ Flutter  New/ Intermittent High Grade AV Block   - Was on CCB and Dig o/p now on hold. Dig level normal   - EKG with 1st degree AV block but intermittently dropped QRSs on beside tele on 5/14; today mainly in SR HR 60s-70s, x2 QRS drops overnight   - Not historically on a/c due to bruising/ bleeding risk/ Hx GIB and low Afib burden, o/p Watchman discussions in process   - TSH low/ T3 Low,/ T4 normal- on steroids   - ECHO 5/15/25: Hyperdynamic LV, no WMA- appears euvolemic on exam     Chronic HFpEF- Appears compensated   HTN- Controlled   Chronic Anemia- Stable   HLD- , Not historically on Statin, was unable to start with concurrent use of CCB with increased risk of myopathies, plan to explore alternatives o/p  Hx T6 Fracture- s/p Kyphoplasty     PLAN:  - Patient evaluated by EP yesterday  - Paroxysmal atrial fibrillation with RVR and episodes of high-grade AV block,  patient was offered pacemaker Monday to allow to safely uptitrate sandra blockers  - if having recurrent atrial fibrillation with RVR then consider low-dose IV Lopressor 2.5 mg as needed for RVR  - Consider full anticoagulation if having prolonged episodes of atrial fibrillation    Thank you for allowing me to take part in the care of Yesenia Berkowitz. Please call with any questions of  concerns.    L3    Shawn Camilo DO  [facility]   Interventional Cardiac and Vascular Services           [1]    ipratropium-albuterol  3 mL Nebulization Q6H WA    acetaZOLAMIDE  500 mg Oral Daily    cholecalciferol  1,000 Units Oral BID    [Held by provider] digoxin  125 mcg Oral Daily    [Held by provider] dilTIAZem ER  360 mg Oral Daily    fluticasone-salmeterol  1 puff Inhalation BID    umeclidinium bromide  1 puff Inhalation Daily    furosemide  80 mg Oral BID (Diuretic)    cetirizine  5 mg Oral Nightly    montelukast  10 mg Oral Nightly    polyethylene glycol (PEG 3350)  17 g Oral Daily    methylPREDNISolone  40 mg Intravenous Q8H    insulin aspart  1-7 Units Subcutaneous TID CC and HS    enoxaparin  40 mg Subcutaneous Daily   [2]    dilTIAZem

## 2025-05-17 NOTE — PROGRESS NOTES
[facility]  part of [facility]    Progress Note    Yesenia Berkowitz Patient Status:  Inpatient    1940 MRN [id number]   Location [facility] 3W/SW Attending Ez Taylor MD   Hosp Day # 4 PCP JO-ANN YANG MD       Subjective:     After in dilt 5 and then 10 converted to SR and still in SR at 85  OJEDA walk to bathroom and takes 10 mins to recover  Im told possible PPM on Monday    Yesenia Berkowitz is a(n) 84 year old female known severe COPD and multiple admissions again presented to ED w severe dyspnea.  She also has a highly suspicious RUL spiculated growing lung nodule that is highly FDG + on April PET scan.    I saw Yesenia in April 10 when she came in with her son, Gaston to discuss this PET scan that was done 25.  The nodule shows a high FDG uptake very suspicious for carcinoma.  We discussed needle biopsy versus navigational bronchoscopy biopsy versus going ahead with SBRT treatment without tissue dx on the assumption that it is likely to be a cancerous tumor.  We performed spirometry in the office which shows FEV1 of 0.76 L which is 39% of her predicted.  The FEV1 to FVC ratio was 64%.  The flow-volume loop is mildly scooped out.  This is compatible with a severe obstructive pattern. There is question as to whether she would be a candidate for SBRT given this poor function. Repeat PFT will be needed before any radiation Rx.    Objective:   Blood pressure 141/74, pulse 92, temperature 97.8 °F (36.6 °C), temperature source Oral, resp. rate 18, height 5' 5\" (1.651 m), weight 141 lb 3.2 oz (64 kg), SpO2 91%.    Intake/Output Summary (Last 24 hours) at 2025 1638  Last data filed at 2025 1410  Gross per 24 hour   Intake 491 ml   Output --   Net 491 ml     General:alert and NAD sitting on bed on 4 L  Neck: no jvd or acc ms use  Pulmonary/Chest: fairl y  good BS bilat and min if any whz  Cardiovascular: S1, S2 distant w AF and RVR up to 140  Extremities: extremities well  perfused, no cyanosis or edema  Neuro: alert, no gross weakness of face or extremities    Results:     Lab Results   Component Value Date    WBC 9.5 05/17/2025    HGB 10.5 (L) 05/17/2025    HCT 33.9 (L) 05/17/2025    .0 05/17/2025    CREATSERUM 0.84 05/17/2025    BUN 37 (H) 05/17/2025     05/17/2025    K 3.8 05/17/2025    CL 97 (L) 05/17/2025    CO2 36.0 (H) 05/17/2025     (H) 05/17/2025    CA 8.0 (L) 05/17/2025    ALB 3.9 05/13/2025    ALKPHO 65 05/13/2025    BILT 0.2 05/13/2025    TP 6.5 05/13/2025    AST 13 05/13/2025    ALT <7 (L) 05/13/2025    PTT 28.1 03/20/2025    INR 1.08 11/25/2024    T4F 0.9 05/15/2025    TSH 0.172 (L) 05/15/2025    LIP 30 08/30/2024    DDIMER 0.47 12/21/2024    MG 2.0 05/14/2025    PHOS 3.4 12/26/2024    TROP <0.045 02/03/2020       No results found.  EKG 12 Lead  Result Date: 5/16/2025  Atrial fibrillation Marked ST abnormality, possible inferior subendocardial injury Abnormal ECG When compared with ECG of 14-MAY-2025 14:48, Atrial fibrillation has replaced Sinus rhythm T wave inversion no longer evident in Inferior leads Nonspecific T wave abnormality no longer evident in Anterior leads QT has lengthened Confirmed by YAN CERVANTES ([id number]) on 5/16/2025 4:13:57 PM Also confirmed by SAGAR SIDDIQUI ELMER ([id number])  on 5/16/2025 4:26:29 PM      Assessment and Plan:   Principal Problem:    COPD exacerbation (HCC)  Active Problems:    Palliative care by specialist    Goals of care, counseling/discussion    Advance care planning    Malignant neoplasm of right upper lobe of lung (HCC)    Impression      AF w RVR now in SR      COPD exacerbation (HCC)      Acute on Chronic Hypoxemic Resp failure      RUL Nodule with increased uptake on PET highly suspicious for malignancy    R pleural effusion (small and free flowing) - Dr Boyle removed 800 and 900 ml from   R chest in late ( Nov 5) 2024 showing cytology no malignant cells, LDH 74, protein 2.4, glucose 139 thus it was transudative.  Current R effusion is quite small so not likely malignant and quite small to tap.      Lesion near vulva ?infectious      Anxiety    Rec    Transferred to cardiac floor rm 304 for dilt     Trial of Bipap last night tolerated poorly so pt says she wont wear this    Bronchodilation    IV steroids to taper down to 30 bid    Out pt ION Bronch or perc bx for lung Nodule, DW Pt but pt has declined    5/17/25 Discussed option of getting SBRT without dx ( see above) w pt and son at bedside so will rec send to rad-onc after discharge  D/w RN              OLIMPIA JONES MD  5/16/2025

## 2025-05-17 NOTE — PROGRESS NOTES
[facility]  Progress Note     Yesenia Berkowitz  : 1940    Status: Inpatient  Day #: 4    Attending: Ez Taylor MD  PCP: JO-ANN YANG MD     Assessment and Plan:    AECOPD  -pulm on consult  -cont nebs  -cont steroids IV  -cont advair, incruse    RUL lung nodule  -recent PET scan +  -appreciate pulm oncology, palliative care consults  -ION bronchoscopy suggested, but patient does not know if she really wants to pursue this. Consideration of SBRT - to f/u outpatient    DM2  -monitor BG, cover ISS    Paroxysmal afib  -was on digoxin, diltiazem - now held  -not on AC per patient preference per prior cardiology note    Second degree AVB with possible brief third degree block  -cardiology consulted  -digoxin, diltiazem held  -echo result reviewed  -digoxin level ok  -PPM planned for monday    Abnormal TFTs  -low TSH and free T3 but normal free T4    HTN  -cont meds and monitor    Chronic HFpEF  -cont lasix PO    GOC  -DNAR/selective  -palliative care consulted    Dietitian Malnutrition Assessment    Evaluation for Malnutrition: Criteria for severe malnutrition diagnosis- acute illness/injury related to Wt loss greater than 5% in 1 month., Energy intake less than 50% for greater than 5 days.                 RD Malnutrition Care Plan: Encouraged increased PO intake., Encouraged small frequent meals with emphasis on high calorie/high protein., Intiated ONS (oral nutritional supplements).    Body Fat/Muscle Mass:          Physician Assessment     Patient has a diagnosis of severe malnutrition     DVT Mechanical Prophylaxis:        DVT Pharmacologic Prophylaxis   Medication    enoxaparin (Lovenox) 40 MG/0.4ML SUBQ injection 40 mg               Subjective:      Initial Chief Complaint:  SOB    No CP, no SOB. Breathing better.    10 point ROS completed and was negative, except for pertinent positive and negatives stated in subjective.      Objective:      Temp:  [97.5 °F (36.4 °C)-98.4 °F (36.9 °C)] 97.5 °F  (36.4 °C)  Pulse:  [] 69  Resp:  [18-22] 20  BP: (123-141)/(53-72) 123/60  SpO2:  [93 %-96 %] 96 %  General:  Alert, no distress  HEENT:  Normocephalic, atraumatic  Cardiac:  Regular rate, regular rhythm  Pulmonary:  diminished breath sounds. No wheeze. Crackles at bases  Gastrointestinal:  Soft, non-tender, normal bowel sounds  Musculoskeletal:  No joint swelling  Extremities:  No edema, no cyanosis, no clubbing  Neurologic:  nonfocal  Psychiatric:  Normal affect, calm and appropriate    Intake/Output Summary (Last 24 hours) at 5/17/2025 1036  Last data filed at 5/16/2025 2354  Gross per 24 hour   Intake 251 ml   Output 150 ml   Net 101 ml         Recent Labs   Lab 05/15/25  0610 05/16/25  0505 05/17/25  0531   WBC 13.8* 12.8* 9.5   HGB 10.4* 10.8* 10.5*   HCT 34.5* 35.2 33.9*   .0 265.0 248.0   RBC 3.70* 3.95 3.85   MCV 93.2 89.1 88.1   MCH 28.1 27.3 27.3   MCHC 30.1* 30.7* 31.0   RDW 16.4* 16.8* 17.0*   NEPRELIM 12.81* 11.96* 8.69*     Recent Labs   Lab 05/13/25  1821 05/14/25  0603 05/14/25  1546 05/15/25  0610 05/16/25  0505 05/17/25  0531   BUN 14   < >  --  29* 38* 37*   CREATSERUM 0.74   < >  --  1.04* 0.95 0.84   CA 8.8   < >  --  8.3* 8.2* 8.0*   ALB 3.9  --   --   --   --   --       < >  --  137 137 139   K 3.1*   < >  --  4.0  4.0 3.9 3.8   CL 96*   < >  --  97* 96* 97*   CO2 36.0*   < >  --  31.0 34.0* 36.0*   *   < >  --  268* 277* 263*   MG  --   --  2.0  --   --   --    BILT 0.2  --   --   --   --   --    AST 13  --   --   --   --   --    ALT <7*  --   --   --   --   --    ALKPHO 65  --   --   --   --   --    TP 6.5  --   --   --   --   --    TSH  --   --   --  0.172*  --   --    T4F  --   --   --  0.9  --   --     < > = values in this interval not displayed.       No results found.    EKG 12 Lead  Result Date: 5/16/2025  Atrial fibrillation Marked ST abnormality, possible inferior subendocardial injury Abnormal ECG When compared with ECG of 14-MAY-2025 14:48, Atrial  fibrillation has replaced Sinus rhythm T wave inversion no longer evident in Inferior leads Nonspecific T wave abnormality no longer evident in Anterior leads QT has lengthened Confirmed by YAN CERVANTES ([id number]) on 5/16/2025 4:13:57 PM Also confirmed by SAGAR SIDDIQUI, RADHA ([id number])  on 5/16/2025 4:26:29 PM    Medications:  Scheduled Medications[1]   PRN Meds: PRN Medications[2]    Supplementary Documentation:   DVT Mechanical Prophylaxis:        DVT Pharmacologic Prophylaxis   Medication    enoxaparin (Lovenox) 40 MG/0.4ML SUBQ injection 40 mg                Code Status: DNAR/Selective Treatment  García: No urinary catheter in place  García Duration (in days):   Central line:    ANGEL: 5/20/2025        **Certification      PHYSICIAN Certification of Need for Inpatient Hospitalization - Initial Certification    Patient will require inpatient services that will reasonably be expected to span two midnight's based on the clinical documentation in H+P.   Based on patients current state of illness, I anticipate that, after discharge, patient will require TBD.         [facility]. Time spent on chart/note review, review of labs/imaging, discussion with patient, physical exam, discussion with staff, consultants, coordinating care, writing progress note, discussion of plan of care.      Ez Taylor MD         [1]    ipratropium-albuterol  3 mL Nebulization Q6H WA    acetaZOLAMIDE  500 mg Oral Daily    cholecalciferol  1,000 Units Oral BID    [Held by provider] digoxin  125 mcg Oral Daily    [Held by provider] dilTIAZem ER  360 mg Oral Daily    fluticasone-salmeterol  1 puff Inhalation BID    umeclidinium bromide  1 puff Inhalation Daily    furosemide  80 mg Oral BID (Diuretic)    cetirizine  5 mg Oral Nightly    montelukast  10 mg Oral Nightly    polyethylene glycol (PEG 3350)  17 g Oral Daily    methylPREDNISolone  40 mg Intravenous Q8H    insulin aspart  1-7 Units Subcutaneous TID CC and HS    enoxaparin  40 mg Subcutaneous Daily   [2]    dilTIAZem    senna-docusate    morphINE    metoclopramide    ipratropium-albuterol    glucose **OR** glucose **OR** glucose-vitamin C **OR** dextrose **OR** glucose **OR** glucose **OR** glucose-vitamin C    ondansetron    guaiFENesin    benzonatate    acetaminophen    phenazopyridine

## 2025-05-17 NOTE — PLAN OF CARE
Patient received IV solumedrol, dosage reduced per pulm. No acute events this shift.     Problem: CARDIOVASCULAR - ADULT  Goal: Maintains optimal cardiac output and hemodynamic stability  Description: INTERVENTIONS:  - Monitor vital signs, rhythm, and trends  - Monitor for bleeding, hypotension and signs of decreased cardiac output  - Evaluate effectiveness of vasoactive medications to optimize hemodynamic stability  - Monitor arterial and/or venous puncture sites for bleeding and/or hematoma  - Assess quality of pulses, skin color and temperature  - Assess for signs of decreased coronary artery perfusion - ex. Angina  - Evaluate fluid balance, assess for edema, trend weights  Outcome: Progressing  Goal: Absence of cardiac arrhythmias or at baseline  Description: INTERVENTIONS:  - Continuous cardiac monitoring, monitor vital signs, obtain 12 lead EKG if indicated  - Evaluate effectiveness of antiarrhythmic and heart rate control medications as ordered  - Initiate emergency measures for life threatening arrhythmias  - Monitor electrolytes and administer replacement therapy as ordered  Outcome: Progressing     Problem: RESPIRATORY - ADULT  Goal: Achieves optimal ventilation and oxygenation  Description: INTERVENTIONS:  - Assess for changes in respiratory status  - Assess for changes in mentation and behavior  - Position to facilitate oxygenation and minimize respiratory effort  - Oxygen supplementation based on oxygen saturation or ABGs  - Provide Smoking Cessation handout, if applicable  - Encourage broncho-pulmonary hygiene including cough, deep breathe, Incentive Spirometry  - Assess the need for suctioning and perform as needed  - Assess and instruct to report SOB or any respiratory difficulty  - Respiratory Therapy support as indicated  - Manage/alleviate anxiety  - Monitor for signs/symptoms of CO2 retention  Outcome: Progressing     Problem: GENITOURINARY - ADULT  Goal: Absence of urinary  retention  Description: INTERVENTIONS:  - Assess patient’s ability to void and empty bladder  - Monitor intake/output and perform bladder scan as needed  - Follow urinary retention protocol/standard of care  - Consider collaborating with pharmacy to review patient's medication profile  - Implement strategies to promote bladder emptying  Outcome: Progressing     Problem: METABOLIC/FLUID AND ELECTROLYTES - ADULT  Goal: Glucose maintained within prescribed range  Description: INTERVENTIONS:  - Monitor Blood Glucose as ordered  - Assess for signs and symptoms of hyperglycemia and hypoglycemia  - Administer ordered medications to maintain glucose within target range  - Assess barriers to adequate nutritional intake and initiate nutrition consult as needed  - Instruct patient on self management of diabetes  Outcome: Progressing  Goal: Electrolytes maintained within normal limits  Description: INTERVENTIONS:  - Monitor labs and rhythm and assess patient for signs and symptoms of electrolyte imbalances  - Administer electrolyte replacement as ordered  - Monitor response to electrolyte replacements, including rhythm and repeat lab results as appropriate  - Fluid restriction as ordered  - Instruct patient on fluid and nutrition restrictions as appropriate  Outcome: Progressing  Goal: Hemodynamic stability and optimal renal function maintained  Description: INTERVENTIONS:  - Monitor labs and assess for signs and symptoms of volume excess or deficit  - Monitor intake, output and patient weight  - Monitor urine specific gravity, serum osmolarity and serum sodium as indicated or ordered  - Monitor response to interventions for patient's volume status, including labs, urine output, blood pressure (other measures as available)  - Encourage oral intake as appropriate  - Instruct patient on fluid and nutrition restrictions as appropriate  Outcome: Progressing

## 2025-05-17 NOTE — PLAN OF CARE
Pt A/Ox4, One assist. 4L O2 overnight. IV solumedrol continued.     Problem: Patient Centered Care  Goal: Patient preferences are identified and integrated in the patient's plan of care  Description: Interventions:- What would you like us to know as we care for you? I would like to return home. - Provide timely, complete, and accurate information to patient/family- Incorporate patient and family knowledge, values, beliefs, and cultural backgrounds into the planning and delivery of care- Encourage patient/family to participate in care and decision-making at the level they choose- Honor patient and family perspectives and choices  Outcome: Progressing     Problem: Patient/Family Goals  Goal: Patient/Family Long Term Goal  Description: Patient's Long Term Goal: Get stronger. Interventions:- Ambulate often. - See additional Care Plan goals for specific interventions  Outcome: Progressing  Goal: Patient/Family Short Term Goal  Description: Patient's Short Term Goal: Ask frequent questions. Interventions: - Work with staff on ways to improve overall health status. - See additional Care Plan goals for specific interventions  Outcome: Progressing     Problem: CARDIOVASCULAR - ADULT  Goal: Maintains optimal cardiac output and hemodynamic stability  Description: INTERVENTIONS:  - Monitor vital signs, rhythm, and trends  - Monitor for bleeding, hypotension and signs of decreased cardiac output  - Evaluate effectiveness of vasoactive medications to optimize hemodynamic stability  - Monitor arterial and/or venous puncture sites for bleeding and/or hematoma  - Assess quality of pulses, skin color and temperature  - Assess for signs of decreased coronary artery perfusion - ex. Angina  - Evaluate fluid balance, assess for edema, trend weights  Outcome: Progressing  Goal: Absence of cardiac arrhythmias or at baseline  Description: INTERVENTIONS:  - Continuous cardiac monitoring, monitor vital signs, obtain 12 lead EKG if  indicated  - Evaluate effectiveness of antiarrhythmic and heart rate control medications as ordered  - Initiate emergency measures for life threatening arrhythmias  - Monitor electrolytes and administer replacement therapy as ordered  Outcome: Progressing     Problem: RESPIRATORY - ADULT  Goal: Achieves optimal ventilation and oxygenation  Description: INTERVENTIONS:  - Assess for changes in respiratory status  - Assess for changes in mentation and behavior  - Position to facilitate oxygenation and minimize respiratory effort  - Oxygen supplementation based on oxygen saturation or ABGs  - Provide Smoking Cessation handout, if applicable  - Encourage broncho-pulmonary hygiene including cough, deep breathe, Incentive Spirometry  - Assess the need for suctioning and perform as needed  - Assess and instruct to report SOB or any respiratory difficulty  - Respiratory Therapy support as indicated  - Manage/alleviate anxiety  - Monitor for signs/symptoms of CO2 retention  Outcome: Progressing     Problem: GENITOURINARY - ADULT  Goal: Absence of urinary retention  Description: INTERVENTIONS:  - Assess patient’s ability to void and empty bladder  - Monitor intake/output and perform bladder scan as needed  - Follow urinary retention protocol/standard of care  - Consider collaborating with pharmacy to review patient's medication profile  - Implement strategies to promote bladder emptying  Outcome: Progressing     Problem: METABOLIC/FLUID AND ELECTROLYTES - ADULT  Goal: Glucose maintained within prescribed range  Description: INTERVENTIONS:  - Monitor Blood Glucose as ordered  - Assess for signs and symptoms of hyperglycemia and hypoglycemia  - Administer ordered medications to maintain glucose within target range  - Assess barriers to adequate nutritional intake and initiate nutrition consult as needed  - Instruct patient on self management of diabetes  Outcome: Progressing  Goal: Electrolytes maintained within normal  limits  Description: INTERVENTIONS:  - Monitor labs and rhythm and assess patient for signs and symptoms of electrolyte imbalances  - Administer electrolyte replacement as ordered  - Monitor response to electrolyte replacements, including rhythm and repeat lab results as appropriate  - Fluid restriction as ordered  - Instruct patient on fluid and nutrition restrictions as appropriate  Outcome: Progressing  Goal: Hemodynamic stability and optimal renal function maintained  Description: INTERVENTIONS:  - Monitor labs and assess for signs and symptoms of volume excess or deficit  - Monitor intake, output and patient weight  - Monitor urine specific gravity, serum osmolarity and serum sodium as indicated or ordered  - Monitor response to interventions for patient's volume status, including labs, urine output, blood pressure (other measures as available)  - Encourage oral intake as appropriate  - Instruct patient on fluid and nutrition restrictions as appropriate  Outcome: Progressing

## 2025-05-18 LAB
GLUCOSE BLDC GLUCOMTR-MCNC: 202 MG/DL (ref 70–99)
GLUCOSE BLDC GLUCOMTR-MCNC: 224 MG/DL (ref 70–99)
GLUCOSE BLDC GLUCOMTR-MCNC: 255 MG/DL (ref 70–99)
GLUCOSE BLDC GLUCOMTR-MCNC: 259 MG/DL (ref 70–99)

## 2025-05-18 PROCEDURE — 99233 SBSQ HOSP IP/OBS HIGH 50: CPT | Performed by: HOSPITALIST

## 2025-05-18 RX ORDER — CHLORHEXIDINE GLUCONATE 40 MG/ML
SOLUTION TOPICAL
Status: COMPLETED | OUTPATIENT
Start: 2025-05-19 | End: 2025-05-19

## 2025-05-18 RX ORDER — SODIUM CHLORIDE 9 MG/ML
INJECTION, SOLUTION INTRAVENOUS
Status: COMPLETED | OUTPATIENT
Start: 2025-05-19 | End: 2025-05-19

## 2025-05-18 RX ORDER — METHYLPREDNISOLONE SODIUM SUCCINATE 40 MG/ML
20 INJECTION INTRAMUSCULAR; INTRAVENOUS EVERY 12 HOURS
Status: DISCONTINUED | OUTPATIENT
Start: 2025-05-18 | End: 2025-05-19

## 2025-05-18 RX ORDER — IPRATROPIUM BROMIDE AND ALBUTEROL SULFATE 2.5; .5 MG/3ML; MG/3ML
3 SOLUTION RESPIRATORY (INHALATION) 2 TIMES DAILY
Status: DISCONTINUED | OUTPATIENT
Start: 2025-05-18 | End: 2025-05-21

## 2025-05-18 NOTE — PROGRESS NOTES
Progress Note  Yesenia Berkowitz Patient Status:  Inpatient    1940 MRN [id number]   Location [facility] 5SW/SE Attending Ez Taylor MD   Hosp Day # 5 PCP JO-ANN YANG MD     Attending Cardiologist: Malcolm     SUBJECTIVE:  Patient feels well  No new complaints      VITALS:  /54 (BP Location: Right arm)   Pulse 82   Temp 98.2 °F (36.8 °C) (Oral)   Resp 19   Ht 5' 5\" (1.651 m)   Wt 141 lb 1.6 oz (64 kg)   SpO2 95%   BMI 23.48 kg/m²   INTAKE/OUTPUT:    Intake/Output Summary (Last 24 hours) at 2025 1215  Last data filed at 2025 1027  Gross per 24 hour   Intake 458 ml   Output 300 ml   Net 158 ml     Last 3 Weights   25 0503 141 lb 1.6 oz (64 kg)   25 0555 141 lb 3.2 oz (64 kg)   25 2037 132 lb 8 oz (60.1 kg)   25 1804 135 lb (61.2 kg)   25 1753 150 lb 12.7 oz (68.4 kg)   25 1747 150 lb 12.7 oz (68.4 kg)   25 1513 145 lb 4.8 oz (65.9 kg)     LABS:  Recent Labs   Lab 05/15/25  0610 25  0505 25  0531   * 277* 263*   BUN 29* 38* 37*   CREATSERUM 1.04* 0.95 0.84   EGFRCR 54* 60 69   CA 8.3* 8.2* 8.0*    137 139   K 4.0  4.0 3.9 3.8   CL 97* 96* 97*   CO2 31.0 34.0* 36.0*     Recent Labs   Lab 05/15/25  0610 25  0505 25  0531   RBC 3.70* 3.95 3.85   HGB 10.4* 10.8* 10.5*   HCT 34.5* 35.2 33.9*   MCV 93.2 89.1 88.1   MCH 28.1 27.3 27.3   MCHC 30.1* 30.7* 31.0   RDW 16.4* 16.8* 17.0*   NEPRELIM 12.81* 11.96* 8.69*   WBC 13.8* 12.8* 9.5   .0 265.0 248.0     No results for input(s): \"TROP\", \"CK\" in the last 168 hours.  DIAGNOSTICS:  TELEMETRY: SR, prolonged AV, x2 dropped QRS overnight     ROS: Negative unless noted above   PHYSICAL EXAM:  General: Alert and oriented x 3. No apparent distress.  HEENT: Normocephalic, sclera are nonicteric. Hearing appropriate bilaterally.  Neck: No JVD or Carotid bruits. Trachea midline.   Cardiac: Regular rate and rhythm. S1, S2 auscultated. +murmur  Lungs: faint wheezes.  Chest expansion symmetrical. Regular effort. 3L NC   Abdomen: Soft, non-tender, +BS. No hepatosplenomegaly or appreciable masses.   Extremities: Without clubbing, cyanosis. Peripheral pulses are 2+. Edema BLE non pitting (baseline)  Neurologic: Motor and sensory nerves grossly intact.   Psych: Appropriate affect   Skin: Warm and dry. No obvious lesions, wounds, or ulcerations.     MEDICATIONS:  Scheduled Medications[1]  Medication Infusions[2]    ASSESSMENT:    Acute Hypoxic Respiratory Failure w/ COPD Exacerbation   - Pulm following, Steroids/ Nebs   - RUL nodule; plans for o/p bronch   - Baseline 3-4L NC use, at baseline 3.5L NC    PAF/ Flutter  New/ Intermittent High Grade AV Block   - Was on CCB and Dig o/p now on hold. Dig level normal   - EKG with 1st degree AV block but intermittently dropped QRSs on beside tele on 5/14; today mainly in SR HR 60s-70s, x2 QRS drops overnight   - Not historically on a/c due to bruising/ bleeding risk/ Hx GIB and low Afib burden, o/p Watchman discussions in process   - TSH low/ T3 Low,/ T4 normal- on steroids   - ECHO 5/15/25: Hyperdynamic LV, no WMA- appears euvolemic on exam     Chronic HFpEF- Appears compensated   HTN- Controlled   Chronic Anemia- Stable   HLD- , Not historically on Statin, was unable to start with concurrent use of CCB with increased risk of myopathies, plan to explore alternatives o/p  Hx T6 Fracture- s/p Kyphoplasty     PLAN:   Patient evaluated by EP last week  Paroxysmal atrial fibrillation with RVR and episodes of high-grade AV block,  patient was offered pacemaker Monday to allow to safely uptitrate sandra blockers  Afib/RVR then consider low-dose IV Lopressor 2.5 mg PRN  Consider full anticoagulation if having prolonged episodes of atrial fibrillation    Thank you for allowing me to take part in the care of Yesenia Berkowitz. Please call with any questions of concerns.    L3    Shawn Camilo,   [facility]   Interventional  Cardiac and Vascular Services           [1]    methylPREDNISolone  32 mg Intravenous Q12H    ipratropium-albuterol  3 mL Nebulization Q6H WA    acetaZOLAMIDE  500 mg Oral Daily    cholecalciferol  1,000 Units Oral BID    [Held by provider] digoxin  125 mcg Oral Daily    [Held by provider] dilTIAZem ER  360 mg Oral Daily    fluticasone-salmeterol  1 puff Inhalation BID    umeclidinium bromide  1 puff Inhalation Daily    furosemide  80 mg Oral BID (Diuretic)    cetirizine  5 mg Oral Nightly    montelukast  10 mg Oral Nightly    polyethylene glycol (PEG 3350)  17 g Oral Daily    insulin aspart  1-7 Units Subcutaneous TID CC and HS    [Held by provider] enoxaparin  40 mg Subcutaneous Daily   [2]    dilTIAZem

## 2025-05-18 NOTE — PLAN OF CARE
Pt /Ox4, One assist. 4L O2 overnight. IV Solumedrol continued.     Problem: Patient Centered Care  Goal: Patient preferences are identified and integrated in the patient's plan of care  Description: Interventions:- What would you like us to know as we care for you? I would like to return home. - Provide timely, complete, and accurate information to patient/family- Incorporate patient and family knowledge, values, beliefs, and cultural backgrounds into the planning and delivery of care- Encourage patient/family to participate in care and decision-making at the level they choose- Honor patient and family perspectives and choices  Outcome: Progressing     Problem: Patient/Family Goals  Goal: Patient/Family Long Term Goal  Description: Patient's Long Term Goal: Get stronger. Interventions:- Ambulate often. - See additional Care Plan goals for specific interventions  Outcome: Progressing  Goal: Patient/Family Short Term Goal  Description: Patient's Short Term Goal: Ask frequent questions. Interventions: - Work with staff on ways to improve overall health status. - See additional Care Plan goals for specific interventions  Outcome: Progressing     Problem: CARDIOVASCULAR - ADULT  Goal: Maintains optimal cardiac output and hemodynamic stability  Description: INTERVENTIONS:  - Monitor vital signs, rhythm, and trends  - Monitor for bleeding, hypotension and signs of decreased cardiac output  - Evaluate effectiveness of vasoactive medications to optimize hemodynamic stability  - Monitor arterial and/or venous puncture sites for bleeding and/or hematoma  - Assess quality of pulses, skin color and temperature  - Assess for signs of decreased coronary artery perfusion - ex. Angina  - Evaluate fluid balance, assess for edema, trend weights  Outcome: Progressing  Goal: Absence of cardiac arrhythmias or at baseline  Description: INTERVENTIONS:  - Continuous cardiac monitoring, monitor vital signs, obtain 12 lead EKG if indicated  -  Evaluate effectiveness of antiarrhythmic and heart rate control medications as ordered  - Initiate emergency measures for life threatening arrhythmias  - Monitor electrolytes and administer replacement therapy as ordered  Outcome: Progressing     Problem: RESPIRATORY - ADULT  Goal: Achieves optimal ventilation and oxygenation  Description: INTERVENTIONS:  - Assess for changes in respiratory status  - Assess for changes in mentation and behavior  - Position to facilitate oxygenation and minimize respiratory effort  - Oxygen supplementation based on oxygen saturation or ABGs  - Provide Smoking Cessation handout, if applicable  - Encourage broncho-pulmonary hygiene including cough, deep breathe, Incentive Spirometry  - Assess the need for suctioning and perform as needed  - Assess and instruct to report SOB or any respiratory difficulty  - Respiratory Therapy support as indicated  - Manage/alleviate anxiety  - Monitor for signs/symptoms of CO2 retention  Outcome: Progressing     Problem: GENITOURINARY - ADULT  Goal: Absence of urinary retention  Description: INTERVENTIONS:  - Assess patient’s ability to void and empty bladder  - Monitor intake/output and perform bladder scan as needed  - Follow urinary retention protocol/standard of care  - Consider collaborating with pharmacy to review patient's medication profile  - Implement strategies to promote bladder emptying  Outcome: Progressing     Problem: METABOLIC/FLUID AND ELECTROLYTES - ADULT  Goal: Glucose maintained within prescribed range  Description: INTERVENTIONS:  - Monitor Blood Glucose as ordered  - Assess for signs and symptoms of hyperglycemia and hypoglycemia  - Administer ordered medications to maintain glucose within target range  - Assess barriers to adequate nutritional intake and initiate nutrition consult as needed  - Instruct patient on self management of diabetes  Outcome: Progressing  Goal: Electrolytes maintained within normal limits  Description:  INTERVENTIONS:  - Monitor labs and rhythm and assess patient for signs and symptoms of electrolyte imbalances  - Administer electrolyte replacement as ordered  - Monitor response to electrolyte replacements, including rhythm and repeat lab results as appropriate  - Fluid restriction as ordered  - Instruct patient on fluid and nutrition restrictions as appropriate  Outcome: Progressing  Goal: Hemodynamic stability and optimal renal function maintained  Description: INTERVENTIONS:  - Monitor labs and assess for signs and symptoms of volume excess or deficit  - Monitor intake, output and patient weight  - Monitor urine specific gravity, serum osmolarity and serum sodium as indicated or ordered  - Monitor response to interventions for patient's volume status, including labs, urine output, blood pressure (other measures as available)  - Encourage oral intake as appropriate  - Instruct patient on fluid and nutrition restrictions as appropriate  Outcome: Progressing

## 2025-05-18 NOTE — SPIRITUAL CARE NOTE
Spiritual Care Visit Note    Patient Name: Yesenia Berkowitz Date of Spiritual Care Visit: 25   : 1940 Primary Dx: COPD exacerbation (HCC)       Referred By: Referral From: Patient, Nurse    Spiritual Care Taxonomy:    Intended Effects: Chen affirmation    Methods: Accompany someone in their spiritual/Jewish practice outside your chen tradition, Collaborate with care team member, Offer emotional support, Offer spiritual/Jewish support, Offer support    Interventions: Acknowledge current situation, Active listening, Explain  role, [place], Provide hospitality    Visit Type/Summary:     - Spiritual Care: Responded to a request for spiritual care and assessed patient  for spiritual care needs. Responded to a request via the on call phone Consulted with RN prior to visit. Offered empathic listening and emotional support. Patient expressed appreciation for  visit. Provided information regarding how to contact Spiritual Care and left a Spiritual Care information card. Writer responded from a patient's petition to received Holy Communion and pray. Patient is going to have a procedure tomorrow and she is scared. Emotional at times. Patient received Holy Communion. No family members at this time.  remains available as needed for follow up.    Spiritual Care support can be requested via an Epic consult. For urgent/immediate needs, please contact the On Call  at: [place]: ext [phone]    Babar BLAIR  Chaplain Resident  [id number]

## 2025-05-18 NOTE — PLAN OF CARE
Pt A/Ox4. 4L baseline oxygen. Pacemaker planned Monday. Consent signed. Family updated. Call light within reach and safety precautions in place.     Problem: Patient Centered Care  Goal: Patient preferences are identified and integrated in the patient's plan of care  Description: Interventions:- What would you like us to know as we care for you? - Provide timely, complete, and accurate information to patient/family- Incorporate patient and family knowledge, values, beliefs, and cultural backgrounds into the planning and delivery of care- Encourage patient/family to participate in care and decision-making at the level they choose- Honor patient and family perspectives and choices  Outcome: Progressing     Problem: Patient/Family Goals  Goal: Patient/Family Long Term Goal  Description: Patient's Long Term Goal: Interventions:- - See additional Care Plan goals for specific interventions  Outcome: Progressing  Goal: Patient/Family Short Term Goal  Description: Patient's Short Term Goal: Interventions: - - See additional Care Plan goals for specific interventions  Outcome: Progressing     Problem: CARDIOVASCULAR - ADULT  Goal: Maintains optimal cardiac output and hemodynamic stability  Description: INTERVENTIONS:  - Monitor vital signs, rhythm, and trends  - Monitor for bleeding, hypotension and signs of decreased cardiac output  - Evaluate effectiveness of vasoactive medications to optimize hemodynamic stability  - Monitor arterial and/or venous puncture sites for bleeding and/or hematoma  - Assess quality of pulses, skin color and temperature  - Assess for signs of decreased coronary artery perfusion - ex. Angina  - Evaluate fluid balance, assess for edema, trend weights  Outcome: Progressing  Goal: Absence of cardiac arrhythmias or at baseline  Description: INTERVENTIONS:  - Continuous cardiac monitoring, monitor vital signs, obtain 12 lead EKG if indicated  - Evaluate effectiveness of antiarrhythmic and heart rate  control medications as ordered  - Initiate emergency measures for life threatening arrhythmias  - Monitor electrolytes and administer replacement therapy as ordered  Outcome: Progressing     Problem: RESPIRATORY - ADULT  Goal: Achieves optimal ventilation and oxygenation  Description: INTERVENTIONS:  - Assess for changes in respiratory status  - Assess for changes in mentation and behavior  - Position to facilitate oxygenation and minimize respiratory effort  - Oxygen supplementation based on oxygen saturation or ABGs  - Provide Smoking Cessation handout, if applicable  - Encourage broncho-pulmonary hygiene including cough, deep breathe, Incentive Spirometry  - Assess the need for suctioning and perform as needed  - Assess and instruct to report SOB or any respiratory difficulty  - Respiratory Therapy support as indicated  - Manage/alleviate anxiety  - Monitor for signs/symptoms of CO2 retention  Outcome: Progressing     Problem: GENITOURINARY - ADULT  Goal: Absence of urinary retention  Description: INTERVENTIONS:  - Assess patient’s ability to void and empty bladder  - Monitor intake/output and perform bladder scan as needed  - Follow urinary retention protocol/standard of care  - Consider collaborating with pharmacy to review patient's medication profile  - Implement strategies to promote bladder emptying  Outcome: Progressing     Problem: METABOLIC/FLUID AND ELECTROLYTES - ADULT  Goal: Glucose maintained within prescribed range  Description: INTERVENTIONS:  - Monitor Blood Glucose as ordered  - Assess for signs and symptoms of hyperglycemia and hypoglycemia  - Administer ordered medications to maintain glucose within target range  - Assess barriers to adequate nutritional intake and initiate nutrition consult as needed  - Instruct patient on self management of diabetes  Outcome: Progressing  Goal: Electrolytes maintained within normal limits  Description: INTERVENTIONS:  - Monitor labs and rhythm and assess  patient for signs and symptoms of electrolyte imbalances  - Administer electrolyte replacement as ordered  - Monitor response to electrolyte replacements, including rhythm and repeat lab results as appropriate  - Fluid restriction as ordered  - Instruct patient on fluid and nutrition restrictions as appropriate  Outcome: Progressing  Goal: Hemodynamic stability and optimal renal function maintained  Description: INTERVENTIONS:  - Monitor labs and assess for signs and symptoms of volume excess or deficit  - Monitor intake, output and patient weight  - Monitor urine specific gravity, serum osmolarity and serum sodium as indicated or ordered  - Monitor response to interventions for patient's volume status, including labs, urine output, blood pressure (other measures as available)  - Encourage oral intake as appropriate  - Instruct patient on fluid and nutrition restrictions as appropriate  Outcome: Progressing

## 2025-05-18 NOTE — PROGRESS NOTES
[facility]  Progress Note     Yesenia Berkowitz  : 1940    Status: Inpatient  Day #: 5    Attending: Ez Taylor MD  PCP: JO-ANN YANG MD     Assessment and Plan:    AECOPD  -pulm on consult  -cont nebs  -cont steroids IV  -cont advair, incruse    RUL lung nodule  -recent PET scan +  -appreciate pulm, oncology, palliative care consults  -ION bronchoscopy suggested, but patient does not know if she really wants to pursue this. Consideration of SBRT - to f/u outpatient    DM2  -monitor BG, cover ISS    Paroxysmal afib  -was on digoxin, diltiazem - now held  -not on AC per patient preference per prior cardiology note    Second degree AVB with possible brief third degree block  -cardiology consulted  -digoxin, diltiazem held  -echo result reviewed  -digoxin level ok  -PPM planned for monday    Abnormal TFTs  -low TSH and free T3 but normal free T4    HTN  -cont meds and monitor    Chronic HFpEF  -cont lasix PO    GOC  -DNAR/selective  -palliative care consulted    Dietitian Malnutrition Assessment    Evaluation for Malnutrition: Criteria for severe malnutrition diagnosis- acute illness/injury related to Wt loss greater than 5% in 1 month., Energy intake less than 50% for greater than 5 days.                 RD Malnutrition Care Plan: Encouraged increased PO intake., Encouraged small frequent meals with emphasis on high calorie/high protein., Intiated ONS (oral nutritional supplements).    Body Fat/Muscle Mass:          Physician Assessment     Patient has a diagnosis of severe malnutrition     DVT Mechanical Prophylaxis:        DVT Pharmacologic Prophylaxis   Medication    enoxaparin (Lovenox) 40 MG/0.4ML SUBQ injection 40 mg               Subjective:      Initial Chief Complaint:  SOB    No CP, no SOB. Breathing better. Anxious about procedure tomorrow.    10 point ROS completed and was negative, except for pertinent positive and negatives stated in subjective.      Objective:      Temp:  [97.8 °F  (36.6 °C)-98.5 °F (36.9 °C)] 98.2 °F (36.8 °C)  Pulse:  [] 82  Resp:  [18-19] 19  BP: (114-149)/(54-79) 125/54  SpO2:  [91 %-98 %] 95 %  General:  Alert, no distress  HEENT:  Normocephalic, atraumatic  Cardiac:  Regular rate, regular rhythm  Pulmonary:  diminished breath sounds. No wheeze. Crackles at bases  Gastrointestinal:  Soft, non-tender, normal bowel sounds  Musculoskeletal:  No joint swelling  Extremities:  No edema, no cyanosis, no clubbing  Neurologic:  nonfocal  Psychiatric:  Normal affect, calm and appropriate    Intake/Output Summary (Last 24 hours) at 5/18/2025 1032  Last data filed at 5/18/2025 1027  Gross per 24 hour   Intake 698 ml   Output 300 ml   Net 398 ml         Recent Labs   Lab 05/15/25  0610 05/16/25  0505 05/17/25  0531   WBC 13.8* 12.8* 9.5   HGB 10.4* 10.8* 10.5*   HCT 34.5* 35.2 33.9*   .0 265.0 248.0   RBC 3.70* 3.95 3.85   MCV 93.2 89.1 88.1   MCH 28.1 27.3 27.3   MCHC 30.1* 30.7* 31.0   RDW 16.4* 16.8* 17.0*   NEPRELIM 12.81* 11.96* 8.69*     Recent Labs   Lab 05/13/25  1821 05/14/25  0603 05/14/25  1546 05/15/25  0610 05/16/25  0505 05/17/25  0531   BUN 14   < >  --  29* 38* 37*   CREATSERUM 0.74   < >  --  1.04* 0.95 0.84   CA 8.8   < >  --  8.3* 8.2* 8.0*   ALB 3.9  --   --   --   --   --       < >  --  137 137 139   K 3.1*   < >  --  4.0  4.0 3.9 3.8   CL 96*   < >  --  97* 96* 97*   CO2 36.0*   < >  --  31.0 34.0* 36.0*   *   < >  --  268* 277* 263*   MG  --   --  2.0  --   --   --    BILT 0.2  --   --   --   --   --    AST 13  --   --   --   --   --    ALT <7*  --   --   --   --   --    ALKPHO 65  --   --   --   --   --    TP 6.5  --   --   --   --   --    TSH  --   --   --  0.172*  --   --    T4F  --   --   --  0.9  --   --     < > = values in this interval not displayed.       No results found.    EKG 12 Lead  Result Date: 5/16/2025  Atrial fibrillation Marked ST abnormality, possible inferior subendocardial injury Abnormal ECG When compared with  ECG of 14-MAY-2025 14:48, Atrial fibrillation has replaced Sinus rhythm T wave inversion no longer evident in Inferior leads Nonspecific T wave abnormality no longer evident in Anterior leads QT has lengthened Confirmed by YAN CERVANTES ([id number]) on 5/16/2025 4:13:57 PM Also confirmed by SAGAR SIDDIQUI, RADHA ([id number])  on 5/16/2025 4:26:29 PM    Medications:  Scheduled Medications[1]   PRN Meds: PRN Medications[2]    Supplementary Documentation:   DVT Mechanical Prophylaxis:        DVT Pharmacologic Prophylaxis   Medication    enoxaparin (Lovenox) 40 MG/0.4ML SUBQ injection 40 mg                Code Status: DNAR/Selective Treatment  García: No urinary catheter in place  García Duration (in days):   Central line:    ANGEL: 5/20/2025        **Certification      PHYSICIAN Certification of Need for Inpatient Hospitalization - Initial Certification    Patient will require inpatient services that will reasonably be expected to span two midnight's based on the clinical documentation in H+P.   Based on patients current state of illness, I anticipate that, after discharge, patient will require TBD.         [facility]. Time spent on chart/note review, review of labs/imaging, discussion with patient, physical exam, discussion with staff, consultants, coordinating care, writing progress note, discussion of plan of care.      Ez Taylor MD         [1]    methylPREDNISolone  32 mg Intravenous Q12H    ipratropium-albuterol  3 mL Nebulization Q6H WA    acetaZOLAMIDE  500 mg Oral Daily    cholecalciferol  1,000 Units Oral BID    [Held by provider] digoxin  125 mcg Oral Daily    [Held by provider] dilTIAZem ER  360 mg Oral Daily    fluticasone-salmeterol  1 puff Inhalation BID    umeclidinium bromide  1 puff Inhalation Daily    furosemide  80 mg Oral BID (Diuretic)    cetirizine  5 mg Oral Nightly    montelukast  10 mg Oral Nightly    polyethylene glycol (PEG 3350)  17 g Oral Daily    insulin aspart  1-7 Units Subcutaneous TID CC and HS     enoxaparin  40 mg Subcutaneous Daily   [2]   dilTIAZem    senna-docusate    morphINE    metoclopramide    ipratropium-albuterol    glucose **OR** glucose **OR** glucose-vitamin C **OR** dextrose **OR** glucose **OR** glucose **OR** glucose-vitamin C    ondansetron    guaiFENesin    benzonatate    acetaminophen    phenazopyridine

## 2025-05-19 ENCOUNTER — APPOINTMENT (OUTPATIENT)
Dept: INTERVENTIONAL RADIOLOGY/VASCULAR | Facility: HOSPITAL | Age: 85
End: 2025-05-19
Payer: MEDICARE

## 2025-05-19 LAB
ANION GAP SERPL CALC-SCNC: 5 MMOL/L (ref 0–18)
BUN BLD-MCNC: 36 MG/DL (ref 9–23)
BUN/CREAT SERPL: 53.7 (ref 10–20)
CALCIUM BLD-MCNC: 7.8 MG/DL (ref 8.7–10.4)
CHLORIDE SERPL-SCNC: 96 MMOL/L (ref 98–112)
CO2 SERPL-SCNC: 39 MMOL/L (ref 21–32)
CREAT BLD-MCNC: 0.67 MG/DL (ref 0.55–1.02)
DEPRECATED RDW RBC AUTO: 54.8 FL (ref 35.1–46.3)
EGFRCR SERPLBLD CKD-EPI 2021: 87 ML/MIN/1.73M2 (ref 60–?)
ERYTHROCYTE [DISTWIDTH] IN BLOOD BY AUTOMATED COUNT: 16.7 % (ref 11–15)
GLUCOSE BLD-MCNC: 208 MG/DL (ref 70–99)
GLUCOSE BLDC GLUCOMTR-MCNC: 110 MG/DL (ref 70–99)
GLUCOSE BLDC GLUCOMTR-MCNC: 163 MG/DL (ref 70–99)
GLUCOSE BLDC GLUCOMTR-MCNC: 239 MG/DL (ref 70–99)
GLUCOSE BLDC GLUCOMTR-MCNC: 262 MG/DL (ref 70–99)
HCT VFR BLD AUTO: 37 % (ref 35–48)
HGB BLD-MCNC: 11.6 G/DL (ref 12–16)
MCH RBC QN AUTO: 28.3 PG (ref 26–34)
MCHC RBC AUTO-ENTMCNC: 31.4 G/DL (ref 31–37)
MCV RBC AUTO: 90.2 FL (ref 80–100)
MRSA DNA SPEC QL NAA+PROBE: NEGATIVE
OSMOLALITY SERPL CALC.SUM OF ELEC: 304 MOSM/KG (ref 275–295)
PLATELET # BLD AUTO: 241 10(3)UL (ref 150–450)
POTASSIUM SERPL-SCNC: 3.7 MMOL/L (ref 3.5–5.1)
RBC # BLD AUTO: 4.1 X10(6)UL (ref 3.8–5.3)
SODIUM SERPL-SCNC: 140 MMOL/L (ref 136–145)
WBC # BLD AUTO: 15.3 X10(3) UL (ref 4–11)

## 2025-05-19 PROCEDURE — 99233 SBSQ HOSP IP/OBS HIGH 50: CPT | Performed by: HOSPITALIST

## 2025-05-19 PROCEDURE — 02H63JZ INSERTION OF PACEMAKER LEAD INTO RIGHT ATRIUM, PERCUTANEOUS APPROACH: ICD-10-PCS | Performed by: INTERNAL MEDICINE

## 2025-05-19 PROCEDURE — 0JH606Z INSERTION OF PACEMAKER, DUAL CHAMBER INTO CHEST SUBCUTANEOUS TISSUE AND FASCIA, OPEN APPROACH: ICD-10-PCS | Performed by: INTERNAL MEDICINE

## 2025-05-19 PROCEDURE — 02HK3JZ INSERTION OF PACEMAKER LEAD INTO RIGHT VENTRICLE, PERCUTANEOUS APPROACH: ICD-10-PCS | Performed by: INTERNAL MEDICINE

## 2025-05-19 PROCEDURE — 99232 SBSQ HOSP IP/OBS MODERATE 35: CPT | Performed by: REGISTERED NURSE

## 2025-05-19 RX ORDER — CLINDAMYCIN PHOSPHATE 600 MG/50ML
600 INJECTION, SOLUTION INTRAVENOUS EVERY 8 HOURS
Status: COMPLETED | OUTPATIENT
Start: 2025-05-19 | End: 2025-05-20

## 2025-05-19 RX ORDER — LIDOCAINE HYDROCHLORIDE AND EPINEPHRINE 10; 10 MG/ML; UG/ML
INJECTION, SOLUTION INFILTRATION; PERINEURAL
Status: COMPLETED
Start: 2025-05-19 | End: 2025-05-19

## 2025-05-19 RX ORDER — IOPAMIDOL 612 MG/ML
30 INJECTION, SOLUTION INTRAVASCULAR
Status: COMPLETED | OUTPATIENT
Start: 2025-05-19 | End: 2025-05-19

## 2025-05-19 RX ORDER — TRAMADOL HYDROCHLORIDE 50 MG/1
50 TABLET ORAL EVERY 6 HOURS PRN
Status: DISCONTINUED | OUTPATIENT
Start: 2025-05-19 | End: 2025-05-21

## 2025-05-19 RX ORDER — CLINDAMYCIN PHOSPHATE 600 MG/50ML
INJECTION, SOLUTION INTRAVENOUS
Status: COMPLETED
Start: 2025-05-19 | End: 2025-05-19

## 2025-05-19 RX ORDER — ACETAMINOPHEN 500 MG
1000 TABLET ORAL ONCE
Status: COMPLETED | OUTPATIENT
Start: 2025-05-19 | End: 2025-05-19

## 2025-05-19 RX ORDER — MIDAZOLAM HYDROCHLORIDE 1 MG/ML
INJECTION INTRAMUSCULAR; INTRAVENOUS
Status: COMPLETED
Start: 2025-05-19 | End: 2025-05-19

## 2025-05-19 RX ORDER — PREDNISONE 5 MG/1
15 TABLET ORAL 2 TIMES DAILY WITH MEALS
Status: DISCONTINUED | OUTPATIENT
Start: 2025-05-20 | End: 2025-05-20

## 2025-05-19 NOTE — PROGRESS NOTES
[facility]  Progress Note     Yesenia Berkowitz  : 1940    Status: Inpatient  Day #: 6    Attending: Ez Taylor MD  PCP: JO-ANN YANG MD     Assessment and Plan:    AECOPD  -pulm on consult  -cont nebs  -cont steroids IV  -cont advair, incruse    RUL lung nodule  -recent PET scan +  -appreciate pulm, oncology, palliative care consults  -ION bronchoscopy suggested, but patient does not know if she really wants to pursue this. Consideration of SBRT - to f/u outpatient    DM2  -monitor BG, cover ISS    Paroxysmal afib  -was on digoxin, diltiazem - now held  -not on AC per patient preference per prior cardiology note    Second degree AVB with possible brief third degree block  -cardiology consulted  -digoxin, diltiazem held  -echo result reviewed  -digoxin level ok  -PPM planned for today    Abnormal TFTs  -low TSH and free T3 but normal free T4    HTN  -cont meds and monitor    Chronic HFpEF  -cont lasix PO    GOC  -DNAR/selective  -palliative care consulted    Dietitian Malnutrition Assessment    Evaluation for Malnutrition: Criteria for severe malnutrition diagnosis- acute illness/injury related to Wt loss greater than 5% in 1 month., Energy intake less than 50% for greater than 5 days.                 RD Malnutrition Care Plan: Encouraged increased PO intake., Encouraged small frequent meals with emphasis on high calorie/high protein., Intiated ONS (oral nutritional supplements).    Body Fat/Muscle Mass:          Physician Assessment     Patient has a diagnosis of severe malnutrition     DVT Mechanical Prophylaxis:        DVT Pharmacologic Prophylaxis   Medication    [Held by provider] enoxaparin (Lovenox) 40 MG/0.4ML SUBQ injection 40 mg               Subjective:      Initial Chief Complaint:  SOB    No CP, no SOB. Breathing better. Anxious about procedure.    10 point ROS completed and was negative, except for pertinent positive and negatives stated in subjective.      Objective:      Temp:   [97.7 °F (36.5 °C)-98.4 °F (36.9 °C)] 97.7 °F (36.5 °C)  Pulse:  [] 90  Resp:  [18-20] 20  BP: (118-136)/(60-70) 118/60  SpO2:  [93 %-95 %] 95 %  General:  Alert, no distress  HEENT:  Normocephalic, atraumatic  Cardiac:  Regular rate, regular rhythm  Pulmonary:  diminished breath sounds. No wheeze. Crackles at bases  Gastrointestinal:  Soft, non-tender, normal bowel sounds  Musculoskeletal:  No joint swelling  Extremities:  No edema, no cyanosis, no clubbing  Neurologic:  nonfocal  Psychiatric:  Normal affect, calm and appropriate    Intake/Output Summary (Last 24 hours) at 5/19/2025 1139  Last data filed at 5/19/2025 0900  Gross per 24 hour   Intake 678 ml   Output 1500 ml   Net -822 ml         Recent Labs   Lab 05/15/25  0610 05/16/25  0505 05/17/25  0531 05/19/25  0726   WBC 13.8* 12.8* 9.5 15.3*   HGB 10.4* 10.8* 10.5* 11.6*   HCT 34.5* 35.2 33.9* 37.0   .0 265.0 248.0 241.0   RBC 3.70* 3.95 3.85 4.10   MCV 93.2 89.1 88.1 90.2   MCH 28.1 27.3 27.3 28.3   MCHC 30.1* 30.7* 31.0 31.4   RDW 16.4* 16.8* 17.0* 16.7*   NEPRELIM 12.81* 11.96* 8.69*  --      Recent Labs   Lab 05/13/25  1821 05/14/25  0603 05/14/25  1546 05/15/25  0610 05/16/25  0505 05/17/25  0531 05/19/25  0726   BUN 14   < >  --  29* 38* 37* 36*   CREATSERUM 0.74   < >  --  1.04* 0.95 0.84 0.67   CA 8.8   < >  --  8.3* 8.2* 8.0* 7.8*   ALB 3.9  --   --   --   --   --   --       < >  --  137 137 139 140   K 3.1*   < >  --  4.0  4.0 3.9 3.8 3.7   CL 96*   < >  --  97* 96* 97* 96*   CO2 36.0*   < >  --  31.0 34.0* 36.0* 39.0*   *   < >  --  268* 277* 263* 208*   MG  --   --  2.0  --   --   --   --    BILT 0.2  --   --   --   --   --   --    AST 13  --   --   --   --   --   --    ALT <7*  --   --   --   --   --   --    ALKPHO 65  --   --   --   --   --   --    TP 6.5  --   --   --   --   --   --    TSH  --   --   --  0.172*  --   --   --    T4F  --   --   --  0.9  --   --   --     < > = values in this interval not displayed.        No results found.          Medications:  Scheduled Medications[1]   PRN Meds: PRN Medications[2]    Supplementary Documentation:   DVT Mechanical Prophylaxis:        DVT Pharmacologic Prophylaxis   Medication    [Held by provider] enoxaparin (Lovenox) 40 MG/0.4ML SUBQ injection 40 mg                Code Status: DNAR/Selective Treatment  García: No urinary catheter in place  García Duration (in days):   Central line:    ANGEL: 5/20/2025        **Certification      PHYSICIAN Certification of Need for Inpatient Hospitalization - Initial Certification    Patient will require inpatient services that will reasonably be expected to span two midnight's based on the clinical documentation in H+P.   Based on patients current state of illness, I anticipate that, after discharge, patient will require TBD.         [facility] High. Time spent on chart/note review, review of labs/imaging, discussion with patient, physical exam, discussion with staff, consultants, coordinating care, writing progress note, discussion of plan of care.      Ez Taylor MD         [1]    acetaminophen  1,000 mg Oral Once    ipratropium-albuterol  3 mL Nebulization BID    methylPREDNISolone  20 mg Intravenous Q12H    acetaZOLAMIDE  500 mg Oral Daily    cholecalciferol  1,000 Units Oral BID    [Held by provider] digoxin  125 mcg Oral Daily    [Held by provider] dilTIAZem ER  360 mg Oral Daily    fluticasone-salmeterol  1 puff Inhalation BID    umeclidinium bromide  1 puff Inhalation Daily    furosemide  80 mg Oral BID (Diuretic)    cetirizine  5 mg Oral Nightly    montelukast  10 mg Oral Nightly    polyethylene glycol (PEG 3350)  17 g Oral Daily    [Held by provider] insulin aspart  1-7 Units Subcutaneous TID CC and HS    [Held by provider] enoxaparin  40 mg Subcutaneous Daily   [2]   dilTIAZem    senna-docusate    morphINE    metoclopramide    ipratropium-albuterol    glucose **OR** glucose **OR** glucose-vitamin C **OR** dextrose **OR** glucose **OR** glucose  **OR** glucose-vitamin C    ondansetron    guaiFENesin    benzonatate    acetaminophen    phenazopyridine

## 2025-05-19 NOTE — DIETARY NOTE
ADULT NUTRITION REASSESSMENT    Pt is at high nutrition risk.  Pt meets severe malnutrition criteria.      RECOMMENDATIONS TO MD: See nutrition intervention for ONS (oral nutrition supplements)    ADMITTING DIAGNOSIS:  COPD exacerbation (HCC) [J44.1]  PERTINENT PAST MEDICAL HISTORY: Past Medical History[1]    PATIENT STATUS: Initial 05/14/25: Pt assessed due to screening at risk for malnutrition, MST score of 2. Pt admitted for SOB x several days. PMH listed above including COPD, HTN, HLD, GERD, and diverticulosis. Per EMR review, pt last weighed 150 lbs 3/20/25 (12% weight loss within 2 months, significant and severe). Pt endorses this weight loss, reports a recent UBW of ~150 lbs. Reports appetite is much better than PTA. She reports a decreased appetite about 2 weeks PTA. States she was eating very small amounts (1-2 small meals) such as toast for breakfast. Discussed ONS to optimize nutrition. Pt agreeable to trying Glucerna ONS daily as an afternoon snack. Pt inquired about getting a sandwich sent in the evening, since she tends to get very hungry around 10-11 pm. Discussed sending up an evening snack at 7 pm and keeping it in fridge until she wants it. Will monitor PO + ONS tolerance.     Update 5/19: Pt reassessed per protocol. Pt with good appetite since last visit, consuming 100% of most meals, ~2 meals per day. Pt sometimes drinking Glucerna. Eating sandwich for HS snack as well. Weight up 8 lbs since admit, using standing scale. NPO today for procedure. F/u per protocol.     FOOD/NUTRITION RELATED HISTORY:  Appetite: Fair and improving   Intake: ~85% x 11  meals documented since last visit  Intake Meeting Needs: Marginal, added oral nutrition supplements (ONS)  Percent Meals Eaten (last 6 days)       Date/Time Percent Meals Eaten (%)    05/14/25 0900 85 %    05/14/25 1502 100 %    05/15/25 1100 75 %    05/15/25 1937 100 %    05/15/25 2300 50 %    05/16/25 0940 100 %    05/16/25 1500 100 %    05/17/25 1122  100 %    05/17/25 1410 100 %    05/18/25 1027 100 %    05/18/25 1313 100 %    05/18/25 1910 200 %    05/19/25 0900 0 %     Percent Meals Eaten (%): NPO at 05/19/25 0900           Food Allergies: No Known Food Allergies (NKFA)  Cultural/Ethnic/Denominational Preferences: Not Obtained    GASTROINTESTINAL: +BM 5/18    MEDICATIONS: reviewed  Scheduled Medications[2]    LABS: reviewed hyperglycemia noted  Recent Labs     05/16/25  0505 05/17/25  0531 05/19/25  0726   * 263* 208*   BUN 38* 37* 36*   CREATSERUM 0.95 0.84 0.67   CA 8.2* 8.0* 7.8*    139 140   K 3.9 3.8 3.7   CL 96* 97* 96*   CO2 34.0* 36.0* 39.0*   OSMOCALC 303* 306* 304*       WEIGHT HISTORY:  Patient Weight(s) for the past 336 hrs:   Weight   05/19/25 0504 63.8 kg (140 lb 9.6 oz)   05/18/25 0503 64 kg (141 lb 1.6 oz)   05/17/25 0555 64 kg (141 lb 3.2 oz)   05/13/25 2037 60.1 kg (132 lb 8 oz)   05/13/25 1804 61.2 kg (135 lb)     Wt Readings from Last 10 Encounters:   05/19/25 63.8 kg (140 lb 9.6 oz)   03/20/25 68.4 kg (150 lb 12.7 oz)   03/06/25 65.9 kg (145 lb 4.8 oz)   02/20/25 66.2 kg (145 lb 14.4 oz)   02/05/25 65.3 kg (143 lb 14.4 oz)   01/18/25 70.8 kg (156 lb)   01/14/25 70.8 kg (156 lb)   01/13/25 73.1 kg (161 lb 1.6 oz)   01/07/25 68.5 kg (151 lb)   12/18/24 79.7 kg (175 lb 9.6 oz)       ANTHROPOMETRICS:  HT: 165.1 cm (5' 5\")  Wt Readings from Last 1 Encounters:   05/19/25 63.8 kg (140 lb 9.6 oz)     Last weight: Likely accurate  BMI: Body mass index is 23.4 kg/m².  Dosing Weight: 60.1 kg - recent weight, utilized for anthropometric calculations  BMI CLASSIFICATION: 18.5-24.9 kg/m2 - WNL  IBW/lbs (Calculated) Female: 125 lbs             106% IBW  Usual Body Wt: 150 lbs       88% UBW    NUTRITION RELATED PHYSICAL FINDINGS:  - Nutrition Focused Physical Exam (NFPE): Appropriate age related deficits noted   - Fluid Accumulation: non-pitting Bilateral Lower extremity and Left Hand (s) and +1 Left Upper extremity. See RN documentation for  details  - Skin Integrity: low skin risk. See RN documentation for details    CRITERIA FOR MALNUTRITION DIAGNOSIS:  Criteria for severe malnutrition diagnosis: acute illness/injury related to wt loss greater than 5% in 1 month and energy intake less than 50% for greater than 5 days.    NUTRITION DIAGNOSIS/PROBLEM:   Severe Malnutrition related to Acute - Physiological causes resulting in anorexia or diminished intake d/t decreased appetite as evidenced by 12% weight loss within 2 months and <50% energy intake for ~2 weeks    NUTRITION DIAGNOSIS PROGRESS:  Improvement (unresolved) - intakes improving since admit, currently NPO for procedure    NUTRITION INTERVENTION:     NUTRITION PRESCRIPTION:   Estimated Nutrition needs: --dosing wt of 60.1 kg - wt taken on 5/13/25  Calories: 9636-4584 calories/day (27-30 calories per kg Dosing wt)  Protein: 78-90 g protein/day (1.3-1.5 g protein/kg Dosing wt)    - Diet:       Procedures    NPO      - Nutrition Care Plan: Encouraged increased PO intake, Encouraged small frequent meals with emphasis on high calorie/high protein, and Initiated ONS (oral nutritional supplements)  - ONS (Oral Nutrition Supplements)/Meals/Snacks: Glucerna (220 calories/ 10 g protein each) Daily Cactus and 7 pm snack of turkey sandwich per pt request    - Vitamin and mineral supplements: none  - Feeding assistance: independent  - Nutrition education: Discussed importance of adequate energy and protein intake    - Coordination of nutrition care: collaboration with other providers  - Discharge and transfer of nutrition care to new setting or provider: monitor plans    MONITOR AND EVALUATE/NUTRITION GOALS:  - Food and Nutrient Intake:      Monitor: adequacy of PO intake, adequacy of supplement intake, and tolerance of supplement intake  - Food and Nutrient Administration:      Monitor: N/A  - Anthropometric Measurement:    Monitor weight  - Nutrition Goals:      halt wt loss, maintain wt within 5%, PO  and supplement greater than 75% of needs, intake to meet needs, good supplement intake, labs within acceptable limits, euglycemia, and support body systems    DIETITIAN FOLLOW UP: RD to follow and monitor nutrition status      Yaneli Rogers, MS, RDN, LDN  Clinical Dietitian         [1]   Past Medical History:   A-fib (HCC)    Anesthesia complication    Arrhythmia    AFIB    Arthritis    Asthma (HCC)    Back problem    COPD (chronic obstructive pulmonary disease) ([facility])    Deviated nasal septum    nasal septoplasty, turb reduction, smr of turbs    Difficult intubation    fiber optic if needed    Diverticulitis    colonoscopy     Diverticulosis of large intestine    Esophageal reflux    Extrinsic asthma, unspecified    Headache    Heart disease    Hepatitis    WAS TOLD SHE HAS HAD THIS WHEN SHE WAS 19 YEARS OLD    High blood pressure    High cholesterol    Irregular heart rate    Lichenoid keratosis    left chest-bx    Osteoarthritis    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    Paronychia    (RT); onychomycosis; debridement    Problems with swallowing    occasionally    Shortness of breath    2 L NC ALL THE TIME 24HR/7 DAYS PER WEEK    Unspecified essential hypertension    Visual impairment   [2]    ipratropium-albuterol  3 mL Nebulization BID    methylPREDNISolone  20 mg Intravenous Q12H    acetaZOLAMIDE  500 mg Oral Daily    cholecalciferol  1,000 Units Oral BID    [Held by provider] digoxin  125 mcg Oral Daily    [Held by provider] dilTIAZem ER  360 mg Oral Daily    fluticasone-salmeterol  1 puff Inhalation BID    umeclidinium bromide  1 puff Inhalation Daily    furosemide  80 mg Oral BID (Diuretic)    cetirizine  5 mg Oral Nightly    montelukast  10 mg Oral Nightly    polyethylene glycol (PEG 3350)  17 g Oral Daily    [Held by provider] insulin aspart  1-7 Units Subcutaneous TID CC and HS    [Held by provider] enoxaparin  40 mg Subcutaneous Daily

## 2025-05-19 NOTE — PROGRESS NOTES
Progress Note  Yesenia Berkowitz Patient Status:  Inpatient    1940 MRN [id number]   Location [facility] 3W/SW Attending Ez Taylor MD   Hosp Day # 6 PCP JO-ANN YANG MD     Subjective:  Pt denies dizziness/lightheadedness this am. Denies CP, SOB. Overall feeling anxious.     Objective:  /62 (BP Location: Right arm)   Pulse 95   Temp 97.9 °F (36.6 °C) (Oral)   Resp 18   Ht 5' 5\" (1.651 m)   Wt 140 lb 9.6 oz (63.8 kg)   SpO2 93%   BMI 23.40 kg/m²     Telemetry: NSR    Intake/Output:    Intake/Output Summary (Last 24 hours) at 2025 0725  Last data filed at 2025 0504  Gross per 24 hour   Intake 896 ml   Output 1400 ml   Net -504 ml       Last 3 Weights   25 0504 140 lb 9.6 oz (63.8 kg)   25 0503 141 lb 1.6 oz (64 kg)   25 0555 141 lb 3.2 oz (64 kg)   25 2037 132 lb 8 oz (60.1 kg)   25 1804 135 lb (61.2 kg)   25 1753 150 lb 12.7 oz (68.4 kg)   25 1747 150 lb 12.7 oz (68.4 kg)   25 1513 145 lb 4.8 oz (65.9 kg)       Labs:  Recent Labs   Lab 05/15/25  0610 25  0505 25  0531   * 277* 263*   BUN 29* 38* 37*   CREATSERUM 1.04* 0.95 0.84   EGFRCR 54* 60 69   CA 8.3* 8.2* 8.0*    137 139   K 4.0  4.0 3.9 3.8   CL 97* 96* 97*   CO2 31.0 34.0* 36.0*     Recent Labs   Lab 05/15/25  0610 25  0505 25  0531   RBC 3.70* 3.95 3.85   HGB 10.4* 10.8* 10.5*   HCT 34.5* 35.2 33.9*   MCV 93.2 89.1 88.1   MCH 28.1 27.3 27.3   MCHC 30.1* 30.7* 31.0   RDW 16.4* 16.8* 17.0*   NEPRELIM 12.81* 11.96* 8.69*   WBC 13.8* 12.8* 9.5   .0 265.0 248.0         No results for input(s): \"TROP\", \"TROPHS\", \"CK\" in the last 168 hours.    Diagnostics:  No results found.   Review of Systems   Cardiovascular:  Negative for chest pain, dyspnea on exertion and leg swelling.   Respiratory:  Negative for cough and shortness of breath.        Physical Exam:    Gen: alert, oriented x 3, NAD  Heent: pupils equal, reactive. Mucous membranes  moist.   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, Tachy-Noah Syndrome   Maintaining NSR   Hx on diltiazem, digoxin - on hold   Digoxin level WNL  TSH low, FT4 WNL, FT3 low  Not hx on A/C d/t bleeding risk, hx GIB, low Afib burden   OP discussion of Watchman  Acute Hypoxic Respiratory Failure (Acute Exac COPD)  On baseline O2 - 4L   Steroids, nebs, inhaler  Pulm following  Chronic HFpEF  Echo w/LVEF 70%, no RWMA, G2DD, normal RV function, PASP 35mmHg  GDMT -  hx on lasix 80mg po BID, diamox; jardiance on hold  Appears compensated on exam  Hypertension - controlled  Not on anti-hypertensives currently  Hyperlipidemia -   Not currently on statin d/t risk of myalgias w/concomitant use of diltiazem  Discussing alternatives as OP  COPD, Hx RUL Nodule   + PET as OP  May need bronch/poss biopsy  Following w/OP oncology  Chronic Anemia - stable  Former Tobacco Abuse  DMII - A1C 7.8  Hx T6 Fracture s/p Kyphoplasty    Plan:  Holding diltiazem, digoxin  Pt is NPO for insertion of dual chamber PPM today with Dr Vasquez.     Plan of care discussed with patient, RN.    Cassy Landers, APRN  5/19/2025  7:25 AM              [1]    ipratropium-albuterol  3 mL Nebulization BID    methylPREDNISolone  20 mg Intravenous Q12H    acetaZOLAMIDE  500 mg Oral Daily    cholecalciferol  1,000 Units Oral BID    [Held by provider] digoxin  125 mcg Oral Daily    [Held by provider] dilTIAZem ER  360 mg Oral Daily    fluticasone-salmeterol  1 puff Inhalation BID    umeclidinium bromide  1 puff Inhalation Daily    furosemide  80 mg Oral BID (Diuretic)    cetirizine  5 mg Oral Nightly    montelukast  10 mg Oral Nightly     polyethylene glycol (PEG 3350)  17 g Oral Daily    insulin aspart  1-7 Units Subcutaneous TID CC and HS    [Held by provider] enoxaparin  40 mg Subcutaneous Daily   [2]    dilTIAZem

## 2025-05-19 NOTE — PLAN OF CARE
Problem: Patient Centered Care  Goal: Patient preferences are identified and integrated in the patient's plan of care  Description: Interventions:- What would you like us to know as we care for you? From home with family - Provide timely, complete, and accurate information to patient/family- Incorporate patient and family knowledge, values, beliefs, and cultural backgrounds into the planning and delivery of care- Encourage patient/family to participate in care and decision-making at the level they choose- Honor patient and family perspectives and choices  Outcome: Progressing     Problem: Patient/Family Goals  Goal: Patient/Family Long Term Goal  Description: Patient's Long Term Goal: to go home Interventions:- follow MD orders - See additional Care Plan goals for specific interventions  Outcome: Progressing  Goal: Patient/Family Short Term Goal  Description: Patient's Short Term Goal: manage sob Interventions: - follow MD orders - See additional Care Plan goals for specific interventions  Outcome: Progressing     Problem: CARDIOVASCULAR - ADULT  Goal: Maintains optimal cardiac output and hemodynamic stability  Description: INTERVENTIONS:  - Monitor vital signs, rhythm, and trends  - Monitor for bleeding, hypotension and signs of decreased cardiac output  - Evaluate effectiveness of vasoactive medications to optimize hemodynamic stability  - Monitor arterial and/or venous puncture sites for bleeding and/or hematoma  - Assess quality of pulses, skin color and temperature  - Assess for signs of decreased coronary artery perfusion - ex. Angina  - Evaluate fluid balance, assess for edema, trend weights  Outcome: Progressing  Goal: Absence of cardiac arrhythmias or at baseline  Description: INTERVENTIONS:  - Continuous cardiac monitoring, monitor vital signs, obtain 12 lead EKG if indicated  - Evaluate effectiveness of antiarrhythmic and heart rate control medications as ordered  - Initiate emergency measures for life  threatening arrhythmias  - Monitor electrolytes and administer replacement therapy as ordered  Outcome: Progressing     Problem: RESPIRATORY - ADULT  Goal: Achieves optimal ventilation and oxygenation  Description: INTERVENTIONS:  - Assess for changes in respiratory status  - Assess for changes in mentation and behavior  - Position to facilitate oxygenation and minimize respiratory effort  - Oxygen supplementation based on oxygen saturation or ABGs  - Provide Smoking Cessation handout, if applicable  - Encourage broncho-pulmonary hygiene including cough, deep breathe, Incentive Spirometry  - Assess the need for suctioning and perform as needed  - Assess and instruct to report SOB or any respiratory difficulty  - Respiratory Therapy support as indicated  - Manage/alleviate anxiety  - Monitor for signs/symptoms of CO2 retention  Outcome: Progressing     Problem: GENITOURINARY - ADULT  Goal: Absence of urinary retention  Description: INTERVENTIONS:  - Assess patient’s ability to void and empty bladder  - Monitor intake/output and perform bladder scan as needed  - Follow urinary retention protocol/standard of care  - Consider collaborating with pharmacy to review patient's medication profile  - Implement strategies to promote bladder emptying  Outcome: Progressing     Problem: METABOLIC/FLUID AND ELECTROLYTES - ADULT  Goal: Glucose maintained within prescribed range  Description: INTERVENTIONS:  - Monitor Blood Glucose as ordered  - Assess for signs and symptoms of hyperglycemia and hypoglycemia  - Administer ordered medications to maintain glucose within target range  - Assess barriers to adequate nutritional intake and initiate nutrition consult as needed  - Instruct patient on self management of diabetes  Outcome: Progressing  Goal: Electrolytes maintained within normal limits  Description: INTERVENTIONS:  - Monitor labs and rhythm and assess patient for signs and symptoms of electrolyte imbalances  - Administer  electrolyte replacement as ordered  - Monitor response to electrolyte replacements, including rhythm and repeat lab results as appropriate  - Fluid restriction as ordered  - Instruct patient on fluid and nutrition restrictions as appropriate  Outcome: Progressing  Goal: Hemodynamic stability and optimal renal function maintained  Description: INTERVENTIONS:  - Monitor labs and assess for signs and symptoms of volume excess or deficit  - Monitor intake, output and patient weight  - Monitor urine specific gravity, serum osmolarity and serum sodium as indicated or ordered  - Monitor response to interventions for patient's volume status, including labs, urine output, blood pressure (other measures as available)  - Encourage oral intake as appropriate  - Instruct patient on fluid and nutrition restrictions as appropriate  Outcome: Progressing

## 2025-05-19 NOTE — PLAN OF CARE
Pt is alertx4, son at bedside, PM placement today, pt in sling, Hold lovenox for 48hrs, plan for pt to DC home once medically clear  Problem: Patient Centered Care  Goal: Patient preferences are identified and integrated in the patient's plan of care   Provide timely, complete, and accurate information to patient/family- Incorporate patient and family knowledge, values, beliefs, and cultural backgrounds into the planning and delivery of care- Encourage patient/family to participate in care and decision-making at the level they choose- Honor patient and family perspectives and choices  Outcome: Progressing       Problem: CARDIOVASCULAR - ADULT  Goal: Maintains optimal cardiac output and hemodynamic stability  Description: INTERVENTIONS:  - Monitor vital signs, rhythm, and trends  - Monitor for bleeding, hypotension and signs of decreased cardiac output  - Evaluate effectiveness of vasoactive medications to optimize hemodynamic stability  - Monitor arterial and/or venous puncture sites for bleeding and/or hematoma  - Assess quality of pulses, skin color and temperature  - Assess for signs of decreased coronary artery perfusion - ex. Angina  - Evaluate fluid balance, assess for edema, trend weights  Outcome: Progressing  Goal: Absence of cardiac arrhythmias or at baseline  Description: INTERVENTIONS:  - Continuous cardiac monitoring, monitor vital signs, obtain 12 lead EKG if indicated  - Evaluate effectiveness of antiarrhythmic and heart rate control medications as ordered  - Initiate emergency measures for life threatening arrhythmias  - Monitor electrolytes and administer replacement therapy as ordered  Outcome: Progressing     Problem: RESPIRATORY - ADULT  Goal: Achieves optimal ventilation and oxygenation  Description: INTERVENTIONS:  - Assess for changes in respiratory status  - Assess for changes in mentation and behavior  - Position to facilitate oxygenation and minimize respiratory effort  - Oxygen  supplementation based on oxygen saturation or ABGs  - Provide Smoking Cessation handout, if applicable  - Encourage broncho-pulmonary hygiene including cough, deep breathe, Incentive Spirometry  - Assess the need for suctioning and perform as needed  - Assess and instruct to report SOB or any respiratory difficulty  - Respiratory Therapy support as indicated  - Manage/alleviate anxiety  - Monitor for signs/symptoms of CO2 retention  Outcome: Progressing

## 2025-05-19 NOTE — SPIRITUAL CARE NOTE
Spiritual Care Visit Note    Patient Name: Yesenia Berkowitz Date of Spiritual Care Visit: 25   : 1940 Primary Dx: COPD exacerbation (HCC)       Referred By: Referral From: Nurse    Spiritual Care Taxonomy:    Intended Effects: Lessen anxiety    Methods: Collaborate with care team member, Offer emotional support, Offer spiritual/Restorationism support    Interventions: Acknowledge current situation, Acknowledge response to difficult experience, Active listening, Ask guided questions, Ask guided questions about purpose, Ask guided questions about the nature and presence of God, Ask questions to bring forth feelings, Explain  role, Prayer for healing, Provide hospitality, Share words of hope and inspiration    Visit Type/Summary:     - Spiritual Care: Responded to a request via the on call phone Consulted with RN prior to visit. Offered empathic listening and emotional support. Patient names/describes feeling anxious about pacemaker and waiting for the procedure later today. This anxiety caught patient off guard since patient has had other surgeries and procedures.  explored if it being her heart might be part of her concern. Talked about leaning into her coral. Patient and family expressed appreciation for  visit. We prayed for the doctor, nurses, and staff.  remains available as needed for follow up.    Spiritual Care support can be requested via an Epic consult. For urgent/immediate needs, please contact the On Call  at: [place]: ext [phone]    Chaplain Resident, Leny Fontana, PhD

## 2025-05-19 NOTE — PALLIATIVE CARE NOTE
[facility]  part of [facility]  Palliative Care Progress Note    Yesenia Berkowitz Patient Status:  Inpatient    1940 MRN [id number]   Location [facility] 3W/SW Attending Ez Taylor MD   Hosp Day # 6 PCP JO-ANN YANG MD     The  Cures Act makes medical notes like these available to patients in the interest of transparency. Please be advised this is a medical document. Medical documents are intended to carry relevant information, facts as evident, and the clinical opinion of the practitioner. The medical note is intended as peer to peer communication and may appear blunt or direct. It is written in medical language and may contain abbreviations or verbiage that are unfamiliar.       Subjective     Reviewed symptom medications past 24 hrs: Tylenol 650 mg po X1    Patient was seen and examined in bed with her son Tyler at the bedside. Pt is A&OX4 and very pleasant.  She tells me her breathing is overall a bit better, still having some OJEDA but tolerable. Remains on nc4L. NPO for procedure and moved her bowels yesterday. She reports feeling a little anxious about upcoming pacemaker placement later today.    See summary of discussion below.     Review of Systems:  Pertinent items are noted in subjective    Allergies:  Allergies[1]    Medications:   Current Hospital Medications[2]    Objective     Vital Signs:  Blood pressure 128/74, pulse 87, temperature 97.8 °F (36.6 °C), temperature source Oral, resp. rate 20, height 5' 5\" (1.651 m), weight 140 lb 9.6 oz (63.8 kg), SpO2 96%.  Body mass index is 23.4 kg/m².  Present Level of pain: 0/10  Non-verbal signs of pain present: No    Physical Exam:  General: Alert and in no apparent distress. Weak, frail/thin appearing  HEENT: No focal deficits.  Cardiac: Regular rate and rhythm, S1, S2 normal, no murmur, rub or gallop.  Lungs: Diminished breath sounds bilaterally. Normal excursions and effort.  Abdomen: Soft, non-tender, normal  bowel sounds X 4 quadrants, no rebound or guarding  Extremities: Without clubbing, cyanosis. Peripheral pulses are 2+. Trace pedal BLE edema present  Neurologic: Alert and oriented X4, normal affect.  Skin: Warm and dry.    Prior to admission Palliative performance scale PPSv2 (%): 60    Current PPS 50%    Hematology:  Lab Results   Component Value Date    WBC 15.3 (H) 05/19/2025    HGB 11.6 (L) 05/19/2025    HCT 37.0 05/19/2025    .0 05/19/2025       Coags:  Lab Results   Component Value Date    INR 1.08 11/25/2024    PTT 28.1 03/20/2025       Chemistry:  Lab Results   Component Value Date    CREATSERUM 0.67 05/19/2025    BUN 36 (H) 05/19/2025     05/19/2025    K 3.7 05/19/2025    CL 96 (L) 05/19/2025    CO2 39.0 (H) 05/19/2025     (H) 05/19/2025    CA 7.8 (L) 05/19/2025    ALB 3.9 05/13/2025    ALKPHO 65 05/13/2025    BILT 0.2 05/13/2025    TP 6.5 05/13/2025    AST 13 05/13/2025    ALT <7 (L) 05/13/2025    DDIMER 0.47 12/21/2024    MG 2.0 05/14/2025    PHOS 3.4 12/26/2024    TROP <0.045 02/03/2020       Imaging:  No results found.    Follow up GOC Discussion      5/19/25-Provided clinical update to pt/son. We talked about her worries about having pacemaker placed today. I offered support and relieved some of her fears related to the procedures. She tells me she talked with oncology and she may follow up as OP for consideration for possible RT. She says she is not really sure yet what she will do, but doesn't want bronch. We talked about plan for home with [facility] and [facility] on dc. She wants to continue with supportive care.     Discussed with Patient and son: yes  Patient's preference about sharing medical information: speak to pt and son  Patient's decision making preferences: speak to pt  Code status: DNAR/DNI-selective  Have advanced directives been discussed with patient or healthcare power of : Yes        Healthcare Agent Appointed: Yes  Healthcare Agent's Name: Tyler  Woo  Healthcare Agent's Phone Number: [phone]          Spiritual needs addressed: Currently following    Palliative disposition: Community [facility]      Procedures:  No intubation  No g-tube        Palliative Care Assessment and Recommendations     Problem List:     Acute COPD exacerbation  Acute on chronic respiratory failure  Spiculated RUL lung nodule/+PET concerning for malignancy  Second degree AVB with brief third degree block  PAfib  DM type II  Abnormal thyroid function tests  Severe malnutrition  Chronic HFpEF  HTN  HLD     Dyspnea  -Monitor, O2, tx per pulm  -Pt is DNI.  -Pt has low dose Roxanol prn at home from previous admission, but has not tried it. Prefers to hold off for now.  -Education provided on non-pharmacological modalities for dyspnea.     Goals of care counseling  -see above for details  -Confirmed wishes for DNAR/DNI-selective and continue supportive care.   -Pt agreeable to pacemaker placement.   -Pt is declining to have bronch and says she is unsure if she will pursue RT. She will consider as OP.   -Ongoing GOC discussions will be needed through clinical course and over time.  -Pt/son are aware of the option for comfort care with hospice, but are NOT ready for this right now.  - following for spiritual support.  -Agreeable to palliative care following  -Dispo:  [facility]  and  [facility] to follow. SW to help with dc planning.   -Provided emotional support to pt/son who are coping adequately.     Advance care planning  -see above for details  -Pt's son Tyler Berkowitz is HCPOA #[phone].  -Previously completed POLST/ HCPOA paperwork on file in [employer].      A total of 35 mins were spent on this consult, which included all of the following: direct face to face contact, history taking, physical examination, and >50% was spent counseling and coordinating care.    I will continue to follow clinically.      Charlotte  Luis [facility], [facility] [id number]  5/19/2025  1:31 PM  Palliative Care Services        [1]   Allergies  Allergen Reactions    Penicillin G ANAPHYLAXIS    Azithromycin DIARRHEA and NAUSEA AND VOMITING    Cefuroxime UNKNOWN     Other reaction(s): Vomitting    Levofloxacin UNKNOWN     Other reaction(s): LEVOFLOXACIN    Penicillins      Other reaction(s): Unknown    Seasonal ITCHING   [2]   Current Facility-Administered Medications:     ipratropium-albuterol (Duoneb) 0.5-2.5 (3) MG/3ML inhalation solution 3 mL, 3 mL, Nebulization, BID    methylPREDNISolone sodium succinate (Solu-MEDROL) injection 20 mg, 20 mg, Intravenous, Q12H    dilTIAZem 10 mg BOLUS FROM BAG infusion, 10 mg, Intravenous, Q1H PRN    senna-docusate (Senokot-S) 8.6-50 MG per tab 2 tablet, 2 tablet, Oral, Daily PRN    acetaZOLAMIDE (Diamox) tab 500 mg, 500 mg, Oral, Daily    cholecalciferol (Vitamin D3) tab 1,000 Units, 1,000 Units, Oral, BID    [Held by provider] digoxin (Lanoxin) tab 125 mcg, 125 mcg, Oral, Daily    [Held by provider] dilTIAZem ER (Dilacor XR) 24 hr cap 360 mg, 360 mg, Oral, Daily    fluticasone-salmeterol (Advair Diskus) 500-50 MCG/ACT inhaler 1 puff, 1 puff, Inhalation, BID    umeclidinium bromide (Incruse Ellipta) 62.5 MCG/ACT inhaler 1 puff, 1 puff, Inhalation, Daily    furosemide (Lasix) tab 80 mg, 80 mg, Oral, BID (Diuretic)    cetirizine (ZyrTEC) tab 5 mg, 5 mg, Oral, Nightly    montelukast (Singulair) tab 10 mg, 10 mg, Oral, Nightly    morphINE 10 MG/5ML oral solution 2.6 mg, 2.6 mg, Oral, TID PRN    metoclopramide (Reglan) 5 mg/mL injection 5 mg, 5 mg, Intravenous, Q8H PRN    polyethylene glycol (PEG 3350) (Miralax) 17 g oral packet 17 g, 17 g, Oral, Daily    ipratropium-albuterol (Duoneb) 0.5-2.5 (3) MG/3ML inhalation solution 3 mL, 3 mL, Nebulization, Q4H PRN    glucose (Dex4) 15 GM/59ML oral liquid 15 g, 15 g, Oral, Q15 Min PRN **OR** glucose (Glutose) 40% oral gel 15 g, 15 g, Oral, Q15 Min PRN **OR** glucose-vitamin C (Dex-4)  chewable tab 4 tablet, 4 tablet, Oral, Q15 Min PRN **OR** dextrose 50% injection 50 mL, 50 mL, Intravenous, Q15 Min PRN **OR** glucose (Dex4) 15 GM/59ML oral liquid 30 g, 30 g, Oral, Q15 Min PRN **OR** glucose (Glutose) 40% oral gel 30 g, 30 g, Oral, Q15 Min PRN **OR** glucose-vitamin C (Dex-4) chewable tab 8 tablet, 8 tablet, Oral, Q15 Min PRN    [Held by provider] insulin aspart (NovoLOG) 100 Units/mL FlexPen 1-7 Units, 1-7 Units, Subcutaneous, TID CC and HS    [Held by provider] enoxaparin (Lovenox) 40 MG/0.4ML SUBQ injection 40 mg, 40 mg, Subcutaneous, Daily    ondansetron (Zofran) 4 MG/2ML injection 4 mg, 4 mg, Intravenous, Q6H PRN    guaiFENesin (Robitussin) 100 MG/5 ML oral liquid 200 mg, 200 mg, Oral, Q4H PRN    benzonatate (Tessalon) cap 200 mg, 200 mg, Oral, TID PRN    acetaminophen (Tylenol) tab 650 mg, 650 mg, Oral, Q6H PRN    phenazopyridine (Pyridium) tab 200 mg, 200 mg, Oral, TID PRN

## 2025-05-19 NOTE — OPERATIVE REPORT
[facility]    Cardiac Electrophysiology Operative Note    Yesenia Berkowitz Location:Cath Lab Suites   [facility] [id number] MRN [id number]   Admission Date 5/13/2025 Procedure Date 5/19/2025   Attending Physician Ez Taylor MD Procedure Physician Davey Vasquez MD       Preprocedure Diagnosis:  Intermittent 2nd degree AV block, type 2; Atrial fibrillation with rapid rates; Tachy-lakeshia syndrome     Postprocedure Diagnosis:  Intermittent 2nd degree AV block, type 2; Atrial fibrillation with rapid rates; Tachy-lakeshia syndrome      Procedures:    - Implantation of a MRI-conditional dual-chamber permanent pacemaker, right atrial lead, and right ventricular lead ([employer])  - Fluoroscopy   - Venography   - Moderate conscious sedation     :  Davey Vasquez M.D.      Anesthesia:  I provided moderate conscious sedation from 15:30 to 16:10.  Versed, fentanyl.     Estimated blood loss:  10 mL      Complications:  None apparent.      Findings:  The patient was brought to the operating room in the postabsorptive and stable state. A procedural pause was performed to confirm the patient's identity and the site and type of surgery. Intravenous conscious sedation was administered. The chest was prepped and draped in a sterile fashion. 1% lidocaine was administered in the right chest, and an incision was made medial to the deltopectoral groove. Electrocautery was used to create a small inferomedially directed prepectoral pocket. The right axillary vein was accessed using venography, fluoroscopy, and the modified Seldinger technique. Two J-tipped guide wires were advanced into the inferior vena cava. Over the first wire, a 6-Panamanian sheath was advanced into the axillary vein, and through this the right ventricular lead was advanced to the right ventricular septum, confirmed in the Italian and MELENDEZ fluoroscopic perspectives. Lead stability and electrical parameters were satisfactory, no extracardiac stimulation at maximum  output, and the lead was secured to the pectoralis muscle using 3 separate ties of 0-ethibond suture and the collar. The next wire was used to advance a 6-Afghan sheath into the axillary vein, through which the right atrial lead was advanced to the right atrial appendage. Lead stability and electrical parameters were satisfactory, with no extracardiac stimulation at maximum output, and the lead was secured to the pectoralis muscle using 3 separate ties of 0-ethibond suture and the collar.The generator was connected to the leads, with visual inspection and gentle tugging confirming a secure connection. The device pocket was irrigated with sterile solution and hemostasis was excellent. The generator was placed within the pocket with the leads coiled behind it. The incision was closed with deep and intermediate layers of 2-0 Vicryl suture, the subcuticular layer was approximated with Steri-strips, and a sterile bandage placed over the site. The pacemaker was interrogated via the external , with satisfactory findings. The patient was transported to the recovery area in comfortable and stable condition.    Device Settings:   DDDR  ppm         RESULTS:   - Implantation of a MRI-conditional dual-chamber pacemaker      PLAN:   - IV antbiotics x 24 hrs  - Resume rate/rhythm meds for AF  - No heparin, lovenox, or other anticoagulants for 48 hours.   - CXR-PAL and device interrogation in AM  - Incision care as directed.  Keep dry for 5 days.  - Follow-up in [facility] for incision check in 7-10 days.  - Driving/activity restrictions as instructed.     Davey Vasquez M.D.   Cardiac Electrophysiology     5/19/2025  -----------------------------------------

## 2025-05-19 NOTE — PROGRESS NOTES
[facility]  part of [facility]    Progress Note    Yesenia Berkowitz Patient Status:  Inpatient    1940 MRN [id number]   Location [facility] 3W/SW Attending Ez Taylor MD   Hosp Day # 6 PCP JO-ANN YANG MD       Subjective:     2025 today Yesenia is a couple hours post right PPM placement.  She has some soreness at the urine site.  The site looks quite good with dressing applied.  She has good air exchange all lung fields.  Breathing she says it is overall much better.  She normally does not take steroi.d at home but she is on 4 L chronically.  I will continue to taper steroids down quickly as tolerated    Yesenia Berkowitz is a(n) 84 year old female known severe COPD and multiple admissions again presented to ED w severe dyspnea.  She also has a highly suspicious RUL spiculated growing lung nodule that is highly FDG + on April PET scan.    I saw Yesenia in April 10 when she came in with her son, Gaston to discuss this PET scan that was done 25.  The nodule shows a high FDG uptake very suspicious for carcinoma.  We discussed needle biopsy versus navigational bronchoscopy biopsy versus going ahead with SBRT treatment without tissue dx on the assumption that it is likely to be a cancerous tumor.  We performed spirometry in the office which shows FEV1 of 0.76 L which is 39% of her predicted.  The FEV1 to FVC ratio was 64%.  The flow-volume loop is mildly scooped out.  This is compatible with a severe obstructive pattern. There is question as to whether she would be a candidate for SBRT given this poor function. Repeat PFT will be needed before any radiation Rx.    Objective:   Blood pressure 125/62, pulse 86, temperature 97.8 °F (36.6 °C), temperature source Oral, resp. rate 18, height 5' 5\" (1.651 m), weight 140 lb 9.6 oz (63.8 kg), SpO2 97%.    Intake/Output Summary (Last 24 hours) at 2025 1826  Last data filed at 2025 1700  Gross per 24 hour   Intake 458 ml   Output 1500  ml   Net -1042 ml     General:alert and NAD sitting on bed on 4 L with right arm sling  Infraclavicular right-sided pacemaker site with tiny bandages all clean not swollen  Neck: no jvd or acc ms use  Pulmonary/Chest: fairl y  good BS bilat and min if any whz  Cardiovascular: S1, S2 distant w AF and RVR up to 140  Extremities: extremities well perfused, no cyanosis or edema  Neuro: alert, no gross weakness of face or extremities    Results:     Lab Results   Component Value Date    WBC 15.3 (H) 05/19/2025    HGB 11.6 (L) 05/19/2025    HCT 37.0 05/19/2025    .0 05/19/2025    CREATSERUM 0.67 05/19/2025    BUN 36 (H) 05/19/2025     05/19/2025    K 3.7 05/19/2025    CL 96 (L) 05/19/2025    CO2 39.0 (H) 05/19/2025     (H) 05/19/2025    CA 7.8 (L) 05/19/2025    ALB 3.9 05/13/2025    ALKPHO 65 05/13/2025    BILT 0.2 05/13/2025    TP 6.5 05/13/2025    AST 13 05/13/2025    ALT <7 (L) 05/13/2025    PTT 28.1 03/20/2025    INR 1.08 11/25/2024    T4F 0.9 05/15/2025    TSH 0.172 (L) 05/15/2025    LIP 30 08/30/2024    DDIMER 0.47 12/21/2024    MG 2.0 05/14/2025    PHOS 3.4 12/26/2024    TROP <0.045 02/03/2020       No results found.          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 Current R effusion is quite small so not likely malignant and quite small to tap.      Hyperglycemia related to steroid      Lesion near vulva  ?infectious      Anxiety    Rec    Cardiac floor rm 304 for dilt   Status post right PPM    Bronchodilation    Steroid to taper down to 15 bid    Out pt ION Bronch or perc bx for lung Nodule, DW Pt but pt has declined    5/17/25 Discussed option of getting SBRT without dx ( see above) w pt and son at bedside so will rec send to rad-onc after discharge  D/w RN              OLIMPIA JONES MD  5/16/2025

## 2025-05-20 ENCOUNTER — APPOINTMENT (OUTPATIENT)
Dept: GENERAL RADIOLOGY | Facility: HOSPITAL | Age: 85
End: 2025-05-20
Attending: INTERNAL MEDICINE
Payer: MEDICARE

## 2025-05-20 LAB
ANION GAP SERPL CALC-SCNC: 7 MMOL/L (ref 0–18)
BASOPHILS # BLD AUTO: 0.03 X10(3) UL (ref 0–0.2)
BASOPHILS NFR BLD AUTO: 0.2 %
BUN BLD-MCNC: 36 MG/DL (ref 9–23)
BUN/CREAT SERPL: 51.4 (ref 10–20)
CALCIUM BLD-MCNC: 8.5 MG/DL (ref 8.7–10.4)
CHLORIDE SERPL-SCNC: 94 MMOL/L (ref 98–112)
CO2 SERPL-SCNC: 35 MMOL/L (ref 21–32)
CREAT BLD-MCNC: 0.7 MG/DL (ref 0.55–1.02)
DEPRECATED RDW RBC AUTO: 54.4 FL (ref 35.1–46.3)
EGFRCR SERPLBLD CKD-EPI 2021: 86 ML/MIN/1.73M2 (ref 60–?)
EOSINOPHIL # BLD AUTO: 0 X10(3) UL (ref 0–0.7)
EOSINOPHIL NFR BLD AUTO: 0 %
ERYTHROCYTE [DISTWIDTH] IN BLOOD BY AUTOMATED COUNT: 17 % (ref 11–15)
GLUCOSE BLD-MCNC: 212 MG/DL (ref 70–99)
GLUCOSE BLDC GLUCOMTR-MCNC: 158 MG/DL (ref 70–99)
GLUCOSE BLDC GLUCOMTR-MCNC: 215 MG/DL (ref 70–99)
GLUCOSE BLDC GLUCOMTR-MCNC: 245 MG/DL (ref 70–99)
GLUCOSE BLDC GLUCOMTR-MCNC: 259 MG/DL (ref 70–99)
HCT VFR BLD AUTO: 36.3 % (ref 35–48)
HGB BLD-MCNC: 11.3 G/DL (ref 12–16)
IMM GRANULOCYTES # BLD AUTO: 0.12 X10(3) UL (ref 0–1)
IMM GRANULOCYTES NFR BLD: 0.7 %
LYMPHOCYTES # BLD AUTO: 1.49 X10(3) UL (ref 1–4)
LYMPHOCYTES NFR BLD AUTO: 8.6 %
MCH RBC QN AUTO: 27.3 PG (ref 26–34)
MCHC RBC AUTO-ENTMCNC: 31.1 G/DL (ref 31–37)
MCV RBC AUTO: 87.7 FL (ref 80–100)
MONOCYTES # BLD AUTO: 1.55 X10(3) UL (ref 0.1–1)
MONOCYTES NFR BLD AUTO: 8.9 %
NEUTROPHILS # BLD AUTO: 14.21 X10 (3) UL (ref 1.5–7.7)
NEUTROPHILS # BLD AUTO: 14.21 X10(3) UL (ref 1.5–7.7)
NEUTROPHILS NFR BLD AUTO: 81.6 %
OSMOLALITY SERPL CALC.SUM OF ELEC: 297 MOSM/KG (ref 275–295)
PLATELET # BLD AUTO: 238 10(3)UL (ref 150–450)
POTASSIUM SERPL-SCNC: 3.7 MMOL/L (ref 3.5–5.1)
RBC # BLD AUTO: 4.14 X10(6)UL (ref 3.8–5.3)
SODIUM SERPL-SCNC: 136 MMOL/L (ref 136–145)
WBC # BLD AUTO: 17.4 X10(3) UL (ref 4–11)

## 2025-05-20 PROCEDURE — 99231 SBSQ HOSP IP/OBS SF/LOW 25: CPT | Performed by: REGISTERED NURSE

## 2025-05-20 PROCEDURE — 71046 X-RAY EXAM CHEST 2 VIEWS: CPT | Performed by: INTERNAL MEDICINE

## 2025-05-20 PROCEDURE — 99233 SBSQ HOSP IP/OBS HIGH 50: CPT | Performed by: HOSPITALIST

## 2025-05-20 RX ORDER — PREDNISONE 10 MG/1
10 TABLET ORAL 2 TIMES DAILY WITH MEALS
Status: DISCONTINUED | OUTPATIENT
Start: 2025-05-20 | End: 2025-05-21

## 2025-05-20 RX ORDER — DILTIAZEM HYDROCHLORIDE 180 MG/1
360 CAPSULE, EXTENDED RELEASE ORAL DAILY
Status: DISCONTINUED | OUTPATIENT
Start: 2025-05-20 | End: 2025-05-20

## 2025-05-20 NOTE — PLAN OF CARE
Discussed discharge instructions post pacemaker placement. Sling to be worn at night for one month, pt is aware. Plan for DC tomorrow.  at bedside updated on plan of care.     Problem: Patient Centered Care  Goal: Patient preferences are identified and integrated in the patient's plan of care  Description: Interventions:- What would you like us to know as we care for you? - Provide timely, complete, and accurate information to patient/family- Incorporate patient and family knowledge, values, beliefs, and cultural backgrounds into the planning and delivery of care- Encourage patient/family to participate in care and decision-making at the level they choose- Honor patient and family perspectives and choices  Outcome: Progressing     Problem: Patient/Family Goals  Goal: Patient/Family Long Term Goal  Description: Patient's Long Term Goal: Interventions:- - See additional Care Plan goals for specific interventions  Outcome: Progressing  Goal: Patient/Family Short Term Goal  Description: Patient's Short Term Goal: Interventions: - - See additional Care Plan goals for specific interventions  Outcome: Progressing     Problem: CARDIOVASCULAR - ADULT  Goal: Maintains optimal cardiac output and hemodynamic stability  Description: INTERVENTIONS:  - Monitor vital signs, rhythm, and trends  - Monitor for bleeding, hypotension and signs of decreased cardiac output  - Evaluate effectiveness of vasoactive medications to optimize hemodynamic stability  - Monitor arterial and/or venous puncture sites for bleeding and/or hematoma  - Assess quality of pulses, skin color and temperature  - Assess for signs of decreased coronary artery perfusion - ex. Angina  - Evaluate fluid balance, assess for edema, trend weights  Outcome: Progressing  Goal: Absence of cardiac arrhythmias or at baseline  Description: INTERVENTIONS:  - Continuous cardiac monitoring, monitor vital signs, obtain 12 lead EKG if indicated  - Evaluate effectiveness  of antiarrhythmic and heart rate control medications as ordered  - Initiate emergency measures for life threatening arrhythmias  - Monitor electrolytes and administer replacement therapy as ordered  Outcome: Progressing     Problem: RESPIRATORY - ADULT  Goal: Achieves optimal ventilation and oxygenation  Description: INTERVENTIONS:  - Assess for changes in respiratory status  - Assess for changes in mentation and behavior  - Position to facilitate oxygenation and minimize respiratory effort  - Oxygen supplementation based on oxygen saturation or ABGs  - Provide Smoking Cessation handout, if applicable  - Encourage broncho-pulmonary hygiene including cough, deep breathe, Incentive Spirometry  - Assess the need for suctioning and perform as needed  - Assess and instruct to report SOB or any respiratory difficulty  - Respiratory Therapy support as indicated  - Manage/alleviate anxiety  - Monitor for signs/symptoms of CO2 retention  Outcome: Progressing     Problem: GENITOURINARY - ADULT  Goal: Absence of urinary retention  Description: INTERVENTIONS:  - Assess patient’s ability to void and empty bladder  - Monitor intake/output and perform bladder scan as needed  - Follow urinary retention protocol/standard of care  - Consider collaborating with pharmacy to review patient's medication profile  - Implement strategies to promote bladder emptying  Outcome: Progressing     Problem: METABOLIC/FLUID AND ELECTROLYTES - ADULT  Goal: Glucose maintained within prescribed range  Description: INTERVENTIONS:  - Monitor Blood Glucose as ordered  - Assess for signs and symptoms of hyperglycemia and hypoglycemia  - Administer ordered medications to maintain glucose within target range  - Assess barriers to adequate nutritional intake and initiate nutrition consult as needed  - Instruct patient on self management of diabetes  Outcome: Progressing  Goal: Electrolytes maintained within normal limits  Description: INTERVENTIONS:  -  Monitor labs and rhythm and assess patient for signs and symptoms of electrolyte imbalances  - Administer electrolyte replacement as ordered  - Monitor response to electrolyte replacements, including rhythm and repeat lab results as appropriate  - Fluid restriction as ordered  - Instruct patient on fluid and nutrition restrictions as appropriate  Outcome: Progressing  Goal: Hemodynamic stability and optimal renal function maintained  Description: INTERVENTIONS:  - Monitor labs and assess for signs and symptoms of volume excess or deficit  - Monitor intake, output and patient weight  - Monitor urine specific gravity, serum osmolarity and serum sodium as indicated or ordered  - Monitor response to interventions for patient's volume status, including labs, urine output, blood pressure (other measures as available)  - Encourage oral intake as appropriate  - Instruct patient on fluid and nutrition restrictions as appropriate  Outcome: Progressing

## 2025-05-20 NOTE — PROGRESS NOTES
Progress Note  Yesenia Berkowitz Patient Status:  Inpatient    1940 MRN [id number]   Location [facility] 3W/SW Attending Ez Taylor MD   Hosp Day # 7 PCP JO-ANN YANG MD     Subjective:  S/p dual chamber PPM insertion   Post implant device check stable, CXR pending    Objective:  /67 (BP Location: Right arm)   Pulse 91   Temp 98.2 °F (36.8 °C) (Oral)   Resp 19   Ht 5' 5\" (1.651 m)   Wt 140 lb 9.6 oz (63.8 kg)   SpO2 92%   BMI 23.40 kg/m²     Telemetry: SR 80s-90s    Intake/Output:    Intake/Output Summary (Last 24 hours) at 2025 0957  Last data filed at 2025 0900  Gross per 24 hour   Intake 630 ml   Output 250 ml   Net 380 ml     Last 3 Weights   25 0504 140 lb 9.6 oz (63.8 kg)   25 0503 141 lb 1.6 oz (64 kg)   25 0555 141 lb 3.2 oz (64 kg)   25 2037 132 lb 8 oz (60.1 kg)   25 1804 135 lb (61.2 kg)   25 1753 150 lb 12.7 oz (68.4 kg)   25 1747 150 lb 12.7 oz (68.4 kg)   25 1513 145 lb 4.8 oz (65.9 kg)     Labs:  Recent Labs   Lab 25  05025  0531 25  0726   * 263* 208*   BUN 38* 37* 36*   CREATSERUM 0.95 0.84 0.67   EGFRCR 60 69 87   CA 8.2* 8.0* 7.8*    139 140   K 3.9 3.8 3.7   CL 96* 97* 96*   CO2 34.0* 36.0* 39.0*     Recent Labs   Lab 25  0505 25  0531 25  0726 25  0918   RBC 3.95 3.85 4.10 4.14   HGB 10.8* 10.5* 11.6* 11.3*   HCT 35.2 33.9* 37.0 36.3   MCV 89.1 88.1 90.2 87.7   MCH 27.3 27.3 28.3 27.3   MCHC 30.7* 31.0 31.4 31.1   RDW 16.8* 17.0* 16.7* 17.0*   NEPRELIM 11.96* 8.69*  --  14.21*   WBC 12.8* 9.5 15.3* 17.4*   .0 248.0 241.0 238.0     No results for input(s): \"TROP\", \"TROPHS\", \"CK\" in the last 168 hours.  Lab Results   Component Value Date/Time    HDL 81 (H) 2025 12:04 AM     (H) 2025 12:04 AM    TRIG 143 2025 12:04 AM     Lab Results   Component Value Date    DDIMER 0.47 2024     Lab Results   Component Value Date    TSH  0.172 (L) 05/15/2025     Review of Systems:   Constitutional: No fevers, chills, fatigue or night sweats.  Respiratory: Denies cough, wheezing or shortness of breath.  CV: Denies chest pain, palpitations, orthopnea, PND or dizziness.  GI: No nausea, vomiting or diarrhea. No blood in stools.    Physical Exam:   General: Alert, cooperative, no distress, appears stated age.  Neck: no JVD.  Lungs: Clear to auscultation bilaterally.  Chest wall: No tenderness or deformity. Surgical dressing in place, no swelling, oozing, or hematoma  Heart: Regular rate and rhythm, S1, S2 normal, no murmur, click, rub or gallop.  Abdomen: Soft, non-tender. Bowel sounds normal. No masses,  No organomegaly.  Extremities: Extremities normal, atraumatic, no cyanosis or edema.  Pulses: 2+ and symmetric all extremities.  Neurologic: Grossly intact.    Medications:  Scheduled Medications[1]  Medication Infusions[2]    Assessment:    84 year old female with PMH ofPMH of PAF, HFpEF, HTN, COPD who presents with shortness of breath and generalized weakness. Her monitor revealed intermittent AV block.     New onset intermittent 2nd degree AVB  Tachy-Noah Syndrome  Paroxysmal Atrial fibrillation/Atrial flutter with RVR  Historically on digoxin and diltiazem for PAFRVR  -intermittent AV block noted on tele, dig and diltiazem held, intermittent block persisted despite holding CCB and digoxin  -now s/p dual chamber PPM implant (right sided due to chronic lymphedema)  -post implant device check this morning stable  -PA/LAT CXR pending  -sinus rhythm on tele, HR stable 80s-90s  -will resume home diltiazem dose for control of HR if PAF recurs  -not historically on OAC due to recurrent bleeding, GIB hx, low AF burden  -[id number] score of 5 for age, gender, HF, HTN, consider eventual watchman eval due to hx recurrent bleeding     Chronic HFpEF  Pulmonary HTN  Appears compensated on exam  -September echo with LVEF >70% on 5/1/5 echo with G2DD, moderate to  severe pulmonary htn, PASP 56MMhG  -stable on home diuretic regimen lasix 80mg BID with daily diamox  -GDMT: lasix 80mg BID, diamox 500mg daily, jardiance PTA     Acute hypoxic and hypercapneic Respiratory failure  COPD  Hx RUL nodule,likely early stage lung Ca  2/2 COPD exacerbation  -steroids, inhalers, abx  -pulm following, declined biopsy  -oncology following, will f/u as OP to discuss potential SBRT radiotherapy  -palliative following    Mild Aortic stenosis  Mean gradient 8mmHg on      Mild Mitral stenosis  Mean gradient 4mmHg     HTN-controlled     HLD-not on statin due to myalgia risk with diltiazem, , OP discussion for alternatives to be considered    Chronic anemia  Hgb stable 10-11    Leukocytosis  WBC 17.4  -afebrile  -primary following    DMII-A1c 7.8    Hx T6 fracture s/p kyphoplasty      Plan:  -resume home diltiazem dose 360mg daily  -Continue lasix, diamox  -CXR pending  -stable from cardiac standpoint, we will monitor overnight and likely dc home tomorrow morning as patient does not feel comfortable discharging home today, all questions answered and post implant precautions reviewed with patient and family      Plan of care discussed with patient, RN.        Molly Carrillo, MSN, APN, FNP-BC, CCK  5/20/2025  9:57 AM           [1]    clindamycin  600 mg Intravenous Q8H    predniSONE  15 mg Oral BID with meals    ipratropium-albuterol  3 mL Nebulization BID    acetaZOLAMIDE  500 mg Oral Daily    cholecalciferol  1,000 Units Oral BID    dilTIAZem ER  360 mg Oral Daily    fluticasone-salmeterol  1 puff Inhalation BID    umeclidinium bromide  1 puff Inhalation Daily    furosemide  80 mg Oral BID (Diuretic)    cetirizine  5 mg Oral Nightly    montelukast  10 mg Oral Nightly    polyethylene glycol (PEG 3350)  17 g Oral Daily    insulin aspart  1-7 Units Subcutaneous TID CC and HS   [2]

## 2025-05-20 NOTE — CM/SW NOTE
Received MDO for HH orders.    Per chart, pt is current w/ [facility]. DREW Miller entered LEIDY order on 5/14.      PLAN: Home w/ [facility] - pending med clear      SW/CM to remain available for support and/or discharge planning.     MS GabrielleW, LSW [id number]

## 2025-05-20 NOTE — PROGRESS NOTES
[facility]  Progress Note     Yesenia Berkowitz  : 1940    Status: Inpatient  Day #: 7    Attending: Ez Taylor MD  PCP: JO-ANN YANG MD     Assessment and Plan:    AECOPD  -pulm on consult  -cont nebs  -cont steroids IV  -cont advair, incruse    RUL lung nodule  -recent PET scan +  -appreciate pulm, oncology, palliative care consults  -ION bronchoscopy suggested, but patient does not know if she really wants to pursue this. Consideration of SBRT - to f/u outpatient    DM2  -monitor BG, cover ISS    Paroxysmal afib  -not on AC per patient preference per prior cardiology note  -diltiazem started now that PPM in    Second degree AVB with possible brief third degree block  -cardiology consulted  -digoxin, diltiazem held  -echo result reviewed  -s/p PPM    Abnormal TFTs  -low TSH and free T3 but normal free T4    HTN  -cont meds and monitor    Chronic HFpEF  -cont lasix PO    GOC  -DNAR/selective  -palliative care consulted    Dispo:  discharge planning    Dietitian Malnutrition Assessment    Evaluation for Malnutrition: Criteria for severe malnutrition diagnosis- acute illness/injury related to Wt loss greater than 5% in 1 month., Energy intake less than 50% for greater than 5 days.                 RD Malnutrition Care Plan: Encouraged increased PO intake., Encouraged small frequent meals with emphasis on high calorie/high protein., Intiated ONS (oral nutritional supplements).    Body Fat/Muscle Mass:          Physician Assessment     Patient has a diagnosis of severe malnutrition     DVT Mechanical Prophylaxis:        DVT Pharmacologic Prophylaxis   Medication   None               Subjective:      Initial Chief Complaint:  SOB    C/o SOB with exertion today. Sore from PPM.     10 point ROS completed and was negative, except for pertinent positive and negatives stated in subjective.      Objective:      Temp:  [98.1 °F (36.7 °C)-98.2 °F (36.8 °C)] 98.2 °F (36.8 °C)  Pulse:  [75-91] 91  Resp:   [15-19] 19  BP: (115-133)/(55-71) 132/67  SpO2:  [92 %-97 %] 92 %  General:  Alert, no distress  HEENT:  Normocephalic, atraumatic  Cardiac:  Regular rate, regular rhythm  Pulmonary:  diminished breath sounds. No wheeze. Crackles at bases  Gastrointestinal:  Soft, non-tender, normal bowel sounds  Musculoskeletal:  No joint swelling  Extremities:  No edema, no cyanosis, no clubbing  Neurologic:  nonfocal  Psychiatric:  Normal affect, calm and appropriate    Intake/Output Summary (Last 24 hours) at 5/20/2025 1424  Last data filed at 5/20/2025 0900  Gross per 24 hour   Intake 530 ml   Output 250 ml   Net 280 ml         Recent Labs   Lab 05/16/25  0505 05/17/25  0531 05/19/25  0726 05/20/25  0918   WBC 12.8* 9.5 15.3* 17.4*   HGB 10.8* 10.5* 11.6* 11.3*   HCT 35.2 33.9* 37.0 36.3   .0 248.0 241.0 238.0   RBC 3.95 3.85 4.10 4.14   MCV 89.1 88.1 90.2 87.7   MCH 27.3 27.3 28.3 27.3   MCHC 30.7* 31.0 31.4 31.1   RDW 16.8* 17.0* 16.7* 17.0*   NEPRELIM 11.96* 8.69*  --  14.21*     Recent Labs   Lab 05/13/25  1821 05/14/25  0603 05/14/25  1546 05/15/25  0610 05/16/25  0505 05/17/25  0531 05/19/25  0726 05/20/25  0918   BUN 14   < >  --  29*   < > 37* 36* 36*   CREATSERUM 0.74   < >  --  1.04*   < > 0.84 0.67 0.70   CA 8.8   < >  --  8.3*   < > 8.0* 7.8* 8.5*   ALB 3.9  --   --   --   --   --   --   --       < >  --  137   < > 139 140 136   K 3.1*   < >  --  4.0  4.0   < > 3.8 3.7 3.7   CL 96*   < >  --  97*   < > 97* 96* 94*   CO2 36.0*   < >  --  31.0   < > 36.0* 39.0* 35.0*   *   < >  --  268*   < > 263* 208* 212*   MG  --   --  2.0  --   --   --   --   --    BILT 0.2  --   --   --   --   --   --   --    AST 13  --   --   --   --   --   --   --    ALT <7*  --   --   --   --   --   --   --    ALKPHO 65  --   --   --   --   --   --   --    TP 6.5  --   --   --   --   --   --   --    TSH  --   --   --  0.172*  --   --   --   --    T4F  --   --   --  0.9  --   --   --   --     < > = values in this interval  not displayed.       No results found.          Medications:  Scheduled Medications[1]   PRN Meds: PRN Medications[2]    Supplementary Documentation:   DVT Mechanical Prophylaxis:        DVT Pharmacologic Prophylaxis   Medication   None                Code Status: DNAR/Selective Treatment  García: No urinary catheter in place  García Duration (in days):   Central line:    ANGEL: 5/21/2025        **Certification      PHYSICIAN Certification of Need for Inpatient Hospitalization - Initial Certification    Patient will require inpatient services that will reasonably be expected to span two midnight's based on the clinical documentation in H+P.   Based on patients current state of illness, I anticipate that, after discharge, patient will require TBD.         [facility]. Time spent on chart/note review, review of labs/imaging, discussion with patient, physical exam, discussion with staff, consultants, coordinating care, writing progress note, discussion of plan of care.      Ez Taylor MD         [1]    predniSONE  10 mg Oral BID with meals    ipratropium-albuterol  3 mL Nebulization BID    acetaZOLAMIDE  500 mg Oral Daily    cholecalciferol  1,000 Units Oral BID    dilTIAZem ER  360 mg Oral Daily    fluticasone-salmeterol  1 puff Inhalation BID    umeclidinium bromide  1 puff Inhalation Daily    furosemide  80 mg Oral BID (Diuretic)    cetirizine  5 mg Oral Nightly    montelukast  10 mg Oral Nightly    polyethylene glycol (PEG 3350)  17 g Oral Daily    insulin aspart  1-7 Units Subcutaneous TID CC and HS   [2]   traMADol    dilTIAZem    senna-docusate    morphINE    metoclopramide    ipratropium-albuterol    glucose **OR** glucose **OR** glucose-vitamin C **OR** dextrose **OR** glucose **OR** glucose **OR** glucose-vitamin C    ondansetron    guaiFENesin    benzonatate    acetaminophen    phenazopyridine

## 2025-05-20 NOTE — PALLIATIVE CARE NOTE
[facility]  part of [facility]  Palliative Care Progress Note    Yesenia Berkowitz Patient Status:  Inpatient    1940 MRN [id number]   Location [facility] 3W/SW Attending Ez Taylor MD   Hosp Day # 7 PCP JO-ANN YANG MD     The  Cures Act makes medical notes like these available to patients in the interest of transparency. Please be advised this is a medical document. Medical documents are intended to carry relevant information, facts as evident, and the clinical opinion of the practitioner. The medical note is intended as peer to peer communication and may appear blunt or direct. It is written in medical language and may contain abbreviations or verbiage that are unfamiliar.       Subjective     Reviewed symptom medications past 24 hrs: Ultram 50 mg po X3, pyridium 200 mg po X2    Patient was seen and examined in bed with her son Tyler at the bedside. Pt is A&OX4 and tells me she didn't sleep well overnight. She says her breathing is stable and denies any significant dyspnea. Remains on nc 4L. She is reporting some mild pain from pacemaker site and Tramadol prn is helping.     See summary of discussion below.     Review of Systems:  Pertinent items are noted in subjective    Allergies:  Allergies[1]    Medications:   Current Hospital Medications[2]    Objective     Vital Signs:  Blood pressure 132/67, pulse 91, temperature 98.2 °F (36.8 °C), temperature source Oral, resp. rate 19, height 5' 5\" (1.651 m), weight 140 lb 9.6 oz (63.8 kg), SpO2 92%.  Body mass index is 23.4 kg/m².  Present Level of pain: mild R chest pacemaker site pain   Non-verbal signs of pain present: No    Physical Exam:  General: Alert and in no apparent distress. Weak, frail/thin appearing  HEENT: No focal deficits.  Cardiac: Regular rate and rhythm, S1, S2 normal, no murmur, rub or gallop.  Lungs: Diminished breath sounds bilaterally. Normal excursions and effort.  Abdomen: Soft, non-tender, normal  bowel sounds X 4 quadrants, no rebound or guarding  Extremities: Without clubbing, cyanosis. Peripheral pulses are 2+. Trace pedal BLE edema present  Neurologic: Alert and oriented X4, normal affect.  Skin: Warm and dry.    Prior to admission Palliative performance scale PPSv2 (%): 60    Current PPS 50%    Hematology:  Lab Results   Component Value Date    WBC 17.4 (H) 05/20/2025    HGB 11.3 (L) 05/20/2025    HCT 36.3 05/20/2025    .0 05/20/2025       Coags:  Lab Results   Component Value Date    INR 1.08 11/25/2024    PTT 28.1 03/20/2025       Chemistry:  Lab Results   Component Value Date    CREATSERUM 0.70 05/20/2025    BUN 36 (H) 05/20/2025     05/20/2025    K 3.7 05/20/2025    CL 94 (L) 05/20/2025    CO2 35.0 (H) 05/20/2025     (H) 05/20/2025    CA 8.5 (L) 05/20/2025    ALB 3.9 05/13/2025    ALKPHO 65 05/13/2025    BILT 0.2 05/13/2025    TP 6.5 05/13/2025    AST 13 05/13/2025    ALT <7 (L) 05/13/2025    DDIMER 0.47 12/21/2024    MG 2.0 05/14/2025    PHOS 3.4 12/26/2024    TROP <0.045 02/03/2020       Imaging:  No results found.    Follow up [facility] Discussion    5/20/25-I spoke to pt and her son with clinical update. She is hoping to go home tomorrow and we talked about [facility] will be following. She wants to continue with supportive care.     5/19/25-Provided clinical update to pt/son. We talked about her worries about having pacemaker placed today. I offered support and relieved some of her fears related to the procedures. She tells me she talked with oncology and she may follow up as OP for consideration for possible RT. She says she is not really sure yet what she will do, but doesn't want bronch. We talked about plan for home with [facility] on dc. She wants to continue with supportive care.     Discussed with Patient and son: yes  Patient's preference about sharing medical information: speak to pt and son  Patient's decision making preferences: speak to pt  Code  status: DNAR/DNI-selective  Have advanced directives been discussed with patient or healthcare power of : Yes        Healthcare Agent Appointed: Yes  Healthcare Agent's Name: Tyler Berkowitz  Healthcare Agent's Phone Number: [phone]          Spiritual needs addressed: Currently following    Palliative disposition: [facility]      Procedures:  No intubation  No g-tube    Palliative Care Assessment and Recommendations     Problem List:     Acute COPD exacerbation  Acute on chronic respiratory failure  Spiculated RUL lung nodule/+PET concerning for malignancy  Second degree AVB with brief third degree block s/p pacemaker placement 5/19  PAfib  DM type II  Abnormal thyroid function tests  Severe malnutrition  Chronic HFpEF  HTN  HLD     Dyspnea  -Monitor, O2, tx per pulm  -Pt is DNI.  -Pt has low dose Roxanol prn at home from previous admission, but has not tried it. Prefers to hold off for now.  -Education provided on non-pharmacological modalities for dyspnea.     Goals of care counseling  -see above for details  -Confirmed wishes for DNAR/DNI-selective and continue supportive care.   -Pt is declining to have bronch and says she is unsure if she will pursue RT. She will consider as OP.   -Ongoing GOC discussions will be needed over time.  -Pt/son are aware of the option for comfort care with hospice, but are NOT ready for this right now.  - following for spiritual support.  -Agreeable to palliative care following  -Dispo:  [facility]. SW to help with dc planning.   -Provided emotional support to pt/son who are coping adequately.     Advance care planning  -see above for details  -Pt's son Tyler Berkowitz is [facility] #[phone].  -Previously completed POLST/ HCPOA paperwork on file in [employer].      A total of 25 mins were spent on this consult, which included all of the following: direct face to face contact,  history taking, physical examination, and >50% was spent counseling and coordinating care.    Discussed today's visit with Dr. Taylor and Destini KRAUSE.    I will sign off.       Charlotte Smith, ANP-BC, [facility] [id number]  5/20/2025  10:59 AM  Palliative Care Services          [1]   Allergies  Allergen Reactions    Penicillin G ANAPHYLAXIS    Azithromycin DIARRHEA and NAUSEA AND VOMITING    Cefuroxime UNKNOWN     Other reaction(s): Vomitting    Levofloxacin UNKNOWN     Other reaction(s): LEVOFLOXACIN    Penicillins      Other reaction(s): Unknown    Seasonal ITCHING   [2]   Current Facility-Administered Medications:     clindamycin phosphate in dextrose 5% (Cleocin) 600 mg/50mL IVPB premix 600 mg, 600 mg, Intravenous, Q8H    traMADol (Ultram) tab 50 mg, 50 mg, Oral, Q6H PRN    predniSONE (Deltasone) tab 15 mg, 15 mg, Oral, BID with meals    ipratropium-albuterol (Duoneb) 0.5-2.5 (3) MG/3ML inhalation solution 3 mL, 3 mL, Nebulization, BID    dilTIAZem 10 mg BOLUS FROM BAG infusion, 10 mg, Intravenous, Q1H PRN    senna-docusate (Senokot-S) 8.6-50 MG per tab 2 tablet, 2 tablet, Oral, Daily PRN    acetaZOLAMIDE (Diamox) tab 500 mg, 500 mg, Oral, Daily    cholecalciferol (Vitamin D3) tab 1,000 Units, 1,000 Units, Oral, BID    dilTIAZem ER (Dilacor XR) 24 hr cap 360 mg, 360 mg, Oral, Daily    fluticasone-salmeterol (Advair Diskus) 500-50 MCG/ACT inhaler 1 puff, 1 puff, Inhalation, BID    umeclidinium bromide (Incruse Ellipta) 62.5 MCG/ACT inhaler 1 puff, 1 puff, Inhalation, Daily    furosemide (Lasix) tab 80 mg, 80 mg, Oral, BID (Diuretic)    cetirizine (ZyrTEC) tab 5 mg, 5 mg, Oral, Nightly    montelukast (Singulair) tab 10 mg, 10 mg, Oral, Nightly    morphINE 10 MG/5ML oral solution 2.6 mg, 2.6 mg, Oral, TID PRN    metoclopramide (Reglan) 5 mg/mL injection 5 mg, 5 mg, Intravenous, Q8H PRN    polyethylene glycol (PEG 3350) (Miralax) 17 g oral packet 17 g, 17 g, Oral, Daily    ipratropium-albuterol (Duoneb) 0.5-2.5 (3) MG/3ML  inhalation solution 3 mL, 3 mL, Nebulization, Q4H PRN    glucose (Dex4) 15 GM/59ML oral liquid 15 g, 15 g, Oral, Q15 Min PRN **OR** glucose (Glutose) 40% oral gel 15 g, 15 g, Oral, Q15 Min PRN **OR** glucose-vitamin C (Dex-4) chewable tab 4 tablet, 4 tablet, Oral, Q15 Min PRN **OR** dextrose 50% injection 50 mL, 50 mL, Intravenous, Q15 Min PRN **OR** glucose (Dex4) 15 GM/59ML oral liquid 30 g, 30 g, Oral, Q15 Min PRN **OR** glucose (Glutose) 40% oral gel 30 g, 30 g, Oral, Q15 Min PRN **OR** glucose-vitamin C (Dex-4) chewable tab 8 tablet, 8 tablet, Oral, Q15 Min PRN    insulin aspart (NovoLOG) 100 Units/mL FlexPen 1-7 Units, 1-7 Units, Subcutaneous, TID CC and HS    ondansetron (Zofran) 4 MG/2ML injection 4 mg, 4 mg, Intravenous, Q6H PRN    guaiFENesin (Robitussin) 100 MG/5 ML oral liquid 200 mg, 200 mg, Oral, Q4H PRN    benzonatate (Tessalon) cap 200 mg, 200 mg, Oral, TID PRN    acetaminophen (Tylenol) tab 650 mg, 650 mg, Oral, Q6H PRN    phenazopyridine (Pyridium) tab 200 mg, 200 mg, Oral, TID PRN

## 2025-05-20 NOTE — PLAN OF CARE

## 2025-05-20 NOTE — PROGRESS NOTES
[facility]  Cardiology Progress Note    Yesenia Berkowitz Patient Status:  Inpatient    1940 MRN [id number]   Location [facility] 3W/SW Attending Ez Taylor MD   Hosp Day # 7 PCP JO-ANN YANG MD     Subjective     Going for chest x-ray this morning.  Remains in sinus rhythm.    Objective:     Patient Vitals for the past 24 hrs:   BP Temp Temp src Pulse Resp SpO2   25 0832 132/67 98.2 °F (36.8 °C) Oral 91 19 92 %   25 0700 -- -- -- -- -- 95 %   25 0506 132/71 -- -- 87 18 97 %   25 2000 127/66 98.1 °F (36.7 °C) Oral 84 18 95 %   25 1855 129/65 -- -- 86 18 97 %   25 1755 125/62 -- -- 86 18 97 %   25 1722 120/56 -- -- 88 18 97 %   25 1707 133/67 -- -- 86 18 96 %   25 1653 122/66 -- -- 75 16 96 %   25 1635 121/65 -- -- 80 16 97 %   25 1500 115/55 -- -- 87 15 95 %   25 1257 128/74 97.8 °F (36.6 °C) Oral 87 20 96 %       Intake/Output:   Last 3 shifts:   Intake/Output                   25 07 - 25 0659 25 07 - 25 0659 25 07 - 25 0659       Intake    P.O.  896  340  240    P.O. 896 340 240    IV PIGGYBACK  --  50  --    Volume (mL) (clindamycin phosphate in dextrose 5% (Cleocin) 600 mg/50mL IVPB premix 600 mg) -- 50 --    Total Intake 896 390 240       Output    Urine  1400  650  --    Urine 1400 650 --    Urine Occurrence 2 x 5 x --    Stool  --  --  --    Stool Count Calculated for I/O 1 x 3 x --    Total Output 1400 650 --       Net I/O     -504 -260 240             Vent Settings:      Hemodynamic parameters (last 24 hours):      Scheduled Meds: Scheduled Medications[1]    Continuous Infusions: Medication Infusions[2]    Results:     Lab Results   Component Value Date    WBC 17.4 (H) 2025    HGB 11.3 (L) 2025    HCT 36.3 2025    .0 2025    CREATSERUM 0.70 2025    BUN 36 (H) 2025     2025    K 3.7 2025    CL 94 (L)  2025    CO2 35.0 (H) 2025     (H) 2025    CA 8.5 (L) 2025    ALB 3.9 2025    ALKPHO 65 2025    BILT 0.2 2025    TP 6.5 2025    AST 13 2025    ALT <7 (L) 2025    PTT 28.1 2025    INR 1.08 2024    T4F 0.9 05/15/2025    TSH 0.172 (L) 05/15/2025    LIP 30 2024    DDIMER 0.47 2024    MG 2.0 2025    PHOS 3.4 2024    TROP <0.045 2020       Recent Labs   Lab 25  0531 25  0726 25  0918   * 208* 212*   BUN 37* 36* 36*   CREATSERUM 0.84 0.67 0.70   CA 8.0* 7.8* 8.5*    140 136   K 3.8 3.7 3.7   CL 97* 96* 94*   CO2 36.0* 39.0* 35.0*     Recent Labs   Lab 25  0505 25  0531 25  0726 25  0918   RBC 3.95 3.85 4.10 4.14   HGB 10.8* 10.5* 11.6* 11.3*   HCT 35.2 33.9* 37.0 36.3   MCV 89.1 88.1 90.2 87.7   MCH 27.3 27.3 28.3 27.3   MCHC 30.7* 31.0 31.4 31.1   RDW 16.8* 17.0* 16.7* 17.0*   NEPRELIM 11.96* 8.69*  --  14.21*   WBC 12.8* 9.5 15.3* 17.4*   .0 248.0 241.0 238.0       No results for input(s): \"BNPML\" in the last 168 hours.    No results for input(s): \"TROP\", \"CK\" in the last 168 hours.    No results found.      CARD ECHO 2D DOPPLER (CPT=93306)  Result Date: 5/15/2025  Transthoracic Echocardiogram Name:Yesenia Berkowitz Date: 05/15/2025 :  1940 Ht:  (65in)  BP: 103 / 63 MRN:  [id number]    Age:  84years    Wt:  (132lb) HR: 67bpm Loc:  [facility]       Gndr: F          BSA: 1.66m^2 Sonographer: Samantha Ordering:    Cortney Dumont Consulting:  Luis Fernando Fowler ---------------------------------------------------------------------------- History/Indications:   Murmur.  Abnormal ECG. ---------------------------------------------------------------------------- Procedure information:  A transthoracic complete 2D study was performed. Additional evaluation included M-mode, complete spectral Doppler, and color Doppler.  Patient status:  Inpatient.  Location:  Bedside.     This was a routine study. Transthoracic echocardiography for diagnosis and ventricular function evaluation. Image quality was adequate. ECG rhythm:   Normal sinus ---------------------------------------------------------------------------- Conclusions: 1. Left ventricle: The cavity size was normal. Wall thickness was mildly    increased. Systolic function was hyperdynamic. The estimated ejection    fraction was >70%, by visual assessment. Wall motion is normal; there are    no regional wall motion abnormalities. Features are consistent with a    pseudonormal left ventricular filling pattern, with concomitant abnormal    relaxation and increased filling pressure - grade 2 diastolic    dysfunction. 2. Right ventricle: The cavity size was mildly increased. Systolic function    was normal. 3. Mitral valve: The annulus was moderately fibrotic. The leaflets were    mildly thickened. The mean diastolic gradient was 4mm Hg. The valve area    (LVOT continuity) was 1.88cm^2. 4. Left atrium: The atrium was mildly dilated. 5. Pulmonary arteries: Systolic pressure was mildly increased, estimated to    be 35mm Hg. Impressions:  No significant change since prior study. * ---------------------------------------------------------------------------- * Findings: Left ventricle:  The cavity size was normal. Wall thickness was mildly increased. Systolic function was hyperdynamic. The estimated ejection fraction was >70%, by visual assessment. Wall motion is normal; there are no regional wall motion abnormalities. Features are consistent with a pseudonormal left ventricular filling pattern, with concomitant abnormal relaxation and increased filling pressure - grade 2 diastolic dysfunction. Left atrium:  The atrium was mildly dilated. Right ventricle:  The cavity size was mildly increased. Systolic function was normal. Right atrium:  The atrium was normal in size. Mitral valve:  The annulus was moderately fibrotic. The leaflets were  mildly thickened. Leaflet separation was normal.  Doppler:  Transvalvular velocity was increased. There was trace regurgitation.    The valve area (LVOT continuity) was 1.88cm^2. The valve area index (LVOT continuity) was 1.13cm^2/m^2.    The mean diastolic gradient was 4mm Hg. Aortic valve:   The valve was trileaflet. The leaflets were mildly thickened. Cusp separation was normal.  Doppler:  Transvalvular velocity was within the normal range. There was no evidence for stenosis. There was no significant regurgitation.    The valve area (VTI) was 2.1cm^2. The valve area (VTI) index was 1.27cm^2/m^2.    The mean systolic gradient was 8mm Hg. The peak systolic gradient was 14mm Hg. Tricuspid valve:  The valve is structurally normal. Leaflet separation was normal.  Doppler:  Transvalvular velocity was within the normal range. There was no evidence for stenosis. There was trivial regurgitation. Pulmonic valve:   The valve is structurally normal. Cusp separation was normal.  Doppler:  Transvalvular velocity was within the normal range. There was no evidence for stenosis. There was no significant regurgitation. Pericardium:   There was no pericardial effusion. Aorta: Aortic root: The aortic root was normal. Ascending aorta: The ascending aorta was normal. Pulmonary arteries: Systolic pressure was mildly increased, estimated to be 35mm Hg. Systemic veins:  Central venous respirophasic diameter changes are in the normal range (>50%). Inferior vena cava: The IVC was normal-sized. ---------------------------------------------------------------------------- Measurements  Left ventricle       Value          Ref       09/14/2024  IVS thickness,   (H) 1.0   cm       0.6 - 0.9 1.0  ED, PLAX  LV ID, ED, PLAX      4.0   cm       3.8 - 5.2 4.2  LV ID, ES, PLAX      2.7   cm       2.2 - 3.5 2.9  LV PW thickness,     0.9   cm       0.6 - 0.9 1.0  ED, PLAX  IVS/LV PW ratio,     1.11           --------- 1.00  ED, PLAX  LV PW/LV ID           0.23           --------- 0.24  ratio, ED, PLAX  LV ejection          61    %        54 - 74   59  fraction  Stroke               54    ml/m^2   --------- 51  volume/bsa, 2D  LV e', lateral   (L) 5.8   cm/sec   >=10.0    ----------  LV E/e', lateral (H) 18             <=13      ----------  LV e', medial    (L) 6.1   cm/sec   >=7.0     ----------  LV E/e', medial      17             --------- ----------  LV e', average       5.9   cm/sec   --------- ----------  LV E/e', average (H) 17             <=14      ----------  LVOT                 Value          Ref       09/14/2024  LVOT ID              1.9   cm       --------- 1.8  LVOT peak            1.45  m/sec    --------- 1.69  velocity, S  LVOT VTI, S          31.7  cm       --------- 36.4  LVOT peak            8     mm Hg    --------- 11  gradient, S  LVOT mean            5     mm Hg    --------- 6  gradient, S  Stroke volume        90    ml       --------- 93  (SV), LVOT DP  Stroke index         54    ml/m^2   --------- 51  (SV/bsa), LVOT  DP  Aortic valve         Value          Ref       09/14/2024  Aortic valve         1.88  m/sec    --------- 2.06  peak velocity, S  Aortic valve         42.8  cm       --------- 42.1  VTI, S  Aortic mean          8     mm Hg    --------- 10  gradient, S  Aortic peak          14    mm Hg    --------- 17  gradient, S  Aortic valve         2.1   cm^2     --------- 2.17  area, VTI  Aortic valve         1.27  cm^2/m^2 --------- 1.19  area/bsa, VTI  Velocity ratio,      0.77           --------- 0.82  peak, LVOT/AV  Aortic root          Value          Ref       09/14/2024  Aortic root ID       2.9   cm       2.4 - 3.9 2.9  Ascending aorta      Value          Ref       09/14/2024  Ascending aorta      3.0   cm       1.9 - 3.5 3.0  ID  Left atrium          Value          Ref       09/14/2024  LA volume, S     (H) 61    ml       22 - 52   47  LA volume/bsa, S (H) 37    ml/m^2   16 - 34   26  LA volume, ES,   (H) 58    ml       22 - 52   47   1-p A4C  LA volume, ES,   (H) 59    ml       22 - 52   44  1-p A2C  LA volume, ES,       69    ml       --------- 53  A/L  LA volume/bsa,   (H) 42    ml/m^2   16 - 34   29  ES, A/L  Mitral valve         Value          Ref       09/14/2024  Mitral E-wave        1.02  m/sec    --------- 1  peak velocity  Mitral A-wave        1.61  m/sec    --------- 1.4  peak velocity  Mitral               248   ms       --------- 243  deceleration  time  Mitral mean          4     mm Hg    --------- 5  gradient, D  Mitral peak          13    mm Hg    --------- 11  gradient, D  Mitral E/A           0.6            --------- 0.7  ratio, peak  Mitral valve         1.88  cm^2     --------- 1.88  area, LVOT  continuity  Mitral valve         1.13  cm^2/m^2 --------- 1.04  area/bsa, LVOT  continuity  Pulmonary artery     Value          Ref       09/14/2024  PA pressure, S,      35    mm Hg    --------- ----------  DP  Tricuspid valve      Value          Ref       09/14/2024  Tricuspid regurg (H) 2.82  m/sec    <=2.8     3.63  peak velocity  Tricuspid peak       32    mm Hg    --------- 53  RV-RA gradient  Right atrium         Value          Ref       09/14/2024  RA ID, S-I, ES,      4.3   cm       3.4 - 5.3 ----------  A4C  RA ID/bsa, S-I,      2.6   cm/m^2   1.9 - 3.1 ----------  ES, A4C  RA area, ES, A4C     13    cm^2     10 - 18   ----------  RA volume, ES,       31    ml       --------- ----------  1-p A4C  RA volume/bsa,       18    ml/m^2   9 - 33    ----------  ES, 1-p A4C  Systemic veins       Value          Ref       09/14/2024  Estimated CVP        3     mm Hg    --------- 3  Inferior vena        Value          Ref       09/14/2024  cava  ID                   1.4   cm       <=2.1     1.8  Right ventricle      Value          Ref       09/14/2024  TAPSE, MM            2.02  cm       >=1.70    1.98  RV pressure, S,      35    mm Hg    --------- 56  DP  RV s', lateral       14.0  cm/sec   >=9.5     14.8 Legend: (L)  and  (H)  malvin  values outside specified reference range. ---------------------------------------------------------------------------- Prepared and electronically signed by Luis Fernando Fowler 05/15/2025 12:03        Exam:     General: Alert and oriented x 3. No apparent distress.   HEENT: Normocephalic, anicteric sclera, neck supple, no thyromegaly or adenopathy.  Neck: No JVD, carotids 2+, no bruits.  Cardiac: Regular rate and rhythm. S1, S2 normal. No murmur, pericardial rub, S3, or extra cardiac sounds.  Lungs: Clear without wheezes, rales, rhonchi or dullness.  Normal excursions and effort.  Abdomen: Soft, non-tender. No organosplenomegally, mass or rebound, BS-present.  Extremities: Without clubbing or cyanosis. No edema.    Neurologic: Alert and oriented, normal affect. No focal defects  Skin: Warm and dry.     Assessment and Plan:   Assessment:  Hx Paroxysmal Atrial Fib/Flutter, New Intermittent 2nd Deg AVB, Tachy-Noah Syndrome   S/p dual-chamber PPM on 5/19/2025  Now back on diltiazem 30 to 60 mg daily  Not hx on A/C d/t bleeding risk, hx GIB, low Afib burden   OP discussion of Watchman  Acute Hypoxic Respiratory Failure (Acute Exac COPD)  On baseline O2 - 4L   Steroids, nebs, inhaler  Pulm following  Chronic HFpEF  Echo w/LVEF 70%, no RWMA, G2DD, normal RV function, PASP 35mmHg  GDMT -  hx on lasix 80mg po BID, diamox; jardiance on hold  Appears compensated on exam  Hypertension - controlled  Not on anti-hypertensives currently  Hyperlipidemia -   Not currently on statin d/t risk of myalgias w/concomitant use of diltiazem  Discussing alternatives as OP  COPD, Hx RUL Nodule   + PET as OP  May need bronch/poss biopsy  Following w/OP oncology  Chronic Anemia - stable  Former Tobacco Abuse  DMII - A1C 7.8  Hx T6 Fracture s/p Kyphoplasty    Plan:  Continue oral diltiazem 360 mg once daily for paroxysmal atrial fibrillation now that pacemaker has been placed  Plan for CXR and device interrogation today  Otherwise patient  can be discharged from cardiac standpoint today or tomorrow    Luis Fernando Fowler MD  [facility]  5/20/2025         [1]    clindamycin  600 mg Intravenous Q8H    predniSONE  15 mg Oral BID with meals    ipratropium-albuterol  3 mL Nebulization BID    acetaZOLAMIDE  500 mg Oral Daily    cholecalciferol  1,000 Units Oral BID    dilTIAZem ER  360 mg Oral Daily    fluticasone-salmeterol  1 puff Inhalation BID    umeclidinium bromide  1 puff Inhalation Daily    furosemide  80 mg Oral BID (Diuretic)    cetirizine  5 mg Oral Nightly    montelukast  10 mg Oral Nightly    polyethylene glycol (PEG 3350)  17 g Oral Daily    insulin aspart  1-7 Units Subcutaneous TID CC and HS   [2]

## 2025-05-20 NOTE — PROGRESS NOTES
[facility]  part of [facility]    Progress Note    Yesenia Berkowitz Patient Status:  Inpatient    1940 MRN [id number]   Location [facility] 3W/SW Attending Ez Taylor MD   Hosp Day # 7 PCP JO-ANN YANG MD       Subjective:     87 Yesenia is comfortable today although some discomfort from the pacemaker site which on the exterior otherwise looks good.  Breathing is okay.  Blood sugars 150s in the morning and over 200 in the afternoon.  Will continue to cut down prednisone.  She tells me she is not taking prednisone at home regularly so we can discontinue this hopefully within days.    Yesenia Berkowitz is a(n) 84 year old female known severe COPD and multiple admissions again presented to ED w severe dyspnea.  She also has a highly suspicious RUL spiculated growing lung nodule that is highly FDG + on April PET scan.    I saw Yesenia in April 10 when she came in with her son, Gaston to discuss this PET scan that was done 25.  The nodule shows a high FDG uptake very suspicious for carcinoma.  We discussed needle biopsy versus navigational bronchoscopy biopsy versus going ahead with SBRT treatment without tissue dx on the assumption that it is likely to be a cancerous tumor.  We performed spirometry in the office which shows FEV1 of 0.76 L which is 39% of her predicted.  The FEV1 to FVC ratio was 64%.  The flow-volume loop is mildly scooped out.  This is compatible with a severe obstructive pattern. There is question as to whether she would be a candidate for SBRT given this poor function. Repeat PFT will be needed before any radiation Rx.    Objective:   Blood pressure 132/67, pulse 91, temperature 98.2 °F (36.8 °C), temperature source Oral, resp. rate 19, height 5' 5\" (1.651 m), weight 140 lb 9.6 oz (63.8 kg), SpO2 92%.    Intake/Output Summary (Last 24 hours) at 2025 1312  Last data filed at 2025 0900  Gross per 24 hour   Intake 630 ml   Output 250 ml   Net 380 ml      General:alert and NAD sitting on bed on 4 L with right arm sling  Infraclavicular right-sided pacemaker site with tiny bandages all clean not swollen  Neck: no jvd or acc ms use  Pulmonary/Chest: fairl y  good BS bilat and min if any whz  Cardiovascular: S1, S2 distant w AF and RVR up to 140  Extremities: extremities well perfused, no cyanosis or edema  Neuro: alert, no gross weakness of face or extremities    Results:     Lab Results   Component Value Date    WBC 17.4 (H) 05/20/2025    HGB 11.3 (L) 05/20/2025    HCT 36.3 05/20/2025    .0 05/20/2025    CREATSERUM 0.70 05/20/2025    BUN 36 (H) 05/20/2025     05/20/2025    K 3.7 05/20/2025    CL 94 (L) 05/20/2025    CO2 35.0 (H) 05/20/2025     (H) 05/20/2025    CA 8.5 (L) 05/20/2025    ALB 3.9 05/13/2025    ALKPHO 65 05/13/2025    BILT 0.2 05/13/2025    TP 6.5 05/13/2025    AST 13 05/13/2025    ALT <7 (L) 05/13/2025    PTT 28.1 03/20/2025    INR 1.08 11/25/2024    T4F 0.9 05/15/2025    TSH 0.172 (L) 05/15/2025    LIP 30 08/30/2024    DDIMER 0.47 12/21/2024    MG 2.0 05/14/2025    PHOS 3.4 12/26/2024    TROP <0.045 02/03/2020       No results found.      Chest x-ray today reviewed there is no pneumothorax or new infiltrates with pacemaker in place for radiologist.    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 Current R effusion is quite small so not likely  malignant and quite small to tap.      Hyperglycemia related to steroid      Lesion near vulva ?infectious      Anxiety    Rec  Status post right PPM    Bronchodilation    Steroid to taper down to 10 bid and continue to taper off    Out pt ION Bronch or perc bx for lung Nodule, DW Pt but pt has declined    5/17/25 Discussed option of getting SBRT without dx ( see above) w pt and son at bedside so will rec send to rad-onc ASAP after discharge  D/w RN              OLIMPIA JONES MD  5/16/2025

## 2025-05-21 VITALS
HEART RATE: 73 BPM | TEMPERATURE: 98 F | WEIGHT: 149 LBS | DIASTOLIC BLOOD PRESSURE: 55 MMHG | HEIGHT: 65 IN | SYSTOLIC BLOOD PRESSURE: 122 MMHG | BODY MASS INDEX: 24.83 KG/M2 | RESPIRATION RATE: 18 BRPM | OXYGEN SATURATION: 95 %

## 2025-05-21 LAB
BASOPHILS # BLD AUTO: 0.05 X10(3) UL (ref 0–0.2)
BASOPHILS NFR BLD AUTO: 0.2 %
BUN BLD-MCNC: 30 MG/DL (ref 9–23)
BUN/CREAT SERPL: 40.5 (ref 10–20)
CALCIUM BLD-MCNC: 8.4 MG/DL (ref 8.7–10.4)
CHLORIDE SERPL-SCNC: 91 MMOL/L (ref 98–112)
CO2 SERPL-SCNC: >40 MMOL/L (ref 21–32)
CREAT BLD-MCNC: 0.74 MG/DL (ref 0.55–1.02)
DEPRECATED RDW RBC AUTO: 55.5 FL (ref 35.1–46.3)
EGFRCR SERPLBLD CKD-EPI 2021: 81 ML/MIN/1.73M2 (ref 60–?)
EOSINOPHIL # BLD AUTO: 0 X10(3) UL (ref 0–0.7)
EOSINOPHIL NFR BLD AUTO: 0 %
ERYTHROCYTE [DISTWIDTH] IN BLOOD BY AUTOMATED COUNT: 17.1 % (ref 11–15)
GLUCOSE BLD-MCNC: 243 MG/DL (ref 70–99)
GLUCOSE BLDC GLUCOMTR-MCNC: 137 MG/DL (ref 70–99)
GLUCOSE BLDC GLUCOMTR-MCNC: 171 MG/DL (ref 70–99)
GLUCOSE BLDC GLUCOMTR-MCNC: 246 MG/DL (ref 70–99)
HCT VFR BLD AUTO: 36.1 % (ref 35–48)
HGB BLD-MCNC: 11.3 G/DL (ref 12–16)
IMM GRANULOCYTES # BLD AUTO: 0.19 X10(3) UL (ref 0–1)
IMM GRANULOCYTES NFR BLD: 0.7 %
LYMPHOCYTES # BLD AUTO: 0.72 X10(3) UL (ref 1–4)
LYMPHOCYTES NFR BLD AUTO: 2.7 %
MCH RBC QN AUTO: 27.7 PG (ref 26–34)
MCHC RBC AUTO-ENTMCNC: 31.3 G/DL (ref 31–37)
MCV RBC AUTO: 88.5 FL (ref 80–100)
MONOCYTES # BLD AUTO: 1.07 X10(3) UL (ref 0.1–1)
MONOCYTES NFR BLD AUTO: 4.1 %
NEUTROPHILS # BLD AUTO: 24.26 X10 (3) UL (ref 1.5–7.7)
NEUTROPHILS # BLD AUTO: 24.26 X10(3) UL (ref 1.5–7.7)
NEUTROPHILS NFR BLD AUTO: 92.3 %
OSMOLALITY SERPL CALC.SUM OF ELEC: 296 MOSM/KG (ref 275–295)
PLATELET # BLD AUTO: 249 10(3)UL (ref 150–450)
POTASSIUM SERPL-SCNC: 3.7 MMOL/L (ref 3.5–5.1)
RBC # BLD AUTO: 4.08 X10(6)UL (ref 3.8–5.3)
SODIUM SERPL-SCNC: 136 MMOL/L (ref 136–145)
WBC # BLD AUTO: 26.3 X10(3) UL (ref 4–11)

## 2025-05-21 PROCEDURE — 99239 HOSP IP/OBS DSCHRG MGMT >30: CPT | Performed by: HOSPITALIST

## 2025-05-21 RX ORDER — NYSTATIN 100000 [USP'U]/ML
5 SUSPENSION ORAL 4 TIMES DAILY
Status: DISCONTINUED | OUTPATIENT
Start: 2025-05-21 | End: 2025-05-21

## 2025-05-21 RX ORDER — PREDNISONE 10 MG/1
TABLET ORAL
Qty: 9 TABLET | Refills: 0 | Status: SHIPPED | OUTPATIENT
Start: 2025-05-21

## 2025-05-21 RX ORDER — TRAMADOL HYDROCHLORIDE 50 MG/1
50 TABLET ORAL EVERY 6 HOURS PRN
Qty: 10 TABLET | Refills: 0 | Status: SHIPPED | OUTPATIENT
Start: 2025-05-21

## 2025-05-21 RX ORDER — NYSTATIN 100000 [USP'U]/ML
5 SUSPENSION ORAL 4 TIMES DAILY
Qty: 140 ML | Refills: 0 | Status: SHIPPED | OUTPATIENT
Start: 2025-05-21 | End: 2025-05-28

## 2025-05-21 RX ORDER — PREDNISONE 10 MG/1
TABLET ORAL
Qty: 9 TABLET | Refills: 0 | Status: SHIPPED | OUTPATIENT
Start: 2025-05-21 | End: 2025-05-27

## 2025-05-21 NOTE — PROGRESS NOTES
[facility]  part of [facility]    Progress Note    Yesenia Berkowitz Patient Status:  Inpatient    1940 MRN [id number]   Location [facility] 3W/SW Attending Ez Taylor MD   Hosp Day # 8 PCP JO-ANN YANG MD       Subjective:     87 Yesenia is comfortable today although some discomfort from the pacemaker site which on the exterior otherwise looks good.  Breathing is okay.  Blood sugars 150s in the morning and over 200 in the afternoon.  Will continue to cut down prednisone.  She tells me she is not taking prednisone at home regularly so we can discontinue this hopefully within days.    Yesenia Berkowitz is a(n) 84 year old female known severe COPD and multiple admissions again presented to ED w severe dyspnea.  She also has a highly suspicious RUL spiculated growing lung nodule that is highly FDG + on April PET scan.    I saw Yesenia in April 10 when she came in with her son, Gaston to discuss this PET scan that was done 25.  The nodule shows a high FDG uptake very suspicious for carcinoma.  We discussed needle biopsy versus navigational bronchoscopy biopsy versus going ahead with SBRT treatment without tissue dx on the assumption that it is likely to be a cancerous tumor.  We performed spirometry in the office which shows FEV1 of 0.76 L which is 39% of her predicted.  The FEV1 to FVC ratio was 64%.  The flow-volume loop is mildly scooped out.  This is compatible with a severe obstructive pattern. There is question as to whether she would be a candidate for SBRT given this poor function. Repeat PFT will be needed before any radiation Rx.    25 on 4L    Objective:   Blood pressure 137/65, pulse 94, temperature 98 °F (36.7 °C), temperature source Oral, resp. rate 18, height 5' 5\" (1.651 m), weight 149 lb (67.6 kg), SpO2 93%.    Intake/Output Summary (Last 24 hours) at 2025 0839  Last data filed at 2025 2246  Gross per 24 hour   Intake 580 ml   Output --   Net 580 ml      General:alert and NAD sitting on bed on 4 L with right arm sling  Infraclavicular right-sided pacemaker site with tiny bandages all clean not swollen  Neck: no jvd or acc ms use  Pulmonary/Chest: fairl y  good BS bilat and min if any whz  Cardiovascular: S1, S2 distant w AF and RVR up to 140  Extremities: extremities well perfused, no cyanosis or edema  Neuro: alert, no gross weakness of face or extremities    Results:     Lab Results   Component Value Date    WBC 17.4 (H) 05/20/2025    HGB 11.3 (L) 05/20/2025    HCT 36.3 05/20/2025    .0 05/20/2025    CREATSERUM 0.70 05/20/2025    BUN 36 (H) 05/20/2025     05/20/2025    K 3.7 05/20/2025    CL 94 (L) 05/20/2025    CO2 35.0 (H) 05/20/2025     (H) 05/20/2025    CA 8.5 (L) 05/20/2025    ALB 3.9 05/13/2025    ALKPHO 65 05/13/2025    BILT 0.2 05/13/2025    TP 6.5 05/13/2025    AST 13 05/13/2025    ALT <7 (L) 05/13/2025    PTT 28.1 03/20/2025    INR 1.08 11/25/2024    T4F 0.9 05/15/2025    TSH 0.172 (L) 05/15/2025    LIP 30 08/30/2024    DDIMER 0.47 12/21/2024    MG 2.0 05/14/2025    PHOS 3.4 12/26/2024    TROP <0.045 02/03/2020       No results found.      Chest x-ray today reviewed there is no pneumothorax or new infiltrates with pacemaker in place for radiologist.    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 Current R effusion is quite small so not likely  malignant and quite small to tap.      Hyperglycemia related to steroid      Lesion near vulva ?infectious      Anxiety    Rec  Status post right PPM    Bronchodilation    Steroid to taper down to 10 bid and continue to taper off    Out pt ION Bronch or perc bx for lung Nodule, DW Pt but pt has declined    5/17/25 Discussed option of getting SBRT without dx ( see above) w pt and son at bedside so will rec send to rad-onc ASAP after discharge  PT eval for right shoulder  Nystatin for thrush  D/w RN            Enrique Padilla MD  5/16/2025

## 2025-05-21 NOTE — PLAN OF CARE
Patient is alert and oriented x4. To be discharged home today. Went over discharge instructions and importance of follow up appointments with cardiology and PCP. Explained any new and/or existing medications, their use, and side effects; specificallyl with tramadol, prednisone, and nystatin. Assured patient had belongings from home. Questions from patient and her son were answered. Patient verbalized understanding of all discharge instructions.     Problem: Patient Centered Care  Goal: Patient preferences are identified and integrated in the patient's plan of care  Description: Interventions:- What would you like us to know as we care for you?   - Provide timely, complete, and accurate information to patient/family- Incorporate patient and family knowledge, values, beliefs, and cultural backgrounds into the planning and delivery of care- Encourage patient/family to participate in care and decision-making at the level they choose- Honor patient and family perspectives and choices  Outcome: Adequate for Discharge        Problem: CARDIOVASCULAR - ADULT  Goal: Maintains optimal cardiac output and hemodynamic stability  Description: INTERVENTIONS:  - Monitor vital signs, rhythm, and trends  - Monitor for bleeding, hypotension and signs of decreased cardiac output  - Evaluate effectiveness of vasoactive medications to optimize hemodynamic stability  - Monitor arterial and/or venous puncture sites for bleeding and/or hematoma  - Assess quality of pulses, skin color and temperature  - Assess for signs of decreased coronary artery perfusion - ex. Angina  - Evaluate fluid balance, assess for edema, trend weights  Outcome: Adequate for Discharge  Goal: Absence of cardiac arrhythmias or at baseline  Description: INTERVENTIONS:  - Continuous cardiac monitoring, monitor vital signs, obtain 12 lead EKG if indicated  - Evaluate effectiveness of antiarrhythmic and heart rate control medications as ordered  - Initiate emergency  measures for life threatening arrhythmias  - Monitor electrolytes and administer replacement therapy as ordered  Outcome: Adequate for Discharge     Problem: RESPIRATORY - ADULT  Goal: Achieves optimal ventilation and oxygenation  Description: INTERVENTIONS:  - Assess for changes in respiratory status  - Assess for changes in mentation and behavior  - Position to facilitate oxygenation and minimize respiratory effort  - Oxygen supplementation based on oxygen saturation or ABGs  - Provide Smoking Cessation handout, if applicable  - Encourage broncho-pulmonary hygiene including cough, deep breathe, Incentive Spirometry  - Assess the need for suctioning and perform as needed  - Assess and instruct to report SOB or any respiratory difficulty  - Respiratory Therapy support as indicated  - Manage/alleviate anxiety  - Monitor for signs/symptoms of CO2 retention  Outcome: Adequate for Discharge     Problem: GENITOURINARY - ADULT  Goal: Absence of urinary retention  Description: INTERVENTIONS:  - Assess patient’s ability to void and empty bladder  - Monitor intake/output and perform bladder scan as needed  - Follow urinary retention protocol/standard of care  - Consider collaborating with pharmacy to review patient's medication profile  - Implement strategies to promote bladder emptying  Outcome: Adequate for Discharge     Problem: METABOLIC/FLUID AND ELECTROLYTES - ADULT  Goal: Glucose maintained within prescribed range  Description: INTERVENTIONS:  - Monitor Blood Glucose as ordered  - Assess for signs and symptoms of hyperglycemia and hypoglycemia  - Administer ordered medications to maintain glucose within target range  - Assess barriers to adequate nutritional intake and initiate nutrition consult as needed  - Instruct patient on self management of diabetes  Outcome: Adequate for Discharge  Goal: Electrolytes maintained within normal limits  Description: INTERVENTIONS:  - Monitor labs and rhythm and assess patient for  signs and symptoms of electrolyte imbalances  - Administer electrolyte replacement as ordered  - Monitor response to electrolyte replacements, including rhythm and repeat lab results as appropriate  - Fluid restriction as ordered  - Instruct patient on fluid and nutrition restrictions as appropriate  Outcome: Adequate for Discharge  Goal: Hemodynamic stability and optimal renal function maintained  Description: INTERVENTIONS:  - Monitor labs and assess for signs and symptoms of volume excess or deficit  - Monitor intake, output and patient weight  - Monitor urine specific gravity, serum osmolarity and serum sodium as indicated or ordered  - Monitor response to interventions for patient's volume status, including labs, urine output, blood pressure (other measures as available)  - Encourage oral intake as appropriate  - Instruct patient on fluid and nutrition restrictions as appropriate  Outcome: Adequate for Discharge

## 2025-05-21 NOTE — PLAN OF CARE
Tramadol given x2 to pt for pain at incision site, sling applied to right arm for sleeping    Problem: Patient Centered Care  Goal: Patient preferences are identified and integrated in the patient's plan of care  Description: Interventions:- What would you like us to know as we care for you? - Provide timely, complete, and accurate information to patient/family- Incorporate patient and family knowledge, values, beliefs, and cultural backgrounds into the planning and delivery of care- Encourage patient/family to participate in care and decision-making at the level they choose- Honor patient and family perspectives and choices  Outcome: Progressing     Problem: CARDIOVASCULAR - ADULT  Goal: Maintains optimal cardiac output and hemodynamic stability  Description: INTERVENTIONS:  - Monitor vital signs, rhythm, and trends  - Monitor for bleeding, hypotension and signs of decreased cardiac output  - Evaluate effectiveness of vasoactive medications to optimize hemodynamic stability  - Monitor arterial and/or venous puncture sites for bleeding and/or hematoma  - Assess quality of pulses, skin color and temperature  - Assess for signs of decreased coronary artery perfusion - ex. Angina  - Evaluate fluid balance, assess for edema, trend weights  Outcome: Progressing  Goal: Absence of cardiac arrhythmias or at baseline  Description: INTERVENTIONS:  - Continuous cardiac monitoring, monitor vital signs, obtain 12 lead EKG if indicated  - Evaluate effectiveness of antiarrhythmic and heart rate control medications as ordered  - Initiate emergency measures for life threatening arrhythmias  - Monitor electrolytes and administer replacement therapy as ordered  Outcome: Progressing     Problem: RESPIRATORY - ADULT  Goal: Achieves optimal ventilation and oxygenation  Description: INTERVENTIONS:  - Assess for changes in respiratory status  - Assess for changes in mentation and behavior  - Position to facilitate oxygenation and minimize  respiratory effort  - Oxygen supplementation based on oxygen saturation or ABGs  - Provide Smoking Cessation handout, if applicable  - Encourage broncho-pulmonary hygiene including cough, deep breathe, Incentive Spirometry  - Assess the need for suctioning and perform as needed  - Assess and instruct to report SOB or any respiratory difficulty  - Respiratory Therapy support as indicated  - Manage/alleviate anxiety  - Monitor for signs/symptoms of CO2 retention  Outcome: Progressing     Problem: GENITOURINARY - ADULT  Goal: Absence of urinary retention  Description: INTERVENTIONS:  - Assess patient’s ability to void and empty bladder  - Monitor intake/output and perform bladder scan as needed  - Follow urinary retention protocol/standard of care  - Consider collaborating with pharmacy to review patient's medication profile  - Implement strategies to promote bladder emptying  Outcome: Progressing     Problem: METABOLIC/FLUID AND ELECTROLYTES - ADULT  Goal: Glucose maintained within prescribed range  Description: INTERVENTIONS:  - Monitor Blood Glucose as ordered  - Assess for signs and symptoms of hyperglycemia and hypoglycemia  - Administer ordered medications to maintain glucose within target range  - Assess barriers to adequate nutritional intake and initiate nutrition consult as needed  - Instruct patient on self management of diabetes  Outcome: Progressing  Goal: Electrolytes maintained within normal limits  Description: INTERVENTIONS:  - Monitor labs and rhythm and assess patient for signs and symptoms of electrolyte imbalances  - Administer electrolyte replacement as ordered  - Monitor response to electrolyte replacements, including rhythm and repeat lab results as appropriate  - Fluid restriction as ordered  - Instruct patient on fluid and nutrition restrictions as appropriate  Outcome: Progressing  Goal: Hemodynamic stability and optimal renal function maintained  Description: INTERVENTIONS:  - Monitor labs  and assess for signs and symptoms of volume excess or deficit  - Monitor intake, output and patient weight  - Monitor urine specific gravity, serum osmolarity and serum sodium as indicated or ordered  - Monitor response to interventions for patient's volume status, including labs, urine output, blood pressure (other measures as available)  - Encourage oral intake as appropriate  - Instruct patient on fluid and nutrition restrictions as appropriate  Outcome: Progressing

## 2025-05-21 NOTE — PHYSICAL THERAPY NOTE
PHYSICAL THERAPY EVALUATION - INPATIENT     Room Number: 304/304-A  Evaluation Date: 2025  Type of Evaluation: Initial        Presenting Problem: COPD exacerbation     Reason for Therapy: Mobility Dysfunction and Discharge Planning    PHYSICAL THERAPY ASSESSMENT   Patient is a 84 year old female admitted 2025 for COPD exacerbation.  Prior to admission, patient's baseline is mod ind with walker.  Patient is currently functioning near baseline with bed mobility, transfers, and gait.    PLAN  Patient has been evaluated and presents with no skilled Physical Therapy needs at this time.  Patient will be discharged from Physical Therapy services. Please re-order if a new functional limitation presents during this admission.         PHYSICAL THERAPY MEDICAL/SOCIAL HISTORY   History related to current admission:      Problem List  Principal Problem:    COPD exacerbation (HCC)  Active Problems:    Palliative care by specialist    Goals of care, counseling/discussion    Advance care planning    Malignant neoplasm of right upper lobe of lung (HCC)      HOME SITUATION  Type of Home: House  Home Layout: Multi-level, Able to live on main level                     Lives With: Alone    Drives: No   Patient Regularly Uses: Rolling walker      Prior Level of [place]: mod ind    SUBJECTIVE  \"I want to go home\"    PHYSICAL THERAPY EXAMINATION   OBJECTIVE  Precautions: Other (Comment) (Pacemaker precautions)  Fall Risk: Standard fall risk    WEIGHT BEARING RESTRICTION            PAIN ASSESSMENT  Ratin  Location:  (R pacemaker site)       COGNITION  Overall Cognitive Status:  WFL - within functional limits    RANGE OF MOTION AND STRENGTH ASSESSMENT  Upper extremity ROM and strength are within functional limits   Lower extremity ROM is within functional limits   Lower extremity strength is within functional limits     BALANCE  Static Sitting: Fair  Dynamic Sitting: Fair  Static Standing: Fair -  Dynamic Standing: Fair  -    ADDITIONAL TESTS                                    NEUROLOGICAL FINDINGS                      ACTIVITY TOLERANCE                         O2 WALK  Oxygen Therapy  SPO2% Ambulation on Oxygen: 90  Ambulation oxygen flow (liters per minute): 2    AM-PAC '6-Clicks' INPATIENT SHORT FORM - BASIC MOBILITY  How much difficulty does the patient currently have...  Patient Difficulty: Turning over in bed (including adjusting bedclothes, sheets and blankets)?: A Little   Patient Difficulty: Sitting down on and standing up from a chair with arms (e.g., wheelchair, bedside commode, etc.): A Little   Patient Difficulty: Moving from lying on back to sitting on the side of the bed?: A Little   How much help from another person does the patient currently need...   Help from Another: Moving to and from a bed to a chair (including a wheelchair)?: A Little   Help from Another: Need to walk in hospital room?: A Little   Help from Another: Climbing 3-5 steps with a railing?: A Little     AM-PAC Score:  Raw Score: 18   Approx Degree of Impairment: 46.58%   Standardized Score (AM-PAC Scale): 43.63   CMS Modifier (G-Code): CK    FUNCTIONAL ABILITY STATUS  Functional Mobility/Gait Assessment  Gait Assistance: Supervision  Distance (ft): 65  Assistive Device: Rolling walker  Rolling: stand-by assist  Supine to Sit: stand-by assist  Sit to Supine: stand-by assist  Sit to Stand: stand-by assist    Exercise/Education Provided:  Bed mobility  Avoiding use of RUE    Skilled Therapy Provided: activity pacing    The patient's Approx Degree of Impairment: 46.58% has been calculated based on documentation in the [facility] '6 clicks' Inpatient Basic Mobility Short Form.  Research supports that patients with this level of impairment may benefit from hh.  Final disposition will be made by interdisciplinary medical team.    Patient End of Session: Up in chair    Patient was able to achieve the following ...   Patient able to transfer  At previous,  functional level    Patient able to ambulate on level surfaces  At previous, functional level     Patient Evaluation Complexity Level:  History Low - no personal factors and/or co-morbidities   Examination of body systems Low -  addressing 1-2 elements   Clinical Presentation Low- Stable   Clinical Decision Making  Low Complexity     Therapeutic Activity:  14 minutes

## 2025-05-21 NOTE — SPIRITUAL CARE NOTE
Spiritual Care Visit Note    Patient Name: Yesenia Berkowitz Date of Spiritual Care Visit: 25   : 1940 Primary Dx: COPD exacerbation (HCC)       Referred By: Referral From:     Spiritual Care Taxonomy:    Intended Effects: Demonstrate caring and concern    Methods: Encourage self care    Interventions: Acknowledge current situation, Acknowledge response to difficult experience, Active listening, Ask guided questions, Communicate patient's needs/concerns to others, Provide hospitality    Visit Type/Summary:     - Spiritual Care: Responded to a request for spiritual care and assessed patient and family for spiritual care needs. Consulted with RN prior to visit. Offered empathic listening and emotional support. Patient expressed appreciation for  visit.  remains available as needed for follow up.    Spiritual Care support can be requested via an Epic consult. For urgent/immediate needs, please contact the On Call  at: [place]: ext [phone]    Chaplain Resident, Leny Fontana, PhD

## 2025-05-21 NOTE — PROGRESS NOTES
Progress Note  Yesenia Berkowitz Patient Status:  Inpatient    1940 MRN [id number]   Location [facility] 3W/SW Attending Ez Taylor MD   Hosp Day # 8 PCP JO-ANN YANG MD     Subjective:  S/p dual chamber PPM insertion   PO device check and XR stable  Labs pending    Objective:  /65 (BP Location: Right arm)   Pulse 84   Temp 98.4 °F (36.9 °C) (Oral)   Resp 20   Ht 5' 5\" (1.651 m)   Wt 149 lb (67.6 kg)   SpO2 93%   BMI 24.79 kg/m²     Telemetry: SR 80s-90s    Intake/Output:    Intake/Output Summary (Last 24 hours) at 2025 0925  Last data filed at 2025 2246  Gross per 24 hour   Intake 340 ml   Output --   Net 340 ml     Last 3 Weights   25 0500 149 lb (67.6 kg)   25 0504 140 lb 9.6 oz (63.8 kg)   25 0503 141 lb 1.6 oz (64 kg)   25 0555 141 lb 3.2 oz (64 kg)   25 2037 132 lb 8 oz (60.1 kg)   25 1804 135 lb (61.2 kg)   25 1753 150 lb 12.7 oz (68.4 kg)   25 1747 150 lb 12.7 oz (68.4 kg)   25 1513 145 lb 4.8 oz (65.9 kg)     Labs:  Recent Labs   Lab 25  0531 25  0726 25  0918   * 208* 212*   BUN 37* 36* 36*   CREATSERUM 0.84 0.67 0.70   EGFRCR 69 87 86   CA 8.0* 7.8* 8.5*    140 136   K 3.8 3.7 3.7   CL 97* 96* 94*   CO2 36.0* 39.0* 35.0*     Recent Labs   Lab 25  0505 25  0531 25  0726 25  0918   RBC 3.95 3.85 4.10 4.14   HGB 10.8* 10.5* 11.6* 11.3*   HCT 35.2 33.9* 37.0 36.3   MCV 89.1 88.1 90.2 87.7   MCH 27.3 27.3 28.3 27.3   MCHC 30.7* 31.0 31.4 31.1   RDW 16.8* 17.0* 16.7* 17.0*   NEPRELIM 11.96* 8.69*  --  14.21*   WBC 12.8* 9.5 15.3* 17.4*   .0 248.0 241.0 238.0     No results for input(s): \"TROP\", \"TROPHS\", \"CK\" in the last 168 hours.  Lab Results   Component Value Date/Time    HDL 81 (H) 2025 12:04 AM     (H) 2025 12:04 AM    TRIG 143 2025 12:04 AM     Lab Results   Component Value Date    DDIMER 0.47 2024     Lab Results    Component Value Date    TSH 0.172 (L) 05/15/2025     Review of Systems:   Constitutional: No fevers, chills, fatigue or night sweats.  Respiratory: Denies cough, wheezing or shortness of breath.  CV: Denies chest pain, palpitations, orthopnea, PND or dizziness.  GI: No nausea, vomiting or diarrhea. No blood in stools.    Physical Exam:   General: Alert, cooperative, no distress, appears stated age.  Neck: no JVD.  Lungs: Clear to auscultation bilaterally.  Chest wall: No tenderness or deformity. Surgical dressing in place, no swelling, oozing, or hematoma  Heart: Regular rate and rhythm, S1, S2 normal, no murmur, click, rub or gallop.  Abdomen: Soft, non-tender. Bowel sounds normal. No masses,  No organomegaly.  Extremities: Extremities normal, atraumatic, no cyanosis, mild baseline LUE and LLE edema.  Pulses: 2+ and symmetric all extremities.  Neurologic: Grossly intact.    Medications:  Scheduled Medications[1]  Medication Infusions[2]    Assessment:    84 year old female with PMH ofPMH of PAF, HFpEF, HTN, COPD who presents with shortness of breath and generalized weakness. Her monitor revealed intermittent AV block.     New onset intermittent 2nd degree AVB  Tachy-Noah Syndrome  Paroxysmal Atrial fibrillation/Atrial flutter with RVR  Historically on digoxin and diltiazem for PAFRVR  -intermittent AV block noted on tele, dig and diltiazem held, intermittent block persisted despite holding CCB and digoxin  -now s/p dual chamber PPM implant (right sided due to chronic lymphedema)  -post implant device check this morning stable  -PA/LAT CXR with stable lead placement, no pneumothorax  -sinus rhythm on tele, HR stable 80s-90s  -will resume home diltiazem dose for control of HR if PAF recurs  -not historically on OAC due to recurrent bleeding, GIB hx, low AF burden  -[id number] score of 5 for age, gender, HF, HTN, consider eventual watchman eval due to hx recurrent bleeding     Chronic HFpEF  Pulmonary HTN  Appears  compensated on exam  -September echo with LVEF >70% on 5/1/5 echo with G2DD, moderate to severe pulmonary htn, PASP 56MMhG  -stable on home diuretic regimen lasix 80mg BID with daily diamox  -GDMT: lasix 80mg BID, diamox 500mg daily, jardiance PTA     Acute hypoxic and hypercapneic Respiratory failure  COPD  Hx RUL nodule,likely early stage lung Ca  2/2 COPD exacerbation  -steroids, inhalers, abx  -pulm following, declined biopsy  -oncology following, will f/u as OP to discuss potential SBRT radiotherapy  -palliative following    Mild Aortic stenosis  Mean gradient 8mmHg on      Mild Mitral stenosis  Mean gradient 4mmHg     HTN-controlled     HLD-not on statin due to myalgia risk with diltiazem, , OP discussion for alternatives to be considered    Chronic anemia  Hgb stable 10-11    Leukocytosis  WBC 17.4  -afebrile  -primary following    DMII-A1c 7.8    Hx T6 fracture s/p kyphoplasty      Plan:  -continue home diltiazem dose 360mg daily  -Continue lasix, diamox  -stable from cardiac standpoint for dc home, all questions answered and post implant precautions reviewed with patient and family      Plan of care discussed with patient, RN.        Molly Carrillo, MSN, APN, FNP-BC, CCK  05/21/25  9:31 AM           [1]    nystatin  5 mL Oral QID    predniSONE  10 mg Oral BID with meals    ipratropium-albuterol  3 mL Nebulization BID    acetaZOLAMIDE  500 mg Oral Daily    cholecalciferol  1,000 Units Oral BID    dilTIAZem ER  360 mg Oral Daily    fluticasone-salmeterol  1 puff Inhalation BID    umeclidinium bromide  1 puff Inhalation Daily    furosemide  80 mg Oral BID (Diuretic)    cetirizine  5 mg Oral Nightly    montelukast  10 mg Oral Nightly    polyethylene glycol (PEG 3350)  17 g Oral Daily    insulin aspart  1-7 Units Subcutaneous TID CC and HS   [2]

## 2025-05-21 NOTE — OCCUPATIONAL THERAPY NOTE
OCCUPATIONAL THERAPY EVALUATION - INPATIENT     Room Number: 304/304-A  Evaluation Date: 5/21/2025  Type of Evaluation: Initial  Presenting Problem: Acute COPD exacerbation, 84 year old female with PMH ofPMH of PAF, HFpEF, HTN, COPD who presents with shortness of breath and generalized weakness. Her monitor revealed intermittent AV block. S/p dual chamber PPM insertion     Physician Order: IP Consult to Occupational Therapy  Reason for Therapy: ADL/IADL Dysfunction and Discharge Planning    OCCUPATIONAL THERAPY ASSESSMENT   Patient is a 84 year old female admitted 5/13/2025 for pacemaker placement. .  Prior to admission, patient's baseline is independent.  Patient is currently functioning near baseline with upper body dressing, lower body dressing, and performing household tasks.    PLAN  Patient has been evaluated and presents with no skilled Occupational Therapy needs  at this time.  Patient will be discharged from Occupational Therapy services. Please re-order if a new functional limitation presents during this admission.    OT Device Recommendations: None    OCCUPATIONAL THERAPY MEDICAL/SOCIAL HISTORY   Problem List  Principal Problem:    COPD exacerbation (HCC)  Active Problems:    Palliative care by specialist    Goals of care, counseling/discussion    Advance care planning    Malignant neoplasm of right upper lobe of lung (HCC)    HOME SITUATION  Type of Home: House  Home Layout: Multi-level; Able to live on main level  Lives With: Alone  Toilet and Equipment: Standard height toilet  Shower/Tub and Equipment: Tub-shower combo; Tub transfer bench; Grab bar  Drives: No  Patient Regularly Uses: Rolling walker    Stairs in Home: 1 flight to enter  Use of Assistive Device(s): rolling walker and manual w/c     Prior Level of [place]: independent     SUBJECTIVE  \"I know not to move my arm too much but its hard\"    OCCUPATIONAL THERAPY EXAMINATION    OBJECTIVE  Precautions: Other (Comment) (Pacemaker  precautions)  Fall Risk: Standard fall risk    WEIGHT BEARING RESTRICTION     PAIN ASSESSMENT  Rating: 3  Location: right shld  Management Techniques: Repositioning    ACTIVITY TOLERANCE                         O2 SATURATIONS       COGNITION  WFL    VISION  wfl    PERCEPTION  wfl    SENSATION  Tight touch intact    Communication: Able to communicate all needs correctly    Behavioral/Emotional/Social: cooperative     RANGE OF MOTION   Upper extremity ROM is within functional limits     STRENGTH ASSESSMENT  Upper extremity strength is within functional limits     COORDINATION  Gross Motor: WFL   Fine Motor: WFL     ACTIVITIES OF DAILY LIVING ASSESSMENT  AM-PAC ‘6-Clicks’ Inpatient Daily Activity Short Form  How much help from another person does the patient currently need…  -   Putting on and taking off regular lower body clothing?: None  -   Bathing (including washing, rinsing, drying)?: None  -   Toileting, which includes using toilet, bedpan or urinal? : None  -   Putting on and taking off regular upper body clothing?: None  -   Taking care of personal grooming such as brushing teeth?: None  -   Eating meals?: None    AM-PAC Score:  Score: 24  Approx Degree of Impairment: 0%  Standardized Score (AM-PAC Scale): 57.54  CMS Modifier (G-Code): CH    FUNCTIONAL TRANSFER ASSESSMENT  Sit to Stand: Edge of Bed  Edge of Bed: Supervision  Toilet Transfer: Supervision    BED MOBILITY  Rolling: Supervision  Supine to Sit : Supervision  Sit to Supine (OT): Supervision    BALANCE ASSESSMENT     FUNCTIONAL ADL ASSESSMENT     THERAPEUTIC EXERCISE     Skilled Therapy Provided: OT ricardo completed.  S/p evaluation treatment rendered is as follows:  - Pacemaker precautions  - Ub dressing w/in precautions  - Toilet t/f and toileting   - Grooming in standing at sink   - LB dressing w/in precautions.     EDUCATION PROVIDED  Patient Education : Role of Occupational Therapy; Plan of Care; Discharge Recommendations; DME Recommendations;  Functional Transfer Techniques; Fall Prevention  Patient's Response to Education: Verbalized Understanding; Returned Demonstration  Family/Caregiver Education : Role of Occupational Therapy; Plan of Care; Discharge Recommendations  Family/Caregiver's Response to Education: Verbalized Understanding    The patient's Approx Degree of Impairment: 0% has been calculated based on documentation in the [facility] '6 clicks' Inpatient Daily Activity Short Form.  Research supports that patients with this level of impairment may benefit from discharge home w/ support from son.  Final disposition will be made by interdisciplinary medical team.    Patient End of Session: In bed    Patient was able to achieve the following ...   Patient able to toilet transfer  safely and independently    Patient able to dress lower extremities  safely and independently    Patient/Caregiver able to demonstrate safety with ADLS  safely and independently     Patient Evaluation Complexity Level:   Occupational Profile/Medical History LOW - Brief history including review of medical or therapy records    Specific performance deficits impacting engagement in ADL/IADL LOW  1 - 3 performance deficits    Client Assessment/Performance Deficits LOW - No comorbidities nor modifications of tasks    Clinical Decision Making LOW - Analysis of occupational profile, problem-focused assessments, limited treatment options    Overall Complexity LOW     OT Session Time: 23 minutes  Self-Care Home Management: 23 minutes

## 2025-05-21 NOTE — CM/SW NOTE
05/21/25 1500   Discharge disposition   Expected discharge disposition Home-Health   Post Acute Care Provider Residential  (and Community Palliative Care)   Discharge transportation Private car     Message sen to JIMI Guevara - confirmed pt should be cleared for DC later today, pending Dr. Bang seeing pt and confirming DC.    Notified liaison Annia w/ [facility]idential [facility] and liaison Mili w/ [facility].    Pt is cleared from SW/CM stand point once MD clears. JIMI Guevara is aware.      PLAN: Home w/ [facility]idential HH and [facility]idential [facility]          SUMMER Anthony, LSW [id number]

## 2025-05-22 ENCOUNTER — PATIENT OUTREACH (OUTPATIENT)
Dept: CASE MANAGEMENT | Age: 85
End: 2025-05-22

## 2025-05-22 ENCOUNTER — TELEPHONE (OUTPATIENT)
Dept: RADIATION ONCOLOGY | Facility: HOSPITAL | Age: 85
End: 2025-05-22

## 2025-05-22 NOTE — PROGRESS NOTES
SCC-COPD appointment request (discharged 05/21)    [facility]  [address]  [address]  [place], [place] [id number] [phone]  [address]  Apt made:  Wed 05/28 @2:00pm  Confirmed w/pt's sonTlyer  Closing encounter

## 2025-05-22 NOTE — DISCHARGE SUMMARY
[facility]  part of [facility]    Discharge Summary    Yesenia Berkowitz Patient Status:  Inpatient    1940 MRN [id number]   Location [facility] 3W/SW Attending No att. providers found   Hosp Day # 8 PCP JO-ANN YANG MD     Date of Admission: 2025 Disposition:   Home Health Care Services     Date of Discharge:  2025  7:11 PM    Admitting Diagnosis:   COPD exacerbation (HCC) [J44.1]    Hospital Discharge Diagnoses:    AECOPD  RUL lung nodule  Hx of DM2  Paroxysmal afib  Second degree AVB with possible brief third degree block  S/p pacemaker placement 2025 by Dr. Vasquez  Abnormal TFTs  Hx of HTN  Hx of chronic HFpEF         Problem List:     Problem List[1]    Physical Exam:      /55 (BP Location: Right arm)   Pulse 73   Temp 98.2 °F (36.8 °C) (Oral)   Resp 18   Ht 65\"   Wt 149 lb (67.6 kg)   SpO2 95%   BMI 24.79 kg/m²     Gen:  NAD.  A and O x  3  CV:   RRR.  No m/g/r  Pulm:   CTA bilat  Abd:   +bs, soft, NT, ND  LE:  No c/c/e  Neuro:  nonfocal      Reason for Admission:   COPD exacerbation.     HISTORY OF PRESENT ILLNESS:  The patient is an 84-year-old  female with underlying severe chronic obstructive pulmonary disease, presented today to the emergency department for evaluation of progressive wheezing and shortness of breath more than her baseline for last 2 days.  In the emergency room, noted to have pulse ox of 90% to 94% on room air.  Started on nasal cannula oxygen and Solu-Medrol, nebulizer treatment, and she will be admitted to the hospital for further management.  Chest x-ray showed no acute findings compared to prior x-rays.  CBC showed white blood cell count of 11.0 with left shift.  Chemistry, basic metabolic panel, and liver function test still pending.    Hospital Course:     AECOPD  -pulm was on consult  -was given nebs  -was given steroids IV. Home on pred taper.  -cont advair, incruse  Pulm and PCP f/u.     RUL lung nodule  -recent  PET scan +  -appreciate pulm, oncology, palliative care consults  -ION bronchoscopy suggested, but patient does not know if she really wants to pursue this. Consideration of SBRT - to f/u outpatient     DM2  Resume home regimen     Paroxysmal afib  -not on AC per patient preference per prior cardiology note  -diltiazem started now that PPM in     Second degree AVB with possible brief third degree block  -cardiology was consulted  -echo result reviewed  -s/p PPM 5/19/25  EP f/u     Abnormal TFTs  -low TSH and free T3 but normal free T4     HTN  -cont meds and monitor     Chronic HFpEF  - cont lasix PO     GOC  -DNAR/selective  -palliative care consulted       Dietitian Malnutrition Assessment     Evaluation for Malnutrition: Criteria for severe malnutrition diagnosis- acute illness/injury related to Wt loss greater than 5% in 1 month., Energy intake less than 50% for greater than 5 days.                  RD Malnutrition Care Plan: Encouraged increased PO intake., Encouraged small frequent meals with emphasis on high calorie/high protein., Intiated ONS (oral nutritional supplements).     Body Fat/Muscle Mass:           Physician Assessment      Patient has a diagnosis of severe malnutrition     Dvt proph:  ambulating, SCDs    Consultations:   Pulmonology, cardiology, EP, palliative care, oncology     Discharge Condition:  Good    Discharge Medications:      Discharge Medications        START taking these medications        Instructions Prescription details   nystatin 627755 UNIT/ML Susp  Commonly known as: Mycostatin      Take 5 mL (500,000 Units total) by mouth 4 (four) times daily for 7 days.   Stop taking on: May 28, 2025  Quantity: 140 mL  Refills: 0     traMADol 50 MG Tabs  Commonly known as: Ultram      Take 1 tablet (50 mg total) by mouth every 6 (six) hours as needed.   Quantity: 10 tablet  Refills: 0            CHANGE how you take these medications        Instructions Prescription details   predniSONE 10 MG  Tabs  Commonly known as: Deltasone  What changed: You were already taking a medication with the same name, and this prescription was added. Make sure you understand how and when to take each.      Take 1 tab twice per day for 3 days.    Then take 1 tab daily for 3 days and then stop.   Quantity: 9 tablet  Refills: 0     predniSONE 10 MG Tabs  Commonly known as: Deltasone  Start taking on: May 21, 2025  What changed: See the new instructions.      Take 2 tablets (20 mg total) by mouth daily for 3 days, THEN 1 tablet (10 mg total) daily for 3 days.   Stop taking on: May 27, 2025  Quantity: 9 tablet  Refills: 0            CONTINUE taking these medications        Instructions Prescription details   acetaminophen 500 MG Tabs  Commonly known as: Tylenol Extra Strength      Take 2 tablets (1,000 mg total) by mouth every 6 (six) hours as needed for Pain or Fever.   Refills: 0     acetaZOLAMIDE 250 MG Tabs  Commonly known as: Diamox      Take 2 tablets (500 mg total) by mouth daily.   Quantity: 60 tablet  Refills: 5     albuterol 108 (90 Base) MCG/ACT Aers  Commonly known as: Ventolin HFA      Inhale 2 puffs into the lungs as needed.   Refills: 0     albuterol (2.5 MG/3ML) 0.083% Nebu  Commonly known as: Ventolin      Take 3 mL (2.5 mg total) by nebulization every 6 (six) hours as needed for Wheezing.   Refills: 0     benzonatate 200 MG Caps  Commonly known as: Tessalon      Take 1 capsule (200 mg total) by mouth 3 (three) times daily as needed for cough.   Quantity: 30 capsule  Refills: 0     digoxin 0.125 MG Tabs  Commonly known as: Lanoxin      Take 1 tablet (125 mcg total) by mouth daily.   Quantity: 30 tablet  Refills: 0     dilTIAZem  MG Cp24  Commonly known as: Tiazac      Take 1 capsule (360 mg total) by mouth daily.   Quantity: 30 capsule  Refills: 11     empagliflozin 10 MG Tabs  Commonly known as: Jardiance      Take 1 tablet (10 mg total) by mouth daily.   Quantity: 30 tablet  Refills: 3     estradiol 0.05  MG/24HR Ptwk  Commonly known as: Climara      Place 1 patch onto the skin once a week. As directed.   Refills: 0     fluticasone-umeclidin-vilant 200-62.5-25 MCG/ACT Aepb  Commonly known as: Trelegy Ellipta      Inhale 1 puff into the lungs as needed (sob).   Refills: 0     furosemide 80 MG Tabs  Commonly known as: Lasix      Take 1 tablet (80 mg total) by mouth BID (Diuretic).   Quantity: 60 tablet  Refills: 0     Loratadine 10 MG Caps      Take 10 mg by mouth nightly.   Refills: 0     montelukast 10 MG Tabs  Commonly known as: Singulair      Take 1 tablet (10 mg total) by mouth nightly.   Refills: 0     morphINE 10 MG/5ML Soln      Take 1.3 mL (2.6 mg total) by mouth 3 (three) times daily as needed (Shortness of breath/ take prior to activity that cased shortness of breath). Please add an adapt a cap top for ease of drawing up.  Please give pt 2 oral syringes   Quantity: 60 mL  Refills: 0     nitrofurantoin monohydrate macro 100 MG Caps  Commonly known as: Macrobid      Take 1 capsule (100 mg total) by mouth 2 (two) times daily.   Refills: 0     potassium chloride 20 MEQ Tbcr  Commonly known as: Klor-Con M20      Take 1 tablet (20 mEq total) by mouth nightly.   Refills: 0     Vitamin D 1000 units Tabs      Take 1,000 Units by mouth in the morning and 1,000 Units before bedtime.   Refills: 0            STOP taking these medications      doxycycline 100 MG Caps  Commonly known as: Vibramycin        phenazopyridine 200 MG Tabs  Commonly known as: Pyridium                  Where to Get Your Medications        These medications were sent to [id number] - [place], [place] - [address] [phone], [phone]  [address] [place] [address], [facility] [address]      Phone: [phone]   nystatin [id number] UNIT/ML Susp  predniSONE 10 MG Tabs  predniSONE 10 MG Tabs  traMADol 50 MG Tabs         Follow up Visits:     Follow-up Information       [facility]. Schedule an appointment as soon as possible  for a visit.    Specialty: Multi-Specialty  Why: COPD follow up  Contact information:  [address]  [facility] [id number] [phone]  Additional information:  Your appointment is located at [address] in [place], [place].  Please park in the Yellow lot and go through the [facility] entrance.  Then proceed to [address].      Masks are optional for all patients and visitors, unless otherwise indicated.             Brenda Wilkerson MD. Schedule an appointment as soon as possible for a visit in 1 week(s).    Specialty: Internal Medicine  Contact information:  [address]  [address]  [facility] [address]  [phone]               Davey Vasquez MD Follow up in 1 week(s).    Specialties: Cardiac Electrophysiology, CARDIOLOGY  Why: Office will call to schedule follow up  Contact information:  [address]   [address]  [facility] [id number] [phone]               Nighat Aly MD Follow up.    Specialties: PULMONARY DISEASES, Internal Medicine, Critical Care  Why: pulmonology  Contact information:  [address]   [address]  [facility] [address] [phone]                             Hospital Discharge Diagnoses: AECOPD    Lace+ Score: 84  59-90 High Risk  29-58 Medium Risk  0-28   Low Risk.    TCM Follow-Up Recommendation:  LACE > 58: High Risk of readmission after discharge from the hospital.    >35 minutes spent preparing this discharge.    Tayo Cervantes MD  5/21/2025  7:36 PM        [1]   Patient Active Problem List  Diagnosis    Actinic keratosis    Inflamed seborrheic keratosis    Palpitations    Pain of upper abdomen    Acute on chronic congestive heart failure, unspecified heart failure type (HCC)    COPD with acute exacerbation (HCC)    COPD (chronic obstructive pulmonary disease) (HCC)    Kyphoscoliosis    Phrenic nerve paralysis    Restrictive lung disease    Acute cor pulmonale (HCC)    Pneumonia    Acute on chronic hypoxic respiratory failure (HCC)    Acute pulmonary edema  (HCC)    Pleural effusion    Pulmonary nodule 1 cm or greater in diameter    Community acquired pneumonia of right lower lobe of lung    Acute on chronic respiratory failure with hypoxia and hypercapnia (HCC)    Acute respiratory failure with hypoxia and hypercapnia (HCC)    Cor pulmonale (chronic) (HCC)    SOB (shortness of breath)    Acute on chronic diastolic (congestive) heart failure (HCC)    Obesity hypoventilation syndrome (HCC)    Asthma (HCC)    Acute exacerbation of CHF (congestive heart failure) (HCC)    Pleural effusion on left    Pleural effusion on right    Acute bilateral thoracic back pain    Atypical pneumonia    Compression fracture of T6 vertebra (HCC)    COPD exacerbation (HCC)    Primary hypertension    Paroxysmal atrial fibrillation (HCC)    Acute respiratory failure (HCC)    Chronic heart failure with preserved ejection fraction (HFpEF) (HCC)    Shortness of breath    Counseling regarding advance care planning and goals of care    Palliative care by specialist    Right upper lobe pulmonary nodule    Dyspnea and respiratory abnormalities    Goals of care, counseling/discussion    Advance care planning    Malignant neoplasm of right upper lobe of lung (HCC)

## 2025-05-23 ENCOUNTER — TELEPHONE (OUTPATIENT)
Facility: LOCATION | Age: 85
End: 2025-05-23

## 2025-05-23 ENCOUNTER — TELEPHONE (OUTPATIENT)
Dept: RADIATION ONCOLOGY | Facility: HOSPITAL | Age: 85
End: 2025-05-23

## 2025-05-27 ENCOUNTER — HOSPITAL ENCOUNTER (INPATIENT)
Facility: HOSPITAL | Age: 85
LOS: 2 days | Discharge: HOME HEALTH | DRG: 291 | End: 2025-05-30
Attending: EMERGENCY MEDICINE | Admitting: STUDENT IN AN ORGANIZED HEALTH CARE EDUCATION/TRAINING PROGRAM
Payer: MEDICARE

## 2025-05-27 ENCOUNTER — HOSPITAL ENCOUNTER (INPATIENT)
Facility: HOSPITAL | Age: 85
LOS: 2 days | Discharge: HOME HEALTH | End: 2025-05-30
Attending: EMERGENCY MEDICINE | Admitting: STUDENT IN AN ORGANIZED HEALTH CARE EDUCATION/TRAINING PROGRAM
Payer: MEDICARE

## 2025-05-27 DIAGNOSIS — I27.81 COR PULMONALE (CHRONIC) (HCC): ICD-10-CM

## 2025-05-27 DIAGNOSIS — J44.9 CHRONIC OBSTRUCTIVE PULMONARY DISEASE, UNSPECIFIED COPD TYPE (HCC): ICD-10-CM

## 2025-05-27 DIAGNOSIS — I50.9 ACUTE ON CHRONIC CONGESTIVE HEART FAILURE, UNSPECIFIED HEART FAILURE TYPE (HCC): ICD-10-CM

## 2025-05-27 DIAGNOSIS — I31.39 PERICARDIAL EFFUSION (HCC): Primary | ICD-10-CM

## 2025-05-27 DIAGNOSIS — R06.02 SHORTNESS OF BREATH: ICD-10-CM

## 2025-05-27 DIAGNOSIS — R06.00 DYSPNEA AND RESPIRATORY ABNORMALITIES: ICD-10-CM

## 2025-05-27 DIAGNOSIS — M41.9 KYPHOSCOLIOSIS: ICD-10-CM

## 2025-05-27 DIAGNOSIS — J90 PLEURAL EFFUSION: ICD-10-CM

## 2025-05-27 DIAGNOSIS — J44.1 COPD EXACERBATION (HCC): ICD-10-CM

## 2025-05-27 DIAGNOSIS — R06.89 DYSPNEA AND RESPIRATORY ABNORMALITIES: ICD-10-CM

## 2025-05-27 DIAGNOSIS — I50.32 CHRONIC HEART FAILURE WITH PRESERVED EJECTION FRACTION (HFPEF) (HCC): ICD-10-CM

## 2025-05-27 LAB
BASOPHILS # BLD AUTO: 0.02 X10(3) UL (ref 0–0.2)
BASOPHILS NFR BLD AUTO: 0.1 %
DEPRECATED RDW RBC AUTO: 57.4 FL (ref 35.1–46.3)
EOSINOPHIL # BLD AUTO: 0 X10(3) UL (ref 0–0.7)
EOSINOPHIL NFR BLD AUTO: 0 %
ERYTHROCYTE [DISTWIDTH] IN BLOOD BY AUTOMATED COUNT: 17.4 % (ref 11–15)
HCT VFR BLD AUTO: 37.5 % (ref 35–48)
HGB BLD-MCNC: 11.8 G/DL (ref 12–16)
IMM GRANULOCYTES # BLD AUTO: 0.08 X10(3) UL (ref 0–1)
IMM GRANULOCYTES NFR BLD: 0.5 %
LYMPHOCYTES # BLD AUTO: 1.6 X10(3) UL (ref 1–4)
LYMPHOCYTES NFR BLD AUTO: 10.7 %
MCH RBC QN AUTO: 28.3 PG (ref 26–34)
MCHC RBC AUTO-ENTMCNC: 31.5 G/DL (ref 31–37)
MCV RBC AUTO: 89.9 FL (ref 80–100)
MONOCYTES # BLD AUTO: 0.81 X10(3) UL (ref 0.1–1)
MONOCYTES NFR BLD AUTO: 5.4 %
NEUTROPHILS # BLD AUTO: 12.44 X10 (3) UL (ref 1.5–7.7)
NEUTROPHILS # BLD AUTO: 12.44 X10(3) UL (ref 1.5–7.7)
NEUTROPHILS NFR BLD AUTO: 83.3 %
PLATELET # BLD AUTO: 278 10(3)UL (ref 150–450)
RBC # BLD AUTO: 4.17 X10(6)UL (ref 3.8–5.3)
WBC # BLD AUTO: 15 X10(3) UL (ref 4–11)

## 2025-05-27 RX ORDER — IPRATROPIUM BROMIDE AND ALBUTEROL SULFATE 2.5; .5 MG/3ML; MG/3ML
3 SOLUTION RESPIRATORY (INHALATION) ONCE
Status: COMPLETED | OUTPATIENT
Start: 2025-05-27 | End: 2025-05-28

## 2025-05-27 RX ORDER — METHYLPREDNISOLONE SODIUM SUCCINATE 125 MG/2ML
60 INJECTION INTRAMUSCULAR; INTRAVENOUS ONCE
Status: COMPLETED | OUTPATIENT
Start: 2025-05-27 | End: 2025-05-27

## 2025-05-28 ENCOUNTER — APPOINTMENT (OUTPATIENT)
Dept: CT IMAGING | Facility: HOSPITAL | Age: 85
End: 2025-05-28
Attending: EMERGENCY MEDICINE
Payer: MEDICARE

## 2025-05-28 ENCOUNTER — APPOINTMENT (OUTPATIENT)
Dept: CV DIAGNOSTICS | Facility: HOSPITAL | Age: 85
End: 2025-05-28
Attending: INTERNAL MEDICINE
Payer: MEDICARE

## 2025-05-28 ENCOUNTER — APPOINTMENT (OUTPATIENT)
Dept: CT IMAGING | Facility: HOSPITAL | Age: 85
DRG: 291 | End: 2025-05-28
Attending: EMERGENCY MEDICINE
Payer: MEDICARE

## 2025-05-28 ENCOUNTER — APPOINTMENT (OUTPATIENT)
Dept: CV DIAGNOSTICS | Facility: HOSPITAL | Age: 85
DRG: 291 | End: 2025-05-28
Attending: INTERNAL MEDICINE
Payer: MEDICARE

## 2025-05-28 PROBLEM — I31.39 PERICARDIAL EFFUSION (HCC): Status: ACTIVE | Noted: 2025-05-28

## 2025-05-28 LAB
ANION GAP SERPL CALC-SCNC: 4 MMOL/L (ref 0–18)
ANION GAP SERPL CALC-SCNC: 7 MMOL/L (ref 0–18)
ATRIAL RATE: 77 BPM
BASE EXCESS BLD CALC-SCNC: 7.1 MMOL/L (ref ?–2)
BASOPHILS # BLD AUTO: 0.02 X10(3) UL (ref 0–0.2)
BASOPHILS NFR BLD AUTO: 0.1 %
BUN BLD-MCNC: 18 MG/DL (ref 9–23)
BUN BLD-MCNC: 19 MG/DL (ref 9–23)
BUN/CREAT SERPL: 25.4 (ref 10–20)
BUN/CREAT SERPL: 26.4 (ref 10–20)
CALCIUM BLD-MCNC: 8.5 MG/DL (ref 8.7–10.4)
CALCIUM BLD-MCNC: 8.7 MG/DL (ref 8.7–10.4)
CHLORIDE SERPL-SCNC: 100 MMOL/L (ref 98–112)
CHLORIDE SERPL-SCNC: 98 MMOL/L (ref 98–112)
CO2 SERPL-SCNC: 35 MMOL/L (ref 21–32)
CO2 SERPL-SCNC: 38 MMOL/L (ref 21–32)
CREAT BLD-MCNC: 0.71 MG/DL (ref 0.55–1.02)
CREAT BLD-MCNC: 0.72 MG/DL (ref 0.55–1.02)
DEPRECATED RDW RBC AUTO: 58.3 FL (ref 35.1–46.3)
EGFRCR SERPLBLD CKD-EPI 2021: 83 ML/MIN/1.73M2 (ref 60–?)
EGFRCR SERPLBLD CKD-EPI 2021: 85 ML/MIN/1.73M2 (ref 60–?)
EOSINOPHIL # BLD AUTO: 0 X10(3) UL (ref 0–0.7)
EOSINOPHIL NFR BLD AUTO: 0 %
ERYTHROCYTE [DISTWIDTH] IN BLOOD BY AUTOMATED COUNT: 17.2 % (ref 11–15)
GLUCOSE BLD-MCNC: 233 MG/DL (ref 70–99)
GLUCOSE BLD-MCNC: 88 MG/DL (ref 70–99)
HCO3 BLDA-SCNC: 30.3 MEQ/L (ref 21–27)
HCT VFR BLD AUTO: 36.9 % (ref 35–48)
HGB BLD-MCNC: 11.4 G/DL (ref 12–16)
IMM GRANULOCYTES # BLD AUTO: 0.06 X10(3) UL (ref 0–1)
IMM GRANULOCYTES NFR BLD: 0.4 %
LACTATE BLD-SCNC: 4.1 MMOL/L (ref 0.5–2)
LYMPHOCYTES # BLD AUTO: 0.42 X10(3) UL (ref 1–4)
LYMPHOCYTES NFR BLD AUTO: 2.9 %
MCH RBC QN AUTO: 28.5 PG (ref 26–34)
MCHC RBC AUTO-ENTMCNC: 30.9 G/DL (ref 31–37)
MCV RBC AUTO: 92.3 FL (ref 80–100)
MODIFIED ALLEN TEST: POSITIVE
MONOCYTES # BLD AUTO: 0.08 X10(3) UL (ref 0.1–1)
MONOCYTES NFR BLD AUTO: 0.6 %
NEUTROPHILS # BLD AUTO: 13.87 X10 (3) UL (ref 1.5–7.7)
NEUTROPHILS # BLD AUTO: 13.87 X10(3) UL (ref 1.5–7.7)
NEUTROPHILS NFR BLD AUTO: 96 %
NT-PROBNP SERPL-MCNC: 460 PG/ML (ref ?–450)
O2 CT BLD-SCNC: 14.4 VOL% (ref 15–23)
OSMOLALITY SERPL CALC.SUM OF ELEC: 296 MOSM/KG (ref 275–295)
OSMOLALITY SERPL CALC.SUM OF ELEC: 299 MOSM/KG (ref 275–295)
OXYGEN LITERS/MINUTE: 4 L/MIN
P AXIS: -5 DEGREES
P-R INTERVAL: 160 MS
PCO2 BLDA: 52 MM HG (ref 35–45)
PH BLDA: 7.41 [PH] (ref 7.35–7.45)
PLATELET # BLD AUTO: 257 10(3)UL (ref 150–450)
PO2 BLDA: 86 MM HG (ref 80–100)
POTASSIUM SERPL-SCNC: 3.2 MMOL/L (ref 3.5–5.1)
POTASSIUM SERPL-SCNC: 3.3 MMOL/L (ref 3.5–5.1)
PUNCTURE CHARGE: YES
Q-T INTERVAL: 366 MS
QRS DURATION: 98 MS
QTC CALCULATION (BEZET): 414 MS
R AXIS: -12 DEGREES
RBC # BLD AUTO: 4 X10(6)UL (ref 3.8–5.3)
SAO2 % BLDA: 92.2 % (ref 94–100)
SODIUM SERPL-SCNC: 140 MMOL/L (ref 136–145)
SODIUM SERPL-SCNC: 142 MMOL/L (ref 136–145)
T AXIS: 0 DEGREES
TROPONIN I SERPL HS-MCNC: 13 NG/L (ref ?–34)
TROPONIN I SERPL HS-MCNC: 14 NG/L (ref ?–34)
VENTRICULAR RATE: 77 BPM
WBC # BLD AUTO: 14.5 X10(3) UL (ref 4–11)

## 2025-05-28 PROCEDURE — 93321 DOPPLER ECHO F-UP/LMTD STD: CPT | Performed by: INTERNAL MEDICINE

## 2025-05-28 PROCEDURE — 93308 TTE F-UP OR LMTD: CPT | Performed by: INTERNAL MEDICINE

## 2025-05-28 PROCEDURE — 99223 1ST HOSP IP/OBS HIGH 75: CPT | Performed by: HOSPITALIST

## 2025-05-28 PROCEDURE — 93325 DOPPLER ECHO COLOR FLOW MAPG: CPT | Performed by: INTERNAL MEDICINE

## 2025-05-28 PROCEDURE — 99223 1ST HOSP IP/OBS HIGH 75: CPT | Performed by: INTERNAL MEDICINE

## 2025-05-28 PROCEDURE — 3E0F7SF INTRODUCTION OF OTHER GAS INTO RESPIRATORY TRACT, VIA NATURAL OR ARTIFICIAL OPENING: ICD-10-PCS | Performed by: HOSPITALIST

## 2025-05-28 PROCEDURE — 99223 1ST HOSP IP/OBS HIGH 75: CPT | Performed by: REGISTERED NURSE

## 2025-05-28 PROCEDURE — 71260 CT THORAX DX C+: CPT | Performed by: EMERGENCY MEDICINE

## 2025-05-28 RX ORDER — POTASSIUM CHLORIDE 1500 MG/1
40 TABLET, EXTENDED RELEASE ORAL ONCE
Status: COMPLETED | OUTPATIENT
Start: 2025-05-28 | End: 2025-05-28

## 2025-05-28 RX ORDER — MORPHINE SULFATE 10 MG/5ML
2.6 SOLUTION ORAL EVERY 4 HOURS PRN
Refills: 0 | Status: DISCONTINUED | OUTPATIENT
Start: 2025-05-28 | End: 2025-05-30

## 2025-05-28 RX ORDER — BISACODYL 10 MG
10 SUPPOSITORY, RECTAL RECTAL
Status: DISCONTINUED | OUTPATIENT
Start: 2025-05-28 | End: 2025-05-30

## 2025-05-28 RX ORDER — METHYLPREDNISOLONE SODIUM SUCCINATE 40 MG/ML
40 INJECTION INTRAMUSCULAR; INTRAVENOUS EVERY 12 HOURS
Status: DISCONTINUED | OUTPATIENT
Start: 2025-05-28 | End: 2025-05-30

## 2025-05-28 RX ORDER — TRAMADOL HYDROCHLORIDE 50 MG/1
50 TABLET ORAL EVERY 6 HOURS PRN
Status: DISCONTINUED | OUTPATIENT
Start: 2025-05-28 | End: 2025-05-30

## 2025-05-28 RX ORDER — FUROSEMIDE 10 MG/ML
40 INJECTION INTRAMUSCULAR; INTRAVENOUS ONCE
Status: COMPLETED | OUTPATIENT
Start: 2025-05-28 | End: 2025-05-28

## 2025-05-28 RX ORDER — MORPHINE SULFATE 10 MG/5ML
2.6 SOLUTION ORAL ONCE
Refills: 0 | Status: COMPLETED | OUTPATIENT
Start: 2025-05-28 | End: 2025-05-28

## 2025-05-28 RX ORDER — NYSTATIN 100000 [USP'U]/ML
5 SUSPENSION ORAL 4 TIMES DAILY
Status: DISCONTINUED | OUTPATIENT
Start: 2025-05-28 | End: 2025-05-30

## 2025-05-28 RX ORDER — POTASSIUM CHLORIDE 1500 MG/1
20 TABLET, EXTENDED RELEASE ORAL NIGHTLY
Status: DISCONTINUED | OUTPATIENT
Start: 2025-05-29 | End: 2025-05-30

## 2025-05-28 RX ORDER — DIGOXIN 125 MCG
125 TABLET ORAL DAILY
Status: DISCONTINUED | OUTPATIENT
Start: 2025-05-28 | End: 2025-05-29

## 2025-05-28 RX ORDER — ACETAZOLAMIDE 250 MG/1
500 TABLET ORAL DAILY
Status: DISCONTINUED | OUTPATIENT
Start: 2025-05-28 | End: 2025-05-30

## 2025-05-28 RX ORDER — DILTIAZEM HYDROCHLORIDE 120 MG/1
360 CAPSULE, EXTENDED RELEASE ORAL DAILY
Status: DISCONTINUED | OUTPATIENT
Start: 2025-05-28 | End: 2025-05-30

## 2025-05-28 RX ORDER — FLUTICASONE PROPIONATE AND SALMETEROL 500; 50 UG/1; UG/1
1 POWDER RESPIRATORY (INHALATION) 2 TIMES DAILY
Status: DISCONTINUED | OUTPATIENT
Start: 2025-05-28 | End: 2025-05-30

## 2025-05-28 RX ORDER — HEPARIN SODIUM 5000 [USP'U]/ML
5000 INJECTION, SOLUTION INTRAVENOUS; SUBCUTANEOUS EVERY 12 HOURS SCHEDULED
Status: DISCONTINUED | OUTPATIENT
Start: 2025-05-28 | End: 2025-05-30

## 2025-05-28 RX ORDER — ONDANSETRON 2 MG/ML
4 INJECTION INTRAMUSCULAR; INTRAVENOUS EVERY 6 HOURS PRN
Status: DISCONTINUED | OUTPATIENT
Start: 2025-05-28 | End: 2025-05-30

## 2025-05-28 RX ORDER — PHENAZOPYRIDINE HYDROCHLORIDE 100 MG/1
100 TABLET, FILM COATED ORAL 3 TIMES DAILY PRN
Status: DISCONTINUED | OUTPATIENT
Start: 2025-05-28 | End: 2025-05-30

## 2025-05-28 RX ORDER — BENZONATATE 100 MG/1
200 CAPSULE ORAL 3 TIMES DAILY PRN
Status: DISCONTINUED | OUTPATIENT
Start: 2025-05-28 | End: 2025-05-30

## 2025-05-28 RX ORDER — MONTELUKAST SODIUM 10 MG/1
10 TABLET ORAL NIGHTLY
Status: DISCONTINUED | OUTPATIENT
Start: 2025-05-28 | End: 2025-05-30

## 2025-05-28 RX ORDER — CETIRIZINE HYDROCHLORIDE 5 MG/1
5 TABLET ORAL DAILY
Status: DISCONTINUED | OUTPATIENT
Start: 2025-05-29 | End: 2025-05-30

## 2025-05-28 RX ORDER — POLYETHYLENE GLYCOL 3350 17 G/17G
17 POWDER, FOR SOLUTION ORAL DAILY
Status: DISCONTINUED | OUTPATIENT
Start: 2025-05-28 | End: 2025-05-30

## 2025-05-28 RX ORDER — MORPHINE SULFATE 10 MG/5ML
2.6 SOLUTION ORAL 3 TIMES DAILY PRN
Refills: 0 | Status: DISCONTINUED | OUTPATIENT
Start: 2025-05-28 | End: 2025-05-28

## 2025-05-28 RX ORDER — SODIUM PHOSPHATE, DIBASIC AND SODIUM PHOSPHATE, MONOBASIC 7; 19 G/230ML; G/230ML
1 ENEMA RECTAL ONCE AS NEEDED
Status: DISCONTINUED | OUTPATIENT
Start: 2025-05-28 | End: 2025-05-30

## 2025-05-28 RX ORDER — ACETAMINOPHEN 500 MG
500 TABLET ORAL EVERY 4 HOURS PRN
Status: DISCONTINUED | OUTPATIENT
Start: 2025-05-28 | End: 2025-05-30

## 2025-05-28 RX ORDER — ESTRADIOL 0.05 MG/D
1 PATCH TRANSDERMAL WEEKLY
Status: DISCONTINUED | OUTPATIENT
Start: 2025-05-28 | End: 2025-05-30

## 2025-05-28 RX ORDER — SENNOSIDES 8.6 MG
17.2 TABLET ORAL NIGHTLY PRN
Status: DISCONTINUED | OUTPATIENT
Start: 2025-05-28 | End: 2025-05-30

## 2025-05-28 RX ORDER — CETIRIZINE HYDROCHLORIDE 10 MG/1
10 TABLET ORAL DAILY
Status: DISCONTINUED | OUTPATIENT
Start: 2025-05-28 | End: 2025-05-28

## 2025-05-28 RX ORDER — POLYETHYLENE GLYCOL 3350 17 G/17G
17 POWDER, FOR SOLUTION ORAL DAILY PRN
Status: DISCONTINUED | OUTPATIENT
Start: 2025-05-28 | End: 2025-05-30

## 2025-05-28 NOTE — CONSULTS
[facility]  part of [facility]  Palliative Care Initial Consult Note    Yesenia Berkowitz Patient Status:  Inpatient    1940 MRN [id number]   Location [facility] 3W/SW Attending Robbie Tate MD   Hosp Day # 0 PCP JO-ANN YANG MD     Date of Consult: 2025  Patient seen at: [facility] Inpatient    The  Cures Act makes medical notes like these available to patients in the interest of transparency. Please be advised this is a medical document. Medical documents are intended to carry relevant information, facts as evident, and the clinical opinion of the practitioner. The medical note is intended as peer to peer communication and may appear blunt or direct. It is written in medical language and may contain abbreviations or verbiage that are unfamiliar.     Reason for Consultation: Consult ordered by:: Dr. Tate for evaluation of Palliative Care needs and Goals of care discussion.    Subjective     History of Present Illness: Yesenia Berkowitz is a 84 year old female with history of severe COPD with chronic respiratory failure on home O2 4L at baseline, RUL spiculated lung nodule with positive PET scan concerning for malignancy-refused bronch/bx, recent second degree heart block s/p pacemaker placement 25, HFpEF, Pafib, HTN, HLD, OA, DDD, DM type II, recurrent UTI's, GERD who was admitted on 2025 for worsening SOB and LE edema. See below for reviewed labs and imaging. Pt was admitted for treatment and evaluation of COPD exac, CHF exacerbation, acute on chronic respiratory failure, pleural effusions and pericardial effusion per imaging. See below for reviewed imaging.     Discussed case with Dr. Tate-he is asking for further discussion regarding code status clarification.    She will be followed by cardiology and pulmonary services.    I reviewed labs and imaging-see below. History was obtained from [facility] and pt/son Tyler.  Today is day 1 of hospitalization.  This is her 8th hospitalization in the past 6 months.    I evaluated pt for palliative care during her last admission earlier this month and she is being followed [facility].    Pt is listed as DNAR/full tx in Epic,  and reviewed previously completed POLST which shows wishes for DNAR/DNI-selective. I reviewed previously completed HCPOA paperwork which shows her son Tyler as HCPOA.     Reviewed symptom needs past 24 hrs: none    Patient was seen and examined in bed with her son Tyler at bedside. Pt is A&OX4 and very pleasant. She appears frail and thin. She remembers me from last visit. Pt reports ongoing bothersome dyspnea symptoms with associated anxiety symptoms. Dyspnea symptoms worse with any activity or prolonged talking and takes rests which help. Remains on nc 4L which is her baseline at home. She tells me she has Roxanol at home, but hasn't tried it. Denies any pain symptoms. Appetite has been fairly good, denies abdominal pain, n/v and moved her bowels 3 days ago. +constipation issues and takes Miralax daily at home. She See physical exam and ROS below.    Review of Systems/Palliative Care symptom needs assessed:   Pertinent items are noted in HPI/below      Fatigue:  Yes  Dyspnea: +chronic ongoing issue   Current O2 therapy: nc 4L  Cough: denies  Pain Present: denies  Non-verbal signs of pain present: NO  Appetite: good  Constipation: +constipation with no BM in past 3 days  Diarrhea: denies  Nausea/Vomiting: denies  Depression: denies  Anxiety: +anxiety r/t dyspnea symptoms    Medical History: obtained from [facility]  Past Medical History[1]  Past Surgical History[2]    Family History: obtained from [facility]  Family History[3]    Palliative Care Social History:   Marital Status:   Children: 1 son Tyler  Living Situation Prior to Admit: lives alone, but son lives close by  Occupational History: Retired, RN    Substance History:   reports that she quit smoking about 29 years ago. Her  smoking use included cigarettes. She has never used smokeless tobacco.  reports that she does not currently use alcohol.  reports no history of drug use.  Hx of Substance Use/Abuse: No    Spiritual Assessment:   Jewish: Christianity   requested: Yes    Allergies:  Allergies[4]    Medications:   Current Hospital Medications[5]    Nutritional status:  BMI: 22 Weight: 133  Weight loss: +down 42 pounds in past 5 months per EPIC documentation  Current Appetite: Good  Dysphagia: denies    Functional Status History:  ADLs: uses walker for short distance ambulation, still fairly independent in ADL's      Palliative Performance Scale:   Prior to admission: 60%  Observed during hospitalization: 50%  % Ambulation Activity Level Self-Care Intake Consciousness   100 Full  Normal  No Disease Full Normal Full   90 Full  Normal  Some Disease Full Normal Full   80 Full  Normal w/effort  Some Disease Full Normal or reduced Full   70 Reduced  Can't Perform Job  Some Disease Full Normal or reduced Full   60 Reduced  Can't Perform Hobby   Significant Disease Occ Assist Normal or reduced Full or confused   50 Mainly sit/lie Can't do any work  Extensive Disease Partial Assist Normal or reduced Full or confused   40 Mainly in bed Can't do any work  Extensive Disease Mainly Assist Normal or reduced Full or confused   30 Bed Bound Can't do any work  Extensive Disease Max Assist  Total Care Reduced  Drowsy/confused   20 Bed Bound Can't do any work  Extensive Disease Max Assist  Total Care Minimal  Drowsy/confused   10 Bed Bound Can't do any work  Extensive Disease Max Assist  Total Care Mouth Care  Drowsy/confused   0 Death        Objective      Vital Signs:  Blood pressure 142/59, pulse 75, temperature 97.7 °F (36.5 °C), temperature source Oral, resp. rate 18, height 5' 5\" (1.651 m), weight 133 lb 11.2 oz (60.6 kg), SpO2 95%.  Body mass index is 22.25 kg/m².  Present Level of pain: 0/10  Non-verbal signs of pain present: No    Physical  Exam:  General: Alert and in no apparent distress. Weak, frail/thin appearing  HEENT: No focal deficits.  Cardiac: Regular rate and rhythm, S1, S2 normal, no murmur, rub or gallop.  Lungs: Diminished breath sounds bilaterally. Normal excursions and effort.  Abdomen: Soft, non-tender, normal bowel sounds X 4 quadrants, no rebound or guarding  Extremities: Without clubbing, cyanosis. Peripheral pulses are 2+. Trace pedal BLE edema present L>R  Neurologic: Alert and oriented X4, normal affect.  Skin: Warm and dry. R chest pacemaker site clear with steri strips in place.       Hematology:  Lab Results   Component Value Date    WBC 14.5 (H) 05/28/2025    HGB 11.4 (L) 05/28/2025    HCT 36.9 05/28/2025    .0 05/28/2025       Coags:  Lab Results   Component Value Date    INR 1.08 11/25/2024    PTT 28.1 03/20/2025       Chemistry:  Lab Results   Component Value Date    CREATSERUM 0.71 05/28/2025    BUN 18 05/28/2025     05/28/2025    K 3.2 (L) 05/28/2025    CL 98 05/28/2025    CO2 35.0 (H) 05/28/2025     (H) 05/28/2025    CA 8.5 (L) 05/28/2025    ALB 3.9 05/13/2025    ALKPHO 65 05/13/2025    BILT 0.2 05/13/2025    TP 6.5 05/13/2025    AST 13 05/13/2025    ALT <7 (L) 05/13/2025    DDIMER 0.47 12/21/2024    MG 2.0 05/14/2025    PHOS 3.4 12/26/2024    TROP <0.045 02/03/2020       Imaging:  CT CHEST PE AORTA (IV ONLY) (CPT=71260)  Result Date: 5/28/2025  CONCLUSION:  1. No evidence of acute pulmonary embolism to the level of the first order subsegmental pulmonary artery branches.  2. Cardiomegaly with imaging findings concerning for pulmonary interstitial edema.  3. Increasing pericardial effusion.  4. Right larger than left pleural effusions and associated basilar atelectasis, with or without superimposed pneumonia.  5. A right upper lobe pulmonary nodule is redemonstrated; notably, this was hypermetabolic on prior PET-CT, and is concerning for primary bronchogenic malignancy.  6. Right perihilar and  subcarinal lymphadenopathy.  7. Mild-to-moderate centrilobular emphysematous changes.  8. Dilatation of the main pulmonary artery trunk may relate to underlying pulmonary hypertension.  9. Diffuse thyromegaly with multiple thyroid nodules.  10. Sequela of remote granulomatous disease.  11. Lesser incidental findings as above.     A preliminary report was issued by the [facility] teleradiology service. There are no major discrepancies.   Dictated by (CST): Grant Ferreira MD on 2025 at 7:24 AM     Finalized by (CST): Grant Ferreira MD on 2025 at 7:37 AM             CARD ECHO 2D DOPPLER (CPT=93306)  Result Date: 5/15/2025  Transthoracic Echocardiogram Name:Yesenia Berkowitz Date: 05/15/2025 :  1940 Ht:  (65in)  BP: 103 / 63 MRN:  [id number]    Age:  84years    Wt:  (132lb) HR: 67bpm Loc:  [facility]       Gndr: F          BSA: 1.66m^2 Sonographer: Samantha Ordering:    Cortney Dumont Consulting:  Luis Fernando Fowler ---------------------------------------------------------------------------- History/Indications:   Murmur.  Abnormal ECG. ---------------------------------------------------------------------------- Procedure information:  A transthoracic complete 2D study was performed. Additional evaluation included M-mode, complete spectral Doppler, and color Doppler.  Patient status:  Inpatient.  Location:  Bedside.    This was a routine study. Transthoracic echocardiography for diagnosis and ventricular function evaluation. Image quality was adequate. ECG rhythm:   Normal sinus ---------------------------------------------------------------------------- Conclusions: 1. Left ventricle: The cavity size was normal. Wall thickness was mildly    increased. Systolic function was hyperdynamic. The estimated ejection    fraction was >70%, by visual assessment. Wall motion is normal; there are    no regional wall motion abnormalities. Features are consistent with a    pseudonormal left ventricular filling pattern,  with concomitant abnormal    relaxation and increased filling pressure - grade 2 diastolic    dysfunction. 2. Right ventricle: The cavity size was mildly increased. Systolic function    was normal. 3. Mitral valve: The annulus was moderately fibrotic. The leaflets were    mildly thickened. The mean diastolic gradient was 4mm Hg. The valve area    (LVOT continuity) was 1.88cm^2. 4. Left atrium: The atrium was mildly dilated. 5. Pulmonary arteries: Systolic pressure was mildly increased, estimated to    be 35mm Hg. Impressions:  No significant change since prior study. * ---------------------------------------------------------------------------- * Findings: Left ventricle:  The cavity size was normal. Wall thickness was mildly increased. Systolic function was hyperdynamic. The estimated ejection fraction was >70%, by visual assessment. Wall motion is normal; there are no regional wall motion abnormalities. Features are consistent with a pseudonormal left ventricular filling pattern, with concomitant abnormal relaxation and increased filling pressure - grade 2 diastolic dysfunction. Left atrium:  The atrium was mildly dilated. Right ventricle:  The cavity size was mildly increased. Systolic function was normal. Right atrium:  The atrium was normal in size. Mitral valve:  The annulus was moderately fibrotic. The leaflets were mildly thickened. Leaflet separation was normal.  Doppler:  Transvalvular velocity was increased. There was trace regurgitation.    The valve area (LVOT continuity) was 1.88cm^2. The valve area index (LVOT continuity) was 1.13cm^2/m^2.    The mean diastolic gradient was 4mm Hg. Aortic valve:   The valve was trileaflet. The leaflets were mildly thickened. Cusp separation was normal.  Doppler:  Transvalvular velocity was within the normal range. There was no evidence for stenosis. There was no significant regurgitation.    The valve area (VTI) was 2.1cm^2. The valve area (VTI) index was  1.27cm^2/m^2.    The mean systolic gradient was 8mm Hg. The peak systolic gradient was 14mm Hg. Tricuspid valve:  The valve is structurally normal. Leaflet separation was normal.  Doppler:  Transvalvular velocity was within the normal range. There was no evidence for stenosis. There was trivial regurgitation. Pulmonic valve:   The valve is structurally normal. Cusp separation was normal.  Doppler:  Transvalvular velocity was within the normal range. There was no evidence for stenosis. There was no significant regurgitation. Pericardium:   There was no pericardial effusion. Aorta: Aortic root: The aortic root was normal. Ascending aorta: The ascending aorta was normal. Pulmonary arteries: Systolic pressure was mildly increased, estimated to be 35mm Hg. Systemic veins:  Central venous respirophasic diameter changes are in the normal range (>50%). Inferior vena cava: The IVC was normal-sized. ---------------------------------------------------------------------------- Measurements  Left ventricle       Value          Ref       09/14/2024  IVS thickness,   (H) 1.0   cm       0.6 - 0.9 1.0  ED, PLAX  LV ID, ED, PLAX      4.0   cm       3.8 - 5.2 4.2  LV ID, ES, PLAX      2.7   cm       2.2 - 3.5 2.9  LV PW thickness,     0.9   cm       0.6 - 0.9 1.0  ED, PLAX  IVS/LV PW ratio,     1.11           --------- 1.00  ED, PLAX  LV PW/LV ID          0.23           --------- 0.24  ratio, ED, PLAX  LV ejection          61    %        54 - 74   59  fraction  Stroke               54    ml/m^2   --------- 51  volume/bsa, 2D  LV e', lateral   (L) 5.8   cm/sec   >=10.0    ----------  LV E/e', lateral (H) 18             <=13      ----------  LV e', medial    (L) 6.1   cm/sec   >=7.0     ----------  LV E/e', medial      17             --------- ----------  LV e', average       5.9   cm/sec   --------- ----------  LV E/e', average (H) 17             <=14      ----------  LVOT                 Value          Ref       09/14/2024  LVOT ID               1.9   cm       --------- 1.8  LVOT peak            1.45  m/sec    --------- 1.69  velocity, S  LVOT VTI, S          31.7  cm       --------- 36.4  LVOT peak            8     mm Hg    --------- 11  gradient, S  LVOT mean            5     mm Hg    --------- 6  gradient, S  Stroke volume        90    ml       --------- 93  (SV), LVOT DP  Stroke index         54    ml/m^2   --------- 51  (SV/bsa), LVOT  DP  Aortic valve         Value          Ref       09/14/2024  Aortic valve         1.88  m/sec    --------- 2.06  peak velocity, S  Aortic valve         42.8  cm       --------- 42.1  VTI, S  Aortic mean          8     mm Hg    --------- 10  gradient, S  Aortic peak          14    mm Hg    --------- 17  gradient, S  Aortic valve         2.1   cm^2     --------- 2.17  area, VTI  Aortic valve         1.27  cm^2/m^2 --------- 1.19  area/bsa, VTI  Velocity ratio,      0.77           --------- 0.82  peak, LVOT/AV  Aortic root          Value          Ref       09/14/2024  Aortic root ID       2.9   cm       2.4 - 3.9 2.9  Ascending aorta      Value          Ref       09/14/2024  Ascending aorta      3.0   cm       1.9 - 3.5 3.0  ID  Left atrium          Value          Ref       09/14/2024  LA volume, S     (H) 61    ml       22 - 52   47  LA volume/bsa, S (H) 37    ml/m^2   16 - 34   26  LA volume, ES,   (H) 58    ml       22 - 52   47  1-p A4C  LA volume, ES,   (H) 59    ml       22 - 52   44  1-p A2C  LA volume, ES,       69    ml       --------- 53  A/L  LA volume/bsa,   (H) 42    ml/m^2   16 - 34   29  ES, A/L  Mitral valve         Value          Ref       09/14/2024  Mitral E-wave        1.02  m/sec    --------- 1  peak velocity  Mitral A-wave        1.61  m/sec    --------- 1.4  peak velocity  Mitral               248   ms       --------- 243  deceleration  time  Mitral mean          4     mm Hg    --------- 5  gradient, D  Mitral peak          13    mm Hg    --------- 11  gradient, D  Mitral E/A           0.6             --------- 0.7  ratio, peak  Mitral valve         1.88  cm^2     --------- 1.88  area, LVOT  continuity  Mitral valve         1.13  cm^2/m^2 --------- 1.04  area/bsa, LVOT  continuity  Pulmonary artery     Value          Ref       09/14/2024  PA pressure, S,      35    mm Hg    --------- ----------  DP  Tricuspid valve      Value          Ref       09/14/2024  Tricuspid regurg (H) 2.82  m/sec    <=2.8     3.63  peak velocity  Tricuspid peak       32    mm Hg    --------- 53  RV-RA gradient  Right atrium         Value          Ref       09/14/2024  RA ID, S-I, ES,      4.3   cm       3.4 - 5.3 ----------  A4C  RA ID/bsa, S-I,      2.6   cm/m^2   1.9 - 3.1 ----------  ES, A4C  RA area, ES, A4C     13    cm^2     10 - 18   ----------  RA volume, ES,       31    ml       --------- ----------  1-p A4C  RA volume/bsa,       18    ml/m^2   9 - 33    ----------  ES, 1-p A4C  Systemic veins       Value          Ref       09/14/2024  Estimated CVP        3     mm Hg    --------- 3  Inferior vena        Value          Ref       09/14/2024  cava  ID                   1.4   cm       <=2.1     1.8  Right ventricle      Value          Ref       09/14/2024  TAPSE, MM            2.02  cm       >=1.70    1.98  RV pressure, S,      35    mm Hg    --------- 56  DP  RV s', lateral       14.0  cm/sec   >=9.5     14.8 Legend: (L)  and  (H)  malvin values outside specified reference range. ---------------------------------------------------------------------------- Prepared and electronically signed by Luis Fernando Fowler 05/15/2025 12:03        Summary of [facility] Discussion        I discussed reason for palliative care consultation with patient and her son Tyler. They are already familiar with palliative care from previous admission. Son says she was seen recently by [facility] provider at home.    Prognostic awareness/understanding: still in informational gathering stage    -I  discussed current clinical condition and explored  previous discussions with MDs.    -I discussed the normal disease trajectory of COPD, possible underlying lung ca, CHF with associated symptoms and decline over time.     Hopes/goals/concerns:   -Pt wants to continue with supportive care. She tells me she is scared to die and is not ready. She is not ready for hospice care at this time.    -She talked with me about knowing she likely has an underlying lung malignancy, but is not interested in bronch/bx. Unsure is she will follow up for any RT.     -Pt is concerned about her ongoing dyspnea symptoms and I talked with her about utilizing trial of low dose Roxanol prn to help. Discussed MOA/SE's of low dose opioid tx in the setting of advanced lung dz and she is agreeable to trial.    -Pt is hoping she will improve with ongoing supportive care and return home on dc. Recommended continued follow up with [facility] on dc.    -Discussed ongoing GOC discussions will be needed pending her clinical course. She is agreeable to palliative care following.    Advance Care Planning counseling and discussion:     -I discussed the importance of advance care planning prior to crisis with pt/son Tyler.     -I readdressed pt's code status discussed the risks vs benefits of life sustaining treatments in the setting of advancing age and in her clinical picture. Education provided on what DNAR entails and encouraged setting limits. Initially pt told me she wants CPR, but not interested in ever being put on mechanical ventilation. I spend time talking with her about the reality of resuscitation efforts including the necessary component of airway management during a cardiac arrest. I reinforced our previous discussions we had during last admission where she was very clear that QOL is most important over length of life. After much discussion, pt confirmed wishes for DNAR/DNI-selective, no feeding tubes and continue supportive care. I clarified that DNAR does not effect medical  treatments. Pt's son has a copy of her previously completed POLST form which is valid.     -Provided emotional support to pt/son who are coping adequately.     Procedures:  No intubation  No feeding tubes  Assessment and Recommendations      Problem List:    Acute CHF exacerbation  Acute COPD exacerbation  Acute on chronic respiratory failure  Pleural effusions  Pericardial effusion  Spiculated RUL lung nodule/+PET concerning for malignancy  Second degree AVB with brief third degree block s/p pacemaker placement 5/19  PAfib  DM type II  Abnormal thyroid function tests  Severe malnutrition  Chronic HFpEF  HTN  HLD     Dyspnea  -Ongoing issue, monitor, O2, tx per pulm  -Pt is DNI.  -Add Roxanol 2.6 mg po X1 and Q 4 hrs prn dyspnea for comfort. Pt has home Roxanol but has never tried it.  -Education provided on non-pharmacological modalities for dyspnea.    Constipation  -Add Miralax 17 gm po daily, continue senna prn and ducolax prn     Goals of care counseling  -see above for details  -Confirmed wishes for DNAR/DNI-selective and continue supportive care.   -Pt is declining to have bronch and says she is unsure if she will pursue RT.   -Ongoing GOC discussions will be needed through clinical course.  -Pt/son are aware of the option for comfort care with hospice, but are NOT ready for this right now.  - following for spiritual support.  -Agreeable to palliative care following  -Dispo:  [facility] with [facility]  and  [facility] community palliative care program to follow. SW to help with dc planning.   -Provided emotional support to pt/son who are coping adequately.     Advance care planning  -Pt's son Tyler Berkowitz is HCPOA #[phone].  -Previously completed POLST/ HCPOA paperwork on file in EPIC.     Emotion support provided to patient/family today: Yes    A total of 77 mins were spent on this consult, which included all of the following:direct face to face contact, history taking, physical  examination, and >50% was spent counseling and coordinating care.    Discussed today's visit with message to Dr. Tate, Dr. Tineo, and Dominique KRAUSE.    I will continue to follow clinically.      Thank you for allowing Palliative Care services to participate in the care of Ms. Yesenia Berkowitz.       Charlotte Smith, ANP-BC, ACHPN [id number]  5/28/2025  10:46 AM  Palliative Care Services        [1]   Past Medical History:   A-fib ([facility])    Anesthesia complication    Arrhythmia    AFIB    Arthritis    Asthma (HCC)    Back problem    COPD (chronic obstructive pulmonary disease) ([facility])    Deviated nasal septum    nasal septoplasty, turb reduction, smr of turbs    Difficult intubation    fiber optic if needed    Diverticulitis    colonoscopy     Diverticulosis of large intestine    Esophageal reflux    Extrinsic asthma, unspecified    Headache    Heart disease    Hepatitis    WAS TOLD SHE HAS HAD THIS WHEN SHE WAS 19 YEARS OLD    High blood pressure    High cholesterol    Irregular heart rate    Lichenoid keratosis    left chest-bx    Osteoarthritis    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    Paronychia    (RT); onychomycosis; debridement    Problems with swallowing    occasionally    Shortness of breath    2 L NC ALL THE TIME 24HR/7 DAYS PER WEEK    Unspecified essential hypertension    Visual impairment   [2]   Past Surgical History:  Procedure Laterality Date    Adenoidectomy      Cataract      cataract extraction     Hysterectomy      Sinus surgery        DEVIATED SEPTUM    T&a      Tonsillectomy     [3]   Family History  Problem Relation Age of Onset    Ovarian Cancer Mother 76        endometrial, poss ovarian primary    Pulmonary Disease Sister         COPD    Skin cancer Other     Stroke Other     Other (Other) Other    [4]   Allergies  Allergen Reactions    Penicillin G ANAPHYLAXIS    Azithromycin DIARRHEA and NAUSEA AND VOMITING    Cefuroxime UNKNOWN     Other reaction(s): Vomitting    Levofloxacin UNKNOWN      Other reaction(s): LEVOFLOXACIN    Penicillins      Other reaction(s): Unknown    Seasonal ITCHING   [5]   Current Facility-Administered Medications:     ondansetron (Zofran) 4 MG/2ML injection 4 mg, 4 mg, Intravenous, Q6H PRN    acetaminophen (Tylenol Extra Strength) tab 500 mg, 500 mg, Oral, Q4H PRN    polyethylene glycol (PEG 3350) (Miralax) 17 g oral packet 17 g, 17 g, Oral, Daily PRN    sennosides (Senokot) tab 17.2 mg, 17.2 mg, Oral, Nightly PRN    bisacodyl (Dulcolax) 10 MG rectal suppository 10 mg, 10 mg, Rectal, Daily PRN    fleet enema (Fleet) rectal enema 133 mL, 1 enema, Rectal, Once PRN    acetaZOLAMIDE (Diamox) tab 500 mg, 500 mg, Oral, Daily    benzonatate (Tessalon) cap 200 mg, 200 mg, Oral, TID PRN    digoxin (Lanoxin) tab 125 mcg, 125 mcg, Oral, Daily    dilTIAZem ER (Dilacor XR) 24 hr cap 360 mg, 360 mg, Oral, Daily    estradiol (Climara) 0.05 MG/24HR patch 1 patch, 1 patch, Transdermal, Weekly    fluticasone-salmeterol (Advair Diskus) 500-50 MCG/ACT inhaler 1 puff, 1 puff, Inhalation, BID    umeclidinium bromide (Incruse Ellipta) 62.5 MCG/ACT inhaler 1 puff, 1 puff, Inhalation, Daily    cetirizine (ZyrTEC) tab 10 mg, 10 mg, Oral, Daily    montelukast (Singulair) tab 10 mg, 10 mg, Oral, Nightly    nystatin (Mycostatin) 043778 UNIT/ML oral suspension 500,000 Units, 5 mL, Oral, QID    [START ON 5/29/2025] potassium chloride (Klor-Con M20) tab 20 mEq, 20 mEq, Oral, Nightly    traMADol (Ultram) tab 50 mg, 50 mg, Oral, Q6H PRN    methylPREDNISolone sodium succinate (Solu-MEDROL) injection 40 mg, 40 mg, Intravenous, Q12H    morphINE 10 MG/5ML oral solution 2.6 mg, 2.6 mg, Oral, Q4H PRN    morphINE 20 mg/mL concentrated oral solution 2.6 mg, 2.6 mg, Oral, Once    polyethylene glycol (PEG 3350) (Miralax) 17 g oral packet 17 g, 17 g, Oral, Daily

## 2025-05-28 NOTE — CM/SW NOTE
05/28/25 1000   CM/SW Referral Data   Referral Source Social Work (self-referral)   Reason for Referral Discharge planning;Readmission   Informant EMR   Readmission Assessment   Factors that patient feels contributed to this readmission Acute/Chronic Clinical Presentation   Pt's living situation prior to admission? Home alone   Pt's level of independence at discharge? Some assist (mod)   Was full assessment completed by SW/CM on prior admission? Yes   Was the recommended discharge plan achieved? Yes   Was pt. discharged w/out services? No   Medical Hx   Does patient have an established PCP? Yes  (Brenda Milgram)   Patient Info   Patient's Home Environment House   Number of Levels in Home 1   Number of Stair in Home 5 to enter   Patient lives with Alone   Patient Status Prior to Admission   Independent with ADLs and Mobility No   Pt. requires assistance with Housework;Ambulating   Services in place prior to admission Home Health Care;DME/Supplies at home   Home Health Provider Info [facility] entered, curr w/ [facility])   DME Provider/Supplier [employer]   Type of DME/Supplies Straight Cane;Standard Walker;Wheelchair;Shower Chair;Raised Toilet Seat;Grab Bars;Home Oxygen  (4L O2 via [employer])   Discharge Needs   Anticipated D/C needs Home health care     SW self referred pt for DC Planning. Pt is a READMISSION who last Dc'd from [facility] on 5/21.    Above assessment completed based on chart review and SW's knowledge of pt from previous Admission.  Pt is from home alone. She has a single level home w/ 5 steps to enter.    Pt has above listed DME at home.  Pt wears 4L O2 at baseline via an [employer] machine.    Pt is current w/ [facility] for RN/PT and HH Aide services. Confirmed w/ liaison Vanessa. [facility] entered.  Pt is also current w/ [facility].    PLAN: Home w/ [facility] - pending clinical course & med clear      SW/CM to remain available for support and/or  discharge planning.       SUMMER Anthony, LSW [id number]

## 2025-05-28 NOTE — CONSULTS
[facility]  part of [facility]    Cardiology Consultation    Yesenia Berkowitz Patient Status:  Inpatient    1940 MRN [id number]   Location [facility] 3W/SW Attending Robbie Tate MD   Hosp Day # 0 PCP JO-ANN YANG MD     Date of Admission:  2025  Date of Consult:  2025  Reason for Consultation: dyspnea    History of Present Illness:   Patient is a 84 year old female with sig PMH/o tachy-bradycardia syndrome s/p DC-PPM, atrial fibrillation, severe COPD on home O2 and suspected lung cancer who presents with dyspnea.  Patient states that over the past 2 days she has been experiencing increased dyspnea without significant relief from her nebulizer therapies.  Does note some mild bilateral lower extremity swelling.  Denies any orthopnea.  ED, underwent CTA of the chest that did not reveal any pulmonary embolism, however did show pulm edema and increased pericardial effusion and possible pneumonia; otherwise no other acute findings.  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, Tachy-Noah syndrome s/p DC-PPM  Paroxysmal AF  Severe COPD  Lymphedema  Suspected lung Ca    Dyspnea  -likely multifactorial, will need to reassess pericardial effusion given increased size on CT although this is a non-gated study and can overestimate size; otherwise no significant volume overload on exam; likely significant component of underlying COPD and possible PNA  -appreciate pulm input  -obtain limited TTE to assess for pericardial effusion size and any hemodynamic significance  -start IV lasix with 40mg once daily for now given pulm edema seen on CT  -unlikely ACS given non-ischemic ECG and serially negative troponin (14,13)    Past Medical History  Past Medical History[1]    Past Surgical History  Past Surgical History[2]    Family History  Family History[3]    Social History  Pediatric History   Patient Parents    Not on file     Other Topics Concern    Grew up on a farm Not Asked     History of tanning Not Asked    Outdoor occupation Not Asked    Pt has a pacemaker No    Pt has a defibrillator No    Breast feeding Not Asked    Reaction to local anesthetic No     Service Not Asked    Blood Transfusions Not Asked    Caffeine Concern No    Occupational Exposure Not Asked    Hobby Hazards Not Asked    Sleep Concern Not Asked    Stress Concern Not Asked    Weight Concern Not Asked    Special Diet Not Asked    Back Care Not Asked    Exercise Not Asked    Bike Helmet Not Asked    Seat Belt Not Asked    Self-Exams Not Asked   Social History Narrative    Not on file           Current Medications:  Current Hospital Medications[4]  Prescriptions Prior to Admission[5]    Allergies  Allergies[6]    Review of Systems:   ROS  10 point review of systems completed and negative except as noted.    Physical Exam:     Patient Vitals for the past 24 hrs:   BP Temp Temp src Pulse Resp SpO2 Height Weight   05/28/25 1012 -- -- -- 75 -- -- -- --   05/28/25 0919 142/59 97.7 °F (36.5 °C) Oral 72 18 95 % -- --   05/28/25 0510 -- -- -- -- -- -- 5' 5\" (1.651 m) 133 lb 11.2 oz (60.6 kg)   05/28/25 0508 142/63 97.5 °F (36.4 °C) Oral 74 20 97 % -- --   05/28/25 0100 128/74 -- -- 67 16 95 % -- --   05/27/25 2313 118/48 98.8 °F (37.1 °C) Temporal 77 20 94 % 5' 5\" (1.651 m) 137 lb (62.1 kg)       Intake/Output:   Last 3 shifts:   Intake/Output                   05/26/25 0700 - 05/27/25 0659 (Not Admitted) 05/27/25 0700 - 05/28/25 0659 05/28/25 0700 - 05/29/25 0659       Intake    P.O.  --  --  380    P.O. -- -- 380    Total Intake -- -- 380       Output    Urine  --  --  --    Urine Occurrence -- 2 x 1 x    Total Output -- -- --       Net I/O     -- -- 380          Vent Settings:    Hemodynamic parameters (last 24 hours):    Scheduled Meds: Scheduled Medications[7]  Continuous Infusions: Medication Infusions[8]    Physical Exam:  General: Alert and oriented x 3. No apparent distress.   HEENT: Normocephalic, anicteric  sclera, neck supple, no thyromegaly or adenopathy.  Neck: No JVD, carotids 2+, no bruits.  Cardiac: Regular rate and rhythm. S1, S2 normal. No murmur, pericardial rub, S3, or extra cardiac sounds.  Lungs: Clear without wheezes, rales, rhonchi or dullness.  Normal excursions and effort.  Abdomen: Soft, non-tender. No organosplenomegally, mass or rebound, BS-present.  Extremities: chronic L > R edema, trace pitting superimposed on non-pitting edema  Neurologic: Alert and oriented, normal affect. No focal defects  Skin: Warm and dry.     Results:   Laboratory Data:  Lab Results   Component Value Date    WBC 14.5 (H) 05/28/2025    HGB 11.4 (L) 05/28/2025    HCT 36.9 05/28/2025    .0 05/28/2025    CREATSERUM 0.71 05/28/2025    BUN 18 05/28/2025     05/28/2025    K 3.2 (L) 05/28/2025    CL 98 05/28/2025    CO2 35.0 (H) 05/28/2025     (H) 05/28/2025    CA 8.5 (L) 05/28/2025    ALB 3.9 05/13/2025    ALKPHO 65 05/13/2025    TP 6.5 05/13/2025    AST 13 05/13/2025    ALT <7 (L) 05/13/2025    PTT 28.1 03/20/2025    INR 1.08 11/25/2024    PTP 14.6 11/25/2024    T4F 0.9 05/15/2025    TSH 0.172 (L) 05/15/2025    LIP 30 08/30/2024    DDIMER 0.47 12/21/2024    MG 2.0 05/14/2025    PHOS 3.4 12/26/2024    TROP <0.045 02/03/2020         Recent Labs   Lab 05/21/25  1629 05/27/25  2346 05/28/25  0751   * 88 233*   BUN 30* 19 18   CREATSERUM 0.74 0.72 0.71   CA 8.4* 8.7 8.5*    142 140   K 3.7 3.3* 3.2*   CL 91* 100 98   CO2 >40.0* 38.0* 35.0*     Recent Labs   Lab 05/21/25  1629 05/27/25  2346 05/28/25  0751   RBC 4.08 4.17 4.00   HGB 11.3* 11.8* 11.4*   HCT 36.1 37.5 36.9   MCV 88.5 89.9 92.3   MCH 27.7 28.3 28.5   MCHC 31.3 31.5 30.9*   RDW 17.1* 17.4* 17.2*   NEPRELIM 24.26* 12.44* 13.87*   WBC 26.3* 15.0* 14.5*   .0 278.0 257.0       No results for input(s): \"BNPML\" in the last 168 hours.    No results for input(s): \"TROP\", \"CK\" in the last 168 hours.    Imaging:  CT CHEST PE AORTA (IV ONLY)  (CPT=71260)  Result Date: 5/28/2025  CONCLUSION:  1. No evidence of acute pulmonary embolism to the level of the first order subsegmental pulmonary artery branches.  2. Cardiomegaly with imaging findings concerning for pulmonary interstitial edema.  3. Increasing pericardial effusion.  4. Right larger than left pleural effusions and associated basilar atelectasis, with or without superimposed pneumonia.  5. A right upper lobe pulmonary nodule is redemonstrated; notably, this was hypermetabolic on prior PET-CT, and is concerning for primary bronchogenic malignancy.  6. Right perihilar and subcarinal lymphadenopathy.  7. Mild-to-moderate centrilobular emphysematous changes.  8. Dilatation of the main pulmonary artery trunk may relate to underlying pulmonary hypertension.  9. Diffuse thyromegaly with multiple thyroid nodules.  10. Sequela of remote granulomatous disease.  11. Lesser incidental findings as above.     A preliminary report was issued by the [facility] teleradiology service. There are no major discrepancies.   Dictated by (CST): Grant Ferreira MD on 5/28/2025 at 7:24 AM     Finalized by (CST): Grant Ferreira MD on 5/28/2025 at 7:37 AM            Thank you for allowing me to participate in the care of your patient.    Angel Pratt, [facility] [place]       [1]   Past Medical History:   A-fib (HCC)    Anesthesia complication    Arrhythmia    AFIB    Arthritis    Asthma (HCC)    Back problem    COPD (chronic obstructive pulmonary disease) (HCC)    Deviated nasal septum    nasal septoplasty, turb reduction, smr of turbs    Difficult intubation    fiber optic if needed    Diverticulitis    colonoscopy     Diverticulosis of large intestine    Esophageal reflux    Extrinsic asthma, unspecified    Headache    Heart disease    Hepatitis    WAS TOLD SHE HAS HAD THIS WHEN SHE WAS 19 YEARS OLD    High blood pressure    High cholesterol    Irregular heart rate    Lichenoid keratosis     left chest-bx    Osteoarthritis    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    Paronychia    (RT); onychomycosis; debridement    Problems with swallowing    occasionally    Shortness of breath    2 L NC ALL THE TIME 24HR/7 DAYS PER WEEK    Unspecified essential hypertension    Visual impairment   [2]   Past Surgical History:  Procedure Laterality Date    Adenoidectomy      Cataract      cataract extraction     Hysterectomy      Sinus surgery        DEVIATED SEPTUM    T&a      Tonsillectomy     [3]   Family History  Problem Relation Age of Onset    Ovarian Cancer Mother 76        endometrial, poss ovarian primary    Pulmonary Disease Sister         COPD    Skin cancer Other     Stroke Other     Other (Other) Other    [4]   Current Facility-Administered Medications   Medication Dose Route Frequency    ondansetron (Zofran) 4 MG/2ML injection 4 mg  4 mg Intravenous Q6H PRN    acetaminophen (Tylenol Extra Strength) tab 500 mg  500 mg Oral Q4H PRN    polyethylene glycol (PEG 3350) (Miralax) 17 g oral packet 17 g  17 g Oral Daily PRN    sennosides (Senokot) tab 17.2 mg  17.2 mg Oral Nightly PRN    bisacodyl (Dulcolax) 10 MG rectal suppository 10 mg  10 mg Rectal Daily PRN    fleet enema (Fleet) rectal enema 133 mL  1 enema Rectal Once PRN    acetaZOLAMIDE (Diamox) tab 500 mg  500 mg Oral Daily    benzonatate (Tessalon) cap 200 mg  200 mg Oral TID PRN    digoxin (Lanoxin) tab 125 mcg  125 mcg Oral Daily    dilTIAZem ER (Dilacor XR) 24 hr cap 360 mg  360 mg Oral Daily    estradiol (Climara) 0.05 MG/24HR patch 1 patch  1 patch Transdermal Weekly    fluticasone-salmeterol (Advair Diskus) 500-50 MCG/ACT inhaler 1 puff  1 puff Inhalation BID    umeclidinium bromide (Incruse Ellipta) 62.5 MCG/ACT inhaler 1 puff  1 puff Inhalation Daily    cetirizine (ZyrTEC) tab 10 mg  10 mg Oral Daily    montelukast (Singulair) tab 10 mg  10 mg Oral Nightly    nystatin (Mycostatin) [id number] UNIT/ML oral suspension 500,000 Units  5 mL Oral  QID    [START ON 5/29/2025] potassium chloride (Klor-Con M20) tab 20 mEq  20 mEq Oral Nightly    traMADol (Ultram) tab 50 mg  50 mg Oral Q6H PRN    methylPREDNISolone sodium succinate (Solu-MEDROL) injection 40 mg  40 mg Intravenous Q12H    morphINE 10 MG/5ML oral solution 2.6 mg  2.6 mg Oral Q4H PRN    polyethylene glycol (PEG 3350) (Miralax) 17 g oral packet 17 g  17 g Oral Daily    phenazopyridine (Pyridium) tab 100 mg  100 mg Oral TID PRN   [5]   Medications Prior to Admission   Medication Sig    traMADol 50 MG Oral Tab Take 1 tablet (50 mg total) by mouth every 6 (six) hours as needed.    predniSONE 10 MG Oral Tab Take 1 tab twice per day for 3 days.    Then take 1 tab daily for 3 days and then stop.    nystatin 911240 UNIT/ML Mouth/Throat Suspension Take 5 mL (500,000 Units total) by mouth 4 (four) times daily for 7 days.    nitrofurantoin monohydrate macro 100 MG Oral Cap Take 1 capsule (100 mg total) by mouth 2 (two) times daily.    morphINE 10 MG/5ML Oral Solution Take 1.3 mL (2.6 mg total) by mouth 3 (three) times daily as needed (Shortness of breath/ take prior to activity that cased shortness of breath). Please add an adapt a cap top for ease of drawing up.  Please give pt 2 oral syringes    acetaZOLAMIDE 250 MG Oral Tab Take 2 tablets (500 mg total) by mouth daily.    albuterol (2.5 MG/3ML) 0.083% Inhalation Nebu Soln Take 3 mL (2.5 mg total) by nebulization every 6 (six) hours as needed for Wheezing.    dilTIAZem  MG Oral Capsule SR 24 Hr Take 1 capsule (360 mg total) by mouth daily.    furosemide 80 MG Oral Tab Take 1 tablet (80 mg total) by mouth BID (Diuretic).    fluticasone-umeclidin-vilant 200-62.5-25 MCG/ACT Inhalation Aerosol Powder, Breath Activated Inhale 1 puff into the lungs as needed (sob).    empagliflozin (JARDIANCE) 10 MG Oral Tab Take 1 tablet (10 mg total) by mouth daily.    acetaminophen 500 MG Oral Tab Take 2 tablets (1,000 mg total) by mouth every 6 (six) hours as needed for  Pain or Fever.    DIGOXIN 0.125 MG Oral Tab Take 1 tablet (125 mcg total) by mouth daily.    albuterol 108 (90 Base) MCG/ACT Inhalation Aero Soln Inhale 2 puffs into the lungs as needed.    Cholecalciferol (VITAMIN D) 1000 UNITS Oral Tab Take 1,000 Units by mouth in the morning and 1,000 Units before bedtime.    Potassium Chloride ER (K-DUR M20) 20 MEQ Oral Tab CR Take 1 tablet (20 mEq total) by mouth nightly.    Loratadine 10 MG Oral Cap Take 10 mg by mouth nightly.    montelukast 10 MG Oral Tab Take 1 tablet (10 mg total) by mouth nightly.    [] predniSONE 10 MG Oral Tab Take 2 tablets (20 mg total) by mouth daily for 3 days, THEN 1 tablet (10 mg total) daily for 3 days.    benzonatate 200 MG Oral Cap Take 1 capsule (200 mg total) by mouth 3 (three) times daily as needed for cough.    estradiol 0.05 MG/24HR Transdermal Patch Weekly Place 1 patch onto the skin once a week. As directed.   [6]   Allergies  Allergen Reactions    Penicillin G ANAPHYLAXIS    Azithromycin DIARRHEA and NAUSEA AND VOMITING    Cefuroxime UNKNOWN     Other reaction(s): Vomitting    Levofloxacin UNKNOWN     Other reaction(s): LEVOFLOXACIN    Penicillins      Other reaction(s): Unknown    Seasonal ITCHING   [7]    acetaZOLAMIDE  500 mg Oral Daily    digoxin  125 mcg Oral Daily    dilTIAZem ER  360 mg Oral Daily    estradiol  1 patch Transdermal Weekly    fluticasone-salmeterol  1 puff Inhalation BID    umeclidinium bromide  1 puff Inhalation Daily    cetirizine  10 mg Oral Daily    montelukast  10 mg Oral Nightly    nystatin  5 mL Oral QID    [START ON 2025] potassium chloride  20 mEq Oral Nightly    methylPREDNISolone  40 mg Intravenous Q12H    polyethylene glycol (PEG 3350)  17 g Oral Daily   [8]

## 2025-05-28 NOTE — HISTORICAL OFFICE NOTE
Detail Level: Generalized [facility]  Outside Information  Continuity of Care Document  4/22/2025  Yesenia Berkowitz - 84 y.o. Female; born [birth year]  Summary of episode note  , generated on May 16, 2025May 16, 2025   CHIEF COMPLAINT    CHIEF COMPLAINT  Reason for Visit/Chief Complaint   CRA  robotic bronchoscopy  Bing Boyle MD   Patient is an 83-year-old female with a history of paroxysmal atrial fibrillation, oxygen dependent COPD, HTN, HLD who presents establish care. Previously followed with Dr. Boles, and prior to that with Dr. Preston. Patient is a former smoker and quit 30 years ago, has been on 2 L of oxygen at baseline for the last 15 years. She has relatively unchanged exertional dyspnea. No chest pain, palpitations, edema, dizziness, or syncope. She also has a remote history of paroxysmal atrial fibrillation, when this comes on she will feel more short of breath and fatigued and then checks her pulse which will feel irregular. This is not occurred for the last several years. She is not on anticoagulation due to frequent bruising and infrequent A-fib. She has mild bilateral lower extremity edema but only takes her Lasix as needed. Patient had a coronary CT in 2020 which demonstrated a small speck of calcium in the LAD with a calcium score of 1.4/15/2024  Her last few months has had increased shortness of breath, put on steroids by her pulmonologist with significant permanent breathing. Denies significant palpitations. Has had increased bilateral lower extremity edema, 2 months ago was on increased Lasix 40 mg daily which seemed to improve although will back to 20 mg daily after 10 days per instruction.8/26/2024  Reports worsened shortness of breath, currently on prednisone for COPD exacerbation. Typically has allergies in the summer as well. Mild gradual increase in lower extremity edema although not too bothersome at this point. Remains on Lasix 40 mg daily.9/27/2024  Unfortunately hospitalized  again earlier this month primarily with COPD exacerbation, was diuresed with IV Lasix for a few days but did not appear clinically overloaded at that time. More recently's been feeling dizzy/lightheaded, fatigued, with persistent shortness of breath. Blood pressures were running low at home in the 90s systolic. Today 82/54.10/23/2024  Patient was again hospitalized with COPD exacerbation, had a thoracentesis for pleural effusion, diuresed with IV Lasix. Discharged on Lasix 40 mg daily alternating with 40 mg twice daily. She saw Dr. Li few days ago recommend that she stay on 40 mg twice daily for a week. Recently had an episode of atrial fibrillation, felt abnormal sensation in her chest. Was confirmed on [employer] mobile device but now back in sinus rhythm. Blood pressure creeping up 130s 140s systolic. Persistent unchanged exertional dyspnea since discharge.2/10/2025  Unfortunately patient has been hospitalized 8 times in September, mostly for COPD exacerbation but occasionally requiring IV diuresis. Was just discharged 5 days ago for this reason. There seems to be some confusion about diltiazem and is currently taking 360 mg twice daily. 1+ lower extremity edema.4/23/2025  Patient was hospitalized again last month with COPD exacerbation pneumonia. Breathing is poor but stable. Recently had a PET scan which showed a right upper lung nodule that may be malignant, may need biopsy and possible bronchoscopy.Cardiac history:  Paroxysmal atrial fibrillation  COPD, on 4 to 5 L  HFpEF  HTN     PROBLEMS  Reconcile with Patient's ChartPROBLEMS  Problem Effective Dates Date resolved Problem Status   Chronic hypoxemic respiratory failure, [SNOMED-CT: 222565959] 2/13/2024 - Active   COPD (chronic obstructive pulmonary disease), [SNOMED-CT: 99597105] 2/13/2024 - Active   Syncope and collapse, [SNOMED-CT: 386510432] 2/13/2024 - Active   Cataract surgery, unspecified eye, [SNOMED-CT: 111236612] 8/18/2021 5/19/2023 Resolved   PAF  (paroxysmal atrial fibrillation), [SNOMED-CT: 469473402] 8/13/2019 - Active   Essential hypertension, [SNOMED-CT: 29219208] 8/13/2019 - Active   PVC's (premature ventricular contractions), [SNOMED-CT: 94631949] 8/13/2019 - Active   Localized edema, [SNOMED-CT: 478415164] 8/14/2019 - Active     ENCOUNTER    ENCOUNTER  Problem Effective Dates Date resolved Problem Status   Acute heart failure with preserved ejection fraction (HFpEF), [SNOMED-CT: 714240526] 2/13/2024 - Active   Chronic hypoxemic respiratory failure, [SNOMED-CT: 394796174] 2/13/2024 - Active   COPD (chronic obstructive pulmonary disease), [SNOMED-CT: 21641924] 2/13/2024 - Active   Syncope and collapse, [SNOMED-CT: 168395087] 2/13/2024 - Active   PAF (paroxysmal atrial fibrillation), [SNOMED-CT: 424510550] 8/13/2019 - Active   Essential hypertension, [SNOMED-CT: 11227280] 8/13/2019 - Active   PVC's (premature ventricular contractions), [SNOMED-CT: 79388350] 8/13/2019 - Active     VITAL SIGNS    VITAL SIGNS  Date / Time: 4/23/2025   BP Systolic 130 mmHg   BP Diastolic 58 mmHg   Height 65 inches   Weight 144 lbs   Pulse Rate 78 bpm   BSA (Body Surface Area) 1.7 cc/m2   BMI (Body Mass Index) 24 cc/m2   Blood Pressure 130 / 58 mmHg     PHYSICAL EXAMINATION    PHYSICAL EXAMINATION  Header Details   Constitutional 92%o2   Vitals Right Arm Sitting  / 58 mmHg, Pulse rate 78 bpm, Height in 5' 5\", BMI: 24, Weight in 143.3 lbs (or) 65 kgs, BSA : 1.74 cc/m²   General Appearance No Acute Distress, Normal Body habitus   Cardiovascular      ALLERGIES, ADVERSE REACTIONS, ALERTS  Reconcile with Patient's ChartALLERGIES, ADVERSE REACTIONS, ALERTS  Type Substance Reaction Severity Status   Allergies Cefuroxime, [RxNorm: 283524]   Mild Active   Allergies Levofloxacin, [RxNorm: 4499160]   Mild Active   Allergies penicillin - Ingredient unknown Moderate Active     MEDICATIONS ADMINISTERED DURING VISIT    No data available    MEDICATIONS  Reconcile with Patient's  ChartMEDICATIONS  Medication Start Date Route/Frequency Status   acetaZOLAMIDE 250 mg tablet, [RxNorm: 791493] 2/10/2025 Take 2 tablets orally once a day. Active   albuterol sulfate 2.5 mg/3 mL (0.083 %) solution for nebulization, [RxNorm: 611288] 2/25/2025 Take 3 mL (inhalation) every 6 hours as needed. Active   albuterol sulfate HFA 90 mcg/actuation aerosol inhaler, [RxNorm: 4503241] 2/25/2025 Inhale 2 puffs by mouth every 4-6 hours as needed. Active   cholecalciferol (vitamin D3) 50 mcg (2,000 unit) tablet, [RxNorm: 274092] 2/25/2025 Take 1 tablet orally once a day. Active   digoxin 125 mcg (0.125 mg) tablet, [RxNorm: 684769] 10/22/2024 Take 1 tablet orally once a day. Active   dilTIAZem  mg capsule,extended release 24 hr, [RxNorm: 381314] 2/10/2025 Take 1 capsule orally once a day. Active   estradioL 0.05 mg/24 hr weekly transdermal patch, [RxNorm: 087078] 2/25/2025 patch, transdermal weekly (transdermal) once weekly Active   famotidine 20 mg tablet, [RxNorm: 792692] 2/10/2025 Take 1 tablet orally once a day. Active   furosemide 80 mg tablet, [RxNorm: 498540] 2/10/2025 Take 1 tablet orally 2 times a day. Active   Jardiance 10 mg tablet, [RxNorm: 5984494] 2/10/2025 Take 1 tablet orally once a day. Active   loratadine 10 mg tablet, [RxNorm: 505677] 2/25/2025 Take 1 tablet orally once a day. Active   montelukast 10 mg tablet, [RxNorm: 650144] 2/25/2025 Take 1 tablet orally once a day. Active   Pain Reliever Extra Strength (acetaminophen) 500 mg tablet, [RxNorm: 432432] 2/25/2025 Take 2 tablets orally every 6 hours as needed. Active   potassium chloride ER 20 mEq tablet,extended release, [RxNorm: 285815] 2/25/2025 Take 1 tablet orally once in the evening. Active   spironolactone 25 mg tablet, [RxNorm: 449851] 2/10/2025 Take 1 tablet orally once a day. Active   Trelegy Ellipta 100 mcg-62.5 mcg-25 mcg powder for inhalation, [RxNorm: 1879733] 2/10/2025 as directed Active     ASSESSMENT    RUL nodule  - Recent PET  scan suggestive of possible malignancy, per pulmonary may need biopsy and bronchoscopy  - Patient was told that would not be a candidate for chemotherapy, require inpatient radiation, as well as some of the risk from the procedure which in this case is primary related to severe COPD and RV dysfunction  - At this point she is not sure if she would even want to pursue treatment, I suggested she collects more permission from her oncologist before deciding on proceeding with biopsyParoxysmal atrial fibrillation  - Recent recurrence on cardia mobile, back in sinus rhythm  - Continue aspirin 81 mg once daily, digoxin daily, diltiazem 360 mg once daily  - Per patient preference avoiding anticoagulation, history of GI bleed in the past and low A-fib burden; discussed Watchman as an alternative, will provide patient with pamphletHFpEF  - Increase bilateral lower extremity edema, TTE noted diastolic dysfunction, edema improved on higher dose of Lasix  - Continue Lasix 80 mg daily, spironolactone 25 mg daily, Jardiance 10 mg daily  - Reduce diltiazem to 360 mg daily (previously taken twice daily which may be contributing to lower extremity edema)HTN  - Normotensive today at home  - Continue diltiazem to 360 mg once dailyPlan  Follow-up with me in 6 weeks or sooner as needed     FAMILY HISTORY    FAMILY HISTORY  Relationship Age Diagnosis   Father 62 Hypertension (HTN), primary; Old MI (myocardial infarction) (Reason for death)   Mother 0 Hypertension (HTN), primary   No family history of heart disease.     GENERAL STATUS    No data available    PAST MEDICAL HISTORY    PAST MEDICAL HISTORY  Problem Date diagonsed Date resolved Status   Chronic hypoxemic respiratory failure, [SNOMED-CT: 429720034] 2/13/2024 - Active   COPD (chronic obstructive pulmonary disease), [SNOMED-CT: 81308805] 2/13/2024 - Active   Syncope and collapse, [SNOMED-CT: 070647142] 2/13/2024 - Active   PAF (paroxysmal atrial fibrillation), [SNOMED-CT:  017364461] 8/13/2019 - Active   Essential hypertension, [SNOMED-CT: 54580781] 8/13/2019 - Active   PVC's (premature ventricular contractions), [SNOMED-CT: 22416636] 8/13/2019 - Active   Localized edema, [SNOMED-CT: 129383513] 8/14/2019 - Active     HISTORY OF PRESENT ILLNESS    Patient is an 83-year-old female with a history of paroxysmal atrial fibrillation, oxygen dependent COPD, HTN, HLD who presents establish care. Previously followed with Dr. Boles, and prior to that with Dr. Preston. Patient is a former smoker and quit 30 years ago, has been on 2 L of oxygen at baseline for the last 15 years. She has relatively unchanged exertional dyspnea. No chest pain, palpitations, edema, dizziness, or syncope. She also has a remote history of paroxysmal atrial fibrillation, when this comes on she will feel more short of breath and fatigued and then checks her pulse which will feel irregular. This is not occurred for the last several years. She is not on anticoagulation due to frequent bruising and infrequent A-fib. She has mild bilateral lower extremity edema but only takes her Lasix as needed. Patient had a coronary CT in 2020 which demonstrated a small speck of calcium in the LAD with a calcium score of 1.4/15/2024  Her last few months has had increased shortness of breath, put on steroids by her pulmonologist with significant permanent breathing. Denies significant palpitations. Has had increased bilateral lower extremity edema, 2 months ago was on increased Lasix 40 mg daily which seemed to improve although will back to 20 mg daily after 10 days per instruction.8/26/2024  Reports worsened shortness of breath, currently on prednisone for COPD exacerbation. Typically has allergies in the summer as well. Mild gradual increase in lower extremity edema although not too bothersome at this point. Remains on Lasix 40 mg daily.9/27/2024  Unfortunately hospitalized again earlier this month primarily with COPD exacerbation, was  Detail Level: Detailed diuresed with IV Lasix for a few days but did not appear clinically overloaded at that time. More recently's been feeling dizzy/lightheaded, fatigued, with persistent shortness of breath. Blood pressures were running low at home in the 90s systolic. Today 82/54.10/23/2024  Patient was again hospitalized with COPD exacerbation, had a thoracentesis for pleural effusion, diuresed with IV Lasix. Discharged on Lasix 40 mg daily alternating with 40 mg twice daily. She saw Dr. Li few days ago recommend that she stay on 40 mg twice daily for a week. Recently had an episode of atrial fibrillation, felt abnormal sensation in her chest. Was confirmed on [employer]a mobile device but now back in sinus rhythm. Blood pressure creeping up 130s 140s systolic. Persistent unchanged exertional dyspnea since discharge.2/10/2025  Unfortunately patient has been hospitalized 8 times in September, mostly for COPD exacerbation but occasionally requiring IV diuresis. Was just discharged 5 days ago for this reason. There seems to be some confusion about diltiazem and is currently taking 360 mg twice daily. 1+ lower extremity edema.4/23/2025  Patient was hospitalized again last month with COPD exacerbation pneumonia. Breathing is poor but stable. Recently had a PET scan which showed a right upper lung nodule that may be malignant, may need biopsy and possible bronchoscopy.Cardiac history:  Paroxysmal atrial fibrillation  COPD, on 4 to 5 L  HFpEF  HTN     IMMUNIZATIONS  Reconcile with Patient's ChartNo data available    PLAN OF CARE    PLAN OF CARE  Planned Care Date   EKG (electrocardiogram) 1/1/1900     PROCEDURES    No data available    RESULTS    RESULTS  Name Result Date Location - Ordered By   GLUCOSE [LOINC: 2339-0] 137 mg/dL [High] 10/30/2024 12:03:00 PM [facility] ([facility])  Address: [address] [facility]  [address]  tel:   SODIUM [LOINC: 2951-2] 144 mmol/L 10/30/2024 12:03:00 PM [facility]  ([facility])  Address: [address]  [facility]  [address]  tel:   POTASSIUM [LOINC: 2823-3] 4.0 mmol/L 10/30/2024 12:03:00 PM [facility] ([facility])  Address: [address]  [facility]  [address]  tel:   CHLORIDE [LOINC: 2075-0] 100 mmol/L 10/30/2024 12:03:00 PM [facility] ([facility])  Address: [address]  [facility]  [address]  tel:   CO2 [LOINC: 2028-9] >40.0 mmol/L [High] 10/30/2024 12:03:00 PM [facility] ([facility])  Address: [address]  [facility]  [address]  tel:   ANION GAP [LOINC: 33037-3]   10/30/2024 12:03:00 PM [facility] ([facility])  Address: [address]  [facility]  [address]  tel:   BUN [LOINC: 6299-2] 15 mg/dL 10/30/2024 12:03:00 PM [facility] ([facility])  Address: [address]  [facility]  [address]  tel:   CREATININE [LOINC: 42144-0] 0.79 mg/dL 10/30/2024 12:03:00 PM [facility] ([facility])  Address: [address]  [facility]  [address]  tel:   BUN/ CREAT RATIO [LOINC: 3097-3] 19.0 10/30/2024 12:03:00 PM [facility] ([facility])  Address: [address]  [facility]  [address]  tel:   CALCIUM [LOINC: 93709-8] 8.8 mg/dL 10/30/2024 12:03:00 PM [facility] ([facility])  Address: [address]  [facility]  [address]  tel:   OSMOLALITY CALCULATED [LOINC: 53156-0] 301 mOsm/kg [High] 10/30/2024 12:03:00 PM [facility] ([facility])  Address: [address]  [facility]  [address]  tel:   E GFR CR [LOINC: 23751-2] 75 mL/min/1.73m2 10/30/2024 12:03:00 PM [facility] ([facility])  Address: [address]  [facility]  [address]  tel:   FASTING PATIENT BMP ANSWER [LOINC: 90418-4] Yes 10/30/2024 12:03:00 PM [facility] ([facility])  Address: [address]  [facility]  [address]  tel:    DIGOXIN [LOINC: 10535-3] 0.31 ng/mL [Low] 01/16/2024 01:31:00 PM [facility] ([facility])  Address: [address] [facility]  [address]  tel:   Trans Thoracic Echocardiogram 1.The left ventricle is normal in size. Left ventricular wall thickness is normal. Global left ventricular systolic function is hyperdynamic. The left ventricular ejection fraction is >70%. No regional wall motion abnormalities. The left ventricle diastolic function is impaired (Grade II) with an elevated left atrial pressure.2.The right ventricle is mildly enlarged. Right ventricular systolic function is mildly decreased. 3/18/2024 11:00:00 AM Luis Fernando Fowler MD   Ambulatory Telemetry Monitor 1.This is an average quality study. 2.Predominant rhythm is normal sinus rhythm. 3.The minimum heart rate recorded was 62 beats / minute (normal sinus rhythm, 3/10/2024). The maximum heart rate is 111 beats / minute (sinus tachycardia, 2/27/2024). The mean heart rate is 82 beats / minute. 4.- There were rare PVCs with a burden of <1%.  - There were rare PACs with a burden of 2%.  - 113 Supraventricular Tachycardia events -- the longest episode was 6.8s and the fastest episode was 181 BPM.  - No atrial fibrillation.  - No other arrhythmias.  - There were 7 patient triggered events with symptoms of palpitations, cough, and shortness of breath, associated with sinus rhythm, PVCs, PACs, and brief PAT. 2/15/2024 2:45:00 PM Luis Fernando Fowler MD     REVIEW OF SYSTEMS    REVIEW OF SYSTEMS  Header Details   Constitutional No history of Weight Loss, Anorexia   Cardiovascular No history of Chest pain, OJEDA, Palpitations, Syncope, PND, Orthopnea, Edema, Claudication   Respiratory No history of SOB, Wheezing, Sputum   Hem/Lymphatic No history of Easy bruising, Blood clots, Hx of blood transfusion, Anemia, Bleeding problems     SOCIAL HISTORY    SOCIAL HISTORY  Social History Element Description Effective Dates   Smoking status Former smoker -      FUNCTIONAL STATUS    No data available    MEDICAL EQUIPMENT    No data available    Goals Sections    No data available    REASON FOR REFERRAL    No data available    Health Concerns Section    No data available    COGNITIVE/MENTAL STATUS    No data available    Patient Demographics    Patient Demographics  Patient Address Patient Name Communication   [address] [place] [address] Yesenia Berkowitz [phone] (Home)     Patient Demographics  Language Race / Ethnicity Marital Status   Unknown - Spoken White / Unknown      Document Information    Primary Care Provider Other Service Providers Document Coverage Dates   Luis Fernando Fowler  NPI: [id number]  [phone] (Work)  [address], [address]  [place] [address]  [place] [address]  Interpreting Physicians  [facility]  [phone] (Work)  [address] [place] [id number] Sumayaangela Navas  NPI: [id number]  [phone] (Work)  [address], [address]  [place] [address]  [place] [address]  Nurses     Timmy Padilla  NPI: [id number]  [phone] (Work)  [address], [address]  [place] [address]  [place] [address]  Nurses Apr. 23, 2025April 23, 2025      Organization   [facility]  [phone] (Work)  [address] [place] [address], [address]  [place] [address]  [place] [address]     Encounter Providers Encounter Date    Apr. 23, 2025April 23, 2025     Legal Authenticator    Luis Fernando Fowler  NPI: [id number]  [phone] (Work)  [address], [address]  [place] [address]  [place] [address]

## 2025-05-28 NOTE — SPIRITUAL CARE NOTE
Spiritual Care Visit Note    Patient Name: Yesenia Berkowitz Date of Spiritual Care Visit: 27   : 1940 Primary Dx: Pericardial effusion (HCC)       Referred By: Referral From: Patient, Nurse    Spiritual Care Taxonomy:    Intended Effects: Demonstrate caring and concern    Methods: Exploring hope, Offer emotional support, Explore presence of God, Explore quality of life    Interventions: Acknowledge current situation, Acknowledge response to difficult experience, Active listening, Ask guided questions, Ask guided questions about coral, Ask questions to bring forth feelings, Incorporate cultural and Worship needs in plan of care, Connect someone with their corla community/clergy, Provide hospitality, Prayer for healing    Visit Type/Summary:     - Spiritual Care: Responded to a request via the on call phone Consulted with RN prior to visit. Offered empathic listening and emotional support. Patient names/describes desire to go home one more time.  offered a sounding board and Holy Communion. Patient expressed appreciation for  visit. Provided support for patients spiritual/Worship requests. Provided Communion and verified NPO status. Offered prayer. Coordinated  visit for Sacrament of the Sick.  called Visitation to coordinate a  visit.  remains available for follow up.    Spiritual Care support can be requested via an Epic consult. For urgent/immediate needs, please contact the On Call  at: Valerie: ext [phone]    Chaplain Resident, Leny Fontana, PhD

## 2025-05-28 NOTE — PLAN OF CARE
Problem: Patient Centered Care  Goal: Patient preferences are identified and integrated in the patient's plan of care  Description: Interventions:  - Provide timely, complete, and accurate information to patient/family  - Incorporate patient and family knowledge, values, beliefs, and cultural backgrounds into the planning and delivery of care  - Encourage patient/family to participate in care and decision-making at the level they choose  - Honor patient and family perspectives and choices  Outcome: Progressing     Problem: CARDIOVASCULAR - ADULT  Goal: Maintains optimal cardiac output and hemodynamic stability  Description: INTERVENTIONS:  - Monitor vital signs, rhythm, and trends  - Monitor for bleeding, hypotension and signs of decreased cardiac output  - Evaluate effectiveness of vasoactive medications to optimize hemodynamic stability  - Monitor arterial and/or venous puncture sites for bleeding and/or hematoma  - Assess quality of pulses, skin color and temperature  - Assess for signs of decreased coronary artery perfusion - ex. Angina  - Evaluate fluid balance, assess for edema, trend weights  Outcome: Progressing  Goal: Absence of cardiac arrhythmias or at baseline  Description: INTERVENTIONS:  - Continuous cardiac monitoring, monitor vital signs, obtain 12 lead EKG if indicated  - Evaluate effectiveness of antiarrhythmic and heart rate control medications as ordered  - Initiate emergency measures for life threatening arrhythmias  - Monitor electrolytes and administer replacement therapy as ordered  Outcome: Progressing     Problem: RESPIRATORY - ADULT  Goal: Achieves optimal ventilation and oxygenation  Description: INTERVENTIONS:  - Assess for changes in respiratory status  - Assess for changes in mentation and behavior  - Position to facilitate oxygenation and minimize respiratory effort  - Oxygen supplementation based on oxygen saturation or ABGs  - Provide Smoking Cessation handout, if applicable  -  Encourage broncho-pulmonary hygiene including cough, deep breathe, Incentive Spirometry  - Assess the need for suctioning and perform as needed  - Assess and instruct to report SOB or any respiratory difficulty  - Respiratory Therapy support as indicated  - Manage/alleviate anxiety  - Monitor for signs/symptoms of CO2 retention  Outcome: Progressing     Problem: PAIN - ADULT  Goal: Verbalizes/displays adequate comfort level or patient's stated pain goal  Description: INTERVENTIONS:  - Encourage pt to monitor pain and request assistance  - Assess pain using appropriate pain scale  - Administer analgesics based on type and severity of pain and evaluate response  - Implement non-pharmacological measures as appropriate and evaluate response  - Consider cultural and social influences on pain and pain management  - Manage/alleviate anxiety  - Utilize distraction and/or relaxation techniques  - Monitor for opioid side effects  - Notify MD/LIP if interventions unsuccessful or patient reports new pain  - Anticipate increased pain with activity and pre-medicate as appropriate  Outcome: Progressing     Problem: RISK FOR INFECTION - ADULT  Goal: Absence of fever/infection during anticipated neutropenic period  Description: INTERVENTIONS  - Monitor WBC  - Administer growth factors as ordered  - Implement neutropenic guidelines  Outcome: Progressing     Problem: SAFETY ADULT - FALL  Goal: Free from fall injury  Description: INTERVENTIONS:  - Assess pt frequently for physical needs  - Identify cognitive and physical deficits and behaviors that affect risk of falls.  - [place] fall precautions as indicated by assessment.  - Educate pt/family on patient safety including physical limitations  - Instruct pt to call for assistance with activity based on assessment  - Modify environment to reduce risk of injury  - Provide assistive devices as appropriate  - Consider OT/PT consult to assist with strengthening/mobility  - Encourage  toileting schedule  Outcome: Progressing     Problem: DISCHARGE PLANNING  Goal: Discharge to home or other facility with appropriate resources  Description: INTERVENTIONS:  - Identify barriers to discharge w/pt and caregiver  - Include patient/family/discharge partner in discharge planning  - Arrange for needed discharge resources and transportation as appropriate  - Identify discharge learning needs (meds, wound care, etc)  - Arrange for interpreters to assist at discharge as needed  - Consider post-discharge preferences of patient/family/discharge partner  - Complete POLST form as appropriate  - Assess patient's ability to be responsible for managing their own health  - Refer to Case Management Department for coordinating discharge planning if the patient needs post-hospital services based on physician/LIP order or complex needs related to functional status, cognitive ability or social support system  Outcome: Progressing

## 2025-05-28 NOTE — CONSULTS
[facility]  part of [facility]    Consult Note    Date:  2025  Date of Admission:  2025    Chief Complaint:   Yesenia Berkowitz is a(n) 84 year old female with dyspnea.    HPI:   The patient carries diagnoses of both COPD as well as heart failure with preserved ejection fraction status post recent permanent pacemaker.  She had smoked 1 pack/day for 20 years having quit in the .  She has been requiring supplemental oxygen for the past 20 years.  Her recent pulmonologist retired and she is now seeking opinion of a new pulmonologist.  She was recently admitted with congestive heart failure exacerbating COPD.  She returns to the hospital now with similar symptomatology including lower extremity edema worsening.  She denies hemoptysis, chest pain, pleurisy, fever, chills, shakes, cough, phlegm, wheezing, TB exposure, personal nor family history of deep venous thrombosis.  The patient has recent recognition of a 1.7 cm PET scan positive right lung peripheral pulmonary nodule.  The patient is clear in stating that she does not want to proceed with percutaneous or bronchoscopic biopsy.  She would be interested in empiric single beam radiotherapy.  She has an appointment to see radiation oncology this Friday in the clinic.  The patient has had pleural effusion tapped in the past and there was no evidence of malignancy.    History   Past Medical History[1]  Past Surgical History[2]  Family History[3] Mother  cancer, father  heart disease.    Social History: , 1 child, quit tobacco in the 1970s after 20 years of 1 pack/day, occasional alcohol, retired nurse  Short Social Hx on File[4]  Allergies/Medications:   Allergies: Allergies[5]  Prescriptions Prior to Admission[6]    Review of Systems:   Review of Systems:  Vision normal. Ear nose and throat normal. Bowel normal except for constipation. Bladder function normal. No depression. No thyroid disease. No lymphatic system concerns.   No rash. Muscles and joints unremarkable. No weight loss no weight gain.    Physical Exam:   Vital Signs:  Blood pressure 139/58, pulse 75, temperature 98.3 °F (36.8 °C), temperature source Oral, resp. rate 18, height 5' 5\" (1.651 m), weight 133 lb 11.2 oz (60.6 kg), SpO2 95%.     Alert white female with mild tachypnea  HEENT examination is unremarkable with pupils equal round and reactive to light and accommodation.   Neck without adenopathy, thyromegaly, JVD nor bruit.   Lungs extensive right basilar crackles greater than left to auscultation and percussion.  Cardiac regular rate and rhythm no murmur.   Abdomen nontender, without hepatosplenomegaly and no mass appreciable.   Extremities without clubbing cyanosis nor edema.   Neurologic grossly intact with symmetric tone and strength and reflex.  Skin without gross abnormality    Results:     Lab Results   Component Value Date    WBC 14.5 05/28/2025    HGB 11.4 05/28/2025    HCT 36.9 05/28/2025    .0 05/28/2025    CREATSERUM 0.71 05/28/2025    BUN 18 05/28/2025     05/28/2025    K 3.2 05/28/2025    CL 98 05/28/2025    CO2 35.0 05/28/2025     05/28/2025    CA 8.5 05/28/2025     Chest x-ray-chronic elevation of left hemidiaphragm with dirty lung fields diffusely and permanent pacemaker.    CT scan of the chest-1. No evidence of acute pulmonary embolism to the level of the first order subsegmental pulmonary artery branches.      2. Cardiomegaly with imaging findings concerning for pulmonary interstitial edema.      3. Increasing pericardial effusion.      4. Right larger than left pleural effusions and associated basilar atelectasis, with or without superimposed pneumonia.      5. A right upper lobe pulmonary nodule is redemonstrated; notably, this was hypermetabolic on prior PET-CT, and is concerning for primary bronchogenic malignancy.      6. Right perihilar and subcarinal lymphadenopathy.      7. Mild-to-moderate centrilobular emphysematous  changes.      8. Dilatation of the main pulmonary artery trunk may relate to underlying pulmonary hypertension.      9. Diffuse thyromegaly with multiple thyroid nodules.      10. Sequela of remote granulomatous disease.         Assessment/Plan:   1.  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  This patient has very significant intrinsic lung disease but I agree that diuresis is reasonable considering that there are few other therapeutic interventions beyond what she is already received.    Recommendations:  1.  Gentle diuresis  2.  ABG  3.  Outpatient PFTs  4.  Pulmonary rehabilitation  5.  The effusion is too small to benefit from thoracentesis at this time.  6.  Methylprednisolone  7.  MDI, and can resume Trelegy in the outpatient setting    2.  DVT prophylaxis-subcutaneous heparin    3.  Pulmonary nodule-PET scan positive and presumed neoplastic.  The patient does not want percutaneous or bronchoscopic biopsy.  She is very interested in single beam radiotherapy.  She has an appointment to see radiation oncology this Friday in the clinic but is interested in meeting with their personnel while in hospital now.    Recommendations: Will ask radiation oncology to evaluate.    4.  CODE STATUS-DNAR select    I am delighted to assist with Yesenia's care.    Colby Szymanski MD  Medical Director, Critical Care, [facility]  Medical Director, [facility]  Pager: [phone]               [1]   Past Medical History:   A-fib (HCC)    Anesthesia complication    Arrhythmia    AFIB    Arthritis    Asthma (HCC)    Back problem    COPD (chronic obstructive pulmonary disease) ([facility])    Deviated nasal septum    nasal septoplasty, turb reduction, smr of turbs    Difficult intubation    fiber optic if needed     Diverticulitis    colonoscopy     Diverticulosis of large intestine    Esophageal reflux    Extrinsic asthma, unspecified    Headache    Heart disease    Hepatitis    WAS TOLD SHE HAS HAD THIS WHEN SHE WAS 19 YEARS OLD    High blood pressure    High cholesterol    Irregular heart rate    Lichenoid keratosis    left chest-bx    Osteoarthritis    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    Paronychia    (RT); onychomycosis; debridement    Problems with swallowing    occasionally    Shortness of breath    2 L NC ALL THE TIME 24HR/7 DAYS PER WEEK    Unspecified essential hypertension    Visual impairment   [2]   Past Surgical History:  Procedure Laterality Date    Adenoidectomy      Cataract      cataract extraction     Hysterectomy      Sinus surgery        DEVIATED SEPTUM    T&a      Tonsillectomy     [3]   Family History  Problem Relation Age of Onset    Ovarian Cancer Mother 76        endometrial, poss ovarian primary    Pulmonary Disease Sister         COPD    Skin cancer Other     Stroke Other     Other (Other) Other    [4]   Social History  Socioeconomic History    Marital status:    Tobacco Use    Smoking status: Former     Current packs/day: 0.00     Types: Cigarettes     Quit date: 1996     Years since quittin.4    Smokeless tobacco: Never   Vaping Use    Vaping status: Never Used   Substance and Sexual Activity    Alcohol use: Not Currently     Comment: one drink once a month    Drug use: Never   Other Topics Concern    Pt has a pacemaker No    Pt has a defibrillator No    Reaction to local anesthetic No    Caffeine Concern No     Social Drivers of Health     Food Insecurity: No Food Insecurity (2025)    NCSS - Food Insecurity     Worried About Running Out of Food in the Last Year: No     Ran Out of Food in the Last Year: No   Transportation Needs: No Transportation Needs (2025)    NCSS - Transportation     Lack of Transportation: No   Housing Stability: Not At Risk  (5/28/2025)    NCSS - Housing/Utilities     Has Housing: Yes     Worried About Losing Housing: No     Unable to Get Utilities: No   Recent Concern: Housing Stability - At Risk (3/20/2025)    NCSS - Housing/Utilities     Has Housing: No     Worried About Losing Housing: No     Unable to Get Utilities: No   [5]   Allergies  Allergen Reactions    Penicillin G ANAPHYLAXIS    Azithromycin DIARRHEA and NAUSEA AND VOMITING    Cefuroxime UNKNOWN     Other reaction(s): Vomitting    Levofloxacin UNKNOWN     Other reaction(s): LEVOFLOXACIN    Penicillins      Other reaction(s): Unknown    Seasonal ITCHING   [6]   Medications Prior to Admission   Medication Sig    traMADol 50 MG Oral Tab Take 1 tablet (50 mg total) by mouth every 6 (six) hours as needed.    predniSONE 10 MG Oral Tab Take 1 tab twice per day for 3 days.    Then take 1 tab daily for 3 days and then stop.    nystatin 214319 UNIT/ML Mouth/Throat Suspension Take 5 mL (500,000 Units total) by mouth 4 (four) times daily for 7 days.    nitrofurantoin monohydrate macro 100 MG Oral Cap Take 1 capsule (100 mg total) by mouth 2 (two) times daily.    morphINE 10 MG/5ML Oral Solution Take 1.3 mL (2.6 mg total) by mouth 3 (three) times daily as needed (Shortness of breath/ take prior to activity that cased shortness of breath). Please add an adapt a cap top for ease of drawing up.  Please give pt 2 oral syringes    acetaZOLAMIDE 250 MG Oral Tab Take 2 tablets (500 mg total) by mouth daily.    albuterol (2.5 MG/3ML) 0.083% Inhalation Nebu Soln Take 3 mL (2.5 mg total) by nebulization every 6 (six) hours as needed for Wheezing.    dilTIAZem  MG Oral Capsule SR 24 Hr Take 1 capsule (360 mg total) by mouth daily.    furosemide 80 MG Oral Tab Take 1 tablet (80 mg total) by mouth BID (Diuretic).    fluticasone-umeclidin-vilant 200-62.5-25 MCG/ACT Inhalation Aerosol Powder, Breath Activated Inhale 1 puff into the lungs as needed (sob).    empagliflozin (JARDIANCE) 10 MG Oral  Tab Take 1 tablet (10 mg total) by mouth daily.    acetaminophen 500 MG Oral Tab Take 2 tablets (1,000 mg total) by mouth every 6 (six) hours as needed for Pain or Fever.    DIGOXIN 0.125 MG Oral Tab Take 1 tablet (125 mcg total) by mouth daily.    albuterol 108 (90 Base) MCG/ACT Inhalation Aero Soln Inhale 2 puffs into the lungs as needed.    Cholecalciferol (VITAMIN D) 1000 UNITS Oral Tab Take 1,000 Units by mouth in the morning and 1,000 Units before bedtime.    Potassium Chloride ER (K-DUR M20) 20 MEQ Oral Tab CR Take 1 tablet (20 mEq total) by mouth nightly.    Loratadine 10 MG Oral Cap Take 10 mg by mouth nightly.    montelukast 10 MG Oral Tab Take 1 tablet (10 mg total) by mouth nightly.    [] predniSONE 10 MG Oral Tab Take 2 tablets (20 mg total) by mouth daily for 3 days, THEN 1 tablet (10 mg total) daily for 3 days.    benzonatate 200 MG Oral Cap Take 1 capsule (200 mg total) by mouth 3 (three) times daily as needed for cough.    estradiol 0.05 MG/24HR Transdermal Patch Weekly Place 1 patch onto the skin once a week. As directed.

## 2025-05-28 NOTE — ED PROVIDER NOTES
Patient Seen in: [facility] Emergency Department        History  Chief Complaint   Patient presents with    Difficulty Breathing     Stated Complaint:     Subjective:   HPI          Patient is an 84-year-old female who arrives from home by EMS for shortness of breath x 1 to 2 days.  Worse with exertion.  Denies any chest pain or fevers.  No relief with home nebulizers.  Finished course of steroids yesterday.  Hospitalized last week for COPD exacerbation and had recent pacemaker placement.  Positive swelling to both legs.      Objective:     Past Medical History:    A-fib ([facility])    Anesthesia complication    Arrhythmia    AFIB    Arthritis    Asthma ([facility])    Back problem    COPD (chronic obstructive pulmonary disease) ([facility])    Deviated nasal septum    nasal septoplasty, turb reduction, smr of turbs    Difficult intubation    fiber optic if needed    Diverticulitis    colonoscopy     Diverticulosis of large intestine    Esophageal reflux    Extrinsic asthma, unspecified    Headache    Heart disease    Hepatitis    WAS TOLD SHE HAS HAD THIS WHEN SHE WAS 19 YEARS OLD    High blood pressure    High cholesterol    Irregular heart rate    Lichenoid keratosis    left chest-bx    Osteoarthritis    Paralysis ([facility])    left lung and left vocal cord.    Paronychia    (RT); onychomycosis; debridement    Problems with swallowing    occasionally    Shortness of breath    2 L NC ALL THE TIME 24HR/7 DAYS PER WEEK    Unspecified essential hypertension    Visual impairment              Past Surgical History:   Procedure Laterality Date    Adenoidectomy      Cataract      cataract extraction     Hysterectomy      Sinus surgery        DEVIATED SEPTUM    T&a      Tonsillectomy                  Social History     Socioeconomic History    Marital status:    Tobacco Use    Smoking status: Former     Current packs/day: 0.00     Types: Cigarettes     Quit date: 1996     Years since quittin.4    Smokeless tobacco: Never    Vaping Use    Vaping status: Never Used   Substance and Sexual Activity    Alcohol use: Not Currently     Comment: one drink once a month    Drug use: Never   Other Topics Concern    Pt has a pacemaker No    Pt has a defibrillator No    Reaction to local anesthetic No    Caffeine Concern No     Social Drivers of Health     Food Insecurity: No Food Insecurity (5/13/2025)    NCSS - Food Insecurity     Worried About Running Out of Food in the Last Year: No     Ran Out of Food in the Last Year: No   Transportation Needs: No Transportation Needs (5/13/2025)    NCSS - Transportation     Lack of Transportation: No   Housing Stability: Not At Risk (5/13/2025)    NCSS - Housing/Utilities     Has Housing: Yes     Worried About Losing Housing: No     Unable to Get Utilities: No   Recent Concern: Housing Stability - At Risk (3/20/2025)    NCSS - Housing/Utilities     Has Housing: No     Worried About Losing Housing: No     Unable to Get Utilities: No                                Physical Exam    ED Triage Vitals [05/27/25 2313]   /48   Pulse 77   Resp 20   Temp 98.8 °F (37.1 °C)   Temp src Temporal   SpO2 94 %   O2 Device Nasal cannula       Current Vitals:   Vital Signs  BP: 128/74  Pulse: 67  Resp: 16  Temp: 98.8 °F (37.1 °C)  Temp src: Temporal  MAP (mmHg): 88    Oxygen Therapy  SpO2: 95 %  O2 Device: Nasal cannula  O2 Flow Rate (L/min): 4 L/min            Physical Exam  GENERAL: some distress, awake and alert  HEENT: EOMI, PERRL  Neck: supple, no jvd  CV: RRR, no murmurs  Resp: CTAB, no wheezes or retractions  Ab: soft, nontender, no distension  Extremities: FROM of all extremities, trace BLE edema  Neuro: CN intact, 5/5 motor strength in all extremities, no focal deficits  SKIN: warm, dry, no rashes          ED Course  Labs Reviewed   BASIC METABOLIC PANEL (8) - Abnormal; Notable for the following components:       Result Value    Potassium 3.3 (*)     CO2 38.0 (*)     BUN/CREA Ratio 26.4 (*)     Calculated  Osmolality 296 (*)     All other components within normal limits   CBC WITH DIFFERENTIAL WITH PLATELET - Abnormal; Notable for the following components:    WBC 15.0 (*)     HGB 11.8 (*)     RDW-SD 57.4 (*)     RDW 17.4 (*)     Neutrophil Absolute Prelim 12.44 (*)     Neutrophil Absolute 12.44 (*)     All other components within normal limits   PRO BETA NATRIURETIC PEPTIDE - Abnormal; Notable for the following components:    Pro-Beta Natriuretic Peptide 460 (*)     All other components within normal limits   TROPONIN I HIGH SENSITIVITY - Normal   TROPONIN I HIGH SENSITIVITY   RAINBOW DRAW LAVENDER   RAINBOW DRAW LIGHT GREEN   RAINBOW DRAW BLUE     EKG    Rate, intervals and axes as noted on EKG Report.  Rate:77  Rhythm: Sinus Rhythm  Reading: no ANDREAS, normal EKG               MDM   Admission disposition: 5/28/2025  2:18 AM           Medical Decision Making  Copd vs chf vs arrhythmia vs pe  Labs reassuring  Pt given duoneb/solumedrol    On reassessment patient feels somewhat improved.  Notified of CT results.  Patient provided with IV Lasix and plan for admission for further management of worsening effusions.    Amount and/or Complexity of Data Reviewed  External Data Reviewed: radiology and notes.     Details: Recent admission notes and cxr 5/2025 reviewed  Labs: ordered.  Radiology: ordered.     Details:   CTA CHEST (with IV contrast)    Comparison: CT chest 3/18/2025    IMPRESSION:   Mild CHF/fluid overload is suspected.    Small right pleural effusion.  Interlobular septal thickening and patchy predominantly groundglass densities in right lower lobe are increased from prior.  Mild cardiomegaly.  Cardiac pacemaker, dual lead with tips in right atrium and right ventricle.  Small pericardial effusion is increased from prior.    Emphysema.  15 mm nodule in right upper lobe is similar to prior.    Elevation of left hemidiaphragm and there is platelike atelectasis in the left lung base and lingula, also present on prior.       Diagnostic evaluation of pulmonary arteries.    No identified pulmonary embolism.  Dilated main pulmonary arteries, suggesting pulmonary hypertension.    No thoracic aortic aneurysm or dissection.  Atherosclerosis    Additional findings: Thyroid nodules.  Post vertebroplasty of T6 compression fracture, similar to prior.          Report finalized at 02:48 AM ET    Dr. Korey Cote MD    Discussion of management or test interpretation with external provider(s): D/w dr Cavanaugh    Risk  Decision regarding hospitalization.        Disposition and Plan     Clinical Impression:  1. Pericardial effusion (HCC)    2. Pleural effusion    3. Acute on chronic congestive heart failure, unspecified heart failure type (HCC)         Disposition:  Admit  5/28/2025  2:18 am    Follow-up:  No follow-up provider specified.  We recommend that you schedule follow up care with a primary care provider within the next three months to obtain basic health screening including reassessment of your blood pressure.      Medications Prescribed:  Current Discharge Medication List                Supplementary Documentation:         Hospital Problems       Present on Admission  Date Reviewed: 3/6/2025          ICD-10-CM Noted POA    * (Principal) Pericardial effusion (HCC) I31.39 5/28/2025 Unknown

## 2025-05-28 NOTE — ED QUICK NOTES
Orders for admission, patient is aware of plan and ready to go upstairs. Any questions, please call ED RN nona at extension [phone].     Patient Covid vaccination status: Fully vaccinated     COVID Test Ordered in ED: None    COVID Suspicion at Admission: N/A    Running Infusions: Medication Infusions[1] None    Mental Status/LOC at time of transport: alert and oriented x4    Other pertinent information:   CIWA score: N/A   NIH score:  N/A             [1]

## 2025-05-28 NOTE — ED INITIAL ASSESSMENT (HPI)
Patient to ED via EMS for shortness of breath since 5/26/2025 but has gotten increasingly worse. Hx of COPD, wears 4 liters NC at home. Pacemaker on 5/19/2025.

## 2025-05-28 NOTE — H&P
[facility]  part of [facility]  HISTORY AND PHYSICAL       Yesenia Berkowitz Patient Status:  Inpatient    1940  84 year old CSN [id number]   Location 306/306-A Attending Robbie Tate MD     PCP JO-ANN YANG MD     ASSESSMENT/PLAN  Acute on chronic HFpEF. Most recent EF normal. Patient with chronic hypoxia, +radiographic/physical exam evidence of pulmonary edema, baseline peripheral edema.  -Cardiology consult appreciated  -Diuresis with Lasix 40 IV x 1, defer further diuresis to cardiology  -GDMT per cards  -Echo pending  -Strict I/O  -Daily weights  -Low salt diet  -Monitor renal function  -Telemetry  -Supp O2 as needed, titrate off as tolerated    Acute COPD exacerbation.  Unclear if patient is in exacerbation right now.  No wheezing detected on my exam.  She recently finished a course of steroids.  Patient is a former patient of Dr. Boyle, currently requesting consult from [place] [facility] pulmonology to assist with transitioning care as an outpatient  -Pulmonology consult appreciated  -Solu-Medrol 40 every 12  -Nebulizers  -Inhalers  -Supplemental oxygen as needed, titrate down as tolerated  -Antibiotics: none  -Repeat imaging if clinical change    Respiratory failure, chronic hypoxemic.  Due to several factors including COPD, chronic heart failure, pleural effusions.  Oxygen requirement is stable at 4 L.  -Supplemental oxygen as needed, titrate down  -Pulmonology consultation as above  -Treat contributing factors as described  - Roxanol as needed dyspnea  - Palliative care consult appreciated    Other problems  Recent PPM for second-degree AV block, tachybradycardia  Lung nodule, possibly malignant  Paroxysmal atrial fibrillation      ----------------------------------  dvt prophylaxis: sc heparin  code status: DNR, select  dispo: home upon medical clearance    DATE OF ADMISSION: 25    CHIEF COMPLAINT: Shortness of breath    HISTORY OF PRESENT ILLNESS  This is a 84 year oldfemale with  numerous admissions for shortness of breath here with complaints of the same.  She was recently discharged from the hospital for COPD/heart failure.  Very recently completed her steroid taper.  Was actually seen in the pacemaker clinic yesterday and then suddenly felt more short of breath overnight.  Given 1 dose of IV Lasix in the ER.  Feeling somewhat better already.  No recent URI.  She has met with palliative care in the past and is not ready for hospice.    PAST MEDICAL HISTORY  Past Medical History[1]     PAST SURGICAL HISTORY  Past Surgical History[2]    ALLERGIES   Penicillin g, Azithromycin, Cefuroxime, Levofloxacin, Penicillins, and Seasonal    MEDICATIONS  Current Discharge Medication List        CONTINUE these medications which have NOT CHANGED    Details   traMADol 50 MG Oral Tab Take 1 tablet (50 mg total) by mouth every 6 (six) hours as needed.  Qty: 10 tablet, Refills: 0    Associated Diagnoses: Kyphoscoliosis      predniSONE 10 MG Oral Tab Take 1 tab twice per day for 3 days.    Then take 1 tab daily for 3 days and then stop.  Qty: 9 tablet, Refills: 0      nystatin 996905 UNIT/ML Mouth/Throat Suspension Take 5 mL (500,000 Units total) by mouth 4 (four) times daily for 7 days.  Qty: 140 mL, Refills: 0      nitrofurantoin monohydrate macro 100 MG Oral Cap Take 1 capsule (100 mg total) by mouth 2 (two) times daily.      morphINE 10 MG/5ML Oral Solution Take 1.3 mL (2.6 mg total) by mouth 3 (three) times daily as needed (Shortness of breath/ take prior to activity that cased shortness of breath). Please add an adapt a cap top for ease of drawing up.  Please give pt 2 oral syringes  Qty: 60 mL, Refills: 0    Associated Diagnoses: Shortness of breath; Chronic obstructive pulmonary disease, unspecified COPD type ([facility]); Cor pulmonale (chronic) ([facility]); Chronic heart failure with preserved ejection fraction (HFpEF) ([facility])      acetaZOLAMIDE 250 MG Oral Tab Take 2 tablets (500 mg total) by mouth daily.  Qty: 60  tablet, Refills: 5    Associated Diagnoses: Chronic heart failure with preserved ejection fraction (HFpEF) ([facility])      albuterol (2.5 MG/3ML) 0.083% Inhalation Nebu Soln Take 3 mL (2.5 mg total) by nebulization every 6 (six) hours as needed for Wheezing.      dilTIAZem  MG Oral Capsule SR 24 Hr Take 1 capsule (360 mg total) by mouth daily.  Qty: 30 capsule, Refills: 11      furosemide 80 MG Oral Tab Take 1 tablet (80 mg total) by mouth BID (Diuretic).  Qty: 60 tablet, Refills: 0      fluticasone-umeclidin-vilant 200-62.5-25 MCG/ACT Inhalation Aerosol Powder, Breath Activated Inhale 1 puff into the lungs as needed (sob).      empagliflozin (JARDIANCE) 10 MG Oral Tab Take 1 tablet (10 mg total) by mouth daily.  Qty: 30 tablet, Refills: 3      acetaminophen 500 MG Oral Tab Take 2 tablets (1,000 mg total) by mouth every 6 (six) hours as needed for Pain or Fever.      DIGOXIN 0.125 MG Oral Tab Take 1 tablet (125 mcg total) by mouth daily.  Qty: 30 tablet, Refills: 0      albuterol 108 (90 Base) MCG/ACT Inhalation Aero Soln Inhale 2 puffs into the lungs as needed.      Cholecalciferol (VITAMIN D) 1000 UNITS Oral Tab Take 1,000 Units by mouth in the morning and 1,000 Units before bedtime.      Potassium Chloride ER (K-DUR M20) 20 MEQ Oral Tab CR Take 1 tablet (20 mEq total) by mouth nightly.      Loratadine 10 MG Oral Cap Take 10 mg by mouth nightly.      montelukast 10 MG Oral Tab Take 1 tablet (10 mg total) by mouth nightly.      benzonatate 200 MG Oral Cap Take 1 capsule (200 mg total) by mouth 3 (three) times daily as needed for cough.  Qty: 30 capsule, Refills: 0      estradiol 0.05 MG/24HR Transdermal Patch Weekly Place 1 patch onto the skin once a week. As directed.             SOCIAL HISTORY  Social Hx on file[3]    FAMILY HISTORY  Family History[4]    REVIEW OF SYSTEMS  Gen: Neg for chills, fever, diaphoresis and fatigue.  No weight loss or weight gain.  Endo: No heat or cold intolerance.  No  polyuria.  HEENT: Neg for trouble swallowing, visual disturbance.  no hearing changes, no speech difficulties, no neck stiffness.  Resp: As above  CV: Neg for chest pain and palpitations.   GI: Neg for nausea, vomiting, diarrhea, abdominal pain/distention, constipation, blood in stool, black stools  : Neg for dysuria, frequency and hematuria.   MS :Neg for back pain and joint pain.  No swelling, open wounds  Skin: Neg for rash.   Neuro: Neg for dizziness, focal weakness, LH, HA.   Psych: Neg for suicidal ideas, confusion, agitation and depressed mood.   Other: All other review systems negative except what is mentioned in the HPI    PHYSICAL EXAM  Vital signs:  /59 (BP Location: Right arm)   Pulse 75   Temp 97.7 °F (36.5 °C) (Oral)   Resp 18   Ht 5' 5\" (1.651 m)   Wt 133 lb 11.2 oz (60.6 kg)   SpO2 95%   BMI 22.25 kg/m²   Gen: A+Ox3.  No distress.   HEENT: NCAT, neck supple, no carotid bruit.  CV: RRR, S1S2, and intact distal pulses. No gallop, rub, murmur.    Pulm: Good air movement, no wheezing, questionable bibasilar rales  Abd: Soft, NTND, BS normal, no mass, no HSM, no rebound/guarding.   Neuro: Normal reflexes, cranial nerves II through XII intact, strength is symmetric and 5 out of 5 throughout, sensory exam grossly intact, coordination and gait normal.  Skin: Skin is warm and dry. No rashes, erythema, diaphoresis.   MS: No open wounds, no joint effusions.  1+ bilateral lower extremity edema, slightly worse on the left which is chronic  Psych: Normal mood and affect. Behavior and judgment normal.     Data:  Recent Labs   Lab 05/21/25  1629 05/27/25  2346 05/28/25  0751   RBC 4.08 4.17 4.00   HGB 11.3* 11.8* 11.4*   HCT 36.1 37.5 36.9   MCV 88.5 89.9 92.3   MCH 27.7 28.3 28.5   MCHC 31.3 31.5 30.9*   RDW 17.1* 17.4* 17.2*   NEPRELIM 24.26* 12.44* 13.87*   WBC 26.3* 15.0* 14.5*   .0 278.0 257.0     Recent Labs   Lab 05/21/25  1629 05/27/25  2346 05/28/25  0751   * 88 233*   BUN 30*  19 18   CREATSERUM 0.74 0.72 0.71   CA 8.4* 8.7 8.5*    142 140   K 3.7 3.3* 3.2*   CL 91* 100 98   CO2 >40.0* 38.0* 35.0*       I personally reviewed the available laboratories, imaging including chest x-ray. I discussed the case with ER physician. I ordered laboratories, studies including BMP. I adjusted medications including Solu-Medrol. Medical decision making high, risk is high  >75min spent, >50% spent counseling and coordinating care in the form of educating pt/family and d/w consultants and staff. Most of the time spent discussing the above plan.  Discussed with pulmonology, cardiology, son             [1]   Past Medical History:   A-fib ([facility])    Anesthesia complication    Arrhythmia    AFIB    Arthritis    Asthma (HCC)    Back problem    COPD (chronic obstructive pulmonary disease) ([facility])    Deviated nasal septum    nasal septoplasty, turb reduction, smr of turbs    Difficult intubation    fiber optic if needed    Diverticulitis    colonoscopy     Diverticulosis of large intestine    Esophageal reflux    Extrinsic asthma, unspecified    Headache    Heart disease    Hepatitis    WAS TOLD SHE HAS HAD THIS WHEN SHE WAS 19 YEARS OLD    High blood pressure    High cholesterol    Irregular heart rate    Lichenoid keratosis    left chest-bx    Osteoarthritis    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    Paronychia    (RT); onychomycosis; debridement    Problems with swallowing    occasionally    Shortness of breath    2 L NC ALL THE TIME 24HR/7 DAYS PER WEEK    Unspecified essential hypertension    Visual impairment   [2]   Past Surgical History:  Procedure Laterality Date    Adenoidectomy      Cataract      cataract extraction     Hysterectomy      Sinus surgery        DEVIATED SEPTUM    T&a      Tonsillectomy     [3]   Social History  Socioeconomic History    Marital status:    Tobacco Use    Smoking status: Former     Current packs/day: 0.00     Types: Cigarettes     Quit date: 1/1/1996      Years since quittin.4    Smokeless tobacco: Never   Vaping Use    Vaping status: Never Used   Substance and Sexual Activity    Alcohol use: Not Currently     Comment: one drink once a month    Drug use: Never   Other Topics Concern    Pt has a pacemaker No    Pt has a defibrillator No    Reaction to local anesthetic No    Caffeine Concern No   [4]   Family History  Problem Relation Age of Onset    Ovarian Cancer Mother 76        endometrial, poss ovarian primary    Pulmonary Disease Sister         COPD    Skin cancer Other     Stroke Other     Other (Other) Other

## 2025-05-28 NOTE — DISCHARGE INSTRUCTIONS
Resume services with [facility] [phone]    The Radiation Oncology department will contact you to schedule your Radiation Mapping Scan. Please call [phone] with radiation questions

## 2025-05-28 NOTE — SPIRITUAL CARE NOTE
Spiritual Care Visit Note    Patient Name: Yesenia Berkowitz Date of Spiritual Care Visit: 25   : 1940 Primary Dx: Pericardial effusion (HCC)       Referred By: Referral From: Patient, Nurse    Spiritual Care Taxonomy:    Intended Effects: Aligning care plan with patient's values    Methods: Assist with spiritual/Rastafari practices    Interventions: Incorporate cultural and Rastafari needs in plan of care    Visit Type/Summary:     - Spiritual Care: Responded to a request via the on call phone Consulted with RN prior to visit. Provided support for patient spiritual/Rastafari requests. Coordinated Yaz[employer] Communion and verified NPO status.  remains available for follow up.    Spiritual Care support can be requested via an Epic consult. For urgent/immediate needs, please contact the On Call  at: [place]: ext [phone]    Chaplain Resident, Leny Fontana, PhD

## 2025-05-29 ENCOUNTER — APPOINTMENT (OUTPATIENT)
Dept: PICC SERVICES | Facility: HOSPITAL | Age: 85
End: 2025-05-29
Attending: HOSPITALIST
Payer: MEDICARE

## 2025-05-29 ENCOUNTER — APPOINTMENT (OUTPATIENT)
Dept: PICC SERVICES | Facility: HOSPITAL | Age: 85
DRG: 291 | End: 2025-05-29
Attending: HOSPITALIST
Payer: MEDICARE

## 2025-05-29 LAB
ANION GAP SERPL CALC-SCNC: 5 MMOL/L (ref 0–18)
BASOPHILS # BLD AUTO: 0.02 X10(3) UL (ref 0–0.2)
BASOPHILS NFR BLD AUTO: 0.1 %
BUN BLD-MCNC: 29 MG/DL (ref 9–23)
BUN/CREAT SERPL: 36.7 (ref 10–20)
CALCIUM BLD-MCNC: 8.6 MG/DL (ref 8.7–10.4)
CHLORIDE SERPL-SCNC: 100 MMOL/L (ref 98–112)
CO2 SERPL-SCNC: 35 MMOL/L (ref 21–32)
CREAT BLD-MCNC: 0.79 MG/DL (ref 0.55–1.02)
DEPRECATED RDW RBC AUTO: 58 FL (ref 35.1–46.3)
EGFRCR SERPLBLD CKD-EPI 2021: 75 ML/MIN/1.73M2 (ref 60–?)
EOSINOPHIL # BLD AUTO: 0 X10(3) UL (ref 0–0.7)
EOSINOPHIL NFR BLD AUTO: 0 %
ERYTHROCYTE [DISTWIDTH] IN BLOOD BY AUTOMATED COUNT: 17.2 % (ref 11–15)
GLUCOSE BLD-MCNC: 267 MG/DL (ref 70–99)
HCT VFR BLD AUTO: 33.7 % (ref 35–48)
HGB BLD-MCNC: 10.5 G/DL (ref 12–16)
IMM GRANULOCYTES # BLD AUTO: 0.09 X10(3) UL (ref 0–1)
IMM GRANULOCYTES NFR BLD: 0.5 %
LACTATE SERPL-SCNC: 2 MMOL/L (ref 0.5–2)
LACTATE SERPL-SCNC: 2.5 MMOL/L (ref 0.5–2)
LYMPHOCYTES # BLD AUTO: 0.47 X10(3) UL (ref 1–4)
LYMPHOCYTES NFR BLD AUTO: 2.7 %
MCH RBC QN AUTO: 28.5 PG (ref 26–34)
MCHC RBC AUTO-ENTMCNC: 31.2 G/DL (ref 31–37)
MCV RBC AUTO: 91.3 FL (ref 80–100)
MONOCYTES # BLD AUTO: 0.45 X10(3) UL (ref 0.1–1)
MONOCYTES NFR BLD AUTO: 2.6 %
NEUTROPHILS # BLD AUTO: 16.55 X10 (3) UL (ref 1.5–7.7)
NEUTROPHILS # BLD AUTO: 16.55 X10(3) UL (ref 1.5–7.7)
NEUTROPHILS NFR BLD AUTO: 94.1 %
OSMOLALITY SERPL CALC.SUM OF ELEC: 305 MOSM/KG (ref 275–295)
PLATELET # BLD AUTO: 258 10(3)UL (ref 150–450)
POTASSIUM SERPL-SCNC: 4.2 MMOL/L (ref 3.5–5.1)
RBC # BLD AUTO: 3.69 X10(6)UL (ref 3.8–5.3)
SODIUM SERPL-SCNC: 140 MMOL/L (ref 136–145)
T3FREE SERPL-MCNC: 2.66 PG/ML (ref 2.4–4.2)
T4 FREE SERPL-MCNC: 1.1 NG/DL (ref 0.8–1.7)
TSI SER-ACNC: 0.29 UIU/ML (ref 0.55–4.78)
WBC # BLD AUTO: 17.6 X10(3) UL (ref 4–11)

## 2025-05-29 PROCEDURE — 05HB33Z INSERTION OF INFUSION DEVICE INTO RIGHT BASILIC VEIN, PERCUTANEOUS APPROACH: ICD-10-PCS | Performed by: HOSPITALIST

## 2025-05-29 PROCEDURE — 99233 SBSQ HOSP IP/OBS HIGH 50: CPT | Performed by: HOSPITALIST

## 2025-05-29 PROCEDURE — 99232 SBSQ HOSP IP/OBS MODERATE 35: CPT | Performed by: INTERNAL MEDICINE

## 2025-05-29 PROCEDURE — 99232 SBSQ HOSP IP/OBS MODERATE 35: CPT | Performed by: REGISTERED NURSE

## 2025-05-29 RX ORDER — MORPHINE SULFATE 10 MG/5ML
2.6 SOLUTION ORAL EVERY 4 HOURS PRN
Qty: 60 ML | Refills: 0 | Status: SHIPPED | OUTPATIENT
Start: 2025-05-29

## 2025-05-29 RX ORDER — FUROSEMIDE 10 MG/ML
40 INJECTION INTRAMUSCULAR; INTRAVENOUS
Status: DISCONTINUED | OUTPATIENT
Start: 2025-05-29 | End: 2025-05-30

## 2025-05-29 RX ORDER — POTASSIUM CHLORIDE 1500 MG/1
40 TABLET, EXTENDED RELEASE ORAL ONCE
Status: COMPLETED | OUTPATIENT
Start: 2025-05-29 | End: 2025-05-29

## 2025-05-29 NOTE — PROGRESS NOTES
Provider Clarification    Additional information on the significance of the lab value/diagnostic findings.    Mild hypokalemia, resolved    This note is part of the patient's medical record.

## 2025-05-29 NOTE — PROGRESS NOTES
[facility]  part of [facility]    Progress Note      Assessment and Plan:   1.  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  This patient has very significant intrinsic lung disease but I agree that diuresis is reasonable considering that there are few other therapeutic interventions beyond what she has already received.  The ABG was as expected except there was identification of lactic acid at 4.1.  Breathing is about the same.  The lactic acid is now down to 2.0.     Recommendations:  1.  Gentle diuresis  2.  Outpatient PFTs  3.  Pulmonary rehabilitation  4.  The effusion is too small to benefit from thoracentesis at this time.  5.  Methylprednisolone  6.  MDI with Advair and Incruse Ellipta, and can resume Trelegy in the outpatient setting  7.  Hopefully home as soon as tomorrow     2.  DVT prophylaxis-subcutaneous heparin     3.  Pulmonary nodule-PET scan positive and presumed neoplastic.  The patient does not want percutaneous or bronchoscopic biopsy.  She is very interested in single beam radiotherapy.  She has an appointment to see radiation oncology this Friday in the clinic but is interested in meeting with their personnel while in hospital now.  I spoke with radiation oncology and Dr. Pawel Krueger will evaluate the patient tomorrow.     Recommendations: Dr. Kodak Krueger to evaluate tomorrow    4.  CODE STATUS-DNAR select        Subjective:   Yesenia Berokwitz is a(n) 84 year old female who is breathing about the same    Objective:   Blood pressure 140/60, pulse 72, temperature 98.3 °F (36.8 °C), temperature source Oral, resp. rate 18, height 5' 5\" (1.651 m), weight 133 lb 11.2 oz (60.6 kg), SpO2 97%.    Physical Exam alert white female  HEENT examination is unremarkable with pupils equal  round and reactive to light and accommodation.   Neck without adenopathy, thyromegaly, JVD nor bruit.   Lungs clear to auscultation and percussion.  Cardiac regular rate and rhythm no murmur.   Abdomen nontender, without hepatosplenomegaly and no mass appreciable.   Extremities without clubbing cyanosis nor edema.   Neurologic grossly intact with symmetric tone and strength and reflex.  Skin without gross abnormality     Results:     Lab Results   Component Value Date    WBC 17.6 05/29/2025    HGB 10.5 05/29/2025    HCT 33.7 05/29/2025    .0 05/29/2025    CREATSERUM 0.79 05/29/2025    BUN 29 05/29/2025     05/29/2025    K 4.2 05/29/2025     05/29/2025    CO2 35.0 05/29/2025     05/29/2025    CA 8.6 05/29/2025       Colby Szymanski MD  Medical Director, Critical Care, [facility]  Medical Director, [facility]  Pager: [phone]

## 2025-05-29 NOTE — PALLIATIVE CARE NOTE
[facility]  part of [facility]  Palliative Care Progress Note    Yesenia Berkowitz Patient Status:  Inpatient    1940 MRN [id number]   Location [facility] 3W/SW Attending Robbie Tate MD   Hosp Day # 1 PCP JO-ANN YANG MD     The  Cures Act makes medical notes like these available to patients in the interest of transparency. Please be advised this is a medical document. Medical documents are intended to carry relevant information, facts as evident, and the clinical opinion of the practitioner. The medical note is intended as peer to peer communication and may appear blunt or direct. It is written in medical language and may contain abbreviations or verbiage that are unfamiliar.       Subjective     Reviewed symptom medications past 24 hrs: Tylenol 500 mg po X1, Ultram 50 mg po X1, Roxanol 2.6 mg po X2, Pyridium 100 mg po X1    Patient was seen and examined in bed with RN at the bedside. Pt is A&OX4 and tells me she had a terrible night with losing her IV site and they were unable to place a new site. +fatigue. She says her breathing is ok and remains on nc4L, +still with mild dyspnea with conversation and tells me she is getting some relief of symptoms with Roxanol prn. Denies any adverse side effects from the Morphine. Denies any pain issues currently. Appetite is fair, denies abdominal pain, n/v and moved her bowels yesterday.      See summary of discussion below.     Review of Systems:  Pertinent items are noted in subjective    Allergies:  Allergies[1]    Medications:   Current Hospital Medications[2]    Objective     Vital Signs:  Blood pressure 106/78, pulse 73, temperature 98.3 °F (36.8 °C), temperature source Oral, resp. rate 18, height 5' 5\" (1.651 m), weight 133 lb 11.2 oz (60.6 kg), SpO2 97%.  Body mass index is 22.25 kg/m².  Present Level of pain: 0/10  Non-verbal signs of pain present: No    Physical Exam:  General: Alert and in no apparent distress. Weak,  frail/thin appearing  HEENT: No focal deficits.  Cardiac: Regular rate and rhythm, S1, S2 normal, no murmur, rub or gallop.  Lungs: Diminished breath sounds bilaterally. Normal excursions and effort.  Abdomen: Soft, non-tender, normal bowel sounds X 4 quadrants, no rebound or guarding  Extremities: Without clubbing, cyanosis. Peripheral pulses are 2+. Trace pedal BLE edema present L>R  Neurologic: Alert and oriented X4, normal affect.  Skin: Warm and dry. R chest pacemaker site clear with steri strips in place.        Prior to admission Palliative performance scale PPSv2 (%): 60    Current PPS 50%    Hematology:  Lab Results   Component Value Date    WBC 17.6 (H) 05/29/2025    HGB 10.5 (L) 05/29/2025    HCT 33.7 (L) 05/29/2025    .0 05/29/2025       Coags:  Lab Results   Component Value Date    INR 1.08 11/25/2024    PTT 28.1 03/20/2025       Chemistry:  Lab Results   Component Value Date    CREATSERUM 0.79 05/29/2025    BUN 29 (H) 05/29/2025     05/29/2025    K 4.2 05/29/2025     05/29/2025    CO2 35.0 (H) 05/29/2025     (H) 05/29/2025    CA 8.6 (L) 05/29/2025    ALB 3.9 05/13/2025    ALKPHO 65 05/13/2025    BILT 0.2 05/13/2025    TP 6.5 05/13/2025    AST 13 05/13/2025    ALT <7 (L) 05/13/2025    DDIMER 0.47 12/21/2024    MG 2.0 05/14/2025    PHOS 3.4 12/26/2024    TROP <0.045 02/03/2020       Imaging:  CT CHEST PE AORTA (IV ONLY) (CPT=71260)  Result Date: 5/28/2025  CONCLUSION:  1. No evidence of acute pulmonary embolism to the level of the first order subsegmental pulmonary artery branches.  2. Cardiomegaly with imaging findings concerning for pulmonary interstitial edema.  3. Increasing pericardial effusion.  4. Right larger than left pleural effusions and associated basilar atelectasis, with or without superimposed pneumonia.  5. A right upper lobe pulmonary nodule is redemonstrated; notably, this was hypermetabolic on prior PET-CT, and is concerning for primary bronchogenic  malignancy.  6. Right perihilar and subcarinal lymphadenopathy.  7. Mild-to-moderate centrilobular emphysematous changes.  8. Dilatation of the main pulmonary artery trunk may relate to underlying pulmonary hypertension.  9. Diffuse thyromegaly with multiple thyroid nodules.  10. Sequela of remote granulomatous disease.  11. Lesser incidental findings as above.     A preliminary report was issued by the [facility] teleradiology service. There are no major discrepancies.   Dictated by (CST): Grant Ferreira MD on 5/28/2025 at 7:24 AM     Finalized by (CST): Grant Ferreira MD on 5/28/2025 at 7:37 AM            Follow up GOC Discussion      5/29/25-Provided clinical update to pt and she tells me she is hoping to be able to go home tomorrow. She says she will consider RT for lung lesion, but doesn't feel she will be able to make appt tomorrow. She is glad she is getting some relief of her dyspnea symptoms from Roxanol prn. I told her I would sent an updated RX to her pharmacy as she says the bottle she has at home is old. She wants to continue with supportive care and remains hopeful to remain out of the hospital to enjoy her summer. Discussed [facility] should continue to follow on dc. Provided emotional support to pt who is coping adequately.    Discussed with Patient and son; Yes  Patient's preference about sharing medical information: speak to pt and son  Patient's decision making preferences: speak to pt  Code status: DNAR/DNI-selective  Have advanced directives been discussed with patient or healthcare power of : Yes        Healthcare Agent Appointed: Yes  Healthcare Agent's Name: Tyler Berkowitz  Healthcare Agent's Phone Number: [phone]          Spiritual needs addressed: Referral placed for Spiritual Care    Palliative disposition: [facility]      Procedures:  No intubation  No g-tube      Palliative Care Assessment and Recommendations     Problem List:     Acute CHF  exacerbation  Acute COPD exacerbation  Acute on chronic respiratory failure  Pleural effusions  Pericardial effusion  Spiculated RUL lung nodule/+PET concerning for malignancy  Second degree AVB 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  -Continue Roxanol 2.6 mg po Q 4 hrs prn dyspnea for comfort (RX sent to pharmacy)  -Education provided on non-pharmacological modalities for dyspnea.     Constipation  -Continue Miralax 17 gm po daily, continue senna prn and ducolax prn     Goals of care counseling  -see above for details  -Confirmed wishes for DNAR/DNI-selective and continue supportive care.   -Pt is declining to have bronch and says she will likely pursue RT for lung lesion.  -Ongoing GOC discussions will be needed over time.  -Pt/son are aware of the option for comfort care with hospice, but are NOT ready for this right now.  - following for spiritual support.  -Agreeable to palliative care following  -Dispo:  Return home with [facility]  and  [facility] palliative care program to follow. SW to help with dc planning.   -Provided emotional support to pt/son who are coping adequately.     Advance care planning  -Pt's son Tyler Berkowitz is HCPOA #[phone].  -Previously completed POLST/ HCPOA paperwork on file in [employer].      Emotion support provided to patient/family today: Yes       A total of 35 mins were spent on this consult, which included all of the following: direct face to face contact, history taking, physical examination, and >50% was spent counseling and coordinating care.    Discussed today's visit with Chandra KRAUSE and message to  Dr. Tate.      I am out of the office tomorrow and will follow back on Monday if still hospitalized. Please contact my partners for any arising palliative care needs in my  absence.    Charlotte Smith, [facility], A[facility] [id number]  5/29/2025  10:39 AM  Palliative Care Services        [1]   Allergies  Allergen Reactions    Penicillin G ANAPHYLAXIS    Azithromycin DIARRHEA and NAUSEA AND VOMITING    Cefuroxime UNKNOWN     Other reaction(s): Vomitting    Levofloxacin UNKNOWN     Other reaction(s): LEVOFLOXACIN    Penicillins      Other reaction(s): Unknown    Seasonal ITCHING   [2]   Current Facility-Administered Medications:     furosemide (Lasix) 10 mg/mL injection 40 mg, 40 mg, Intravenous, BID (Diuretic)    ondansetron (Zofran) 4 MG/2ML injection 4 mg, 4 mg, Intravenous, Q6H PRN    acetaminophen (Tylenol Extra Strength) tab 500 mg, 500 mg, Oral, Q4H PRN    polyethylene glycol (PEG 3350) (Miralax) 17 g oral packet 17 g, 17 g, Oral, Daily PRN    sennosides (Senokot) tab 17.2 mg, 17.2 mg, Oral, Nightly PRN    bisacodyl (Dulcolax) 10 MG rectal suppository 10 mg, 10 mg, Rectal, Daily PRN    fleet enema (Fleet) rectal enema 133 mL, 1 enema, Rectal, Once PRN    acetaZOLAMIDE (Diamox) tab 500 mg, 500 mg, Oral, Daily    benzonatate (Tessalon) cap 200 mg, 200 mg, Oral, TID PRN    dilTIAZem ER (Dilacor XR) 24 hr cap 360 mg, 360 mg, Oral, Daily    estradiol (Climara) 0.05 MG/24HR patch 1 patch, 1 patch, Transdermal, Weekly    fluticasone-salmeterol (Advair Diskus) 500-50 MCG/ACT inhaler 1 puff, 1 puff, Inhalation, BID    umeclidinium bromide (Incruse Ellipta) 62.5 MCG/ACT inhaler 1 puff, 1 puff, Inhalation, Daily    montelukast (Singulair) tab 10 mg, 10 mg, Oral, Nightly    nystatin (Mycostatin) 060416 UNIT/ML oral suspension 500,000 Units, 5 mL, Oral, QID    potassium chloride (Klor-Con M20) tab 20 mEq, 20 mEq, Oral, Nightly    traMADol (Ultram) tab 50 mg, 50 mg, Oral, Q6H PRN    methylPREDNISolone sodium succinate (Solu-MEDROL) injection 40 mg, 40 mg, Intravenous, Q12H    morphINE 10 MG/5ML oral solution 2.6 mg, 2.6 mg, Oral, Q4H PRN    polyethylene glycol (PEG 3350) (Miralax) 17 g oral packet 17 g, 17  g, Oral, Daily    phenazopyridine (Pyridium) tab 100 mg, 100 mg, Oral, TID PRN    heparin (Porcine) 5000 UNIT/ML injection 5,000 Units, 5,000 Units, Subcutaneous, 2 times per day    cetirizine (ZyrTEC) tab 5 mg, 5 mg, Oral, Daily

## 2025-05-29 NOTE — PROGRESS NOTES
[facility]  part of [facility]  Hospitalist Progress Note     Yesenia Berkowitz Patient Status:  Inpatient    1940  84 year old CSN [id number]   Location 306/306-A Attending Robbie Tate MD   Hosp Day # 1 PCP JO-ANN YANG MD     Assessment & Plan:   ----------------------------------  ASSESSMENT/PLAN  Acute on chronic HFpEF. Most recent EF normal. Patient with chronic hypoxia, +radiographic/physical exam evidence of pulmonary edema, baseline peripheral edema.  -Cardiology consult appreciated  -defer further diuresis to cardiology  -GDMT per cards  -Echo noted  -Strict I/O  -Daily weights  -Low salt diet  -Monitor renal function  -Telemetry  -Supp O2 as needed, titrate off as tolerated     Acute COPD exacerbation.  Unclear if patient is in exacerbation right now.  No wheezing detected on my exam.  She recently finished a course of steroids.  Patient is a former patient of Dr. Boyle, currently requesting consult from [place] pulmonology to assist with transitioning care as an outpatient  -Pulmonology consult appreciated  -Solu-Medrol 40 every 12  -Nebulizers  -Inhalers  -Supplemental oxygen as needed, titrate down as tolerated  -Antibiotics: none  -Repeat imaging if clinical change     Respiratory failure, chronic hypoxemic.  Due to several factors including COPD, chronic heart failure, pleural effusions.  Oxygen requirement is stable at 4 L.  -Supplemental oxygen as needed, titrate down  -Pulmonology consultation as above  -Treat contributing factors as described  - Roxanol as needed dyspnea  - Palliative care consult appreciated     Other problems  Recent PPM for second-degree AV block, tachybradycardia  Lung nodule, possibly malignant, rad onc consult pend  Paroxysmal atrial fibrillation        Supplementary Documentation:   DVT Mechanical Prophylaxis:   SCDs,    DVT Pharmacologic Prophylaxis   Medication    heparin (Porcine) 5000 UNIT/ML injection 5,000 Units                Code Status:  DNAR/Selective Treatment  García: No urinary catheter in place  García Duration (in days):   Central line:    ANGEL: 5/30/2025, home tomorrow        **Certification      PHYSICIAN Certification of Need for Inpatient Hospitalization - Initial Certification    Patient will require inpatient services that will reasonably be expected to span two midnight's based on the clinical documentation in H+P.   Based on patients current state of illness, I anticipate that, after discharge, patient will require TBD.           I personally reviewed the available laboratories, imaging including. I discussed/will discuss the case with consultants. I ordered laboratories and/or radiographic studies. I adjusted medications as detailed above.  Medical decision making high, risk is high. Seen with pulm    Subjective:   ----------------------------------  Slightly better in her breathing. Did not sleep well      Objective:   Chief Complaint:   Chief Complaint   Patient presents with    Difficulty Breathing     ----------------------------------  Temp:  [97.7 °F (36.5 °C)-98.4 °F (36.9 °C)] 97.8 °F (36.6 °C)  Pulse:  [70-75] 70  Resp:  [16-18] 18  BP: (123-151)/(37-69) 123/37  SpO2:  [95 %-98 %] 97 %  Gen: A+Ox3.  No distress.   HEENT: NCAT, neck supple, no carotid bruit.  CV: RRR, S1S2, and intact distal pulses. No gallop, rub, murmur.  Pulm: Minimal bibasilar rales  Abd: Soft, NTND, BS normal, no mass, no HSM, no rebound/guarding.   Neuro: Normal reflexes, CN. Sensory/motor exams grossly normal deficit.   MS: No joint effusions.  trace peripheral edema.  Skin: Skin is warm and dry. No rashes, erythema, diaphoresis.   Psych: Normal mood and affect. Calm, cooperative    Labs:  Lab Results   Component Value Date    HGB 11.4 (L) 05/28/2025    WBC 14.5 (H) 05/28/2025    .0 05/28/2025     05/28/2025    K 3.2 (L) 05/28/2025    CREATSERUM 0.71 05/28/2025    VHCO3 32.8 (H) 01/29/2025    INR 1.08 11/25/2024    AST 13 05/13/2025    ALT <7  (L) 05/13/2025    TROP <0.045 02/03/2020           Scheduled Medications[1]  PRN Medications[2]             [1]    potassium chloride  40 mEq Oral Once    acetaZOLAMIDE  500 mg Oral Daily    digoxin  125 mcg Oral Daily    dilTIAZem ER  360 mg Oral Daily    estradiol  1 patch Transdermal Weekly    fluticasone-salmeterol  1 puff Inhalation BID    umeclidinium bromide  1 puff Inhalation Daily    montelukast  10 mg Oral Nightly    nystatin  5 mL Oral QID    potassium chloride  20 mEq Oral Nightly    methylPREDNISolone  40 mg Intravenous Q12H    polyethylene glycol (PEG 3350)  17 g Oral Daily    heparin  5,000 Units Subcutaneous 2 times per day    cetirizine  5 mg Oral Daily   [2]   ondansetron    acetaminophen    polyethylene glycol (PEG 3350)    sennosides    bisacodyl    fleet enema    benzonatate    traMADol    morphINE    phenazopyridine

## 2025-05-29 NOTE — PROGRESS NOTES
[facility]  Cardiology Progress Note    Yesenia Berkowitz Patient Status:  Inpatient    1940 MRN [id number]   Location [facility] 3W/SW Attending Robbie Tate MD   Hosp Day # 1 PCP JO-ANN YANG MD     Subjective     Still short of breath but feels better today.    Objective:     Patient Vitals for the past 24 hrs:   BP Temp Temp src Pulse Resp SpO2   25 1000 106/78 98.3 °F (36.8 °C) Oral 73 18 97 %   25 0500 123/37 97.8 °F (36.6 °C) Oral 70 18 97 %   25 2048 151/69 98.4 °F (36.9 °C) Oral 71 16 98 %   25 1606 146/56 98.3 °F (36.8 °C) Oral 70 16 96 %   25 1252 139/58 98.3 °F (36.8 °C) Oral 75 18 95 %       Intake/Output:   Last 3 shifts:   Intake/Output                   25 0700 - 25 0659 25 0700 - 25 0659 25 0700 - 25 0659       Intake    P.O.  --  1240  --    P.O. -- 1240 --    Total Intake -- 1240 --       Output    Urine  --  600  --    Urine -- 600 --    Urine Occurrence 2 x 9 x --    Stool  --  --  --    Stool Count Calculated for I/O -- 2 x --    Total Output -- 600 --       Net I/O     -- 640 --             Vent Settings:      Hemodynamic parameters (last 24 hours):      Scheduled Meds: Scheduled Medications[1]    Continuous Infusions: Medication Infusions[2]    Results:     Lab Results   Component Value Date    WBC 17.6 (H) 2025    HGB 10.5 (L) 2025    HCT 33.7 (L) 2025    .0 2025    CREATSERUM 0.71 2025    BUN 18 2025     2025    K 3.2 (L) 2025    CL 98 2025    CO2 35.0 (H) 2025     (H) 2025    CA 8.5 (L) 2025    ALB 3.9 2025    ALKPHO 65 2025    BILT 0.2 2025    TP 6.5 2025    AST 13 2025    ALT <7 (L) 2025    PTT 28.1 2025    INR 1.08 2024    T4F 0.9 05/15/2025    TSH 0.172 (L) 05/15/2025    LIP 30 2024    DDIMER 0.47 2024    MG 2.0 2025    PHOS 3.4  12/26/2024    TROP <0.045 02/03/2020       Recent Labs   Lab 05/27/25  2346 05/28/25  0751   GLU 88 233*   BUN 19 18   CREATSERUM 0.72 0.71   CA 8.7 8.5*    140   K 3.3* 3.2*    98   CO2 38.0* 35.0*     Recent Labs   Lab 05/27/25  2346 05/28/25  0751 05/29/25  0926   RBC 4.17 4.00 3.69*   HGB 11.8* 11.4* 10.5*   HCT 37.5 36.9 33.7*   MCV 89.9 92.3 91.3   MCH 28.3 28.5 28.5   MCHC 31.5 30.9* 31.2   RDW 17.4* 17.2* 17.2*   NEPRELIM 12.44* 13.87* 16.55*   WBC 15.0* 14.5* 17.6*   .0 257.0 258.0       No results for input(s): \"BNPML\" in the last 168 hours.    No results for input(s): \"TROP\", \"CK\" in the last 168 hours.    CT CHEST PE AORTA (IV ONLY) (CPT=71260)  Result Date: 5/28/2025  CONCLUSION:  1. No evidence of acute pulmonary embolism to the level of the first order subsegmental pulmonary artery branches.  2. Cardiomegaly with imaging findings concerning for pulmonary interstitial edema.  3. Increasing pericardial effusion.  4. Right larger than left pleural effusions and associated basilar atelectasis, with or without superimposed pneumonia.  5. A right upper lobe pulmonary nodule is redemonstrated; notably, this was hypermetabolic on prior PET-CT, and is concerning for primary bronchogenic malignancy.  6. Right perihilar and subcarinal lymphadenopathy.  7. Mild-to-moderate centrilobular emphysematous changes.  8. Dilatation of the main pulmonary artery trunk may relate to underlying pulmonary hypertension.  9. Diffuse thyromegaly with multiple thyroid nodules.  10. Sequela of remote granulomatous disease.  11. Lesser incidental findings as above.     A preliminary report was issued by the [facility] teleradiology service. There are no major discrepancies.   Dictated by (CST): Grant Ferreira MD on 5/28/2025 at 7:24 AM     Finalized by (CST): Grant Ferreira MD on 5/28/2025 at 7:37 AM          EKG 12 Lead  Result Date: 5/28/2025  Normal sinus rhythm Nonspecific ST and T wave abnormality  Abnormal ECG When compared with ECG of 16-MAY-2025 15:18, Sinus rhythm has replaced Atrial fibrillation ST no longer depressed in Anterior-lateral leads T wave inversion no longer evident in Lateral leads Confirmed by Maciej Carlson ([id number]) on 2025 8:32:13 AM    CARD ECHO LIMITED (CPT=93308/94452/40622)  Result Date: 2025  Transthoracic Echocardiogram Name:Yesenia Berkowitz Date: 2025 :  1940 Ht:  (65in)  BP: 139 / 58 MRN:  [id number]    Age:  84years    Wt:  (133lb) HR: 68bpm Loc:  [facility]       Gndr: F          BSA: 1.66m^2 Sonographer: Samantha Ordering:    Angel Pratt Consulting:  Roosevelt Tineo ---------------------------------------------------------------------------- History/Indications:  Pericardial effusion. ---------------------------------------------------------------------------- Procedure information:  A transthoracic echocardiogram, limited study was performed. Additional evaluation included M-mode and limited 2D.  Patient status:  Inpatient.  Location:  Bedside.    This was a routine study. Transthoracic echocardiography for diagnosis and ventricular function evaluation. Image quality was adequate. ECG rhythm:   Normal sinus ---------------------------------------------------------------------------- Conclusions: 1. Left ventricle: The cavity size was normal. Wall thickness was mildly    increased. Systolic function was normal. The estimated ejection fraction    was 60-65%, by visual assessment. No diagnostic evidence for regional    wall motion abnormalities. Unable to assess LV diastolic function. 2. Right ventricle: Pacer wire noted in the right ventricle. 3. Mitral valve: The annulus was moderately fibrotic. The leaflets were    mildly thickened. 4. Pericardium, extracardiac: There was a left pleural effusion. Impressions:  This study is compared with previous dated 05/15/2025: * ---------------------------------------------------------------------------- * Findings: Left ventricle:  The  cavity size was normal. Wall thickness was mildly increased. Systolic function was normal. The estimated ejection fraction was 60-65%, by visual assessment. No diagnostic evidence for regional wall motion abnormalities. Unable to assess LV diastolic function. Left atrium:  Well visualized. The atrium was normal in size. Right ventricle:  The cavity size was normal. Pacer wire noted in the right ventricle. Systolic function was normal. Right atrium:  Well visualized. The atrium was normal in size. Mitral valve:  Well visualized. The annulus was moderately fibrotic. The leaflets were mildly thickened.  Doppler:  Transvalvular velocity was not obtained. Aortic valve:  Well visualized. The leaflets were mildly thickened. Doppler:  Transvalvular velocity was not obtained. Tricuspid valve:  Well visualized.  Doppler:  Transvalvular velocity was not obtained. Pulmonic valve:   Not well visualized.  Doppler:  Transvalvular velocity was not obtained. Pleura:  There was a left pleural effusion. Aorta: Aortic root: The aortic root was normal. Ascending aorta: The ascending aorta was normal. Pulmonary arteries: Systolic pressure could not be accurately estimated. Systemic veins:  Central venous respirophasic diameter changes are in the normal range (>50%). Inferior vena cava: The IVC was normal-sized. ---------------------------------------------------------------------------- Measurements  Left ventricle         Value        Ref       05/15/2025  IVS thickness, ED, (H) 1.0   cm     0.6 - 0.9 1.0  PLAX  LV ID, ED, PLAX        3.8   cm     3.8 - 5.2 4.0  LV ID, ES, PLAX        2.5   cm     2.2 - 3.5 2.7  LV PW thickness,       0.8   cm     0.6 - 0.9 0.9  ED, PLAX  IVS/LV PW ratio,       1.25         --------- 1.11  ED, PLAX  LV PW/LV ID ratio,     0.21         --------- 0.23  ED, PLAX  LV ejection            64    %      54 - 74   61  fraction  LVOT                   Value        Ref       05/15/2025  LVOT ID                1.9    cm     --------- 1.9  Aortic root            Value        Ref       05/15/2025  Aortic root ID         2.9   cm     2.4 - 3.9 2.9  Ascending aorta        Value        Ref       05/15/2025  Ascending aorta ID     3.0   cm     1.9 - 3.5 3.0  Left atrium            Value        Ref       05/15/2025  LA ID, A-P, ES         2.8   cm     2.7 - 3.8 ----------  LA/aortic root         0.97         --------- ----------  ratio  Inferior vena cava     Value        Ref       05/15/2025  ID                     1.9   cm     <=2.1     1.4  Right ventricle        Value        Ref       05/15/2025  TAPSE, MM              1.87  cm     >=1.70    2.02  RV s', lateral         15.1  cm/sec >=9.5     14.0 Legend: (L)  and  (H)  malvin values outside specified reference range. ---------------------------------------------------------------------------- Prepared and electronically signed by Shawn Camilo 2025 16:27     CARD ECHO 2D DOPPLER (CPT=93306)  Result Date: 5/15/2025  Transthoracic Echocardiogram Name:Yesenia Berkowitz Date: 05/15/2025 :  1940 Ht:  (65in)  BP: 103 / 63 MRN:  [id number]    Age:  84years    Wt:  (132lb) HR: 67bpm Loc:  [facility]       Gndr: F          BSA: 1.66m^2 Sonographer: Samantha Ordering:    Cortney Dumont Consulting:  Luis Fernando Fowler ---------------------------------------------------------------------------- History/Indications:   Murmur.  Abnormal ECG. ---------------------------------------------------------------------------- Procedure information:  A transthoracic complete 2D study was performed. Additional evaluation included M-mode, complete spectral Doppler, and color Doppler.  Patient status:  Inpatient.  Location:  Bedside.    This was a routine study. Transthoracic echocardiography for diagnosis and ventricular function evaluation. Image quality was adequate. ECG rhythm:   Normal sinus ---------------------------------------------------------------------------- Conclusions: 1. Left ventricle: The  cavity size was normal. Wall thickness was mildly    increased. Systolic function was hyperdynamic. The estimated ejection    fraction was >70%, by visual assessment. Wall motion is normal; there are    no regional wall motion abnormalities. Features are consistent with a    pseudonormal left ventricular filling pattern, with concomitant abnormal    relaxation and increased filling pressure - grade 2 diastolic    dysfunction. 2. Right ventricle: The cavity size was mildly increased. Systolic function    was normal. 3. Mitral valve: The annulus was moderately fibrotic. The leaflets were    mildly thickened. The mean diastolic gradient was 4mm Hg. The valve area    (LVOT continuity) was 1.88cm^2. 4. Left atrium: The atrium was mildly dilated. 5. Pulmonary arteries: Systolic pressure was mildly increased, estimated to    be 35mm Hg. Impressions:  No significant change since prior study. * ---------------------------------------------------------------------------- * Findings: Left ventricle:  The cavity size was normal. Wall thickness was mildly increased. Systolic function was hyperdynamic. The estimated ejection fraction was >70%, by visual assessment. Wall motion is normal; there are no regional wall motion abnormalities. Features are consistent with a pseudonormal left ventricular filling pattern, with concomitant abnormal relaxation and increased filling pressure - grade 2 diastolic dysfunction. Left atrium:  The atrium was mildly dilated. Right ventricle:  The cavity size was mildly increased. Systolic function was normal. Right atrium:  The atrium was normal in size. Mitral valve:  The annulus was moderately fibrotic. The leaflets were mildly thickened. Leaflet separation was normal.  Doppler:  Transvalvular velocity was increased. There was trace regurgitation.    The valve area (LVOT continuity) was 1.88cm^2. The valve area index (LVOT continuity) was 1.13cm^2/m^2.    The mean diastolic gradient was 4mm Hg.  Aortic valve:   The valve was trileaflet. The leaflets were mildly thickened. Cusp separation was normal.  Doppler:  Transvalvular velocity was within the normal range. There was no evidence for stenosis. There was no significant regurgitation.    The valve area (VTI) was 2.1cm^2. The valve area (VTI) index was 1.27cm^2/m^2.    The mean systolic gradient was 8mm Hg. The peak systolic gradient was 14mm Hg. Tricuspid valve:  The valve is structurally normal. Leaflet separation was normal.  Doppler:  Transvalvular velocity was within the normal range. There was no evidence for stenosis. There was trivial regurgitation. Pulmonic valve:   The valve is structurally normal. Cusp separation was normal.  Doppler:  Transvalvular velocity was within the normal range. There was no evidence for stenosis. There was no significant regurgitation. Pericardium:   There was no pericardial effusion. Aorta: Aortic root: The aortic root was normal. Ascending aorta: The ascending aorta was normal. Pulmonary arteries: Systolic pressure was mildly increased, estimated to be 35mm Hg. Systemic veins:  Central venous respirophasic diameter changes are in the normal range (>50%). Inferior vena cava: The IVC was normal-sized. ---------------------------------------------------------------------------- Measurements  Left ventricle       Value          Ref       09/14/2024  IVS thickness,   (H) 1.0   cm       0.6 - 0.9 1.0  ED, PLAX  LV ID, ED, PLAX      4.0   cm       3.8 - 5.2 4.2  LV ID, ES, PLAX      2.7   cm       2.2 - 3.5 2.9  LV PW thickness,     0.9   cm       0.6 - 0.9 1.0  ED, PLAX  IVS/LV PW ratio,     1.11           --------- 1.00  ED, PLAX  LV PW/LV ID          0.23           --------- 0.24  ratio, ED, PLAX  LV ejection          61    %        54 - 74   59  fraction  Stroke               54    ml/m^2   --------- 51  volume/bsa, 2D  LV e', lateral   (L) 5.8   cm/sec   >=10.0    ----------  LV E/e', lateral (H) 18             <=13       ----------  LV e', medial    (L) 6.1   cm/sec   >=7.0     ----------  LV E/e', medial      17             --------- ----------  LV e', average       5.9   cm/sec   --------- ----------  LV E/e', average (H) 17             <=14      ----------  LVOT                 Value          Ref       09/14/2024  LVOT ID              1.9   cm       --------- 1.8  LVOT peak            1.45  m/sec    --------- 1.69  velocity, S  LVOT VTI, S          31.7  cm       --------- 36.4  LVOT peak            8     mm Hg    --------- 11  gradient, S  LVOT mean            5     mm Hg    --------- 6  gradient, S  Stroke volume        90    ml       --------- 93  (SV), LVOT DP  Stroke index         54    ml/m^2   --------- 51  (SV/bsa), LVOT  DP  Aortic valve         Value          Ref       09/14/2024  Aortic valve         1.88  m/sec    --------- 2.06  peak velocity, S  Aortic valve         42.8  cm       --------- 42.1  VTI, S  Aortic mean          8     mm Hg    --------- 10  gradient, S  Aortic peak          14    mm Hg    --------- 17  gradient, S  Aortic valve         2.1   cm^2     --------- 2.17  area, VTI  Aortic valve         1.27  cm^2/m^2 --------- 1.19  area/bsa, VTI  Velocity ratio,      0.77           --------- 0.82  peak, LVOT/AV  Aortic root          Value          Ref       09/14/2024  Aortic root ID       2.9   cm       2.4 - 3.9 2.9  Ascending aorta      Value          Ref       09/14/2024  Ascending aorta      3.0   cm       1.9 - 3.5 3.0  ID  Left atrium          Value          Ref       09/14/2024  LA volume, S     (H) 61    ml       22 - 52   47  LA volume/bsa, S (H) 37    ml/m^2   16 - 34   26  LA volume, ES,   (H) 58    ml       22 - 52   47  1-p A4C  LA volume, ES,   (H) 59    ml       22 - 52   44  1-p A2C  LA volume, ES,       69    ml       --------- 53  A/L  LA volume/bsa,   (H) 42    ml/m^2   16 - 34   29  ES, A/L  Mitral valve         Value          Ref       09/14/2024  Mitral E-wave        1.02  m/sec     --------- 1  peak velocity  Mitral A-wave        1.61  m/sec    --------- 1.4  peak velocity  Mitral               248   ms       --------- 243  deceleration  time  Mitral mean          4     mm Hg    --------- 5  gradient, D  Mitral peak          13    mm Hg    --------- 11  gradient, D  Mitral E/A           0.6            --------- 0.7  ratio, peak  Mitral valve         1.88  cm^2     --------- 1.88  area, LVOT  continuity  Mitral valve         1.13  cm^2/m^2 --------- 1.04  area/bsa, LVOT  continuity  Pulmonary artery     Value          Ref       09/14/2024  PA pressure, S,      35    mm Hg    --------- ----------  DP  Tricuspid valve      Value          Ref       09/14/2024  Tricuspid regurg (H) 2.82  m/sec    <=2.8     3.63  peak velocity  Tricuspid peak       32    mm Hg    --------- 53  RV-RA gradient  Right atrium         Value          Ref       09/14/2024  RA ID, S-I, ES,      4.3   cm       3.4 - 5.3 ----------  A4C  RA ID/bsa, S-I,      2.6   cm/m^2   1.9 - 3.1 ----------  ES, A4C  RA area, ES, A4C     13    cm^2     10 - 18   ----------  RA volume, ES,       31    ml       --------- ----------  1-p A4C  RA volume/bsa,       18    ml/m^2   9 - 33    ----------  ES, 1-p A4C  Systemic veins       Value          Ref       09/14/2024  Estimated CVP        3     mm Hg    --------- 3  Inferior vena        Value          Ref       09/14/2024  cava  ID                   1.4   cm       <=2.1     1.8  Right ventricle      Value          Ref       09/14/2024  TAPSE, MM            2.02  cm       >=1.70    1.98  RV pressure, S,      35    mm Hg    --------- 56  DP  RV s', lateral       14.0  cm/sec   >=9.5     14.8 Legend: (L)  and  (H)  malvin values outside specified reference range. ---------------------------------------------------------------------------- Prepared and electronically signed by Luis Fernando Fowler 05/15/2025 12:03        Exam:     General: Alert and oriented x 3. No apparent distress.   HEENT:  Normocephalic, anicteric sclera, neck supple, no thyromegaly or adenopathy.  Neck: No JVD, carotids 2+, no bruits.  Cardiac: Regular rate and rhythm. S1, S2 normal. No murmur, pericardial rub, S3, or extra cardiac sounds.  Lungs: Clear without wheezes, rales, rhonchi or dullness.  Normal excursions and effort.  Abdomen: Soft, non-tender. No organosplenomegally, mass or rebound, BS-present.  Extremities: chronic L > R edema, trace pitting superimposed on non-pitting edema  Neurologic: Alert and oriented, normal affect. No focal defects  Skin: Warm and dry.     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, Tachy-Noah syndrome s/p DC-PPM  Paroxysmal AF  Severe COPD  Lymphedema  Suspected lung Ca     Dyspnea  -likely multifactorial, pericardial fusion minimal on TTE; otherwise no significant volume overload on exam; likely significant component of underlying COPD and possible PNA  -appreciate pulm input  -start IV lasix with 40mg BID for now given pulm edema seen on CT although suspect symptoms more pulmonary nature  -unlikely ACS given non-ischemic ECG and serially negative troponin (14,13)    Luis Fernando Fowler MD  [facility]  5/29/2025         [1]    furosemide  40 mg Intravenous BID (Diuretic)    acetaZOLAMIDE  500 mg Oral Daily    dilTIAZem ER  360 mg Oral Daily    estradiol  1 patch Transdermal Weekly    fluticasone-salmeterol  1 puff Inhalation BID    umeclidinium bromide  1 puff Inhalation Daily    montelukast  10 mg Oral Nightly    nystatin  5 mL Oral QID    potassium chloride  20 mEq Oral Nightly    methylPREDNISolone  40 mg Intravenous Q12H    polyethylene glycol (PEG 3350)  17 g Oral Daily    heparin  5,000 Units Subcutaneous 2 times per day    cetirizine  5 mg Oral Daily   [2]

## 2025-05-29 NOTE — PLAN OF CARE
Problem: Patient Centered Care  Goal: Patient preferences are identified and integrated in the patient's plan of care  Description: Interventions:  - What would you like us to know as we care for you? From home alone.   - Provide timely, complete, and accurate information to patient/family  - Incorporate patient and family knowledge, values, beliefs, and cultural backgrounds into the planning and delivery of care  - Encourage patient/family to participate in care and decision-making at the level they choose  - Honor patient and family perspectives and choices  Outcome: Progressing     Problem: Patient/Family Goals  Goal: Patient/Family Long Term Goal  Description: Patient's Long Term Goal:     Interventions:    - See additional Care Plan goals for specific interventions  Outcome: Progressing  Goal: Patient/Family Short Term Goal  Description: Patient's Short Term Goal:     Interventions:   - See additional Care Plan goals for specific interventions  Outcome: Progressing     Problem: CARDIOVASCULAR - ADULT  Goal: Maintains optimal cardiac output and hemodynamic stability  Description: INTERVENTIONS:  - Monitor vital signs, rhythm, and trends  - Monitor for bleeding, hypotension and signs of decreased cardiac output  - Evaluate effectiveness of vasoactive medications to optimize hemodynamic stability  - Monitor arterial and/or venous puncture sites for bleeding and/or hematoma  - Assess quality of pulses, skin color and temperature  - Assess for signs of decreased coronary artery perfusion - ex. Angina  - Evaluate fluid balance, assess for edema, trend weights  Outcome: Progressing  Goal: Absence of cardiac arrhythmias or at baseline  Description: INTERVENTIONS:  - Continuous cardiac monitoring, monitor vital signs, obtain 12 lead EKG if indicated  - Evaluate effectiveness of antiarrhythmic and heart rate control medications as ordered  - Initiate emergency measures for life threatening arrhythmias  - Monitor  electrolytes and administer replacement therapy as ordered  Outcome: Progressing     Problem: RESPIRATORY - ADULT  Goal: Achieves optimal ventilation and oxygenation  Description: INTERVENTIONS:  - Assess for changes in respiratory status  - Assess for changes in mentation and behavior  - Position to facilitate oxygenation and minimize respiratory effort  - Oxygen supplementation based on oxygen saturation or ABGs  - Provide Smoking Cessation handout, if applicable  - Encourage broncho-pulmonary hygiene including cough, deep breathe, Incentive Spirometry  - Assess the need for suctioning and perform as needed  - Assess and instruct to report SOB or any respiratory difficulty  - Respiratory Therapy support as indicated  - Manage/alleviate anxiety  - Monitor for signs/symptoms of CO2 retention  Outcome: Progressing     Problem: PAIN - ADULT  Goal: Verbalizes/displays adequate comfort level or patient's stated pain goal  Description: INTERVENTIONS:  - Encourage pt to monitor pain and request assistance  - Assess pain using appropriate pain scale  - Administer analgesics based on type and severity of pain and evaluate response  - Implement non-pharmacological measures as appropriate and evaluate response  - Consider cultural and social influences on pain and pain management  - Manage/alleviate anxiety  - Utilize distraction and/or relaxation techniques  - Monitor for opioid side effects  - Notify MD/LIP if interventions unsuccessful or patient reports new pain  - Anticipate increased pain with activity and pre-medicate as appropriate  Outcome: Progressing     Problem: RISK FOR INFECTION - ADULT  Goal: Absence of fever/infection during anticipated neutropenic period  Description: INTERVENTIONS  - Monitor WBC  - Administer growth factors as ordered  - Implement neutropenic guidelines  Outcome: Progressing     Problem: SAFETY ADULT - FALL  Goal: Free from fall injury  Description: INTERVENTIONS:  - Assess pt frequently for  physical needs  - Identify cognitive and physical deficits and behaviors that affect risk of falls.  - [place] fall precautions as indicated by assessment.  - Educate pt/family on patient safety including physical limitations  - Instruct pt to call for assistance with activity based on assessment  - Modify environment to reduce risk of injury  - Provide assistive devices as appropriate  - Consider OT/PT consult to assist with strengthening/mobility  - Encourage toileting schedule  Outcome: Progressing     Problem: DISCHARGE PLANNING  Goal: Discharge to home or other facility with appropriate resources  Description: INTERVENTIONS:  - Identify barriers to discharge w/pt and caregiver  - Include patient/family/discharge partner in discharge planning  - Arrange for needed discharge resources and transportation as appropriate  - Identify discharge learning needs (meds, wound care, etc)  - Arrange for interpreters to assist at discharge as needed  - Consider post-discharge preferences of patient/family/discharge partner  - Complete POLST form as appropriate  - Assess patient's ability to be responsible for managing their own health  - Refer to Case Management Department for coordinating discharge planning if the patient needs post-hospital services based on physician/LIP order or complex needs related to functional status, cognitive ability or social support system  Outcome: Progressing

## 2025-05-29 NOTE — PLAN OF CARE
Midline placed, unit draw. Received IV lasix and IV solumedrol. Potential discharge tomorrow. PO morphine given for dyspnea.     Problem: CARDIOVASCULAR - ADULT  Goal: Maintains optimal cardiac output and hemodynamic stability  Description: INTERVENTIONS:  - Monitor vital signs, rhythm, and trends  - Monitor for bleeding, hypotension and signs of decreased cardiac output  - Evaluate effectiveness of vasoactive medications to optimize hemodynamic stability  - Monitor arterial and/or venous puncture sites for bleeding and/or hematoma  - Assess quality of pulses, skin color and temperature  - Assess for signs of decreased coronary artery perfusion - ex. Angina  - Evaluate fluid balance, assess for edema, trend weights  Outcome: Progressing  Goal: Absence of cardiac arrhythmias or at baseline  Description: INTERVENTIONS:  - Continuous cardiac monitoring, monitor vital signs, obtain 12 lead EKG if indicated  - Evaluate effectiveness of antiarrhythmic and heart rate control medications as ordered  - Initiate emergency measures for life threatening arrhythmias  - Monitor electrolytes and administer replacement therapy as ordered  Outcome: Progressing     Problem: PAIN - ADULT  Goal: Verbalizes/displays adequate comfort level or patient's stated pain goal  Description: INTERVENTIONS:  - Encourage pt to monitor pain and request assistance  - Assess pain using appropriate pain scale  - Administer analgesics based on type and severity of pain and evaluate response  - Implement non-pharmacological measures as appropriate and evaluate response  - Consider cultural and social influences on pain and pain management  - Manage/alleviate anxiety  - Utilize distraction and/or relaxation techniques  - Monitor for opioid side effects  - Notify MD/LIP if interventions unsuccessful or patient reports new pain  - Anticipate increased pain with activity and pre-medicate as appropriate  Outcome: Progressing     Problem: RISK FOR INFECTION -  ADULT  Goal: Absence of fever/infection during anticipated neutropenic period  Description: INTERVENTIONS  - Monitor WBC  - Administer growth factors as ordered  - Implement neutropenic guidelines  Outcome: Progressing     Problem: SAFETY ADULT - FALL  Goal: Free from fall injury  Description: INTERVENTIONS:  - Assess pt frequently for physical needs  - Identify cognitive and physical deficits and behaviors that affect risk of falls.  - [place] fall precautions as indicated by assessment.  - Educate pt/family on patient safety including physical limitations  - Instruct pt to call for assistance with activity based on assessment  - Modify environment to reduce risk of injury  - Provide assistive devices as appropriate  - Consider OT/PT consult to assist with strengthening/mobility  - Encourage toileting schedule  Outcome: Progressing

## 2025-05-30 ENCOUNTER — APPOINTMENT (OUTPATIENT)
Dept: RADIATION ONCOLOGY | Facility: HOSPITAL | Age: 85
End: 2025-05-30
Attending: RADIOLOGY
Payer: MEDICARE

## 2025-05-30 VITALS
TEMPERATURE: 98 F | HEART RATE: 75 BPM | RESPIRATION RATE: 18 BRPM | DIASTOLIC BLOOD PRESSURE: 95 MMHG | BODY MASS INDEX: 22.06 KG/M2 | HEIGHT: 65 IN | OXYGEN SATURATION: 94 % | SYSTOLIC BLOOD PRESSURE: 123 MMHG | WEIGHT: 132.38 LBS

## 2025-05-30 DIAGNOSIS — C34.11 MALIGNANT NEOPLASM OF RIGHT UPPER LOBE OF LUNG (HCC): Primary | ICD-10-CM

## 2025-05-30 LAB
ANION GAP SERPL CALC-SCNC: 4 MMOL/L (ref 0–18)
BASOPHILS # BLD AUTO: 0.01 X10(3) UL (ref 0–0.2)
BASOPHILS NFR BLD AUTO: 0.1 %
BUN BLD-MCNC: 37 MG/DL (ref 9–23)
BUN/CREAT SERPL: 42.5 (ref 10–20)
CALCIUM BLD-MCNC: 8.6 MG/DL (ref 8.7–10.4)
CHLORIDE SERPL-SCNC: 99 MMOL/L (ref 98–112)
CO2 SERPL-SCNC: 34 MMOL/L (ref 21–32)
CREAT BLD-MCNC: 0.87 MG/DL (ref 0.55–1.02)
DEPRECATED RDW RBC AUTO: 57.4 FL (ref 35.1–46.3)
EGFRCR SERPLBLD CKD-EPI 2021: 66 ML/MIN/1.73M2 (ref 60–?)
EOSINOPHIL # BLD AUTO: 0 X10(3) UL (ref 0–0.7)
EOSINOPHIL NFR BLD AUTO: 0 %
ERYTHROCYTE [DISTWIDTH] IN BLOOD BY AUTOMATED COUNT: 17.1 % (ref 11–15)
GLUCOSE BLD-MCNC: 294 MG/DL (ref 70–99)
HCT VFR BLD AUTO: 33.4 % (ref 35–48)
HGB BLD-MCNC: 10.5 G/DL (ref 12–16)
IMM GRANULOCYTES # BLD AUTO: 0.08 X10(3) UL (ref 0–1)
IMM GRANULOCYTES NFR BLD: 0.6 %
LYMPHOCYTES # BLD AUTO: 0.34 X10(3) UL (ref 1–4)
LYMPHOCYTES NFR BLD AUTO: 2.5 %
MCH RBC QN AUTO: 28.6 PG (ref 26–34)
MCHC RBC AUTO-ENTMCNC: 31.4 G/DL (ref 31–37)
MCV RBC AUTO: 91 FL (ref 80–100)
MONOCYTES # BLD AUTO: 0.3 X10(3) UL (ref 0.1–1)
MONOCYTES NFR BLD AUTO: 2.2 %
NEUTROPHILS # BLD AUTO: 12.69 X10 (3) UL (ref 1.5–7.7)
NEUTROPHILS # BLD AUTO: 12.69 X10(3) UL (ref 1.5–7.7)
NEUTROPHILS NFR BLD AUTO: 94.6 %
OSMOLALITY SERPL CALC.SUM OF ELEC: 304 MOSM/KG (ref 275–295)
PLATELET # BLD AUTO: 233 10(3)UL (ref 150–450)
POTASSIUM SERPL-SCNC: 5 MMOL/L (ref 3.5–5.1)
POTASSIUM SERPL-SCNC: 5 MMOL/L (ref 3.5–5.1)
RBC # BLD AUTO: 3.67 X10(6)UL (ref 3.8–5.3)
SODIUM SERPL-SCNC: 137 MMOL/L (ref 136–145)
WBC # BLD AUTO: 13.4 X10(3) UL (ref 4–11)

## 2025-05-30 PROCEDURE — 99239 HOSP IP/OBS DSCHRG MGMT >30: CPT | Performed by: HOSPITALIST

## 2025-05-30 PROCEDURE — 99232 SBSQ HOSP IP/OBS MODERATE 35: CPT | Performed by: INTERNAL MEDICINE

## 2025-05-30 RX ORDER — NYSTATIN 100000 [USP'U]/ML
5 SUSPENSION ORAL 4 TIMES DAILY
Qty: 140 ML | Refills: 0 | Status: SHIPPED | OUTPATIENT
Start: 2025-05-30 | End: 2025-06-06

## 2025-05-30 RX ORDER — PHENAZOPYRIDINE HYDROCHLORIDE 100 MG/1
100 TABLET, FILM COATED ORAL 3 TIMES DAILY PRN
Qty: 30 TABLET | Refills: 0 | Status: SHIPPED | OUTPATIENT
Start: 2025-05-30

## 2025-05-30 RX ORDER — PREDNISONE 20 MG/1
TABLET ORAL
Qty: 10 TABLET | Refills: 0 | Status: SHIPPED | OUTPATIENT
Start: 2025-05-30 | End: 2025-06-07

## 2025-05-30 RX ORDER — MAGNESIUM HYDROXIDE/ALUMINUM HYDROXICE/SIMETHICONE 120; 1200; 1200 MG/30ML; MG/30ML; MG/30ML
30 SUSPENSION ORAL 4 TIMES DAILY PRN
Status: DISCONTINUED | OUTPATIENT
Start: 2025-05-30 | End: 2025-05-30

## 2025-05-30 RX ORDER — TRAMADOL HYDROCHLORIDE 50 MG/1
50 TABLET ORAL EVERY 6 HOURS PRN
Qty: 30 TABLET | Refills: 0 | Status: SHIPPED | OUTPATIENT
Start: 2025-05-30

## 2025-05-30 NOTE — PLAN OF CARE
Problem: Patient Centered Care  Goal: Patient preferences are identified and integrated in the patient's plan of care  Description: Intervention  - What would you like us to know as we care for you? Wears 4L at home  Problem: CARDIOVASCULAR - ADULT  Goal: Maintains optimal cardiac output and hemodynamic stability  Description: INTERVENTIONS:  - Monitor vital signs, rhythm, and trends  - Monitor for bleeding, hypotension and signs of decreased cardiac output  - Evaluate effectiveness of vasoactive medications to optimize hemodynamic stability  - Monitor arterial and/or venous puncture sites for bleeding and/or hematoma  - Assess quality of pulses, skin color and temperature  - Assess for signs of decreased coronary artery perfusion - ex. Angina  - Evaluate fluid balance, assess for edema, trend weights  Outcome: Progressing  Goal: Absence of cardiac arrhythmias or at baseline  Description: INTERVENTIONS:  - Continuous cardiac monitoring, monitor vital signs, obtain 12 lead EKG if indicated  - Evaluate effectiveness of antiarrhythmic and heart rate control medications as ordered  - Initiate emergency measures for life threatening arrhythmias  - Monitor electrolytes and administer replacement therapy as ordered  Outcome: Progressing     Problem: RESPIRATORY - ADULT  Goal: Achieves optimal ventilation and oxygenation  Description: INTERVENTIONS:  - Assess for changes in respiratory status  - Assess for changes in mentation and behavior  - Position to facilitate oxygenation and minimize respiratory effort  - Oxygen supplementation based on oxygen saturation or ABGs  - Provide Smoking Cessation handout, if applicable  - Encourage broncho-pulmonary hygiene including cough, deep breathe, Incentive Spirometry  - Assess the need for suctioning and perform as needed  - Assess and instruct to report SOB or any respiratory difficulty  - Respiratory Therapy support as indicated  - Manage/alleviate anxiety  - Monitor for  signs/symptoms of CO2 retention  Outcome: Progressing     Problem: PAIN - ADULT  Goal: Verbalizes/displays adequate comfort level or patient's stated pain goal  Description: INTERVENTIONS:  - Encourage pt to monitor pain and request assistance  - Assess pain using appropriate pain scale  - Administer analgesics based on type and severity of pain and evaluate response  - Implement non-pharmacological measures as appropriate and evaluate response  - Consider cultural and social influences on pain and pain management  - Manage/alleviate anxiety  - Utilize distraction and/or relaxation techniques  - Monitor for opioid side effects  - Notify MD/LIP if interventions unsuccessful or patient reports new pain  - Anticipate increased pain with activity and pre-medicate as appropriate  Outcome: Progressing     Problem: RISK FOR INFECTION - ADULT  Goal: Absence of fever/infection during anticipated neutropenic period  Description: INTERVENTIONS  - Monitor WBC  - Administer growth factors as ordered  - Implement neutropenic guidelines  Outcome: Progressing     Problem: SAFETY ADULT - FALL  Goal: Free from fall injury  Description: INTERVENTIONS:  - Assess pt frequently for physical needs  - Identify cognitive and physical deficits and behaviors that affect risk of falls.  - [place] fall precautions as indicated by assessment.  - Educate pt/family on patient safety including physical limitations  - Instruct pt to call for assistance with activity based on assessment  - Modify environment to reduce risk of injury  - Provide assistive devices as appropriate  - Consider OT/PT consult to assist with strengthening/mobility  - Encourage toileting schedule  Outcome: Progressing     - Provide timely, complete, and accurate information to patient/family  - Incorporate patient and family knowledge, values, beliefs, and cultural backgrounds into the planning and delivery of care  - Encourage patient/family to participate in care and  decision-making at the level they choose  - Honor patient and family perspectives and choices  Outcome: Progressing

## 2025-05-30 NOTE — CM/SW NOTE
05/30/25 1000   Discharge disposition   Expected discharge disposition Home-Health   Post [facility] Provider [facility]   Discharge transportation Private car     Per chart, pt has DC order.    Notified liaison Vanessa and liaison Mili w/ [facility].    Pt is cleared from SW/CM stand point.    PLAN: Home [facility]/ [facility]        SUMMER Anthony, LSW [id number]

## 2025-05-30 NOTE — PROGRESS NOTES
[facility]  part of [facility]    Progress Note      Assessment and Plan:   1.  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  This patient has very significant intrinsic lung disease but I agree that diuresis is reasonable considering that there are few other therapeutic interventions beyond what she has already received.  The ABG was as expected except there was identification of lactic acid at 4.1.  Breathing is about the same.  The lactic acid is now down to 2.0.    The patient appears to have no respiratory distress.  I think it reasonable to let her go home after she is seen by radiation oncology in consultation.  I would be happy to follow-up in the outpatient setting.  Would send home with a short course of prednisone and she can resume the Trelegy in the outpatient setting.     Recommendations:  1.  Gentle diuresis  2.  Outpatient PFTs  3.  Pulmonary rehabilitation  4.  The effusion is too small to benefit from thoracentesis at this time.  5.  Methylprednisolone  6.  MDI with Advair and Incruse Ellipta, and can resume Trelegy in the outpatient setting  7.  As above, okay to home today from my perspective.  I can see her in clinic in 2 to 3 weeks.     2.  DVT prophylaxis-subcutaneous heparin     3.  Pulmonary nodule-PET scan positive and presumed neoplastic.  The patient does not want percutaneous or bronchoscopic biopsy.  She is very interested in single beam radiotherapy.  She has an appointment to see radiation oncology this Friday in the clinic but is interested in meeting with their personnel while in hospital now.  I spoke with radiation oncology and Dr. Pawel Krueger will evaluate the patient tomorrow.     Recommendations: Dr. Kodak Krueger to evaluate today    4.  CODE STATUS-DNAR  select        Subjective:   Yesenia Berkowitz is a(n) 84 year old female who is breathing about the same.    Objective:   Blood pressure (!) 123/95, pulse 75, temperature 98.3 °F (36.8 °C), temperature source Oral, resp. rate 18, height 5' 5\" (1.651 m), weight 132 lb 6.4 oz (60.1 kg), SpO2 94%.    Physical Exam alert white female  HEENT examination is unremarkable with pupils equal round and reactive to light and accommodation.   Neck without adenopathy, thyromegaly, JVD nor bruit.   Lungs clear to auscultation and percussion.  Cardiac regular rate and rhythm no murmur.   Abdomen nontender, without hepatosplenomegaly and no mass appreciable.   Extremities without clubbing cyanosis nor edema.   Neurologic grossly intact with symmetric tone and strength and reflex.  Skin without gross abnormality     Results:     Lab Results   Component Value Date    WBC 13.4 05/30/2025    HGB 10.5 05/30/2025    HCT 33.4 05/30/2025    .0 05/30/2025    CREATSERUM 0.87 05/30/2025    BUN 37 05/30/2025     05/30/2025    K 5.0 05/30/2025    K 5.0 05/30/2025    CL 99 05/30/2025    CO2 34.0 05/30/2025     05/30/2025    CA 8.6 05/30/2025       Colby Szymanski MD  Medical Director, Critical Care, [facility]  Medical Director, [facility]  Pager: [phone]

## 2025-05-30 NOTE — DISCHARGE SUMMARY
[facility]  part of [facility]     DISCHARGE SUMMARY     Yesenia Berkowitz Patient Status:  Inpatient    1940 MRN [id number]   Location [facility] 3W/SW Attending Robbie Tate MD   Hosp Day # 2 PCP JO-ANN YANG MD     DATE OF ADMISSION: 2025  DATE OF DISCHARGE:  25  DISPOSITION: home  CONDITION ON DISCHARGE: good    DISCHARGE DIAGNOSES:  Acute on chronic HFpEF  Acute COPD exacerbation  Respiratory failure, chronic hypoxemic  Recent PPM for second-degree AV block, tachybradycardia  Lung nodule, possibly malignant  Paroxysmal atrial fibrillation    HISTORY OF PRESENT ILLNESS (COPIED FROM ADMISSION H&P)  This is a 84 year oldfemale with numerous admissions for shortness of breath here with complaints of the same.  She was recently discharged from the hospital for COPD/heart failure.  Very recently completed her steroid taper.  Was actually seen in the pacemaker clinic yesterday and then suddenly felt more short of breath overnight.  Given 1 dose of IV Lasix in the ER.  Feeling somewhat better already.  No recent URI.  She has met with palliative care in the past and is not ready for hospice.     HOSPITAL COURSE:  Patient was admitted, seen by cardiology and pulmonology.  Given a few doses of IV diuretics started on IV steroids.  She felt much better over the next 2 days.  Seen by radiation oncology and will plan for single beam radiation treatment as an outpatient.  Cleared by other services for discharge    Patient understands return to the emergency room for increased pain, fever, discharge, shortness of breath, chest pain, new neurologic symptoms, other concerning symptoms.    PHYSICAL EXAM:  Temp:  [98 °F (36.7 °C)-98.3 °F (36.8 °C)] 98.3 °F (36.8 °C)  Pulse:  [65-75] 75  Resp:  [18-20] 18  BP: (123-149)/(58-95) 123/95  SpO2:  [94 %-97 %] 94 %  Gen: A+Ox3.  No distress.   HEENT: NCAT, neck supple, no carotid bruit.  CV: RRR, S1S2, and intact distal pulses. No gallop,  rub, murmur.  Pulm: Effort and breath sounds normal. No distress, wheezes, rales, rhonchi.  Abd: Soft, NTND, BS normal, no mass, no HSM, no rebound/guarding.   Neuro: Normal reflexes, CN. Sensory/motor exams grossly normal deficit. Coordination  and gait normal.   MS: No joint effusions.  No peripheral edema.  Skin: Skin is warm and dry. No rashes, erythema, diaphoresis.   Psych: Normal mood and affect. Behavior and judgment normal.     DISCHARGE MEDICATIONS     Discharge Medications        START taking these medications        Instructions Prescription details   phenazopyridine 100 MG Tabs  Commonly known as: Pyridium      Take 1 tablet (100 mg total) by mouth 3 (three) times daily as needed for Pain (dysuria).   Quantity: 30 tablet  Refills: 0            CHANGE how you take these medications        Instructions Prescription details   morphINE 10 MG/5ML Soln  What changed: when to take this      Take 1.3 mL (2.6 mg total) by mouth every 4 (four) hours as needed (Shortness of breath/ take prior to activity that cased shortness of breath). Please add an adapt a cap top for ease of drawing up.  Please give pt 2 oral syringes   Quantity: 60 mL  Refills: 0     predniSONE 20 MG Tabs  Commonly known as: Deltasone  Start taking on: May 30, 2025  What changed:   medication strength  See the new instructions.  Another medication with the same name was removed. Continue taking this medication, and follow the directions you see here.      Take 2 tablets (40 mg total) by mouth daily for 2 days, THEN 1.5 tablets (30 mg total) daily for 2 days, THEN 1 tablet (20 mg total) daily for 2 days, THEN 0.5 tablets (10 mg total) daily for 2 days.   Stop taking on: June 7, 2025  Quantity: 10 tablet  Refills: 0     traMADol 50 MG Tabs  Commonly known as: Ultram  What changed: reasons to take this      Take 1 tablet (50 mg total) by mouth every 6 (six) hours as needed for Pain.   Quantity: 30 tablet  Refills: 0            CONTINUE taking  these medications        Instructions Prescription details   acetaminophen 500 MG Tabs  Commonly known as: Tylenol Extra Strength      Take 2 tablets (1,000 mg total) by mouth every 6 (six) hours as needed for Pain or Fever.   Refills: 0     acetaZOLAMIDE 250 MG Tabs  Commonly known as: Diamox      Take 2 tablets (500 mg total) by mouth daily.   Quantity: 60 tablet  Refills: 5     albuterol 108 (90 Base) MCG/ACT Aers  Commonly known as: Ventolin HFA      Inhale 2 puffs into the lungs as needed.   Refills: 0     albuterol (2.5 MG/3ML) 0.083% Nebu  Commonly known as: Ventolin      Take 3 mL (2.5 mg total) by nebulization every 6 (six) hours as needed for Wheezing.   Refills: 0     benzonatate 200 MG Caps  Commonly known as: Tessalon      Take 1 capsule (200 mg total) by mouth 3 (three) times daily as needed for cough.   Quantity: 30 capsule  Refills: 0     digoxin 0.125 MG Tabs  Commonly known as: Lanoxin      Take 1 tablet (125 mcg total) by mouth daily.   Quantity: 30 tablet  Refills: 0     dilTIAZem  MG Cp24  Commonly known as: Tiazac      Take 1 capsule (360 mg total) by mouth daily.   Quantity: 30 capsule  Refills: 11     empagliflozin 10 MG Tabs  Commonly known as: Jardiance      Take 1 tablet (10 mg total) by mouth daily.   Quantity: 30 tablet  Refills: 3     estradiol 0.05 MG/24HR Ptwk  Commonly known as: Climara      Place 1 patch onto the skin once a week. As directed.   Refills: 0     fluticasone-umeclidin-vilant 200-62.5-25 MCG/ACT Aepb  Commonly known as: Trelegy Ellipta      Inhale 1 puff into the lungs as needed (sob).   Refills: 0     furosemide 80 MG Tabs  Commonly known as: Lasix      Take 1 tablet (80 mg total) by mouth BID (Diuretic).   Quantity: 60 tablet  Refills: 0     Loratadine 10 MG Caps      Take 10 mg by mouth nightly.   Refills: 0     montelukast 10 MG Tabs  Commonly known as: Singulair      Take 1 tablet (10 mg total) by mouth nightly.   Refills: 0     nitrofurantoin monohydrate  macro 100 MG Caps  Commonly known as: Macrobid      Take 1 capsule (100 mg total) by mouth 2 (two) times daily.   Refills: 0     potassium chloride 20 MEQ Tbcr  Commonly known as: Klor-Con M20      Take 1 tablet (20 mEq total) by mouth nightly.   Refills: 0     Vitamin D 1000 units Tabs      Take 1,000 Units by mouth in the morning and 1,000 Units before bedtime.   Refills: 0            STOP taking these medications      nystatin [id number] UNIT/ML Susp  Commonly known as: Mycostatin                  Where to Get Your Medications        These medications were sent to [id number] - [place], [place] - [address] [phone], [phone]  [address], [facility] [address]      Phone: [phone]   morphINE 10 MG/5ML Soln  phenazopyridine 100 MG Tabs  predniSONE 20 MG Tabs  traMADol 50 MG Tabs         CONSULTANTS  Consultants         Provider   Role Specialty     Star Grady MD      Consulting Physician Radiation Oncology     Noman, Roosevelt Mckinley DO      Consulting Physician PULMONARY DISEASES     Loi Greenberg MD      Consulting Physician Interventional, Cardiology            FOLLOW UP:  No follow-up provider specified.  The above plan and follow-up instructions were reviewed with the patient and they verbalized understanding and agreement.  They understand to return to the emergency room for any concerning signs or symptoms.  Greater than 30 minutes spent on discharge.  -----------------------    Hospital Discharge Diagnoses: Acute on chronic HFpEF    Lace+ Score: 80  59-90 High Risk  29-58 Medium Risk  0-28   Low Risk.    TCM Follow-Up Recommendation:  LACE > 58: High Risk of readmission after discharge from the hospital.  Supplementary Documentation:

## 2025-05-30 NOTE — PLAN OF CARE
Patient cleared for discharge by consults/hospitalist. Discharge education given, including information about medications and future appointments, patient verbalized understanding. Patient discharged home, taken by son via private car.     Problem: CARDIOVASCULAR - ADULT  Goal: Maintains optimal cardiac output and hemodynamic stability  Description: INTERVENTIONS:  - Monitor vital signs, rhythm, and trends  - Monitor for bleeding, hypotension and signs of decreased cardiac output  - Evaluate effectiveness of vasoactive medications to optimize hemodynamic stability  - Monitor arterial and/or venous puncture sites for bleeding and/or hematoma  - Assess quality of pulses, skin color and temperature  - Assess for signs of decreased coronary artery perfusion - ex. Angina  - Evaluate fluid balance, assess for edema, trend weights  Outcome: Completed  Goal: Absence of cardiac arrhythmias or at baseline  Description: INTERVENTIONS:  - Continuous cardiac monitoring, monitor vital signs, obtain 12 lead EKG if indicated  - Evaluate effectiveness of antiarrhythmic and heart rate control medications as ordered  - Initiate emergency measures for life threatening arrhythmias  - Monitor electrolytes and administer replacement therapy as ordered  Outcome: Completed     Problem: RESPIRATORY - ADULT  Goal: Achieves optimal ventilation and oxygenation  Description: INTERVENTIONS:  - Assess for changes in respiratory status  - Assess for changes in mentation and behavior  - Position to facilitate oxygenation and minimize respiratory effort  - Oxygen supplementation based on oxygen saturation or ABGs  - Provide Smoking Cessation handout, if applicable  - Encourage broncho-pulmonary hygiene including cough, deep breathe, Incentive Spirometry  - Assess the need for suctioning and perform as needed  - Assess and instruct to report SOB or any respiratory difficulty  - Respiratory Therapy support as indicated  - Manage/alleviate anxiety  -  Monitor for signs/symptoms of CO2 retention  Outcome: Completed     Problem: PAIN - ADULT  Goal: Verbalizes/displays adequate comfort level or patient's stated pain goal  Description: INTERVENTIONS:  - Encourage pt to monitor pain and request assistance  - Assess pain using appropriate pain scale  - Administer analgesics based on type and severity of pain and evaluate response  - Implement non-pharmacological measures as appropriate and evaluate response  - Consider cultural and social influences on pain and pain management  - Manage/alleviate anxiety  - Utilize distraction and/or relaxation techniques  - Monitor for opioid side effects  - Notify MD/LIP if interventions unsuccessful or patient reports new pain  - Anticipate increased pain with activity and pre-medicate as appropriate  Outcome: Completed     Problem: RISK FOR INFECTION - ADULT  Goal: Absence of fever/infection during anticipated neutropenic period  Description: INTERVENTIONS  - Monitor WBC  - Administer growth factors as ordered  - Implement neutropenic guidelines  Outcome: Completed     Problem: SAFETY ADULT - FALL  Goal: Free from fall injury  Description: INTERVENTIONS:  - Assess pt frequently for physical needs  - Identify cognitive and physical deficits and behaviors that affect risk of falls.  - [place] fall precautions as indicated by assessment.  - Educate pt/family on patient safety including physical limitations  - Instruct pt to call for assistance with activity based on assessment  - Modify environment to reduce risk of injury  - Provide assistive devices as appropriate  - Consider OT/PT consult to assist with strengthening/mobility  - Encourage toileting schedule  Outcome: Completed     Problem: DISCHARGE PLANNING  Goal: Discharge to home or other facility with appropriate resources  Description: INTERVENTIONS:  - Identify barriers to discharge w/pt and caregiver  - Include patient/family/discharge partner in discharge planning  -  Arrange for needed discharge resources and transportation as appropriate  - Identify discharge learning needs (meds, wound care, etc)  - Arrange for interpreters to assist at discharge as needed  - Consider post-discharge preferences of patient/family/discharge partner  - Complete POLST form as appropriate  - Assess patient's ability to be responsible for managing their own health  - Refer to Case Management Department for coordinating discharge planning if the patient needs post-hospital services based on physician/LIP order or complex needs related to functional status, cognitive ability or social support system  Outcome: Completed      no

## 2025-05-30 NOTE — SPIRITUAL CARE NOTE
Spiritual Care Visit Note    Patient Name: Yesenia Berkowitz Date of Spiritual Care Visit: 25   : 1940 Primary Dx: Pericardial effusion (HCC)       Referred By: Referral From:        Visit Type/Summary:     - Spiritual Care: Writer report visiting priest barrientos patient.  remains available as needed for follow up.    idalia Kong, BRITTNEY, CAMII, [facility]   [id number]     Spiritual Care support can be requested via an [facility] consult. For urgent/immediate needs, please contact the On Call  at: [place]: ext [phone]

## 2025-05-30 NOTE — CONSULTS
[facility]    PATIENT'S NAME: CEZAR JOVEL   RADIATION ONCOLOGIST: Star Krueger MD   PATIENT ACCOUNT #: [id number] LOCATION: 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  She had an episode of acute on chronic respiratory failure back in November 2024, and a CT scan at that time revealed a 10 x 7 spiculated right upper lobe pulmonary nodule.  This appeared to have been growing from prior studies.  A followup scan done in March shows some slow growth, as it now measured 1.2 x 1.1 cm along the major fissure.  This prompted a PET scan on 04/08/2025.  This showed a 12 x 13 mm hypermetabolic pulmonary nodule in the right upper lobe concerning for a primary pulmonary neoplasm.  This had an SUV of 7.1.  There was no evidence of any hilar or mediastinal lymphadenopathy.  There was a small right pleural effusion noted to be likely reactive.  The patient was recently admitted for an episode of acute on chronic respiratory failure.  While she was in the hospital, she was referred to Radiation Oncology for discussion regarding possible treatment to her presumed pulmonary malignancy.  She was not thought to be a good candidate for biopsy, let alone surgery or other procedures.    The patient is asymptomatic with respect to this small lesion.  Her respiratory functioning is poor, and she is being managed by Dr. Szymanski.  She denies any hemoptysis and has had no significant changes in appetite or weight loss.  The pleural effusion had been tapped in the past with no evidence of any malignant cytology.    PAST MEDICAL HISTORY:  The patient has a past history  of COPD as per HPI.  She also has atrial fibrillation, arthritis, hypertension, asthma, high cholesterol.      PAST SURGICAL HISTORY:  Tonsils and adenoidectomy, hysterectomy, cataract surgery, and sinus surgery in the past for a deviated septum.    MEDICATIONS:  Acetaminophen, acetazolamide, albuterol, benzonatate, digoxin, diltiazem, Jardiance, fluticasone/umeclidinium/vilanterol, furosemide, loratadine, montelukast, morphine, nitrofurantoin, nystatin, phenazopyridine, prednisone, tramadol.      ALLERGIES:  Penicillin.    FAMILY HISTORY:  Mother with ovarian cancer.    SOCIAL HISTORY:  The patient does have a 30-pack-year history of smoking and quit in 1996.  She reports rare alcohol use.  She is a retired nurse.  She denies transportation-related difficulties.    REVIEW OF SYSTEMS:  A 14-point review of systems is performed.  Pertinent positives and negatives are as per HPI.      PHYSICAL EXAMINATION:    GENERAL:  An 84-year-old female who is pleasant, cooperative, and alert, awake, oriented x3.  She is in no acute distress.  She has an ECOG performance score of 2 due to her respiratory difficulties and a current pain score of 0.  VITAL SIGNS:  Blood pressure of 123/95, pulse of 75, respiratory rate of 18, and a temperature of 98.3.  Her current weight is 132 pounds.  HEENT:  Pupils are equal, round, and reactive to light with accommodation, and the extraocular movements are intact.  The oral cavity is without ulcerations or lesions.    NECK:  Supple with no lymphadenopathy.  LUNGS:  Coarse breath sounds bilaterally.  HEART:  Regular rate and rhythm, with normal S1 and S2, and no audible murmurs.  LYMPHATICS:  There is no supraclavicular, axillary, or inguinal lymphadenopathy.  ABDOMEN:  Benign.    IMPRESSION:  This is an 84-year-old female, recently found to have an enlarging pulmonary nodule.  This was in the right upper lobe and has been getting larger on subsequent CT scans.  This has now been shown to be PET  positive and has a high index of suspicion that this represents a small pulmonary malignancy.  She is not a candidate for biopsy, let alone surgery, and is referred to Radiation Oncology to consider ablative radiotherapy.    RECOMMENDATIONS:  I do believe the patient is an excellent candidate for ablative radiotherapy.  As she is not a candidate for surgery, radiation can be utilized to eradicate her disease.  Given the size and location, I believe the patient would tolerate the treatments extremely well with control rates in excess of 93% to 95% as per published literature.  To that extent, I would recommend 5000 cGy in 5 fractions given utilizing an ablative radiotherapeutic techniques.    I had a long talk with the patient regarding the potential side effects of these treatments.  I told her that she should tolerate treatment well and I do not really expect much morbidity.  She may have some fatigue, and there is the possibility of  some decreased pulmonary functioning or even radiation pneumonitis, though this would be quite unlikely given the small area treated.  Damage to the chest wall or rib cage is also theoretically possible though unlikely.  I also told the patient that I can offer no absolute guarantees that the treatment will be successful.  Following this long and thorough discussion of all the risks and benefits of treatment, the patient indicated that she understood all these issues and does wish to proceed with treatment as I previously dictated.    We, therefore, will schedule the patient for simulation after discharge.  We then will proceed with treatment as expeditiously as possible.    Thank you very much for allowing me the opportunity to participate in the care of this patient.  If there should be any questions regarding the radiotherapy, please feel free to contact me at any time.     Dictated By Star Krueger MD  d: 05/30/2025 13:23:45  t: 05/30/2025  13:43:53  [facility] [id number]  NAD/    cc: MD Robbie Crespo MD Joseph Danavi, D.O. Inna Milgram, MD

## 2025-06-06 ENCOUNTER — APPOINTMENT (OUTPATIENT)
Dept: RADIATION ONCOLOGY | Facility: HOSPITAL | Age: 85
End: 2025-06-06
Attending: RADIOLOGY
Payer: MEDICARE

## 2025-06-07 ENCOUNTER — APPOINTMENT (OUTPATIENT)
Dept: CT IMAGING | Facility: HOSPITAL | Age: 85
End: 2025-06-07
Attending: EMERGENCY MEDICINE
Payer: MEDICARE

## 2025-06-07 ENCOUNTER — HOSPITAL ENCOUNTER (EMERGENCY)
Facility: HOSPITAL | Age: 85
Discharge: HOME | End: 2025-06-07
Attending: EMERGENCY MEDICINE
Payer: MEDICARE

## 2025-06-07 VITALS
BODY MASS INDEX: 20.04 KG/M2 | WEIGHT: 132.25 LBS | TEMPERATURE: 99 F | RESPIRATION RATE: 15 BRPM | HEIGHT: 68 IN | OXYGEN SATURATION: 96 % | DIASTOLIC BLOOD PRESSURE: 60 MMHG | SYSTOLIC BLOOD PRESSURE: 114 MMHG | HEART RATE: 70 BPM

## 2025-06-07 DIAGNOSIS — K59.00 CONSTIPATION, UNSPECIFIED CONSTIPATION TYPE: Primary | ICD-10-CM

## 2025-06-07 LAB
ALBUMIN SERPL-MCNC: 3.6 G/DL (ref 3.2–4.8)
ALBUMIN/GLOB SERPL: 1.9 {RATIO} (ref 1–2)
ALP LIVER SERPL-CCNC: 59 U/L (ref 55–142)
ALT SERPL-CCNC: 29 U/L (ref 10–49)
ANION GAP SERPL CALC-SCNC: 7 MMOL/L (ref 0–18)
AST SERPL-CCNC: 17 U/L (ref ?–34)
BASOPHILS # BLD AUTO: 0.02 X10(3) UL (ref 0–0.2)
BASOPHILS NFR BLD AUTO: 0.2 %
BILIRUB SERPL-MCNC: 0.3 MG/DL (ref 0.2–1.1)
BILIRUB UR QL: NEGATIVE
BUN BLD-MCNC: 16 MG/DL (ref 9–23)
BUN/CREAT SERPL: 27.6 (ref 10–20)
CALCIUM BLD-MCNC: 8.6 MG/DL (ref 8.7–10.4)
CHLORIDE SERPL-SCNC: 99 MMOL/L (ref 98–112)
CLARITY UR: CLEAR
CO2 SERPL-SCNC: 35 MMOL/L (ref 21–32)
CREAT BLD-MCNC: 0.58 MG/DL (ref 0.55–1.02)
DEPRECATED RDW RBC AUTO: 58 FL (ref 35.1–46.3)
EGFRCR SERPLBLD CKD-EPI 2021: 90 ML/MIN/1.73M2 (ref 60–?)
EOSINOPHIL # BLD AUTO: 0.04 X10(3) UL (ref 0–0.7)
EOSINOPHIL NFR BLD AUTO: 0.3 %
ERYTHROCYTE [DISTWIDTH] IN BLOOD BY AUTOMATED COUNT: 17.1 % (ref 11–15)
GLOBULIN PLAS-MCNC: 1.9 G/DL (ref 2–3.5)
GLUCOSE BLD-MCNC: 177 MG/DL (ref 70–99)
GLUCOSE UR-MCNC: 1000 MG/DL
HCT VFR BLD AUTO: 38.4 % (ref 35–48)
HGB BLD-MCNC: 11.4 G/DL (ref 12–16)
HGB UR QL STRIP.AUTO: NEGATIVE
IMM GRANULOCYTES # BLD AUTO: 0.07 X10(3) UL (ref 0–1)
IMM GRANULOCYTES NFR BLD: 0.6 %
KETONES UR-MCNC: NEGATIVE MG/DL
LEUKOCYTE ESTERASE UR QL STRIP.AUTO: NEGATIVE
LYMPHOCYTES # BLD AUTO: 1.07 X10(3) UL (ref 1–4)
LYMPHOCYTES NFR BLD AUTO: 8.4 %
MCH RBC QN AUTO: 27.8 PG (ref 26–34)
MCHC RBC AUTO-ENTMCNC: 29.7 G/DL (ref 31–37)
MCV RBC AUTO: 93.7 FL (ref 80–100)
MONOCYTES # BLD AUTO: 0.82 X10(3) UL (ref 0.1–1)
MONOCYTES NFR BLD AUTO: 6.5 %
NEUTROPHILS # BLD AUTO: 10.68 X10 (3) UL (ref 1.5–7.7)
NEUTROPHILS # BLD AUTO: 10.68 X10(3) UL (ref 1.5–7.7)
NEUTROPHILS NFR BLD AUTO: 84 %
NITRITE UR QL STRIP.AUTO: NEGATIVE
OSMOLALITY SERPL CALC.SUM OF ELEC: 298 MOSM/KG (ref 275–295)
PH UR: 7 [PH] (ref 5–8)
PLATELET # BLD AUTO: 313 10(3)UL (ref 150–450)
POTASSIUM SERPL-SCNC: 3.5 MMOL/L (ref 3.5–5.1)
PROT SERPL-MCNC: 5.5 G/DL (ref 5.7–8.2)
PROT UR-MCNC: NEGATIVE MG/DL
RBC # BLD AUTO: 4.1 X10(6)UL (ref 3.8–5.3)
SODIUM SERPL-SCNC: 141 MMOL/L (ref 136–145)
SP GR UR STRIP: 1.01 (ref 1–1.03)
UROBILINOGEN UR STRIP-ACNC: NORMAL
WBC # BLD AUTO: 12.7 X10(3) UL (ref 4–11)

## 2025-06-07 PROCEDURE — 36415 COLL VENOUS BLD VENIPUNCTURE: CPT

## 2025-06-07 PROCEDURE — 74177 CT ABD & PELVIS W/CONTRAST: CPT | Performed by: EMERGENCY MEDICINE

## 2025-06-07 PROCEDURE — 81003 URINALYSIS AUTO W/O SCOPE: CPT | Performed by: EMERGENCY MEDICINE

## 2025-06-07 PROCEDURE — 99284 EMERGENCY DEPT VISIT MOD MDM: CPT

## 2025-06-07 PROCEDURE — 80053 COMPREHEN METABOLIC PANEL: CPT | Performed by: EMERGENCY MEDICINE

## 2025-06-07 PROCEDURE — 85025 COMPLETE CBC W/AUTO DIFF WBC: CPT | Performed by: EMERGENCY MEDICINE

## 2025-06-07 NOTE — DISCHARGE INSTRUCTIONS
Take magnesium citrate and try an enema at home as discussed.  Follow-up with your primary physician for reevaluation.  Return to the emergency department if increased abdominal pain, vomiting, or other new symptoms develop.

## 2025-06-07 NOTE — ED PROVIDER NOTES
Patient Seen in: [facility] Emergency Department        History  Chief Complaint   Patient presents with    Abdomen/Flank Pain     Stated Complaint: abd pain    Subjective:   HPI            84-year-old female with history of hypertension, hypercholesterolemia, atrial fibrillation, congestive heart failure, COPD on home oxygen (4 L by nasal cannula), asthma, and osteoarthritis presents with complaints of urinary frequency and lower abdominal pain.  The patient reports a history of urinary frequency for which she is taking Pyridium.  She reports urinary frequency has not been improving despite the medication.  She reports today she developed pain across her lower abdomen worsened to the right lower quadrant.  She denies associated nausea or vomiting.  No diarrhea.  She denies fever or chills.  History is obtained from both the patient and her son at the bedside.      Objective:     Past Medical History:    A-fib ([facility])    Anesthesia complication    Arrhythmia    AFIB    Arthritis    Asthma ([facility])    Back problem    COPD (chronic obstructive pulmonary disease) ([facility])    Deviated nasal septum    nasal septoplasty, turb reduction, smr of turbs    Difficult intubation    fiber optic if needed    Diverticulitis    colonoscopy     Diverticulosis of large intestine    Esophageal reflux    Extrinsic asthma, unspecified    Headache    Heart disease    Hepatitis    WAS TOLD SHE HAS HAD THIS WHEN SHE WAS 19 YEARS OLD    High blood pressure    High cholesterol    Irregular heart rate    Lichenoid keratosis    left chest-bx    Osteoarthritis    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    Paronychia    (RT); onychomycosis; debridement    Problems with swallowing    occasionally    Shortness of breath    2 L NC ALL THE TIME 24HR/7 DAYS PER WEEK    Unspecified essential hypertension    Visual impairment              Past Surgical History:   Procedure Laterality Date    Adenoidectomy      Cataract      cataract  extraction     Hysterectomy      Sinus surgery        DEVIATED SEPTUM    T&a      Tonsillectomy                  Social History     Socioeconomic History    Marital status:    Tobacco Use    Smoking status: Former     Current packs/day: 0.00     Types: Cigarettes     Quit date: 1996     Years since quittin.4    Smokeless tobacco: Never   Vaping Use    Vaping status: Never Used   Substance and Sexual Activity    Alcohol use: Not Currently     Comment: one drink once a month    Drug use: Never   Other Topics Concern    Pt has a pacemaker No    Pt has a defibrillator No    Reaction to local anesthetic No    Caffeine Concern No     Social Drivers of Health     Food Insecurity: No Food Insecurity (2025)    NCSS - Food Insecurity     Worried About Running Out of Food in the Last Year: No     Ran Out of Food in the Last Year: No   Transportation Needs: No Transportation Needs (2025)    NCSS - Transportation     Lack of Transportation: No   Housing Stability: Not At Risk (2025)    NCSS - Housing/Utilities     Has Housing: Yes     Worried About Losing Housing: No     Unable to Get Utilities: No   Recent Concern: Housing Stability - At Risk (3/20/2025)    NCSS - Housing/Utilities     Has Housing: No     Worried About Losing Housing: No     Unable to Get Utilities: No                                Physical Exam    ED Triage Vitals   BP 25 1538 124/60   Pulse 25 1538 90   Resp 25 1538 14   Temp 25 1553 98.7 °F (37.1 °C)   Temp src 25 1553 Oral   SpO2 25 1541 92 %   O2 Device 25 1538 Nasal cannula       Current Vitals:   Vital Signs  BP: 133/59  Pulse: 74  Resp: 14  Temp: 98.7 °F (37.1 °C)  Temp src: Oral  MAP (mmHg): 79    Oxygen Therapy  SpO2: 96 %  O2 Device: Nasal cannula  O2 Flow Rate (L/min): 4 L/min            Physical Exam    General Appearance: alert, no distress  Eyes: pupils equal and round no pallor or injection  ENT, Mouth: mucous membranes  moist  Respiratory: there are no retractions, lungs are clear to auscultation  Cardiovascular: regular rate and rhythm  Gastrointestinal:  abdomen is soft with tenderness to palpation to the right lower quadrant, no masses, bowel sounds normal  Neurological: Speech normal.  Moving extremities x 4.  Skin: warm and dry, no rashes.  Musculoskeletal: neck is supple non tender        Extremities are symmetrical, full range of motion  Psychiatric: patient is oriented X 3, there is no agitation    DIFFERENTIAL DIAGNOSIS: After history and physical exam differential diagnosis was considered for abdominal pain including but not limited to appendicitis, diverticulitis, gastritis and urinary tract infection.            ED Course  Labs Reviewed   URINALYSIS WITH CULTURE REFLEX - Abnormal; Notable for the following components:       Result Value    Glucose Urine 1000 (*)     All other components within normal limits   CBC WITH DIFFERENTIAL WITH PLATELET - Abnormal; Notable for the following components:    WBC 12.7 (*)     HGB 11.4 (*)     MCHC 29.7 (*)     RDW-SD 58.0 (*)     RDW 17.1 (*)     Neutrophil Absolute Prelim 10.68 (*)     Neutrophil Absolute 10.68 (*)     All other components within normal limits   COMP METABOLIC PANEL (14) - Abnormal; Notable for the following components:    Glucose 177 (*)     CO2 35.0 (*)     BUN/CREA Ratio 27.6 (*)     Calcium, Total 8.6 (*)     Calculated Osmolality 298 (*)     Total Protein 5.5 (*)     Globulin  1.9 (*)     All other components within normal limits   RAINBOW DRAW LAVENDER   RAINBOW DRAW LIGHT GREEN   RAINBOW DRAW BLUE                            MDM     Lab and CT results noted.  After CT findings I attempted stool disimpaction in the ED but no stool was palpated to the length of my finger.  Will discharge home with recommendations for an enema at home and will give magnesium citrate.  She is advised to follow-up with her primary physician for reevaluation.  She is advised to  return if increased abdominal pain or other new symptoms develop.        Medical Decision Making      Disposition and Plan     Clinical Impression:  1. Constipation, unspecified constipation type         Disposition:  Discharge  6/7/2025  6:14 pm    Follow-up:  Brenda Wilkerson MD  [address]  [facility] [address]  [facility] [address]  [phone]    Follow up      We recommend that you schedule follow up care with a primary care provider within the next three months to obtain basic health screening including reassessment of your blood pressure.      Medications Prescribed:  Current Discharge Medication List        START taking these medications    Details   Magnesium Citrate Oral Solution Take 296 mL by mouth once for 1 dose.  Qty: 296 mL, Refills: 0                   Supplementary Documentation:

## 2025-06-10 ENCOUNTER — APPOINTMENT (OUTPATIENT)
Dept: RADIATION ONCOLOGY | Facility: HOSPITAL | Age: 85
End: 2025-06-10
Attending: RADIOLOGY
Payer: MEDICARE

## 2025-06-10 ENCOUNTER — NURSE ONLY (OUTPATIENT)
Dept: RADIATION ONCOLOGY | Facility: HOSPITAL | Age: 85
End: 2025-06-10

## 2025-06-10 VITALS
SYSTOLIC BLOOD PRESSURE: 109 MMHG | DIASTOLIC BLOOD PRESSURE: 71 MMHG | OXYGEN SATURATION: 98 % | HEART RATE: 78 BPM | RESPIRATION RATE: 16 BRPM | TEMPERATURE: 98 F

## 2025-06-10 DIAGNOSIS — C80.1 CANCER (HCC): Primary | ICD-10-CM

## 2025-06-10 NOTE — PATIENT INSTRUCTIONS
- You will receive a call to schedule your radiation treatments.     - please call [phone] with radiation questions.

## 2025-06-10 NOTE — PROGRESS NOTES
Nursing Consultation Note  Patient: Yesenia Berkowitz  YOB: 1940  Age: 84 year old  Radiation Oncologist: Dr. Star Krueger  Referring Physician: No ref. provider found  Diagnosis:[unfilled]  Consult Date: 6/10/2025      Chemotherapy: N/A  Labs: Reviewed  Imaging: Reviewed  Is the patient of child-bearing age?         No  Has the patient received radiation therapy in the past? no  Does the patient have an implantable device?Yes Pacemaker - need card to scan in, patient doesn't have on her currently.  Patient has/has had:     1. Assistive Devices: Wheelchair and Walker    2. Flu Vaccination: yes    3. Pneumonia Vaccination:  yes    Vital Signs:   Vitals:    06/10/25 1405   BP: 109/71   Pulse: 78   Resp: 16   Temp: 98 °F (36.7 °C)   ,   Wt Readings from Last 6 Encounters:   06/07/25 60 kg (132 lb 4.4 oz)   05/30/25 60.1 kg (132 lb 6.4 oz)   05/21/25 67.6 kg (149 lb)   03/20/25 68.4 kg (150 lb 12.7 oz)   03/06/25 65.9 kg (145 lb 4.8 oz)   02/20/25 66.2 kg (145 lb 14.4 oz)       Nursing Note:      Review of Systems   Constitutional:  Positive for activity change, appetite change, fatigue and unexpected weight change.   HENT: Negative.     Eyes: Negative.    Respiratory:  Positive for chest tightness and shortness of breath.         On 4L O2   Cardiovascular:  Positive for leg swelling.        Chronic lymphedema     Gastrointestinal:  Positive for abdominal pain.        Lower abdominal pain- sometimes worsened by eating. Never fully goes away    Endocrine: Negative.    Genitourinary:  Positive for frequency and urgency.        Feels she has to go to restroom 90% of time      Musculoskeletal:  Positive for back pain.        Upper back pain    Skin: Negative.    Allergic/Immunologic: Negative.    Hematological:  Bruises/bleeds easily.   Psychiatric/Behavioral:  Positive for sleep disturbance.           Allergies:  Allergies[1]    Current Medications[2]    Preferred Pharmacy:    [id number] - [place], [place]  - [address] [phone], [phone]  [address]  [place] [address]  Phone: [phone] Fax: [phone]      Past Medical History[3]    Past Surgical History[4]    Social History     Socioeconomic History    Marital status:      Spouse name: Not on file    Number of children: Not on file    Years of education: Not on file    Highest education level: Not on file   Occupational History    Not on file   Tobacco Use    Smoking status: Former     Current packs/day: 0.00     Types: Cigarettes     Quit date: 1996     Years since quittin.4    Smokeless tobacco: Never   Vaping Use    Vaping status: Never Used   Substance and Sexual Activity    Alcohol use: Not Currently     Comment: one drink once a month    Drug use: Never    Sexual activity: Not on file   Other Topics Concern    Grew up on a farm Not Asked    History of tanning Not Asked    Outdoor occupation Not Asked    Pt has a pacemaker No    Pt has a defibrillator No    Breast feeding Not Asked    Reaction to local anesthetic No     Service Not Asked    Blood Transfusions Not Asked    Caffeine Concern No    Occupational Exposure Not Asked    Hobby Hazards Not Asked    Sleep Concern Not Asked    Stress Concern Not Asked    Weight Concern Not Asked    Special Diet Not Asked    Back Care Not Asked    Exercise Not Asked    Bike Helmet Not Asked    Seat Belt Not Asked    Self-Exams Not Asked   Social History Narrative    Not on file     Social Drivers of Health     Food Insecurity: No Food Insecurity (2025)    NCSS - Food Insecurity     Worried About Running Out of Food in the Last Year: No     Ran Out of Food in the Last Year: No   Transportation Needs: No Transportation Needs (2025)    NCSS - Transportation     Lack of Transportation: No   Housing Stability: Not At Risk (2025)    NCSS - Housing/Utilities     Has Housing: Yes     Worried About Losing Housing: No     Unable to Get Utilities: No   Recent Concern:  Housing Stability - At Risk (3/20/2025)    NCSS - Housing/Utilities     Has Housing: No     Worried About Losing Housing: No     Unable to Get Utilities: No       ECOG:  Grade 2 - Ambulatory/capable of all self-care, unable to perform any work activities.  Up and about more than 50% of waking hours.    Education:  yes    Are ADL's met?  Yes  Does patient feel safe in their environment?  Yes  Care decisions:  Patient and/or surrogate IS involved in care decisions.  Advanced directives:  Patient HAS advnaced directives and a copy has been requested.  Transportation:  Adequate transportation available for expected visits    Pain:  Pain Loc: Back (upper back, lower abdominal);Pain Score: 4   ;    ;     Glasses  Primary language:  English  Language line required?  no  Comprehension Ability:  good  Able to read?  yes  Able to write?  yes  Communication tools:  none  Patient's ability to learn:  good  Readiness to learn:  Motivated  Learning preferences:  Discussion and Handout  Barriers to learning:  None  Interventions to reduce barriers:  Consult, Face patient when speaking, Provide support/encouragement, Provide printed materials, and Patient to express feelings  Visual aids:  yes  Hearing disability:  no  Dentures:  no  Knowledge Deficit Plan Of Care:    Problem:  Knowledge Deficit    Problems related to:    Radiation therapy    Interventions:  Assess patient knowledge level  Assess knowledge needs  Instruct on treatment planning  Instruct on purpose of radiation therapy  Instruct on side effects of radiation therapy    Expected Outcomes:  Knowledge of care plan  Knowledge of radiation therapy  Knowledge of side effects of radiation therapy and management    Progress Toward Outcome:  Making progress    Pamphlets/Handouts Given to Patient:  Understanding radiation therapy  Radiation process overview        Patient here for RN consult. Patient accompanied by her son. Patient did not want to go through medications today-  did confirm pain medications she's taking. Consulted inpatient. VSS, patient in pain- back and lower abdomen. Taking morphine TID for breathing pain, and tramadol for back pain. Patient had imaging in March showing a growing mass. PET done on 4/8/25. Patient not candidate for biopsy. Admitted for acute respiratory failure. On 4L O2. Educated patient on radiation process. States her understanding. Consent signed. CT sim scheduled today. Patient provided with radiation RN number in case of additional questions.        [1]   Allergies  Allergen Reactions    Penicillin G ANAPHYLAXIS    Azithromycin DIARRHEA and NAUSEA AND VOMITING    Cefuroxime UNKNOWN     Other reaction(s): Vomitting    Levofloxacin UNKNOWN     Other reaction(s): LEVOFLOXACIN    Penicillins      Other reaction(s): Unknown    Seasonal ITCHING   [2]   Current Outpatient Medications   Medication Sig Dispense Refill    traMADol 50 MG Oral Tab Take 1 tablet (50 mg total) by mouth every 6 (six) hours as needed for Pain. 30 tablet 0    morphINE 10 MG/5ML Oral Solution Take 1.3 mL (2.6 mg total) by mouth every 4 (four) hours as needed (Shortness of breath/ take prior to activity that cased shortness of breath). Please add an adapt a cap top for ease of drawing up.  Please give pt 2 oral syringes 60 mL 0    acetaminophen 500 MG Oral Tab Take 2 tablets (1,000 mg total) by mouth every 6 (six) hours as needed for Pain or Fever.      phenazopyridine 100 MG Oral Tab Take 1 tablet (100 mg total) by mouth 3 (three) times daily as needed for Pain (dysuria). 30 tablet 0    nitrofurantoin monohydrate macro 100 MG Oral Cap Take 1 capsule (100 mg total) by mouth 2 (two) times daily.      benzonatate 200 MG Oral Cap Take 1 capsule (200 mg total) by mouth 3 (three) times daily as needed for cough. 30 capsule 0    acetaZOLAMIDE 250 MG Oral Tab Take 2 tablets (500 mg total) by mouth daily. 60 tablet 5    albuterol (2.5 MG/3ML) 0.083% Inhalation Nebu Soln Take 3 mL (2.5 mg  total) by nebulization every 6 (six) hours as needed for Wheezing.      dilTIAZem  MG Oral Capsule SR 24 Hr Take 1 capsule (360 mg total) by mouth daily. 30 capsule 11    furosemide 80 MG Oral Tab Take 1 tablet (80 mg total) by mouth BID (Diuretic). 60 tablet 0    fluticasone-umeclidin-vilant 200-62.5-25 MCG/ACT Inhalation Aerosol Powder, Breath Activated Inhale 1 puff into the lungs as needed (sob).      empagliflozin (JARDIANCE) 10 MG Oral Tab Take 1 tablet (10 mg total) by mouth daily. 30 tablet 3    DIGOXIN 0.125 MG Oral Tab Take 1 tablet (125 mcg total) by mouth daily. 30 tablet 0    albuterol 108 (90 Base) MCG/ACT Inhalation Aero Soln Inhale 2 puffs into the lungs as needed.      estradiol 0.05 MG/24HR Transdermal Patch Weekly Place 1 patch onto the skin once a week. As directed.      Cholecalciferol (VITAMIN D) 1000 UNITS Oral Tab Take 1,000 Units by mouth in the morning and 1,000 Units before bedtime.      Potassium Chloride ER (K-DUR M20) 20 MEQ Oral Tab CR Take 1 tablet (20 mEq total) by mouth nightly.      Loratadine 10 MG Oral Cap Take 10 mg by mouth nightly.      montelukast 10 MG Oral Tab Take 1 tablet (10 mg total) by mouth nightly.     [3]   Past Medical History:   A-fib ([facility])    Anesthesia complication    Arrhythmia    AFIB    Arthritis    Asthma ([facility])    Back problem    COPD (chronic obstructive pulmonary disease) ([facility])    Deviated nasal septum    nasal septoplasty, turb reduction, smr of turbs    Difficult intubation    fiber optic if needed    Diverticulitis    colonoscopy     Diverticulosis of large intestine    Esophageal reflux    Extrinsic asthma, unspecified    Headache    Heart disease    Hepatitis    WAS TOLD SHE HAS HAD THIS WHEN SHE WAS 19 YEARS OLD    High blood pressure    High cholesterol    Irregular heart rate    Lichenoid keratosis    left chest-bx    Osteoarthritis    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    Paronychia    (RT); onychomycosis; debridement     Problems with swallowing    occasionally    Shortness of breath    2 L NC ALL THE TIME 24HR/7 DAYS PER WEEK    Unspecified essential hypertension    Visual impairment   [4]   Past Surgical History:  Procedure Laterality Date    Adenoidectomy      Cataract      cataract extraction     Hysterectomy      Sinus surgery        DEVIATED SEPTUM    T&a      Tonsillectomy        Yes

## 2025-06-11 ENCOUNTER — LAB REQUISITION (OUTPATIENT)
Dept: LAB | Facility: HOSPITAL | Age: 85
End: 2025-06-11
Payer: MEDICARE

## 2025-06-11 ENCOUNTER — APPOINTMENT (OUTPATIENT)
Dept: RADIATION ONCOLOGY | Facility: HOSPITAL | Age: 85
End: 2025-06-11
Attending: INTERNAL MEDICINE
Payer: MEDICARE

## 2025-06-11 ENCOUNTER — TELEPHONE (OUTPATIENT)
Dept: RADIATION ONCOLOGY | Facility: HOSPITAL | Age: 85
End: 2025-06-11

## 2025-06-11 DIAGNOSIS — R30.0 DYSURIA: ICD-10-CM

## 2025-06-11 LAB
BILIRUB UR QL CFM: NEGATIVE
GLUCOSE UR-MCNC: >1000 MG/DL
HGB UR QL STRIP.AUTO: NEGATIVE
KETONES UR-MCNC: NEGATIVE MG/DL
LEUKOCYTE ESTERASE UR QL STRIP.AUTO: NEGATIVE
PH UR: 8 [PH] (ref 5–8)
SP GR UR STRIP: 1.02 (ref 1–1.03)
UROBILINOGEN UR STRIP-ACNC: 6

## 2025-06-11 PROCEDURE — 87186 SC STD MICRODIL/AGAR DIL: CPT | Performed by: INTERNAL MEDICINE

## 2025-06-11 PROCEDURE — 81001 URINALYSIS AUTO W/SCOPE: CPT | Performed by: INTERNAL MEDICINE

## 2025-06-11 PROCEDURE — 87077 CULTURE AEROBIC IDENTIFY: CPT | Performed by: INTERNAL MEDICINE

## 2025-06-11 PROCEDURE — 87086 URINE CULTURE/COLONY COUNT: CPT | Performed by: INTERNAL MEDICINE

## 2025-06-11 NOTE — TELEPHONE ENCOUNTER
Received call from pt wanting to cancel CT simulation today due to not feeling well, pt tearful over the phone and states she has been feeling very anxious. Reassured pt that it's okay for her appt to be cancelled today and to schedule when she feels ready to. Pt still wanting to reschedule for later this week. Therapists notified to call and reschedule pt. Pt stated her understanding.

## 2025-06-13 ENCOUNTER — TELEPHONE (OUTPATIENT)
Facility: CLINIC | Age: 85
End: 2025-06-13

## 2025-06-13 ENCOUNTER — TELEPHONE (OUTPATIENT)
Dept: PULMONOLOGY | Facility: CLINIC | Age: 85
End: 2025-06-13

## 2025-06-13 NOTE — TELEPHONE ENCOUNTER
Called patient's son Tyler - confirmed he wants to cancel patient's colonoscopy.    Please refer to telephone encounter dated 10/9/2023 for scheduling orders.    Telephone encounter closed.

## 2025-06-13 NOTE — TELEPHONE ENCOUNTER
Patients adeel Scott calling for patient that was seen while in the hospital 3 weeks ago. Please contact son at [phone],thanks.

## 2025-06-16 ENCOUNTER — TELEPHONE (OUTPATIENT)
Dept: PULMONOLOGY | Facility: CLINIC | Age: 85
End: 2025-06-16

## 2025-06-16 ENCOUNTER — TELEPHONE (OUTPATIENT)
Dept: RADIATION ONCOLOGY | Facility: HOSPITAL | Age: 85
End: 2025-06-16

## 2025-06-16 ENCOUNTER — APPOINTMENT (OUTPATIENT)
Dept: RADIATION ONCOLOGY | Facility: HOSPITAL | Age: 85
End: 2025-06-16
Attending: RADIOLOGY
Payer: MEDICARE

## 2025-06-16 NOTE — TELEPHONE ENCOUNTER
[facility] RN is requesting to speak with an RN.  She states that she saw patient today and hearing abnormal lung sounds.  She states that she feels that the patient needs to be seen by MD.  Please call

## 2025-06-16 NOTE — TELEPHONE ENCOUNTER
Patient discharged on 5/30 for COPD exacerbation    Dr Szymanski- when would you like to see the patient back in the office

## 2025-06-16 NOTE — TELEPHONE ENCOUNTER
Patient called to cancel her CT simulation. Spoke to patient, her son, and the home health nurse on speaker. Patient states she just isn't up for it today. When asked what the barrier is, she stated \"everything\". She has pain, anxiety, trouble breathing, and frequency/urgency with urination. Home health RN states she thinks patient has a UTI and will be paging and MD for antibiotics. RN asked if patient thinks an antianxiety medication would help and she thinks so but isn't sure. Home health RN will also ask PCP for antianxiety meds. RN notified Dr. Pawel Krueger of the above. Also notified the therapists. Will figure out a plan and reschedule patient. Tyler (son) aware to call once pills obtained/ when his mom feels more up to the scan. Also reminded him to bring in pacemaker card.    No

## 2025-06-16 NOTE — TELEPHONE ENCOUNTER
Consult while inpatient with Dr. Szymanski 5/28/25.  Per Margarita saw patient today, she has fine crackles both bases of lower lungs, & both upper lobes diminished. [facility] patient's oxygen saturation was 93% on 4 L of O2. [facility] patient was recently seen by Dr. Fowler ([facility]), taking Spironolactone, switching from another pulmonologist, and patient has COPD, heart failure, lung cancer (radiation), currently has UTI, & currently in palliative care. Requests earlier appointment with Dr. Szymanski & if MD has any recommendations.

## 2025-06-18 NOTE — TELEPHONE ENCOUNTER
Spoke with JIMI Avila ([facility]) and son Tyler (verbal release on file). Son reports patient's breathing is okay. Appointment scheduled for 7/10/25 at 2:15PM and added to wait list. Confirmed date, time, place, and parking. Informed son if patient symptoms worsen in the interim to be evaluated in the ED. Son verbalized understanding.

## 2025-06-24 ENCOUNTER — TELEPHONE (OUTPATIENT)
Dept: RADIATION ONCOLOGY | Facility: HOSPITAL | Age: 85
End: 2025-06-24

## 2025-06-24 ENCOUNTER — APPOINTMENT (OUTPATIENT)
Dept: RADIATION ONCOLOGY | Facility: HOSPITAL | Age: 85
End: 2025-06-24
Attending: RADIOLOGY
Payer: MEDICARE

## 2025-06-25 ENCOUNTER — SOCIAL WORK SERVICES (OUTPATIENT)
Age: 85
End: 2025-06-25

## 2025-06-25 NOTE — TELEPHONE ENCOUNTER
Received call from pt wanting to cancelling CT simulation for today as she is feeling unwell due to anxiety. Pt states \"I know I need to get this done, but I'm not feeling up for it.\" Son states pt has been taking her anxiety medications for 6 days BID, but has not been helping much. Advised pt that I will discuss with Dr. Pawel Krueger and have SW reach out to the pt to help guide her through this process. Pt is agreeable to plan.

## 2025-06-25 NOTE — PROGRESS NOTES
SW called and spoke with patient via phone call. Patient's son was also involved in the conversation over speaker phone.    Patient with a hx of COPD, recently diagnosed with lung cancer. 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 Patient lost her spouse 8 years ago and she has some anxiety surrounding starting cancer treatment. SW validated patient's feelings of feeling uncomfortable with moving forward. Patient acknowledged the need to come for treatment but she has not been able to bring herself to keep her appointment. Patient recently started on anxiety medication but it has not helped her to overcome her hesitations. DREW informed patient of [facility] counselor Jacquie Dalton who can provide counseling and coping strategies.SW provided emotional support and encouragement. SW will have RT contact patient to reschedule CT SIM. SW to continue following for psycho social support.

## 2025-06-30 RX ORDER — ESTRADIOL 0.05 MG/D
PATCH TRANSDERMAL
Qty: 12 EACH | Refills: 0 | Status: SHIPPED | OUTPATIENT
Start: 2025-06-30

## 2025-07-02 ENCOUNTER — APPOINTMENT (OUTPATIENT)
Dept: GENERAL RADIOLOGY | Facility: HOSPITAL | Age: 85
End: 2025-07-02
Attending: EMERGENCY MEDICINE
Payer: MEDICARE

## 2025-07-02 ENCOUNTER — HOSPITAL ENCOUNTER (INPATIENT)
Facility: HOSPITAL | Age: 85
LOS: 4 days | Discharge: HOME HEALTH | End: 2025-07-07
Attending: EMERGENCY MEDICINE | Admitting: INTERNAL MEDICINE
Payer: MEDICARE

## 2025-07-02 ENCOUNTER — HOSPITAL ENCOUNTER (INPATIENT)
Facility: HOSPITAL | Age: 85
LOS: 4 days | Discharge: HOME | End: 2025-07-07
Attending: EMERGENCY MEDICINE | Admitting: INTERNAL MEDICINE
Payer: MEDICARE

## 2025-07-02 DIAGNOSIS — J44.1 COPD EXACERBATION (HCC): Primary | ICD-10-CM

## 2025-07-02 PROBLEM — E87.3 METABOLIC ALKALOSIS: Status: ACTIVE | Noted: 2025-07-02

## 2025-07-02 PROBLEM — E87.6 HYPOKALEMIA: Status: ACTIVE | Noted: 2025-07-02

## 2025-07-02 PROBLEM — R73.9 HYPERGLYCEMIA: Status: ACTIVE | Noted: 2025-07-02

## 2025-07-02 PROBLEM — E87.29 RESPIRATORY ACIDOSIS: Status: ACTIVE | Noted: 2025-07-02

## 2025-07-02 LAB
BASE EXCESS BLD CALC-SCNC: 18.4 MMOL/L (ref ?–2)
BASOPHILS # BLD AUTO: 0.02 X10(3) UL (ref 0–0.2)
BASOPHILS NFR BLD AUTO: 0.2 %
BUN BLD-MCNC: 10 MG/DL (ref 9–23)
BUN/CREAT SERPL: 14.1 (ref 10–20)
CALCIUM BLD-MCNC: 9.1 MG/DL (ref 8.7–10.4)
CHLORIDE SERPL-SCNC: 93 MMOL/L (ref 98–112)
CO2 SERPL-SCNC: >40 MMOL/L (ref 21–32)
CREAT BLD-MCNC: 0.71 MG/DL (ref 0.55–1.02)
DEPRECATED RDW RBC AUTO: 63.1 FL (ref 35.1–46.3)
EGFRCR SERPLBLD CKD-EPI 2021: 85 ML/MIN/1.73M2 (ref 60–?)
EOSINOPHIL # BLD AUTO: 0.03 X10(3) UL (ref 0–0.7)
EOSINOPHIL NFR BLD AUTO: 0.3 %
ERYTHROCYTE [DISTWIDTH] IN BLOOD BY AUTOMATED COUNT: 17.7 % (ref 11–15)
GLUCOSE BLD-MCNC: 108 MG/DL (ref 70–99)
HCO3 BLDA-SCNC: 39 MEQ/L (ref 21–27)
HCT VFR BLD AUTO: 42.4 % (ref 35–48)
HGB BLD-MCNC: 12.6 G/DL (ref 12–16)
IMM GRANULOCYTES # BLD AUTO: 0.02 X10(3) UL (ref 0–1)
IMM GRANULOCYTES NFR BLD: 0.2 %
LYMPHOCYTES # BLD AUTO: 1.48 X10(3) UL (ref 1–4)
LYMPHOCYTES NFR BLD AUTO: 15.1 %
MCH RBC QN AUTO: 28.7 PG (ref 26–34)
MCHC RBC AUTO-ENTMCNC: 29.7 G/DL (ref 31–37)
MCV RBC AUTO: 96.6 FL (ref 80–100)
MODIFIED ALLEN TEST: POSITIVE
MONOCYTES # BLD AUTO: 0.83 X10(3) UL (ref 0.1–1)
MONOCYTES NFR BLD AUTO: 8.5 %
NEUTROPHILS # BLD AUTO: 7.43 X10 (3) UL (ref 1.5–7.7)
NEUTROPHILS # BLD AUTO: 7.43 X10(3) UL (ref 1.5–7.7)
NEUTROPHILS NFR BLD AUTO: 75.7 %
NT-PROBNP SERPL-MCNC: 277 PG/ML (ref ?–450)
O2 CT BLD-SCNC: 15.1 VOL% (ref 15–23)
OSMOLALITY SERPL CALC.SUM OF ELEC: 288 MOSM/KG (ref 275–295)
OXYGEN LITERS/MINUTE: 4 L/MIN
PCO2 BLDA: 64 MM HG (ref 35–45)
PH BLDA: 7.46 [PH] (ref 7.35–7.45)
PLATELET # BLD AUTO: 263 10(3)UL (ref 150–450)
PO2 BLDA: 83 MM HG (ref 80–100)
POTASSIUM SERPL-SCNC: 3.2 MMOL/L (ref 3.5–5.1)
PUNCTURE CHARGE: YES
RBC # BLD AUTO: 4.39 X10(6)UL (ref 3.8–5.3)
SAO2 % BLDA: 95.8 % (ref 94–100)
SODIUM SERPL-SCNC: 139 MMOL/L (ref 136–145)
TROPONIN I SERPL HS-MCNC: 18 NG/L (ref ?–34)
WBC # BLD AUTO: 9.8 X10(3) UL (ref 4–11)

## 2025-07-02 PROCEDURE — 71045 X-RAY EXAM CHEST 1 VIEW: CPT | Performed by: RADIOLOGY

## 2025-07-02 RX ORDER — ALBUTEROL SULFATE 5 MG/ML
10 SOLUTION RESPIRATORY (INHALATION) ONCE
Status: COMPLETED | OUTPATIENT
Start: 2025-07-02 | End: 2025-07-02

## 2025-07-02 RX ORDER — METHYLPREDNISOLONE SODIUM SUCCINATE 125 MG/2ML
60 INJECTION INTRAMUSCULAR; INTRAVENOUS ONCE
Status: COMPLETED | OUTPATIENT
Start: 2025-07-02 | End: 2025-07-02

## 2025-07-03 ENCOUNTER — APPOINTMENT (OUTPATIENT)
Dept: CT IMAGING | Facility: HOSPITAL | Age: 85
End: 2025-07-03
Attending: EMERGENCY MEDICINE
Payer: MEDICARE

## 2025-07-03 LAB
ANION GAP SERPL CALC-SCNC: 5 MMOL/L (ref 0–18)
ATRIAL RATE: 100 BPM
ATRIAL RATE: 115 BPM
BASOPHILS # BLD AUTO: 0 X10(3) UL (ref 0–0.2)
BASOPHILS NFR BLD AUTO: 0 %
BUN BLD-MCNC: 14 MG/DL (ref 9–23)
BUN/CREAT SERPL: 18.7 (ref 10–20)
CALCIUM BLD-MCNC: 8.6 MG/DL (ref 8.7–10.4)
CHLORIDE SERPL-SCNC: 95 MMOL/L (ref 98–112)
CO2 SERPL-SCNC: 38 MMOL/L (ref 21–32)
CREAT BLD-MCNC: 0.75 MG/DL (ref 0.55–1.02)
DEPRECATED RDW RBC AUTO: 60.5 FL (ref 35.1–46.3)
EGFRCR SERPLBLD CKD-EPI 2021: 79 ML/MIN/1.73M2 (ref 60–?)
EOSINOPHIL # BLD AUTO: 0 X10(3) UL (ref 0–0.7)
EOSINOPHIL NFR BLD AUTO: 0 %
ERYTHROCYTE [DISTWIDTH] IN BLOOD BY AUTOMATED COUNT: 17.7 % (ref 11–15)
GLUCOSE BLD-MCNC: 209 MG/DL (ref 70–99)
HCT VFR BLD AUTO: 35.7 % (ref 35–48)
HGB BLD-MCNC: 11.2 G/DL (ref 12–16)
IMM GRANULOCYTES # BLD AUTO: 0.03 X10(3) UL (ref 0–1)
IMM GRANULOCYTES NFR BLD: 0.5 %
LYMPHOCYTES # BLD AUTO: 0.43 X10(3) UL (ref 1–4)
LYMPHOCYTES NFR BLD AUTO: 6.8 %
MAGNESIUM SERPL-MCNC: 1.5 MG/DL (ref 1.6–2.6)
MCH RBC QN AUTO: 29.4 PG (ref 26–34)
MCHC RBC AUTO-ENTMCNC: 31.4 G/DL (ref 31–37)
MCV RBC AUTO: 93.7 FL (ref 80–100)
MONOCYTES # BLD AUTO: 0.04 X10(3) UL (ref 0.1–1)
MONOCYTES NFR BLD AUTO: 0.6 %
NEUTROPHILS # BLD AUTO: 5.86 X10 (3) UL (ref 1.5–7.7)
NEUTROPHILS # BLD AUTO: 5.86 X10(3) UL (ref 1.5–7.7)
NEUTROPHILS NFR BLD AUTO: 92.1 %
OSMOLALITY SERPL CALC.SUM OF ELEC: 293 MOSM/KG (ref 275–295)
P AXIS: 83 DEGREES
P-R INTERVAL: 208 MS
P-R INTERVAL: 224 MS
PLATELET # BLD AUTO: 233 10(3)UL (ref 150–450)
POTASSIUM SERPL-SCNC: 3.5 MMOL/L (ref 3.5–5.1)
Q-T INTERVAL: 318 MS
Q-T INTERVAL: 336 MS
QRS DURATION: 92 MS
QRS DURATION: 92 MS
QTC CALCULATION (BEZET): 410 MS
QTC CALCULATION (BEZET): 464 MS
R AXIS: -14 DEGREES
R AXIS: -9 DEGREES
RBC # BLD AUTO: 3.81 X10(6)UL (ref 3.8–5.3)
SODIUM SERPL-SCNC: 138 MMOL/L (ref 136–145)
T AXIS: -54 DEGREES
T AXIS: 38 DEGREES
TROPONIN I SERPL HS-MCNC: 23 NG/L (ref ?–34)
VENTRICULAR RATE: 100 BPM
VENTRICULAR RATE: 115 BPM
WBC # BLD AUTO: 6.4 X10(3) UL (ref 4–11)

## 2025-07-03 PROCEDURE — 71275 CT ANGIOGRAPHY CHEST: CPT | Performed by: RADIOLOGY

## 2025-07-03 PROCEDURE — 74177 CT ABD & PELVIS W/CONTRAST: CPT | Performed by: RADIOLOGY

## 2025-07-03 PROCEDURE — 99223 1ST HOSP IP/OBS HIGH 75: CPT | Performed by: INTERNAL MEDICINE

## 2025-07-03 PROCEDURE — 99223 1ST HOSP IP/OBS HIGH 75: CPT | Performed by: HOSPITALIST

## 2025-07-03 RX ORDER — ALBUTEROL SULFATE 0.83 MG/ML
2.5 SOLUTION RESPIRATORY (INHALATION) EVERY 6 HOURS PRN
Status: DISCONTINUED | OUTPATIENT
Start: 2025-07-03 | End: 2025-07-07

## 2025-07-03 RX ORDER — MORPHINE SULFATE 4 MG/ML
4 INJECTION, SOLUTION INTRAMUSCULAR; INTRAVENOUS EVERY 2 HOUR PRN
Status: DISCONTINUED | OUTPATIENT
Start: 2025-07-03 | End: 2025-07-07

## 2025-07-03 RX ORDER — FUROSEMIDE 40 MG/1
80 TABLET ORAL
Status: DISCONTINUED | OUTPATIENT
Start: 2025-07-03 | End: 2025-07-07

## 2025-07-03 RX ORDER — ONDANSETRON 2 MG/ML
4 INJECTION INTRAMUSCULAR; INTRAVENOUS EVERY 6 HOURS PRN
Status: DISCONTINUED | OUTPATIENT
Start: 2025-07-03 | End: 2025-07-07

## 2025-07-03 RX ORDER — HEPARIN SODIUM 5000 [USP'U]/ML
5000 INJECTION, SOLUTION INTRAVENOUS; SUBCUTANEOUS 2 TIMES DAILY
Status: DISCONTINUED | OUTPATIENT
Start: 2025-07-03 | End: 2025-07-07

## 2025-07-03 RX ORDER — DIGOXIN 125 MCG
125 TABLET ORAL DAILY
Status: DISCONTINUED | OUTPATIENT
Start: 2025-07-03 | End: 2025-07-07

## 2025-07-03 RX ORDER — IPRATROPIUM BROMIDE AND ALBUTEROL SULFATE 2.5; .5 MG/3ML; MG/3ML
3 SOLUTION RESPIRATORY (INHALATION)
Status: DISCONTINUED | OUTPATIENT
Start: 2025-07-03 | End: 2025-07-03

## 2025-07-03 RX ORDER — MORPHINE SULFATE 2 MG/ML
2 INJECTION, SOLUTION INTRAMUSCULAR; INTRAVENOUS EVERY 2 HOUR PRN
Status: DISCONTINUED | OUTPATIENT
Start: 2025-07-03 | End: 2025-07-07

## 2025-07-03 RX ORDER — PANTOPRAZOLE SODIUM 40 MG/1
40 TABLET, DELAYED RELEASE ORAL
Status: DISCONTINUED | OUTPATIENT
Start: 2025-07-03 | End: 2025-07-07

## 2025-07-03 RX ORDER — ACETAZOLAMIDE 250 MG/1
500 TABLET ORAL DAILY
Status: DISCONTINUED | OUTPATIENT
Start: 2025-07-03 | End: 2025-07-07

## 2025-07-03 RX ORDER — ZOLPIDEM TARTRATE 5 MG/1
5 TABLET ORAL NIGHTLY PRN
Status: DISCONTINUED | OUTPATIENT
Start: 2025-07-03 | End: 2025-07-07

## 2025-07-03 RX ORDER — DILTIAZEM HYDROCHLORIDE 120 MG/1
360 CAPSULE, EXTENDED RELEASE ORAL DAILY
Status: DISCONTINUED | OUTPATIENT
Start: 2025-07-03 | End: 2025-07-07

## 2025-07-03 RX ORDER — FLUTICASONE PROPIONATE AND SALMETEROL 500; 50 UG/1; UG/1
1 POWDER RESPIRATORY (INHALATION) 2 TIMES DAILY
Status: DISCONTINUED | OUTPATIENT
Start: 2025-07-03 | End: 2025-07-07

## 2025-07-03 RX ORDER — IPRATROPIUM BROMIDE AND ALBUTEROL SULFATE 2.5; .5 MG/3ML; MG/3ML
3 SOLUTION RESPIRATORY (INHALATION)
Status: DISCONTINUED | OUTPATIENT
Start: 2025-07-03 | End: 2025-07-04

## 2025-07-03 RX ORDER — TRAMADOL HYDROCHLORIDE 50 MG/1
50 TABLET ORAL EVERY 6 HOURS PRN
Status: DISCONTINUED | OUTPATIENT
Start: 2025-07-03 | End: 2025-07-07

## 2025-07-03 RX ORDER — MAGNESIUM HYDROXIDE/ALUMINUM HYDROXICE/SIMETHICONE 120; 1200; 1200 MG/30ML; MG/30ML; MG/30ML
30 SUSPENSION ORAL 4 TIMES DAILY PRN
Status: DISCONTINUED | OUTPATIENT
Start: 2025-07-03 | End: 2025-07-07

## 2025-07-03 RX ORDER — ACETAMINOPHEN 500 MG
1000 TABLET ORAL EVERY 6 HOURS PRN
Status: DISCONTINUED | OUTPATIENT
Start: 2025-07-03 | End: 2025-07-07

## 2025-07-03 RX ORDER — METHYLPREDNISOLONE SODIUM SUCCINATE 40 MG/ML
40 INJECTION INTRAMUSCULAR; INTRAVENOUS EVERY 8 HOURS
Status: DISCONTINUED | OUTPATIENT
Start: 2025-07-03 | End: 2025-07-04

## 2025-07-03 RX ORDER — POTASSIUM CHLORIDE 1500 MG/1
40 TABLET, EXTENDED RELEASE ORAL ONCE
Status: COMPLETED | OUTPATIENT
Start: 2025-07-03 | End: 2025-07-03

## 2025-07-03 RX ORDER — CODEINE PHOSPHATE AND GUAIFENESIN 10; 100 MG/5ML; MG/5ML
5 SOLUTION ORAL EVERY 4 HOURS PRN
Status: DISCONTINUED | OUTPATIENT
Start: 2025-07-03 | End: 2025-07-07

## 2025-07-03 RX ORDER — MAGNESIUM OXIDE 400 MG/1
800 TABLET ORAL ONCE
Status: COMPLETED | OUTPATIENT
Start: 2025-07-03 | End: 2025-07-03

## 2025-07-03 NOTE — DIETARY NOTE
ADULT NUTRITION INITIAL ASSESSMENT    Pt is at high nutrition risk.  Pt meets severe malnutrition criteria.      RECOMMENDATIONS TO MD: See nutrition intervention for ONS (oral nutrition supplements)    ADMITTING DIAGNOSIS:  COPD exacerbation (HCC) [J44.1]  PERTINENT PAST MEDICAL HISTORY: Past Medical History[1]    PATIENT STATUS: Initial 07/03/25: Pt assessed due to screening at risk for weight loss d/t poor appetite. 85 y/o female with PMH CHF, COPD presents with increasing SOB with minimal exertion. Pt visited, reported weight loss d/t poor appetite recently. Per chart review, pt with weight loss since November 2024. Pt weighed ~160 lbs in November, ~150 lbs in January, and currently weighs 134 lbs. Pt only eating 1-2 small meals per day PTA. Pt stated she gets too tired to make meals for herself. Discussed trying meal service company that will bring ready-to-eat meals to her house to help increase intake. Pt agreeable to try and stated she will talk more about it with her son. Good appetite since admit, had 100% of breakfast this AM- omelette, kraft, potatoes, and fruit. Enjoys the food here. Offered to add ONS to order to help meet nutritional needs and prevent further weight loss. Pt agreeable to try. Will order for her BID. Answered all questions at this time. Will follow per protocol.    FOOD/NUTRITION RELATED HISTORY:  Appetite: Fair  Intake: ~100% x1 meals documented since admit  Intake Meeting Needs: Marginal, ONS in place to maximize  Percent Meals Eaten (last 6 days)       Date/Time Percent Meals Eaten (%)    07/03/25 1021 100 %           Food Allergies: No Known Food Allergies (NKFA)  Cultural/Ethnic/Sikh Preferences: None    GASTROINTESTINAL: Loss of appetite    MEDICATIONS: reviewed  Scheduled Medications[2]  LABS: reviewed  Recent Labs     07/02/25  1946 07/03/25  0006 07/03/25  0721   *  --  209*   BUN 10  --  14   CREATSERUM 0.71  --  0.75   CA 9.1  --  8.6*   MG  --  1.5*  --    NA  139  --  138   K 3.2*  --  3.5   CL 93*  --  95*   CO2 >40.0*  --  38.0*   OSMOCALC 288  --  293       WEIGHT HISTORY:  Patient Weight(s) for the past 336 hrs:   Weight   07/03/25 0221 56.6 kg (124 lb 12.8 oz)   07/02/25 1916 60.8 kg (134 lb)     Wt Readings from Last 10 Encounters:   07/03/25 56.6 kg (124 lb 12.8 oz)   06/07/25 60 kg (132 lb 4.4 oz)   05/30/25 60.1 kg (132 lb 6.4 oz)   05/21/25 67.6 kg (149 lb)   03/20/25 68.4 kg (150 lb 12.7 oz)   03/06/25 65.9 kg (145 lb 4.8 oz)   02/20/25 66.2 kg (145 lb 14.4 oz)   02/05/25 65.3 kg (143 lb 14.4 oz)   01/18/25 70.8 kg (156 lb)   01/14/25 70.8 kg (156 lb)       ANTHROPOMETRICS:  HT:  172.7 cm (5' 8\")  Wt Readings from Last 1 Encounters:   07/03/25 56.6 kg (124 lb 12.8 oz)     Last weight: Likely accurate  BMI: Body mass index is 18.98 kg/m².  Dosing Weight: 60.8 kg - taken on 7/2, utilized for anthropometric calculations  BMI CLASSIFICATION: <22 considered underweight for advanced age (>65)  No data recorded           96% IBW  Usual Body Wt: Noted pt gradually losing weight since ~November 2024 where she weighed ~165 lbs       81% UBW    NUTRITION RELATED PHYSICAL FINDINGS:  - Nutrition Focused Physical Exam (NFPE): moderate depletion body fat triceps region and moderate depletion muscle mass temple region and clavicle region  - Fluid Accumulation: non-pitting Left Upper extremity and Lower extremity. See RN documentation for details  - Skin Integrity: intact. See RN documentation for details    CRITERIA FOR MALNUTRITION DIAGNOSIS:  Criteria for severe malnutrition diagnosis: chronic illness related to wt loss greater than 7.5% in 3 months, wt loss greater than 10% in 6 months, energy intake less than 75% for greater than 1 month, and muscle mass moderate depletion, body fat moderate depletion.    NUTRITION DIAGNOSIS/PROBLEM:   Severe Malnutrition related to Chronic - Physiological causes resulting in anorexia or diminished intake  as evidenced by wt loss greater  than 7.5% in 3 months, wt loss greater than 10% in 6 months, energy intake less than 75% for greater than 1 month, and muscle mass moderate depletion, body fat moderate depletion.    NUTRITION INTERVENTION:     NUTRITION PRESCRIPTION:   Estimated Nutrition needs: --dosing wt of 60.8 kg - wt taken on 7/2  Calories: 1520 - 1820 calories/day (25-30 calories per kg Dosing wt)  Protein: 49 - 79 g protein/day (0.8-1.3 g protein/kg Dosing wt)  Fluid Needs: 1 mL/kcal    - Diet:       Procedures    Regular/General diet Is Patient on Accuchecks? No      - Nutrition Care Plan: Encouraged increased PO intake, Encouraged small frequent meals with emphasis on high calorie/high protein, and Initiated ONS (oral nutritional supplements)  - ONS (Oral Nutrition Supplements)/Meals/Snacks: Glucerna (220 calories/ 10 g protein each) BID Chocolate   - Vitamin and mineral supplements: none  - Feeding assistance: independent  - Nutrition education: Discussed importance of adequate energy and protein intake    - Coordination of nutrition care: collaboration with other providers  - Discharge and transfer of nutrition care to new setting or provider: monitor plans    MONITOR AND EVALUATE/NUTRITION GOALS:  - Food and Nutrient Intake:      Monitor: adequacy of PO intake and adequacy of supplement intake  - Food and Nutrient Administration:      Monitor: N/A  - Anthropometric Measurement:    Monitor weight  - Nutrition Goals:      halt wt loss, gradual wt gain as able, PO and supplement greater than 75% of needs, intake to meet needs, and support body systems    DIETITIAN FOLLOW UP: RD to follow and monitor nutrition status      Yaneli Rogers, MS, RD, LDN  Clinical Dietitian  [id number]           [1]   Past Medical History:   A-fib (HCC)    Anesthesia complication    Arrhythmia    AFIB    Arthritis    Asthma (HCC)    Back problem    COPD (chronic obstructive pulmonary disease) (HCC)    Deviated nasal septum    nasal septoplasty, turb reduction,  smr of turbs    Difficult intubation    fiber optic if needed    Diverticulitis    colonoscopy     Diverticulosis of large intestine    Esophageal reflux    Extrinsic asthma, unspecified    Headache    Heart disease    Hepatitis    WAS TOLD SHE HAS HAD THIS WHEN SHE WAS 19 YEARS OLD    High blood pressure    High cholesterol    Irregular heart rate    Lichenoid keratosis    left chest-bx    Osteoarthritis    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    Paronychia    (RT); onychomycosis; debridement    Problems with swallowing    occasionally    Shortness of breath    2 L NC ALL THE TIME 24HR/7 DAYS PER WEEK    Unspecified essential hypertension    Visual impairment   [2]    acetaZOLAMIDE  500 mg Oral Daily    digoxin  125 mcg Oral Daily    dilTIAZem ER  360 mg Oral Daily    fluticasone-salmeterol  1 puff Inhalation BID    umeclidinium bromide  1 puff Inhalation Daily    furosemide  80 mg Oral BID (Diuretic)    heparin  5,000 Units Subcutaneous BID    methylPREDNISolone  40 mg Intravenous Q8H    pantoprazole  40 mg Oral QAM AC    ipratropium-albuterol  3 mL Nebulization QID

## 2025-07-03 NOTE — ED QUICK NOTES
Orders for admission, patient is aware of plan and ready to go upstairs. Any questions, please call ED RN Shelly at extension [phone] .     Patient Covid vaccination status: Fully vaccinated     COVID Test Ordered in ED: None    COVID Suspicion at Admission: N/A    Running Infusions: Medication Infusions[1]     Mental Status/LOC at time of transport: A/Ox4    Other pertinent information:   CIWA score: N/A   NIH score:  N/A             [1]    ipratropium

## 2025-07-03 NOTE — H&P
[facility]  part of [facility]    History & Physical    Yesenia Berkowitz Patient Status:  Emergency    1940 MRN [id number]   Location [facility] EMERGENCY DEPARTMENT Attending Jonah Herr MD   Hosp Day # 0 PCP JO-ANN YANG MD     Date:  7/3/2025  Date of Admission:  2025    Chief Complaint:  Chief Complaint   Patient presents with    Difficulty Breathing       History of Present Illness:  Yesenia Berkowitz is a(n) 84 year old female, with a past medical history significant for chronic respiratory failure on 4 L nasal cannula at home secondary to COPD along with CHF, atrial fibrillation, GERD osteoporosis and DJD of the spine presents with a complaint of increasing shortness of breath with minimal exertion associated with occasional dry cough and now back pain as well.  Describes her current shortness of breath as typical of a COPD exacerbation, denies any obvious instigating factors, no upper respiratory symptoms no sore throat no fever or chills no exposure to recent allergens or tobacco    History:  Past Medical History[1]  Past Surgical History[2]  Family History[3]   reports that she quit smoking about 29 years ago. Her smoking use included cigarettes. She has never used smokeless tobacco. She reports that she does not currently use alcohol. She reports that she does not use drugs.    Allergies:  Allergies[4]    Home Medications:  Prior to Admission Medications   Prescriptions Last Dose Informant Patient Reported? Taking?   Cholecalciferol (VITAMIN D) 1000 UNITS Oral Tab   Yes No   Sig: Take 1,000 Units by mouth in the morning and 1,000 Units before bedtime.   DIGOXIN 0.125 MG Oral Tab   No No   Sig: Take 1 tablet (125 mcg total) by mouth daily.   Loratadine 10 MG Oral Cap   Yes No   Sig: Take 10 mg by mouth nightly.   Magnesium Citrate Oral Solution   No No   Sig: Take 296 mL by mouth once for 1 dose.   Potassium Chloride ER (K-DUR M20) 20 MEQ Oral Tab CR   Yes No   Sig:  Take 1 tablet (20 mEq total) by mouth nightly.   acetaZOLAMIDE 250 MG Oral Tab   No No   Sig: Take 2 tablets (500 mg total) by mouth daily.   acetaminophen 500 MG Oral Tab   Yes No   Sig: Take 2 tablets (1,000 mg total) by mouth every 6 (six) hours as needed for Pain or Fever.   albuterol (2.5 MG/3ML) 0.083% Inhalation Nebu Soln   Yes No   Sig: Take 3 mL (2.5 mg total) by nebulization every 6 (six) hours as needed for Wheezing.   albuterol 108 (90 Base) MCG/ACT Inhalation Aero Soln   Yes No   Sig: Inhale 2 puffs into the lungs as needed.   benzonatate 200 MG Oral Cap   No No   Sig: Take 1 capsule (200 mg total) by mouth 3 (three) times daily as needed for cough.   dilTIAZem  MG Oral Capsule SR 24 Hr   No No   Sig: Take 1 capsule (360 mg total) by mouth daily.   empagliflozin (JARDIANCE) 10 MG Oral Tab   No No   Sig: Take 1 tablet (10 mg total) by mouth daily.   estradiol 0.05 MG/24HR Transdermal Patch Weekly   Yes No   Sig: Place 1 patch onto the skin once a week. As directed.   fluticasone-umeclidin-vilant 200-62.5-25 MCG/ACT Inhalation Aerosol Powder, Breath Activated   Yes No   Sig: Inhale 1 puff into the lungs as needed (sob).   furosemide 80 MG Oral Tab   No No   Sig: Take 1 tablet (80 mg total) by mouth BID (Diuretic).   montelukast 10 MG Oral Tab   Yes No   Sig: Take 1 tablet (10 mg total) by mouth nightly.   morphINE 10 MG/5ML Oral Solution   No No   Sig: Take 1.3 mL (2.6 mg total) by mouth every 4 (four) hours as needed (Shortness of breath/ take prior to activity that cased shortness of breath). Please add an adapt a cap top for ease of drawing up.  Please give pt 2 oral syringes   nitrofurantoin monohydrate macro 100 MG Oral Cap   Yes No   Sig: Take 1 capsule (100 mg total) by mouth 2 (two) times daily.   nystatin 404493 UNIT/ML Mouth/Throat Suspension   No No   Sig: Take 5 mL (500,000 Units total) by mouth 4 (four) times daily for 7 days.   phenazopyridine 100 MG Oral Tab   No No   Sig: Take 1  tablet (100 mg total) by mouth 3 (three) times daily as needed for Pain (dysuria).   predniSONE 20 MG Oral Tab   No No   Sig: Take 2 tablets (40 mg total) by mouth daily for 2 days, THEN 1.5 tablets (30 mg total) daily for 2 days, THEN 1 tablet (20 mg total) daily for 2 days, THEN 0.5 tablets (10 mg total) daily for 2 days.   traMADol 50 MG Oral Tab   No No   Sig: Take 1 tablet (50 mg total) by mouth every 6 (six) hours as needed for Pain.      Facility-Administered Medications: None       Review of Systems:  Constitutional:  Weakness, Fatigue.  Eye:  Negative.  Ear/Nose/Mouth/Throat:  Negative.  Respiratory: Shortness of breath and cough  Cardiovascular: Negative  Gastrointestinal:  Negative.  Genitourinary:  Negative  Endocrine:  Negative.  Immunologic:  Negative.  Musculoskeletal: Mid back pain  Integumentary:  Negative.  Neurologic:  Negative.  Psychiatric:  Negative.  ROS reviewed as documented in chart    Physical Exam:  Temp:  [98.3 °F (36.8 °C)] 98.3 °F (36.8 °C)  Pulse:  [] 70  Resp:  [14-28] 28  BP: ()/(45-69) 113/45  SpO2:  [91 %-93 %] 93 %    General:  Alert and oriented.  Diffuse skin problem:  None.  Eye:  Pupils are equal, round and reactive to light, extraocular movements are intact, Normal conjunctiva.  HENT:  Normocephalic, oral mucosa is moist.  Head:  Normocephalic, atraumatic.  Neck:  Supple, non-tender, no carotid bruit, no jugular venous distention, no lymphadenopathy, no thyromegaly.  Respiratory:  Lungs are clear to auscultation, respirations are non-labored, breath sounds are equal, symmetrical chest wall expansion.  Cardiovascular:  Normal rate, regular rhythm, no murmur, no edema.  Gastrointestinal:  Soft, non-tender, non-distended, normal bowel sounds, no organomegaly.  Lymphatics:  No lymphadenopathy neck, axilla, groin.  Musculoskeletal: Normal range of motion.  normal strength.  Feet:  Normal pulses.  Neurologic:  Alert, oriented, no focal deficits, cranial nerves II-XII  are grossly intact.  Cognition and Speech:  Oriented, speech clear and coherent.  Psychiatric:  Cooperative, appropriate mood & affect.      Laboratory Data:   Lab Results   Component Value Date    WBC 9.8 07/02/2025    HGB 12.6 07/02/2025    HCT 42.4 07/02/2025    .0 07/02/2025    CREATSERUM 0.71 07/02/2025    BUN 10 07/02/2025     07/02/2025    K 3.2 07/02/2025    CL 93 07/02/2025    CO2 >40.0 07/02/2025     07/02/2025    CA 9.1 07/02/2025    MG 1.5 07/03/2025       Imaging:  XR CHEST AP PORTABLE  (CPT=71045)  Result Date: 7/2/2025  CONCLUSION: Small pleural effusions. Bibasilar pulmonary opacities likely atelectasis however correlate for symptoms of pneumonia. Electronically Verified and Signed by Attending Radiologist: Grey Artis MD 7/2/2025 8:33 PM Workstation: [employer]       Assessment and Plan:    Acute on chronic respiratory failure with hypoxia  Mild worsening of respiratory status, will increase O2 for now wean as tolerated.  Pulmonary has been consulted.    COPD exacerbation  Likely cause for acute worsening of chronic respiratory failure, patient started on DuoNebs every 4 hours and as needed along with Solu-Medrol 40 mg IV every 8 hours.  Resume home inhalers.  Continue to monitor closely.    Chronic diastolic heart failure  No signs of acute exacerbation resume home diuretics, monitor I's and O's and daily weights.    Compensated respiratory acidosis  Will continue to monitor closely, currently on Diamox will continue to.    Hypokalemia  Initiate electrolyte replacement protocol.    Prophylaxis  Subcutaneous heparin    CODE STATUS  Full    Primary care physician  JO-ANN YANG MD    MDM: High, acute illness/severe exacerbation of chronic illness posing threat to life.  IV medications requiring close inpatient monitoring  60 minutes spent on this admission - examining patient, obtaining history, reviewing previous medical records, going over test results/imaging and discussing  plan of care. All questions answered.     Disposition  Clinical course will dictate outcome      ALBERTA MCMULLEN MD  7/3/2025  1:34 AM         [1]   Past Medical History:   A-fib (HCC)    Anesthesia complication    Arrhythmia    AFIB    Arthritis    Asthma (HCC)    Back problem    COPD (chronic obstructive pulmonary disease) (HCC)    Deviated nasal septum    nasal septoplasty, turb reduction, smr of turbs    Difficult intubation    fiber optic if needed    Diverticulitis    colonoscopy     Diverticulosis of large intestine    Esophageal reflux    Extrinsic asthma, unspecified    Headache    Heart disease    Hepatitis    WAS TOLD SHE HAS HAD THIS WHEN SHE WAS 19 YEARS OLD    High blood pressure    High cholesterol    Irregular heart rate    Lichenoid keratosis    left chest-bx    Osteoarthritis    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    Paronychia    (RT); onychomycosis; debridement    Problems with swallowing    occasionally    Shortness of breath    2 L NC ALL THE TIME 24HR/7 DAYS PER WEEK    Unspecified essential hypertension    Visual impairment   [2]   Past Surgical History:  Procedure Laterality Date    Adenoidectomy      Cataract      cataract extraction     Hysterectomy      Sinus surgery        DEVIATED SEPTUM    T&a      Tonsillectomy     [3]   Family History  Problem Relation Age of Onset    Ovarian Cancer Mother 76        endometrial, poss ovarian primary    Pulmonary Disease Sister         COPD    Skin cancer Other     Stroke Other     Other (Other) Other     Cancer Maternal Aunt     Cancer Maternal Aunt    [4]   Allergies  Allergen Reactions    Penicillin G ANAPHYLAXIS    Azithromycin DIARRHEA and NAUSEA AND VOMITING    Cefuroxime UNKNOWN     Other reaction(s): Vomitting    Levofloxacin UNKNOWN     Other reaction(s): LEVOFLOXACIN    Penicillins      Other reaction(s): Unknown    Seasonal ITCHING

## 2025-07-03 NOTE — PLAN OF CARE
Problem: CARDIOVASCULAR - ADULT  Goal: Maintains optimal cardiac output and hemodynamic stability  Description: INTERVENTIONS:  - Monitor vital signs, rhythm, and trends  - Monitor for bleeding, hypotension and signs of decreased cardiac output  - Evaluate effectiveness of vasoactive medications to optimize hemodynamic stability  - Monitor arterial and/or venous puncture sites for bleeding and/or hematoma  - Assess quality of pulses, skin color and temperature  - Assess for signs of decreased coronary artery perfusion - ex. Angina  - Evaluate fluid balance, assess for edema, trend weights  Outcome: Progressing  Goal: Absence of cardiac arrhythmias or at baseline  Description: INTERVENTIONS:  - Continuous cardiac monitoring, monitor vital signs, obtain 12 lead EKG if indicated  - Evaluate effectiveness of antiarrhythmic and heart rate control medications as ordered  - Initiate emergency measures for life threatening arrhythmias  - Monitor electrolytes and administer replacement therapy as ordered  Outcome: Progressing     Problem: RESPIRATORY - ADULT  Goal: Achieves optimal ventilation and oxygenation  Description: INTERVENTIONS:  - Assess for changes in respiratory status  - Assess for changes in mentation and behavior  - Position to facilitate oxygenation and minimize respiratory effort  - Oxygen supplementation based on oxygen saturation or ABGs  - Provide Smoking Cessation handout, if applicable  - Encourage broncho-pulmonary hygiene including cough, deep breathe, Incentive Spirometry  - Assess the need for suctioning and perform as needed  - Assess and instruct to report SOB or any respiratory difficulty  - Respiratory Therapy support as indicated  - Manage/alleviate anxiety  - Monitor for signs/symptoms of CO2 retention  Outcome: Progressing

## 2025-07-03 NOTE — CM/SW NOTE
07/03/25 1200   CM/SW Referral Data   Referral Source Social Work (self-referral)   Reason for Referral Discharge planning   Informant Patient   Medical Hx   Does patient have an established PCP? Yes   Significant Past Medical/Mental Health Hx COPD, CHF, Afib, GERD, Asthma, Arthritis   Patient Info   Patient's Current Mental Status at Time of Assessment Alert;Oriented   Patient's Home Environment House   Number of Levels in Home 2   Number of Stair in Home 10   Patient lives with Alone   Patient Status Prior to Admission   Independent with ADLs and Mobility No   Pt. requires assistance with Housework;Driving;Meals;Bathing;Medications;Toileting   Services in place prior to admission DME/Supplies at home   DME Provider/Supplier [employer]   Type of DME/Supplies Wheeled Walker;Home Oxygen   Discharge Needs   Anticipated D/C needs To be determined     Social work was able to meet with the patient at bedside to discuss discharge planning.    The patient lives home alone but her son comes over to help her.  The patient requires assistance with some ADLs at baseline.  The patient owns a cane and has home O2 with [employer].    Social work called [email or web address] for homemaker services and meals on wheels per patient request.     The patient has no questions or concerns at this time.    SW/CM to remain available for support and/or discharge planning.     Isidra WHEELER, LSW  Discharge Planner [id number]

## 2025-07-03 NOTE — OCCUPATIONAL THERAPY NOTE
OCCUPATIONAL THERAPY EVALUATION - INPATIENT     Room Number: 338/338-A  Evaluation Date: 7/3/2025  Type of Evaluation: Initial   Presenting problem: COPD exacerbation   Co-morbidities: chronic respiratory failure on 4 L nasal cannula at home secondary to COPD along with CHF, atrial fibrillation, GERD osteoporosis and DJD of the spine    Physician Order: IP Consult to Occupational Therapy  Reason for Therapy: ADL/IADL Dysfunction and Discharge Planning    OCCUPATIONAL THERAPY ASSESSMENT   Patient is a 84 year old female admitted 7/2/2025 for COPD exacerbation.  Prior to admission, patient's baseline is independent with most ADLs, mod I for mobility using RW, and SUP for bathing.  Patient is currently functioning near baseline with toileting, lower body dressing, grooming, bed mobility, transfers, and functional standing tolerance.  Patient is requiring stand-by assist and contact guard assist as a result of the following impairments: decreased functional strength, decreased endurance, decreased muscular endurance, and medical status. Occupational Therapy will continue to follow for duration of hospitalization.    Patient will benefit from continued skilled OT Services at discharge to promote prior level of function and safety with additional support and return home with home health OT.    PLAN DURING HOSPITALIZATION  OT Device Recommendations: None  OT Treatment Plan: Balance activities, Energy conservation/work simplification techniques, ADL training, Functional transfer training, Endurance training, Patient/Family training, Patient/Family education, Compensatory technique education     OCCUPATIONAL THERAPY MEDICAL/SOCIAL HISTORY   Problem List  Principal Problem:    COPD exacerbation (HCC)  Active Problems:    Hypokalemia    Metabolic alkalosis    Hyperglycemia    Respiratory acidosis    HOME SITUATION  Type of Home: House  Home Layout: One level  Lives With: Alone (supportive son in area)  Toilet and Equipment:  Standard height toilet  Shower/Tub and Equipment: Tub-shower combo; Tub transfer bench; Grab bar  Patient Regularly Uses: Rolling walker; Home O2    Prior Level of [place]: Prior to admission pt was independent with most ADLs and mod I for mobility using RW. Pt has a part-time CG who comes in once a week to provide SUP with bathing. Pt has a son who lives with her part-time and assists with IADL tasks (cooking, cleaning, laundry).    SUBJECTIVE  \"I was cooking for myself up until about a month ago.\"     OCCUPATIONAL THERAPY EXAMINATION      OBJECTIVE  Precautions: None  Fall Risk: Standard fall risk      PAIN ASSESSMENT  Ratin      ACTIVITY TOLERANCE  Pulse: 86                      O2 SATURATIONS  Oxygen Therapy  SPO2% on Oxygen at Rest: 93  At rest oxygen flow (liters per minute): 4  SPO2% Ambulation on Oxygen: 88 (recovered to 90% within ~20 seconds with seated rest break)  Ambulation oxygen flow (liters per minute): 4    COGNITION  Overall Cognitive Status:  WFL - within functional limits    RANGE OF MOTION   Upper extremity ROM is within functional limits     STRENGTH ASSESSMENT  Upper extremity strength is within functional limits     COORDINATION  Gross Motor: WFL   Fine Motor: WFL     ACTIVITIES OF DAILY LIVING ASSESSMENT  AM-PAC ‘6-Clicks’ Inpatient Daily Activity Short Form  How much help from another person does the patient currently need…  -   Putting on and taking off regular lower body clothing?: A Little  -   Bathing (including washing, rinsing, drying)?: A Little  -   Toileting, which includes using toilet, bedpan or urinal? : A Little  -   Putting on and taking off regular upper body clothing?: None  -   Taking care of personal grooming such as brushing teeth?: A Little  -   Eating meals?: None    AM-PAC Score:  Score: 20  Approx Degree of Impairment: 38.32%  Standardized Score (AM-PAC Scale): 42.03  CMS Modifier (G-Code): CJ    FUNCTIONAL TRANSFER ASSESSMENT  Toilet Transfer: Stand-by  Assist    FUNCTIONAL MOBILITY  Pt required CGA-SBA with RW support to complete bathroom distance mobility to simulate household distances.     BED MOBILITY  Sit to Supine (OT): Stand-by Assist    BALANCE ASSESSMENT  Static Sitting: Independent  Static Standing: Stand-by Assist  Dynamic sitting: SBA  Dynamic Standing: CGA-SBA     FUNCTIONAL ADL ASSESSMENT  Grooming Standing: Stand-by Assist  LB Dressing Standing: Stand-by Assist  Toileting Seated: Independent    Skilled Therapy Provided:   RN cleared pt for participation. Session coordinated with PT. Pt received in bathroom completing toileting. Pt agreeable to participation in therapy. To assess pt's participation during tasks while also providing intermittent education to maximize participation, OT facilitated the following: functional transfers, household distance functional mobility, adl tasks, and bed mobility. Pt tolerated treatment fairly well and completed all tasks with assist levels listed above. Pt overall limited by decreased endurance and activity tolerance. Vitals monitored and reported above. Pt's oxygen slight desat with room mobility; pt recovered to 90% quickly with seated rest break. Pt requesting to return to bed at end of session; SBA provided for sit to supine transition. Pt remained in bed with all needs in reach and questions answered at end of session. RN aware.         EDUCATION PROVIDED  Patient Education : Role of Occupational Therapy; Plan of Care; Functional Transfer Techniques; Fall Prevention; Posture/Positioning; Energy Conservation; Proper Body Mechanics  Patient's Response to Education: Verbalized Understanding; Returned Demonstration    The patient's Approx Degree of Impairment: 38.32% has been calculated based on documentation in the [facility] '6 clicks' Inpatient Daily Activity Short Form.  Research supports that patients with this level of impairment may benefit from HHOT.  Final disposition will be made by interdisciplinary medical  team.     Patient End of Session: In bed, Needs met, Call light within reach, RN aware of session/findings, All patient questions and concerns addressed, Hospital anti-slip socks    OT Goals  Patients self stated goal is: none stated      Patient will complete functional transfer with mod I   Comment:     Patient will complete toileting with mod I   Comment:     Patient will tolerate standing for 5 minutes in prep for adls with mod I    Comment:    Patient will complete item retrieval with mod I   Comment:          Goals  on: 25  Frequency: 3-5x/week    Patient Evaluation Complexity Level:   Occupational Profile/Medical History MODERATE - Expanded review of history including review of medical or therapy record   Specific performance deficits impacting engagement in ADL/IADL MODERATE  3 - 5 performance deficits   Client Assessment/Performance Deficits MODERATE - Comorbidities and min to mod modifications of tasks    Clinical Decision Making MODERATE - Analysis of occupational profile, detailed assessments, several treatment options    Overall Complexity MODERATE     OT Session Time: 10 minutes  Self-Care Home Management: 10 minutes

## 2025-07-03 NOTE — PHYSICAL THERAPY NOTE
PHYSICAL THERAPY EVALUATION - INPATIENT     Room Number: 338/338-A  Evaluation Date: 7/3/2025  Type of Evaluation: Initial   Physician Order: PT Eval and Treat    Presenting Problem: COPD exacerbation  Co-Morbidities : COPD on 4L O2, Afib, asthma, hepatitis, HTN, HLD, CHF, hysterectomy, osteoporosis, DJD  Reason for Therapy: Mobility Dysfunction and Discharge Planning    PHYSICAL THERAPY ASSESSMENT   Patient is a 84 year old female admitted 7/2/2025 for COPD exacerbation.  Prior to admission, patient's baseline is [place] using a rolling walker.  Patient is currently functioning near baseline with bed mobility, transfers, and gait.  Patient is requiring supervision and stand-by assist as a result of the following impairments: decreased endurance/aerobic capacity and increased O2 needs from baseline.  Physical Therapy will continue to follow for duration of hospitalization.    Patient will benefit from continued skilled PT Services at discharge to promote prior level of function.  Anticipate patient will return home with home health PT.    PLAN DURING HOSPITALIZATION  Nursing Mobility Recommendation : 1 Assist  PT Device Recommendation: Rolling walker  PT Treatment Plan: Body mechanics, Energy conservation, Patient education, Gait training, Strengthening, Transfer training, Stair training  Rehab Potential : Good  Frequency (Obs): 3-5x/week     PHYSICAL THERAPY MEDICAL/SOCIAL HISTORY   History related to current admission: Admitted 5/21/25 for COPD exacerbation, was Norma using a rolling walker, D/C'ed home with no PT needs     Problem List  Principal Problem:    COPD exacerbation (HCC)  Active Problems:    Hypokalemia    Metabolic alkalosis    Hyperglycemia    Respiratory acidosis      HOME SITUATION  Type of Home: House  Home Layout: One level  Stairs to Enter : 5   Railing: Yes    Stairs to Bedroom: 0    Railing: No    Lives With: Alone (supportive son in area)        Patient Regularly Uses: Rolling walker, Home O2      Prior Level of [place]: Norma with rolling walker     SUBJECTIVE  \"Going to the bathroom really makes me winded.\"    PHYSICAL THERAPY EXAMINATION   OBJECTIVE  Precautions: None  Fall Risk: Standard fall risk    WEIGHT BEARING RESTRICTION  none    PAIN ASSESSMENT  Ratin  Location: denies       COGNITION  Overall Cognitive Status:  WFL - within functional limits    RANGE OF MOTION AND STRENGTH ASSESSMENT  Upper extremity ROM and strength are within functional limits BUEs  Lower extremity ROM is within functional limits BLEs  Lower extremity strength is within functional limits BLEs    BALANCE  Static Sitting: Normal  Dynamic Sitting: Normal  Static Standing: Normal  Dynamic Standing: Normal    ACTIVITY TOLERANCE  Pulse: 86                      O2 WALK  Oxygen Therapy  SPO2% on Oxygen at Rest: 93  At rest oxygen flow (liters per minute): 4  SPO2% Ambulation on Oxygen: 88  Ambulation oxygen flow (liters per minute): 4    AM-PAC '6-Clicks' INPATIENT SHORT FORM - BASIC MOBILITY  How much difficulty does the patient currently have...  Patient Difficulty: Turning over in bed (including adjusting bedclothes, sheets and blankets)?: None   Patient Difficulty: Sitting down on and standing up from a chair with arms (e.g., wheelchair, bedside commode, etc.): None   Patient Difficulty: Moving from lying on back to sitting on the side of the bed?: None   How much help from another person does the patient currently need...   Help from Another: Moving to and from a bed to a chair (including a wheelchair)?: A Little   Help from Another: Need to walk in hospital room?: A Little   Help from Another: Climbing 3-5 steps with a railing?: A Little     AM-PAC Score:  Raw Score: 21   Approx Degree of Impairment: 28.97%   Standardized Score (AM-PAC Scale): 50.25   CMS Modifier (G-Code): CJ    FUNCTIONAL ABILITY STATUS  Functional Mobility/Gait Assessment  Gait Assistance: Supervision  Distance (ft): 20  Assistive Device: Rolling  walker  Pattern: Within Functional Limits (ambulation distance limited 2/2 fatigue)  Rolling: independent  Supine to Sit: independent  Sit to Supine: independent  Sit to Stand: modified independent    Exercise/Education Provided:  Education Provided To: Patient  Patient Education: Role of Physical Therapy, Plan of Care, DME Recommendations, Discharge Recommendations, Functional Transfer Techniques, Energy Conservation, Proper Body Mechanics, Gait Training  Patient's Response to Education: Verbalized Understanding, Returned Demonstration           Skilled Therapy Provided: Patient ambulation distance and activity tolerance limited 2/2 fatigue and decreased aerobic capacity. Patient independent with bathroom pericares and toilet transfers, noting BRBPR with wiping, RN notified. Patient reporting she uses walker majority of time, encouraged to continue for energy conservation.      Patient seen for evaluation in coordination with occupational therapy to maximize patient participation and function given limited activity tolerance. Patient received in bathroom in direct handoff from PCT, agreeable to physical therapy evaluation. Vital signs monitored as noted above, patient experiencing dyspnea on exertion and shortness of breath, SpO2 88-93% on 2L at rest and with activity. Next session anticipate to progress gait and stair negotiation.    Patient history and/or personal factors that may impact the plan of care include co-morbidities (COPD on 4L O2, Afib, asthma, hepatitis, HTN, HLD, CHF, hysterectomy, osteoporosis, DJD) affecting medical status, O2 needs, endurance and has history of recent hospitalization. Based on the physical therapy examination of the noted systems and functional activity/participation limitations, the patient presentation is evolving given the patient demonstrates worsening of previously stable condition and demonstrates worsening of co-morbidities.    The patient's Approx Degree of Impairment:  28.97% has been calculated based on documentation in the [facility] '6 clicks' Inpatient Basic Mobility Short Form.  Research supports that patients with this level of impairment may benefit from no services, however based on recent admission and limited endurance anticipate would benefit form home-health PT for aerobic conditioning.  Final disposition will be made by interdisciplinary medical team.    Patient End of Session: In bed, Needs met, Call light within reach, RN aware of session/findings, All patient questions and concerns addressed, Hospital anti-slip socks    CURRENT GOALS  Goals to be met by: 7/23/25  Patient Goal Patient's self-stated goal is: increase endurance   Goal #1 Patient is able to demonstrate supine - sit EOB @ level: independent     Goal #1   Current Status    Goal #2 Patient is able to demonstrate transfers EOB to/from [facility] at assistance level: modified independent with walker - rolling     Goal #2  Current Status    Goal #3 Patient is able to ambulate 100 feet with assist device: walker - rolling at assistance level: modified independent   Goal #3   Current Status    Goal #4 Patient will negotiate 5 stairs/one curb w/ assistive device and supervision   Goal #4   Current Status    Goal #5 Patient to demonstrate independence with home activity/exercise instructions provided to patient in preparation for discharge.   Goal #5   Current Status    Goal #6    Goal #6  Current Status      Patient Evaluation Complexity Level:  History Moderate - 1 or 2 personal factors and/or co-morbidities   Examination of body systems Moderate - addressing a total of 3 or more elements   Clinical Presentation  Moderate - Evolving   Clinical Decision Making  Moderate Complexity     Therapeutic Activity:  10 minutes      Maritza Watters, PT, DPT  [facility]  Rehab Services - Physical Therapy  [id number]

## 2025-07-03 NOTE — CM/SW NOTE
07/03/25 1400   Discharge Needs   Anticipated D/C needs Home health care   Choice of Post-Acute Provider   [facility]family choice [facility]      The [facility] is current with [facility].    Referral sent in Aidin and LEIDY uploaded.    SW/CM to remain available for support and/or discharge planning.     Isidra WHEELER, LSW  Discharge Planner [id number]

## 2025-07-03 NOTE — PLAN OF CARE
Patient alert. Denies pain. Noted w/ c/o of intermittent SOB at rest despite on 4L O2. Pulmonology made aware. On IV steroids and duonebs QID. Encouraged ambulation, patient declining at this time. POC updated to patient.      Problem: CARDIOVASCULAR - ADULT  Goal: Maintains optimal cardiac output and hemodynamic stability  Description: INTERVENTIONS:  - Monitor vital signs, rhythm, and trends  - Monitor for bleeding, hypotension and signs of decreased cardiac output  - Evaluate effectiveness of vasoactive medications to optimize hemodynamic stability  - Monitor arterial and/or venous puncture sites for bleeding and/or hematoma  - Assess quality of pulses, skin color and temperature  - Assess for signs of decreased coronary artery perfusion - ex. Angina  - Evaluate fluid balance, assess for edema, trend weights  Outcome: Progressing  Goal: Absence of cardiac arrhythmias or at baseline  Description: INTERVENTIONS:  - Continuous cardiac monitoring, monitor vital signs, obtain 12 lead EKG if indicated  - Evaluate effectiveness of antiarrhythmic and heart rate control medications as ordered  - Initiate emergency measures for life threatening arrhythmias  - Monitor electrolytes and administer replacement therapy as ordered  Outcome: Progressing     Problem: RESPIRATORY - ADULT  Goal: Achieves optimal ventilation and oxygenation  Description: INTERVENTIONS:  - Assess for changes in respiratory status  - Assess for changes in mentation and behavior  - Position to facilitate oxygenation and minimize respiratory effort  - Oxygen supplementation based on oxygen saturation or ABGs  - Provide Smoking Cessation handout, if applicable  - Encourage broncho-pulmonary hygiene including cough, deep breathe, Incentive Spirometry  - Assess the need for suctioning and perform as needed  - Assess and instruct to report SOB or any respiratory difficulty  - Respiratory Therapy support as indicated  - Manage/alleviate anxiety  - Monitor for  signs/symptoms of CO2 retention  Outcome: Progressing     Problem: PAIN - ADULT  Goal: Verbalizes/displays adequate comfort level or patient's stated pain goal  Description: INTERVENTIONS:  - Encourage pt to monitor pain and request assistance  - Assess pain using appropriate pain scale  - Administer analgesics based on type and severity of pain and evaluate response  - Implement non-pharmacological measures as appropriate and evaluate response  - Consider cultural and social influences on pain and pain management  - Manage/alleviate anxiety  - Utilize distraction and/or relaxation techniques  - Monitor for opioid side effects  - Notify MD/LIP if interventions unsuccessful or patient reports new pain  - Anticipate increased pain with activity and pre-medicate as appropriate  Outcome: Progressing     Problem: SAFETY ADULT - FALL  Goal: Free from fall injury  Description: INTERVENTIONS:  - Assess pt frequently for physical needs  - Identify cognitive and physical deficits and behaviors that affect risk of falls.  - Spivey fall precautions as indicated by assessment.  - Educate pt/family on patient safety including physical limitations  - Instruct pt to call for assistance with activity based on assessment  - Modify environment to reduce risk of injury  - Provide assistive devices as appropriate  - Consider OT/PT consult to assist with strengthening/mobility  - Encourage toileting schedule  Outcome: Progressing     Problem: METABOLIC/FLUID AND ELECTROLYTES - ADULT  Goal: Electrolytes maintained within normal limits  Description: INTERVENTIONS:  - Monitor labs and rhythm and assess patient for signs and symptoms of electrolyte imbalances  - Administer electrolyte replacement as ordered  - Monitor response to electrolyte replacements, including rhythm and repeat lab results as appropriate  - Fluid restriction as ordered  - Instruct patient on fluid and nutrition restrictions as appropriate  Outcome: Progressing      Problem: HEMATOLOGIC - ADULT  Goal: Maintains hematologic stability  Description: INTERVENTIONS  - Assess for signs and symptoms of bleeding or hemorrhage  - Monitor labs and vital signs for trends  - Administer supportive blood products/factors, fluids and medications as ordered and appropriate  - Administer supportive blood products/factors as ordered and appropriate  Outcome: Progressing  Goal: Free from bleeding injury  Description: (Example usage: patient with low platelets)  INTERVENTIONS:  - Avoid intramuscular injections, enemas and rectal medication administration  - Ensure safe mobilization of patient  - Hold pressure on venipuncture sites to achieve adequate hemostasis  - Assess for signs and symptoms of internal bleeding  - Monitor lab trends  - Patient is to report abnormal signs of bleeding to staff  - Avoid use of toothpicks and dental floss  - Use electric shaver for shaving  - Use soft bristle tooth brush  - Limit straining and forceful nose blowing  Outcome: Progressing     Problem: MUSCULOSKELETAL - ADULT  Goal: Return mobility to safest level of function  Description: INTERVENTIONS:  - Assess patient stability and activity tolerance for standing, transferring and ambulating w/ or w/o assistive devices  - Assist with transfers and ambulation using safe patient handling equipment as needed  - Ensure adequate protection for wounds/incisions during mobilization  - Obtain PT/OT consults as needed  - Advance activity as appropriate  - Communicate ordered activity level and limitations with patient/family  Outcome: Progressing

## 2025-07-03 NOTE — SPIRITUAL CARE NOTE
Spiritual Care Visit Note    Patient Name: Yesenia Berkowitz Date of Spiritual Care Visit: 25   : 1940 Primary Dx: COPD exacerbation (HCC)       Referred By: Referral From: Patient, Nurse    Spiritual Care Taxonomy:    Intended Effects: Aligning care plan with patient's values    Methods: Assist with spiritual/Gnosticism practices, Offer spiritual/Gnosticism support    Interventions: Acknowledge current situation, Acknowledge response to difficult experience, Active listening, Ask guided questions, [place], Provide hospitality, Incorporate cultural and Gnosticism needs in plan of care, Explain  role    Visit Type/Summary:     - Spiritual Care: Responded to a request for spiritual care and assessed patient for spiritual care needs. Consulted with RN prior to visit. Offered empathic listening and emotional support. Patient names/describes frustration with being back in the hospital  offered Holy Communion. Patient expressed appreciation for  visit. Provided support for Patient spiritual/Gnosticism requests. Coordinated Judaism Communion and verified NPO status. Provided Communion and verified NPO status. Offered prayer.  remains available for follow up.    Spiritual Care support can be requested via an Epic consult. For urgent/immediate needs, please contact the On Call  at: [place]: ext [phone]    Chaplain Resident, Leny Fontana, PhD

## 2025-07-03 NOTE — ED INITIAL ASSESSMENT (HPI)
Arrives via ems from home for shortness of breath worse with exertion x 2 days. History of COPD and Asthma. Recently admitted for same.

## 2025-07-03 NOTE — DISCHARGE INSTRUCTIONS
Please resume home health services with [facility] upon your discharge, [phone].

## 2025-07-03 NOTE — PROGRESS NOTES
Physician Clarification    Additional information related to Magnesium lab value:  Hypomagnesemia     This note is part of the patient's medical record.

## 2025-07-03 NOTE — CONSULTS
[facility]  part of [facility]    Report of Consultation    Yesenia Berkowitz Patient Status:  Inpatient    1940 MRN [id number]   Location [facility] 3W/SW Attending Sina Hogan MD   Hosp Day # 0 PCP JO-ANN YANG MD     Date of Admission:  2025    Reason for Consultation:   Dyspnea    History of Present Illness:   Patient is a 84-year-old female with past medical history significant for COPD, chronic respiratory failure on 4 L oxygen, atrial fibrillation, right lung nodule with concern for underlying malignancy presents with chief complaint of increased dyspnea over the last 24 hours prior to admission.  Denies significant worsening cough or wheezing.  Dyspnea with minimal exertion.  Denies worsening orthopnea.  Denies fevers or chills.  States she has not started any radiation therapy for presumed malignancy as of this time.    Past Medical History  Past Medical History[1]    Past Surgical History  Past Surgical History[2]    Family History  Family History[3]    Social History  Social Hx on file[4]        Current Medications:  Current Hospital Medications[5]  Prescriptions Prior to Admission[6]    Allergies  Allergies[7]    Review of Systems:   Constitutional: denies fevers, chills, weakness, fatigue, recent illness  HEENT: denies headache, sore throat, vision loss  Cardio: denies chest pain, chest pressure, palpitations  Respiratory: dyspnea, cough, denies wheezing, hemoptysis   GI: denies nausea, vomiting, abdominal pain  : denies dysuria, hematuria  Musculoskeletal: denies arthralgia, myalgia  Integumentary: denies rash, itching  Neurological: denies syncope, weakness, dizziness,   Psychiatric: denies depression, anxiety  Hematologic: denies bruising        Physical Exam:   Blood pressure 118/59, pulse 82, temperature 98.2 °F (36.8 °C), temperature source Oral, resp. rate 22, weight 124 lb 12.8 oz (56.6 kg), SpO2 95%.    Constitutional: no acute distress  Eyes:  PERRL  ENT: nares patent  Neck: neck supple, no JVD  Cardio: RRR, S1 S2  Respiratory: Diminished expiratory breath sounds  GI: abdomen soft, non tender, active bowel souds, no organomegaly  Extremities: no clubbing, cyanosis, edema  Neurologic: no gross motor deficits  Skin: warm, dry    Results:   Laboratory Data  Lab Results   Component Value Date    WBC 6.4 07/03/2025    HGB 11.2 (L) 07/03/2025    HCT 35.7 07/03/2025    .0 07/03/2025    CREATSERUM 0.75 07/03/2025    BUN 14 07/03/2025     07/03/2025    K 3.5 07/03/2025    CL 95 (L) 07/03/2025    CO2 38.0 (H) 07/03/2025     (H) 07/03/2025    CA 8.6 (L) 07/03/2025    ALB 3.6 06/07/2025    ALKPHO 59 06/07/2025    TP 5.5 (L) 06/07/2025    AST 17 06/07/2025    ALT 29 06/07/2025    PTT 28.1 03/20/2025    INR 1.08 11/25/2024    PTP 14.6 11/25/2024    T4F 1.1 05/29/2025    TSH 0.292 (L) 05/29/2025    LIP 30 08/30/2024    DDIMER 0.47 12/21/2024    MG 1.5 (L) 07/03/2025    PHOS 3.4 12/26/2024    TROP <0.045 02/03/2020         Imaging  XR CHEST AP PORTABLE  (CPT=71045)  Result Date: 7/2/2025  CONCLUSION: Small pleural effusions. Bibasilar pulmonary opacities likely atelectasis however correlate for symptoms of pneumonia. Electronically Verified and Signed by Attending Radiologist: Grey Artis MD 7/2/2025 8:33 PM Workstation: [id number]      Assessment   1.  Acute on chronic hypoxemic respiratory failure  2.  COPD with acute exacerbation  3.  Chronic diastolic CHF  4.  Hypokalemia  5.  Right upper lobe lung nodule  6.  Small right pleural effusion    Plan   -Patient presents with evidence of worsening dyspnea.  May be multifactorial in nature with underlying COPD, CHF and small right pleural effusion.  - Monitor response to steroid therapy.  Gradually wean  - Nebulizer treatments  - ICS/LABA/LAMA  - Recent CT chest on 5/28/2025 with right upper lobe nodule seen hypermetabolic on PET/CT with small right pleural effusion.  Await CT chest results from current  hospitalization.  Chest x-ray revealed small right pleural effusion.  Patient has declined lung biopsy in the past.  Awaiting their follow-up with radiation oncology as outpatient.  Pleural effusion appears to be small at this time.  - Hold off antibiotic therapy from pulmonary perspective  - Reviewed vitals, labs imaging    Ruchi Senior DO  Pulmonary Critical Care Medicine  [facility]  7/3/2025  10:36 AM        [1]   Past Medical History:   A-fib (HCC)    Anesthesia complication    Arrhythmia    AFIB    Arthritis    Asthma (HCC)    Back problem    COPD (chronic obstructive pulmonary disease) (HCC)    Deviated nasal septum    nasal septoplasty, turb reduction, smr of turbs    Difficult intubation    fiber optic if needed    Diverticulitis    colonoscopy     Diverticulosis of large intestine    Esophageal reflux    Extrinsic asthma, unspecified    Headache    Heart disease    Hepatitis    WAS TOLD SHE HAS HAD THIS WHEN SHE WAS 19 YEARS OLD    High blood pressure    High cholesterol    Irregular heart rate    Lichenoid keratosis    left chest-bx    Osteoarthritis    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    Paronychia    (RT); onychomycosis; debridement    Problems with swallowing    occasionally    Shortness of breath    2 L NC ALL THE TIME 24HR/7 DAYS PER WEEK    Unspecified essential hypertension    Visual impairment   [2]   Past Surgical History:  Procedure Laterality Date    Adenoidectomy      Cataract      cataract extraction     Hysterectomy      Sinus surgery        DEVIATED SEPTUM    T&a      Tonsillectomy     [3]   Family History  Problem Relation Age of Onset    Ovarian Cancer Mother 76        endometrial, poss ovarian primary    Pulmonary Disease Sister         COPD    Skin cancer Other     Stroke Other     Other (Other) Other     Cancer Maternal Aunt     Cancer Maternal Aunt    [4]   Social History  Socioeconomic History    Marital status:    Tobacco Use    Smoking status:  Former     Current packs/day: 0.00     Types: Cigarettes     Quit date: 1996     Years since quittin.5    Smokeless tobacco: Never   Vaping Use    Vaping status: Never Used   Substance and Sexual Activity    Alcohol use: Not Currently     Comment: one drink once a month    Drug use: Never   Other Topics Concern    Pt has a pacemaker No    Pt has a defibrillator No    Reaction to local anesthetic No    Caffeine Concern No   [5]   Current Facility-Administered Medications   Medication Dose Route Frequency    acetaZOLAMIDE (Diamox) tab 500 mg  500 mg Oral Daily    albuterol (Ventolin) (2.5 MG/3ML) 0.083% nebulizer solution 2.5 mg  2.5 mg Nebulization Q6H PRN    acetaminophen (Tylenol Extra Strength) tab 1,000 mg  1,000 mg Oral Q6H PRN    digoxin (Lanoxin) tab 125 mcg  125 mcg Oral Daily    dilTIAZem ER (Dilacor XR) 24 hr cap 360 mg  360 mg Oral Daily    fluticasone-salmeterol (Advair Diskus) 500-50 MCG/ACT inhaler 1 puff  1 puff Inhalation BID    umeclidinium bromide (Incruse Ellipta) 62.5 MCG/ACT inhaler 1 puff  1 puff Inhalation Daily    furosemide (Lasix) tab 80 mg  80 mg Oral BID (Diuretic)    traMADol (Ultram) tab 50 mg  50 mg Oral Q6H PRN    ondansetron (Zofran) 4 MG/2ML injection 4 mg  4 mg Intravenous Q6H PRN    morphINE PF 2 MG/ML injection 2 mg  2 mg Intravenous Q2H PRN    Or    morphINE PF 4 MG/ML injection 4 mg  4 mg Intravenous Q2H PRN    heparin (Porcine) 5000 UNIT/ML injection 5,000 Units  5,000 Units Subcutaneous BID    methylPREDNISolone sodium succinate (Solu-MEDROL) injection 40 mg  40 mg Intravenous Q8H    zolpidem (Ambien) tab 5 mg  5 mg Oral Nightly PRN    pantoprazole (Protonix) DR tab 40 mg  40 mg Oral QAM AC    alum-mag hydroxide-simethicone (Maalox) 200-200-20 MG/5ML oral suspension 30 mL  30 mL Oral QID PRN    guaiFENesin-codeine (Robitussin AC) 100-10 MG/5ML oral solution 5 mL  5 mL Oral Q4H PRN    ipratropium-albuterol (Duoneb) 0.5-2.5 (3) MG/3ML inhalation solution 3 mL  3 mL  Nebulization QID   [6]   Medications Prior to Admission   Medication Sig    traMADol 50 MG Oral Tab Take 1 tablet (50 mg total) by mouth every 6 (six) hours as needed for Pain.    phenazopyridine 100 MG Oral Tab Take 1 tablet (100 mg total) by mouth 3 (three) times daily as needed for Pain (dysuria).    morphINE 10 MG/5ML Oral Solution Take 1.3 mL (2.6 mg total) by mouth every 4 (four) hours as needed (Shortness of breath/ take prior to activity that cased shortness of breath). Please add an adapt a cap top for ease of drawing up.  Please give pt 2 oral syringes    nitrofurantoin monohydrate macro 100 MG Oral Cap Take 1 capsule (100 mg total) by mouth 2 (two) times daily.    benzonatate 200 MG Oral Cap Take 1 capsule (200 mg total) by mouth 3 (three) times daily as needed for cough.    acetaZOLAMIDE 250 MG Oral Tab Take 2 tablets (500 mg total) by mouth daily.    albuterol (2.5 MG/3ML) 0.083% Inhalation Nebu Soln Take 3 mL (2.5 mg total) by nebulization every 6 (six) hours as needed for Wheezing.    dilTIAZem  MG Oral Capsule SR 24 Hr Take 1 capsule (360 mg total) by mouth daily.    furosemide 80 MG Oral Tab Take 1 tablet (80 mg total) by mouth BID (Diuretic).    fluticasone-umeclidin-vilant 200-62.5-25 MCG/ACT Inhalation Aerosol Powder, Breath Activated Inhale 1 puff into the lungs as needed (sob).    empagliflozin (JARDIANCE) 10 MG Oral Tab Take 1 tablet (10 mg total) by mouth daily.    acetaminophen 500 MG Oral Tab Take 2 tablets (1,000 mg total) by mouth every 6 (six) hours as needed for Pain or Fever.    DIGOXIN 0.125 MG Oral Tab Take 1 tablet (125 mcg total) by mouth daily.    albuterol 108 (90 Base) MCG/ACT Inhalation Aero Soln Inhale 2 puffs into the lungs as needed.    Cholecalciferol (VITAMIN D) 1000 UNITS Oral Tab Take 1,000 Units by mouth in the morning and 1,000 Units before bedtime.    [] Magnesium Citrate Oral Solution Take 296 mL by mouth once for 1 dose.    [] predniSONE 20 MG  Oral Tab Take 2 tablets (40 mg total) by mouth daily for 2 days, THEN 1.5 tablets (30 mg total) daily for 2 days, THEN 1 tablet (20 mg total) daily for 2 days, THEN 0.5 tablets (10 mg total) daily for 2 days.    [] nystatin 965335 UNIT/ML Mouth/Throat Suspension Take 5 mL (500,000 Units total) by mouth 4 (four) times daily for 7 days.    Potassium Chloride ER (K-DUR M20) 20 MEQ Oral Tab CR Take 1 tablet (20 mEq total) by mouth nightly.    Loratadine 10 MG Oral Cap Take 10 mg by mouth nightly.    montelukast 10 MG Oral Tab Take 1 tablet (10 mg total) by mouth nightly.   [7]   Allergies  Allergen Reactions    Penicillin G ANAPHYLAXIS    Azithromycin DIARRHEA and NAUSEA AND VOMITING    Cefuroxime UNKNOWN     Other reaction(s): Vomitting    Levofloxacin UNKNOWN     Other reaction(s): LEVOFLOXACIN    Penicillins      Other reaction(s): Unknown    Seasonal ITCHING

## 2025-07-03 NOTE — ED PROVIDER NOTES
Patient Seen in: [facility] Emergency Department        History  Chief Complaint   Patient presents with    Difficulty Breathing     Stated Complaint: SOB    Subjective:   HPI            Patient presents to the emergency department complaining of shortness of breath which began this morning.  There is no fever or chills.  There is no increased coughing.  There is no other aggravating or alleviating factors.      Objective:     Past Medical History:    A-fib (HCC)    Anesthesia complication    Arrhythmia    AFIB    Arthritis    Asthma (HCC)    Back problem    COPD (chronic obstructive pulmonary disease) ([facility])    Deviated nasal septum    nasal septoplasty, turb reduction, smr of turbs    Difficult intubation    fiber optic if needed    Diverticulitis    colonoscopy     Diverticulosis of large intestine    Esophageal reflux    Extrinsic asthma, unspecified    Headache    Heart disease    Hepatitis    WAS TOLD SHE HAS HAD THIS WHEN SHE WAS 19 YEARS OLD    High blood pressure    High cholesterol    Irregular heart rate    Lichenoid keratosis    left chest-bx    Osteoarthritis    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    Paronychia    (RT); onychomycosis; debridement    Problems with swallowing    occasionally    Shortness of breath    2 L NC ALL THE TIME 24HR/7 DAYS PER WEEK    Unspecified essential hypertension    Visual impairment              Past Surgical History:   Procedure Laterality Date    Adenoidectomy      Cataract      cataract extraction     Hysterectomy      Sinus surgery        DEVIATED SEPTUM    T&a      Tonsillectomy                  Social History     Socioeconomic History    Marital status:    Tobacco Use    Smoking status: Former     Current packs/day: 0.00     Types: Cigarettes     Quit date: 1996     Years since quittin.5    Smokeless tobacco: Never   Vaping Use    Vaping status: Never Used   Substance and Sexual Activity    Alcohol use: Not Currently     Comment:  one drink once a month    Drug use: Never   Other Topics Concern    Pt has a pacemaker No    Pt has a defibrillator No    Reaction to local anesthetic No    Caffeine Concern No     Social Drivers of Health     Food Insecurity: No Food Insecurity (7/3/2025)    NCSS - Food Insecurity     Worried About Running Out of Food in the Last Year: No     Ran Out of Food in the Last Year: No   Transportation Needs: No Transportation Needs (7/3/2025)    NCSS - Transportation     Lack of Transportation: No   Housing Stability: Not At Risk (7/3/2025)    NCSS - Housing/Utilities     Has Housing: Yes     Worried About Losing Housing: No     Unable to Get Utilities: No                                Physical Exam    ED Triage Vitals [07/02/25 1917]   /69   Pulse 99   Resp 24   Temp 98.3 °F (36.8 °C)   Temp src Temporal   SpO2 91 %   O2 Device Nasal cannula       Current Vitals:   Vital Signs  BP: 129/56  Pulse: 88  Resp: 16  Temp: 98.1 °F (36.7 °C)  Temp src: Oral  MAP (mmHg): 73    Oxygen Therapy  SpO2: 95 %  O2 Device: Nasal cannula  O2 Flow Rate (L/min): 4 L/min            Physical Exam  Vitals and nursing note reviewed.   Constitutional:       General: She is not in acute distress.     Appearance: She is well-developed.   HENT:      Head: Normocephalic.      Nose: Nose normal.      Mouth/Throat:      Mouth: Mucous membranes are moist.   Eyes:      Conjunctiva/sclera: Conjunctivae normal.   Cardiovascular:      Rate and Rhythm: Normal rate and regular rhythm.      Heart sounds: No murmur heard.  Pulmonary:      Effort: Pulmonary effort is normal. No respiratory distress.      Breath sounds: Decreased breath sounds and wheezing present.   Abdominal:      General: There is no distension.      Palpations: Abdomen is soft.      Tenderness: There is no abdominal tenderness.   Musculoskeletal:         General: No tenderness. Normal range of motion.      Cervical back: Normal range of motion and neck supple.   Skin:     General:  Skin is warm and dry.      Capillary Refill: Capillary refill takes less than 2 seconds.      Findings: No rash.   Neurological:      General: No focal deficit present.      Mental Status: She is alert and oriented to person, place, and time.      Cranial Nerves: No cranial nerve deficit.      Motor: No weakness.                 ED Course  Labs Reviewed   CBC WITH DIFFERENTIAL WITH PLATELET - Abnormal; Notable for the following components:       Result Value    MCHC 29.7 (*)     RDW-SD 63.1 (*)     RDW 17.7 (*)     All other components within normal limits   BASIC METABOLIC PANEL (8) - Abnormal; Notable for the following components:    Glucose 108 (*)     Potassium 3.2 (*)     Chloride 93 (*)     CO2 >40.0 (*)     All other components within normal limits   ARTERIAL BLOOD GAS - Abnormal; Notable for the following components:    ABG pH 7.46 (*)     ABG pCO2 64 (*)     ABG HCO3 39.0 (*)     Blood Gas Base Excess 18.4 (*)     All other components within normal limits   MAGNESIUM - Abnormal; Notable for the following components:    Magnesium 1.5 (*)     All other components within normal limits   BASIC METABOLIC PANEL (8) - Abnormal; Notable for the following components:    Glucose 209 (*)     Chloride 95 (*)     CO2 38.0 (*)     Calcium, Total 8.6 (*)     All other components within normal limits   CBC WITH DIFFERENTIAL WITH PLATELET - Abnormal; Notable for the following components:    HGB 11.2 (*)     RDW-SD 60.5 (*)     RDW 17.7 (*)     Lymphocyte Absolute 0.43 (*)     Monocyte Absolute 0.04 (*)     All other components within normal limits   TROPONIN I HIGH SENSITIVITY - Normal   PRO BETA NATRIURETIC PEPTIDE - Normal   TROPONIN I HIGH SENSITIVITY - Normal   RAINBOW DRAW BLUE     EKG    Rate, intervals and axes as noted on EKG Report.  Rate: 100 bpm  Rhythm: Sinus Rhythm  Reading: Nonspecific ST and T wave changes, abnormal                                MDM         Admission disposition: 7/2/2025 11:40 PM            Medical Decision Making  Differential diagnosis considered for PE, COPD exacerbation, pneumonia, pericardial effusion.    Problems Addressed:  COPD exacerbation (HCC): acute illness or injury    Amount and/or Complexity of Data Reviewed  Labs: ordered. Decision-making details documented in ED Course.     Details: Labs show significant elevation of bicarb on chemistry panel.  CBC normal.  Radiology: ordered and independent interpretation performed. Decision-making details documented in ED Course.     Details: Chest x-ray shows small pleural effusion.  ECG/medicine tests: ordered and independent interpretation performed. Decision-making details documented in ED Course.  Discussion of management or test interpretation with external provider(s): ABG shows compensated blood gas.  CT scan of chest abdomen pelvis is pending as patient now has epigastric discomfort.  Will admit for observation and discussed with pulmonary.    Risk  Prescription drug management.  Drug therapy requiring intensive monitoring for toxicity.  Decision regarding hospitalization.  Risk Details:   Critical Care Documentation    There were greater than 30 minutes of critical care time spent directly on this patient and this time did not include procedures but did include:    7 minutes for documentation  10 minutes for physical exam, re-examination and discussing results with patient and family  10 minutes discussing case with admitting physicians and consultants  5 minutes interpreting labs and diagnostic testing        Critical Care  Total time providing critical care: 32 minutes        Disposition and Plan     Clinical Impression:  1. COPD exacerbation (HCC)         Disposition:  Admit  7/2/2025 11:40 pm    Follow-up:  No follow-up provider specified.  We recommend that you schedule follow up care with a primary care provider within the next three months to obtain basic health screening including reassessment of your blood  pressure.      Medications Prescribed:  Current Discharge Medication List                Supplementary Documentation:         Hospital Problems       Present on Admission  Date Reviewed: 5/30/2025          ICD-10-CM Noted POA    * (Principal) COPD exacerbation (HCC) J44.1 1/10/2025 Unknown    Hyperglycemia R73.9 7/2/2025 Yes    Hypokalemia E87.6 7/2/2025 Yes    Metabolic alkalosis E87.3 7/2/2025 Yes    Respiratory acidosis E87.29 7/2/2025 Yes                                                                  Respiratory acidosis E87.29 7/2/2025 Yes

## 2025-07-03 NOTE — ED QUICK NOTES
Notified Dr. Coronado of patients BP. Received verbal orders for NS 1000 ml bolus. Order placed and started infusion on patient.

## 2025-07-04 LAB
MAGNESIUM SERPL-MCNC: 2.1 MG/DL (ref 1.6–2.6)
POTASSIUM SERPL-SCNC: 3.6 MMOL/L (ref 3.5–5.1)

## 2025-07-04 PROCEDURE — 99233 SBSQ HOSP IP/OBS HIGH 50: CPT | Performed by: INTERNAL MEDICINE

## 2025-07-04 PROCEDURE — 99232 SBSQ HOSP IP/OBS MODERATE 35: CPT | Performed by: INTERNAL MEDICINE

## 2025-07-04 RX ORDER — IPRATROPIUM BROMIDE AND ALBUTEROL SULFATE 2.5; .5 MG/3ML; MG/3ML
3 SOLUTION RESPIRATORY (INHALATION)
Status: DISCONTINUED | OUTPATIENT
Start: 2025-07-04 | End: 2025-07-07

## 2025-07-04 RX ORDER — METHYLPREDNISOLONE SODIUM SUCCINATE 40 MG/ML
40 INJECTION INTRAMUSCULAR; INTRAVENOUS EVERY 12 HOURS
Status: DISCONTINUED | OUTPATIENT
Start: 2025-07-04 | End: 2025-07-05

## 2025-07-04 NOTE — PLAN OF CARE
Problem: Patient Centered Care  Goal: Patient preferences are identified and integrated in the patient's plan of care  Description: Interventions:  - What would you like us to know as we care for you? I would like to know why I am still so short of breath  - Provide timely, complete, and accurate information to patient/family  - Incorporate patient and family knowledge, values, beliefs, and cultural backgrounds into the planning and delivery of care  - Encourage patient/family to participate in care and decision-making at the level they choose  - Honor patient and family perspectives and choices  Outcome: Progressing     Problem: Patient/Family Goals  Goal: Patient/Family Long Term Goal  Description: Patient's Long Term Goal: to return home.    Interventions:  - See additional Care Plan goals for specific interventions  Outcome: Progressing  Goal: Patient/Family Short Term Goal  Description: Patient's Short Term Goal: to have less shortness of breath    Interventions:   - IV Solumedrol  -oral lasix  -oxygen  - See additional Care Plan goals for specific interventions  Outcome: Progressing     Problem: CARDIOVASCULAR - ADULT  Goal: Maintains optimal cardiac output and hemodynamic stability  Description: INTERVENTIONS:  - Monitor vital signs, rhythm, and trends  - Monitor for bleeding, hypotension and signs of decreased cardiac output  - Evaluate effectiveness of vasoactive medications to optimize hemodynamic stability  - Monitor arterial and/or venous puncture sites for bleeding and/or hematoma  - Assess quality of pulses, skin color and temperature  - Assess for signs of decreased coronary artery perfusion - ex. Angina  - Evaluate fluid balance, assess for edema, trend weights  Outcome: Progressing  Goal: Absence of cardiac arrhythmias or at baseline  Description: INTERVENTIONS:  - Continuous cardiac monitoring, monitor vital signs, obtain 12 lead EKG if indicated  - Evaluate effectiveness of antiarrhythmic and  heart rate control medications as ordered  - Initiate emergency measures for life threatening arrhythmias  - Monitor electrolytes and administer replacement therapy as ordered  Outcome: Progressing     Problem: RESPIRATORY - ADULT  Goal: Achieves optimal ventilation and oxygenation  Description: INTERVENTIONS:  - Assess for changes in respiratory status  - Assess for changes in mentation and behavior  - Position to facilitate oxygenation and minimize respiratory effort  - Oxygen supplementation based on oxygen saturation or ABGs  - Provide Smoking Cessation handout, if applicable  - Encourage broncho-pulmonary hygiene including cough, deep breathe, Incentive Spirometry  - Assess the need for suctioning and perform as needed  - Assess and instruct to report SOB or any respiratory difficulty  - Respiratory Therapy support as indicated  - Manage/alleviate anxiety  - Monitor for signs/symptoms of CO2 retention  Outcome: Progressing     Problem: PAIN - ADULT  Goal: Verbalizes/displays adequate comfort level or patient's stated pain goal  Description: INTERVENTIONS:  - Encourage pt to monitor pain and request assistance  - Assess pain using appropriate pain scale  - Administer analgesics based on type and severity of pain and evaluate response  - Implement non-pharmacological measures as appropriate and evaluate response  - Consider cultural and social influences on pain and pain management  - Manage/alleviate anxiety  - Utilize distraction and/or relaxation techniques  - Monitor for opioid side effects  - Notify MD/LIP if interventions unsuccessful or patient reports new pain  - Anticipate increased pain with activity and pre-medicate as appropriate  Outcome: Progressing     Problem: SAFETY ADULT - FALL  Goal: Free from fall injury  Description: INTERVENTIONS:  - Assess pt frequently for physical needs  - Identify cognitive and physical deficits and behaviors that affect risk of falls.  - Swoope fall precautions as  indicated by assessment.  - Educate pt/family on patient safety including physical limitations  - Instruct pt to call for assistance with activity based on assessment  - Modify environment to reduce risk of injury  - Provide assistive devices as appropriate  - Consider OT/PT consult to assist with strengthening/mobility  - Encourage toileting schedule  Outcome: Progressing     Problem: METABOLIC/FLUID AND ELECTROLYTES - ADULT  Goal: Electrolytes maintained within normal limits  Description: INTERVENTIONS:  - Monitor labs and rhythm and assess patient for signs and symptoms of electrolyte imbalances  - Administer electrolyte replacement as ordered  - Monitor response to electrolyte replacements, including rhythm and repeat lab results as appropriate  - Fluid restriction as ordered  - Instruct patient on fluid and nutrition restrictions as appropriate  Outcome: Progressing     Problem: HEMATOLOGIC - ADULT  Goal: Maintains hematologic stability  Description: INTERVENTIONS  - Assess for signs and symptoms of bleeding or hemorrhage  - Monitor labs and vital signs for trends  - Administer supportive blood products/factors, fluids and medications as ordered and appropriate  - Administer supportive blood products/factors as ordered and appropriate  Outcome: Progressing  Goal: Free from bleeding injury  Description: (Example usage: patient with low platelets)  INTERVENTIONS:  - Avoid intramuscular injections, enemas and rectal medication administration  - Ensure safe mobilization of patient  - Hold pressure on venipuncture sites to achieve adequate hemostasis  - Assess for signs and symptoms of internal bleeding  - Monitor lab trends  - Patient is to report abnormal signs of bleeding to staff  - Avoid use of toothpicks and dental floss  - Use electric shaver for shaving  - Use soft bristle tooth brush  - Limit straining and forceful nose blowing  Outcome: Progressing     Problem: MUSCULOSKELETAL - ADULT  Goal: Return  mobility to safest level of function  Description: INTERVENTIONS:  - Assess patient stability and activity tolerance for standing, transferring and ambulating w/ or w/o assistive devices  - Assist with transfers and ambulation using safe patient handling equipment as needed  - Ensure adequate protection for wounds/incisions during mobilization  - Obtain PT/OT consults as needed  - Advance activity as appropriate  - Communicate ordered activity level and limitations with patient/family  Outcome: Progressing

## 2025-07-04 NOTE — PROGRESS NOTES
[facility]  part of [facility]     Progress Note    Yesenia Berkowitz Patient Status:  Inpatient    1940 MRN [id number]   Location [facility] 3W/SW Attending Lissy Blandon MD   Hosp Day # 1 PCP JO-ANN YANG MD       Subjective:   Patient seen and examined.  Dyspnea improved over last 24 hours.  Occasional episodic episodes of dyspnea at rest.  Some occasional cough present    Objective:   Blood pressure 121/53, pulse 92, temperature 97.9 °F (36.6 °C), temperature source Oral, resp. rate 20, weight 127 lb 12.8 oz (58 kg), SpO2 94%.  Intake/Output:   Last 3 shifts: I/O last 3 completed shifts:  In: 3107 [P.O.:7; I.V.:10; IV PIGGYBACK:1000]  Out: 1300 [Urine:1300]   This shift: No intake/output data recorded.     Vent Settings:      Hemodynamic parameters (last 24 hours):      Scheduled Meds: Current Hospital Medications[1]    Continuous Infusions: Medication Infusions[2]    Physical Exam  Constitutional: no acute distress  Eyes: PERRL  ENT: nares pateint  Neck: supple, no JVD  Cardio: RRR, S1 S2  Respiratory: Diminished expiratory breath sounds  GI: abdomen soft, non tender, active bowel sounds, no organomegaly  Extremities: no clubbing, cyanosis, edema  Neurologic: no gross motor deficits  Skin: warm, dry      Results:     Lab Results   Component Value Date    K 3.6 2025    MG 2.1 2025       CT ABDOMEN+PELVIS(CONTRAST ONLY)(CPT=74177)  Result Date: 7/3/2025  CONCLUSION: 1.  No acute intra-abdominal process is identified. The etiology of the patient's symptoms is unclear from this study. 2.  3.  Cholelithiasis without CT evidence of acute location. 4.  Uncomplicated distal colonic diverticulosis. 5.  Hepatomegaly with possible hepatic cirrhosis. 6.  Status post hysterectomy. 7.  Lesser incidental findings as above. A preliminary report was issued by the [facility] teleradiology service. There are no major discrepancies. Electronically Verified and Signed by  Attending Radiologist: Grant Ferreira MD 7/3/2025 3:57 PM Workstation: [id number]    CT CHEST PE AORTA (IV ONLY) (CPT=71260)  Result Date: 7/3/2025  CONCLUSION: 1.  No evidence of acute pulmonary embolism to the level of the first order subsegmental pulmonary artery branches. 2.  Indeterminate right upper lobe perifissural lesion concerning for potential malignancy. 3.  Right larger than left pleural effusions and associated compressive atelectasis, with or without superimposed pneumonia. 4.  Dilatation of the main pulmonary artery trunk may relate to underlying pulmonary hypertension. 5.  Emphysematous changes. 6.  Cardiomegaly with mild pulmonary interstitial edema. 7.  Kyphoplasty of T6. 8.  Diffuse thyromegaly with scattered thyroid nodules. 9.  Please note, there were delays in reporting this study secondary to technical difficulties. 10.  Lesser incidental findings as above. A preliminary report was issued by the [facility] teleradiology service. There are no major discrepancies. Electronically Verified and Signed by Attending Radiologist: Grant Ferreira MD 7/3/2025 3:44 PM Workstation: [id number]    XR CHEST AP PORTABLE  (CPT=71045)  Result Date: 7/2/2025  CONCLUSION: Small pleural effusions. Bibasilar pulmonary opacities likely atelectasis however correlate for symptoms of pneumonia. Electronically Verified and Signed by Attending Radiologist: Grey Artis MD 7/2/2025 8:33 PM Workstation: [id number]      EKG  Result Date: 7/3/2025  Sinus tachycardia with 1st degree A-V block Minimal voltage criteria for LVH, may be normal variant ( Bassam product ) ST depression, consider subendocardial injury Abnormal ECG When compared with ECG of 02-JUL-2025 19:22, Nonspecific T wave abnormality, improved in Inferior leads T wave inversion no longer evident in Lateral leads Confirmed by SAGAR HERNANDEZ, MARY ([id number]) on 7/3/2025 9:35:56 AM    EKG 12 Lead  Result Date: 7/3/2025  Normal sinus rhythm Minimal voltage criteria  for LVH, may be normal variant ( Bassam product ) Inferior infarct , age undetermined Possible Anterior infarct , age undetermined Abnormal ECG When compared with ECG of 27-MAY-2025 23:16, ST now depressed in Lateral leads T wave inversion now evident in Lateral leads Confirmed by SAGAR HERNANDEZ, HERNANDEZ ([id number]) on 7/3/2025 9:34:41 AM      Assessment   1.  Acute on chronic hypoxemic respiratory failure  2.  COPD with acute exacerbation  3.  Chronic diastolic CHF  4.  Right upper lobe lung nodule  5.  Small right pleural effusion     Plan   -Patient presents with evidence of worsening dyspnea.  May be multifactorial in nature with underlying COPD, CHF and small right pleural effusion.  - Wean steroid therapy  - Nebulizer treatments  - ICS/LABA/LAMA  - Recent CT chest on 5/28/2025 with right upper lobe nodule seen hypermetabolic on PET/CT with small right pleural effusion.    - CT chest on 7/3/2025 with no evidence of pulmonary embolism seen.  Right upper lobe nodule seen.  Pleural effusions small in nature right greater than left.  Chest x-ray revealed small right pleural effusion.  Patient has declined lung biopsy in the past.  Awaiting their follow-up with radiation oncology as outpatient.  Pleural effusion appears to be small at this time.  - Hold off antibiotic therapy from pulmonary perspective  - Increase activity as tolerated  - DVT prophylaxis: Heparin  - May be optimized for discharge if dyspnea improved over next 24 hours    Ruchi Senior, DO  Pulmonary Critical Care Medicine  [facility]        [1]   Current Facility-Administered Medications   Medication Dose Route Frequency    ipratropium-albuterol (Duoneb) 0.5-2.5 (3) MG/3ML inhalation solution 3 mL  3 mL Nebulization 2 times daily    methylPREDNISolone sodium succinate (Solu-MEDROL) injection 40 mg  40 mg Intravenous Q12H    acetaZOLAMIDE (Diamox) tab 500 mg  500 mg Oral Daily    albuterol (Ventolin) (2.5 MG/3ML) 0.083% nebulizer solution 2.5 mg  2.5 mg  Nebulization Q6H PRN    acetaminophen (Tylenol Extra Strength) tab 1,000 mg  1,000 mg Oral Q6H PRN    digoxin (Lanoxin) tab 125 mcg  125 mcg Oral Daily    dilTIAZem ER (Dilacor XR) 24 hr cap 360 mg  360 mg Oral Daily    fluticasone-salmeterol (Advair Diskus) 500-50 MCG/ACT inhaler 1 puff  1 puff Inhalation BID    umeclidinium bromide (Incruse Ellipta) 62.5 MCG/ACT inhaler 1 puff  1 puff Inhalation Daily    furosemide (Lasix) tab 80 mg  80 mg Oral BID (Diuretic)    traMADol (Ultram) tab 50 mg  50 mg Oral Q6H PRN    ondansetron (Zofran) 4 MG/2ML injection 4 mg  4 mg Intravenous Q6H PRN    morphINE PF 2 MG/ML injection 2 mg  2 mg Intravenous Q2H PRN    Or    morphINE PF 4 MG/ML injection 4 mg  4 mg Intravenous Q2H PRN    heparin (Porcine) 5000 UNIT/ML injection 5,000 Units  5,000 Units Subcutaneous BID    zolpidem (Ambien) tab 5 mg  5 mg Oral Nightly PRN    pantoprazole (Protonix) DR tab 40 mg  40 mg Oral QAM AC    alum-mag hydroxide-simethicone (Maalox) 200-200-20 MG/5ML oral suspension 30 mL  30 mL Oral QID PRN    guaiFENesin-codeine (Robitussin AC) 100-10 MG/5ML oral solution 5 mL  5 mL Oral Q4H PRN   [2]

## 2025-07-04 NOTE — PLAN OF CARE
Problem: CARDIOVASCULAR - ADULT  Goal: Maintains optimal cardiac output and hemodynamic stability  Description: INTERVENTIONS:  - Monitor vital signs, rhythm, and trends  - Monitor for bleeding, hypotension and signs of decreased cardiac output  - Evaluate effectiveness of vasoactive medications to optimize hemodynamic stability  - Monitor arterial and/or venous puncture sites for bleeding and/or hematoma  - Assess quality of pulses, skin color and temperature  - Assess for signs of decreased coronary artery perfusion - ex. Angina  - Evaluate fluid balance, assess for edema, trend weights  Outcome: Progressing  Goal: Absence of cardiac arrhythmias or at baseline  Description: INTERVENTIONS:  - Continuous cardiac monitoring, monitor vital signs, obtain 12 lead EKG if indicated  - Evaluate effectiveness of antiarrhythmic and heart rate control medications as ordered  - Initiate emergency measures for life threatening arrhythmias  - Monitor electrolytes and administer replacement therapy as ordered  Outcome: Progressing     Problem: RESPIRATORY - ADULT  Goal: Achieves optimal ventilation and oxygenation  Description: INTERVENTIONS:  - Assess for changes in respiratory status  - Assess for changes in mentation and behavior  - Position to facilitate oxygenation and minimize respiratory effort  - Oxygen supplementation based on oxygen saturation or ABGs  - Provide Smoking Cessation handout, if applicable  - Encourage broncho-pulmonary hygiene including cough, deep breathe, Incentive Spirometry  - Assess the need for suctioning and perform as needed  - Assess and instruct to report SOB or any respiratory difficulty  - Respiratory Therapy support as indicated  - Manage/alleviate anxiety  - Monitor for signs/symptoms of CO2 retention  Outcome: Progressing     Problem: METABOLIC/FLUID AND ELECTROLYTES - ADULT  Goal: Electrolytes maintained within normal limits  Description: INTERVENTIONS:  - Monitor labs and rhythm and  assess patient for signs and symptoms of electrolyte imbalances  - Administer electrolyte replacement as ordered  - Monitor response to electrolyte replacements, including rhythm and repeat lab results as appropriate  - Fluid restriction as ordered  - Instruct patient on fluid and nutrition restrictions as appropriate  Outcome: Progressing

## 2025-07-04 NOTE — PROGRESS NOTES
[facility]  part of [facility]     Hospitalist Progress Note     Yesenia Berkowitz Patient Status:  Inpatient    1940 MRN [id number]   Location [facility] 3W/SW Attending Lissy Blandon MD   Hosp Day # 1 PCP JO-ANN YANG MD     Subjective:     Patient resting in bed and in NAD. Saturating well on 4-5 L NC.   She stated she feels much better compared to on admission. No overnight events reported       Objective:    Review of Systems:   ROS completed; pertinent positive and negatives stated in subjective.      Vital signs:  Temp:  [97.4 °F (36.3 °C)-98.1 °F (36.7 °C)] 97.8 °F (36.6 °C)  Pulse:  [76-94] 92  Resp:  [16-20] 20  BP: (119-129)/(47-56) 119/53  SpO2:  [93 %-95 %] 94 %      Physical Exam:    Gen: NAD AO x3  Chest: decreased breath sounds  CVS: normal s1 and s2 RR  Abd: NABS soft NT ND  Neuro: CN 2-12 grossly intact  Ext: no edema in bilateral LE      Diagnostic Data:    Labs:  Recent Labs   Lab 25  0721   WBC 9.8 6.4   HGB 12.6 11.2*   MCV 96.6 93.7   .0 233.0       Recent Labs   Lab 25  0721 25  0627   * 209*  --    BUN 10 14  --    CREATSERUM 0.71 0.75  --    CA 9.1 8.6*  --     138  --    K 3.2* 3.5 3.6   CL 93* 95*  --    CO2 >40.0* 38.0*  --        Estimated Creatinine Clearance: 53 mL/min (based on SCr of 0.75 mg/dL).    No results for input(s): \"PTP\", \"INR\" in the last 168 hours.           Imaging: Imaging data reviewed in Epic.    Medications: Scheduled Medications[1]    Assessment & Plan:     Acute on chronic respiratory failure 2/2 COPD exacerbation  Imaging reviewed  Trop, EKG reviewed  pBNP WNL  Pulm on consult  Wean steroids  Continue nebs, ICS/LABA/LAMA  Hold abx terapy  Afib  HTN  Hx of HFpEF  Not in exacerbation  Continue home meds  Strict Is and Os  Daily weights  Hypokalemia  Replace per protocol  Monitor labs      Plan of care discussed with patient or family at bedside.      Supplementary  Documentation:     Quality:  DVT Prophylaxis: Heparin s/c  CODE status: DNAR/Select      Estimated date of discharge: TBD  Discharge is dependent on: clinical stability  At this point Ms. Berkowitz is expected to be discharge to: home    Dietitian Malnutrition Assessment    Evaluation for Malnutrition: Criteria for severe malnutrition diagnosis- chronic illness related to   Wt loss greater than 7.5% in 3 months., Wt loss greater than 10% in 6 months., Energy intake less than 75% for greater than 1 month.               RD Malnutrition Care Plan: Encouraged increased PO intake., Encouraged small frequent meals with emphasis on high calorie/high protein., Intiated ONS (oral nutritional supplements).    Body Fat/Muscle Mass: Moderate depletion body fat., Moderate depletion muscle mass. triceps region. temple region., clavicle region.    Physician Assessment     Patient has a diagnosis of severe malnutrition          **Certification      PHYSICIAN Certification of Need for Inpatient Hospitalization - Initial Certification    Patient will require inpatient services that will reasonably be expected to span two midnight's based on the clinical documentation in H+P.   Based on patients current state of illness, I anticipate that, after discharge, patient will require TBD.      Lissy Blandon MD  Hospitalist    MDM: High, I personally reviewed the available laboratories, imaging including CT, XR. I discussed the case with RN. I ordered laboratories, studies including AM labs.   Medical decision making high, risk is high.       The 21st Century Cures Act makes medical notes like these available to patients in the interest of transparency. Please be advised this is a medical document. Medical documents are intended to carry relevant information, facts as evident, and the clinical opinion of the practitioner. The medical note is intended as peer to peer communication and may appear blunt or direct. It is written in medical language and  may contain abbreviations or verbiage that are unfamiliar.        [1]    ipratropium-albuterol  3 mL Nebulization 2 times daily    methylPREDNISolone  40 mg Intravenous Q12H    acetaZOLAMIDE  500 mg Oral Daily    digoxin  125 mcg Oral Daily    dilTIAZem ER  360 mg Oral Daily    fluticasone-salmeterol  1 puff Inhalation BID    umeclidinium bromide  1 puff Inhalation Daily    furosemide  80 mg Oral BID (Diuretic)    heparin  5,000 Units Subcutaneous BID    pantoprazole  40 mg Oral QAM AC

## 2025-07-05 LAB
ALBUMIN SERPL-MCNC: 3.4 G/DL (ref 3.2–4.8)
ALBUMIN/GLOB SERPL: 1.5 {RATIO} (ref 1–2)
ALP LIVER SERPL-CCNC: 47 U/L (ref 55–142)
ALT SERPL-CCNC: 13 U/L (ref 10–49)
AST SERPL-CCNC: 18 U/L (ref ?–34)
BASOPHILS # BLD AUTO: 0.01 X10(3) UL (ref 0–0.2)
BASOPHILS NFR BLD AUTO: 0.1 %
BILIRUB SERPL-MCNC: 0.3 MG/DL (ref 0.2–1.1)
BUN BLD-MCNC: 26 MG/DL (ref 9–23)
BUN/CREAT SERPL: 37.7 (ref 10–20)
CALCIUM BLD-MCNC: 8.6 MG/DL (ref 8.7–10.4)
CHLORIDE SERPL-SCNC: 93 MMOL/L (ref 98–112)
CO2 SERPL-SCNC: >40 MMOL/L (ref 21–32)
CREAT BLD-MCNC: 0.69 MG/DL (ref 0.55–1.02)
DEPRECATED RDW RBC AUTO: 63.7 FL (ref 35.1–46.3)
EGFRCR SERPLBLD CKD-EPI 2021: 87 ML/MIN/1.73M2 (ref 60–?)
EOSINOPHIL # BLD AUTO: 0 X10(3) UL (ref 0–0.7)
EOSINOPHIL NFR BLD AUTO: 0 %
ERYTHROCYTE [DISTWIDTH] IN BLOOD BY AUTOMATED COUNT: 18.2 % (ref 11–15)
GLOBULIN PLAS-MCNC: 2.2 G/DL (ref 2–3.5)
GLUCOSE BLD-MCNC: 199 MG/DL (ref 70–99)
HCT VFR BLD AUTO: 33.3 % (ref 35–48)
HGB BLD-MCNC: 10.5 G/DL (ref 12–16)
IMM GRANULOCYTES # BLD AUTO: 0.1 X10(3) UL (ref 0–1)
IMM GRANULOCYTES NFR BLD: 0.7 %
LYMPHOCYTES # BLD AUTO: 0.55 X10(3) UL (ref 1–4)
LYMPHOCYTES NFR BLD AUTO: 3.7 %
MAGNESIUM SERPL-MCNC: 2.2 MG/DL (ref 1.6–2.6)
MCH RBC QN AUTO: 30.3 PG (ref 26–34)
MCHC RBC AUTO-ENTMCNC: 31.5 G/DL (ref 31–37)
MCV RBC AUTO: 96 FL (ref 80–100)
MONOCYTES # BLD AUTO: 0.78 X10(3) UL (ref 0.1–1)
MONOCYTES NFR BLD AUTO: 5.2 %
NEUTROPHILS # BLD AUTO: 13.49 X10 (3) UL (ref 1.5–7.7)
NEUTROPHILS # BLD AUTO: 13.49 X10(3) UL (ref 1.5–7.7)
NEUTROPHILS NFR BLD AUTO: 90.3 %
OSMOLALITY SERPL CALC.SUM OF ELEC: 294 MOSM/KG (ref 275–295)
PHOSPHATE SERPL-MCNC: 2.6 MG/DL (ref 2.4–5.1)
PLATELET # BLD AUTO: 216 10(3)UL (ref 150–450)
POTASSIUM SERPL-SCNC: 3.2 MMOL/L (ref 3.5–5.1)
PROT SERPL-MCNC: 5.6 G/DL (ref 5.7–8.2)
RBC # BLD AUTO: 3.47 X10(6)UL (ref 3.8–5.3)
SODIUM SERPL-SCNC: 137 MMOL/L (ref 136–145)
WBC # BLD AUTO: 14.9 X10(3) UL (ref 4–11)

## 2025-07-05 PROCEDURE — 99232 SBSQ HOSP IP/OBS MODERATE 35: CPT | Performed by: INTERNAL MEDICINE

## 2025-07-05 PROCEDURE — 99233 SBSQ HOSP IP/OBS HIGH 50: CPT | Performed by: HOSPITALIST

## 2025-07-05 RX ORDER — PREDNISONE 20 MG/1
40 TABLET ORAL
Status: DISCONTINUED | OUTPATIENT
Start: 2025-07-06 | End: 2025-07-07

## 2025-07-05 RX ORDER — POTASSIUM CHLORIDE 1500 MG/1
40 TABLET, EXTENDED RELEASE ORAL ONCE
Status: COMPLETED | OUTPATIENT
Start: 2025-07-05 | End: 2025-07-05

## 2025-07-05 NOTE — PROGRESS NOTES
[facility]  part of [facility]     Progress Note    Yesenia Berkowitz Patient Status:  Inpatient    1940 MRN [id number]   Location [facility] 3W/SW Attending Lissy Blandon MD   Hosp Day # 2 PCP JO-ANN YANG MD       Subjective:   Patient seen and examined.  Dyspnea overall slightly improved during hospital course.  Occasional episodes of dyspnea within last 24 hours.  Denies significant    Objective:   Blood pressure 117/48, pulse 81, temperature 97.5 °F (36.4 °C), temperature source Oral, resp. rate 18, weight 131 lb 4.8 oz (59.6 kg), SpO2 95%.  Intake/Output:   Last 3 shifts: I/O last 3 completed shifts:  In:  [P.O.:; I.V.:20]  Out:  [Urine:0]   This shift: I/O this shift:  In: 250 [P.O.:250]  Out: -      Vent Settings:      Hemodynamic parameters (last 24 hours):      Scheduled Meds: Current Hospital Medications[1]    Continuous Infusions: Medication Infusions[2]    Physical Exam  Constitutional: no acute distress  Eyes: PERRL  ENT: nares pateint  Neck: supple, no JVD  Cardio: RRR, S1 S2  Respiratory: Diminished expiratory breath sounds  GI: abdomen soft, non tender, active bowel sounds, no organomegaly  Extremities: no clubbing, cyanosis, edema  Neurologic: no gross motor deficits  Skin: warm, dry      Results:     Lab Results   Component Value Date    WBC 14.9 2025    HGB 10.5 2025    HCT 33.3 2025    .0 2025    CREATSERUM 0.69 2025    BUN 26 2025     2025    K 3.2 2025    CL 93 2025    CO2 >40.0 2025     2025    CA 8.6 2025    ALB 3.4 2025    ALKPHO 47 2025    BILT 0.3 2025    TP 5.6 2025    AST 18 2025    ALT 13 2025    MG 2.2 2025    PHOS 2.6 2025       No results found.              Assessment   1.  Acute on chronic hypoxemic respiratory failure  2.  COPD with acute exacerbation  3.  Chronic diastolic CHF  4.  Right upper  lobe lung nodule  5.  Small right pleural effusion     Plan   -Patient presents with evidence of worsening dyspnea.  May be multifactorial in nature with underlying COPD, CHF and small right pleural effusion.  - Wean steroid therapy.  Will transition to prednisone and taper over the course of next week  - Nebulizer treatments  - ICS/LABA/LAMA  - Recent CT chest on 5/28/2025 with right upper lobe nodule seen hypermetabolic on PET/CT with small right pleural effusion.    - CT chest on 7/3/2025 with no evidence of pulmonary embolism seen.  Right upper lobe nodule seen.  Pleural effusions small in nature right greater than left.  Chest x-ray revealed small right pleural effusion.  Patient has declined lung biopsy in the past.  Awaiting their follow-up with radiation oncology as outpatient.  Pleural effusion appears to be small at this time.  Repeat chest x-ray pending  - Hold off antibiotic therapy from pulmonary perspective  - Increase activity as tolerated  - DVT prophylaxis: Heparin  - May be optimized for discharge from pulmonary perspective by tomorrow if continued improvement no significant worsening seen on chest x-ray.      Ruchi Senior DO  Pulmonary Critical Care Medicine  [facility]          [1]   Current Facility-Administered Medications   Medication Dose Route Frequency    ipratropium-albuterol (Duoneb) 0.5-2.5 (3) MG/3ML inhalation solution 3 mL  3 mL Nebulization 2 times daily    methylPREDNISolone sodium succinate (Solu-MEDROL) injection 40 mg  40 mg Intravenous Q12H    acetaZOLAMIDE (Diamox) tab 500 mg  500 mg Oral Daily    albuterol (Ventolin) (2.5 MG/3ML) 0.083% nebulizer solution 2.5 mg  2.5 mg Nebulization Q6H PRN    acetaminophen (Tylenol Extra Strength) tab 1,000 mg  1,000 mg Oral Q6H PRN    digoxin (Lanoxin) tab 125 mcg  125 mcg Oral Daily    dilTIAZem ER (Dilacor XR) 24 hr cap 360 mg  360 mg Oral Daily    fluticasone-salmeterol (Advair Diskus) 500-50 MCG/ACT inhaler 1 puff  1 puff  Inhalation BID    umeclidinium bromide (Incruse Ellipta) 62.5 MCG/ACT inhaler 1 puff  1 puff Inhalation Daily    furosemide (Lasix) tab 80 mg  80 mg Oral BID (Diuretic)    traMADol (Ultram) tab 50 mg  50 mg Oral Q6H PRN    ondansetron (Zofran) 4 MG/2ML injection 4 mg  4 mg Intravenous Q6H PRN    morphINE PF 2 MG/ML injection 2 mg  2 mg Intravenous Q2H PRN    Or    morphINE PF 4 MG/ML injection 4 mg  4 mg Intravenous Q2H PRN    heparin (Porcine) 5000 UNIT/ML injection 5,000 Units  5,000 Units Subcutaneous BID    zolpidem (Ambien) tab 5 mg  5 mg Oral Nightly PRN    pantoprazole (Protonix) DR tab 40 mg  40 mg Oral QAM AC    alum-mag hydroxide-simethicone (Maalox) 200-200-20 MG/5ML oral suspension 30 mL  30 mL Oral QID PRN    guaiFENesin-codeine (Robitussin AC) 100-10 MG/5ML oral solution 5 mL  5 mL Oral Q4H PRN   [2]

## 2025-07-05 NOTE — PLAN OF CARE
Problem: Patient Centered Care  Goal: Patient preferences are identified and integrated in the patient's plan of care  Description: Interventions:  - What would you like us to know as we care for you? Home w/ son   - Provide timely, complete, and accurate information to patient/family  - Incorporate patient and family knowledge, values, beliefs, and cultural backgrounds into the planning and delivery of care  - Encourage patient/family to participate in care and decision-making at the level they choose  - Honor patient and family perspectives and choices  Outcome: Progressing     Problem: CARDIOVASCULAR - ADULT  Goal: Maintains optimal cardiac output and hemodynamic stability  Description: INTERVENTIONS:  - Monitor vital signs, rhythm, and trends  - Monitor for bleeding, hypotension and signs of decreased cardiac output  - Evaluate effectiveness of vasoactive medications to optimize hemodynamic stability  - Monitor arterial and/or venous puncture sites for bleeding and/or hematoma  - Assess quality of pulses, skin color and temperature  - Assess for signs of decreased coronary artery perfusion - ex. Angina  - Evaluate fluid balance, assess for edema, trend weights  Outcome: Progressing  Goal: Absence of cardiac arrhythmias or at baseline  Description: INTERVENTIONS:  - Continuous cardiac monitoring, monitor vital signs, obtain 12 lead EKG if indicated  - Evaluate effectiveness of antiarrhythmic and heart rate control medications as ordered  - Initiate emergency measures for life threatening arrhythmias  - Monitor electrolytes and administer replacement therapy as ordered  Outcome: Progressing     Problem: SAFETY ADULT - FALL  Goal: Free from fall injury  Description: INTERVENTIONS:  - Assess pt frequently for physical needs  - Identify cognitive and physical deficits and behaviors that affect risk of falls.  - [place] fall precautions as indicated by assessment.  - Educate pt/family on patient safety including  physical limitations  - Instruct pt to call for assistance with activity based on assessment  - Modify environment to reduce risk of injury  - Provide assistive devices as appropriate  - Consider OT/PT consult to assist with strengthening/mobility  - Encourage toileting schedule  Outcome: Progressing     Problem: METABOLIC/FLUID AND ELECTROLYTES - ADULT  Goal: Electrolytes maintained within normal limits  Description: INTERVENTIONS:  - Monitor labs and rhythm and assess patient for signs and symptoms of electrolyte imbalances  - Administer electrolyte replacement as ordered  - Monitor response to electrolyte replacements, including rhythm and repeat lab results as appropriate  - Fluid restriction as ordered  - Instruct patient on fluid and nutrition restrictions as appropriate  Outcome: Progressing     Problem: HEMATOLOGIC - ADULT  Goal: Maintains hematologic stability  Description: INTERVENTIONS  - Assess for signs and symptoms of bleeding or hemorrhage  - Monitor labs and vital signs for trends  - Administer supportive blood products/factors, fluids and medications as ordered and appropriate  - Administer supportive blood products/factors as ordered and appropriate  Outcome: Progressing  Goal: Free from bleeding injury  Description: (Example usage: patient with low platelets)  INTERVENTIONS:  - Avoid intramuscular injections, enemas and rectal medication administration  - Ensure safe mobilization of patient  - Hold pressure on venipuncture sites to achieve adequate hemostasis  - Assess for signs and symptoms of internal bleeding  - Monitor lab trends  - Patient is to report abnormal signs of bleeding to staff  - Avoid use of toothpicks and dental floss  - Use electric shaver for shaving  - Use soft bristle tooth brush  - Limit straining and forceful nose blowing  Outcome: Progressing     Problem: MUSCULOSKELETAL - ADULT  Goal: Return mobility to safest level of function  Description: INTERVENTIONS:  - Assess  patient stability and activity tolerance for standing, transferring and ambulating w/ or w/o assistive devices  - Assist with transfers and ambulation using safe patient handling equipment as needed  - Ensure adequate protection for wounds/incisions during mobilization  - Obtain PT/OT consults as needed  - Advance activity as appropriate  - Communicate ordered activity level and limitations with patient/family  Outcome: Progressing     Problem: RESPIRATORY - ADULT  Goal: Achieves optimal ventilation and oxygenation  Description: INTERVENTIONS:  - Assess for changes in respiratory status  - Assess for changes in mentation and behavior  - Position to facilitate oxygenation and minimize respiratory effort  - Oxygen supplementation based on oxygen saturation or ABGs  - Provide Smoking Cessation handout, if applicable  - Encourage broncho-pulmonary hygiene including cough, deep breathe, Incentive Spirometry  - Assess the need for suctioning and perform as needed  - Assess and instruct to report SOB or any respiratory difficulty  - Respiratory Therapy support as indicated  - Manage/alleviate anxiety  - Monitor for signs/symptoms of CO2 retention  Outcome: Not Progressing     Problem: PAIN - ADULT  Goal: Verbalizes/displays adequate comfort level or patient's stated pain goal  Description: INTERVENTIONS:  - Encourage pt to monitor pain and request assistance  - Assess pain using appropriate pain scale  - Administer analgesics based on type and severity of pain and evaluate response  - Implement non-pharmacological measures as appropriate and evaluate response  - Consider cultural and social influences on pain and pain management  - Manage/alleviate anxiety  - Utilize distraction and/or relaxation techniques  - Monitor for opioid side effects  - Notify MD/LIP if interventions unsuccessful or patient reports new pain  - Anticipate increased pain with activity and pre-medicate as appropriate  Outcome: Not Progressing

## 2025-07-05 NOTE — PLAN OF CARE
AxOx4, 4L NC baseline, NSR, Cont x2, x1 walker  PLAN: Start PO prednisone tomorrow, Bipap PRN, discharge when medically cleared   Problem: Patient Centered Care  Goal: Patient preferences are identified and integrated in the patient's plan of care  Description: Interventions:  - What would you like us to know as we care for you? From home   - Provide timely, complete, and accurate information to patient/family  - Incorporate patient and family knowledge, values, beliefs, and cultural backgrounds into the planning and delivery of care  - Encourage patient/family to participate in care and decision-making at the level they choose  - Honor patient and family perspectives and choices  Outcome: Progressing     Problem: Patient/Family Goals  Goal: Patient/Family Long Term Goal  Description: Patient's Long Term Goal: go home     Interventions:  - go home  - See additional Care Plan goals for specific interventions  Outcome: Progressing  Goal: Patient/Family Short Term Goal  Description: Patient's Short Term Goal: improve breathing     Interventions:   - O2   -PO/IV steroids   - See additional Care Plan goals for specific interventions  Outcome: Progressing     Problem: CARDIOVASCULAR - ADULT  Goal: Maintains optimal cardiac output and hemodynamic stability  Description: INTERVENTIONS:  - Monitor vital signs, rhythm, and trends  - Monitor for bleeding, hypotension and signs of decreased cardiac output  - Evaluate effectiveness of vasoactive medications to optimize hemodynamic stability  - Monitor arterial and/or venous puncture sites for bleeding and/or hematoma  - Assess quality of pulses, skin color and temperature  - Assess for signs of decreased coronary artery perfusion - ex. Angina  - Evaluate fluid balance, assess for edema, trend weights  Outcome: Progressing  Goal: Absence of cardiac arrhythmias or at baseline  Description: INTERVENTIONS:  - Continuous cardiac monitoring, monitor vital signs, obtain 12 lead EKG if  indicated  - Evaluate effectiveness of antiarrhythmic and heart rate control medications as ordered  - Initiate emergency measures for life threatening arrhythmias  - Monitor electrolytes and administer replacement therapy as ordered  Outcome: Progressing     Problem: RESPIRATORY - ADULT  Goal: Achieves optimal ventilation and oxygenation  Description: INTERVENTIONS:  - Assess for changes in respiratory status  - Assess for changes in mentation and behavior  - Position to facilitate oxygenation and minimize respiratory effort  - Oxygen supplementation based on oxygen saturation or ABGs  - Provide Smoking Cessation handout, if applicable  - Encourage broncho-pulmonary hygiene including cough, deep breathe, Incentive Spirometry  - Assess the need for suctioning and perform as needed  - Assess and instruct to report SOB or any respiratory difficulty  - Respiratory Therapy support as indicated  - Manage/alleviate anxiety  - Monitor for signs/symptoms of CO2 retention  Outcome: Progressing     Problem: METABOLIC/FLUID AND ELECTROLYTES - ADULT  Goal: Electrolytes maintained within normal limits  Description: INTERVENTIONS:  - Monitor labs and rhythm and assess patient for signs and symptoms of electrolyte imbalances  - Administer electrolyte replacement as ordered  - Monitor response to electrolyte replacements, including rhythm and repeat lab results as appropriate  - Fluid restriction as ordered  - Instruct patient on fluid and nutrition restrictions as appropriate  Outcome: Progressing     Problem: HEMATOLOGIC - ADULT  Goal: Maintains hematologic stability  Description: INTERVENTIONS  - Assess for signs and symptoms of bleeding or hemorrhage  - Monitor labs and vital signs for trends  - Administer supportive blood products/factors, fluids and medications as ordered and appropriate  - Administer supportive blood products/factors as ordered and appropriate  Outcome: Progressing  Goal: Free from bleeding  injury  Description: (Example usage: patient with low platelets)  INTERVENTIONS:  - Avoid intramuscular injections, enemas and rectal medication administration  - Ensure safe mobilization of patient  - Hold pressure on venipuncture sites to achieve adequate hemostasis  - Assess for signs and symptoms of internal bleeding  - Monitor lab trends  - Patient is to report abnormal signs of bleeding to staff  - Avoid use of toothpicks and dental floss  - Use electric shaver for shaving  - Use soft bristle tooth brush  - Limit straining and forceful nose blowing  Outcome: Progressing     Problem: MUSCULOSKELETAL - ADULT  Goal: Return mobility to safest level of function  Description: INTERVENTIONS:  - Assess patient stability and activity tolerance for standing, transferring and ambulating w/ or w/o assistive devices  - Assist with transfers and ambulation using safe patient handling equipment as needed  - Ensure adequate protection for wounds/incisions during mobilization  - Obtain PT/OT consults as needed  - Advance activity as appropriate  - Communicate ordered activity level and limitations with patient/family  Outcome: Progressing     Problem: PAIN - ADULT  Goal: Verbalizes/displays adequate comfort level or patient's stated pain goal  Description: INTERVENTIONS:  - Encourage pt to monitor pain and request assistance  - Assess pain using appropriate pain scale  - Administer analgesics based on type and severity of pain and evaluate response  - Implement non-pharmacological measures as appropriate and evaluate response  - Consider cultural and social influences on pain and pain management  - Manage/alleviate anxiety  - Utilize distraction and/or relaxation techniques  - Monitor for opioid side effects  - Notify MD/LIP if interventions unsuccessful or patient reports new pain  - Anticipate increased pain with activity and pre-medicate as appropriate  Outcome: Progressing     Problem: SAFETY ADULT - FALL  Goal: Free from  fall injury  Description: INTERVENTIONS:  - Assess pt frequently for physical needs  - Identify cognitive and physical deficits and behaviors that affect risk of falls.  - [place] fall precautions as indicated by assessment.  - Educate pt/family on patient safety including physical limitations  - Instruct pt to call for assistance with activity based on assessment  - Modify environment to reduce risk of injury  - Provide assistive devices as appropriate  - Consider OT/PT consult to assist with strengthening/mobility  - Encourage toileting schedule  Outcome: Progressing

## 2025-07-05 NOTE — PROGRESS NOTES
[facility]  part of [facility]ist Progress Note     Yesenia Berkowitz Patient Status:  Inpatient    1940 MRN [id number]   Location [facility] 3W/SW Attending Sina Hogan MD   Hosp Day # 2 PCP JO-ANN YANG MD     Subjective:     Pt was seen and examined  Sitting up in bed, still with mild SOB  No cp, f,c,n,v, abd pain or HA  Cough persists       Objective:    Review of Systems:   ROS completed; pertinent positive and negatives stated in subjective.      Vital signs:  Temp:  [97.5 °F (36.4 °C)-98.5 °F (36.9 °C)] 97.7 °F (36.5 °C)  Pulse:  [78-90] 81  Resp:  [18-20] 18  BP: (117-142)/(48-75) 122/51  SpO2:  [94 %-96 %] 95 %      Physical Exam:    Gen: NAD AO x3  Chest: decreased air exchange b/l  CVS: normal s1 and s2 RR  Abd: NABS soft NT ND  Neuro: CN 2-12 grossly intact  Ext: no edema in bilateral LE      Diagnostic Data:    Labs:  Recent Labs   Lab 25   WBC 9.8 6.4 14.9*   HGB 12.6 11.2* 10.5*   MCV 96.6 93.7 96.0   .0 233.0 216.0       Recent Labs   Lab 25  05   * 209*  --  199*   BUN 10 14  --  26*   CREATSERUM 0.71 0.75  --  0.69   CA 9.1 8.6*  --  8.6*   ALB  --   --   --  3.4    138  --  137   K 3.2* 3.5 3.6 3.2*   CL 93* 95*  --  93*   CO2 >40.0* 38.0*  --  >40.0*   ALKPHO  --   --   --  47*   AST  --   --   --  18   ALT  --   --   --  13   BILT  --   --   --  0.3   TP  --   --   --  5.6*       Estimated Creatinine Clearance: 59.1 mL/min (based on SCr of 0.69 mg/dL).    No results for input(s): \"PTP\", \"INR\" in the last 168 hours.           Imaging: Imaging data reviewed in Epic.    Medications: Scheduled Medications[1]    Assessment & Plan:     Acute on chronic respiratory failure 2/2 COPD exacerbation  Imaging reviewed  Trop, EKG reviewed  pBNP WNL  Pulm on consult  Wean steroids, likely switch to PO tomorrow   Continue nebs,  ICS/LABA/LAMA  Hold abx terapy  Afib  HTN  Hx of HFpEF  Not in exacerbation  Continue home meds  Strict Is and Os  Daily weights  Hypokalemia  Replace per protocol  Monitor labs      Plan of care discussed with patient or family at bedside.      Supplementary Documentation:     Quality:  DVT Prophylaxis: Heparin s/c  CODE status: DNAR/Select      Estimated date of discharge: TBD  Discharge is dependent on: clinical stability  At this point Ms. Berkowitz is expected to be discharge to: home    Dietitian Malnutrition Assessment    Evaluation for Malnutrition: Criteria for severe malnutrition diagnosis- chronic illness related to   Wt loss greater than 7.5% in 3 months., Wt loss greater than 10% in 6 months., Energy intake less than 75% for greater than 1 month.               RD Malnutrition Care Plan: Encouraged increased PO intake., Encouraged small frequent meals with emphasis on high calorie/high protein., Intiated ONS (oral nutritional supplements).    Body Fat/Muscle Mass: Moderate depletion body fat., Moderate depletion muscle mass. triceps region. temple region., clavicle region.    Physician Assessment     Patient has a diagnosis of severe malnutrition      MDM: High         [1]    [START ON 7/6/2025] predniSONE  40 mg Oral Daily with breakfast    ipratropium-albuterol  3 mL Nebulization 2 times daily    acetaZOLAMIDE  500 mg Oral Daily    digoxin  125 mcg Oral Daily    dilTIAZem ER  360 mg Oral Daily    fluticasone-salmeterol  1 puff Inhalation BID    umeclidinium bromide  1 puff Inhalation Daily    furosemide  80 mg Oral BID (Diuretic)    heparin  5,000 Units Subcutaneous BID    pantoprazole  40 mg Oral QAM AC

## 2025-07-05 NOTE — PROGRESS NOTES
Pt reported tingling sensation to LUE; moderate strength hand . MD notified. Per MD reassess and continue to monitor. Warm pack applied to extremity. Pt reported improvement of symptoms.

## 2025-07-06 ENCOUNTER — APPOINTMENT (OUTPATIENT)
Dept: GENERAL RADIOLOGY | Facility: HOSPITAL | Age: 85
End: 2025-07-06
Attending: INTERNAL MEDICINE
Payer: MEDICARE

## 2025-07-06 LAB — POTASSIUM SERPL-SCNC: 3.5 MMOL/L (ref 3.5–5.1)

## 2025-07-06 PROCEDURE — 99233 SBSQ HOSP IP/OBS HIGH 50: CPT | Performed by: HOSPITALIST

## 2025-07-06 PROCEDURE — 99232 SBSQ HOSP IP/OBS MODERATE 35: CPT | Performed by: INTERNAL MEDICINE

## 2025-07-06 PROCEDURE — 71045 X-RAY EXAM CHEST 1 VIEW: CPT | Performed by: RADIOLOGY

## 2025-07-06 RX ORDER — HYDROXYZINE HYDROCHLORIDE 25 MG/1
25 TABLET, FILM COATED ORAL EVERY 6 HOURS PRN
Status: DISCONTINUED | OUTPATIENT
Start: 2025-07-06 | End: 2025-07-07

## 2025-07-06 NOTE — PLAN OF CARE
AxOx4, 4L NC baseline, NSR, Cont x2, x1 walker  PLAN: Start PO today, possible discharge   Problem: Patient Centered Care  Goal: Patient preferences are identified and integrated in the patient's plan of care  Description: Interventions:  - What would you like us to know as we care for you? From home   - Provide timely, complete, and accurate information to patient/family  - Incorporate patient and family knowledge, values, beliefs, and cultural backgrounds into the planning and delivery of care  - Encourage patient/family to participate in care and decision-making at the level they choose  - Honor patient and family perspectives and choices  Outcome: Progressing     Problem: Patient/Family Goals  Goal: Patient/Family Long Term Goal  Description: Patient's Long Term Goal: go home     Interventions:  - go home  - See additional Care Plan goals for specific interventions  Outcome: Progressing  Goal: Patient/Family Short Term Goal  Description: Patient's Short Term Goal: improve breathing     Interventions:   - O2   -PO/IV steroids   - See additional Care Plan goals for specific interventions  Outcome: Progressing     Problem: CARDIOVASCULAR - ADULT  Goal: Maintains optimal cardiac output and hemodynamic stability  Description: INTERVENTIONS:  - Monitor vital signs, rhythm, and trends  - Monitor for bleeding, hypotension and signs of decreased cardiac output  - Evaluate effectiveness of vasoactive medications to optimize hemodynamic stability  - Monitor arterial and/or venous puncture sites for bleeding and/or hematoma  - Assess quality of pulses, skin color and temperature  - Assess for signs of decreased coronary artery perfusion - ex. Angina  - Evaluate fluid balance, assess for edema, trend weights  Outcome: Progressing  Goal: Absence of cardiac arrhythmias or at baseline  Description: INTERVENTIONS:  - Continuous cardiac monitoring, monitor vital signs, obtain 12 lead EKG if indicated  - Evaluate effectiveness of  antiarrhythmic and heart rate control medications as ordered  - Initiate emergency measures for life threatening arrhythmias  - Monitor electrolytes and administer replacement therapy as ordered  Outcome: Progressing     Problem: RESPIRATORY - ADULT  Goal: Achieves optimal ventilation and oxygenation  Description: INTERVENTIONS:  - Assess for changes in respiratory status  - Assess for changes in mentation and behavior  - Position to facilitate oxygenation and minimize respiratory effort  - Oxygen supplementation based on oxygen saturation or ABGs  - Provide Smoking Cessation handout, if applicable  - Encourage broncho-pulmonary hygiene including cough, deep breathe, Incentive Spirometry  - Assess the need for suctioning and perform as needed  - Assess and instruct to report SOB or any respiratory difficulty  - Respiratory Therapy support as indicated  - Manage/alleviate anxiety  - Monitor for signs/symptoms of CO2 retention  Outcome: Progressing     Problem: METABOLIC/FLUID AND ELECTROLYTES - ADULT  Goal: Electrolytes maintained within normal limits  Description: INTERVENTIONS:  - Monitor labs and rhythm and assess patient for signs and symptoms of electrolyte imbalances  - Administer electrolyte replacement as ordered  - Monitor response to electrolyte replacements, including rhythm and repeat lab results as appropriate  - Fluid restriction as ordered  - Instruct patient on fluid and nutrition restrictions as appropriate  Outcome: Progressing     Problem: HEMATOLOGIC - ADULT  Goal: Maintains hematologic stability  Description: INTERVENTIONS  - Assess for signs and symptoms of bleeding or hemorrhage  - Monitor labs and vital signs for trends  - Administer supportive blood products/factors, fluids and medications as ordered and appropriate  - Administer supportive blood products/factors as ordered and appropriate  Outcome: Progressing  Goal: Free from bleeding injury  Description: (Example usage: patient with low  platelets)  INTERVENTIONS:  - Avoid intramuscular injections, enemas and rectal medication administration  - Ensure safe mobilization of patient  - Hold pressure on venipuncture sites to achieve adequate hemostasis  - Assess for signs and symptoms of internal bleeding  - Monitor lab trends  - Patient is to report abnormal signs of bleeding to staff  - Avoid use of toothpicks and dental floss  - Use electric shaver for shaving  - Use soft bristle tooth brush  - Limit straining and forceful nose blowing  Outcome: Progressing     Problem: MUSCULOSKELETAL - ADULT  Goal: Return mobility to safest level of function  Description: INTERVENTIONS:  - Assess patient stability and activity tolerance for standing, transferring and ambulating w/ or w/o assistive devices  - Assist with transfers and ambulation using safe patient handling equipment as needed  - Ensure adequate protection for wounds/incisions during mobilization  - Obtain PT/OT consults as needed  - Advance activity as appropriate  - Communicate ordered activity level and limitations with patient/family  Outcome: Progressing     Problem: PAIN - ADULT  Goal: Verbalizes/displays adequate comfort level or patient's stated pain goal  Description: INTERVENTIONS:  - Encourage pt to monitor pain and request assistance  - Assess pain using appropriate pain scale  - Administer analgesics based on type and severity of pain and evaluate response  - Implement non-pharmacological measures as appropriate and evaluate response  - Consider cultural and social influences on pain and pain management  - Manage/alleviate anxiety  - Utilize distraction and/or relaxation techniques  - Monitor for opioid side effects  - Notify MD/LIP if interventions unsuccessful or patient reports new pain  - Anticipate increased pain with activity and pre-medicate as appropriate  Outcome: Progressing     Problem: SAFETY ADULT - FALL  Goal: Free from fall injury  Description: INTERVENTIONS:  - Assess pt  frequently for physical needs  - Identify cognitive and physical deficits and behaviors that affect risk of falls.  - [place] fall precautions as indicated by assessment.  - Educate pt/family on patient safety including physical limitations  - Instruct pt to call for assistance with activity based on assessment  - Modify environment to reduce risk of injury  - Provide assistive devices as appropriate  - Consider OT/PT consult to assist with strengthening/mobility  - Encourage toileting schedule  Outcome: Progressing

## 2025-07-06 NOTE — PLAN OF CARE
Problem: Patient Centered Care  Goal: Patient preferences are identified and integrated in the patient's plan of care  Description: Interventions:  - What would you like us to know as we care for you? From home   - Provide timely, complete, and accurate information to patient/family  - Incorporate patient and family knowledge, values, beliefs, and cultural backgrounds into the planning and delivery of care  - Encourage patient/family to participate in care and decision-making at the level they choose  - Honor patient and family perspectives and choices  Outcome: Progressing     Problem: Patient/Family Goals  Goal: Patient/Family Long Term Goal  Description: Patient's Long Term Goal: go home     Interventions:  - go home  - See additional Care Plan goals for specific interventions  Outcome: Progressing  Goal: Patient/Family Short Term Goal  Description: Patient's Short Term Goal: improve breathing     Interventions:   - O2   -PO/IV steroids   - See additional Care Plan goals for specific interventions  Outcome: Progressing     Problem: CARDIOVASCULAR - ADULT  Goal: Maintains optimal cardiac output and hemodynamic stability  Description: INTERVENTIONS:  - Monitor vital signs, rhythm, and trends  - Monitor for bleeding, hypotension and signs of decreased cardiac output  - Evaluate effectiveness of vasoactive medications to optimize hemodynamic stability  - Monitor arterial and/or venous puncture sites for bleeding and/or hematoma  - Assess quality of pulses, skin color and temperature  - Assess for signs of decreased coronary artery perfusion - ex. Angina  - Evaluate fluid balance, assess for edema, trend weights  Outcome: Progressing  Goal: Absence of cardiac arrhythmias or at baseline  Description: INTERVENTIONS:  - Continuous cardiac monitoring, monitor vital signs, obtain 12 lead EKG if indicated  - Evaluate effectiveness of antiarrhythmic and heart rate control medications as ordered  - Initiate emergency measures  for life threatening arrhythmias  - Monitor electrolytes and administer replacement therapy as ordered  Outcome: Progressing     Problem: RESPIRATORY - ADULT  Goal: Achieves optimal ventilation and oxygenation  Description: INTERVENTIONS:  - Assess for changes in respiratory status  - Assess for changes in mentation and behavior  - Position to facilitate oxygenation and minimize respiratory effort  - Oxygen supplementation based on oxygen saturation or ABGs  - Provide Smoking Cessation handout, if applicable  - Encourage broncho-pulmonary hygiene including cough, deep breathe, Incentive Spirometry  - Assess the need for suctioning and perform as needed  - Assess and instruct to report SOB or any respiratory difficulty  - Respiratory Therapy support as indicated  - Manage/alleviate anxiety  - Monitor for signs/symptoms of CO2 retention  Outcome: Progressing     Problem: PAIN - ADULT  Goal: Verbalizes/displays adequate comfort level or patient's stated pain goal  Description: INTERVENTIONS:  - Encourage pt to monitor pain and request assistance  - Assess pain using appropriate pain scale  - Administer analgesics based on type and severity of pain and evaluate response  - Implement non-pharmacological measures as appropriate and evaluate response  - Consider cultural and social influences on pain and pain management  - Manage/alleviate anxiety  - Utilize distraction and/or relaxation techniques  - Monitor for opioid side effects  - Notify MD/LIP if interventions unsuccessful or patient reports new pain  - Anticipate increased pain with activity and pre-medicate as appropriate  Outcome: Progressing     Problem: SAFETY ADULT - FALL  Goal: Free from fall injury  Description: INTERVENTIONS:  - Assess pt frequently for physical needs  - Identify cognitive and physical deficits and behaviors that affect risk of falls.  - [place] fall precautions as indicated by assessment.  - Educate pt/family on patient safety including  physical limitations  - Instruct pt to call for assistance with activity based on assessment  - Modify environment to reduce risk of injury  - Provide assistive devices as appropriate  - Consider OT/PT consult to assist with strengthening/mobility  - Encourage toileting schedule  Outcome: Progressing     Problem: METABOLIC/FLUID AND ELECTROLYTES - ADULT  Goal: Electrolytes maintained within normal limits  Description: INTERVENTIONS:  - Monitor labs and rhythm and assess patient for signs and symptoms of electrolyte imbalances  - Administer electrolyte replacement as ordered  - Monitor response to electrolyte replacements, including rhythm and repeat lab results as appropriate  - Fluid restriction as ordered  - Instruct patient on fluid and nutrition restrictions as appropriate  Outcome: Progressing     Problem: HEMATOLOGIC - ADULT  Goal: Maintains hematologic stability  Description: INTERVENTIONS  - Assess for signs and symptoms of bleeding or hemorrhage  - Monitor labs and vital signs for trends  - Administer supportive blood products/factors, fluids and medications as ordered and appropriate  - Administer supportive blood products/factors as ordered and appropriate  Outcome: Progressing  Goal: Free from bleeding injury  Description: (Example usage: patient with low platelets)  INTERVENTIONS:  - Avoid intramuscular injections, enemas and rectal medication administration  - Ensure safe mobilization of patient  - Hold pressure on venipuncture sites to achieve adequate hemostasis  - Assess for signs and symptoms of internal bleeding  - Monitor lab trends  - Patient is to report abnormal signs of bleeding to staff  - Avoid use of toothpicks and dental floss  - Use electric shaver for shaving  - Use soft bristle tooth brush  - Limit straining and forceful nose blowing  Outcome: Progressing     Problem: MUSCULOSKELETAL - ADULT  Goal: Return mobility to safest level of function  Description: INTERVENTIONS:  - Assess  patient stability and activity tolerance for standing, transferring and ambulating w/ or w/o assistive devices  - Assist with transfers and ambulation using safe patient handling equipment as needed  - Ensure adequate protection for wounds/incisions during mobilization  - Obtain PT/OT consults as needed  - Advance activity as appropriate  - Communicate ordered activity level and limitations with patient/family  Outcome: Progressing

## 2025-07-06 NOTE — PROGRESS NOTES
[facility]  part of [facility]     Hospitalist Progress Note     Yesenia Berkowitz Patient Status:  Inpatient    1940 MRN [id number]   Location [facility] 3W/SW Attending Sina Hogan MD   Hosp Day # 3 PCP JO-ANN YANG MD     Subjective:     Pt was seen and examined  Sitting up in bed, still with mild SOB  Also c/o itching today, generalized   Cough persists       Objective:    Review of Systems:   ROS completed; pertinent positive and negatives stated in subjective.      Vital signs:  Temp:  [98 °F (36.7 °C)-98.4 °F (36.9 °C)] 98 °F (36.7 °C)  Pulse:  [75-86] 86  Resp:  [18-20] 18  BP: (111-127)/(35-56) 115/54  SpO2:  [95 %-97 %] 96 %      Physical Exam:    Gen: NAD AO x3  Chest: decreased air exchange b/l  CVS: normal s1 and s2 RR  Abd: NABS soft NT ND  Neuro: CN 2-12 grossly intact  Ext: no edema in bilateral LE      Diagnostic Data:    Labs:  Recent Labs   Lab 2524   WBC 9.8 6.4 14.9*   HGB 12.6 11.2* 10.5*   MCV 96.6 93.7 96.0   .0 233.0 216.0       Recent Labs   Lab 25  0525  0654   * 209*  --  199*  --    BUN 10 14  --  26*  --    CREATSERUM 0.71 0.75  --  0.69  --    CA 9.1 8.6*  --  8.6*  --    ALB  --   --   --  3.4  --     138  --  137  --    K 3.2* 3.5 3.6 3.2* 3.5   CL 93* 95*  --  93*  --    CO2 >40.0* 38.0*  --  >40.0*  --    ALKPHO  --   --   --  47*  --    AST  --   --   --  18  --    ALT  --   --   --  13  --    BILT  --   --   --  0.3  --    TP  --   --   --  5.6*  --        Estimated Creatinine Clearance: 59.6 mL/min (based on SCr of 0.69 mg/dL).    No results for input(s): \"PTP\", \"INR\" in the last 168 hours.           Imaging: Imaging data reviewed in Epic.    Medications: Scheduled Medications[1]    Assessment & Plan:     Acute on chronic respiratory failure 2/2 COPD exacerbation  Imaging reviewed  Trop, EKG reviewed  pBNP WNL  Pulm on  consult  Wean steroids, switched to today  Continue nebs, ICS/LABA/LAMA  Hold abx terapy  Also consider anxiety component, added atarax  Afib  HTN  Hx of HFpEF  Not in exacerbation  Continue home meds  Strict Is and Os  Daily weights  Hypokalemia  Replace per protocol  Monitor labs      Plan of care discussed with patient or family at bedside.      Supplementary Documentation:     Quality:  DVT Prophylaxis: Heparin s/c  CODE status: DNAR/Select      Estimated date of discharge: TBD  Discharge is dependent on: clinical stability  At this point Ms. Berkowitz is expected to be discharge to: home    Dietitian Malnutrition Assessment    Evaluation for Malnutrition: Criteria for severe malnutrition diagnosis- chronic illness related to   Wt loss greater than 7.5% in 3 months., Wt loss greater than 10% in 6 months., Energy intake less than 75% for greater than 1 month.               RD Malnutrition Care Plan: Encouraged increased PO intake., Encouraged small frequent meals with emphasis on high calorie/high protein., Intiated ONS (oral nutritional supplements).    Body Fat/Muscle Mass: Moderate depletion body fat., Moderate depletion muscle mass. triceps region. temple region., clavicle region.    Physician Assessment     Patient has a diagnosis of severe malnutrition      MDM: High         [1]    predniSONE  40 mg Oral Daily with breakfast    ipratropium-albuterol  3 mL Nebulization 2 times daily    acetaZOLAMIDE  500 mg Oral Daily    digoxin  125 mcg Oral Daily    dilTIAZem ER  360 mg Oral Daily    fluticasone-salmeterol  1 puff Inhalation BID    umeclidinium bromide  1 puff Inhalation Daily    furosemide  80 mg Oral BID (Diuretic)    heparin  5,000 Units Subcutaneous BID    pantoprazole  40 mg Oral QAM AC

## 2025-07-06 NOTE — PROGRESS NOTES
[facility]  part of [facility]     Progress Note    Yesenia Berkowitz Patient Status:  Inpatient    1940 MRN [id number]   Location [facility] 3W/SW Attending Lissy Blandon MD   Hosp Day # 3 PCP JO-ANN YANG MD       Subjective:   Patient seen and examined.  Patient episodes of dyspnea.  Denies significant wheezing.    Objective:   Blood pressure 115/54, pulse 86, temperature 98 °F (36.7 °C), temperature source Oral, resp. rate 18, weight 132 lb 8 oz (60.1 kg), SpO2 96%.  Intake/Output:   Last 3 shifts: I/O last 3 completed shifts:  In: 980 [P.O.:970; I.V.:10]  Out:  [Urine:]   This shift: I/O this shift:  In: 300 [P.O.:300]  Out: -      Vent Settings:      Hemodynamic parameters (last 24 hours):      Scheduled Meds: Current Hospital Medications[1]    Continuous Infusions: Medication Infusions[2]    Physical Exam  Constitutional: no acute distress  Eyes: PERRL  ENT: nares pateint  Neck: supple, no JVD  Cardio: RRR, S1 S2  Respiratory: Diminished expiratory breath sounds  GI: abdomen soft, non tender, active bowel sounds, no organomegaly  Extremities: no clubbing, cyanosis, edema  Neurologic: no gross motor deficits  Skin: warm, dry      Results:     Lab Results   Component Value Date    K 3.5 2025       No results found.              Assessment   1.  Acute on chronic hypoxemic respiratory failure  2.  COPD with acute exacerbation  3.  Chronic diastolic CHF  4.  Right upper lobe lung nodule  5.  Small right pleural effusion     Plan   -Patient presents with evidence of worsening dyspnea.  May be multifactorial in nature with underlying COPD, CHF and small right pleural effusion.  - Wean steroid therapy.    - Nebulizer treatments  - ICS/LABA/LAMA  - Recent CT chest on 2025 with right upper lobe nodule seen hypermetabolic on PET/CT with small right pleural effusion.    - CT chest on 7/3/2025 with no evidence of pulmonary embolism seen.  Right upper lobe nodule seen.   Pleural effusions small in nature right greater than left.  Chest x-ray revealed small right pleural effusion.  Patient has declined lung biopsy in the past.  Awaiting their follow-up with radiation oncology as outpatient.  Pleural effusion appears to be small at this time.  Repeat chest x-ray on 7/6/2025 without significant right pleural effusion seen.  -Previous thoracentesis performed most recently November 2024 cytology negative with prior thoracentesis.  - Hold off antibiotic therapy from pulmonary perspective  - Increase activity as tolerated  - DVT prophylaxis: Heparin  - Discussed with hospitalist.  Possible discharge tomorrow if improving.        Ruchi Senior,   Pulmonary Critical Care Medicine  [facility]          [1]   Current Facility-Administered Medications   Medication Dose Route Frequency    hydrOXYzine (Atarax) tab 25 mg  25 mg Oral Q6H PRN    predniSONE (Deltasone) tab 40 mg  40 mg Oral Daily with breakfast    ipratropium-albuterol (Duoneb) 0.5-2.5 (3) MG/3ML inhalation solution 3 mL  3 mL Nebulization 2 times daily    acetaZOLAMIDE (Diamox) tab 500 mg  500 mg Oral Daily    albuterol (Ventolin) (2.5 MG/3ML) 0.083% nebulizer solution 2.5 mg  2.5 mg Nebulization Q6H PRN    acetaminophen (Tylenol Extra Strength) tab 1,000 mg  1,000 mg Oral Q6H PRN    digoxin (Lanoxin) tab 125 mcg  125 mcg Oral Daily    dilTIAZem ER (Dilacor XR) 24 hr cap 360 mg  360 mg Oral Daily    fluticasone-salmeterol (Advair Diskus) 500-50 MCG/ACT inhaler 1 puff  1 puff Inhalation BID    umeclidinium bromide (Incruse Ellipta) 62.5 MCG/ACT inhaler 1 puff  1 puff Inhalation Daily    furosemide (Lasix) tab 80 mg  80 mg Oral BID (Diuretic)    traMADol (Ultram) tab 50 mg  50 mg Oral Q6H PRN    ondansetron (Zofran) 4 MG/2ML injection 4 mg  4 mg Intravenous Q6H PRN    morphINE PF 2 MG/ML injection 2 mg  2 mg Intravenous Q2H PRN    Or    morphINE PF 4 MG/ML injection 4 mg  4 mg Intravenous Q2H PRN    heparin (Porcine) 5000  UNIT/ML injection 5,000 Units  5,000 Units Subcutaneous BID    zolpidem (Ambien) tab 5 mg  5 mg Oral Nightly PRN    pantoprazole (Protonix) DR tab 40 mg  40 mg Oral QAM AC    alum-mag hydroxide-simethicone (Maalox) 200-200-20 MG/5ML oral suspension 30 mL  30 mL Oral QID PRN    guaiFENesin-codeine (Robitussin AC) 100-10 MG/5ML oral solution 5 mL  5 mL Oral Q4H PRN   [2]

## 2025-07-07 VITALS
OXYGEN SATURATION: 95 % | DIASTOLIC BLOOD PRESSURE: 65 MMHG | WEIGHT: 130 LBS | RESPIRATION RATE: 16 BRPM | HEART RATE: 87 BPM | SYSTOLIC BLOOD PRESSURE: 107 MMHG | BODY MASS INDEX: 20 KG/M2 | TEMPERATURE: 97 F

## 2025-07-07 PROCEDURE — 99239 HOSP IP/OBS DSCHRG MGMT >30: CPT | Performed by: HOSPITALIST

## 2025-07-07 PROCEDURE — 99232 SBSQ HOSP IP/OBS MODERATE 35: CPT | Performed by: PHYSICIAN ASSISTANT

## 2025-07-07 RX ORDER — CLOTRIMAZOLE 10 MG/1
1 LOZENGE ORAL
Status: DISCONTINUED | OUTPATIENT
Start: 2025-07-07 | End: 2025-07-07

## 2025-07-07 RX ORDER — IPRATROPIUM BROMIDE AND ALBUTEROL SULFATE 2.5; .5 MG/3ML; MG/3ML
3 SOLUTION RESPIRATORY (INHALATION) EVERY 6 HOURS PRN
Qty: 180 ML | Refills: 0 | Status: SHIPPED | OUTPATIENT
Start: 2025-07-07 | End: 2025-07-14

## 2025-07-07 RX ORDER — PREDNISONE 10 MG/1
TABLET ORAL
Qty: 12 TABLET | Refills: 0 | Status: SHIPPED | OUTPATIENT
Start: 2025-07-08 | End: 2025-07-14

## 2025-07-07 RX ORDER — CLOTRIMAZOLE 10 MG/1
10 LOZENGE ORAL
Qty: 35 LOZENGE | Refills: 0 | Status: SHIPPED | OUTPATIENT
Start: 2025-07-07 | End: 2025-07-14

## 2025-07-07 NOTE — CM/SW NOTE
07/07/25 1200   Discharge disposition   Expected discharge disposition Home or Self   Post Acute Care Provider [facility]     MDO for discharge entered.     Patient is arranged to discharge home with [facility]. Liaison from [facility] notified of patient discharge.     Patient cleared for discharge from /social work standpoint.    ROMÁN SantoN, RN  Nurse   [phone]

## 2025-07-07 NOTE — PLAN OF CARE
Problem: Patient Centered Care  Goal: Patient preferences are identified and integrated in the patient's plan of care  Description: Interventions:  - What would you like us to know as we care for you? From home   - Provide timely, complete, and accurate information to patient/family  - Incorporate patient and family knowledge, values, beliefs, and cultural backgrounds into the planning and delivery of care  - Encourage patient/family to participate in care and decision-making at the level they choose  - Honor patient and family perspectives and choices  Outcome: Progressing     Problem: Patient/Family Goals  Goal: Patient/Family Long Term Goal  Description: Patient's Long Term Goal: go home     Interventions:  - go home  - See additional Care Plan goals for specific interventions  Outcome: Progressing  Goal: Patient/Family Short Term Goal  Description: Patient's Short Term Goal: improve breathing     Interventions:   - O2   -PO/IV steroids   - See additional Care Plan goals for specific interventions  Outcome: Progressing     Problem: CARDIOVASCULAR - ADULT  Goal: Maintains optimal cardiac output and hemodynamic stability  Description: INTERVENTIONS:  - Monitor vital signs, rhythm, and trends  - Monitor for bleeding, hypotension and signs of decreased cardiac output  - Evaluate effectiveness of vasoactive medications to optimize hemodynamic stability  - Monitor arterial and/or venous puncture sites for bleeding and/or hematoma  - Assess quality of pulses, skin color and temperature  - Assess for signs of decreased coronary artery perfusion - ex. Angina  - Evaluate fluid balance, assess for edema, trend weights  Outcome: Progressing  Goal: Absence of cardiac arrhythmias or at baseline  Description: INTERVENTIONS:  - Continuous cardiac monitoring, monitor vital signs, obtain 12 lead EKG if indicated  - Evaluate effectiveness of antiarrhythmic and heart rate control medications as ordered  - Initiate emergency measures  for life threatening arrhythmias  - Monitor electrolytes and administer replacement therapy as ordered  Outcome: Progressing     Problem: RESPIRATORY - ADULT  Goal: Achieves optimal ventilation and oxygenation  Description: INTERVENTIONS:  - Assess for changes in respiratory status  - Assess for changes in mentation and behavior  - Position to facilitate oxygenation and minimize respiratory effort  - Oxygen supplementation based on oxygen saturation or ABGs  - Provide Smoking Cessation handout, if applicable  - Encourage broncho-pulmonary hygiene including cough, deep breathe, Incentive Spirometry  - Assess the need for suctioning and perform as needed  - Assess and instruct to report SOB or any respiratory difficulty  - Respiratory Therapy support as indicated  - Manage/alleviate anxiety  - Monitor for signs/symptoms of CO2 retention  Outcome: Progressing     Problem: PAIN - ADULT  Goal: Verbalizes/displays adequate comfort level or patient's stated pain goal  Description: INTERVENTIONS:  - Encourage pt to monitor pain and request assistance  - Assess pain using appropriate pain scale  - Administer analgesics based on type and severity of pain and evaluate response  - Implement non-pharmacological measures as appropriate and evaluate response  - Consider cultural and social influences on pain and pain management  - Manage/alleviate anxiety  - Utilize distraction and/or relaxation techniques  - Monitor for opioid side effects  - Notify MD/LIP if interventions unsuccessful or patient reports new pain  - Anticipate increased pain with activity and pre-medicate as appropriate  Outcome: Progressing     Problem: SAFETY ADULT - FALL  Goal: Free from fall injury  Description: INTERVENTIONS:  - Assess pt frequently for physical needs  - Identify cognitive and physical deficits and behaviors that affect risk of falls.  - [place] fall precautions as indicated by assessment.  - Educate pt/family on patient safety including  physical limitations  - Instruct pt to call for assistance with activity based on assessment  - Modify environment to reduce risk of injury  - Provide assistive devices as appropriate  - Consider OT/PT consult to assist with strengthening/mobility  - Encourage toileting schedule  Outcome: Progressing     Problem: METABOLIC/FLUID AND ELECTROLYTES - ADULT  Goal: Electrolytes maintained within normal limits  Description: INTERVENTIONS:  - Monitor labs and rhythm and assess patient for signs and symptoms of electrolyte imbalances  - Administer electrolyte replacement as ordered  - Monitor response to electrolyte replacements, including rhythm and repeat lab results as appropriate  - Fluid restriction as ordered  - Instruct patient on fluid and nutrition restrictions as appropriate  Outcome: Progressing     Problem: HEMATOLOGIC - ADULT  Goal: Maintains hematologic stability  Description: INTERVENTIONS  - Assess for signs and symptoms of bleeding or hemorrhage  - Monitor labs and vital signs for trends  - Administer supportive blood products/factors, fluids and medications as ordered and appropriate  - Administer supportive blood products/factors as ordered and appropriate  Outcome: Progressing  Goal: Free from bleeding injury  Description: (Example usage: patient with low platelets)  INTERVENTIONS:  - Avoid intramuscular injections, enemas and rectal medication administration  - Ensure safe mobilization of patient  - Hold pressure on venipuncture sites to achieve adequate hemostasis  - Assess for signs and symptoms of internal bleeding  - Monitor lab trends  - Patient is to report abnormal signs of bleeding to staff  - Avoid use of toothpicks and dental floss  - Use electric shaver for shaving  - Use soft bristle tooth brush  - Limit straining and forceful nose blowing  Outcome: Progressing     Problem: MUSCULOSKELETAL - ADULT  Goal: Return mobility to safest level of function  Description: INTERVENTIONS:  - Assess  patient stability and activity tolerance for standing, transferring and ambulating w/ or w/o assistive devices  - Assist with transfers and ambulation using safe patient handling equipment as needed  - Ensure adequate protection for wounds/incisions during mobilization  - Obtain PT/OT consults as needed  - Advance activity as appropriate  - Communicate ordered activity level and limitations with patient/family  Outcome: Progressing

## 2025-07-07 NOTE — DISCHARGE SUMMARY
[facility]  part of [facility]    Discharge Summary    Yesenia Berkowitz Patient Status:  Inpatient    1940 MRN [id number]   Location [facility] 3W/SW Attending Sina Hogan MD   Hosp Day # 4 PCP JO-ANN YANG MD     Date of Admission: 2025 Disposition: Home or Self Care     Date of Discharge: 25    Admitting Diagnosis: COPD exacerbation (HCC) [J44.1]    Hospital Discharge Diagnoses: COPD exacerbation, Afib, HFpEF    Lace+ Score: 82  59-90 High Risk  29-58 Medium Risk  0-28   Low Risk.    TCM Follow-Up Recommendation:  LACE > 58: High Risk of readmission after discharge from the hospital.    Problem List: Problem List[1]    Reason for Admission: SOB    Physical Exam:   Vitals:    25 0835   BP:    Pulse: 87   Resp:    Temp:    Gen: NAD AO x3  Chest: decreased air exchange b/l  CVS: normal s1 and s2 RR  Abd: NABS soft NT ND  Neuro: CN 2-12 grossly intact  Ext: no edema in bilateral LE      History of Present Illness:   Per Dr. Charles De Jesuskristine Berkowitz is a(n) 84 year old female, with a past medical history significant for chronic respiratory failure on 4 L nasal cannula at home secondary to COPD along with CHF, atrial fibrillation, GERD osteoporosis and DJD of the spine presents with a complaint of increasing shortness of breath with minimal exertion associated with occasional dry cough and now back pain as well.  Describes her current shortness of breath as typical of a COPD exacerbation, denies any obvious instigating factors, no upper respiratory symptoms no sore throat no fever or chills no exposure to recent allergens or tobacco    Hospital Course:   Acute on chronic respiratory failure 2/2 COPD exacerbation  Imaging reviewed  Trop, EKG reviewed  pBNP WNL  Pulm was on consult  Wean steroids, switched to PO, will dc on taper  Continue nebs, ICS/LABA/LAMA  Hold abx terapy  Afib  HTN  Hx of HFpEF  Not in exacerbation  Continue home meds    Hypokalemia  Replaced per  protocol    Consultations: Pulm    Procedures: none    Complications: none    Discharge Condition: Stable    Discharge Medications:      Discharge Medications        START taking these medications        Instructions Prescription details   clotrimazole 10 MG Troc  Commonly known as: Mycelex      Take 1 lozenge (10 mg total) by mouth 5 (five) times daily for 7 days.   Stop taking on: July 14, 2025  Quantity: 35 lozenge  Refills: 0     ipratropium-albuterol 0.5-2.5 (3) MG/3ML Soln  Commonly known as: Duoneb      Take 3 mL by nebulization every 6 (six) hours as needed (shortness of breath, wheezing).   Quantity: 180 mL  Refills: 0            CHANGE how you take these medications        Instructions Prescription details   albuterol 108 (90 Base) MCG/ACT Aers  Commonly known as: Ventolin HFA  What changed: Another medication with the same name was removed. Continue taking this medication, and follow the directions you see here.      Inhale 2 puffs into the lungs as needed.   Refills: 0     fluticasone-umeclidin-vilant 200-62.5-25 MCG/ACT Aepb  Commonly known as: Trelegy Ellipta  What changed:   when to take this  reasons to take this  additional instructions      Inhale 1 puff into the lungs daily. Rinse mouth, gargle, and spit to follow.   Quantity: 1 each  Refills: 0     predniSONE 10 MG Tabs  Commonly known as: Deltasone  Start taking on: July 8, 2025  What changed:   medication strength  See the new instructions.      Take 3 tablets (30 mg total) by mouth daily with breakfast for 2 days, THEN 2 tablets (20 mg total) daily with breakfast for 2 days, THEN 1 tablet (10 mg total) daily with breakfast for 2 days.   Stop taking on: July 14, 2025  Quantity: 12 tablet  Refills: 0            CONTINUE taking these medications        Instructions Prescription details   acetaminophen 500 MG Tabs  Commonly known as: Tylenol Extra Strength      Take 2 tablets (1,000 mg total) by mouth every 6 (six) hours as needed for Pain or  Fever.   Refills: 0     acetaZOLAMIDE 250 MG Tabs  Commonly known as: Diamox      Take 2 tablets (500 mg total) by mouth daily.   Quantity: 60 tablet  Refills: 5     digoxin 0.125 MG Tabs  Commonly known as: Lanoxin      Take 1 tablet (125 mcg total) by mouth daily.   Quantity: 30 tablet  Refills: 0     dilTIAZem  MG Cp24  Commonly known as: Tiazac      Take 1 capsule (360 mg total) by mouth daily.   Quantity: 30 capsule  Refills: 11     empagliflozin 10 MG Tabs  Commonly known as: Jardiance      Take 1 tablet (10 mg total) by mouth daily.   Quantity: 30 tablet  Refills: 3     furosemide 80 MG Tabs  Commonly known as: Lasix      Take 1 tablet (80 mg total) by mouth BID (Diuretic).   Quantity: 60 tablet  Refills: 0     Loratadine 10 MG Caps      Take 10 mg by mouth nightly.   Refills: 0     montelukast 10 MG Tabs  Commonly known as: Singulair      Take 1 tablet (10 mg total) by mouth nightly.   Refills: 0     morphINE 10 MG/5ML Soln      Take 1.3 mL (2.6 mg total) by mouth every 4 (four) hours as needed (Shortness of breath/ take prior to activity that cased shortness of breath). Please add an adapt a cap top for ease of drawing up.  Please give pt 2 oral syringes   Quantity: 60 mL  Refills: 0     nitrofurantoin monohydrate macro 100 MG Caps  Commonly known as: Macrobid      Take 1 capsule (100 mg total) by mouth 2 (two) times daily.   Refills: 0     phenazopyridine 100 MG Tabs  Commonly known as: Pyridium      Take 1 tablet (100 mg total) by mouth 3 (three) times daily as needed for Pain (dysuria).   Quantity: 30 tablet  Refills: 0     potassium chloride 20 MEQ Tbcr  Commonly known as: Klor-Con M20      Take 1 tablet (20 mEq total) by mouth nightly.   Refills: 0     traMADol 50 MG Tabs  Commonly known as: Ultram      Take 1 tablet (50 mg total) by mouth every 6 (six) hours as needed for Pain.   Quantity: 30 tablet  Refills: 0     Vitamin D 1000 units Tabs      Take 1,000 Units by mouth in the morning and 1,000  Units before bedtime.   Refills: 0            STOP taking these medications      benzonatate 200 MG Caps  Commonly known as: Tessalon        Magnesium Citrate Soln        nystatin [id number] UNIT/ML Susp  Commonly known as: Mycostatin                  Where to Get Your Medications        These medications were sent to [id number] - [place] [place] - [address] [phone], [phone]  [address] [address]      Phone: [phone]   clotrimazole 10 MG Troc  fluticasone-umeclidin-vilant 200-62.5-25 MCG/ACT Aepb  ipratropium-albuterol 0.5-2.5 (3) MG/3ML Soln  predniSONE 10 MG Tabs         Greater than 35 minutes spent, >50% spent counseling re: treatment plan and workup     Sina Hogan MD  7/7/2025            [1]   Patient Active Problem List  Diagnosis    Actinic keratosis    Inflamed seborrheic keratosis    Palpitations    Pain of upper abdomen    Acute on chronic congestive heart failure, unspecified heart failure type (HCC)    COPD with acute exacerbation (HCC)    COPD (chronic obstructive pulmonary disease) (HCC)    Kyphoscoliosis    Phrenic nerve paralysis    Restrictive lung disease    Acute cor pulmonale (HCC)    Pneumonia    Acute on chronic hypoxic respiratory failure (HCC)    Acute pulmonary edema (HCC)    Pleural effusion    Pulmonary nodule 1 cm or greater in diameter    Community acquired pneumonia of right lower lobe of lung    Acute on chronic respiratory failure with hypoxia and hypercapnia (HCC)    Acute respiratory failure with hypoxia and hypercapnia (HCC)    Cor pulmonale (chronic) (HCC)    SOB (shortness of breath)    Acute on chronic diastolic (congestive) heart failure (HCC)    Obesity hypoventilation syndrome (HCC)    Asthma (HCC)    Acute exacerbation of CHF (congestive heart failure) (HCC)    Pleural effusion on left    Pleural effusion on right    Acute bilateral thoracic back pain    Atypical pneumonia    Compression fracture of T6 vertebra (HCC)    COPD  exacerbation (HCC)    Primary hypertension    Paroxysmal atrial fibrillation (HCC)    Acute respiratory failure (HCC)    Chronic heart failure with preserved ejection fraction (HFpEF) (HCC)    Shortness of breath    Counseling regarding advance care planning and goals of care    Palliative care by specialist    Right upper lobe pulmonary nodule    Dyspnea and respiratory abnormalities    Goals of care, counseling/discussion    Advance care planning    Malignant neoplasm of right upper lobe of lung (HCC)    Pericardial effusion (HCC)    Hypokalemia    Metabolic alkalosis    Hyperglycemia    Respiratory acidosis

## 2025-07-07 NOTE — PROGRESS NOTES
[facility]  part of [facility]    Progress Note    Yesenia Berkowitz Patient Status:  Inpatient    1940 MRN [id number]   Location [facility] 3W/SW Attending Sina Hogan MD   Hosp Day # 4 PCP JO-ANN YANG MD     Subjective:   Seen and examined this morning while sitting in bed. Main complaint today is sore throat. Has dyspnea which is chronic. No cough or wheezing. No fever or chills. Has appetite. On 5 L O2 (4 L O2 at baseline).    Objective:   Blood pressure 107/65, pulse 87, temperature 97.3 °F (36.3 °C), temperature source Oral, resp. rate 16, weight 130 lb (59 kg), SpO2 95%.  Physical Exam  Vitals and nursing note reviewed.   Constitutional:       General: She is awake. She is not in acute distress.     Appearance: She is not ill-appearing.      Interventions: Nasal cannula in place.   HENT:      Head: Normocephalic and atraumatic.      Mouth/Throat:      Palate: Lesions (white patches on soft and hard palate) present.   Cardiovascular:      Rate and Rhythm: Normal rate and regular rhythm.   Pulmonary:      Effort: No respiratory distress.      Breath sounds: Rales (bibasilar) present. No wheezing or rhonchi.   Musculoskeletal:      Cervical back: Normal range of motion and neck supple.      Right lower leg: No edema.      Left lower leg: No edema.   Skin:     General: Skin is warm and dry.   Neurological:      General: No focal deficit present.      Mental Status: She is alert and oriented to person, place, and time.   Psychiatric:         Mood and Affect: Mood normal.         Behavior: Behavior is cooperative.       Results:   Lab Results   Component Value Date    WBC 14.9 (H) 2025    HGB 10.5 (L) 2025    HCT 33.3 (L) 2025    .0 2025    CREATSERUM 0.69 2025    BUN 26 (H) 2025     2025    K 3.5 2025    CL 93 (L) 2025    CO2 >40.0 (HH) 2025     (H) 2025    CA 8.6 (L) 2025    ALB  3.4 07/05/2025    ALKPHO 47 (L) 07/05/2025    BILT 0.3 07/05/2025    TP 5.6 (L) 07/05/2025    AST 18 07/05/2025    ALT 13 07/05/2025    PTT 28.1 03/20/2025    INR 1.08 11/25/2024    T4F 1.1 05/29/2025    TSH 0.292 (L) 05/29/2025    LIP 30 08/30/2024    DDIMER 0.47 12/21/2024    MG 2.2 07/05/2025    PHOS 2.6 07/05/2025    TROP <0.045 02/03/2020    TROPHS 23 07/03/2025       XR CHEST AP PORTABLE  (CPT=71045)  Result Date: 7/6/2025  CONCLUSION: 1.  Pleural effusions with associated basilar atelectasis, with or without superimposed pneumonia. 2.  Cardiomegaly with pulmonary vascular congestion. 3.  Radiographic findings of emphysema. 4.  Known right lung nodule is not well delineated by radiographic assessment. Electronically Verified and Signed by Attending Radiologist: Grant Ferreira MD 7/6/2025 3:44 PM Workstation: [id number]          Assessment & Plan:   Severe COPD with acute exacerbation  No leukocytosis on admit  Improving  Plan:  -Prednisone taper at discharge  -Advair and Incruse in lieu of home Trelegy  -DuoNebs  -Stable for discharge from pulmonary perspective  -F/u with COPD clinic in 1 week  -F/u with Dr. Szymanski in 2-3 weeks    Chronic HFpEF  Pro-BNP normal  CT chest with mild edema  Plan:  -Lasix    Acute on chronic hypoxemic respiratory failure  Likely multifactorial with severe COPD with acute exacerbation and CHF  CTA chest 7/3/2025 no PE, mild edema, R>L pleural effusions, emphysema  CXR 7/6/2025 with elevated L hemidiaphragm  On 5 L O2 (4 L O2 at baseline)  Plan:  -O2    Right upper lobe lung nodule  Hypermetabolic on PET/CT 4/2025  Patient previously declined biopsy, plans for radiation oncology f/u  H/o R thoracentesis 11/2024 - cytology negative  Plan:  -F/u radiation oncology    Oropharyngeal candidiasis  Plan:  -Clotrimazole lozenge 5x daily for 7 days    DVT prophylaxis: Subcutaneous heparin    Code status: DNAR/Select    D/w hospitalist Dr. Hogan.    Castillo Gruber PA-C  Pulmonary  Medicine  7/7/2025

## 2025-07-07 NOTE — PLAN OF CARE
Pt alert and oriented x4. Pt on 4.5 L of oxygen. PRN tylenol given for pain. Pt ambulating SBA. Primary RN educated pt and family on dc instructions. Pt and family have no questions at this time.   Problem: Patient Centered Care  Goal: Patient preferences are identified and integrated in the patient's plan of care  Description: Interventions:  - What would you like us to know as we care for you? From home   - Provide timely, complete, and accurate information to patient/family  - Incorporate patient and family knowledge, values, beliefs, and cultural backgrounds into the planning and delivery of care  - Encourage patient/family to participate in care and decision-making at the level they choose  - Honor patient and family perspectives and choices  Outcome: Completed     Problem: Patient/Family Goals  Goal: Patient/Family Long Term Goal  Description: Patient's Long Term Goal: go home     Interventions:  - go home  - See additional Care Plan goals for specific interventions  Outcome: Completed  Goal: Patient/Family Short Term Goal  Description: Patient's Short Term Goal: improve breathing     Interventions:   - O2   -PO/IV steroids   - See additional Care Plan goals for specific interventions  Outcome: Completed     Problem: CARDIOVASCULAR - ADULT  Goal: Maintains optimal cardiac output and hemodynamic stability  Description: INTERVENTIONS:  - Monitor vital signs, rhythm, and trends  - Monitor for bleeding, hypotension and signs of decreased cardiac output  - Evaluate effectiveness of vasoactive medications to optimize hemodynamic stability  - Monitor arterial and/or venous puncture sites for bleeding and/or hematoma  - Assess quality of pulses, skin color and temperature  - Assess for signs of decreased coronary artery perfusion - ex. Angina  - Evaluate fluid balance, assess for edema, trend weights  Outcome: Completed  Goal: Absence of cardiac arrhythmias or at baseline  Description: INTERVENTIONS:  - Continuous  cardiac monitoring, monitor vital signs, obtain 12 lead EKG if indicated  - Evaluate effectiveness of antiarrhythmic and heart rate control medications as ordered  - Initiate emergency measures for life threatening arrhythmias  - Monitor electrolytes and administer replacement therapy as ordered  Outcome: Completed     Problem: RESPIRATORY - ADULT  Goal: Achieves optimal ventilation and oxygenation  Description: INTERVENTIONS:  - Assess for changes in respiratory status  - Assess for changes in mentation and behavior  - Position to facilitate oxygenation and minimize respiratory effort  - Oxygen supplementation based on oxygen saturation or ABGs  - Provide Smoking Cessation handout, if applicable  - Encourage broncho-pulmonary hygiene including cough, deep breathe, Incentive Spirometry  - Assess the need for suctioning and perform as needed  - Assess and instruct to report SOB or any respiratory difficulty  - Respiratory Therapy support as indicated  - Manage/alleviate anxiety  - Monitor for signs/symptoms of CO2 retention  Outcome: Completed     Problem: METABOLIC/FLUID AND ELECTROLYTES - ADULT  Goal: Electrolytes maintained within normal limits  Description: INTERVENTIONS:  - Monitor labs and rhythm and assess patient for signs and symptoms of electrolyte imbalances  - Administer electrolyte replacement as ordered  - Monitor response to electrolyte replacements, including rhythm and repeat lab results as appropriate  - Fluid restriction as ordered  - Instruct patient on fluid and nutrition restrictions as appropriate  Outcome: Completed     Problem: HEMATOLOGIC - ADULT  Goal: Maintains hematologic stability  Description: INTERVENTIONS  - Assess for signs and symptoms of bleeding or hemorrhage  - Monitor labs and vital signs for trends  - Administer supportive blood products/factors, fluids and medications as ordered and appropriate  - Administer supportive blood products/factors as ordered and  appropriate  Outcome: Completed  Goal: Free from bleeding injury  Description: (Example usage: patient with low platelets)  INTERVENTIONS:  - Avoid intramuscular injections, enemas and rectal medication administration  - Ensure safe mobilization of patient  - Hold pressure on venipuncture sites to achieve adequate hemostasis  - Assess for signs and symptoms of internal bleeding  - Monitor lab trends  - Patient is to report abnormal signs of bleeding to staff  - Avoid use of toothpicks and dental floss  - Use electric shaver for shaving  - Use soft bristle tooth brush  - Limit straining and forceful nose blowing  Outcome: Completed     Problem: MUSCULOSKELETAL - ADULT  Goal: Return mobility to safest level of function  Description: INTERVENTIONS:  - Assess patient stability and activity tolerance for standing, transferring and ambulating w/ or w/o assistive devices  - Assist with transfers and ambulation using safe patient handling equipment as needed  - Ensure adequate protection for wounds/incisions during mobilization  - Obtain PT/OT consults as needed  - Advance activity as appropriate  - Communicate ordered activity level and limitations with patient/family  Outcome: Completed     Problem: PAIN - ADULT  Goal: Verbalizes/displays adequate comfort level or patient's stated pain goal  Description: INTERVENTIONS:  - Encourage pt to monitor pain and request assistance  - Assess pain using appropriate pain scale  - Administer analgesics based on type and severity of pain and evaluate response  - Implement non-pharmacological measures as appropriate and evaluate response  - Consider cultural and social influences on pain and pain management  - Manage/alleviate anxiety  - Utilize distraction and/or relaxation techniques  - Monitor for opioid side effects  - Notify MD/LIP if interventions unsuccessful or patient reports new pain  - Anticipate increased pain with activity and pre-medicate as appropriate  Outcome:  Completed     Problem: SAFETY ADULT - FALL  Goal: Free from fall injury  Description: INTERVENTIONS:  - Assess pt frequently for physical needs  - Identify cognitive and physical deficits and behaviors that affect risk of falls.  - [place] fall precautions as indicated by assessment.  - Educate pt/family on patient safety including physical limitations  - Instruct pt to call for assistance with activity based on assessment  - Modify environment to reduce risk of injury  - Provide assistive devices as appropriate  - Consider OT/PT consult to assist with strengthening/mobility  - Encourage toileting schedule  Outcome: Completed

## 2025-07-08 ENCOUNTER — TELEPHONE (OUTPATIENT)
Dept: RADIATION ONCOLOGY | Facility: HOSPITAL | Age: 85
End: 2025-07-08

## 2025-07-08 NOTE — TELEPHONE ENCOUNTER
Patient's son Tyler is calling to cancel patient's 2pm appointment today due to patient just being released from [facility] yesterday. Please contact Tyler at your earliest convenience.

## 2025-07-08 NOTE — TELEPHONE ENCOUNTER
Spoke to Tyler, patient is very tired and still \"transitioning between hospital and home\" so she can't make it in to appointment. RN asked if she feels any better after hospitalization in terms of breathing, he says overall yes but she is just very tired. Radiation therapists notified.

## 2025-07-08 NOTE — TELEPHONE ENCOUNTER
Called patient's son to remind him to bring in pacemaker card when they come for CT sim today. LVM.

## 2025-07-10 ENCOUNTER — TELEPHONE (OUTPATIENT)
Dept: PULMONOLOGY | Facility: CLINIC | Age: 85
End: 2025-07-10

## 2025-07-10 NOTE — TELEPHONE ENCOUNTER
Patients son called to reschedule patient's appointment because she has diarrhea and can't come in today.  Patient is asking for a sooner appointment.  Scheduled next available for 1/5/26 and put patient on wait list.  Please call.

## 2025-07-12 DIAGNOSIS — J44.9 CHRONIC OBSTRUCTIVE PULMONARY DISEASE, UNSPECIFIED COPD TYPE (HCC): Primary | ICD-10-CM

## 2025-07-14 ENCOUNTER — LAB REQUISITION (OUTPATIENT)
Dept: LAB | Age: 85
End: 2025-07-14

## 2025-07-14 DIAGNOSIS — R35.0 FREQUENCY OF MICTURITION: ICD-10-CM

## 2025-07-14 PROCEDURE — 81001 URINALYSIS AUTO W/SCOPE: CPT | Performed by: CLINICAL MEDICAL LABORATORY

## 2025-07-14 RX ORDER — IPRATROPIUM BROMIDE AND ALBUTEROL SULFATE 2.5; .5 MG/3ML; MG/3ML
3 SOLUTION RESPIRATORY (INHALATION) EVERY 6 HOURS PRN
Qty: 180 ML | Refills: 0 | Status: SHIPPED | OUTPATIENT
Start: 2025-07-14

## 2025-07-14 NOTE — TELEPHONE ENCOUNTER
Dr. Szymanski- please sign pended order for ipratropium-albuterol 0.5-2.5 (3) MG/3ML, if agreeable    Last office visit: 7/3/2025 in hospital Follow up: 1/5/2026 Last refill: 7/7/2025.    Left voicemail for patient to call back to get sooner appointment with Dr. Szymanski

## 2025-07-15 LAB
APPEARANCE UR: CLEAR
BACTERIA #/AREA URNS HPF: ABNORMAL /HPF
BILIRUB UR QL STRIP: NEGATIVE
COLOR UR: ABNORMAL
GLUCOSE UR STRIP-MCNC: 500 MG/DL
HGB UR QL STRIP: NEGATIVE
HYALINE CASTS #/AREA URNS LPF: ABNORMAL /LPF
KETONES UR STRIP-MCNC: NEGATIVE MG/DL
LEUKOCYTE ESTERASE UR QL STRIP: NEGATIVE
MUCOUS THREADS URNS QL MICRO: PRESENT
NITRITE UR QL STRIP: NEGATIVE
PH UR STRIP: 7 [PH] (ref 5–7)
PROT UR STRIP-MCNC: NEGATIVE MG/DL
RBC #/AREA URNS HPF: ABNORMAL /HPF
SP GR UR STRIP: 1.01 (ref 1–1.03)
SQUAMOUS #/AREA URNS HPF: ABNORMAL /HPF
UROBILINOGEN UR STRIP-MCNC: 0.2 MG/DL
WBC #/AREA URNS HPF: ABNORMAL /HPF

## 2025-07-16 DIAGNOSIS — I50.32 CHRONIC HEART FAILURE WITH PRESERVED EJECTION FRACTION (HFPEF) (HCC): Primary | ICD-10-CM

## 2025-07-18 NOTE — PROGRESS NOTES
Provider Clarification    Additional information on the acuity of patient's respiratory status    Chronic hypoxic respiratory failure     This note is part of the patient's medical record.

## 2025-07-22 ENCOUNTER — LAB REQUISITION (OUTPATIENT)
Dept: LAB | Facility: HOSPITAL | Age: 85
End: 2025-07-22

## 2025-07-22 DIAGNOSIS — R35.0 FREQUENCY OF MICTURITION: ICD-10-CM

## 2025-07-22 PROCEDURE — 87077 CULTURE AEROBIC IDENTIFY: CPT

## 2025-07-22 PROCEDURE — 87186 SC STD MICRODIL/AGAR DIL: CPT

## 2025-07-22 PROCEDURE — 87086 URINE CULTURE/COLONY COUNT: CPT

## 2025-07-30 ENCOUNTER — HOSPITAL ENCOUNTER (EMERGENCY)
Facility: HOSPITAL | Age: 85
Discharge: HOME | End: 2025-07-30
Attending: EMERGENCY MEDICINE

## 2025-07-30 ENCOUNTER — APPOINTMENT (OUTPATIENT)
Dept: CT IMAGING | Facility: HOSPITAL | Age: 85
End: 2025-07-30
Attending: EMERGENCY MEDICINE

## 2025-07-30 VITALS
OXYGEN SATURATION: 95 % | SYSTOLIC BLOOD PRESSURE: 117 MMHG | HEART RATE: 84 BPM | RESPIRATION RATE: 20 BRPM | DIASTOLIC BLOOD PRESSURE: 57 MMHG | TEMPERATURE: 99 F

## 2025-07-30 DIAGNOSIS — N30.01 ACUTE CYSTITIS WITH HEMATURIA: Primary | ICD-10-CM

## 2025-07-30 LAB
ALBUMIN SERPL-MCNC: 3.8 G/DL (ref 3.2–4.8)
ALBUMIN/GLOB SERPL: 1.5 (ref 1–2)
ALP LIVER SERPL-CCNC: 74 U/L (ref 55–142)
ALT SERPL-CCNC: 9 U/L (ref 10–49)
ANION GAP SERPL CALC-SCNC: 7 MMOL/L (ref 0–18)
AST SERPL-CCNC: 17 U/L (ref ?–34)
BASOPHILS # BLD AUTO: 0.01 X10(3) UL (ref 0–0.2)
BASOPHILS NFR BLD AUTO: 0.1 %
BILIRUB SERPL-MCNC: 0.4 MG/DL (ref 0.2–1.1)
BILIRUB UR QL CFM: NEGATIVE
BUN BLD-MCNC: 21 MG/DL (ref 9–23)
BUN/CREAT SERPL: 25.9 (ref 10–20)
CALCIUM BLD-MCNC: 9.3 MG/DL (ref 8.7–10.4)
CHLORIDE SERPL-SCNC: 94 MMOL/L (ref 98–112)
CHOLEST SERPL-MCNC: 236 MG/DL (ref ?–200)
CO2 SERPL-SCNC: 39 MMOL/L (ref 21–32)
CREAT BLD-MCNC: 0.81 MG/DL (ref 0.55–1.02)
DEPRECATED RDW RBC AUTO: 55.4 FL (ref 35.1–46.3)
EGFRCR SERPLBLD CKD-EPI 2021: 72 ML/MIN/1.73M2 (ref 60–?)
EOSINOPHIL # BLD AUTO: 0 X10(3) UL (ref 0–0.7)
EOSINOPHIL NFR BLD AUTO: 0 %
ERYTHROCYTE [DISTWIDTH] IN BLOOD BY AUTOMATED COUNT: 15.7 % (ref 11–15)
GLOBULIN PLAS-MCNC: 2.5 G/DL (ref 2–3.5)
GLUCOSE BLD-MCNC: 107 MG/DL (ref 70–99)
GLUCOSE BLDC GLUCOMTR-MCNC: 141 MG/DL (ref 70–99)
GLUCOSE UR-MCNC: >1000 MG/DL
HCT VFR BLD AUTO: 41.5 % (ref 35–48)
HDLC SERPL-MCNC: 52 MG/DL (ref 40–59)
HGB BLD-MCNC: 13.3 G/DL (ref 12–16)
HGB UR QL STRIP.AUTO: NEGATIVE
HYALINE CASTS #/AREA URNS AUTO: PRESENT /LPF
IMM GRANULOCYTES # BLD AUTO: 0.01 X10(3) UL (ref 0–1)
IMM GRANULOCYTES NFR BLD: 0.1 %
KETONES UR-MCNC: 10 MG/DL
LDLC SERPL CALC-MCNC: 146 MG/DL (ref ?–100)
LEUKOCYTE ESTERASE UR QL STRIP.AUTO: 75
LYMPHOCYTES # BLD AUTO: 1.23 X10(3) UL (ref 1–4)
LYMPHOCYTES NFR BLD AUTO: 15.1 %
MCH RBC QN AUTO: 30.4 PG (ref 26–34)
MCHC RBC AUTO-ENTMCNC: 32 G/DL (ref 31–37)
MCV RBC AUTO: 95 FL (ref 80–100)
MONOCYTES # BLD AUTO: 0.84 X10(3) UL (ref 0.1–1)
MONOCYTES NFR BLD AUTO: 10.3 %
NEUTROPHILS # BLD AUTO: 6.05 X10 (3) UL (ref 1.5–7.7)
NEUTROPHILS # BLD AUTO: 6.05 X10(3) UL (ref 1.5–7.7)
NEUTROPHILS NFR BLD AUTO: 74.4 %
NONHDLC SERPL-MCNC: 184 MG/DL (ref ?–130)
OSMOLALITY SERPL CALC.SUM OF ELEC: 293 MOSM/KG (ref 275–295)
PH UR: 5.5 (ref 5–8)
PLATELET # BLD AUTO: 269 10(3)UL (ref 150–450)
POTASSIUM SERPL-SCNC: 3.3 MMOL/L (ref 3.5–5.1)
PROT SERPL-MCNC: 6.3 G/DL (ref 5.7–8.2)
PROT UR-MCNC: 20 MG/DL
RBC # BLD AUTO: 4.37 X10(6)UL (ref 3.8–5.3)
SODIUM SERPL-SCNC: 140 MMOL/L (ref 136–145)
SP GR UR STRIP: 1.02 (ref 1–1.03)
TRIGL SERPL-MCNC: 210 MG/DL (ref 30–149)
TROPONIN I SERPL HS-MCNC: 81 NG/L (ref ?–34)
TROPONIN I SERPL HS-MCNC: 85 NG/L (ref ?–34)
UROBILINOGEN UR STRIP-ACNC: 6
VLDLC SERPL CALC-MCNC: 40 MG/DL (ref 0–30)
WBC # BLD AUTO: 8.1 X10(3) UL (ref 4–11)

## 2025-07-30 PROCEDURE — 99285 EMERGENCY DEPT VISIT HI MDM: CPT

## 2025-07-30 PROCEDURE — 93010 ELECTROCARDIOGRAM REPORT: CPT

## 2025-07-30 PROCEDURE — 87086 URINE CULTURE/COLONY COUNT: CPT | Performed by: EMERGENCY MEDICINE

## 2025-07-30 PROCEDURE — 96365 THER/PROPH/DIAG IV INF INIT: CPT

## 2025-07-30 PROCEDURE — 87077 CULTURE AEROBIC IDENTIFY: CPT | Performed by: EMERGENCY MEDICINE

## 2025-07-30 PROCEDURE — 93005 ELECTROCARDIOGRAM TRACING: CPT

## 2025-07-30 PROCEDURE — 96361 HYDRATE IV INFUSION ADD-ON: CPT

## 2025-07-30 PROCEDURE — 85025 COMPLETE CBC W/AUTO DIFF WBC: CPT | Performed by: EMERGENCY MEDICINE

## 2025-07-30 PROCEDURE — 87186 SC STD MICRODIL/AGAR DIL: CPT | Performed by: EMERGENCY MEDICINE

## 2025-07-30 PROCEDURE — 84484 ASSAY OF TROPONIN QUANT: CPT | Performed by: EMERGENCY MEDICINE

## 2025-07-30 PROCEDURE — 80053 COMPREHEN METABOLIC PANEL: CPT | Performed by: EMERGENCY MEDICINE

## 2025-07-30 PROCEDURE — 82962 GLUCOSE BLOOD TEST: CPT

## 2025-07-30 PROCEDURE — 81001 URINALYSIS AUTO W/SCOPE: CPT | Performed by: EMERGENCY MEDICINE

## 2025-07-30 PROCEDURE — 80061 LIPID PANEL: CPT | Performed by: EMERGENCY MEDICINE

## 2025-07-30 PROCEDURE — 96375 TX/PRO/DX INJ NEW DRUG ADDON: CPT

## 2025-07-30 PROCEDURE — 74177 CT ABD & PELVIS W/CONTRAST: CPT | Performed by: EMERGENCY MEDICINE

## 2025-07-30 RX ORDER — NAPROXEN 500 MG/1
500 TABLET ORAL 2 TIMES DAILY PRN
Qty: 20 TABLET | Refills: 0 | Status: SHIPPED | OUTPATIENT
Start: 2025-07-30 | End: 2025-08-06

## 2025-07-30 RX ORDER — ONDANSETRON 4 MG/1
4 TABLET, ORALLY DISINTEGRATING ORAL EVERY 4 HOURS PRN
Qty: 10 TABLET | Refills: 0 | Status: SHIPPED | OUTPATIENT
Start: 2025-07-30 | End: 2025-08-06

## 2025-07-30 RX ORDER — ONDANSETRON 2 MG/ML
4 INJECTION INTRAMUSCULAR; INTRAVENOUS ONCE
Status: COMPLETED | OUTPATIENT
Start: 2025-07-30 | End: 2025-07-30

## 2025-07-30 RX ORDER — CEPHALEXIN 500 MG/1
500 CAPSULE ORAL 3 TIMES DAILY
Qty: 10 CAPSULE | Refills: 0 | Status: SHIPPED | OUTPATIENT
Start: 2025-07-30 | End: 2025-08-04

## 2025-07-31 LAB
ATRIAL RATE: 66 BPM
P AXIS: -29 DEGREES
P-R INTERVAL: 216 MS
Q-T INTERVAL: 390 MS
QRS DURATION: 94 MS
QTC CALCULATION (BEZET): 408 MS
R AXIS: -9 DEGREES
T AXIS: -58 DEGREES
VENTRICULAR RATE: 66 BPM

## 2025-08-13 ENCOUNTER — APPOINTMENT (OUTPATIENT)
Dept: OBGYN | Age: 85
End: 2025-08-13

## 2025-08-20 ENCOUNTER — APPOINTMENT (OUTPATIENT)
Dept: CT IMAGING | Facility: HOSPITAL | Age: 85
End: 2025-08-20
Attending: EMERGENCY MEDICINE

## 2025-08-20 ENCOUNTER — HOSPITAL ENCOUNTER (EMERGENCY)
Facility: HOSPITAL | Age: 85
Discharge: HOME | End: 2025-08-20
Attending: EMERGENCY MEDICINE

## 2025-08-20 ENCOUNTER — APPOINTMENT (OUTPATIENT)
Dept: GENERAL RADIOLOGY | Facility: HOSPITAL | Age: 85
End: 2025-08-20
Attending: EMERGENCY MEDICINE

## 2025-08-20 VITALS
RESPIRATION RATE: 13 BRPM | WEIGHT: 130 LBS | TEMPERATURE: 98 F | OXYGEN SATURATION: 94 % | HEART RATE: 76 BPM | BODY MASS INDEX: 21.66 KG/M2 | DIASTOLIC BLOOD PRESSURE: 73 MMHG | HEIGHT: 65 IN | SYSTOLIC BLOOD PRESSURE: 145 MMHG

## 2025-08-20 DIAGNOSIS — R19.5 LOOSE STOOLS: Primary | ICD-10-CM

## 2025-08-20 LAB
ALBUMIN SERPL-MCNC: 3.4 G/DL (ref 3.2–4.8)
ALBUMIN/GLOB SERPL: 1.3 (ref 1–2)
ALP LIVER SERPL-CCNC: 84 U/L (ref 55–142)
ALT SERPL-CCNC: <7 U/L (ref 10–49)
ANION GAP SERPL CALC-SCNC: 5 MMOL/L (ref 0–18)
AST SERPL-CCNC: 11 U/L (ref ?–34)
ATRIAL RATE: 86 BPM
BASOPHILS # BLD AUTO: 0.03 X10(3) UL (ref 0–0.2)
BASOPHILS NFR BLD AUTO: 0.2 %
BILIRUB SERPL-MCNC: 0.4 MG/DL (ref 0.2–1.1)
BILIRUB UR QL CFM: POSITIVE
BUN BLD-MCNC: 11 MG/DL (ref 9–23)
BUN/CREAT SERPL: 20.8 (ref 10–20)
CALCIUM BLD-MCNC: 9.3 MG/DL (ref 8.7–10.4)
CHLORIDE SERPL-SCNC: 104 MMOL/L (ref 98–112)
CO2 SERPL-SCNC: 32 MMOL/L (ref 21–32)
CREAT BLD-MCNC: 0.53 MG/DL (ref 0.55–1.02)
DEPRECATED RDW RBC AUTO: 62 FL (ref 35.1–46.3)
EGFRCR SERPLBLD CKD-EPI 2021: 92 ML/MIN/1.73M2 (ref 60–?)
EOSINOPHIL # BLD AUTO: 0.02 X10(3) UL (ref 0–0.7)
EOSINOPHIL NFR BLD AUTO: 0.2 %
ERYTHROCYTE [DISTWIDTH] IN BLOOD BY AUTOMATED COUNT: 16.3 % (ref 11–15)
GLOBULIN PLAS-MCNC: 2.6 G/DL (ref 2–3.5)
GLUCOSE BLD-MCNC: 111 MG/DL (ref 70–99)
GLUCOSE BLDC GLUCOMTR-MCNC: 124 MG/DL (ref 70–99)
HCT VFR BLD AUTO: 39 % (ref 35–48)
HGB BLD-MCNC: 11.9 G/DL (ref 12–16)
HYALINE CASTS #/AREA URNS AUTO: PRESENT /LPF
IMM GRANULOCYTES # BLD AUTO: 0.03 X10(3) UL (ref 0–1)
IMM GRANULOCYTES NFR BLD: 0.2 %
LIPASE SERPL-CCNC: 24 U/L (ref 12–53)
LYMPHOCYTES # BLD AUTO: 1.01 X10(3) UL (ref 1–4)
LYMPHOCYTES NFR BLD AUTO: 8 %
MCH RBC QN AUTO: 31.7 PG (ref 26–34)
MCHC RBC AUTO-ENTMCNC: 30.5 G/DL (ref 31–37)
MCV RBC AUTO: 104 FL (ref 80–100)
MONOCYTES # BLD AUTO: 0.79 X10(3) UL (ref 0.1–1)
MONOCYTES NFR BLD AUTO: 6.3 %
NEUTROPHILS # BLD AUTO: 10.68 X10 (3) UL (ref 1.5–7.7)
NEUTROPHILS # BLD AUTO: 10.68 X10(3) UL (ref 1.5–7.7)
NEUTROPHILS NFR BLD AUTO: 85.1 %
OSMOLALITY SERPL CALC.SUM OF ELEC: 292 MOSM/KG (ref 275–295)
P AXIS: -8 DEGREES
P-R INTERVAL: 186 MS
PLATELET # BLD AUTO: 176 10(3)UL (ref 150–450)
POTASSIUM SERPL-SCNC: 3.8 MMOL/L (ref 3.5–5.1)
PROT SERPL-MCNC: 6 G/DL (ref 5.7–8.2)
Q-T INTERVAL: 312 MS
QRS DURATION: 94 MS
QTC CALCULATION (BEZET): 373 MS
R AXIS: -7 DEGREES
RBC # BLD AUTO: 3.75 X10(6)UL (ref 3.8–5.3)
SODIUM SERPL-SCNC: 141 MMOL/L (ref 136–145)
SP GR UR REFRACTOMETRY: 1.03 (ref 1–1.03)
T AXIS: -4 DEGREES
VENTRICULAR RATE: 86 BPM
WBC # BLD AUTO: 12.6 X10(3) UL (ref 4–11)

## 2025-08-20 PROCEDURE — 93010 ELECTROCARDIOGRAM REPORT: CPT

## 2025-08-20 PROCEDURE — 80053 COMPREHEN METABOLIC PANEL: CPT | Performed by: EMERGENCY MEDICINE

## 2025-08-20 PROCEDURE — 93005 ELECTROCARDIOGRAM TRACING: CPT

## 2025-08-20 PROCEDURE — 99285 EMERGENCY DEPT VISIT HI MDM: CPT

## 2025-08-20 PROCEDURE — 74177 CT ABD & PELVIS W/CONTRAST: CPT | Performed by: EMERGENCY MEDICINE

## 2025-08-20 PROCEDURE — 81001 URINALYSIS AUTO W/SCOPE: CPT | Performed by: EMERGENCY MEDICINE

## 2025-08-20 PROCEDURE — 82962 GLUCOSE BLOOD TEST: CPT

## 2025-08-20 PROCEDURE — 36415 COLL VENOUS BLD VENIPUNCTURE: CPT

## 2025-08-20 PROCEDURE — 71045 X-RAY EXAM CHEST 1 VIEW: CPT | Performed by: EMERGENCY MEDICINE

## 2025-08-20 PROCEDURE — 82272 OCCULT BLD FECES 1-3 TESTS: CPT

## 2025-08-20 PROCEDURE — 87086 URINE CULTURE/COLONY COUNT: CPT | Performed by: EMERGENCY MEDICINE

## 2025-08-20 PROCEDURE — 83690 ASSAY OF LIPASE: CPT | Performed by: EMERGENCY MEDICINE

## 2025-08-20 PROCEDURE — 85025 COMPLETE CBC W/AUTO DIFF WBC: CPT | Performed by: EMERGENCY MEDICINE

## 2025-08-27 ENCOUNTER — HOSPITAL ENCOUNTER (INPATIENT)
Facility: HOSPITAL | Age: 85
LOS: 4 days | Discharge: HOME HEALTH | End: 2025-08-31
Attending: EMERGENCY MEDICINE | Admitting: HOSPITALIST

## 2025-08-27 ENCOUNTER — APPOINTMENT (OUTPATIENT)
Dept: GENERAL RADIOLOGY | Facility: HOSPITAL | Age: 85
End: 2025-08-27
Attending: EMERGENCY MEDICINE

## 2025-08-27 DIAGNOSIS — J96.21 ACUTE ON CHRONIC HYPOXIC RESPIRATORY FAILURE (HCC): Primary | ICD-10-CM

## 2025-08-27 DIAGNOSIS — R19.7 DIARRHEA, UNSPECIFIED TYPE: ICD-10-CM

## 2025-08-27 DIAGNOSIS — R53.1 WEAKNESS GENERALIZED: ICD-10-CM

## 2025-08-27 LAB
ALBUMIN SERPL-MCNC: 3.9 G/DL (ref 3.2–4.8)
ALBUMIN/GLOB SERPL: 1.2 (ref 1–2)
ALP LIVER SERPL-CCNC: 70 U/L (ref 55–142)
ALT SERPL-CCNC: <7 U/L (ref 10–49)
ANION GAP SERPL CALC-SCNC: 5 MMOL/L (ref 0–18)
AST SERPL-CCNC: 31 U/L (ref ?–34)
BASOPHILS # BLD AUTO: 0.02 X10(3) UL (ref 0–0.2)
BASOPHILS NFR BLD AUTO: 0.2 %
BILIRUB SERPL-MCNC: 0.7 MG/DL (ref 0.2–1.1)
BUN BLD-MCNC: 9 MG/DL (ref 9–23)
BUN/CREAT SERPL: 15.8 (ref 10–20)
CALCIUM BLD-MCNC: 9.3 MG/DL (ref 8.7–10.4)
CHLORIDE SERPL-SCNC: 102 MMOL/L (ref 98–112)
CO2 SERPL-SCNC: 31 MMOL/L (ref 21–32)
CREAT BLD-MCNC: 0.57 MG/DL (ref 0.55–1.02)
DEPRECATED RDW RBC AUTO: 66.3 FL (ref 35.1–46.3)
EGFRCR SERPLBLD CKD-EPI 2021: 90 ML/MIN/1.73M2 (ref 60–?)
EOSINOPHIL # BLD AUTO: 0 X10(3) UL (ref 0–0.7)
EOSINOPHIL NFR BLD AUTO: 0 %
ERYTHROCYTE [DISTWIDTH] IN BLOOD BY AUTOMATED COUNT: 17.2 % (ref 11–15)
FLUAV + FLUBV RNA SPEC NAA+PROBE: NEGATIVE
FLUAV + FLUBV RNA SPEC NAA+PROBE: NEGATIVE
GLOBULIN PLAS-MCNC: 3.3 G/DL (ref 2–3.5)
GLUCOSE BLD-MCNC: 117 MG/DL (ref 70–99)
HCT VFR BLD AUTO: 34.4 % (ref 35–48)
HGB BLD-MCNC: 10.4 G/DL (ref 12–16)
IMM GRANULOCYTES # BLD AUTO: 0.02 X10(3) UL (ref 0–1)
IMM GRANULOCYTES NFR BLD: 0.2 %
LYMPHOCYTES # BLD AUTO: 0.93 X10(3) UL (ref 1–4)
LYMPHOCYTES NFR BLD AUTO: 11.3 %
MCH RBC QN AUTO: 31.6 PG (ref 26–34)
MCHC RBC AUTO-ENTMCNC: 30.2 G/DL (ref 31–37)
MCV RBC AUTO: 104.6 FL (ref 80–100)
MONOCYTES # BLD AUTO: 0.57 X10(3) UL (ref 0.1–1)
MONOCYTES NFR BLD AUTO: 6.9 %
NEUTROPHILS # BLD AUTO: 6.7 X10 (3) UL (ref 1.5–7.7)
NEUTROPHILS # BLD AUTO: 6.7 X10(3) UL (ref 1.5–7.7)
NEUTROPHILS NFR BLD AUTO: 81.4 %
NT-PROBNP SERPL-MCNC: 354 PG/ML (ref ?–450)
OSMOLALITY SERPL CALC.SUM OF ELEC: 286 MOSM/KG (ref 275–295)
PHOSPHATE SERPL-MCNC: 3.2 MG/DL (ref 2.4–5.1)
PLATELET # BLD AUTO: 252 10(3)UL (ref 150–450)
PLATELET MORPHOLOGY: NORMAL
POTASSIUM SERPL-SCNC: 4.8 MMOL/L (ref 3.5–5.1)
PROT SERPL-MCNC: 7.2 G/DL (ref 5.7–8.2)
RBC # BLD AUTO: 3.29 X10(6)UL (ref 3.8–5.3)
RSV RNA SPEC NAA+PROBE: NEGATIVE
SARS-COV-2 RNA RESP QL NAA+PROBE: NOT DETECTED
SODIUM SERPL-SCNC: 138 MMOL/L (ref 136–145)
TSI SER-ACNC: 1.06 UIU/ML (ref 0.55–4.78)
WBC # BLD AUTO: 8.2 X10(3) UL (ref 4–11)

## 2025-08-27 PROCEDURE — 99223 1ST HOSP IP/OBS HIGH 75: CPT | Performed by: HOSPITALIST

## 2025-08-27 PROCEDURE — 71045 X-RAY EXAM CHEST 1 VIEW: CPT | Performed by: EMERGENCY MEDICINE

## 2025-08-27 RX ORDER — DIGOXIN 125 MCG
125 TABLET ORAL DAILY
Status: DISCONTINUED | OUTPATIENT
Start: 2025-08-27 | End: 2025-08-31

## 2025-08-27 RX ORDER — ESCITALOPRAM OXALATE 10 MG/1
10 TABLET ORAL DAILY
COMMUNITY
Start: 2025-08-20

## 2025-08-27 RX ORDER — ESCITALOPRAM OXALATE 10 MG/1
10 TABLET ORAL DAILY
Status: DISCONTINUED | OUTPATIENT
Start: 2025-08-27 | End: 2025-08-31

## 2025-08-27 RX ORDER — DILTIAZEM HYDROCHLORIDE 120 MG/1
360 CAPSULE, EXTENDED RELEASE ORAL DAILY
Status: DISCONTINUED | OUTPATIENT
Start: 2025-08-27 | End: 2025-08-31

## 2025-08-27 RX ORDER — METHYLPREDNISOLONE SODIUM SUCCINATE 40 MG/ML
40 INJECTION INTRAMUSCULAR; INTRAVENOUS EVERY 8 HOURS
Status: DISCONTINUED | OUTPATIENT
Start: 2025-08-27 | End: 2025-08-28

## 2025-08-27 RX ORDER — FUROSEMIDE 40 MG/1
80 TABLET ORAL
Status: DISCONTINUED | OUTPATIENT
Start: 2025-08-27 | End: 2025-08-31

## 2025-08-27 RX ORDER — POTASSIUM CHLORIDE 1500 MG/1
20 TABLET, EXTENDED RELEASE ORAL NIGHTLY
Status: DISCONTINUED | OUTPATIENT
Start: 2025-08-27 | End: 2025-08-31

## 2025-08-27 RX ORDER — METOCLOPRAMIDE HYDROCHLORIDE 5 MG/ML
10 INJECTION INTRAMUSCULAR; INTRAVENOUS EVERY 8 HOURS PRN
Status: DISCONTINUED | OUTPATIENT
Start: 2025-08-27 | End: 2025-08-31

## 2025-08-27 RX ORDER — IPRATROPIUM BROMIDE AND ALBUTEROL SULFATE 2.5; .5 MG/3ML; MG/3ML
3 SOLUTION RESPIRATORY (INHALATION) EVERY 6 HOURS PRN
Status: DISCONTINUED | OUTPATIENT
Start: 2025-08-27 | End: 2025-08-31

## 2025-08-27 RX ORDER — ALBUTEROL SULFATE 5 MG/ML
10 SOLUTION RESPIRATORY (INHALATION) ONCE
Status: COMPLETED | OUTPATIENT
Start: 2025-08-27 | End: 2025-08-27

## 2025-08-27 RX ORDER — IPRATROPIUM BROMIDE AND ALBUTEROL SULFATE 2.5; .5 MG/3ML; MG/3ML
3 SOLUTION RESPIRATORY (INHALATION)
Status: DISCONTINUED | OUTPATIENT
Start: 2025-08-27 | End: 2025-08-28

## 2025-08-27 RX ORDER — ESTRADIOL 0.05 MG/D
1 PATCH TRANSDERMAL AS NEEDED
COMMUNITY
Start: 2025-07-09

## 2025-08-27 RX ORDER — CETIRIZINE HYDROCHLORIDE 1 MG/ML
5 SOLUTION ORAL NIGHTLY
Status: DISCONTINUED | OUTPATIENT
Start: 2025-08-27 | End: 2025-08-31

## 2025-08-27 RX ORDER — CHOLECALCIFEROL (VITAMIN D3) 25 MCG
1000 TABLET ORAL 2 TIMES DAILY
Status: DISCONTINUED | OUTPATIENT
Start: 2025-08-27 | End: 2025-08-31

## 2025-08-27 RX ORDER — ACETAZOLAMIDE 250 MG/1
500 TABLET ORAL DAILY
Status: DISCONTINUED | OUTPATIENT
Start: 2025-08-27 | End: 2025-08-31

## 2025-08-27 RX ORDER — PHENAZOPYRIDINE HYDROCHLORIDE 100 MG/1
100 TABLET, FILM COATED ORAL 3 TIMES DAILY PRN
Status: DISCONTINUED | OUTPATIENT
Start: 2025-08-27 | End: 2025-08-31

## 2025-08-27 RX ORDER — METHYLPREDNISOLONE SODIUM SUCCINATE 40 MG/ML
40 INJECTION INTRAMUSCULAR; INTRAVENOUS ONCE
Status: COMPLETED | OUTPATIENT
Start: 2025-08-27 | End: 2025-08-27

## 2025-08-27 RX ORDER — ACETAMINOPHEN 500 MG
1000 TABLET ORAL EVERY 6 HOURS PRN
Status: DISCONTINUED | OUTPATIENT
Start: 2025-08-27 | End: 2025-08-31

## 2025-08-27 RX ORDER — ACETAMINOPHEN 500 MG
500 TABLET ORAL EVERY 4 HOURS PRN
Status: DISCONTINUED | OUTPATIENT
Start: 2025-08-27 | End: 2025-08-27

## 2025-08-27 RX ORDER — FLUTICASONE PROPIONATE AND SALMETEROL 250; 50 UG/1; UG/1
1 POWDER RESPIRATORY (INHALATION) 2 TIMES DAILY
Status: DISCONTINUED | OUTPATIENT
Start: 2025-08-27 | End: 2025-08-31

## 2025-08-27 RX ORDER — BENZONATATE 200 MG/1
200 CAPSULE ORAL 3 TIMES DAILY PRN
Status: DISCONTINUED | OUTPATIENT
Start: 2025-08-27 | End: 2025-08-31

## 2025-08-27 RX ORDER — ENOXAPARIN SODIUM 100 MG/ML
40 INJECTION SUBCUTANEOUS DAILY
Status: DISCONTINUED | OUTPATIENT
Start: 2025-08-28 | End: 2025-08-31

## 2025-08-27 RX ORDER — MORPHINE SULFATE 10 MG/5ML
2.6 SOLUTION ORAL EVERY 4 HOURS PRN
Refills: 0 | Status: DISCONTINUED | OUTPATIENT
Start: 2025-08-27 | End: 2025-08-31

## 2025-08-27 RX ORDER — MONTELUKAST SODIUM 10 MG/1
10 TABLET ORAL NIGHTLY
Status: DISCONTINUED | OUTPATIENT
Start: 2025-08-27 | End: 2025-08-31

## 2025-08-27 RX ORDER — TRAMADOL HYDROCHLORIDE 50 MG/1
50 TABLET ORAL EVERY 6 HOURS PRN
Status: DISCONTINUED | OUTPATIENT
Start: 2025-08-27 | End: 2025-08-31

## 2025-08-27 RX ORDER — ONDANSETRON 2 MG/ML
4 INJECTION INTRAMUSCULAR; INTRAVENOUS EVERY 6 HOURS PRN
Status: DISCONTINUED | OUTPATIENT
Start: 2025-08-27 | End: 2025-08-31

## 2025-08-28 ENCOUNTER — APPOINTMENT (OUTPATIENT)
Dept: CT IMAGING | Facility: HOSPITAL | Age: 85
End: 2025-08-28
Attending: INTERNAL MEDICINE

## 2025-08-28 PROBLEM — I50.32 CHRONIC HEART FAILURE WITH PRESERVED EJECTION FRACTION (HCC): Status: ACTIVE | Noted: 2025-03-06

## 2025-08-28 PROBLEM — E46 MALNUTRITION (HCC): Status: ACTIVE | Noted: 2025-08-28

## 2025-08-28 LAB
ANION GAP SERPL CALC-SCNC: 7 MMOL/L (ref 0–18)
ATRIAL RATE: 76 BPM
BASOPHILS # BLD AUTO: 0 X10(3) UL (ref 0–0.2)
BASOPHILS NFR BLD AUTO: 0 %
BUN BLD-MCNC: 13 MG/DL (ref 9–23)
BUN/CREAT SERPL: 22.8 (ref 10–20)
CALCIUM BLD-MCNC: 8.7 MG/DL (ref 8.7–10.4)
CHLORIDE SERPL-SCNC: 101 MMOL/L (ref 98–112)
CO2 SERPL-SCNC: 36 MMOL/L (ref 21–32)
CREAT BLD-MCNC: 0.57 MG/DL (ref 0.55–1.02)
DEPRECATED HBV CORE AB SER IA-ACNC: 63 NG/ML (ref 50–306)
DEPRECATED RDW RBC AUTO: 65.1 FL (ref 35.1–46.3)
EGFRCR SERPLBLD CKD-EPI 2021: 90 ML/MIN/1.73M2 (ref 60–?)
EOSINOPHIL # BLD AUTO: 0 X10(3) UL (ref 0–0.7)
EOSINOPHIL NFR BLD AUTO: 0 %
ERYTHROCYTE [DISTWIDTH] IN BLOOD BY AUTOMATED COUNT: 17.1 % (ref 11–15)
GLUCOSE BLD-MCNC: 167 MG/DL (ref 70–99)
HCT VFR BLD AUTO: 31.9 % (ref 35–48)
HGB BLD-MCNC: 9.8 G/DL (ref 12–16)
IMM GRANULOCYTES # BLD AUTO: 0.02 X10(3) UL (ref 0–1)
IMM GRANULOCYTES NFR BLD: 0.3 %
IRON SATN MFR SERPL: 13 % (ref 15–50)
IRON SERPL-MCNC: 28 UG/DL (ref 50–170)
LYMPHOCYTES # BLD AUTO: 0.36 X10(3) UL (ref 1–4)
LYMPHOCYTES NFR BLD AUTO: 6.2 %
MCH RBC QN AUTO: 32 PG (ref 26–34)
MCHC RBC AUTO-ENTMCNC: 30.7 G/DL (ref 31–37)
MCV RBC AUTO: 104.2 FL (ref 80–100)
MONOCYTES # BLD AUTO: 0.03 X10(3) UL (ref 0.1–1)
MONOCYTES NFR BLD AUTO: 0.5 %
NEUTROPHILS # BLD AUTO: 5.42 X10 (3) UL (ref 1.5–7.7)
NEUTROPHILS # BLD AUTO: 5.42 X10(3) UL (ref 1.5–7.7)
NEUTROPHILS NFR BLD AUTO: 93 %
OSMOLALITY SERPL CALC.SUM OF ELEC: 302 MOSM/KG (ref 275–295)
P AXIS: -22 DEGREES
P-R INTERVAL: 180 MS
PLATELET # BLD AUTO: 242 10(3)UL (ref 150–450)
POTASSIUM SERPL-SCNC: 3.3 MMOL/L (ref 3.5–5.1)
Q-T INTERVAL: 326 MS
QRS DURATION: 86 MS
QTC CALCULATION (BEZET): 366 MS
R AXIS: -5 DEGREES
RBC # BLD AUTO: 3.06 X10(6)UL (ref 3.8–5.3)
SODIUM SERPL-SCNC: 144 MMOL/L (ref 136–145)
T AXIS: -70 DEGREES
TOTAL IRON BINDING CAPACITY: 221 UG/DL (ref 250–425)
TRANSFERRIN SERPL-MCNC: 150 MG/DL (ref 250–380)
VENTRICULAR RATE: 76 BPM
VIT B12 SERPL-MCNC: 113 PG/ML (ref 211–911)
WBC # BLD AUTO: 5.8 X10(3) UL (ref 4–11)

## 2025-08-28 PROCEDURE — 99223 1ST HOSP IP/OBS HIGH 75: CPT | Performed by: INTERNAL MEDICINE

## 2025-08-28 PROCEDURE — 71260 CT THORAX DX C+: CPT | Performed by: INTERNAL MEDICINE

## 2025-08-28 PROCEDURE — 99233 SBSQ HOSP IP/OBS HIGH 50: CPT | Performed by: STUDENT IN AN ORGANIZED HEALTH CARE EDUCATION/TRAINING PROGRAM

## 2025-08-28 RX ORDER — POTASSIUM CHLORIDE 1.5 G/1.58G
40 POWDER, FOR SOLUTION ORAL ONCE
Status: COMPLETED | OUTPATIENT
Start: 2025-08-28 | End: 2025-08-28

## 2025-08-28 RX ORDER — IPRATROPIUM BROMIDE AND ALBUTEROL SULFATE 2.5; .5 MG/3ML; MG/3ML
3 SOLUTION RESPIRATORY (INHALATION)
Status: DISCONTINUED | OUTPATIENT
Start: 2025-08-28 | End: 2025-08-31

## 2025-08-28 RX ORDER — METHYLPREDNISOLONE SODIUM SUCCINATE 40 MG/ML
40 INJECTION INTRAMUSCULAR; INTRAVENOUS EVERY 12 HOURS
Status: DISCONTINUED | OUTPATIENT
Start: 2025-08-28 | End: 2025-08-29

## 2025-08-29 LAB — POTASSIUM SERPL-SCNC: 3.3 MMOL/L (ref 3.5–5.1)

## 2025-08-29 PROCEDURE — 99232 SBSQ HOSP IP/OBS MODERATE 35: CPT | Performed by: STUDENT IN AN ORGANIZED HEALTH CARE EDUCATION/TRAINING PROGRAM

## 2025-08-29 RX ORDER — PREDNISONE 20 MG/1
40 TABLET ORAL
Status: DISCONTINUED | OUTPATIENT
Start: 2025-08-29 | End: 2025-08-31

## 2025-08-29 RX ORDER — POTASSIUM CHLORIDE 1500 MG/1
40 TABLET, EXTENDED RELEASE ORAL ONCE
Status: COMPLETED | OUTPATIENT
Start: 2025-08-29 | End: 2025-08-29

## 2025-08-30 LAB — POTASSIUM SERPL-SCNC: 3.4 MMOL/L (ref 3.5–5.1)

## 2025-08-30 RX ORDER — POTASSIUM CHLORIDE 1500 MG/1
40 TABLET, EXTENDED RELEASE ORAL ONCE
Status: COMPLETED | OUTPATIENT
Start: 2025-08-30 | End: 2025-08-30

## 2025-08-31 VITALS
HEIGHT: 65 IN | RESPIRATION RATE: 17 BRPM | BODY MASS INDEX: 19.99 KG/M2 | TEMPERATURE: 98 F | DIASTOLIC BLOOD PRESSURE: 49 MMHG | WEIGHT: 120 LBS | SYSTOLIC BLOOD PRESSURE: 101 MMHG | HEART RATE: 77 BPM | OXYGEN SATURATION: 94 %

## 2025-08-31 RX ORDER — PREDNISONE 20 MG/1
TABLET ORAL
Qty: 9 TABLET | Refills: 0 | Status: SHIPPED | OUTPATIENT
Start: 2025-09-01 | End: 2025-09-10

## (undated) DEVICE — KIT CLEAN ENDOKIT 1.1OZ GOWNX2

## (undated) DEVICE — TRAP MCS 40ML 5IN PLS SCR CAP

## (undated) DEVICE — SYRINGE MED 10ML SLIP TIP CLR BRL TAPR PLUNG

## (undated) DEVICE — SINGLE USE SUCTION VALVE MAJ-209: Brand: SINGLE USE SUCTION VALVE (STERILE)

## (undated) DEVICE — MEDI-VAC NON-CONDUCTIVE SUCTION TUBING: Brand: CARDINAL HEALTH

## (undated) DEVICE — Device: Brand: DEFENDO AIR/WATER/SUCTION AND BIOPSY VALVE

## (undated) DEVICE — FORCEP RADIAL JAW 4

## (undated) DEVICE — ENDOSCOPY PACK UPPER: Brand: MEDLINE INDUSTRIES, INC.

## (undated) DEVICE — 35 ML SYRINGE REGULAR TIP: Brand: MONOJECT

## (undated) DEVICE — STERILE LATEX POWDER-FREE SURGICAL GLOVESWITH NITRILE COATING: Brand: PROTEXIS

## (undated) DEVICE — KIT MFLD FOR SPEC COLL

## (undated) DEVICE — SINGLE USE BIOPSY VALVE MAJ-210: Brand: SINGLE USE BIOPSY VALVE (STERILE)

## (undated) DEVICE — MVAO100U ADV AD ORAL N O2 S TERM

## (undated) DEVICE — 6 ML SYRINGE LUER-LOCK TIP: Brand: MONOJECT

## (undated) DEVICE — 6 ML SYRINGE REGULAR TIP: Brand: MONOJECT

## (undated) NOTE — LETTER
Hospital Discharge Documentation  Please phone to schedule a hospital follow up appointment.    From: [facility] Hospitalist's Office  Phone: [phone]    Patient discharged time/date: 2025  1:22 PM  Patient discharge disposition:  Home Health Care Services, [facility]       Discharge Summary - D/C Summary        Discharge Summary signed by Lissy Blandon MD at 2025 11:18 AM  Version 3 of 3      Author: Lissy Blandon MD Service: Hospitalist Author Type: Physician    Filed: 2025 11:18 AM Date of Service: 2025 10:20 AM Status: Addendum    : Lissy Blandon MD (Physician)    Related Notes: Original Note by Lissy Blandon MD (Physician) filed at 2025 11:11 AM           [facility]  part of [facility]    DISCHARGE SUMMARY     Yesenia Berkowitz Patient Status:  Inpatient    1940 MRN [id number]   Location [facility]5W Attending Lissy Blandon MD   Hosp Day # 7 PCP JO-ANN YANG MD     Date of Admission: 2025  Date of Discharge:  2025    Discharge Disposition: Home or Self Care    Discharge Diagnosis:     Acute on chronic respiratory failure  COPD exacerbation  Asthma exacerbation  Bilateral PNA  Phrenic nervie paralysis  Hx of Afib  HTN  Hx of HFpEF  Hypokalemia    History of Present Illness:     Yesenia Berkowitz is a 84 year old female with history of COPD, A-fib, asthma, GERD, heart disease, chronic respiratory failure on 3.5-4 L, HTN and others who presented to the ED today with complaints of shortness of breath for the last 2 days.  Tried usual home medications without improvement.  She developed a cough a day ago.  Nonproductive.  No fever or chills.  No sick contacts.  No recent travel.  She does note her legs are slightly more swollen than baseline but has not documented any weight gain.     On EMT arrival to home, she was noted to be 85% on 3 L.  Nebs administered and patient transported to the ED.  On arrival to  ED, vital stable almost to 88% on 5 L.  She was placed on a nonrebreather.  Steroids nebs and antibiotics initiated.  Pulmonology on consult.     She will be admitted for further treatment.     CXR: Possible mild interstitial pulmonary edema.  Slight interval increase in mild right lower lobe patchy groundglass airspace disease, mild left basilar atelectasis and/or small pleural effusion  Significant labs potassium 3.4, WBC 12.0    Brief Synopsis:     Acute on chronic respiratory failure  COPD exacerbation  Asthma exacerbation  Bilateral PNA  Phrenic nervie paralysis  Imaging reviewed  Cultures showing NGTD  Pulm on consult  Prednisone increased to 40 mg PO - taper and discharge on 10 mg daily  No abx at discharge  Continue nebs, PPI  Continue lasix and spironolactone  Continue BiPAP at night  Hx of Afib  HTN  Continue home meds  Not on anticoagulation  Cardiology on consult  Appreciate recs  Close opt follow up  Hx of HFpEF  Imaging reviewed  BNP WNL  Last EF 65-70%  Net IO Since Admission: -2,846 mL [02/04/25 0845]  Strict Is and Os, daily weights  Hypokalemia  Replace per protocol  Patient is to follow up with PCP, Cardiology and Pulm as opt. Discharge meds ordered per pulm and cardiology   Patient is to remain compliant with all discharge medications and instructions and to follow up as advised.   Patient encouraged to make healthy lifestyle and dietary changes.    Attempted to call the son to update him with the plan of care but was unable to reach him.    Lace+ Score: 83  59-90 High Risk  29-58 Medium Risk  0-28   Low Risk       TCM Follow-Up Recommendation:  LACE > 58: High Risk of readmission after discharge from the hospital.      Procedures during hospitalization:   None    Incidental or significant findings and recommendations (brief descriptions):  None    Lab/Test results pending at Discharge:   None    Consultants:  Consultants         Provider   Role Specialty     Loi Greenberg MD      Consulting  Physician Interventional, Cardiology     Bing Boyle MD      Consulting Physician PULMONARY DISEASES          Discharge Medication List:     Discharge Medications        START taking these medications        Instructions Prescription details   dilTIAZem  MG Cp24  Commonly known as: Tiazac  Start taking on: February 6, 2025      Take 1 capsule (360 mg total) by mouth daily.   Quantity: 30 capsule  Refills: 11     predniSONE 10 MG Tabs  Commonly known as: Deltasone      20 mg (two tabs) daily for 5 days followed by 10 mg (one tab) daily for 5 days.   Quantity: 15 tablet  Refills: 0            CHANGE how you take these medications        Instructions Prescription details   furosemide 80 MG Tabs  Commonly known as: Lasix  What changed: when to take this      Take 1 tablet (80 mg total) by mouth BID (Diuretic).   Quantity: 60 tablet  Refills: 0            CONTINUE taking these medications        Instructions Prescription details   acetaminophen 500 MG Tabs  Commonly known as: Tylenol Extra Strength      Take 2 tablets (1,000 mg total) by mouth every 6 (six) hours as needed for Pain or Fever.   Refills: 0     acetaZOLAMIDE 250 MG Tabs  Commonly known as: Diamox      Take 2 tablets (500 mg total) by mouth daily.   Quantity: 60 tablet  Refills: 0     albuterol 108 (90 Base) MCG/ACT Aers  Commonly known as: Ventolin HFA      Inhale 2 puffs into the lungs as needed.   Refills: 0     digoxin 0.125 MG Tabs  Commonly known as: Lanoxin      Take 1 tablet (125 mcg total) by mouth daily.   Quantity: 30 tablet  Refills: 0     empagliflozin 10 MG Tabs  Commonly known as: Jardiance      Take 1 tablet (10 mg total) by mouth daily.   Quantity: 30 tablet  Refills: 3     estradiol 0.05 MG/24HR Ptwk  Commonly known as: Climara      Place 1 patch onto the skin once a week. As directed.   Refills: 0     famotidine 20 MG Tabs  Commonly known as: Pepcid      Take 1 tablet (20 mg total) by mouth 2 (two) times daily as needed for  Heartburn.   Stop taking on: February 20, 2025  Quantity: 30 tablet  Refills: 0     Loratadine 10 MG Caps      Take 10 mg by mouth nightly.   Refills: 0     montelukast 10 MG Tabs  Commonly known as: Singulair      Take 1 tablet (10 mg total) by mouth nightly.   Refills: 0     potassium chloride 20 MEQ Tbcr  Commonly known as: Klor-Con M20      Take 1 tablet (20 mEq total) by mouth nightly.   Refills: 0     spironolactone 25 MG Tabs  Commonly known as: Aldactone      Take 1 tablet (25 mg total) by mouth daily for 90 doses.   Stop taking on: March 17, 2025  Quantity: 90 tablet  Refills: 0     Trelegy Ellipta 100-62.5-25 MCG/ACT Aepb  Generic drug: fluticasone-umeclidin-vilant      Inhale 1 puff into the lungs daily.   Refills: 0     Vitamin D 1000 units Tabs      Take 1,000 Units by mouth 2 (two) times daily.   Refills: 0            STOP taking these medications      aspirin 81 MG Tabs        dilTIAZem HCl ER Coated Beads 360 MG Cp24  Commonly known as: CARDIZEM CD        fluticasone propionate 110 MCG/ACT Aero  Commonly known as: Flovent HFA        lidocaine 5 % Ptch  Commonly known as: Lidoderm                  Where to Get Your Medications        These medications were sent to [id number] - [place], [place] - [address] [phone], [phone]  [address], [facility] [address]      Phone: [phone]   dilTIAZem  MG Cp24  furosemide 80 MG Tabs  predniSONE 10 MG Tabs         [place]    Follow-up appointment:   [facility]  [address]  [facility] [id number] [phone]  Schedule an appointment as soon as possible for a visit      Brenda Wilkerson MD  [address]  [address]  [facility] [id number] [phone]    Schedule an appointment as soon as possible for a visit in 1 week(s)      Luis Fernando Fowler MD  [address]  [address]  [facility] [id number] [phone]    Follow up in 1 week(s)      Jose Ruelas MD  [address]  [address]  [place] [address]  [phone]    Follow up in 1 week(s)      Appointments for Next 30 Days 2/5/2025 - 3/7/2025        Date Arrival Time Visit Type Length Department Provider     2/24/2025  1:15 PM  PROCEDURE MAC [4002] 45 min [facility], [facility], Valerie SANZ, PROCEDURE    Patient Instructions:                           Vital signs:  Temp:  [97.9 °F (36.6 °C)-98.6 °F (37 °C)] 98.1 °F (36.7 °C)  Pulse:  [75-88] 81  Resp:  [16-21] 21  BP: (115-127)/(49-81) 118/81  SpO2:  [95 %-100 %] 98 %    Physical Exam:    Gen: NAD AO x3  Chest: good air entry CTABL  CVS: normal s1 and s2 RR  Abd: NABS soft NT ND  Neuro: CN 2-12 grossly intact  Ext: no edema in bilateral LE    -----------------------------------------------------------------------------------------------  PATIENT DISCHARGE INSTRUCTIONS: See electronic chart    Lissy Blandon MD  Hospitalist    Time spent:  > 30 minutes    The 21st Century Cures Act makes medical notes like these available to patients in the interest of transparency. Please be advised this is a medical document. Medical documents are intended to carry relevant information, facts as evident, and the clinical opinion of the practitioner. The medical note is intended as peer to peer communication and may appear blunt or direct. It is written in medical language and may contain abbreviations or verbiage that are unfamiliar.     Electronically signed by Lissy Blandon MD on 2/5/2025 11:18 AM       Discharge Summary signed by Lissy Blandon MD at 2/5/2025 11:11 AM  Version 2 of 3      Author: Lissy Blandon MD Service: Hospitalist Author Type: Physician    Filed: 2/5/2025 11:11 AM Date of Service: 2/5/2025 10:20 AM Status: Addendum    : Lissy Blandon MD (Physician)    Related Notes: Addendum by Lissy Blandon MD (Physician) filed at 2/5/2025 11:18 AM  Original Note by Lissy Blandon MD (Physician) filed at 2/5/2025 10:23 AM           [facility]  Hospital  part of [facility]    DISCHARGE SUMMARY     Yesenia Berkowitz Patient Status:  Inpatient    1940 MRN [id number]   Location [facility]5W Attending Lissy Blandon MD   Hosp Day # 7 PCP JO-ANN YANG MD     Date of Admission: 2025  Date of Discharge:  2025    Discharge Disposition: Home or Self Care    Discharge Diagnosis:     Acute on chronic respiratory failure  COPD exacerbation  Asthma exacerbation  Bilateral PNA  Phrenic nervie paralysis  Hx of Afib  HTN  Hx of HFpEF  Hypokalemia    History of Present Illness:     Yesenia Berkowitz is a 84 year old female with history of COPD, A-fib, asthma, GERD, heart disease, chronic respiratory failure on 3.5-4 L, HTN and others who presented to the ED today with complaints of shortness of breath for the last 2 days.  Tried usual home medications without improvement.  She developed a cough a day ago.  Nonproductive.  No fever or chills.  No sick contacts.  No recent travel.  She does note her legs are slightly more swollen than baseline but has not documented any weight gain.     On EMT arrival to home, she was noted to be 85% on 3 L.  Nebs administered and patient transported to the ED.  On arrival to ED, vital stable almost to 88% on 5 L.  She was placed on a nonrebreather.  Steroids nebs and antibiotics initiated.  Pulmonology on consult.     She will be admitted for further treatment.     CXR: Possible mild interstitial pulmonary edema.  Slight interval increase in mild right lower lobe patchy groundglass airspace disease, mild left basilar atelectasis and/or small pleural effusion  Significant labs potassium 3.4, WBC 12.0    Brief Synopsis:     Acute on chronic respiratory failure  COPD exacerbation  Asthma exacerbation  Bilateral PNA  Phrenic nervie paralysis  Imaging reviewed  Cultures showing NGTD  Pulm on consult  Prednisone increased to 40 mg PO - taper and discharge on 10 mg daily  No abx at discharge  Continue nebs, PPI  Continue lasix  and spironolactone  Continue BiPAP at night  Hx of Afib  HTN  Continue home meds  Not on anticoagulation  Cardiology on consult  Appreciate recs  Close opt follow up  Hx of HFpEF  Imaging reviewed  BNP WNL  Last EF 65-70%  Net IO Since Admission: -2,846 mL [02/04/25 0845]  Strict Is and Os, daily weights  Hypokalemia  Replace per protocol  Patient is to follow up with PCP, Cardiology and Pulm as opt. Discharge meds ordered per pulm and cardiology   Patient is to remain compliant with all discharge medications and instructions and to follow up as advised.   Patient encouraged to make healthy lifestyle and dietary changes.    Lace+ Score: 83  59-90 High Risk  29-58 Medium Risk  0-28   Low Risk       TCM Follow-Up Recommendation:  LACE > 58: High Risk of readmission after discharge from the hospital.      Procedures during hospitalization:   None    Incidental or significant findings and recommendations (brief descriptions):  None    Lab/Test results pending at Discharge:   None    Consultants:  Consultants         Provider   Role Specialty     Loi Greenberg MD      Consulting Physician Interventional, Cardiology     Bing Boyle MD      Consulting Physician PULMONARY DISEASES          Discharge Medication List:     Discharge Medications        START taking these medications        Instructions Prescription details   dilTIAZem  MG Cp24  Commonly known as: Tiazac  Start taking on: February 6, 2025      Take 1 capsule (360 mg total) by mouth daily.   Quantity: 30 capsule  Refills: 11     predniSONE 10 MG Tabs  Commonly known as: Deltasone      20 mg (two tabs) daily for 5 days followed by 10 mg (one tab) daily for 5 days.   Quantity: 15 tablet  Refills: 0            CHANGE how you take these medications        Instructions Prescription details   furosemide 80 MG Tabs  Commonly known as: Lasix  What changed: when to take this      Take 1 tablet (80 mg total) by mouth BID (Diuretic).   Quantity: 60 tablet  Refills:  0            CONTINUE taking these medications        Instructions Prescription details   acetaminophen 500 MG Tabs  Commonly known as: Tylenol Extra Strength      Take 2 tablets (1,000 mg total) by mouth every 6 (six) hours as needed for Pain or Fever.   Refills: 0     acetaZOLAMIDE 250 MG Tabs  Commonly known as: Diamox      Take 2 tablets (500 mg total) by mouth daily.   Quantity: 60 tablet  Refills: 0     albuterol 108 (90 Base) MCG/ACT Aers  Commonly known as: Ventolin HFA      Inhale 2 puffs into the lungs as needed.   Refills: 0     digoxin 0.125 MG Tabs  Commonly known as: Lanoxin      Take 1 tablet (125 mcg total) by mouth daily.   Quantity: 30 tablet  Refills: 0     empagliflozin 10 MG Tabs  Commonly known as: Jardiance      Take 1 tablet (10 mg total) by mouth daily.   Quantity: 30 tablet  Refills: 3     estradiol 0.05 MG/24HR Ptwk  Commonly known as: Climara      Place 1 patch onto the skin once a week. As directed.   Refills: 0     famotidine 20 MG Tabs  Commonly known as: Pepcid      Take 1 tablet (20 mg total) by mouth 2 (two) times daily as needed for Heartburn.   Stop taking on: February 20, 2025  Quantity: 30 tablet  Refills: 0     Loratadine 10 MG Caps      Take 10 mg by mouth nightly.   Refills: 0     montelukast 10 MG Tabs  Commonly known as: Singulair      Take 1 tablet (10 mg total) by mouth nightly.   Refills: 0     potassium chloride 20 MEQ Tbcr  Commonly known as: Klor-Con M20      Take 1 tablet (20 mEq total) by mouth nightly.   Refills: 0     spironolactone 25 MG Tabs  Commonly known as: Aldactone      Take 1 tablet (25 mg total) by mouth daily for 90 doses.   Stop taking on: March 17, 2025  Quantity: 90 tablet  Refills: 0     Trelegy Ellipta 100-62.5-25 MCG/ACT Aepb  Generic drug: fluticasone-umeclidin-vilant      Inhale 1 puff into the lungs daily.   Refills: 0     Vitamin D 1000 units Tabs      Take 1,000 Units by mouth 2 (two) times daily.   Refills: 0            STOP taking these  medications      aspirin 81 MG Tabs        dilTIAZem HCl ER Coated Beads 360 MG Cp24  Commonly known as: CARDIZEM CD        fluticasone propionate 110 MCG/ACT Aero  Commonly known as: Flovent HFA        lidocaine 5 % Ptch  Commonly known as: Lidoderm                  Where to Get Your Medications        These medications were sent to [id number] - [place], [place] - [address] [phone], [phone]  [address], [facility] [address]      Phone: [phone]   dilTIAZem  MG Cp24  furosemide 80 MG Tabs  predniSONE 10 MG Tabs         [place]    Follow-up appointment:   [facility]  [address]  [facility] [id number] [phone]  Schedule an appointment as soon as possible for a visit      Brenda Wilkerson MD  [address]  [address]  [facility] [id number] [phone]    Schedule an appointment as soon as possible for a visit in 1 week(s)      Luis Fernando Fowler MD  [address]  [address]  [facility] [address]  [phone]    Follow up in 1 week(s)      Jose Ruelas MD  [address]  [address]  [place] [id number] [phone]    Follow up in 1 week(s)      Appointments for Next 30 Days 2/5/2025 - 3/7/2025        Date Arrival Time Visit Type Length Department Provider     2/24/2025  1:15 PM  PROCEDURE MAC [0562] 45 min [facility], [place] JESUS SANZ    Patient Instructions:                           Vital signs:  Temp:  [97.9 °F (36.6 °C)-98.6 °F (37 °C)] 98.1 °F (36.7 °C)  Pulse:  [75-88] 81  Resp:  [16-21] 21  BP: (115-127)/(49-81) 118/81  SpO2:  [95 %-100 %] 98 %    Physical Exam:    Gen: NAD AO x3  Chest: good air entry CTABL  CVS: normal s1 and s2 RR  Abd: NABS soft NT ND  Neuro: CN 2-12 grossly intact  Ext: no edema in bilateral LE    -----------------------------------------------------------------------------------------------  PATIENT DISCHARGE INSTRUCTIONS: See electronic  chart    Lissy Blandon MD  Hospitalist    Time spent:  > 30 minutes    The  Cures Act makes medical notes like these available to patients in the interest of transparency. Please be advised this is a medical document. Medical documents are intended to carry relevant information, facts as evident, and the clinical opinion of the practitioner. The medical note is intended as peer to peer communication and may appear blunt or direct. It is written in medical language and may contain abbreviations or verbiage that are unfamiliar.     Electronically signed by Lissy Blandon MD on 2025 11:11 AM       Discharge Summary signed by Lissy Blandon MD at 2025 10:23 AM  Version 1 of 3      Author: Lissy Blandon MD Service: Hospitalist Author Type: Physician    Filed: 2025 10:23 AM Date of Service: 2025 10:20 AM Status: Signed    : Lissy Blandon MD (Physician)    Related Notes: Addendum by Lissy Blandon MD (Physician) filed at 2025 11:11 AM           [facility]  part of [facility]    DISCHARGE SUMMARY     Yesenia Berkowitz Patient Status:  Inpatient    1940 MRN [id number]   Location [facility]5W Attending Lissy Blandon MD   Hosp Day # 7 PCP JO-ANN YANG MD     Date of Admission: 2025  Date of Discharge:  2025    Discharge Disposition: Home or Self Care    Discharge Diagnosis:     Acute on chronic respiratory failure  COPD exacerbation  Asthma exacerbation  Bilateral PNA  Phrenic nervie paralysis  Hx of Afib  HTN  Hx of HFpEF  Hypokalemia    History of Present Illness:     Yesenia Berkowitz is a 84 year old female with history of COPD, A-fib, asthma, GERD, heart disease, chronic respiratory failure on 3.5-4 L, HTN and others who presented to the ED today with complaints of shortness of breath for the last 2 days.  Tried usual home medications without improvement.  She developed a cough a day ago.  Nonproductive.  No fever or chills.  No sick contacts.  No  recent travel.  She does note her legs are slightly more swollen than baseline but has not documented any weight gain.     On EMT arrival to home, she was noted to be 85% on 3 L.  Nebs administered and patient transported to the ED.  On arrival to ED, vital stable almost to 88% on 5 L.  She was placed on a nonrebreather.  Steroids nebs and antibiotics initiated.  Pulmonology on consult.     She will be admitted for further treatment.     CXR: Possible mild interstitial pulmonary edema.  Slight interval increase in mild right lower lobe patchy groundglass airspace disease, mild left basilar atelectasis and/or small pleural effusion  Significant labs potassium 3.4, WBC 12.0    Brief Synopsis:     Acute on chronic respiratory failure  COPD exacerbation  Asthma exacerbation  Bilateral PNA  Phrenic nervie paralysis  Imaging reviewed  Cultures showing NGTD  Pulm on consult  Prednisone increased to 40 mg PO - taper and discharge on 10 mg daily  No abx at discharge  Continue nebs, PPI  Continue lasix and spironolactone  Continue BiPAP at night  Hx of Afib  HTN  Continue home meds  Not on anticoagulation  Cardiology on consult  Appreciate recs  Close opt follow up  Hx of HFpEF  Imaging reviewed  BNP WNL  Last EF 65-70%  Net IO Since Admission: -2,846 mL [02/04/25 0845]  Strict Is and Os, daily weights  Hypokalemia  Replace per protocol  Patient is to follow up with PCP, Cardiology and Pulm as opt. Discharge meds ordered per pulm and cardiology   Patient is to remain compliant with all discharge medications and instructions and to follow up as advised.   Patient encouraged to make healthy lifestyle and dietary changes.    Lace+ Score: 83  59-90 High Risk  29-58 Medium Risk  0-28   Low Risk       TCM Follow-Up Recommendation:  LACE > 58: High Risk of readmission after discharge from the hospital.      Procedures during hospitalization:   None    Incidental or significant findings and recommendations (brief  descriptions):  None    Lab/Test results pending at Discharge:   None    Consultants:  Consultants         Provider   Role Specialty     Loi Greenberg MD      Consulting Physician Interventional, Cardiology     Bing Boyle MD      Consulting Physician PULMONARY DISEASES          Discharge Medication List:     Discharge Medications        START taking these medications        Instructions Prescription details   dilTIAZem  MG Cp24  Commonly known as: Tiazac  Start taking on: February 6, 2025      Take 1 capsule (360 mg total) by mouth daily.   Quantity: 30 capsule  Refills: 11     predniSONE 10 MG Tabs  Commonly known as: Deltasone      40 mg (4 tabs) daily for 3 days, 20 mg (2 tabs) daily for 3 days followed by 10 mg daily as chronic home dose   Quantity: 30 tablet  Refills: 0            CHANGE how you take these medications        Instructions Prescription details   furosemide 80 MG Tabs  Commonly known as: Lasix  What changed: when to take this      Take 1 tablet (80 mg total) by mouth BID (Diuretic).   Quantity: 60 tablet  Refills: 0            CONTINUE taking these medications        Instructions Prescription details   acetaminophen 500 MG Tabs  Commonly known as: Tylenol Extra Strength      Take 2 tablets (1,000 mg total) by mouth every 6 (six) hours as needed for Pain or Fever.   Refills: 0     acetaZOLAMIDE 250 MG Tabs  Commonly known as: Diamox      Take 2 tablets (500 mg total) by mouth daily.   Quantity: 60 tablet  Refills: 0     albuterol 108 (90 Base) MCG/ACT Aers  Commonly known as: Ventolin HFA      Inhale 2 puffs into the lungs as needed.   Refills: 0     digoxin 0.125 MG Tabs  Commonly known as: Lanoxin      Take 1 tablet (125 mcg total) by mouth daily.   Quantity: 30 tablet  Refills: 0     empagliflozin 10 MG Tabs  Commonly known as: Jardiance      Take 1 tablet (10 mg total) by mouth daily.   Quantity: 30 tablet  Refills: 3     estradiol 0.05 MG/24HR Ptwk  Commonly known as: Climara       Place 1 patch onto the skin once a week. As directed.   Refills: 0     famotidine 20 MG Tabs  Commonly known as: Pepcid      Take 1 tablet (20 mg total) by mouth 2 (two) times daily as needed for Heartburn.   Stop taking on: February 20, 2025  Quantity: 30 tablet  Refills: 0     Loratadine 10 MG Caps      Take 10 mg by mouth nightly.   Refills: 0     montelukast 10 MG Tabs  Commonly known as: Singulair      Take 1 tablet (10 mg total) by mouth nightly.   Refills: 0     potassium chloride 20 MEQ Tbcr  Commonly known as: Klor-Con M20      Take 1 tablet (20 mEq total) by mouth nightly.   Refills: 0     spironolactone 25 MG Tabs  Commonly known as: Aldactone      Take 1 tablet (25 mg total) by mouth daily for 90 doses.   Stop taking on: March 17, 2025  Quantity: 90 tablet  Refills: 0     Trelegy Ellipta 100-62.5-25 MCG/ACT Aepb  Generic drug: fluticasone-umeclidin-vilant      Inhale 1 puff into the lungs daily.   Refills: 0     Vitamin D 1000 units Tabs      Take 1,000 Units by mouth 2 (two) times daily.   Refills: 0            STOP taking these medications      aspirin 81 MG Tabs        dilTIAZem HCl ER Coated Beads 360 MG Cp24  Commonly known as: CARDIZEM CD        fluticasone propionate 110 MCG/ACT Aero  Commonly known as: Flovent HFA        lidocaine 5 % Ptch  Commonly known as: Lidoderm                  Where to Get Your Medications        These medications were [facility] to [id number] - [place], [place] - [address] [phone], [phone]  [address] [facility] [address]      Phone: [phone]   dilTIAZem  MG Cp24  furosemide 80 MG Tabs  predniSONE 10 MG Tabs         [place]    Follow-up appointment:   [facility]  [address]  [facility] [id number] [phone]  Schedule an appointment as soon as possible for a visit      Brenda Wilkerson MD  [address]  [facility] [address] [phone]    Schedule an appointment as soon as  possible for a visit in 1 week(s)      Luis Fernando Fowler MD  [address]  [address]  [facility] [id number] [phone]    Follow up in 1 week(s)      Jose Ruelas MD  [address]  [address]  [place] [id number] [phone]    Follow up in 1 week(s)      Appointments for Next 30 Days 2/5/2025 - 3/7/2025        Date Arrival Time Visit Type Length Department Provider     2/24/2025  1:15 PM  PROCEDURE MAC [5492] 45 min [facility] SAN[facility], PROCEDURE    Patient Instructions:                           Vital signs:  Temp:  [97.9 °F (36.6 °C)-98.6 °F (37 °C)] 98.1 °F (36.7 °C)  Pulse:  [75-88] 81  Resp:  [16-21] 21  BP: (115-127)/(49-81) 118/81  SpO2:  [95 %-100 %] 98 %    Physical Exam:    Gen: NAD AO x3  Chest: good air entry CTABL  CVS: normal s1 and s2 RR  Abd: NABS soft NT ND  Neuro: CN 2-12 grossly intact  Ext: no edema in bilateral LE    -----------------------------------------------------------------------------------------------  PATIENT DISCHARGE INSTRUCTIONS: See electronic chart    Lissy Blandon MD  Hospitalist    Time spent:  > 30 minutes    The 21st Century Cures Act makes medical notes like these available to patients in the interest of transparency. Please be advised this is a medical document. Medical documents are intended to carry relevant information, facts as evident, and the clinical opinion of the practitioner. The medical note is intended as peer to peer communication and may appear blunt or direct. It is written in medical language and may contain abbreviations or verbiage that are unfamiliar.     Electronically signed by Lissy Blandon MD on 2/5/2025 10:23 AM

## (undated) NOTE — LETTER
Hospital Discharge Documentation  Please phone to schedule a hospital follow up appointment. No discharge summary available at this time, below is the most recent progress note  for your review .         From: [address] [facility]  Phone: [phone] • Paronychia 2011     (RT); onychomycosis; debridement   • Problems with swallowing       occasionally   • Shortness of breath       2 L NC ALL THE TIME 24HR/7 DAYS PER WEEK   • Unspecified essential hypertension     • Visual impairment              Past S TRIAMCINOLONE ACETONIDE 0.1 % External Cream,  APPLY TO AFFECTED AREA(S) 2 (TWO) TIMES DAILY.    ARNUITY ELLIPTA 200 MCG/ACT Inhalation Aerosol Powder, Breath Activated,     PROAIR  (90 BASE) MCG/ACT Inhalation Aero Soln,     Cholecalciferol (VITAMI A comprehensive 12 point review of systems was negative.  See History of Present Illness for other pertinent details.      Physical Exam      Temp:  [97.8 °F (36.6 °C)] 97.8 °F (36.6 °C)  Pulse:  [72-83] 75  Resp:  [18-19] 18  BP: (120-139)/(51-61) 120/55

## (undated) NOTE — LETTER
[facility]  [address], [place], [place]    Authorization for Surgical Operation and Procedure                               I hereby authorize Bing Boyle MD, my physician and his/her assistants (if applicable), which may include medical students, residents, and/or fellows, to perform the following surgical operation/ procedure and administer such anesthesia as may be determined necessary by my physician: Operation/Procedure name (s) BRONCHOSCOPY on Yesenia Berkowitz   2.   I recognize that during the surgical operation/procedure, unforeseen conditions may necessitate additional or different procedures than those listed above.  I, therefore, further authorize and request that the above-named surgeon, assistants, or designees perform such procedures as are, in their judgment, necessary and desirable.    3.   My surgeon/physician has discussed prior to my surgery the potential benefits, risks and side effects of this procedure; the likelihood of achieving goals; and potential problems that might occur during recuperation.  They also discussed reasonable alternatives to the procedure, including risks, benefits, and side effects related to the alternatives and risks related to not receiving this procedure.  I have had all my questions answered and I acknowledge that no guarantee has been made as to the result that may be obtained.    4.   Should the need arise during my operation/procedure, which includes change of level of care prior to discharge, I also consent to the administration of blood and/or blood products.  Further, I understand that despite careful testing and screening of blood or blood products by collecting agencies, I may still be subject to ill effects as a result of receiving a blood transfusion and/or blood products.  The following are some, but not all, of the potential risks that can occur: fever and allergic reactions, hemolytic reactions, transmission of diseases such as  Hepatitis, AIDS and Cytomegalovirus (CMV) and fluid overload.  In the event that I wish to have an autologous transfusion of my own blood, or a directed donor transfusion, I will discuss this with my physician.  Check only if Refusing Blood or Blood Products  I understand refusal of blood or blood products as deemed necessary by my physician may have serious consequences to my condition to include possible death. I hereby assume responsibility for my refusal and release the hospital, its personnel, and my physicians from any responsibility for the consequences of my refusal.    o  Refuse   5.   I authorize the use of any specimen, organs, tissues, body parts or foreign objects that may be removed from my body during the operation/procedure for diagnosis, research or teaching purposes and their subsequent disposal by hospital authorities.  I also authorize the release of specimen test results and/or written reports to my treating physician on the hospital medical staff or other referring or consulting physicians involved in my care, at the discretion of the Pathologist or my treating physician.    6.   I consent to the photographing or videotaping of the operations or procedures to be performed, including appropriate portions of my body for medical, scientific, or educational purposes, provided my identity is not revealed by the pictures or by descriptive texts accompanying them.  If the procedure has been photographed/videotaped, the surgeon will obtain the original picture, image, videotape or CD.  The hospital will not be responsible for storage, release or maintenance of the picture, image, tape or CD.    7.   I consent to the presence of a  or observers in the operating room as deemed necessary by my physician or their designees.    8.   I recognize that in the event my procedure results in extended X-Ray/fluoroscopy time, I may develop a skin reaction.    9. If I have a Do Not Attempt  Resuscitation (DNAR) order in place, that status will be suspended while in the operating room, procedural suite, and during the recovery period unless otherwise explicitly stated by me (or a person authorized to consent on my behalf). The surgeon or my attending physician will determine when the applicable recovery period ends for purposes of reinstating the DNAR order.  10. Patients having a sterilization procedure: I understand that if the procedure is successful the results will be permanent and it will therefore be impossible for me to inseminate, conceive, or bear children.  I also understand that the procedure is intended to result in sterility, although the result has not been guaranteed.   11. I acknowledge that my physician has explained sedation/analgesia administration to me including the risk and benefits I consent to the administration of sedation/analgesia as may be necessary or desirable in the judgment of my physician.    I CERTIFY THAT I HAVE READ AND FULLY UNDERSTAND THE ABOVE CONSENT TO OPERATION and/or OTHER PROCEDURE.     ____________________________________  _________________________________        ______________________________  Signature of Patient    Signature of Responsible Person                Printed Name of Responsible Person                                      ____________________________________  _____________________________                ________________________________  Signature of Witness        Date  Time         Relationship to Patient    STATEMENT OF PHYSICIAN My signature below affirms that prior to the time of the procedure; I have explained to the patient and/or his/her legal representative, the risks and benefits involved in the proposed treatment and any reasonable alternative to the proposed treatment. I have also explained the risks and benefits involved in refusal of the proposed treatment and alternatives to the proposed treatment and have answered the patient's  questions. If I have a significant financial interest in a co-management agreement or a significant financial interest in any product or implant, or other significant relationship used in this procedure/surgery, I have disclosed this and had a discussion with my patient.     _____________________________________________________              _____________________________  (Signature of Physician)                                                                                         (Date)                                   (Time)  Patient Name: Yesenia Berkowitz      : 1940      Printed: 2024     Medical Record #: [id number]                                      Page 1 of 1

## (undated) NOTE — LETTER
Hospital Discharge Documentation  Please phone to schedule a hospital follow up appointment.    From: [facility] Hospitalist's Office  Phone: [phone]    Patient discharged time/date: 2024 12:06 PM  Patient discharge disposition:  [facility] Health Care Services[facility]       Discharge Summary - D/C Summary        Discharge Summary signed by Anna Douglas MD at 2024  2:30 PM  Version 1 of 1      Author: Anna Douglas MD Service: Internal Medicine Author Type: Physician    Filed: 2024  2:30 PM Date of Service: 2024 10:21 AM Status: Signed    : Anna Douglas MD (Physician)         [facility]  [facility] of [facility]    Discharge Summary    Yesenia Berkowitz Patient Status:  Inpatient    1940 MRN [id number]   Location [facility]W Attending Anna Douglas MD   Hosp Day # 6 PCP JO-ANN YANG MD     Date of Admission: 2024   Date of Discharge: 24     Admitting Diagnosis: COPD exacerbation (HCC) [J44.1]  Acute on chronic congestive heart failure, unspecified heart failure type (HCC) [I50.9]    Disposition: Home    Discharge Diagnosis: .Principal Problem:    Acute on chronic congestive heart failure, unspecified heart failure type (HCC)  Active Problems:    COPD exacerbation (HCC)    COPD (chronic obstructive pulmonary disease) (HCC)    Kyphoscoliosis    Phrenic nerve paralysis    Restrictive lung disease    Acute cor pulmonale (HCC)    Pneumonia      Hospital Course:   Reason for Admission: leg edema, OJEDA, wheezing, SOB    Discharge Physical Exam:     GENERAL:  no respiratory distress at rest  HEENT:  Head was atraumatic and normocephalic.    NECK:  Supple.  There was no JVD.   CHEST:  Symmetrical movement on inspiration  LUNGS:  No audible wheezing  ABDOMEN: Non-distended  MUSCULOSKELETAL:  There was no deformity.   EXTREMITIES: There was no edema  NEUROLOGICAL:  There was no focal deficit.    PSYCHIATRIC: Calm and cooperative      Hospital Course:     Patient is an 84-year-old  female who came into the emergency department for evaluation of 10 days of progressive leg edema, dyspnea on exertion, wheezing, and shortness of breath. CBC showed white blood cell count of 10.9 with left shift. Chemistry showed bicarb of 38, otherwise unremarkable. Troponin, proBNP, COVID testing, and D-dimer were all negative. Chest x-ray showed cardiomegaly, increased interstitial edema, and bilateral pleural effusions. EKG showed normal sinus rhythm. Patient was admitted for Acute on chronic hypoxemic respiratory failure 2/2 COPD and fluid overload from CHF. Pulmonary was consulted and pt underwent flex bronch on 9/17/24 with Dr. Boyle his pulmonologist, with bronchial washings and removal of mucus plug. Pt tolerated the procedure with no complications. She received broncodilators, steroids, and doxycycline.   Cardiology was also consulted for acute HFpEF and received IV lasix which was later switched to oral.   By discharge patient was feeling better. She was discharged home to complete doxycycline and steroid taper. Full biopsy results to be followed in clinic.   Follow up instructions given. Ed precautions reviewed.       Complications: None    Consultants         Provider   Role Specialty     Luis Fernando Fowler MD      Consulting Physician Cardiovascular Diseases     Bing Boyle MD      Consulting Physician PULMONARY DISEASES          Surgical Procedures       Case IDs Date Procedure Surgeon Location Status    [id number] 9/17/24 BRONCHOSCOPY with bronchoavleolar lavage Bing Boyle MD [facility] ENDOSCOPY Comp          Pending Labs       Order Current Status    AFB Culture and Smear In process    AFB Culture(P) In process    FUNGUS CULTURE(P) In process    Fungus Culture and Stain In process            Discharge Plan:   Discharge Condition: Stable    Current Discharge Medication List        New Orders    Details   predniSONE 20 MG Oral Tab Take 2 tablets (40 mg  total) by mouth daily for 5 days, THEN 1 tablet (20 mg total) daily for 5 days. 20mgx2 [id number], then 20mg [id number].      doxycycline 100 MG Oral Cap Take 1 capsule (100 mg total) by mouth 2 (two) times daily for 10 doses.           Home Meds - Modified    Details   DIGOXIN 0.125 MG Oral Tab Take 1 tablet (125 mcg total) by mouth daily.           Home Meds - Unchanged    Details   albuterol 108 (90 Base) MCG/ACT Inhalation Aero Soln Inhale 2 puffs into the lungs as needed.      mupirocin 2 % External Ointment Apply 1 Application topically 3 (three) times daily.      triamcinolone 0.1 % External Cream Apply topically 2 (two) times daily. Apply bid as directed      estradiol 0.05 MG/24HR Transdermal Patch Weekly Place 1 patch onto the skin once a week. As directed.      PULMICORT FLEXHALER 180 MCG/ACT Inhalation Aerosol Powder, Breath Activated Inhale 2 puffs into the lungs daily.      dilTIAZem HCl ER Coated Beads 240 MG Oral Capsule SR 24 Hr Take 1 capsule (240mg)by mouth every morning on an empty stomach.      Calcium Carb-Cholecalciferol (CALCIUM + D3) 600-200 MG-UNIT Oral Tab Take 1 tablet by mouth daily.        furosemide 40 MG Oral Tab Take 1 tablet (40 mg total) by mouth daily.      Cholecalciferol (VITAMIN D) 1000 UNITS Oral Tab Take 1,000 Units by mouth 2 (two) times daily.      Potassium Chloride ER (K-DUR M20) 20 MEQ Oral Tab CR Take 1 tablet (20 mEq total) by mouth nightly.      lansoprazole (PREVACID) 30 MG Oral Capsule Delayed Release Take 1 capsule (30 mg total) by mouth nightly.      amitriptyline 50 MG Oral Tab Take 1 tablet (50 mg total) by mouth nightly.      aspirin 81 MG Oral Tab Take 2 tablets (162 mg total) by mouth daily.      Lisinopril-Hydrochlorothiazide 10-12.5 MG Oral Tab Take 1 tablet by mouth daily.      Loratadine 10 MG Oral Cap Take 10 mg by mouth nightly.        montelukast 10 MG Oral Tab Take 1 tablet (10 mg total) by mouth nightly.      Multiple Vitamin (MULTI-VITAMINS) Oral Tab  take 1 tablet by ORAL route  every day with food      omega-3 fatty acids (FISH OIL) 1000 MG Oral Cap Take 1,000 mg by mouth daily.      Tiotropium Bromide Monohydrate 18 MCG Inhalation Cap Inhale 1 capsule (18 mcg total) into the lungs nightly.      B Complex Oral Cap Take 1 tablet by mouth daily.      dicyclomine 10 MG Oral Cap Take 1 capsule (10 mg total) by mouth 3 (three) times daily as needed (abdominal pain).      polyethylene glycol, PEG 3350-KCl-NaBcb-NaCl-NaSulf, 236 g Oral Recon Soln Take 4,000 mL by mouth As Directed. Take 2,000 mL the night before your procedure and 2,000 mL the morning of your procedure. Take as directed by GI clinic. Okay to substitute for generic.                 Discharge Diet: As tolerated    Discharge Activity: As tolerated    Follow up:      Follow-up Information       Luis Fernando Fowler MD Follow up in 2 week(s).    Specialties: Cardiovascular Diseases, CARDIOLOGY  Why: Office will call to schedule  Contact information:  [address]  [address] [facility] [facility] [address]  [facility] [address]  [phone]               Amanda Nelson, APRN. Go on 9/20/2024.    Specialty: Nurse Practitioner  Why: @1130 pls bring discharge med lsit to this appt  Contact information:  [address]  [facility] [address]  [facility] [id number] [phone]               Bing Boyle MD Follow up in 1 week(s).    Specialties: PULMONARY DISEASES, Sleep Disorders  Contact information:  [address]  [place] [address]  [phone]                                   Other Discharge Instructions:         Sometimes managing your health at home requires assistance.  The [facility] team has recognized your preference to use [facility].  They can be reached by phone at [phone].  The fax number for your reference is [phone].  A representative from the home health agency will contact you or your family to schedule your first visit.     Going Home Instructions  In this section you will find the  tools which will guide you through the first few days after you leave the hospital. Continued use of these tools will help you develop the skills necessary to keep your heart failure under control.     Home Care Instructions Following Heart Failure - the most important things to do every day include:   Weigh yourself and review the “Self-Check Plan” sheet every morning.   Call your cardiologist office if you are in the “Pay Attention-Use Caution” (yellow zone) or “Medical Alert-Warning!” (red zone) as outlined in the Self-Check Plan sheet.  Take your medicines as prescribed.  Limit your sodium (salt) intake.  Know when to call your cardiologist, primary doctor, or nurse.  Know when to seek emergency care.      Things for You to Remember:   1. See your doctor or healthcare provider as written on your discharge instructions.  It is important that you attend this appointment to make sure your symptoms are under control.     2. Your recommended sodium intake is 0727-8864 mg daily.    3.  Weigh yourself every day.    4. Some exercise and activity is important to help keep your heart functioning and strong. Unless instructed not to exercise, you may walk at a slow to moderate pace for 10-15 minutes 2-3 days per week to start. Pace your activity to prevent shortness of breath or fatigue. Stop exercising if you develop chest pain, lightheadedness, or significant shortness of breath.       Call Your Cardiologist If:   You gain 2-3 pounds in one day or 5 pounds in one week.  You have more difficulty breathing.  You are getting more tired with normal activity.  You are more short of breath lying down, or awaken at night short of breath.  You have swelling of your feet or legs.  You urinate less often during the day and more often at night.  You have cramps in your legs.  You have blurred vision or see yellowish-green halos around objects of lights.    Go to the Emergency Room If:   You have pain or tightness in your chest  You  are extremely short of breath  You are coughing up pink-frothy mucus  You are traveling and develop symptoms of worsening heart failure      ** Please follow up with your cardiologist or Advanced Practice Provider as written on your discharge instructions. If you are not provided with an appointment, let your nurse know so you can get an appointment**         >30 minutes spent on discharge orders, coordination, and planning.       Anna Douglas MD  9/19/2024    Electronically signed by Anna Douglas MD on 9/19/2024  2:30 PM

## (undated) NOTE — [place] [address]  Authorization for Invasive Procedures  Date: 5/18/2025           Time:1400    I hereby authorize , my physician and his/her assistants (if applicable), which may include medical students, residents, and/or fellows, to perform the following surgical operation/ procedure and administer such anesthesia as may be determined necessary by my physician: Insertion of Permanent Pacemaker on Yesenia Berkowitz  2.   I recognize that during the surgical operation/procedure, unforeseen conditions may necessitate additional or different procedures than those listed above.  I, therefore, further authorize and request that the above-named surgeon, assistants, or designees perform such procedures as are, in their judgment, necessary and desirable.    3.   My surgeon/physician has discussed prior to my surgery the potential benefits, risks and side effects of this procedure; the likelihood of achieving goals; and potential problems that might occur during recuperation.  They also discussed reasonable alternatives to the procedure, including risks, benefits, and side effects related to the alternatives and risks related to not receiving this procedure.  I have had all my questions answered and I acknowledge that no guarantee has been made as to the result that may be obtained.    4.   Should the need arise during my operation/procedure, which includes change of level of care prior to discharge, I also consent to the administration of blood and/or blood products.  Further, I understand that despite careful testing and screening of blood or blood products by collecting agencies, I may still be subject to ill effects as a result of receiving a blood transfusion and/or blood products.  The following are some, but not all, of the potential risks that can occur: fever and allergic reactions, hemolytic reactions, transmission of diseases such as Hepatitis, AIDS and Cytomegalovirus (CMV) and  fluid overload.  In the event that I wish to have an autologous transfusion of my own blood, or a directed donor transfusion, I will discuss this with my physician.   Check only if Refusing Blood or Blood Products  I understand refusal of blood or blood products as deemed necessary by my physician may have serious consequences to my condition to include possible death. I hereby assume responsibility for my refusal and release the hospital, its personnel, and my physicians from any responsibility for the consequences of my refusal.         o  Refuse         5.   I authorize the use of any specimen, organs, tissues, body parts or foreign objects that may be removed from my body during the operation/procedure for diagnosis, research or teaching purposes and their subsequent disposal by hospital authorities.  I also authorize the release of specimen test results and/or written reports to my treating physician on the hospital medical staff or other referring or consulting physicians involved in my care, at the discretion of the Pathologist or my treating physician.    6.   I consent to the photographing or videotaping of the operations or procedures to be performed, including appropriate portions of my body for medical, scientific, or educational purposes, provided my identity is not revealed by the pictures or by descriptive texts accompanying them.  If the procedure has been photographed/videotaped, the surgeon will obtain the original picture, image, videotape or CD.  The hospital will not be responsible for storage, release or maintenance of the picture, image, tape or CD.    7.   I consent to the presence of a  or observers in the operating room as deemed necessary by my physician or their designees.    8.   I recognize that in the event my procedure results in extended X-Ray/fluoroscopy time, I may develop a skin reaction.    9. If I have a Do Not Attempt Resuscitation (DNAR) order in place, that  status will be suspended while in the operating room, procedural suite, and during the recovery period unless otherwise explicitly stated by me (or a person authorized to consent on my behalf). The surgeon or my attending physician will determine when the applicable recovery period ends for purposes of reinstating the DNAR order.  10. Patients having a sterilization procedure: I understand that if the procedure is successful the results will be permanent and it will therefore be impossible for me to inseminate, conceive, or bear children.  I also understand that the procedure is intended to result in sterility, although the result has not been guaranteed.   11. I acknowledge that my physician has explained sedation/analgesia administration to me including the risk and benefits I consent to the administration of sedation/analgesia as may be necessary or desirable in the judgment of my physician.    I CERTIFY THAT I HAVE READ AND FULLY UNDERSTAND THE ABOVE CONSENT TO OPERATION and/or OTHER PROCEDURE.        ____________________________________       _________________________________      ______________________________  Signature of Patient         Signature of Responsible Person        Printed Name of Responsible Person        ____________________________________      _________________________________      ______________________________       Signature of Witness          Relationship to Patient                       Date                                       Time  Patient Name: Yesenia Berkowitz  : 1940    Reviewed: 2024   Printed: May 18, 2025  Medical Record #: [id number] Page 1 of 2             STATEMENT OF PHYSICIAN My signature below affirms that prior to the time of the procedure; I have explained to the patient and/or his/her legal representative, the risks and benefits involved in the proposed treatment and any reasonable alternative to the proposed treatment. I have also explained the risks and  benefits involved in refusal of the proposed treatment and alternatives to the proposed treatment and have answered the patient's questions. If I have a significant financial interest in a co-management agreement or a significant financial interest in any product or implant, or other significant relationship used in this procedure/surgery, I have disclosed this and had a discussion with my patient.     _______________________________________________________________ _____________________________  (Signature of Physician)                                                                                         (Date)                                   (Time)  Patient Name: Yesenia DOMINGO Berkowitz  : 1940    Reviewed: 2024   Printed: May 18, 2025  Medical Record #: [id number] Page 2 of 2

## (undated) NOTE — LETTER
Hospital Discharge Documentation  Please phone to schedule a hospital follow up appointment.    From: [facility] Hospitalist's Office  Phone: [phone]    Patient discharged time/date: 2024 11:55 AM  Patient discharge disposition:  Home or Self Care       Discharge Summary - D/C Summary        Discharge Summary signed by Robbie Tate MD at 2024  1:28 PM  Version 1 of 1      Author: Robbie Tate MD Service: Hospitalist Author Type: Physician    Filed: 2024  1:28 PM Date of Service: 2024  1:26 PM Status: Signed    : Robbie Tate MD (Physician)           [facility]  [facility] of [facility]     DISCHARGE SUMMARY     Yesenia Berkowitz Patient Status:  Inpatient    1940 MRN [id number]   Location [facility] Attending No att. providers found   Hosp Day # 8 PCP JO-ANN YANG MD     DATE OF ADMISSION: 2024  DATE OF DISCHARGE: 2024   DISPOSITION: home  CONDITION ON DISCHARGE: good    DISCHARGE DIAGNOSES:  Thoracic compression fracture  Right upper lobe lung nodule  Acute on chronic diastolic congestive heart failure  COPD  Steroid-induced leukocytosis  Hypertension  Atrial fibrillation, paroxysmal    HISTORY OF PRESENT ILLNESS (COPIED FROM ADMISSION H&P)  Yesenia Berkowitz is a 84 year old female with history of chronic respiratory failure on 4 L, COPD, CHF, HTN, A-fib, asthma, GERD with recent hospitalization between  to 2024 and treated for CHF/COPD exacerbation.  She presents today via EMS with complaints of upper back pain for the last 5-6 days.  Pain has progressively gotten worse.  She is unable to lie down.  Has been unable to sleep.  Son at bedside notes patient has been a bit forgetful.  Patient attributes this to lack of sleep.  She has noted mild worsening of lower extremity swelling but no pulmonary symptoms.  No fever or chills.  No cough above baseline.    HOSPITAL COURSE:  Patient was admitted, seen by cardiology and  pulmonology.  Diuresed and felt much better.  She does have some baseline lower extremity edema which does not seem to correlate necessarily with her heart failure.  Back pain was due to subacute T6 compression fracture.  Confirmed by MRI, IR did kyphoplasty which significantly improved her pain control.  Cleared by pulmonology and cardiology for discharge.  I encouraged her to weigh herself regularly at home and abide by water restriction.    Patient understands return to the emergency room for increased pain, fever, discharge, shortness of breath, chest pain, new neurologic symptoms, other concerning symptoms.    PHYSICAL EXAM:  Temp:  [97.8 °F (36.6 °C)-98.4 °F (36.9 °C)] 97.8 °F (36.6 °C)  Pulse:  [64-97] 82  Resp:  [18-20] 20  BP: (105-137)/(43-62) 110/58  SpO2:  [96 %-97 %] 96 %  Gen: A+Ox3.  No distress.   HEENT: NCAT, neck supple, no carotid bruit.  CV: RRR, S1S2, and intact distal pulses. No gallop, rub, murmur.  Pulm: Effort and breath sounds normal. No distress, wheezes, rales, rhonchi.  Abd: Soft, NTND, BS normal, no mass, no HSM, no rebound/guarding.   Neuro: Normal reflexes, CN. Sensory/motor exams grossly normal deficit. Coordination  and gait normal.   MS: No joint effusions.  1+ bilateral lower extremity peripheral edema.  Skin: Skin is warm and dry. No rashes, erythema, diaphoresis.   Psych: Normal mood and affect. Behavior and judgment normal.     DISCHARGE MEDICATIONS     Discharge Medications        START taking these medications        Instructions Prescription details   lidocaine-menthol 4-1 % Ptch  Start taking on: December 30, 2024      Place 1 patch onto the skin daily.   Quantity: 30 patch  Refills: 0            CHANGE how you take these medications        Instructions Prescription details   predniSONE 10 MG Tabs  Commonly known as: Deltasone  Start taking on: December 30, 2024  What changed:   medication strength  See the new instructions.      Take 1 tablet (10 mg total) by mouth daily  for 4 days, THEN 0.5 tablets (5 mg total) daily for 4 days, THEN 0.5 tablets (5 mg total) every other day for 4 days.   Stop taking on: January 11, 2025  Quantity: 7 tablet  Refills: 0            CONTINUE taking these medications        Instructions Prescription details   acetaminophen 500 MG Tabs  Commonly known as: Tylenol Extra Strength      Take 1 tablet (500 mg total) by mouth every 6 (six) hours as needed.   Refills: 0     acetaZOLAMIDE 250 MG Tabs  Commonly known as: Diamox      Take 2 tablets (500 mg total) by mouth daily.   Quantity: 30 tablet  Refills: 0     albuterol 108 (90 Base) MCG/ACT Aers  Commonly known as: Ventolin HFA      Inhale 2 puffs into the lungs as needed.   Refills: 0     aspirin 81 MG Tabs      Take 2 tablets (162 mg total) by mouth daily as needed.   Refills: 0     digoxin 0.125 MG Tabs  Commonly known as: Lanoxin      Take 1 tablet (125 mcg total) by mouth daily.   Quantity: 30 tablet  Refills: 0     dilTIAZem HCl ER Coated Beads 360 MG Cp24  Commonly known as: CARDIZEM CD      Take 1 capsule (360 mg total) by mouth 2 (two) times daily.   Refills: 0     empagliflozin 10 MG Tabs  Commonly known as: Jardiance      Take 1 tablet (10 mg total) by mouth daily.   Quantity: 30 tablet  Refills: 3     estradiol 0.05 MG/24HR Ptwk  Commonly known as: Climara      Place 1 patch onto the skin once a week. As directed.   Refills: 0     furosemide 80 MG Tabs  Commonly known as: Lasix      Take 1 tablet (80 mg total) by mouth daily.   Quantity: 30 tablet  Refills: 3     lansoprazole 30 MG Cpdr  Commonly known as: Prevacid      Take 1 capsule (30 mg total) by mouth nightly.   Refills: 0     Loratadine 10 MG Caps      Take 10 mg by mouth nightly.   Refills: 0     montelukast 10 MG Tabs  Commonly known as: Singulair      Take 1 tablet (10 mg total) by mouth nightly.   Refills: 0     omega-3 fatty acids 1000 MG Caps  Commonly known as: Fish Oil      Take 1,000 mg by mouth daily.   Refills: 0     potassium  chloride 20 MEQ Tbcr  Commonly known as: Klor-Con M20      Take 1 tablet (20 mEq total) by mouth nightly.   Refills: 0     spironolactone 25 MG Tabs  Commonly known as: Aldactone      Take 1 tablet (25 mg total) by mouth daily for 90 doses.   Stop taking on: March 17, 2025  Quantity: 90 tablet  Refills: 0     tiotropium 18 MCG Caps  Commonly known as: Spiriva Handihaler      Inhale 1 capsule (18 mcg total) into the lungs nightly for 30 doses.   Stop taking on: January 28, 2025  Quantity: 30 capsule  Refills: 0     Vitamin D 1000 units Tabs      Take 1,000 Units by mouth 2 (two) times daily.   Refills: 0            STOP taking these medications      dicyclomine 10 MG Caps  Commonly known as: Bentyl        doxycycline 100 MG Caps  Commonly known as: Vibramycin                  Where to Get Your Medications        These medications were sent to [id number] - [place], [place] - [address] [phone], [phone]  [address], [facility] [address]      Phone: [phone]   lidocaine-menthol 4-1 % Ptch  predniSONE 10 MG Tabs  tiotropium 18 MCG Caps         CONSULTANTS  Consultants         Provider   Role Specialty     Abdoul Choudhury MD      Consulting Physician INTERVENTIONAL, RADIOLOGY     Bing Boyle MD      Consulting Physician PULMONARY DISEASES     Nighat Aly MD      Consulting Physician PULMONARY DISEASES     Luis Fernando Fowler MD      Consulting Physician Cardiovascular Diseases            FOLLOW UP:  Amanda Nelson APRN  [address]  [address]  [facility] [id number] [phone]    Go on 1/7/2025  @1100    Bing Boyle MD  [address]  [address]  [place] [address]  [phone]    Follow up in 1 week(s)      Brenda Wilkerson MD  [address]  [address]  [facility] [address]  [phone]    Follow up in 1 week(s)  Post Discharge Followup    Luis Fernando Fowler MD  [address]  [address]  [facility] [address]  [phone]    Follow up  Post Discharge Followup    The above plan and follow-up  instructions were reviewed with the patient and they verbalized understanding and agreement.  They understand to return to the emergency room for any concerning signs or symptoms.  Greater than 30 minutes spent on discharge.  -----------------------    Hospital Discharge Diagnoses: Thoracic compression fracture    Lace+ Score: 84  59-90 High Risk  29-58 Medium Risk  0-28   Low Risk.    TCM Follow-Up Recommendation:  LACE > 58: High Risk of readmission after discharge from the hospital.    Electronically signed by Robbie Tate MD on 12/29/2024  1:28 PM

## (undated) NOTE — LETTER
[place] [address]  Authorization for Invasive Procedures  Date: 10/9/24           Time: 0930    I hereby authorize Bing Boyle MD  my physician and his/her assistants (if applicable), which may include medical students, residents, and/or fellows, to perform the following surgical operation/ procedure and administer such anesthesia as may be determined necessary by my physician: US guided Thoracentesis  on Yesenia Berkowitz  2.   I recognize that during the surgical operation/procedure, unforeseen conditions may necessitate additional or different procedures than those listed above.  I, therefore, further authorize and request that the above-named surgeon, assistants, or designees perform such procedures as are, in their judgment, necessary and desirable.    3.   My surgeon/physician has discussed prior to my surgery the potential benefits, risks and side effects of this procedure; the likelihood of achieving goals; and potential problems that might occur during recuperation.  They also discussed reasonable alternatives to the procedure, including risks, benefits, and side effects related to the alternatives and risks related to not receiving this procedure.  I have had all my questions answered and I acknowledge that no guarantee has been made as to the result that may be obtained.    4.   Should the need arise during my operation/procedure, which includes change of level of care prior to discharge, I also consent to the administration of blood and/or blood products.  Further, I understand that despite careful testing and screening of blood or blood products by collecting agencies, I may still be subject to ill effects as a result of receiving a blood transfusion and/or blood products.  The following are some, but not all, of the potential risks that can occur: fever and allergic reactions, hemolytic reactions, transmission of diseases such as Hepatitis, AIDS and Cytomegalovirus (CMV) and  fluid overload.  In the event that I wish to have an autologous transfusion of my own blood, or a directed donor transfusion, I will discuss this with my physician.   Check only if Refusing Blood or Blood Products  I understand refusal of blood or blood products as deemed necessary by my physician may have serious consequences to my condition to include possible death. I hereby assume responsibility for my refusal and release the hospital, its personnel, and my physicians from any responsibility for the consequences of my refusal.         o  Refuse         5.   I authorize the use of any specimen, organs, tissues, body parts or foreign objects that may be removed from my body during the operation/procedure for diagnosis, research or teaching purposes and their subsequent disposal by hospital authorities.  I also authorize the release of specimen test results and/or written reports to my treating physician on the hospital medical staff or other referring or consulting physicians involved in my care, at the discretion of the Pathologist or my treating physician.    6.   I consent to the photographing or videotaping of the operations or procedures to be performed, including appropriate portions of my body for medical, scientific, or educational purposes, provided my identity is not revealed by the pictures or by descriptive texts accompanying them.  If the procedure has been photographed/videotaped, the surgeon will obtain the original picture, image, videotape or CD.  The hospital will not be responsible for storage, release or maintenance of the picture, image, tape or CD.    7.   I consent to the presence of a  or observers in the operating room as deemed necessary by my physician or their designees.    8.   I recognize that in the event my procedure results in extended X-Ray/fluoroscopy time, I may develop a skin reaction.    9. If I have a Do Not Attempt Resuscitation (DNAR) order in place, that  status will be suspended while in the operating room, procedural suite, and during the recovery period unless otherwise explicitly stated by me (or a person authorized to consent on my behalf). The surgeon or my attending physician will determine when the applicable recovery period ends for purposes of reinstating the DNAR order.  10. Patients having a sterilization procedure: I understand that if the procedure is successful the results will be permanent and it will therefore be impossible for me to inseminate, conceive, or bear children.  I also understand that the procedure is intended to result in sterility, although the result has not been guaranteed.   11. I acknowledge that my physician has explained sedation/analgesia administration to me including the risk and benefits I consent to the administration of sedation/analgesia as may be necessary or desirable in the judgment of my physician.    I CERTIFY THAT I HAVE READ AND FULLY UNDERSTAND THE ABOVE CONSENT TO OPERATION and/or OTHER PROCEDURE.        ____________________________________       _________________________________      ______________________________  Signature of Patient         Signature of Responsible Person        Printed Name of Responsible Person        ____________________________________      _________________________________      ______________________________       Signature of Witness          Relationship to Patient                       Date                                       Time  Patient Name: Yesenia Berkowitz  : 1940    Reviewed: 2024   Printed: 2024  Medical Record #: [id number] Page 1 of 2             STATEMENT OF PHYSICIAN My signature below affirms that prior to the time of the procedure; I have explained to the patient and/or his/her legal representative, the risks and benefits involved in the proposed treatment and any reasonable alternative to the proposed treatment. I have also explained the risks and  benefits involved in refusal of the proposed treatment and alternatives to the proposed treatment and have answered the patient's questions. If I have a significant financial interest in a co-management agreement or a significant financial interest in any product or implant, or other significant relationship used in this procedure/surgery, I have disclosed this and had a discussion with my patient.     _______________________________________________________________ _____________________________  (Signature of Physician)                                                                                         (Date)                                   (Time)  Patient Name: Yesenia DOMINGO Berkowitz  : 1940    Reviewed: 2024   Printed: 2024  Medical Record #: [id number] Page 2 of 2

## (undated) NOTE — Clinical Note
[facility] [place] [facility]  Administration of Anesthesia  1. I, Yesenia Berkowitz agree to be cared for by a physician anesthesiologist alone and/or with a nurse anesthetist, who is specially trained to monitor me and give me medicine to put me to sleep or keep me comfortable during my procedure    I understand that my anesthesiologist and/or anesthetist is not an employee or agent of [facility] or [employer]. He or she works for [facility] [employer] [facility]    2. As the patient asking for anesthesia services, I agree to:  a. Allow the anesthesiologist (anesthesia doctor) to give me medicine and do additional procedures as necessary. Some examples are: Starting or using an “IV” to give me medicine, fluids or blood during my procedure, and having a breathing tube placed to help me breathe when I’m asleep (intubation). In the event that my heart stops working properly, I understand that my anesthesiologist will make every effort to sustain my life, unless otherwise directed by [facility] Do Not Resuscitate documents.  b. Tell my anesthesia doctor before my procedure:  i. If I am pregnant.  ii. The last time that I ate or drank.  iii. All of the medicines I take (including prescriptions, herbal supplements, and pills I can buy without a prescription (including street drugs/illegal medications). Failure to inform my anesthesiologist about these medicines may increase my risk of anesthetic complications.  iv.If I am allergic to anything or have had a reaction to anesthesia before.  3. I understand how the anesthesia medicine will help me (benefits).  4. I understand that with any type of anesthesia medicine there are risks:  a. The most common risks are: nausea, vomiting, sore throat, muscle soreness, damage to my eyes, mouth, or teeth (from breathing tube placement).  b. Rare risks include: remembering what happened during my procedure, allergic reactions to medications, injury to my airway, heart,  lungs, vision, nerves, or muscles and in extremely rare instances death.  5. My doctor has explained to me other choices available to me for my care (alternatives).  6. Pregnant Patients (“epidural”):  I understand that the risks of having an epidural (medicine given into my back to help control pain during labor), include itching, low blood pressure, difficulty urinating, headache or slowing of the baby’s heart. Very rare risks include infection, bleeding, seizure, irregular heart rhythms and nerve injury.  7. Regional Anesthesia (“spinal”, “epidural”, & “nerve blocks”):  I understand that rare but potential complications include headache, bleeding, infection, seizure, irregular heart rhythms, and nerve injury.    _____________________________________________________________________________  Patient (or Representative) Signature/Relationship to Patient  Date   Time    _____________________________________________________________________________   Name (if used)    Language/Organization   Time    _____________________________________________________________________________  Nurse Anesthetist Signature     Date   Time  _____________________________________________________________________________  Anesthesiologist Signature     Date   Time  I have discussed the procedure and information above with the patient (or patient’s representative) and answered their questions. The patient or their representative has agreed to have anesthesia services.    _____________________________________________________________________________  Witness        Date   Time  I have verified that the signature is that of the patient or patient’s representative, and that it was signed before the procedure  Patient Name: Yesenia Berkowitz     : 1940                 Printed: 2024 at 5:31 PM    Medical Record #: [id number]                                            Page 1 of 1  ----------ANESTHESIA CONSENT----------

## (undated) NOTE — LETTER
Hospital Discharge Documentation  Please phone to schedule a hospital follow up appointment.    From: [facility] Hospitalist's Office  Phone: [phone]    Patient discharged time/date: 2025  4:03 PM  Patient discharge disposition:  Home or Self Care       Discharge Summary - D/C Summary        Discharge Summary signed by Lidia Levy MD at 2025  6:04 PM  Version 1 of 1      Author: Lidia Levy MD Service: Hospitalist Author Type: Physician    Filed: 2025  6:04 PM Date of Service: 2025  2:33 PM Status: Signed    : Lidia Levy MD (Physician)         [facility]  part of [facility]    Discharge Summary    Yesenia Berkowitz Patient Status:  Inpatient    1940 MRN [id number]   Location [facility]5W Attending Lidia Levy MD   Hosp Day # 3 PCP JO-ANN YANG MD     Date of Admission: 1/10/2025 Disposition: Home Health Care Services     Date of Discharge: 2025    Admitting Diagnosis: SOB (shortness of breath) [R06.02]  COPD exacerbation (HCC) [J44.1]  Community acquired pneumonia of right lower lobe of lung [J18.9]    Hospital Discharge Diagnoses:   # Acute on chronic hypoxemic respiratory failure   # COPD exacerbation  # Left phrenic nerve paralysis  # Kyphoscoliosis  # HFpEF  # Paroxysmal Afib, not on AC  # HTN  # HLD    Lace+ Score: 80  59-90 High Risk  29-58 Medium Risk  0-28   Low Risk.    TCM Follow-Up Recommendation:  LACE > 58: High Risk of readmission after discharge from the hospital.      Problem List:   Patient Active Problem List   Diagnosis    Actinic keratosis    Inflamed seborrheic keratosis    Palpitations    Pain of upper abdomen    Acute on chronic congestive heart failure, unspecified heart failure type (HCC)    COPD with acute exacerbation (HCC)    COPD (chronic obstructive pulmonary disease) (HCC)    Kyphoscoliosis    Phrenic nerve paralysis    Restrictive lung disease    Acute cor pulmonale (HCC)    Pneumonia     Acute on chronic hypoxic respiratory failure (HCC)    Acute pulmonary edema (HCC)    Pleural effusion    Pulmonary nodule 1 cm or greater in diameter    Community acquired pneumonia of right lower lobe of lung    Acute on chronic respiratory failure with hypoxia and hypercapnia (HCC)    Acute respiratory failure with hypoxia and hypercapnia (HCC)    Cor pulmonale (chronic) (HCC)    SOB (shortness of breath)    Acute on chronic diastolic (congestive) heart failure (HCC)    Obesity hypoventilation syndrome (HCC)    Asthma (HCC)    Acute exacerbation of CHF (congestive heart failure) (HCC)    Pleural effusion on left    Pleural effusion on right    Acute bilateral thoracic back pain    Atypical pneumonia    Compression fracture of T6 vertebra (HCC)    COPD exacerbation (HCC)       Reason for Admission:     Physical Exam:   General appearance: alert, appears stated age and cooperative  Pulmonary:  clear to auscultation bilaterally  Cardiovascular: S1, S2 normal, no murmur, click, rub or gallop, regular rate and rhythm  Abdominal: soft, non-tender; bowel sounds normal; no masses,  no organomegaly  Extremities: extremities normal, atraumatic, no cyanosis or edema  Psychiatric: calm      History of Present Illness:     Hospital Course: Yesenia Berkowitz is an 84-year-old female with hx of chronic dyspnea, COPD on home O2, HTN, HLD, Afib not on AC, who p/w worsening dyspnea for several days.     # Acute on chronic hypoxemic respiratory failure   # COPD exacerbation  # Left phrenic nerve paralysis  # Kyphoscoliosis  - s/p flexible bronchoscopy with bronchial washings and removal of mucus plug, by Dr. Boyle 09/17/24  - Steroids, bronchodilators, and doxycycline  Pt did well and was discharged on PO steroids and Doxy once cleared by pulmonary.    Consultations: Pulmonary     Procedures:   CXR : Edema/CHF        Complications: see hospital course     Discharge Condition: Good    Discharge Medications:      Discharge Medications         START taking these medications        Instructions Prescription details   doxycycline 100 MG Caps  Commonly known as: Vibramycin      Take 1 capsule (100 mg total) by mouth 2 (two) times daily for 10 doses.   Stop taking on: January 18, 2025  Quantity: 10 capsule  Refills: 0            CHANGE how you take these medications        Instructions Prescription details   predniSONE 10 MG Tabs  Commonly known as: Deltasone  Start taking on: January 13, 2025  What changed: See the new instructions.      Take 1 tablet (10 mg total) by mouth daily for 10 days, THEN 0.5 tablets (5 mg total) daily for 4 days, THEN 0.5 tablets (5 mg total) every other day for 4 days.   Stop taking on: January 31, 2025  Quantity: 13 tablet  Refills: 0            CONTINUE taking these medications        Instructions Prescription details   acetaminophen 500 MG Tabs  Commonly known as: Tylenol Extra Strength      Take 1 tablet (500 mg total) by mouth every 6 (six) hours as needed.   Refills: 0     acetaZOLAMIDE 250 MG Tabs  Commonly known as: Diamox      Take 2 tablets (500 mg total) by mouth daily.   Quantity: 30 tablet  Refills: 0     albuterol 108 (90 Base) MCG/ACT Aers  Commonly known as: Ventolin HFA      Inhale 2 puffs into the lungs as needed.   Refills: 0     aspirin 81 MG Tabs      Take 2 tablets (162 mg total) by mouth daily as needed.   Refills: 0     digoxin 0.125 MG Tabs  Commonly known as: Lanoxin      Take 1 tablet (125 mcg total) by mouth daily.   Quantity: 30 tablet  Refills: 0     dilTIAZem HCl ER Coated Beads 360 MG Cp24  Commonly known as: CARDIZEM CD      Take 1 capsule (360 mg total) by mouth 2 (two) times daily.   Refills: 0     empagliflozin 10 MG Tabs  Commonly known as: Jardiance      Take 1 tablet (10 mg total) by mouth daily.   Quantity: 30 tablet  Refills: 3     estradiol 0.05 MG/24HR Ptwk  Commonly known as: Climara      Place 1 patch onto the skin once a week. As directed.   Refills: 0     furosemide 80 MG  Tabs  Commonly known as: Lasix      Take 1 tablet (80 mg total) by mouth daily.   Quantity: 30 tablet  Refills: 3     lansoprazole 30 MG Cpdr  Commonly known as: Prevacid      Take 1 capsule (30 mg total) by mouth nightly.   Refills: 0     lidocaine-menthol 4-1 % Ptch      Place 1 patch onto the skin daily.   Quantity: 30 patch  Refills: 0     Loratadine 10 MG Caps      Take 10 mg by mouth nightly.   Refills: 0     montelukast 10 MG Tabs  Commonly known as: Singulair      Take 1 tablet (10 mg total) by mouth nightly.   Refills: 0     omega-3 fatty acids 1000 MG Caps  Commonly known as: Fish Oil      Take 1,000 mg by mouth daily.   Refills: 0     potassium chloride 20 MEQ Tbcr  Commonly known as: Klor-Con M20      Take 1 tablet (20 mEq total) by mouth nightly.   Refills: 0     spironolactone 25 MG Tabs  Commonly known as: Aldactone      Take 1 tablet (25 mg total) by mouth daily for 90 doses.   Stop taking on: March 17, 2025  Quantity: 90 tablet  Refills: 0     tiotropium 18 MCG Caps  Commonly known as: Spiriva Handihaler      Inhale 1 capsule (18 mcg total) into the lungs nightly for 30 doses.   Stop taking on: January 28, 2025  Quantity: 30 capsule  Refills: 0     Vitamin D 1000 units Tabs      Take 1,000 Units by mouth 2 (two) times daily.   Refills: 0               Where to Get Your Medications        These medications were sent to [id number] - [place], [place] - [address] [place] [address] [phone], [phone]  [address] [place] [address], [facility] [address]      Phone: [phone]   doxycycline 100 MG Caps  predniSONE 10 MG Tabs         Follow up Visits: Follow-up with PCP and pulmonary        Lidia Levy MD  1/13/2025  2:33 PM    > 35 min     Electronically signed by Lidia Levy MD on 1/13/2025  6:04 PM

## (undated) NOTE — LETTER
[place] [facility]  Administration of Anesthesia  I Yesenia Berkowitz agree to be cared for by a physician anesthesiologist alone and/or with a nurse anesthetist, who is specially trained to monitor me and give me medicine to put me to sleep or keep me comfortable during my procedure    I understand that my anesthesiologist and/or anesthetist is not an employee or agent of [facility] or [employer]. He or she works for [facility]    As the patient asking for anesthesia services, I agree to:  Allow the anesthesiologist (anesthesia doctor) to give me medicine and do additional procedures as necessary. Some examples are: Starting or using an “IV” to give me medicine, fluids or blood during my procedure, and having a breathing tube placed to help me breathe when I’m asleep (intubation). In the event that my heart stops working properly, I understand that my anesthesiologist will make every effort to sustain my life, unless otherwise directed by [facility] Do Not Resuscitate documents.  Tell my anesthesia doctor before my procedure:  If I am pregnant.  The last time that I ate or drank.  iii. All of the medicines I take (including prescriptions, herbal supplements, and pills I can buy without a prescription (including street drugs/illegal medications). Failure to inform my anesthesiologist about these medicines may increase my risk of anesthetic complications.  iv.If I am allergic to anything or have had a reaction to anesthesia before.  I understand how the anesthesia medicine will help me (benefits).  I understand that with any type of anesthesia medicine there are risks:  The most common risks are: nausea, vomiting, sore throat, muscle soreness, damage to my eyes, mouth, or teeth (from breathing tube placement).  Rare risks include: remembering what happened during my procedure, allergic reactions to medications, injury to my airway, heart, lungs, vision, nerves, or  muscles and in extremely rare instances death.  My doctor has explained to me other choices available to me for my care (alternatives).  Pregnant Patients (“epidural”):  I understand that the risks of having an epidural (medicine given into my back to help control pain during labor), include itching, low blood pressure, difficulty urinating, headache or slowing of the baby’s heart. Very rare risks include infection, bleeding, seizure, irregular heart rhythms and nerve injury.  Regional Anesthesia (“spinal”, “epidural”, & “nerve blocks”):  I understand that rare but potential complications include headache, bleeding, infection, seizure, irregular heart rhythms, and nerve injury.    _____________________________________________________________________________  Patient (or Representative) Signature/Relationship to Patient  Date   Time    _____________________________________________________________________________   Name (if used)    Language/Organization   Time    _____________________________________________________________________________  Nurse Anesthetist Signature     Date   Time  _____________________________________________________________________________  Anesthesiologist Signature     Date   Time  I have discussed the procedure and information above with the patient (or patient’s representative) and answered their questions. The patient or their representative has agreed to have anesthesia services.    _____________________________________________________________________________  Witness        Date   Time  I have verified that the signature is that of the patient or patient’s representative, and that it was signed before the procedure  Patient Name: Yesenia Berkowitz     : 1940                 Printed: 2024 at 1:09 PM    Medical Record #: [id number]                                            Page 1 of 1  ----------ANESTHESIA CONSENT----------

## (undated) NOTE — MR AVS SNAPSHOT
[address]  [phone]               Thank you for choosing us for your health care visit with Valente Bermeo.  Jaydon Torres MD.  We are glad to serve you and happy to provide you with this summar Lisinopril-Hydrochlorothiazide 10-12.5 MG Tabs   Take  by mouth. Take one tablet by mouth every day           Loratadine 10 MG Caps   Take  by mouth. Take one tablet by mouth daily           MULTI-VITAMINS Tabs   Take  by mouth.  take 1 tablet by ORAL rout taking this medication, and follow the directions you see here.    Commonly known as:  KENALOG           * HYDROcodone-acetaminophen 5-325 MG Tabs   Commonly known as:  NORCO           * VICODIN 5-500 MG Tabs   Generic drug:  hydrocodone-acetaminophen   [place] Modification Recommendation   Weight Reduction Maintain normal body weight (body mass index 18.5-24.9 kg/m2)   DASH eating plan Adopt a diet rich in fruits, vegetables, and low fat dairy products with reduced content of saturated and total fat.    Dietary s

## (undated) NOTE — LETTER
Hospital Discharge Documentation  Please phone to schedule a hospital follow up appointment.    From: [facility] Hospitalist's Office  Phone: [phone]    Patient discharged time/date: 11/7/2024  2:26 PM  Patient discharge disposition:  Home Health Care Services, patient went [facility].    See below for last patient progress note as well.       Discharge Summary - D/C Summary        Discharge Summary signed by Rex Tabor MD at 11/7/2024  5:10 PM  Version 2 of 2      Author: Rex Tabor MD Service: Hospitalist Author Type: Physician    Filed: 11/7/2024  5:10 PM Date of Service: 11/7/2024 11:26 AM Status: Addendum    : Rex Tabor MD (Physician)    Related Notes: Original Note by Rex Tabor MD (Physician) filed at 11/7/2024 11:30 AM         Dc summary#[id number]  > 30 min spent on dc    Hospital Discharge Diagnoses: copd exacerbation    Lace+ Score: 82  59-90 High Risk  29-58 Medium Risk  0-28   Low Risk.    TCM Follow-Up Recommendation:  LACE 29-58: Moderate Risk of readmission after discharge from the hospital.  Tcm fu recommended       Electronically signed by Rex Tabor MD on 11/7/2024  5:10 PM       Discharge Summary signed by Rex Tabor MD at 11/7/2024 11:30 AM  Version 1 of 2      Author: Rex Tabor MD Service: Hospitalist Author Type: Physician    Filed: 11/7/2024 11:30 AM Date of Service: 11/7/2024 11:26 AM Status: Signed    : Rex Tabor MD (Physician)    Related Notes: Addendum by Rex Tabor MD (Physician) filed at 11/7/2024  5:10 PM         Dc summary#  > 30 min spent on dc    Hospital Discharge Diagnoses: copd exacerbation    Lace+ Score: 82  59-90 High Risk  29-58 Medium Risk  0-28   Low Risk.    TCM Follow-Up Recommendation:  LACE 29-58: Moderate Risk of readmission after discharge from the hospital.  Tcm fu recommended       Electronically  signed by Rex Tabor MD on 2024 11:30 AM    [facility]  part of [facility]     Progress Note           Yesenia Berkowitz Patient Status:  Inpatient    1940 MRN [id number]   Location [facility]5W Attending Rex Tabor,*   Hosp Day # 3 PCP JO-ANN YANG MD      Chief Complaint: left sided lower back pain        Subjective:     Constitutional:  Negative for appetite change, chills and diaphoresis.   HENT:  Positive for congestion.    Respiratory:  Positive for cough.    Cardiovascular:  Positive for chest pain.        Left side   Gastrointestinal:  Negative for abdominal pain.   Genitourinary:  Negative for dysuria.   Musculoskeletal:  Negative for joint pain.   Psychiatric/Behavioral:  Negative for confusion, sleep disturbance and decreased concentration. The patient is not nervous/anxious and is not hyperactive.             Objective:  Blood pressure 121/62, pulse 80, temperature 98.2 °F (36.8 °C), temperature source Oral, resp. rate 18, weight 170 lb 3.2 oz (77.2 kg), SpO2 92%.  Physical Exam  Vitals and nursing note reviewed.   Constitutional:       General: She is not in acute distress.     Appearance: She is not ill-appearing, toxic-appearing or diaphoretic.   Neurological:      Mental Status: She is alert.               Results:        Lab Results   Component Value Date     WBC 10.8 2024     HGB 12.1 2024     HCT 37.1 2024     .0 2024     CREATSERUM 0.74 2024     BUN 26 (H) 2024      2024     K 4.1 2024      2024     CO2 >40.0 (HH) 2024      (H) 2024     CA 9.2 2024     ALB 2.8 (L) 2024     ALKPHO 48 (L) 2024     BILT 0.2 2024     TP 4.8 (L) 2024     AST 15 2024     ALT 22 2024     PTT 30.1 2023     INR 1.12 2023     TSH 1.060 2023     LIP 30 2024     DDIMER 0.63 10/07/2024     MG 2.0 2024      PHOS 4.0 09/18/2024     TROP <0.045 02/03/2020     TROPHS 9 10/07/2024         XR CHEST AP PORTABLE  (CPT=71045)     Result Date: 11/5/2024  CONCLUSION:          Post thoracentesis there is markedly decreased right pleural effusion.  No appreciable pneumothorax.   Persistent elevation of the left hemidiaphragm and small left pleural effusion.  Persistent pulmonary edema.     elm-remote   Dictated by (CST): Isaac Holley MD on 11/05/2024 at 1:39 PM     Finalized by (CST): Isaac Holley MD on 11/05/2024 at 1:45 PM           US THORACENTESIS GUIDED RIGHT (CPT=32555)     Result Date: 11/5/2024  CONCLUSION:         1. Right pleural effusion.    Dictated by (CST): Bijan Orona MD on 11/05/2024 at 9:31 AM     Finalized by (CST): Bijan Oroan MD on 11/05/2024 at 9:32 AM                   Assessment & Plan:  Acute on chronic respiratory failure with hypoxia and hypercapnia - likely multifactorial from copd exacerbation, chf and possible pna   - currently on 3l her baseline  - cont iv abx-ceftriaxone and doxy ; recent admit in October   - cont nebs and steroids   - consult pulm Dr Boyle notified in ER - apprec input - ct neg for pe  ; sp 900 c c out by thorra Appears exudative   - cont home lasix   - use bipap as needed  - cont singulair      Left sided pain will recheck cxr     Afib with controlled rate - cont diltiazem and dig, monitor on tele      Acute on chronic Chf with h/o diastolic dysfunction and pulmonary htn   - lasix as above   - strict I/o and daily wts   - last echo with normal EF in sep, pulm arteries sys pressure increased     Gerd - cont ppi            Pt/ot consult         DVT PPX: heparin         Complex mdm; coordinating care with nurse and counseling pt about poss dc; check cxr           TEOFILO TABOR MD                                    Electronically signed by Teofilo Tabor MD at 11/7/2024  5:13 PM         ED to Hosp-Admission (Discharged) on 11/2/2024            Revision  History            Detailed Report          Note shared with patient  Chart Review: Note Routing History    Recipients Sent On Sent By Routed Reports     [facility]   Fax: [phone]       11/7/2024  9:57 AM Vanessa Willard Progress Notes by Rex Tabor MD           Care Timeline    11/03   Admitted from ED 0134

## (undated) NOTE — LETTER
Hospital Discharge Documentation      From: [facility] Hospitalist's Office  Phone: [phone]    Patient discharged time/date: 2025  2:22 PM  Patient discharge disposition:  Home or Self Care       Discharge Summary - D/C Summary        Discharge Summary signed by Sina Hogan MD at 2025  9:21 AM  Version 1 of 1      Author: Sina Hogan MD Service: Hospitalist Author Type: Physician    Filed: 2025  9:21 AM Date of Service: 2025 12:26 PM Status: Signed    : Sina Hogan MD (Physician)           [facility]  part of [facility]    Discharge Summary    Yesenia Berkowitz Patient Status:  Inpatient    1940 MRN [id number]   Location [facility] 3W/SW Attending Sina Hogan MD   Hosp Day # 4 PCP JO-ANN YANG MD     Date of Admission: 2025 Disposition: Home or Self Care     Date of Discharge: 25    Admitting Diagnosis: COPD exacerbation (HCC) [J44.1]    Hospital Discharge Diagnoses: COPD exacerbation, Afib, HFpEF    Lace+ Score: 82  59-90 High Risk  29-58 Medium Risk  0-28   Low Risk.    TCM Follow-Up Recommendation:  LACE > 58: High Risk of readmission after discharge from the hospital.    Problem List: Problem List[1]    Reason for Admission: SOB    Physical Exam:   Vitals:    25 0835   BP:    Pulse: 87   Resp:    Temp:    Gen: NAD AO x3  Chest: decreased air exchange b/l  CVS: normal s1 and s2 RR  Abd: NABS soft NT ND  Neuro: CN 2-12 grossly intact  Ext: no edema in bilateral LE      History of Present Illness:   Per Dr. Charles Seean is a(n) 84 year old female, with a past medical history significant for chronic respiratory failure on 4 L nasal cannula at home secondary to COPD along with CHF, atrial fibrillation, GERD osteoporosis and DJD of the spine presents with a complaint of increasing shortness of breath with minimal exertion associated with occasional dry cough and now back pain as well.  Describes her  current shortness of breath as typical of a COPD exacerbation, denies any obvious instigating factors, no upper respiratory symptoms no sore throat no fever or chills no exposure to recent allergens or tobacco    Hospital Course:   Acute on chronic respiratory failure 2/2 COPD exacerbation  Imaging reviewed  Trop, EKG reviewed  pBNP WNL  Pulm was on consult  Wean steroids, switched to PO, will dc on taper  Continue nebs, ICS/LABA/LAMA  Hold abx terapy  Afib  HTN  Hx of HFpEF  Not in exacerbation  Continue home meds    Hypokalemia  Replaced per protocol    Consultations: Pulm    Procedures: none    Complications: none    Discharge Condition: Stable    Discharge Medications:      Discharge Medications        START taking these medications        Instructions Prescription details   clotrimazole 10 MG Troc  Commonly known as: Mycelex      Take 1 lozenge (10 mg total) by mouth 5 (five) times daily for 7 days.   Stop taking on: July 14, 2025  Quantity: 35 lozenge  Refills: 0     ipratropium-albuterol 0.5-2.5 (3) MG/3ML Soln  Commonly known as: Duoneb      Take 3 mL by nebulization every 6 (six) hours as needed (shortness of breath, wheezing).   Quantity: 180 mL  Refills: 0            CHANGE how you take these medications        Instructions Prescription details   albuterol 108 (90 Base) MCG/ACT Aers  Commonly known as: Ventolin HFA  What changed: Another medication with the same name was removed. Continue taking this medication, and follow the directions you see here.      Inhale 2 puffs into the lungs as needed.   Refills: 0     fluticasone-umeclidin-vilant 200-62.5-25 MCG/ACT Aepb  Commonly known as: Trelegy Ellipta  What changed:   when to take this  reasons to take this  additional instructions      Inhale 1 puff into the lungs daily. Rinse mouth, gargle, and spit to follow.   Quantity: 1 each  Refills: 0     predniSONE 10 MG Tabs  Commonly known as: Deltasone  Start taking on: July 8, 2025  What changed:   medication  strength  See the new instructions.      Take 3 tablets (30 mg total) by mouth daily with breakfast for 2 days, THEN 2 tablets (20 mg total) daily with breakfast for 2 days, THEN 1 tablet (10 mg total) daily with breakfast for 2 days.   Stop taking on: July 14, 2025  Quantity: 12 tablet  Refills: 0            CONTINUE taking these medications        Instructions Prescription details   acetaminophen 500 MG Tabs  Commonly known as: Tylenol Extra Strength      Take 2 tablets (1,000 mg total) by mouth every 6 (six) hours as needed for Pain or Fever.   Refills: 0     acetaZOLAMIDE 250 MG Tabs  Commonly known as: Diamox      Take 2 tablets (500 mg total) by mouth daily.   Quantity: 60 tablet  Refills: 5     digoxin 0.125 MG Tabs  Commonly known as: Lanoxin      Take 1 tablet (125 mcg total) by mouth daily.   Quantity: 30 tablet  Refills: 0     dilTIAZem  MG Cp24  Commonly known as: Tiazac      Take 1 capsule (360 mg total) by mouth daily.   Quantity: 30 capsule  Refills: 11     empagliflozin 10 MG Tabs  Commonly known as: Jardiance      Take 1 tablet (10 mg total) by mouth daily.   Quantity: 30 tablet  Refills: 3     furosemide 80 MG Tabs  Commonly known as: Lasix      Take 1 tablet (80 mg total) by mouth BID (Diuretic).   Quantity: 60 tablet  Refills: 0     Loratadine 10 MG Caps      Take 10 mg by mouth nightly.   Refills: 0     montelukast 10 MG Tabs  Commonly known as: Singulair      Take 1 tablet (10 mg total) by mouth nightly.   Refills: 0     morphINE 10 MG/5ML Soln      Take 1.3 mL (2.6 mg total) by mouth every 4 (four) hours as needed (Shortness of breath/ take prior to activity that cased shortness of breath). Please add an adapt a cap top for ease of drawing up.  Please give pt 2 oral syringes   Quantity: 60 mL  Refills: 0     nitrofurantoin monohydrate macro 100 MG Caps  Commonly known as: Macrobid      Take 1 capsule (100 mg total) by mouth 2 (two) times daily.   Refills: 0     phenazopyridine 100 MG  Tabs  Commonly known as: Pyridium      Take 1 tablet (100 mg total) by mouth 3 (three) times daily as needed for Pain (dysuria).   Quantity: 30 tablet  Refills: 0     potassium chloride 20 MEQ Tbcr  Commonly known as: Klor-Con M20      Take 1 tablet (20 mEq total) by mouth nightly.   Refills: 0     traMADol 50 MG Tabs  Commonly known as: Ultram      Take 1 tablet (50 mg total) by mouth every 6 (six) hours as needed for Pain.   Quantity: 30 tablet  Refills: 0     Vitamin D 1000 units Tabs      Take 1,000 Units by mouth in the morning and 1,000 Units before bedtime.   Refills: 0            STOP taking these medications      benzonatate 200 MG Caps  Commonly known as: Tessalon        Magnesium Citrate Soln        nystatin 780512 UNIT/ML Susp  Commonly known as: Mycostatin                  Where to Get Your Medications        These medications were sent to [id number] - [place], [place] - [address] [place] [address] [phone], [phone]  [address] [place] [address], [facility] [address]      Phone: [phone]   clotrimazole 10 MG Troc  fluticasone-umeclidin-vilant 200-62.5-25 MCG/ACT Aepb  ipratropium-albuterol 0.5-2.5 (3) MG/3ML Soln  predniSONE 10 MG Tabs         Greater than 35 minutes spent, >50% spent counseling re: treatment plan and workup     Sina Hogan MD  7/7/2025            [1]   Patient Active Problem List  Diagnosis    Actinic keratosis    Inflamed seborrheic keratosis    Palpitations    Pain of upper abdomen    Acute on chronic congestive heart failure, unspecified heart failure type (HCC)    COPD with acute exacerbation (HCC)    COPD (chronic obstructive pulmonary disease) (HCC)    Kyphoscoliosis    Phrenic nerve paralysis    Restrictive lung disease    Acute cor pulmonale (HCC)    Pneumonia    Acute on chronic hypoxic respiratory failure (HCC)    Acute pulmonary edema (HCC)    Pleural effusion    Pulmonary nodule 1 cm or greater in diameter    Community acquired pneumonia of right lower lobe of lung    Acute  on chronic respiratory failure with hypoxia and hypercapnia (HCC)    Acute respiratory failure with hypoxia and hypercapnia (HCC)    Cor pulmonale (chronic) (HCC)    SOB (shortness of breath)    Acute on chronic diastolic (congestive) heart failure (HCC)    Obesity hypoventilation syndrome (HCC)    Asthma (HCC)    Acute exacerbation of CHF (congestive heart failure) (HCC)    Pleural effusion on left    Pleural effusion on right    Acute bilateral thoracic back pain    Atypical pneumonia    Compression fracture of T6 vertebra (HCC)    COPD exacerbation (HCC)    Primary hypertension    Paroxysmal atrial fibrillation (HCC)    Acute respiratory failure (HCC)    Chronic heart failure with preserved ejection fraction (HFpEF) (HCC)    Shortness of breath    Counseling regarding advance care planning and goals of care    Palliative care by specialist    Right upper lobe pulmonary nodule    Dyspnea and respiratory abnormalities    Goals of care, counseling/discussion    Advance care planning    Malignant neoplasm of right upper lobe of lung (HCC)    Pericardial effusion (HCC)    Hypokalemia    Metabolic alkalosis    Hyperglycemia    Respiratory acidosis       Electronically signed by Sina Hogan MD on 7/8/2025  9:21 AM

## (undated) NOTE — LETTER
[facility]  Administration of Anesthesia  1. Sindhu Salazar, or _________________________________ acting on her behalf, (Patient) (Dependent/Representative) request to receive anesthesia for my pending procedure/operation/treatment.   ROX judd infections, high spinal block, spinal bleeding, seizure, cardiac arrest and death. 7. AWARENESS: I understand that it is possible (but unlikely) to have explicit memory of events from the operating room while under general anesthesia.   8. ELECTROCONVULSIV unconscious pt /Relationship    My signature below affirms that prior to the time of the procedure, I have explained to the patient and/or his/her guardian, the risks and benefits of undergoing anesthesia, as well as any reasonable alternatives.     _______

## (undated) NOTE — LETTER
Hospital Discharge Documentation    From: [facility] Hospitalist's Office  Phone: [phone]    Patient discharged time/date: 2025  7:11 PM  Patient discharge disposition:  Home with [facility]       Discharge Summary - D/C Summary        Discharge Summary signed by Tayo Bang MD at 2025  7:40 PM  Version 1 of 1      Author: Tayo Bang MD Service: Hospitalist Author Type: Physician    Filed: 2025  7:40 PM Date of Service: 2025  7:36 PM Status: Signed    : Tayo Bang MD (Physician)           [facility]  part of [facility]    Discharge Summary    Yesenia Berkowitz Patient Status:  Inpatient    1940 MRN [id number]   Location [facility] 3W/ Attending No att. providers found   Hosp Day # 8 PCP JO-ANN YANG MD     Date of Admission: 2025 Disposition:   Home Health Care Services     Date of Discharge:  2025  7:11 PM    Admitting Diagnosis:   COPD exacerbation (HCC) [J44.1]    Hospital Discharge Diagnoses:    AECOPD  RUL lung nodule  Hx of DM2  Paroxysmal afib  Second degree AVB with possible brief third degree block  S/p pacemaker placement 2025 by Dr. Vasquez  Abnormal TFTs  Hx of HTN  Hx of chronic HFpEF         Problem List:     Problem List[1]    Physical Exam:      /55 (BP Location: Right arm)   Pulse 73   Temp 98.2 °F (36.8 °C) (Oral)   Resp 18   Ht 65\"   Wt 149 lb (67.6 kg)   SpO2 95%   BMI 24.79 kg/m²     Gen:  NAD.  A and O x  3  CV:   RRR.  No m/g/r  Pulm:   CTA bilat  Abd:   +bs, soft, NT, ND  LE:  No c/c/e  Neuro:  nonfocal      Reason for Admission:   COPD exacerbation.     HISTORY OF PRESENT ILLNESS:  The patient is an 84-year-old  female with underlying severe chronic obstructive pulmonary disease, presented today to the emergency department for evaluation of progressive wheezing and shortness of breath more than her baseline for last 2  days.  In the emergency room, noted to have pulse ox of 90% to 94% on room air.  Started on nasal cannula oxygen and Solu-Medrol, nebulizer treatment, and she will be admitted to the hospital for further management.  Chest x-ray showed no acute findings compared to prior x-rays.  CBC showed white blood cell count of 11.0 with left shift.  Chemistry, basic metabolic panel, and liver function test still pending.    Hospital Course:     AECOPD  -pulm was on consult  -was given nebs  -was given steroids IV. Home on pred taper.  -cont advair, incruse  Pulm and PCP f/u.     RUL lung nodule  -recent PET scan +  -appreciate pulm, oncology, palliative care consults  -ION bronchoscopy suggested, but patient does not know if she really wants to pursue this. Consideration of SBRT - to f/u outpatient     DM2  Resume home regimen     Paroxysmal afib  -not on AC per patient preference per prior cardiology note  -diltiazem started now that PPM in     Second degree AVB with possible brief third degree block  -cardiology was consulted  -echo result reviewed  -s/p PPM 5/19/25  EP f/u     Abnormal TFTs  -low TSH and free T3 but normal free T4     HTN  -cont meds and monitor     Chronic HFpEF  - cont lasix PO     GOC  -DNAR/selective  -palliative care consulted       Dietitian Malnutrition Assessment     Evaluation for Malnutrition: Criteria for severe malnutrition diagnosis- acute illness/injury related to Wt loss greater than 5% in 1 month., Energy intake less than 50% for greater than 5 days.                  RD Malnutrition Care Plan: Encouraged increased PO intake., Encouraged small frequent meals with emphasis on high calorie/high protein., Intiated ONS (oral nutritional supplements).     Body Fat/Muscle Mass:           Physician Assessment      Patient has a diagnosis of severe malnutrition     Dvt proph:  ambulating, SCDs    Consultations:   Pulmonology, cardiology, EP, palliative care, oncology     Discharge Condition:   Good    Discharge Medications:      Discharge Medications        START taking these medications        Instructions Prescription details   nystatin 790633 UNIT/ML Susp  Commonly known as: Mycostatin      Take 5 mL (500,000 Units total) by mouth 4 (four) times daily for 7 days.   Stop taking on: May 28, 2025  Quantity: 140 mL  Refills: 0     traMADol 50 MG Tabs  Commonly known as: Ultram      Take 1 tablet (50 mg total) by mouth every 6 (six) hours as needed.   Quantity: 10 tablet  Refills: 0            CHANGE how you take these medications        Instructions Prescription details   predniSONE 10 MG Tabs  Commonly known as: Deltasone  What changed: You were already taking a medication with the same name, and this prescription was added. Make sure you understand how and when to take each.      Take 1 tab twice per day for 3 days.    Then take 1 tab daily for 3 days and then stop.   Quantity: 9 tablet  Refills: 0     predniSONE 10 MG Tabs  Commonly known as: Deltasone  Start taking on: May 21, 2025  What changed: See the new instructions.      Take 2 tablets (20 mg total) by mouth daily for 3 days, THEN 1 tablet (10 mg total) daily for 3 days.   Stop taking on: May 27, 2025  Quantity: 9 tablet  Refills: 0            CONTINUE taking these medications        Instructions Prescription details   acetaminophen 500 MG Tabs  Commonly known as: Tylenol Extra Strength      Take 2 tablets (1,000 mg total) by mouth every 6 (six) hours as needed for Pain or Fever.   Refills: 0     acetaZOLAMIDE 250 MG Tabs  Commonly known as: Diamox      Take 2 tablets (500 mg total) by mouth daily.   Quantity: 60 tablet  Refills: 5     albuterol 108 (90 Base) MCG/ACT Aers  Commonly known as: Ventolin HFA      Inhale 2 puffs into the lungs as needed.   Refills: 0     albuterol (2.5 MG/3ML) 0.083% Nebu  Commonly known as: Ventolin      Take 3 mL (2.5 mg total) by nebulization every 6 (six) hours as needed for Wheezing.   Refills: 0     benzonatate  200 MG Caps  Commonly known as: Tessalon      Take 1 capsule (200 mg total) by mouth 3 (three) times daily as needed for cough.   Quantity: 30 capsule  Refills: 0     digoxin 0.125 MG Tabs  Commonly known as: Lanoxin      Take 1 tablet (125 mcg total) by mouth daily.   Quantity: 30 tablet  Refills: 0     dilTIAZem  MG Cp24  Commonly known as: Tiazac      Take 1 capsule (360 mg total) by mouth daily.   Quantity: 30 capsule  Refills: 11     empagliflozin 10 MG Tabs  Commonly known as: Jardiance      Take 1 tablet (10 mg total) by mouth daily.   Quantity: 30 tablet  Refills: 3     estradiol 0.05 MG/24HR Ptwk  Commonly known as: Climara      Place 1 patch onto the skin once a week. As directed.   Refills: 0     fluticasone-umeclidin-vilant 200-62.5-25 MCG/ACT Aepb  Commonly known as: Trelegy Ellipta      Inhale 1 puff into the lungs as needed (sob).   Refills: 0     furosemide 80 MG Tabs  Commonly known as: Lasix      Take 1 tablet (80 mg total) by mouth BID (Diuretic).   Quantity: 60 tablet  Refills: 0     Loratadine 10 MG Caps      Take 10 mg by mouth nightly.   Refills: 0     montelukast 10 MG Tabs  Commonly known as: Singulair      Take 1 tablet (10 mg total) by mouth nightly.   Refills: 0     morphINE 10 MG/5ML Soln      Take 1.3 mL (2.6 mg total) by mouth 3 (three) times daily as needed (Shortness of breath/ take prior to activity that cased shortness of breath). Please add an adapt a cap top for ease of drawing up.  Please give pt 2 oral syringes   Quantity: 60 mL  Refills: 0     nitrofurantoin monohydrate macro 100 MG Caps  Commonly known as: Macrobid      Take 1 capsule (100 mg total) by mouth 2 (two) times daily.   Refills: 0     potassium chloride 20 MEQ Tbcr  Commonly known as: Klor-Con M20      Take 1 tablet (20 mEq total) by mouth nightly.   Refills: 0     Vitamin D 1000 units Tabs      Take 1,000 Units by mouth in the morning and 1,000 Units before bedtime.   Refills: 0            STOP taking these  medications      doxycycline 100 MG Caps  Commonly known as: Vibramycin        phenazopyridine 200 MG Tabs  Commonly known as: Pyridium                  Where to Get Your Medications        These medications were sent to [id number] - [place], [place] - [address] [phone], [phone]  [address], [place] [facility] [address]      Phone: [phone]   nystatin [id number] UNIT/ML Susp  predniSONE 10 MG Tabs  predniSONE 10 MG Tabs  traMADol 50 MG Tabs         Follow up Visits:     Follow-up Information       [facility]. Schedule an appointment as soon as possible for a visit.    Specialty: Multi-Specialty  Why: COPD follow up  Contact information:  [address]  [facility] [id number] [phone]  Additional information:  Your appointment is located at [address] in [place], [place].  Please park in the Yellow lot and go through the [facility] entrance.  Then proceed to [address].      Masks are optional for all patients and visitors, unless otherwise indicated.             Brenda Wilkerson MD. Schedule an appointment as soon as possible for a visit in 1 week(s).    Specialty: Internal Medicine  Contact information:  [address]  [address]  [facility] [id number] [phone]               Davey Vasquez MD Follow up in 1 week(s).    Specialties: Cardiac Electrophysiology, CARDIOLOGY  Why: Office will call to schedule follow up  Contact information:  [address]   [address]  [facility] [id number] [phone]               Nighat Aly MD Follow up.    Specialties: PULMONARY DISEASES, Internal Medicine, Critical Care  Why: pulmonology  Contact information:  [address]   [address]  [facility] [address] [phone]                             Hospital Discharge Diagnoses: AECOPD    Lace+ Score: 84  59-90 High Risk  29-58 Medium Risk  0-28   Low Risk.    TCM Follow-Up Recommendation:  LACE > 58: High Risk of readmission after discharge from the  [facility].    >35 minutes spent preparing this discharge.    Tayo Cervantes MD  5/21/2025  7:36 PM        [1]   Patient Active Problem List  Diagnosis    Actinic keratosis    Inflamed seborrheic keratosis    Palpitations    Pain of upper abdomen    Acute on chronic congestive heart failure, unspecified heart failure type (HCC)    COPD with acute exacerbation (HCC)    COPD (chronic obstructive pulmonary disease) (HCC)    Kyphoscoliosis    Phrenic nerve paralysis    Restrictive lung disease    Acute cor pulmonale (HCC)    Pneumonia    Acute on chronic hypoxic respiratory failure (HCC)    Acute pulmonary edema (HCC)    Pleural effusion    Pulmonary nodule 1 cm or greater in diameter    Community acquired pneumonia of right lower lobe of lung    Acute on chronic respiratory failure with hypoxia and hypercapnia (HCC)    Acute respiratory failure with hypoxia and hypercapnia (HCC)    Cor pulmonale (chronic) (HCC)    SOB (shortness of breath)    Acute on chronic diastolic (congestive) heart failure (HCC)    Obesity hypoventilation syndrome (HCC)    Asthma (HCC)    Acute exacerbation of CHF (congestive heart failure) (HCC)    Pleural effusion on left    Pleural effusion on right    Acute bilateral thoracic back pain    Atypical pneumonia    Compression fracture of T6 vertebra (HCC)    COPD exacerbation (HCC)    Primary hypertension    Paroxysmal atrial fibrillation (HCC)    Acute respiratory failure (HCC)    Chronic heart failure with preserved ejection fraction (HFpEF) (HCC)    Shortness of breath    Counseling regarding advance care planning and goals of care    Palliative care by specialist    Right upper lobe pulmonary nodule    Dyspnea and respiratory abnormalities    Goals of care, counseling/discussion    Advance care planning    Malignant neoplasm of right upper lobe of lung (HCC)       Electronically signed by Tayo Bang MD on 5/21/2025  7:40 PM

## (undated) NOTE — LETTER
Hospital Discharge Documentation    From: [facility] Hospitalist's Office  Phone: [phone]    Patient discharged time/date: 3/24/2025  4:30 PM  Patient discharge disposition:  [facility] with [facility]        Discharge Summary - D/C Summary        Discharge Summary signed by Rafael Parrish MD at 3/24/2025  2:53 PM  Version 2 of 2      Author: Rafael Parrish MD Service: Hospitalist Author Type: Physician    Filed: 3/24/2025  2:53 PM Date of Service: 3/24/2025  2:30 PM Status: Addendum    : Rafael Parrish MD (Physician)    Related Notes: Original Note by Rafael Parrish MD (Physician) filed at 3/24/2025  2:34 PM         [facility]  part of [facility]    Discharge Summary    Yesenia Berkowitz Patient Status:  Inpatient    1940 MRN [id number]   Location [facility] 5SW/SE Attending Rafael Parrish MD   Hosp Day # 4 PCP JO-ANN YANG MD     Date of Admission: 3/20/2025 Disposition: Home Health Care Services     Date of Discharge: 25      Admitting Diagnosis: Shortness of breath [R06.02]    Hospital Discharge Diagnoses:  Dyspnea    Lace+ Score: 81  59-90 High Risk  29-58 Medium Risk  0-28   Low Risk.    TCM Follow-Up Recommendation:  LACE > 58: High Risk of readmission after discharge from the hospital.      Problem List:   Patient Active Problem List   Diagnosis    Actinic keratosis    Inflamed seborrheic keratosis    Palpitations    Pain of upper abdomen    Acute on chronic congestive heart failure, unspecified heart failure type (HCC)    COPD with acute exacerbation (HCC)    COPD (chronic obstructive pulmonary disease) (HCC)    Kyphoscoliosis    Phrenic nerve paralysis    Restrictive lung disease    Acute cor pulmonale (HCC)    Pneumonia    Acute on chronic hypoxic respiratory failure (HCC)    Acute pulmonary edema (HCC)    Pleural effusion    Pulmonary nodule 1 cm or greater in diameter    Community acquired pneumonia of right lower lobe of lung     Acute on chronic respiratory failure with hypoxia and hypercapnia (HCC)    Acute respiratory failure with hypoxia and hypercapnia (HCC)    Cor pulmonale (chronic) (HCC)    SOB (shortness of breath)    Acute on chronic diastolic (congestive) heart failure (HCC)    Obesity hypoventilation syndrome (HCC)    Asthma (HCC)    Acute exacerbation of CHF (congestive heart failure) (HCC)    Pleural effusion on left    Pleural effusion on right    Acute bilateral thoracic back pain    Atypical pneumonia    Compression fracture of T6 vertebra (HCC)    COPD exacerbation (HCC)    Primary hypertension    Paroxysmal atrial fibrillation (HCC)    Acute respiratory failure (HCC)    Chronic heart failure with preserved ejection fraction (HFpEF) (HCC)    Shortness of breath       Reason for Admission:   Acute on chronic respiratory failure  COPD exacerbation  Asthma exacerbation  Bilateral PNA  Phrenic nervie paralysis  Hx of Afib  HTN  Chronic HFpEF    Physical Exam:   General appearance: alert, appears stated age and cooperative  Pulmonary:  clear to auscultation bilaterally  Cardiovascular: S1, S2 normal, no murmur, click, rub or gallop, regular rate and rhythm  Abdominal: soft, non-tender; bowel sounds normal; no masses,  no organomegaly  Extremities: extremities normal, atraumatic, no cyanosis or edema  Psychiatric: calm      History of Present Illness:   Per Dr. Khan  Patient is an 84-year-old  female with underlying severe chronic obstructive pulmonary disease with multiple recent hospitalizations for COPD exacerbation. Came in today to the emergency department for evaluation of progressive shortness of breath for the last 2 to 3 days. CBC showed white blood cell count of 13.4 with left shift. Chemistry showed bicarb 39, chloride 97, and potassium 3.4. Procalcitonin still pending. GeneXpert viral panel was negative. ProBNP 276. Chest x-ray showed small right pleural effusion with associated right basilar perihilar  opacities which appear similar to comparison of February 2025, redemonstration of elevated left hemidiaphragm with associated left basilar opacities may reflect atelectasis with or without superimposed pneumonia. Patient was started on IV antibiotics, steroids, and nebulizer treatments, and she will be admitted to the hospital for further management.     Hospital Course:   Acute on chronic respiratory failure  COPD exacerbation  Asthma exacerbation  Bilateral PNA  Phrenic nervie paralysis  -Imaging reviewed  -follow-up cultures showing NGTD  -Pulm on consult  -cont solumedrol 40mg iv  q 8 h--> bid  --> 20mg bid--> prednisone on dc  -Continue lasix and spironolactone  -wean o2 as tolerated  -improved  -plan OP pulmonary rehab on dc  -PET scan for pulm nodule to be ordered by pulmonology  -seen by palliative care, plan morphine on dc for symptom management      Hx of Afib  HTN  -Continue home meds  -Not on anticoagulation     Chronic HFpEF  -Imaging reviewed  -P-bnp 276  -Last EF 65-70%  -Strict Is and Os, daily weights  -Resume home Lasix    Consultations:   pulmonology    Procedures: n/a    Complications: n/a    Discharge Condition: Good    Discharge Medications:      Medication List        START taking these medications      benzonatate 200 MG Caps  Commonly known as: Tessalon  Take 1 capsule (200 mg total) by mouth 3 (three) times daily as needed for cough.     doxycycline 100 MG Caps  Commonly known as: Vibramycin  Take 1 capsule (100 mg total) by mouth 2 (two) times daily for 7 days.     morphINE 10 MG/5ML Soln  Take 1.3 mL (2.6 mg total) by mouth 3 (three) times daily as needed (Shortness of breath/ take prior to activity that cased shortness of breath). Please add an adapt a cap top for ease of drawing up.  Please give pt 2 oral syringes     predniSONE 10 MG Tabs  Commonly known as: Deltasone  Take 2 tablets (20 mg total) by mouth daily for 3 days, THEN 1 tablet (10 mg total) daily for 3 days.  Start taking  on: March 24, 2025            CONTINUE taking these medications      acetaminophen 500 MG Tabs  Commonly known as: Tylenol Extra Strength     acetaZOLAMIDE 250 MG Tabs  Commonly known as: Diamox  Take 2 tablets (500 mg total) by mouth daily.     * albuterol 108 (90 Base) MCG/ACT Aers  Commonly known as: Ventolin HFA     * albuterol (2.5 MG/3ML) 0.083% Nebu  Commonly known as: Ventolin     digoxin 0.125 MG Tabs  Commonly known as: Lanoxin  Take 1 tablet (125 mcg total) by mouth daily.     dilTIAZem  MG Cp24  Commonly known as: Tiazac  Take 1 capsule (360 mg total) by mouth daily.     empagliflozin 10 MG Tabs  Commonly known as: Jardiance  Take 1 tablet (10 mg total) by mouth daily.     estradiol 0.05 MG/24HR Ptwk  Commonly known as: Climara     fluticasone-umeclidin-vilant 200-62.5-25 MCG/ACT Aepb  Commonly known as: Trelegy Ellipta     furosemide 80 MG Tabs  Commonly known as: Lasix  Take 1 tablet (80 mg total) by mouth BID (Diuretic).     Loratadine 10 MG Caps     montelukast 10 MG Tabs  Commonly known as: Singulair     potassium chloride 20 MEQ Tbcr  Commonly known as: Klor-Con M20     Vitamin D 1000 units Tabs           * This list has 2 medication(s) that are the same as other medications prescribed for you. Read the directions carefully, and ask your doctor or other care provider to review them with you.                   Where to Get Your Medications        These medications were sent to [id number] - [place], [place] - [address] [place] [address] [phone], [phone]  [address] [place] [address]      Phone: [phone]   benzonatate 200 MG Caps  doxycycline 100 MG Caps  morphINE 10 MG/5ML Soln  predniSONE 10 MG Tabs             Follow up Visits: Follow-up with pcp in 1 week    Follow up Labs: n/a     Other Discharge Instructions: follow-up with pulmonology as instructed    NEGRITO KYLE MD  3/24/2025  2:30 PM    > 35 min     Electronically signed by Negrito Kyle MD on 3/24/2025  2:53 PM        Discharge Summary signed by Rafael Parrish MD at 3/24/2025  2:34 PM  Version 1 of 2      Author: Rafael Parrish MD Service: Hospitalist Author Type: Physician    Filed: 3/24/2025  2:34 PM Date of Service: 3/24/2025  2:30 PM Status: Signed    : Rafael Parrish MD (Physician)    Related Notes: Addendum by Rafael Parrish MD (Physician) filed at 3/24/2025  2:53 PM         [facility]  part of [facility]    Discharge Summary    Yesenia Berkowitz Patient Status:  Inpatient    1940 MRN [id number]   Location [facility] 5SW/SE Attending Rafael Parrish MD   Hosp Day # 4 PCP JO-ANN YANG MD     Date of Admission: 3/20/2025 Disposition: Home Health Care Services     Date of Discharge: 25      Admitting Diagnosis: Shortness of breath [R06.02]    Hospital Discharge Diagnoses:  Dyspnea    Lace+ Score: 81  59-90 High Risk  29-58 Medium Risk  0-28   Low Risk.    TCM Follow-Up Recommendation:  LACE > 58: High Risk of readmission after discharge from the hospital.      Problem List:   Patient Active Problem List   Diagnosis    Actinic keratosis    Inflamed seborrheic keratosis    Palpitations    Pain of upper abdomen    Acute on chronic congestive heart failure, unspecified heart failure type (HCC)    COPD with acute exacerbation (HCC)    COPD (chronic obstructive pulmonary disease) (HCC)    Kyphoscoliosis    Phrenic nerve paralysis    Restrictive lung disease    Acute cor pulmonale (HCC)    Pneumonia    Acute on chronic hypoxic respiratory failure (HCC)    Acute pulmonary edema (HCC)    Pleural effusion    Pulmonary nodule 1 cm or greater in diameter    Community acquired pneumonia of right lower lobe of lung    Acute on chronic respiratory failure with hypoxia and hypercapnia (HCC)    Acute respiratory failure with hypoxia and hypercapnia (HCC)    Cor pulmonale (chronic) (HCC)    SOB (shortness of breath)    Acute on chronic diastolic (congestive) heart failure (HCC)     Obesity hypoventilation syndrome (HCC)    Asthma (HCC)    Acute exacerbation of CHF (congestive heart failure) (HCC)    Pleural effusion on left    Pleural effusion on right    Acute bilateral thoracic back pain    Atypical pneumonia    Compression fracture of T6 vertebra (HCC)    COPD exacerbation (HCC)    Primary hypertension    Paroxysmal atrial fibrillation (HCC)    Acute respiratory failure (HCC)    Chronic heart failure with preserved ejection fraction (HFpEF) (HCC)    Shortness of breath       Reason for Admission:   Acute on chronic respiratory failure  COPD exacerbation  Asthma exacerbation  Bilateral PNA  Phrenic nervie paralysis  Hx of Afib  HTN  Chronic HFpEF    Physical Exam:   General appearance: alert, appears stated age and cooperative  Pulmonary:  clear to auscultation bilaterally  Cardiovascular: S1, S2 normal, no murmur, click, rub or gallop, regular rate and rhythm  Abdominal: soft, non-tender; bowel sounds normal; no masses,  no organomegaly  Extremities: extremities normal, atraumatic, no cyanosis or edema  Psychiatric: calm      History of Present Illness:   Per Dr. Khan  Patient is an 84-year-old  female with underlying severe chronic obstructive pulmonary disease with multiple recent hospitalizations for COPD exacerbation. Came in today to the emergency department for evaluation of progressive shortness of breath for the last 2 to 3 days. CBC showed white blood cell count of 13.4 with left shift. Chemistry showed bicarb 39, chloride 97, and potassium 3.4. Procalcitonin still pending. GeneXpert viral panel was negative. ProBNP 276. Chest x-ray showed small right pleural effusion with associated right basilar perihilar opacities which appear similar to comparison of February 2025, redemonstration of elevated left hemidiaphragm with associated left basilar opacities may reflect atelectasis with or without superimposed pneumonia. Patient was started on IV antibiotics, steroids, and  nebulizer treatments, and she will be admitted to the hospital for further management.     Hospital Course:   Acute on chronic respiratory failure  COPD exacerbation  Asthma exacerbation  Bilateral PNA  Phrenic nervie paralysis  -Imaging reviewed  -follow-up cultures showing NGTD  -Pulm on consult  -cont solumedrol 40mg iv  q 8 h--> bid  --> 20mg bid--> prednisone on dc  -Continue lasix and spironolactone  -wean o2 as tolerated  -improved  -plan OP pulmonary rehab on dc  -PET scan for pulm nodule to be ordered by pulmonology     Hx of Afib  HTN  -Continue home meds  -Not on anticoagulation     Chronic HFpEF  -Imaging reviewed  -P-bnp 276  -Last EF 65-70%  -Strict Is and Os, daily weights  -Resume home Lasix    Consultations:   pulmonology    Procedures: n/a    Complications: n/a    Discharge Condition: Good    Discharge Medications:      Discharge Medications        START taking these medications        Instructions Prescription details   benzonatate 200 MG Caps  Commonly known as: Tessalon      Take 1 capsule (200 mg total) by mouth 3 (three) times daily as needed for cough.   Quantity: 30 capsule  Refills: 0     doxycycline 100 MG Caps  Commonly known as: Vibramycin      Take 1 capsule (100 mg total) by mouth 2 (two) times daily for 7 days.   Stop taking on: March 27, 2025  Quantity: 14 capsule  Refills: 0     predniSONE 10 MG Tabs  Commonly known as: Deltasone  Start taking on: March 24, 2025      Take 2 tablets (20 mg total) by mouth daily for 3 days, THEN 1 tablet (10 mg total) daily for 3 days.   Stop taking on: March 30, 2025  Quantity: 9 tablet  Refills: 0            CONTINUE taking these medications        Instructions Prescription details   acetaminophen 500 MG Tabs  Commonly known as: Tylenol Extra Strength      Take 2 tablets (1,000 mg total) by mouth every 6 (six) hours as needed for Pain or Fever.   Refills: 0     acetaZOLAMIDE 250 MG Tabs  Commonly known as: Diamox      Take 2 tablets (500 mg total)  by mouth daily.   Quantity: 60 tablet  Refills: 5     albuterol 108 (90 Base) MCG/ACT Aers  Commonly known as: Ventolin HFA      Inhale 2 puffs into the lungs as needed.   Refills: 0     albuterol (2.5 MG/3ML) 0.083% Nebu  Commonly known as: Ventolin      Take 3 mL (2.5 mg total) by nebulization every 6 (six) hours as needed for Wheezing.   Refills: 0     digoxin 0.125 MG Tabs  Commonly known as: Lanoxin      Take 1 tablet (125 mcg total) by mouth daily.   Quantity: 30 tablet  Refills: 0     dilTIAZem  MG Cp24  Commonly known as: Tiazac      Take 1 capsule (360 mg total) by mouth daily.   Quantity: 30 capsule  Refills: 11     empagliflozin 10 MG Tabs  Commonly known as: Jardiance      Take 1 tablet (10 mg total) by mouth daily.   Quantity: 30 tablet  Refills: 3     estradiol 0.05 MG/24HR Ptwk  Commonly known as: Climara      Place 1 patch onto the skin once a week. As directed.   Refills: 0     fluticasone-umeclidin-vilant 200-62.5-25 MCG/ACT Aepb  Commonly known as: Trelegy Ellipta      Inhale 1 puff into the lungs daily.   Refills: 0     furosemide 80 MG Tabs  Commonly known as: Lasix      Take 1 tablet (80 mg total) by mouth BID (Diuretic).   Quantity: 60 tablet  Refills: 0     Loratadine 10 MG Caps      Take 10 mg by mouth nightly.   Refills: 0     montelukast 10 MG Tabs  Commonly known as: Singulair      Take 1 tablet (10 mg total) by mouth nightly.   Refills: 0     potassium chloride 20 MEQ Tbcr  Commonly known as: Klor-Con M20      Take 1 tablet (20 mEq total) by mouth nightly.   Refills: 0     Vitamin D 1000 units Tabs      Take 1,000 Units by mouth 2 (two) times daily.   Refills: 0               Where to Get Your Medications        These medications were sent to [id number] - [place], [place] - [address] [facility] [address] [facility] [phone], [phone]  [address], [facility] [address]      Phone: [phone]   benzonatate 200 MG Caps  doxycycline 100 MG Caps  predniSONE 10 MG Tabs         Follow up Visits:  Follow-up with pcp in 1 week    Follow up Labs: n/a     Other Discharge Instructions: follow-up with pulmonology as instructed    NEGRITO KYLE MD  3/24/2025  2:30 PM    > 35 min     Electronically signed by Negrito Kyle MD on 3/24/2025  2:34 PM

## (undated) NOTE — LETTER
[facility]  [address], [place], [place]    Authorization for Surgical Operation and Procedure                               I hereby authorize Bing Boyle MD, my physician and his/her assistants (if applicable), which may include medical students, residents, and/or fellows, to perform the following surgical operation/ procedure and administer such anesthesia as may be determined necessary by my physician: Operation/Procedure name (s) Flexible bronchoscopy with possible biopsy and brushing on Yesenia Berkowitz   2.   I recognize that during the surgical operation/procedure, unforeseen conditions may necessitate additional or different procedures than those listed above.  I, therefore, further authorize and request that the above-named surgeon, assistants, or designees perform such procedures as are, in their judgment, necessary and desirable.    3.   My surgeon/physician has discussed prior to my surgery the potential benefits, risks and side effects of this procedure; the likelihood of achieving goals; and potential problems that might occur during recuperation.  They also discussed reasonable alternatives to the procedure, including risks, benefits, and side effects related to the alternatives and risks related to not receiving this procedure.  I have had all my questions answered and I acknowledge that no guarantee has been made as to the result that may be obtained.    4.   Should the need arise during my operation/procedure, which includes change of level of care prior to discharge, I also consent to the administration of blood and/or blood products.  Further, I understand that despite careful testing and screening of blood or blood products by collecting agencies, I may still be subject to ill effects as a result of receiving a blood transfusion and/or blood products.  The following are some, but not all, of the potential risks that can occur: fever and allergic reactions, hemolytic  reactions, transmission of diseases such as Hepatitis, AIDS and Cytomegalovirus (CMV) and fluid overload.  In the event that I wish to have an autologous transfusion of my own blood, or a directed donor transfusion, I will discuss this with my physician.  Check only if Refusing Blood or Blood Products  I understand refusal of blood or blood products as deemed necessary by my physician may have serious consequences to my condition to include possible death. I hereby assume responsibility for my refusal and release the hospital, its personnel, and my physicians from any responsibility for the consequences of my refusal.    o  Refuse   5.   I authorize the use of any specimen, organs, tissues, body parts or foreign objects that may be removed from my body during the operation/procedure for diagnosis, research or teaching purposes and their subsequent disposal by hospital authorities.  I also authorize the release of specimen test results and/or written reports to my treating physician on the hospital medical staff or other referring or consulting physicians involved in my care, at the discretion of the Pathologist or my treating physician.    6.   I consent to the photographing or videotaping of the operations or procedures to be performed, including appropriate portions of my body for medical, scientific, or educational purposes, provided my identity is not revealed by the pictures or by descriptive texts accompanying them.  If the procedure has been photographed/videotaped, the surgeon will obtain the original picture, image, videotape or CD.  The hospital will not be responsible for storage, release or maintenance of the picture, image, tape or CD.    7.   I consent to the presence of a  or observers in the operating room as deemed necessary by my physician or their designees.    8.   I recognize that in the event my procedure results in extended X-Ray/fluoroscopy time, I may develop a skin  reaction.    9. If I have a Do Not Attempt Resuscitation (DNAR) order in place, that status will be suspended while in the operating room, procedural suite, and during the recovery period unless otherwise explicitly stated by me (or a person authorized to consent on my behalf). The surgeon or my attending physician will determine when the applicable recovery period ends for purposes of reinstating the DNAR order.  10. Patients having a sterilization procedure: I understand that if the procedure is successful the results will be permanent and it will therefore be impossible for me to inseminate, conceive, or bear children.  I also understand that the procedure is intended to result in sterility, although the result has not been guaranteed.   11. I acknowledge that my physician has explained sedation/analgesia administration to me including the risk and benefits I consent to the administration of sedation/analgesia as may be necessary or desirable in the judgment of my physician.    I CERTIFY THAT I HAVE READ AND FULLY UNDERSTAND THE ABOVE CONSENT TO OPERATION and/or OTHER PROCEDURE.     ____________________________________  _________________________________        ______________________________  Signature of Patient    Signature of Responsible Person                Printed Name of Responsible Person                                      ____________________________________  _____________________________                ________________________________  Signature of Witness        Date  Time         Relationship to Patient    STATEMENT OF PHYSICIAN My signature below affirms that prior to the time of the procedure; I have explained to the patient and/or his/her legal representative, the risks and benefits involved in the proposed treatment and any reasonable alternative to the proposed treatment. I have also explained the risks and benefits involved in refusal of the proposed treatment and alternatives to the proposed  treatment and have answered the patient's questions. If I have a significant financial interest in a co-management agreement or a significant financial interest in any product or implant, or other significant relationship used in this procedure/surgery, I have disclosed this and had a discussion with my patient.     _____________________________________________________              _____________________________  (Signature of Physician)                                                                                         (Date)                                   (Time)  Patient Name: Yesenia DOMINGO Berkowitz      : 1940      Printed: 2024     Medical Record #: [id number]                                      Page 1 of 1

## (undated) NOTE — [place] [address]  Authorization for Invasive Procedures  Date: 11/4/24           Time: 1800    I hereby authorize Bing Boyle MD, my physician and his/her assistants (if applicable), which may include medical students, residents, and/or fellows, to perform the following surgical operation/ procedure and administer such anesthesia as may be determined necessary by my physician: US guided thoracentesis on Yesenia Berkowitz  2.   I recognize that during the surgical operation/procedure, unforeseen conditions may necessitate additional or different procedures than those listed above.  I, therefore, further authorize and request that the above-named surgeon, assistants, or designees perform such procedures as are, in their judgment, necessary and desirable.    3.   My surgeon/physician has discussed prior to my surgery the potential benefits, risks and side effects of this procedure; the likelihood of achieving goals; and potential problems that might occur during recuperation.  They also discussed reasonable alternatives to the procedure, including risks, benefits, and side effects related to the alternatives and risks related to not receiving this procedure.  I have had all my questions answered and I acknowledge that no guarantee has been made as to the result that may be obtained.    4.   Should the need arise during my operation/procedure, which includes change of level of care prior to discharge, I also consent to the administration of blood and/or blood products.  Further, I understand that despite careful testing and screening of blood or blood products by collecting agencies, I may still be subject to ill effects as a result of receiving a blood transfusion and/or blood products.  The following are some, but not all, of the potential risks that can occur: fever and allergic reactions, hemolytic reactions, transmission of diseases such as Hepatitis, AIDS and Cytomegalovirus (CMV) and  fluid overload.  In the event that I wish to have an autologous transfusion of my own blood, or a directed donor transfusion, I will discuss this with my physician.   Check only if Refusing Blood or Blood Products  I understand refusal of blood or blood products as deemed necessary by my physician may have serious consequences to my condition to include possible death. I hereby assume responsibility for my refusal and release the hospital, its personnel, and my physicians from any responsibility for the consequences of my refusal.         o  Refuse         5.   I authorize the use of any specimen, organs, tissues, body parts or foreign objects that may be removed from my body during the operation/procedure for diagnosis, research or teaching purposes and their subsequent disposal by hospital authorities.  I also authorize the release of specimen test results and/or written reports to my treating physician on the hospital medical staff or other referring or consulting physicians involved in my care, at the discretion of the Pathologist or my treating physician.    6.   I consent to the photographing or videotaping of the operations or procedures to be performed, including appropriate portions of my body for medical, scientific, or educational purposes, provided my identity is not revealed by the pictures or by descriptive texts accompanying them.  If the procedure has been photographed/videotaped, the surgeon will obtain the original picture, image, videotape or CD.  The hospital will not be responsible for storage, release or maintenance of the picture, image, tape or CD.    7.   I consent to the presence of a  or observers in the operating room as deemed necessary by my physician or their designees.    8.   I recognize that in the event my procedure results in extended X-Ray/fluoroscopy time, I may develop a skin reaction.    9. If I have a Do Not Attempt Resuscitation (DNAR) order in place, that  status will be suspended while in the operating room, procedural suite, and during the recovery period unless otherwise explicitly stated by me (or a person authorized to consent on my behalf). The surgeon or my attending physician will determine when the applicable recovery period ends for purposes of reinstating the DNAR order.  10. Patients having a sterilization procedure: I understand that if the procedure is successful the results will be permanent and it will therefore be impossible for me to inseminate, conceive, or bear children.  I also understand that the procedure is intended to result in sterility, although the result has not been guaranteed.   11. I acknowledge that my physician has explained sedation/analgesia administration to me including the risk and benefits I consent to the administration of sedation/analgesia as may be necessary or desirable in the judgment of my physician.    I CERTIFY THAT I HAVE READ AND FULLY UNDERSTAND THE ABOVE CONSENT TO OPERATION and/or OTHER PROCEDURE.        ____________________________________       _________________________________      ______________________________  Signature of Patient         Signature of Responsible Person        Printed Name of Responsible Person        ____________________________________      _________________________________      ______________________________       Signature of Witness          Relationship to Patient                       Date                                       Time  Patient Name: Yesenia Berkowitz  : 1940    Reviewed: 2024   Printed: 2024  Medical Record #: [id number] Page 1 of 2             STATEMENT OF PHYSICIAN My signature below affirms that prior to the time of the procedure; I have explained to the patient and/or his/her legal representative, the risks and benefits involved in the proposed treatment and any reasonable alternative to the proposed treatment. I have also explained the risks and  benefits involved in refusal of the proposed treatment and alternatives to the proposed treatment and have answered the patient's questions. If I have a significant financial interest in a co-management agreement or a significant financial interest in any product or implant, or other significant relationship used in this procedure/surgery, I have disclosed this and had a discussion with my patient.     _______________________________________________________________ _____________________________  (Signature of Physician)                                                                                         (Date)                                   (Time)  Patient Name: Yesenia DOMINGO Berkowitz  : 1940    Reviewed: 2024   Printed: 2024  Medical Record #: [id number] Page 2 of 2

## (undated) NOTE — LETTER
Hospital Discharge Documentation  Please phone to schedule a hospital follow up appointment.    From: [facility] Hospitalist's Office  Phone: [phone]    Patient discharged time/date: 2025  4:03 PM  Patient discharge disposition:  Home or Self Care       Discharge Summary - D/C Summary        Discharge Summary signed by Lidia Levy MD at 2025  6:04 PM  Version 1 of 1      Author: Lidia Levy MD Service: Hospitalist Author Type: Physician    Filed: 2025  6:04 PM Date of Service: 2025  2:33 PM Status: Signed    : Lidia Levy MD (Physician)         [facility]  part of [facility]    Discharge Summary    Yesenia Berkowitz Patient Status:  Inpatient    1940 MRN [id number]   Location [facility]5W Attending Lidia Levy MD   Hosp Day # 3 PCP JO-ANN YANG MD     Date of Admission: 1/10/2025 Disposition: Home Health Care Services     Date of Discharge: 2025    Admitting Diagnosis: SOB (shortness of breath) [R06.02]  COPD exacerbation (HCC) [J44.1]  Community acquired pneumonia of right lower lobe of lung [J18.9]    Hospital Discharge Diagnoses:   # Acute on chronic hypoxemic respiratory failure   # COPD exacerbation  # Left phrenic nerve paralysis  # Kyphoscoliosis  # HFpEF  # Paroxysmal Afib, not on AC  # HTN  # HLD    Lace+ Score: 80  59-90 High Risk  29-58 Medium Risk  0-28   Low Risk.    TCM Follow-Up Recommendation:  LACE > 58: High Risk of readmission after discharge from the hospital.      Problem List:   Patient Active Problem List   Diagnosis    Actinic keratosis    Inflamed seborrheic keratosis    Palpitations    Pain of upper abdomen    Acute on chronic congestive heart failure, unspecified heart failure type (HCC)    COPD with acute exacerbation (HCC)    COPD (chronic obstructive pulmonary disease) (HCC)    Kyphoscoliosis    Phrenic nerve paralysis    Restrictive lung disease    Acute cor pulmonale (HCC)    Pneumonia     Acute on chronic hypoxic respiratory failure (HCC)    Acute pulmonary edema (HCC)    Pleural effusion    Pulmonary nodule 1 cm or greater in diameter    Community acquired pneumonia of right lower lobe of lung    Acute on chronic respiratory failure with hypoxia and hypercapnia (HCC)    Acute respiratory failure with hypoxia and hypercapnia (HCC)    Cor pulmonale (chronic) (HCC)    SOB (shortness of breath)    Acute on chronic diastolic (congestive) heart failure (HCC)    Obesity hypoventilation syndrome (HCC)    Asthma (HCC)    Acute exacerbation of CHF (congestive heart failure) (HCC)    Pleural effusion on left    Pleural effusion on right    Acute bilateral thoracic back pain    Atypical pneumonia    Compression fracture of T6 vertebra (HCC)    COPD exacerbation (HCC)       Reason for Admission:     Physical Exam:   General appearance: alert, appears stated age and cooperative  Pulmonary:  clear to auscultation bilaterally  Cardiovascular: S1, S2 normal, no murmur, click, rub or gallop, regular rate and rhythm  Abdominal: soft, non-tender; bowel sounds normal; no masses,  no organomegaly  Extremities: extremities normal, atraumatic, no cyanosis or edema  Psychiatric: calm      History of Present Illness:     Hospital Course: Yesenia Berkowitz is an 84-year-old female with hx of chronic dyspnea, COPD on home O2, HTN, HLD, Afib not on AC, who p/w worsening dyspnea for several days.     # Acute on chronic hypoxemic respiratory failure   # COPD exacerbation  # Left phrenic nerve paralysis  # Kyphoscoliosis  - s/p flexible bronchoscopy with bronchial washings and removal of mucus plug, by Dr. Boyle 09/17/24  - Steroids, bronchodilators, and doxycycline  Pt did well and was discharged on PO steroids and Doxy once cleared by pulmonary.    Consultations: Pulmonary     Procedures:   CXR : Edema/CHF        Complications: see hospital course     Discharge Condition: Good    Discharge Medications:      Discharge Medications         START taking these medications        Instructions Prescription details   doxycycline 100 MG Caps  Commonly known as: Vibramycin      Take 1 capsule (100 mg total) by mouth 2 (two) times daily for 10 doses.   Stop taking on: January 18, 2025  Quantity: 10 capsule  Refills: 0            CHANGE how you take these medications        Instructions Prescription details   predniSONE 10 MG Tabs  Commonly known as: Deltasone  Start taking on: January 13, 2025  What changed: See the new instructions.      Take 1 tablet (10 mg total) by mouth daily for 10 days, THEN 0.5 tablets (5 mg total) daily for 4 days, THEN 0.5 tablets (5 mg total) every other day for 4 days.   Stop taking on: January 31, 2025  Quantity: 13 tablet  Refills: 0            CONTINUE taking these medications        Instructions Prescription details   acetaminophen 500 MG Tabs  Commonly known as: Tylenol Extra Strength      Take 1 tablet (500 mg total) by mouth every 6 (six) hours as needed.   Refills: 0     acetaZOLAMIDE 250 MG Tabs  Commonly known as: Diamox      Take 2 tablets (500 mg total) by mouth daily.   Quantity: 30 tablet  Refills: 0     albuterol 108 (90 Base) MCG/ACT Aers  Commonly known as: Ventolin HFA      Inhale 2 puffs into the lungs as needed.   Refills: 0     aspirin 81 MG Tabs      Take 2 tablets (162 mg total) by mouth daily as needed.   Refills: 0     digoxin 0.125 MG Tabs  Commonly known as: Lanoxin      Take 1 tablet (125 mcg total) by mouth daily.   Quantity: 30 tablet  Refills: 0     dilTIAZem HCl ER Coated Beads 360 MG Cp24  Commonly known as: CARDIZEM CD      Take 1 capsule (360 mg total) by mouth 2 (two) times daily.   Refills: 0     empagliflozin 10 MG Tabs  Commonly known as: Jardiance      Take 1 tablet (10 mg total) by mouth daily.   Quantity: 30 tablet  Refills: 3     estradiol 0.05 MG/24HR Ptwk  Commonly known as: Climara      Place 1 patch onto the skin once a week. As directed.   Refills: 0     furosemide 80 MG  Tabs  Commonly known as: Lasix      Take 1 tablet (80 mg total) by mouth daily.   Quantity: 30 tablet  Refills: 3     lansoprazole 30 MG Cpdr  Commonly known as: Prevacid      Take 1 capsule (30 mg total) by mouth nightly.   Refills: 0     lidocaine-menthol 4-1 % Ptch      Place 1 patch onto the skin daily.   Quantity: 30 patch  Refills: 0     Loratadine 10 MG Caps      Take 10 mg by mouth nightly.   Refills: 0     montelukast 10 MG Tabs  Commonly known as: Singulair      Take 1 tablet (10 mg total) by mouth nightly.   Refills: 0     omega-3 fatty acids 1000 MG Caps  Commonly known as: Fish Oil      Take 1,000 mg by mouth daily.   Refills: 0     potassium chloride 20 MEQ Tbcr  Commonly known as: Klor-Con M20      Take 1 tablet (20 mEq total) by mouth nightly.   Refills: 0     spironolactone 25 MG Tabs  Commonly known as: Aldactone      Take 1 tablet (25 mg total) by mouth daily for 90 doses.   Stop taking on: March 17, 2025  Quantity: 90 tablet  Refills: 0     tiotropium 18 MCG Caps  Commonly known as: Spiriva Handihaler      Inhale 1 capsule (18 mcg total) into the lungs nightly for 30 doses.   Stop taking on: January 28, 2025  Quantity: 30 capsule  Refills: 0     Vitamin D 1000 units Tabs      Take 1,000 Units by mouth 2 (two) times daily.   Refills: 0               Where to Get Your Medications        These medications were sent to [id number] - [place], [place] - [address] [place] [address] [phone], [phone]  [address] [place] [address], [facility] [address]      Phone: [phone]   doxycycline 100 MG Caps  predniSONE 10 MG Tabs         Follow up Visits: Follow-up with PCP and pulmonary        Lidia Levy MD  1/13/2025  2:33 PM    > 35 min     Electronically signed by Lidia Levy MD on 1/13/2025  6:04 PM

## (undated) NOTE — LETTER
Hospital Discharge Documentation    From: [facility] Hospitalist's Office  Phone: [phone]    Patient discharged time/date: 10/11/2024  1:20 PM  Patient discharge disposition:  Home Health Care Services-Residential        Discharge Summary - D/C Summary        Discharge Summary signed by Tayo Bang MD at 10/11/2024 11:02 AM  Version 1 of 1      Author: Tayo Bang MD Service: Hospitalist Author Type: Physician    Filed: 10/11/2024 11:02 AM Date of Service: 10/11/2024 10:57 AM Status: Signed    : Tayo Bang MD (Physician)           [facility]  part of [facility]    Discharge Summary    Yesenia Berkowitz Patient Status:  Inpatient    1940 MRN [id number]   Location [facility]5W Attending Tayo Bang MD   Hosp Day # 4 PCP JO-ANN YANG MD     Date of Admission: 10/7/2024 Disposition:   Home Health Care Services     Date of Discharge:   10/11/2024     Admitting Diagnosis:   Acute pulmonary edema (HCC) [J81.0]  COPD exacerbation (HCC) [J44.1]  Acute on chronic hypoxic respiratory failure (HCC) [J96.21]    Hospital Discharge Diagnoses:    Acute on chronic respiratory failure    AECOPD  Acute on chronic diastolic CHF  Left phrenic nerve paralysis  Kyphoscoliosis  Acute on chronic diastolic CHF  Paroxysmal afib  Hx of HTN  Pulmonary nodule      Problem List:     Patient Active Problem List   Diagnosis    Actinic keratosis    Inflamed seborrheic keratosis    Palpitations    Pain of upper abdomen    Acute on chronic congestive heart failure, unspecified heart failure type (HCC)    COPD exacerbation (HCC)    COPD (chronic obstructive pulmonary disease) (HCC)    Kyphoscoliosis    Phrenic nerve paralysis    Restrictive lung disease    Acute cor pulmonale (HCC)    Pneumonia    Acute on chronic hypoxic respiratory failure (HCC)    Acute pulmonary edema (HCC)    Pleural effusion    Pulmonary nodule       Physical Exam:      /51 (BP Location: Right arm)    Pulse 76   Temp 97.9 °F (36.6 °C) (Oral)   Resp 16   Ht 5' 5\" (1.651 m)   Wt 166 lb 14.4 oz (75.7 kg)   SpO2 93%   BMI 27.77 kg/m²     Gen:   NAD.  A and O x 3  CV:   RRR, no m/g/r  Pulm:   coarse bilat  Abd:   +bs, soft, NT, ND  LE:   No c/c/e  Neuro:   nonfocal      Reason for Admission:   COPD exacerbation.     HISTORY OF PRESENT ILLNESS:  The patient is an 84-year-old  female with history of severe chronic obstructive pulmonary disease, last hospitalization September 2024.  At that time treated with steroids, antibiotics, and nebulizer treatments.  Also had an echocardiogram which showed mild aortic stenosis and moderate pulmonary artery hypertension.  Today presented with progressive shortness of breath.  CBC showed white blood cell count of 10.8 with left shift.  Chemistry, B-natriuretic peptide, and troponin are still pending.  Chest x-ray showed hazy opacities both lung bases.  The patient was started on steroids, nebulizer treatments, and she will be admitted to the hospital for further management.     Hospital Course:     Acute on chronic respiratory failure due to  AECOPD  Acute on chronic diastolic CHF  Left phrenic nerve paralysis  Currently on 3L o2.   On 3L o2 at home.  Kyphoscoliosis  - pulm and cards were on consult  - CT chest - no PE. +pulmonary edema, moderate R and small L pleural effusions  - was given nebs, steroids IV, doxycycline  - was given lasix IV  - dc home on po lasix.   Lasix 40 mg bid alternating with lasix 40 daily.  - monitored I/O, wts  - S/p right thoracentesis of 700 ml 10/10  - weaned o2  - cards and pulm f/u     Acute on chronic diastolic CHF  - echo 9/2024 - EF 65-70%, moderate pulmonary htn  - cardiology was on consult  - lasix as above     Paroxysmal afib  - not on AC  - aspirin  - cont diltiazem, digoxin  - in NSR     HTN  - cont meds and monitor     Pulmonary nodule  - per pulmonology     dvt proph:   lovenox      Code status:    Full       Consultations:    Pulmonology, cardiology     Discharge Condition:  Good    Discharge Medications:      Discharge Medications        CHANGE how you take these medications        Instructions Prescription details   doxycycline 100 MG Caps  Commonly known as: Vibramycin  What changed: when to take this      Take 1 capsule (100 mg total) by mouth every 12 (twelve) hours for 10 doses.   Stop taking on: October 16, 2024  Quantity: 10 capsule  Refills: 0     furosemide 40 MG Tabs  Commonly known as: Lasix  What changed:   how much to take  how to take this  when to take this  additional instructions      Please alternate taking 40 mg twice per day with 40 mg once per day.   Quantity: 45 tablet  Refills: 1     predniSONE 20 MG Tabs  Commonly known as: Deltasone  Start taking on: October 11, 2024  What changed: See the new instructions.      Take 2 tablets (40 mg total) by mouth daily for 5 days, THEN 1 tablet (20 mg total) daily for 5 days. 20mgx2 [id number], then 20mg [id number].   Stop taking on: October 20, 2024  Quantity: 15 tablet  Refills: 0            CONTINUE taking these medications        Instructions Prescription details   albuterol 108 (90 Base) MCG/ACT Aers  Commonly known as: Ventolin HFA      Inhale 2 puffs into the lungs as needed.   Refills: 0     amitriptyline 50 MG Tabs  Commonly known as: Elavil      Take 1 tablet (50 mg total) by mouth nightly.   Refills: 0     aspirin 81 MG Tabs      Take 2 tablets (162 mg total) by mouth daily.   Refills: 0     B Complex Caps      Take 1 tablet by mouth daily.   Refills: 0     Calcium + D3 600-200 MG-UNIT Tabs      Take 1 tablet by mouth daily.   Refills: 0     dicyclomine 10 MG Caps  Commonly known as: Bentyl      Take 1 capsule (10 mg total) by mouth 3 (three) times daily as needed (abdominal pain).   Quantity: 40 capsule  Refills: 3     digoxin 0.125 MG Tabs  Commonly known as: Lanoxin      Take 1 tablet (125 mcg total) by mouth daily.   Quantity: 30 tablet  Refills: 0     dilTIAZem ER  240 MG Cp24  Commonly known as: CardIZEM CD      Take 1 capsule (240mg)by mouth every morning on an empty stomach.   Refills: 0     estradiol 0.05 MG/24HR Ptwk  Commonly known as: Climara      Place 1 patch onto the skin once a week. As directed.   Refills: 0     lansoprazole 30 MG Cpdr  Commonly known as: Prevacid      Take 1 capsule (30 mg total) by mouth nightly.   Refills: 0     Loratadine 10 MG Caps      Take 10 mg by mouth nightly.   Refills: 0     montelukast 10 MG Tabs  Commonly known as: Singulair      Take 1 tablet (10 mg total) by mouth nightly.   Refills: 0     mupirocin 2 % Oint  Commonly known as: Bactroban      Apply 1 Application topically 3 (three) times daily.   Quantity: 1 each  Refills: 3     omega-3 fatty acids 1000 MG Caps  Commonly known as: Fish Oil      Take 1,000 mg by mouth daily.   Refills: 0     polyethylene glycol (PEG 3350-KCl-NaBcb-NaCl-NaSulf) 236 g Solr  Commonly known as: Golytely      Take 4,000 mL by mouth As Directed. Take 2,000 mL the night before your procedure and 2,000 mL the morning of your procedure. Take as directed by GI clinic. Okay to substitute for generic.   Quantity: 1 each  Refills: 0     potassium chloride 20 MEQ Tbcr  Commonly known as: Klor-Con M20      Take 1 tablet (20 mEq total) by mouth nightly.   Refills: 0     Pulmicort Flexhaler 180 MCG/ACT Aepb  Generic drug: Budesonide      Inhale 2 puffs into the lungs daily.   Refills: 0     THERA/BETA-CAROTENE Tabs      take 1 tablet by ORAL route  every day with food   Refills: 0     tiotropium 18 MCG Caps  Commonly known as: Spiriva Handihaler      Inhale 1 capsule (18 mcg total) into the lungs nightly.   Refills: 0     triamcinolone 0.1 % Crea  Commonly known as: Kenalog      Apply topically 2 (two) times daily. Apply bid as directed   Quantity: 80 g  Refills: 3     Vitamin D 1000 units Tabs      Take 1,000 Units by mouth 2 (two) times daily.   Refills: 0               Where to Get Your Medications        These  medications were sent to [id number] - [place], [place] - [address] [phone], [phone]  [address] [address]      Phone: [phone]   doxycycline 100 MG Caps  furosemide 40 MG Tabs  predniSONE 20 MG Tabs         Follow up Visits:     Follow-up Information       [facility]. Schedule an appointment as soon as possible for a visit.    Specialty: Cardiology  Contact information:  [address]  [facility] [id number] [phone]  Additional information:  Your appointment is located at [address]  in [place], [place].  Please park in the Yellow lot and go through the [facility] entrance.  Then proceed to [address].      Masks are optional for all patients and visitors, unless otherwise indicated.             Brenda Wilkerson MD. Schedule an appointment as soon as possible for a visit in 1 week(s).    Specialty: Internal Medicine  Contact information:  [address] [facility] [address] [facility]  [address]  [facility] [address] [phone]               Bing Boyle MD Follow up in 1 week(s).    Specialties: PULMONARY DISEASES, Sleep Disorders  Contact information:  [address]  [address]  [place] [id number] [phone]                             Hospital Discharge Diagnoses:  AECOPD    Lace+ Score: 78  59-90 High Risk  29-58 Medium Risk  0-28   Low Risk.    TCM Follow-Up Recommendation:  LACE > 58: High Risk of readmission after discharge from the hospital.    >35 minutes spent preparing this discharge.    Tayo Cervantes MD  10/11/2024  10:57 AM     Electronically signed by Tayo Bang MD on 10/11/2024 11:02 AM

## (undated) NOTE — LETTER
[place] [address]  Authorization for Invasive Procedures  Date: 12/26/2024           Time: 1450    I hereby authorize Dr. Harper, my physician and his/her assistants (if applicable), which may include medical students, residents, and/or fellows, to perform the following surgical operation/ procedure and administer such anesthesia as may be determined necessary by my physician: : Thoracic Level 6 Kyphoplasty and Bone Biopsy with Anesthesia  on Yesenia Berkowitz  2.   I recognize that during the surgical operation/procedure, unforeseen conditions may necessitate additional or different procedures than those listed above.  I, therefore, further authorize and request that the above-named surgeon, assistants, or designees perform such procedures as are, in their judgment, necessary and desirable.    3.   My surgeon/physician has discussed prior to my surgery the potential benefits, risks and side effects of this procedure; the likelihood of achieving goals; and potential problems that might occur during recuperation.  They also discussed reasonable alternatives to the procedure, including risks, benefits, and side effects related to the alternatives and risks related to not receiving this procedure.  I have had all my questions answered and I acknowledge that no guarantee has been made as to the result that may be obtained.    4.   Should the need arise during my operation/procedure, which includes change of level of care prior to discharge, I also consent to the administration of blood and/or blood products.  Further, I understand that despite careful testing and screening of blood or blood products by collecting agencies, I may still be subject to ill effects as a result of receiving a blood transfusion and/or blood products.  The following are some, but not all, of the potential risks that can occur: fever and allergic reactions, hemolytic reactions, transmission of diseases such as Hepatitis,  AIDS and Cytomegalovirus (CMV) and fluid overload.  In the event that I wish to have an autologous transfusion of my own blood, or a directed donor transfusion, I will discuss this with my physician.   Check only if Refusing Blood or Blood Products  I understand refusal of blood or blood products as deemed necessary by my physician may have serious consequences to my condition to include possible death. I hereby assume responsibility for my refusal and release the hospital, its personnel, and my physicians from any responsibility for the consequences of my refusal.         o  Refuse         5.   I authorize the use of any specimen, organs, tissues, body parts or foreign objects that may be removed from my body during the operation/procedure for diagnosis, research or teaching purposes and their subsequent disposal by hospital authorities.  I also authorize the release of specimen test results and/or written reports to my treating physician on the hospital medical staff or other referring or consulting physicians involved in my care, at the discretion of the Pathologist or my treating physician.    6.   I consent to the photographing or videotaping of the operations or procedures to be performed, including appropriate portions of my body for medical, scientific, or educational purposes, provided my identity is not revealed by the pictures or by descriptive texts accompanying them.  If the procedure has been photographed/videotaped, the surgeon will obtain the original picture, image, videotape or CD.  The hospital will not be responsible for storage, release or maintenance of the picture, image, tape or CD.    7.   I consent to the presence of a  or observers in the operating room as deemed necessary by my physician or their designees.    8.   I recognize that in the event my procedure results in extended X-Ray/fluoroscopy time, I may develop a skin reaction.    9. If I have a Do Not Attempt  Resuscitation (DNAR) order in place, that status will be suspended while in the operating room, procedural suite, and during the recovery period unless otherwise explicitly stated by me (or a person authorized to consent on my behalf). The surgeon or my attending physician will determine when the applicable recovery period ends for purposes of reinstating the DNAR order.  10. Patients having a sterilization procedure: I understand that if the procedure is successful the results will be permanent and it will therefore be impossible for me to inseminate, conceive, or bear children.  I also understand that the procedure is intended to result in sterility, although the result has not been guaranteed.   11. I acknowledge that my physician has explained sedation/analgesia administration to me including the risk and benefits I consent to the administration of sedation/analgesia as may be necessary or desirable in the judgment of my physician.    I CERTIFY THAT I HAVE READ AND FULLY UNDERSTAND THE ABOVE CONSENT TO OPERATION and/or OTHER PROCEDURE.        ____________________________________       _________________________________      ______________________________  Signature of Patient         Signature of Responsible Person        Printed Name of Responsible Person        ____________________________________      _________________________________      ______________________________       Signature of Witness          Relationship to Patient                       Date                                       Time  Patient Name: Yesenia Berkowitz  : 1940    Reviewed: 2024   Printed: 2024  Medical Record #: [id number] Page 1 of 2             STATEMENT OF PHYSICIAN My signature below affirms that prior to the time of the procedure; I have explained to the patient and/or his/her legal representative, the risks and benefits involved in the proposed treatment and any reasonable alternative to the proposed  treatment. I have also explained the risks and benefits involved in refusal of the proposed treatment and alternatives to the proposed treatment and have answered the patient's questions. If I have a significant financial interest in a co-management agreement or a significant financial interest in any product or implant, or other significant relationship used in this procedure/surgery, I have disclosed this and had a discussion with my patient.     _______________________________________________________________ _____________________________  (Signature of Physician)                                                                                         (Date)                                   (Time)  Patient Name: Yesenia LANE [place]  : 1940    Reviewed: 2024   Printed: 2024  Medical Record #: [id number] Page 2 of 2

## (undated) NOTE — LETTER
[place] [address]  Authorization for Invasive Procedures  Date: 12/26/2024           Time: 1020    I hereby authorize Dr. Manuel Harper, my physician and his/her assistants (if applicable), which may include medical students, residents, and/or fellows, to perform the following surgical operation/ procedure and administer such anesthesia as may be determined necessary by my physician: Thoracic Level 6 Kyphoplasty with General Anesthesia  on Yesenia Berkowitz  2.   I recognize that during the surgical operation/procedure, unforeseen conditions may necessitate additional or different procedures than those listed above.  I, therefore, further authorize and request that the above-named surgeon, assistants, or designees perform such procedures as are, in their judgment, necessary and desirable.    3.   My surgeon/physician has discussed prior to my surgery the potential benefits, risks and side effects of this procedure; the likelihood of achieving goals; and potential problems that might occur during recuperation.  They also discussed reasonable alternatives to the procedure, including risks, benefits, and side effects related to the alternatives and risks related to not receiving this procedure.  I have had all my questions answered and I acknowledge that no guarantee has been made as to the result that may be obtained.    4.   Should the need arise during my operation/procedure, which includes change of level of care prior to discharge, I also consent to the administration of blood and/or blood products.  Further, I understand that despite careful testing and screening of blood or blood products by collecting agencies, I may still be subject to ill effects as a result of receiving a blood transfusion and/or blood products.  The following are some, but not all, of the potential risks that can occur: fever and allergic reactions, hemolytic reactions, transmission of diseases such as Hepatitis,  AIDS and Cytomegalovirus (CMV) and fluid overload.  In the event that I wish to have an autologous transfusion of my own blood, or a directed donor transfusion, I will discuss this with my physician.   Check only if Refusing Blood or Blood Products  I understand refusal of blood or blood products as deemed necessary by my physician may have serious consequences to my condition to include possible death. I hereby assume responsibility for my refusal and release the hospital, its personnel, and my physicians from any responsibility for the consequences of my refusal.         o  Refuse         5.   I authorize the use of any specimen, organs, tissues, body parts or foreign objects that may be removed from my body during the operation/procedure for diagnosis, research or teaching purposes and their subsequent disposal by hospital authorities.  I also authorize the release of specimen test results and/or written reports to my treating physician on the hospital medical staff or other referring or consulting physicians involved in my care, at the discretion of the Pathologist or my treating physician.    6.   I consent to the photographing or videotaping of the operations or procedures to be performed, including appropriate portions of my body for medical, scientific, or educational purposes, provided my identity is not revealed by the pictures or by descriptive texts accompanying them.  If the procedure has been photographed/videotaped, the surgeon will obtain the original picture, image, videotape or CD.  The hospital will not be responsible for storage, release or maintenance of the picture, image, tape or CD.    7.   I consent to the presence of a  or observers in the operating room as deemed necessary by my physician or their designees.    8.   I recognize that in the event my procedure results in extended X-Ray/fluoroscopy time, I may develop a skin reaction.    9. If I have a Do Not Attempt  Resuscitation (DNAR) order in place, that status will be suspended while in the operating room, procedural suite, and during the recovery period unless otherwise explicitly stated by me (or a person authorized to consent on my behalf). The surgeon or my attending physician will determine when the applicable recovery period ends for purposes of reinstating the DNAR order.  10. Patients having a sterilization procedure: I understand that if the procedure is successful the results will be permanent and it will therefore be impossible for me to inseminate, conceive, or bear children.  I also understand that the procedure is intended to result in sterility, although the result has not been guaranteed.   11. I acknowledge that my physician has explained sedation/analgesia administration to me including the risk and benefits I consent to the administration of sedation/analgesia as may be necessary or desirable in the judgment of my physician.    I CERTIFY THAT I HAVE READ AND FULLY UNDERSTAND THE ABOVE CONSENT TO OPERATION and/or OTHER PROCEDURE.        ____________________________________       _________________________________      ______________________________  Signature of Patient         Signature of Responsible Person        Printed Name of Responsible Person        ____________________________________      _________________________________      ______________________________       Signature of Witness          Relationship to Patient                       Date                                       Time  Patient Name: Yesenia Berkowitz  : 1940    Reviewed: 2024   Printed: 2024  Medical Record #: [id number] Page 1 of 2             STATEMENT OF PHYSICIAN My signature below affirms that prior to the time of the procedure; I have explained to the patient and/or his/her legal representative, the risks and benefits involved in the proposed treatment and any reasonable alternative to the proposed  treatment. I have also explained the risks and benefits involved in refusal of the proposed treatment and alternatives to the proposed treatment and have answered the patient's questions. If I have a significant financial interest in a co-management agreement or a significant financial interest in any product or implant, or other significant relationship used in this procedure/surgery, I have disclosed this and had a discussion with my patient.     _______________________________________________________________ _____________________________  (Signature of Physician)                                                                                         (Date)                                   (Time)  Patient Name: Yesenia LANE [place]  : 1940    Reviewed: 2024   Printed: 2024  Medical Record #: [id number] Page 2 of 2

## (undated) NOTE — Clinical Note
[facility], [place]  [address], [place], [place]    Authorization for Surgical Operation and Procedure                               1. I hereby authorize * Surgery not found *, my physician and his/her assistants (if applicable), which may include medical students, residents, and/or fellows, to perform the following surgical operation/ procedure and administer such anesthesia as may be determined necessary by my physician: Operation/Procedure name (s)  on Yesenia Berkowitz   2.   I recognize that during the surgical operation/procedure, unforeseen conditions may necessitate additional or different procedures than those listed above.  I, therefore, further authorize and request that the above-named surgeon, assistants, or designees perform such procedures as are, in their judgment, necessary and desirable.    3.   My surgeon/physician has discussed prior to my surgery the potential benefits, risks and side effects of this procedure; the likelihood of achieving goals; and potential problems that might occur during recuperation.  They also discussed reasonable alternatives to the procedure, including risks, benefits, and side effects related to the alternatives and risks related to not receiving this procedure.  I have had all my questions answered and I acknowledge that no guarantee has been made as to the result that may be obtained.    4.   Should the need arise during my operation/procedure, which includes change of level of care prior to discharge, I also consent to the administration of blood and/or blood products.  Further, I understand that despite careful testing and screening of blood or blood products by collecting agencies, I may still be subject to ill effects as a result of receiving a blood transfusion and/or blood products.  The following are some, but not all, of the potential risks that can occur: fever and allergic reactions, hemolytic reactions, transmission of diseases such as Hepatitis,  AIDS and Cytomegalovirus (CMV) and fluid overload.  In the event that I wish to have an autologous transfusion of my own blood, or a directed donor transfusion, I will discuss this with my physician.  Check only if Refusing Blood or Blood Products  I understand refusal of blood or blood products as deemed necessary by my physician may have serious consequences to my condition to include possible death. I hereby assume responsibility for my refusal and release the hospital, its personnel, and my physicians from any responsibility for the consequences of my refusal.    o  Refuse   5.   I authorize the use of any specimen, organs, tissues, body parts or foreign objects that may be removed from my body during the operation/procedure for diagnosis, research or teaching purposes and their subsequent disposal by hospital authorities.  I also authorize the release of specimen test results and/or written reports to my treating physician on the hospital medical staff or other referring or consulting physicians involved in my care, at the discretion of the Pathologist or my treating physician.    6.   I consent to the photographing or videotaping of the operations or procedures to be performed, including appropriate portions of my body for medical, scientific, or educational purposes, provided my identity is not revealed by the pictures or by descriptive texts accompanying them.  If the procedure has been photographed/videotaped, the surgeon will obtain the original picture, image, videotape or CD.  The hospital will not be responsible for storage, release or maintenance of the picture, image, tape or CD.    7.   I consent to the presence of a  or observers in the operating room as deemed necessary by my physician or their designees.    8.   I recognize that in the event my procedure results in extended X-Ray/fluoroscopy time, I may develop a skin reaction.    9. If I have a Do Not Attempt Resuscitation  (DNAR) order in place, that status will be suspended while in the operating room, procedural suite, and during the recovery period unless otherwise explicitly stated by me (or a person authorized to consent on my behalf). The surgeon or my attending physician will determine when the applicable recovery period ends for purposes of reinstating the DNAR order.  10. Patients having a sterilization procedure: I understand that if the procedure is successful the results will be permanent and it will therefore be impossible for me to inseminate, conceive, or bear children.  I also understand that the procedure is intended to result in sterility, although the result has not been guaranteed.   11. I acknowledge that my physician has explained sedation/analgesia administration to me including the risk and benefits I consent to the administration of sedation/analgesia as may be necessary or desirable in the judgment of my physician.    I CERTIFY THAT I HAVE READ AND FULLY UNDERSTAND THE ABOVE CONSENT TO OPERATION and/or OTHER PROCEDURE.     ____________________________________  _________________________________        ______________________________  Signature of Patient    Signature of Responsible Person                Printed Name of Responsible Person                                      ____________________________________  _____________________________                ________________________________  Signature of Witness        Date  Time         Relationship to Patient    STATEMENT OF PHYSICIAN My signature below affirms that prior to the time of the procedure; I have explained to the patient and/or his/her legal representative, the risks and benefits involved in the proposed treatment and any reasonable alternative to the proposed treatment. I have also explained the risks and benefits involved in refusal of the proposed treatment and alternatives to the proposed treatment and have answered the patient's questions. If I  have a significant financial interest in a co-management agreement or a significant financial interest in any product or implant, or other significant relationship used in this procedure/surgery, I have disclosed this and had a discussion with my patient.     _____________________________________________________              _____________________________  (Signature of Physician)                                                                                         (Date)                                   (Time)  Patient Name: Yesenia LANE [place]      : 1940      Printed: 2024     Medical Record #: [id number]                                      Page 1 of 1

## (undated) NOTE — LETTER
Hospital Discharge Documentation  Please phone to schedule a hospital follow up appointment.    From: [facility] Hospitalist's Office  Phone: [phone]    Patient discharged time/date: 2024  1:00 PM  Patient discharge disposition:  Home Health Care Services, patient went [facility] with [facility].    No discharge summary available at this time.  Please see below for last patient progress note.           [facility]  part of [facility]     Progress Note           Yesenia Berkowitz Patient Status:  Inpatient    1940 MRN [id number]   Location [facility]5W Attending Obdulia Lovell, DO   Hosp Day # 3 PCP JO-ANN YANG MD      Chief complaint sob      Subjective:   Yesenia Berkowitz is a(n) 84 year old female pt doing better today. Wants to go home      ROS:   No cp, sob   No c/d   No n/v      Objective:   Blood pressure 127/62, pulse 74, temperature 98.7 °F (37.1 °C), temperature source Oral, resp. rate 20, height 5' 5\" (1.651 m), weight 169 lb 6.4 oz (76.8 kg), SpO2 98%.        Intake/Output Summary (Last 24 hours) at 2024 1816  Last data filed at 2024 1802      Gross per 24 hour   Intake 860 ml   Output 500 ml   Net 360 ml         Patient Weight(s) for the past 336 hrs:    Weight   24 0650 169 lb 6.4 oz (76.8 kg)   24 0700 174 lb (78.9 kg)   24 2200 173 lb 6.4 oz (78.7 kg)               General appearance: alert, appears stated age and cooperative  Pulmonary:  clear to auscultation bilaterally  Cardiovascular: S1, S2 normal, no murmur, click, rub or gallop, regular rate and rhythm  Abdominal: soft, non-tender; bowel sounds normal; no masses,  no organomegaly  Extremities: extremities normal, atraumatic, no cyanosis or edema           Medicines:      Current Hospital Medications          Current Facility-Administered Medications   Medication Dose Route Frequency    [START ON 2024] furosemide (Lasix) tab 40 mg  40 mg Oral BID (Diuretic)     albuterol (Ventolin) (2.5 MG/3ML) 0.083% nebulizer solution 2.5 mg  2.5 mg Nebulization Q4H PRN    amitriptyline (Elavil) tab 50 mg  50 mg Oral Nightly    aspirin chewable tab 162 mg  162 mg Oral Daily    benzonatate (Tessalon) cap 200 mg  200 mg Oral TID PRN    dicyclomine (Bentyl) cap 10 mg  10 mg Oral TID PRN    digoxin (Lanoxin) tab 125 mcg  125 mcg Oral Daily    dilTIAZem ER (CardIZEM CD) 24 hr cap 360 mg  360 mg Oral Daily    guaiFENesin (Robitussin) 100 MG/5 ML oral liquid 200 mg  200 mg Oral Q4H PRN    pantoprazole (Protonix) DR tab 40 mg  40 mg Oral QAM AC    cetirizine (ZyrTEC) tab 5 mg  5 mg Oral Nightly    montelukast (Singulair) tab 10 mg  10 mg Oral Nightly    fluticasone furoate (Arnuity Ellipta) 100 MCG/ACT inhaler 1 puff  1 puff Inhalation Daily    umeclidinium bromide (Incruse Ellipta) 62.5 MCG/ACT inhaler 1 puff  1 puff Inhalation Daily    glucose (Dex4) 15 GM/59ML oral liquid 15 g  15 g Oral Q15 Min PRN     Or    glucose (Glutose) 40% oral gel 15 g  15 g Oral Q15 Min PRN     Or    glucose-vitamin C (Dex-4) chewable tab 4 tablet  4 tablet Oral Q15 Min PRN     Or    dextrose 50% injection 50 mL  50 mL Intravenous Q15 Min PRN     Or    glucose (Dex4) 15 GM/59ML oral liquid 30 g  30 g Oral Q15 Min PRN     Or    glucose (Glutose) 40% oral gel 30 g  30 g Oral Q15 Min PRN     Or    glucose-vitamin C (Dex-4) chewable tab 8 tablet  8 tablet Oral Q15 Min PRN    insulin aspart (NovoLOG) 100 Units/mL FlexPen 1-7 Units  1-7 Units Subcutaneous TID CC    acetaZOLAMIDE (Diamox) tab 500 mg  500 mg Oral Daily    enoxaparin (Lovenox) 40 MG/0.4ML SUBQ injection 40 mg  40 mg Subcutaneous Daily    acetaminophen (Tylenol Extra Strength) tab 500 mg  500 mg Oral Q4H PRN    ipratropium-albuterol (Duoneb) 0.5-2.5 (3) MG/3ML inhalation solution 3 mL  3 mL Nebulization Q6H PRN    predniSONE (Deltasone) tab 40 mg  40 mg Oral Daily    doxycycline (Vibramycin) cap 100 mg  100 mg Oral 2 times per day    ondansetron (Zofran) 4  MG/2ML injection 4 mg  4 mg Intravenous Q6H PRN    metoclopramide (Reglan) 5 mg/mL injection 10 mg  10 mg Intravenous Q8H PRN                  Ant-Infective Medications               doxycycline 100 MG Oral Cap     Doxycycline Monohydrate 100 MG Oral Cap ()                      Blood Pressure and Cardiac Medications               DIGOXIN 0.125 MG Oral Tab     dilTIAZem HCl ER Coated Beads 360 MG Oral Capsule SR 24 Hr                Medication Infusions                        Lab Results   Component Value Date     WBC 11.7 (H) 2024     HGB 10.7 (L) 2024     HCT 33.9 (L) 2024     .0 (L) 2024     CREATSERUM 0.62 2024     BUN 25 (H) 2024      2024     K 4.6 2024      2024     CO2 >40.0 (HH) 2024      (H) 2024     CA 8.7 2024     ALB 3.2 2024     ALKPHO 59 2024     BILT 0.4 2024     TP 5.0 (L) 2024     AST 20 2024     ALT 32 2024     PTT 24.3 2024     INR 1.08 2024     TSH 1.060 2023     LIP 30 2024     DDIMER 0.63 10/07/2024     MG 2.0 2024     PHOS 3.5 2024     TROP <0.045 2020         CT CHEST (EIS=31500)     Result Date: 2024  CONCLUSION:         1. Cardiomegaly and mild coronary atherosclerosis.  Findings consistent with mild CHF/fluid overload including small bilateral pleural effusions, slightly improved on the right, with associated bibasilar atelectasis. 2. Mild emphysema with a 10 x 7 mm spiculated right upper lobe pulmonary nodule.  This nodule is stable dating back to 2024 but has increased in size compared to prior studies.  A malignant etiology such as a slow growing primary bronchogenic carcinoma therefore cannot be excluded.  Consider further assessment with percutaneous biopsy or follow-up PET/CT when the patient is better able to cooperate. 3. Chronically elevated left hemidiaphragm compatible with the known  history of diaphragmatic paralysis. 4. Stable moderately dilated main pulmonary artery, which can be seen with chronic pulmonary hypertension; correlate clinically.     Dictated by (CST): Malik Reynolds MD on 11/25/2024 at 8:16 PM     Finalized by (CST): Malik Reynolds MD on 11/25/2024 at 8:22 PM                Results:      CBC:          Lab Results   Component Value Date     WBC 11.7 (H) 11/27/2024     WBC 16.6 (H) 11/26/2024     WBC 13.7 (H) 11/25/2024            Lab Results   Component Value Date     HGB 10.7 (L) 11/27/2024     HGB 11.0 (L) 11/26/2024     HGB 11.5 (L) 11/25/2024            Lab Results   Component Value Date     .0 (L) 11/27/2024     .0 (L) 11/26/2024     .0 11/25/2024                 Recent Labs   Lab 11/25/24  0446 11/25/24  1614 11/26/24  0449 11/27/24  0434 11/27/24  1546   *  --  156* 136*  --    BUN 23  --  23 25*  --    CREATSERUM 0.80  --  0.64 0.62  --    CA 8.4*  --  8.8 8.7  --      --  143 142  --    K 3.4*   < > 3.7 3.6  3.6 4.6   CL 97*  --  99 101  --    CO2 >40.0*  --  >40.0* >40.0*  --     < > = values in this interval not displayed.               Assessment and Plan:          Acute on chronic respiratory failure with hypercapnia, hypoxia  CHF exacerbation, last echo with EF 65 to 70%.    Possible COPD exacerbation  -Admit  -CHF protocol with diuretics  -Strict I's and O's  -Wean oxygen to baseline  -Activity as tolerated  -Pulmonology on consult  -Steroids, nebs, antibiotics     Leukocytosis, likely steroid-induced  -Was in the ER 11/18/2024, discharged on Decadron     Pulm htn with rv dysfunction     A-fib  HTN  HLD  -Resume home medications as appropriate     Vag dryness resume meds; estrogrn patch; per pt NO hist pe     Quality:  DVT Prophylaxis: Lovenox  CODE status: Full code  ANGEL: TBD        Patient will require inpatient services that will reasonably be expected to span two midnight's based on the clinical documentation in H+P.    Based on patients current state of illness, I anticipate that, after discharge, patient will require TBD.                 Obdulia Lovell DO           Chart reviewed, including current vitals, notes, labs and imaging  Labs ordered and medications adjusted as outlined above  Coordinate care with care team/consultants  Discussed with patient results of tests, management plan as outlined above, and the need for ongoing hospitalization  D/w RN     [facility]           11/27/2024                     Supplementary Documentation:  DVT Mechanical Prophylaxis:     Early ambuation      DVT Pharmacologic Prophylaxis   Medication    enoxaparin (Lovenox) 40 MG/0.4ML SUBQ injection 40 mg      DVT Pharmacologic prophylaxis: Aspirin 162 mg          Code Status: Full Code  García: No urinary catheter in place  García Duration (in days):   Central line:    ANGEL: 11/28/2024                                       Electronically signed by Obdulia Lovell DO at 11/27/2024  6:18 PM         ED to Hosp-Admission (Discharged) on 11/24/2024            Detailed Report          Note shared with patient  Chart Review: Note Routing History

## (undated) NOTE — MR AVS SNAPSHOT
[facility]  [address] [place] [address], [address] [place] [address]               Thank you for choosing us for your health care visit with El Peralta MD.  We are glad to serve you and happy to provide you with this summary of Take  by mouth. take 1 tablet by ORAL route  every day with food           * mupirocin 2 % Oint   Apply 1 Application topically 2 (two) times daily.    Commonly known as:  BACTROBAN           * mupirocin 2 % Oint   Apply 1 Application topically 3 (three) ti Enter your [employer] Activation Code exactly as it appears below along with your Zip Code and Date of Birth to complete the sign-up process. If you do not sign up before the expiration date, you must request a new code.     Your unique [employer]ter Access Code: [place] Visit [facility] online at  [email or web address]

## (undated) NOTE — LETTER
2/16/2024              Yesenia Berkowitz        [address]        [place] [address]         Dear Yesenia,    Our records indicate that the tests ordered for you by RAFI Jenkins  have not been done.    XR VIDEO SWALLOW (CPT=74230) (Order #[id number]) on 11/17/23       If you have, in fact, already completed the tests or you do not wish to have the tests done, please contact our office at THE NUMBER LISTED BELOW.  Otherwise, please proceed with the testing.  Enclosed is a duplicate order for your convenience.    To schedule a test, call Central Scheduling at [phone]      Sincerely,    RAFI Jenkins  [facility]  [address] [facility] [address]  [place] [address] [phone]

## (undated) NOTE — LETTER
Hospital Discharge Documentation  Please phone to schedule a hospital follow up appointment.    From: [facility] Hospitalist's Office  Phone: [phone]    Patient discharged time/date: 2024  2:47 PM  Patient discharge disposition:  Home Health Care Services, patient lewis home with [facility]       Discharge Summary - D/C Summary        Discharge Summary signed by Sina Hogan MD at 2024  9:55 AM  Version 1 of 1      Author: Sina Hogan MD Service: Hospitalist Author Type: Physician    Filed: 2024  9:55 AM Date of Service: 2024  1:17 PM Status: Signed    : Sina Hogan MD (Physician)           [facility]  part of [facility]    Discharge Summary    Yesenia Berkowitz Patient Status:  Inpatient    1940 MRN [id number]   Location [facility]5W Attending Sina Hogan MD   Hosp Day # 5 PCP JO-ANN YANG MD     Date of Admission: 2024 Disposition: Home Health Care Services     Date of Discharge: 24    Admitting Diagnosis: SOB (shortness of breath) [R06.02]    Hospital Discharge Diagnoses: Acute diastolic HF, Acute COPD exacerbation    Lace+ Score: 83  59-90 High Risk  29-58 Medium Risk  0-28   Low Risk.    TCM Follow-Up Recommendation:  LACE > 58: High Risk of readmission after discharge from the hospital.    Problem List:   Patient Active Problem List   Diagnosis    Actinic keratosis    Inflamed seborrheic keratosis    Palpitations    Pain of upper abdomen    Acute on chronic congestive heart failure, unspecified heart failure type (HCC)    COPD with acute exacerbation (HCC)    COPD (chronic obstructive pulmonary disease) (HCC)    Kyphoscoliosis    Phrenic nerve paralysis    Restrictive lung disease    Acute cor pulmonale (HCC)    Pneumonia    Acute on chronic hypoxic respiratory failure (HCC)    Acute pulmonary edema (HCC)    Pleural effusion    Pulmonary nodule    Community acquired pneumonia of right  lower lobe of lung    Acute on chronic respiratory failure with hypoxia and hypercapnia (HCC)    Acute respiratory failure with hypoxia and hypercapnia (HCC)    Cor pulmonale (chronic) (HCC)    SOB (shortness of breath)    Acute on chronic diastolic (congestive) heart failure (HCC)    Obesity hypoventilation syndrome (HCC)    Asthma (HCC)       Reason for Admission: SOB    Physical Exam:   Vitals:    12/18/24 1033   BP:    Pulse: 86   Resp:    Temp:      Gen: A+Ox3.  No distress.   HEENT: NCAT, neck supple, no carotid bruit.  CV: RRR, S1S2, and intact distal pulses. No gallop, rub, murmur.  Pulm: coarse bs L base, no wheezing  Abd: Soft, NTND, BS normal, no mass, no HSM, no rebound/guarding.   Neuro:  Normal reflexes, CN. Sensory/motor exams grossly normal deficit.   MS: No joint effusions.  1+ bilateral LE peripheral edema.  Skin: Skin is warm and dry. No rashes, erythema, diaphoresis.   Psych:   Normal mood and affect. Calm, cooperative    History of Present Illness:   Per Dr. Tate  This is a 84 year oldfemale recently discharged after hospitalization for COPD and congestive heart failure.  Noticed increase in swelling, weight, dyspnea at home.  Her main reason for coming in however was back pain located between the shoulder blades.  No radiation.      Hospital Course:   Acute on chronic diastolic congestive heart failure.  Presumably mostly due to noncompliance with diet.  Patient does not check her weight very regularly. Cr up slightly but still normal  -IV diuretics per cardiology -> dc on PO  -Cardiology was consulted  -cont GDMT     Acute COPD exacerbation.  -IV Solu-Medrol, now every 12 -> DC o PO  -Nebulizer treatments  -Pulmonology was on consult     Acute on chronic hypoxemic and hypercarbic respiratory failure.  Usually on 4 L. Some of her OJEDA may be related to inc right sided pressures.  -Continue oxygen  -Decrease as tolerated  -Bipap as tolerated  -Home BiPAP arranged     Back pain.  This was her  primary complaint but now much better.  Seems to be musculoskeletal.  -Heat pack  -Gentle activity  -No neurologic symptoms     Other problems  A-fib, proximal muscle  Diverticulosis  ?  Hepatitis, per chart     Consultations: Pulm, Cards    Procedures: none    Complications: none    Discharge Condition: Stable    Discharge Medications:      Discharge Medications        START taking these medications        Instructions Prescription details   doxycycline 100 MG Caps  Commonly known as: Vibramycin      Take 1 capsule (100 mg total) by mouth every 12 (twelve) hours for 10 doses.   Stop taking on: December 22, 2024  Quantity: 10 capsule  Refills: 0     empagliflozin 10 MG Tabs  Commonly known as: Jardiance      Take 1 tablet (10 mg total) by mouth daily.   Quantity: 30 tablet  Refills: 3     predniSONE 20 MG Tabs  Commonly known as: Deltasone      Take 1 tablet (20 mg total) by mouth daily for 10 doses.   Stop taking on: December 27, 2024  Quantity: 10 tablet  Refills: 0     spironolactone 25 MG Tabs  Commonly known as: Aldactone      Take 1 tablet (25 mg total) by mouth daily for 90 doses.   Stop taking on: March 17, 2025  Quantity: 90 tablet  Refills: 0            CONTINUE taking these medications        Instructions Prescription details   acetaminophen 500 MG Tabs  Commonly known as: Tylenol Extra Strength      Take 1 tablet (500 mg total) by mouth every 6 (six) hours as needed for Pain or Fever.   Refills: 0     acetaZOLAMIDE 250 MG Tabs  Commonly known as: Diamox      Take 2 tablets (500 mg total) by mouth daily.   Quantity: 30 tablet  Refills: 0     albuterol 108 (90 Base) MCG/ACT Aers  Commonly known as: Ventolin HFA      Inhale 2 puffs into the lungs as needed.   Refills: 0     albuterol (2.5 MG/3ML) 0.083% Nebu  Commonly known as: Ventolin      Take 3 mL (2.5 mg total) by nebulization every 4 (four) hours as needed for Wheezing or Shortness of Breath.   Quantity: 50 each  Refills: 0     aspirin 81 MG Tabs       Take 2 tablets (162 mg total) by mouth daily.   Refills: 0     B Complex Caps      Take 1 tablet by mouth daily.   Refills: 0     Calcium + D3 600-200 MG-UNIT Tabs      Take 1 tablet by mouth daily.   Refills: 0     dicyclomine 10 MG Caps  Commonly known as: Bentyl      Take 1 capsule (10 mg total) by mouth 3 (three) times daily as needed (abdominal pain).   Quantity: 40 capsule  Refills: 3     digoxin 0.125 MG Tabs  Commonly known as: Lanoxin      Take 1 tablet (125 mcg total) by mouth daily.   Quantity: 30 tablet  Refills: 0     dilTIAZem HCl ER Coated Beads 360 MG Cp24  Commonly known as: CARDIZEM CD      Take 1 capsule (360 mg total) by mouth daily. Take 1 capsule (360mg)by mouth every morning on an empty stomach.   Refills: 0     estradiol 0.05 MG/24HR Ptwk  Commonly known as: Climara      Place 1 patch onto the skin once a week. As directed.   Refills: 0     furosemide 40 MG Tabs  Commonly known as: Lasix      Take 1 tablet (40 mg total) by mouth BID (Diuretic).   Quantity: 60 tablet  Refills: 0     lansoprazole 30 MG Cpdr  Commonly known as: Prevacid      Take 1 capsule (30 mg total) by mouth nightly.   Refills: 0     Loratadine 10 MG Caps      Take 10 mg by mouth nightly.   Refills: 0     montelukast 10 MG Tabs  Commonly known as: Singulair      Take 1 tablet (10 mg total) by mouth nightly.   Refills: 0     mupirocin 2 % Oint  Commonly known as: Bactroban      Apply 1 Application topically 3 (three) times daily.   Quantity: 1 each  Refills: 3     omega-3 fatty acids 1000 MG Caps  Commonly known as: Fish Oil      Take 1,000 mg by mouth daily.   Refills: 0     potassium chloride 20 MEQ Tbcr  Commonly known as: Klor-Con M20      Take 1 tablet (20 mEq total) by mouth nightly.   Refills: 0     Pulmicort Flexhaler 180 MCG/ACT Aepb  Generic drug: Budesonide      Inhale 2 puffs into the lungs daily.   Refills: 0     THERA/BETA-CAROTENE Tabs      take 1 tablet by ORAL route  every day with food   Refills: 0      tiotropium 18 MCG Caps  Commonly known as: Spiriva Handihaler      Inhale 1 capsule (18 mcg total) into the lungs nightly.   Refills: 0     triamcinolone 0.1 % Crea  Commonly known as: Kenalog      Apply topically 2 (two) times daily. Apply bid as directed   Quantity: 80 g  Refills: 3     Vitamin D 1000 units Tabs      Take 1,000 Units by mouth 2 (two) times daily.   Refills: 0            STOP taking these medications      amitriptyline 50 MG Tabs  Commonly known as: Elavil        benzonatate 200 MG Caps  Commonly known as: Tessalon        guaiFENesin 100 MG/5 ML  Commonly known as: Robitussin                  Where to Get Your Medications        These medications were sent to [id number] - [place], [place] - [address] [place] [address] [phone], [phone]  [address] [place] [address]      Phone: [phone]   doxycycline 100 MG Caps  empagliflozin 10 MG Tabs  predniSONE 20 MG Tabs  spironolactone 25 MG Tabs         Greater than 35 minutes spent, >50% spent counseling re: treatment plan and workup     Sina Hogan MD  12/18/2024      Electronically signed by Sina Hogan MD on 12/19/2024  9:55 AM